# Patient Record
Sex: FEMALE | Race: WHITE | NOT HISPANIC OR LATINO | Employment: OTHER | URBAN - METROPOLITAN AREA
[De-identification: names, ages, dates, MRNs, and addresses within clinical notes are randomized per-mention and may not be internally consistent; named-entity substitution may affect disease eponyms.]

---

## 2017-01-13 ENCOUNTER — APPOINTMENT (OUTPATIENT)
Dept: MEDICAL GROUP | Facility: MEDICAL CENTER | Age: 82
End: 2017-01-13
Payer: MEDICARE

## 2017-01-30 ENCOUNTER — OFFICE VISIT (OUTPATIENT)
Dept: MEDICAL GROUP | Facility: MEDICAL CENTER | Age: 82
End: 2017-01-30
Payer: MEDICARE

## 2017-01-30 VITALS
SYSTOLIC BLOOD PRESSURE: 122 MMHG | HEIGHT: 64 IN | OXYGEN SATURATION: 97 % | HEART RATE: 67 BPM | BODY MASS INDEX: 27.66 KG/M2 | TEMPERATURE: 98.6 F | DIASTOLIC BLOOD PRESSURE: 64 MMHG | WEIGHT: 162 LBS

## 2017-01-30 DIAGNOSIS — Z96.659 HISTORY OF TOTAL KNEE ARTHROPLASTY, UNSPECIFIED LATERALITY: ICD-10-CM

## 2017-01-30 DIAGNOSIS — G47.00 INSOMNIA, UNSPECIFIED TYPE: Chronic | ICD-10-CM

## 2017-01-30 DIAGNOSIS — M81.0 POSTMENOPAUSAL BONE LOSS: ICD-10-CM

## 2017-01-30 DIAGNOSIS — M25.561 PAIN IN BOTH KNEES, UNSPECIFIED CHRONICITY: ICD-10-CM

## 2017-01-30 DIAGNOSIS — M25.562 PAIN IN BOTH KNEES, UNSPECIFIED CHRONICITY: ICD-10-CM

## 2017-01-30 PROCEDURE — 99214 OFFICE O/P EST MOD 30 MIN: CPT | Performed by: INTERNAL MEDICINE

## 2017-01-30 RX ORDER — CELECOXIB 100 MG/1
100 CAPSULE ORAL 2 TIMES DAILY
COMMUNITY
End: 2017-05-22

## 2017-01-30 RX ORDER — TRAMADOL HYDROCHLORIDE 50 MG/1
50 TABLET ORAL 2 TIMES DAILY PRN
Qty: 60 TAB | Refills: 5 | Status: SHIPPED
Start: 2017-01-30 | End: 2017-04-04

## 2017-01-30 RX ORDER — TRAZODONE HYDROCHLORIDE 50 MG/1
50 TABLET ORAL NIGHTLY PRN
Qty: 30 TAB | Refills: 3 | Status: SHIPPED
Start: 2017-01-30 | End: 2017-04-04

## 2017-01-30 NOTE — MR AVS SNAPSHOT
"        Yvonne Marie Wada   2017 1:40 PM   Office Visit   MRN: [id number]    Department:  [facility]   Dept Phone:  [phone]    Description:  Female : 1935   Provider:  Tee Tran M.D.           Allergies as of 2017     Allergen Noted Reactions    Codeine 2012   Nausea    Synthetic codones ok      You were diagnosed with     History of total knee arthroplasty, unspecified laterality   [680958]       Pain in both knees, unspecified chronicity   [9145331]       Insomnia, unspecified type   [7843397]       Postmenopausal bone loss   [127743]         Vital Signs     Blood Pressure Pulse Temperature Height Weight Body Mass Index    122/64 mmHg 67 37 °C (98.6 °F) 1.626 m (5' 4\") 73.483 kg (162 lb) 27.79 kg/m2    Oxygen Saturation Smoking Status                97% Never Smoker           Basic Information     Date Of Birth Sex Race Ethnicity Preferred Language    1935 Female White Non- English      Problem List              ICD-10-CM Priority Class Noted - Resolved    Status post lumbar surgery (Chronic) Z98.890   2010 - Present    Hypertension (Chronic) I10   2010 - Present    Dyslipidemia (Chronic) E78.5   2010 - Present    S/P TOMY (total abdominal hysterectomy) (Chronic) Z90.710   2010 - Present    Preventative health care (Chronic) Z00.00   2010 - Present    History of total knee arthroplasty Z96.659   2010 - Present    S/P appendectomy Z90.49   2010 - Present    Osteoporosis, idiopathic (Chronic) M81.8   2010 - Present    Hypothyroid (Chronic) E03.9   2011 - Present    History of shingles Z86.19   2011 - Present    GERD (gastroesophageal reflux disease) (Chronic) K21.9   2012 - Present    Cervical pain M54.2   2014 - Present    S/P thoracic aortic aneurysm repair (Chronic) Z98.890, Z86.79   3/29/2014 - Present    Decreased GFR R94.4   4/15/2016 - Present    Lower urinary tract infectious disease N39.0   " 7/10/2016 - Present    UTI (urinary tract infection) N39.0   11/12/2016 - Present    Insomnia (Chronic) G47.00   1/30/2017 - Present      Health Maintenance        Date Due Completion Dates    IMM DTaP/Tdap/Td Vaccine (1 - Tdap) 11/29/1955 ---    PAP SMEAR 11/29/1957 ---    MAMMOGRAM 5/22/2016 5/22/2015, 4/16/2014, 9/20/2012, 9/13/2011, 8/9/2010, 8/9/2010, 11/19/2008, 11/19/2008, 10/24/2007, 10/24/2007, 9/28/2006, 5/23/2005, 5/21/2004    IMM INFLUENZA (1) 9/1/2016 10/15/2015, 10/1/2014    COLONOSCOPY 6/26/2019 6/26/2009 (Prv Comp), 12/11/2003    Override on 6/26/2009: Previously completed ([facility])    BONE DENSITY 2/2/2020 2/2/2015, 9/20/2012, 8/9/2010            Current Immunizations     13-VALENT PCV PREVNAR 4/14/2015    Influenza Vaccine Adult HD 10/15/2015  9:34 AM, 10/1/2014    Pneumococcal polysaccharide vaccine (PPSV-23) 10/9/2004    SHINGLES VACCINE 9/9/2011      Below and/or attached are the medications your provider expects you to take. Review all of your home medications and newly ordered medications with your provider and/or pharmacist. Follow medication instructions as directed by your provider and/or pharmacist. Please keep your medication list with you and share with your provider. Update the information when medications are discontinued, doses are changed, or new medications (including over-the-counter products) are added; and carry medication information at all times in the event of emergency situations     Allergies:  CODEINE - Nausea               Medications  Valid as of: January 30, 2017 -  2:28 PM    Generic Name Brand Name Tablet Size Instructions for use    Aspirin (Tab)  MG Take 1 Tab by mouth 2 Times a Day.        Atorvastatin Calcium (Tab) LIPITOR 20 MG Take 20 mg by mouth every evening.        Benazepril HCl (Tab) LOTENSIN 40 MG Take 1 Tab by mouth every day.        Calcium Carbonate-Vitamin D   Take 1 Tab by mouth every day.        Celecoxib (Cap) CELEBREX 100 MG Take 100 mg by mouth  2 times a day.        Cholecalciferol (Tab) cholecalciferol 1000 UNIT Take 1,000 Units by mouth every day.        Lansoprazole (CAPSULE DELAYED RELEASE) PREVACID 15 MG Take 15 mg by mouth 1 time daily as needed.        Levothyroxine Sodium (Tab) SYNTHROID 75 MCG Take 1 Tab by mouth Every morning on an empty stomach.        Magnesium Gluconate (Tab) MAG-G 500 MG Take 500 mg by mouth every day.        Metoprolol Succinate (TABLET SR 24 HR) TOPROL XL 25 MG TAKE 1/2 TABLET DAILY        Multiple Vitamins-Minerals   Take 1 Tab by mouth every day.        TraMADol HCl (Tab) ULTRAM 50 MG Take 1 Tab by mouth 2 times a day as needed for Moderate Pain.        TraZODone HCl (Tab) DESYREL 50 MG Take 1 Tab by mouth at bedtime as needed for Sleep.        .                 Medicines prescribed today were sent to:     [id number] - [place] - [address]    [address] [place] [address]    Phone: [phone] Fax: [phone]    Open 24 Hours?: No    [facility] [place], [place] - [address] AT PORTAL TO REGISTERED [facility] SIT[facility]    [address] [facility] [address]    Phone: [phone] Fax: [phone]    Open 24 Hours?: No      Medication refill instructions:       If your prescription bottle indicates you have medication refills left, it is not necessary to call your provider’s office. Please contact your pharmacy and they will refill your medication.    If your prescription bottle indicates you do not have any refills left, you may request refills at any time through one of the following ways: The online [employer] system (except Urgent Care), by calling your provider’s office, or by asking your pharmacy to contact your provider’s office with a refill request. Medication refills are processed only during regular business hours and may not be available until the next business day. Your provider may request additional information or to have a follow-up visit with you prior to  refilling your medication.   *Please Note: Medication refills are assigned a new Rx number when refilled electronically. Your pharmacy may indicate that no refills were authorized even though a new prescription for the same medication is available at the pharmacy. Please request the medicine by name with the pharmacy before contacting your provider for a refill.        Your To Do List     Future Labs/Procedures Complete By Expires    DS-BONE DENSITY STUDY (DEXA)  As directed 8/2/2017      Other Notes About Your Plan     1/30/17 dexa ordered  1/30/17 trazodone for sleep, tramadol for pain continue Celebrex  4/17 Need all labs  Repeat pneumovax               MyChart Status: Patient Declined

## 2017-01-30 NOTE — PROGRESS NOTES
Subjective:      Yvonne Marie Wada is a 81 y.o. female who presents with insomnia        HPI     Has had recent knee surgery x 2, most recent in november, ambulating using cane and no falls, no swelling, does have more pain at night right knee since the surgery, taking celebrex daily once with no GI upset or irritation. No blood in stools.  Denies anxiety or depression.  Difficulty with sleep maintenance because of knee pain.  Does not take hydrocodone.  Takes occasional Aleve, no GI upset with Celebrex or Aleve.  No incontinence of bowel or bladder  No hip pain or back pain  Right knee pain can be sharp in nature, usually worse with activity or laying on the right side, no radiation down her legs, improved with rest  No swelling, no redness of the right knee, no open sores or lesions, no fevers or chills  Hypertension followed by cardiology has been stable on Lotensin and metoprolol  Dyslipidemia on Lipitor no muscle pain or muscle aches with statin therapy  No tobacco, no alcohol        Current Outpatient Prescriptions   Medication Sig Dispense Refill   • celecoxib (CELEBREX) 100 MG Cap Take 100 mg by mouth 2 times a day.     • atorvastatin (LIPITOR) 20 MG Tab Take 20 mg by mouth every evening.     • naproxen (ALEVE) 220 MG tablet Take 440 mg by mouth 2 times a day as needed.     • aspirin (ASA) 325 MG Tab Take 1 Tab by mouth 2 Times a Day. 60 Tab 3   • lansoprazole (PREVACID) 15 MG CAPSULE DELAYED RELEASE Take 15 mg by mouth 1 time daily as needed.     • levothyroxine (SYNTHROID) 75 MCG Tab Take 1 Tab by mouth Every morning on an empty stomach. 90 Tab 3   • benazepril (LOTENSIN) 40 MG tablet Take 1 Tab by mouth every day. 90 Tab 3   • metoprolol SR (TOPROL XL) 25 MG TABLET SR 24 HR TAKE 1/2 TABLET DAILY 45 Tab 3   • magnesium gluconate (MAG-G) 500 MG tablet Take 500 mg by mouth every day.     • Multiple Vitamins-Minerals (CENTRUM SILVER ULTRA WOMENS PO) Take 1 Tab by mouth every day.     • vitamin D  (CHOLECALCIFEROL) 1000 UNIT TABS Take 1,000 Units by mouth every day.     • Calcium Carbonate-Vitamin D (CALTRATE 600+D PO) Take 1 Tab by mouth every day.       No current facility-administered medications for this visit.     Patient Active Problem List    Diagnosis Date Noted   • UTI (urinary tract infection) 11/12/2016   • Lower urinary tract infectious disease 07/10/2016   • Decreased GFR 04/15/2016   • Arthritis of knee, right 03/02/2016   • S/P thoracic aortic aneurysm repair 03/29/2014   • Cervical pain 02/28/2014   • GERD (gastroesophageal reflux disease) 07/12/2012   • History of shingles 06/30/2011   • Hypothyroid 04/08/2011   • Osteoporosis, idiopathic 08/09/2010   • Status post lumbar surgery 07/26/2010   • Hypertension 07/26/2010   • Dyslipidemia 07/26/2010   • S/P TOMY (total abdominal hysterectomy) 07/26/2010   • Preventative health care 07/26/2010   • History of total knee arthroplasty 07/26/2010   • S/P appendectomy 07/26/2010         Arthritis  2/29/16  orthopedics x-ray right knee advanced DJD on the right knee corticosteroid injection performed, prescription voltaren gel  6/13/16  orthopedic note x-rays demonstrate right knee advanced DJD and resurfaced patella, offer right knee total replacement followed by patellar resurfacing after she recovers from her right knee    Cervical pain  2/10/14 [facility] note; x-ray cervical spine DJD, right shoulder steroid injection    Decreased GFR  5/31/09 bun 18,creat 1.2  1/14/10 bun 28,creat 1.3,GFR 40  9/20/12 bun 37,creat 1.1,GFR 48  9/25/13 bun 25,creat 1.2,GFR 44  9/26/14 bun 26,creat 1.1,GFR 45  2/23/15 bun 20,creat 1.1,GFR 48  4/15/16 bun 31,creat 1.1,GFR 45  7/6/16 bun 29,creat 1.1,GFR 44     Dyslipidemia  4/11 chol 159,trig 84,hdl 47,ldl 95,cpk 114  7/12 chol 163,trig 120,hdl 49,ldl 90, crp 1.1 on lipitor 20 mg  3/26/13 chol 159,trig 99,hdl 46,ldl 93 on lipitor 20 mg  9/25/13 chol 164,trig 100,hdl 47,ldl 97 on lipitor 20 mg  9/12/14 cardiology  note lower extremity weakness, hold lipitor x3 months, labs ordered  12/15/14 cardiology start low dose lipitor 10 mg  1/22/15 off lipitor will resume 10 mg and repeat labs 4 weeks  2/24/15 chol 179,trig 111,hdl 54,ldl 103 on lipitor 10 mg  2/1/16 cardiology note restart lipitor 20 mg   4/15/16 chol 163,trig 102,hdl 48,ldl 95 on lipitor 20 mg    Gastroesophageal reflux  6/07 EGD per [facility] hiatal hernia, start prevacid 30 mg  7/12 protonix 20 mg qday  11/16/12 [facility] note cont prilosec  1/5/16 change to protonix 40 mg from prilosec    History shingles    History total knee arthroplasty  4/10 MRI right knee complex tear medial meniscus, horizontal cleavage tear lateral meniscus, chondromalacia patella, moderate-sized baker's cyst  5/10 right meniscus knee repair   5/10 told by  had gout on surgery had pathology report, I await that report, question gout seen on pathology report done during repair of right meniscus knee surgery.  7/10 uric acid 6.3, we'll await starting allopurinol depending on the operative report  8/5/10 reviewed orthopedics notes , operative report indicates crystal deposition, could be gout or pseudogout, no biopsy results, suspect chondrocalcinosis  10/11 dr.parlasca najera note, MRI pending  8/7/10 reviewed pathology report right knee tissue, marked degenerative changes with focal calcification, no mention of gout. Based on this and a normal uric acid level, I do not believe she has gout, has no hyperuricemia medications would be indicated  10/11 dr.parlasca najera left knee meniscectomy and arthroscopy  1/12 dr.parlasca najera left knee arthroplasty  6/13/16  orthopedic note x-rays demonstrate right knee advanced DJD and resurfaced patella, offer right knee total replacement followed by patellar resurfacing after she recovers from her right knee  9/8/16  orthopedic's operative note right knee total arthroplasty  11/2/16  orthopedic note, follow-up  right knee, x-ray right knee shows subluxation patella, traumatic evulsion VMO needs reexploration and repair  11/10/16  orthopedic's operative note VMO quadriceps avulsion repair status post total knee replacement  11/23/16  orthopedic no follow-up quadriceps repair, continue crutches in full extension, follow-up one month and then start passive range of motion 0-40°    Hypertension  Sees cardiology  1/10/11 [facility] note cardiac catheterization normal systolic function  3/11 [facility] echo moderate aortic insufficiency, moderate mitral insufficiency, moderate tricuspid insufficiency, normal LV function  5/12 echo [facility] normal LV function EF 60%, moderate to severe left atrial enlargement, RVSP 32   9/26/13 CT thoracic aorta no evidence of aneurysm, small hiatal hernia  9/26/13 Persantine thallium uniform breast tissue attenuation, cannot rule out underlying ischemic, LVEF 67%  10/3/13 on toprol-XL 25 mg half a tablet daily   12/13/13 cardiology note cont same meds, repeat echo and follow up 6 months  1/2/14 echo mild LVH, grade 1 diastolic dysfunction, ascending aortic 4.0, no change compared with ZAK 2010  5/15/14 cardiology note; increase benazepril to 40 mg qday,continue toprol XL 25 mg daily  6/17/15 cardiology note continue meds, repeat echo 6 months  2/1/16 cardiology note moderate pulmonary hypertension and snoring, nocturnal pulse oximetry ordered  6/30/16 cardiology note patient declines overnight pulse oximetry    Hypothyroid  4/11 tsh 9 start synthroid 50  4/11 tsh 6,free t3 3.1,free t4 1.2,tpo negative  7/1/11 tsh 2.1 cont synthroid 50 mcg  7/12 tsh 3.4 cont synthroid 50 mcg  7/31/13 tsh 3.2 on synthroid 50 mcg  9/25/13 tsh 1.7 on synthroid 50 mcg  2/24/15 tsh 4.9 on synthroid 50 mcg; increase to synthroid 75 mcg, repeat tsh in 6 weeks  4/14/15 tsh 1.1 on synthroid 75 mcg  4/15/16 tsh 2.3 on synthroid 75 mcg    Osteoporosis  Had been on fosamax [id number]   8/10 dexa LS -2.0,hip -1.5 await old dexa  result, she will get copy for me  4/11 vit d 35  9/12 dexa LS -3.2,hip -1.4  2/13/13 reclast IV  7/31/13 vit d 33 cont 2000 units  2/18/14 reclast IV  2/2/15 dexa LS -3.1,hip -1.9; FRAX 35.5 % major,12.6% hip  2/18/15 reclast IV  2/24/15 vit d 33  4/15/16 vit d 36    Preventative health  6/27/09 colon per [facility] no records  10/19/04 pneumovax   9/9/11 zoster vaccine  2/2/15 dexa LS -3.1,hip -1.9; FRAX 35.5 % major,12.6% hip  4/14/15 prevnar  5/22/15 mammogram  4/15/16 vit d 36    Status post TOMY    Status post thoracic aneurysm repair  6/29/06 CT-CTA chest with and without postprocessing; stable ascending thoracic aortic aneurysm maximum diameter 4.8, just distal to the aortic valve maximum diameter 4.3  4/9/07 CT-CTA chest with and without processing; stable ascending aortic thoracic aneurysm 4.5 x 4.6 cm  6/8/09 CT chest with; stable 4.7 ascending aorta aneurysm  10/15/09 CT chest without; dilated ascending thoracic aorta 4.9 cm aneurysm increased from 4.7  1/11/10  cardiovascular surgery operative note ascending thoracic aorta aneurysm repair  9/26/13 CT thoracic aorta evaluation; status post repair ascending thoracic aortic aneurysm without evidence of dissection or leak  1/2/14 echo mild LVH, grade 1 diastolic dysfunction, ascending aortic 4.0, no change compared with ZAK 2010  5/15/14 cardiology note  6/17/15 cardiology note cont meds,repeat echo 6 months  2/1/16 cardiology note moderate pulmonary hypertension and snoring, nocturnal pulse oximetry ordered    Status post lumbar surgery  6/03 L4-5 epidural    7/06  spinal surgery operative note decompression, foraminotomy. L4-L5 posterior fusion, L4-L5 allograft   3/5/14 MRI lumbar spine grade 2 spondylolisthesis L4-L5 with previous laminectomy and posterior fusion, left sided pedicle screw fixation L4-L5, moderate central canal stenosis L5-S1 secondary to facet arthropathy, mild to moderate multilevel neural foraminal  "narrowing  12/8/14  pain [facility] note; right lower extremity radiculitis L4-L5 lesion, myofascial lumbosacral pain, trochanteric bursitis, followup as needed  4/2/15  pain procedure note right L4-L5 and right L5-S1 SSNRB under fluoroscopy  4/27/15  pain note; right trochanteric bursal injection, trigger point injections performed, also set up SI joint injections  8/3/15 [facility] neurosurgery note, lumbar x-ray flexion and extension, MRI lumbar spine without contrast, followup   8/9/15 MRI lumbar spine, previous L4-L5 left  fusion and laminectomy, L3-L4 moderate bilateral facet arthropathy, mild disc bulge, L4-L5 severe bilateral facet arthropathy, moderate to severe bilateral neural foraminal stenosis, L5-S1 moderate facet arthropathy  8/28/15  neurosurgery note previous posterior fusion L4-L5, does have L3-L4 disc bulge with central canal stenosis, recommend L3-L5 decompression  8/31/15  pain note; right lower extremity radiculitis, myofascial lumbosacral pain, start hydrocodone 5 mg, continued SI joint exercises, perform trochanteric bursal injection right hip, trigger points lumbar region  10/28/15  neurosurgery note, will schedule for posterior L3-L4 laminectomy and fusion with redo L4-5 laminectomy and exploration of fusion, surgical clearance per cardiology   12/9/15 [facility] neurosurgery note s/p L4-S1 fusion on 11/24/15 , follow up in 4 weeks  12/23/15  neurosurgery note, clear to restart physical therapy if she would like after one month, follow-up 3 months             ROS       Objective:     /64 mmHg  Pulse 67  Temp(Src) 37 °C (98.6 °F)  Ht 1.626 m (5' 4\")  Wt 73.483 kg (162 lb)  BMI 27.79 kg/m2  SpO2 97%     Physical Exam   Constitutional: She appears well-developed and well-nourished. No distress.   HENT:   Head: Normocephalic and atraumatic.   Eyes: Conjunctivae are normal. Right eye exhibits no discharge. Left eye " exhibits no discharge.   Neck: No thyromegaly present.   Cardiovascular: Normal rate and regular rhythm.    Murmur heard.  Pulmonary/Chest: Effort normal and breath sounds normal. No respiratory distress. She has no wheezes.   Abdominal: She exhibits no distension.   Musculoskeletal: She exhibits no edema.   Neurological: She is alert.   Skin: Skin is warm. She is not diaphoretic.   Psychiatric: She has a normal mood and affect. Her behavior is normal.   Nursing note and vitals reviewed.    Limited right knee range of motion flexion and extension, no edema, incision site clean, dry, intact  Mild tenderness over the patellar tendon          Assessment/Plan:     Assessment  #!  Status post right knee surgery ×2 with most recent in November by Dr. manzo orthopedics, not going to physical therapy, still with knee pain and limited range of motion using cane for ambulation  9/8/16  orthopedic's operative note right knee total arthroplasty  11/2/16  orthopedic note, follow-up right knee, x-ray right knee shows subluxation patella, traumatic evulsion VMO needs reexploration and repair  11/10/16  orthopedic's operative note VMO quadriceps avulsion repair status post total knee replacement    #2 insomnia related to knee pain, no evidence of depression    #3 hypertension stable on Lotensin and Toprol    #4 osteoporosis on reclast did not get injection last year    Plan  #1 trazodone 50 every evening as needed for insomnia medication may cause daytime drowsiness, fall precautions    #2 add tramadol 50 mg twice a day as needed, may cause lightheadedness, dizziness, headache, nausea, drowsiness    #3 continue Celebrex    #4 do not take Aleve or Advil with Celebrex     #5 repeat bone density    #6 declines mammogram    #7 influenza vaccine at pharmacy    #8 no alcohol with medication

## 2017-02-26 DIAGNOSIS — Z86.79 S/P THORACIC AORTIC ANEURYSM REPAIR: ICD-10-CM

## 2017-02-26 DIAGNOSIS — Z98.890 S/P THORACIC AORTIC ANEURYSM REPAIR: ICD-10-CM

## 2017-02-26 DIAGNOSIS — I35.1 AORTIC VALVE REGURGITATION, ACQUIRED: ICD-10-CM

## 2017-02-26 DIAGNOSIS — I10 ESSENTIAL HYPERTENSION: ICD-10-CM

## 2017-02-26 RX ORDER — BENAZEPRIL HYDROCHLORIDE 40 MG/1
40 TABLET ORAL DAILY
Qty: 90 TAB | Refills: 3 | Status: SHIPPED | OUTPATIENT
Start: 2017-02-26 | End: 2017-06-07 | Stop reason: SDUPTHER

## 2017-03-21 DIAGNOSIS — I10 ESSENTIAL HYPERTENSION: ICD-10-CM

## 2017-03-21 RX ORDER — METOPROLOL SUCCINATE 25 MG/1
TABLET, EXTENDED RELEASE ORAL
Qty: 45 TAB | Refills: 3 | Status: SHIPPED | OUTPATIENT
Start: 2017-03-21 | End: 2017-11-01

## 2017-04-04 ENCOUNTER — OFFICE VISIT (OUTPATIENT)
Dept: MEDICAL GROUP | Facility: MEDICAL CENTER | Age: 82
End: 2017-04-04
Payer: MEDICARE

## 2017-04-04 VITALS
HEART RATE: 66 BPM | OXYGEN SATURATION: 96 % | HEIGHT: 64 IN | DIASTOLIC BLOOD PRESSURE: 58 MMHG | WEIGHT: 156 LBS | BODY MASS INDEX: 26.63 KG/M2 | TEMPERATURE: 98.1 F | SYSTOLIC BLOOD PRESSURE: 122 MMHG

## 2017-04-04 DIAGNOSIS — G89.29 CHRONIC PAIN OF RIGHT KNEE: ICD-10-CM

## 2017-04-04 DIAGNOSIS — G47.00 INSOMNIA, UNSPECIFIED TYPE: Chronic | ICD-10-CM

## 2017-04-04 DIAGNOSIS — G89.29 CHRONIC RIGHT SHOULDER PAIN: ICD-10-CM

## 2017-04-04 DIAGNOSIS — M25.561 CHRONIC PAIN OF RIGHT KNEE: ICD-10-CM

## 2017-04-04 DIAGNOSIS — M25.511 CHRONIC RIGHT SHOULDER PAIN: ICD-10-CM

## 2017-04-04 PROCEDURE — 99213 OFFICE O/P EST LOW 20 MIN: CPT | Performed by: INTERNAL MEDICINE

## 2017-04-04 PROCEDURE — 1101F PT FALLS ASSESS-DOCD LE1/YR: CPT | Performed by: INTERNAL MEDICINE

## 2017-04-04 PROCEDURE — G8432 DEP SCR NOT DOC, RNG: HCPCS | Performed by: INTERNAL MEDICINE

## 2017-04-04 PROCEDURE — 1036F TOBACCO NON-USER: CPT | Performed by: INTERNAL MEDICINE

## 2017-04-04 PROCEDURE — G8419 CALC BMI OUT NRM PARAM NOF/U: HCPCS | Performed by: INTERNAL MEDICINE

## 2017-04-04 PROCEDURE — 4040F PNEUMOC VAC/ADMIN/RCVD: CPT | Performed by: INTERNAL MEDICINE

## 2017-04-04 RX ORDER — NAPROXEN 500 MG/1
500 TABLET ORAL 2 TIMES DAILY PRN
Qty: 60 TAB | Refills: 5 | Status: SHIPPED | OUTPATIENT
Start: 2017-04-04 | End: 2017-11-01

## 2017-04-04 RX ORDER — TIZANIDINE 4 MG/1
4 TABLET ORAL NIGHTLY PRN
Qty: 30 TAB | Refills: 3 | Status: SHIPPED
Start: 2017-04-04 | End: 2017-09-18

## 2017-04-04 NOTE — PROGRESS NOTES
Subjective:      Yvonne Marie Wada is a 81 y.o. female who presents with arm pain           HPI    Has had knee surgery right side most recent right total knee replacement in november, not going to physical therapy, due to the knee pain has been using her arms to go from sitting to standing and that has been bothering her shoulders. Right handed female.  She will have right upper arm pain especially with lying on her back at night.  No radiation below the elbow.  No trauma to the area.  No sensory changes hands.  No neck pain or cervical radiculopathy.  No incontinence of bowel or bladder.  Pain can be sharp at times, but more often like a cramp with activity and movement of her shoulder.  Has been trying Aleve which has been beneficial.  2 tablets Aleve per day without any GI upset.  Tramadol caused nausea.  Trazodone did cause daytime drowsiness for insomnia, she is no longer on those medication.  Chronic right knee pain status post right knee surgery ×2 last year initial surgery performed on 9/8/16  orthopedic's operative note right knee total arthroplasty, with second surgery performed on 11/10/16  orthopedic's operative note VMO quadriceps avulsion repair status post total knee replacement.  No leg swelling.  No hip pain or low back pain.  Pain is worse with ambulation, right knee pain can be sharp, no radiation.  No sensory changes.  Worse with prolonged walking or standing  Cane for ambulation no falls  No tobacco        Current Outpatient Prescriptions   Medication Sig Dispense Refill   • metoprolol SR (TOPROL XL) 25 MG TABLET SR 24 HR TAKE 1/2 TABLET DAILY 45 Tab 3   • benazepril (LOTENSIN) 40 MG tablet Take 1 Tab by mouth every day. 90 Tab 3   • trazodone (DESYREL) 50 MG Tab Take 1 Tab by mouth at bedtime as needed for Sleep. 30 Tab 3   • tramadol (ULTRAM) 50 MG Tab Take 1 Tab by mouth 2 times a day as needed for Moderate Pain. 60 Tab 5   • atorvastatin (LIPITOR) 20 MG Tab Take 20 mg by mouth  every evening.     • aspirin (ASA) 325 MG Tab Take 1 Tab by mouth 2 Times a Day. 60 Tab 3   • lansoprazole (PREVACID) 15 MG CAPSULE DELAYED RELEASE Take 15 mg by mouth 1 time daily as needed.     • levothyroxine (SYNTHROID) 75 MCG Tab Take 1 Tab by mouth Every morning on an empty stomach. 90 Tab 3   • magnesium gluconate (MAG-G) 500 MG tablet Take 500 mg by mouth every day.     • Multiple Vitamins-Minerals (CENTRUM SILVER ULTRA WOMENS PO) Take 1 Tab by mouth every day.     • vitamin D (CHOLECALCIFEROL) 1000 UNIT TABS Take 1,000 Units by mouth every day.     • Calcium Carbonate-Vitamin D (CALTRATE 600+D PO) Take 1 Tab by mouth every day.     • celecoxib (CELEBREX) 100 MG Cap Take 100 mg by mouth 2 times a day.       No current facility-administered medications for this visit.     Patient Active Problem List    Diagnosis Date Noted   • Insomnia 01/30/2017   • UTI (urinary tract infection) 11/12/2016   • Lower urinary tract infectious disease 07/10/2016   • Decreased GFR 04/15/2016   • S/P thoracic aortic aneurysm repair 03/29/2014   • Cervical pain 02/28/2014   • GERD (gastroesophageal reflux disease) 07/12/2012   • History of shingles 06/30/2011   • Hypothyroid 04/08/2011   • Osteoporosis, idiopathic 08/09/2010   • Status post lumbar surgery 07/26/2010   • Hypertension 07/26/2010   • Dyslipidemia 07/26/2010   • S/P TOMY (total abdominal hysterectomy) 07/26/2010   • Preventative health care 07/26/2010   • History of total knee arthroplasty 07/26/2010   • S/P appendectomy 07/26/2010               Decreased GFR  5/31/09 bun 18,creat 1.2  1/14/10 bun 28,creat 1.3,GFR 40  9/20/12 bun 37,creat 1.1,GFR 48  9/25/13 bun 25,creat 1.2,GFR 44  9/26/14 bun 26,creat 1.1,GFR 45  2/23/15 bun 20,creat 1.1,GFR 48  4/15/16 bun 31,creat 1.1,GFR 45  7/6/16 bun 29,creat 1.1,GFR 44     Dyslipidemia  4/11 chol 159,trig 84,hdl 47,ldl 95,cpk 114  7/12 chol 163,trig 120,hdl 49,ldl 90, crp 1.1 on lipitor 20 mg  3/26/13 chol 159,trig 99,hdl  46,ldl 93 on lipitor 20 mg  9/25/13 chol 164,trig 100,hdl 47,ldl 97 on lipitor 20 mg  9/12/14 cardiology note lower extremity weakness, hold lipitor x3 months, labs ordered  12/15/14 cardiology start low dose lipitor 10 mg  1/22/15 off lipitor will resume 10 mg and repeat labs 4 weeks  2/24/15 chol 179,trig 111,hdl 54,ldl 103 on lipitor 10 mg  2/1/16 cardiology note restart lipitor 20 mg   4/15/16 chol 163,trig 102,hdl 48,ldl 95 on lipitor 20 mg    Gastroesophageal reflux  6/27/07 EGD per [facility] hiatal hernia, start prevacid 30 mg  7/12 protonix 20 mg qday  11/16/12 [facility] note cont prilosec  1/5/16 change to protonix 40 mg from prilosec    History shingles    History of total knee arthroplasty  4/10 MRI right knee complex tear medial meniscus, horizontal cleavage tear lateral meniscus, chondromalacia patella, moderate-sized baker's cyst  5/10 right meniscus knee repair   5/10 told by  had gout on surgery had pathology report, I await that report, question gout seen on pathology report done during repair of right meniscus knee surgery.  7/10 uric acid 6.3, await starting allopurinol depending on the operative report  8/5/10 reviewed orthopedics notes , operative report indicates crystal deposition, could be gout or pseudogout, no biopsy results, suspect chondrocalcinosis  10/11 dr.parlasca najera note, MRI pending  8/7/10 reviewed pathology report right knee tissue, marked degenerative changes with focal calcification, no mention of gout. Based on this and a normal uric acid level, I do not believe she has gout, has no hyperuricemia medications would be indicated  10/11 dr.parlasca najera left knee meniscectomy and arthroscopy  1/12 dr.parlasca najera left knee arthroplasty  2/29/16  orthopedics x-ray right knee advanced DJD on the right knee corticosteroid injection performed, prescription voltaren gel  6/13/16  orthopedic note x-rays demonstrate right knee advanced DJD and  resurfaced patella, offer right knee total replacement followed by patellar resurfacing after she recovers from her right knee  9/8/16  orthopedic's operative note right knee total arthroplasty  11/2/16  orthopedic note, follow-up right knee, x-ray right knee shows subluxation patella, traumatic evulsion VMO needs reexploration and repair  11/10/16  orthopedic's operative note VMO quadriceps avulsion repair status post total knee replacement  11/23/16  orthopedic no follow-up quadriceps repair, continue crutches in full extension, follow-up one month and then start passive range of motion 0-40°  2/4/17  orthopedic note, continue physical therapy, follow-up this summer    Hypertension  Sees cardiology  1/10/11 [facility] note cardiac catheterization normal systolic function  3/11 [facility] echo moderate aortic insufficiency, moderate mitral insufficiency, moderate tricuspid insufficiency, normal LV function  5/12 echo [facility] normal LV function EF 60%, moderate to severe left atrial enlargement, RVSP 32   9/26/13 CT thoracic aorta no evidence of aneurysm, small hiatal hernia  9/26/13 Persantine thallium uniform breast tissue attenuation, cannot rule out underlying ischemic, LVEF 67%  10/3/13 on toprol-XL 25 mg half a tablet daily   12/13/13 cardiology note cont same meds, repeat echo and follow up 6 months  1/2/14 echo mild LVH, grade 1 diastolic dysfunction, ascending aortic 4.0, no change compared with ZAK 2010  5/15/14 cardiology note; increase benazepril to 40 mg qday,continue toprol XL 25 mg daily  6/17/15 cardiology note continue meds, repeat echo 6 months  2/1/16 cardiology note moderate pulmonary hypertension and snoring, nocturnal pulse oximetry ordered  6/30/16 cardiology note patient declines overnight pulse oximetry    Hypothyroid  4/11 tsh 9 start synthroid 50  4/11 tsh 6,free t3 3.1,free t4 1.2,tpo negative  7/1/11 tsh 2.1 cont synthroid 50 mcg  7/12 tsh 3.4 cont synthroid 50  mcg  7/31/13 tsh 3.2 on synthroid 50 mcg  9/25/13 tsh 1.7 on synthroid 50 mcg  2/24/15 tsh 4.9 on synthroid 50 mcg; increase to synthroid 75 mcg, repeat tsh in 6 weeks  4/14/15 tsh 1.1 on synthroid 75 mcg  4/15/16 tsh 2.3 on synthroid 75 mcg    Insomnia  1/30/17 trial of trazodone 50 mg    Osteoporosis  Had been on fosamax [id number]   8/10 dexa LS -2.0,hip -1.5 await old dexa result, she will get copy for me  4/11 vit d 35  9/12 dexa LS -3.2,hip -1.4  2/13/13 reclast IV  7/31/13 vit d 33 cont 2000 units  2/18/14 reclast IV  2/2/15 dexa LS -3.1,hip -1.9; FRAX 35.5 % major,12.6% hip  2/18/15 reclast IV  2/24/15 vit d 33  4/15/16 vit d 36    Status post TOMY    Status post thoracic aneurysm repair  6/29/06 CT-CTA chest with and without postprocessing; stable ascending thoracic aortic aneurysm maximum diameter 4.8, just distal to the aortic valve maximum diameter 4.3  4/9/07 CT-CTA chest with and without processing; stable ascending aortic thoracic aneurysm 4.5 x 4.6 cm  6/8/09 CT chest with; stable 4.7 ascending aorta aneurysm  10/15/09 CT chest without; dilated ascending thoracic aorta 4.9 cm aneurysm increased from 4.7  1/11/10  cardiovascular surgery operative note ascending thoracic aorta aneurysm repair  9/26/13 CT thoracic aorta evaluation; status post repair ascending thoracic aortic aneurysm without evidence of dissection or leak  1/2/14 echo mild LVH, grade 1 diastolic dysfunction, ascending aortic 4.0, no change compared with ZAK 2010  5/15/14 cardiology note  6/17/15 cardiology note cont meds,repeat echo 6 months  2/1/16 cardiology note moderate pulmonary hypertension and snoring, nocturnal pulse oximetry ordered    Status post lumbar surgery  6/03 L4-5 epidural    7/06  spinal surgery operative note decompression, foraminotomy. L4-L5 posterior fusion, L4-L5 allograft   3/5/14 MRI lumbar spine grade 2 spondylolisthesis L4-L5 with previous laminectomy and posterior fusion, left  "sided pedicle screw fixation L4-L5, moderate central canal stenosis L5-S1 secondary to facet arthropathy, mild to moderate multilevel neural foraminal narrowing  12/8/14  pain OMAR note; right lower extremity radiculitis L4-L5 lesion, myofascial lumbosacral pain, trochanteric bursitis, followup as needed  4/2/15  pain procedure note right L4-L5 and right L5-S1 SSNRB under fluoroscopy  4/27/15  pain note; right trochanteric bursal injection, trigger point injections performed, also set up SI joint injections  8/3/15 [facility] neurosurgery note, lumbar x-ray flexion and extension, MRI lumbar spine without contrast, followup   8/9/15 MRI lumbar spine, previous L4-L5 left  fusion and laminectomy, L3-L4 moderate bilateral facet arthropathy, mild disc bulge, L4-L5 severe bilateral facet arthropathy, moderate to severe bilateral neural foraminal stenosis, L5-S1 moderate facet arthropathy  8/28/15  neurosurgery note previous posterior fusion L4-L5, does have L3-L4 disc bulge with central canal stenosis, recommend L3-L5 decompression  8/31/15  pain note; right lower extremity radiculitis, myofascial lumbosacral pain, start hydrocodone 5 mg, continued SI joint exercises, perform trochanteric bursal injection right hip, trigger points lumbar region  10/28/15  neurosurgery note, will schedule for posterior L3-L4 laminectomy and fusion with redo L4-5 laminectomy and exploration of fusion, surgical clearance per cardiology   12/9/15 [facility] neurosurgery note s/p L4-S1 fusion on 11/24/15 , follow up in 4 weeks  12/23/15  neurosurgery note, clear to restart physical therapy if she would like after one month, follow-up 3 months        ROS       Objective:     /58 mmHg  Pulse 66  Temp(Src) 36.7 °C (98.1 °F)  Ht 1.626 m (5' 4.02\")  Wt 70.761 kg (156 lb)  BMI 26.76 kg/m2  SpO2 96%     Physical Exam   Constitutional: She appears well-developed and " well-nourished. No distress.   HENT:   Head: Normocephalic and atraumatic.   Eyes: Conjunctivae are normal. Right eye exhibits no discharge. Left eye exhibits no discharge.   Neck: No JVD present. No thyromegaly present.   Cardiovascular: Normal rate and regular rhythm.    No murmur heard.  Pulmonary/Chest: Effort normal. No respiratory distress. She has no wheezes.   Abdominal: She exhibits no distension.   Musculoskeletal: She exhibits tenderness. She exhibits no edema.   Skin: She is not diaphoretic. No erythema.   Psychiatric: She has a normal mood and affect. Her behavior is normal.   Nursing note and vitals reviewed.    Right shoulder limited range of motion hand behind back, negative Fitch, limited right shoulder abduction 120°, limited right shoulder extension 120°, positive Neer's, normal  strength    Right knee limited extension 150°, cane for ambulation, crepitus right knee             Assessment/Plan:     Assessment  #1 right shoulder pain question rotator cuff syndrome from standing repeatedly with knee pain    #2 chronic knee pain after surgery first in September, second in November as follows:  9/8/16  orthopedic's operative note right knee total arthroplasty  11/10/16  orthopedic's operative note VMO quadriceps avulsion repair status post total knee replacement  Still with right knee pain with ambulation, did not complete physical therapy    #3 insomnia cannot tolerate trazodone    Plan  #! Xray right shoulder     #2 custom physical therapy right shoulder and right knee evaluation and treatment    #3 Zanaflex one by mouth every afternoon, can cause drowsiness, sedation, no alcohol with medication, could not tolerate tramadol, Celebrex not effective    #4 sleep hygiene cannot tolerate trazodone    #5 follow-up in 3 months

## 2017-04-04 NOTE — MR AVS SNAPSHOT
"        Yvonne Marie Wada   2017 2:40 PM   Office Visit   MRN: [id number]    Department:  [facility]   Dept Phone:  [phone]    Description:  Female : 1935   Provider:  Tee Tran M.D.           Allergies as of 2017     Allergen Noted Reactions    Codeine 2012   Nausea    Synthetic codones ok    Trazodone 2017       groggy    Ultram [Tramadol] 2017       nausea      You were diagnosed with     Chronic pain of right knee   [4284282]       Chronic right shoulder pain   [[id number]]         Vital Signs     Blood Pressure Pulse Temperature Height Weight Body Mass Index    122/58 mmHg 66 36.7 °C (98.1 °F) 1.626 m (5' 4.02\") 70.761 kg (156 lb) 26.76 kg/m2    Oxygen Saturation Smoking Status                96% Never Smoker           Basic Information     Date Of Birth Sex Race Ethnicity Preferred Language    1935 Female White Non- English      Problem List              ICD-10-CM Priority Class Noted - Resolved    Status post lumbar surgery (Chronic) Z98.890   2010 - Present    Hypertension (Chronic) I10   2010 - Present    Dyslipidemia (Chronic) E78.5   2010 - Present    S/P TOMY (total abdominal hysterectomy) (Chronic) Z90.710   2010 - Present    Preventative health care (Chronic) Z00.00   2010 - Present    History of total knee arthroplasty Z96.659   2010 - Present    S/P appendectomy Z90.49   2010 - Present    Osteoporosis, idiopathic (Chronic) M81.8   2010 - Present    Hypothyroid (Chronic) E03.9   2011 - Present    History of shingles Z86.19   2011 - Present    GERD (gastroesophageal reflux disease) (Chronic) K21.9   2012 - Present    Cervical pain M54.2   2014 - Present    S/P thoracic aortic aneurysm repair (Chronic) Z98.890, Z86.79   3/29/2014 - Present    Decreased GFR R94.4   4/15/2016 - Present    Lower urinary tract infectious disease N39.0   7/10/2016 - Present    UTI (urinary tract " infection) N39.0   11/12/2016 - Present    Insomnia (Chronic) G47.00   1/30/2017 - Present      Health Maintenance        Date Due Completion Dates    IMM DTaP/Tdap/Td Vaccine (1 - Tdap) 11/29/1955 ---    PAP SMEAR 11/29/1957 ---    MAMMOGRAM 5/22/2016 5/22/2015, 4/16/2014, 9/20/2012, 9/13/2011, 8/9/2010, 8/9/2010, 11/19/2008, 11/19/2008, 10/24/2007, 10/24/2007, 9/28/2006, 5/23/2005, 5/21/2004    COLONOSCOPY 6/26/2019 6/26/2009 (Prv Comp), 12/11/2003    Override on 6/26/2009: Previously completed ([facility])    BONE DENSITY 2/2/2020 2/2/2015, 9/20/2012, 8/9/2010            Current Immunizations     13-VALENT PCV PREVNAR 4/14/2015    Influenza Vaccine Adult HD 10/15/2015  9:34 AM, 10/1/2014    Pneumococcal polysaccharide vaccine (PPSV-23) 10/9/2004    SHINGLES VACCINE 9/9/2011      Below and/or attached are the medications your provider expects you to take. Review all of your home medications and newly ordered medications with your provider and/or pharmacist. Follow medication instructions as directed by your provider and/or pharmacist. Please keep your medication list with you and share with your provider. Update the information when medications are discontinued, doses are changed, or new medications (including over-the-counter products) are added; and carry medication information at all times in the event of emergency situations     Allergies:  CODEINE - Nausea     TRAZODONE - (reactions not documented)     ULTRAM - (reactions not documented)               Medications  Valid as of: April 04, 2017 -  2:57 PM    Generic Name Brand Name Tablet Size Instructions for use    Aspirin (Tab)  MG Take 1 Tab by mouth 2 Times a Day.        Atorvastatin Calcium (Tab) LIPITOR 20 MG Take 20 mg by mouth every evening.        Benazepril HCl (Tab) LOTENSIN 40 MG Take 1 Tab by mouth every day.        Calcium Carbonate-Vitamin D   Take 1 Tab by mouth every day.        Celecoxib (Cap) CELEBREX 100 MG Take 100 mg by mouth 2 times a  day.        Cholecalciferol (Tab) cholecalciferol 1000 UNIT Take 1,000 Units by mouth every day.        Lansoprazole (CAPSULE DELAYED RELEASE) PREVACID 15 MG Take 15 mg by mouth 1 time daily as needed.        Levothyroxine Sodium (Tab) SYNTHROID 75 MCG Take 1 Tab by mouth Every morning on an empty stomach.        Magnesium Gluconate (Tab) MAG-G 500 MG Take 500 mg by mouth every day.        Metoprolol Succinate (TABLET SR 24 HR) TOPROL XL 25 MG TAKE 1/2 TABLET DAILY        Multiple Vitamins-Minerals   Take 1 Tab by mouth every day.        Naproxen (Tab) NAPROSYN 500 MG Take 1 Tab by mouth 2 times a day as needed (pain).        TiZANidine HCl (Tab) ZANAFLEX 4 MG Take 1 Tab by mouth at bedtime as needed.        .                 Medicines prescribed today were sent to:     [id number] - [place], [place] - [address]    [address] [place] [address]    Phone: [phone] Fax: [phone]    Open 24 Hours?: No    [facility] - [place], [place] - [address] AT [address] TO [address] [facility]    [address] [facility] [address]    Phone: [phone] Fax: [phone]    Open 24 Hours?: No      Medication refill instructions:       If your prescription bottle indicates you have medication refills left, it is not necessary to call your provider’s office. Please contact your pharmacy and they will refill your medication.    If your prescription bottle indicates you do not have any refills left, you may request refills at any time through one of the following ways: The online [employer] system (except Urgent Care), by calling your provider’s office, or by asking your pharmacy to contact your provider’s office with a refill request. Medication refills are processed only during regular business hours and may not be available until the next business day. Your provider may request additional information or to have a follow-up visit with you prior to refilling your medication.      *Please Note: Medication refills are assigned a new Rx number when refilled electronically. Your pharmacy may indicate that no refills were authorized even though a new prescription for the same medication is available at the pharmacy. Please request the medicine by name with the pharmacy before contacting your provider for a refill.        Your To Do List     Future Labs/Procedures Complete By Expires    DX-SHOULDER 2+ RIGHT  As directed 10/5/2017      Referral     A referral request has been sent to our patient care coordination department. Please allow 3-5 business days for us to process this request and contact you either by phone or mail. If you do not hear from us by the 5th business day, please call us at [phone].        Other Notes About Your Plan     1/30/17 dexa ordered  1/30/17 trazodone for sleep, tramadol for pain continue Celebrex  4/17 Need all labs  Repeat pneumovax                     MyChart Status: Patient Declined

## 2017-04-05 ENCOUNTER — HOSPITAL ENCOUNTER (OUTPATIENT)
Dept: RADIOLOGY | Facility: MEDICAL CENTER | Age: 82
End: 2017-04-05
Attending: INTERNAL MEDICINE
Payer: MEDICARE

## 2017-04-05 ENCOUNTER — TELEPHONE (OUTPATIENT)
Dept: MEDICAL GROUP | Facility: MEDICAL CENTER | Age: 82
End: 2017-04-05

## 2017-04-05 DIAGNOSIS — G89.29 CHRONIC RIGHT SHOULDER PAIN: ICD-10-CM

## 2017-04-05 DIAGNOSIS — M25.511 CHRONIC RIGHT SHOULDER PAIN: ICD-10-CM

## 2017-04-05 PROBLEM — M25.519 SHOULDER PAIN: Status: ACTIVE | Noted: 2017-04-05

## 2017-04-05 PROCEDURE — 73030 X-RAY EXAM OF SHOULDER: CPT | Mod: RT

## 2017-04-26 DIAGNOSIS — E03.9 HYPOTHYROIDISM, UNSPECIFIED TYPE: Chronic | ICD-10-CM

## 2017-04-26 DIAGNOSIS — E78.5 DYSLIPIDEMIA: Chronic | ICD-10-CM

## 2017-04-26 DIAGNOSIS — M81.8 OSTEOPOROSIS, IDIOPATHIC: Chronic | ICD-10-CM

## 2017-04-26 DIAGNOSIS — I10 ESSENTIAL HYPERTENSION: Chronic | ICD-10-CM

## 2017-04-26 RX ORDER — LEVOTHYROXINE SODIUM 0.07 MG/1
75 TABLET ORAL
Qty: 90 TAB | Refills: 0 | Status: SHIPPED | OUTPATIENT
Start: 2017-04-26 | End: 2017-08-20 | Stop reason: SDUPTHER

## 2017-04-26 NOTE — TELEPHONE ENCOUNTER
Please notify patient that she is due for fasting blood tests, the orders have been placed in the computer system

## 2017-05-11 ENCOUNTER — HOSPITAL ENCOUNTER (OUTPATIENT)
Dept: LAB | Facility: MEDICAL CENTER | Age: 82
End: 2017-05-11
Attending: INTERNAL MEDICINE
Payer: MEDICARE

## 2017-05-11 DIAGNOSIS — E78.5 DYSLIPIDEMIA: Chronic | ICD-10-CM

## 2017-05-11 DIAGNOSIS — E03.9 HYPOTHYROIDISM, UNSPECIFIED TYPE: Chronic | ICD-10-CM

## 2017-05-11 DIAGNOSIS — M81.8 OSTEOPOROSIS, IDIOPATHIC: Chronic | ICD-10-CM

## 2017-05-11 LAB
25(OH)D3 SERPL-MCNC: 36 NG/ML (ref 30–100)
ALBUMIN SERPL BCP-MCNC: 4 G/DL (ref 3.2–4.9)
ALBUMIN/GLOB SERPL: 1.3 G/DL
ALP SERPL-CCNC: 63 U/L (ref 30–99)
ALT SERPL-CCNC: 13 U/L (ref 2–50)
ANION GAP SERPL CALC-SCNC: 7 MMOL/L (ref 0–11.9)
AST SERPL-CCNC: 17 U/L (ref 12–45)
BASOPHILS # BLD AUTO: 1.5 % (ref 0–1.8)
BASOPHILS # BLD: 0.07 K/UL (ref 0–0.12)
BILIRUB SERPL-MCNC: 0.4 MG/DL (ref 0.1–1.5)
BUN SERPL-MCNC: 35 MG/DL (ref 8–22)
CALCIUM SERPL-MCNC: 10.1 MG/DL (ref 8.5–10.5)
CHLORIDE SERPL-SCNC: 108 MMOL/L (ref 96–112)
CHOLEST SERPL-MCNC: 186 MG/DL (ref 100–199)
CO2 SERPL-SCNC: 23 MMOL/L (ref 20–33)
CREAT SERPL-MCNC: 1.17 MG/DL (ref 0.5–1.4)
EOSINOPHIL # BLD AUTO: 0.31 K/UL (ref 0–0.51)
EOSINOPHIL NFR BLD: 6.6 % (ref 0–6.9)
ERYTHROCYTE [DISTWIDTH] IN BLOOD BY AUTOMATED COUNT: 45.7 FL (ref 35.9–50)
GFR SERPL CREATININE-BSD FRML MDRD: 44 ML/MIN/1.73 M 2
GLOBULIN SER CALC-MCNC: 3 G/DL (ref 1.9–3.5)
GLUCOSE SERPL-MCNC: 97 MG/DL (ref 65–99)
HCT VFR BLD AUTO: 39.2 % (ref 37–47)
HDLC SERPL-MCNC: 47 MG/DL
HGB BLD-MCNC: 12.4 G/DL (ref 12–16)
IMM GRANULOCYTES # BLD AUTO: 0.01 K/UL (ref 0–0.11)
IMM GRANULOCYTES NFR BLD AUTO: 0.2 % (ref 0–0.9)
LDLC SERPL CALC-MCNC: 119 MG/DL
LYMPHOCYTES # BLD AUTO: 1.68 K/UL (ref 1–4.8)
LYMPHOCYTES NFR BLD: 35.7 % (ref 22–41)
MCH RBC QN AUTO: 29.3 PG (ref 27–33)
MCHC RBC AUTO-ENTMCNC: 31.6 G/DL (ref 33.6–35)
MCV RBC AUTO: 92.7 FL (ref 81.4–97.8)
MONOCYTES # BLD AUTO: 0.44 K/UL (ref 0–0.85)
MONOCYTES NFR BLD AUTO: 9.3 % (ref 0–13.4)
NEUTROPHILS # BLD AUTO: 2.2 K/UL (ref 2–7.15)
NEUTROPHILS NFR BLD: 46.7 % (ref 44–72)
NRBC # BLD AUTO: 0 K/UL
NRBC BLD AUTO-RTO: 0 /100 WBC
PLATELET # BLD AUTO: 236 K/UL (ref 164–446)
PMV BLD AUTO: 10.1 FL (ref 9–12.9)
POTASSIUM SERPL-SCNC: 4.6 MMOL/L (ref 3.6–5.5)
PROT SERPL-MCNC: 7 G/DL (ref 6–8.2)
RBC # BLD AUTO: 4.23 M/UL (ref 4.2–5.4)
SODIUM SERPL-SCNC: 138 MMOL/L (ref 135–145)
TRIGL SERPL-MCNC: 101 MG/DL (ref 0–149)
TSH SERPL DL<=0.005 MIU/L-ACNC: 1.23 UIU/ML (ref 0.3–3.7)
WBC # BLD AUTO: 4.7 K/UL (ref 4.8–10.8)

## 2017-05-11 PROCEDURE — 80061 LIPID PANEL: CPT

## 2017-05-11 PROCEDURE — 80053 COMPREHEN METABOLIC PANEL: CPT

## 2017-05-11 PROCEDURE — 82306 VITAMIN D 25 HYDROXY: CPT

## 2017-05-11 PROCEDURE — 36415 COLL VENOUS BLD VENIPUNCTURE: CPT

## 2017-05-11 PROCEDURE — 84443 ASSAY THYROID STIM HORMONE: CPT

## 2017-05-11 PROCEDURE — 85025 COMPLETE CBC W/AUTO DIFF WBC: CPT

## 2017-05-12 ENCOUNTER — TELEPHONE (OUTPATIENT)
Dept: MEDICAL GROUP | Facility: MEDICAL CENTER | Age: 82
End: 2017-05-12

## 2017-05-12 DIAGNOSIS — M25.511 CHRONIC RIGHT SHOULDER PAIN: ICD-10-CM

## 2017-05-12 DIAGNOSIS — G89.29 CHRONIC RIGHT SHOULDER PAIN: ICD-10-CM

## 2017-05-13 NOTE — TELEPHONE ENCOUNTER
Notified with labs, taking Lipitor intermittently, recommend resume taking daily  Still with right shoulder discomfort despite physical therapy, will order MRI right shoulder, previous x-ray showed calcifications, has been trying physical therapy over one month with still persistent pain

## 2017-05-18 ENCOUNTER — TELEPHONE (OUTPATIENT)
Dept: MEDICAL GROUP | Facility: MEDICAL CENTER | Age: 82
End: 2017-05-18

## 2017-05-18 ENCOUNTER — APPOINTMENT (OUTPATIENT)
Dept: RADIOLOGY | Facility: MEDICAL CENTER | Age: 82
End: 2017-05-18
Attending: INTERNAL MEDICINE
Payer: MEDICARE

## 2017-05-18 DIAGNOSIS — M75.111 INCOMPLETE TEAR OF RIGHT ROTATOR CUFF: ICD-10-CM

## 2017-05-18 DIAGNOSIS — G89.29 CHRONIC RIGHT SHOULDER PAIN: ICD-10-CM

## 2017-05-18 DIAGNOSIS — M25.511 CHRONIC RIGHT SHOULDER PAIN: ICD-10-CM

## 2017-05-18 PROCEDURE — 73221 MRI JOINT UPR EXTREM W/O DYE: CPT | Mod: RT

## 2017-05-19 NOTE — TELEPHONE ENCOUNTER
Notified with MRI, will refer to orthopedics and she can follow-up with myself initially to try steroid injection

## 2017-05-19 NOTE — TELEPHONE ENCOUNTER
Please call patient and have her schedule an appointment with myself 20 minute visit for a shoulder injection, make sure we have Kenalog available

## 2017-05-22 ENCOUNTER — OFFICE VISIT (OUTPATIENT)
Dept: MEDICAL GROUP | Facility: MEDICAL CENTER | Age: 82
End: 2017-05-22
Payer: MEDICARE

## 2017-05-22 VITALS
HEART RATE: 66 BPM | WEIGHT: 161 LBS | BODY MASS INDEX: 27.49 KG/M2 | HEIGHT: 64 IN | OXYGEN SATURATION: 95 % | DIASTOLIC BLOOD PRESSURE: 62 MMHG | TEMPERATURE: 98.4 F | SYSTOLIC BLOOD PRESSURE: 118 MMHG

## 2017-05-22 DIAGNOSIS — M25.511 CHRONIC RIGHT SHOULDER PAIN: ICD-10-CM

## 2017-05-22 DIAGNOSIS — G89.29 CHRONIC RIGHT SHOULDER PAIN: ICD-10-CM

## 2017-05-22 PROCEDURE — 20610 DRAIN/INJ JOINT/BURSA W/O US: CPT | Performed by: INTERNAL MEDICINE

## 2017-05-22 NOTE — PROGRESS NOTES
Subjective:      Yvonne Marie Wada is a 81 y.o. female who presents with shoulder pain        HPI   Here right shoulder pain follow-up, has been going to physical therapy, 14 treatments, some improvement in range of motion pain, but still chronic right shoulder pain, right-handed female, try Naprosyn 500 L twice daily with no GI upset, seen in April with right shoulder pain, MRI ordered, done on May 18 showing full-thickness supraspinatus tear, is here for possible shoulder injection.  Referral made to orthopedics but no appointments until October.  Uses cane for an ablation, no falls.  Does not want surgery at this point for her shoulder if possible since she has to drive for her .  Routine day-to-day activities no limitations, occasional right shoulder pain with laying on that side, but with physical therapy there has been improvement.  No radiation down the arm.  No weakness right upper extremity.  No sensory changes right upper extremity, no neck pain and cervical radiculopathy  Pain can be sharp in nature.  Worse with lifting above her head or out in front.  Naprosyn does help.    No tobacco    Current Outpatient Prescriptions   Medication Sig Dispense Refill   • levothyroxine (SYNTHROID) 75 MCG Tab Take 1 Tab by mouth Every morning on an empty stomach. 90 Tab 0   • naproxen (NAPROSYN) 500 MG Tab Take 1 Tab by mouth 2 times a day as needed (pain). 60 Tab 5   • tizanidine (ZANAFLEX) 4 MG Tab Take 1 Tab by mouth at bedtime as needed. 30 Tab 3   • metoprolol SR (TOPROL XL) 25 MG TABLET SR 24 HR TAKE 1/2 TABLET DAILY 45 Tab 3   • benazepril (LOTENSIN) 40 MG tablet Take 1 Tab by mouth every day. 90 Tab 3   • celecoxib (CELEBREX) 100 MG Cap Take 100 mg by mouth 2 times a day.     • atorvastatin (LIPITOR) 20 MG Tab Take 20 mg by mouth every evening.     • aspirin (ASA) 325 MG Tab Take 1 Tab by mouth 2 Times a Day. 60 Tab 3   • lansoprazole (PREVACID) 15 MG CAPSULE DELAYED RELEASE Take 15 mg by mouth 1 time  daily as needed.     • magnesium gluconate (MAG-G) 500 MG tablet Take 500 mg by mouth every day.     • Multiple Vitamins-Minerals (CENTRUM SILVER ULTRA WOMENS PO) Take 1 Tab by mouth every day.     • vitamin D (CHOLECALCIFEROL) 1000 UNIT TABS Take 1,000 Units by mouth every day.     • Calcium Carbonate-Vitamin D (CALTRATE 600+D PO) Take 1 Tab by mouth every day.       No current facility-administered medications for this visit.     Patient Active Problem List    Diagnosis Date Noted   • Shoulder pain 04/05/2017   • Insomnia 01/30/2017   • UTI (urinary tract infection) 11/12/2016   • Decreased GFR 04/15/2016   • S/P thoracic aortic aneurysm repair 03/29/2014   • Cervical pain 02/28/2014   • GERD (gastroesophageal reflux disease) 07/12/2012   • History of shingles 06/30/2011   • Hypothyroid 04/08/2011   • Osteoporosis, idiopathic 08/09/2010   • Status post lumbar surgery 07/26/2010   • Hypertension 07/26/2010   • Dyslipidemia 07/26/2010   • S/P TOMY (total abdominal hysterectomy) 07/26/2010   • Preventative health care 07/26/2010   • History of total knee arthroplasty 07/26/2010   • S/P appendectomy 07/26/2010        Cervical pain  2/10/14 [facility] note; x-ray cervical spine DJD, right shoulder steroid injection    Decreased GFR  5/31/09 bun 18,creat 1.2  1/14/10 bun 28,creat 1.3,GFR 40  9/20/12 bun 37,creat 1.1,GFR 48  9/25/13 bun 25,creat 1.2,GFR 44  9/26/14 bun 26,creat 1.1,GFR 45  2/23/15 bun 20,creat 1.1,GFR 48  4/15/16 bun 31,creat 1.1,GFR 45  7/6/16 bun 29,creat 1.1,GFR 44   5/12/17 bun 35,creat 1.1,GFR 44    Dyslipidemia  4/11 chol 159,trig 84,hdl 47,ldl 95,cpk 114  7/12 chol 163,trig 120,hdl 49,ldl 90, crp 1.1 on lipitor 20 mg  3/26/13 chol 159,trig 99,hdl 46,ldl 93 on lipitor 20 mg  9/25/13 chol 164,trig 100,hdl 47,ldl 97 on lipitor 20 mg  9/12/14 cardiology note lower extremity weakness, hold lipitor x3 months, labs ordered  12/15/14 cardiology start low dose lipitor 10 mg  1/22/15 off lipitor will resume 10 mg  and repeat labs 4 weeks  2/24/15 chol 179,trig 111,hdl 54,ldl 103 on lipitor 10 mg  2/1/16 cardiology note restart lipitor 20 mg    4/15/16 chol 163,trig 102,hdl 48,ldl 95 on lipitor 20 mg  5/12/17 chol 186,trig 101,hdl 47,ldl 119 on lipitor 20 mg intermittently will start taking daily    Gastroesophageal reflux  6/27/07 EGD per [facility] hiatal hernia, start prevacid 30 mg  7/12 protonix 20 mg qday  11/16/12 [facility] note cont prilosec  1/5/16 change to protonix 40 mg from prilosec    History shingles    History of total knee arthroplasty  4/10 MRI right knee complex tear medial meniscus, horizontal cleavage tear lateral meniscus, chondromalacia patella, moderate-sized baker's cyst  5/10 right meniscus knee repair   5/10 told by  had gout on surgery had pathology report, I await that report, question gout seen on pathology report done during repair of right meniscus knee surgery.  7/10 uric acid 6.3, await starting allopurinol depending on the operative report  8/5/10 reviewed orthopedics notes , operative report indicates crystal deposition, could be gout or pseudogout, no biopsy results, suspect chondrocalcinosis  10/11  ortho note, MRI pending  8/7/10 reviewed pathology report right knee tissue, marked degenerative changes with focal calcification, no mention of gout. Based on this and a normal uric acid level, I do not believe she has gout, has no hyperuricemia medications would be indicated  10/11 dr.parlasca najera left knee meniscectomy and arthroscopy  1/12 dr.parlasca najera left knee arthroplasty  2/29/16  orthopedics x-ray right knee advanced DJD on the right knee corticosteroid injection performed, prescription voltaren gel  6/13/16  orthopedic note x-rays demonstrate right knee advanced DJD and resurfaced patella, offer right knee total replacement followed by patellar resurfacing after she recovers from her right knee  9/8/16  orthopedic's operative  note right knee total arthroplasty  11/2/16  orthopedic note, follow-up right knee, x-ray right knee shows subluxation patella, traumatic evulsion VMO needs reexploration and repair  11/10/16  orthopedic's operative note VMO quadriceps avulsion repair status post total knee replacement  11/23/16  orthopedic no follow-up quadriceps repair, continue crutches in full extension, follow-up one month and then start passive range of motion 0-40°  2/4/17  orthopedic note, continue physical therapy, follow-up this summer  4/17/17 custom PT note    Hypertension  Sees cardiology  1/10/11 [facility] note cardiac catheterization normal systolic function  3/11 [facility] echo moderate aortic insufficiency, moderate mitral insufficiency, moderate tricuspid insufficiency, normal LV function  5/12 echo [facility] normal LV function EF 60%, moderate to severe left atrial enlargement, RVSP 32    9/26/13 CT thoracic aorta no evidence of aneurysm, small hiatal hernia  9/26/13 Persantine thallium uniform breast tissue attenuation, cannot rule out underlying ischemic, LVEF 67%  10/3/13 on toprol-XL 25 mg half a tablet daily    12/13/13 cardiology note cont same meds, repeat echo and follow up 6 months  1/2/14 echo mild LVH, grade 1 diastolic dysfunction, ascending aortic 4.0, no change compared with ZAK 2010  5/15/14 cardiology note; increase benazepril to 40 mg qday,continue toprol XL 25 mg daily  6/17/15 cardiology note continue meds, repeat echo 6 months  2/1/16 cardiology note moderate pulmonary hypertension and snoring, nocturnal pulse oximetry ordered  6/30/16 cardiology note patient declines overnight pulse oximetry    Hypothyroid  4/11 tsh 9 start synthroid 50  4/11 tsh 6,free t3 3.1,free t4 1.2,tpo negative  7/1/11 tsh 2.1 cont synthroid 50 mcg  7/12 tsh 3.4 cont synthroid 50 mcg  7/31/13 tsh 3.2 on synthroid 50 mcg  9/25/13 tsh 1.7 on synthroid 50 mcg  2/24/15 tsh 4.9 on synthroid 50 mcg; increase to synthroid 75 mcg,  repeat tsh in 6 weeks  4/14/15 tsh 1.1 on synthroid 75 mcg  4/15/16 tsh 2.3 on synthroid 75 mcg  5/12/17 tsh 1.2 on synthroid 75 mcg    Insomnia  1/30/17 trial of trazodone 50 mg  4/4/17 side effects with trazodone drowsiness, discontinued    Osteoporosis  Had been on fosamax [id number]    8/10 dexa LS -2.0,hip -1.5 await old dexa result, she will get copy for me  4/11 vit d 35  9/12 dexa LS -3.2,hip -1.4  2/13/13 reclast IV  7/31/13 vit d 33 cont 2000 units  2/18/14 reclast IV  2/2/15 dexa LS -3.1,hip -1.9; FRAX 35.5 % major,12.6% hip  2/18/15 reclast IV  2/24/15 vit d 33  4/15/16 vit d 36  5/12/17 vit d 36    Preventative health  6/27/09 colon per [facility] no records  10/19/04 pneumovax   9/9/11 zoster vaccine  2/2/15 dexa LS -3.1,hip -1.9; FRAX 35.5 % major,12.6% hip  4/14/15 prevnar  5/22/15 mammogram  5/12/17 vit d 36    Shoulder pain  4/4/17 right shoulder pain right shoulder x-ray ordered, right knee pain, referral custom PT, tried celebrex no benefit, will try naproxen 500 mg twice daily and zanaflex at night  4/5/17 x-ray right shoulder calcifications consistent with calcific tendinitis or hydroxyapatite deposition    Status post TOMY    Status post thoracic aneurysm repair  6/29/06 CT-CTA chest with and without postprocessing; stable ascending thoracic aortic aneurysm maximum diameter 4.8, just distal to the aortic valve maximum diameter 4.3  4/9/07 CT-CTA chest with and without processing; stable ascending aortic thoracic aneurysm 4.5 x 4.6 cm  6/8/09 CT chest with; stable 4.7 ascending aorta aneurysm  10/15/09 CT chest without; dilated ascending thoracic aorta 4.9 cm aneurysm increased from 4.7  1/11/10  cardiovascular surgery operative note ascending thoracic aorta aneurysm repair  9/26/13 CT thoracic aorta evaluation; status post repair ascending thoracic aortic aneurysm without evidence of dissection or leak  1/2/14 echo mild LVH, grade 1 diastolic dysfunction, ascending aortic 4.0, no change  compared with ZAK 2010  5/15/14 cardiology note  6/17/15 cardiology note cont meds,repeat echo 6 months  2/1/16 cardiology note moderate pulmonary hypertension and snoring, nocturnal pulse oximetry ordered    Status post lumbar surgery  6/03 L4-5 epidural     7/06  spinal surgery operative note decompression, foraminotomy. L4-L5 posterior fusion, L4-L5 allograft    3/5/14 MRI lumbar spine grade 2 spondylolisthesis L4-L5 with previous laminectomy and posterior fusion, left sided pedicle screw fixation L4-L5, moderate central canal stenosis L5-S1 secondary to facet arthropathy, mild to moderate multilevel neural foraminal narrowing  12/8/14  pain [facility] note; right lower extremity radiculitis L4-L5 lesion, myofascial lumbosacral pain, trochanteric bursitis, followup as needed  4/2/15  pain procedure note right L4-L5 and right L5-S1 SSNRB under fluoroscopy  4/27/15  pain note; right trochanteric bursal injection, trigger point injections performed, also set up SI joint injections  8/3/15 [facility] neurosurgery note, lumbar x-ray flexion and extension, MRI lumbar spine without contrast, followup   8/9/15 MRI lumbar spine, previous L4-L5 left  fusion and laminectomy, L3-L4 moderate bilateral facet arthropathy, mild disc bulge, L4-L5 severe bilateral facet arthropathy, moderate to severe bilateral neural foraminal stenosis, L5-S1 moderate facet arthropathy  8/28/15  neurosurgery note previous posterior fusion L4-L5, does have L3-L4 disc bulge with central canal stenosis, recommend L3-L5 decompression  8/31/15  pain note; right lower extremity radiculitis, myofascial lumbosacral pain, start hydrocodone 5 mg, continued SI joint exercises, perform trochanteric bursal injection right hip, trigger points lumbar region  10/28/15  neurosurgery note, will schedule for posterior L3-L4 laminectomy and fusion with redo L4-5 laminectomy and exploration of  "fusion, surgical clearance per cardiology   12/9/15 [facility] neurosurgery note s/p L4-S1 fusion on 11/24/15 , follow up in 4 weeks  12/23/15  neurosurgery note, clear to restart physical therapy if she would like after one month, follow-up 3 months          ROS       Objective:     /62 mmHg  Pulse 66  Temp(Src) 36.9 °C (98.4 °F)  Ht 1.626 m (5' 4\")  Wt 73.029 kg (161 lb)  BMI 27.62 kg/m2  SpO2 95%     Physical Exam   Constitutional: She appears well-developed and well-nourished. No distress.   HENT:   Head: Normocephalic and atraumatic.   Eyes: Conjunctivae are normal. Right eye exhibits no discharge. Left eye exhibits no discharge.   Cardiovascular: Normal rate, regular rhythm and normal heart sounds.    No murmur heard.  Pulmonary/Chest: Effort normal and breath sounds normal. No respiratory distress.   Abdominal: She exhibits no distension.   Musculoskeletal: She exhibits tenderness. She exhibits no edema.   Neurological: She is alert.   Skin: Skin is warm. She is not diaphoretic.   Psychiatric: She has a normal mood and affect. Her behavior is normal.   Nursing note and vitals reviewed.    Right shoulder no clavicle tenderness, no glenohumeral tenderness, no biceps tenderness  Limited right shoulder abduction and adduction  Normal  strength, normal right elbow flexion and extension          Assessment/Plan:   Assessment  #1 right shoulder supraspinatus tear by MRI, 5/18/17 MRI right shoulder; full-thickness supraspinatus tendon tear, no labral damage, going to physical therapy 14 treatments with some improvement in pain and range of motion, try Naprosyn with some improvement        Plan  #1 informed consent obtained verbal and written right shoulder injection, risks and benefits, bleeding, infection, tendon rupture discussed, local pain, reaction to anesthetic and corticosteroid possibly could include lightheadedness, dizziness, nausea, chest pain, palpitations, patient understands " and agrees and provides written and verbal consent    #2 patient has had corticosteroid injections in the past without side effects    #3 area prepped and draped in sterile fashion, patient sitting, right elbow 90°, internally rotated    #4 using Kenalog 20 mg, lidocaine 1% without 1 cc, using 3 mL syringe, 27-gauge needle, area posterior acromion process had already been demarcated, one 1 cm inferior and medial to that marker, needle injected aiming for sternal notch, injection performed without complications    #5 area dressed without complications, no bleeding, no blood loss    #6 patient told to decrease inflammation may take 3 days, no heavy lifting, continue physical therapy, monitor for redness, fever, pain, drainage or discharge, should that occur return care or urgent care or emergency room    #7 call for any questions    #8 follow up if no improvement

## 2017-05-22 NOTE — MR AVS SNAPSHOT
"        Yvonne Marie Wada   2017 2:20 PM   Office Visit   MRN: [id number]    Department:  [facility]   Dept Phone:  [phone]    Description:  Female : 1935   Provider:  Tee Tran M.D.           Allergies as of 2017     Allergen Noted Reactions    Codeine 2012   Nausea    Synthetic codones ok    Trazodone 2017       groggy    Ultram [Tramadol] 2017       nausea      You were diagnosed with     Chronic right shoulder pain   [321006]         Vital Signs     Blood Pressure Pulse Temperature Height Weight Body Mass Index    118/62 mmHg 66 36.9 °C (98.4 °F) 1.626 m (5' 4\") 73.029 kg (161 lb) 27.62 kg/m2    Oxygen Saturation Smoking Status                95% Never Smoker           Basic Information     Date Of Birth Sex Race Ethnicity Preferred Language    1935 Female White Non- English      Your appointments     2017  8:30 AM   BONE DENSITY (DEXASCAN) with [facility] BD 1   [facility] ([address])    [address] [place] [address]  [facility] [address]   [phone]           No calcium supplements 24 hours prior to exam, including antacids or multivitamins w/calcium.  Pt may eat and drink normally.  No injection procedures prior to Dexa on the same day.  No barium contrast, no CTs with IV or oral contrast, no Pet/CTs and no Nuc Med injections for 7 days prior to a Dexa (including Barium Swallow, Upper GI, Small Bowel Series).  If scheduling a Dexa on the same day as a CT with IV or oral contrast, any test with barium, Pet/CT or a Nuc Med with injection, always schedule the Dexa before the other study.              Problem List              ICD-10-CM Priority Class Noted - Resolved    Status post lumbar surgery (Chronic) Z98.890   2010 - Present    Hypertension (Chronic) I10   2010 - Present    Dyslipidemia (Chronic) E78.5   2010 - Present    S/P TOMY (total abdominal hysterectomy) (Chronic) Z90.710   2010 - " Present    Preventative health care (Chronic) Z00.00   7/26/2010 - Present    History of total knee arthroplasty Z96.659   7/26/2010 - Present    S/P appendectomy Z90.49   7/26/2010 - Present    Osteoporosis, idiopathic (Chronic) M81.8   8/9/2010 - Present    Hypothyroid (Chronic) E03.9   4/8/2011 - Present    History of shingles Z86.19   6/30/2011 - Present    GERD (gastroesophageal reflux disease) (Chronic) K21.9   7/12/2012 - Present    Cervical pain M54.2   2/28/2014 - Present    S/P thoracic aortic aneurysm repair (Chronic) Z98.890, Z86.79   3/29/2014 - Present    Decreased GFR R94.4   4/15/2016 - Present    UTI (urinary tract infection) N39.0   11/12/2016 - Present    Insomnia (Chronic) G47.00   1/30/2017 - Present    Shoulder pain M25.519   4/5/2017 - Present      Health Maintenance        Date Due Completion Dates    IMM DTaP/Tdap/Td Vaccine (1 - Tdap) 11/29/1955 ---    PAP SMEAR 11/29/1957 ---    MAMMOGRAM 5/22/2016 5/22/2015, 4/16/2014, 9/20/2012, 9/13/2011, 8/9/2010, 8/9/2010, 11/19/2008, 11/19/2008, 10/24/2007, 10/24/2007, 9/28/2006, 5/23/2005, 5/21/2004    COLONOSCOPY 6/26/2019 6/26/2009 (Prv Comp), 12/11/2003    Override on 6/26/2009: Previously completed ([facility])    BONE DENSITY 2/2/2020 2/2/2015, 9/20/2012, 8/9/2010            Current Immunizations     13-VALENT PCV PREVNAR 4/14/2015    Influenza Vaccine Adult HD 10/15/2015  9:34 AM, 10/1/2014    Pneumococcal polysaccharide vaccine (PPSV-23) 10/9/2004    SHINGLES VACCINE 9/9/2011      Below and/or attached are the medications your provider expects you to take. Review all of your home medications and newly ordered medications with your provider and/or pharmacist. Follow medication instructions as directed by your provider and/or pharmacist. Please keep your medication list with you and share with your provider. Update the information when medications are discontinued, doses are changed, or new medications (including over-the-counter products) are added;  and carry medication information at all times in the event of emergency situations     Allergies:  CODEINE - Nausea     TRAZODONE - (reactions not documented)     ULTRAM - (reactions not documented)               Medications  Valid as of: May 22, 2017 -  4:26 PM    Generic Name Brand Name Tablet Size Instructions for use    Aspirin (Tab)  MG Take 1 Tab by mouth 2 Times a Day.        Atorvastatin Calcium (Tab) LIPITOR 20 MG Take 20 mg by mouth every evening.        Benazepril HCl (Tab) LOTENSIN 40 MG Take 1 Tab by mouth every day.        Calcium Carbonate-Vitamin D   Take 1 Tab by mouth every day.        Cholecalciferol (Tab) cholecalciferol 1000 UNIT Take 1,000 Units by mouth every day.        Lansoprazole (CAPSULE DELAYED RELEASE) PREVACID 15 MG Take 15 mg by mouth 1 time daily as needed.        Levothyroxine Sodium (Tab) SYNTHROID 75 MCG Take 1 Tab by mouth Every morning on an empty stomach.        Magnesium Gluconate (Tab) MAG-G 500 MG Take 500 mg by mouth every day.        Metoprolol Succinate (TABLET SR 24 HR) TOPROL XL 25 MG TAKE 1/2 TABLET DAILY        Multiple Vitamins-Minerals   Take 1 Tab by mouth every day.        Naproxen (Tab) NAPROSYN 500 MG Take 1 Tab by mouth 2 times a day as needed (pain).        TiZANidine HCl (Tab) ZANAFLEX 4 MG Take 1 Tab by mouth at bedtime as needed.        .                 Medicines prescribed today were sent to:     [facility][id number] - [place] - [address]    [address] [place] [address]    Phone: [phone] Fax: [phone]    Open 24 Hours?: No    [facility] - [place], [place] - [address] AT PORTAL TO REGISTERED [facility] S[facility]    [address] [facility] [address]    Phone: [phone] Fax: [phone]    Open 24 Hours?: No      Medication refill instructions:       If your prescription bottle indicates you have medication refills left, it is not necessary to call your provider’s office. Please contact your  pharmacy and they will refill your medication.    If your prescription bottle indicates you do not have any refills left, you may request refills at any time through one of the following ways: The online [employer] system (except Urgent Care), by calling your provider’s office, or by asking your pharmacy to contact your provider’s office with a refill request. Medication refills are processed only during regular business hours and may not be available until the next business day. Your provider may request additional information or to have a follow-up visit with you prior to refilling your medication.   *Please Note: Medication refills are assigned a new Rx number when refilled electronically. Your pharmacy may indicate that no refills were authorized even though a new prescription for the same medication is available at the pharmacy. Please request the medicine by name with the pharmacy before contacting your provider for a refill.        Other Notes About Your Plan     1/30/17 dexa ordered  5/12/17 MRI right shoulder ordered, persistent pain despite physical therapy  5/18/17 MRI right shoulder; full-thickness supraspinatus tendon tear  5/22/17 has been going to physical therapy 14 treatments some improvement, right shoulder injection provided, if no improvement in has appt  in october, if need sooner appt try   Repeat pneumovax                 [employer] Status: Patient Declined

## 2017-06-07 DIAGNOSIS — I10 ESSENTIAL HYPERTENSION: ICD-10-CM

## 2017-06-07 DIAGNOSIS — Z86.79 S/P THORACIC AORTIC ANEURYSM REPAIR: ICD-10-CM

## 2017-06-07 DIAGNOSIS — Z98.890 S/P THORACIC AORTIC ANEURYSM REPAIR: ICD-10-CM

## 2017-06-07 DIAGNOSIS — I35.1 AORTIC VALVE REGURGITATION, ACQUIRED: ICD-10-CM

## 2017-06-07 RX ORDER — BENAZEPRIL HYDROCHLORIDE 40 MG/1
40 TABLET ORAL DAILY
Qty: 90 TAB | Refills: 3 | Status: SHIPPED | OUTPATIENT
Start: 2017-06-07 | End: 2018-07-03

## 2017-09-18 ENCOUNTER — OFFICE VISIT (OUTPATIENT)
Dept: MEDICAL GROUP | Facility: MEDICAL CENTER | Age: 82
End: 2017-09-18
Payer: MEDICARE

## 2017-09-18 VITALS
HEART RATE: 59 BPM | WEIGHT: 166 LBS | TEMPERATURE: 97.4 F | HEIGHT: 64 IN | BODY MASS INDEX: 28.34 KG/M2 | DIASTOLIC BLOOD PRESSURE: 66 MMHG | SYSTOLIC BLOOD PRESSURE: 128 MMHG | OXYGEN SATURATION: 99 %

## 2017-09-18 DIAGNOSIS — Z98.890 S/P THORACIC AORTIC ANEURYSM REPAIR: Chronic | ICD-10-CM

## 2017-09-18 DIAGNOSIS — M81.0 POSTMENOPAUSAL BONE LOSS: ICD-10-CM

## 2017-09-18 DIAGNOSIS — M81.8 OSTEOPOROSIS, IDIOPATHIC: Chronic | ICD-10-CM

## 2017-09-18 DIAGNOSIS — E78.5 DYSLIPIDEMIA: ICD-10-CM

## 2017-09-18 DIAGNOSIS — Z12.39 ENCOUNTER FOR SCREENING BREAST EXAMINATION: ICD-10-CM

## 2017-09-18 DIAGNOSIS — Z86.79 S/P THORACIC AORTIC ANEURYSM REPAIR: Chronic | ICD-10-CM

## 2017-09-18 DIAGNOSIS — Z23 FLU VACCINE NEED: ICD-10-CM

## 2017-09-18 DIAGNOSIS — E03.9 HYPOTHYROIDISM, UNSPECIFIED TYPE: Chronic | ICD-10-CM

## 2017-09-18 DIAGNOSIS — I10 ESSENTIAL HYPERTENSION: Chronic | ICD-10-CM

## 2017-09-18 PROCEDURE — G0008 ADMIN INFLUENZA VIRUS VAC: HCPCS | Performed by: INTERNAL MEDICINE

## 2017-09-18 PROCEDURE — 99214 OFFICE O/P EST MOD 30 MIN: CPT | Mod: 25 | Performed by: INTERNAL MEDICINE

## 2017-09-18 PROCEDURE — 90662 IIV NO PRSV INCREASED AG IM: CPT | Performed by: INTERNAL MEDICINE

## 2017-09-18 RX ORDER — FLUTICASONE PROPIONATE 50 MCG
2 SPRAY, SUSPENSION (ML) NASAL DAILY
Qty: 16 G | Refills: 11 | Status: SHIPPED | OUTPATIENT
Start: 2017-09-18 | End: 2019-01-10

## 2017-09-18 RX ORDER — ATORVASTATIN CALCIUM 20 MG/1
20 TABLET, FILM COATED ORAL DAILY
Qty: 30 TAB | Refills: 6 | Status: ON HOLD
Start: 2017-09-18 | End: 2017-11-02

## 2017-09-18 RX ORDER — AZITHROMYCIN 250 MG/1
TABLET, FILM COATED ORAL
Qty: 6 TAB | Refills: 0 | Status: SHIPPED | OUTPATIENT
Start: 2017-09-18 | End: 2017-11-01

## 2017-09-18 ASSESSMENT — PATIENT HEALTH QUESTIONNAIRE - PHQ9: CLINICAL INTERPRETATION OF PHQ2 SCORE: 0

## 2017-09-18 NOTE — PROGRESS NOTES
Subjective:      Yvonne Marie Wada is a 81 y.o. female who presents with sinus issue          HPI   New complaint  Has had cough with sputum does not check color sputum, cough can occur at night and during the day, some sinus pressure and congestion, no headaches, no fever or chills, no mouth or tooth pain, some sinus post nasal drip, no sob, no chest pain, no sick exposures, no otc meds for cough or allergies, tries to use otc nasal lavage and flonase intermittently, Cough is not worse with activity or exercise, occasional allergic rhinitis symptoms during the summer  No nausea, vomiting, diarrhea, no melena or hematochezia  No history of recurrent lung infections, sinus infections  No sick exposure  No recent travel  On benazepril daily will run 130/70 with no lightheadedness, no dizziness, no chest pain, no palpitations, no shortness of breath  Off lipitor Not exercising because of chronic right hip pain followed by orthopedics, no falls, utilizing cane for ambulation, Naprosyn as needed no GI upset, has seen physical therapy  History thoracic aorta aneurysm repair Stable followed by cardiology          Current Outpatient Prescriptions   Medication Sig Dispense Refill   • levothyroxine (SYNTHROID) 75 MCG Tab Take 1 Tab by mouth Every morning on an empty stomach. 90 Tab 3   • benazepril (LOTENSIN) 40 MG tablet Take 1 Tab by mouth every day. 90 Tab 3   • naproxen (NAPROSYN) 500 MG Tab Take 1 Tab by mouth 2 times a day as needed (pain). 60 Tab 5   • tizanidine (ZANAFLEX) 4 MG Tab Take 1 Tab by mouth at bedtime as needed. 30 Tab 3   • metoprolol SR (TOPROL XL) 25 MG TABLET SR 24 HR TAKE 1/2 TABLET DAILY 45 Tab 3   • atorvastatin (LIPITOR) 20 MG Tab Take 20 mg by mouth every evening.     • aspirin (ASA) 325 MG Tab Take 1 Tab by mouth 2 Times a Day. 60 Tab 3   • lansoprazole (PREVACID) 15 MG CAPSULE DELAYED RELEASE Take 15 mg by mouth 1 time daily as needed.     • magnesium gluconate (MAG-G) 500 MG tablet Take 500 mg  by mouth every day.     • Multiple Vitamins-Minerals (CENTRUM SILVER ULTRA WOMENS PO) Take 1 Tab by mouth every day.     • vitamin D (CHOLECALCIFEROL) 1000 UNIT TABS Take 1,000 Units by mouth every day.     • Calcium Carbonate-Vitamin D (CALTRATE 600+D PO) Take 1 Tab by mouth every day.       No current facility-administered medications for this visit.      Patient Active Problem List    Diagnosis Date Noted   • Shoulder pain 04/05/2017   • Insomnia 01/30/2017   • UTI (urinary tract infection) 11/12/2016   • Decreased GFR 04/15/2016   • S/P thoracic aortic aneurysm repair 03/29/2014   • Cervical pain 02/28/2014   • GERD (gastroesophageal reflux disease) 07/12/2012   • History of shingles 06/30/2011   • Hypothyroid 04/08/2011   • Osteoporosis, idiopathic 08/09/2010   • Status post lumbar surgery 07/26/2010   • Hypertension 07/26/2010   • Dyslipidemia 07/26/2010   • S/P TOMY (total abdominal hysterectomy) 07/26/2010   • Preventative health care 07/26/2010   • History of total knee arthroplasty 07/26/2010   • S/P appendectomy 07/26/2010         Cervical pain  2/10/14 [facility] note; x-ray cervical spine DJD, right shoulder steroid injection     Decreased GFR  5/31/09 bun 18,creat 1.2  1/14/10 bun 28,creat 1.3,GFR 40  9/20/12 bun 37,creat 1.1,GFR 48  9/25/13 bun 25,creat 1.2,GFR 44  9/26/14 bun 26,creat 1.1,GFR 45  2/23/15 bun 20,creat 1.1,GFR 48  4/15/16 bun 31,creat 1.1,GFR 45  7/6/16 bun 29,creat 1.1,GFR 44   5/12/17 bun 35,creat 1.1,GFR 44     Dyslipidemia  4/11 chol 159,trig 84,hdl 47,ldl 95,cpk 114  7/12 chol 163,trig 120,hdl 49,ldl 90, crp 1.1 on lipitor 20 mg  3/26/13 chol 159,trig 99,hdl 46,ldl 93 on lipitor 20 mg  9/25/13 chol 164,trig 100,hdl 47,ldl 97 on lipitor 20 mg  9/12/14 cardiology note lower extremity weakness, hold lipitor x3 months, labs ordered  12/15/14 cardiology start low dose lipitor 10 mg  1/22/15 off lipitor will resume 10 mg and repeat labs 4 weeks  2/24/15 chol 179,trig 111,hdl 54,ldl 103 on  lipitor 10 mg  2/1/16 cardiology note restart lipitor 20 mg    4/15/16 chol 163,trig 102,hdl 48,ldl 95 on lipitor 20 mg  5/12/17 chol 186,trig 101,hdl 47,ldl 119 on lipitor 20 mg intermittently will start taking daily     Gastroesophageal reflux  6/27/07 EGD per [facility] hiatal hernia, start prevacid 30 mg  7/12 protonix 20 mg qday  11/16/12 [facility] note cont prilosec  1/5/16 change to protonix 40 mg from prilosec     History shingles     History of total knee arthroplasty  4/10 MRI right knee complex tear medial meniscus, horizontal cleavage tear lateral meniscus, chondromalacia patella, moderate-sized baker's cyst  5/10 right meniscus knee repair   5/10 told by  had gout on surgery had pathology report, I await that report, question gout seen on pathology report done during repair of right meniscus knee surgery.  7/10 uric acid 6.3, await starting allopurinol depending on the operative report  8/5/10 reviewed orthopedics notes , operative report indicates crystal deposition, could be gout or pseudogout, no biopsy results, suspect chondrocalcinosis  10/11  ortho note, MRI pending  8/7/10 reviewed pathology report right knee tissue, marked degenerative changes with focal calcification, no mention of gout. Based on this and a normal uric acid level, I do not believe she has gout, has no hyperuricemia medications would be indicated  10/11 dr.parlasca najera left knee meniscectomy and arthroscopy  1/12 dr.parlasca najera left knee arthroplasty  2/29/16  orthopedics x-ray right knee advanced DJD on the right knee corticosteroid injection performed, prescription voltaren gel  6/13/16  orthopedic note x-rays demonstrate right knee advanced DJD and resurfaced patella, offer right knee total replacement followed by patellar resurfacing after she recovers from her right knee  9/8/16  orthopedic's operative note right knee total arthroplasty  11/2/16  orthopedic  note, follow-up right knee, x-ray right knee shows subluxation patella, traumatic evulsion VMO needs reexploration and repair  11/10/16  orthopedic's operative note VMO quadriceps avulsion repair status post total knee replacement  11/23/16  orthopedic no follow-up quadriceps repair, continue crutches in full extension, follow-up one month and then start passive range of motion 0-40°  2/4/17  orthopedic note, continue physical therapy, follow-up this summer  4/17/17 custom PT note  6/9/17 custom PT note     Hypertension  Sees cardiology  1/10/11 [facility] note cardiac catheterization normal systolic function  3/11 [facility] echo moderate aortic insufficiency, moderate mitral insufficiency, moderate tricuspid insufficiency, normal LV function  5/12 echo [facility] normal LV function EF 60%, moderate to severe left atrial enlargement, RVSP 32    9/26/13 CT thoracic aorta no evidence of aneurysm, small hiatal hernia  9/26/13 Persantine thallium uniform breast tissue attenuation, cannot rule out underlying ischemic, LVEF 67%  10/3/13 on toprol-XL 25 mg half a tablet daily    12/13/13 cardiology note cont same meds, repeat echo and follow up 6 months  1/2/14 echo mild LVH, grade 1 diastolic dysfunction, ascending aortic 4.0, no change compared with ZAK 2010  5/15/14 cardiology note; increase benazepril to 40 mg qday,continue toprol XL 25 mg daily  6/17/15 cardiology note continue meds, repeat echo 6 months  2/1/16 cardiology note moderate pulmonary hypertension and snoring, nocturnal pulse oximetry ordered  6/30/16 cardiology note patient declines overnight pulse oximetry     Hypothyroid  4/11 tsh 9 start synthroid 50  4/11 tsh 6,free t3 3.1,free t4 1.2,tpo negative  7/1/11 tsh 2.1 cont synthroid 50 mcg  7/12 tsh 3.4 cont synthroid 50 mcg  7/31/13 tsh 3.2 on synthroid 50 mcg  9/25/13 tsh 1.7 on synthroid 50 mcg  2/24/15 tsh 4.9 on synthroid 50 mcg; increase to synthroid 75 mcg, repeat tsh in 6 weeks  4/14/15 tsh 1.1  on synthroid 75 mcg  4/15/16 tsh 2.3 on synthroid 75 mcg  5/12/17 tsh 1.2 on synthroid 75 mcg     Insomnia  1/30/17 trial of trazodone 50 mg  4/4/17 side effects with trazodone drowsiness, discontinued     Osteoporosis  Had been on fosamax [id number]    8/10 dexa LS -2.0,hip -1.5 await old dexa result, she will get copy for me  4/11 vit d 35  9/12 dexa LS -3.2,hip -1.4  2/13/13 reclast IV  7/31/13 vit d 33 cont 2000 units  2/18/14 reclast IV  2/2/15 dexa LS -3.1,hip -1.9; FRAX 35.5 % major,12.6% hip  2/18/15 reclast IV  2/24/15 vit d 33  4/15/16 vit d 36  5/12/17 vit d 36     Preventative health  6/27/09 colon per [facility] no records  10/19/04 pneumovax   9/9/11 zoster vaccine  2/2/15 dexa LS -3.1,hip -1.9; FRAX 35.5 % major,12.6% hip  4/14/15 prevnar  5/22/15 mammogram  5/12/17 vit d 36     Shoulder pain  4/4/17 right shoulder pain right shoulder x-ray ordered, right knee pain, referral custom PT, tried celebrex no benefit, will try naproxen 500 mg twice daily and zanaflex at night  4/5/17 x-ray right shoulder calcifications consistent with calcific tendinitis or hydroxyapatite deposition  6/9/17 custom PT note   7/10/17 custom PT note      Status post TOMY     Status post thoracic aneurysm repair  6/29/06 CT-CTA chest with and without postprocessing; stable ascending thoracic aortic aneurysm maximum diameter 4.8, just distal to the aortic valve maximum diameter 4.3  4/9/07 CT-CTA chest with and without processing; stable ascending aortic thoracic aneurysm 4.5 x 4.6 cm  6/8/09 CT chest with; stable 4.7 ascending aorta aneurysm  10/15/09 CT chest without; dilated ascending thoracic aorta 4.9 cm aneurysm increased from 4.7  1/11/10  cardiovascular surgery operative note ascending thoracic aorta aneurysm repair  9/26/13 CT thoracic aorta evaluation; status post repair ascending thoracic aortic aneurysm without evidence of dissection or leak  1/2/14 echo mild LVH, grade 1 diastolic dysfunction, ascending aortic  4.0, no change compared with ZAK 2010  5/15/14 cardiology note  6/17/15 cardiology note cont meds,repeat echo 6 months  2/1/16 cardiology note moderate pulmonary hypertension and snoring, nocturnal pulse oximetry ordered     Status post lumbar surgery  6/03 L4-5 epidural     7/06  spinal surgery operative note decompression, foraminotomy. L4-L5 posterior fusion, L4-L5 allograft    3/5/14 MRI lumbar spine grade 2 spondylolisthesis L4-L5 with previous laminectomy and posterior fusion, left sided pedicle screw fixation L4-L5, moderate central canal stenosis L5-S1 secondary to facet arthropathy, mild to moderate multilevel neural foraminal narrowing  12/8/14  pain [facility] note; right lower extremity radiculitis L4-L5 lesion, myofascial lumbosacral pain, trochanteric bursitis, followup as needed  4/2/15  pain procedure note right L4-L5 and right L5-S1 SSNRB under fluoroscopy  4/27/15  pain note; right trochanteric bursal injection, trigger point injections performed, also set up SI joint injections  8/3/15 [facility] neurosurgery note, lumbar x-ray flexion and extension, MRI lumbar spine without contrast, followup   8/9/15 MRI lumbar spine, previous L4-L5 left  fusion and laminectomy, L3-L4 moderate bilateral facet arthropathy, mild disc bulge, L4-L5 severe bilateral facet arthropathy, moderate to severe bilateral neural foraminal stenosis, L5-S1 moderate facet arthropathy  8/28/15  neurosurgery note previous posterior fusion L4-L5, does have L3-L4 disc bulge with central canal stenosis, recommend L3-L5 decompression  8/31/15  pain note; right lower extremity radiculitis, myofascial lumbosacral pain, start hydrocodone 5 mg, continued SI joint exercises, perform trochanteric bursal injection right hip, trigger points lumbar region  10/28/15  neurosurgery note, will schedule for posterior L3-L4 laminectomy and fusion with redo L4-5 laminectomy and  exploration of fusion, surgical clearance per cardiology   12/9/15 [facility] neurosurgery note s/p L4-S1 fusion on 11/24/15 , follow up in 4 weeks  12/23/15  neurosurgery note, clear to restart physical therapy if she would like after one month, follow-up 3 months        Depression Screening    Little interest or pleasure in doing things?  0 - not at all  Feeling down, depressed , or hopeless? 0 - not at all  Patient Health Questionnaire Score: 0           ROS       Objective:          Physical Exam   Constitutional: She appears well-developed and well-nourished. No distress.   HENT:   Head: Normocephalic and atraumatic.   Right Ear: External ear normal.   Left Ear: External ear normal.   Mouth/Throat: Oropharynx is clear and moist.   Eyes: Conjunctivae are normal. Right eye exhibits no discharge. Left eye exhibits no discharge.   Neck: No JVD present. No thyromegaly present.   Cardiovascular: Normal rate, regular rhythm and normal heart sounds.    Pulmonary/Chest: Effort normal and breath sounds normal. No respiratory distress. She has no wheezes.   Abdominal: She exhibits no distension.   Musculoskeletal: She exhibits no edema.   Neurological: She is alert.   Skin: Skin is warm. She is not diaphoretic.   Psychiatric: She has a normal mood and affect. Her behavior is normal.   Nursing note and vitals reviewed.    No sinus tenderness to palpation          Assessment/Plan:     Assessment  #! Sinusitis, symptoms ×6-8 weeks, possibly allergic rhinitis component, lungs are clear, no saturation, afebrile    #2 hypertension stable on Lotensin    #3 dyslipidemia not taking Lipitor    #4 right hip pain followed by orthopedics taking Naprosyn as needed with no GI irritation    #5 history thoracic aneurysm repair followed by cardiology         Plan  #1 flu vaccine, afebrile she can have this today    #2 Zithromax prescription for sinusitis has had before can cause nausea, loose stools, rash    #3 Flonase nasal  spray    #4 continue nasal lavage    #5 continue check blood pressure and record    #6 resume Lipitor    #7 echo    #8 follow-up cardiology    #9 notify us if symptoms do not improve    #10 follow-up orthopedic    #11 bone density and mammogram ordered

## 2017-09-21 ENCOUNTER — HOSPITAL ENCOUNTER (OUTPATIENT)
Dept: RADIOLOGY | Facility: MEDICAL CENTER | Age: 82
End: 2017-09-21
Attending: ORTHOPAEDIC SURGERY
Payer: MEDICARE

## 2017-09-21 DIAGNOSIS — M25.551 RIGHT HIP PAIN: ICD-10-CM

## 2017-09-21 PROCEDURE — 73721 MRI JNT OF LWR EXTRE W/O DYE: CPT | Mod: RT

## 2017-11-01 ENCOUNTER — RESOLUTE PROFESSIONAL BILLING HOSPITAL PROF FEE (OUTPATIENT)
Dept: HOSPITALIST | Facility: MEDICAL CENTER | Age: 82
End: 2017-11-01
Payer: MEDICARE

## 2017-11-01 ENCOUNTER — APPOINTMENT (OUTPATIENT)
Dept: RADIOLOGY | Facility: MEDICAL CENTER | Age: 82
End: 2017-11-01
Attending: EMERGENCY MEDICINE
Payer: MEDICARE

## 2017-11-01 ENCOUNTER — HOSPITAL ENCOUNTER (OUTPATIENT)
Facility: MEDICAL CENTER | Age: 82
End: 2017-11-02
Attending: EMERGENCY MEDICINE | Admitting: HOSPITALIST
Payer: MEDICARE

## 2017-11-01 ENCOUNTER — APPOINTMENT (OUTPATIENT)
Dept: RADIOLOGY | Facility: MEDICAL CENTER | Age: 82
End: 2017-11-01
Attending: HOSPITALIST
Payer: MEDICARE

## 2017-11-01 DIAGNOSIS — R51.9 ACUTE NONINTRACTABLE HEADACHE, UNSPECIFIED HEADACHE TYPE: ICD-10-CM

## 2017-11-01 DIAGNOSIS — I10 ESSENTIAL HYPERTENSION: ICD-10-CM

## 2017-11-01 PROBLEM — Z98.890 HISTORY OF AORTIC ANEURYSM REPAIR: Status: ACTIVE | Noted: 2017-11-01

## 2017-11-01 PROBLEM — E03.9 HYPOTHYROIDISM: Status: ACTIVE | Noted: 2017-11-01

## 2017-11-01 PROBLEM — Z86.79 HISTORY OF AORTIC ANEURYSM REPAIR: Status: ACTIVE | Noted: 2017-11-01

## 2017-11-01 PROBLEM — G45.9 TIA (TRANSIENT ISCHEMIC ATTACK): Status: ACTIVE | Noted: 2017-11-01

## 2017-11-01 LAB
ALBUMIN SERPL BCP-MCNC: 4.3 G/DL (ref 3.2–4.9)
ALBUMIN/GLOB SERPL: 1.5 G/DL
ALP SERPL-CCNC: 55 U/L (ref 30–99)
ALT SERPL-CCNC: 17 U/L (ref 2–50)
ANION GAP SERPL CALC-SCNC: 10 MMOL/L (ref 0–11.9)
APTT PPP: 26.8 SEC (ref 24.7–36)
AST SERPL-CCNC: 25 U/L (ref 12–45)
BASOPHILS # BLD AUTO: 1.2 % (ref 0–1.8)
BASOPHILS # BLD: 0.07 K/UL (ref 0–0.12)
BILIRUB SERPL-MCNC: 1 MG/DL (ref 0.1–1.5)
BUN SERPL-MCNC: 23 MG/DL (ref 8–22)
CALCIUM SERPL-MCNC: 9.6 MG/DL (ref 8.4–10.2)
CHLORIDE SERPL-SCNC: 103 MMOL/L (ref 96–112)
CO2 SERPL-SCNC: 24 MMOL/L (ref 20–33)
CREAT SERPL-MCNC: 1.22 MG/DL (ref 0.5–1.4)
EOSINOPHIL # BLD AUTO: 0.18 K/UL (ref 0–0.51)
EOSINOPHIL NFR BLD: 3 % (ref 0–6.9)
ERYTHROCYTE [DISTWIDTH] IN BLOOD BY AUTOMATED COUNT: 43.6 FL (ref 35.9–50)
GFR SERPL CREATININE-BSD FRML MDRD: 42 ML/MIN/1.73 M 2
GLOBULIN SER CALC-MCNC: 2.9 G/DL (ref 1.9–3.5)
GLUCOSE SERPL-MCNC: 103 MG/DL (ref 65–99)
HCT VFR BLD AUTO: 39.3 % (ref 37–47)
HGB BLD-MCNC: 13.1 G/DL (ref 12–16)
IMM GRANULOCYTES # BLD AUTO: 0.03 K/UL (ref 0–0.11)
IMM GRANULOCYTES NFR BLD AUTO: 0.5 % (ref 0–0.9)
INR PPP: 1.01 (ref 0.87–1.13)
LYMPHOCYTES # BLD AUTO: 1.59 K/UL (ref 1–4.8)
LYMPHOCYTES NFR BLD: 26.6 % (ref 22–41)
MAGNESIUM SERPL-MCNC: 1.8 MG/DL (ref 1.5–2.5)
MCH RBC QN AUTO: 30.7 PG (ref 27–33)
MCHC RBC AUTO-ENTMCNC: 33.3 G/DL (ref 33.6–35)
MCV RBC AUTO: 92 FL (ref 81.4–97.8)
MONOCYTES # BLD AUTO: 0.52 K/UL (ref 0–0.85)
MONOCYTES NFR BLD AUTO: 8.7 % (ref 0–13.4)
NEUTROPHILS # BLD AUTO: 3.58 K/UL (ref 2–7.15)
NEUTROPHILS NFR BLD: 60 % (ref 44–72)
NRBC # BLD AUTO: 0 K/UL
NRBC BLD AUTO-RTO: 0 /100 WBC
PLATELET # BLD AUTO: 226 K/UL (ref 164–446)
PMV BLD AUTO: 9.9 FL (ref 9–12.9)
POTASSIUM SERPL-SCNC: 3.8 MMOL/L (ref 3.6–5.5)
PROT SERPL-MCNC: 7.2 G/DL (ref 6–8.2)
PROTHROMBIN TIME: 13.1 SEC (ref 12–14.6)
RBC # BLD AUTO: 4.27 M/UL (ref 4.2–5.4)
SODIUM SERPL-SCNC: 137 MMOL/L (ref 135–145)
TROPONIN I SERPL-MCNC: <0.02 NG/ML (ref 0–0.04)
TSH SERPL DL<=0.005 MIU/L-ACNC: 2.16 UIU/ML (ref 0.35–5.5)
WBC # BLD AUTO: 6 K/UL (ref 4.8–10.8)

## 2017-11-01 PROCEDURE — 85610 PROTHROMBIN TIME: CPT

## 2017-11-01 PROCEDURE — 85025 COMPLETE CBC W/AUTO DIFF WBC: CPT

## 2017-11-01 PROCEDURE — 71010 DX-CHEST-PORTABLE (1 VIEW): CPT

## 2017-11-01 PROCEDURE — 93005 ELECTROCARDIOGRAM TRACING: CPT | Performed by: EMERGENCY MEDICINE

## 2017-11-01 PROCEDURE — 80053 COMPREHEN METABOLIC PANEL: CPT

## 2017-11-01 PROCEDURE — 85730 THROMBOPLASTIN TIME PARTIAL: CPT

## 2017-11-01 PROCEDURE — 94760 N-INVAS EAR/PLS OXIMETRY 1: CPT

## 2017-11-01 PROCEDURE — 84443 ASSAY THYROID STIM HORMONE: CPT

## 2017-11-01 PROCEDURE — A9270 NON-COVERED ITEM OR SERVICE: HCPCS | Performed by: HOSPITALIST

## 2017-11-01 PROCEDURE — 83735 ASSAY OF MAGNESIUM: CPT

## 2017-11-01 PROCEDURE — 83036 HEMOGLOBIN GLYCOSYLATED A1C: CPT

## 2017-11-01 PROCEDURE — 700102 HCHG RX REV CODE 250 W/ 637 OVERRIDE(OP): Performed by: HOSPITALIST

## 2017-11-01 PROCEDURE — G0378 HOSPITAL OBSERVATION PER HR: HCPCS

## 2017-11-01 PROCEDURE — 99285 EMERGENCY DEPT VISIT HI MDM: CPT

## 2017-11-01 PROCEDURE — 84484 ASSAY OF TROPONIN QUANT: CPT

## 2017-11-01 PROCEDURE — 70450 CT HEAD/BRAIN W/O DYE: CPT

## 2017-11-01 PROCEDURE — 70551 MRI BRAIN STEM W/O DYE: CPT

## 2017-11-01 PROCEDURE — 36415 COLL VENOUS BLD VENIPUNCTURE: CPT

## 2017-11-01 PROCEDURE — 70544 MR ANGIOGRAPHY HEAD W/O DYE: CPT

## 2017-11-01 PROCEDURE — 99220 PR INITIAL OBSERVATION CARE,LEVL III: CPT | Mod: AI | Performed by: HOSPITALIST

## 2017-11-01 PROCEDURE — 93306 TTE W/DOPPLER COMPLETE: CPT | Mod: 26 | Performed by: INTERNAL MEDICINE

## 2017-11-01 RX ORDER — MAGNESIUM GLUCONATE 27 MG(500)
500 TABLET ORAL DAILY
Status: DISCONTINUED | OUTPATIENT
Start: 2017-11-01 | End: 2017-11-01

## 2017-11-01 RX ORDER — BISACODYL 10 MG
10 SUPPOSITORY, RECTAL RECTAL
Status: DISCONTINUED | OUTPATIENT
Start: 2017-11-01 | End: 2017-11-02 | Stop reason: HOSPADM

## 2017-11-01 RX ORDER — FLUTICASONE PROPIONATE 50 MCG
2 SPRAY, SUSPENSION (ML) NASAL DAILY
Status: DISCONTINUED | OUTPATIENT
Start: 2017-11-01 | End: 2017-11-02 | Stop reason: HOSPADM

## 2017-11-01 RX ORDER — ATORVASTATIN CALCIUM 40 MG/1
80 TABLET, FILM COATED ORAL EVERY EVENING
Status: DISCONTINUED | OUTPATIENT
Start: 2017-11-01 | End: 2017-11-02 | Stop reason: HOSPADM

## 2017-11-01 RX ORDER — ASPIRIN 325 MG
325 TABLET ORAL DAILY
Status: DISCONTINUED | OUTPATIENT
Start: 2017-11-01 | End: 2017-11-02 | Stop reason: HOSPADM

## 2017-11-01 RX ORDER — AMOXICILLIN 250 MG
2 CAPSULE ORAL 2 TIMES DAILY
Status: DISCONTINUED | OUTPATIENT
Start: 2017-11-01 | End: 2017-11-02 | Stop reason: HOSPADM

## 2017-11-01 RX ORDER — ACETAMINOPHEN 325 MG/1
650 TABLET ORAL EVERY 6 HOURS PRN
Status: DISCONTINUED | OUTPATIENT
Start: 2017-11-01 | End: 2017-11-02 | Stop reason: HOSPADM

## 2017-11-01 RX ORDER — MECOBALAMIN 5000 MCG
15 TABLET,DISINTEGRATING ORAL
Status: DISCONTINUED | OUTPATIENT
Start: 2017-11-01 | End: 2017-11-01

## 2017-11-01 RX ORDER — ASPIRIN 600 MG/1
300 SUPPOSITORY RECTAL DAILY
Status: DISCONTINUED | OUTPATIENT
Start: 2017-11-01 | End: 2017-11-02 | Stop reason: HOSPADM

## 2017-11-01 RX ORDER — OMEPRAZOLE 20 MG/1
20 CAPSULE, DELAYED RELEASE ORAL
Status: DISCONTINUED | OUTPATIENT
Start: 2017-11-01 | End: 2017-11-02 | Stop reason: HOSPADM

## 2017-11-01 RX ORDER — METOPROLOL SUCCINATE 25 MG/1
12.5 TABLET, EXTENDED RELEASE ORAL DAILY
Status: DISCONTINUED | OUTPATIENT
Start: 2017-11-01 | End: 2017-11-02 | Stop reason: HOSPADM

## 2017-11-01 RX ORDER — ASPIRIN 81 MG/1
324 TABLET, CHEWABLE ORAL DAILY
Status: DISCONTINUED | OUTPATIENT
Start: 2017-11-01 | End: 2017-11-02 | Stop reason: HOSPADM

## 2017-11-01 RX ORDER — METOPROLOL SUCCINATE 25 MG/1
12.5 TABLET, EXTENDED RELEASE ORAL DAILY
COMMUNITY
End: 2018-07-03

## 2017-11-01 RX ORDER — NAPROXEN SODIUM 220 MG
440 TABLET ORAL 2 TIMES DAILY WITH MEALS
COMMUNITY
End: 2018-02-20

## 2017-11-01 RX ORDER — BENAZEPRIL HYDROCHLORIDE 10 MG/1
40 TABLET ORAL DAILY
Status: DISCONTINUED | OUTPATIENT
Start: 2017-11-01 | End: 2017-11-02 | Stop reason: HOSPADM

## 2017-11-01 RX ORDER — POLYETHYLENE GLYCOL 3350 17 G/17G
1 POWDER, FOR SOLUTION ORAL
Status: DISCONTINUED | OUTPATIENT
Start: 2017-11-01 | End: 2017-11-02 | Stop reason: HOSPADM

## 2017-11-01 RX ORDER — LEVOTHYROXINE SODIUM 0.05 MG/1
75 TABLET ORAL
Status: DISCONTINUED | OUTPATIENT
Start: 2017-11-01 | End: 2017-11-02 | Stop reason: HOSPADM

## 2017-11-01 RX ADMIN — Medication 400 MG: at 17:57

## 2017-11-01 RX ADMIN — ACETAMINOPHEN 650 MG: 325 TABLET, FILM COATED ORAL at 21:38

## 2017-11-01 RX ADMIN — ASPIRIN 325 MG ORAL TABLET 325 MG: 325 PILL ORAL at 17:55

## 2017-11-01 RX ADMIN — DOCUSATE SODIUM AND SENNOSIDES 2 TABLET: 8.6; 5 TABLET, FILM COATED ORAL at 20:36

## 2017-11-01 RX ADMIN — ATORVASTATIN CALCIUM 80 MG: 40 TABLET, FILM COATED ORAL at 20:35

## 2017-11-01 RX ADMIN — VITAMIN D, TAB 1000IU (100/BT) 1000 UNITS: 25 TAB at 17:55

## 2017-11-01 ASSESSMENT — ENCOUNTER SYMPTOMS
HEARTBURN: 0
CHILLS: 0
HEMOPTYSIS: 0
EYE PAIN: 0
DOUBLE VISION: 0
BLURRED VISION: 0
PND: 0
ORTHOPNEA: 0
CONSTIPATION: 0
COUGH: 0
SENSORY CHANGE: 0
PALPITATIONS: 0
SORE THROAT: 0
MYALGIAS: 0
WEAKNESS: 0
SPUTUM PRODUCTION: 0
PHOTOPHOBIA: 0
MEMORY LOSS: 0
BACK PAIN: 0
NERVOUS/ANXIOUS: 1
SHORTNESS OF BREATH: 0
NAUSEA: 0
DEPRESSION: 0
FEVER: 0
CLAUDICATION: 0
TREMORS: 0
STRIDOR: 0
NECK PAIN: 0
SPEECH CHANGE: 0
HEADACHES: 1
BLOOD IN STOOL: 0
DIZZINESS: 0
VOMITING: 0
TINGLING: 0

## 2017-11-01 ASSESSMENT — PATIENT HEALTH QUESTIONNAIRE - PHQ9
SUM OF ALL RESPONSES TO PHQ QUESTIONS 1-9: 0
2. FEELING DOWN, DEPRESSED, IRRITABLE, OR HOPELESS: NOT AT ALL
1. LITTLE INTEREST OR PLEASURE IN DOING THINGS: NOT AT ALL
SUM OF ALL RESPONSES TO PHQ9 QUESTIONS 1 AND 2: 0

## 2017-11-01 ASSESSMENT — LIFESTYLE VARIABLES
EVER_SMOKED: NEVER
ALCOHOL_USE: NO
EVER_SMOKED: NEVER

## 2017-11-01 ASSESSMENT — PAIN SCALES - GENERAL: PAINLEVEL_OUTOF10: 0

## 2017-11-01 NOTE — ED NOTES
ERP at bedside. Pt agrees with plan of care discussed by ERP. AIDET acknowledged with patient. Yifan in low position, side rail up for pt safety. Call light within reach. Will continue to monitor.

## 2017-11-01 NOTE — ED PROVIDER NOTES
"ED Provider Note    CHIEF COMPLAINT  Chief Complaint   Patient presents with   • Possible Stroke   • Headache   • Facial Pain   • Numbness       [facility]  Yvonne Marie Wada is a 81 y.o. female who presents For evaluation of possible stroke.  The patient presents with a several day history of frontal headache.  She states she is developed some numbness in her face in the periorbital area along with some numbness in both her right and left hands.  Patient is concerned because 1998 she had a hemorrhagic stroke.  The patient states she's had a cough for the last several weeks but this is been fairly nonproductive.  The patient denies: Fever, chills, nasal congestion, purulent rhinorrhea, chest pain, abdominal pain, gastrointestinal symptoms, unilateral motor weakness, ataxia, vertigo.  No other acute symptomatology or complaints.    REVIEW OF SYSTEMS  See HPI for further details. All other systems negative.    PAST MEDICAL HISTORY  Past Medical History:   Diagnosis Date   • AAA (abdominal aortic aneurysm) (CMS-[facility]) 7/26/2010    10/09 CT thorax dilated ascending thoracic aorta 4.9 cm 1/10 transesophageal echo dilated aortic root, and ascending aorta with mild aortic insufficiency 1/10  ascending aortic aneurysm repair 5/12 echo [facility] normal LV function EF 60%, moderate to severe left atrial enlargement, RVSP 32    • Aortic insufficiency    • Aortic valve disease    • Aortic valve regurgitation    • Arthritis     back and knee/ osteo   • Cataract    • Dental disorder     full top denture, partial bottom   • Diverticulosis    • Dyslipidemia 7/26/2010    Sees [facility] on lipitor 4/11 chol 159,trig 84,hdl 47,ldl 95,cpk 114 7/12 chol 163,trig 120,hdl 49,ldl 90, crp 1.1 on lipitor 20 mg    • GERD (gastroesophageal reflux disease) 7/12/2012 6/07 EGD per [facility] hiatal hernia, start prevacid 30 mg 7/12 protonix 20 mg qday    • Glaucoma    • Heart burn    • Heart murmur    • Heart valve disease     \"leaking\", mitral valve   • " Hiatus hernia syndrome    • High cholesterol    • History of shingles 6/30/2011    2010 Back and left thorax     • History of total knee arthroplasty 7/26/2010    4/10 MRI right knee complex tear medial meniscus, horizontal cleavage tear lateral meniscus, chondromalacia patella, moderate-sized Baker's cyst 5/10 right meniscus knee repair  5/10 told by  had gout on surgery had pathology report, I await that report Question gout seen on pathology report done during repair of right meniscus knee surgery. 7/10 uric acid 6.3, we'll await starting allopurinol depending on the operative report 8/5/10 reviewed ortho notes , op report indicates crystal deposition, could be gout or pseudogout. No bx result sent over. Will need to get. My suspicion is chondrocalcinosis. 10/11  ortho note, MRI pending 8/7/10 reviewed pathology report right knee tissue, marked degenerative changes with focal calcification, no mention of gout. Based on this and a normal uric acid level, I do not believe she has gout, has no hyperuricemia medications would be indicated 10/11  ortho left knee meniscectomy and arthroscopy 1/12     • HLD (hyperlipidemia)    • Hypertension    • Hypothyroid 4/8/2011 4/11 tsh 9 start synthroid 50 4/11 tsh 6,free t3 3.1,free t4 1.2,tpo negative 7/1/11 tsh 2.1 cont synthroid 50 mcg 7/12 tsh 3.4 cont synthroid 50 mcg    • Indigestion    • Mitral insufficiency    • OSTEOPOROSIS 8/9/2010    Had been on fosamax [id number]  8/10 dexa LS -2.0,hip -1.5 await old dexa result, she will get copy for me 4/11 vit d 35 9/12 dexa LS -3.2,hip -1.4 2/13/13 relast infusion    • Pain     back and right leg, can get as bad as 10/10   • Preventative health care 7/26/2010 2002 pneum 2005 colon [facility] no records 4/11 vit d 35 9/11 shingles vaccine 9/12 mammogram 9/12 dexa LS -3.2,hip -1.4    • Rheumatic fever    • S/P appendectomy 7/26/2010   • S/P TOMY (total abdominal  hysterectomy) 7/26/2010    Sees gyn on HRT estradiol for 20 yrs () 11/08 mammo     • Shingles 6/30/2011   • Snoring    • Status post lumbar surgery 7/26/2010 6/03 L4-5 epidural Dr. Jordan  7/06 overall for decompression, foraminotomy. L4-L5 posterior fusion, L4-L5 allograft      • Stroke (CMS-[facility]) 1996    no residual problems    • Thoracic aortic aneurysm without mention of rupture    • Tricuspid insufficiency    • Unspecified disorder of thyroid     hypo       FAMILY HISTORY  Family History   Problem Relation Age of Onset   • Stroke Mother    • Heart Disease Father    • Heart Disease Sister        SOCIAL HISTORY  Nonsmoker; no history of alcohol or drugs;    SURGICAL HISTORY  Past Surgical History:   Procedure Laterality Date   • TENDON REPAIR Right 11/10/2016    Procedure: TENDON REPAIR - QUADRACEPS ;  Surgeon: Maxim Herrera M.D.;  Location: [facility];  Service:    • KNEE ARTHROPLASTY TOTAL Right 9/8/2016    Procedure: KNEE ARTHROPLASTY TOTAL;  Surgeon: Maxim Herrera M.D.;  Location: [facility];  Service:    • LUMBAR FUSION POSTERIOR  11/24/2015    Procedure: LUMBAR FUSION POSTERIOR L3-4, EXPLORATION OF FUSION L4-5 WITH INSTRUMENTATION;  Surgeon: Hermann Reis M.D.;  Location: [facility];  Service:    • LUMBAR LAMINECTOMY DISKECTOMY  11/24/2015    Procedure: LUMBAR L3-4 LAMINECTOMY AND REDO L4-5;  Surgeon: Hermann Reis M.D.;  Location: [facility];  Service:    • KNEE ARTHROPLASTY TOTAL  1/3/2012    Performed by YOSEF MEJÍA at [facility]   • KNEE ARTHROSCOPY  10/18/2011    Performed by YOSEF MEJÍA at [facility]   • MEDIAL MENISCECTOMY  10/18/2011    Performed by YOSEF MEJÍA at [facility]   • AORTIC VALVE REPLACEMENT  1/12/2010    Performed by ISAÍAS NICOLE at [facility]   • APPENDECTOMY     • HYSTERECTOMY, TOTAL ABDOMINAL     • LUMBAR FUSION POSTERIOR          CURRENT MEDICATIONS  See nurses notes    ALLERGIES  Allergies   Allergen Reactions   • Codeine Nausea     Synthetic codones ok   • Trazodone      groggy   • Ultram [Tramadol]      nausea       PHYSICAL EXAM  VITAL SIGNS: Wt 74.6 kg (164 lb 7.4 oz)   BMI 28.23 kg/m²    Constitutional: A 81-year-old female, anxious, oriented ×3  HENT: ,Atraumatic, Bilateral external ears normal, tympanic membranes clear, Oropharynx moist, No oral exudates, Nose normal.   Eyes: PERRL, EOMI, Conjunctiva normal, No discharge.   Neck: Normal range of motion, No tenderness, Supple, No stridor.   Lymphatic: No lymphadenopathy noted.   Cardiovascular: Normal heart rate, Normal rhythm, No murmurs, No rubs, No gallops.   Thorax & Lungs: Normal Equal breath sounds, No respiratory distress, No wheezing, no stridor, no rales. No chest tenderness.   Abdomen: Soft, nontender, nondistended, no organomegaly, positive bowel sounds normal in quality. No guarding or rebound.  Skin: Mildly decreased skin turgor, pink, warm, dry. No rashes, petechiae, purpura. Normal capillary refill.   Back: No tenderness, No CVA tenderness.   Extremities: Intact distal pulses, No edema, No tenderness, No cyanosis, No clubbing. Vascular: Pulses are 2+, symmetric in the upper and lower extremities.  Musculoskeletal: Diffuse arthritic changes with pain in her right knee from previous right knee arthroplasty. No tenderness to palpation or major deformities noted.   Neurologic: Alert & oriented x 3, Normal motor function, Normal sensory function, No gross focal deficits noted.  Subjective paresthesias in the perioral area along with the right and left upper extremities; I do not appreciate any motor dysfunction/weakness; finger-nose is normal;  Psychiatric: Affect anxious, Judgment normal, Mood normal.     EKG  I have interpreted: Rate 80, rhythm sinus, axis normal, QRS Q-T intervals normal, nonspecific ST-T wave changes, 12-lead EKG, no acute change compared to  previous tracing;    RADIOLOGY/PROCEDURES  CT-HEAD W/O   Final Result      Stable head CT with no noncontrast CT evidence of acute intracranial hemorrhage.      Stable right midbrain likely chronic lacunar infarction, mostly periventricular white matter changes and cerebral volume loss.      DX-CHEST-PORTABLE (1 VIEW)   Final Result      Decreasing lung volumes with increasing linear opacity most likely to represent atelectasis            COURSE & MEDICAL DECISION MAKING  Pertinent Labs & Imaging studies reviewed. (See chart for details)  1.  IV normal saline    Laboratory studies: CBC and differential within normal; CMP shows a random glucose 103 BUN of 23 otherwise within normal; troponin less than 0.02; coags within normal;    Discussion/consultation: At this time, the patient presents for evaluation of paresthesias along with a headache.  The patient's initial workup has been negative.  Patient's remaining hypertensive.  At this time, I spoke with the hospitalist on call.  Patient will be admitted for further monitoring, treatment, and care.    FINAL IMPRESSION  1. Acute nonintractable headache, unspecified headache type    2. Essential hypertension        PLAN  1.  Patient will be admitted for further monitoring, treatment, and care.    Electronically signed by: Guy G Gansert, 11/1/2017 12:19 PM

## 2017-11-01 NOTE — H&P
Hospital Medicine History and Physical    Date of Service  11/1/2017    Chief Complaint  Chief Complaint   Patient presents with   • Possible Stroke   • Headache   • Facial Pain   • Numbness       History of Presenting Illness  81 y.o. female with a past medical history of Ascending aortic aneurysm status post repair about 8 years ago, hypertension, hyperlipidemia,  presented 11/1/2017 with symptoms of left lower facial pain, numbness and transient numbness of her both hands, not arms that last fairly quick several minutes, given her previous medical history patient came to the ER for an evaluation. Upon our initial evaluation patient was noted to have elevated blood pressure with systolic BP >190s. However, her initial CT Head was grossly normal for acute findings. At this time we will admit patient for close neurochecks as well as perform a TIA workup.      Primary Care Physician  Tee Tran M.D.    Consultants  None    Code Status  Code: Full code    Review of Systems  Review of Systems   Constitutional: Negative for chills, fever and malaise/fatigue.   HENT: Negative for congestion, hearing loss, sore throat and tinnitus.    Eyes: Negative for blurred vision, double vision, photophobia and pain.   Respiratory: Negative for cough, hemoptysis, sputum production, shortness of breath and stridor.    Cardiovascular: Negative for chest pain, palpitations, orthopnea, claudication and PND.   Gastrointestinal: Negative for blood in stool, constipation, heartburn, melena, nausea and vomiting.   Genitourinary: Negative for dysuria, frequency and urgency.   Musculoskeletal: Negative for back pain, myalgias and neck pain.   Neurological: Positive for headaches. Negative for dizziness, tingling, tremors, sensory change, speech change and weakness.   Psychiatric/Behavioral: Negative for depression, memory loss and suicidal ideas. The patient is nervous/anxious.           Past Medical History  Past Medical History:    Diagnosis Date   • GERD (gastroesophageal reflux disease) 7/12/2012 6/07 EGD per [facility] hiatal hernia, start prevacid 30 mg 7/12 protonix 20 mg qday    • Shingles 6/30/2011   • History of shingles 6/30/2011 2010 Back and left thorax     • Hypothyroid 4/8/2011 4/11 tsh 9 start synthroid 50 4/11 tsh 6,free t3 3.1,free t4 1.2,tpo negative 7/1/11 tsh 2.1 cont synthroid 50 mcg 7/12 tsh 3.4 cont synthroid 50 mcg    • OSTEOPOROSIS 8/9/2010    Had been on fosamax [id number]  8/10 dexa LS -2.0,hip -1.5 await old dexa result, she will get copy for me 4/11 vit d 35 9/12 dexa LS -3.2,hip -1.4 2/13/13 relast infusion    • AAA (abdominal aortic aneurysm) (CMS-[facility]) 7/26/2010    10/09 CT thorax dilated ascending thoracic aorta 4.9 cm 1/10 transesophageal echo dilated aortic root, and ascending aorta with mild aortic insufficiency 1/10  ascending aortic aneurysm repair 5/12 echo [facility] normal LV function EF 60%, moderate to severe left atrial enlargement, RVSP 32    • Status post lumbar surgery 7/26/2010 6/03 L4-5 epidural Dr. Jordan  7/06 overall for decompression, foraminotomy. L4-L5 posterior fusion, L4-L5 allograft      • Dyslipidemia 7/26/2010    Sees [facility] on lipitor 4/11 chol 159,trig 84,hdl 47,ldl 95,cpk 114 7/12 chol 163,trig 120,hdl 49,ldl 90, crp 1.1 on lipitor 20 mg    • S/P TOMY (total abdominal hysterectomy) 7/26/2010    Sees gyn on HRT estradiol for 20 yrs () 11/08 mammo     • Preventative health care 7/26/2010    2002 pneum 2005 colon [facility] no records 4/11 vit d 35 9/11 shingles vaccine 9/12 mammogram 9/12 dexa LS -3.2,hip -1.4    • History of total knee arthroplasty 7/26/2010    4/10 MRI right knee complex tear medial meniscus, horizontal cleavage tear lateral meniscus, chondromalacia patella, moderate-sized Baker's cyst 5/10 right meniscus knee repair  5/10 told by  had gout on surgery had pathology report, I await that report Question gout seen on pathology  "report done during repair of right meniscus knee surgery. 7/10 uric acid 6.3, we'll await starting allopurinol depending on the operative report 8/5/10 reviewed ortho notes , op report indicates crystal deposition, could be gout or pseudogout. No bx result sent over. Will need to get. My suspicion is chondrocalcinosis. 10/11  ortho note, MRI pending 8/7/10 reviewed pathology report right knee tissue, marked degenerative changes with focal calcification, no mention of gout. Based on this and a normal uric acid level, I do not believe she has gout, has no hyperuricemia medications would be indicated 10/11  ortho left knee meniscectomy and arthroscopy 1/12     • S/P appendectomy 7/26/2010   • Stroke (CMS-[facility]) 1996    no residual problems    • Aortic insufficiency    • Aortic valve disease    • Aortic valve regurgitation    • Arthritis     back and knee/ osteo   • Cataract    • Dental disorder     full top denture, partial bottom   • Diverticulosis    • Glaucoma    • Heart burn    • Heart murmur    • Heart valve disease     \"leaking\", mitral valve   • Hiatus hernia syndrome    • High cholesterol    • HLD (hyperlipidemia)    • Hypertension    • Indigestion    • Mitral insufficiency    • Pain     back and right leg, can get as bad as 10/10   • Rheumatic fever    • Snoring    • Thoracic aortic aneurysm without mention of rupture    • Tricuspid insufficiency    • Unspecified disorder of thyroid     hypo       Surgical History  Past Surgical History:   Procedure Laterality Date   • TENDON REPAIR Right 11/10/2016    Procedure: TENDON REPAIR - QUADRACEPS ;  Surgeon: Maxim Herrera M.D.;  Location: SURGERY [facility];  Service:    • KNEE ARTHROPLASTY TOTAL Right 9/8/2016    Procedure: KNEE ARTHROPLASTY TOTAL;  Surgeon: Maxim Herrera M.D.;  Location: SURGERY [facility];  Service:    • LUMBAR FUSION POSTERIOR  11/24/2015    Procedure: LUMBAR FUSION POSTERIOR L3-4, " EXPLORATION OF FUSION L4-5 WITH INSTRUMENTATION;  Surgeon: Hermann Reis M.D.;  Location: SURGERY [facility];  Service:    • LUMBAR LAMINECTOMY DISKECTOMY  11/24/2015    Procedure: LUMBAR L3-4 LAMINECTOMY AND REDO L4-5;  Surgeon: Hermann Reis M.D.;  Location: SURGERY [facility];  Service:    • KNEE ARTHROPLASTY TOTAL  1/3/2012    Performed by YOSEF MEJÍA at [facility]   • KNEE ARTHROSCOPY  10/18/2011    Performed by YOSEF MEJÍA at [facility]   • MEDIAL MENISCECTOMY  10/18/2011    Performed by YOSEF MEJÍA at [facility]   • AORTIC VALVE REPLACEMENT  1/12/2010    Performed by ISAÍAS NICOLE at [facility]   • APPENDECTOMY     • HYSTERECTOMY, TOTAL ABDOMINAL     • LUMBAR FUSION POSTERIOR         Medications  No current facility-administered medications on file prior to encounter.      Current Outpatient Prescriptions on File Prior to Encounter   Medication Sig Dispense Refill   • atorvastatin (LIPITOR) 20 MG Tab Take 1 Tab by mouth every day. 30 Tab 6   • fluticasone (FLONASE) 50 MCG/ACT nasal spray Spray 2 Sprays in nose every day. Each Nostril 16 g 11   • levothyroxine (SYNTHROID) 75 MCG Tab Take 1 Tab by mouth Every morning on an empty stomach. 90 Tab 3   • benazepril (LOTENSIN) 40 MG tablet Take 1 Tab by mouth every day. 90 Tab 3   • lansoprazole (PREVACID) 15 MG CAPSULE DELAYED RELEASE Take 15 mg by mouth 1 time daily as needed.     • magnesium gluconate (MAG-G) 500 MG tablet Take 500 mg by mouth every day.     • Multiple Vitamins-Minerals (CENTRUM SILVER ULTRA WOMENS PO) Take 1 Tab by mouth every day.     • vitamin D (CHOLECALCIFEROL) 1000 UNIT TABS Take 1,000 Units by mouth every day.         Family History  Family History   Problem Relation Age of Onset   • Stroke Mother    • Heart Disease Father    • Heart Disease Sister        Social History  Social History   Substance Use Topics   • Smoking status: Never Smoker   • Smokeless  tobacco: Never Used      Comment: none   • Alcohol use No       Allergies  Allergies   Allergen Reactions   • Codeine Nausea     Synthetic codones ok   • Trazodone Vomiting     groggy   • Ultram [Tramadol] Vomiting     nausea        Physical Exam  Laboratory   Hemodynamics  Temp (24hrs), Av.9 °C (98.5 °F), Min:36.9 °C (98.5 °F), Max:36.9 °C (98.5 °F)   Temperature: 36.9 °C (98.5 °F)  Pulse  Av.7  Min: 59  Max: 83 Heart Rate (Monitored): (!) 56  Blood Pressure : (!) 195/91 (Simultaneous filing. User may not have seen previous data.), NIBP: (!) 198/69      Respiratory      Respiration: 20, Pulse Oximetry: 97 %             Physical Exam   Constitutional: She is oriented to person, place, and time. She appears well-developed and well-nourished.   HENT:   Head: Normocephalic and atraumatic.   Mouth/Throat: No oropharyngeal exudate.   Eyes: Conjunctivae are normal. Pupils are equal, round, and reactive to light. Right eye exhibits no discharge. No scleral icterus.   Neck: Neck supple. No JVD present. No thyromegaly present.   Cardiovascular: Normal rate and intact distal pulses.    No murmur heard.  Pulmonary/Chest: Effort normal and breath sounds normal. No stridor. No respiratory distress. She has no wheezes. She has no rales.   Abdominal: Soft. Bowel sounds are normal. She exhibits no distension. There is no tenderness. There is no rebound.   Musculoskeletal: Normal range of motion. She exhibits no edema.   Neurological: She is alert and oriented to person, place, and time. She displays normal reflexes. No cranial nerve deficit. She exhibits normal muscle tone. Coordination normal.   Strength 4/5 throughout B/L UE/LE    Skin: Skin is warm. No erythema.   Psychiatric: She has a normal mood and affect. Her behavior is normal. Thought content normal.         Assessment/Plan  * TIA (transient ischemic attack)- (present on admission)   Assessment & Plan    Symptoms such as left facial pain and numbness can possibly  be due to a TIA, symptoms currently resolved, except for mild headache. 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   Initial CT Head: Stable head CT with no noncontrast CT evidence of acute intracranial hemorrhage.  We will order an MRI/MRA of her brain.  We have started patient on aspirin, high intensity statin for cardiac protective measures.  We will also obtain an echocardiogram as well.  Dysphagia screen before diet.   PT/OT when stable        History of aortic aneurysm repair- (present on admission)   Assessment & Plan    No acute issues, follows with cardiology.  We will perform an echocardiogram.        Hypothyroidism- (present on admission)   Assessment & Plan    Continue synthroid  Check TSH/Free T4        GERD (gastroesophageal reflux disease)- (present on admission)   Assessment & Plan    Continue lansoprazole        Hypertension- (present on admission)   Assessment & Plan    Will allow for permissive HTN, however sbp >190s on admission, if sustained we will continue metoprolol and her benazepril.            I anticipate this patient is appropriate for observation status at this time.    Prophylaxis: lovenox    Recent Labs      11/01/17   1244   WBC  6.0   RBC  4.27   HEMOGLOBIN  13.1   HEMATOCRIT  39.3   MCV  92.0   MCH  30.7   MCHC  33.3*   RDW  43.6   PLATELETCT  226   MPV  9.9     Recent Labs      11/01/17   1244   SODIUM  137   POTASSIUM  3.8   CHLORIDE  103   CO2  24   GLUCOSE  103*   BUN  23*   CREATININE  1.22   CALCIUM  9.6     Recent Labs      11/01/17   1244   ALTSGPT  17   ASTSGOT  25   ALKPHOSPHAT  55   TBILIRUBIN  1.0   GLUCOSE  103*     Recent Labs      11/01/17   1244   APTT  26.8   INR  1.01             Lab Results   Component Value Date    TROPONINI <0.02 11/01/2017       Imaging  CT-HEAD W/O   Final Result      Stable head CT with no noncontrast CT evidence of acute intracranial hemorrhage.      Stable right  midbrain likely chronic lacunar infarction, mostly periventricular white matter changes and cerebral volume loss.      DX-CHEST-PORTABLE (1 VIEW)   Final Result      Decreasing lung volumes with increasing linear opacity most likely to represent atelectasis      Echocardiogram Comp W/O cont    (Results Pending)   MR-BRAIN-W/O    (Results Pending)   MR-MRA HEAD-W/O    (Results Pending)

## 2017-11-01 NOTE — ED NOTES
"Patient tearful -states \"it feels like I'm having another stroke\".   Right face pain, weakness, intermittent numbness in bilateral hands, and headache x 1 week. EKG in triage.   "

## 2017-11-01 NOTE — ASSESSMENT & PLAN NOTE
Symptoms such as left facial pain and numbness can possibly be due to a TIA, symptoms currently resolved, except for mild headache. 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   Initial CT Head: Stable head CT with no noncontrast CT evidence of acute intracranial hemorrhage.  We will order an MRI/MRA of her brain.  We have started patient on aspirin, high intensity statin for cardiac protective measures.  We will also obtain an echocardiogram as well.  Dysphagia screen before diet.   PT/OT when stable

## 2017-11-01 NOTE — ASSESSMENT & PLAN NOTE
Will allow for permissive HTN, however sbp >190s on admission, if sustained we will continue metoprolol and her benazepril.

## 2017-11-02 VITALS
HEART RATE: 63 BPM | DIASTOLIC BLOOD PRESSURE: 78 MMHG | OXYGEN SATURATION: 95 % | TEMPERATURE: 98 F | SYSTOLIC BLOOD PRESSURE: 163 MMHG | RESPIRATION RATE: 16 BRPM | HEIGHT: 64 IN | BODY MASS INDEX: 28.08 KG/M2 | WEIGHT: 164.46 LBS

## 2017-11-02 PROBLEM — G45.9 TIA (TRANSIENT ISCHEMIC ATTACK): Status: RESOLVED | Noted: 2017-11-01 | Resolved: 2017-11-02

## 2017-11-02 LAB
ALBUMIN SERPL BCP-MCNC: 3.2 G/DL (ref 3.2–4.9)
ALBUMIN/GLOB SERPL: 1.4 G/DL
ALP SERPL-CCNC: 51 U/L (ref 30–99)
ALT SERPL-CCNC: 16 U/L (ref 2–50)
ANION GAP SERPL CALC-SCNC: 9 MMOL/L (ref 0–11.9)
AST SERPL-CCNC: 23 U/L (ref 12–45)
BASOPHILS # BLD AUTO: 1.4 % (ref 0–1.8)
BASOPHILS # BLD: 0.07 K/UL (ref 0–0.12)
BILIRUB SERPL-MCNC: 0.6 MG/DL (ref 0.1–1.5)
BUN SERPL-MCNC: 22 MG/DL (ref 8–22)
CALCIUM SERPL-MCNC: 9.3 MG/DL (ref 8.4–10.2)
CHLORIDE SERPL-SCNC: 106 MMOL/L (ref 96–112)
CHOLEST SERPL-MCNC: 146 MG/DL (ref 100–199)
CO2 SERPL-SCNC: 24 MMOL/L (ref 20–33)
CREAT SERPL-MCNC: 1.28 MG/DL (ref 0.5–1.4)
EOSINOPHIL # BLD AUTO: 0.29 K/UL (ref 0–0.51)
EOSINOPHIL NFR BLD: 5.8 % (ref 0–6.9)
ERYTHROCYTE [DISTWIDTH] IN BLOOD BY AUTOMATED COUNT: 42.5 FL (ref 35.9–50)
EST. AVERAGE GLUCOSE BLD GHB EST-MCNC: 108 MG/DL
GFR SERPL CREATININE-BSD FRML MDRD: 40 ML/MIN/1.73 M 2
GLOBULIN SER CALC-MCNC: 2.3 G/DL (ref 1.9–3.5)
GLUCOSE SERPL-MCNC: 93 MG/DL (ref 65–99)
HBA1C MFR BLD: 5.4 % (ref 0–5.6)
HCT VFR BLD AUTO: 35.7 % (ref 37–47)
HDLC SERPL-MCNC: 42 MG/DL
HGB BLD-MCNC: 11.8 G/DL (ref 12–16)
IMM GRANULOCYTES # BLD AUTO: 0.02 K/UL (ref 0–0.11)
IMM GRANULOCYTES NFR BLD AUTO: 0.4 % (ref 0–0.9)
LDLC SERPL CALC-MCNC: 89 MG/DL
LV EJECT FRACT  99904: 70
LYMPHOCYTES # BLD AUTO: 1.69 K/UL (ref 1–4.8)
LYMPHOCYTES NFR BLD: 33.6 % (ref 22–41)
MCH RBC QN AUTO: 30.1 PG (ref 27–33)
MCHC RBC AUTO-ENTMCNC: 33.1 G/DL (ref 33.6–35)
MCV RBC AUTO: 91.1 FL (ref 81.4–97.8)
MONOCYTES # BLD AUTO: 0.61 K/UL (ref 0–0.85)
MONOCYTES NFR BLD AUTO: 12.1 % (ref 0–13.4)
NEUTROPHILS # BLD AUTO: 2.35 K/UL (ref 2–7.15)
NEUTROPHILS NFR BLD: 46.7 % (ref 44–72)
NRBC # BLD AUTO: 0 K/UL
NRBC BLD AUTO-RTO: 0 /100 WBC
PLATELET # BLD AUTO: 201 K/UL (ref 164–446)
PMV BLD AUTO: 9.6 FL (ref 9–12.9)
POTASSIUM SERPL-SCNC: 4 MMOL/L (ref 3.6–5.5)
PROT SERPL-MCNC: 5.5 G/DL (ref 6–8.2)
RBC # BLD AUTO: 3.92 M/UL (ref 4.2–5.4)
SODIUM SERPL-SCNC: 139 MMOL/L (ref 135–145)
TRIGL SERPL-MCNC: 73 MG/DL (ref 0–149)
WBC # BLD AUTO: 5 K/UL (ref 4.8–10.8)

## 2017-11-02 PROCEDURE — G0378 HOSPITAL OBSERVATION PER HR: HCPCS

## 2017-11-02 PROCEDURE — 700102 HCHG RX REV CODE 250 W/ 637 OVERRIDE(OP): Performed by: HOSPITALIST

## 2017-11-02 PROCEDURE — 99285 EMERGENCY DEPT VISIT HI MDM: CPT

## 2017-11-02 PROCEDURE — 85025 COMPLETE CBC W/AUTO DIFF WBC: CPT

## 2017-11-02 PROCEDURE — G8978 MOBILITY CURRENT STATUS: HCPCS | Mod: CI

## 2017-11-02 PROCEDURE — 97535 SELF CARE MNGMENT TRAINING: CPT

## 2017-11-02 PROCEDURE — G8987 SELF CARE CURRENT STATUS: HCPCS | Mod: CI

## 2017-11-02 PROCEDURE — 99217 PR OBSERVATION CARE DISCHARGE: CPT | Performed by: HOSPITALIST

## 2017-11-02 PROCEDURE — A9270 NON-COVERED ITEM OR SERVICE: HCPCS | Performed by: HOSPITALIST

## 2017-11-02 PROCEDURE — G8989 SELF CARE D/C STATUS: HCPCS | Mod: CI

## 2017-11-02 PROCEDURE — G8988 SELF CARE GOAL STATUS: HCPCS | Mod: CI

## 2017-11-02 PROCEDURE — 36415 COLL VENOUS BLD VENIPUNCTURE: CPT

## 2017-11-02 PROCEDURE — G8980 MOBILITY D/C STATUS: HCPCS | Mod: CI

## 2017-11-02 PROCEDURE — G8979 MOBILITY GOAL STATUS: HCPCS | Mod: CI

## 2017-11-02 PROCEDURE — 97165 OT EVAL LOW COMPLEX 30 MIN: CPT

## 2017-11-02 PROCEDURE — 93306 TTE W/DOPPLER COMPLETE: CPT

## 2017-11-02 PROCEDURE — 80053 COMPREHEN METABOLIC PANEL: CPT

## 2017-11-02 PROCEDURE — 80061 LIPID PANEL: CPT

## 2017-11-02 PROCEDURE — 700111 HCHG RX REV CODE 636 W/ 250 OVERRIDE (IP): Performed by: HOSPITALIST

## 2017-11-02 PROCEDURE — 97161 PT EVAL LOW COMPLEX 20 MIN: CPT

## 2017-11-02 PROCEDURE — 94760 N-INVAS EAR/PLS OXIMETRY 1: CPT

## 2017-11-02 RX ORDER — ASPIRIN 81 MG/1
81 TABLET ORAL DAILY
Qty: 30 TAB | Refills: 0 | Status: SHIPPED | OUTPATIENT
Start: 2017-11-02 | End: 2017-11-28

## 2017-11-02 RX ORDER — ATORVASTATIN CALCIUM 80 MG/1
80 TABLET, FILM COATED ORAL EVERY EVENING
Qty: 30 TAB | Refills: 0 | Status: SHIPPED | OUTPATIENT
Start: 2017-11-02 | End: 2017-11-28 | Stop reason: SDUPTHER

## 2017-11-02 RX ADMIN — METOPROLOL SUCCINATE 25 MG: 25 TABLET, EXTENDED RELEASE ORAL at 08:22

## 2017-11-02 RX ADMIN — LEVOTHYROXINE SODIUM 75 MCG: 50 TABLET ORAL at 06:17

## 2017-11-02 RX ADMIN — VITAMIN D, TAB 1000IU (100/BT) 1000 UNITS: 25 TAB at 08:21

## 2017-11-02 RX ADMIN — ENOXAPARIN SODIUM 40 MG: 100 INJECTION SUBCUTANEOUS at 08:16

## 2017-11-02 RX ADMIN — ASPIRIN 325 MG ORAL TABLET 325 MG: 325 PILL ORAL at 08:22

## 2017-11-02 RX ADMIN — Medication 400 MG: at 08:22

## 2017-11-02 RX ADMIN — BENAZEPRIL HYDROCHLORIDE 40 MG: 10 TABLET, FILM COATED ORAL at 08:19

## 2017-11-02 ASSESSMENT — GAIT ASSESSMENTS
GAIT LEVEL OF ASSIST: SUPERVISED
DEVIATION: STEP TO
ASSISTIVE DEVICE: SINGLE POINT CANE
DISTANCE (FEET): 200

## 2017-11-02 ASSESSMENT — ACTIVITIES OF DAILY LIVING (ADL): TOILETING: INDEPENDENT

## 2017-11-02 ASSESSMENT — PAIN SCALES - GENERAL
PAINLEVEL_OUTOF10: 0
PAINLEVEL_OUTOF10: 0

## 2017-11-02 NOTE — THERAPY
"Occupational Therapy Evaluation completed.   Functional Status:  A&Ox3. Performs self-cares and functional mobility using SPC with Sup/SBA. Good safety awareness.    Plan of Care: Patient with no further skilled OT needs in the acute care setting at this time  Discharge Recommendations:  Equipment: No Equipment Needed. Post-acute therapy Currently anticipate no further skilled therapy needs once patient is discharged from the inpatient setting.  Lives with spouse. Pt states son can place a railing on steps to enter home for increased safety.    See \"Rehab Therapy-Acute\" Patient Summary Report for complete documentation.    "

## 2017-11-02 NOTE — DISCHARGE INSTRUCTIONS
Discharge Instructions    Discharged to home by car with relative. Discharged via wheelchair, hospital escort: Yes.  Special equipment needed: Cane    Be sure to schedule a follow-up appointment with your primary care doctor or any specialists as instructed.     Discharge Plan:   Diet Plan: Discussed  Activity Level: Discussed  Confirmed Follow up Appointment: Patient to Call and Schedule Appointment  Confirmed Symptoms Management: Discussed  Medication Reconciliation Updated: Yes  Influenza Vaccine Indication: Not indicated: Previously immunized this influenza season and > 8 years of age    I understand that a diet low in cholesterol, fat, and sodium is recommended for good health. Unless I have been given specific instructions below for another diet, I accept this instruction as my diet prescription.   Other diet: Regular    Special Instructions: None    · Is patient discharged on Warfarin / Coumadin?   No     · Is patient Post Blood Transfusion?  No    Depression / Suicide Risk    As you are discharged from this [facility] facility, it is important to learn how to keep safe from harming yourself.    Recognize the warning signs:  · Abrupt changes in personality, positive or negative- including increase in energy   · Giving away possessions  · Change in eating patterns- significant weight changes-  positive or negative  · Change in sleeping patterns- unable to sleep or sleeping all the time   · Unwillingness or inability to communicate  · Depression  · Unusual sadness, discouragement and loneliness  · Talk of wanting to die  · Neglect of personal appearance   · Rebelliousness- reckless behavior  · Withdrawal from people/activities they love  · Confusion- inability to concentrate     If you or a loved one observes any of these behaviors or has concerns about self-harm, here's what you can do:  · Talk about it- your feelings and reasons for harming yourself  · Remove any means that you might use to hurt yourself  "(examples: pills, rope, extension cords, firearm)  · Get professional help from the community (Mental Health, Substance Abuse, psychological counseling)  · Do not be alone:Call your Safe Contact- someone whom you trust who will be there for you.  · Call your local CRISIS HOTLINE 740-2647 or 526-608-0478  · Call your local Children's Mobile Crisis Response Team [place] (977) 449-1973 or www.[employer]  · Call the toll free National Suicide Prevention Hotlines   · National Suicide Prevention Lifeline 173-323-PZLY (8010)  · [employer] 800-SUICIDE (723-0815)      Transient Ischemic Attack    A transient ischemic attack (TIA) is a \"warning stroke\" that causes stroke-like symptoms. A TIA does not cause lasting damage to the brain. The symptoms of a TIA can happen fast and do not last long. It is important to know the symptoms of a TIA and what to do. This can help prevent stroke or death.   HOME CARE   · Take medicines only as told by your doctor. Make sure you understand all of the instructions.  · You may need to take aspirin or warfarin medicine. Warfarin needs to be taken exactly as told.  ¨ Taking too much or too little warfarin is dangerous. Blood tests must be done as often as told by your doctor. A PT blood test measures how long it takes for blood to clot. Your PT is used to calculate another value called an INR. Your PT and INR help your doctor adjust your warfarin dosage. He or she will make sure you are taking the right amount.  ¨ Food can cause problems with warfarin and affect the results of your blood tests. This is true for foods high in vitamin K. Eat the same amount of foods high in vitamin K each day. Foods high in vitamin K include spinach, kale, broccoli, cabbage, mayra and turnip greens, [place] sprouts, peas, cauliflower, seaweed, and parsley. Other foods high in vitamin K include beef and pork liver, green tea, and soybean oil. Eat the same amount of foods high in " vitamin K each day. Avoid big changes in your diet. Tell your doctor before changing your diet. Talk to a  (dietitian) if you have questions.  ¨ Many medicines can cause problems with warfarin and affect your PT and INR. Tell your doctor about all medicines you take. This includes vitamins and dietary pills (supplements). Do not take or stop taking any prescribed or over-the-counter medicines unless your doctor tells you to.  ¨ Warfarin can cause more bruising or bleeding. Hold pressure over any cuts for longer than normal. Talk to your doctor about other side effects of warfarin.  ¨ Avoid sports or activities that may cause injury or bleeding.  ¨ Be careful when you shave, floss, or use sharp objects.  ¨ Avoid or drink very little alcohol while taking warfarin. Tell your doctor if you change how much alcohol you drink.  ¨ Tell your dentist and other doctors that you take warfarin before any procedures.  · Follow your diet program as told, if you are given one.  · Keep a healthy weight.  · Stay active. Try to get at least 30 minutes of activity on all or most days.  · Do not use any tobacco products, including cigarettes, chewing tobacco, or electronic cigarettes. If you need help quitting, ask your doctor.  · Limit alcohol intake to no more than 1 drink per day for nonpregnant women and 2 drinks per day for men. One drink equals 12 ounces of beer, 5 ounces of wine, or 1½ ounces of hard liquor.  · Do not abuse drugs.  · Keep your home safe so you do not fall. You can do this by:  ¨ Putting grab bars in the bedroom and bathroom.  ¨ Raising toilet seats.  ¨ Putting a seat in the shower.  · Keep all follow-up visits as told by your doctor. This is important.  GET HELP IF:  · Your personality changes.  · You have trouble swallowing.  · You have double vision.  · You are dizzy.  · You have a fever.  GET HELP RIGHT AWAY IF:   These symptoms may be an emergency. Do not wait to see if the symptoms will go  away. Get medical help right away. Call your local emergency services (911 in the [place] Do not drive yourself to the hospital.  · You have sudden weakness or lose feeling (go numb), especially on one side of the body. This can affect your:  ¨ Face.  ¨ Arm.  ¨ Leg.  · You have sudden trouble walking.  · You have sudden trouble moving your arms or legs.  · You have sudden confusion.  · You have trouble talking.  · You have trouble understanding.  · You have sudden trouble seeing in one or both eyes.  · You lose your balance.  · Your movements are not smooth.  · You have a sudden, very bad headache with no known cause.  · You have new chest pain.  · Your heartbeat is unsteady.  · You are partly or totally unaware of what is going on around you.  MAKE SURE YOU:   · Understand these instructions.  · Will watch your condition.  · Will get help right away if you are not doing well or get worse.     This information is not intended to replace advice given to you by your health care provider. Make sure you discuss any questions you have with your health care provider.     Document Released: 09/26/2009 Document Revised: 01/08/2016 Document Reviewed: 03/25/2015  [employer] Patient Education ©2016 Elsevier Inc.    Aspirin capsules or tablets extended release  What is this medicine?  ASPIRIN (AS pir in) is a pain reliever. It is used to treat mild pain and fever. This medicine is also used as directed by a doctor to prevent and to treat heart attacks, to prevent strokes, and to treat arthritis or inflammation.  This medicine may be used for other purposes; ask your health care provider or pharmacist if you have questions.  COMMON BRAND NAME(S): Anacin Adult Low Strength, Octavia Aspirin Regimen, Octavia Low Dose Aspirin Regimen, Easprin , Ecotrin, Genacote, Halfprin, MiniPrin, Safety Coated Aspirin, Gulf Adult Low Strength, Zero Order Release Aspirin, ZORprin  What should I tell my health care provider before I take  this medicine?  They need to know if you have any of these conditions:  -anemia  -asthma  -bleeding problems  -child with chickenpox, the flu, or other viral infection  -diabetes  -gout  -if you frequently drink alcohol containing drinks  -kidney disease  -liver disease  -low level of vitamin K  -lupus  -smoke tobacco  -stomach ulcers or other problems  -an unusual or allergic reaction to aspirin, tartrazine dye, other medicines, dyes, or preservatives  -pregnant or trying to get pregnant  -breast-feeding  How should I use this medicine?  Take this medicine by mouth with a glass of water. Follow the directions on the package or prescription label. Do not chew, crush, or cut this medicine. You can take this medicine with or without food. If it upsets your stomach, take it with food. Do not take your medicine more often than directed.  Talk to your pediatrician regarding the use of this medicine in children. While this drug may be prescribed for children as young as 12 years of age for selected conditions, precautions do apply. Children and teenagers should not use this medicine to treat chicken pox or flu symptoms unless directed by a doctor.  Patients over 65 years old may have a stronger reaction and need a smaller dose.  Overdosage: If you think you have taken too much of this medicine contact a poison control center or emergency room at once.  NOTE: This medicine is only for you. Do not share this medicine with others.  What if I miss a dose?  If you are taking this medicine on a regular schedule and miss a dose, take it as soon as you can. If it is almost time for your next dose, take only that dose. Do not take double or extra doses.  What may interact with this medicine?  Do not take this medicine with any of the following medications:  -cidofovir  -ketorolac  -probenecid  This medicine may also interact with the following medications:  -alcohol  -alendronate  -bismuth subsalicylate  -flavocoxid  -herbal  supplements like feverfew, garlic, татьяна, ginkgo biloba, horse chestnut  -medicines for diabetes or glaucoma like acetazolamide, methazolamide  -medicines for gout  -medicines that treat or prevent blood clots like enoxaparin, heparin, ticlopidine, warfarin  -other aspirin and aspirin-like medicines  -NSAIDs, medicines for pain and inflammation, like ibuprofen or naproxen  -pemetrexed  -sulfinpyrazone  -varicella live vaccine  This list may not describe all possible interactions. Give your health care provider a list of all the medicines, herbs, non-prescription drugs, or dietary supplements you use. Also tell them if you smoke, drink alcohol, or use illegal drugs. Some items may interact with your medicine.  What should I watch for while using this medicine?  If you are treating yourself for pain, tell your doctor or health care professional if the pain lasts more than 10 days, if it gets worse, or if there is a new or different kind of pain. Tell your doctor if you see redness or swelling. Also, check with your doctor if you have a fever that lasts for more than 3 days. Only take this medicine to prevent heart attacks or blood clotting if prescribed by your doctor or health care professional.  Do not take aspirin or aspirin-like medicines with this medicine. Too much aspirin can be dangerous. Always read the labels carefully.  This medicine can irritate your stomach or cause bleeding problems. Do not smoke cigarettes or drink alcohol while taking this medicine. Do not lie down for 30 minutes after taking this medicine to prevent irritation to your throat.  If you are scheduled for any medical or dental procedure, tell your healthcare provider that you are taking this medicine. You may need to stop taking this medicine before the procedure.  What side effects may I notice from receiving this medicine?  Side effects that you should report to your doctor or health care professional as soon as possible:  -allergic  reactions like skin rash, itching or hives, swelling of the face, lips, or tongue  -black, tarry stools  -bloody, coffee ground-like vomit  -breathing problems  -changes in hearing, ringing in the ears  -confusion  -general ill feeling or flu-like symptoms  -pain on swallowing  -redness, blistering, peeling or loosening of the skin, including inside the mouth or nose  -trouble passing urine or change in the amount of urine  -unusual bleeding or bruising  -unusually weak or tired  -yellowing of the eyes or skin  Side effects that usually do not require medical attention (report to your doctor or health care professional if they continue or are bothersome):  -diarrhea or constipation  -nausea, vomiting  -stomach gas, heartburn  This list may not describe all possible side effects. Call your doctor for medical advice about side effects. You may report side effects to FDA at 3-427-FDA-2170.  Where should I keep my medicine?  Keep out of the reach of children.  Store at room temperature between 15 and 30 degrees C (59 and 86 degrees F). Protect from heat and moisture. Do not use this medicine if it has a strong vinegar smell. Throw away any unused medicine after the expiration date.  NOTE: This sheet is a summary. It may not cover all possible information. If you have questions about this medicine, talk to your doctor, pharmacist, or health care provider.  © 2014, Elsevier/Gold Standard. (3/17/2009 3:13:32 PM)    Atorvastatin tablets  What is this medicine?  ATORVASTATIN (a TORE va sta tin) is known as a HMG-CoA reductase inhibitor or 'statin'. It lowers the level of cholesterol and triglycerides in the blood. This drug may also reduce the risk of heart attack, stroke, or other health problems in patients with risk factors for heart disease. Diet and lifestyle changes are often used with this drug.  This medicine may be used for other purposes; ask your health care provider or pharmacist if you have questions.  COMMON  BRAND NAME(S): Lipitor  What should I tell my health care provider before I take this medicine?  They need to know if you have any of these conditions:  -frequently drink alcoholic beverages  -history of stroke, TIA  -kidney disease  -liver disease  -muscle aches or weakness  -other medical condition  -an unusual or allergic reaction to atorvastatin, other medicines, foods, dyes, or preservatives  -pregnant or trying to get pregnant  -breast-feeding  How should I use this medicine?  Take this medicine by mouth with a glass of water. Follow the directions on the prescription label. You can take this medicine with or without food. Take your doses at regular intervals. Do not take your medicine more often than directed.  Talk to your pediatrician regarding the use of this medicine in children. While this drug may be prescribed for children as young as 10 years old for selected conditions, precautions do apply.  Overdosage: If you think you have taken too much of this medicine contact a poison control center or emergency room at once.  NOTE: This medicine is only for you. Do not share this medicine with others.  What if I miss a dose?  If you miss a dose, take it as soon as you can. If it is almost time for your next dose, take only that dose. Do not take double or extra doses.  What may interact with this medicine?  Do not take this medicine with any of the following medications:  -red yeast rice  -telaprevir  -telithromycin  -voriconazole  This medicine may also interact with the following medications:  -alcohol  -antiviral medicines for HIV or AIDS  -boceprevir  -certain antibiotics like clarithromycin, erythromycin, troleandomycin  -certain medicines for cholesterol like fenofibrate or gemfibrozil  -cimetidine  -clarithromycin  -colchicine  -cyclosporine  -digoxin  -female hormones, like estrogens or progestins and birth control pills  -grapefruit juice  -medicines for fungal infections like fluconazole,  itraconazole, ketoconazole  -niacin  -rifampin  -spironolactone  This list may not describe all possible interactions. Give your health care provider a list of all the medicines, herbs, non-prescription drugs, or dietary supplements you use. Also tell them if you smoke, drink alcohol, or use illegal drugs. Some items may interact with your medicine.  What should I watch for while using this medicine?  Visit your doctor or health care professional for regular check-ups. You may need regular tests to make sure your liver is working properly.  Tell your doctor or health care professional right away if you get any unexplained muscle pain, tenderness, or weakness, especially if you also have a fever and tiredness. Your doctor or health care professional may tell you to stop taking this medicine if you develop muscle problems. If your muscle problems do not go away after stopping this medicine, contact your health care professional.  This drug is only part of a total heart-health program. Your doctor or a dietician can suggest a low-cholesterol and low-fat diet to help. Avoid alcohol and smoking, and keep a proper exercise schedule.  Do not use this drug if you are pregnant or breast-feeding. Serious side effects to an unborn child or to an infant are possible. Talk to your doctor or pharmacist for more information.  This medicine may affect blood sugar levels. If you have diabetes, check with your doctor or health care professional before you change your diet or the dose of your diabetic medicine.  If you are going to have surgery tell your health care professional that you are taking this drug.  What side effects may I notice from receiving this medicine?  Side effects that you should report to your doctor or health care professional as soon as possible:  -allergic reactions like skin rash, itching or hives, swelling of the face, lips, or tongue  -dark urine  -fever  -joint pain  -muscle cramps, pain  -redness,  blistering, peeling or loosening of the skin, including inside the mouth  -trouble passing urine or change in the amount of urine  -unusually weak or tired  -yellowing of eyes or skin  Side effects that usually do not require medical attention (report to your doctor or health care professional if they continue or are bothersome):  -constipation  -heartburn  -stomach gas, pain, upset  This list may not describe all possible side effects. Call your doctor for medical advice about side effects. You may report side effects to FDA at 5-949-FDA-2841.  Where should I keep my medicine?  Keep out of the reach of children.  Store at room temperature between 20 to 25 degrees C (68 to 77 degrees F). Throw away any unused medicine after the expiration date.  NOTE: This sheet is a summary. It may not cover all possible information. If you have questions about this medicine, talk to your doctor, pharmacist, or health care provider.  © 2014, Elsevier/Gold Standard. (11/6/2012 9:18:24 AM)

## 2017-11-02 NOTE — ED NOTES
Dysphasia screening done. Pt swallowed without difficulty. Dr Ríos notified. Cardiac diet ordered.

## 2017-11-02 NOTE — PROGRESS NOTES
Received report for this pt at 1900. Pt arrived back from MRI around 1915. Pt awake and alert. No complaints of pain or sob. Pt stated she not have any numbness or tingling. Told her to tell me if she started to have numbness/tingling. Had a headache earlier this evening - gave tylenol. Will monitor pt.

## 2017-11-02 NOTE — PROGRESS NOTES
Discharge instructions given to patient. Discharge instructions given to patient at bedside, verbalizes understanding and states plans for follow-up with doctors. New and home medication review, post-discharge activity level and worsening of symptoms needing follow-up care discussed. Telemetry monitor/IV cathlon removed, monitor room notified. All belongings accounted for, all questions answered at this time. Patient wheelchair escorted out by staff to husbands car.

## 2017-11-02 NOTE — DISCHARGE PLANNING
Care Transition Team Assessment    Information Source  Orientation : Oriented x 4  Information Given By: Patient  Informant's Name: yvonne Wada  Who is responsible for making decisions for patient? : Patient    Readmission Evaluation  Is this a readmission?: No    Elopement Risk  Legal Hold: No  Ambulatory or Self Mobile in Wheelchair: Yes  Disoriented: No  Psychiatric Symptoms: None  History of Wandering: No  Elopement this Admit: No  Vocalizing Wanting to Leave: No  Displays Behaviors, Body Language Wanting to Leave: No-Not at Risk for Elopement  Elopement Risk: Not at Risk for Elopement    Interdisciplinary Discharge Planning  Does Admitting Nurse Feel This Could be a Complex Discharge?: No  Primary Care Physician: Marc  Lives with - Patient's Self Care Capacity: Significant Other  Patient or legal guardian wants to designate a caregiver (see row info): No  Support Systems: Friends / Neighbors  Housing / Facility: Unable To Determine At This Time  Do You Take your Prescribed Medications Regularly: Yes  Mobility Issues: Yes  Prior Services: Home-Independent  Patient Expects to be Discharged to:: Home  Assistance Needed: Yes  Durable Medical Equipment: Other - Specify    Discharge Preparedness  What is your plan after discharge?: Home with help  What are your discharge supports?: Spouse    Finances  Financial Barriers to Discharge: No  Prescription Coverage: Yes    Vision / Hearing Impairment  Vision Impairment : Yes  Right Eye Vision: Wears Glasses  Left Eye Vision: Wears Glasses  Hearing Impairment : No    Values / Beliefs / Concerns  Values / Beliefs Concerns : No    Advance Directive  Advance Directive?: None    Domestic Abuse  Have you ever been the victim of abuse or violence?: No  Physical Abuse or Sexual Abuse: No  Verbal Abuse or Emotional Abuse: No  Possible Abuse Reported to:: Not Applicable    Psychological Assessment  History of Substance Abuse: None    Discharge Risks or Barriers  Discharge risks  or barriers?: No    Anticipated Discharge Information  Anticipated discharge disposition: Home

## 2017-11-02 NOTE — DISCHARGE SUMMARY
CHIEF COMPLAINT ON ADMISSION  Chief Complaint   Patient presents with   • Possible Stroke   • Headache   • Facial Pain   • Numbness       CODE STATUS  Full Code    HPI & HOSPITAL COURSE  This is a 81 y.o. female here with  Past medical history of ascending aortic aneurysm, status post repair about 8 years ago, hypertension and hyperlipidemia. He showed admitted 11/1/2017 with symptoms of lower facial pain numbness and transient numbness in both hands lasted fairly quickly several minutes. Her previous history she came to the ER for evaluation. Upon initial presentation she had elevated systolic blood pressures in the 190s. There is concern for TIA patient had CT head which was grossly normal. She also had MRI moderate chronic microvascular ischemic changes chronic microhemorrhages left frontal and right parietal lobes chronic lacunar infarcts and no acute findings. Patient also seen by PT OT cleared. Blood pressure remained stable on home regimen. Patient was resolved fairly quickly this morning had no symptoms no focal neurological deficits cranial nerves were all intact. Given chronic changes will start on high-dose statin and aspirin. Patient follow-up with primary care physician.    Therefore, she is discharged in good and stable condition with close outpatient follow-up.    SPECIFIC OUTPATIENT FOLLOW-UP  Follow up pcp x 1 week     DISCHARGE PROBLEM LIST  Principal Problem (Resolved):    TIA (transient ischemic attack) POA: Yes  Active Problems:    Hypertension (Chronic) POA: Yes      Overview: Sees cardiology      1/10/11 [facility] note cardiac catheterization normal systolic function      3/11 [facility] echo moderate aortic insufficiency, moderate mitral       insufficiency, moderate tricuspid insufficiency, normal LV function      5/12 echo [facility] normal LV function EF 60%, moderate to severe left atrial       enlargement, RVSP 32       9/26/13 CT thoracic aorta no evidence of aneurysm, small hiatal hernia      9/26/13  Persantine thallium uniform breast tissue attenuation, cannot rule       out underlying ischemic, LVEF 67%      10/3/13 on toprol-XL 25 mg half a tablet daily       12/13/13 cardiology note cont same meds, repeat echo and follow up 6       months      1/2/14 echo mild LVH, grade 1 diastolic dysfunction, ascending aortic 4.0,       no change compared with [facility] 2010      5/15/14 cardiology note; increase benazepril to 40 mg qday,continue toprol       XL 25 mg daily      6/17/15 cardiology note continue meds, repeat echo 6 months      2/1/16 cardiology note moderate pulmonary hypertension and snoring,       nocturnal pulse oximetry ordered      6/30/16 cardiology note patient declines overnight pulse oximetry       1/16/16 echo EF 65%,grade 2-3 diastolic dysfunction, mild MR, RVSP 40,       aorta root 3.5 cm                      GERD (gastroesophageal reflux disease) (Chronic) POA: Yes      Overview: 6/27/07 EGD per [facility] hiatal hernia, start prevacid 30 mg      7/12 protonix 20 mg qday      11/16/12 [facility] note cont prilosec      1/5/16 change to protonix 40 mg from prilosec    Hypothyroidism POA: Yes    History of aortic aneurysm repair POA: Yes      FOLLOW UP  Future Appointments  Date Time Provider Department Center   11/3/2017 1:15 PM Paco Ocampo M.D. [facility] None     No follow-up provider specified.    MEDICATIONS ON DISCHARGE   [facility]   [facility] Medication Instructions LETICIA:[id number]    Printed on:11/02/17 1791   Medication Information                      aspirin 81 MG EC tablet  Take 1 Tab by mouth every day.             atorvastatin (LIPITOR) 80 MG tablet  Take 1 Tab by mouth every evening.             benazepril (LOTENSIN) 40 MG tablet  Take 1 Tab by mouth every day.             fluticasone (FLONASE) 50 MCG/ACT nasal spray  Spray 2 Sprays in nose every day. Each Nostril             lansoprazole (PREVACID) 15 MG CAPSULE DELAYED RELEASE  Take 15 mg by mouth 1 time daily as needed.              levothyroxine (SYNTHROID) 75 MCG Tab  Take 1 Tab by mouth Every morning on an empty stomach.             magnesium gluconate (MAG-G) 500 MG tablet  Take 500 mg by mouth every day.             metoprolol SR (TOPROL XL) 25 MG TABLET SR 24 HR  Take 12.5 mg by mouth every day.             Multiple Vitamins-Minerals (CENTRUM SILVER ULTRA WOMENS PO)  Take 1 Tab by mouth every day.             naproxen (ALEVE) 220 MG tablet  Take 440 mg by mouth 2 times a day, with meals.             vitamin D (CHOLECALCIFEROL) 1000 UNIT TABS  Take 1,000 Units by mouth every day.                 DIET  Orders Placed This Encounter   Procedures   • DIET ORDER     Standing Status:   Standing     Number of Occurrences:   1     Order Specific Question:   Diet:     Answer:   Cardiac [6]       ACTIVITY  As tolerated.  Weight bearing as tolerated      CONSULTATIONS  None    PROCEDURES  None    LABORATORY  Lab Results   Component Value Date/Time    SODIUM 139 11/02/2017 04:55 AM    POTASSIUM 4.0 11/02/2017 04:55 AM    CHLORIDE 106 11/02/2017 04:55 AM    CO2 24 11/02/2017 04:55 AM    GLUCOSE 93 11/02/2017 04:55 AM    BUN 22 11/02/2017 04:55 AM    CREATININE 1.28 11/02/2017 04:55 AM    CREATININE 1.05 (H) 02/07/2013 11:31 AM        Lab Results   Component Value Date/Time    WBC 5.0 11/02/2017 04:55 AM    WBC 3.9 (L) 04/16/2011 12:00 AM    HEMOGLOBIN 11.8 (L) 11/02/2017 04:55 AM    HEMATOCRIT 35.7 (L) 11/02/2017 04:55 AM    PLATELETCT 201 11/02/2017 04:55 AM        Total time of the discharge process exceeds 38 minutes

## 2017-11-02 NOTE — CARE PLAN
Problem: Safety  Goal: Will remain free from injury  Outcome: PROGRESSING AS EXPECTED  Pt calls for assistance

## 2017-11-02 NOTE — PROGRESS NOTES
Bedside report completed. Assumed pt care. Pt A&O 4, resting in bed comfortably with no signs of labored breathing on RA. Pt tele monitor in place, cardiac rhythm being monitored. Pt call light within reach, bed in low position, upper bed rails up, non skid socks in place. Pt denies any pain or other distress at this time. Patient was updated on the plan of care for the day. All fall precautions in place. Will continue to monitor.

## 2017-11-02 NOTE — PROGRESS NOTES
Telemetry Note:  Sinus Rhythm with a first degree AVB.  No ectopy.  MARCIA 0.22, QRS 0.10, QTI 0.38

## 2017-11-02 NOTE — CARE PLAN
Problem: Safety  Goal: Will remain free from injury    Intervention: Provide assistance with mobility  Pt mobility assessed at beginning of shift, pt requires one assist and cane with ambulation.  Fall precautions in place, non-slip socks on, bed in lowest, locked position and call light is within reach.  Pt educated to call for assistance and verbalizes understanding.       Problem: Knowledge Deficit  Goal: Knowledge of disease process/condition, treatment plan, diagnostic tests, and medications will improve    Intervention: Assess knowledge level of disease process/condition, treatment plan, diagnostic tests, and medications  Educated pt on disease process, treatment plan and reason for medications she is on.  Pt also educated on how to deep breath and cough efficiently.

## 2017-11-03 ENCOUNTER — OFFICE VISIT (OUTPATIENT)
Dept: CARDIOLOGY | Facility: MEDICAL CENTER | Age: 82
End: 2017-11-03
Payer: MEDICARE

## 2017-11-03 VITALS
BODY MASS INDEX: 27.83 KG/M2 | HEART RATE: 62 BPM | WEIGHT: 163 LBS | OXYGEN SATURATION: 95 % | SYSTOLIC BLOOD PRESSURE: 152 MMHG | HEIGHT: 64 IN | DIASTOLIC BLOOD PRESSURE: 80 MMHG

## 2017-11-03 DIAGNOSIS — Z86.79 S/P THORACIC AORTIC ANEURYSM REPAIR: ICD-10-CM

## 2017-11-03 DIAGNOSIS — R51.9 NONINTRACTABLE HEADACHE, UNSPECIFIED CHRONICITY PATTERN, UNSPECIFIED HEADACHE TYPE: ICD-10-CM

## 2017-11-03 DIAGNOSIS — I35.1 AORTIC VALVE REGURGITATION, ACQUIRED: ICD-10-CM

## 2017-11-03 DIAGNOSIS — Z98.890 S/P THORACIC AORTIC ANEURYSM REPAIR: ICD-10-CM

## 2017-11-03 DIAGNOSIS — I10 ESSENTIAL HYPERTENSION: ICD-10-CM

## 2017-11-03 DIAGNOSIS — I27.21 PAH (PULMONARY ARTERY HYPERTENSION) (HCC): ICD-10-CM

## 2017-11-03 PROCEDURE — 99214 OFFICE O/P EST MOD 30 MIN: CPT | Performed by: INTERNAL MEDICINE

## 2017-11-03 ASSESSMENT — ENCOUNTER SYMPTOMS
NERVOUS/ANXIOUS: 0
BLURRED VISION: 0
EYE DISCHARGE: 0
DEPRESSION: 0
BACK PAIN: 1
NAUSEA: 0
BRUISES/BLEEDS EASILY: 0
SHORTNESS OF BREATH: 0
PALPITATIONS: 0
SENSORY CHANGE: 1
CHILLS: 0
DIZZINESS: 0
PND: 0
MYALGIAS: 0
FEVER: 0
HEADACHES: 1
HEARTBURN: 0
COUGH: 0

## 2017-11-03 NOTE — PROGRESS NOTES
"Subjective:   Yvonne Marie Wada is a 81 y.o. female who presents todayInitially here to get clearance for repeat orthopedic surgery.   I ever she 2 days ago developed variable numbness of her face and hands and was hospitalized overnight. Workup was unremarkable. Old lacunar infarct was documented possibly related to hemorrhagic stroke 20 years ago.  She was sent home on baby aspirin and statin  Antihypertensives were continued unchanged.  She was hypertensive in the emergency room and then blood pressure normalized with further hospitalization.  Today she has more of a head pain including in the bilateral submaxillary area in the frontal area.  Pain is not worse with breathing or head position or neck position. No fever or chills.  No nasal discharge.  Past Medical History:   Diagnosis Date   • AAA (abdominal aortic aneurysm) (CMS-[facility]) 7/26/2010    10/09 CT thorax dilated ascending thoracic aorta 4.9 cm 1/10 transesophageal echo dilated aortic root, and ascending aorta with mild aortic insufficiency 1/10  ascending aortic aneurysm repair 5/12 echo [facility] normal LV function EF 60%, moderate to severe left atrial enlargement, RVSP 32    • Aortic insufficiency    • Aortic valve disease    • Aortic valve regurgitation    • Arthritis     back and knee/ osteo   • Cataract    • Dental disorder     full top denture, partial bottom   • Diverticulosis    • Dyslipidemia 7/26/2010    Sees [facility] on lipitor 4/11 chol 159,trig 84,hdl 47,ldl 95,cpk 114 7/12 chol 163,trig 120,hdl 49,ldl 90, crp 1.1 on lipitor 20 mg    • GERD (gastroesophageal reflux disease) 7/12/2012 6/07 EGD per [facility] hiatal hernia, start prevacid 30 mg 7/12 protonix 20 mg qday    • Glaucoma    • Heart burn    • Heart murmur    • Heart valve disease     \"leaking\", mitral valve   • Hiatus hernia syndrome    • High cholesterol    • History of shingles 6/30/2011 2010 Back and left thorax     • History of total knee arthroplasty 7/26/2010    4/10 MRI " right knee complex tear medial meniscus, horizontal cleavage tear lateral meniscus, chondromalacia patella, moderate-sized Baker's cyst 5/10 right meniscus knee repair  5/10 told by  had gout on surgery had pathology report, I await that report Question gout seen on pathology report done during repair of right meniscus knee surgery. 7/10 uric acid 6.3, we'll await starting allopurinol depending on the operative report 8/5/10 reviewed ortho notes , op report indicates crystal deposition, could be gout or pseudogout. No bx result sent over. Will need to get. My suspicion is chondrocalcinosis. 10/11  ortho note, MRI pending 8/7/10 reviewed pathology report right knee tissue, marked degenerative changes with focal calcification, no mention of gout. Based on this and a normal uric acid level, I do not believe she has gout, has no hyperuricemia medications would be indicated 10/11  ortho left knee meniscectomy and arthroscopy 1/12     • HLD (hyperlipidemia)    • Hypertension    • Hypothyroid 4/8/2011 4/11 tsh 9 start synthroid 50 4/11 tsh 6,free t3 3.1,free t4 1.2,tpo negative 7/1/11 tsh 2.1 cont synthroid 50 mcg 7/12 tsh 3.4 cont synthroid 50 mcg    • Indigestion    • Mitral insufficiency    • OSTEOPOROSIS 8/9/2010    Had been on fosamax 5550-1119  8/10 dexa LS -2.0,hip -1.5 await old dexa result, she will get copy for me 4/11 vit d 35 9/12 dexa LS -3.2,hip -1.4 2/13/13 relast infusion    • Pain     back and right leg, can get as bad as 10/10   • Preventative health care 7/26/2010 2002 pneum 2005 colon [facility] no records 4/11 vit d 35 9/11 shingles vaccine 9/12 mammogram 9/12 dexa LS -3.2,hip -1.4    • Rheumatic fever    • S/P appendectomy 7/26/2010   • S/P TOMY (total abdominal hysterectomy) 7/26/2010    Sees gyn on HRT estradiol for 20 yrs () 11/08 mammo     • Shingles 6/30/2011   • Snoring    • Status post lumbar surgery 7/26/2010 6/03 L4-5  epidural Dr. Jordan  7/06 overall for decompression, foraminotomy. L4-L5 posterior fusion, L4-L5 allograft      • Stroke (CMS-HCC) 1996    no residual problems    • Thoracic aortic aneurysm without mention of rupture    • Tricuspid insufficiency    • Unspecified disorder of thyroid     hypo     Past Surgical History:   Procedure Laterality Date   • TENDON REPAIR Right 11/10/2016    Procedure: TENDON REPAIR - QUADRACEPS ;  Surgeon: Maxim Herrera M.D.;  Location: SURGERY [facility];  Service:    • KNEE ARTHROPLASTY TOTAL Right 9/8/2016    Procedure: KNEE ARTHROPLASTY TOTAL;  Surgeon: Maxim Herrera M.D.;  Location: SURGERY [facility];  Service:    • LUMBAR FUSION POSTERIOR  11/24/2015    Procedure: LUMBAR FUSION POSTERIOR L3-4, EXPLORATION OF FUSION L4-5 WITH INSTRUMENTATION;  Surgeon: Hermann Reis M.D.;  Location: SURGERY [facility];  Service:    • LUMBAR LAMINECTOMY DISKECTOMY  11/24/2015    Procedure: LUMBAR L3-4 LAMINECTOMY AND REDO L4-5;  Surgeon: Hermann Reis M.D.;  Location: SURGERY [facility];  Service:    • KNEE ARTHROPLASTY TOTAL  1/3/2012    Performed by YOSEF MEJÍA at [facility]   • KNEE ARTHROSCOPY  10/18/2011    Performed by YOSEF MEJÍA at [facility]   • MEDIAL MENISCECTOMY  10/18/2011    Performed by YOSEF MEJÍA at [facility]   • AORTIC VALVE REPLACEMENT  1/12/2010    Performed by ISAÍAS NICOLE at [facility]   • APPENDECTOMY     • HYSTERECTOMY, TOTAL ABDOMINAL     • LUMBAR FUSION POSTERIOR       Family History   Problem Relation Age of Onset   • Stroke Mother    • Heart Disease Father    • Heart Disease Sister      History   Smoking Status   • Never Smoker   Smokeless Tobacco   • Never Used     Comment: none     Allergies   Allergen Reactions   • Codeine Nausea     Synthetic codones ok   • Trazodone Vomiting     groggy   • Ultram [Tramadol] Vomiting     nausea     Outpatient Encounter  "Prescriptions as of 11/3/2017   Medication Sig Dispense Refill   • atorvastatin (LIPITOR) 80 MG tablet Take 1 Tab by mouth every evening. 30 Tab 0   • aspirin 81 MG EC tablet Take 1 Tab by mouth every day. 30 Tab 0   • naproxen (ALEVE) 220 MG tablet Take 440 mg by mouth 2 times a day, with meals.     • metoprolol SR (TOPROL XL) 25 MG TABLET SR 24 HR Take 12.5 mg by mouth every day.     • fluticasone (FLONASE) 50 MCG/ACT nasal spray Spray 2 Sprays in nose every day. Each Nostril 16 g 11   • levothyroxine (SYNTHROID) 75 MCG Tab Take 1 Tab by mouth Every morning on an empty stomach. 90 Tab 3   • benazepril (LOTENSIN) 40 MG tablet Take 1 Tab by mouth every day. 90 Tab 3   • lansoprazole (PREVACID) 15 MG CAPSULE DELAYED RELEASE Take 15 mg by mouth 1 time daily as needed.     • magnesium gluconate (MAG-G) 500 MG tablet Take 500 mg by mouth every day.     • Multiple Vitamins-Minerals (CENTRUM SILVER ULTRA WOMENS PO) Take 1 Tab by mouth every day.     • vitamin D (CHOLECALCIFEROL) 1000 UNIT TABS Take 1,000 Units by mouth every day.       No facility-administered encounter medications on file as of 11/3/2017.      Review of Systems   Constitutional: Negative for chills, fever and malaise/fatigue.   Eyes: Negative for blurred vision and discharge.   Respiratory: Negative for cough and shortness of breath.    Cardiovascular: Negative for chest pain, palpitations, leg swelling and PND.   Gastrointestinal: Negative for heartburn and nausea.   Genitourinary: Negative for dysuria and urgency.   Musculoskeletal: Positive for back pain and joint pain. Negative for myalgias.   Skin: Negative for itching and rash.   Neurological: Positive for sensory change and headaches. Negative for dizziness.   Endo/Heme/Allergies: Negative for environmental allergies. Does not bruise/bleed easily.   Psychiatric/Behavioral: Negative for depression. The patient is not nervous/anxious.         Objective:   /80   Pulse 62   Ht 1.626 m (5' 4\")  "  Wt 73.9 kg (163 lb)   SpO2 95%   BMI 27.98 kg/m²     Physical Exam   Constitutional: She is oriented to person, place, and time. She appears well-developed and well-nourished.   HENT:   Head: Normocephalic and atraumatic.   Eyes: Conjunctivae and EOM are normal. No scleral icterus.   Neck: Neck supple. No JVD present. No thyromegaly present.   Cardiovascular: Normal rate and regular rhythm.  Exam reveals no gallop and no friction rub.    Murmur heard.   Systolic murmur is present with a grade of 2/6    Diastolic murmur is present with a grade of 1/6   Pulmonary/Chest: Effort normal and breath sounds normal. No respiratory distress. She has no wheezes. She has no rales. She exhibits no tenderness.   Abdominal: Soft. Bowel sounds are normal. She exhibits no distension and no mass. There is no tenderness.   Neurological: She is alert and oriented to person, place, and time. Coordination normal.   Skin: Skin is warm and dry. No rash noted. No pallor.   Psychiatric: She has a normal mood and affect. Her behavior is normal. Judgment and thought content normal.       Assessment:     1. Essential hypertension     2. S/P thoracic aortic aneurysm repair     3. Aortic valve regurgitation, acquired     4. PAH (pulmonary artery hypertension)     5. Nonintractable headache, unspecified chronicity pattern, unspecified headache type  WESTERGREN SED RATE       Medical Decision Making:  Today's Assessment / Status / Plan:   She is not planning on elective orthopedic surgery anytime soon  Her current syndrome of variable numbness and head pain is of uncertain etiology.  She may need to see her PCP regarding this syndrome.  I have ordered an erythrocyte sedimentation rate.  Next appointment kept open

## 2017-11-03 NOTE — LETTER
[facility]   [address]   [address]  [place] [address]  Phone: [phone]  Fax: [phone]              Yvonne Marie Wada  1935    Encounter Date: 11/3/2017    Paco Ocampo M.D.          PROGRESS NOTE:  Subjective:   Yvonne Marie Wada is a 81 y.o. female who presents todayInitially here to get clearance for repeat orthopedic surgery.   I ever she 2 days ago developed variable numbness of her face and hands and was hospitalized overnight. Workup was unremarkable. Old lacunar infarct was documented possibly related to hemorrhagic stroke 20 years ago.  She was sent home on baby aspirin and statin  Antihypertensives were continued unchanged.  She was hypertensive in the emergency room and then blood pressure normalized with further hospitalization.  Today she has more of a head pain including in the bilateral submaxillary area in the frontal area.  Pain is not worse with breathing or head position or neck position. No fever or chills.  No nasal discharge.  Past Medical History:   Diagnosis Date   • AAA (abdominal aortic aneurysm) (CMS-[facility]) 7/26/2010    10/09 CT thorax dilated ascending thoracic aorta 4.9 cm 1/10 transesophageal echo dilated aortic root, and ascending aorta with mild aortic insufficiency 1/10  ascending aortic aneurysm repair 5/12 echo [facility] normal LV function EF 60%, moderate to severe left atrial enlargement, RVSP 32    • Aortic insufficiency    • Aortic valve disease    • Aortic valve regurgitation    • Arthritis     back and knee/ osteo   • Cataract    • Dental disorder     full top denture, partial bottom   • Diverticulosis    • Dyslipidemia 7/26/2010    Sees [facility] on lipitor 4/11 chol 159,trig 84,hdl 47,ldl 95,cpk 114 7/12 chol 163,trig 120,hdl 49,ldl 90, crp 1.1 on lipitor 20 mg    • GERD (gastroesophageal reflux disease) 7/12/2012 6/07 EGD per [facility] hiatal hernia, start prevacid 30 mg 7/12  "protonix 20 mg qday    • Glaucoma    • Heart burn    • Heart murmur    • Heart valve disease     \"leaking\", mitral valve   • Hiatus hernia syndrome    • High cholesterol    • History of shingles 6/30/2011    2010 Back and left thorax     • History of total knee arthroplasty 7/26/2010    4/10 MRI right knee complex tear medial meniscus, horizontal cleavage tear lateral meniscus, chondromalacia patella, moderate-sized Baker's cyst 5/10 right meniscus knee repair  5/10 told by  had gout on surgery had pathology report, I await that report Question gout seen on pathology report done during repair of right meniscus knee surgery. 7/10 uric acid 6.3, we'll await starting allopurinol depending on the operative report 8/5/10 reviewed ortho notes , op report indicates crystal deposition, could be gout or pseudogout. No bx result sent over. Will need to get. My suspicion is chondrocalcinosis. 10/11  ortho note, MRI pending 8/7/10 reviewed pathology report right knee tissue, marked degenerative changes with focal calcification, no mention of gout. Based on this and a normal uric acid level, I do not believe she has gout, has no hyperuricemia medications would be indicated 10/11  ortho left knee meniscectomy and arthroscopy 1/12     • HLD (hyperlipidemia)    • Hypertension    • Hypothyroid 4/8/2011 4/11 tsh 9 start synthroid 50 4/11 tsh 6,free t3 3.1,free t4 1.2,tpo negative 7/1/11 tsh 2.1 cont synthroid 50 mcg 7/12 tsh 3.4 cont synthroid 50 mcg    • Indigestion    • Mitral insufficiency    • OSTEOPOROSIS 8/9/2010    Had been on fosamax [id number]  8/10 dexa LS -2.0,hip -1.5 await old dexa result, she will get copy for me 4/11 vit d 35 9/12 dexa LS -3.2,hip -1.4 2/13/13 relast infusion    • Pain     back and right leg, can get as bad as 10/10   • Preventative health care 7/26/2010 2002 pneum 2005 colon [facility] no records 4/11 vit d 35 9/11 shingles vaccine 9/12 " mammogram 9/12 dexa LS -3.2,hip -1.4    • Rheumatic fever    • S/P appendectomy 7/26/2010   • S/P TOMY (total abdominal hysterectomy) 7/26/2010    Sees gyn on HRT estradiol for 20 yrs () 11/08 mammo     • Shingles 6/30/2011   • Snoring    • Status post lumbar surgery 7/26/2010 6/03 L4-5 epidural Dr. Jordan  7/06 overall for decompression, foraminotomy. L4-L5 posterior fusion, L4-L5 allograft      • Stroke (CMS-[facility]) 1996    no residual problems    • Thoracic aortic aneurysm without mention of rupture    • Tricuspid insufficiency    • Unspecified disorder of thyroid     hypo     Past Surgical History:   Procedure Laterality Date   • TENDON REPAIR Right 11/10/2016    Procedure: TENDON REPAIR - QUADRACEPS ;  Surgeon: Maxim Herrera M.D.;  Location: [facility];  Service:    • KNEE ARTHROPLASTY TOTAL Right 9/8/2016    Procedure: KNEE ARTHROPLASTY TOTAL;  Surgeon: Maxim Herrera M.D.;  Location: [facility];  Service:    • LUMBAR FUSION POSTERIOR  11/24/2015    Procedure: LUMBAR FUSION POSTERIOR L3-4, EXPLORATION OF FUSION L4-5 WITH INSTRUMENTATION;  Surgeon: Hermann Reis M.D.;  Location: [facility];  Service:    • LUMBAR LAMINECTOMY DISKECTOMY  11/24/2015    Procedure: LUMBAR L3-4 LAMINECTOMY AND REDO L4-5;  Surgeon: Hermann Reis M.D.;  Location: [facility];  Service:    • KNEE ARTHROPLASTY TOTAL  1/3/2012    Performed by YOSEF MEJÍA at [facility]   • KNEE ARTHROSCOPY  10/18/2011    Performed by YOSEF MEJÍA at [facility]   • MEDIAL MENISCECTOMY  10/18/2011    Performed by YOSEF MEJÍA at [facility]   • AORTIC VALVE REPLACEMENT  1/12/2010    Performed by ISAÍAS NICOLE at [facility]   • APPENDECTOMY     • HYSTERECTOMY, TOTAL ABDOMINAL     • LUMBAR FUSION POSTERIOR       Family History   Problem Relation Age of Onset   • Stroke Mother    • Heart Disease Father    • Heart  Disease Sister      History   Smoking Status   • Never Smoker   Smokeless Tobacco   • Never Used     Comment: none     Allergies   Allergen Reactions   • Codeine Nausea     Synthetic codones ok   • Trazodone Vomiting     groggy   • Ultram [Tramadol] Vomiting     nausea     Outpatient Encounter Prescriptions as of 11/3/2017   Medication Sig Dispense Refill   • atorvastatin (LIPITOR) 80 MG tablet Take 1 Tab by mouth every evening. 30 Tab 0   • aspirin 81 MG EC tablet Take 1 Tab by mouth every day. 30 Tab 0   • naproxen (ALEVE) 220 MG tablet Take 440 mg by mouth 2 times a day, with meals.     • metoprolol SR (TOPROL XL) 25 MG TABLET SR 24 HR Take 12.5 mg by mouth every day.     • fluticasone (FLONASE) 50 MCG/ACT nasal spray Spray 2 Sprays in nose every day. Each Nostril 16 g 11   • levothyroxine (SYNTHROID) 75 MCG Tab Take 1 Tab by mouth Every morning on an empty stomach. 90 Tab 3   • benazepril (LOTENSIN) 40 MG tablet Take 1 Tab by mouth every day. 90 Tab 3   • lansoprazole (PREVACID) 15 MG CAPSULE DELAYED RELEASE Take 15 mg by mouth 1 time daily as needed.     • magnesium gluconate (MAG-G) 500 MG tablet Take 500 mg by mouth every day.     • Multiple Vitamins-Minerals (CENTRUM SILVER ULTRA WOMENS PO) Take 1 Tab by mouth every day.     • vitamin D (CHOLECALCIFEROL) 1000 UNIT TABS Take 1,000 Units by mouth every day.       No facility-administered encounter medications on file as of 11/3/2017.      Review of Systems   Constitutional: Negative for chills, fever and malaise/fatigue.   Eyes: Negative for blurred vision and discharge.   Respiratory: Negative for cough and shortness of breath.    Cardiovascular: Negative for chest pain, palpitations, leg swelling and PND.   Gastrointestinal: Negative for heartburn and nausea.   Genitourinary: Negative for dysuria and urgency.   Musculoskeletal: Positive for back pain and joint pain. Negative for myalgias.   Skin: Negative for itching and rash.   Neurological: Positive for  "sensory change and headaches. Negative for dizziness.   Endo/Heme/Allergies: Negative for environmental allergies. Does not bruise/bleed easily.   Psychiatric/Behavioral: Negative for depression. The patient is not nervous/anxious.         Objective:   /80   Pulse 62   Ht 1.626 m (5' 4\")   Wt 73.9 kg (163 lb)   SpO2 95%   BMI 27.98 kg/m²      Physical Exam   Constitutional: She is oriented to person, place, and time. She appears well-developed and well-nourished.   HENT:   Head: Normocephalic and atraumatic.   Eyes: Conjunctivae and EOM are normal. No scleral icterus.   Neck: Neck supple. No JVD present. No thyromegaly present.   Cardiovascular: Normal rate and regular rhythm.  Exam reveals no gallop and no friction rub.    Murmur heard.   Systolic murmur is present with a grade of 2/6    Diastolic murmur is present with a grade of 1/6   Pulmonary/Chest: Effort normal and breath sounds normal. No respiratory distress. She has no wheezes. She has no rales. She exhibits no tenderness.   Abdominal: Soft. Bowel sounds are normal. She exhibits no distension and no mass. There is no tenderness.   Neurological: She is alert and oriented to person, place, and time. Coordination normal.   Skin: Skin is warm and dry. No rash noted. No pallor.   Psychiatric: She has a normal mood and affect. Her behavior is normal. Judgment and thought content normal.       Assessment:     1. Essential hypertension     2. S/P thoracic aortic aneurysm repair     3. Aortic valve regurgitation, acquired     4. PAH (pulmonary artery hypertension)     5. Nonintractable headache, unspecified chronicity pattern, unspecified headache type  WESTERGREN SED RATE       Medical Decision Making:  Today's Assessment / Status / Plan:   She is not planning on elective orthopedic surgery anytime soon  Her current syndrome of variable numbness and head pain is of uncertain etiology.  She may need to see her PCP regarding this syndrome.  I have ordered " an erythrocyte sedimentation rate.  Next appointment kept open        Tee BENNY Tran M.D.  [address]  [address]  [facility] [id number]  VIA In Basket

## 2017-11-10 LAB — EKG IMPRESSION: NORMAL

## 2017-11-24 PROBLEM — E03.9 HYPOTHYROIDISM: Status: RESOLVED | Noted: 2017-11-01 | Resolved: 2017-11-24

## 2017-11-24 PROBLEM — Z98.890 HISTORY OF AORTIC ANEURYSM REPAIR: Status: RESOLVED | Noted: 2017-11-01 | Resolved: 2017-11-24

## 2017-11-24 PROBLEM — Z86.79 HISTORY OF AORTIC ANEURYSM REPAIR: Status: RESOLVED | Noted: 2017-11-01 | Resolved: 2017-11-24

## 2017-11-28 ENCOUNTER — OFFICE VISIT (OUTPATIENT)
Dept: MEDICAL GROUP | Facility: MEDICAL CENTER | Age: 82
End: 2017-11-28
Payer: MEDICARE

## 2017-11-28 VITALS
SYSTOLIC BLOOD PRESSURE: 144 MMHG | DIASTOLIC BLOOD PRESSURE: 82 MMHG | HEIGHT: 64 IN | OXYGEN SATURATION: 98 % | WEIGHT: 171 LBS | BODY MASS INDEX: 29.19 KG/M2 | TEMPERATURE: 97 F | HEART RATE: 60 BPM

## 2017-11-28 DIAGNOSIS — G45.9 TRANSIENT CEREBRAL ISCHEMIA, UNSPECIFIED TYPE: ICD-10-CM

## 2017-11-28 DIAGNOSIS — M25.551 PAIN OF RIGHT HIP JOINT: ICD-10-CM

## 2017-11-28 DIAGNOSIS — E78.5 DYSLIPIDEMIA: Chronic | ICD-10-CM

## 2017-11-28 DIAGNOSIS — I10 ESSENTIAL HYPERTENSION: Chronic | ICD-10-CM

## 2017-11-28 PROCEDURE — 99214 OFFICE O/P EST MOD 30 MIN: CPT | Performed by: INTERNAL MEDICINE

## 2017-11-28 RX ORDER — OMEPRAZOLE 20 MG/1
20 TABLET, DELAYED RELEASE ORAL DAILY
Qty: 30 TAB | Refills: 5
Start: 2017-11-28 | End: 2018-02-20

## 2017-11-28 RX ORDER — AMLODIPINE BESYLATE 5 MG/1
5 TABLET ORAL DAILY
Qty: 30 TAB | Refills: 5 | Status: SHIPPED | OUTPATIENT
Start: 2017-11-28 | End: 2018-03-01 | Stop reason: SDUPTHER

## 2017-11-28 RX ORDER — CLOPIDOGREL BISULFATE 75 MG/1
75 TABLET ORAL DAILY
Qty: 30 TAB | Refills: 5 | Status: SHIPPED | OUTPATIENT
Start: 2017-11-28 | End: 2018-02-20

## 2017-11-28 RX ORDER — ATORVASTATIN CALCIUM 80 MG/1
80 TABLET, FILM COATED ORAL EVERY EVENING
Qty: 90 TAB | Refills: 2 | Status: SHIPPED | OUTPATIENT
Start: 2017-11-28 | End: 2018-10-29 | Stop reason: SDUPTHER

## 2017-11-28 NOTE — PROGRESS NOTES
Subjective:      Yvonne Marie Wada is a 81 y.o. female who presents with Other (REF needed  R Hip)            HPI       Needs clearance for hip surgery upcoming per  hip right side  Seen ER And subsequently admitted to the hospital November 1 for right hand numbness and numbness of her right face at the corner of her mouth but with no associated facial or right arm or hand weakness, no difficulty with speech or swallow or balance, no vision changes, no headache, no dizziness or difficulty with balance or falls, has been on asa prior to going to the hospital, and had been on lipitor 40 mg at the time of admission. Blood pressure had been running 140s prior to the hospital .  No previous history of TIA, stroke, arrhythmia.  Patient is followed by cardiology with a previous history of thoracic aorta aneurysm repair, hypertension, dyslipidemia  Taking three aleve bid with no GI upset   Seen by cardiology November 3 for clearance for hip surgery but per cardiology note patient was not contemplating surgery anytime soon however the patient is currently deciding on hip surgery and would like to proceed given chronic arthritic right hip and chronic pain making it difficult for her to ambulate.  Hospital records reviewed  11/1/17 hospital admission, 11/2/17 hospital discharge, facial numbness, transient numbness in both hands, resolved, CT, MRI head no acute infarct, blood pressure stable, discharged home  11/1/17 CT head stable right mid brain likely chronic lacunar infarction, periventricular white matter disease  11/1/17 MRI brain no acute abnormality, moderate chronic microvascular stomach disease, chronic microhemorrhages left frontal and right parietal lobes, chronic lacunar infarct left basal ganglia and blanco, or cerebral atrophy  11/1/17 MR-MRA head without; negative  11/2/17 echo normal LV size and function, EF 70%, RVSP 50, mild AR and TR  11/2/17 discharged on aspirin, lipitor 80 mg  Since discharge no  recurrent sensory symptoms, no headache, no vision changes, no numbness or tingling face, arms or legs, no weakness face, arms, legs, blood pressure has been stable since discharge typically running systolics 130s to 140s  Currently on metoprolol 25 mg and lotensin 40 mg with no lightheadedness or dizziness  On prilosec every other day  Aspirin daily plus Lipitor 80 mg with no muscle pain or muscle aches on higher statin therapy     Current Outpatient Prescriptions   Medication Sig Dispense Refill   • atorvastatin (LIPITOR) 80 MG tablet Take 1 Tab by mouth every evening. 30 Tab 0   • aspirin 81 MG EC tablet Take 1 Tab by mouth every day. 30 Tab 0   • naproxen (ALEVE) 220 MG tablet Take 440 mg by mouth 2 times a day, with meals.     • metoprolol SR (TOPROL XL) 25 MG TABLET SR 24 HR Take 12.5 mg by mouth every day.     • fluticasone (FLONASE) 50 MCG/ACT nasal spray Spray 2 Sprays in nose every day. Each Nostril 16 g 11   • levothyroxine (SYNTHROID) 75 MCG Tab Take 1 Tab by mouth Every morning on an empty stomach. 90 Tab 3   • benazepril (LOTENSIN) 40 MG tablet Take 1 Tab by mouth every day. 90 Tab 3   • lansoprazole (PREVACID) 15 MG CAPSULE DELAYED RELEASE Take 15 mg by mouth 1 time daily as needed.     • magnesium gluconate (MAG-G) 500 MG tablet Take 500 mg by mouth every day.     • Multiple Vitamins-Minerals (CENTRUM SILVER ULTRA WOMENS PO) Take 1 Tab by mouth every day.     • vitamin D (CHOLECALCIFEROL) 1000 UNIT TABS Take 1,000 Units by mouth every day.       No current facility-administered medications for this visit.      Patient Active Problem List   Diagnosis   • Status post lumbar surgery   • Hypertension   • Dyslipidemia   • S/P TOMY (total abdominal hysterectomy)   • Preventative health care   • History of total knee arthroplasty   • S/P appendectomy   • Osteoporosis, idiopathic   • Hypothyroid   • History of shingles   • GERD (gastroesophageal reflux disease)   • Cervical pain   • S/P thoracic aortic  aneurysm repair   • Decreased GFR   • UTI (urinary tract infection)   • Insomnia   • Shoulder pain          Cervical pain  2/10/14 [facility] note; x-ray cervical spine DJD, right shoulder steroid injection     Decreased GFR  5/31/09 bun 18,creat 1.2  1/14/10 bun 28,creat 1.3,GFR 40  9/20/12 bun 37,creat 1.1,GFR 48  9/25/13 bun 25,creat 1.2,GFR 44  9/26/14 bun 26,creat 1.1,GFR 45  2/23/15 bun 20,creat 1.1,GFR 48  4/15/16 bun 31,creat 1.1,GFR 45  7/6/16 bun 29,creat 1.1,GFR 44   5/12/17 bun 35,creat 1.1,GFR 44  11/2/17 bun 22,creat 1.28,GFR 40     Dyslipidemia  4/11 chol 159,trig 84,hdl 47,ldl 95,cpk 114  7/12 chol 163,trig 120,hdl 49,ldl 90, crp 1.1 on lipitor 20 mg  3/26/13 chol 159,trig 99,hdl 46,ldl 93 on lipitor 20 mg  9/25/13 chol 164,trig 100,hdl 47,ldl 97 on lipitor 20 mg  9/12/14 cardiology note lower extremity weakness, hold lipitor x3 months, labs ordered  12/15/14 cardiology start low dose lipitor 10 mg  1/22/15 off lipitor will resume 10 mg and repeat labs 4 weeks  2/24/15 chol 179,trig 111,hdl 54,ldl 103 on lipitor 10 mg  2/1/16 cardiology note restart lipitor 20 mg    4/15/16 chol 163,trig 102,hdl 48,ldl 95 on lipitor 20 mg  5/12/17 chol 186,trig 101,hdl 47,ldl 119 on lipitor 20 mg intermittently will start taking daily  11/1/17 chol 146,trig 73,hdl 42,ldl 89 on lipitor 20 mg     Gastroesophageal reflux  6/27/07 EGD per [facility] hiatal hernia, start prevacid 30 mg  7/12 protonix 20 mg qday  11/16/12 [facility] note cont prilosec  1/5/16 change to protonix 40 mg from prilosec     History shingles     History of total knee arthroplasty  4/10 MRI right knee complex tear medial meniscus, horizontal cleavage tear lateral meniscus, chondromalacia patella, moderate-sized baker's cyst  5/10 right meniscus knee repair   5/10 told by  had gout on surgery had pathology report, I await that report, question gout seen on pathology report done during repair of right meniscus knee surgery.  7/10 uric acid 6.3,  await starting allopurinol depending on the operative report  8/5/10 reviewed orthopedics notes , operative report indicates crystal deposition, could be gout or pseudogout, no biopsy results, suspect chondrocalcinosis  10/11 dr.parlasca najera note, MRI pending  8/7/10 reviewed pathology report right knee tissue, marked degenerative changes with focal calcification, no mention of gout. Based on this and a normal uric acid level, I do not believe she has gout, has no hyperuricemia medications would be indicated  10/11 dr.parlasca najera left knee meniscectomy and arthroscopy  1/12 dr.parlasca najera left knee arthroplasty  2/29/16  orthopedics x-ray right knee advanced DJD on the right knee corticosteroid injection performed, prescription voltaren gel  6/13/16  orthopedic note x-rays demonstrate right knee advanced DJD and resurfaced patella, offer right knee total replacement followed by patellar resurfacing after she recovers from her right knee  9/8/16  orthopedic's operative note right knee total arthroplasty  11/2/16  orthopedic note, follow-up right knee, x-ray right knee shows subluxation patella, traumatic evulsion VMO needs reexploration and repair  11/10/16  orthopedic's operative note VMO quadriceps avulsion repair status post total knee replacement  11/23/16  orthopedic no follow-up quadriceps repair, continue crutches in full extension, follow-up one month and then start passive range of motion 0-40°  2/4/17  orthopedic note, continue physical therapy, follow-up this summer  4/17/17 [facility] PT note  6/9/17 custom PT note     Hypertension  Sees cardiology  1/10/11 [facility] note cardiac catheterization normal systolic function  3/11 [facility] echo moderate aortic insufficiency, moderate mitral insufficiency, moderate tricuspid insufficiency, normal LV function  5/12 echo [facility] normal LV function EF 60%, moderate to severe left atrial enlargement, RVSP 32     9/26/13 CT thoracic aorta no evidence of aneurysm, small hiatal hernia  9/26/13 Persantine thallium uniform breast tissue attenuation, cannot rule out underlying ischemic, LVEF 67%  10/3/13 on toprol-XL 25 mg half a tablet daily    12/13/13 cardiology note cont same meds, repeat echo and follow up 6 months  1/2/14 echo mild LVH, grade 1 diastolic dysfunction, ascending aortic 4.0, no change compared with ZAK 2010  5/15/14 cardiology note; increase benazepril to 40 mg qday,continue toprol XL 25 mg daily  6/17/15 cardiology note continue meds, repeat echo 6 months  2/1/16 cardiology note moderate pulmonary hypertension and snoring, nocturnal pulse oximetry ordered  6/30/16 cardiology note patient declines overnight pulse oximetry  11/1/17 echo normal LV size and function, EF 70%, mild aortic insufficiency and mild tricuspid regurgitation, RVSP 50, aortic root 3.2 cm  11/3/17 cardiology note [id number] hospital admission,     Hypothyroid  4/11 tsh 9 start synthroid 50  4/11 tsh 6,free t3 3.1,free t4 1.2,tpo negative  7/1/11 tsh 2.1 cont synthroid 50 mcg  7/12 tsh 3.4 cont synthroid 50 mcg  7/31/13 tsh 3.2 on synthroid 50 mcg  9/25/13 tsh 1.7 on synthroid 50 mcg  2/24/15 tsh 4.9 on synthroid 50 mcg; increase to synthroid 75 mcg, repeat tsh in 6 weeks  4/14/15 tsh 1.1 on synthroid 75 mcg  4/15/16 tsh 2.3 on synthroid 75 mcg  5/12/17 tsh 1.2 on synthroid 75 mcg  11/1/17 tsh 2.1 on synthroid 75 mcg     Insomnia  1/30/17 trial of trazodone 50 mg  4/4/17 side effects with trazodone drowsiness, discontinued     Osteoporosis  Had been on fosamax [id number]    8/10 dexa LS -2.0,hip -1.5 await old dexa result, she will get copy for me  4/11 vit d 35  9/12 dexa LS -3.2,hip -1.4  2/13/13 reclast IV  7/31/13 vit d 33 cont 2000 units  2/18/14 reclast IV  2/2/15 dexa LS -3.1,hip -1.9; FRAX 35.5 % major,12.6% hip  2/18/15 reclast IV  2/24/15 vit d 33  4/15/16 vit d 36  5/12/17 vit d 36     Preventative health  6/27/09 colon per  [facility] no records  10/19/04 pneumovax   9/9/11 zoster vaccine  2/2/15 dexa LS -3.1,hip -1.9; FRAX 35.5 % major,12.6% hip  4/14/15 prevnar  5/22/15 mammogram  5/12/17 vit d 36     Shoulder pain  4/4/17 right shoulder pain right shoulder x-ray ordered, right knee pain, referral custom PT, tried celebrex no benefit, will try naproxen 500 mg twice daily and zanaflex at night  4/5/17 x-ray right shoulder calcifications consistent with calcific tendinitis or hydroxyapatite deposition  5/12/17 MRI right shoulder ordered, persistent pain despite physical therapy  5/18/17 MRI right shoulder; full-thickness supraspinatus tendon tear  5/22/17 right shoulder injection, has been going to physical therapy 14 treatments some improvement, right shoulder injection provided, if no improvement in has appt  in october, if need sooner appt try   6/9/17 custom PT note   7/10/17 custom PT note      Status post TOMY     Status post thoracic aneurysm repair  6/29/06 CT-CTA chest with and without postprocessing; stable ascending thoracic aortic aneurysm maximum diameter 4.8, just distal to the aortic valve maximum diameter 4.3  4/9/07 CT-CTA chest with and without processing; stable ascending aortic thoracic aneurysm 4.5 x 4.6 cm  6/8/09 CT chest with; stable 4.7 ascending aorta aneurysm  10/15/09 CT chest without; dilated ascending thoracic aorta 4.9 cm aneurysm increased from 4.7  1/11/10  cardiovascular surgery operative note ascending thoracic aorta aneurysm repair  9/26/13 CT thoracic aorta evaluation; status post repair ascending thoracic aortic aneurysm without evidence of dissection or leak  1/2/14 echo mild LVH, grade 1 diastolic dysfunction, ascending aortic 4.0, no change compared with ZAK 2010  5/15/14 cardiology note  6/17/15 cardiology note cont meds,repeat echo 6 months  2/1/16 cardiology note moderate pulmonary hypertension and snoring, nocturnal pulse oximetry ordered  11/1/17 echo normal LV size  and function, EF 70%, mild aortic insufficiency and mild tricuspid regurgitation, RVSP 50, aortic root 3.2 cm  11/3/17 cardiology note stable     Status post lumbar surgery  6/03 L4-5 epidural     7/06  spinal surgery operative note decompression, foraminotomy. L4-L5 posterior fusion, L4-L5 allograft    3/5/14 MRI lumbar spine grade 2 spondylolisthesis L4-L5 with previous laminectomy and posterior fusion, left sided pedicle screw fixation L4-L5, moderate central canal stenosis L5-S1 secondary to facet arthropathy, mild to moderate multilevel neural foraminal narrowing  12/8/14  pain [facility] note; right lower extremity radiculitis L4-L5 lesion, myofascial lumbosacral pain, trochanteric bursitis, followup as needed  4/2/15  pain procedure note right L4-L5 and right L5-S1 SSNRB under fluoroscopy  4/27/15  pain note; right trochanteric bursal injection, trigger point injections performed, also set up SI joint injections  8/3/15 [facility] note, lumbar x-ray flexion and extension, MRI lumbar spine without contrast, followup   8/9/15 MRI lumbar spine, previous L4-L5 left  fusion and laminectomy, L3-L4 moderate bilateral facet arthropathy, mild disc bulge, L4-L5 severe bilateral facet arthropathy, moderate to severe bilateral neural foraminal stenosis, L5-S1 moderate facet arthropathy  8/28/15  neurosurgery note previous posterior fusion L4-L5, does have L3-L4 disc bulge with central canal stenosis, recommend L3-L5 decompression  8/31/15  pain note; right lower extremity radiculitis, myofascial lumbosacral pain, start hydrocodone 5 mg, continued SI joint exercises, perform trochanteric bursal injection right hip, trigger points lumbar region  10/28/15  neurosurgery note, will schedule for posterior L3-L4 laminectomy and fusion with redo L4-5 laminectomy and exploration of fusion, surgical clearance per cardiology   12/9/15 [facility]  "neurosurgery note s/p L4-S1 fusion on 11/24/15 , follow up in 4 weeks  12/23/15  neurosurgery note, clear to restart physical therapy if she would like after one month, follow-up 3 months    UTI  7/6/16 UTI klebsiella pneumoniae sensitive to all antibiotics except ampicillin, start bactrim x 5 days                  Health Maintenance Summary                IMM DTaP/Tdap/Td Vaccine Overdue 11/29/1954     Annual Wellness Visit Overdue 1/23/2016      Done 1/22/2015 Visit Dx: Medicare annual wellness visit, initial    MAMMOGRAM Overdue 5/22/2016      Done 5/22/2015 [facility]-SCREENING MAMMOGRAM W/ CAD     Patient has more history with this topic...    COLONOSCOPY Next Due 6/26/2019      Previously completed 6/26/2009 [facility]     Patient has more history with this topic...    BONE DENSITY Next Due 2/2/2020      Done 2/2/2015 DS-BONE DENSITY STUDY (DEXA)     Patient has more history with this topic...          Patient Care Team:  Tee Tran M.D. as PCP - General  Paco Ocampo M.D. as Consulting Physician (Cardiology)      ROS       Objective:     /82   Pulse 60   Temp 36.1 °C (97 °F)   Ht 1.626 m (5' 4\")   Wt 77.6 kg (171 lb)   SpO2 98%   BMI 29.35 kg/m²      Physical Exam   Constitutional: She appears well-developed and well-nourished. No distress.   HENT:   Head: Normocephalic and atraumatic.   Mouth/Throat: Oropharynx is clear and moist.   Eyes: Conjunctivae and EOM are normal. Pupils are equal, round, and reactive to light. Right eye exhibits no discharge. Left eye exhibits no discharge. No scleral icterus.   Neck: Neck supple. No JVD present. No thyromegaly present.   Cardiovascular: Normal rate, regular rhythm and normal heart sounds.    No murmur heard.  Pulmonary/Chest: Effort normal and breath sounds normal. No respiratory distress. She has no wheezes.   Musculoskeletal: She exhibits no edema.   Neurological: She is alert. No cranial nerve deficit or sensory deficit. She exhibits normal " muscle tone. Coordination normal.   Skin: Skin is warm. She is not diaphoretic.   Psychiatric: She has a normal mood and affect. Her behavior is normal.   Nursing note and vitals reviewed.    Normal strength upper and lower extremities  Normal gait  Normal affect, insight, judgment          Assessment/Plan:     Assessment  #1 Recent hospital admission diagnosed with possible TIA with sensory changes right arm and right side of face, no strength deficits, no headache, no other neurologic changes, symptoms resolved upon admission.  Workup included CT head old lacunar infarct, MRI brain old lacunar infarct, MRI/MRA head without all negative, was on aspirin 81 mg, Lipitor 20 mg at time of admission, no current neurologic deficits, seen by cardiology earlier this month, clearance not provided    #2 chronic right hip pain followed by orthopedics, we have no records from  apparently patient is a candidate for right hip replacement given her chronic right hip pain, she is intolerant of tramadol, does not want to take narcotics, is taking Aleve 3 tablets twice daily with no GI upset, also on Prilosec every other day    #3 hypertension systolics running slightly high 140s on benazepril 40 mg, Toprol-XL 25 mg    #4 dyslipidemia on Lipitor 80 mg no muscle pain or muscle aches with statins therapy    #5 reflux on Prilosec no GI upset with Aleve 3 tablets twice daily    #6 decreased GFR, creatinine 1.28, she is taking anti-inflammatories, this needs to be monitored    Plan  #! Change asa to plavix    #2 continue lipitor 80 mg    #3 add norvasc 5 mg daily and check blood pressure daily can cause constipation, edema, lightheadedness, tachycardia, check blood pressure and record    #4 continue prilosec daily     #5 refer to  [facility] cardiology second opinion per patient request     #6 repeat labs    #7 there are not any well-designed randomized trial is looking at episode of TIA, and subsequent risk of recurrent  stroke, at best I could find population based studies indicating a potentially  increased risk of stroke within 3 months of TIA between 15 and 20% although because of the multiple designs, outcomes, it is difficult to obtain reliable data from population based incidence studies    #8 since the patient seems determined to have her hip replacement done sooner rather than later, I believe it reasonable to refer her to neurology to provide an opinion as to whether or not she had a TIA, if we should continue on Plavix, and the appropriate timeframe with regards to right hip arthroplasty    #9 old records from orthopedics    #10 letter to orthopedics

## 2017-11-29 ENCOUNTER — TELEPHONE (OUTPATIENT)
Dept: MEDICAL GROUP | Facility: MEDICAL CENTER | Age: 82
End: 2017-11-29

## 2017-11-29 PROBLEM — M25.559 HIP PAIN: Status: ACTIVE | Noted: 2017-11-29

## 2017-12-03 ENCOUNTER — TELEPHONE (OUTPATIENT)
Dept: MEDICAL GROUP | Facility: MEDICAL CENTER | Age: 82
End: 2017-12-03

## 2017-12-03 DIAGNOSIS — G45.9 TRANSIENT CEREBRAL ISCHEMIA, UNSPECIFIED TYPE: ICD-10-CM

## 2017-12-03 NOTE — TELEPHONE ENCOUNTER
Called the patient, letter will be sent to orthopedics indicating recent possible TIA, will place referral to neurology and cardiology evaluation pending, patient remains on Plavix, if she did have a TIA, question  as to how long she needs to wait before surgery, the only pertinent article that I could find evaluating early risk of stroke after TIA meta analysis archives internal medicine 2007;167 (22) :[id number] suggesting that risk of stroke within 3 months of TIA 15-20% however discrepancy in the medical literature with regards to heterogeneity of studies.  Because of this and question of TIA during hospitalization, we will refer to neurology, she is awaiting right hip replacement, and would like to expedite this but again given question more recent TIA, possible increased risk of stroke after TIA, referred to neurology and cardiology for input  She understands and agrees

## 2017-12-09 ENCOUNTER — TELEPHONE (OUTPATIENT)
Dept: MEDICAL GROUP | Facility: MEDICAL CENTER | Age: 82
End: 2017-12-09

## 2017-12-09 LAB
ALBUMIN SERPL-MCNC: 4.2 G/DL (ref 3.5–4.7)
ALBUMIN/GLOB SERPL: 1.7 {RATIO} (ref 1.2–2.2)
ALP SERPL-CCNC: 68 IU/L (ref 39–117)
ALT SERPL-CCNC: 19 IU/L (ref 0–32)
AST SERPL-CCNC: 21 IU/L (ref 0–40)
BILIRUB SERPL-MCNC: 0.5 MG/DL (ref 0–1.2)
BUN SERPL-MCNC: 29 MG/DL (ref 8–27)
BUN/CREAT SERPL: 25 (ref 12–28)
CALCIUM SERPL-MCNC: 9.7 MG/DL (ref 8.7–10.3)
CHLORIDE SERPL-SCNC: 101 MMOL/L (ref 96–106)
CHOLEST SERPL-MCNC: 133 MG/DL (ref 100–199)
CO2 SERPL-SCNC: 22 MMOL/L (ref 18–29)
COMMENT 011824: NORMAL
CREAT SERPL-MCNC: 1.18 MG/DL (ref 0.57–1)
GFR SERPLBLD CREATININE-BSD FMLA CKD-EPI: 43 ML/MIN/1.73
GFR SERPLBLD CREATININE-BSD FMLA CKD-EPI: 50 ML/MIN/1.73
GLOBULIN SER CALC-MCNC: 2.5 G/DL (ref 1.5–4.5)
GLUCOSE SERPL-MCNC: 92 MG/DL (ref 65–99)
HDLC SERPL-MCNC: 49 MG/DL
LDLC SERPL CALC-MCNC: 69 MG/DL (ref 0–99)
POTASSIUM SERPL-SCNC: 4.7 MMOL/L (ref 3.5–5.2)
PROT SERPL-MCNC: 6.7 G/DL (ref 6–8.5)
SODIUM SERPL-SCNC: 139 MMOL/L (ref 134–144)
TRIGL SERPL-MCNC: 74 MG/DL (ref 0–149)
VLDLC SERPL CALC-MCNC: 15 MG/DL (ref 5–40)

## 2017-12-12 ENCOUNTER — TELEPHONE (OUTPATIENT)
Dept: MEDICAL GROUP | Facility: MEDICAL CENTER | Age: 82
End: 2017-12-12

## 2017-12-12 ENCOUNTER — HOSPITAL ENCOUNTER (OUTPATIENT)
Dept: RADIOLOGY | Facility: MEDICAL CENTER | Age: 82
End: 2017-12-12
Attending: INTERNAL MEDICINE
Payer: MEDICARE

## 2017-12-12 DIAGNOSIS — G45.9 TRANSIENT CEREBRAL ISCHEMIA, UNSPECIFIED TYPE: ICD-10-CM

## 2017-12-12 PROCEDURE — 93880 EXTRACRANIAL BILAT STUDY: CPT

## 2018-01-23 ENCOUNTER — OFFICE VISIT (OUTPATIENT)
Dept: MEDICAL GROUP | Facility: MEDICAL CENTER | Age: 83
End: 2018-01-23
Payer: MEDICARE

## 2018-01-23 VITALS
HEART RATE: 65 BPM | WEIGHT: 171.6 LBS | HEIGHT: 64 IN | BODY MASS INDEX: 29.3 KG/M2 | RESPIRATION RATE: 14 BRPM | OXYGEN SATURATION: 96 % | SYSTOLIC BLOOD PRESSURE: 138 MMHG | DIASTOLIC BLOOD PRESSURE: 64 MMHG | TEMPERATURE: 97 F

## 2018-01-23 DIAGNOSIS — J01.00 ACUTE NON-RECURRENT MAXILLARY SINUSITIS: ICD-10-CM

## 2018-01-23 PROCEDURE — 99214 OFFICE O/P EST MOD 30 MIN: CPT | Performed by: NURSE PRACTITIONER

## 2018-01-23 RX ORDER — CODEINE PHOSPHATE AND GUAIFENESIN 10; 100 MG/5ML; MG/5ML
5 SOLUTION ORAL EVERY 6 HOURS PRN
Qty: 120 ML | Refills: 0 | Status: SHIPPED | OUTPATIENT
Start: 2018-01-23 | End: 2018-02-02

## 2018-01-23 RX ORDER — AMOXICILLIN AND CLAVULANATE POTASSIUM 875; 125 MG/1; MG/1
1 TABLET, FILM COATED ORAL 2 TIMES DAILY
Qty: 20 TAB | Refills: 0 | Status: SHIPPED | OUTPATIENT
Start: 2018-01-23 | End: 2018-02-20

## 2018-01-24 NOTE — PROGRESS NOTES
Subjective:     Chief Complaint   Patient presents with   • Nasal Congestion   • Cough     Malanne Marie Wada is a 82 y.o. female established patient of Dr. Tran here for evaluation of acute congestion and sinus pressure. She states that she's had persistent nasal congestion and postnasal drainage for the last 4 weeks. The drainage in her throat is triggering cough which is troublesome for her. She is feeling generally poorly, pressure in the face and behind the eyes. She is using Flonase without any benefit. She did try a prescription cough syrup at night to help with sleep, this seemed to work fairly well. She has a surgery coming up next month and is concerned that she will not be recovered.  She denies fever, nausea, vomiting, shortness of breath    Current medicines (including changes today)  Current Outpatient Prescriptions   Medication Sig Dispense Refill   • amoxicillin-clavulanate (AUGMENTIN) 875-125 MG Tab Take 1 Tab by mouth 2 times a day. 20 Tab 0   • guaifenesin-codeine (ROBITUSSIN AC) Solution oral solution Take 5 mL by mouth every 6 hours as needed for Cough for up to 10 days. 120 mL 0   • amlodipine (NORVASC) 5 MG Tab Take 1 Tab by mouth every day. 30 Tab 5   • omeprazole (PRILOSEC OTC) 20 MG tablet Take 1 Tab by mouth every day. 30 Tab 5   • atorvastatin (LIPITOR) 80 MG tablet Take 1 Tab by mouth every evening. 90 Tab 2   • naproxen (ALEVE) 220 MG tablet Take 440 mg by mouth 2 times a day, with meals.     • metoprolol SR (TOPROL XL) 25 MG TABLET SR 24 HR Take 12.5 mg by mouth every day.     • levothyroxine (SYNTHROID) 75 MCG Tab Take 1 Tab by mouth Every morning on an empty stomach. 90 Tab 3   • benazepril (LOTENSIN) 40 MG tablet Take 1 Tab by mouth every day. 90 Tab 3   • magnesium gluconate (MAG-G) 500 MG tablet Take 500 mg by mouth every day.     • Multiple Vitamins-Minerals (CENTRUM SILVER ULTRA WOMENS PO) Take 1 Tab by mouth every day.     • vitamin D (CHOLECALCIFEROL) 1000 UNIT TABS Take  "1,000 Units by mouth every day.     • clopidogrel (PLAVIX) 75 MG Tab Take 1 Tab by mouth every day. 30 Tab 5   • fluticasone (FLONASE) 50 MCG/ACT nasal spray Spray 2 Sprays in nose every day. Each Nostril 16 g 11     No current facility-administered medications for this visit.      She  has a past medical history of AAA (abdominal aortic aneurysm) (CMS-HCC) (7/26/2010); Aortic insufficiency; Aortic valve disease; Aortic valve regurgitation; Arthritis; Cataract; Dental disorder; Diverticulosis; Dyslipidemia (7/26/2010); GERD (gastroesophageal reflux disease) (7/12/2012); Glaucoma; Heart burn; Heart murmur; Heart valve disease; Hiatus hernia syndrome; High cholesterol; History of shingles (6/30/2011); History of total knee arthroplasty (7/26/2010); HLD (hyperlipidemia); Hypertension; Hypothyroid (4/8/2011); Indigestion; Mitral insufficiency; OSTEOPOROSIS (8/9/2010); Pain; Preventative health care (7/26/2010); Rheumatic fever; S/P appendectomy (7/26/2010); S/P TOMY (total abdominal hysterectomy) (7/26/2010); Shingles (6/30/2011); Snoring; Status post lumbar surgery (7/26/2010); Stroke (CMS-HCC) (1996); Thoracic aortic aneurysm without mention of rupture; Tricuspid insufficiency; and Unspecified disorder of thyroid. She also has no past medical history of CAD (coronary artery disease); COPD; or Liver disease.    ROS included above     Objective:     Blood pressure 138/64, pulse 65, temperature 36.1 °C (97 °F), resp. rate 14, height 1.626 m (5' 4\"), weight 77.8 kg (171 lb 9.6 oz), SpO2 96 %, not currently breastfeeding. Body mass index is 29.46 kg/m².     Physical Exam:  General: Alert, oriented in no acute distress.  Eye contact is good, speech is normal, affect calm  HEENT: Posterior oropharynx with mild erythema, no lesions or exudate. Nares with yellow mucus. TMs gray with good landmarks bilaterally. Mild maxillary sinus tenderness. No lymphadenopathy.  Lungs: clear to auscultation bilaterally, normal effort, no " wheeze/ rhonchi/ rales.  CV: regular rate and rhythm, S1, S2, murmur present  Abdomen: soft, nontender  Ext: no edema, color normal, vascularity normal, temperature normal    Assessment and Plan:   The following treatment plan was discussed   1. Acute non-recurrent maxillary sinusitis  4 weeks of acute congestion with sinus pressure. Will start Augmentin, advised take antibiotic with food, add probiotic to diet. Increase fluids, rest, humidity, OTC Coricidin if needed. She has been using a prescription cough syrup at night to help with her sleep and requests refill today, prescription provided. Advised not to drink alcohol or drive with medication, she verbalizes understanding  amoxicillin-clavulanate (AUGMENTIN) 875-125 MG Tab    guaifenesin-codeine (ROBITUSSIN AC) Solution oral solution       Followup: As needed         Please note that this dictation was created using voice recognition software. I have worked with consultants from the vendor as well as technical experts from [facility] to optimize the interface. I have made every reasonable attempt to correct obvious errors, but I expect that there are errors of grammar and possibly content that I did not discover before finalizing the note.

## 2018-02-19 PROBLEM — Z86.73 HISTORY OF TRANSIENT ISCHEMIC ATTACK (TIA): Status: ACTIVE | Noted: 2017-11-01

## 2018-02-20 ENCOUNTER — HOSPITAL ENCOUNTER (EMERGENCY)
Facility: MEDICAL CENTER | Age: 83
End: 2018-02-20
Attending: EMERGENCY MEDICINE
Payer: MEDICARE

## 2018-02-20 ENCOUNTER — APPOINTMENT (OUTPATIENT)
Dept: RADIOLOGY | Facility: MEDICAL CENTER | Age: 83
End: 2018-02-20
Attending: EMERGENCY MEDICINE
Payer: MEDICARE

## 2018-02-20 ENCOUNTER — TELEPHONE (OUTPATIENT)
Dept: MEDICAL GROUP | Facility: MEDICAL CENTER | Age: 83
End: 2018-02-20

## 2018-02-20 VITALS
BODY MASS INDEX: 27.31 KG/M2 | HEIGHT: 64 IN | TEMPERATURE: 97.7 F | DIASTOLIC BLOOD PRESSURE: 68 MMHG | RESPIRATION RATE: 18 BRPM | SYSTOLIC BLOOD PRESSURE: 135 MMHG | WEIGHT: 160 LBS | HEART RATE: 87 BPM | OXYGEN SATURATION: 99 %

## 2018-02-20 DIAGNOSIS — Z46.6 ENCOUNTER FOR REMOVAL OF URINARY CATHETER: ICD-10-CM

## 2018-02-20 DIAGNOSIS — I47.10 PSVT (PAROXYSMAL SUPRAVENTRICULAR TACHYCARDIA): ICD-10-CM

## 2018-02-20 PROBLEM — Z86.79 HISTORY OF SUPRAVENTRICULAR TACHYCARDIA: Status: ACTIVE | Noted: 2018-02-20

## 2018-02-20 LAB
ALBUMIN SERPL BCP-MCNC: 3.6 G/DL (ref 3.2–4.9)
ALBUMIN/GLOB SERPL: 1.1 G/DL
ALP SERPL-CCNC: 55 U/L (ref 30–99)
ALT SERPL-CCNC: 21 U/L (ref 2–50)
ANION GAP SERPL CALC-SCNC: 9 MMOL/L (ref 0–11.9)
APTT PPP: 27.4 SEC (ref 24.7–36)
AST SERPL-CCNC: 31 U/L (ref 12–45)
BASOPHILS # BLD AUTO: 0.7 % (ref 0–1.8)
BASOPHILS # BLD: 0.05 K/UL (ref 0–0.12)
BILIRUB SERPL-MCNC: 0.7 MG/DL (ref 0.1–1.5)
BNP SERPL-MCNC: 181 PG/ML (ref 0–100)
BUN SERPL-MCNC: 37 MG/DL (ref 8–22)
CALCIUM SERPL-MCNC: 9 MG/DL (ref 8.4–10.2)
CHLORIDE SERPL-SCNC: 103 MMOL/L (ref 96–112)
CO2 SERPL-SCNC: 21 MMOL/L (ref 20–33)
CREAT SERPL-MCNC: 1.22 MG/DL (ref 0.5–1.4)
EOSINOPHIL # BLD AUTO: 0.23 K/UL (ref 0–0.51)
EOSINOPHIL NFR BLD: 3.2 % (ref 0–6.9)
ERYTHROCYTE [DISTWIDTH] IN BLOOD BY AUTOMATED COUNT: 44.2 FL (ref 35.9–50)
GLOBULIN SER CALC-MCNC: 3.4 G/DL (ref 1.9–3.5)
GLUCOSE SERPL-MCNC: 131 MG/DL (ref 65–99)
HCT VFR BLD AUTO: 33.2 % (ref 37–47)
HGB BLD-MCNC: 11 G/DL (ref 12–16)
IMM GRANULOCYTES # BLD AUTO: 0.08 K/UL (ref 0–0.11)
IMM GRANULOCYTES NFR BLD AUTO: 1.1 % (ref 0–0.9)
INR PPP: 0.97 (ref 0.87–1.13)
LIPASE SERPL-CCNC: 20 U/L (ref 7–58)
LYMPHOCYTES # BLD AUTO: 1.66 K/UL (ref 1–4.8)
LYMPHOCYTES NFR BLD: 23.2 % (ref 22–41)
MCH RBC QN AUTO: 30.1 PG (ref 27–33)
MCHC RBC AUTO-ENTMCNC: 33.1 G/DL (ref 33.6–35)
MCV RBC AUTO: 91 FL (ref 81.4–97.8)
MONOCYTES # BLD AUTO: 0.86 K/UL (ref 0–0.85)
MONOCYTES NFR BLD AUTO: 12 % (ref 0–13.4)
NEUTROPHILS # BLD AUTO: 4.27 K/UL (ref 2–7.15)
NEUTROPHILS NFR BLD: 59.8 % (ref 44–72)
NRBC # BLD AUTO: 0 K/UL
NRBC BLD-RTO: 0 /100 WBC
PLATELET # BLD AUTO: 259 K/UL (ref 164–446)
PMV BLD AUTO: 9.1 FL (ref 9–12.9)
POTASSIUM SERPL-SCNC: 4 MMOL/L (ref 3.6–5.5)
PROT SERPL-MCNC: 7 G/DL (ref 6–8.2)
PROTHROMBIN TIME: 12.5 SEC (ref 12–14.6)
RBC # BLD AUTO: 3.65 M/UL (ref 4.2–5.4)
SODIUM SERPL-SCNC: 133 MMOL/L (ref 135–145)
TROPONIN I SERPL-MCNC: <0.02 NG/ML (ref 0–0.04)
WBC # BLD AUTO: 7.2 K/UL (ref 4.8–10.8)

## 2018-02-20 PROCEDURE — 85610 PROTHROMBIN TIME: CPT

## 2018-02-20 PROCEDURE — 93005 ELECTROCARDIOGRAM TRACING: CPT | Performed by: EMERGENCY MEDICINE

## 2018-02-20 PROCEDURE — 83690 ASSAY OF LIPASE: CPT

## 2018-02-20 PROCEDURE — 99284 EMERGENCY DEPT VISIT MOD MDM: CPT

## 2018-02-20 PROCEDURE — 85025 COMPLETE CBC W/AUTO DIFF WBC: CPT

## 2018-02-20 PROCEDURE — 84484 ASSAY OF TROPONIN QUANT: CPT

## 2018-02-20 PROCEDURE — 71045 X-RAY EXAM CHEST 1 VIEW: CPT

## 2018-02-20 PROCEDURE — 80053 COMPREHEN METABOLIC PANEL: CPT

## 2018-02-20 PROCEDURE — 36415 COLL VENOUS BLD VENIPUNCTURE: CPT

## 2018-02-20 PROCEDURE — 83880 ASSAY OF NATRIURETIC PEPTIDE: CPT

## 2018-02-20 PROCEDURE — 85730 THROMBOPLASTIN TIME PARTIAL: CPT

## 2018-02-20 RX ORDER — CELECOXIB 200 MG/1
200 CAPSULE ORAL DAILY
Status: SHIPPED | COMMUNITY
End: 2018-11-07

## 2018-02-20 RX ORDER — CEPHALEXIN 500 MG/1
500 CAPSULE ORAL 2 TIMES DAILY
Status: SHIPPED | COMMUNITY
Start: 2018-02-15 | End: 2018-03-01

## 2018-02-20 RX ORDER — OMEPRAZOLE 20 MG/1
20 CAPSULE, DELAYED RELEASE ORAL EVERY MORNING
Status: SHIPPED | COMMUNITY
End: 2023-02-07

## 2018-02-20 RX ORDER — AMOXICILLIN AND CLAVULANATE POTASSIUM 875; 125 MG/1; MG/1
1 TABLET, FILM COATED ORAL 2 TIMES DAILY
Status: SHIPPED | COMMUNITY
Start: 2018-01-23 | End: 2018-03-01

## 2018-02-20 ASSESSMENT — PAIN SCALES - GENERAL: PAINLEVEL_OUTOF10: 0

## 2018-02-20 NOTE — ED NOTES
Dc instructions reviewed with pt. To f/u with ortho and pcp, return for worsening s/s. D/c via wheelchair per pt request

## 2018-02-20 NOTE — DISCHARGE INSTRUCTIONS
Paroxysmal Supraventricular Tachycardia  Paroxysmal supraventricular tachycardia (PSVT) is a type of abnormal heart rhythm. It causes your heart to beat very quickly and then suddenly stop beating so quickly. A normal heart rate is  beats per minute. During an episode of PSVT, your heart rate may be 150-250 beats per minute. This can make you feel light-headed and short of breath. An episode of PSVT can be frightening. It is usually not dangerous.  The heart has four chambers. All chambers need to work together for the heart to beat effectively. A normal heartbeat usually starts in the right upper chamber of the heart (atrium) when an area (sinoatrial node) puts out an electrical signal that spreads to the other chambers. People with PSVT may have abnormal electrical pathways, or they may have other areas in the upper chambers that send out electrical signals. The result is a very rapid heartbeat.  When your heart beats very quickly, it does not have time to fill completely with blood. When PSVT happens often or it lasts for long periods, it can lead to heart weakness and failure. Most people with PSVT do not have any other heart disease.  CAUSES  Abnormal electrical activity in the heart causes PSVT. It is not known why some people get PSVT and others do not.  RISK FACTORS  You may be more likely to have PSVT if:  · You are 20-30 years old.  · You are a woman.  Other factors that may increase your chances of an attack include:  · Stress.  · Being tired.  · Smoking.  · Stimulant drugs.  · Alcoholic drinks.  · Caffeine.  · Pregnancy.  SIGNS AND SYMPTOMS  A mild episode of PSVT may cause no symptoms. If you do have signs and symptoms, they may include:  · A pounding heart.  · Feeling of skipped heartbeats (palpitations).  · Weakness.  · Shortness of breath.  · Tightness or pain in your chest.  · Light-headedness.  · Anxiety.  · Dizziness.  · Sweating.  · Nausea.  · A fainting spell.  DIAGNOSIS  Your health care  provider may suspect PSVT if you have symptoms that come and go. The health care provider will do a physical exam. If you are having an episode during the exam, the health care provider may be able to diagnose PSVT by listening to your heart and feeling your pulse. Tests may also be done, including:  · An electrical study of your heart (electrocardiogram, or ECG).  · A test in which you wear a portable ECG monitor all day (Holter monitor) or for several days (event monitor).  · A test that involves taking an image of your heart using sound waves (echocardiogram) to rule out other causes of a fast heart rate.  TREATMENT  You may not need treatment if episodes of PSVT do not happen often or if they do not cause symptoms. If PSVT episodes do cause symptoms, your health care provider may first suggest trying a self-treatment called vagus nerve stimulation. The vagus nerve extends down from the brain. It regulates certain body functions. Stimulating this nerve can slow down the heart. Your health care provider can teach you ways to do this. You may need to try a few ways to find what works best for you. Options include:  · Holding your breath and pushing, as though you are having a bowel movement.  · Massaging an area on one side of your neck below your jaw.  · Bending forward with your head between your legs.  · Bending forward with your head between your legs and coughing.  · Massaging your eyeballs with your eyes closed.  If vagus nerve stimulation does not work, other treatment options include:  · Medicines to prevent an attack.  · Being treated in the hospital with medicine or electric shock to stop an attack (cardioversion). This treatment can include:  ¨ Getting medicine through an IV line.  ¨ Having a small electric shock delivered to your heart. You will be given medicine to make you sleep through this procedure.  · If you have frequent episodes with symptoms, you may need a procedure to get rid of the faulty  areas of your heart (radiofrequency ablation) and end the episodes of PSVT. In this procedure:  ¨ A long, thin tube (catheter) is passed through one of your veins into your heart.  ¨ Energy directed through the catheter eliminates the areas of your heart that are causing abnormal electric stimulation.  HOME CARE INSTRUCTIONS  · Take medicines only as directed by your health care provider.  · Do not use caffeine in any form if caffeine triggers episodes of PSVT. Otherwise, consume caffeine in moderation. This means no more than a few cups of coffee or the equivalent each day.  · Do not drink alcohol if alcohol triggers episodes of PSVT. Otherwise, limit alcohol intake to no more than 1 drink per day for nonpregnant women and 2 drinks per day for men. One drink equals 12 ounces of beer, 5 ounces of wine, or 1½ ounces of hard liquor.  · Do not use any tobacco products, including cigarettes, chewing tobacco, or electronic cigarettes. If you need help quitting, ask your health care provider.  · Try to get at least 7 hours of sleep each night.  · Find healthy ways to manage stress.  · Perform vagus nerve stimulation as directed by your health care provider.  · Maintain a healthy weight.  · Get some exercise on most days. Ask your health care provider to suggest some good activities for you.  SEEK MEDICAL CARE IF:  · You are having episodes of PSVT more often, or they are lasting longer.  · Vagus nerve stimulation is no longer helping.  · You have new symptoms during an episode.  SEEK IMMEDIATE MEDICAL CARE IF:  · You have chest pain or trouble breathing.  · You have an episode of PSVT that has lasted longer than 20 minutes.  · You have passed out from an episode of PSVT.  These symptoms may represent a serious problem that is an emergency. Do not wait to see if the symptoms will go away. Get medical help right away. Call your local emergency services (911 in the [place] Do not drive yourself to the hospital.     This  information is not intended to replace advice given to you by your health care provider. Make sure you discuss any questions you have with your health care provider.     Document Released: 12/18/2006 Document Revised: 01/08/2016 Document Reviewed: 05/28/2015  [employer] Interactive Patient Education ©2016 [employer]

## 2018-02-20 NOTE — ED NOTES
"Chief Complaint   Patient presents with   • Chest Pain     SVT at 163 per minute   • Urinary Catheter Problem     \"I want this catheter out\". Hip surgery within the week     "

## 2018-02-20 NOTE — TELEPHONE ENCOUNTER
Patient called and left a message yesterday which was a holiday, the patient had Dale catheter placed after hip replacement surgery done at [facility] and is having problems with the catheter, please call the patient and have her go to the emergency room for a Dale catheter check if she is still having problems, we are not equipped to handle that type of problem here

## 2018-03-01 DIAGNOSIS — I10 ESSENTIAL HYPERTENSION: ICD-10-CM

## 2018-03-01 RX ORDER — AMLODIPINE BESYLATE 5 MG/1
5 TABLET ORAL DAILY
Qty: 90 TAB | Refills: 1 | Status: SHIPPED | OUTPATIENT
Start: 2018-03-01 | End: 2018-09-18 | Stop reason: SDUPTHER

## 2018-03-06 ENCOUNTER — APPOINTMENT (OUTPATIENT)
Dept: RADIOLOGY | Facility: MEDICAL CENTER | Age: 83
End: 2018-03-06
Attending: EMERGENCY MEDICINE
Payer: MEDICARE

## 2018-03-06 ENCOUNTER — HOSPITAL ENCOUNTER (EMERGENCY)
Facility: MEDICAL CENTER | Age: 83
End: 2018-03-06
Attending: EMERGENCY MEDICINE
Payer: MEDICARE

## 2018-03-06 VITALS
OXYGEN SATURATION: 96 % | WEIGHT: 165 LBS | HEART RATE: 82 BPM | BODY MASS INDEX: 28.17 KG/M2 | TEMPERATURE: 99.3 F | SYSTOLIC BLOOD PRESSURE: 138 MMHG | HEIGHT: 64 IN | RESPIRATION RATE: 18 BRPM | DIASTOLIC BLOOD PRESSURE: 85 MMHG

## 2018-03-06 DIAGNOSIS — Z96.641 STATUS POST HIP REPLACEMENT, RIGHT: ICD-10-CM

## 2018-03-06 DIAGNOSIS — M79.604 RIGHT LEG PAIN: ICD-10-CM

## 2018-03-06 LAB
ANION GAP SERPL CALC-SCNC: 7 MMOL/L (ref 0–11.9)
APTT PPP: 26.8 SEC (ref 24.7–36)
BASOPHILS # BLD AUTO: 0.8 % (ref 0–1.8)
BASOPHILS # BLD: 0.05 K/UL (ref 0–0.12)
BUN SERPL-MCNC: 41 MG/DL (ref 8–22)
CALCIUM SERPL-MCNC: 9.1 MG/DL (ref 8.4–10.2)
CHLORIDE SERPL-SCNC: 104 MMOL/L (ref 96–112)
CO2 SERPL-SCNC: 23 MMOL/L (ref 20–33)
CREAT SERPL-MCNC: 1.34 MG/DL (ref 0.5–1.4)
EOSINOPHIL # BLD AUTO: 0.34 K/UL (ref 0–0.51)
EOSINOPHIL NFR BLD: 5.8 % (ref 0–6.9)
ERYTHROCYTE [DISTWIDTH] IN BLOOD BY AUTOMATED COUNT: 47.5 FL (ref 35.9–50)
GLUCOSE SERPL-MCNC: 105 MG/DL (ref 65–99)
HCT VFR BLD AUTO: 30.7 % (ref 37–47)
HGB BLD-MCNC: 10.1 G/DL (ref 12–16)
IMM GRANULOCYTES # BLD AUTO: 0.03 K/UL (ref 0–0.11)
IMM GRANULOCYTES NFR BLD AUTO: 0.5 % (ref 0–0.9)
INR PPP: 0.98 (ref 0.87–1.13)
LYMPHOCYTES # BLD AUTO: 1.56 K/UL (ref 1–4.8)
LYMPHOCYTES NFR BLD: 26.5 % (ref 22–41)
MCH RBC QN AUTO: 30.9 PG (ref 27–33)
MCHC RBC AUTO-ENTMCNC: 32.9 G/DL (ref 33.6–35)
MCV RBC AUTO: 93.9 FL (ref 81.4–97.8)
MONOCYTES # BLD AUTO: 0.63 K/UL (ref 0–0.85)
MONOCYTES NFR BLD AUTO: 10.7 % (ref 0–13.4)
NEUTROPHILS # BLD AUTO: 3.28 K/UL (ref 2–7.15)
NEUTROPHILS NFR BLD: 55.7 % (ref 44–72)
NRBC # BLD AUTO: 0 K/UL
NRBC BLD-RTO: 0 /100 WBC
PLATELET # BLD AUTO: 276 K/UL (ref 164–446)
PMV BLD AUTO: 9.2 FL (ref 9–12.9)
POTASSIUM SERPL-SCNC: 4 MMOL/L (ref 3.6–5.5)
PROTHROMBIN TIME: 12.6 SEC (ref 12–14.6)
RBC # BLD AUTO: 3.27 M/UL (ref 4.2–5.4)
SODIUM SERPL-SCNC: 134 MMOL/L (ref 135–145)
WBC # BLD AUTO: 5.9 K/UL (ref 4.8–10.8)

## 2018-03-06 PROCEDURE — 93971 EXTREMITY STUDY: CPT | Mod: RT

## 2018-03-06 PROCEDURE — 85610 PROTHROMBIN TIME: CPT

## 2018-03-06 PROCEDURE — 80048 BASIC METABOLIC PNL TOTAL CA: CPT

## 2018-03-06 PROCEDURE — 99283 EMERGENCY DEPT VISIT LOW MDM: CPT

## 2018-03-06 PROCEDURE — 85730 THROMBOPLASTIN TIME PARTIAL: CPT

## 2018-03-06 PROCEDURE — 85025 COMPLETE CBC W/AUTO DIFF WBC: CPT

## 2018-03-06 NOTE — ED NOTES
Pt concerned about possible blood clot in right leg pt is s/p right hip sx approx 1 month ago, pt states difficulty bearing weight and continues to have pain, taking prescribed hydrocodone w/o relief.

## 2018-03-06 NOTE — ED PROVIDER NOTES
ED Provider Note    CHIEF COMPLAINT  Leg Pain    HPI  Yvonne Marie Wada is a 82 y.o. female who presents to the emergency department she complained of right lower leg pain. The patient states she had her hip replaced 2 weeks ago as an outpatient at the time she had a torn ligament in the hip and she has not been able to walk on it since. She states she just finished Lovenox 2 days ago but this evening had pain from her hip that shot only down to her foot and toes that made her worried she had a blood clot. She denies any chest pain or shortness of breath has been taking a baby aspirin denies any worsening or new numbness or tingling in the leg denies any trauma. She however has not been ambulatory since the surgery. She took a Norco prior to arrival and states that greatly improved her leg pain. Currently it is a 4 out of 10 and sharp in nature    REVIEW OF SYSTEMS  See HPI for further details. All other systems are negative.     PAST MEDICAL HISTORY   has a past medical history of AAA (abdominal aortic aneurysm) (CMS-HCC) (7/26/2010); Aortic insufficiency; Aortic valve disease; Aortic valve regurgitation; Arthritis; Cataract; Dental disorder; Diverticulosis; Dyslipidemia (7/26/2010); GERD (gastroesophageal reflux disease) (7/12/2012); Glaucoma; Heart burn; Heart murmur; Heart valve disease; Hiatus hernia syndrome; High cholesterol; History of shingles (6/30/2011); History of total knee arthroplasty (7/26/2010); HLD (hyperlipidemia); Hypertension; Hypothyroid (4/8/2011); Indigestion; Mitral insufficiency; OSTEOPOROSIS (8/9/2010); Pain; Preventative health care (7/26/2010); Rheumatic fever; S/P appendectomy (7/26/2010); S/P TOMY (total abdominal hysterectomy) (7/26/2010); Shingles (6/30/2011); Snoring; Status post lumbar surgery (7/26/2010); Stroke (CMS-HCC) (1996); Thoracic aortic aneurysm without mention of rupture; Tricuspid insufficiency; and Unspecified disorder of thyroid.    SOCIAL HISTORY  Social History  "    Social History Main Topics   • Smoking status: Never Smoker   • Smokeless tobacco: Never Used      Comment: none   • Alcohol use No   • Drug use: No   • Sexual activity: Not on file       SURGICAL HISTORY   has a past surgical history that includes lumbar fusion posterior; hysterectomy, total abdominal; appendectomy; aortic valve replacement (1/12/2010); knee arthroscopy (10/18/2011); medial meniscectomy (10/18/2011); knee arthroplasty total (1/3/2012); lumbar fusion posterior (11/24/2015); lumbar laminectomy diskectomy (11/24/2015); knee arthroplasty total (Right, 9/8/2016); and tendon repair (Right, 11/10/2016).    CURRENT MEDICATIONS  Home Medications     Reviewed by Rosemarie Dempsey R.N. (Registered Nurse) on 03/06/18 at 0134  Med List Status: Partial   Medication Last Dose Status   amLODIPine (NORVASC) 5 MG Tab  Active   atorvastatin (LIPITOR) 80 MG tablet 2/19/2018 Active   benazepril (LOTENSIN) 40 MG tablet 2/20/2018 Active   celecoxib (CELEBREX) 200 MG Cap 2/19/2018 Active   enoxaparin (LOVENOX) 40 MG/0.4ML Solution inj 2/19/2018 Active   fluticasone (FLONASE) 50 MCG/ACT nasal spray 2/19/2018 Active   levothyroxine (SYNTHROID) 75 MCG Tab 2/19/2018 Active   magnesium gluconate (MAG-G) 500 MG tablet > 2 weeks Active   metoprolol SR (TOPROL XL) 25 MG TABLET SR 24 HR 2/20/2018 Active   Multiple Vitamins-Minerals (CENTRUM SILVER ULTRA WOMENS PO) > 2 weeks Active   omeprazole (PRILOSEC) 20 MG delayed-release capsule 2/19/2018 Active   vitamin D (CHOLECALCIFEROL) 1000 UNIT TABS > 2 weeks Active                ALLERGIES  Allergies   Allergen Reactions   • Codeine Nausea     Synthetic codones ok   • Trazodone Vomiting     groggy   • Ultram [Tramadol] Vomiting     nausea       PHYSICAL EXAM  VITAL SIGNS: /61   Pulse 80   Temp 37.4 °C (99.3 °F)   Resp 18   Ht 1.626 m (5' 4\")   Wt 74.8 kg (165 lb)   SpO2 98%   BMI 28.32 kg/m²    Pulse ox interpretation: I interpret this pulse ox as " normal.  Constitutional: Alert in no apparent distress.  HENT: Normocephalic, Atraumatic, MMM  Eyes: PERound. Conjunctiva normal, non-icteric.   Heart: Regular rate and rythm, no murmurs.    Lungs: Clear to auscultation bilaterally. No resp distress, breath sounds equal  Abdomen: Non-tender, non-distended, normal bowel sounds  EXT: Mild right hip tenderness, bilateral large dermis with 3 swelling and no pitting edema, bilateral DP pulses 2+, negative Homans sign bilaterally negative calf tenderness bilaterally. No swelling or redness or erythema over the right hip  Skin: Warm, Dry, No erythema, No rash.   Neurologic: Alert and oriented, Grossly non-focal.       DIFFERENTIAL DIAGNOSIS AND WORK UP PLAN    This is a 82 y.o. female who presents with right leg pain it appears equal on my examination however she has not been moving that leg and she had increased pain this evening as well as anticoagulants until 2 days ago. Her differential includes muscle spasm nerve pain such as sciatica or discomfort secondary to her recent surgery versus DVT versus electrolyte abnormality leading to muscle spasm. We'll perform laboratory analysis and ultrasound lower extremity    Pertinent Lab Findings  CBC within normal limits and a mild anemia with a hemoglobin of 10, BMP within normal limits coags normal      Radiology  US-EXTREMITY VENOUS UNILATERAL-LOWER RIGHT   Final Result         1.  No evidence of right lower extremity deep venous thrombosis.        The radiologist's interpretation of all radiological studies have been reviewed by me.    COURSE & MEDICAL DECISION MAKING  Pertinent Labs & Imaging studies reviewed. (See chart for details)    3:15 AM  Reassess patient in the bedside she is doing well states her pain is in control after the pain meds she took at home. We discussed her ultrasound findings without evidence of a blood clot. She'll follow up with orthopedic surgeon or primary care provider. She will return to the  "emergency department if any new or worsening redness swelling or discharge or worsening numbness weakness tingling in the leg.    /85   Pulse 82   Temp 37.4 °C (99.3 °F)   Resp 18   Ht 1.626 m (5' 4\")   Wt 74.8 kg (165 lb)   SpO2 96%   BMI 28.32 kg/m²        The patient will return for new or worsening symptoms and is stable at the time of discharge.    The patient is referred to a primary physician for blood pressure management, diabetic screening, and for all other preventative health concerns.    DISPOSITION:  Patient will be discharged home in stable condition.    FOLLOW UP:  Tee Tran M.D.  [address]  [id number]  [place] [id number] [phone]    Schedule an appointment as soon as possible for a visit      [facility], Emergency Dept  [address]  [place] [id number] [phone]    If symptoms worsen - color change in your toes, or unbearable pain not controlled by hydrocodone      OUTPATIENT MEDICATIONS:  New Prescriptions    No medications on file           FINAL IMPRESSION  1. Right leg pain    2. Status post hip replacement, right           Electronically signed by: Ursula Aguilera, 3/6/2018 1:40 AM    This dictation has been created using voice recognition software and/or scribes. The accuracy of the dictation is limited by the abilities of the software and the expertise of the scribes. I expect there may be some errors of grammar and possibly content. I made every attempt to manually correct the errors within my dictation. However, errors related to voice recognition software and/or scribes may still exist and should be interpreted within the appropriate context.    "

## 2018-03-06 NOTE — DISCHARGE INSTRUCTIONS
Hip Rehabilitation After Surgery  Exercising your hip can greatly improve the results of your hip surgery. The exercises described here are designed to help you keep full movement of your hip joint.  HOW SHOULD I EXERCISE MY HIP?  The following exercises can be done on a training mat, on the floor, on a table, or on a bed. Use whatever works best and is most comfortable for you. Perform all exercises about fifteen times on each side, three times per day or as directed.  · Lying on your back, slowly slide your foot toward your buttocks, raising your knee up off the floor. Then slowly slide your foot back down until your leg is straight again.  · Lying on your back, spread your legs as far apart as you can without feeling discomfort.  · Lying on your side, raise your leg straight up from the floor as far as is comfortable. Slowly lower the leg.  · Lying on your back, tighten up the muscle in the front of your thigh (quadriceps). You can do this by keeping your leg straight and trying to raise your heel off the floor. This helps strengthen the largest muscle supporting your knee.  · Lying on your back, tighten up the muscles of your buttocks both with the legs straight and with the knee bent at a comfortable angle while keeping your heel on the floor.  · Lying on your stomach, lift your toes off the floor toward your buttocks. Bend your knee as far as is comfortable. Tighten the muscles in your buttocks while doing this.  Follow all safety measures that are given to protect your hip. If any of these exercises cause increased pain or swelling in your joint, decrease the exercises until you are comfortable again. Then slowly increase the exercises. Call your health care provider if you have problems or questions.      This information is not intended to replace advice given to you by your health care provider. Make sure you discuss any questions you have with your health care provider.     Document Released: 07/21/2005  Document Revised: 01/08/2016 Document Reviewed: 03/19/2015  [employer] Interactive Patient Education ©2016 [employer]

## 2018-03-06 NOTE — ED NOTES
Pt dc'd home with instructions to f/u with pcp, opportunity provided to ask questions, pt brought out to car via wheelchair with .

## 2018-03-11 LAB
EKG IMPRESSION: NORMAL
EKG IMPRESSION: NORMAL

## 2018-03-19 DIAGNOSIS — I10 ESSENTIAL HYPERTENSION: ICD-10-CM

## 2018-03-19 DIAGNOSIS — Z86.79 S/P THORACIC AORTIC ANEURYSM REPAIR: ICD-10-CM

## 2018-03-19 DIAGNOSIS — I35.1 AORTIC VALVE REGURGITATION, ACQUIRED: ICD-10-CM

## 2018-03-19 DIAGNOSIS — Z98.890 S/P THORACIC AORTIC ANEURYSM REPAIR: ICD-10-CM

## 2018-03-20 RX ORDER — BENAZEPRIL HYDROCHLORIDE 40 MG/1
TABLET ORAL
Qty: 90 TAB | Refills: 2 | Status: SHIPPED | OUTPATIENT
Start: 2018-03-20 | End: 2018-10-31 | Stop reason: SDUPTHER

## 2018-07-03 ENCOUNTER — TELEPHONE (OUTPATIENT)
Dept: MEDICAL GROUP | Facility: MEDICAL CENTER | Age: 83
End: 2018-07-03

## 2018-07-03 ENCOUNTER — OFFICE VISIT (OUTPATIENT)
Dept: MEDICAL GROUP | Facility: MEDICAL CENTER | Age: 83
End: 2018-07-03
Payer: MEDICARE

## 2018-07-03 VITALS
WEIGHT: 162 LBS | HEART RATE: 61 BPM | DIASTOLIC BLOOD PRESSURE: 62 MMHG | TEMPERATURE: 98 F | SYSTOLIC BLOOD PRESSURE: 128 MMHG | HEIGHT: 64 IN | OXYGEN SATURATION: 96 % | BODY MASS INDEX: 27.66 KG/M2

## 2018-07-03 DIAGNOSIS — E78.5 DYSLIPIDEMIA: Chronic | ICD-10-CM

## 2018-07-03 DIAGNOSIS — Z00.00 PREVENTATIVE HEALTH CARE: Chronic | ICD-10-CM

## 2018-07-03 DIAGNOSIS — M81.0 POSTMENOPAUSAL BONE LOSS: ICD-10-CM

## 2018-07-03 DIAGNOSIS — M79.604 PAIN OF RIGHT LOWER EXTREMITY: ICD-10-CM

## 2018-07-03 DIAGNOSIS — Z96.641 STATUS POST TOTAL REPLACEMENT OF RIGHT HIP: ICD-10-CM

## 2018-07-03 DIAGNOSIS — Z12.31 ENCOUNTER FOR SCREENING MAMMOGRAM FOR BREAST CANCER: ICD-10-CM

## 2018-07-03 DIAGNOSIS — Z23 NEED FOR ZOSTER VACCINE: ICD-10-CM

## 2018-07-03 DIAGNOSIS — I10 ESSENTIAL HYPERTENSION: Chronic | ICD-10-CM

## 2018-07-03 PROCEDURE — 99214 OFFICE O/P EST MOD 30 MIN: CPT | Performed by: INTERNAL MEDICINE

## 2018-07-03 NOTE — LETTER
[facility]  Tee Tran M.D.  [address]  [place] [address]  Fax: [phone]   Authorization for Release/Disclosure of   Protected Health Information   Name: YVONNE MARIE WADA : 1935 SSN: [id number]   Address: [address] [place], [place] [address] [place] [address] Phone:    [phone] (home)    I authorize the entity listed below to release/disclose the PHI below to:   [facility]/Tee Tran M.D. and Tee Tran M.D.   Provider or Entity Name:     Address   City, State, Zip   Phone:      Fax:     Reason for request: continuity of care   Information to be released:    [  ] LAST COLONOSCOPY,  including any PATH REPORT and follow-up  [  ] LAST FIT/COLOGUARD RESULT [  ] LAST DEXA  [  ] LAST MAMMOGRAM  [  ] LAST PAP  [  ] LAST LABS [  ] RETINA EXAM REPORT  [  ] IMMUNIZATION RECORDS  [ XXX ] Release all info      [  ] Check here and initial the line next to each item to release ALL health information INCLUDING  _____ Care and treatment for drug and / or alcohol abuse  _____ HIV testing, infection status, or AIDS  _____ Genetic Testing    DATES OF SERVICE OR TIME PERIOD TO BE DISCLOSED: _____________  I understand and acknowledge that:  * This Authorization may be revoked at any time by you in writing, except if your health information has already been used or disclosed.  * Your health information that will be used or disclosed as a result of you signing this authorization could be re-disclosed by the recipient. If this occurs, your re-disclosed health information may no longer be protected by State or Federal laws.  * You may refuse to sign this Authorization. Your refusal will not affect your ability to obtain treatment.  * This Authorization becomes effective upon signing and will  on (date) __________.      If no date is indicated, this Authorization will  one (1) year from the signature date.    Name: Yvonne Marie Wada    Signature:   Date:     7/3/2018       PLEASE FAX  REQUESTED RECORDS BACK TO: [phone]

## 2018-07-03 NOTE — PROGRESS NOTES
Subjective:      Yvonne Marie Wada is a 82 y.o. female who presents with Other (DMV Paperwork); Medication Management; and Referral Needed (cardiologist)            HPI     Here for dmv form completion has been to orthopedics  after surgery in feb at [facility], has been still having right IT band pain, completed PT at [facility] uses cane at all times.  We do not have any operative records from orthopedics or [facility].  Patient has difficulty ambulating long distances because of right ligament pain, has completed physical therapy.  Requesting DMV form because of limitations.  Only 4 months since surgery, has chronic right lateral thigh pain 5-6/10, improving from 10/10 pain after surgery.  No current pain medications.  No regular anti-inflammatories.  On metoprolol, Lotensin, Norvasc with blood pressure stable and controlled, no lightheadedness, dizziness, chest pain, palpitations.  Limited activity because of recent surgery, no regular exercise program, low sodium diet.   Dyslipidemia on Lipitor no muscle pain or muscle #10 acupuncture referral for pain aches related to statin therapy   Hypothyroid on replacement, no fatigue, no weight loss, no mood changes  Decreased hearing left ear, has had earwax previously, no pain, no drainage, no discharge, tinnitus, no vertigo      Current Outpatient Prescriptions   Medication Sig Dispense Refill   • metoprolol SR (TOPROL XL) 25 MG TABLET SR 24 HR TAKE 1/2 TABLET DAILY 45 Tab 3   • benazepril (LOTENSIN) 40 MG tablet TAKE 1 TABLET DAILY 90 Tab 2   • amLODIPine (NORVASC) 5 MG Tab Take 1 Tab by mouth every day. 90 Tab 1   • enoxaparin (LOVENOX) 40 MG/0.4ML Solution inj Inject 1 Syringe as instructed every day. Pt started on 2/14/2018 for 14 day course.     • celecoxib (CELEBREX) 200 MG Cap Take 200 mg by mouth every day.     • omeprazole (PRILOSEC) 20 MG delayed-release capsule Take 20 mg by mouth as needed (For heartburn).     • atorvastatin (LIPITOR) 80  MG tablet Take 1 Tab by mouth every evening. 90 Tab 2   • metoprolol SR (TOPROL XL) 25 MG TABLET SR 24 HR Take 12.5 mg by mouth every day.     • fluticasone (FLONASE) 50 MCG/ACT nasal spray Spray 2 Sprays in nose every day. Each Nostril 16 g 11   • levothyroxine (SYNTHROID) 75 MCG Tab Take 1 Tab by mouth Every morning on an empty stomach. 90 Tab 3   • benazepril (LOTENSIN) 40 MG tablet Take 1 Tab by mouth every day. 90 Tab 3   • magnesium gluconate (MAG-G) 500 MG tablet Take 500 mg by mouth every day.     • Multiple Vitamins-Minerals (CENTRUM SILVER ULTRA WOMENS PO) Take 1 Tab by mouth every day.     • vitamin D (CHOLECALCIFEROL) 1000 UNIT TABS Take 1,000 Units by mouth every day.       No current facility-administered medications for this visit.      Patient Active Problem List   Diagnosis   • Status post lumbar surgery   • Hypertension   • Dyslipidemia   • S/P TOMY (total abdominal hysterectomy)   • Preventative health care   • History of total knee arthroplasty   • S/P appendectomy   • Osteoporosis, idiopathic   • Hypothyroid   • History of shingles   • GERD (gastroesophageal reflux disease)   • Cervical pain   • S/P thoracic aortic aneurysm repair   • Decreased GFR   • UTI (urinary tract infection)   • Insomnia   • Shoulder pain   • History of transient ischemic attack (TIA)   • Hip pain   • History of supraventricular tachycardia        Cervical pain  2/10/14 [facility] note; x-ray cervical spine DJD, right shoulder steroid injection     Decreased GFR  5/31/09 bun 18,creat 1.2  1/14/10 bun 28,creat 1.3,GFR 40  9/20/12 bun 37,creat 1.1,GFR 48  9/25/13 bun 25,creat 1.2,GFR 44  9/26/14 bun 26,creat 1.1,GFR 45  2/23/15 bun 20,creat 1.1,GFR 48  4/15/16 bun 31,creat 1.1,GFR 45  7/6/16 bun 29,creat 1.1,GFR 44   5/12/17 bun 35,creat 1.1,GFR 44  11/2/17 bun 22,creat 1.28,GFR 40  12/9/17 bun 29,creat 1.1,GFR 43     Dyslipidemia  4/11 chol 159,trig 84,hdl 47,ldl 95,cpk 114  7/12 chol 163,trig 120,hdl 49,ldl 90, crp 1.1 on lipitor  20 mg  3/26/13 chol 159,trig 99,hdl 46,ldl 93 on lipitor 20 mg  9/25/13 chol 164,trig 100,hdl 47,ldl 97 on lipitor 20 mg  9/12/14 cardiology note lower extremity weakness, hold lipitor x3 months, labs ordered  12/15/14 cardiology start low dose lipitor 10 mg  1/22/15 off lipitor will resume 10 mg and repeat labs 4 weeks  2/24/15 chol 179,trig 111,hdl 54,ldl 103 on lipitor 10 mg  2/1/16 cardiology note restart lipitor 20 mg    4/15/16 chol 163,trig 102,hdl 48,ldl 95 on lipitor 20 mg  5/12/17 chol 186,trig 101,hdl 47,ldl 119 on lipitor 20 mg intermittently will start taking daily  11/1/17 chol 146,trig 73,hdl 42,ldl 89 on lipitor 20 mg  12/9/17 chol 133,trig 74,hdl 49,ldl 69 on lipitor 80 mg higher dose due to recent transient ischemic attack     Gastroesophageal reflux  6/27/07 EGD per [facility] hiatal hernia, start prevacid 30 mg  7/12 protonix 20 mg qday  11/16/12 [facility] note cont prilosec  1/5/16 change to protonix 40 mg from prilosec    Hip pain  9/21/17 MRI right hip mild trochanteric bursitis, mild femoral acetabular joint arthritis  10/12/17  orthopedic no proceed with right total hip arthroplasty, x-ray AP pelvis and right hip shows early arthrosis with calcifications and DJD  2/14/18  orthopedics operative note at [facility] right hip total arthroplasty, gluteus medius and minimus tendon repair     History shingles     History of total knee arthroplasty  4/10 MRI right knee complex tear medial meniscus, horizontal cleavage tear lateral meniscus, chondromalacia patella, moderate-sized baker's cyst  5/10 right meniscus knee repair   5/10 told by  had gout on surgery had pathology report, I await that report, question gout seen on pathology report done during repair of right meniscus knee surgery.  7/10 uric acid 6.3, await starting allopurinol depending on the operative report  8/5/10 reviewed orthopedics notes , operative report indicates crystal deposition, could be  gout or pseudogout, no biopsy results, suspect chondrocalcinosis  10/11 dr.parlasca najera note, MRI pending  8/7/10 reviewed pathology report right knee tissue, marked degenerative changes with focal calcification, no mention of gout. Based on this and a normal uric acid level, I do not believe she has gout, has no hyperuricemia medications would be indicated  10/11 dr.parlasca najera left knee meniscectomy and arthroscopy  1/12 dr.parlasca najera left knee arthroplasty  2/29/16  orthopedics x-ray right knee advanced DJD on the right knee corticosteroid injection performed, prescription voltaren gel  6/13/16  orthopedic note x-rays demonstrate right knee advanced DJD and resurfaced patella, offer right knee total replacement followed by patellar resurfacing after she recovers from her right knee  9/8/16  orthopedic's operative note right knee total arthroplasty  11/2/16  orthopedic note, follow-up right knee, x-ray right knee shows subluxation patella, traumatic evulsion VMO needs reexploration and repair  11/10/16  orthopedic's operative note VMO quadriceps avulsion repair status post total knee replacement  11/23/16  orthopedic no follow-up quadriceps repair, continue crutches in full extension, follow-up one month and then start passive range of motion 0-40°  2/4/17  orthopedic note, continue physical therapy, follow-up this summer  4/17/17 [facility] note  6/9/17 [facility] note    History TIA  11/1/17 hospital admission, 11/2/17 hospital discharge, facial numbness, transient numbness in both hands, resolved, CT, MRI head no acute infarct, blood pressure stable, discharged home, patient states she was on aspirin at the time of admission  11/1/17 CT head stable right mid brain likely chronic lacunar infarction, periventricular white matter disease  11/1/17 MRI brain no acute abnormality, moderate chronic microvascular stomach disease, chronic microhemorrhages left  frontal and right parietal lobes, chronic lacunar infarct left basal ganglia and blanco, or cerebral atrophy  11/1/17 MR-MRA head without; negative  11/2/17 echo normal LV size and function, EF 70%, RVSP 50, mild AR and TR  11/28/17 awaiting possible right hip replacement per orthopedics , given recent possible TIA, cannot provide clearance, she will like second opinion from cardiology referral made to dr.bryan de leon, changed asa to plavix  12/12/17 ultrasound carotid less than 50% internal carotid stenosis bilateral  12/20/17 dr.bryan de leon cardiology note most recent echocardiogram looks fine, will repeat CT scan follow aortic repair  2/20/18 episode of supraventricular tachycardia in the emergency room, davenport catheter removed, symptoms resolved with repeat EKG sinus rhythm     Hypertension  Sees cardiology  1/10/11 [facility] note cardiac catheterization normal systolic function  3/11 [facility] echo moderate aortic insufficiency, moderate mitral insufficiency, moderate tricuspid insufficiency, normal LV function  5/12 echo [facility] normal LV function EF 60%, moderate to severe left atrial enlargement, RVSP 32    9/26/13 CT thoracic aorta no evidence of aneurysm, small hiatal hernia  9/26/13 Persantine thallium uniform breast tissue attenuation, cannot rule out underlying ischemic, LVEF 67%  10/3/13 on toprol-XL 25 mg half a tablet daily    12/13/13 cardiology note cont same meds, repeat echo and follow up 6 months  1/2/14 echo mild LVH, grade 1 diastolic dysfunction, ascending aortic 4.0, no change compared with ZAK 2010  5/15/14 cardiology note; increase benazepril to 40 mg qday,continue toprol XL 25 mg daily  6/17/15 cardiology note continue meds, repeat echo 6 months  2/1/16 cardiology note moderate pulmonary hypertension and snoring, nocturnal pulse oximetry ordered  6/30/16 cardiology note patient declines overnight pulse oximetry  11/1/17 echo normal LV size and function, EF 70%, mild aortic insufficiency  and mild tricuspid regurgitation, RVSP 50, aortic root 3.2 cm  11/3/17 cardiology note hypertension stable, status post thoracic aortic aneurysm repair, headache unspecified, ESR ordered  11/28/17 on benazepril 40 mg,toprol 25mg, add norvasc 5 mg  12/12/17 ultrasound carotid less than 50% internal carotid stenosis bilateral  12/20/17  [facility] cardiology note most recent echocardiogram looks fine, will repeat CT scan follow aortic repair  2/20/18 episode of supraventricular tachycardia in the emergency room, davenport catheter removed, symptoms resolved with repeat EKG sinus rhythm     Hypothyroid  4/11 tsh 9 start synthroid 50  4/11 tsh 6,free t3 3.1,free t4 1.2,tpo negative  7/1/11 tsh 2.1 cont synthroid 50 mcg  7/12 tsh 3.4 cont synthroid 50 mcg  7/31/13 tsh 3.2 on synthroid 50 mcg  9/25/13 tsh 1.7 on synthroid 50 mcg  2/24/15 tsh 4.9 on synthroid 50 mcg; increase to synthroid 75 mcg, repeat tsh in 6 weeks  4/14/15 tsh 1.1 on synthroid 75 mcg  4/15/16 tsh 2.3 on synthroid 75 mcg  5/12/17 tsh 1.2 on synthroid 75 mcg  11/1/17 tsh 2.1 on synthroid 75 mcg     Insomnia  1/30/17 trial of trazodone 50 mg  4/4/17 side effects with trazodone drowsiness, discontinued     Osteoporosis  Had been on fosamax [id number]    8/10 dexa LS -2.0,hip -1.5 await old dexa result, she will get copy for me  4/11 vit d 35  9/12 dexa LS -3.2,hip -1.4  2/13/13 reclast IV  7/31/13 vit d 33 cont 2000 units  2/18/14 reclast IV  2/2/15 dexa LS -3.1,hip -1.9; FRAX 35.5 % major,12.6% hip  2/18/15 reclast IV  2/24/15 vit d 33  4/15/16 vit d 36  5/12/17 vit d 36     Preventative health  6/27/09 colon per [facility] no records  10/19/04 pneumovax   9/9/11 zoster vaccine  2/2/15 dexa LS -3.1,hip -1.9; FRAX 35.5 % major,12.6% hip  4/14/15 prevnar  5/22/15 mammogram  5/12/17 vit d 36     Shoulder pain  4/4/17 right shoulder pain right shoulder x-ray ordered, right knee pain, referral [facility], tried celebrex no benefit, will try naproxen 500 mg twice  daily and zanaflex at night  4/5/17 x-ray right shoulder calcifications consistent with calcific tendinitis or hydroxyapatite deposition  5/12/17 MRI right shoulder ordered, persistent pain despite physical therapy  5/18/17 MRI right shoulder; full-thickness supraspinatus tendon tear  5/22/17 right shoulder injection, has been going to physical therapy 14 treatments some improvement, right shoulder injection provided, if no improvement in has appt  in october, if need sooner appt try   6/9/17 [facility] note   7/10/17 [facility] note      Status post TOMY     Status post thoracic aneurysm repair  6/29/06 CT-CTA chest with and without postprocessing; stable ascending thoracic aortic aneurysm maximum diameter 4.8, just distal to the aortic valve maximum diameter 4.3  4/9/07 CT-CTA chest with and without processing; stable ascending aortic thoracic aneurysm 4.5 x 4.6 cm  6/8/09 CT chest with; stable 4.7 ascending aorta aneurysm  10/15/09 CT chest without; dilated ascending thoracic aorta 4.9 cm aneurysm increased from 4.7  1/11/10  cardiovascular surgery operative note ascending thoracic aorta aneurysm repair  9/26/13 CT thoracic aorta evaluation; status post repair ascending thoracic aortic aneurysm without evidence of dissection or leak  1/2/14 echo mild LVH, grade 1 diastolic dysfunction, ascending aortic 4.0, no change compared with ZAK 2010  5/15/14 cardiology note  6/17/15 cardiology note cont meds,repeat echo 6 months  2/1/16 cardiology note moderate pulmonary hypertension and snoring, nocturnal pulse oximetry ordered  11/1/17 echo normal LV size and function, EF 70%, mild aortic insufficiency and mild tricuspid regurgitation, RVSP 50, aortic root 3.2 cm  11/3/17 cardiology note stable  12/20/17  [facility] cardiology note most recent echocardiogram looks fine, will repeat CT scan follow aortic repair     Status post lumbar surgery  6/03 L4-5 epidural     7/06   spinal surgery operative note decompression, foraminotomy. L4-L5 posterior fusion, L4-L5 allograft    3/5/14 MRI lumbar spine grade 2 spondylolisthesis L4-L5 with previous laminectomy and posterior fusion, left sided pedicle screw fixation L4-L5, moderate central canal stenosis L5-S1 secondary to facet arthropathy, mild to moderate multilevel neural foraminal narrowing  12/8/14  pain [facility] note; right lower extremity radiculitis L4-L5 lesion, myofascial lumbosacral pain, trochanteric bursitis, followup as needed  4/2/15  pain procedure note right L4-L5 and right L5-S1 SSNRB under fluoroscopy  4/27/15  pain note; right trochanteric bursal injection, trigger point injections performed, also set up SI joint injections  8/3/15 [facility] neurosurgery note, lumbar x-ray flexion and extension, MRI lumbar spine without contrast, followup   8/9/15 MRI lumbar spine, previous L4-L5 left  fusion and laminectomy, L3-L4 moderate bilateral facet arthropathy, mild disc bulge, L4-L5 severe bilateral facet arthropathy, moderate to severe bilateral neural foraminal stenosis, L5-S1 moderate facet arthropathy  8/28/15  neurosurgery note previous posterior fusion L4-L5, does have L3-L4 disc bulge with central canal stenosis, recommend L3-L5 decompression  8/31/15  pain note; right lower extremity radiculitis, myofascial lumbosacral pain, start hydrocodone 5 mg, continued SI joint exercises, perform trochanteric bursal injection right hip, trigger points lumbar region  10/28/15  neurosurgery note, will schedule for posterior L3-L4 laminectomy and fusion with redo L4-5 laminectomy and exploration of fusion, surgical clearance per cardiology   12/9/15 [facility] neurosurgery note s/p L4-S1 fusion on 11/24/15 , follow up in 4 weeks  12/23/15  neurosurgery note, clear to restart physical therapy if she would like after one month, follow-up 3 months     UTI  7/6/16 UTI  "klebsiella pneumoniae sensitive to all antibiotics except ampicillin, start bactrim x 5 days                          Health Maintenance Summary                IMM DTaP/Tdap/Td Vaccine Overdue 11/29/1954     Annual Wellness Visit Overdue 1/23/2016      Done 1/22/2015 Visit Dx: Medicare annual wellness visit, initial    MAMMOGRAM Overdue 5/22/2016      Done 5/22/2015 MA-SCREENING MAMMOGRAM W/ CAD     Patient has more history with this topic...    IMM INFLUENZA Next Due 9/1/2018      Done 9/18/2017 Imm Admin: Influenza Vaccine Adult HD     Patient has more history with this topic...    COLONOSCOPY Next Due 6/26/2019      Previously completed 6/26/2009 [facility]     Patient has more history with this topic...    BONE DENSITY Next Due 2/2/2020      Done 2/2/2015 DS-BONE DENSITY STUDY (DEXA)     Patient has more history with this topic...          Patient Care Team:  Tee Tran M.D. as PCP - General  Paco Ocampo M.D. as Consulting Physician (Cardiology)    ROS       Objective:     /62   Pulse 61   Temp 36.7 °C (98 °F)   Ht 1.626 m (5' 4\")   Wt 73.5 kg (162 lb)   SpO2 96%   BMI 27.81 kg/m²      Physical Exam   Constitutional: She appears well-developed and well-nourished. No distress.   HENT:   Head: Normocephalic and atraumatic.   Eyes: Conjunctivae are normal. Right eye exhibits no discharge. Left eye exhibits no discharge.   Neck: Neck supple. No JVD present.   Cardiovascular: Normal rate, regular rhythm and normal heart sounds.    Pulmonary/Chest: Effort normal and breath sounds normal.   Abdominal: She exhibits no distension.   Musculoskeletal: She exhibits no edema.   Skin: Skin is warm. She is not diaphoretic.   Psychiatric: She has a normal mood and affect. Her behavior is normal.   Nursing note and vitals reviewed.      No spinal tenderness  Mild right IT band tenderness       Left ear cerumen  Assessment/Plan:       Assessment  #1 status post right hip arthroplasty in February by orthopedics " , completed physical therapy, still limited with regards to ability to walk for prolonged periods of time because of right leg discomfort but improving, no falls, cane for ambulation    #2 gait imbalance uses cane for ambulation completed physical therapy no falls    #3 hypertension stable on metoprolol, Lotensin, Norvasc    #4 dyslipidemia stable on Lipitor no muscle pain related to statin therapy    #5 hypothyroid stable on Synthroid    #6 left  ear cerumen      Plan  #1 old records from orthopedics    #2 left ear lavage by medical assistant, left ear examined after lavage, clear no evidence of otitis    #3 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

## 2018-08-07 ENCOUNTER — HOSPITAL ENCOUNTER (OUTPATIENT)
Dept: RADIOLOGY | Facility: MEDICAL CENTER | Age: 83
End: 2018-08-07
Attending: INTERNAL MEDICINE
Payer: MEDICARE

## 2018-08-07 ENCOUNTER — TELEPHONE (OUTPATIENT)
Dept: MEDICAL GROUP | Facility: MEDICAL CENTER | Age: 83
End: 2018-08-07

## 2018-08-07 DIAGNOSIS — M81.0 POSTMENOPAUSAL BONE LOSS: ICD-10-CM

## 2018-08-07 DIAGNOSIS — Z12.31 ENCOUNTER FOR SCREENING MAMMOGRAM FOR BREAST CANCER: ICD-10-CM

## 2018-08-07 DIAGNOSIS — M81.8 OSTEOPOROSIS, IDIOPATHIC: Chronic | ICD-10-CM

## 2018-08-07 DIAGNOSIS — M81.0 POSTMENOPAUSAL OSTEOPOROSIS: ICD-10-CM

## 2018-08-07 PROCEDURE — 77067 SCR MAMMO BI INCL CAD: CPT

## 2018-08-07 PROCEDURE — 77080 DXA BONE DENSITY AXIAL: CPT

## 2018-08-07 RX ORDER — ZOLEDRONIC ACID 5 MG/100ML
5 INJECTION, SOLUTION INTRAVENOUS ONCE
Qty: 100 ML | Refills: 0 | Status: SHIPPED
Start: 2018-08-07 | End: 2018-08-07

## 2018-08-07 NOTE — TELEPHONE ENCOUNTER
Notified with bone density, will resume reclast, then repeat bone density 2 years, forms completed to send to infusion center  Mammogram negative repeat 1 year

## 2018-08-31 ENCOUNTER — OUTPATIENT INFUSION SERVICES (OUTPATIENT)
Dept: ONCOLOGY | Facility: MEDICAL CENTER | Age: 83
End: 2018-08-31
Attending: INTERNAL MEDICINE
Payer: MEDICARE

## 2018-08-31 VITALS
HEIGHT: 63 IN | DIASTOLIC BLOOD PRESSURE: 65 MMHG | HEART RATE: 57 BPM | SYSTOLIC BLOOD PRESSURE: 139 MMHG | RESPIRATION RATE: 18 BRPM | OXYGEN SATURATION: 97 % | WEIGHT: 162.48 LBS | BODY MASS INDEX: 28.79 KG/M2 | TEMPERATURE: 98.3 F

## 2018-08-31 LAB
CA-I BLD ISE-SCNC: 1.11 MMOL/L (ref 1.1–1.3)
CREAT BLD-MCNC: 1.4 MG/DL (ref 0.5–1.4)

## 2018-08-31 PROCEDURE — 700111 HCHG RX REV CODE 636 W/ 250 OVERRIDE (IP): Performed by: INTERNAL MEDICINE

## 2018-08-31 PROCEDURE — 36415 COLL VENOUS BLD VENIPUNCTURE: CPT

## 2018-08-31 PROCEDURE — 82330 ASSAY OF CALCIUM: CPT

## 2018-08-31 PROCEDURE — 96365 THER/PROPH/DIAG IV INF INIT: CPT

## 2018-08-31 PROCEDURE — 82565 ASSAY OF CREATININE: CPT

## 2018-08-31 RX ORDER — ZOLEDRONIC ACID 5 MG/100ML
5 INJECTION, SOLUTION INTRAVENOUS ONCE
Status: COMPLETED | OUTPATIENT
Start: 2018-08-31 | End: 2018-08-31

## 2018-08-31 RX ADMIN — ZOLEDRONIC ACID 5 MG: 0.05 INJECTION, SOLUTION INTRAVENOUS at 12:14

## 2018-08-31 ASSESSMENT — PAIN SCALES - GENERAL: PAINLEVEL_OUTOF10: 0

## 2018-08-31 NOTE — PROGRESS NOTES
Late entry for 1300:  Reclast infusion completed without incident.  Line flushed clear with normal saline.  No adverse effects observed or expressed.  PIV d/c'd; catheter tip intact.  Pressure dressing applied.  Pt released to self care in no apparent distress after completion of treatment, ambulatory.  Pt to f/u with MD.

## 2018-08-31 NOTE — PROGRESS NOTES
Pt to infusion services ambulatory per self.  Pt here for scheduled Reclast infusion.  Plan of care reviewed.  Pt verbalizes understanding.  Pt tells me she has received Reclast in the past and tolerated.  Pt denies any recent or upcoming invasive dental work.  Pt tells me she is taking calcium and vitamin d daily.  Pt denies any s/sx of hypocalcemia.  PIV established to LAC, #24G.  IV flushes easily; +blood return verified.  Labs drawn for iSTAT calcium and creatinine.  Ionized calcium = 1.11 today and creatinine = 1.4.  Chart to pharmacy; okay for Reclast today.  Reclast administered per MD orders.  Reclast infusing at this time.  Pt resting in chair.  Call light at hand.

## 2018-08-31 NOTE — LETTER
Infusion Services   [address] [place] [address]  [place] [address]  Phone: [phone]  Fax: [phone]              Dear Marc,    Your patient, Yvonne Marie Wada (: 1935), was scheduled at [facility].  Mala's encounter diagnosis is:  No diagnosis found.  She arrived for her appointment, and  the scheduled treatment was   given. These medications were administered to the patient: We administered zoledronic Acid..  Mala tolerated treatment.. In addition, the following labs were drawn    Recent Results (from the past 24 hour(s))   ISTAT CREATININE    Collection Time: 18 11:57 AM   Result Value Ref Range    Istat Creatinine 1.4 0.5 - 1.4 mg/dL   ISTAT IONIZED CA    Collection Time: 18 11:57 AM   Result Value Ref Range    Istat Ionized Calcium 1.11 1.10 - 1.30 mmol/L            Her next appointment is did not reschedule; because she must see her provider first.    For more information, you may review the nurse's progress notes in chart review under the notes section.       Sincerely,  Infusion Services

## 2018-08-31 NOTE — PROGRESS NOTES
Pharmacy Reclast Note  Labs 8/31/18  Ionized calcium = 1.11  Cr = 1.4 est CrCl~37 ml/min  Last Reclast at [facility] on 2/18/15  Eileen HeathD

## 2018-09-05 ENCOUNTER — OFFICE VISIT (OUTPATIENT)
Dept: MEDICAL GROUP | Facility: MEDICAL CENTER | Age: 83
End: 2018-09-05
Payer: MEDICARE

## 2018-09-05 VITALS
HEART RATE: 75 BPM | HEIGHT: 63 IN | RESPIRATION RATE: 16 BRPM | OXYGEN SATURATION: 95 % | SYSTOLIC BLOOD PRESSURE: 140 MMHG | DIASTOLIC BLOOD PRESSURE: 60 MMHG | WEIGHT: 173 LBS | BODY MASS INDEX: 30.65 KG/M2 | TEMPERATURE: 97.6 F

## 2018-09-05 DIAGNOSIS — J01.10 ACUTE NON-RECURRENT FRONTAL SINUSITIS: ICD-10-CM

## 2018-09-05 PROCEDURE — 99214 OFFICE O/P EST MOD 30 MIN: CPT | Performed by: NURSE PRACTITIONER

## 2018-09-05 RX ORDER — AMOXICILLIN AND CLAVULANATE POTASSIUM 875; 125 MG/1; MG/1
1 TABLET, FILM COATED ORAL 2 TIMES DAILY
Qty: 20 TAB | Refills: 0 | Status: SHIPPED | OUTPATIENT
Start: 2018-09-05 | End: 2018-10-05

## 2018-09-06 NOTE — PROGRESS NOTES
"Subjective:     Chief Complaint   Patient presents with   • Sinus Problem     Yvonne Marie Wada is a 82 y.o. female here today to follow up on:    Acute non-recurrent frontal sinusitis  Constant congestion, rhinitis, postnasal drainage for over 2 months.  Patient states \"I have tried everything\" and it has not been improving.  In the last 2 weeks she has developed frontal sinus pain, headaches, fatigue.  She has been using Flonase on a regular basis, has also use Walter med saline rinse without improvement.  She has been hesitant to try any oral allergy medications due to her history of high blood pressure.  Denies fever, nausea, cough, shortness of breath, otalgia       Current medicines (including changes today)  Current Outpatient Prescriptions   Medication Sig Dispense Refill   • amoxicillin-clavulanate (AUGMENTIN) 875-125 MG Tab Take 1 Tab by mouth 2 times a day. 20 Tab 0   • CALCIUM PO Take  by mouth.     • Bioflavonoid Products (BIOFLEX PO) Take  by mouth.     • metoprolol SR (TOPROL XL) 25 MG TABLET SR 24 HR TAKE 1/2 TABLET DAILY 45 Tab 3   • benazepril (LOTENSIN) 40 MG tablet TAKE 1 TABLET DAILY 90 Tab 2   • celecoxib (CELEBREX) 200 MG Cap Take 200 mg by mouth every day.     • omeprazole (PRILOSEC) 20 MG delayed-release capsule Take 20 mg by mouth as needed (For heartburn).     • atorvastatin (LIPITOR) 80 MG tablet Take 1 Tab by mouth every evening. 90 Tab 2   • fluticasone (FLONASE) 50 MCG/ACT nasal spray Spray 2 Sprays in nose every day. Each Nostril 16 g 11   • levothyroxine (SYNTHROID) 75 MCG Tab Take 1 Tab by mouth Every morning on an empty stomach. 90 Tab 3   • magnesium gluconate (MAG-G) 500 MG tablet Take 500 mg by mouth every day.     • Multiple Vitamins-Minerals (CENTRUM SILVER ULTRA WOMENS PO) Take 1 Tab by mouth every day.     • vitamin D (CHOLECALCIFEROL) 1000 UNIT TABS Take 1,000 Units by mouth every day.     • amLODIPine (NORVASC) 5 MG Tab Take 1 Tab by mouth every day. 90 Tab 1     No current " "facility-administered medications for this visit.      She  has a past medical history of AAA (abdominal aortic aneurysm) ([facility]) (7/26/2010); Aortic insufficiency; Aortic valve disease; Aortic valve regurgitation; Arthritis; Cataract; Dental disorder; Diverticulosis; Dyslipidemia (7/26/2010); GERD (gastroesophageal reflux disease) (7/12/2012); Glaucoma; Heart burn; Heart murmur; Heart valve disease; Hiatus hernia syndrome; High cholesterol; History of shingles (6/30/2011); History of total knee arthroplasty (7/26/2010); HLD (hyperlipidemia); Hypertension; Hypothyroid (4/8/2011); Indigestion; Mitral insufficiency; OSTEOPOROSIS (8/9/2010); Pain; Preventative health care (7/26/2010); Rheumatic fever; S/P appendectomy (7/26/2010); S/P TOMY (total abdominal hysterectomy) (7/26/2010); Shingles (6/30/2011); Snoring; Status post lumbar surgery (7/26/2010); Stroke ([facility]) (1996); Thoracic aortic aneurysm without mention of rupture; Tricuspid insufficiency; and Unspecified disorder of thyroid. She also has no past medical history of CAD (coronary artery disease); COPD; or Liver disease.    ROS included above     Objective:     Blood pressure 140/60, pulse 75, temperature 36.4 °C (97.6 °F), resp. rate 16, height 1.59 m (5' 2.6\"), weight 78.5 kg (173 lb), SpO2 95 %. Body mass index is 31.04 kg/m².     Physical Exam:  General: Alert, oriented in no acute distress.  Eye contact is good, speech is normal, affect calm  HEENT: Oral mucosa pink moist, no lesions.  Nares with thick yellow mucus.  Pain over left frontal sinus.  TMs gray with good landmarks bilaterally. No cervical or supraclavicular lymphadenopathy.  Lungs: clear to auscultation bilaterally, normal effort, no wheeze/ rhonchi/ rales.  CV: regular rate and rhythm, S1, S2, murmur present  Ext: no edema, color normal, vascularity normal, temperature normal    Assessment and Plan:   The following treatment plan was discussed   1. Acute non-recurrent frontal sinusitis   " persistent congestion now with acute frontal sinusitis.  Start Augmentin.  I suspect she has uncontrolled allergies as a contributing factor.  Advised to start Zyrtec daily, avoid decongestants.  Continue Flonase.  Follow-up if not gradually resolving  amoxicillin-clavulanate (AUGMENTIN) 875-125 MG Tab       Followup: As needed         Please note that this dictation was created using voice recognition software. I have worked with consultants from the vendor as well as technical experts from [facility] [employer] to optimize the interface. I have made every reasonable attempt to correct obvious errors, but I expect that there are errors of grammar and possibly content that I did not discover before finalizing the note.

## 2018-09-06 NOTE — ASSESSMENT & PLAN NOTE
"Constant congestion, rhinitis, postnasal drainage for over 2 months.  Patient states \"I have tried everything\" and it has not been improving.  In the last 2 weeks she has developed frontal sinus pain, headaches, fatigue.  She has been using Flonase on a regular basis, has also use Walter med saline rinse without improvement.  She has been hesitant to try any oral allergy medications due to her history of high blood pressure.  Denies fever, nausea, cough, shortness of breath, otalgia  "

## 2018-10-05 RX ORDER — AZITHROMYCIN 250 MG/1
TABLET, FILM COATED ORAL
Qty: 6 TAB | Refills: 0 | Status: SHIPPED | OUTPATIENT
Start: 2018-10-05 | End: 2018-11-07

## 2018-10-31 DIAGNOSIS — Z86.79 S/P THORACIC AORTIC ANEURYSM REPAIR: ICD-10-CM

## 2018-10-31 DIAGNOSIS — Z98.890 S/P THORACIC AORTIC ANEURYSM REPAIR: ICD-10-CM

## 2018-10-31 DIAGNOSIS — I35.1 AORTIC VALVE REGURGITATION, ACQUIRED: ICD-10-CM

## 2018-10-31 DIAGNOSIS — I10 ESSENTIAL HYPERTENSION: ICD-10-CM

## 2018-10-31 RX ORDER — BENAZEPRIL HYDROCHLORIDE 40 MG/1
40 TABLET ORAL DAILY
Qty: 90 TAB | Refills: 0 | Status: SHIPPED | OUTPATIENT
Start: 2018-10-31 | End: 2018-11-07 | Stop reason: SDUPTHER

## 2018-11-07 ENCOUNTER — OFFICE VISIT (OUTPATIENT)
Dept: CARDIOLOGY | Facility: MEDICAL CENTER | Age: 83
End: 2018-11-07
Payer: MEDICARE

## 2018-11-07 VITALS
OXYGEN SATURATION: 98 % | SYSTOLIC BLOOD PRESSURE: 124 MMHG | WEIGHT: 161 LBS | HEIGHT: 63 IN | HEART RATE: 58 BPM | BODY MASS INDEX: 28.53 KG/M2 | DIASTOLIC BLOOD PRESSURE: 70 MMHG

## 2018-11-07 DIAGNOSIS — Z98.890 S/P THORACIC AORTIC ANEURYSM REPAIR: Chronic | ICD-10-CM

## 2018-11-07 DIAGNOSIS — I35.1 AORTIC VALVE REGURGITATION, ACQUIRED: ICD-10-CM

## 2018-11-07 DIAGNOSIS — Z86.79 S/P THORACIC AORTIC ANEURYSM REPAIR: Chronic | ICD-10-CM

## 2018-11-07 DIAGNOSIS — E78.5 DYSLIPIDEMIA: Chronic | ICD-10-CM

## 2018-11-07 DIAGNOSIS — I10 ESSENTIAL HYPERTENSION: Chronic | ICD-10-CM

## 2018-11-07 DIAGNOSIS — I35.1 NONRHEUMATIC AORTIC VALVE INSUFFICIENCY: ICD-10-CM

## 2018-11-07 PROCEDURE — 99214 OFFICE O/P EST MOD 30 MIN: CPT | Performed by: INTERNAL MEDICINE

## 2018-11-07 RX ORDER — BENAZEPRIL HYDROCHLORIDE 40 MG/1
40 TABLET ORAL DAILY
Qty: 90 TAB | Refills: 3 | Status: SHIPPED | OUTPATIENT
Start: 2018-11-07 | End: 2019-02-19 | Stop reason: SDUPTHER

## 2018-11-07 NOTE — PROGRESS NOTES
"  Subjective:   Yvonne Marie Wada is a 82 y.o. female who presents today for follow-up of repaired thoracic ascending aorta aneurysm in 2010, aortic insufficiency, hypertension and hyperlipidemia.  She is a former patient of Dr. Bertrand and this is our first visit.  Blood pressure is well controlled heart rate is acceptable.  Medical therapy is appropriate.  She has no cardiac symptoms.  She request medication refills.  She has mild aortic insufficiency as of last check with preserved LVEF.  She is mildly active relating to multiple multi multiple musculoskeletal issues including hip surgery and tendon injury as well as knee surgeries.  At her activity level she has no exertional symptoms.    Denies any other cardiovascular symptoms including chest pain, shortness of breath, dyspnea on exertion, lightheadedness, syncope or presyncope, lower extremity edema, PND, orthopnea or palpitations.      Past Medical History:   Diagnosis Date   • AAA (abdominal aortic aneurysm) ([facility]) 7/26/2010    10/09 CT thorax dilated ascending thoracic aorta 4.9 cm 1/10 transesophageal echo dilated aortic root, and ascending aorta with mild aortic insufficiency 1/10  ascending aortic aneurysm repair 5/12 echo [facility] normal LV function EF 60%, moderate to severe left atrial enlargement, RVSP 32    • Aortic insufficiency    • Aortic valve disease    • Aortic valve regurgitation    • Arthritis     back and knee/ osteo   • Cataract    • Dental disorder     full top denture, partial bottom   • Diverticulosis    • Dyslipidemia 7/26/2010    Sees [facility] on lipitor 4/11 chol 159,trig 84,hdl 47,ldl 95,cpk 114 7/12 chol 163,trig 120,hdl 49,ldl 90, crp 1.1 on lipitor 20 mg    • GERD (gastroesophageal reflux disease) 7/12/2012 6/07 EGD per [facility] hiatal hernia, start prevacid 30 mg 7/12 protonix 20 mg qday    • Glaucoma    • Heart burn    • Heart murmur    • Heart valve disease     \"leaking\", mitral valve   • Hiatus hernia syndrome    • High " cholesterol    • History of shingles 6/30/2011    2010 Back and left thorax     • History of total knee arthroplasty 7/26/2010    4/10 MRI right knee complex tear medial meniscus, horizontal cleavage tear lateral meniscus, chondromalacia patella, moderate-sized Baker's cyst 5/10 right meniscus knee repair  5/10 told by  had gout on surgery had pathology report, I await that report Question gout seen on pathology report done during repair of right meniscus knee surgery. 7/10 uric acid 6.3, we'll await starting allopurinol depending on the operative report 8/5/10 reviewed ortho notes , op report indicates crystal deposition, could be gout or pseudogout. No bx result sent over. Will need to get. My suspicion is chondrocalcinosis. 10/11  ortho note, MRI pending 8/7/10 reviewed pathology report right knee tissue, marked degenerative changes with focal calcification, no mention of gout. Based on this and a normal uric acid level, I do not believe she has gout, has no hyperuricemia medications would be indicated 10/11  ortho left knee meniscectomy and arthroscopy 1/12     • HLD (hyperlipidemia)    • Hypertension    • Hypothyroid 4/8/2011 4/11 tsh 9 start synthroid 50 4/11 tsh 6,free t3 3.1,free t4 1.2,tpo negative 7/1/11 tsh 2.1 cont synthroid 50 mcg 7/12 tsh 3.4 cont synthroid 50 mcg    • Indigestion    • Mitral insufficiency    • OSTEOPOROSIS 8/9/2010    Had been on fosamax [id number]  8/10 dexa LS -2.0,hip -1.5 await old dexa result, she will get copy for me 4/11 vit d 35 9/12 dexa LS -3.2,hip -1.4 2/13/13 relast infusion    • Pain     back and right leg, can get as bad as 10/10   • Preventative health care 7/26/2010 2002 pneum 2005 colon [facility] no records 4/11 vit d 35 9/11 shingles vaccine 9/12 mammogram 9/12 dexa LS -3.2,hip -1.4    • Rheumatic fever    • S/P appendectomy 7/26/2010   • S/P TOMY (total abdominal hysterectomy) 7/26/2010    Sees gyn on  HRT estradiol for 20 yrs () 11/08 mammo     • Shingles 6/30/2011   • Snoring    • Status post lumbar surgery 7/26/2010 6/03 L4-5 epidural Dr. Jordan  7/06 overall for decompression, foraminotomy. L4-L5 posterior fusion, L4-L5 allograft      • Stroke ([facility]) 1996    no residual problems    • Thoracic aortic aneurysm without mention of rupture    • Tricuspid insufficiency    • Unspecified disorder of thyroid     hypo     Past Surgical History:   Procedure Laterality Date   • TENDON REPAIR Right 11/10/2016    Procedure: TENDON REPAIR - QUADRACEPS ;  Surgeon: Maxim Herrera M.D.;  Location: [facility];  Service:    • KNEE ARTHROPLASTY TOTAL Right 9/8/2016    Procedure: KNEE ARTHROPLASTY TOTAL;  Surgeon: Maxim Herrera M.D.;  Location: [facility];  Service:    • LUMBAR FUSION POSTERIOR  11/24/2015    Procedure: LUMBAR FUSION POSTERIOR L3-4, EXPLORATION OF FUSION L4-5 WITH INSTRUMENTATION;  Surgeon: Hermann Reis M.D.;  Location: [facility];  Service:    • LUMBAR LAMINECTOMY DISKECTOMY  11/24/2015    Procedure: LUMBAR L3-4 LAMINECTOMY AND REDO L4-5;  Surgeon: Hermann Reis M.D.;  Location: [facility];  Service:    • KNEE ARTHROPLASTY TOTAL  1/3/2012    Performed by YOSEF MEJÍA at [facility]   • KNEE ARTHROSCOPY  10/18/2011    Performed by YOSEF MEJÍA at [facility]   • MEDIAL MENISCECTOMY  10/18/2011    Performed by YOSEF MEJÍA at [facility]   • AORTIC VALVE REPLACEMENT  1/12/2010    Performed by ISAÍAS NICOLE at [facility]   • APPENDECTOMY     • HYSTERECTOMY, TOTAL ABDOMINAL     • LUMBAR FUSION POSTERIOR       Family History   Problem Relation Age of Onset   • Stroke Mother    • Heart Disease Father    • Heart Disease Sister      Social History     Social History   • Marital status:      Spouse name: N/A   • Number of children: N/A   • Years of education: N/A      Occupational History   • Not on file.     Social History Main Topics   • Smoking status: Never Smoker   • Smokeless tobacco: Never Used      Comment: none   • Alcohol use No   • Drug use: No   • Sexual activity: Not on file     Other Topics Concern   • Not on file     Social History Narrative   • No narrative on file     Allergies   Allergen Reactions   • Codeine Nausea     Synthetic codones ok   • Trazodone Vomiting     groggy   • Ultram [Tramadol] Vomiting     nausea     Outpatient Encounter Prescriptions as of 11/7/2018   Medication Sig Dispense Refill   • benazepril (LOTENSIN) 40 MG tablet Take 1 Tab by mouth every day. 90 Tab 3   • atorvastatin (LIPITOR) 80 MG tablet Take 1 Tab by mouth every evening. 90 Tab 3   • levothyroxine (SYNTHROID) 75 MCG Tab Take 1 Tab by mouth Every morning on an empty stomach. 90 Tab 0   • CALCIUM PO Take  by mouth.     • Bioflavonoid Products (BIOFLEX PO) Take  by mouth.     • metoprolol SR (TOPROL XL) 25 MG TABLET SR 24 HR TAKE 1/2 TABLET DAILY 45 Tab 3   • omeprazole (PRILOSEC) 20 MG delayed-release capsule Take 20 mg by mouth as needed (For heartburn).     • fluticasone (FLONASE) 50 MCG/ACT nasal spray Spray 2 Sprays in nose every day. Each Nostril 16 g 11   • magnesium gluconate (MAG-G) 500 MG tablet Take 500 mg by mouth every day.     • Multiple Vitamins-Minerals (CENTRUM SILVER ULTRA WOMENS PO) Take 1 Tab by mouth every day.     • vitamin D (CHOLECALCIFEROL) 1000 UNIT TABS Take 1,000 Units by mouth every day.     • [DISCONTINUED] benazepril (LOTENSIN) 40 MG tablet Take 1 Tab by mouth every day. 90 Tab 0   • [DISCONTINUED] azithromycin (ZITHROMAX Z-TOM) 250 MG Tab 2 tabs by mouth day 1, 1 tab by mouth days 2-5 6 Tab 0   • [DISCONTINUED] amLODIPine (NORVASC) 5 MG Tab Take 1 Tab by mouth every day. 90 Tab 2   • [DISCONTINUED] celecoxib (CELEBREX) 200 MG Cap Take 200 mg by mouth every day.       No facility-administered encounter medications on file as of 11/7/2018.   "    Review of Systems   All other systems reviewed and are negative.       Objective:   /70 (BP Location: Left arm, Patient Position: Sitting, BP Cuff Size: Adult)   Pulse (!) 58   Ht 1.59 m (5' 2.6\")   Wt 73 kg (161 lb)   SpO2 98%   BMI 28.89 kg/m²     Physical Exam   Constitutional: She is oriented to person, place, and time. She appears well-developed and well-nourished. No distress.   HENT:   Head: Normocephalic and atraumatic.   Right Ear: External ear normal.   Left Ear: External ear normal.   Mouth/Throat: No oropharyngeal exudate.   Eyes: Pupils are equal, round, and reactive to light. Conjunctivae and EOM are normal. Right eye exhibits no discharge. Left eye exhibits no discharge. No scleral icterus.   Neck: Normal range of motion. Neck supple. No JVD present. No tracheal deviation present. No thyromegaly present.   Cardiovascular: Normal rate, regular rhythm, S1 normal, S2 normal and intact distal pulses.  PMI is not displaced.  Exam reveals no gallop, no S3, no S4 and no friction rub.    Murmur heard.   Crescendo decrescendo systolic murmur is present with a grade of 2/6    Decrescendo diastolic murmur is present with a grade of 2/6   Pulses:       Carotid pulses are 2+ on the right side, and 2+ on the left side.       Radial pulses are 2+ on the right side, and 2+ on the left side.        Popliteal pulses are 2+ on the right side, and 2+ on the left side.        Dorsalis pedis pulses are 2+ on the right side, and 2+ on the left side.        Posterior tibial pulses are 2+ on the right side, and 2+ on the left side.   Pulmonary/Chest: Effort normal and breath sounds normal. No respiratory distress. She has no wheezes. She has no rales. She exhibits no tenderness.   Abdominal: Soft. Bowel sounds are normal. She exhibits no distension. There is no tenderness.   Musculoskeletal: Normal range of motion. She exhibits no edema or tenderness.   Neurological: She is alert and oriented to person, place, " and time. No cranial nerve deficit (Cranial nerves II through XII grossly intact).   Skin: Skin is warm and dry. No rash noted. She is not diaphoretic. No erythema.   Psychiatric: She has a normal mood and affect. Her behavior is normal. Judgment and thought content normal.   Vitals reviewed.    LABS:  Lab Results   Component Value Date/Time    CHOLSTRLTOT 133 12/08/2017 08:57 AM    CHOLSTRLTOT 146 11/02/2017 04:55 AM    LDL 69 12/08/2017 08:57 AM    LDL 89 11/02/2017 04:55 AM    HDL 49 12/08/2017 08:57 AM    HDL 42 11/02/2017 04:55 AM    TRIGLYCERIDE 74 12/08/2017 08:57 AM    TRIGLYCERIDE 73 11/02/2017 04:55 AM       Lab Results   Component Value Date/Time    WBC 5.9 03/06/2018 01:48 AM    WBC 3.9 (L) 04/16/2011 12:00 AM    RBC 3.27 (L) 03/06/2018 01:48 AM    RBC 4.63 04/16/2011 12:00 AM    HEMOGLOBIN 10.1 (L) 03/06/2018 01:48 AM    HEMATOCRIT 30.7 (L) 03/06/2018 01:48 AM    MCV 93.9 03/06/2018 01:48 AM    MCV 92 04/16/2011 12:00 AM    NEUTSPOLYS 55.70 03/06/2018 01:48 AM    LYMPHOCYTES 26.50 03/06/2018 01:48 AM    MONOCYTES 10.70 03/06/2018 01:48 AM    EOSINOPHILS 5.80 03/06/2018 01:48 AM    BASOPHILS 0.80 03/06/2018 01:48 AM     Lab Results   Component Value Date/Time    SODIUM 134 (L) 03/06/2018 01:48 AM    POTASSIUM 4.0 03/06/2018 01:48 AM    CHLORIDE 104 03/06/2018 01:48 AM    CO2 23 03/06/2018 01:48 AM    GLUCOSE 105 (H) 03/06/2018 01:48 AM    BUN 41 (H) 03/06/2018 01:48 AM    CREATININE 1.34 03/06/2018 01:48 AM    CREATININE 1.05 (H) 02/07/2013 11:31 AM    BUNCREATRAT 25 12/08/2017 08:57 AM    BUNCREATRAT 30 (H) 02/07/2013 11:31 AM     Lab Results   Component Value Date    HBA1C 5.4 11/01/2017      Lab Results   Component Value Date/Time    ALKPHOSPHAT 55 02/20/2018 10:05 AM    ASTSGOT 31 02/20/2018 10:05 AM    ALTSGPT 21 02/20/2018 10:05 AM    TBILIRUBIN 0.7 02/20/2018 10:05 AM      Lab Results   Component Value Date/Time    BNPBTYPENAT 181 (H) 02/20/2018 10:05 AM      Lab Results   Component Value  Date/Time    TSH 2.320 04/14/2016 08:27 AM     Lab Results   Component Value Date/Time    PROTHROMBTM 12.6 03/06/2018 01:48 AM    INR 0.98 03/06/2018 01:48 AM        ECHO CONCLUSIONS (11/2/2017):  Normal left ventricular chamber size.  Mild concentric left ventricular hypertrophy.  Left ventricular ejection fraction is visually estimated to be 70%.  Mild aortic insufficiency.  Mild tricuspid regurgitation.  Estimated right ventricular systolic pressure  is 50 mmHg.    EKG (2/20/2018):  I have personally reviewed the EKG this visit and discussed with the patient.  SINUS RHYTHM       Assessment:     1. S/P thoracic aortic aneurysm repair  benazepril (LOTENSIN) 40 MG tablet    EC-ECHOCARDIOGRAM COMPLETE W/O CONT   2. Essential hypertension  benazepril (LOTENSIN) 40 MG tablet   3. Dyslipidemia     4. Nonrheumatic aortic valve insufficiency     5. Aortic valve regurgitation, acquired  benazepril (LOTENSIN) 40 MG tablet    EC-ECHOCARDIOGRAM COMPLETE W/O CONT       Medical Decision Making:  Today's Assessment / Status / Plan:     She is doing well.  She has no appreciable cardiac symptoms although she is limited by musculoskeletal issues.  No underlying CAD.  Previously preserved LVEF.  Mild aortic insufficiency.  Mild atherosclerotic disease and is undergoing aggressive cholesterol management and takes an aspirin.  Most recent lipid profile is acceptable.  We discussed her cholesterol medication regimen as well as her antihypertensive therapy.  These are all appropriate.  Recommend continuing monitoring for goal LDL less than 70, and an echocardiogram to reassess her aortic valve insufficiency as well as attempt to visualize repaired ascending aorta.    She is to follow-up yearly if this testing is unremarkable.

## 2018-11-20 ENCOUNTER — HOSPITAL ENCOUNTER (OUTPATIENT)
Dept: CARDIOLOGY | Facility: MEDICAL CENTER | Age: 83
End: 2018-11-20
Attending: INTERNAL MEDICINE
Payer: MEDICARE

## 2018-11-20 DIAGNOSIS — Z98.890 S/P THORACIC AORTIC ANEURYSM REPAIR: Chronic | ICD-10-CM

## 2018-11-20 DIAGNOSIS — I35.1 AORTIC VALVE REGURGITATION, ACQUIRED: ICD-10-CM

## 2018-11-20 DIAGNOSIS — Z86.79 S/P THORACIC AORTIC ANEURYSM REPAIR: Chronic | ICD-10-CM

## 2018-11-20 PROCEDURE — 93306 TTE W/DOPPLER COMPLETE: CPT

## 2018-11-20 PROCEDURE — 93306 TTE W/DOPPLER COMPLETE: CPT | Mod: 26 | Performed by: INTERNAL MEDICINE

## 2018-11-21 LAB
LV EJECT FRACT  99904: 60
LV EJECT FRACT MOD 2C 99903: 61.69
LV EJECT FRACT MOD 4C 99902: 60.46
LV EJECT FRACT MOD BP 99901: 60.58

## 2019-01-05 PROBLEM — I27.20 PULMONARY HYPERTENSION (HCC): Status: ACTIVE | Noted: 2019-01-05

## 2019-01-05 PROBLEM — Z96.649 S/P TOTAL HIP ARTHROPLASTY: Status: ACTIVE | Noted: 2017-11-29

## 2019-01-05 PROBLEM — J01.10 ACUTE NON-RECURRENT FRONTAL SINUSITIS: Status: RESOLVED | Noted: 2018-09-05 | Resolved: 2019-01-05

## 2019-01-09 ENCOUNTER — TELEPHONE (OUTPATIENT)
Dept: MEDICAL GROUP | Facility: MEDICAL CENTER | Age: 84
End: 2019-01-09

## 2019-01-09 RX ORDER — DIAZEPAM 10 MG/1
TABLET ORAL
COMMUNITY
End: 2019-01-10

## 2019-01-09 RX ORDER — BENAZEPRIL HYDROCHLORIDE 40 MG/1
TABLET ORAL
COMMUNITY
End: 2019-01-10

## 2019-01-09 RX ORDER — METOPROLOL SUCCINATE 25 MG/1
TABLET, EXTENDED RELEASE ORAL
COMMUNITY
End: 2019-04-15 | Stop reason: SDUPTHER

## 2019-01-09 RX ORDER — FLUTICASONE PROPIONATE 50 MCG
SPRAY, SUSPENSION (ML) NASAL
COMMUNITY
End: 2020-09-28

## 2019-01-09 RX ORDER — SULFAMETHOXAZOLE AND TRIMETHOPRIM 800; 160 MG/1; MG/1
TABLET ORAL
COMMUNITY
End: 2019-01-10

## 2019-01-09 RX ORDER — CELECOXIB 200 MG/1
CAPSULE ORAL
COMMUNITY
End: 2019-01-10

## 2019-01-09 NOTE — TELEPHONE ENCOUNTER
Future Appointments       Provider Department Center    1/10/2019 9:20 AM Tee Tran M.D.; [facility]  [facility]    11/8/2019 11:30 AM Kobi Wheeler M.D. [facility]           ANNUAL WELLNESS VISIT PRE-VISIT PLANNING WITHOUT OUTREACH    1.  Reviewed note from last office visit with PCP: YES    2.  If any orders were placed at last visit, do we have Results/Consult Notes?        •  Labs - Labs were not ordered at last office visit.  Note: If patient appointment is for lab review and patient did not complete labs, check with provider if OK to reschedule patient until labs completed.       •  Imaging - Imaging ordered, completed and results are in chart.       •  Referrals - Referral ordered, patient has NOT been seen.    3.  Immunizations were updated in [facility] using WebIZ?: Yes       •  WebIZ Recommendations: FLU, TDAP, SHINGRIX (Shingles) and MMR        •  Is patient due for Tdap? YES. Patient was notified of copay/out of pocket cost.       •  Is patient due for Shingrix? YES. Patient was notified of copay/out of pocket cost.     4.  Patient is due for the following Health Maintenance Topics:   Health Maintenance Due   Topic Date Due   • IMM DTaP/Tdap/Td Vaccine (1 - Tdap) 11/29/1954   • IMM ZOSTER VACCINES (2 of 3) 11/04/2011   • Annual Wellness Visit  01/23/2016   • IMM INFLUENZA (1) 09/01/2018       - Patient has completed Immunizations: FLU and HMR Letter(s) faxed to: [facility]  - Patient already has appointment scheduled for Annual Wellness Visit (AWV).  - Patient declines Immunizations: TDAP.    5.  Reviewed/Updated the following with patient:       •   Preferred Pharmacy? YES       •   Preferred Lab? YES       •   Preferred Communication? YES       •   Allergies? YES       •   Medications? YES. Was Abstract Encounter opened and chart updated? YES       •   Social History? YES. Was Abstract Encounter opened  and chart updated? YES       •   Family History (document living status of immediate family members and if + hx of  cancer, diabetes, hypertension, hyperlipidemia, heart attack, stroke) YES. Was Abstract Encounter opened and chart updated? YES    6.  Care Team Updated:       •   DME Company (gait device, O2, CPAP, etc.): YES       •   Other Specialists (eye doctor, derm, GYN, cardiology, endo, etc): YES    7. Orders for overdue Health Maintenance topics pended in Pre-Charting? NO    8.  Patient has the following Care Path diagnoses on Problem List:  NONE    9.  Patient was advised: “This is a free wellness visit. The provider will screen for medical conditions to help you stay healthy. If you have other concerns to address you may be asked to discuss these at a separate visit or there may be an additional fee.”     10.  Patient was informed to arrive 15 min prior to their scheduled appointment and bring in their medication bottles.

## 2019-01-10 ENCOUNTER — OFFICE VISIT (OUTPATIENT)
Dept: MEDICAL GROUP | Facility: MEDICAL CENTER | Age: 84
End: 2019-01-10
Payer: MEDICARE

## 2019-01-10 VITALS
SYSTOLIC BLOOD PRESSURE: 106 MMHG | DIASTOLIC BLOOD PRESSURE: 58 MMHG | HEART RATE: 58 BPM | HEIGHT: 63 IN | BODY MASS INDEX: 28 KG/M2 | OXYGEN SATURATION: 98 % | TEMPERATURE: 97.8 F | WEIGHT: 158 LBS

## 2019-01-10 DIAGNOSIS — E78.5 DYSLIPIDEMIA: Chronic | ICD-10-CM

## 2019-01-10 DIAGNOSIS — I10 ESSENTIAL HYPERTENSION: Chronic | ICD-10-CM

## 2019-01-10 DIAGNOSIS — E03.9 HYPOTHYROIDISM, UNSPECIFIED TYPE: Chronic | ICD-10-CM

## 2019-01-10 DIAGNOSIS — M81.8 OSTEOPOROSIS, IDIOPATHIC: Chronic | ICD-10-CM

## 2019-01-10 DIAGNOSIS — Z00.00 MEDICARE ANNUAL WELLNESS VISIT, SUBSEQUENT: Primary | ICD-10-CM

## 2019-01-10 DIAGNOSIS — R94.4 DECREASED GFR: ICD-10-CM

## 2019-01-10 PROCEDURE — G0439 PPPS, SUBSEQ VISIT: HCPCS | Performed by: INTERNAL MEDICINE

## 2019-01-10 ASSESSMENT — ENCOUNTER SYMPTOMS: GENERAL WELL-BEING: GOOD

## 2019-01-10 ASSESSMENT — ACTIVITIES OF DAILY LIVING (ADL): BATHING_REQUIRES_ASSISTANCE: 0

## 2019-01-10 ASSESSMENT — PATIENT HEALTH QUESTIONNAIRE - PHQ9: CLINICAL INTERPRETATION OF PHQ2 SCORE: 0

## 2019-01-10 NOTE — PROGRESS NOTES
Chief Complaint   Patient presents with   • Annual Wellness Visit         HPI:  Mala is a 83 y.o. here for Medicare Annual Wellness Visit  Medication, allergies, medical history, surgical history, social history, family history reviewed and updated  Sees cardiology  SH , no tobacco, no etoh    Patient Active Problem List    Diagnosis Date Noted   • Pulmonary hypertension (HCC) 01/05/2019   • History of supraventricular tachycardia 02/20/2018   • S/P total hip arthroplasty 11/29/2017   • History of transient ischemic attack (TIA) 11/01/2017   • Shoulder pain 04/05/2017   • Insomnia 01/30/2017   • UTI (urinary tract infection) 11/12/2016   • Decreased GFR 04/15/2016   • S/P thoracic aortic aneurysm repair 03/29/2014   • Cervical pain 02/28/2014   • Tricuspid regurgitation    • GERD (gastroesophageal reflux disease) 07/12/2012   • History of shingles 06/30/2011   • Hypothyroid 04/08/2011   • Osteoporosis, idiopathic 08/09/2010   • Status post lumbar surgery 07/26/2010   • Hypertension 07/26/2010   • Dyslipidemia 07/26/2010   • S/P TOMY (total abdominal hysterectomy) 07/26/2010   • Preventative health care 07/26/2010   • S/P total knee arthroplasty 07/26/2010   • S/P appendectomy 07/26/2010          Cervical pain  2/10/14 [facility] note; x-ray cervical spine DJD, right shoulder steroid injection     Decreased GFR  5/31/09 bun 18,creat 1.2  1/14/10 bun 28,creat 1.3,GFR 40  9/20/12 bun 37,creat 1.1,GFR 48  9/25/13 bun 25,creat 1.2,GFR 44  9/26/14 bun 26,creat 1.1,GFR 45  2/23/15 bun 20,creat 1.1,GFR 48  4/15/16 bun 31,creat 1.1,GFR 45  7/6/16 bun 29,creat 1.1,GFR 44   5/12/17 bun 35,creat 1.1,GFR 44  11/2/17 bun 22,creat 1.28,GFR 40  12/9/17 bun 29,creat 1.1,GFR 43  10/4/18 bun 31,creat 1.5 at [facility]     Dyslipidemia  4/11 chol 159,trig 84,hdl 47,ldl 95,cpk 114  7/12 chol 163,trig 120,hdl 49,ldl 90, crp 1.1 on lipitor 20 mg  3/26/13 chol 159,trig 99,hdl 46,ldl 93 on lipitor 20 mg  9/25/13 chol 164,trig 100,hdl  47,ldl 97 on lipitor 20 mg  9/12/14 cardiology note lower extremity weakness, hold lipitor x3 months, labs ordered  12/15/14 cardiology start low dose lipitor 10 mg  1/22/15 off lipitor will resume 10 mg and repeat labs 4 weeks  2/24/15 chol 179,trig 111,hdl 54,ldl 103 on lipitor 10 mg  2/1/16 cardiology note restart lipitor 20 mg    4/15/16 chol 163,trig 102,hdl 48,ldl 95 on lipitor 20 mg  5/12/17 chol 186,trig 101,hdl 47,ldl 119 on lipitor 20 mg intermittently will start taking daily  11/1/17 chol 146,trig 73,hdl 42,ldl 89 on lipitor 20 mg  12/9/17 chol 133,trig 74,hdl 49,ldl 69 on lipitor 80 mg higher dose due to recent transient ischemic attack  10/4/18 chol 126,trig 98,hdl 44,ldl 62 on lipitor 80 mg at [facility]     Gastroesophageal reflux  6/27/07 EGD per [facility] hiatal hernia, start prevacid 30 mg  7/12 protonix 20 mg qday  11/16/12 [facility] note cont prilosec  1/5/16 change to protonix 40 mg from prilosec      History shingles    History of supraventricular tachycardia  11/17/18 cardiology note asymptomatic, continue cholesterol medication, continue to monitor echo aortic valve, repaired ascending aorta, follow-up yearly  11/21/18 echo normal LV function, EF 60%, mild LVH, grade 2 diastolic dysfunction, mild aortic insufficiency, moderate tricuspid regurgitation, RVSP 50      History TIA  11/1/17 hospital admission, 11/2/17 hospital discharge, facial numbness, transient numbness in both hands, resolved, CT, MRI head no acute infarct, blood pressure stable, discharged home, patient states she was on aspirin at the time of admission  11/1/17 CT head stable right mid brain likely chronic lacunar infarction, periventricular white matter disease  11/1/17 MRI brain no acute abnormality, moderate chronic microvascular stomach disease, chronic microhemorrhages left frontal and right parietal lobes, chronic lacunar infarct left basal ganglia and blanco, or cerebral atrophy  11/1/17 MR-MRA head without; negative  11/2/17  echo normal LV size and function, EF 70%, RVSP 50, mild AR and TR  11/28/17 awaiting possible right hip replacement per orthopedics , given recent possible TIA, cannot provide clearance, she will like second opinion from cardiology referral made to dr.bryan de leon, changed asa to plavix  12/12/17 ultrasound carotid less than 50% internal carotid stenosis bilateral  12/20/17 dr.bryan de leon cardiology note most recent echocardiogram looks fine, will repeat CT scan follow aortic repair  2/20/18 episode of supraventricular tachycardia in the emergency room, davenport catheter removed, symptoms resolved with repeat EKG sinus rhythm  11/17/18 cardiology note asymptomatic, continue cholesterol medication, continue to monitor echo aortic valve, repaired ascending aorta, follow-up yearly  11/21/18 echo normal LV function, EF 60%, mild LVH, grade 2 diastolic dysfunction, mild aortic insufficiency, moderate tricuspid regurgitation, RVSP 50     Hypertension  Sees cardiology  1/10/11 [facility] note cardiac catheterization normal systolic function  3/11 [facility] echo moderate aortic insufficiency, moderate mitral insufficiency, moderate tricuspid insufficiency, normal LV function  5/12 echo [facility] normal LV function EF 60%, moderate to severe left atrial enlargement, RVSP 32    9/26/13 CT thoracic aorta no evidence of aneurysm, small hiatal hernia  9/26/13 Persantine thallium uniform breast tissue attenuation, cannot rule out underlying ischemic, LVEF 67%  10/3/13 on toprol-XL 25 mg half a tablet daily    12/13/13 cardiology note cont same meds, repeat echo and follow up 6 months  1/2/14 echo mild LVH, grade 1 diastolic dysfunction, ascending aortic 4.0, no change compared with ZAK 2010  5/15/14 cardiology note; increase benazepril to 40 mg qday,continue toprol XL 25 mg daily  6/17/15 cardiology note continue meds, repeat echo 6 months  2/1/16 cardiology note moderate pulmonary hypertension and snoring, nocturnal pulse oximetry  ordered  6/30/16 cardiology note patient declines overnight pulse oximetry  11/1/17 echo normal LV size and function, EF 70%, mild aortic insufficiency and mild tricuspid regurgitation, RVSP 50, aortic root 3.2 cm  11/3/17 cardiology note hypertension stable, status post thoracic aortic aneurysm repair, headache unspecified, ESR ordered  11/28/17 on benazepril 40 mg,toprol 25mg, add norvasc 5 mg  12/12/17 ultrasound carotid less than 50% internal carotid stenosis bilateral  12/20/17  [facility] cardiology note most recent echocardiogram looks fine, will repeat CT scan follow aortic repair  2/20/18 episode of supraventricular tachycardia in the emergency room, davenport catheter removed, symptoms resolved with repeat EKG sinus rhythm  11/17/18 cardiology note asymptomatic, continue cholesterol medication, continue to monitor echo aortic valve, repaired ascending aorta, follow-up yearly  11/21/18 echo normal LV function, EF 60%, mild LVH, grade 2 diastolic dysfunction, mild aortic insufficiency, moderate tricuspid regurgitation, RVSP 50     Hypothyroid  4/11 tsh 9 start synthroid 50  4/11 tsh 6,free t3 3.1,free t4 1.2,tpo negative  7/1/11 tsh 2.1 cont synthroid 50 mcg  7/12 tsh 3.4 cont synthroid 50 mcg  7/31/13 tsh 3.2 on synthroid 50 mcg  9/25/13 tsh 1.7 on synthroid 50 mcg  2/24/15 tsh 4.9 on synthroid 50 mcg; increase to synthroid 75 mcg, repeat tsh in 6 weeks  4/14/15 tsh 1.1 on synthroid 75 mcg  4/15/16 tsh 2.3 on synthroid 75 mcg  5/12/17 tsh 1.2 on synthroid 75 mcg  11/1/17 tsh 2.1 on synthroid 75 mcg     Insomnia  1/30/17 trial of trazodone 50 mg  4/4/17 side effects with trazodone drowsiness, discontinued     Osteoporosis  Had been on fosamax [id number]    8/10 dexa LS -2.0,hip -1.5 await old dexa result, she will get copy for me  4/11 vit d 35  9/12 dexa LS -3.2,hip -1.4  2/13/13 reclast IV  7/31/13 vit d 33 cont 2000 units  2/18/14 reclast IV  2/2/15 dexa LS -3.1,hip -1.9; FRAX 35.5 % major,12.6%  hip  2/18/15 reclast IV  2/24/15 vit d 33  4/15/16 vit d 36  5/12/17 vit d 36  8/7/18 dexa left forearm -4.2,hip -2.5 resume reclast   8/31/18 reclast IV     Preventative health  6/27/09 colon per [facility] no records  10/19/04 pneumovax   9/9/11 zoster vaccine  4/14/15 prevnar  5/12/17 vit d 36  7/3/18 shingrix prescription provided to get at pharmacy  8/7/18 dexa left forearm -4.2,hip -2.5  8/7/18 mammogram    Pulmonary hypertension  11/17/18 cardiology note asymptomatic, continue cholesterol medication, continue to monitor echo aortic valve, repaired ascending aorta, follow-up yearly  11/21/18 echo normal LV function, EF 60%, mild LVH, grade 2 diastolic dysfunction, mild aortic insufficiency, moderate tricuspid regurgitation, RVSP 50     Shoulder pain  4/4/17 right shoulder pain right shoulder x-ray ordered, right knee pain, referral custom PT, tried celebrex no benefit, will try naproxen 500 mg twice daily and zanaflex at night  4/5/17 x-ray right shoulder calcifications consistent with calcific tendinitis or hydroxyapatite deposition  5/12/17 MRI right shoulder ordered, persistent pain despite physical therapy  5/18/17 MRI right shoulder; full-thickness supraspinatus tendon tear  5/22/17 right shoulder injection, has been going to physical therapy 14 treatments some improvement, right shoulder injection provided, if no improvement in has appt  in october, if need sooner appt try   6/9/17 custom PT note   7/10/17 custom PT note      s/p hip arthroplasty  9/21/17 MRI right hip mild trochanteric bursitis, mild femoral acetabular joint arthritis  10/12/17  orthopedic no proceed with right total hip arthroplasty, x-ray AP pelvis and right hip shows early arthrosis with calcifications and DJD  2/14/18  orthopedics operative note at [facility] right hip total arthroplasty, gluteus medius and minimus tendon repair  3/27/18 [place] [facility] note   5/8/18 [place] [facility] note xray right  hip stable total hip arthroplasty, continue physical therapy     s/p knee arthroplasty  4/10 MRI right knee complex tear medial meniscus, horizontal cleavage tear lateral meniscus, chondromalacia patella, moderate-sized baker's cyst  5/10 right meniscus knee repair   5/10 told by  had gout on surgery had pathology report, I await that report, question gout seen on pathology report done during repair of right meniscus knee surgery.  7/10 uric acid 6.3, await starting allopurinol depending on the operative report  8/5/10 reviewed orthopedics notes , operative report indicates crystal deposition, could be gout or pseudogout, no biopsy results, suspect chondrocalcinosis  10/11  ortho note, MRI pending  8/7/10 reviewed pathology report right knee tissue, marked degenerative changes with focal calcification, no mention of gout. Based on this and a normal uric acid level, I do not believe she has gout, has no hyperuricemia medications would be indicated  10/11 dr.parlasca najera left knee meniscectomy and arthroscopy  1/12 dr.parlasca najera left knee arthroplasty  2/29/16  orthopedics x-ray right knee advanced DJD on the right knee corticosteroid injection performed, prescription voltaren gel  6/13/16  orthopedic note x-rays demonstrate right knee advanced DJD and resurfaced patella, offer right knee total replacement followed by patellar resurfacing after she recovers from her right knee  9/8/16  orthopedic's operative note right knee total arthroplasty  11/2/16  orthopedic note, follow-up right knee, x-ray right knee shows subluxation patella, traumatic evulsion VMO needs reexploration and repair  11/10/16  orthopedic's operative note VMO quadriceps avulsion repair status post total knee replacement  11/23/16  orthopedic no follow-up quadriceps repair, continue crutches in full extension, follow-up one month and then start passive range  of motion 0-40°  2/4/17  orthopedic note, continue physical therapy, follow-up this summer  4/17/17 [facility] PT note  6/9/17 custom PT note     s/p lumbar surgery  6/03 L4-5 epidural     7/06  spinal surgery operative note decompression, foraminotomy. L4-L5 posterior fusion, L4-L5 allograft    3/5/14 MRI lumbar spine grade 2 spondylolisthesis L4-L5 with previous laminectomy and posterior fusion, left sided pedicle screw fixation L4-L5, moderate central canal stenosis L5-S1 secondary to facet arthropathy, mild to moderate multilevel neural foraminal narrowing  12/8/14  pain [facility] note; right lower extremity radiculitis L4-L5 lesion, myofascial lumbosacral pain, trochanteric bursitis, followup as needed  4/2/15  pain procedure note right L4-L5 and right L5-S1 SSNRB under fluoroscopy  4/27/15  pain note; right trochanteric bursal injection, trigger point injections performed, also set up SI joint injections  8/3/15 [facility] neurosurgery note, lumbar x-ray flexion and extension, MRI lumbar spine without contrast, followup   8/9/15 MRI lumbar spine, previous L4-L5 left  fusion and laminectomy, L3-L4 moderate bilateral facet arthropathy, mild disc bulge, L4-L5 severe bilateral facet arthropathy, moderate to severe bilateral neural foraminal stenosis, L5-S1 moderate facet arthropathy  8/28/15  neurosurgery note previous posterior fusion L4-L5, does have L3-L4 disc bulge with central canal stenosis, recommend L3-L5 decompression  8/31/15  pain note; right lower extremity radiculitis, myofascial lumbosacral pain, start hydrocodone 5 mg, continued SI joint exercises, perform trochanteric bursal injection right hip, trigger points lumbar region  10/28/15  neurosurgery note, will schedule for posterior L3-L4 laminectomy and fusion with redo L4-5 laminectomy and exploration of fusion, surgical clearance per cardiology   12/9/15 [place]  neurosurgery note s/p L4-S1 fusion on 11/24/15 , follow up in 4 weeks  12/23/15  neurosurgery note, clear to restart physical therapy if she would like after one month, follow-up 3 months    s/p TOMY  Sees gyn on HRT estradiol for 20 yrs ()     s/p thoracic aneurysm repair  6/29/06 CT-CTA chest with and without postprocessing; stable ascending thoracic aortic aneurysm maximum diameter 4.8, just distal to the aortic valve maximum diameter 4.3  4/9/07 CT-CTA chest with and without processing; stable ascending aortic thoracic aneurysm 4.5 x 4.6 cm  6/8/09 CT chest with; stable 4.7 ascending aorta aneurysm  10/15/09 CT chest without; dilated ascending thoracic aorta 4.9 cm aneurysm increased from 4.7  1/11/10  cardiovascular surgery operative note ascending thoracic aorta aneurysm repair  9/26/13 CT thoracic aorta evaluation; status post repair ascending thoracic aortic aneurysm without evidence of dissection or leak  1/2/14 echo mild LVH, grade 1 diastolic dysfunction, ascending aortic 4.0, no change compared with ZAK 2010  5/15/14 cardiology note  6/17/15 cardiology note cont meds,repeat echo 6 months  2/1/16 cardiology note moderate pulmonary hypertension and snoring, nocturnal pulse oximetry ordered  11/1/17 echo normal LV size and function, EF 70%, mild aortic insufficiency and mild tricuspid regurgitation, RVSP 50, aortic root 3.2 cm  11/3/17 cardiology note stable  [facility] most recent echocardiogram looks fine, will repeat CT scan follow aortic repair  11/17/18 cardiology note asymptomatic, continue cholesterol medication, continue to monitor echo aortic valve, repaired ascending aorta, follow-up yearly  11/21/18 echo normal LV function, EF 60%, mild LVH, grade 2 diastolic dysfunction, mild aortic insufficiency, moderate tricuspid regurgitation, RVSP 50    Tricuspid regurgitation  11/17/18 cardiology note asymptomatic, continue cholesterol medication,  continue to monitor echo aortic valve, repaired ascending aorta, follow-up yearly  11/21/18 echo normal LV function, EF 60%, mild LVH, grade 2 diastolic dysfunction, mild aortic insufficiency, moderate tricuspid regurgitation, RVSP 50     UTI  7/6/16 UTI klebsiella pneumoniae sensitive to all antibiotics except ampicillin, start bactrim x 5 days                 Current Outpatient Prescriptions   Medication Sig Dispense Refill   • celecoxib (CELEBREX) 200 MG Cap celecoxib 200 mg capsule     • diazepam (VALIUM) 10 MG tablet diazepam 10 mg tablet   TAKE 1 TABLET BY MOUTH EVERY 12 HOURS AS NEEDED FOR ANXIETY OR SLEEP     • sulfamethoxazole-trimethoprim (BACTRIM DS) 800-160 MG tablet sulfamethoxazole 800 mg-trimethoprim 160 mg tablet     • fluticasone (FLONASE) 50 MCG/ACT nasal spray fluticasone 50 mcg/actuation nasal spray,suspension     • metoprolol SR (TOPROL XL) 25 MG TABLET SR 24 HR metoprolol succinate ER 25 mg tablet,extended release 24 hr     • levothyroxine (SYNTHROID) 75 MCG Tab Take 1 Tab by mouth Every morning on an empty stomach. 90 Tab 0   • benazepril (LOTENSIN) 40 MG tablet Take 1 Tab by mouth every day. 90 Tab 3   • atorvastatin (LIPITOR) 80 MG tablet Take 1 Tab by mouth every evening. 90 Tab 3   • CALCIUM PO Take  by mouth.     • Bioflavonoid Products (BIOFLEX PO) Take  by mouth.     • metoprolol SR (TOPROL XL) 25 MG TABLET SR 24 HR TAKE 1/2 TABLET DAILY 45 Tab 3   • omeprazole (PRILOSEC) 20 MG delayed-release capsule Take 20 mg by mouth as needed (For heartburn).     • fluticasone (FLONASE) 50 MCG/ACT nasal spray Spray 2 Sprays in nose every day. Each Nostril 16 g 11   • magnesium gluconate (MAG-G) 500 MG tablet Take 500 mg by mouth every day.     • Multiple Vitamins-Minerals (CENTRUM SILVER ULTRA WOMENS PO) Take 1 Tab by mouth every day.     • vitamin D (CHOLECALCIFEROL) 1000 UNIT TABS Take 1,000 Units by mouth every day.     • benazepril (LOTENSIN) 40 MG tablet benazepril 40 mg tablet       No  current facility-administered medications for this visit.         Patient is taking medications as noted in medication list.  Current supplements as per medication list.     Allergies: Codeine; Trazodone; and Ultram [tramadol]    Current social contact/activities: Pt reports going out to eat with her  and breakfasts with her lady friends      Is patient current with immunizations? No, due for TDAP and SHINGRIX (Shingles). Patient is interested in receiving NONE today.    She  reports that she has never smoked. She has never used smokeless tobacco. She reports that she does not drink alcohol or use drugs.  Counseling given: Not Answered        DPA/Advanced directive: Patient does not have an Advanced Directive.  A packet and workshop information was given on Advanced Directives.    ROS:    Gait: Uses a cane    Ostomy: No    Other tubes: No    Amputations: No   Chronic oxygen use No   Last eye exam June 2018    Wears hearing aids: No    : Denies any urinary leakage during the last 6 months       Depression Screening    Little interest or pleasure in doing things?  0 - not at all  Feeling down, depressed, or hopeless? 0 - not at all  Patient Health Questionnaire Score: 0    If depressive symptoms identified deferred to follow up visit unless specifically addressed in assessment and plan.    Interpretation of PHQ-9 Total Score   Score Severity   1-4 No Depression   5-9 Mild Depression   10-14 Moderate Depression   15-19 Moderately Severe Depression   20-27 Severe Depression    Screening for Cognitive Impairment    Three Minute Recall (leader, season, table)  2/3    [place] clock face with all 12 numbers and set the hands to show 10 past 11.  Yes 5/5  If cognitive concerns identified, deferred for follow up unless specifically addressed in assessment and plan.    Fall Risk Assessment    Has the patient had two or more falls in the last year or any fall with injury in the last year?  No  If fall risk identified,  deferred for follow up unless specifically addressed in assessment and plan.    Safety Assessment    Throw rugs on floor.  No  Handrails on all stairs.  No  Good lighting in all hallways.  Yes  Difficulty hearing.  Yes  Patient counseled about all safety risks that were identified.    Functional Assessment ADLs    Are there any barriers preventing you from cooking for yourself or meeting nutritional needs?  No.    Are there any barriers preventing you from driving safely or obtaining transportation?  No.    Are there any barriers preventing you from using a telephone or calling for help?  No.    Are there any barriers preventing you from shopping?  No.    Are there any barriers preventing you from taking care of your own finances?  No.    Are there any barriers preventing you from managing your medications?  No.    Are there any barriers preventing you from showering, bathing or dressing yourself?  No.    Are you currently engaging in any exercise or physical activity?  No.     What is your perception of your health?  Good.    Health Maintenance Summary                IMM DTaP/Tdap/Td Vaccine Overdue 11/29/1954     IMM ZOSTER VACCINES Overdue 11/4/2011      Done 9/9/2011 Imm Admin: Zoster Vaccine Live (ZVL) (Zostavax)    Annual Wellness Visit Overdue 1/23/2016      Done 1/22/2015 Visit Dx: Medicare annual wellness visit, initial    IMM INFLUENZA Overdue 9/1/2018      Done 9/18/2017 Imm Admin: Influenza Vaccine Adult HD     Patient has more history with this topic...    COLONOSCOPY Next Due 6/26/2019      Previously completed 6/26/2009 [facility]     Patient has more history with this topic...    MAMMOGRAM Next Due 8/7/2019      Done 8/7/2018 MA-MAMMO SCREENING BILAT W/TOMOSYNTHESIS W/CAD     Patient has more history with this topic...    BONE DENSITY Next Due 8/7/2023      Done 8/7/2018 DS-BONE DENSITY STUDY (DEXA)     Patient has more history with this topic...          Patient Care Team:  Tee Tran M.D. as PCP -  General  Kobi Wheeler M.D. as Consulting Physician (Interventional Cardiology)  Brett Santos II, O.D. as Consulting Physician (Optometry)  Angelic Adams as Consulting Physician (Dentistry)    Social History   Substance Use Topics   • Smoking status: Never Smoker   • Smokeless tobacco: Never Used      Comment: none   • Alcohol use No     Family History   Problem Relation Age of Onset   • Stroke Mother    • Heart Disease Father    • Heart Disease Sister      She  has a past medical history of AAA (abdominal aortic aneurysm) ([facility]) (7/26/2010); Aortic insufficiency; Aortic valve disease; Aortic valve regurgitation; Arthritis; Cataract; Dental disorder; Diverticulosis; Dyslipidemia (7/26/2010); GERD (gastroesophageal reflux disease) (7/12/2012); Glaucoma; Heart burn; Heart murmur; Heart valve disease; Hiatus hernia syndrome; High cholesterol; History of shingles (6/30/2011); History of total knee arthroplasty (7/26/2010); HLD (hyperlipidemia); Hypertension; Hypothyroid (4/8/2011); Indigestion; Mitral insufficiency; OSTEOPOROSIS (8/9/2010); Pain; Preventative health care (7/26/2010); Rheumatic fever; S/P appendectomy (7/26/2010); S/P TOMY (total abdominal hysterectomy) (7/26/2010); Shingles (6/30/2011); Snoring; Status post lumbar surgery (7/26/2010); Stroke ([facility]) (1996); Thoracic aortic aneurysm without mention of rupture; Tricuspid insufficiency; and Unspecified disorder of thyroid. She also has no past medical history of CAD (coronary artery disease); COPD; or Liver disease.   Past Surgical History:   Procedure Laterality Date   • TENDON REPAIR Right 11/10/2016    Procedure: TENDON REPAIR - QUADRACEPS ;  Surgeon: Maxim Herrera M.D.;  Location: SURGERY [facility];  Service:    • KNEE ARTHROPLASTY TOTAL Right 9/8/2016    Procedure: KNEE ARTHROPLASTY TOTAL;  Surgeon: Maxim Herrera M.D.;  Location: [facility];  Service:    • LUMBAR FUSION POSTERIOR  11/24/2015    Procedure: LUMBAR FUSION  "POSTERIOR L3-4, EXPLORATION OF FUSION L4-5 WITH INSTRUMENTATION;  Surgeon: Hermann Reis M.D.;  Location: SURGERY [facility];  Service:    • LUMBAR LAMINECTOMY DISKECTOMY  11/24/2015    Procedure: LUMBAR L3-4 LAMINECTOMY AND REDO L4-5;  Surgeon: Hermann Reis M.D.;  Location: SURGERY [facility];  Service:    • KNEE ARTHROPLASTY TOTAL  1/3/2012    Performed by YOSEF MEJÍA at [facility]   • KNEE ARTHROSCOPY  10/18/2011    Performed by YOSEF MEJÍA at [facility]   • MEDIAL MENISCECTOMY  10/18/2011    Performed by YOSEF MEJÍA at [facility]   • AORTIC VALVE REPLACEMENT  1/12/2010    Performed by ISAÍAS NICOLE at [facility]   • APPENDECTOMY     • HYSTERECTOMY, TOTAL ABDOMINAL     • LUMBAR FUSION POSTERIOR             Exam:     Height 1.592 m (5' 2.68\"), weight 71.7 kg (158 lb), not currently breastfeeding. Body mass index is 28.28 kg/m².    Hearing and dentition fair  Alert, oriented in no acute distress.  Eye contact is good, speech goal directed, affect calm  Lungs clear  Cv s1s2 2/6 candi   Extremities no edema       Assessment and Plan. The following treatment and monitoring plan is recommended:    Assessment  #! Wellness assessment     #2 History of supraventricular tachycardia 11/17/18 cardiology note asymptomatic, continue cholesterol medication, continue to monitor echo aortic valve, repaired ascending aorta, follow-up yearly 11/21/18 echo normal LV function, EF 60%, mild LVH, grade 2 diastolic dysfunction, mild aortic insufficiency, moderate tricuspid regurgitation, RVSP 50      #3 History TIA  No recurrence    #4 Hypertension stable on meds lotensin and toprol 11/17/18 cardiology note asymptomatic, continue cholesterol medication, continue to monitor echo aortic valve, repaired ascending aorta, follow-up yearly 11/21/18 echo normal LV function, EF 60%, mild LVH, grade 2 diastolic dysfunction, mild aortic insufficiency, moderate " tricuspid regurgitation, RVSP 50     #5 Hypothyroid 11/1/17 tsh 2.1 on synthroid 75 mcg has been stable     #6 Decreased GFR 10/4/18 bun 31,creat 1.5 at [facility] [place] [facility]     #7 Dyslipidemia 10/4/18 chol 126,trig 98,hdl 44,ldl 62 on lipitor 80 mg at [facility] [place] [facility]     #8 Gastroesophageal reflux on prilosec as needed 1/5/16 change to protonix 40 mg from prilosec     #9 Osteoporosis 8/7/18 dexa left forearm -4.2,hip -2.5 resume reclast   8/31/18 reclast IV     #10 Pulmonary hypertension stable no sob or chest pain11/21/18 echo normal LV function, EF 60%, mild LVH, grade 2 diastolic dysfunction, mild aortic insufficiency, moderate tricuspid regurgitation, RVSP 50      #11 s/p thoracic aneurysm repair stable 11/21/18 echo normal LV function, EF 60%, mild LVH, grade 2 diastolic dysfunction, mild aortic insufficiency, moderate tricuspid regurgitation, RVSP 50    #12 Preventative health  6/27/09 colon per [facility] no records  10/19/04 pneumovax   9/9/11 zoster vaccine  4/14/15 prevnar  5/12/17 vit d 36  7/3/18 shingrix prescription provided to get at pharmacy  8/7/18 dexa left forearm -4.2,hip -2.5  8/7/18 mammogram      #13 low vit d           Services suggested:    Health Care Screening recommendations as per orders if indicated.  Referrals offered: PT/OT/Nutrition counseling/Behavioral Health/Smoking cessation as per orders if indicated.    Discussion today about general wellness and lifestyle habits:    · Prevent falls and reduce trip hazards; Cautioned about securing or removing rugs.  · Have a working fire alarm and carbon monoxide detector;   · Engage in regular physical activity and social activities       Follow-up:   Plan  #! Health maintenance reviewed and updated    #2 mammogram due 8/19    #3 dexa due 2020    #4 has had flu vaccine, pneumococcal 13, pneumococcal 23    #5 declines tdap and on shingrix wait list     #6 labs     #7  Avoid NSAIDs    #8 declines hearing test, physical therapy    #9 nutrition, diet,  exercise discussed    #10 check blood pressure periodically and record    #11 falling precautions    #12 follow-up cardiology    #13 follow-up 6 months

## 2019-01-13 ENCOUNTER — OFFICE VISIT (OUTPATIENT)
Dept: URGENT CARE | Facility: PHYSICIAN GROUP | Age: 84
End: 2019-01-13
Payer: MEDICARE

## 2019-01-13 VITALS
BODY MASS INDEX: 28.35 KG/M2 | WEIGHT: 160 LBS | TEMPERATURE: 97.8 F | OXYGEN SATURATION: 98 % | SYSTOLIC BLOOD PRESSURE: 126 MMHG | HEIGHT: 63 IN | HEART RATE: 63 BPM | RESPIRATION RATE: 18 BRPM | DIASTOLIC BLOOD PRESSURE: 60 MMHG

## 2019-01-13 DIAGNOSIS — G44.209 TENSION HEADACHE: ICD-10-CM

## 2019-01-13 DIAGNOSIS — H61.21 IMPACTED CERUMEN OF RIGHT EAR: ICD-10-CM

## 2019-01-13 DIAGNOSIS — S05.01XA ABRASION OF RIGHT CORNEA, INITIAL ENCOUNTER: ICD-10-CM

## 2019-01-13 PROCEDURE — 99214 OFFICE O/P EST MOD 30 MIN: CPT | Mod: 25 | Performed by: FAMILY MEDICINE

## 2019-01-13 PROCEDURE — 69210 REMOVE IMPACTED EAR WAX UNI: CPT | Performed by: FAMILY MEDICINE

## 2019-01-13 RX ORDER — POLYMYXIN B SULFATE AND TRIMETHOPRIM 1; 10000 MG/ML; [USP'U]/ML
1 SOLUTION OPHTHALMIC 4 TIMES DAILY
Qty: 1 BOTTLE | Refills: 0 | Status: SHIPPED | OUTPATIENT
Start: 2019-01-13 | End: 2019-01-18

## 2019-01-14 ENCOUNTER — HOSPITAL ENCOUNTER (OUTPATIENT)
Dept: RADIOLOGY | Facility: MEDICAL CENTER | Age: 84
End: 2019-01-14
Attending: FAMILY MEDICINE
Payer: MEDICARE

## 2019-01-14 DIAGNOSIS — G44.319 ACUTE POST-TRAUMATIC HEADACHE, NOT INTRACTABLE: ICD-10-CM

## 2019-01-14 PROCEDURE — 70450 CT HEAD/BRAIN W/O DYE: CPT

## 2019-01-14 NOTE — PROGRESS NOTES
Chief Complaint   Patient presents with   • Eye Pain     R sided head [id number]         Migraine   This is a new problem. The current episode started in the past 7 days. The problem occurs intermittently. The problem has been unchanged. The pain is located in the right unilateral region. The pain does not radiate. The pain quality is similar to prior headaches. The quality of the pain is described as sharp. Associated symptoms include dizziness, nausea, phonophobia and photophobia. Pertinent negatives include no abdominal pain or fever. The symptoms are aggravated by activity and bright light. Patient has tried motrin for the symptoms. The treatment provided no relief.  past medical history is significant for migraine headaches.      #2.   She feels that she has some foreign object in rt eye - c/o pain and irritation x 3 d.       #3.  C/o decreased hearing on the rt x 1 wk       Social History   Substance Use Topics   • Smoking status: Never Smoker   • Smokeless tobacco: Never Used      Comment: none   • Alcohol use No           Past Medical History:   Diagnosis Date   • AAA (abdominal aortic aneurysm) ([facility]) 7/26/2010    10/09 CT thorax dilated ascending thoracic aorta 4.9 cm 1/10 transesophageal echo dilated aortic root, and ascending aorta with mild aortic insufficiency 1/10  ascending aortic aneurysm repair 5/12 echo [facility] normal LV function EF 60%, moderate to severe left atrial enlargement, RVSP 32    • Aortic insufficiency    • Aortic valve disease    • Aortic valve regurgitation    • Arthritis     back and knee/ osteo   • Cataract    • Dental disorder     full top denture, partial bottom   • Diverticulosis    • Dyslipidemia 7/26/2010    Sees [facility] on lipitor 4/11 chol 159,trig 84,hdl 47,ldl 95,cpk 114 7/12 chol 163,trig 120,hdl 49,ldl 90, crp 1.1 on lipitor 20 mg    • GERD (gastroesophageal reflux disease) 7/12/2012    6/07 EGD per [facility] hiatal hernia, start prevacid 30 mg 7/12 protonix 20 mg qday   "  • Glaucoma    • Heart burn    • Heart murmur    • Heart valve disease     \"leaking\", mitral valve   • Hiatus hernia syndrome    • High cholesterol    • History of shingles 6/30/2011    2010 Back and left thorax     • History of total knee arthroplasty 7/26/2010    4/10 MRI right knee complex tear medial meniscus, horizontal cleavage tear lateral meniscus, chondromalacia patella, moderate-sized Baker's cyst 5/10 right meniscus knee repair  5/10 told by  had gout on surgery had pathology report, I await that report Question gout seen on pathology report done during repair of right meniscus knee surgery. 7/10 uric acid 6.3, we'll await starting allopurinol depending on the operative report 8/5/10 reviewed ortho notes , op report indicates crystal deposition, could be gout or pseudogout. No bx result sent over. Will need to get. My suspicion is chondrocalcinosis. 10/11  ortho note, MRI pending 8/7/10 reviewed pathology report right knee tissue, marked degenerative changes with focal calcification, no mention of gout. Based on this and a normal uric acid level, I do not believe she has gout, has no hyperuricemia medications would be indicated 10/11  ortho left knee meniscectomy and arthroscopy 1/12     • HLD (hyperlipidemia)    • Hypertension    • Hypothyroid 4/8/2011 4/11 tsh 9 start synthroid 50 4/11 tsh 6,free t3 3.1,free t4 1.2,tpo negative 7/1/11 tsh 2.1 cont synthroid 50 mcg 7/12 tsh 3.4 cont synthroid 50 mcg    • Indigestion    • Mitral insufficiency    • OSTEOPOROSIS 8/9/2010    Had been on fosamax [id number]  8/10 dexa LS -2.0,hip -1.5 await old dexa result, she will get copy for me 4/11 vit d 35 9/12 dexa LS -3.2,hip -1.4 2/13/13 relast infusion    • Pain     back and right leg, can get as bad as 10/10   • Preventative health care 7/26/2010 2002 pneum 2005 colon [facility] no records 4/11 vit d 35 9/11 shingles vaccine 9/12 mammogram 9/12 dexa LS " "-3.2,hip -1.4    • Rheumatic fever    • S/P appendectomy 7/26/2010   • S/P TOMY (total abdominal hysterectomy) 7/26/2010    Sees gyn on HRT estradiol for 20 yrs () 11/08 mammo     • Shingles 6/30/2011   • Snoring    • Status post lumbar surgery 7/26/2010 6/03 L4-5 epidural Dr. Jordan  7/06 overall for decompression, foraminotomy. L4-L5 posterior fusion, L4-L5 allograft      • Stroke (HCC) 1996    no residual problems    • Thoracic aortic aneurysm without mention of rupture    • Tricuspid insufficiency    • Unspecified disorder of thyroid     hypo           Review of Systems   Constitutional: Negative for fever, chills and malaise/fatigue.   Eyes: Negative for eye redness, d/c.    HENT: Negative for sore throat, nasal congestion.    Respiratory: Negative for cough and sputum production.    Cardiovascular: Negative for chest pain and palpitations.   Gastrointestinal: Negative for nausea, vomiting, abdominal pain, diarrhea and constipation.   Genitourinary: Negative for dysuria, urgency and frequency.   Ext - no leg pain or edema.   Skin: Negative for rash or  itching.   Neurological: Negative for dizziness and tingling.   Psychiatric/Behavioral: Negative for depression or anxiety   Hematologic/lymphatic - denies bruising or excessive bleeding  All other systems reviewed and are negative.         Objective:     Blood pressure 126/60, pulse 63, temperature 36.6 °C (97.8 °F), temperature source Temporal, resp. rate 18, height 1.592 m (5' 2.68\"), weight 72.6 kg (160 lb), SpO2 98 %, not currently breastfeeding.    Physical Exam   Constitutional: pt is oriented to person, place, and time.  Pt appears well-developed and well-nourished. No distress.   HENT:   Rt ear - cerumen impaction  Head: Normocephalic and atraumatic.   Mouth/Throat: Oropharynx is clear and moist. No oropharyngeal exudate.   Eyes: EOMI , PERRLA.  Rt eye - No foreign bodies noted.   flourecin exam shows small corneal abrasion at lower " outer quadrant.      No scleral icterus.   Neck: Neck supple.   Cardiovascular: Normal rate and regular rhythm.    Pulmonary/Chest: Effort normal.   Lymphadenopathy:     Pt has no cervical adenopathy.   Neurologic: Alert and oriented. Cranial nerves II-XII intact, EOMs intact, no tongue deviation, PERRL, no facial asymmetry to motor or sensation, symmetric palate, normal finger-to-nose test, no pronator drift. No focal motor deficits. Symmetric reflexes. Normal station and gait, normal tandem walk. Coordination normal.   Skin: Skin is warm. Pt is not diaphoretic. No erythema. No pallor.   Psychiatric:  behavior is normal.   Nursing note and vitals reviewed.           1/14/2019 10:56 AM    HISTORY/REASON FOR EXAM: Right-sided headache    TECHNIQUE/EXAM DESCRIPTION AND NUMBER OF VIEWS:  CT of the head without contrast.    The study was performed on a helical multidetector CT scanner. Contiguous 5 mm axial sections were obtained from the skull base through the vertex.    Up to date radiation dose reduction adjustments have been utilized to meet ALARA standards for radiation dose reduction.    COMPARISON:  MRI brain and CT head 11/1/2017    FINDINGS:  The calvaria are normal in appearance.    There are white matter changes that could be from previous small vessel ischemia, demyelination, or gliosis.    There is cerebral volume loss.    The ventricular system is normal in size and position.    There is no hemorrhage or evidence for acute infarct.    There are no extra-axial fluid collection.    The visualized paranasal sinuses are clear.    The mastoid air cells and middle ear are clear.    There are ocular changes that are consistent with previous cataract surgery.   Impression       1.  No acute intracranial abnormality    2.  Underlying cerebral volume loss and white matter change   Reading Provider Reading Date   Cristiano Castillo M.D. Jan 14, 2019   Signing Provider Signing Date Signing Time   Cristiano Castillo M.D. Jan  14, 2019 11:08 AM          Assessment/Plan:         1. Impacted cerumen of right ear  ear canal was impacted with cerumen. Ear lavage was performed with normal saline, afterward impacted cerumen was removed with speculum. Subsequent to this, tympanic membrane was visualized.       2. Abrasion of right cornea, initial encounter     - polymixin-trimethoprim (POLYTRIM) 23893-2.1 UNIT/ML-% Solution; Place 1 Drop in right eye 4 times a day.  Dispense: 1 Bottle; Refill: 0    3.  Headaches  CT was unremarkable.   otc gary, prn

## 2019-01-16 ENCOUNTER — HOSPITAL ENCOUNTER (OUTPATIENT)
Dept: LAB | Facility: MEDICAL CENTER | Age: 84
End: 2019-01-16
Attending: INTERNAL MEDICINE
Payer: MEDICARE

## 2019-01-16 DIAGNOSIS — I10 ESSENTIAL HYPERTENSION: Chronic | ICD-10-CM

## 2019-01-16 DIAGNOSIS — E78.5 DYSLIPIDEMIA: Chronic | ICD-10-CM

## 2019-01-16 DIAGNOSIS — R94.4 DECREASED GFR: ICD-10-CM

## 2019-01-16 DIAGNOSIS — M81.8 OSTEOPOROSIS, IDIOPATHIC: Chronic | ICD-10-CM

## 2019-01-16 LAB
25(OH)D3 SERPL-MCNC: 27 NG/ML (ref 30–100)
ALBUMIN SERPL BCP-MCNC: 4.2 G/DL (ref 3.2–4.9)
ALBUMIN/GLOB SERPL: 1.3 G/DL
ALP SERPL-CCNC: 55 U/L (ref 30–99)
ALT SERPL-CCNC: 17 U/L (ref 2–50)
ANION GAP SERPL CALC-SCNC: 9 MMOL/L (ref 0–11.9)
APPEARANCE UR: ABNORMAL
AST SERPL-CCNC: 22 U/L (ref 12–45)
BACTERIA #/AREA URNS HPF: ABNORMAL /HPF
BASOPHILS # BLD AUTO: 1 % (ref 0–1.8)
BASOPHILS # BLD: 0.04 K/UL (ref 0–0.12)
BILIRUB SERPL-MCNC: 0.6 MG/DL (ref 0.1–1.5)
BILIRUB UR QL STRIP.AUTO: NEGATIVE
BUN SERPL-MCNC: 33 MG/DL (ref 8–22)
CALCIUM SERPL-MCNC: 9.7 MG/DL (ref 8.5–10.5)
CHLORIDE SERPL-SCNC: 107 MMOL/L (ref 96–112)
CHOLEST SERPL-MCNC: 161 MG/DL (ref 100–199)
CO2 SERPL-SCNC: 23 MMOL/L (ref 20–33)
COLOR UR: YELLOW
CREAT SERPL-MCNC: 1.34 MG/DL (ref 0.5–1.4)
EOSINOPHIL # BLD AUTO: 0.12 K/UL (ref 0–0.51)
EOSINOPHIL NFR BLD: 3 % (ref 0–6.9)
EPI CELLS #/AREA URNS HPF: NEGATIVE /HPF
ERYTHROCYTE [DISTWIDTH] IN BLOOD BY AUTOMATED COUNT: 45.1 FL (ref 35.9–50)
FASTING STATUS PATIENT QL REPORTED: NORMAL
GLOBULIN SER CALC-MCNC: 3.2 G/DL (ref 1.9–3.5)
GLUCOSE SERPL-MCNC: 96 MG/DL (ref 65–99)
GLUCOSE UR STRIP.AUTO-MCNC: NEGATIVE MG/DL
HCT VFR BLD AUTO: 38.5 % (ref 37–47)
HDLC SERPL-MCNC: 45 MG/DL
HGB BLD-MCNC: 12.8 G/DL (ref 12–16)
HYALINE CASTS #/AREA URNS LPF: ABNORMAL /LPF
IMM GRANULOCYTES # BLD AUTO: 0.01 K/UL (ref 0–0.11)
IMM GRANULOCYTES NFR BLD AUTO: 0.3 % (ref 0–0.9)
KETONES UR STRIP.AUTO-MCNC: NEGATIVE MG/DL
LDLC SERPL CALC-MCNC: 95 MG/DL
LEUKOCYTE ESTERASE UR QL STRIP.AUTO: ABNORMAL
LYMPHOCYTES # BLD AUTO: 1.6 K/UL (ref 1–4.8)
LYMPHOCYTES NFR BLD: 40.4 % (ref 22–41)
MCH RBC QN AUTO: 29.8 PG (ref 27–33)
MCHC RBC AUTO-ENTMCNC: 33.2 G/DL (ref 33.6–35)
MCV RBC AUTO: 89.7 FL (ref 81.4–97.8)
MICRO URNS: ABNORMAL
MONOCYTES # BLD AUTO: 0.45 K/UL (ref 0–0.85)
MONOCYTES NFR BLD AUTO: 11.4 % (ref 0–13.4)
NEUTROPHILS # BLD AUTO: 1.74 K/UL (ref 2–7.15)
NEUTROPHILS NFR BLD: 43.9 % (ref 44–72)
NITRITE UR QL STRIP.AUTO: NEGATIVE
NRBC # BLD AUTO: 0 K/UL
NRBC BLD-RTO: 0 /100 WBC
PH UR STRIP.AUTO: 5.5 [PH]
PHOSPHATE SERPL-MCNC: 3.6 MG/DL (ref 2.5–4.5)
PLATELET # BLD AUTO: 192 K/UL (ref 164–446)
PMV BLD AUTO: 9.5 FL (ref 9–12.9)
POTASSIUM SERPL-SCNC: 4.3 MMOL/L (ref 3.6–5.5)
PROT SERPL-MCNC: 7.4 G/DL (ref 6–8.2)
PROT UR QL STRIP: NEGATIVE MG/DL
PTH-INTACT SERPL-MCNC: 53.3 PG/ML (ref 14–72)
RBC # BLD AUTO: 4.29 M/UL (ref 4.2–5.4)
RBC # URNS HPF: ABNORMAL /HPF
RBC UR QL AUTO: NEGATIVE
SODIUM SERPL-SCNC: 139 MMOL/L (ref 135–145)
SP GR UR STRIP.AUTO: 1.01
TRIGL SERPL-MCNC: 103 MG/DL (ref 0–149)
TSH SERPL DL<=0.005 MIU/L-ACNC: 2.28 UIU/ML (ref 0.38–5.33)
UROBILINOGEN UR STRIP.AUTO-MCNC: 0.2 MG/DL
WBC # BLD AUTO: 4 K/UL (ref 4.8–10.8)
WBC #/AREA URNS HPF: ABNORMAL /HPF

## 2019-01-16 PROCEDURE — 85025 COMPLETE CBC W/AUTO DIFF WBC: CPT

## 2019-01-16 PROCEDURE — 81001 URINALYSIS AUTO W/SCOPE: CPT

## 2019-01-16 PROCEDURE — 87077 CULTURE AEROBIC IDENTIFY: CPT

## 2019-01-16 PROCEDURE — 83970 ASSAY OF PARATHORMONE: CPT

## 2019-01-16 PROCEDURE — 87086 URINE CULTURE/COLONY COUNT: CPT

## 2019-01-16 PROCEDURE — 82570 ASSAY OF URINE CREATININE: CPT

## 2019-01-16 PROCEDURE — 87186 SC STD MICRODIL/AGAR DIL: CPT

## 2019-01-16 PROCEDURE — 82306 VITAMIN D 25 HYDROXY: CPT

## 2019-01-16 PROCEDURE — 84100 ASSAY OF PHOSPHORUS: CPT

## 2019-01-16 PROCEDURE — 84160 ASSAY OF PROTEIN ANY SOURCE: CPT

## 2019-01-16 PROCEDURE — 84165 PROTEIN E-PHORESIS SERUM: CPT

## 2019-01-16 PROCEDURE — 80053 COMPREHEN METABOLIC PANEL: CPT

## 2019-01-16 PROCEDURE — 82043 UR ALBUMIN QUANTITATIVE: CPT

## 2019-01-16 PROCEDURE — 36415 COLL VENOUS BLD VENIPUNCTURE: CPT

## 2019-01-16 PROCEDURE — 84443 ASSAY THYROID STIM HORMONE: CPT

## 2019-01-16 PROCEDURE — 80061 LIPID PANEL: CPT

## 2019-01-17 LAB
CREAT UR-MCNC: 102.8 MG/DL
MICROALBUMIN UR-MCNC: 4.6 MG/DL
MICROALBUMIN/CREAT UR: 45 MG/G (ref 0–30)

## 2019-01-18 ENCOUNTER — TELEPHONE (OUTPATIENT)
Dept: MEDICAL GROUP | Facility: MEDICAL CENTER | Age: 84
End: 2019-01-18

## 2019-01-18 DIAGNOSIS — N30.00 ACUTE CYSTITIS WITHOUT HEMATURIA: ICD-10-CM

## 2019-01-18 LAB
BACTERIA UR CULT: ABNORMAL
BACTERIA UR CULT: ABNORMAL
SIGNIFICANT IND 70042: ABNORMAL
SITE SITE: ABNORMAL
SOURCE SOURCE: ABNORMAL

## 2019-01-18 RX ORDER — CEFUROXIME AXETIL 250 MG/1
250 TABLET ORAL 2 TIMES DAILY
Qty: 14 TAB | Refills: 0 | Status: SHIPPED | OUTPATIENT
Start: 2019-01-18 | End: 2019-02-21

## 2019-01-18 NOTE — TELEPHONE ENCOUNTER
Notified with labs, UTI sensitive to cefuroxime, no penicillin allergies, start 250 twice daily times 7 days, has had penicillin previously no reaction, medication can cause rash, nausea, patient currently has no burning with urination, no abdominal pain, nausea, vomiting, flank pain, fevers, chills, blood in her urine, occasional urinary frequency.  Any worsening symptoms to call us or side effects otherwise complete course of antibiotics.  CKD stable, no NSAIDs, continue medication thyroid, statin, increase vitamin D to 4000 units daily  Avoid NSAIDs

## 2019-01-19 LAB
ALBUMIN SERPL-MCNC: 4.41 G/DL (ref 3.75–5.01)
ALPHA1 GLOB SERPL ELPH-MCNC: 0.33 G/DL (ref 0.19–0.46)
ALPHA2 GLOB SERPL ELPH-MCNC: 0.84 G/DL (ref 0.48–1.05)
B-GLOBULIN SERPL ELPH-MCNC: 0.79 G/DL (ref 0.48–1.1)
EER PROT ELECT SER Q1092: NORMAL
GAMMA GLOB SERPL ELPH-MCNC: 1.03 G/DL (ref 0.62–1.51)
INTERPRETATION SERPL IFE-IMP: NORMAL
PROT SERPL-MCNC: 7.4 G/DL (ref 6–8.3)

## 2019-02-18 ENCOUNTER — PATIENT MESSAGE (OUTPATIENT)
Dept: MEDICAL GROUP | Facility: MEDICAL CENTER | Age: 84
End: 2019-02-18

## 2019-02-18 DIAGNOSIS — Z86.79 S/P THORACIC AORTIC ANEURYSM REPAIR: Chronic | ICD-10-CM

## 2019-02-18 DIAGNOSIS — Z98.890 S/P THORACIC AORTIC ANEURYSM REPAIR: Chronic | ICD-10-CM

## 2019-02-18 DIAGNOSIS — I35.1 AORTIC VALVE REGURGITATION, ACQUIRED: ICD-10-CM

## 2019-02-18 DIAGNOSIS — I10 ESSENTIAL HYPERTENSION: Chronic | ICD-10-CM

## 2019-02-19 RX ORDER — BENAZEPRIL HYDROCHLORIDE 40 MG/1
40 TABLET ORAL DAILY
Qty: 90 TAB | Refills: 3 | Status: SHIPPED | OUTPATIENT
Start: 2019-02-19 | End: 2020-01-31

## 2019-02-19 NOTE — PATIENT COMMUNICATION
Change to mail order pharmacy   Was the patient seen in the last year in this department? Yes    Does patient have an active prescription for medications requested? No     Received Request Via: Patient

## 2019-02-21 ENCOUNTER — OFFICE VISIT (OUTPATIENT)
Dept: MEDICAL GROUP | Facility: MEDICAL CENTER | Age: 84
End: 2019-02-21
Payer: MEDICARE

## 2019-02-21 VITALS
BODY MASS INDEX: 29.44 KG/M2 | OXYGEN SATURATION: 97 % | HEIGHT: 62 IN | TEMPERATURE: 98.5 F | HEART RATE: 70 BPM | WEIGHT: 160 LBS | SYSTOLIC BLOOD PRESSURE: 126 MMHG | DIASTOLIC BLOOD PRESSURE: 72 MMHG

## 2019-02-21 DIAGNOSIS — I10 ESSENTIAL HYPERTENSION: Chronic | ICD-10-CM

## 2019-02-21 DIAGNOSIS — J40 BRONCHITIS: ICD-10-CM

## 2019-02-21 PROCEDURE — 99213 OFFICE O/P EST LOW 20 MIN: CPT | Performed by: INTERNAL MEDICINE

## 2019-02-21 RX ORDER — DOXYCYCLINE HYCLATE 100 MG
100 TABLET ORAL 2 TIMES DAILY
Qty: 20 TAB | Refills: 0 | Status: SHIPPED | OUTPATIENT
Start: 2019-02-21 | End: 2019-03-22

## 2019-02-21 NOTE — PROGRESS NOTES
Subjective:      Yvonne Marie Wada is a 83 y.o. female who presents with Sinus Problem (x2days )            HPI   Patient complains of 3-4 days of cough and sinus congestion, postnasal drip left ear pressure no dizziness, no hearing loss, no tinnitus.  No shortness of breath, no chest pain, no hemoptysis, no fevers or chills.  Some fatigue.  Cough is worse with activity.  No tobacco, asthma, COPD  Last month completed ceftin for head cold, but symptoms did not fully resolve.  Blood pressure at home 125-130 remains on Toprol, Lotensin.  No lightheadedness or dizziness.  No nausea, vomiting, diarrhea      Current Outpatient Prescriptions   Medication Sig Dispense Refill   • benazepril (LOTENSIN) 40 MG tablet Take 1 Tab by mouth every day. 90 Tab 3   • cefUROXime (CEFTIN) 250 MG Tab Take 1 Tab by mouth 2 times a day. 14 Tab 0   • fluticasone (FLONASE) 50 MCG/ACT nasal spray fluticasone 50 mcg/actuation nasal spray,suspension     • metoprolol SR (TOPROL XL) 25 MG TABLET SR 24 HR metoprolol succinate ER 25 mg tablet,extended release 24 hr     • levothyroxine (SYNTHROID) 75 MCG Tab Take 1 Tab by mouth Every morning on an empty stomach. 90 Tab 0   • atorvastatin (LIPITOR) 80 MG tablet Take 1 Tab by mouth every evening. 90 Tab 3   • CALCIUM PO Take  by mouth.     • Bioflavonoid Products (BIOFLEX PO) Take  by mouth.     • omeprazole (PRILOSEC) 20 MG delayed-release capsule Take 20 mg by mouth as needed (For heartburn).     • magnesium gluconate (MAG-G) 500 MG tablet Take 500 mg by mouth every day.     • Multiple Vitamins-Minerals (CENTRUM SILVER ULTRA WOMENS PO) Take 1 Tab by mouth every day.     • vitamin D (CHOLECALCIFEROL) 1000 UNIT TABS Take 1,000 Units by mouth every day.       No current facility-administered medications for this visit.      Patient Active Problem List   Diagnosis   • Status post lumbar surgery   • Hypertension   • Dyslipidemia   • S/P TOMY (total abdominal hysterectomy)   • Preventative health care   •  "S/P total knee arthroplasty   • S/P appendectomy   • Osteoporosis, idiopathic   • Hypothyroid   • History of shingles   • GERD (gastroesophageal reflux disease)   • Tricuspid regurgitation   • Cervical pain   • S/P thoracic aortic aneurysm repair   • Decreased GFR   • UTI (urinary tract infection)   • Insomnia   • Shoulder pain   • History of transient ischemic attack (TIA)   • S/P total hip arthroplasty   • History of supraventricular tachycardia   • Pulmonary hypertension (HCC)        Health Maintenance Summary                IMM DTaP/Tdap/Td Vaccine Overdue 11/29/1954     IMM ZOSTER VACCINES Overdue 11/4/2011      Done 9/9/2011 Imm Admin: Zoster Vaccine Live (ZVL) (Zostavax)    IMM INFLUENZA Overdue 9/1/2018      Done 9/18/2017 Imm Admin: Influenza Vaccine Adult HD     Patient has more history with this topic...    COLONOSCOPY Next Due 6/26/2019      Previously completed 6/26/2009 [facility]     Patient has more history with this topic...    MAMMOGRAM Next Due 8/7/2019      Done 8/7/2018 MA-MAMMO SCREENING BILAT W/TOMOSYNTHESIS W/CAD     Patient has more history with this topic...    Annual Wellness Visit Next Due 1/11/2020      Done 1/10/2019 Visit Dx: Medicare annual wellness visit, subsequent     Patient has more history with this topic...    BONE DENSITY Next Due 8/7/2023      Done 8/7/2018 DS-BONE DENSITY STUDY (DEXA)     Patient has more history with this topic...          Patient Care Team:  Tee Tran M.D. as PCP - General Kobi Wheeler M.D. as Consulting Physician (Interventional Cardiology)  Brett Santos II, O.D. as Consulting Physician (Optometry)  Angelic Adams as Consulting Physician (Dentistry)      ROS       Objective:     /72 (BP Location: Right arm, Patient Position: Sitting, BP Cuff Size: Adult)   Pulse 70   Temp 36.9 °C (98.5 °F) (Temporal)   Ht 1.575 m (5' 2\")   Wt 72.6 kg (160 lb)   SpO2 97%   BMI 29.26 kg/m²      Physical Exam   Constitutional: She appears well-developed " and well-nourished. No distress.   HENT:   Head: Normocephalic and atraumatic.   Right Ear: External ear normal.   Left Ear: External ear normal.   Mouth/Throat: Oropharynx is clear and moist.   Eyes: Conjunctivae are normal. Right eye exhibits no discharge. Left eye exhibits no discharge.   Neck: Neck supple. No JVD present. No thyromegaly present.   Cardiovascular: Normal rate, regular rhythm and normal heart sounds.    Pulmonary/Chest: Effort normal and breath sounds normal. No respiratory distress. She has no wheezes.   Abdominal: She exhibits no distension.   Neurological: She is alert.   Skin: Skin is warm. She is not diaphoretic.   Psychiatric: She has a normal mood and affect. Her behavior is normal.   Nursing note and vitals reviewed.              Assessment/Plan:        Assessment  #1 bronchitis, recently treated last month with Ceftin, but not fully resolved, possible chronic bronchitis or sinusitis, lungs are clear, afebrile, normal saturation on room air, no tobacco, asthma, COPD    #2 hypertension stable    #3 history of SVT no recurrence      Plan  #1 since she recently had Ceftin, will change to doxycycline 100 mg twice daily times 10 days can cause nausea, vomiting, diarrhea, rash    #2 no need for chest x-ray at this time    #3 continue Flonase over-the-counter, trial of Allegra as Zyrtec makes her drowsy    #4 Ensure adequate hydration, check blood pressure regularly and record    #5 doxycycline can cause side effects, notify us if that occurs, or symptoms persist or worsen, ER precautions

## 2019-03-12 ENCOUNTER — OFFICE VISIT (OUTPATIENT)
Dept: MEDICAL GROUP | Facility: MEDICAL CENTER | Age: 84
End: 2019-03-12
Payer: MEDICARE

## 2019-03-12 VITALS
WEIGHT: 158 LBS | HEIGHT: 62 IN | BODY MASS INDEX: 29.08 KG/M2 | RESPIRATION RATE: 16 BRPM | TEMPERATURE: 96.8 F | DIASTOLIC BLOOD PRESSURE: 70 MMHG | OXYGEN SATURATION: 97 % | SYSTOLIC BLOOD PRESSURE: 130 MMHG | HEART RATE: 59 BPM

## 2019-03-12 DIAGNOSIS — E03.9 HYPOTHYROIDISM, UNSPECIFIED TYPE: ICD-10-CM

## 2019-03-12 DIAGNOSIS — R07.89 RIGHT-SIDED CHEST WALL PAIN: ICD-10-CM

## 2019-03-12 PROCEDURE — 99213 OFFICE O/P EST LOW 20 MIN: CPT | Performed by: NURSE PRACTITIONER

## 2019-03-12 RX ORDER — LEVOTHYROXINE SODIUM 0.07 MG/1
75 TABLET ORAL
Qty: 90 TAB | Refills: 3 | Status: SHIPPED | OUTPATIENT
Start: 2019-03-12 | End: 2020-01-31

## 2019-03-12 NOTE — PROGRESS NOTES
"Subjective:     Chief Complaint   Patient presents with   • Rib Pain     RIGHT RIB PAIN, GETTING BETTER, X 1 WEEK      Yvonne Marie Wada is a 83 y.o. female here today to follow up on:    Hypothyroid  Chronic issue managed with Synthroid 75 mcg daily, needing refill of medication today.  Labs from January reviewed  Energy and weight are stable.  Denies constipation, diarrhea, heat or cold intolerance    Right-sided chest wall pain  She reports a few episodes of right-sided chest wall pain over the last week, tends to come on suddenly and described as sharp\" with radiation around the right rib cage.  Exacerbated by coughing, deep breathing, certain movements.  Overall she feels that this is starting to improve, she is having less frequent episodes.  She states that she considered canceling her appointment today.  She does report that her allergies have been causing her to cough quite a bit, she started Allegra which does seem to be helping.  No shortness of breath, sputum production, point tenderness over the chest wall, abdominal pain, nausea, heartburn, injury       Current medicines (including changes today)  Current Outpatient Prescriptions   Medication Sig Dispense Refill   • levothyroxine (SYNTHROID) 75 MCG Tab Take 1 Tab by mouth Every morning on an empty stomach. 90 Tab 3   • doxycycline (VIBRAMYCIN) 100 MG Tab Take 1 Tab by mouth 2 times a day. 20 Tab 0   • benazepril (LOTENSIN) 40 MG tablet Take 1 Tab by mouth every day. 90 Tab 3   • fluticasone (FLONASE) 50 MCG/ACT nasal spray fluticasone 50 mcg/actuation nasal spray,suspension     • metoprolol SR (TOPROL XL) 25 MG TABLET SR 24 HR metoprolol succinate ER 25 mg tablet,extended release 24 hr     • atorvastatin (LIPITOR) 80 MG tablet Take 1 Tab by mouth every evening. 90 Tab 3   • CALCIUM PO Take  by mouth.     • Bioflavonoid Products (BIOFLEX PO) Take  by mouth.     • omeprazole (PRILOSEC) 20 MG delayed-release capsule Take 20 mg by mouth as needed (For " "heartburn).     • magnesium gluconate (MAG-G) 500 MG tablet Take 500 mg by mouth every day.     • Multiple Vitamins-Minerals (CENTRUM SILVER ULTRA WOMENS PO) Take 1 Tab by mouth every day.     • vitamin D (CHOLECALCIFEROL) 1000 UNIT TABS Take 1,000 Units by mouth every day.       No current facility-administered medications for this visit.      She  has a past medical history of AAA (abdominal aortic aneurysm) ([facility]) (7/26/2010); Aortic insufficiency; Aortic valve disease; Aortic valve regurgitation; Arthritis; Cataract; Dental disorder; Diverticulosis; Dyslipidemia (7/26/2010); GERD (gastroesophageal reflux disease) (7/12/2012); Glaucoma; Heart burn; Heart murmur; Heart valve disease; Hiatus hernia syndrome; High cholesterol; History of shingles (6/30/2011); History of total knee arthroplasty (7/26/2010); HLD (hyperlipidemia); Hypertension; Hypothyroid (4/8/2011); Indigestion; Mitral insufficiency; OSTEOPOROSIS (8/9/2010); Pain; Preventative health care (7/26/2010); Rheumatic fever; S/P appendectomy (7/26/2010); S/P TOMY (total abdominal hysterectomy) (7/26/2010); Shingles (6/30/2011); Snoring; Status post lumbar surgery (7/26/2010); Stroke ([facility]) (1996); Thoracic aortic aneurysm without mention of rupture; Tricuspid insufficiency; and Unspecified disorder of thyroid. She also has no past medical history of CAD (coronary artery disease); COPD; or Liver disease.    ROS included above     Objective:     Blood pressure 130/70, pulse (!) 59, temperature 36 °C (96.8 °F), temperature source Temporal, resp. rate 16, height 1.575 m (5' 2\"), weight 71.7 kg (158 lb), SpO2 97 %, not currently breastfeeding. Body mass index is 28.9 kg/m².     Physical Exam:  General: Alert, oriented in no acute distress.  Eye contact is good, speech is normal, affect calm  Lungs: clear to auscultation bilaterally, good aeration, normal effort, no wheeze/ rhonchi/ rales.  CV: regular rate and rhythm, S1, S2, murmur present   abdomen: soft, " nontender, nondistended  MS: No point tenderness over the anterior posterior chest wall, no deformity  Ext: no edema, color normal, vascularity normal, temperature normal    Assessment and Plan:   The following treatment plan was discussed   1. Hypothyroidism, unspecified type   stable, continue current medication  levothyroxine (SYNTHROID) 75 MCG Tab   2. Right-sided chest wall pain   intermittent sharp right-sided pain, improving at this point.  No point tenderness to suggest intercostal chondritis.  Likely muscular.  She will follow-up if this does not continue to resolve       Followup: As needed         Please note that this dictation was created using voice recognition software. I have worked with consultants from the vendor as well as technical experts from [facility] [employer] to optimize the interface. I have made every reasonable attempt to correct obvious errors, but I expect that there are errors of grammar and possibly content that I did not discover before finalizing the note.

## 2019-03-12 NOTE — ASSESSMENT & PLAN NOTE
"She reports a few episodes of right-sided chest wall pain over the last week, tends to come on suddenly and described as sharp\" with radiation around the right rib cage.  Exacerbated by coughing, deep breathing, certain movements.  Overall she feels that this is starting to improve, she is having less frequent episodes.  She states that she considered canceling her appointment today.  She does report that her allergies have been causing her to cough quite a bit, she started Allegra which does seem to be helping.  No shortness of breath, sputum production, point tenderness over the chest wall, abdominal pain, nausea, heartburn, injury  "

## 2019-03-12 NOTE — ASSESSMENT & PLAN NOTE
Chronic issue managed with Synthroid 75 mcg daily, needing refill of medication today.  Labs from January reviewed  Energy and weight are stable.  Denies constipation, diarrhea, heat or cold intolerance

## 2019-03-14 ENCOUNTER — TELEPHONE (OUTPATIENT)
Dept: SCHEDULING | Facility: IMAGING CENTER | Age: 84
End: 2019-03-14

## 2019-03-22 ENCOUNTER — OFFICE VISIT (OUTPATIENT)
Dept: URGENT CARE | Facility: PHYSICIAN GROUP | Age: 84
End: 2019-03-22
Payer: MEDICARE

## 2019-03-22 VITALS
OXYGEN SATURATION: 97 % | WEIGHT: 158 LBS | BODY MASS INDEX: 29.08 KG/M2 | TEMPERATURE: 98.7 F | HEIGHT: 62 IN | SYSTOLIC BLOOD PRESSURE: 138 MMHG | DIASTOLIC BLOOD PRESSURE: 78 MMHG | HEART RATE: 63 BPM

## 2019-03-22 DIAGNOSIS — J01.01 ACUTE RECURRENT MAXILLARY SINUSITIS: ICD-10-CM

## 2019-03-22 PROCEDURE — 99214 OFFICE O/P EST MOD 30 MIN: CPT | Performed by: FAMILY MEDICINE

## 2019-03-22 RX ORDER — CETIRIZINE HYDROCHLORIDE 10 MG/1
10 TABLET ORAL DAILY
COMMUNITY
End: 2020-07-16

## 2019-03-22 RX ORDER — CEFIXIME 400 MG/1
1 CAPSULE ORAL DAILY
Qty: 10 CAP | Refills: 0 | Status: SHIPPED | OUTPATIENT
Start: 2019-03-22 | End: 2019-05-25

## 2019-03-22 RX ORDER — CEFPODOXIME PROXETIL 200 MG/1
200 TABLET, FILM COATED ORAL 2 TIMES DAILY
Qty: 20 TAB | Refills: 0 | Status: SHIPPED | OUTPATIENT
Start: 2019-03-22 | End: 2019-03-22

## 2019-03-22 RX ORDER — CLINDAMYCIN HYDROCHLORIDE 150 MG/1
150 CAPSULE ORAL 3 TIMES DAILY
Qty: 30 CAP | Refills: 0 | Status: SHIPPED | OUTPATIENT
Start: 2019-03-22 | End: 2019-04-01

## 2019-03-22 ASSESSMENT — ENCOUNTER SYMPTOMS: SINUS PRESSURE: 1

## 2019-03-22 NOTE — PATIENT INSTRUCTIONS

## 2019-03-22 NOTE — PROGRESS NOTES
"Subjective:   Yvonne Marie Wadais a 83 y.o. female who presents for Sinus Problem (x 3 months)    Sinus Problem   This is a new problem. The current episode started 1 to 4 weeks ago. The problem has been rapidly worsening since onset. There has been no fever. The pain is severe. Associated symptoms include congestion and sinus pressure.     Review of Systems   HENT: Positive for congestion and sinus pressure.      Allergies   Allergen Reactions   • Amoxicillin    • Codeine Nausea     Synthetic codones ok   • Doxycycline    • Trazodone Vomiting     groggy   • Ultram [Tramadol] Vomiting     nausea      Objective:   /78   Pulse 63   Temp 37.1 °C (98.7 °F)   Ht 1.575 m (5' 2\")   Wt 71.7 kg (158 lb)   SpO2 97%   BMI 28.90 kg/m²   Physical Exam   Constitutional: She is oriented to person, place, and time. She appears well-developed and well-nourished. No distress.   HENT:   Head: Normocephalic and atraumatic.   Nose: Mucosal edema and rhinorrhea present. Right sinus exhibits maxillary sinus tenderness. Left sinus exhibits maxillary sinus tenderness.   Eyes: Pupils are equal, round, and reactive to light. Conjunctivae and EOM are normal.   Cardiovascular: Normal rate and regular rhythm.    No murmur heard.  Pulmonary/Chest: Effort normal and breath sounds normal. No respiratory distress.   Abdominal: Soft. She exhibits no distension. There is no tenderness.   Neurological: She is alert and oriented to person, place, and time. She has normal reflexes. No sensory deficit.   Skin: Skin is warm and dry.   Psychiatric: She has a normal mood and affect.     Assessment/Plan:   Assessment    1. Acute recurrent maxillary sinusitis  - cefpodoxime (VANTIN) 200 MG Tab; Take 1 Tab by mouth 2 times a day.  Dispense: 20 Tab; Refill: 0  - clindamycin (CLEOCIN) 150 MG Cap; Take 1 Cap by mouth 3 times a day for 10 days.  Dispense: 30 Cap; Refill: 0    Other orders  - fexofenadine (ALLEGRA) 60 MG Tab; Take 60 mg by mouth every " day.  - cetirizine (ZYRTEC) 10 MG Tab; Take 10 mg by mouth every day.      Differential diagnosis, natural history, supportive care, and indications for immediate follow-up discussed.  Return if symptoms worsen or fail to improve.

## 2019-03-26 ENCOUNTER — APPOINTMENT (OUTPATIENT)
Dept: RADIOLOGY | Facility: MEDICAL CENTER | Age: 84
End: 2019-03-26
Attending: EMERGENCY MEDICINE
Payer: MEDICARE

## 2019-03-26 ENCOUNTER — HOSPITAL ENCOUNTER (EMERGENCY)
Facility: MEDICAL CENTER | Age: 84
End: 2019-03-26
Attending: EMERGENCY MEDICINE
Payer: MEDICARE

## 2019-03-26 VITALS
SYSTOLIC BLOOD PRESSURE: 138 MMHG | BODY MASS INDEX: 28.63 KG/M2 | OXYGEN SATURATION: 96 % | RESPIRATION RATE: 16 BRPM | DIASTOLIC BLOOD PRESSURE: 70 MMHG | TEMPERATURE: 97.3 F | HEART RATE: 60 BPM | WEIGHT: 156.53 LBS

## 2019-03-26 DIAGNOSIS — R05.9 COUGH: ICD-10-CM

## 2019-03-26 DIAGNOSIS — R07.89 CHEST WALL PAIN: ICD-10-CM

## 2019-03-26 LAB — EKG IMPRESSION: NORMAL

## 2019-03-26 PROCEDURE — 71046 X-RAY EXAM CHEST 2 VIEWS: CPT

## 2019-03-26 PROCEDURE — 99284 EMERGENCY DEPT VISIT MOD MDM: CPT

## 2019-03-26 PROCEDURE — 93005 ELECTROCARDIOGRAM TRACING: CPT | Performed by: EMERGENCY MEDICINE

## 2019-03-26 PROCEDURE — 700102 HCHG RX REV CODE 250 W/ 637 OVERRIDE(OP): Performed by: EMERGENCY MEDICINE

## 2019-03-26 PROCEDURE — A9270 NON-COVERED ITEM OR SERVICE: HCPCS | Performed by: EMERGENCY MEDICINE

## 2019-03-26 RX ORDER — IBUPROFEN 200 MG
400 TABLET ORAL ONCE
Status: COMPLETED | OUTPATIENT
Start: 2019-03-26 | End: 2019-03-26

## 2019-03-26 RX ORDER — ACETAMINOPHEN 325 MG/1
650 TABLET ORAL ONCE
Status: COMPLETED | OUTPATIENT
Start: 2019-03-26 | End: 2019-03-26

## 2019-03-26 RX ADMIN — ACETAMINOPHEN 650 MG: 325 TABLET, FILM COATED ORAL at 09:18

## 2019-03-26 RX ADMIN — IBUPROFEN 400 MG: 200 TABLET, FILM COATED ORAL at 09:18

## 2019-03-26 NOTE — ED NOTES
Reviewed discharge instructions w/ pt, verbalized understanding to information provided including follow up care and return precautions.  Pt stated has a scheduled follow up appointment w/ PCP.  Pt ambulated from ED w/ family member.

## 2019-03-26 NOTE — ED PROVIDER NOTES
"ED Provider Note    CHIEF COMPLAINT  Chief Complaint   Patient presents with   • RUQ Pain     started ten days ago, states became worse yesterday, described as \"sharp when I bend over or cough\"       HPI  Yvonne Marie Wada is a 83 y.o. female who presents to the emergency department with right-sided chest wall pain.  The patient says that she believes it is from coughing.  She is had a cough for the last couple weeks.  She developed pain underneath her right breast approximately 7-10 days ago.  She went to her primary care provider for this pain and requested a chest x-ray.  However the primary care provider did not feel that this is indicated at this time.  Because of her ongoing symptoms she comes the emergency department for evaluation.  The patient localized the pain to the right side of the chest.  She points to a location underneath the right breast as her location of pain.  She says that it feels fine when she is sitting there but it hurts worse with coughing and movement.  No shortness of breath.  No nausea or vomiting.  No abdominal pain.  No diarrhea.  No fevers.    REVIEW OF SYSTEMS  See HPI for further details. All other systems are negative.     PAST MEDICAL HISTORY  Past Medical History:   Diagnosis Date   • AAA (abdominal aortic aneurysm) ([facility]) 7/26/2010    10/09 CT thorax dilated ascending thoracic aorta 4.9 cm 1/10 transesophageal echo dilated aortic root, and ascending aorta with mild aortic insufficiency 1/10  ascending aortic aneurysm repair 5/12 echo [facility] normal LV function EF 60%, moderate to severe left atrial enlargement, RVSP 32    • Aortic insufficiency    • Aortic valve disease    • Aortic valve regurgitation    • Arthritis     back and knee/ osteo   • Cataract    • Dental disorder     full top denture, partial bottom   • Diverticulosis    • Dyslipidemia 7/26/2010    Sees [facility] on lipitor 4/11 chol 159,trig 84,hdl 47,ldl 95,cpk 114 7/12 chol 163,trig 120,hdl 49,ldl 90, crp 1.1 on " "lipitor 20 mg    • GERD (gastroesophageal reflux disease) 7/12/2012    6/07 EGD per [facility] hiatal hernia, start prevacid 30 mg 7/12 protonix 20 mg qday    • Glaucoma    • Heart burn    • Heart murmur    • Heart valve disease     \"leaking\", mitral valve   • Hiatus hernia syndrome    • High cholesterol    • History of shingles 6/30/2011    2010 Back and left thorax     • History of total knee arthroplasty 7/26/2010    4/10 MRI right knee complex tear medial meniscus, horizontal cleavage tear lateral meniscus, chondromalacia patella, moderate-sized Baker's cyst 5/10 right meniscus knee repair  5/10 told by  had gout on surgery had pathology report, I await that report Question gout seen on pathology report done during repair of right meniscus knee surgery. 7/10 uric acid 6.3, we'll await starting allopurinol depending on the operative report 8/5/10 reviewed ortho notes , op report indicates crystal deposition, could be gout or pseudogout. No bx result sent over. Will need to get. My suspicion is chondrocalcinosis. 10/11  ortho note, MRI pending 8/7/10 reviewed pathology report right knee tissue, marked degenerative changes with focal calcification, no mention of gout. Based on this and a normal uric acid level, I do not believe she has gout, has no hyperuricemia medications would be indicated 10/11  ortho left knee meniscectomy and arthroscopy 1/12     • HLD (hyperlipidemia)    • Hypertension    • Hypothyroid 4/8/2011 4/11 tsh 9 start synthroid 50 4/11 tsh 6,free t3 3.1,free t4 1.2,tpo negative 7/1/11 tsh 2.1 cont synthroid 50 mcg 7/12 tsh 3.4 cont synthroid 50 mcg    • Indigestion    • Mitral insufficiency    • OSTEOPOROSIS 8/9/2010    Had been on fosamax [id number]  8/10 dexa LS -2.0,hip -1.5 await old dexa result, she will get copy for me 4/11 vit d 35 9/12 dexa LS -3.2,hip -1.4 2/13/13 relast infusion    • Pain     back and right leg, can get as bad " as 10/10   • Preventative health care 7/26/2010    2002 pneum 2005 colon [facility] no records 4/11 vit d 35 9/11 shingles vaccine 9/12 mammogram 9/12 dexa LS -3.2,hip -1.4    • Rheumatic fever    • S/P appendectomy 7/26/2010   • S/P TOMY (total abdominal hysterectomy) 7/26/2010    Sees gyn on HRT estradiol for 20 yrs () 11/08 mammo     • Shingles 6/30/2011   • Snoring    • Status post lumbar surgery 7/26/2010 6/03 L4-5 epidural Dr. Jordan  7/06 overall for decompression, foraminotomy. L4-L5 posterior fusion, L4-L5 allograft      • Stroke (HCC) 1996    no residual problems    • Thoracic aortic aneurysm without mention of rupture    • Tricuspid insufficiency    • Unspecified disorder of thyroid     hypo       FAMILY HISTORY  Family History   Problem Relation Age of Onset   • Stroke Mother    • Heart Disease Father    • Heart Disease Sister        SOCIAL HISTORY  Social History     Social History   • Marital status:      Spouse name: N/A   • Number of children: N/A   • Years of education: N/A     Social History Main Topics   • Smoking status: Never Smoker   • Smokeless tobacco: Never Used      Comment: none   • Alcohol use No   • Drug use: Yes      Comment: CBD Oil for Arthritis   • Sexual activity: Not Currently     Partners: Male     Other Topics Concern   • Not on file     Social History Narrative   • No narrative on file       SURGICAL HISTORY  Past Surgical History:   Procedure Laterality Date   • TENDON REPAIR Right 11/10/2016    Procedure: TENDON REPAIR - QUADRACEPS ;  Surgeon: Maxim Herrera M.D.;  Location: [facility];  Service:    • KNEE ARTHROPLASTY TOTAL Right 9/8/2016    Procedure: KNEE ARTHROPLASTY TOTAL;  Surgeon: Maxim Herrera M.D.;  Location: [facility];  Service:    • LUMBAR FUSION POSTERIOR  11/24/2015    Procedure: LUMBAR FUSION POSTERIOR L3-4, EXPLORATION OF FUSION L4-5 WITH INSTRUMENTATION;  Surgeon: Hermann Reis M.D.;  Location: [facility]  [facility];  Service:    • LUMBAR LAMINECTOMY DISKECTOMY  11/24/2015    Procedure: LUMBAR L3-4 LAMINECTOMY AND REDO L4-5;  Surgeon: Hermann Reis M.D.;  Location: SURGERY [facility];  Service:    • KNEE ARTHROPLASTY TOTAL  1/3/2012    Performed by YOSEF MEJÍA at [facility]   • KNEE ARTHROSCOPY  10/18/2011    Performed by YOSEF MEJÍA at [facility]   • MEDIAL MENISCECTOMY  10/18/2011    Performed by YOSEF MEJÍA at [facility]   • AORTIC VALVE REPLACEMENT  1/12/2010    Performed by ISAÍAS NICOLE at [facility]   • APPENDECTOMY     • HYSTERECTOMY, TOTAL ABDOMINAL     • LUMBAR FUSION POSTERIOR         CURRENT MEDICATIONS  Home Medications    **Home medications have not yet been reviewed for this encounter**         ALLERGIES  Allergies   Allergen Reactions   • Amoxicillin    • Codeine Nausea     Synthetic codones ok   • Doxycycline    • Trazodone Vomiting     groggy   • Ultram [Tramadol] Vomiting     nausea       PHYSICAL EXAM  VITAL SIGNS: /77   Pulse 67   Temp 36.2 °C (97.1 °F) (Temporal)   Resp 16   Wt 71 kg (156 lb 8.4 oz)   SpO2 95%   BMI 28.63 kg/m²   Constitutional: Well developed, Well nourished, No acute distress, Non-toxic appearance.   HENT: Normocephalic, Atraumatic, Bilateral external ears normal, Oropharynx moist, No oral exudates, Nose normal.   Eyes: PERRLA, EOMI, Conjunctiva normal, No discharge.   Neck: Normal range of motion, No tenderness, Supple, No stridor.   Cardiovascular: Regular rate and rhythm, no audible murmur  Thorax & Lungs: Clear to auscultation bilaterally.  No rales, rhonchi, or wheezing.  The patient does have fairly exquisite tenderness of the right lateral chest wall at the level of T5 and extending along the right sternal costal margin.  No palpable crepitus.  Breath sounds are symmetric.  Abdomen: Bowel sounds normal, Soft, No tenderness, No masses, No pulsatile masses.   Skin: Warm, Dry, No  erythema, No rash.   Back: No tenderness, No CVA tenderness.   Extremities: Intact distal pulses, No tenderness, No cyanosis, No clubbing.  No edema.  Neurologic: Alert & oriented x 3, Normal motor function, Normal sensory function, No focal deficits noted.     EKG  Results for orders placed or performed during the hospital encounter of 19   EKG   Result Value Ref Range    Report       [facility] Emergency Dept.    Test Date:  2019  Pt Name:    YVONNE WADA                  Department: [facility]  MRN:        [id number]                      [facility]  Gender:     Female                       Technician: [id number]  :        [id number]                   Requested By:COLE CAMPOS  Order #:    [id number]                    Reading MD: COLE CAMPOS MD    Measurements  Intervals                                Axis  Rate:       68                           P:          24  VA:         228                          QRS:        56  QRSD:       94                           T:          40  QT:         367  QTc:        391    Interpretive Statements  Sinus rhythm  Atrial premature complex  Prolonged VA interval  Compared to ECG 2018 10:28:43  Atrial premature complex(es) now present  First degree AV block now present    Electronically Signed On 3- 9:30:07 PDT by COLE CAMPOS MD           RADIOLOGY/PROCEDURES  DX-CHEST-2 VIEWS   Final Result      No evidence of acute cardiopulmonary process.            COURSE & MEDICAL DECISION MAKING  Pertinent Labs & Imaging studies reviewed. (See chart for details)    Patient presents today with mechanical right-sided chest wall pain.  She has fairly exquisite point tenderness on the right chest.  She has been coughing recently.  X-ray is obtained.  This does not demonstrate any evidence of focal infiltrate or displaced rib fracture.  I feel the patient has mechanical chest wall pain likely related to coughing.  She is treated with  Tylenol and ibuprofen in the emergency department.  I would recommend the same for home.    I do not feel the patient would benefit from antibiotics at this time.  Likely has a viral upper respiratory infection with chest wall pain from coughing.  Primary care follow-up.    The patient will return for new or worsening symptoms and is stable at the time of discharge.    The patient is referred to a primary physician for blood pressure management, diabetic screening, and for all other preventative health concerns.    DISPOSITION:  Patient will be discharged home in stable condition.    FOLLOW UP:  Tee Tran M.D.  [address] [facility] [id number]  [id number]  [facility] [address]  [phone]    Schedule an appointment as soon as possible for a visit       [facility], Emergency Dept  [address] [facility] [id number] [phone]    If symptoms worsen      OUTPATIENT MEDICATIONS:  New Prescriptions    No medications on file       FINAL IMPRESSION  1. Cough    2. Chest wall pain              Electronically signed by: [place], 3/26/2019 9:28 AM

## 2019-03-26 NOTE — ED TRIAGE NOTES
"Chief Complaint   Patient presents with   • RUQ Pain     started ten days ago, states became worse yesterday, described as \"sharp when I bend over or cough\"     /77   Pulse 67   Temp 36.2 °C (97.1 °F) (Temporal)   Resp 16   Wt 71 kg (156 lb 8.4 oz)   SpO2 95%   BMI 28.63 kg/m²     Pt states only feels pain when bending over, lying on Right side, coughing or reaching arms above head, denies c/o N/V, abd pain or CP.    "

## 2019-04-03 PROBLEM — R07.89 RIGHT-SIDED CHEST WALL PAIN: Status: RESOLVED | Noted: 2019-03-12 | Resolved: 2019-04-03

## 2019-04-15 ENCOUNTER — OFFICE VISIT (OUTPATIENT)
Dept: MEDICAL GROUP | Facility: MEDICAL CENTER | Age: 84
End: 2019-04-15
Payer: MEDICARE

## 2019-04-15 VITALS
WEIGHT: 160 LBS | TEMPERATURE: 98.8 F | HEART RATE: 75 BPM | DIASTOLIC BLOOD PRESSURE: 64 MMHG | HEIGHT: 62 IN | OXYGEN SATURATION: 95 % | SYSTOLIC BLOOD PRESSURE: 112 MMHG | RESPIRATION RATE: 16 BRPM | BODY MASS INDEX: 29.44 KG/M2

## 2019-04-15 DIAGNOSIS — J01.00 ACUTE NON-RECURRENT MAXILLARY SINUSITIS: ICD-10-CM

## 2019-04-15 PROCEDURE — 99214 OFFICE O/P EST MOD 30 MIN: CPT | Performed by: FAMILY MEDICINE

## 2019-04-15 RX ORDER — AZITHROMYCIN 250 MG/1
TABLET, FILM COATED ORAL
Qty: 6 TAB | Refills: 0 | Status: SHIPPED | OUTPATIENT
Start: 2019-04-15 | End: 2019-04-20

## 2019-04-15 RX ORDER — METOPROLOL SUCCINATE 25 MG/1
25 TABLET, EXTENDED RELEASE ORAL DAILY
Qty: 90 TAB | Refills: 3 | Status: SHIPPED | OUTPATIENT
Start: 2019-04-15 | End: 2020-07-15

## 2019-04-15 NOTE — PROGRESS NOTES
Subjective:   Yvonne Marie Wada is a 83 y.o. female here today for sinusitis    Acute non-recurrent maxillary sinusitis  Has been having sinusitis for 3 months with post nasal drip, right maxillary sinus pressure and right front sinus pressure. She was prescribed doxycyline but that caused nausea upset. Has been having cough and there is associated right side chest wall pain. Uses cough drop, which helps temporarily.  Chest x-ray was normal.  Robitussin, Neilmed, Flonase, which helps temporarily.  Has same symptoms every year, at the same time of year, but this year is worse.         Current medicines (including changes today)  Current Outpatient Prescriptions   Medication Sig Dispense Refill   • azithromycin (ZITHROMAX) 250 MG Tab 2 tabs by mouth day 1, 1 tab by mouth days 2-5 6 Tab 0   • metoprolol SR (TOPROL XL) 25 MG TABLET SR 24 HR Take 1 Tab by mouth every day. 90 Tab 3   • fexofenadine (ALLEGRA) 60 MG Tab Take 60 mg by mouth every day.     • cetirizine (ZYRTEC) 10 MG Tab Take 10 mg by mouth every day.     • Cefixime 400 MG Cap Take 1 Tab by mouth every day. 10 Cap 0   • levothyroxine (SYNTHROID) 75 MCG Tab Take 1 Tab by mouth Every morning on an empty stomach. 90 Tab 3   • benazepril (LOTENSIN) 40 MG tablet Take 1 Tab by mouth every day. 90 Tab 3   • fluticasone (FLONASE) 50 MCG/ACT nasal spray fluticasone 50 mcg/actuation nasal spray,suspension     • atorvastatin (LIPITOR) 80 MG tablet Take 1 Tab by mouth every evening. 90 Tab 3   • CALCIUM PO Take  by mouth.     • Bioflavonoid Products (BIOFLEX PO) Take  by mouth.     • omeprazole (PRILOSEC) 20 MG delayed-release capsule Take 20 mg by mouth as needed (For heartburn).     • magnesium gluconate (MAG-G) 500 MG tablet Take 500 mg by mouth every day.     • Multiple Vitamins-Minerals (CENTRUM SILVER ULTRA WOMENS PO) Take 1 Tab by mouth every day.     • vitamin D (CHOLECALCIFEROL) 1000 UNIT TABS Take 1,000 Units by mouth every day.       No current  "facility-administered medications for this visit.      She  has a past medical history of AAA (abdominal aortic aneurysm) ([facility]) (7/26/2010); Aortic insufficiency; Aortic valve disease; Aortic valve regurgitation; Arthritis; Cataract; Dental disorder; Diverticulosis; Dyslipidemia (7/26/2010); GERD (gastroesophageal reflux disease) (7/12/2012); Glaucoma; Heart burn; Heart murmur; Heart valve disease; Hiatus hernia syndrome; High cholesterol; History of shingles (6/30/2011); History of total knee arthroplasty (7/26/2010); HLD (hyperlipidemia); Hypertension; Hypothyroid (4/8/2011); Indigestion; Mitral insufficiency; OSTEOPOROSIS (8/9/2010); Pain; Preventative health care (7/26/2010); Rheumatic fever; S/P appendectomy (7/26/2010); S/P TOMY (total abdominal hysterectomy) (7/26/2010); Shingles (6/30/2011); Snoring; Status post lumbar surgery (7/26/2010); Stroke ([facility]) (1996); Thoracic aortic aneurysm without mention of rupture; Tricuspid insufficiency; and Unspecified disorder of thyroid. She also has no past medical history of CAD (coronary artery disease); COPD; or Liver disease.    ROS   No chest pain, no shortness of breath       Objective:     /64 (BP Location: Right arm, Patient Position: Sitting, BP Cuff Size: Adult)   Pulse 75   Temp 37.1 °C (98.8 °F) (Temporal)   Resp 16   Ht 1.575 m (5' 2.01\")   Wt 72.6 kg (160 lb)   SpO2 95%  Body mass index is 29.26 kg/m².   Physical Exam:  Constitutional: Alert, no distress.  Skin: Warm, dry, good turgor, no rashes in visible areas.  Eye: Equal, round and reactive, conjunctiva clear, lids normal.  ENMT: Lips without lesions, good dentition, oropharynx clear. Right maxillary tenderness on palpation.  Clear bilateral nasal drainage.  Neck: Trachea midline, no masses, no thyromegaly. No cervical or supraclavicular lymphadenopathy  Respiratory: Unlabored respiratory effort, lungs clear to auscultation, no wheezes, no ronchi.  Cardiovascular: Normal S1, S2, + " murmur  Psych: Alert and oriented x3, normal affect and mood.        Assessment and Plan:   The following treatment plan was discussed    1. Acute non-recurrent maxillary sinusitis  New problem.  Possible sinusitis versus allergies.  We will do a trial of Z-Enmanuel, which she has tolerated in the past.  Advised her to use Z-Enmanuel and Flonase together.  If she does not respond to Z-Enmanuel advised patient this may be more environmental and that she should continue Flonase.  Continue supportive treatment with cough drops as well.  - azithromycin (ZITHROMAX) 250 MG Tab; 2 tabs by mouth day 1, 1 tab by mouth days 2-5  Dispense: 6 Tab; Refill: 0      Followup: Return if symptoms worsen or fail to improve.

## 2019-04-15 NOTE — ASSESSMENT & PLAN NOTE
Has been having sinusitis for 3 months with post nasal drip, right maxillary sinus pressure and right front sinus pressure. She was prescribed doxycyline but that caused nausea upset. Has been having cough and there is associated right side chest wall pain. Uses cough drop, which helps temporarily.  Chest x-ray was normal.  Robitussin, Neilmed, Flonase, which helps temporarily.  Has same symptoms every year, at the same time of year, but this year is worse.

## 2019-05-25 ENCOUNTER — HOSPITAL ENCOUNTER (OUTPATIENT)
Facility: MEDICAL CENTER | Age: 84
End: 2019-05-25
Attending: FAMILY MEDICINE
Payer: MEDICARE

## 2019-05-25 ENCOUNTER — OFFICE VISIT (OUTPATIENT)
Dept: URGENT CARE | Facility: PHYSICIAN GROUP | Age: 84
End: 2019-05-25
Payer: MEDICARE

## 2019-05-25 VITALS
HEART RATE: 60 BPM | RESPIRATION RATE: 14 BRPM | BODY MASS INDEX: 29.44 KG/M2 | DIASTOLIC BLOOD PRESSURE: 62 MMHG | TEMPERATURE: 97.3 F | HEIGHT: 62 IN | WEIGHT: 160 LBS | OXYGEN SATURATION: 97 % | SYSTOLIC BLOOD PRESSURE: 128 MMHG

## 2019-05-25 DIAGNOSIS — R39.9 UTI SYMPTOMS: ICD-10-CM

## 2019-05-25 DIAGNOSIS — N30.00 ACUTE CYSTITIS WITHOUT HEMATURIA: ICD-10-CM

## 2019-05-25 PROCEDURE — 99214 OFFICE O/P EST MOD 30 MIN: CPT | Performed by: FAMILY MEDICINE

## 2019-05-25 PROCEDURE — 87086 URINE CULTURE/COLONY COUNT: CPT

## 2019-05-25 PROCEDURE — 87077 CULTURE AEROBIC IDENTIFY: CPT

## 2019-05-25 PROCEDURE — 87186 SC STD MICRODIL/AGAR DIL: CPT

## 2019-05-25 RX ORDER — CEFDINIR 300 MG/1
300 CAPSULE ORAL EVERY 12 HOURS
Qty: 10 CAP | Refills: 0 | Status: SHIPPED | OUTPATIENT
Start: 2019-05-25 | End: 2019-05-30

## 2019-05-25 NOTE — PROGRESS NOTES
"Subjective:      Yvonne Marie Wada is a 83 y.o. female who presents with Dysuria (x 4 days, painful and frequent urinating)            This is a new problem.  83-year-old presenting with urinary frequency and burning sensation started last night.  She had similar symptoms 4 days ago and took some Azo and a lot of fluid which went away.  She denies any back pain, flank pain, abdominal pain, nausea or vomiting or fever.  Had a UTI in January 2019.  She has been taking Azo at home.  She denies any blood in the urine.        Review of Systems   All other systems reviewed and are negative.         Objective:     /62   Pulse 60   Temp 36.3 °C (97.3 °F) (Temporal)   Resp 14   Ht 1.575 m (5' 2.01\")   Wt 72.6 kg (160 lb)   SpO2 97%   BMI 29.25 kg/m²      Physical Exam   Constitutional: She is oriented to person, place, and time. She appears well-developed and well-nourished.  Non-toxic appearance.   HENT:   Head: Normocephalic and atraumatic.   Right Ear: External ear normal.   Left Ear: External ear normal.   Nose: Nose normal.   Eyes: Conjunctivae are normal.   Cardiovascular: Normal rate and regular rhythm.  Exam reveals no gallop and no friction rub.    No murmur heard.  Pulmonary/Chest: Effort normal. No stridor. No respiratory distress. She has no wheezes. She has no rales.   Abdominal: Soft. Bowel sounds are normal. She exhibits no distension and no mass. There is no tenderness. There is no rigidity, no rebound, no guarding and no CVA tenderness.   Neurological: She is alert and oriented to person, place, and time.   Skin: Skin is warm. She is not diaphoretic. No pallor.   Psychiatric: She has a normal mood and affect.               Assessment/Plan:   ASSESSMENT:PLAN:  1. Acute cystitis without hematuria  - Urine Culture; Future  - cefdinir (OMNICEF) 300 MG Cap; Take 1 Cap by mouth every 12 hours for 5 days.  Dispense: 10 Cap; Refill: 0    2. UTI symptoms      She has been taking Azo at home.  She has " tried to drink some fluids here but not able to give us a urine sample now  Advised to collect a sample at home and drop it off before 5 PM today  Based on history she has uncomplicated urinary tract infection  Based on the recent urine culture from January, Ceftin it should do fine.  Plan per orders and instructions  Warning signs reviewed

## 2019-05-25 NOTE — PATIENT INSTRUCTIONS
Drop off your urine as soon as possible before 5 PM in our urgent care so we can send it for culture    Start Ceftin ear twice daily for 5 days  You may continue Azo  If you are not significantly better in the next 2 to 3 days we should see you back, if any worsening sooner

## 2019-06-01 PROBLEM — J01.00 ACUTE NON-RECURRENT MAXILLARY SINUSITIS: Status: RESOLVED | Noted: 2019-04-15 | Resolved: 2019-06-01

## 2019-06-04 ENCOUNTER — OFFICE VISIT (OUTPATIENT)
Dept: MEDICAL GROUP | Facility: MEDICAL CENTER | Age: 84
End: 2019-06-04
Payer: MEDICARE

## 2019-06-04 VITALS
SYSTOLIC BLOOD PRESSURE: 142 MMHG | OXYGEN SATURATION: 98 % | DIASTOLIC BLOOD PRESSURE: 70 MMHG | HEIGHT: 62 IN | WEIGHT: 158 LBS | BODY MASS INDEX: 29.08 KG/M2 | TEMPERATURE: 98.7 F | HEART RATE: 60 BPM

## 2019-06-04 DIAGNOSIS — M19.90 OSTEOARTHRITIS, UNSPECIFIED OSTEOARTHRITIS TYPE, UNSPECIFIED SITE: ICD-10-CM

## 2019-06-04 DIAGNOSIS — E03.9 HYPOTHYROIDISM, UNSPECIFIED TYPE: ICD-10-CM

## 2019-06-04 DIAGNOSIS — M19.049 HAND ARTHRITIS: ICD-10-CM

## 2019-06-04 DIAGNOSIS — E78.5 DYSLIPIDEMIA: ICD-10-CM

## 2019-06-04 DIAGNOSIS — N39.0 URINARY TRACT INFECTION WITHOUT HEMATURIA, SITE UNSPECIFIED: ICD-10-CM

## 2019-06-04 DIAGNOSIS — M13.0 POLYARTHRITIS: ICD-10-CM

## 2019-06-04 PROCEDURE — 99213 OFFICE O/P EST LOW 20 MIN: CPT | Performed by: INTERNAL MEDICINE

## 2019-06-04 NOTE — PROGRESS NOTES
Subjective:      Yvonne Marie Wada is a 83 y.o. female who presents with follow-up arthritis        HPI   Patient requesting rheumatology referral to  in [place]  Has chronic arthritis pain and low back surgery, has seen neurosurgery and physical therapy, has had prior knee surgeries, and chronic knee pain, mostly has knee pain right leg right hip to right knee. Takes advil two in am and biofreeze or cbd lotion. No more than two aleve in am, does have ckd with GFR 38.  Has nocturia with getting up every few hours at night, no daytime symptoms, no incontinence. No dysuria or hematuria  Past surgical history previous lumbar fusion L4-L5, followed by neurosurgery, pain management, last MRI showed previous laminectomy, moderate to severe bilateral foraminal stenosis lumbar spine, previous left knee arthroplasty, right knee arthroplasty, revision right knee replacement quadriceps evulsion, status post right hip total arthroplasty a year ago  Chronic pain right hip and knee, especially lateral thigh, has been to physical therapy, chronic low back pain, chronic arthritic pains hands, no personal history of rheumatoid arthritis, daughter has ankylosing spondylitis  CKD BUN 33, creatinine 1.34, GFR 38 in January other than Advil 2/day, no other regular NSAIDs  5/26/19 UTI klebsiella given omnicef, organism resistant ampicillin, ciprofloxacin, levofloxacin, bactrim, completed course of antibiotics, currently asymptomatic no dysuria hematuria  Dyslipidemia on Lipitor      Current Outpatient Prescriptions   Medication Sig Dispense Refill   • metoprolol SR (TOPROL XL) 25 MG TABLET SR 24 HR Take 1 Tab by mouth every day. 90 Tab 3   • fexofenadine (ALLEGRA) 60 MG Tab Take 60 mg by mouth every day.     • cetirizine (ZYRTEC) 10 MG Tab Take 10 mg by mouth every day.     • levothyroxine (SYNTHROID) 75 MCG Tab Take 1 Tab by mouth Every morning on an empty stomach. 90 Tab 3   • benazepril (LOTENSIN) 40 MG tablet Take 1 Tab  by mouth every day. 90 Tab 3   • fluticasone (FLONASE) 50 MCG/ACT nasal spray fluticasone 50 mcg/actuation nasal spray,suspension     • atorvastatin (LIPITOR) 80 MG tablet Take 1 Tab by mouth every evening. 90 Tab 3   • CALCIUM PO Take  by mouth.     • Bioflavonoid Products (BIOFLEX PO) Take  by mouth.     • omeprazole (PRILOSEC) 20 MG delayed-release capsule Take 20 mg by mouth as needed (For heartburn).     • magnesium gluconate (MAG-G) 500 MG tablet Take 500 mg by mouth every day.     • Multiple Vitamins-Minerals (CENTRUM SILVER ULTRA WOMENS PO) Take 1 Tab by mouth every day.     • vitamin D (CHOLECALCIFEROL) 1000 UNIT TABS Take 1,000 Units by mouth every day.       No current facility-administered medications for this visit.      Cervical pain  2/10/14 [facility] note; x-ray cervical spine DJD, right shoulder steroid injection     Decreased GFR  5/31/09 bun 18,creat 1.2  1/14/10 bun 28,creat 1.3,GFR 40  9/20/12 bun 37,creat 1.1,GFR 48  9/25/13 bun 25,creat 1.2,GFR 44  9/26/14 bun 26,creat 1.1,GFR 45  2/23/15 bun 20,creat 1.1,GFR 48  4/15/16 bun 31,creat 1.1,GFR 45  7/6/16 bun 29,creat 1.1,GFR 44   5/12/17 bun 35,creat 1.1,GFR 44  11/2/17 bun 22,creat 1.28,GFR 40  12/9/17 bun 29,creat 1.1,GFR 43  10/4/18 bun 31,creat 1.5 at [facility]  1/18/19 bun 33,creat 1.34,GFR 38,PTH 53,calcium 9.7,phos 3.6,spep negative     Dyslipidemia  4/11 chol 159,trig 84,hdl 47,ldl 95,cpk 114  7/12 chol 163,trig 120,hdl 49,ldl 90, crp 1.1 on lipitor 20 mg  3/26/13 chol 159,trig 99,hdl 46,ldl 93 on lipitor 20 mg  9/25/13 chol 164,trig 100,hdl 47,ldl 97 on lipitor 20 mg  9/12/14 cardiology note lower extremity weakness, hold lipitor x3 months, labs ordered  12/15/14 cardiology start low dose lipitor 10 mg  1/22/15 off lipitor will resume 10 mg and repeat labs 4 weeks  2/24/15 chol 179,trig 111,hdl 54,ldl 103 on lipitor 10 mg  2/1/16 cardiology note restart lipitor 20 mg    4/15/16 chol 163,trig 102,hdl 48,ldl 95 on lipitor 20 mg  5/12/17  chol 186,trig 101,hdl 47,ldl 119 on lipitor 20 mg intermittently will start taking daily  11/1/17 chol 146,trig 73,hdl 42,ldl 89 on lipitor 20 mg  12/9/17 chol 133,trig 74,hdl 49,ldl 69 on lipitor 80 mg higher dose due to recent transient ischemic attack  10/4/18 chol 126,trig 98,hdl 44,ldl 62 [facility]  1/18/19 chol 161,trig 103,hdl 45,ldl 95 on lipitor 80 mg     Gastroesophageal reflux  6/27/07 EGD per [facility] hiatal hernia, start prevacid 30 mg  7/12 protonix 20 mg qday  11/16/12 [facility] note cont prilosec  1/5/16 change to protonix 40 mg from prilosec     History shingles     History of supraventricular tachycardia  12/20/17  [facility] cardiology note most recent echocardiogram looks fine, will repeat CT scan follow aortic repair  2/20/17 episode of supraventricular tachycardia in the emergency room, daven[place] catheter removed, symptoms resolved with repeat EKG sinus rhythm  11/17/18 cardiology note asymptomatic, continue cholesterol medication, continue to monitor echo aortic valve, repaired ascending aorta, follow-up yearly  11/21/18 echo normal LV function, EF 60%, mild LVH, grade 2 diastolic dysfunction, mild aortic insufficiency, moderate tricuspid regurgitation, RVSP 50      History TIA  11/1/17 hospital admission, 11/2/17 hospital discharge, facial numbness, transient numbness in both hands, resolved, CT, MRI head no acute infarct, blood pressure stable, discharged home, patient states she was on aspirin at the time of admission  11/1/17 CT head stable right mid brain likely chronic lacunar infarction, periventricular white matter disease  11/1/17 MRI brain no acute abnormality, moderate chronic microvascular stomach disease, chronic microhemorrhages left frontal and right parietal lobes, chronic lacunar infarct left basal ganglia and blanco, or cerebral atrophy  11/1/17 MR-MRA head without; negative  11/2/17 echo normal LV size and function, EF 70%, RVSP 50, mild AR and TR  11/28/17  awaiting possible right hip replacement per orthopedics , given recent possible TIA, cannot provide clearance, she will like second opinion from cardiology referral made to dr.bryan de leon, changed asa to plavix  12/12/17 ultrasound carotid less than 50% internal carotid stenosis bilateral  12/20/17 dr.bryan de leon cardiology note most recent echocardiogram looks fine, will repeat CT scan follow aortic repair  2/20/18 episode of supraventricular tachycardia in the emergency room, daven[place] catheter removed, symptoms resolved with repeat EKG sinus rhythm  11/17/18 cardiology note asymptomatic, continue cholesterol medication, continue to monitor echo aortic valve, repaired ascending aorta, follow-up yearly  11/21/18 echo normal LV function, EF 60%, mild LVH, grade 2 diastolic dysfunction, mild aortic insufficiency, moderate tricuspid regurgitation, RVSP 50     Hypertension  Sees cardiology  1/10/11 [facility] note cardiac catheterization normal systolic function  3/11 [facility] echo moderate aortic insufficiency, moderate mitral insufficiency, moderate tricuspid insufficiency, normal LV function  5/12 echo [facility] normal LV function EF 60%, moderate to severe left atrial enlargement, RVSP 32    9/26/13 CT thoracic aorta no evidence of aneurysm, small hiatal hernia  9/26/13 Persantine thallium uniform breast tissue attenuation, cannot rule out underlying ischemic, LVEF 67%  10/3/13 on toprol-XL 25 mg half a tablet daily    12/13/13 cardiology note cont same meds, repeat echo and follow up 6 months  1/2/14 echo mild LVH, grade 1 diastolic dysfunction, ascending aortic 4.0, no change compared with ZAK 2010  5/15/14 cardiology note; increase benazepril to 40 mg qday,continue toprol XL 25 mg daily  6/17/15 cardiology note continue meds, repeat echo 6 months  2/1/16 cardiology note moderate pulmonary hypertension and snoring, nocturnal pulse oximetry ordered  6/30/16 cardiology note patient declines overnight pulse  oximetry  11/1/17 echo normal LV size and function, EF 70%, mild aortic insufficiency and mild tricuspid regurgitation, RVSP 50, aortic root 3.2 cm  11/3/17 cardiology note hypertension stable, status post thoracic aortic aneurysm repair, headache unspecified, ESR ordered  11/28/17 on benazepril 40 mg,toprol 25mg, add norvasc 5 mg  12/12/17 ultrasound carotid less than 50% internal carotid stenosis bilateral  12/20/17  [facility] cardiology note most recent echocardiogram looks fine, will repeat CT scan follow aortic repair  2/20/18 episode of supraventricular tachycardia in the emergency room, daven[place] catheter removed, symptoms resolved with repeat EKG sinus rhythm  11/17/18 cardiology note asymptomatic, continue cholesterol medication, continue to monitor echo aortic valve, repaired ascending aorta, follow-up yearly  11/21/18 echo normal LV function, EF 60%, mild LVH, grade 2 diastolic dysfunction, mild aortic insufficiency, moderate tricuspid regurgitation, RVSP 50  1/18/19 urine mac 45 on benazepril 40 mg, toprol 25 mg, norvasc 5 mg     Hypothyroid  4/11 tsh 9 start synthroid 50  4/11 tsh 6,free t3 3.1,free t4 1.2,tpo negative  7/1/11 tsh 2.1 cont synthroid 50 mcg  7/12 tsh 3.4 cont synthroid 50 mcg  7/31/13 tsh 3.2 on synthroid 50 mcg  9/25/13 tsh 1.7 on synthroid 50 mcg  2/24/15 tsh 4.9 on synthroid 50 mcg; increase to synthroid 75 mcg, repeat tsh in 6 weeks  4/14/15 tsh 1.1 on synthroid 75 mcg  4/15/16 tsh 2.3 on synthroid 75 mcg  5/12/17 tsh 1.2 on synthroid 75 mcg  11/1/17 tsh 2.1 on synthroid 75 mcg  1/18/19 tsh 2.2 on synthroid 75 mcg     Insomnia  1/30/17 trial of trazodone 50 mg  4/4/17 side effects with trazodone drowsiness, discontinued     Osteoporosis  Had been on fosamax [id number]    8/10 dexa LS -2.0,hip -1.5 await old dexa result, she will get copy for me  4/11 vit d 35  9/12 dexa LS -3.2,hip -1.4  2/13/13 reclast IV  7/31/13 vit d 33 cont 2000 units  2/18/14 reclast IV  2/2/15 dexa LS  -3.1,hip -1.9; FRAX 35.5 % major,12.6% hip  2/18/15 reclast IV  2/24/15 vit d 33  4/15/16 vit d 36  5/12/17 vit d 36  8/7/18 dexa left forearm -4.2,hip -2.5 resume reclast   8/31/18 reclast IV  1/18/19 vit d 27 increase from 2000 to 4000 units     Preventative health  6/27/09 colon per [facility] no records  10/19/04 pneumovax   9/9/11 zoster vaccine  4/14/15 prevnar  7/3/18 shingrix prescription provided to get at pharmacy  8/7/18 dexa left forearm -4.2,hip -2.5  8/7/18 mammogram  1/18/19 vit d 27 increase from 2000 to 4000 units     Pulmonary hypertension  11/17/18 cardiology note asymptomatic, continue cholesterol medication, continue to monitor echo aortic valve, repaired ascending aorta, follow-up yearly  11/21/18 echo normal LV function, EF 60%, mild LVH, grade 2 diastolic dysfunction, mild aortic insufficiency, moderate tricuspid regurgitation, RVSP 50     Shoulder pain  4/4/17 right shoulder pain right shoulder x-ray ordered, right knee pain, referral custom PT, tried celebrex no benefit, will try naproxen 500 mg twice daily and zanaflex at night  4/5/17 x-ray right shoulder calcifications consistent with calcific tendinitis or hydroxyapatite deposition  5/12/17 MRI right shoulder ordered, persistent pain despite physical therapy  5/18/17 MRI right shoulder; full-thickness supraspinatus tendon tear  5/22/17 right shoulder injection, has been going to physical therapy 14 treatments some improvement, right shoulder injection provided, if no improvement in has appt  in october, if need sooner appt try   6/9/17 custom PT note   7/10/17 custom PT note      s/p knee arthroplasty  4/10 MRI right knee complex tear medial meniscus, horizontal cleavage tear lateral meniscus, chondromalacia patella, moderate-sized baker's cyst  5/10 right meniscus knee repair   5/10 told by  had gout on surgery had pathology report, I await that report, question gout seen on pathology report done  during repair of right meniscus knee surgery.  7/10 uric acid 6.3, await starting allopurinol depending on the operative report  8/5/10 reviewed orthopedics notes , operative report indicates crystal deposition, could be gout or pseudogout, no biopsy results, suspect chondrocalcinosis  10/11 dr.parlasca najera note, MRI pending  8/7/10 reviewed pathology report right knee tissue, marked degenerative changes with focal calcification, no mention of gout. Based on this and a normal uric acid level, I do not believe she has gout, has no hyperuricemia medications would be indicated  10/11 dr.parlasca najera left knee meniscectomy and arthroscopy  1/12 dr.parlasca najera left knee arthroplasty  2/29/16  orthopedics x-ray right knee advanced DJD on the right knee corticosteroid injection performed, prescription voltaren gel  6/13/16  orthopedic note x-rays demonstrate right knee advanced DJD and resurfaced patella, offer right knee total replacement followed by patellar resurfacing after she recovers from her right knee  9/8/16  orthopedic's operative note right knee total arthroplasty  11/2/16  orthopedic note, follow-up right knee, x-ray right knee shows subluxation patella, traumatic evulsion VMO needs reexploration and repair  11/10/16  orthopedic's operative note VMO quadriceps avulsion repair status post total knee replacement  11/23/16  orthopedic no follow-up quadriceps repair, continue crutches in full extension, follow-up one month and then start passive range of motion 0-40°  2/4/17  orthopedic note, continue physical therapy, follow-up this summer  4/17/17 [facility] PT note  6/9/17 custom PT note    s/p total hip arthroplasty  9/21/17 MRI right hip mild trochanteric bursitis, mild femoral acetabular joint arthritis  10/12/17  orthopedic no proceed with right total hip arthroplasty, x-ray AP pelvis and right hip shows early arthrosis with  calcifications and DJD  2/14/18  orthopedics operative note at [facility] right hip total arthroplasty, gluteus medius and minimus tendon repair  3/27/18 [place] [facility] note   5/8/18 [facility] note xray right hip stable total hip arthroplasty, continue physical therapy     s/p lumbar surgery  6/03 L4-5 epidural     7/06  spinal surgery operative note decompression, foraminotomy. L4-L5 posterior fusion, L4-L5 allograft    3/5/14 MRI lumbar spine grade 2 spondylolisthesis L4-L5 with previous laminectomy and posterior fusion, left sided pedicle screw fixation L4-L5, moderate central canal stenosis L5-S1 secondary to facet arthropathy, mild to moderate multilevel neural foraminal narrowing  12/8/14  pain [facility] note; right lower extremity radiculitis L4-L5 lesion, myofascial lumbosacral pain, trochanteric bursitis, followup as needed  4/2/15  pain procedure note right L4-L5 and right L5-S1 SSNRB under fluoroscopy  4/27/15  pain note; right trochanteric bursal injection, trigger point injections performed, also set up SI joint injections  8/3/15 [facility] neurosurgery note, lumbar x-ray flexion and extension, MRI lumbar spine without contrast, followup   8/9/15 MRI lumbar spine, previous L4-L5 left  fusion and laminectomy, L3-L4 moderate bilateral facet arthropathy, mild disc bulge, L4-L5 severe bilateral facet arthropathy, moderate to severe bilateral neural foraminal stenosis, L5-S1 moderate facet arthropathy  8/28/15  neurosurgery note previous posterior fusion L4-L5, does have L3-L4 disc bulge with central canal stenosis, recommend L3-L5 decompression  8/31/15  pain note; right lower extremity radiculitis, myofascial lumbosacral pain, start hydrocodone 5 mg, continued SI joint exercises, perform trochanteric bursal injection right hip, trigger points lumbar region  10/28/15  neurosurgery note, will schedule for posterior L3-L4  laminectomy and fusion with redo L4-5 laminectomy and exploration of fusion, surgical clearance per cardiology   12/9/15 [facility] neurosurgery note s/p L4-S1 fusion on 11/24/15 , follow up in 4 weeks  12/23/15  neurosurgery note, clear to restart physical therapy if she would like after one month, follow-up 3 months     s/p TOMY  Sees gyn on HRT estradiol for 20 yrs ()     s/p thoracic aneurysm repair  6/29/06 CT-CTA chest with and without postprocessing; stable ascending thoracic aortic aneurysm maximum diameter 4.8, just distal to the aortic valve maximum diameter 4.3  4/9/07 CT-CTA chest with and without processing; stable ascending aortic thoracic aneurysm 4.5 x 4.6 cm  6/8/09 CT chest with; stable 4.7 ascending aorta aneurysm  10/15/09 CT chest without; dilated ascending thoracic aorta 4.9 cm aneurysm increased from 4.7  1/11/10  cardiovascular surgery operative note ascending thoracic aorta aneurysm repair  9/26/13 CT thoracic aorta evaluation; status post repair ascending thoracic aortic aneurysm without evidence of dissection or leak  1/2/14 echo mild LVH, grade 1 diastolic dysfunction, ascending aortic 4.0, no change compared with ZAK 2010  5/15/14 cardiology note  6/17/15 cardiology note cont meds,repeat echo 6 months  2/1/16 cardiology note moderate pulmonary hypertension and snoring, nocturnal pulse oximetry ordered  11/1/17 echo normal LV size and function, EF 70%, mild aortic insufficiency and mild tricuspid regurgitation, RVSP 50, aortic root 3.2 cm  11/3/17 cardiology note stable  12[facility]20[facility]17  [facility] most recent echocardiogram looks fine, will repeat CT scan follow aortic repair  11/17/18 cardiology note asymptomatic, continue cholesterol medication, continue to monitor echo aortic valve, repaired ascending aorta, follow-up yearly  11/21/18 echo normal LV function, EF 60%, mild LVH, grade 2 diastolic dysfunction, mild aortic insufficiency,  moderate tricuspid regurgitation, RVSP 50     Tricuspid regurgitation  11/17/18 cardiology note asymptomatic, continue cholesterol medication, continue to monitor echo aortic valve, repaired ascending aorta, follow-up yearly  11/21/18 echo normal LV function, EF 60%, mild LVH, grade 2 diastolic dysfunction, mild aortic insufficiency, moderate tricuspid regurgitation, RVSP 50     UTI  7/6/16 UTI klebsiella pneumoniae sensitive to all antibiotics except ampicillin, start bactrim x 5 days  1/18/19 UTI enterobacter cloacae resistant to ampicillin, cephalothin, penicillin, bactrim, sensitive to cefuroxime, ciprofloxacin and levaquin, nitrofurantoin                  Patient Active Problem List   Diagnosis   • Status post lumbar surgery   • Hypertension   • Dyslipidemia   • S/P TOMY (total abdominal hysterectomy)   • Preventative health care   • S/P total knee arthroplasty   • S/P appendectomy   • Osteoporosis, idiopathic   • Hypothyroid   • History of shingles   • GERD (gastroesophageal reflux disease)   • Tricuspid regurgitation   • Cervical pain   • S/P thoracic aortic aneurysm repair   • Decreased GFR   • UTI (urinary tract infection)   • Insomnia   • Shoulder pain   • History of transient ischemic attack (TIA)   • S/P total hip arthroplasty   • History of supraventricular tachycardia   • Pulmonary hypertension (HCC)          Health Maintenance Summary                IMM DTaP/Tdap/Td Vaccine Overdue 11/29/1954     IMM ZOSTER VACCINES Overdue 11/4/2011      Done 9/9/2011 Imm Admin: Zoster Vaccine Live (ZVL) (Zostavax)    MAMMOGRAM Next Due 8/7/2019      Done 8/7/2018 MA-MAMMO SCREENING BILAT W/TOMOSYNTHESIS W/CAD     Patient has more history with this topic...    IMM INFLUENZA Next Due 9/1/2019      Done 9/18/2017 Imm Admin: Influenza Vaccine Adult HD     Patient has more history with this topic...    Annual Wellness Visit Next Due 1/11/2020      Done 1/10/2019 Visit Dx: Medicare annual wellness visit, subsequent      Patient has more history with this topic...    BONE DENSITY Next Due 8/7/2023      Done 8/7/2018 DS-BONE DENSITY STUDY (DEXA)     Patient has more history with this topic...          Patient Care Team:  Tee Tran M.D. as PCP - General  Kobi Wheeler M.D. as Consulting Physician (Interventional Cardiology)  Brett Santos II, O.D. as Consulting Physician (Optometry)  Angelic Adams as Consulting Physician (Dentistry)      ROS       Objective:          Physical Exam   Constitutional: She appears well-developed and well-nourished. No distress.   HENT:   Head: Normocephalic and atraumatic.   Eyes: Conjunctivae are normal. Right eye exhibits no discharge. Left eye exhibits no discharge.   Neck: Neck supple. No JVD present. No thyromegaly present.   Cardiovascular: Normal rate and regular rhythm.    Pulmonary/Chest: Effort normal and breath sounds normal.   Abdominal: She exhibits no distension.   Neurological: She is alert.   Skin: Skin is warm. She is not diaphoretic.   Psychiatric: She has a normal mood and affect. Her behavior is normal.   Nursing note and vitals reviewed.    2/6 candi     Hands no swan-neck deformities, no ulnar deviation  Lower extremities no edema     Assessment/Plan:     Assessment  #!  Polyarthritis lumbar spine, right hip, right knee, hands likely related to multiple orthopedic procedures, spinal stenosis lumbar spine, right knee surgery and revision quadriceps avulsion, right hip arthroplasty    #2 hypothyroid on replacement    #3 CKD, using over-the-counter Advil 2/day    #4 history UTI in May completed Omnicef no current symptoms    #5 dyslipidemia on Lipitor      Plan  #! Referral rheumatology for polyarthritis I told the patient that she has osteoarthritis of multiple joints, I suspect underlying systemic collagen vascular disease, offered to get hand x-rays bilateral she declines    #2 labs    #3 recommend limit NSAID use because it can worsen CKD, we can consider topical  Voltaren    #4 follow-up 6 months

## 2019-06-08 ENCOUNTER — HOSPITAL ENCOUNTER (OUTPATIENT)
Dept: LAB | Facility: MEDICAL CENTER | Age: 84
End: 2019-06-08
Attending: INTERNAL MEDICINE
Payer: MEDICARE

## 2019-06-08 DIAGNOSIS — E78.5 DYSLIPIDEMIA: ICD-10-CM

## 2019-06-08 DIAGNOSIS — M13.0 POLYARTHRITIS: ICD-10-CM

## 2019-06-08 DIAGNOSIS — E03.9 HYPOTHYROIDISM, UNSPECIFIED TYPE: ICD-10-CM

## 2019-06-08 LAB
ALBUMIN SERPL BCP-MCNC: 4.1 G/DL (ref 3.2–4.9)
ALBUMIN/GLOB SERPL: 1.5 G/DL
ALP SERPL-CCNC: 57 U/L (ref 30–99)
ALT SERPL-CCNC: 19 U/L (ref 2–50)
ANION GAP SERPL CALC-SCNC: 8 MMOL/L (ref 0–11.9)
AST SERPL-CCNC: 24 U/L (ref 12–45)
BASOPHILS # BLD AUTO: 1.4 % (ref 0–1.8)
BASOPHILS # BLD: 0.06 K/UL (ref 0–0.12)
BILIRUB SERPL-MCNC: 0.6 MG/DL (ref 0.1–1.5)
BUN SERPL-MCNC: 33 MG/DL (ref 8–22)
CALCIUM SERPL-MCNC: 9.4 MG/DL (ref 8.5–10.5)
CHLORIDE SERPL-SCNC: 106 MMOL/L (ref 96–112)
CO2 SERPL-SCNC: 24 MMOL/L (ref 20–33)
CREAT SERPL-MCNC: 1.26 MG/DL (ref 0.5–1.4)
EOSINOPHIL # BLD AUTO: 0.13 K/UL (ref 0–0.51)
EOSINOPHIL NFR BLD: 3 % (ref 0–6.9)
ERYTHROCYTE [DISTWIDTH] IN BLOOD BY AUTOMATED COUNT: 48.9 FL (ref 35.9–50)
ERYTHROCYTE [SEDIMENTATION RATE] IN BLOOD BY WESTERGREN METHOD: 6 MM/HOUR (ref 0–30)
FASTING STATUS PATIENT QL REPORTED: NORMAL
GLOBULIN SER CALC-MCNC: 2.7 G/DL (ref 1.9–3.5)
GLUCOSE SERPL-MCNC: 93 MG/DL (ref 65–99)
HCT VFR BLD AUTO: 38.3 % (ref 37–47)
HGB BLD-MCNC: 12.3 G/DL (ref 12–16)
IMM GRANULOCYTES # BLD AUTO: 0.01 K/UL (ref 0–0.11)
IMM GRANULOCYTES NFR BLD AUTO: 0.2 % (ref 0–0.9)
LYMPHOCYTES # BLD AUTO: 1.7 K/UL (ref 1–4.8)
LYMPHOCYTES NFR BLD: 39.7 % (ref 22–41)
MCH RBC QN AUTO: 30 PG (ref 27–33)
MCHC RBC AUTO-ENTMCNC: 32.1 G/DL (ref 33.6–35)
MCV RBC AUTO: 93.4 FL (ref 81.4–97.8)
MONOCYTES # BLD AUTO: 0.47 K/UL (ref 0–0.85)
MONOCYTES NFR BLD AUTO: 11 % (ref 0–13.4)
NEUTROPHILS # BLD AUTO: 1.91 K/UL (ref 2–7.15)
NEUTROPHILS NFR BLD: 44.7 % (ref 44–72)
NRBC # BLD AUTO: 0 K/UL
NRBC BLD-RTO: 0 /100 WBC
PLATELET # BLD AUTO: 208 K/UL (ref 164–446)
PMV BLD AUTO: 9.5 FL (ref 9–12.9)
POTASSIUM SERPL-SCNC: 4.5 MMOL/L (ref 3.6–5.5)
PROT SERPL-MCNC: 6.8 G/DL (ref 6–8.2)
RBC # BLD AUTO: 4.1 M/UL (ref 4.2–5.4)
SODIUM SERPL-SCNC: 138 MMOL/L (ref 135–145)
TSH SERPL DL<=0.005 MIU/L-ACNC: 1.8 UIU/ML (ref 0.38–5.33)
WBC # BLD AUTO: 4.3 K/UL (ref 4.8–10.8)

## 2019-06-08 PROCEDURE — 86038 ANTINUCLEAR ANTIBODIES: CPT

## 2019-06-08 PROCEDURE — 36415 COLL VENOUS BLD VENIPUNCTURE: CPT

## 2019-06-08 PROCEDURE — 85652 RBC SED RATE AUTOMATED: CPT

## 2019-06-08 PROCEDURE — 84443 ASSAY THYROID STIM HORMONE: CPT

## 2019-06-08 PROCEDURE — 85025 COMPLETE CBC W/AUTO DIFF WBC: CPT

## 2019-06-08 PROCEDURE — 86200 CCP ANTIBODY: CPT

## 2019-06-08 PROCEDURE — 80053 COMPREHEN METABOLIC PANEL: CPT

## 2019-06-09 ENCOUNTER — TELEPHONE (OUTPATIENT)
Dept: MEDICAL GROUP | Facility: MEDICAL CENTER | Age: 84
End: 2019-06-09

## 2019-06-09 PROBLEM — D70.9 NEUTROPENIA (HCC): Status: ACTIVE | Noted: 2019-06-09

## 2019-06-10 ENCOUNTER — TELEPHONE (OUTPATIENT)
Dept: MEDICAL GROUP | Facility: MEDICAL CENTER | Age: 84
End: 2019-06-10

## 2019-06-10 LAB — NUCLEAR IGG SER QL IA: NORMAL

## 2019-06-10 NOTE — TELEPHONE ENCOUNTER
Called patient and left message.  Please notify her that the test results shows:  (1) her thyroid function is normal continue on the same thyroid dose  (2) her inflammatory markers are negative, we are still awaiting the test results looking for rheumatic disease regarding her joints those will take a few days  (3) her blood counts are stable  (4) her kidney function is stable

## 2019-06-10 NOTE — TELEPHONE ENCOUNTER
----- Message from Tee Tran M.D. sent at 6/9/2019 11:08 PM PDT -----  Called patient and left message.  Please notify her that the test results shows:  (1) her thyroid function is normal continue on the same thyroid dose  (2) her inflammatory markers are negative, we are still awaiting the test results looking for rheumatic disease regarding her joints those will take a few days  (3) her blood counts are stable  (4) her kidney function is stable

## 2019-06-11 ENCOUNTER — TELEPHONE (OUTPATIENT)
Dept: MEDICAL GROUP | Facility: MEDICAL CENTER | Age: 84
End: 2019-06-11

## 2019-06-11 LAB — CCP IGG SERPL-ACNC: 4 UNITS (ref 0–19)

## 2019-06-11 NOTE — TELEPHONE ENCOUNTER
Called pt and gave her your message, she wants to know why her blood counts are low and I am telling her they are stable.

## 2019-06-11 NOTE — TELEPHONE ENCOUNTER
Please notify patient that her white blood count has been as low as 3.9  Going back [id number] years ago, it is actually higher than that now.  It has been stable no further work-up for that is necessary.

## 2019-06-11 NOTE — TELEPHONE ENCOUNTER
Please notify patient that the blood tests for rheumatoid arthritis are negative, she can follow-up with the rheumatologist.  That referral has been placed.

## 2019-11-07 ENCOUNTER — HOSPITAL ENCOUNTER (OUTPATIENT)
Dept: RADIOLOGY | Facility: MEDICAL CENTER | Age: 84
End: 2019-11-07
Attending: INTERNAL MEDICINE
Payer: MEDICARE

## 2019-11-07 DIAGNOSIS — Z12.31 VISIT FOR SCREENING MAMMOGRAM: ICD-10-CM

## 2019-11-07 PROCEDURE — 77063 BREAST TOMOSYNTHESIS BI: CPT

## 2019-11-12 ENCOUNTER — OFFICE VISIT (OUTPATIENT)
Dept: MEDICAL GROUP | Facility: MEDICAL CENTER | Age: 84
End: 2019-11-12
Payer: MEDICARE

## 2019-11-12 VITALS
OXYGEN SATURATION: 95 % | HEART RATE: 62 BPM | DIASTOLIC BLOOD PRESSURE: 70 MMHG | WEIGHT: 165 LBS | TEMPERATURE: 98.2 F | HEIGHT: 64 IN | RESPIRATION RATE: 16 BRPM | SYSTOLIC BLOOD PRESSURE: 130 MMHG | BODY MASS INDEX: 28.17 KG/M2

## 2019-11-12 DIAGNOSIS — Z23 NEED FOR SHINGLES VACCINE: ICD-10-CM

## 2019-11-12 DIAGNOSIS — Z86.79 S/P THORACIC AORTIC ANEURYSM REPAIR: Chronic | ICD-10-CM

## 2019-11-12 DIAGNOSIS — R07.9 CHEST PAIN, UNSPECIFIED TYPE: ICD-10-CM

## 2019-11-12 DIAGNOSIS — I07.1 TRICUSPID VALVE INSUFFICIENCY, UNSPECIFIED ETIOLOGY: Chronic | ICD-10-CM

## 2019-11-12 DIAGNOSIS — D70.9 NEUTROPENIA, UNSPECIFIED TYPE (HCC): ICD-10-CM

## 2019-11-12 DIAGNOSIS — Z98.890 S/P THORACIC AORTIC ANEURYSM REPAIR: Chronic | ICD-10-CM

## 2019-11-12 DIAGNOSIS — Z00.00 PREVENTATIVE HEALTH CARE: Chronic | ICD-10-CM

## 2019-11-12 PROCEDURE — 90750 HZV VACC RECOMBINANT IM: CPT | Performed by: INTERNAL MEDICINE

## 2019-11-12 PROCEDURE — 90471 IMMUNIZATION ADMIN: CPT | Performed by: INTERNAL MEDICINE

## 2019-11-12 PROCEDURE — 99214 OFFICE O/P EST MOD 30 MIN: CPT | Mod: 25 | Performed by: INTERNAL MEDICINE

## 2019-11-12 RX ORDER — AZELASTINE 1 MG/ML
2 SPRAY, METERED NASAL 2 TIMES DAILY
Qty: 30 ML | Refills: 5 | Status: SHIPPED | OUTPATIENT
Start: 2019-11-12 | End: 2020-05-22 | Stop reason: SDUPTHER

## 2019-11-12 NOTE — PROGRESS NOTES
Subjective:      Yvonne Marie Wada is a 83 y.o. female who presents with Follow-Up            HPI       Has had sinus congestion improved with allegra, sinus drainage and post nasal drip, has used flonase daily, symptoms more noticeable last few weeks, no fever or chills, some nasal discharge, cough is dry, no sob, no hemoptysis. Takes only 1/2 allegra which does not make her drowsy, 1 full tablet of Allegra it does make her drowsy, symptoms are improved with half a tablet of Allegra, Flonase monotherapy not as effective without the additional Allegra.  No history of recurrent sinus infections.  Does not smoke no history of asthma, no history of COPD.  Hypertension followed by cardiology, blood pressure stable on benazepril, Toprol, amlodipine, no lightness or distance, history of SVT no recurrent symptoms of palpitations.  Has had occasional right-sided chest discomfort, does not happen daily not associate with activity, can be related to coughing related to postnasal drip, was seen in the ER a few months ago diagnosed with musculoskeletal strain from coughing.  Chest x-ray in March was negative.  No trauma to the area.  No history of shingles over that area.  Does have reflux, has been stable and controlled on omeprazole, when she does not take omeprazole expressed breakthrough reflux but she states she does take this regularly and the occasional chest discomfort with coughing is not related to or similar to reflux symptoms.  No hemoptysis.  Chest discomfort not positional, only seems to bother her when she calls related to the postnasal drip.  No history of pulmonary embolism.  No radiation.  No associated nausea, vomiting, does have history of hiatal hernia but again reflux controlled when she takes Prilosec as directed.  Dysphagia, odynophagia.  History neutropenia, no recurrent infections  Hypothyroid on Synthroid 75 mcg daily        Current Outpatient Medications   Medication Sig Dispense Refill   •  metoprolol SR (TOPROL XL) 25 MG TABLET SR 24 HR Take 1 Tab by mouth every day. 90 Tab 3   • fexofenadine (ALLEGRA) 60 MG Tab Take 60 mg by mouth every day.     • cetirizine (ZYRTEC) 10 MG Tab Take 10 mg by mouth every day.     • levothyroxine (SYNTHROID) 75 MCG Tab Take 1 Tab by mouth Every morning on an empty stomach. 90 Tab 3   • benazepril (LOTENSIN) 40 MG tablet Take 1 Tab by mouth every day. 90 Tab 3   • fluticasone (FLONASE) 50 MCG/ACT nasal spray fluticasone 50 mcg/actuation nasal spray,suspension     • atorvastatin (LIPITOR) 80 MG tablet Take 1 Tab by mouth every evening. 90 Tab 3   • CALCIUM PO Take  by mouth.     • Bioflavonoid Products (BIOFLEX PO) Take  by mouth.     • omeprazole (PRILOSEC) 20 MG delayed-release capsule Take 20 mg by mouth as needed (For heartburn).     • magnesium gluconate (MAG-G) 500 MG tablet Take 500 mg by mouth every day.     • Multiple Vitamins-Minerals (CENTRUM SILVER ULTRA WOMENS PO) Take 1 Tab by mouth every day.     • vitamin D (CHOLECALCIFEROL) 1000 UNIT TABS Take 1,000 Units by mouth every day.       No current facility-administered medications for this visit.      Patient Active Problem List   Diagnosis   • Status post lumbar surgery   • Hypertension   • Dyslipidemia   • S/P TOMY (total abdominal hysterectomy)   • Preventative health care   • S/P total knee arthroplasty   • S/P appendectomy   • Osteoporosis, idiopathic   • Hypothyroid   • History of shingles   • GERD (gastroesophageal reflux disease)   • Tricuspid regurgitation   • Cervical pain   • S/P thoracic aortic aneurysm repair   • CKD (chronic kidney disease) stage 3, GFR 30-59 ml/min ([facility])   • UTI (urinary tract infection)   • Insomnia   • Shoulder pain   • History of transient ischemic attack (TIA)   • S/P total hip arthroplasty   • History of supraventricular tachycardia   • Pulmonary hypertension ([facility])   • Neutropenia ([facility])               Health Maintenance Summary                IMM DTaP/Tdap/Td Vaccine Overdue  11/29/1946     IMM ZOSTER VACCINES Overdue 11/4/2011      Done 9/9/2011 Imm Admin: Zoster Vaccine Live (ZVL) (Zostavax)    Annual Wellness Visit Next Due 1/11/2020      Done 1/10/2019 Visit Dx: Medicare annual wellness visit, subsequent     Patient has more history with this topic...    MAMMOGRAM Next Due 11/7/2020      Done 11/7/2019 MA-SCREENING MAMMO BILAT W/TOMOSYNTHESIS W/CAD     Patient has more history with this topic...    BONE DENSITY Next Due 8/7/2023      Done 8/7/2018 DS-BONE DENSITY STUDY (DEXA)     Patient has more history with this topic...          Patient Care Team:  Tee Tran M.D. as PCP - General  Kobi Wheeler M.D. as Consulting Physician (Interventional Cardiology)  Brett Santos II, O.D. as Consulting Physician (Optometry)  Angelic Adams as Consulting Physician (Dentistry)    ROS       Objective:          Physical Exam  Vitals signs and nursing note reviewed.   Constitutional:       Appearance: Normal appearance.   HENT:      Head: Normocephalic and atraumatic.      Right Ear: Tympanic membrane, ear canal and external ear normal.      Left Ear: Tympanic membrane, ear canal and external ear normal.      Nose: Congestion present.      Mouth/Throat:      Pharynx: No posterior oropharyngeal erythema.   Eyes:      General: No scleral icterus.        Right eye: No discharge.         Left eye: No discharge.   Cardiovascular:      Rate and Rhythm: Normal rate and regular rhythm.      Heart sounds: Normal heart sounds. No murmur.   Pulmonary:      Effort: No respiratory distress.      Breath sounds: Normal breath sounds. No wheezing.   Abdominal:      Palpations: Abdomen is soft.   Skin:     General: Skin is warm.   Neurological:      Mental Status: She is alert.   Psychiatric:         Mood and Affect: Mood normal.         Behavior: Behavior normal.         Thought Content: Thought content normal.       No sinus tenderness to palpation  Neck no adenopathy or thyromegaly           Assessment/Plan:     Assessment  #!  Postnasal drip, contributing to dry cough, lungs are clear, normal saturation, afebrile, no evidence of sinusitis, bronchitis, pneumonia.  Symptoms are improved with Allegra half a tablet, also continues on Flonase.    #2 hypertension currently stable on benazepril, Toprol, Norvasc    #3 moderate tricuspid regurgitation with pulmonary hypertension RSVP 55 echo 1 year ago, grade 2 diastolic dysfunction    #3 history of SVT no recurrence remains on Toprol    #4  Atypical chest pain related to coughing, question related to costochondritis, symptoms appear improved if she takes Allegra which helps her cough, had an x-ray for similar symptoms earlier this year in March that was negative in the emergency room    #5  Chronic neutropenia no history of recurrent infections most recent lab 6/8/19 wbc 4.3 (44%N, 39%L)         Plan  #! Echo follow tricuspid regurgitation and diastolic dysfunction    #2 myocardial perfusion study chemical test for atypical chest pain with cardiac history, symptoms could be related to reflux, postnasal drip contributing to cough, also could be secondary to breakthrough reflux    #3 check blood pressure daily record    #4 follow-up cardiology as scheduled    #5 add Astelin nasal spray to Flonase and have a tablet of Allegra daily, Astelin may contribute to epistaxis with Flonase    #6 Shingrix vaccine first in series today, she will need to get her second vaccine within 2 to 6 months, if we do not have that in stock at that time, she will need to check with the pharmacy    #6 has had influenza vaccine    #7 already has had chest x-ray for the cough, I do not think any additional studies are warranted at this time, if the postnasal drip and resultant cough subsides with the use of daily Astelin, Flonase, Allegra no further studies are necessary.  If the Persantine thallium is negative and she still has a cough despite 4 therapy for postnasal drip, consider  increasing PPI, consider CT high-resolution thorax    #8 follow-up 3 to 4 months

## 2019-12-06 ENCOUNTER — HOSPITAL ENCOUNTER (OUTPATIENT)
Dept: CARDIOLOGY | Facility: MEDICAL CENTER | Age: 84
End: 2019-12-06
Attending: INTERNAL MEDICINE
Payer: MEDICARE

## 2019-12-06 ENCOUNTER — TELEPHONE (OUTPATIENT)
Dept: MEDICAL GROUP | Facility: MEDICAL CENTER | Age: 84
End: 2019-12-06

## 2019-12-06 DIAGNOSIS — I07.1 TRICUSPID VALVE INSUFFICIENCY, UNSPECIFIED ETIOLOGY: Chronic | ICD-10-CM

## 2019-12-06 LAB
LV EJECT FRACT  99904: 65
LV EJECT FRACT MOD 2C 99903: 66.81
LV EJECT FRACT MOD 4C 99902: 64.59
LV EJECT FRACT MOD BP 99901: 66.53

## 2019-12-06 PROCEDURE — 93306 TTE W/DOPPLER COMPLETE: CPT | Mod: 26 | Performed by: INTERNAL MEDICINE

## 2019-12-06 PROCEDURE — 93306 TTE W/DOPPLER COMPLETE: CPT

## 2020-01-02 ENCOUNTER — APPOINTMENT (OUTPATIENT)
Dept: CARDIOLOGY | Facility: MEDICAL CENTER | Age: 85
End: 2020-01-02
Payer: MEDICARE

## 2020-01-31 ENCOUNTER — TELEPHONE (OUTPATIENT)
Dept: MEDICAL GROUP | Facility: MEDICAL CENTER | Age: 85
End: 2020-01-31

## 2020-01-31 DIAGNOSIS — N18.30 CKD (CHRONIC KIDNEY DISEASE) STAGE 3, GFR 30-59 ML/MIN: ICD-10-CM

## 2020-01-31 DIAGNOSIS — M81.8 OSTEOPOROSIS, IDIOPATHIC: Chronic | ICD-10-CM

## 2020-01-31 DIAGNOSIS — E55.9 VITAMIN D DEFICIENCY: ICD-10-CM

## 2020-01-31 DIAGNOSIS — I35.1 AORTIC VALVE REGURGITATION, ACQUIRED: ICD-10-CM

## 2020-01-31 DIAGNOSIS — I10 ESSENTIAL HYPERTENSION: Chronic | ICD-10-CM

## 2020-01-31 DIAGNOSIS — Z98.890 S/P THORACIC AORTIC ANEURYSM REPAIR: Chronic | ICD-10-CM

## 2020-01-31 DIAGNOSIS — D70.9 NEUTROPENIA, UNSPECIFIED TYPE (HCC): ICD-10-CM

## 2020-01-31 DIAGNOSIS — E03.9 HYPOTHYROIDISM, UNSPECIFIED TYPE: Chronic | ICD-10-CM

## 2020-01-31 DIAGNOSIS — Z86.79 S/P THORACIC AORTIC ANEURYSM REPAIR: Chronic | ICD-10-CM

## 2020-01-31 DIAGNOSIS — E78.5 DYSLIPIDEMIA: Chronic | ICD-10-CM

## 2020-01-31 DIAGNOSIS — E03.9 HYPOTHYROIDISM, UNSPECIFIED TYPE: ICD-10-CM

## 2020-01-31 RX ORDER — ATORVASTATIN CALCIUM 80 MG/1
80 TABLET, FILM COATED ORAL DAILY
Qty: 90 TAB | Refills: 3 | Status: SHIPPED | OUTPATIENT
Start: 2020-01-31 | End: 2020-09-28

## 2020-01-31 RX ORDER — BENAZEPRIL HYDROCHLORIDE 40 MG/1
40 TABLET ORAL DAILY
Qty: 90 TAB | Refills: 3 | Status: SHIPPED | OUTPATIENT
Start: 2020-01-31 | End: 2020-02-07 | Stop reason: SDUPTHER

## 2020-01-31 RX ORDER — LEVOTHYROXINE SODIUM 0.07 MG/1
75 TABLET ORAL
Qty: 90 TAB | Refills: 1 | Status: SHIPPED | OUTPATIENT
Start: 2020-01-31 | End: 2020-07-30

## 2020-01-31 NOTE — TELEPHONE ENCOUNTER
Please notify the patient that she is due for her repeat fasting blood test, the orders are in the computer system.

## 2020-02-03 ENCOUNTER — HOSPITAL ENCOUNTER (OUTPATIENT)
Dept: LAB | Facility: MEDICAL CENTER | Age: 85
End: 2020-02-03
Attending: INTERNAL MEDICINE
Payer: MEDICARE

## 2020-02-03 DIAGNOSIS — E78.5 DYSLIPIDEMIA: Chronic | ICD-10-CM

## 2020-02-03 DIAGNOSIS — N18.30 CKD (CHRONIC KIDNEY DISEASE) STAGE 3, GFR 30-59 ML/MIN: ICD-10-CM

## 2020-02-03 DIAGNOSIS — E55.9 VITAMIN D DEFICIENCY: ICD-10-CM

## 2020-02-03 DIAGNOSIS — M81.8 OSTEOPOROSIS, IDIOPATHIC: Chronic | ICD-10-CM

## 2020-02-03 DIAGNOSIS — E03.9 HYPOTHYROIDISM, UNSPECIFIED TYPE: Chronic | ICD-10-CM

## 2020-02-03 DIAGNOSIS — D70.9 NEUTROPENIA, UNSPECIFIED TYPE (HCC): ICD-10-CM

## 2020-02-03 LAB
ALBUMIN SERPL BCP-MCNC: 4.1 G/DL (ref 3.2–4.9)
ALBUMIN/GLOB SERPL: 1.5 G/DL
ALP SERPL-CCNC: 61 U/L (ref 30–99)
ALT SERPL-CCNC: 17 U/L (ref 2–50)
ANION GAP SERPL CALC-SCNC: 9 MMOL/L (ref 0–11.9)
AST SERPL-CCNC: 22 U/L (ref 12–45)
BASOPHILS # BLD AUTO: 1.2 % (ref 0–1.8)
BASOPHILS # BLD: 0.05 K/UL (ref 0–0.12)
BILIRUB SERPL-MCNC: 0.6 MG/DL (ref 0.1–1.5)
BUN SERPL-MCNC: 30 MG/DL (ref 8–22)
CALCIUM SERPL-MCNC: 9.7 MG/DL (ref 8.5–10.5)
CHLORIDE SERPL-SCNC: 102 MMOL/L (ref 96–112)
CHOLEST SERPL-MCNC: 121 MG/DL (ref 100–199)
CO2 SERPL-SCNC: 26 MMOL/L (ref 20–33)
CREAT SERPL-MCNC: 1.24 MG/DL (ref 0.5–1.4)
EOSINOPHIL # BLD AUTO: 0.15 K/UL (ref 0–0.51)
EOSINOPHIL NFR BLD: 3.7 % (ref 0–6.9)
ERYTHROCYTE [DISTWIDTH] IN BLOOD BY AUTOMATED COUNT: 44.2 FL (ref 35.9–50)
FASTING STATUS PATIENT QL REPORTED: NORMAL
GLOBULIN SER CALC-MCNC: 2.8 G/DL (ref 1.9–3.5)
GLUCOSE SERPL-MCNC: 94 MG/DL (ref 65–99)
HCT VFR BLD AUTO: 39.4 % (ref 37–47)
HDLC SERPL-MCNC: 45 MG/DL
HGB BLD-MCNC: 12.5 G/DL (ref 12–16)
IMM GRANULOCYTES # BLD AUTO: 0.01 K/UL (ref 0–0.11)
IMM GRANULOCYTES NFR BLD AUTO: 0.2 % (ref 0–0.9)
LDLC SERPL CALC-MCNC: 62 MG/DL
LYMPHOCYTES # BLD AUTO: 1.66 K/UL (ref 1–4.8)
LYMPHOCYTES NFR BLD: 41 % (ref 22–41)
MCH RBC QN AUTO: 30 PG (ref 27–33)
MCHC RBC AUTO-ENTMCNC: 31.7 G/DL (ref 33.6–35)
MCV RBC AUTO: 94.5 FL (ref 81.4–97.8)
MONOCYTES # BLD AUTO: 0.48 K/UL (ref 0–0.85)
MONOCYTES NFR BLD AUTO: 11.9 % (ref 0–13.4)
NEUTROPHILS # BLD AUTO: 1.7 K/UL (ref 2–7.15)
NEUTROPHILS NFR BLD: 42 % (ref 44–72)
NRBC # BLD AUTO: 0 K/UL
NRBC BLD-RTO: 0 /100 WBC
PHOSPHATE SERPL-MCNC: 3.9 MG/DL (ref 2.5–4.5)
PLATELET # BLD AUTO: 202 K/UL (ref 164–446)
PMV BLD AUTO: 10 FL (ref 9–12.9)
POTASSIUM SERPL-SCNC: 4.2 MMOL/L (ref 3.6–5.5)
PROT SERPL-MCNC: 6.9 G/DL (ref 6–8.2)
RBC # BLD AUTO: 4.17 M/UL (ref 4.2–5.4)
SODIUM SERPL-SCNC: 137 MMOL/L (ref 135–145)
TRIGL SERPL-MCNC: 71 MG/DL (ref 0–149)
WBC # BLD AUTO: 4.1 K/UL (ref 4.8–10.8)

## 2020-02-03 PROCEDURE — 85025 COMPLETE CBC W/AUTO DIFF WBC: CPT

## 2020-02-03 PROCEDURE — 83970 ASSAY OF PARATHORMONE: CPT

## 2020-02-03 PROCEDURE — 36415 COLL VENOUS BLD VENIPUNCTURE: CPT

## 2020-02-03 PROCEDURE — 84443 ASSAY THYROID STIM HORMONE: CPT

## 2020-02-03 PROCEDURE — 80061 LIPID PANEL: CPT

## 2020-02-03 PROCEDURE — 80053 COMPREHEN METABOLIC PANEL: CPT

## 2020-02-03 PROCEDURE — 84100 ASSAY OF PHOSPHORUS: CPT

## 2020-02-03 PROCEDURE — 82306 VITAMIN D 25 HYDROXY: CPT

## 2020-02-04 ENCOUNTER — TELEPHONE (OUTPATIENT)
Dept: MEDICAL GROUP | Facility: MEDICAL CENTER | Age: 85
End: 2020-02-04

## 2020-02-04 LAB
25(OH)D3 SERPL-MCNC: 67 NG/ML (ref 30–100)
PTH-INTACT SERPL-MCNC: 57.9 PG/ML (ref 14–72)
TSH SERPL DL<=0.005 MIU/L-ACNC: 3.13 UIU/ML (ref 0.38–5.33)

## 2020-02-05 NOTE — TELEPHONE ENCOUNTER
Notified with labs, stable CKD, neutropenia stable, no changes continue current medication regimen.

## 2020-04-08 RX ORDER — SULFAMETHOXAZOLE AND TRIMETHOPRIM 800; 160 MG/1; MG/1
1 TABLET ORAL 2 TIMES DAILY
Qty: 6 QUANTITY SUFFICIENT | Refills: 0 | Status: SHIPPED | OUTPATIENT
Start: 2020-04-08 | End: 2020-07-16

## 2020-04-19 ENCOUNTER — HOSPITAL ENCOUNTER (EMERGENCY)
Facility: MEDICAL CENTER | Age: 85
End: 2020-04-19
Attending: EMERGENCY MEDICINE
Payer: MEDICARE

## 2020-04-19 VITALS
DIASTOLIC BLOOD PRESSURE: 75 MMHG | HEART RATE: 75 BPM | RESPIRATION RATE: 18 BRPM | BODY MASS INDEX: 27.83 KG/M2 | WEIGHT: 163 LBS | OXYGEN SATURATION: 96 % | TEMPERATURE: 97.4 F | HEIGHT: 64 IN | SYSTOLIC BLOOD PRESSURE: 155 MMHG

## 2020-04-19 DIAGNOSIS — J01.01 ACUTE RECURRENT MAXILLARY SINUSITIS: ICD-10-CM

## 2020-04-19 LAB — COVID ORDER STATUS COVID19: NORMAL

## 2020-04-19 PROCEDURE — G2023 SPECIMEN COLLECT COVID-19: HCPCS | Performed by: EMERGENCY MEDICINE

## 2020-04-19 PROCEDURE — 99284 EMERGENCY DEPT VISIT MOD MDM: CPT

## 2020-04-19 RX ORDER — AZITHROMYCIN 250 MG/1
TABLET, FILM COATED ORAL
Qty: 6 TAB | Refills: 0 | Status: SHIPPED | OUTPATIENT
Start: 2020-04-19 | End: 2020-05-22

## 2020-04-19 ASSESSMENT — FIBROSIS 4 INDEX: FIB4 SCORE: 2.19

## 2020-04-19 NOTE — ED NOTES
Discharge instructions given and discussed. RX for zithromax given and pt educated. Pt educated to come back to ER for new or worsening symptoms and follow up with PCP as instructed. Pt verbalized understanding. VSS. Pt  Discharged in stable condition. Pt educated on self isolation and verbalized understanding, mask remained in place. .

## 2020-04-19 NOTE — ED NOTES
Pt complains of right eye sinus pressure and headache. Pt states she was up all night with a cough as well . Denies CP or SOB.

## 2020-04-19 NOTE — DISCHARGE INSTRUCTIONS
Medications as prescribed. Return to the emergency department for worsening pain call if you have fever, shortness of breath or other concerns.  We will call you with the results of your COVID-19 test.

## 2020-04-19 NOTE — ED PROVIDER NOTES
ED Provider Note    CHIEF COMPLAINT  Chief Complaint   Patient presents with   • Sore Throat     sore throat with post nasal drainage x 10 days  R orbit pain    • Eye Pain   • Cough     coughed all night       HPI  Yvonne Marie Wada is a 84 y.o. female who presents to the emergency department complaining of sore throat, runny nose, postnasal drip and sinus discomfort on the right side.  The patient has a history of chronic and recurrent sinus infections with similar pain.  She states she has had this multiple times in the last 50 years and this is really no different.  Started with some sinus congestion which is been trying her over-the-counter and prescription remedies at home including Flonase and Allegra but really this is not helping.  She has significant postnasal drip when she lays down but this causes a cough.  The cough is entirely associated with a tickle in her throat from postnasal drip.  She has no shortness of breath or chest discomfort.  Cough is nonproductive.  She has some pain in the maxillary sinus and the right above her eye on the right.  She has no lateralizing headache or temporal pain.  She denies any fevers or chills or nausea vomiting.  She states she has had this multiple times in the past and still gets antibiotics.  She denies any other acute concerns or complaints.  No COVID-19 exposure.    REVIEW OF SYSTEMS  See HPI for further details. All other systems are negative.    PAST MEDICAL HISTORY  Past Medical History:   Diagnosis Date   • AAA (abdominal aortic aneurysm) ([facility]) 7/26/2010    10/09 CT thorax dilated ascending thoracic aorta 4.9 cm 1/10 transesophageal echo dilated aortic root, and ascending aorta with mild aortic insufficiency 1/10  ascending aortic aneurysm repair 5/12 echo [facility] normal LV function EF 60%, moderate to severe left atrial enlargement, RVSP 32    • Aortic insufficiency    • Aortic valve disease    • Aortic valve regurgitation    • Arthritis     back and  "knee/ osteo   • Cataract    • Dental disorder     full top denture, partial bottom   • Diverticulosis    • Dyslipidemia 7/26/2010    Sees [facility] on lipitor 4/11 chol 159,trig 84,hdl 47,ldl 95,cpk 114 7/12 chol 163,trig 120,hdl 49,ldl 90, crp 1.1 on lipitor 20 mg    • GERD (gastroesophageal reflux disease) 7/12/2012    6/07 EGD per [facility] hiatal hernia, start prevacid 30 mg 7/12 protonix 20 mg qday    • Glaucoma    • Heart burn    • Heart murmur    • Heart valve disease     \"leaking\", mitral valve   • Hiatus hernia syndrome    • High cholesterol    • History of shingles 6/30/2011 2010 Back and left thorax     • History of total knee arthroplasty 7/26/2010    4/10 MRI right knee complex tear medial meniscus, horizontal cleavage tear lateral meniscus, chondromalacia patella, moderate-sized Baker's cyst 5/10 right meniscus knee repair  5/10 told by  had gout on surgery had pathology report, I await that report Question gout seen on pathology report done during repair of right meniscus knee surgery. 7/10 uric acid 6.3, we'll await starting allopurinol depending on the operative report 8/5/10 reviewed ortho notes , op report indicates crystal deposition, could be gout or pseudogout. No bx result sent over. Will need to get. My suspicion is chondrocalcinosis. 10/11  ortho note, MRI pending 8/7/10 reviewed pathology report right knee tissue, marked degenerative changes with focal calcification, no mention of gout. Based on this and a normal uric acid level, I do not believe she has gout, has no hyperuricemia medications would be indicated 10/11  ortho left knee meniscectomy and arthroscopy 1/12     • HLD (hyperlipidemia)    • Hypertension    • Hypothyroid 4/8/2011 4/11 tsh 9 start synthroid 50 4/11 tsh 6,free t3 3.1,free t4 1.2,tpo negative 7/1/11 tsh 2.1 cont synthroid 50 mcg 7/12 tsh 3.4 cont synthroid 50 mcg    • Indigestion    • Mitral insufficiency    • " OSTEOPOROSIS 8/9/2010    Had been on fosamax [id number]  8/10 dexa LS -2.0,hip -1.5 await old dexa result, she will get copy for me 4/11 vit d 35 9/12 dexa LS -3.2,hip -1.4 2/13/13 relast infusion    • Pain     back and right leg, can get as bad as 10/10   • Preventative health care 7/26/2010    2002 pneum 2005 colon [facility] no records 4/11 vit d 35 9/11 shingles vaccine 9/12 mammogram 9/12 dexa LS -3.2,hip -1.4    • Rheumatic fever    • S/P appendectomy 7/26/2010   • S/P TOMY (total abdominal hysterectomy) 7/26/2010    Sees gyn on HRT estradiol for 20 yrs () 11/08 mammo     • Shingles 6/30/2011   • Snoring    • Status post lumbar surgery 7/26/2010 6/03 L4-5 epidural Dr. Jordan  7/06 overall for decompression, foraminotomy. L4-L5 posterior fusion, L4-L5 allograft      • Stroke (HCC) 1996    no residual problems    • Thoracic aortic aneurysm without mention of rupture    • Tricuspid insufficiency    • Unspecified disorder of thyroid     hypo       FAMILY HISTORY  Family History   Problem Relation Age of Onset   • Stroke Mother    • Heart Disease Father    • Heart Disease Sister        SOCIAL HISTORY  Social History     Socioeconomic History   • Marital status:      Spouse name: Not on file   • Number of children: Not on file   • Years of education: Not on file   • Highest education level: Not on file   Occupational History   • Not on file   Social Needs   • Financial resource strain: Not on file   • Food insecurity     Worry: Not on file     Inability: Not on file   • Transportation needs     Medical: Not on file     Non-medical: Not on file   Tobacco Use   • Smoking status: Never Smoker   • Smokeless tobacco: Never Used   • Tobacco comment: none   Substance and Sexual Activity   • Alcohol use: No     Alcohol/week: 0.0 oz   • Drug use: Yes     Comment: CBD Oil for Arthritis   • Sexual activity: Not Currently     Partners: Male   Lifestyle   • Physical activity     Days per week: Not on file      Minutes per session: Not on file   • Stress: Not on file   Relationships   • Social connections     Talks on phone: Not on file     Gets together: Not on file     Attends Judaism service: Not on file     Active member of club or organization: Not on file     Attends meetings of clubs or organizations: Not on file     Relationship status: Not on file   • Intimate partner violence     Fear of current or ex partner: Not on file     Emotionally abused: Not on file     Physically abused: Not on file     Forced sexual activity: Not on file   Other Topics Concern   • Not on file   Social History Narrative   • Not on file       SURGICAL HISTORY  Past Surgical History:   Procedure Laterality Date   • TENDON REPAIR Right 11/10/2016    Procedure: TENDON REPAIR - QUADRACEPS ;  Surgeon: Maxim Herrera M.D.;  Location: [facility];  Service:    • KNEE ARTHROPLASTY TOTAL Right 9/8/2016    Procedure: KNEE ARTHROPLASTY TOTAL;  Surgeon: Maxim Herrera M.D.;  Location: [facility];  Service:    • LUMBAR FUSION POSTERIOR  11/24/2015    Procedure: LUMBAR FUSION POSTERIOR L3-4, EXPLORATION OF FUSION L4-5 WITH INSTRUMENTATION;  Surgeon: Hermann Reis M.D.;  Location: [facility];  Service:    • LUMBAR LAMINECTOMY DISKECTOMY  11/24/2015    Procedure: LUMBAR L3-4 LAMINECTOMY AND REDO L4-5;  Surgeon: Hermann Reis M.D.;  Location: [facility];  Service:    • KNEE ARTHROPLASTY TOTAL  1/3/2012    Performed by YOSEF MEJÍA at [facility]   • KNEE ARTHROSCOPY  10/18/2011    Performed by YOSEF MEJÍA at [facility]   • MEDIAL MENISCECTOMY  10/18/2011    Performed by YOSEF MEJÍA at [facility]   • AORTIC VALVE REPLACEMENT  1/12/2010    Performed by ISAÍAS NICOLE at [facility]   • APPENDECTOMY     • HYSTERECTOMY, TOTAL ABDOMINAL     • LUMBAR FUSION POSTERIOR         CURRENT MEDICATIONS  Home Medications    **Home medications  "have not yet been reviewed for this encounter**         ALLERGIES  Allergies   Allergen Reactions   • Amoxicillin    • Bactrim [Sulfamethoxazole W-Trimethoprim]    • Codeine Nausea     Synthetic codones ok   • Doxycycline    • Trazodone Vomiting     groggy   • Ultram [Tramadol] Vomiting     nausea       PHYSICAL EXAM  VITAL SIGNS: /76   Pulse 71   Temp 36.7 °C (98.1 °F) (Temporal)   Resp 18   Ht 1.626 m (5' 4\")   Wt 73.9 kg (163 lb)   SpO2 96%   BMI 27.98 kg/m²      Constitutional: Well developed, Well nourished, No acute distress, Non-toxic appearance.   HENT: Normocephalic, Atraumatic, Bilateral external ears normal, Oropharynx moist, No oral exudates, Nose normal.  No temporal or mastoid tenderness.  Slight swelling to the nasal mucosa but no purulent discharge.  Tenderness of the right maxillary sinus.  No tenderness around the eyes.  Eyes: PERRL, EOMI, Conjunctiva normal, No discharge.   Neck: Normal range of motion, No tenderness, Supple, No stridor.   Cardiovascular: Normal heart rate, Normal rhythm, No murmurs, No rubs, No gallops.   Thorax & Lungs: Normal breath sounds, No respiratory distress, No wheezing,  Abdomen: Bowel sounds normal, Soft, No tenderness,.   Musculoskeletal: Good range of motion in all major joints.   Neurologic: Alert, , No focal deficits noted.   Psychiatric: Affect normal        RADIOLOGY/PROCEDURES      COURSE & MEDICAL DECISION MAKING  Pertinent Labs & Imaging studies reviewed. (See chart for details)      Patient presents with sinus pressure nasal discharge pain around the right eye and a bit of a cough.  I do believe the cough to be secondary to the postnasal drip.  Her lungs are clear she has a cough when she sits up with a normal saturation.  I do not think she requires an x-ray I do not think she is likely to have pneumonia.    Because of her age and comorbidities we will do a COVID-19 test.  This will be performed with a 2-day turnaround time and she will be " called with the results.    I reviewed the patient's chart.  She does not have imaging to support the diagnosis of sinusitis in the past.  She has a relatively recent normal ESR which I think makes temporal arteritis unlikely.  Her presentation is already unlikely but a recent lab is reassuring.    I reviewed the patient's primary care physician's notes that are on the computer and she has had multiple similar presentations occasionally treated with antibiotics and occasionally not.    At this point I think it is prudent to treat her with antibiotics.  She does have multiple drug allergies to this limits our choices.      The patient was noted to have elevated blood pressure while in the ER and was counseled to see their doctor within one wee to have this rechecked.    Tee Tran M.D.  [address] [facility] [address]  [id number]  [facility] [address]  [phone]    Schedule an appointment as soon as possible for a visit in 2 days          FINAL IMPRESSION  1. Acute recurrent maxillary sinusitis         2.   3.         Electronically signed by: Alfie Amador M.D., 4/19/2020 11:37 AM

## 2020-04-21 LAB
SARS-COV-2 RNA RESP QL NAA+PROBE: NOT DETECTED
SPECIMEN SOURCE: NORMAL

## 2020-04-21 NOTE — ED NOTES
COVID-19 Test Follow Up  04/21/20      Patient tested negative for COVID-19. I have informed the patient of the result by My Chart Message. Encouraged to stay at home until no fever for 72 hours without the use of fever reducing medications and symptoms improving. Informed there is no need for further self-isolate for 14 days for COVID-19 unless otherwise directed by the Health Dept.      4/19/2020 11:34   COVID Order Status Received   2019-nCoV RNA Interp Not detected   2019-nCoV Source NP Swab     She is advised to return to the ER for worsening symptoms including difficulty breathing, ongoing fever, weakness or chest pain.    Cristina Garcia, PharmD

## 2020-05-20 ENCOUNTER — TELEPHONE (OUTPATIENT)
Dept: HEALTH INFORMATION MANAGEMENT | Facility: OTHER | Age: 85
End: 2020-05-20

## 2020-05-20 NOTE — TELEPHONE ENCOUNTER
1. Caller Name: Yvonne Marie Wada                         Call Back Number: [phone]    [facility] PCP or Specialty Provider: Yes           2.  Does patient have any active symptoms of respiratory illness? Yes, the patient reports the following respiratory symptoms: cough and ha, eye pain. Pt states is a sinus infection, has hx of, pt has had neg covid test    3.  Does patient have any comoribidities? None     4.  Has the patient traveled in the last 14 days OR had any known contact with someone who is suspected or confirmed to have COVID-19?  No.    5. Disposition: Cleared by RN Triage as potential is low for COVID-19; OK to keep/schedule appointment    Note routed to [facility] Provider: [facility] only.

## 2020-05-22 ENCOUNTER — OFFICE VISIT (OUTPATIENT)
Dept: MEDICAL GROUP | Facility: MEDICAL CENTER | Age: 85
End: 2020-05-22
Payer: MEDICARE

## 2020-05-22 VITALS
HEART RATE: 68 BPM | HEIGHT: 64 IN | DIASTOLIC BLOOD PRESSURE: 62 MMHG | BODY MASS INDEX: 27.31 KG/M2 | TEMPERATURE: 97.9 F | OXYGEN SATURATION: 95 % | WEIGHT: 160 LBS | SYSTOLIC BLOOD PRESSURE: 138 MMHG

## 2020-05-22 DIAGNOSIS — J01.00 ACUTE NON-RECURRENT MAXILLARY SINUSITIS: ICD-10-CM

## 2020-05-22 PROCEDURE — 99214 OFFICE O/P EST MOD 30 MIN: CPT | Performed by: FAMILY MEDICINE

## 2020-05-22 RX ORDER — AZITHROMYCIN 250 MG/1
TABLET, FILM COATED ORAL
Qty: 6 TAB | Refills: 0 | Status: SHIPPED | OUTPATIENT
Start: 2020-05-22 | End: 2020-05-27

## 2020-05-22 RX ORDER — AZELASTINE 1 MG/ML
1 SPRAY, METERED NASAL 2 TIMES DAILY
Qty: 30 ML | Refills: 5 | Status: SHIPPED | OUTPATIENT
Start: 2020-05-22 | End: 2020-07-16

## 2020-05-22 ASSESSMENT — FIBROSIS 4 INDEX: FIB4 SCORE: 2.19

## 2020-05-22 ASSESSMENT — PATIENT HEALTH QUESTIONNAIRE - PHQ9: CLINICAL INTERPRETATION OF PHQ2 SCORE: 0

## 2020-05-22 NOTE — PROGRESS NOTES
Subjective:   Yvonne Marie Wada is a 84 y.o. female here today for sinusitis     Has been having persistent with nasal congestion and rhinorrhea for the last 1 month or more. She having right sided maxillary sinus pressure that radiates to under her right eye and also having frontal headache. Using Flonase daily without improvement in symptoms. Having post nasal drainage. Symptoms are worse at night.  Was seen at the ER 1 month ago and given Z-martin, which helped slightly but then came right back.    Current medicines (including changes today)  Current Outpatient Medications   Medication Sig Dispense Refill   • azithromycin (ZITHROMAX) 250 MG Tab 2 tabs by mouth day 1, 1 tab by mouth days 2-5 6 Tab 0   • azelastine (ASTELIN) 137 MCG/SPRAY nasal spray [place] 1 Spray in nose 2 times a day. 30 mL 5   • sulfamethoxazole-trimethoprim (BACTRIM DS) 800-160 MG tablet Take 1 Tab by mouth 2 times a day. 6 Quantity Sufficient 0   • benazepril (LOTENSIN) 40 MG tablet Take 1 Tab by mouth every day. 90 Tab 3   • levothyroxine (SYNTHROID) 75 MCG Tab Take 1 Tab by mouth Every morning on an empty stomach. 90 Tab 1   • atorvastatin (LIPITOR) 80 MG tablet Take 1 Tab by mouth every day. 90 Tab 3   • metoprolol SR (TOPROL XL) 25 MG TABLET SR 24 HR Take 1 Tab by mouth every day. 90 Tab 3   • fexofenadine (ALLEGRA) 60 MG Tab Take 60 mg by mouth every day.     • cetirizine (ZYRTEC) 10 MG Tab Take 10 mg by mouth every day.     • fluticasone (FLONASE) 50 MCG/ACT nasal spray fluticasone 50 mcg/actuation nasal spray,suspension     • CALCIUM PO Take  by mouth.     • Bioflavonoid Products (BIOFLEX PO) Take  by mouth.     • omeprazole (PRILOSEC) 20 MG delayed-release capsule Take 20 mg by mouth as needed (For heartburn).     • magnesium gluconate (MAG-G) 500 MG tablet Take 500 mg by mouth every day.     • Multiple Vitamins-Minerals (CENTRUM SILVER ULTRA WOMENS PO) Take 1 Tab by mouth every day.     • vitamin D (CHOLECALCIFEROL) 1000 UNIT TABS Take  "1,000 Units by mouth every day.       No current facility-administered medications for this visit.      She  has a past medical history of AAA (abdominal aortic aneurysm) ([facility]) (7/26/2010), Aortic insufficiency, Aortic valve disease, Aortic valve regurgitation, Arthritis, Cataract, Dental disorder, Diverticulosis, Dyslipidemia (7/26/2010), GERD (gastroesophageal reflux disease) (7/12/2012), Glaucoma, Heart burn, Heart murmur, Heart valve disease, Hiatus hernia syndrome, High cholesterol, History of shingles (6/30/2011), History of total knee arthroplasty (7/26/2010), HLD (hyperlipidemia), Hypertension, Hypothyroid (4/8/2011), Indigestion, Mitral insufficiency, OSTEOPOROSIS (8/9/2010), Pain, Preventative health care (7/26/2010), Rheumatic fever, S/P appendectomy (7/26/2010), S/P TOMY (total abdominal hysterectomy) (7/26/2010), Shingles (6/30/2011), Snoring, Status post lumbar surgery (7/26/2010), Stroke ([facility]) (1996), Thoracic aortic aneurysm without mention of rupture, Tricuspid insufficiency, and Unspecified disorder of thyroid. She also has no past medical history of CAD (coronary artery disease), COPD, or Liver disease.    ROS   No fever, + cough       Objective:     /62 (BP Location: Right arm, Patient Position: Sitting)   Pulse 68   Temp 36.6 °C (97.9 °F)   Ht 1.626 m (5' 4\")   Wt 72.6 kg (160 lb)   SpO2 95%  Body mass index is 27.46 kg/m².   Physical Exam:  Constitutional: Alert, no distress.  Skin: Warm, dry, good turgor, no rashes in visible areas.  Eye: Equal, round and reactive, conjunctiva clear, lids normal.  ENMT: Lips without lesions, good dentition, oropharynx clear. + right maxillary sinus tenderness on palpation.  Neck: Trachea midline, no masses, no thyromegaly. No cervical or supraclavicular lymphadenopathy  Respiratory: Unlabored respiratory effort, lungs clear to auscultation, no wheezes, no ronchi.  Psych: Alert and oriented x3, normal affect and mood.        Assessment and Plan:   The " following treatment plan was discussed    1. Acute non-recurrent maxillary sinusitis  New problem. [place] with Z-martin and will add Astelin 1 spray bilaterally to Flonase 1 spray bilaterally.      Followup: Return if symptoms worsen or fail to improve.

## 2020-07-13 ENCOUNTER — NURSE TRIAGE (OUTPATIENT)
Dept: HEALTH INFORMATION MANAGEMENT | Facility: OTHER | Age: 85
End: 2020-07-13

## 2020-07-13 NOTE — TELEPHONE ENCOUNTER
1. Caller Name: Mala                Call Back Number: [phone]  [facility] PCP or Specialty Provider: Yes         2.  In the last two weeks, has the patient had any new or worsening symptoms (not explained by alternative diagnosis)? No. sinus congestion. Allergies. Taking allegra and nasal rinse. Now with dizziness and HA around R eye.     3.  Does patient have any comoribidities? None     4.  Has the patient traveled in the last 14 days OR had any known contact with someone who is suspected or confirmed to have COVID-19?  No.    5. Disposition: Cleared by RN Triage as potential is low for COVID-19; OK to keep/schedule appointment    Note routed to [facility] Provider: [facility] only.

## 2020-07-13 NOTE — TELEPHONE ENCOUNTER
Regarding: Patient seeking clearance for office visit  ----- Message from Tio Márquez sent at 7/13/2020  9:45 AM PDT -----  Patient is having some sinus concerns and believes it is causing dizziness and would like to schedule an in office visit with provider

## 2020-07-16 ENCOUNTER — TELEPHONE (OUTPATIENT)
Dept: MEDICAL GROUP | Facility: MEDICAL CENTER | Age: 85
End: 2020-07-16

## 2020-07-16 ENCOUNTER — OFFICE VISIT (OUTPATIENT)
Dept: MEDICAL GROUP | Facility: MEDICAL CENTER | Age: 85
End: 2020-07-16
Payer: MEDICARE

## 2020-07-16 VITALS
DIASTOLIC BLOOD PRESSURE: 68 MMHG | SYSTOLIC BLOOD PRESSURE: 136 MMHG | TEMPERATURE: 97.8 F | WEIGHT: 163 LBS | HEART RATE: 74 BPM | BODY MASS INDEX: 27.83 KG/M2 | HEIGHT: 64 IN | OXYGEN SATURATION: 94 %

## 2020-07-16 DIAGNOSIS — Z00.00 PREVENTATIVE HEALTH CARE: Chronic | ICD-10-CM

## 2020-07-16 DIAGNOSIS — I27.20 PULMONARY HYPERTENSION, UNSPECIFIED (HCC): ICD-10-CM

## 2020-07-16 DIAGNOSIS — M81.8 IDIOPATHIC OSTEOPOROSIS: Chronic | ICD-10-CM

## 2020-07-16 DIAGNOSIS — J30.1 ALLERGIC RHINITIS DUE TO POLLEN, UNSPECIFIED SEASONALITY: ICD-10-CM

## 2020-07-16 PROBLEM — J30.9 ALLERGIC RHINITIS: Status: ACTIVE | Noted: 2020-07-16

## 2020-07-16 PROCEDURE — 99213 OFFICE O/P EST LOW 20 MIN: CPT | Performed by: INTERNAL MEDICINE

## 2020-07-16 RX ORDER — CEFIXIME 400 MG/1
400 CAPSULE ORAL DAILY
Qty: 10 CAP | Refills: 0 | Status: SHIPPED | OUTPATIENT
Start: 2020-07-16 | End: 2020-09-28

## 2020-07-16 RX ORDER — DOXYCYCLINE HYCLATE 100 MG
100 TABLET ORAL 2 TIMES DAILY
Qty: 20 TAB | Refills: 0 | Status: SHIPPED | OUTPATIENT
Start: 2020-07-16 | End: 2020-07-16

## 2020-07-16 RX ORDER — IPRATROPIUM BROMIDE 21 UG/1
2 SPRAY, METERED NASAL 3 TIMES DAILY
Qty: 6 ML | Refills: 6 | Status: SHIPPED | OUTPATIENT
Start: 2020-07-16 | End: 2020-08-15

## 2020-07-16 ASSESSMENT — FIBROSIS 4 INDEX: FIB4 SCORE: 2.19

## 2020-07-16 NOTE — PROGRESS NOTES
Subjective:      Yvonne Marie Wada is a 84 y.o. female who presents sinus issue          HPI     Has been having persistent sinus congestion now using allegra, tried astelin but caused nasal irritation so she is no longer using that, currently using Flonase, also uses nasal lavage in the am,  the allegra has helped, no cough, still with sinus pressure, no hearing changes, no tinnitus, no fever, no chills, no cough, no shortness of breath, no chest pain, no hemoptysis  Completed zpack in May with no benefit, intolerant of amoxicillin previously, doxycycline listed as intolerant but she does not recall any side effect of the medication  Medication, allergies, medical history, surgical history, social history reviewed and updated  No tobacco  Hypertension blood pressure stable running 130s over 70s to 80s  CKD no regular NSAIDs  No covid exposures  Aortic regurgitation  12/6/19 echo normal LV function EF 65%, RVSP 43, moderate tricuspid regurgitation, mild to moderate aortic insufficiency    Cervical pain  2/10/14 [facility] note; x-ray cervical spine DJD, right shoulder steroid injection     Decreased GFR  5/31/09 bun 18,creat 1.2  1/14/10 bun 28,creat 1.3,GFR 40  9/20/12 bun 37,creat 1.1,GFR 48  9/25/13 bun 25,creat 1.2,GFR 44  9/26/14 bun 26,creat 1.1,GFR 45  2/23/15 bun 20,creat 1.1,GFR 48  4/15/16 bun 31,creat 1.1,GFR 45  7/6/16 bun 29,creat 1.1,GFR 44   5/12/17 bun 35,creat 1.1,GFR 44  11/2/17 bun 22,creat 1.28,GFR 40  12/9/17 bun 29,creat 1.1,GFR 43  10/4/18 bun 31,creat 1.5 at [facility]  1/18/19 bun 33,creat 1.34,GFR 38,PTH 53,calcium 9.7,phos 3.6,spep negative  2/4/20 bun 30,creat 1.24, GFR 41, PTH 58, calcium 9.7, phos 3.9     Dyslipidemia  4/11 chol 159,trig 84,hdl 47,ldl 95,cpk 114  7/12 chol 163,trig 120,hdl 49,ldl 90, crp 1.1 on lipitor 20 mg  3/26/13 chol 159,trig 99,hdl 46,ldl 93 on lipitor 20 mg  9/25/13 chol 164,trig 100,hdl 47,ldl 97 on lipitor 20 mg  9/12/14 cardiology note lower extremity weakness,  hold lipitor x3 months, labs ordered  12/15/14 cardiology start low dose lipitor 10 mg  1/22/15 off lipitor will resume 10 mg and repeat labs 4 weeks  2/24/15 chol 179,trig 111,hdl 54,ldl 103 on lipitor 10 mg  2/1/16 cardiology note restart lipitor 20 mg    4/15/16 chol 163,trig 102,hdl 48,ldl 95 on lipitor 20 mg  5/12/17 chol 186,trig 101,hdl 47,ldl 119 on lipitor 20 mg intermittently will start taking daily  11/1/17 chol 146,trig 73,hdl 42,ldl 89 on lipitor 20 mg  12/9/17 chol 133,trig 74,hdl 49,ldl 69 on lipitor 80 mg higher dose due to recent transient ischemic attack  10/4/18 chol 126,trig 98,hdl 44,ldl 62 on lipitor 80 mg at [facility]  1/18/19 chol 161,trig 103,hdl 45,ldl 95 on lipitor 80 mg  2/4/20 chol 121,trig 71,hdl 45,ldl 62 on lipitor 80 mg       Gastroesophageal reflux  6/27/07 EGD per [facility] hiatal hernia, start prevacid 30 mg  7/12 protonix 20 mg qday  11/16/12 [facility] note cont prilosec  1/5/16 change to protonix 40 mg from prilosec     History shingles     History of supraventricular tachycardia  12/20/17  [facility] cardiology note most recent echocardiogram looks fine, will repeat CT scan follow aortic repair  2/20/17 episode of supraventricular tachycardia in the emergency room, davenport catheter removed, symptoms resolved with repeat EKG sinus rhythm  11/17/18 cardiology note asymptomatic, continue cholesterol medication, continue to monitor echo aortic valve, repaired ascending aorta, follow-up yearly  11/21/18 echo normal LV function, EF 60%, mild LVH, grade 2 diastolic dysfunction, mild aortic insufficiency, moderate tricuspid regurgitation, RVSP 50      History TIA  11/1/17 hospital admission, 11/2/17 hospital discharge, facial numbness, transient numbness in both hands, resolved, CT, MRI head no acute infarct, blood pressure stable, discharged home, patient states she was on aspirin at the time of admission  11/1/17 CT head stable right mid brain likely chronic lacunar infarction,  periventricular white matter disease  11/1/17 MRI brain no acute abnormality, moderate chronic microvascular stomach disease, chronic microhemorrhages left frontal and right parietal lobes, chronic lacunar infarct left basal ganglia and blanco, or cerebral atrophy  11/1/17 MR-MRA head without; negative  11/2/17 echo normal LV size and function, EF 70%, RVSP 50, mild AR and TR  11/28/17 awaiting possible right hip replacement per orthopedics , given recent possible TIA, cannot provide clearance, she will like second opinion from cardiology referral made to dr.bryan de leon, changed asa to plavix  12/12/17 ultrasound carotid less than 50% internal carotid stenosis bilateral  12/20/17 dr.bryan de leon cardiology note most recent echocardiogram looks fine, will repeat CT scan follow aortic repair  2/20/18 episode of supraventricular tachycardia in the emergency room, daven[place] catheter removed, symptoms resolved with repeat EKG sinus rhythm  11/17/18 cardiology note asymptomatic, continue cholesterol medication, continue to monitor echo aortic valve, repaired ascending aorta, follow-up yearly  11/21/18 echo normal LV function, EF 60%, mild LVH, grade 2 diastolic dysfunction, mild aortic insufficiency, moderate tricuspid regurgitation, RVSP 50  12/6/19 echo normal LV function EF 65%, RVSP 43, moderate tricuspid regurgitation, mild to moderate aortic insufficiency     Hypertension  Sees cardiology  1/10/11 [facility] note cardiac catheterization normal systolic function  3/11 [facility] echo moderate aortic insufficiency, moderate mitral insufficiency, moderate tricuspid insufficiency, normal LV function  5/12 echo [facility] normal LV function EF 60%, moderate to severe left atrial enlargement, RVSP 32    9/26/13 CT thoracic aorta no evidence of aneurysm, small hiatal hernia  9/26/13 Persantine thallium uniform breast tissue attenuation, cannot rule out underlying ischemic, LVEF 67%  10/3/13 on toprol-XL 25 mg half a tablet daily     12/13/13 cardiology note cont same meds, repeat echo and follow up 6 months  1/2/14 echo mild LVH, grade 1 diastolic dysfunction, ascending aortic 4.0, no change compared with ZAK 2010  5/15/14 cardiology note; increase benazepril to 40 mg qday,continue toprol XL 25 mg daily  6/17/15 cardiology note continue meds, repeat echo 6 months  2/1/16 cardiology note moderate pulmonary hypertension and snoring, nocturnal pulse oximetry ordered  6/30/16 cardiology note patient declines overnight pulse oximetry  11/1/17 echo normal LV size and function, EF 70%, mild aortic insufficiency and mild tricuspid regurgitation, RVSP 50, aortic root 3.2 cm  11/3/17 cardiology note hypertension stable, status post thoracic aortic aneurysm repair, headache unspecified, ESR ordered  11/28/17 on benazepril 40 mg,toprol 25mg, add norvasc 5 mg  12/12/17 ultrasound carotid less than 50% internal carotid stenosis bilateral  12/20/17  [facility] cardiology note most recent echocardiogram looks fine, will repeat CT scan follow aortic repair  2/20/18 episode of supraventricular tachycardia in the emergency room, davenport catheter removed, symptoms resolved with repeat EKG sinus rhythm  11/17/18 cardiology note asymptomatic, continue cholesterol medication, continue to monitor echo aortic valve, repaired ascending aorta, follow-up yearly  11/21/18 echo normal LV function, EF 60%, mild LVH, grade 2 diastolic dysfunction, mild aortic insufficiency, moderate tricuspid regurgitation, RVSP 50  1/18/19 urine mac 45 on benazepril 40 mg, toprol 25 mg, norvasc 5 mg  12/6/19 echo normal LV function EF 65%, RVSP 43, moderate tricuspid regurgitation, mild to moderate aortic insufficiency     Hypothyroid  4/11 tsh 9 start synthroid 50  4/11 tsh 6,free t3 3.1,free t4 1.2,tpo negative  7/1/11 tsh 2.1 cont synthroid 50 mcg  7/12 tsh 3.4 cont synthroid 50 mcg  7/31/13 tsh 3.2 on synthroid 50 mcg  9/25/13 tsh 1.7 on synthroid 50 mcg  2/24/15 tsh 4.9 on  synthroid 50 mcg; increase to synthroid 75 mcg, repeat tsh in 6 weeks  4/14/15 tsh 1.1 on synthroid 75 mcg  4/15/16 tsh 2.3 on synthroid 75 mcg  5/12/17 tsh 1.2 on synthroid 75 mcg  11/1/17 tsh 2.1 on synthroid 75 mcg  1/18/19 tsh 2.2 on synthroid 75 mcg  2/4/20 tsh 3.1 on synthroid 75 mcg     Insomnia  1/30/17 trial of trazodone 50 mg  4/4/17 side effects with trazodone drowsiness, discontinued    Neutropenia  4/16/11 wbc 3.9  7/30/13 wbc 5.6  11/7/16 wbc 5.7  5/11/17 wbc 4.7  2/20/18 wbc 7.2  3/6/18 wbc 5.9  1/16/19 wbc 4.0  6/8/19 wbc 4.3 (44%N, 39%L)  2/4/20 wbc 4.1 (42%N,41%L)     Osteoporosis  Had been on fosamax [id number]    8/10 dexa LS -2.0,hip -1.5 await old dexa result, she will get copy for me  4/11 vit d 35  9/12 dexa LS -3.2,hip -1.4  2/13/13 reclast IV  7/31/13 vit d 33 cont 2000 units  2/18/14 reclast IV  2/2/15 dexa LS -3.1,hip -1.9; FRAX 35.5 % major,12.6% hip  2/18/15 reclast IV  2/24/15 vit d 33  4/15/16 vit d 36  5/12/17 vit d 36  8/7/18 dexa left forearm -4.2,hip -2.5 resume reclast   8/31/18 reclast IV  1/18/19 vit d 27 increase from 2000 to 4000 units  2/4/20 vit d 67, PTH 58     Preventative health  6/27/09 colon per [facility] no records  10/19/04 pneumovax   9/9/11 zoster vaccine  4/14/15 prevnar  8/7/18 dexa left forearm -4.2,hip -2.5  9/28/19 pneumovax  11/7/19 mammogram  11/12/19 shingrix second  2/4/20 vit d 67     Pulmonary hypertension  11/17/18 cardiology note asymptomatic, continue cholesterol medication, continue to monitor echo aortic valve, repaired ascending aorta, follow-up yearly  11/21/18 echo normal LV function, EF 60%, mild LVH, grade 2 diastolic dysfunction, mild aortic insufficiency, moderate tricuspid regurgitation, RVSP 50  12/6/19 echo normal LV function EF 65%, RVSP 43, moderate tricuspid regurgitation, mild to moderate aortic insufficiency     Shoulder pain  4/4/17 right shoulder pain right shoulder x-ray ordered, right knee pain, referral custom PT, tried celebrex no  benefit, will try naproxen 500 mg twice daily and zanaflex at night  4/5/17 x-ray right shoulder calcifications consistent with calcific tendinitis or hydroxyapatite deposition  5/12/17 MRI right shoulder ordered, persistent pain despite physical therapy  5/18/17 MRI right shoulder; full-thickness supraspinatus tendon tear  5/22/17 right shoulder injection, has been going to physical therapy 14 treatments some improvement, right shoulder injection provided, if no improvement in has appt  in october, if need sooner appt try   6/9/17 [facility] note   7/10/17 [facility] PT note      s/p knee arthroplasty  4/10 MRI right knee complex tear medial meniscus, horizontal cleavage tear lateral meniscus, chondromalacia patella, moderate-sized baker's cyst  5/10 right meniscus knee repair   5/10 told by  had gout on surgery had pathology report, I await that report, question gout seen on pathology report done during repair of right meniscus knee surgery.  7/10 uric acid 6.3, await starting allopurinol depending on the operative report  8/5/10 reviewed orthopedics notes , operative report indicates crystal deposition, could be gout or pseudogout, no biopsy results, suspect chondrocalcinosis  10/11  ortho note, MRI pending  8/7/10 reviewed pathology report right knee tissue, marked degenerative changes with focal calcification, no mention of gout. Based on this and a normal uric acid level, I do not believe she has gout, has no hyperuricemia medications would be indicated  10/11 dr.parlasca najera left knee meniscectomy and arthroscopy  1/12 dr.parlasca najera left knee arthroplasty  2/29/16  orthopedics x-ray right knee advanced DJD on the right knee corticosteroid injection performed, prescription voltaren gel  6/13/16  orthopedic note x-rays demonstrate right knee advanced DJD and resurfaced patella, offer right knee total replacement followed by patellar  resurfacing after she recovers from her right knee  9/8/16  orthopedic's operative note right knee total arthroplasty  11/2/16  orthopedic note, follow-up right knee, x-ray right knee shows subluxation patella, traumatic evulsion VMO needs reexploration and repair  11/10/16  orthopedic's operative note VMO quadriceps avulsion repair status post total knee replacement  11/23/16  orthopedic no follow-up quadriceps repair, continue crutches in full extension, follow-up one month and then start passive range of motion 0-40°  2/4/17  orthopedic note, continue physical therapy, follow-up this summer  4/17/17 [facility] note  6/9/17 [facility] PT note     s/p total hip arthroplasty  9/21/17 MRI right hip mild trochanteric bursitis, mild femoral acetabular joint arthritis  10/12/17  orthopedic no proceed with right total hip arthroplasty, x-ray AP pelvis and right hip shows early arthrosis with calcifications and DJD  2/14/18  orthopedics operative note at [facility] right hip total arthroplasty, gluteus medius and minimus tendon repair  3/27/18 [place] [facility] note   5/8/18 [facility] [facility] note xray right hip stable total hip arthroplasty, continue physical therapy  4/3/19 custom PT discharge     s/p lumbar surgery  6/03 L4-5 epidural     7/06  spinal surgery operative note decompression, foraminotomy. L4-L5 posterior fusion, L4-L5 allograft    3/5/14 MRI lumbar spine grade 2 spondylolisthesis L4-L5 with previous laminectomy and posterior fusion, left sided pedicle screw fixation L4-L5, moderate central canal stenosis L5-S1 secondary to facet arthropathy, mild to moderate multilevel neural foraminal narrowing  12/8/14  pain [facility] note; right lower extremity radiculitis L4-L5 lesion, myofascial lumbosacral pain, trochanteric bursitis, followup as needed  4/2/15  pain procedure note right L4-L5 and right L5-S1 SSNRB under  fluoroscopy  4/27/15  pain note; right trochanteric bursal injection, trigger point injections performed, also set up SI joint injections  8/3/15 [facility] neurosurgery note, lumbar x-ray flexion and extension, MRI lumbar spine without contrast, followup   8/9/15 MRI lumbar spine, previous L4-L5 left  fusion and laminectomy, L3-L4 moderate bilateral facet arthropathy, mild disc bulge, L4-L5 severe bilateral facet arthropathy, moderate to severe bilateral neural foraminal stenosis, L5-S1 moderate facet arthropathy  8/28/15  neurosurgery note previous posterior fusion L4-L5, does have L3-L4 disc bulge with central canal stenosis, recommend L3-L5 decompression  8/31/15  pain note; right lower extremity radiculitis, myofascial lumbosacral pain, start hydrocodone 5 mg, continued SI joint exercises, perform trochanteric bursal injection right hip, trigger points lumbar region  10/28/15  neurosurgery note, will schedule for posterior L3-L4 laminectomy and fusion with redo L4-5 laminectomy and exploration of fusion, surgical clearance per cardiology   12/9/15 [facility] neurosurgery note s/p L4-S1 fusion on 11/24/15 , follow up in 4 weeks  12/23/15  neurosurgery note, clear to restart physical therapy if she would like after one month, follow-up 3 months     s/p TOMY  Sees gyn on HRT estradiol for 20 yrs ()     s/p thoracic aneurysm repair  6/29/06 CT-CTA chest with and without postprocessing; stable ascending thoracic aortic aneurysm maximum diameter 4.8, just distal to the aortic valve maximum diameter 4.3  4/9/07 CT-CTA chest with and without processing; stable ascending aortic thoracic aneurysm 4.5 x 4.6 cm  6/8/09 CT chest with; stable 4.7 ascending aorta aneurysm  10/15/09 CT chest without; dilated ascending thoracic aorta 4.9 cm aneurysm increased from 4.7  1/11/10  cardiovascular surgery operative note ascending thoracic aorta aneurysm  repair  9/26/13 CT thoracic aorta evaluation; status post repair ascending thoracic aortic aneurysm without evidence of dissection or leak  1/2/14 echo mild LVH, grade 1 diastolic dysfunction, ascending aortic 4.0, no change compared with ZAK 2010  5/15/14 cardiology note  6/17/15 cardiology note cont meds,repeat echo 6 months  2/1/16 cardiology note moderate pulmonary hypertension and snoring, nocturnal pulse oximetry ordered  11/1/17 echo normal LV size and function, EF 70%, mild aortic insufficiency and mild tricuspid regurgitation, RVSP 50, aortic root 3.2 cm  11/3/17 cardiology note stable  12/20/17  [facility] cardiology note most recent echocardiogram looks fine, will repeat CT scan follow aortic repair  11/17/18 cardiology note asymptomatic, continue cholesterol medication, continue to monitor echo aortic valve, repaired ascending aorta, follow-up yearly  11/21/18 echo normal LV function, EF 60%, mild LVH, grade 2 diastolic dysfunction, mild aortic insufficiency, moderate tricuspid regurgitation, RVSP 50  12/6/19 echo normal LV function EF 65%, RVSP 43, moderate tricuspid regurgitation, mild to moderate aortic insufficiency     Tricuspid regurgitation  11/17/18 cardiology note asymptomatic, continue cholesterol medication, continue to monitor echo aortic valve, repaired ascending aorta, follow-up yearly  11/21/18 echo normal LV function, EF 60%, mild LVH, grade 2 diastolic dysfunction, mild aortic insufficiency, moderate tricuspid regurgitation, RVSP 50  12/6/19 echo normal LV function EF 65%, RVSP 43, moderate tricuspid regurgitation, mild to moderate aortic insufficiency     UTI  7/6/16 UTI klebsiella pneumoniae sensitive to all antibiotics except ampicillin, start bactrim x 5 days  1/18/19 UTI enterobacter cloacae resistant to ampicillin, cephalothin, penicillin, bactrim, sensitive to cefuroxime, ciprofloxacin and levaquin, nitrofurantoin  5/26/19 UTI klebsiella given omnicef, organism resistant  ampicillin, ciprofloxacin, levofloxacin, bactrim          Current Outpatient Medications   Medication Sig Dispense Refill   • metoprolol SR (TOPROL XL) 25 MG TABLET SR 24 HR TAKE 1 TABLET DAILY 90 Tab 0   • azelastine (ASTELIN) 137 MCG/SPRAY nasal spray [place] 1 Spray in nose 2 times a day. 30 mL 5   • sulfamethoxazole-trimethoprim (BACTRIM DS) 800-160 MG tablet Take 1 Tab by mouth 2 times a day. 6 Quantity Sufficient 0   • benazepril (LOTENSIN) 40 MG tablet Take 1 Tab by mouth every day. 90 Tab 3   • levothyroxine (SYNTHROID) 75 MCG Tab Take 1 Tab by mouth Every morning on an empty stomach. 90 Tab 1   • atorvastatin (LIPITOR) 80 MG tablet Take 1 Tab by mouth every day. 90 Tab 3   • fexofenadine (ALLEGRA) 60 MG Tab Take 60 mg by mouth every day.     • cetirizine (ZYRTEC) 10 MG Tab Take 10 mg by mouth every day.     • fluticasone (FLONASE) 50 MCG/ACT nasal spray fluticasone 50 mcg/actuation nasal spray,suspension     • CALCIUM PO Take  by mouth.     • Bioflavonoid Products (BIOFLEX PO) Take  by mouth.     • omeprazole (PRILOSEC) 20 MG delayed-release capsule Take 20 mg by mouth as needed (For heartburn).     • magnesium gluconate (MAG-G) 500 MG tablet Take 500 mg by mouth every day.     • Multiple Vitamins-Minerals (CENTRUM SILVER ULTRA WOMENS PO) Take 1 Tab by mouth every day.     • vitamin D (CHOLECALCIFEROL) 1000 UNIT TABS Take 1,000 Units by mouth every day.       No current facility-administered medications for this visit.      Patient Active Problem List   Diagnosis   • Status post lumbar surgery   • Hypertension   • Dyslipidemia   • S/P TOMY (total abdominal hysterectomy)   • Preventative health care   • S/P total knee arthroplasty   • S/P appendectomy   • Osteoporosis, idiopathic   • Hypothyroid   • History of shingles   • GERD (gastroesophageal reflux disease)   • Tricuspid regurgitation   • Aortic regurgitation   • Cervical pain   • S/P thoracic aortic aneurysm repair   • CKD (chronic kidney disease) stage 3,  GFR 30-59 ml/min (HCC)   • UTI (urinary tract infection)   • Insomnia   • History shoulder pain   • History of transient ischemic attack (TIA)   • S/P total hip arthroplasty   • History of supraventricular tachycardia   • Pulmonary hypertension (HCC)   • Neutropenia (HCC)            Health Maintenance Summary                IMM DTaP/Tdap/Td Vaccine Overdue 11/29/1954     Annual Wellness Visit Overdue 1/11/2020      Done 1/10/2019 Visit Dx: Medicare annual wellness visit, subsequent     Patient has more history with this topic...    IMM INFLUENZA Next Due 9/1/2020      Done 9/28/2019 Imm Admin: Influenza Vaccine Adult HD     Patient has more history with this topic...    MAMMOGRAM Next Due 11/7/2020      Done 11/7/2019 MA-SCREENING MAMMO BILAT W/TOMOSYNTHESIS W/CAD     Patient has more history with this topic...    BONE DENSITY Next Due 8/7/2023      Done 8/7/2018 DS-BONE DENSITY STUDY (DEXA)     Patient has more history with this topic...          Patient Care Team:  Tee Tran M.D. as PCP - General  Kobi Wheeler M.D. as Consulting Physician (Interventional Cardiology)  Brett Santos II, O.D. as Consulting Physician (Optometry)  Angelic Adams as Consulting Physician (Dentistry)      ROS       Objective:        Physical Exam  Vitals signs and nursing note reviewed.   Constitutional:       General: She is not in acute distress.     Appearance: Normal appearance. She is not ill-appearing.   HENT:      Head: Normocephalic and atraumatic.      Nose: No congestion.      Mouth/Throat:      Pharynx: No oropharyngeal exudate.   Eyes:      Conjunctiva/sclera: Conjunctivae normal.   Neck:      Musculoskeletal: Neck supple.   Cardiovascular:      Rate and Rhythm: Regular rhythm.      Heart sounds: Murmur present.   Pulmonary:      Effort: Pulmonary effort is normal. No respiratory distress.      Breath sounds: Normal breath sounds.   Abdominal:      General: There is no distension.   Skin:     General: Skin is  warm.   Neurological:      General: No focal deficit present.      Mental Status: She is alert.   Psychiatric:         Mood and Affect: Mood normal.         Behavior: Behavior normal.     Nasal passageways mild rhinorrhea, no periorbital edema  No sinus tenderness to palpation, no mastoid tenderness          Assessment/Plan:       Assessment  #1  Allergic rhinitis question component of mild sinusitis, tried Zithromax no benefit a few months ago, remains on Flonase, Allegra, cannot tolerate Astelin, no evidence of bronchitis, recently tried Zithromax no benefit    #2 hypertension stable on metoprolol, Lotensin    #3 CKD stage III no regular NSAIDs    #4 hypothyroid on Synthroid    #5 dyslipidemia on Lipitor    #6 moderate tricuspid regurgitation, mild to moderate aortic insufficiency by echo in December no shortness of breath, chest pain, palpitations    Plan  #1 continue Flonase and Allegra    #2 add Atrovent nasal    #3 continue nasal lavage    #4 trial of doxycycline, it is listed as an allergy but as an intolerance, she does not recall any side effects with that medication, can cause rash, nausea, emesis, will try this for x10 days, notify me if side effects    #5 continue check blood pressure and record    #6 follow-up for wellness assessment    #7 notify me if symptoms do not improve    #8 could consider Singulair if symptoms do not improve or CT sinus

## 2020-07-16 NOTE — TELEPHONE ENCOUNTER
Yvonne Marie Wada  [phone]    Pt called to say that she turned in her script but this is a medication that she cannt take. States that it says in the labeling that it should not be taken if you have a hiatal hernia.

## 2020-07-16 NOTE — TELEPHONE ENCOUNTER
She can take the medication even with her hiatal hernia, the medication can sometimes cause more heartburn in patients with a hiatal hernia but it is not a contraindication to taking the medication.

## 2020-07-16 NOTE — TELEPHONE ENCOUNTER
Thank you for the update, she did not mention the nausea with the medication at the appointment.    We will change to a different medication called cefixime.  The medication is different from penicillin and she should not have a reaction, it can cause a rash, upset stomach, diarrhea.  I will send this to her pharmacy.

## 2020-07-22 ENCOUNTER — PATIENT MESSAGE (OUTPATIENT)
Dept: MEDICAL GROUP | Facility: MEDICAL CENTER | Age: 85
End: 2020-07-22

## 2020-07-22 DIAGNOSIS — M25.50 POLYARTHRALGIA: ICD-10-CM

## 2020-07-30 DIAGNOSIS — E03.9 HYPOTHYROIDISM, UNSPECIFIED TYPE: ICD-10-CM

## 2020-07-30 RX ORDER — LEVOTHYROXINE SODIUM 0.07 MG/1
75 TABLET ORAL
Qty: 90 TAB | Refills: 2 | Status: SHIPPED | OUTPATIENT
Start: 2020-07-30 | End: 2021-02-23

## 2020-07-31 ENCOUNTER — OFFICE VISIT (OUTPATIENT)
Dept: URGENT CARE | Facility: PHYSICIAN GROUP | Age: 85
End: 2020-07-31
Payer: MEDICARE

## 2020-07-31 ENCOUNTER — NURSE TRIAGE (OUTPATIENT)
Dept: HEALTH INFORMATION MANAGEMENT | Facility: OTHER | Age: 85
End: 2020-07-31

## 2020-07-31 VITALS
TEMPERATURE: 97.7 F | DIASTOLIC BLOOD PRESSURE: 62 MMHG | RESPIRATION RATE: 16 BRPM | HEIGHT: 64 IN | BODY MASS INDEX: 26.46 KG/M2 | WEIGHT: 155 LBS | SYSTOLIC BLOOD PRESSURE: 130 MMHG | HEART RATE: 71 BPM | OXYGEN SATURATION: 95 %

## 2020-07-31 DIAGNOSIS — M79.641 BILATERAL HAND PAIN: ICD-10-CM

## 2020-07-31 DIAGNOSIS — M15.8 OTHER OSTEOARTHRITIS INVOLVING MULTIPLE JOINTS: ICD-10-CM

## 2020-07-31 DIAGNOSIS — M79.642 BILATERAL HAND PAIN: ICD-10-CM

## 2020-07-31 PROCEDURE — 99214 OFFICE O/P EST MOD 30 MIN: CPT | Performed by: PHYSICIAN ASSISTANT

## 2020-07-31 RX ORDER — PREDNISONE 20 MG/1
20 TABLET ORAL DAILY
Qty: 10 TAB | Refills: 0 | Status: SHIPPED | OUTPATIENT
Start: 2020-07-31 | End: 2020-08-10

## 2020-07-31 ASSESSMENT — FIBROSIS 4 INDEX: FIB4 SCORE: 2.19

## 2020-07-31 NOTE — TELEPHONE ENCOUNTER
Regarding: Cough and arm pain  ----- Message from Gloria Paul sent at 7/31/2020  9:37 AM PDT -----  Patient calling regarding sinus issue and cough, as well as arm pain on both arms.

## 2020-07-31 NOTE — TELEPHONE ENCOUNTER
Pt reports she is having bilateral arm pain. Reports pain has been present for weeks. States not sure what is causing pain. Denies any chest pain. Pt states pain is in her right hand too. Pt states pain has been keeping her up at night. Pt reports chronic sinus problems. States she has a cough. States this is not a new symptom. Pt was transferred to scheduling to make appt. Per scheduling no same day appt at PCP office. Recommended patient go to urgent care for evaluation.    Reason for Disposition  • Arm pains with exertion (e.g., occurs with walking; goes away on resting)  • SEVERE pain (e.g., excruciating, unable to do any normal activities)    Additional Information  • Negative: Shock suspected (e.g., cold/pale/clammy skin, too weak to stand, low BP, rapid pulse)  • Negative: Similar pain previously and it was from 'heart attack'  • Negative: Similar pain previously from 'angina' and not relieved by nitroglycerin  • Negative: Sounds like a life-threatening emergency to the triager  • Negative: Chest pain  • Negative: Followed an injury to arm  • Negative: Wound looks infected  • Negative: Elbow pain is the main symptom  • Negative: Difficulty breathing or unusual sweating (e.g., sweating without exertion)  • Negative: Chest pain lasting longer than 5 minutes  • Negative: Age > 40 and no obvious cause for pain, pain still present even when not moving the arm  • Negative: Fever and red area (or area very tender to touch)  • Negative: Fever and swollen joint  • Negative: Entire arm is swollen  • Negative: Patient sounds very sick or weak to the triager  • Negative: Painful rash with multiple small blisters grouped together (i.e., dermatomal distribution or 'band' or 'stripe')  • Negative: Looks like a boil, infected sore, deep ulcer, or other infected rash (spreading redness, pus)  • Negative: Localized rash is very painful (no fever)  • Negative: Weakness (i.e., loss of strength) in hand or fingers  • Negative:  "Cast on wrist or arm and now increasing pain  • Negative: Red area or streak and large (> 2 in. or 5 cm)    Answer Assessment - Initial Assessment Questions  1. ONSET: \"When did the pain start?\"f      Started a couple weeks ago, went away and then came back.  2. LOCATION: \"Where is the pain located?\"      Bilateral arms, reports from wrist and radiates up both arms.  3. PAIN: \"How bad is the pain?\" (Scale 1-10; or mild, moderate, severe)    - MILD (1-3): doesn't interfere with normal activities    - MODERATE (4-7): interferes with normal activities (e.g., work or school) or awakens from sleep    - SEVERE (8-10): excruciating pain, unable to do any normal activities, unable to hold a cup of water      Moderate  4. WORK OR EXERCISE: \"Has there been any recent work or exercise that involved this part of the body?\"      denies  5. CAUSE: \"What do you think is causing the arm pain?\"      none  6. OTHER SYMPTOMS: \"Do you have any other symptoms?\" (e.g., neck pain, swelling, rash, fever, numbness, weakness)      Reports this morning right hand was stiff  7. PREGNANCY: \"Is there any chance you are pregnant?\" \"When was your last menstrual period?\"      N/A    Protocols used: ARM PAIN-A-[place]    "

## 2020-08-01 ASSESSMENT — ENCOUNTER SYMPTOMS
TINGLING: 0
FEVER: 0
MUSCLE WEAKNESS: 0
NUMBNESS: 0
CHILLS: 0
SENSORY CHANGE: 0
FOCAL WEAKNESS: 0

## 2020-08-20 ENCOUNTER — OFFICE VISIT (OUTPATIENT)
Dept: MEDICAL GROUP | Facility: MEDICAL CENTER | Age: 85
End: 2020-08-20
Payer: MEDICARE

## 2020-08-20 ENCOUNTER — HOSPITAL ENCOUNTER (OUTPATIENT)
Dept: LAB | Facility: MEDICAL CENTER | Age: 85
End: 2020-08-20
Attending: INTERNAL MEDICINE
Payer: MEDICARE

## 2020-08-20 VITALS
SYSTOLIC BLOOD PRESSURE: 144 MMHG | OXYGEN SATURATION: 97 % | HEIGHT: 64 IN | HEART RATE: 64 BPM | WEIGHT: 162 LBS | TEMPERATURE: 97.4 F | BODY MASS INDEX: 27.66 KG/M2 | DIASTOLIC BLOOD PRESSURE: 68 MMHG

## 2020-08-20 DIAGNOSIS — E78.5 DYSLIPIDEMIA: Chronic | ICD-10-CM

## 2020-08-20 DIAGNOSIS — M79.10 MUSCLE PAIN: ICD-10-CM

## 2020-08-20 LAB
ALBUMIN SERPL BCP-MCNC: 4.3 G/DL (ref 3.2–4.9)
ALBUMIN/GLOB SERPL: 2 G/DL
ALP SERPL-CCNC: 69 U/L (ref 30–99)
ALT SERPL-CCNC: 20 U/L (ref 2–50)
ANION GAP SERPL CALC-SCNC: 12 MMOL/L (ref 7–16)
AST SERPL-CCNC: 25 U/L (ref 12–45)
BASOPHILS # BLD AUTO: 0.9 % (ref 0–1.8)
BASOPHILS # BLD: 0.04 K/UL (ref 0–0.12)
BILIRUB SERPL-MCNC: 0.4 MG/DL (ref 0.1–1.5)
BUN SERPL-MCNC: 35 MG/DL (ref 8–22)
CALCIUM SERPL-MCNC: 9.5 MG/DL (ref 8.5–10.5)
CHLORIDE SERPL-SCNC: 101 MMOL/L (ref 96–112)
CK SERPL-CCNC: 75 U/L (ref 0–154)
CO2 SERPL-SCNC: 23 MMOL/L (ref 20–33)
CREAT SERPL-MCNC: 1.13 MG/DL (ref 0.5–1.4)
CRP SERPL HS-MCNC: 0.08 MG/DL (ref 0–0.75)
EOSINOPHIL # BLD AUTO: 0.16 K/UL (ref 0–0.51)
EOSINOPHIL NFR BLD: 3.5 % (ref 0–6.9)
ERYTHROCYTE [DISTWIDTH] IN BLOOD BY AUTOMATED COUNT: 45.3 FL (ref 35.9–50)
ERYTHROCYTE [SEDIMENTATION RATE] IN BLOOD BY WESTERGREN METHOD: 2 MM/HOUR (ref 0–30)
GLOBULIN SER CALC-MCNC: 2.2 G/DL (ref 1.9–3.5)
GLUCOSE SERPL-MCNC: 97 MG/DL (ref 65–99)
HCT VFR BLD AUTO: 38.3 % (ref 37–47)
HGB BLD-MCNC: 12.5 G/DL (ref 12–16)
IMM GRANULOCYTES # BLD AUTO: 0.01 K/UL (ref 0–0.11)
IMM GRANULOCYTES NFR BLD AUTO: 0.2 % (ref 0–0.9)
LYMPHOCYTES # BLD AUTO: 1.66 K/UL (ref 1–4.8)
LYMPHOCYTES NFR BLD: 36.3 % (ref 22–41)
MCH RBC QN AUTO: 30.6 PG (ref 27–33)
MCHC RBC AUTO-ENTMCNC: 32.6 G/DL (ref 33.6–35)
MCV RBC AUTO: 93.6 FL (ref 81.4–97.8)
MONOCYTES # BLD AUTO: 0.49 K/UL (ref 0–0.85)
MONOCYTES NFR BLD AUTO: 10.7 % (ref 0–13.4)
NEUTROPHILS # BLD AUTO: 2.21 K/UL (ref 2–7.15)
NEUTROPHILS NFR BLD: 48.4 % (ref 44–72)
NRBC # BLD AUTO: 0 K/UL
NRBC BLD-RTO: 0 /100 WBC
PLATELET # BLD AUTO: 209 K/UL (ref 164–446)
PMV BLD AUTO: 9.9 FL (ref 9–12.9)
POTASSIUM SERPL-SCNC: 4.5 MMOL/L (ref 3.6–5.5)
PROT SERPL-MCNC: 6.5 G/DL (ref 6–8.2)
RBC # BLD AUTO: 4.09 M/UL (ref 4.2–5.4)
SODIUM SERPL-SCNC: 136 MMOL/L (ref 135–145)
TSH SERPL DL<=0.005 MIU/L-ACNC: 1.62 UIU/ML (ref 0.38–5.33)
WBC # BLD AUTO: 4.6 K/UL (ref 4.8–10.8)

## 2020-08-20 PROCEDURE — 99213 OFFICE O/P EST LOW 20 MIN: CPT | Performed by: INTERNAL MEDICINE

## 2020-08-20 PROCEDURE — 84443 ASSAY THYROID STIM HORMONE: CPT

## 2020-08-20 PROCEDURE — 80053 COMPREHEN METABOLIC PANEL: CPT

## 2020-08-20 PROCEDURE — 82550 ASSAY OF CK (CPK): CPT

## 2020-08-20 PROCEDURE — 85652 RBC SED RATE AUTOMATED: CPT

## 2020-08-20 PROCEDURE — 36415 COLL VENOUS BLD VENIPUNCTURE: CPT

## 2020-08-20 PROCEDURE — 85025 COMPLETE CBC W/AUTO DIFF WBC: CPT

## 2020-08-20 PROCEDURE — 86140 C-REACTIVE PROTEIN: CPT

## 2020-08-20 ASSESSMENT — FIBROSIS 4 INDEX: FIB4 SCORE: 2.19

## 2020-08-20 NOTE — PROGRESS NOTES
Subjective:      Yvonne Marie Wada is a 84 y.o. female who presents with Follow-Up (arm pain b/l)            HPI       Last 5 weeks has had arm pain both sides at night, forearms and upper arms, not in the elbow or shoulder joint, but occurs only at night in bed, no weakness, no associated neck pain, no numbness or sensory changes hands or arms, takes advil two at night, pain like an ache rated 10 at night, wakes her up at night and tries cbd lotion and goes away. Does not occur with activity, no neck pain, no chest pain or sob, no recent cold or uri, remains on statin. Given prednisone at urgent care on 7/31/20 for five days and it did help somewhat.  No neck stiffness, no back stiffness, no pelvic stiffness, no headache, no vision changes, no history of polymyalgia rheumatica.  No history of cervical radiculopathy, no head trauma, no falls.  No weakness of  strength, no sensation changes fingers or hands.  No color changes fingers.  No swelling hands.  Symptoms only occur at night, no change in mattress, pillows, or sleeping arrangement  Hypothyroid on replacement   Dyslipidemia on lipitor with no previous statin intolerance  Blood pressure runs 130/60s at home more stress at home due to dog being very ill      Current Outpatient Medications   Medication Sig Dispense Refill   • levothyroxine (SYNTHROID) 75 MCG Tab TAKE 1 TAB BY MOUTH EVERY MORNING ON AN EMPTY STOMACH. 90 Tab 2   • Cefixime 400 MG Cap Take 400 mg by mouth every day. 10 Cap 0   • metoprolol SR (TOPROL XL) 25 MG TABLET SR 24 HR TAKE 1 TABLET DAILY 90 Tab 0   • benazepril (LOTENSIN) 40 MG tablet Take 1 Tab by mouth every day. 90 Tab 3   • atorvastatin (LIPITOR) 80 MG tablet Take 1 Tab by mouth every day. 90 Tab 3   • fexofenadine (ALLEGRA) 60 MG Tab Take 60 mg by mouth every day.     • fluticasone (FLONASE) 50 MCG/ACT nasal spray fluticasone 50 mcg/actuation nasal spray,suspension     • CALCIUM PO Take  by mouth.     • Bioflavonoid Products  (BIOFLEX PO) Take  by mouth.     • omeprazole (PRILOSEC) 20 MG delayed-release capsule Take 20 mg by mouth as needed (For heartburn).     • magnesium gluconate (MAG-G) 500 MG tablet Take 500 mg by mouth every day.     • Multiple Vitamins-Minerals (CENTRUM SILVER ULTRA WOMENS PO) Take 1 Tab by mouth every day.     • vitamin D (CHOLECALCIFEROL) 1000 UNIT TABS Take 1,000 Units by mouth every day.       No current facility-administered medications for this visit.      Aortic regurgitation  12/6/19 echo normal LV function EF 65%, RVSP 43, moderate tricuspid regurgitation, mild to moderate aortic insufficiency     Cervical pain  2/10/14 [facility] note; x-ray cervical spine DJD, right shoulder steroid injection     Decreased GFR  5/31/09 bun 18,creat 1.2  1/14/10 bun 28,creat 1.3,GFR 40  9/20/12 bun 37,creat 1.1,GFR 48  9/25/13 bun 25,creat 1.2,GFR 44  9/26/14 bun 26,creat 1.1,GFR 45  2/23/15 bun 20,creat 1.1,GFR 48  4/15/16 bun 31,creat 1.1,GFR 45  7/6/16 bun 29,creat 1.1,GFR 44   5/12/17 bun 35,creat 1.1,GFR 44  11/2/17 bun 22,creat 1.28,GFR 40  12/9/17 bun 29,creat 1.1,GFR 43  10/4/18 bun 31,creat 1.5 at [facility]  1/18/19 bun 33,creat 1.34,GFR 38,PTH 53,calcium 9.7,phos 3.6,spep negative  2/4/20 bun 30,creat 1.24, GFR 41, PTH 58, calcium 9.7, phos 3.9     Dyslipidemia  4/11 chol 159,trig 84,hdl 47,ldl 95,cpk 114  7/12 chol 163,trig 120,hdl 49,ldl 90, crp 1.1 on lipitor 20 mg  3/26/13 chol 159,trig 99,hdl 46,ldl 93 on lipitor 20 mg  9/25/13 chol 164,trig 100,hdl 47,ldl 97 on lipitor 20 mg  9/12/14 cardiology note lower extremity weakness, hold lipitor x3 months, labs ordered  12/15/14 cardiology start low dose lipitor 10 mg  1/22/15 off lipitor will resume 10 mg and repeat labs 4 weeks  2/24/15 chol 179,trig 111,hdl 54,ldl 103 on lipitor 10 mg  2/1/16 cardiology note restart lipitor 20 mg    4/15/16 chol 163,trig 102,hdl 48,ldl 95 on lipitor 20 mg  5/12/17 chol 186,trig 101,hdl 47,ldl 119 on lipitor 20 mg  intermittently will start taking daily  11/1/17 chol 146,trig 73,hdl 42,ldl 89 on lipitor 20 mg  12/9/17 chol 133,trig 74,hdl 49,ldl 69 on lipitor 80 mg higher dose due to recent transient ischemic attack  10/4/18 chol 126,trig 98,hdl 44,ldl 62 on lipitor 80 mg at [facility]  1/18/19 chol 161,trig 103,hdl 45,ldl 95 on lipitor 80 mg  2/4/20 chol 121,trig 71,hdl 45,ldl 62 on lipitor 80 mg     Gastroesophageal reflux  6/27/07 EGD per [facility] hiatal hernia, start prevacid 30 mg  7/12 protonix 20 mg qday  11/16/12 [facility] note cont prilosec  1/5/16 change to protonix 40 mg from prilosec     History shingles     History of supraventricular tachycardia  12/20/17  [facility] cardiology note most recent echocardiogram looks fine, will repeat CT scan follow aortic repair  2/20/17 episode of supraventricular tachycardia in the emergency room, daven[place] catheter removed, symptoms resolved with repeat EKG sinus rhythm  11/17/18 cardiology note asymptomatic, continue cholesterol medication, continue to monitor echo aortic valve, repaired ascending aorta, follow-up yearly  11/21/18 echo normal LV function, EF 60%, mild LVH, grade 2 diastolic dysfunction, mild aortic insufficiency, moderate tricuspid regurgitation, RVSP 50      History TIA  11/1/17 hospital admission, 11/2/17 hospital discharge, facial numbness, transient numbness in both hands, resolved, CT, MRI head no acute infarct, blood pressure stable, discharged home, patient states she was on aspirin at the time of admission  11/1/17 CT head stable right mid brain likely chronic lacunar infarction, periventricular white matter disease  11/1/17 MRI brain no acute abnormality, moderate chronic microvascular stomach disease, chronic microhemorrhages left frontal and right parietal lobes, chronic lacunar infarct left basal ganglia and blanco, or cerebral atrophy  11/1/17 MR-MRA head without; negative  11/2/17 echo normal LV size and function, EF 70%, RVSP 50, mild AR and  TR  11/28/17 awaiting possible right hip replacement per orthopedics , given recent possible TIA, cannot provide clearance, she will like second opinion from cardiology referral made to dr.bryan de leon, changed asa to plavix  12/12/17 ultrasound carotid less than 50% internal carotid stenosis bilateral  12/20/17 dr.bryan de leon cardiology note most recent echocardiogram looks fine, will repeat CT scan follow aortic repair  2/20/18 episode of supraventricular tachycardia in the emergency room, daven[place] catheter removed, symptoms resolved with repeat EKG sinus rhythm  11/17/18 cardiology note asymptomatic, continue cholesterol medication, continue to monitor echo aortic valve, repaired ascending aorta, follow-up yearly  11/21/18 echo normal LV function, EF 60%, mild LVH, grade 2 diastolic dysfunction, mild aortic insufficiency, moderate tricuspid regurgitation, RVSP 50  12/6/19 echo normal LV function EF 65%, RVSP 43, moderate tricuspid regurgitation, mild to moderate aortic insufficiency     Hypertension  Sees cardiology  1/10/11 [facility] note cardiac catheterization normal systolic function  3/11 [facility] echo moderate aortic insufficiency, moderate mitral insufficiency, moderate tricuspid insufficiency, normal LV function  5/12 echo [facility] normal LV function EF 60%, moderate to severe left atrial enlargement, RVSP 32    9/26/13 CT thoracic aorta no evidence of aneurysm, small hiatal hernia  9/26/13 Persantine thallium uniform breast tissue attenuation, cannot rule out underlying ischemic, LVEF 67%  10/3/13 on toprol-XL 25 mg half a tablet daily    12/13/13 cardiology note cont same meds, repeat echo and follow up 6 months  1/2/14 echo mild LVH, grade 1 diastolic dysfunction, ascending aortic 4.0, no change compared with ZAK 2010  5/15/14 cardiology note; increase benazepril to 40 mg qday,continue toprol XL 25 mg daily  6/17/15 cardiology note continue meds, repeat echo 6 months  2/1/16 cardiology note moderate  pulmonary hypertension and snoring, nocturnal pulse oximetry ordered  6/30/16 cardiology note patient declines overnight pulse oximetry  11/1/17 echo normal LV size and function, EF 70%, mild aortic insufficiency and mild tricuspid regurgitation, RVSP 50, aortic root 3.2 cm  11/3/17 cardiology note hypertension stable, status post thoracic aortic aneurysm repair, headache unspecified, ESR ordered  11/28/17 on benazepril 40 mg,toprol 25mg, add norvasc 5 mg  12/12/17 ultrasound carotid less than 50% internal carotid stenosis bilateral  12/20/17  [facility] cardiology note most recent echocardiogram looks fine, will repeat CT scan follow aortic repair  2/20/18 episode of supraventricular tachycardia in the emergency room, davenport catheter removed, symptoms resolved with repeat EKG sinus rhythm  11/17/18 cardiology note asymptomatic, continue cholesterol medication, continue to monitor echo aortic valve, repaired ascending aorta, follow-up yearly  11/21/18 echo normal LV function, EF 60%, mild LVH, grade 2 diastolic dysfunction, mild aortic insufficiency, moderate tricuspid regurgitation, RVSP 50  1/18/19 urine mac 45 on benazepril 40 mg, toprol 25 mg, norvasc 5 mg  12/6/19 echo normal LV function EF 65%, RVSP 43, moderate tricuspid regurgitation, mild to moderate aortic insufficiency     Hypothyroid  4/11 tsh 9 start synthroid 50  4/11 tsh 6,free t3 3.1,free t4 1.2,tpo negative  7/1/11 tsh 2.1 cont synthroid 50 mcg  7/12 tsh 3.4 cont synthroid 50 mcg  7/31/13 tsh 3.2 on synthroid 50 mcg  9/25/13 tsh 1.7 on synthroid 50 mcg  2/24/15 tsh 4.9 on synthroid 50 mcg; increase to synthroid 75 mcg, repeat tsh in 6 weeks  4/14/15 tsh 1.1 on synthroid 75 mcg  4/15/16 tsh 2.3 on synthroid 75 mcg  5/12/17 tsh 1.2 on synthroid 75 mcg  11/1/17 tsh 2.1 on synthroid 75 mcg  1/18/19 tsh 2.2 on synthroid 75 mcg  2/4/20 tsh 3.1 on synthroid 75 mcg     Insomnia  1/30/17 trial of trazodone 50 mg  4/4/17 side effects with trazodone  drowsiness, discontinued     Neutropenia  4/16/11 wbc 3.9  7/30/13 wbc 5.6  11/7/16 wbc 5.7  5/11/17 wbc 4.7  2/20/18 wbc 7.2  3/6/18 wbc 5.9  1/16/19 wbc 4.0  6/8/19 wbc 4.3 (44%N, 39%L)  2/4/20 wbc 4.1 (42%N,41%L)     Osteoporosis  Had been on fosamax [id number]    8/10 dexa LS -2.0,hip -1.5 await old dexa result, she will get copy for me  4/11 vit d 35  9/12 dexa LS -3.2,hip -1.4  2/13/13 reclast IV  7/31/13 vit d 33 cont 2000 units  2/18/14 reclast IV  2/2/15 dexa LS -3.1,hip -1.9; FRAX 35.5 % major,12.6% hip  2/18/15 reclast IV  2/24/15 vit d 33  4/15/16 vit d 36  5/12/17 vit d 36  8/7/18 dexa left forearm -4.2,hip -2.5 resume reclast   8/31/18 reclast IV  1/18/19 vit d 27 increase from 2000 to 4000 units  2/4/20 vit d 67, PTH 58     Preventative health  6/27/09 colon per [facility] no records  10/19/04 pneumovax   9/9/11 zoster vaccine  4/14/15 prevnar  8/7/18 dexa left forearm -4.2,hip -2.5  9/28/19 pneumovax  11/7/19 mammogram  11/12/19 shingrix second  2/4/20 vit d 67     Pulmonary hypertension  11/17/18 cardiology note asymptomatic, continue cholesterol medication, continue to monitor echo aortic valve, repaired ascending aorta, follow-up yearly  11/21/18 echo normal LV function, EF 60%, mild LVH, grade 2 diastolic dysfunction, mild aortic insufficiency, moderate tricuspid regurgitation, RVSP 50  12/6/19 echo normal LV function EF 65%, RVSP 43, moderate tricuspid regurgitation, mild to moderate aortic insufficiency     Shoulder pain  4/4/17 right shoulder pain right shoulder x-ray ordered, right knee pain, referral custom PT, tried celebrex no benefit, will try naproxen 500 mg twice daily and zanaflex at night  4/5/17 x-ray right shoulder calcifications consistent with calcific tendinitis or hydroxyapatite deposition  5/12/17 MRI right shoulder ordered, persistent pain despite physical therapy  5/18/17 MRI right shoulder; full-thickness supraspinatus tendon tear  5/22/17 right shoulder injection, has been going to  physical therapy 14 treatments some improvement, right shoulder injection provided, if no improvement in has appt  in october, if need sooner appt try   6/9/17 [facility] PT note   7/10/17 custom PT note      s/p knee arthroplasty  4/10 MRI right knee complex tear medial meniscus, horizontal cleavage tear lateral meniscus, chondromalacia patella, moderate-sized baker's cyst  5/10 right meniscus knee repair   5/10 told by  had gout on surgery had pathology report, I await that report, question gout seen on pathology report done during repair of right meniscus knee surgery.  7/10 uric acid 6.3, await starting allopurinol depending on the operative report  8/5/10 reviewed orthopedics notes , operative report indicates crystal deposition, could be gout or pseudogout, no biopsy results, suspect chondrocalcinosis  10/11  ortho note, MRI pending  8/7/10 reviewed pathology report right knee tissue, marked degenerative changes with focal calcification, no mention of gout. Based on this and a normal uric acid level, I do not believe she has gout, has no hyperuricemia medications would be indicated  10/11 dr.parlasca najera left knee meniscectomy and arthroscopy  1/12 dr.parlasca najera left knee arthroplasty  2/29/16  orthopedics x-ray right knee advanced DJD on the right knee corticosteroid injection performed, prescription voltaren gel  6/13/16  orthopedic note x-rays demonstrate right knee advanced DJD and resurfaced patella, offer right knee total replacement followed by patellar resurfacing after she recovers from her right knee  9/8/16  orthopedic's operative note right knee total arthroplasty  11/2/16  orthopedic note, follow-up right knee, x-ray right knee shows subluxation patella, traumatic evulsion VMO needs reexploration and repair  11/10/16  orthopedic's operative note VMO quadriceps avulsion repair status post total knee  replacement  11/23/16  orthopedic no follow-up quadriceps repair, continue crutches in full extension, follow-up one month and then start passive range of motion 0-40°  2/4/17  orthopedic note, continue physical therapy, follow-up this summer  4/17/17 [facility] PT note  6/9/17 custom PT note     s/p total hip arthroplasty  9/21/17 MRI right hip mild trochanteric bursitis, mild femoral acetabular joint arthritis  10/12/17  orthopedic no proceed with right total hip arthroplasty, x-ray AP pelvis and right hip shows early arthrosis with calcifications and DJD  2/14/18  orthopedics operative note at [facility] right hip total arthroplasty, gluteus medius and minimus tendon repair  3/27/18 [facility] [facility] note   5/8/18 [facility] note xray right hip stable total hip arthroplasty, continue physical therapy  4/3/19 custom PT discharge     s/p lumbar surgery  6/03 L4-5 epidural     7/06  spinal surgery operative note decompression, foraminotomy. L4-L5 posterior fusion, L4-L5 allograft    3/5/14 MRI lumbar spine grade 2 spondylolisthesis L4-L5 with previous laminectomy and posterior fusion, left sided pedicle screw fixation L4-L5, moderate central canal stenosis L5-S1 secondary to facet arthropathy, mild to moderate multilevel neural foraminal narrowing  12/8/14  pain [facility] note; right lower extremity radiculitis L4-L5 lesion, myofascial lumbosacral pain, trochanteric bursitis, followup as needed  4/2/15  pain procedure note right L4-L5 and right L5-S1 SSNRB under fluoroscopy  4/27/15  pain note; right trochanteric bursal injection, trigger point injections performed, also set up SI joint injections  8/3/15 [facility] neurosurgery note, lumbar x-ray flexion and extension, MRI lumbar spine without contrast, followup   8/9/15 MRI lumbar spine, previous L4-L5 left  fusion and laminectomy, L3-L4 moderate bilateral facet arthropathy, mild disc  bulge, L4-L5 severe bilateral facet arthropathy, moderate to severe bilateral neural foraminal stenosis, L5-S1 moderate facet arthropathy  8/28/15  neurosurgery note previous posterior fusion L4-L5, does have L3-L4 disc bulge with central canal stenosis, recommend L3-L5 decompression  8/31/15  pain note; right lower extremity radiculitis, myofascial lumbosacral pain, start hydrocodone 5 mg, continued SI joint exercises, perform trochanteric bursal injection right hip, trigger points lumbar region  10/28/15  neurosurgery note, will schedule for posterior L3-L4 laminectomy and fusion with redo L4-5 laminectomy and exploration of fusion, surgical clearance per cardiology   12/9/15 [facility] neurosurgery note s/p L4-S1 fusion on 11/24/15 , follow up in 4 weeks  12/23/15  neurosurgery note, clear to restart physical therapy if she would like after one month, follow-up 3 months     s/p TOMY  Sees gyn on HRT estradiol for 20 yrs ()     s/p thoracic aneurysm repair  6/29/06 CT-CTA chest with and without postprocessing; stable ascending thoracic aortic aneurysm maximum diameter 4.8, just distal to the aortic valve maximum diameter 4.3  4/9/07 CT-CTA chest with and without processing; stable ascending aortic thoracic aneurysm 4.5 x 4.6 cm  6/8/09 CT chest with; stable 4.7 ascending aorta aneurysm  10/15/09 CT chest without; dilated ascending thoracic aorta 4.9 cm aneurysm increased from 4.7  1/11/10  cardiovascular surgery operative note ascending thoracic aorta aneurysm repair  9/26/13 CT thoracic aorta evaluation; status post repair ascending thoracic aortic aneurysm without evidence of dissection or leak  1/2/14 echo mild LVH, grade 1 diastolic dysfunction, ascending aortic 4.0, no change compared with ZAK 2010  5/15/14 cardiology note  6/17/15 cardiology note cont meds,repeat echo 6 months  2/1/16 cardiology note moderate pulmonary hypertension and snoring,  nocturnal pulse oximetry ordered  11/1/17 echo normal LV size and function, EF 70%, mild aortic insufficiency and mild tricuspid regurgitation, RVSP 50, aortic root 3.2 cm  11/3/17 cardiology note stable  12/20/17  [facility] cardiology note most recent echocardiogram looks fine, will repeat CT scan follow aortic repair  11/17/18 cardiology note asymptomatic, continue cholesterol medication, continue to monitor echo aortic valve, repaired ascending aorta, follow-up yearly  11/21/18 echo normal LV function, EF 60%, mild LVH, grade 2 diastolic dysfunction, mild aortic insufficiency, moderate tricuspid regurgitation, RVSP 50  12/6/19 echo normal LV function EF 65%, RVSP 43, moderate tricuspid regurgitation, mild to moderate aortic insufficiency     Tricuspid regurgitation  11/17/18 cardiology note asymptomatic, continue cholesterol medication, continue to monitor echo aortic valve, repaired ascending aorta, follow-up yearly  11/21/18 echo normal LV function, EF 60%, mild LVH, grade 2 diastolic dysfunction, mild aortic insufficiency, moderate tricuspid regurgitation, RVSP 50  12/6/19 echo normal LV function EF 65%, RVSP 43, moderate tricuspid regurgitation, mild to moderate aortic insufficiency     UTI  7/6/16 UTI klebsiella pneumoniae sensitive to all antibiotics except ampicillin, start bactrim x 5 days  1/18/19 UTI enterobacter cloacae resistant to ampicillin, cephalothin, penicillin, bactrim, sensitive to cefuroxime, ciprofloxacin and levaquin, nitrofurantoin  5/26/19 UTI klebsiella given omnicef, organism resistant ampicillin, ciprofloxacin, levofloxacin, bactrim                Patient Active Problem List   Diagnosis   • Status post lumbar surgery   • Hypertension   • Dyslipidemia   • S/P TOMY (total abdominal hysterectomy)   • Preventative health care   • S/P total knee arthroplasty   • S/P appendectomy   • Osteoporosis, idiopathic   • Hypothyroid   • History of shingles   • GERD (gastroesophageal reflux  "disease)   • Tricuspid regurgitation   • Aortic regurgitation   • Cervical pain   • S/P thoracic aortic aneurysm repair   • CKD (chronic kidney disease) stage 3, GFR 30-59 ml/min (HCC)   • UTI (urinary tract infection)   • Insomnia   • History shoulder pain   • History of transient ischemic attack (TIA)   • S/P total hip arthroplasty   • History of supraventricular tachycardia   • Pulmonary hypertension (HCC)   • Neutropenia (HCC)   • Allergic rhinitis            Health Maintenance Summary                IMM DTaP/Tdap/Td Vaccine Overdue 11/29/1954     Annual Wellness Visit Overdue 1/11/2020      Done 1/10/2019 Visit Dx: Medicare annual wellness visit, subsequent     Patient has more history with this topic...    IMM INFLUENZA Next Due 9/1/2020      Done 9/28/2019 Imm Admin: Influenza Vaccine Adult HD     Patient has more history with this topic...    MAMMOGRAM Next Due 11/7/2020      Done 11/7/2019 MA-SCREENING MAMMO BILAT W/TOMOSYNTHESIS W/CAD     Patient has more history with this topic...    BONE DENSITY Next Due 8/7/2023      Done 8/7/2018 DS-BONE DENSITY STUDY (DEXA)     Patient has more history with this topic...          Patient Care Team:  Tee Tran M.D. as PCP - General  Kobi Wheeler M.D. as Consulting Physician (Interventional Cardiology)  Brett Santos II, O.D. as Consulting Physician (Optometry)  Angelic Adams as Consulting Physician (Dentistry)    ROS       Objective:     /68 (BP Location: Right arm, Patient Position: Sitting, BP Cuff Size: Adult)   Pulse 64   Temp 36.3 °C (97.4 °F)   Ht 1.626 m (5' 4\")   Wt 73.5 kg (162 lb)   SpO2 97%   BMI 27.81 kg/m²      Physical Exam  Vitals signs and nursing note reviewed.   Constitutional:       Appearance: Normal appearance.   HENT:      Head: Normocephalic and atraumatic.      Right Ear: External ear normal.      Left Ear: External ear normal.      Nose: Nose normal.   Eyes:      Conjunctiva/sclera: Conjunctivae normal.   Neck:      " Musculoskeletal: Neck supple.   Cardiovascular:      Rate and Rhythm: Normal rate and regular rhythm.   Pulmonary:      Effort: No respiratory distress.      Breath sounds: Normal breath sounds.   Abdominal:      General: There is no distension.   Skin:     General: Skin is warm.   Neurological:      General: No focal deficit present.      Mental Status: She is alert.   Psychiatric:         Mood and Affect: Mood normal.         Behavior: Behavior normal.     Normal  strength bilateral, normal range of motion elbows and shoulders, no tenderness to palpation forearms, biceps, triceps, deltoid muscles, no rashes, no edema right or left upper extremity, normal sensation bilateral upper extremities, neck normal range of motion no tenderness to palpation trapezius, neck no adenopathy or thyromegaly            Assessment/Plan:      Assessment  #1 pain bilateral upper arms, sounds more like muscle pain does not sound like radiculopathy, no sensory changes, no weakness, no evidence of shingles, do not suspect cardiac equivalent as symptoms only occur at night when lying down, possible statin side effect, less likely polymyalgia    #2 dyslipidemia on Lipitor    #3 hypertension typically controlled but slightly higher today given stress over her dog who is quite ill    #4 hypothyroid on replacement    #5 CKD stage III taking 2 Advil at night      Plan  #1 hold Lipitor x1 week    #2 labs    #3 continue check blood pressure and record    #4 avoid NSAIDs    #5 flu vaccine when available    #6 continue social distancing and mask wearing    #7 follow-up 1 month call if worsening symptoms in the interim

## 2020-08-21 ENCOUNTER — TELEPHONE (OUTPATIENT)
Dept: MEDICAL GROUP | Facility: MEDICAL CENTER | Age: 85
End: 2020-08-21

## 2020-08-21 NOTE — TELEPHONE ENCOUNTER
Notified with results, no evidence of polymyalgia on lab work, no evidence of muscle enzyme elevation related to statin.  Discussed with patient that statin side effects can still occur with normal CPK levels, advised her to hold Lipitor 1 week as we discussed, call me next week for update on symptoms.

## 2020-09-17 ENCOUNTER — OFFICE VISIT (OUTPATIENT)
Dept: MEDICAL GROUP | Facility: MEDICAL CENTER | Age: 85
End: 2020-09-17
Payer: MEDICARE

## 2020-09-17 ENCOUNTER — TELEPHONE (OUTPATIENT)
Dept: MEDICAL GROUP | Facility: MEDICAL CENTER | Age: 85
End: 2020-09-17

## 2020-09-17 VITALS
HEIGHT: 64 IN | TEMPERATURE: 97.2 F | HEART RATE: 74 BPM | DIASTOLIC BLOOD PRESSURE: 70 MMHG | WEIGHT: 162 LBS | SYSTOLIC BLOOD PRESSURE: 128 MMHG | BODY MASS INDEX: 27.66 KG/M2 | OXYGEN SATURATION: 97 %

## 2020-09-17 DIAGNOSIS — Z23 NEED FOR DIPHTHERIA-TETANUS-PERTUSSIS (TDAP) VACCINE: ICD-10-CM

## 2020-09-17 DIAGNOSIS — Z00.00 PREVENTATIVE HEALTH CARE: Chronic | ICD-10-CM

## 2020-09-17 DIAGNOSIS — E78.5 DYSLIPIDEMIA: Chronic | ICD-10-CM

## 2020-09-17 DIAGNOSIS — M81.0 OSTEOPOROSIS, UNSPECIFIED OSTEOPOROSIS TYPE, UNSPECIFIED PATHOLOGICAL FRACTURE PRESENCE: ICD-10-CM

## 2020-09-17 DIAGNOSIS — R26.9 GAIT DISORDER: ICD-10-CM

## 2020-09-17 DIAGNOSIS — M54.9 BACK PAIN, UNSPECIFIED BACK LOCATION, UNSPECIFIED BACK PAIN LATERALITY, UNSPECIFIED CHRONICITY: ICD-10-CM

## 2020-09-17 PROCEDURE — 90471 IMMUNIZATION ADMIN: CPT | Performed by: INTERNAL MEDICINE

## 2020-09-17 PROCEDURE — 99213 OFFICE O/P EST LOW 20 MIN: CPT | Mod: 25 | Performed by: INTERNAL MEDICINE

## 2020-09-17 PROCEDURE — 90715 TDAP VACCINE 7 YRS/> IM: CPT | Performed by: INTERNAL MEDICINE

## 2020-09-17 ASSESSMENT — FIBROSIS 4 INDEX: FIB4 SCORE: 2.22

## 2020-09-17 NOTE — LETTER
[facility]  Tee Tran M.D.  [address]  [place] [address]  Fax: [phone]   Authorization for Release/Disclosure of   Protected Health Information   Name: YVONNE MARIE WADA : 1935 SSN: [id number]   Address: [address] [place] [address] [place] [address] Phone:    [phone] (home)    I authorize the entity listed below to release/disclose the PHI below to:   [facility]/Tee Tran M.D. and Tee Tran M.D.   Provider or Entity Name:                                                            [facility] (Physiatry)   Address   City, State, [facility]   Phone:      Fax:                 [phone]   Reason for request: continuity of care   Information to be released:  Old records   [  ] LAST COLONOSCOPY,  including any PATH REPORT and follow-up  [  ] LAST FIT/COLOGUARD RESULT [  ] LAST DEXA  [  ] LAST MAMMOGRAM  [  ] LAST PAP  [  ] LAST LABS [  ] RETINA EXAM REPORT  [  ] IMMUNIZATION RECORDS  [ xx ] Release all info      [  ] Check here and initial the line next to each item to release ALL health information INCLUDING  _____ Care and treatment for drug and / or alcohol abuse  _____ HIV testing, infection status, or AIDS  _____ Genetic Testing    DATES OF SERVICE OR TIME PERIOD TO BE DISCLOSED: _____________  I understand and acknowledge that:  * This Authorization may be revoked at any time by you in writing, except if your health information has already been used or disclosed.  * Your health information that will be used or disclosed as a result of you signing this authorization could be re-disclosed by the recipient. If this occurs, your re-disclosed health information may no longer be protected by State or Federal laws.  * You may refuse to sign this Authorization. Your refusal will not affect your ability to obtain treatment.  * This Authorization becomes effective upon signing and will  on (date) __________.      If no date is indicated, this Authorization will  one (1)  year from the signature date.    Name: Yvonne Marie Wada    Signature:   Date:     9/17/2020       PLEASE FAX REQUESTED RECORDS BACK TO: [phone]

## 2020-09-17 NOTE — PROGRESS NOTES
Subjective:      Yvonne Marie Wada is a 84 y.o. female who presents with lipid Follow-Up            HPI   Here follow up muscle pains off statin lipitor since last month, also chronic back pain has seen pain management  [facility] pain management for injections as well as physical therapy, we do not have old records.  Pains are better in her legs and back off the statin.  Did have some arm muscle pains with Lipitor improved off that medication.  She still has occasional low back pain, no radiation down her leg just to the right thigh, uses cane for ambulation, no falls.  Hypothyroid on replacement taking daily.  Hypertension has been stable on metoprolol and Lotensin.  Practicing social distancing, avoiding smoking fires in the area.  No chest pain, shortness of breath.  No tobacco.      Current Outpatient Medications   Medication Sig Dispense Refill   • levothyroxine (SYNTHROID) 75 MCG Tab TAKE 1 TAB BY MOUTH EVERY MORNING ON AN EMPTY STOMACH. 90 Tab 2   • metoprolol SR (TOPROL XL) 25 MG TABLET SR 24 HR TAKE 1 TABLET DAILY 90 Tab 0   • benazepril (LOTENSIN) 40 MG tablet Take 1 Tab by mouth every day. 90 Tab 3   • fexofenadine (ALLEGRA) 60 MG Tab Take 60 mg by mouth every day.     • fluticasone (FLONASE) 50 MCG/ACT nasal spray fluticasone 50 mcg/actuation nasal spray,suspension     • CALCIUM PO Take  by mouth.     • omeprazole (PRILOSEC) 20 MG delayed-release capsule Take 20 mg by mouth as needed (For heartburn).     • magnesium gluconate (MAG-G) 500 MG tablet Take 500 mg by mouth every day.     • Multiple Vitamins-Minerals (CENTRUM SILVER ULTRA WOMENS PO) Take 1 Tab by mouth every day.     • vitamin D (CHOLECALCIFEROL) 1000 UNIT TABS Take 1,000 Units by mouth every day.     • Cefixime 400 MG Cap Take 400 mg by mouth every day. 10 Cap 0   • atorvastatin (LIPITOR) 80 MG tablet Take 1 Tab by mouth every day. (Patient not taking: Reported on 9/17/2020) 90 Tab 3   • Bioflavonoid Products (BIOFLEX PO) Take  by mouth.        No current facility-administered medications for this visit.      Aortic regurgitation  12/6/19 echo normal LV function EF 65%, RVSP 43, moderate tricuspid regurgitation, mild to moderate aortic insufficiency     Cervical pain  2/10/14 [facility] note; x-ray cervical spine DJD, right shoulder steroid injection     Decreased GFR  5/31/09 bun 18,creat 1.2  1/14/10 bun 28,creat 1.3,GFR 40  9/20/12 bun 37,creat 1.1,GFR 48  9/25/13 bun 25,creat 1.2,GFR 44  9/26/14 bun 26,creat 1.1,GFR 45  2/23/15 bun 20,creat 1.1,GFR 48  4/15/16 bun 31,creat 1.1,GFR 45  7/6/16 bun 29,creat 1.1,GFR 44   5/12/17 bun 35,creat 1.1,GFR 44  11/2/17 bun 22,creat 1.28,GFR 40  12/9/17 bun 29,creat 1.1,GFR 43  10/4/18 bun 31,creat 1.5 at [facility]  1/18/19 bun 33,creat 1.34,GFR 38,PTH 53,calcium 9.7,phos 3.6,spep negative  2/4/20 bun 30,creat 1.24, GFR 41, PTH 58, calcium 9.7, phos 3.9  8/20/20 bun 35,creat 1.12,GFR 46     Dyslipidemia  4/11 chol 159,trig 84,hdl 47,ldl 95,cpk 114  7/12 chol 163,trig 120,hdl 49,ldl 90, crp 1.1 on lipitor 20 mg  3/26/13 chol 159,trig 99,hdl 46,ldl 93 on lipitor 20 mg  9/25/13 chol 164,trig 100,hdl 47,ldl 97 on lipitor 20 mg  9/12/14 cardiology note lower extremity weakness, hold lipitor x3 months, labs ordered  12/15/14 cardiology start low dose lipitor 10 mg  1/22/15 off lipitor will resume 10 mg and repeat labs 4 weeks  2/24/15 chol 179,trig 111,hdl 54,ldl 103 on lipitor 10 mg  2/1/16 cardiology note restart lipitor 20 mg    4/15/16 chol 163,trig 102,hdl 48,ldl 95 on lipitor 20 mg  5/12/17 chol 186,trig 101,hdl 47,ldl 119 on lipitor 20 mg intermittently will start taking daily  11/1/17 chol 146,trig 73,hdl 42,ldl 89 on lipitor 20 mg  12/9/17 chol 133,trig 74,hdl 49,ldl 69 on lipitor 80 mg higher dose due to recent transient ischemic attack  10/4/18 chol 126,trig 98,hdl 44,ldl 62 on lipitor 80 mg at [facility]  1/18/19 chol 161,trig 103,hdl 45,ldl 95 on lipitor 80 mg  2/4/20 chol 121,trig 71,hdl 45,ldl 62  on lipitor 80 mg     Gastroesophageal reflux  6/27/07 EGD per [facility] hiatal hernia, start prevacid 30 mg  7/12 protonix 20 mg qday  11/16/12 [facility] note cont prilosec  1/5/16 change to protonix 40 mg from prilosec  2/4/20 vit d 67     History shingles     History of supraventricular tachycardia  12/20/17 dr.bryan de leon cardiology note most recent echocardiogram looks fine, will repeat CT scan follow aortic repair  2/20/17 episode of supraventricular tachycardia in the emergency room, daven[place] catheter removed, symptoms resolved with repeat EKG sinus rhythm  11/17/18 cardiology note asymptomatic, continue cholesterol medication, continue to monitor echo aortic valve, repaired ascending aorta, follow-up yearly  11/21/18 echo normal LV function, EF 60%, mild LVH, grade 2 diastolic dysfunction, mild aortic insufficiency, moderate tricuspid regurgitation, RVSP 50      History TIA  11/1/17 hospital admission, 11/2/17 hospital discharge, facial numbness, transient numbness in both hands, resolved, CT, MRI head no acute infarct, blood pressure stable, discharged home, patient states she was on aspirin at the time of admission  11/1/17 CT head stable right mid brain likely chronic lacunar infarction, periventricular white matter disease  11/1/17 MRI brain no acute abnormality, moderate chronic microvascular stomach disease, chronic microhemorrhages left frontal and right parietal lobes, chronic lacunar infarct left basal ganglia and blanco, or cerebral atrophy  11/1/17 MR-MRA head without; negative  11/2/17 echo normal LV size and function, EF 70%, RVSP 50, mild AR and TR  11/28/17 awaiting possible right hip replacement per orthopedics , given recent possible TIA, cannot provide clearance, she will like second opinion from cardiology referral made to dr.bryan de leon, changed asa to plavix  12/12/17 ultrasound carotid less than 50% internal carotid stenosis bilateral  12/20/17 dr.bryan de leon cardiology note most  recent echocardiogram looks fine, will repeat CT scan follow aortic repair  2/20/18 episode of supraventricular tachycardia in the emergency room, daven[place] catheter removed, symptoms resolved with repeat EKG sinus rhythm  11/17/18 cardiology note asymptomatic, continue cholesterol medication, continue to monitor echo aortic valve, repaired ascending aorta, follow-up yearly  11/21/18 echo normal LV function, EF 60%, mild LVH, grade 2 diastolic dysfunction, mild aortic insufficiency, moderate tricuspid regurgitation, RVSP 50  12/6/19 echo normal LV function EF 65%, RVSP 43, moderate tricuspid regurgitation, mild to moderate aortic insufficiency     Hypertension  Sees cardiology  1/10/11 [facility] note cardiac catheterization normal systolic function  3/11 [facility] echo moderate aortic insufficiency, moderate mitral insufficiency, moderate tricuspid insufficiency, normal LV function  5/12 echo [facility] normal LV function EF 60%, moderate to severe left atrial enlargement, RVSP 32    9/26/13 CT thoracic aorta no evidence of aneurysm, small hiatal hernia  9/26/13 Persantine thallium uniform breast tissue attenuation, cannot rule out underlying ischemic, LVEF 67%  10/3/13 on toprol-XL 25 mg half a tablet daily    12/13/13 cardiology note cont same meds, repeat echo and follow up 6 months  1/2/14 echo mild LVH, grade 1 diastolic dysfunction, ascending aortic 4.0, no change compared with ZAK 2010  5/15/14 cardiology note; increase benazepril to 40 mg qday,continue toprol XL 25 mg daily  6/17/15 cardiology note continue meds, repeat echo 6 months  2/1/16 cardiology note moderate pulmonary hypertension and snoring, nocturnal pulse oximetry ordered  6/30/16 cardiology note patient declines overnight pulse oximetry  11/1/17 echo normal LV size and function, EF 70%, mild aortic insufficiency and mild tricuspid regurgitation, RVSP 50, aortic root 3.2 cm  11/3/17 cardiology note hypertension stable, status post thoracic aortic aneurysm repair,  headache unspecified, ESR ordered  11/28/17 on benazepril 40 mg,toprol 25mg, add norvasc 5 mg  12/12/17 ultrasound carotid less than 50% internal carotid stenosis bilateral  12/20/17  [facility] cardiology note most recent echocardiogram looks fine, will repeat CT scan follow aortic repair  2/20/18 episode of supraventricular tachycardia in the emergency room, daven[place] catheter removed, symptoms resolved with repeat EKG sinus rhythm  11/17/18 cardiology note asymptomatic, continue cholesterol medication, continue to monitor echo aortic valve, repaired ascending aorta, follow-up yearly  11/21/18 echo normal LV function, EF 60%, mild LVH, grade 2 diastolic dysfunction, mild aortic insufficiency, moderate tricuspid regurgitation, RVSP 50  1/18/19 urine mac 45 on benazepril 40 mg, toprol 25 mg, norvasc 5 mg  12/6/19 echo normal LV function EF 65%, RVSP 43, moderate tricuspid regurgitation, mild to moderate aortic insufficiency     Hypothyroid  4/11 tsh 9 start synthroid 50  4/11 tsh 6,free t3 3.1,free t4 1.2,tpo negative  7/1/11 tsh 2.1 cont synthroid 50 mcg  7/12 tsh 3.4 cont synthroid 50 mcg  7/31/13 tsh 3.2 on synthroid 50 mcg  9/25/13 tsh 1.7 on synthroid 50 mcg  2/24/15 tsh 4.9 on synthroid 50 mcg; increase to synthroid 75 mcg, repeat tsh in 6 weeks  4/14/15 tsh 1.1 on synthroid 75 mcg  4/15/16 tsh 2.3 on synthroid 75 mcg  5/12/17 tsh 1.2 on synthroid 75 mcg  11/1/17 tsh 2.1 on synthroid 75 mcg  1/18/19 tsh 2.2 on synthroid 75 mcg  2/4/20 tsh 3.1 on synthroid 75 mcg  8/20/20 tsh 1.6 on synthroid 75 mcg     Insomnia  1/30/17 trial of trazodone 50 mg  4/4/17 side effects with trazodone drowsiness, discontinued     Neutropenia  4/16/11 wbc 3.9  7/30/13 wbc 5.6  11/7/16 wbc 5.7  5/11/17 wbc 4.7  2/20/18 wbc 7.2  3/6/18 wbc 5.9  1/16/19 wbc 4.0  6/8/19 wbc 4.3 (44%N, 39%L)  2/4/20 wbc 4.1 (42%N,41%L)  8/20/20 wbc 4.6     Osteoporosis  Had been on fosamax [id number]    8/10 dexa LS -2.0,hip -1.5 await old dexa result,  she will get copy for me  4/11 vit d 35  9/12 dexa LS -3.2,hip -1.4  2/13/13 reclast IV  7/31/13 vit d 33 cont 2000 units  2/18/14 reclast IV  2/2/15 dexa LS -3.1,hip -1.9; FRAX 35.5 % major,12.6% hip  2/18/15 reclast IV  2/24/15 vit d 33  4/15/16 vit d 36  5/12/17 vit d 36  8/7/18 dexa left forearm -4.2,hip -2.5 resume reclast   8/31/18 reclast IV  1/18/19 vit d 27 increase from 2000 to 4000 units  2/4/20 vit d 67, PTH 58     Preventative health  6/27/09 colon per [facility] no records  10/19/04 pneumovax   9/9/11 zoster vaccine  4/14/15 prevnar  8/7/18 dexa left forearm -4.2,hip -2.5  9/28/19 pneumovax  11/7/19 mammogram  11/12/19 shingrix second  2/4/20 vit d 67     Pulmonary hypertension  11/17/18 cardiology note asymptomatic, continue cholesterol medication, continue to monitor echo aortic valve, repaired ascending aorta, follow-up yearly  11/21/18 echo normal LV function, EF 60%, mild LVH, grade 2 diastolic dysfunction, mild aortic insufficiency, moderate tricuspid regurgitation, RVSP 50  12/6/19 echo normal LV function EF 65%, RVSP 43, moderate tricuspid regurgitation, mild to moderate aortic insufficiency     Shoulder pain  4/4/17 right shoulder pain right shoulder x-ray ordered, right knee pain, referral [facility] PT, tried celebrex no benefit, will try naproxen 500 mg twice daily and zanaflex at night  4/5/17 x-ray right shoulder calcifications consistent with calcific tendinitis or hydroxyapatite deposition  5/12/17 MRI right shoulder ordered, persistent pain despite physical therapy  5/18/17 MRI right shoulder; full-thickness supraspinatus tendon tear  5/22/17 right shoulder injection, has been going to physical therapy 14 treatments some improvement, right shoulder injection provided, if no improvement in has appt  in october, if need sooner appt try   6/9/17 [facility] PT note   7/10/17 custom PT note      s/p knee arthroplasty  4/10 MRI right knee complex tear medial meniscus, horizontal cleavage  tear lateral meniscus, chondromalacia patella, moderate-sized baker's cyst  5/10 right meniscus knee repair   5/10 told by  had gout on surgery had pathology report, I await that report, question gout seen on pathology report done during repair of right meniscus knee surgery.  7/10 uric acid 6.3, await starting allopurinol depending on the operative report  8/5/10 reviewed orthopedics notes , operative report indicates crystal deposition, could be gout or pseudogout, no biopsy results, suspect chondrocalcinosis  10/11 dr.parlasca najera note, MRI pending  8/7/10 reviewed pathology report right knee tissue, marked degenerative changes with focal calcification, no mention of gout. Based on this and a normal uric acid level, I do not believe she has gout, has no hyperuricemia medications would be indicated  10/11 dr.parlasca najera left knee meniscectomy and arthroscopy  1/12 dr.parlasca najera left knee arthroplasty  2/29/16  orthopedics x-ray right knee advanced DJD on the right knee corticosteroid injection performed, prescription voltaren gel  6/13/16  orthopedic note x-rays demonstrate right knee advanced DJD and resurfaced patella, offer right knee total replacement followed by patellar resurfacing after she recovers from her right knee  9/8/16  orthopedic's operative note right knee total arthroplasty  11/2/16  orthopedic note, follow-up right knee, x-ray right knee shows subluxation patella, traumatic evulsion VMO needs reexploration and repair  11/10/16  orthopedic's operative note VMO quadriceps avulsion repair status post total knee replacement  11/23/16  orthopedic no follow-up quadriceps repair, continue crutches in full extension, follow-up one month and then start passive range of motion 0-40°  2/4/17  orthopedic note, continue physical therapy, follow-up this summer  4/17/17 [facility] note  6/9/17 [facility] PT note     s/p total  hip arthroplasty  9/21/17 MRI right hip mild trochanteric bursitis, mild femoral acetabular joint arthritis  10/12/17  orthopedic no proceed with right total hip arthroplasty, x-ray AP pelvis and right hip shows early arthrosis with calcifications and DJD  2/14/18  orthopedics operative note at [facility] right hip total arthroplasty, gluteus medius and minimus tendon repair  3/27/18 [place] [facility] note   5/8/18 [place] [facility] note xray right hip stable total hip arthroplasty, continue physical therapy  4/3/19 [facility] PT discharge     s/p lumbar surgery  6/03 L4-5 epidural     7/06  spinal surgery operative note decompression, foraminotomy. L4-L5 posterior fusion, L4-L5 allograft    3/5/14 MRI lumbar spine grade 2 spondylolisthesis L4-L5 with previous laminectomy and posterior fusion, left sided pedicle screw fixation L4-L5, moderate central canal stenosis L5-S1 secondary to facet arthropathy, mild to moderate multilevel neural foraminal narrowing  12/8/14  pain [facility] note; right lower extremity radiculitis L4-L5 lesion, myofascial lumbosacral pain, trochanteric bursitis, followup as needed  4/2/15 dr.arraiz lowe procedure note right L4-L5 and right L5-S1 SSNRB under fluoroscopy  4/27/15  pain note; right trochanteric bursal injection, trigger point injections performed, also set up SI joint injections  8/3/15 [facility] neurosurgery note, lumbar x-ray flexion and extension, MRI lumbar spine without contrast, followup   8/9/15 MRI lumbar spine, previous L4-L5 left  fusion and laminectomy, L3-L4 moderate bilateral facet arthropathy, mild disc bulge, L4-L5 severe bilateral facet arthropathy, moderate to severe bilateral neural foraminal stenosis, L5-S1 moderate facet arthropathy  8/28/15  neurosurgery note previous posterior fusion L4-L5, does have L3-L4 disc bulge with central canal stenosis, recommend L3-L5 decompression  8/31/15 dr.arraiz lowe  note; right lower extremity radiculitis, myofascial lumbosacral pain, start hydrocodone 5 mg, continued SI joint exercises, perform trochanteric bursal injection right hip, trigger points lumbar region  10/28/15  neurosurgery note, will schedule for posterior L3-L4 laminectomy and fusion with redo L4-5 laminectomy and exploration of fusion, surgical clearance per cardiology   12/9/15 [facility] neurosurgery note s/p L4-S1 fusion on 11/24/15 , follow up in 4 weeks  12/23/15  neurosurgery note, clear to restart physical therapy if she would like after one month, follow-up 3 months     s/p TOMY  Sees gyn on HRT estradiol for 20 yrs ()     s/p thoracic aneurysm repair  6/29/06 CT-CTA chest with and without postprocessing; stable ascending thoracic aortic aneurysm maximum diameter 4.8, just distal to the aortic valve maximum diameter 4.3  4/9/07 CT-CTA chest with and without processing; stable ascending aortic thoracic aneurysm 4.5 x 4.6 cm  6/8/09 CT chest with; stable 4.7 ascending aorta aneurysm  10/15/09 CT chest without; dilated ascending thoracic aorta 4.9 cm aneurysm increased from 4.7  1/11/10  cardiovascular surgery operative note ascending thoracic aorta aneurysm repair  9/26/13 CT thoracic aorta evaluation; status post repair ascending thoracic aortic aneurysm without evidence of dissection or leak  1/2/14 echo mild LVH, grade 1 diastolic dysfunction, ascending aortic 4.0, no change compared with ZAK 2010  5/15/14 cardiology note  6/17/15 cardiology note cont meds,repeat echo 6 months  2/1/16 cardiology note moderate pulmonary hypertension and snoring, nocturnal pulse oximetry ordered  11/1/17 echo normal LV size and function, EF 70%, mild aortic insufficiency and mild tricuspid regurgitation, RVSP 50, aortic root 3.2 cm  11/3/17 cardiology note stable  [facility]ogram looks fine, will repeat CT scan follow aortic  repair  11/17/18 cardiology note asymptomatic, continue cholesterol medication, continue to monitor echo aortic valve, repaired ascending aorta, follow-up yearly  11/21/18 echo normal LV function, EF 60%, mild LVH, grade 2 diastolic dysfunction, mild aortic insufficiency, moderate tricuspid regurgitation, RVSP 50  12/6/19 echo normal LV function EF 65%, RVSP 43, moderate tricuspid regurgitation, mild to moderate aortic insufficiency     Tricuspid regurgitation  11/17/18 cardiology note asymptomatic, continue cholesterol medication, continue to monitor echo aortic valve, repaired ascending aorta, follow-up yearly  11/21/18 echo normal LV function, EF 60%, mild LVH, grade 2 diastolic dysfunction, mild aortic insufficiency, moderate tricuspid regurgitation, RVSP 50  12/6/19 echo normal LV function EF 65%, RVSP 43, moderate tricuspid regurgitation, mild to moderate aortic insufficiency     UTI  7/6/16 UTI klebsiella pneumoniae sensitive to all antibiotics except ampicillin, start bactrim x 5 days  1/18/19 UTI enterobacter cloacae resistant to ampicillin, cephalothin, penicillin, bactrim, sensitive to cefuroxime, ciprofloxacin and levaquin, nitrofurantoin  5/26/19 UTI klebsiella given omnicef, organism resistant ampicillin, ciprofloxacin, levofloxacin, bactrim                     Patient Active Problem List   Diagnosis   • Status post lumbar surgery   • Hypertension   • Dyslipidemia   • S/P TOMY (total abdominal hysterectomy)   • Preventative health care   • S/P total knee arthroplasty   • S/P appendectomy   • Osteoporosis, idiopathic   • Hypothyroid   • History of shingles   • GERD (gastroesophageal reflux disease)   • Tricuspid regurgitation   • Aortic regurgitation   • Cervical pain   • S/P thoracic aortic aneurysm repair   • CKD (chronic kidney disease) stage 3, GFR 30-59 ml/min ([facility])   • UTI (urinary tract infection)   • Insomnia   • History shoulder pain   • History of transient ischemic attack (TIA)   • S/P total  "hip arthroplasty   • History of supraventricular tachycardia   • Pulmonary hypertension (HCC)   • Neutropenia (HCC)   • Allergic rhinitis         Health Maintenance Summary                IMM DTaP/Tdap/Td Vaccine Overdue 11/29/1954     Annual Wellness Visit Overdue 1/11/2020      Done 1/10/2019 Visit Dx: Medicare annual wellness visit, subsequent     Patient has more history with this topic...    IMM INFLUENZA Overdue 9/1/2020      Done 9/28/2019 Imm Admin: Influenza Vaccine Adult HD     Patient has more history with this topic...    MAMMOGRAM Next Due 11/7/2020      Done 11/7/2019 MA-SCREENING MAMMO BILAT W/TOMOSYNTHESIS W/CAD     Patient has more history with this topic...    BONE DENSITY Next Due 8/7/2023      Done 8/7/2018 DS-BONE DENSITY STUDY (DEXA)     Patient has more history with this topic...          Patient Care Team:  Tee Tran M.D. as PCP - General  Kobi Wheeler M.D. as Consulting Physician (Interventional Cardiology)  Brett Santos II, O.D. as Consulting Physician (Optometry)  Angelic Adams as Consulting Physician (Dentistry)      ROS       Objective:     /70 (BP Location: Right arm, Patient Position: Sitting, BP Cuff Size: Adult)   Pulse 74   Temp 36.2 °C (97.2 °F)   Ht 1.626 m (5' 4\")   Wt 73.5 kg (162 lb)   SpO2 97%   BMI 27.81 kg/m²      Physical Exam  Vitals signs and nursing note reviewed.   Constitutional:       Appearance: Normal appearance.   HENT:      Head: Normocephalic.   Eyes:      Conjunctiva/sclera: Conjunctivae normal.   Cardiovascular:      Rate and Rhythm: Normal rate and regular rhythm.      Heart sounds: Normal heart sounds.   Pulmonary:      Effort: No respiratory distress.      Breath sounds: Normal breath sounds.   Abdominal:      General: There is no distension.   Skin:     General: Skin is warm.   Neurological:      General: No focal deficit present.      Mental Status: She is alert.   Psychiatric:         Mood and Affect: Mood normal.         " Behavior: Behavior normal.                 Assessment/Plan:        Assessment  #!  Dyslipidemia intolerant of Lipitor, apparently made her back and leg symptoms worse, improved off the medication, also had arm pain improved off Lipitor    #2 chronic low back pain followed by pain management uses cane for ambulation has seen physical therapy previously, has had injections, no chronic pain medications    #3 osteoporosis off reclast for a year     #4 hypertension stable on Lotensin and metoprolol        Plan  #! Old records  pain management [facility], follow-up pain management, continue cane for ambulation, falling precautions    #2 PT referral at [facility]    #3 has had flu shot at [facility]    #4 tdap     #5 labs    #6 consider crestor depending on labs    #7 check blood pressure record continue Lotensin and metoprolol    #8 follow-up 3 months    #9 continue social distancing measures and mask wearing during pandemic

## 2020-09-28 ENCOUNTER — HOSPITAL ENCOUNTER (EMERGENCY)
Facility: MEDICAL CENTER | Age: 85
End: 2020-09-28
Attending: EMERGENCY MEDICINE
Payer: MEDICARE

## 2020-09-28 ENCOUNTER — APPOINTMENT (OUTPATIENT)
Dept: RADIOLOGY | Facility: MEDICAL CENTER | Age: 85
End: 2020-09-28
Attending: EMERGENCY MEDICINE
Payer: MEDICARE

## 2020-09-28 VITALS
SYSTOLIC BLOOD PRESSURE: 144 MMHG | OXYGEN SATURATION: 97 % | RESPIRATION RATE: 17 BRPM | HEIGHT: 64 IN | BODY MASS INDEX: 27.96 KG/M2 | HEART RATE: 62 BPM | TEMPERATURE: 97 F | WEIGHT: 163.8 LBS | DIASTOLIC BLOOD PRESSURE: 73 MMHG

## 2020-09-28 DIAGNOSIS — I48.91 ATRIAL FIBRILLATION, UNSPECIFIED TYPE (HCC): ICD-10-CM

## 2020-09-28 LAB
ALBUMIN SERPL BCP-MCNC: 4.1 G/DL (ref 3.2–4.9)
ALBUMIN/GLOB SERPL: 1.5 G/DL
ALP SERPL-CCNC: 64 U/L (ref 30–99)
ALT SERPL-CCNC: 18 U/L (ref 2–50)
ANION GAP SERPL CALC-SCNC: 10 MMOL/L (ref 7–16)
AST SERPL-CCNC: 24 U/L (ref 12–45)
BASOPHILS # BLD AUTO: 1.1 % (ref 0–1.8)
BASOPHILS # BLD: 0.05 K/UL (ref 0–0.12)
BILIRUB SERPL-MCNC: 0.4 MG/DL (ref 0.1–1.5)
BUN SERPL-MCNC: 31 MG/DL (ref 8–22)
CALCIUM SERPL-MCNC: 9.5 MG/DL (ref 8.4–10.2)
CHLORIDE SERPL-SCNC: 105 MMOL/L (ref 96–112)
CO2 SERPL-SCNC: 24 MMOL/L (ref 20–33)
CREAT SERPL-MCNC: 1.1 MG/DL (ref 0.5–1.4)
EKG IMPRESSION: NORMAL
EKG IMPRESSION: NORMAL
EOSINOPHIL # BLD AUTO: 0.09 K/UL (ref 0–0.51)
EOSINOPHIL NFR BLD: 2 % (ref 0–6.9)
ERYTHROCYTE [DISTWIDTH] IN BLOOD BY AUTOMATED COUNT: 44.9 FL (ref 35.9–50)
GLOBULIN SER CALC-MCNC: 2.8 G/DL (ref 1.9–3.5)
GLUCOSE SERPL-MCNC: 105 MG/DL (ref 65–99)
HCT VFR BLD AUTO: 38.1 % (ref 37–47)
HGB BLD-MCNC: 12.6 G/DL (ref 12–16)
IMM GRANULOCYTES # BLD AUTO: 0.02 K/UL (ref 0–0.11)
IMM GRANULOCYTES NFR BLD AUTO: 0.4 % (ref 0–0.9)
LYMPHOCYTES # BLD AUTO: 1.67 K/UL (ref 1–4.8)
LYMPHOCYTES NFR BLD: 36.9 % (ref 22–41)
MCH RBC QN AUTO: 30.3 PG (ref 27–33)
MCHC RBC AUTO-ENTMCNC: 33.1 G/DL (ref 33.6–35)
MCV RBC AUTO: 91.6 FL (ref 81.4–97.8)
MONOCYTES # BLD AUTO: 0.53 K/UL (ref 0–0.85)
MONOCYTES NFR BLD AUTO: 11.7 % (ref 0–13.4)
NEUTROPHILS # BLD AUTO: 2.17 K/UL (ref 2–7.15)
NEUTROPHILS NFR BLD: 47.9 % (ref 44–72)
NRBC # BLD AUTO: 0 K/UL
NRBC BLD-RTO: 0 /100 WBC
NT-PROBNP SERPL IA-MCNC: 1078 PG/ML (ref 0–125)
PLATELET # BLD AUTO: 223 K/UL (ref 164–446)
PMV BLD AUTO: 8.7 FL (ref 9–12.9)
POTASSIUM SERPL-SCNC: 4.3 MMOL/L (ref 3.6–5.5)
PROT SERPL-MCNC: 6.9 G/DL (ref 6–8.2)
RBC # BLD AUTO: 4.16 M/UL (ref 4.2–5.4)
SODIUM SERPL-SCNC: 139 MMOL/L (ref 135–145)
TROPONIN T SERPL-MCNC: 32 NG/L (ref 6–19)
TSH SERPL DL<=0.005 MIU/L-ACNC: 2.68 UIU/ML (ref 0.38–5.33)
WBC # BLD AUTO: 4.5 K/UL (ref 4.8–10.8)

## 2020-09-28 PROCEDURE — 99285 EMERGENCY DEPT VISIT HI MDM: CPT

## 2020-09-28 PROCEDURE — 93005 ELECTROCARDIOGRAM TRACING: CPT

## 2020-09-28 PROCEDURE — 83880 ASSAY OF NATRIURETIC PEPTIDE: CPT

## 2020-09-28 PROCEDURE — 71045 X-RAY EXAM CHEST 1 VIEW: CPT

## 2020-09-28 PROCEDURE — 85025 COMPLETE CBC W/AUTO DIFF WBC: CPT

## 2020-09-28 PROCEDURE — 93005 ELECTROCARDIOGRAM TRACING: CPT | Performed by: EMERGENCY MEDICINE

## 2020-09-28 PROCEDURE — 84443 ASSAY THYROID STIM HORMONE: CPT

## 2020-09-28 PROCEDURE — 80053 COMPREHEN METABOLIC PANEL: CPT

## 2020-09-28 PROCEDURE — 84484 ASSAY OF TROPONIN QUANT: CPT

## 2020-09-28 PROCEDURE — 36415 COLL VENOUS BLD VENIPUNCTURE: CPT

## 2020-09-28 RX ORDER — METOPROLOL SUCCINATE 25 MG/1
12.5 TABLET, EXTENDED RELEASE ORAL EVERY MORNING
Status: SHIPPED | COMMUNITY
End: 2020-09-30 | Stop reason: SDUPTHER

## 2020-09-28 RX ORDER — AZELASTINE 1 MG/ML
2 SPRAY, METERED NASAL DAILY
Status: SHIPPED | COMMUNITY
Start: 2020-08-17 | End: 2021-06-22

## 2020-09-28 ASSESSMENT — LIFESTYLE VARIABLES
HAVE YOU EVER FELT YOU SHOULD CUT DOWN ON YOUR DRINKING: NO
CONSUMPTION TOTAL: NEGATIVE
TOTAL SCORE: 0
EVER HAD A DRINK FIRST THING IN THE MORNING TO STEADY YOUR NERVES TO GET RID OF A HANGOVER: NO
TOTAL SCORE: 0
HOW MANY TIMES IN THE PAST YEAR HAVE YOU HAD 5 OR MORE DRINKS IN A DAY: 0
ON A TYPICAL DAY WHEN YOU DRINK ALCOHOL HOW MANY DRINKS DO YOU HAVE: 0
AVERAGE NUMBER OF DAYS PER WEEK YOU HAVE A DRINK CONTAINING ALCOHOL: 0
TOTAL SCORE: 0
DO YOU DRINK ALCOHOL: NO
EVER FELT BAD OR GUILTY ABOUT YOUR DRINKING: NO
HAVE PEOPLE ANNOYED YOU BY CRITICIZING YOUR DRINKING: NO

## 2020-09-28 ASSESSMENT — FIBROSIS 4 INDEX: FIB4 SCORE: 2.22

## 2020-09-28 NOTE — ED NOTES
"Presents complaining of acute onset of palpitations recurring since approximately 0400 this AM.  She denies any chest pain or associated dyspnea, and recalls a similar occurrence 9 months ago.   Chief Complaint   Patient presents with   • Palpitations   /82   Pulse (!) 139   Temp 36.6 °C (97.8 °F) (Temporal)   Resp 17   Ht 1.626 m (5' 4\")   Wt 74.3 kg (163 lb 12.8 oz)   SpO2 99%   BMI 28.12 kg/m²          "

## 2020-09-28 NOTE — ED NOTES
Med Rec complete per phone interview with pt  Allergies Reviewed and updated  No ABX in the last 14 days    Pt took ONCE dose of ASPRIN this morning (9/28)

## 2020-09-28 NOTE — ED PROVIDER NOTES
"ED Provider Note    CHIEF COMPLAINT  Chief Complaint   Patient presents with   • Palpitations       [facility]  Yvonne Marie Wada is a 84 y.o. female who presents with heart fluttering this morning at 4 AM.  When she went to bed last night it was fine.  She has no pain no shortness of breath no nausea vomiting diaphoresis no arm pain neck pain or back pain.  She had a similar episode 9 months ago but did not get it checked out at that time.  She comes in for evaluation.  No leg pain or swelling.      REVIEW OF SYSTEMS  See HPI for further details    PAST MEDICAL HISTORY  Past Medical History:   Diagnosis Date   • AAA (abdominal aortic aneurysm) (Formerly [facility]) 7/26/2010    10/09 CT thorax dilated ascending thoracic aorta 4.9 cm 1/10 transesophageal echo dilated aortic root, and ascending aorta with mild aortic insufficiency 1/10  ascending aortic aneurysm repair 5/12 echo [facility] normal LV function EF 60%, moderate to severe left atrial enlargement, RVSP 32    • Aortic insufficiency    • Aortic valve disease    • Aortic valve regurgitation    • Arthritis     back and knee/ osteo   • Cataract    • Dental disorder     full top denture, partial bottom   • Diverticulosis    • Dyslipidemia 7/26/2010    Sees [facility] on lipitor 4/11 chol 159,trig 84,hdl 47,ldl 95,cpk 114 7/12 chol 163,trig 120,hdl 49,ldl 90, crp 1.1 on lipitor 20 mg    • GERD (gastroesophageal reflux disease) 7/12/2012 6/07 EGD per [facility] hiatal hernia, start prevacid 30 mg 7/12 protonix 20 mg qday    • Glaucoma    • Heart burn    • Heart murmur    • Heart valve disease     \"leaking\", mitral valve   • Hiatus hernia syndrome    • High cholesterol    • History of shingles 6/30/2011    2010 Back and left thorax     • History of total knee arthroplasty 7/26/2010    4/10 MRI right knee complex tear medial meniscus, horizontal cleavage tear lateral meniscus, chondromalacia patella, moderate-sized Baker's cyst 5/10 right meniscus knee repair  5/10 told by "  had gout on surgery had pathology report, I await that report Question gout seen on pathology report done during repair of right meniscus knee surgery. 7/10 uric acid 6.3, we'll await starting allopurinol depending on the operative report 8/5/10 reviewed ortho notes , op report indicates crystal deposition, could be gout or pseudogout. No bx result sent over. Will need to get. My suspicion is chondrocalcinosis. 10/11  ortho note, MRI pending 8/7/10 reviewed pathology report right knee tissue, marked degenerative changes with focal calcification, no mention of gout. Based on this and a normal uric acid level, I do not believe she has gout, has no hyperuricemia medications would be indicated 10/11  ortho left knee meniscectomy and arthroscopy 1/12     • HLD (hyperlipidemia)    • Hypertension    • Hypothyroid 4/8/2011 4/11 tsh 9 start synthroid 50 4/11 tsh 6,free t3 3.1,free t4 1.2,tpo negative 7/1/11 tsh 2.1 cont synthroid 50 mcg 7/12 tsh 3.4 cont synthroid 50 mcg    • Indigestion    • Mitral insufficiency    • OSTEOPOROSIS 8/9/2010    Had been on fosamax [id number]  8/10 dexa LS -2.0,hip -1.5 await old dexa result, she will get copy for me 4/11 vit d 35 9/12 dexa LS -3.2,hip -1.4 2/13/13 relast infusion    • Pain     back and right leg, can get as bad as 10/10   • Preventative health care 7/26/2010 2002 pneum 2005 colon [facility] no records 4/11 vit d 35 9/11 shingles vaccine 9/12 mammogram 9/12 dexa LS -3.2,hip -1.4    • Rheumatic fever    • S/P appendectomy 7/26/2010   • S/P TOMY (total abdominal hysterectomy) 7/26/2010    Sees gyn on HRT estradiol for 20 yrs () 11/08 mammo     • Shingles 6/30/2011   • Snoring    • Status post lumbar surgery 7/26/2010 6/03 L4-5 epidural Dr. Jordan  7/06 overall for decompression, foraminotomy. L4-L5 posterior fusion, L4-L5 allograft      • Stroke (HCC) 1996    no residual problems    • Thoracic aortic  aneurysm without mention of rupture    • Tricuspid insufficiency    • Unspecified disorder of thyroid     hypo       FAMILY HISTORY  Family History   Problem Relation Age of Onset   • Stroke Mother    • Heart Disease Father    • Heart Disease Sister        SOCIAL HISTORY  Social History     Socioeconomic History   • Marital status:      Spouse name: Not on file   • Number of children: Not on file   • Years of education: Not on file   • Highest education level: Not on file   Occupational History   • Not on file   Social Needs   • Financial resource strain: Not on file   • Food insecurity     Worry: Not on file     Inability: Not on file   • Transportation needs     Medical: Not on file     Non-medical: Not on file   Tobacco Use   • Smoking status: Never Smoker   • Smokeless tobacco: Never Used   • Tobacco comment: none   Substance and Sexual Activity   • Alcohol use: No     Alcohol/week: 0.0 oz   • Drug use: Yes     Comment: CBD Oil for Arthritis   • Sexual activity: Not Currently     Partners: Male   Lifestyle   • Physical activity     Days per week: Not on file     Minutes per session: Not on file   • Stress: Not on file   Relationships   • Social connections     Talks on phone: Not on file     Gets together: Not on file     Attends Zoroastrianism service: Not on file     Active member of club or organization: Not on file     Attends meetings of clubs or organizations: Not on file     Relationship status: Not on file   • Intimate partner violence     Fear of current or ex partner: Not on file     Emotionally abused: Not on file     Physically abused: Not on file     Forced sexual activity: Not on file   Other Topics Concern   • Not on file   Social History Narrative   • Not on file       SURGICAL HISTORY  Past Surgical History:   Procedure Laterality Date   • TENDON REPAIR Right 11/10/2016    Procedure: TENDON REPAIR - QUADRACEPS ;  Surgeon: Maxim Herrera M.D.;  Location: SURGERY [facility];  Service:    •  KNEE ARTHROPLASTY TOTAL Right 9/8/2016    Procedure: KNEE ARTHROPLASTY TOTAL;  Surgeon: Maxim Herrera M.D.;  Location: SURGERY [facility];  Service:    • LUMBAR FUSION POSTERIOR  11/24/2015    Procedure: LUMBAR FUSION POSTERIOR L3-4, EXPLORATION OF FUSION L4-5 WITH INSTRUMENTATION;  Surgeon: Hermann Reis M.D.;  Location: SURGERY [facility];  Service:    • LUMBAR LAMINECTOMY DISKECTOMY  11/24/2015    Procedure: LUMBAR L3-4 LAMINECTOMY AND REDO L4-5;  Surgeon: Hermann Reis M.D.;  Location: SURGERY [facility];  Service:    • KNEE ARTHROPLASTY TOTAL  1/3/2012    Performed by YOSEF MEJÍA at [facility]   • KNEE ARTHROSCOPY  10/18/2011    Performed by YOSEF MEJÍA at [facility]   • MEDIAL MENISCECTOMY  10/18/2011    Performed by YOSEF MEJÍA at [facility]   • AORTIC VALVE REPLACEMENT  1/12/2010    Performed by ISAÍAS NICOLE at [facility]   • APPENDECTOMY     • HYSTERECTOMY, TOTAL ABDOMINAL     • LUMBAR FUSION POSTERIOR         CURRENT MEDICATIONS  Home Medications     Reviewed by Mike Guzman (Pharmacy Tech) on 09/28/20 at 0900  Med List Status: Complete   Medication Last Dose Status   aspirin EC (ECOTRIN) 81 MG Tablet Delayed Response 9/28/2020 Active   azelastine (ASTELIN) 137 MCG/SPRAY nasal spray 9/27/2020 Active   benazepril (LOTENSIN) 40 MG tablet 9/28/2020 Active   fexofenadine (ALLEGRA) 60 MG Tab > 1 WEEK Active   levothyroxine (SYNTHROID) 75 MCG Tab 9/28/2020 Active   metoprolol SR (TOPROL XL) 25 MG TABLET SR 24 HR 9/28/2020 Active   Multiple Vitamins-Minerals (CENTRUM SILVER ULTRA WOMENS PO) 9/28/2020 Active   omeprazole (PRILOSEC) 20 MG delayed-release capsule 9/27/2020 Active                ALLERGIES  Allergies   Allergen Reactions   • Amoxicillin Vomiting     Upset stomach, ok if needed for life saving treatment (per pt)   • Bactrim [Sulfamethoxazole W-Trimethoprim] Vomiting     Nausea/vomiting Ok in a life  "saving situation (per pt)   • Codeine Nausea     Synthetic codones ok   • Doxycycline      Nausea, Ok in life saving situation (per pt)   • Lipitor [Atorvastatin Calcium]    • Trazodone Vomiting     groggy   • Ultram [Tramadol] Vomiting     nausea       PHYSICAL EXAM  VITAL SIGNS: /82   Pulse (!) 139   Temp 36.6 °C (97.8 °F) (Temporal)   Resp 17   Ht 1.626 m (5' 4\")   Wt 74.3 kg (163 lb 12.8 oz)   SpO2 99%   BMI 28.12 kg/m²     Constitutional: Patient is alert and oriented x3 in no distress   HENT: Moist mucous membranes  Eyes:   No conjunctivitis  Neck: Trach is midline no Pap thyroid  Lymphatic: Negative anterior cervical of adenopathy  Cardiovascular: Irregularly irregular  Thorax & Lungs: Clear to auscultation  Back: No CVA tenderness  Abdomen: Nontender  Neurologic: Normal motor sensation  Extremities: No edema  Psychiatric: Affect normal, Judgment normal, Mood normal.     EKG: Atrial flutter 2-1 block rate 132 normal corrected QT interval QRS and axis nonspecific ST changes no old EKG for comparison    EKG #2: Atrial fibrillation rate is 98 with a normal corrected QT interval QRS and axis.  Results for orders placed or performed during the hospital encounter of 09/28/20   CBC WITH DIFFERENTIAL   Result Value Ref Range    WBC 4.5 (L) 4.8 - 10.8 K/uL    RBC 4.16 (L) 4.20 - 5.40 M/uL    Hemoglobin 12.6 12.0 - 16.0 g/dL    Hematocrit 38.1 37.0 - 47.0 %    MCV 91.6 81.4 - 97.8 fL    MCH 30.3 27.0 - 33.0 pg    MCHC 33.1 (L) 33.6 - 35.0 g/dL    RDW 44.9 35.9 - 50.0 fL    Platelet Count 223 164 - 446 K/uL    MPV 8.7 (L) 9.0 - 12.9 fL    Neutrophils-Polys 47.90 44.00 - 72.00 %    Lymphocytes 36.90 22.00 - 41.00 %    Monocytes 11.70 0.00 - 13.40 %    Eosinophils 2.00 0.00 - 6.90 %    Basophils 1.10 0.00 - 1.80 %    Immature Granulocytes 0.40 0.00 - 0.90 %    Nucleated RBC 0.00 /100 WBC    Neutrophils (Absolute) 2.17 2.00 - 7.15 K/uL    Lymphs (Absolute) 1.67 1.00 - 4.80 K/uL    Monos (Absolute) 0.53 0.00 - " 0.85 K/uL    Eos (Absolute) 0.09 0.00 - 0.51 K/uL    Baso (Absolute) 0.05 0.00 - 0.12 K/uL    Immature Granulocytes (abs) 0.02 0.00 - 0.11 K/uL    NRBC (Absolute) 0.00 K/uL   COMP METABOLIC PANEL   Result Value Ref Range    Sodium 139 135 - 145 mmol/L    Potassium 4.3 3.6 - 5.5 mmol/L    Chloride 105 96 - 112 mmol/L    Co2 24 20 - 33 mmol/L    Anion Gap 10.0 7.0 - 16.0    Glucose 105 (H) 65 - 99 mg/dL    Bun 31 (H) 8 - 22 mg/dL    Creatinine 1.10 0.50 - 1.40 mg/dL    Calcium 9.5 8.4 - 10.2 mg/dL    AST(SGOT) 24 12 - 45 U/L    ALT(SGPT) 18 2 - 50 U/L    Alkaline Phosphatase 64 30 - 99 U/L    Total Bilirubin 0.4 0.1 - 1.5 mg/dL    Albumin 4.1 3.2 - 4.9 g/dL    Total Protein 6.9 6.0 - 8.2 g/dL    Globulin 2.8 1.9 - 3.5 g/dL    A-G Ratio 1.5 g/dL   TROPONIN   Result Value Ref Range    Troponin T 32 (H) 6 - 19 ng/L   proBrain Natriuretic Peptide, NT   Result Value Ref Range    NT-proBNP 1078 (H) 0 - 125 pg/mL   ESTIMATED GFR   Result Value Ref Range    GFR If  57 (A) >60 mL/min/1.73 m 2    GFR If Non  47 (A) >60 mL/min/1.73 m 2   EKG   Result Value Ref Range    Report       [facility] Emergency Dept.    Test Date:  2020  Pt Name:    YVONNE WADA                  Department: [facility]  MRN:        [id number]                      Room:       -ROOM 1  Gender:     Female                       Technician: [id number]  :        1935                   Requested By:LEENA SHELDON  Order #:    [id number]                    Reading MD: LEENA SHELDON MD    Measurements  Intervals                                Axis  Rate:       98                           P:  DC:                                      QRS:        36  QRSD:       104                          T:          10  QT:         348  QTc:        445    Interpretive Statements  ATRIAL FIBRILLATION  Compared to ECG 2020 08:10:19  Atrial flutter no longer present  2:1 AV block no longer present  Early  repolarization no longer present  Electronically Signed On 2020 9:11:00 PDT by LENEA SHELDON MD     EKG   Result Value Ref Range    Report       [facility] Emergency Dept[facility]    Test Date:  2020  Pt Name:    YVONNE WADA                  Department: [facility]  MRN:        [id number]                      [facility]  Gender:     Female                       Technician: [id number]  :        1935                   Requested By:ER TRIAGE PROTOCOL  Order #:    [id number]                    Reading MD: LEENA SHELDON MD    Measurements  Intervals                                Axis  Rate:       132                          P:  WV:                                      QRS:        20  QRSD:       84                           T:          92  QT:         296  QTc:        439    Interpretive Statements  ATRIAL FLUTTER WITH 2:1 AV BLOCK  REPOLARIZATION ABNORMALITY, PROB RATE RELATED  BASELINE WANDER IN LEAD(S) V1  Compared to ECG 2019 08:34:56  2:1 AV block now present  Early repolarization now present  Sinus rhythm no longer present  Atrial premature complex(es) no longer present  First degree AV b lock no longer present  Electronically Signed On 2020 9:10:56 PDT by LEENA SHELDON MD            COURSE & MEDICAL DECISION MAKING  Pertinent Labs & Imaging studies reviewed. (See chart for details)  Patient's initial EKG was somewhat suspicious for atrial flutter however when I went in the rhythm strip did look like A. fib the EKG was repeated and it does appear to be atrial fibrillation new onset.  She has had a before but is never had it worked up.  She does go back to normal but does periodically get tachycardic in the ER.  Labs do show some congestive heart failure component.  I do feel she needs admission and contacting the hospitalist.  Due to new onset of A. fib I am also contacting cardiology she sees them as well.    FINAL IMPRESSION  1.  Atrial fibrillation  2.    3.         Electronically signed by: Addi Morataya M.D., 9/28/2020 8:20 AM

## 2020-09-30 ENCOUNTER — OFFICE VISIT (OUTPATIENT)
Dept: CARDIOLOGY | Facility: MEDICAL CENTER | Age: 85
End: 2020-09-30
Payer: MEDICARE

## 2020-09-30 VITALS
OXYGEN SATURATION: 97 % | HEART RATE: 64 BPM | BODY MASS INDEX: 27.42 KG/M2 | HEIGHT: 64 IN | SYSTOLIC BLOOD PRESSURE: 134 MMHG | WEIGHT: 160.6 LBS | DIASTOLIC BLOOD PRESSURE: 72 MMHG

## 2020-09-30 DIAGNOSIS — I10 ESSENTIAL HYPERTENSION: ICD-10-CM

## 2020-09-30 DIAGNOSIS — I48.0 PAF (PAROXYSMAL ATRIAL FIBRILLATION) (HCC): ICD-10-CM

## 2020-09-30 DIAGNOSIS — E78.5 DYSLIPIDEMIA: ICD-10-CM

## 2020-09-30 DIAGNOSIS — N18.30 CKD (CHRONIC KIDNEY DISEASE) STAGE 3, GFR 30-59 ML/MIN: ICD-10-CM

## 2020-09-30 PROCEDURE — 99215 OFFICE O/P EST HI 40 MIN: CPT | Performed by: INTERNAL MEDICINE

## 2020-09-30 RX ORDER — METOPROLOL SUCCINATE 25 MG/1
25 TABLET, EXTENDED RELEASE ORAL EVERY MORNING
Qty: 90 TAB | Refills: 3 | Status: SHIPPED | OUTPATIENT
Start: 2020-09-30 | End: 2021-11-15

## 2020-09-30 ASSESSMENT — FIBROSIS 4 INDEX: FIB4 SCORE: 2.11

## 2020-09-30 NOTE — PROGRESS NOTES
"  Subjective:   Yvonne Marie Wada is a 82 y.o. female who presents today for follow-up of repaired thoracic ascending aorta aneurysm in 2010, aortic insufficiency, hypertension and hyperlipidemia.      In the interim she presented the emergency department with recurrent episodes of palpitations and was noted to be in atrial fibrillation.  She spontaneously converted sinus rhythm and was advised to stay in the hospital but ended up leaving after she converted to sinus rhythm.  She now feels well.  She was started on low-dose apixaban which is not the appropriate dosing for her age and renal function, and recommended to quadruple her beta-blocker.  Thankfully she only doubled it.    Past Medical History:   Diagnosis Date   • AAA (abdominal aortic aneurysm) (Formerly [facility]) 7/26/2010    10/09 CT thorax dilated ascending thoracic aorta 4.9 cm 1/10 transesophageal echo dilated aortic root, and ascending aorta with mild aortic insufficiency 1/10  ascending aortic aneurysm repair 5/12 echo [facility] normal LV function EF 60%, moderate to severe left atrial enlargement, RVSP 32    • Aortic insufficiency    • Aortic valve disease    • Aortic valve regurgitation    • Arthritis     back and knee/ osteo   • Cataract    • Dental disorder     full top denture, partial bottom   • Diverticulosis    • Dyslipidemia 7/26/2010    Sees [facility] on lipitor 4/11 chol 159,trig 84,hdl 47,ldl 95,cpk 114 7/12 chol 163,trig 120,hdl 49,ldl 90, crp 1.1 on lipitor 20 mg    • GERD (gastroesophageal reflux disease) 7/12/2012    6/07 EGD per [facility] hiatal hernia, start prevacid 30 mg 7/12 protonix 20 mg qday    • Glaucoma    • Heart burn    • Heart murmur    • Heart valve disease     \"leaking\", mitral valve   • Hiatus hernia syndrome    • High cholesterol    • History of shingles 6/30/2011    2010 Back and left thorax     • History of total knee arthroplasty 7/26/2010    4/10 MRI right knee complex tear medial meniscus, horizontal cleavage tear lateral " meniscus, chondromalacia patella, moderate-sized Baker's cyst 5/10 right meniscus knee repair  5/10 told by  had gout on surgery had pathology report, I await that report Question gout seen on pathology report done during repair of right meniscus knee surgery. 7/10 uric acid 6.3, we'll await starting allopurinol depending on the operative report 8/5/10 reviewed ortho notes , op report indicates crystal deposition, could be gout or pseudogout. No bx result sent over. Will need to get. My suspicion is chondrocalcinosis. 10/11  ortho note, MRI pending 8/7/10 reviewed pathology report right knee tissue, marked degenerative changes with focal calcification, no mention of gout. Based on this and a normal uric acid level, I do not believe she has gout, has no hyperuricemia medications would be indicated 10/11  ortho left knee meniscectomy and arthroscopy 1/12     • HLD (hyperlipidemia)    • Hypertension    • Hypothyroid 4/8/2011 4/11 tsh 9 start synthroid 50 4/11 tsh 6,free t3 3.1,free t4 1.2,tpo negative 7/1/11 tsh 2.1 cont synthroid 50 mcg 7/12 tsh 3.4 cont synthroid 50 mcg    • Indigestion    • Mitral insufficiency    • OSTEOPOROSIS 8/9/2010    Had been on fosamax [id number]  8/10 dexa LS -2.0,hip -1.5 await old dexa result, she will get copy for me 4/11 vit d 35 9/12 dexa LS -3.2,hip -1.4 2/13/13 relast infusion    • Pain     back and right leg, can get as bad as 10/10   • Preventative health care 7/26/2010 2002 pneum 2005 colon [facility] no records 4/11 vit d 35 9/11 shingles vaccine 9/12 mammogram 9/12 dexa LS -3.2,hip -1.4    • Rheumatic fever    • S/P appendectomy 7/26/2010   • S/P TOMY (total abdominal hysterectomy) 7/26/2010    Sees gyn on HRT estradiol for 20 yrs () 11/08 mammo     • Shingles 6/30/2011   • Snoring    • Status post lumbar surgery 7/26/2010 6/03 L4-5 epidural Dr. Jordan  7/06 overall for decompression, foraminotomy. L4-L5  posterior fusion, L4-L5 allograft      • Stroke (Formerly [facility]) 1996    no residual problems    • Thoracic aortic aneurysm without mention of rupture    • Tricuspid insufficiency    • Unspecified disorder of thyroid     hypo     Past Surgical History:   Procedure Laterality Date   • TENDON REPAIR Right 11/10/2016    Procedure: TENDON REPAIR - QUADRACEPS ;  Surgeon: Maxim Herrera M.D.;  Location: [facility];  Service:    • KNEE ARTHROPLASTY TOTAL Right 9/8/2016    Procedure: KNEE ARTHROPLASTY TOTAL;  Surgeon: Maxim Herrera M.D.;  Location: SURGERY [facility];  Service:    • LUMBAR FUSION POSTERIOR  11/24/2015    Procedure: LUMBAR FUSION POSTERIOR L3-4, EXPLORATION OF FUSION L4-5 WITH INSTRUMENTATION;  Surgeon: Hermann Reis M.D.;  Location: SURGERY [facility];  Service:    • LUMBAR LAMINECTOMY DISKECTOMY  11/24/2015    Procedure: LUMBAR L3-4 LAMINECTOMY AND REDO L4-5;  Surgeon: Hermann Reis M.D.;  Location: [facility];  Service:    • KNEE ARTHROPLASTY TOTAL  1/3/2012    Performed by YOSEF MEJÍA at [facility]   • KNEE ARTHROSCOPY  10/18/2011    Performed by YOSEF MEJÍA at [facility]   • MEDIAL MENISCECTOMY  10/18/2011    Performed by YOSEF MEJÍA at [facility]   • AORTIC VALVE REPLACEMENT  1/12/2010    Performed by ISAÍAS NICOLE at [facility]   • APPENDECTOMY     • HYSTERECTOMY, TOTAL ABDOMINAL     • LUMBAR FUSION POSTERIOR       Family History   Problem Relation Age of Onset   • Stroke Mother    • Heart Disease Father    • Heart Disease Sister      Social History     Socioeconomic History   • Marital status:      Spouse name: Not on file   • Number of children: Not on file   • Years of education: Not on file   • Highest education level: Not on file   Occupational History   • Not on file   Social Needs   • Financial resource strain: Not on file   • Food insecurity     Worry: Not on file      Inability: Not on file   • Transportation needs     Medical: Not on file     Non-medical: Not on file   Tobacco Use   • Smoking status: Never Smoker   • Smokeless tobacco: Never Used   • Tobacco comment: none   Substance and Sexual Activity   • Alcohol use: No     Alcohol/week: 0.0 oz   • Drug use: Yes     Comment: CBD Oil for Arthritis   • Sexual activity: Not Currently     Partners: Male   Lifestyle   • Physical activity     Days per week: Not on file     Minutes per session: Not on file   • Stress: Not on file   Relationships   • Social connections     Talks on phone: Not on file     Gets together: Not on file     Attends Congregational service: Not on file     Active member of club or organization: Not on file     Attends meetings of clubs or organizations: Not on file     Relationship status: Not on file   • Intimate partner violence     Fear of current or ex partner: Not on file     Emotionally abused: Not on file     Physically abused: Not on file     Forced sexual activity: Not on file   Other Topics Concern   • Not on file   Social History Narrative   • Not on file     Allergies   Allergen Reactions   • Amoxicillin Vomiting     Upset stomach, ok if needed for life saving treatment (per pt)   • Bactrim [Sulfamethoxazole W-Trimethoprim] Vomiting     Nausea/vomiting Ok in a life saving situation (per pt)   • Codeine Nausea     Synthetic codones ok   • Doxycycline      Nausea, Ok in life saving situation (per pt)   • Trazodone Vomiting     groggy   • Ultram [Tramadol] Vomiting     nausea     Outpatient Encounter Medications as of 9/30/2020   Medication Sig Dispense Refill   • Magnesium 300 MG Cap Take 300 mg by mouth every day.     • apixaban (ELIQUIS) 5mg Tab Take 1 Tab by mouth 2 Times a Day. 180 Tab 3   • metoprolol SR (TOPROL XL) 25 MG TABLET SR 24 HR Take 1 Tab by mouth every morning. 90 Tab 3   • azelastine (ASTELIN) 137 MCG/SPRAY nasal spray Spray 1-2 Sprays in nose 2 times a day as needed (coughing).     •  "levothyroxine (SYNTHROID) 75 MCG Tab TAKE 1 TAB BY MOUTH EVERY MORNING ON AN EMPTY STOMACH. 90 Tab 2   • benazepril (LOTENSIN) 40 MG tablet Take 1 Tab by mouth every day. 90 Tab 3   • fexofenadine (ALLEGRA) 60 MG Tab Take 60 mg by mouth 1 time daily as needed. Indications: Hayfever     • omeprazole (PRILOSEC) 20 MG delayed-release capsule Take 20 mg by mouth 1 time daily as needed (For heartburn).     • Multiple Vitamins-Minerals (CENTRUM SILVER ULTRA WOMENS PO) Take 1 Tab by mouth every morning.     • [DISCONTINUED] metoprolol SR (TOPROL XL) 25 MG TABLET SR 24 HR Take 12.5 mg by mouth every morning. 0.5 tablet = 12.5 mg     • [DISCONTINUED] aspirin EC (ECOTRIN) 81 MG Tablet Delayed Response Take 81 mg by mouth Once.     • [DISCONTINUED] apixaban (ELIQUIS) 2.5mg Tab Take 1 Tab by mouth 2 Times a Day. 60 Tab 0     No facility-administered encounter medications on file as of 9/30/2020.      Review of Systems   All other systems reviewed and are negative.       Objective:   /72 (BP Location: Left arm, Patient Position: Sitting, BP Cuff Size: Adult)   Pulse 64   Ht 1.626 m (5' 4\")   Wt 72.8 kg (160 lb 9.6 oz)   SpO2 97%   BMI 27.57 kg/m²     Physical Exam   Constitutional: She is oriented to person, place, and time. She appears well-developed and well-nourished. No distress.   HENT:   Head: Normocephalic and atraumatic.   Right Ear: External ear normal.   Left Ear: External ear normal.   Mouth/Throat: No oropharyngeal exudate.   Eyes: Pupils are equal, round, and reactive to light. Conjunctivae and EOM are normal. Right eye exhibits no discharge. Left eye exhibits no discharge. No scleral icterus.   Neck: Normal range of motion. Neck supple. No JVD present. No tracheal deviation present. No thyromegaly present.   Cardiovascular: Normal rate, regular rhythm, S1 normal, S2 normal and intact distal pulses. PMI is not displaced. Exam reveals no gallop, no S3, no S4 and no friction rub.   Murmur heard.   Crescendo " decrescendo systolic murmur is present with a grade of 2/6.   Decrescendo diastolic murmur is present with a grade of 2/6.  Pulses:       Carotid pulses are 2+ on the right side and 2+ on the left side.       Radial pulses are 2+ on the right side and 2+ on the left side.        Popliteal pulses are 2+ on the right side and 2+ on the left side.        Dorsalis pedis pulses are 2+ on the right side and 2+ on the left side.        Posterior tibial pulses are 2+ on the right side and 2+ on the left side.   Pulmonary/Chest: Effort normal and breath sounds normal. No respiratory distress. She has no wheezes. She has no rales. She exhibits no tenderness.   Abdominal: Soft. Bowel sounds are normal. She exhibits no distension. There is no abdominal tenderness.   Musculoskeletal: Normal range of motion.         General: No tenderness or edema.   Neurological: She is alert and oriented to person, place, and time. No cranial nerve deficit (Cranial nerves II through XII grossly intact).   Skin: Skin is warm and dry. No rash noted. She is not diaphoretic. No erythema.   Psychiatric: She has a normal mood and affect. Her behavior is normal. Judgment and thought content normal.   Vitals reviewed.  No significant change in physical exam since prior 11/7/2018 .    LABS:  Lab Results   Component Value Date/Time    CHOLSTRLTOT 121 02/03/2020 07:03 AM    LDL 62 02/03/2020 07:03 AM    HDL 45 02/03/2020 07:03 AM    TRIGLYCERIDE 71 02/03/2020 07:03 AM       Lab Results   Component Value Date/Time    WBC 4.5 (L) 09/28/2020 08:20 AM    WBC 3.9 (L) 04/16/2011 12:00 AM    RBC 4.16 (L) 09/28/2020 08:20 AM    RBC 4.63 04/16/2011 12:00 AM    HEMOGLOBIN 12.6 09/28/2020 08:20 AM    HEMATOCRIT 38.1 09/28/2020 08:20 AM    MCV 91.6 09/28/2020 08:20 AM    MCV 92 04/16/2011 12:00 AM    NEUTSPOLYS 47.90 09/28/2020 08:20 AM    LYMPHOCYTES 36.90 09/28/2020 08:20 AM    MONOCYTES 11.70 09/28/2020 08:20 AM    EOSINOPHILS 2.00 09/28/2020 08:20 AM    BASOPHILS  1.10 09/28/2020 08:20 AM     Lab Results   Component Value Date/Time    SODIUM 139 09/28/2020 08:20 AM    POTASSIUM 4.3 09/28/2020 08:20 AM    CHLORIDE 105 09/28/2020 08:20 AM    CO2 24 09/28/2020 08:20 AM    GLUCOSE 105 (H) 09/28/2020 08:20 AM    BUN 31 (H) 09/28/2020 08:20 AM    CREATININE 1.10 09/28/2020 08:20 AM    CREATININE 1.05 (H) 02/07/2013 11:31 AM    BUNCREATRAT 25 12/08/2017 08:57 AM    BUNCREATRAT 30 (H) 02/07/2013 11:31 AM         Lab Results   Component Value Date/Time    ALKPHOSPHAT 64 09/28/2020 08:20 AM    ASTSGOT 24 09/28/2020 08:20 AM    ALTSGPT 18 09/28/2020 08:20 AM    TBILIRUBIN 0.4 09/28/2020 08:20 AM      Lab Results   Component Value Date/Time    BNPBTYPENAT 181 (H) 02/20/2018 10:05 AM      Lab Results   Component Value Date/Time    TSH 2.320 04/14/2016 08:27 AM     Lab Results   Component Value Date/Time    PROTHROMBTM 12.6 03/06/2018 01:48 AM    INR 0.98 03/06/2018 01:48 AM        ECHO CONCLUSIONS (11/2/2017):  Normal left ventricular chamber size.  Mild concentric left ventricular hypertrophy.  Left ventricular ejection fraction is visually estimated to be 70%.  Mild aortic insufficiency.  Mild tricuspid regurgitation.  Estimated right ventricular systolic pressure  is 50 mmHg.    EKG (2/20/2018):  I have personally reviewed the EKG this visit and discussed with the patient.  SINUS RHYTHM       Assessment:     1. PAF (paroxysmal atrial fibrillation) (Formerly [facility])  apixaban (ELIQUIS) 5mg Tab    metoprolol SR (TOPROL XL) 25 MG TABLET SR 24 HR    EC-ECHOCARDIOGRAM COMPLETE W/O CONT   2. Essential hypertension  apixaban (ELIQUIS) 5mg Tab    metoprolol SR (TOPROL XL) 25 MG TABLET SR 24 HR   3. Dyslipidemia     4. CKD (chronic kidney disease) stage 3, GFR 30-59 ml/min (Formerly [facility])         Medical Decision Making:  Today's Assessment / Status / Plan:     Overall doing well currently in sinus rhythm.  Based on her age weight and renal function she is most appropriate for 5 mg twice daily full dose apixaban as  she has no bleeding risks.  I advised her against routine use of nonsteroidal anti-inflammatories, and agree with a 25 mg up from 12.5 mg dose of Toprol-XL.  Should she have recurrent episodes consideration for antiarrhythmic therapy likely in the form of amiodarone would be recommended.  She is not planning to take baby aspirin concomitantly and I agree.    Follow-up in 3 months for tolerability and 6 months with renal function evaluation for dose titration of her apixaban.  Also needs an echocardiogram.

## 2020-10-03 ENCOUNTER — HOSPITAL ENCOUNTER (OUTPATIENT)
Dept: LAB | Facility: MEDICAL CENTER | Age: 85
End: 2020-10-03
Attending: INTERNAL MEDICINE
Payer: MEDICARE

## 2020-10-03 DIAGNOSIS — E78.5 DYSLIPIDEMIA: Chronic | ICD-10-CM

## 2020-10-03 LAB
CHOLEST SERPL-MCNC: 240 MG/DL (ref 100–199)
FASTING STATUS PATIENT QL REPORTED: NORMAL
HDLC SERPL-MCNC: 60 MG/DL
LDLC SERPL CALC-MCNC: 158 MG/DL
TRIGL SERPL-MCNC: 111 MG/DL (ref 0–149)

## 2020-10-03 PROCEDURE — 80061 LIPID PANEL: CPT

## 2020-10-03 PROCEDURE — 36415 COLL VENOUS BLD VENIPUNCTURE: CPT

## 2020-10-04 ENCOUNTER — TELEPHONE (OUTPATIENT)
Dept: MEDICAL GROUP | Facility: MEDICAL CENTER | Age: 85
End: 2020-10-04

## 2020-10-04 DIAGNOSIS — I48.0 PAROXYSMAL ATRIAL FIBRILLATION (HCC): ICD-10-CM

## 2020-10-04 DIAGNOSIS — E78.5 DYSLIPIDEMIA: Chronic | ICD-10-CM

## 2020-10-04 DIAGNOSIS — Z86.79 HISTORY OF SUPRAVENTRICULAR TACHYCARDIA: ICD-10-CM

## 2020-10-04 RX ORDER — ROSUVASTATIN CALCIUM 10 MG/1
10 TABLET, COATED ORAL EVERY EVENING
Qty: 30 TAB | Refills: 3 | Status: SHIPPED | OUTPATIENT
Start: 2020-10-04 | End: 2021-01-23

## 2020-10-04 NOTE — TELEPHONE ENCOUNTER
Notified with results of statin, cannot tolerate Lipitor, start Crestor 10 mg, can cause muscle pain, muscle aches, liver enzyme elevation, prescription to pharmacy she agrees.  She will get labs done in 6 weeks fasting.  Notify us if side effects in the interim.

## 2020-10-08 NOTE — DOCUMENTATION QUERY
[facility]                                                                       Query Response Note      PATIENT:               WADA, YVONNE  ACCT #:                  [id number]  MRN:                     [id number]  :                      1935  ADMIT DATE:       2020 8:07 AM  DISCH DATE:        2020 11:46 AM  RESPONDING  PROVIDER #:        [id number]           QUERY TEXT:    Your assistance is needed in determining the reason  for BNP lab test that was ordered.  This service is non-covered by the diagnoses documented in the medical record.      Can you please provide an additional diagnosis that describes the sign or symptom that  (prompted/initiated) the name of lab-BNP lab test  NOTE:  If an appropriate answer is not listed below, please respond with a new note.        The patient's Clinical Indicators include:  BNP lab test  Options provided:   -- Other related diagnosis, Please specify diagnosis demonstrating medical necessity for lab/imaging/other test.)   -- Unable to determine      Query created by: Carolyn Valenzuela on 10/5/2020 5:39 AM    RESPONSE TEXT:    Other related diagnosis- chf          Electronically signed by:  LEENA SHELDON MD 10/8/2020 7:43 AM

## 2020-10-22 ENCOUNTER — HOSPITAL ENCOUNTER (OUTPATIENT)
Dept: CARDIOLOGY | Facility: MEDICAL CENTER | Age: 85
End: 2020-10-22
Attending: INTERNAL MEDICINE
Payer: MEDICARE

## 2020-10-22 DIAGNOSIS — I48.0 PAF (PAROXYSMAL ATRIAL FIBRILLATION) (HCC): ICD-10-CM

## 2020-10-22 PROCEDURE — 93306 TTE W/DOPPLER COMPLETE: CPT

## 2020-10-23 LAB
LV EJECT FRACT  99904: 70
LV EJECT FRACT MOD 2C 99903: 79
LV EJECT FRACT MOD 4C 99902: 69.23
LV EJECT FRACT MOD BP 99901: 75.45

## 2020-10-23 PROCEDURE — 93306 TTE W/DOPPLER COMPLETE: CPT | Mod: 26 | Performed by: INTERNAL MEDICINE

## 2020-10-26 ENCOUNTER — HOSPITAL ENCOUNTER (OUTPATIENT)
Dept: LAB | Facility: MEDICAL CENTER | Age: 85
End: 2020-10-26
Attending: INTERNAL MEDICINE
Payer: MEDICARE

## 2020-10-26 ENCOUNTER — TELEPHONE (OUTPATIENT)
Dept: MEDICAL GROUP | Facility: MEDICAL CENTER | Age: 85
End: 2020-10-26

## 2020-10-26 DIAGNOSIS — E78.5 DYSLIPIDEMIA: Chronic | ICD-10-CM

## 2020-10-26 LAB
ALBUMIN SERPL BCP-MCNC: 4.4 G/DL (ref 3.2–4.9)
ALP SERPL-CCNC: 58 U/L (ref 30–99)
ALT SERPL-CCNC: 24 U/L (ref 2–50)
AST SERPL-CCNC: 21 U/L (ref 12–45)
BILIRUB CONJ SERPL-MCNC: <0.2 MG/DL (ref 0.1–0.5)
BILIRUB INDIRECT SERPL-MCNC: NORMAL MG/DL (ref 0–1)
BILIRUB SERPL-MCNC: 0.6 MG/DL (ref 0.1–1.5)
CHOLEST SERPL-MCNC: 158 MG/DL (ref 100–199)
FASTING STATUS PATIENT QL REPORTED: NORMAL
HDLC SERPL-MCNC: 62 MG/DL
LDLC SERPL CALC-MCNC: 75 MG/DL
PROT SERPL-MCNC: 7.1 G/DL (ref 6–8.2)
TRIGL SERPL-MCNC: 106 MG/DL (ref 0–149)

## 2020-10-26 PROCEDURE — 36415 COLL VENOUS BLD VENIPUNCTURE: CPT

## 2020-10-26 PROCEDURE — 80061 LIPID PANEL: CPT

## 2020-10-26 PROCEDURE — 80076 HEPATIC FUNCTION PANEL: CPT

## 2020-10-27 ENCOUNTER — TELEPHONE (OUTPATIENT)
Dept: MEDICAL GROUP | Facility: MEDICAL CENTER | Age: 85
End: 2020-10-27

## 2020-10-27 NOTE — TELEPHONE ENCOUNTER
Please notify the patient that her cholesterol test shows:  (1) total cholesterol is 158, decreased from 240  (2) good cholesterol is 62 (goal is above 40)  (3) bad cholesterol is 75 which is decreased from 158 (goal is less than 100)  (4) her cholesterol looks excellent, have her continue on the same cholesterol medication.

## 2020-10-27 NOTE — TELEPHONE ENCOUNTER
----- Message from Tee Tran M.D. sent at 10/26/2020  7:33 PM PDT -----  Please notify the patient that her cholesterol test shows:  (1) total cholesterol is 158, decreased from 240  (2) good cholesterol is 62 (goal is above 40)  (3) bad cholesterol is 75 which is decreased from 158 (goal is less than 100)  (4) her cholesterol looks excellent, have her continue on the same cholesterol medication.

## 2020-12-14 ENCOUNTER — OFFICE VISIT (OUTPATIENT)
Dept: CARDIOLOGY | Facility: MEDICAL CENTER | Age: 85
End: 2020-12-14
Payer: MEDICARE

## 2020-12-14 VITALS
WEIGHT: 160.8 LBS | OXYGEN SATURATION: 98 % | DIASTOLIC BLOOD PRESSURE: 70 MMHG | BODY MASS INDEX: 27.45 KG/M2 | HEIGHT: 64 IN | RESPIRATION RATE: 14 BRPM | SYSTOLIC BLOOD PRESSURE: 128 MMHG | HEART RATE: 67 BPM

## 2020-12-14 DIAGNOSIS — Z86.79 S/P THORACIC AORTIC ANEURYSM REPAIR: Chronic | ICD-10-CM

## 2020-12-14 DIAGNOSIS — Z98.890 S/P THORACIC AORTIC ANEURYSM REPAIR: Chronic | ICD-10-CM

## 2020-12-14 DIAGNOSIS — I10 ESSENTIAL HYPERTENSION: Chronic | ICD-10-CM

## 2020-12-14 DIAGNOSIS — Z79.01 ON CONTINUOUS ORAL ANTICOAGULATION: ICD-10-CM

## 2020-12-14 DIAGNOSIS — I35.1 NONRHEUMATIC AORTIC VALVE INSUFFICIENCY: Chronic | ICD-10-CM

## 2020-12-14 DIAGNOSIS — I48.0 PAF (PAROXYSMAL ATRIAL FIBRILLATION) (HCC): ICD-10-CM

## 2020-12-14 PROCEDURE — 99215 OFFICE O/P EST HI 40 MIN: CPT | Performed by: INTERNAL MEDICINE

## 2020-12-14 RX ORDER — AMIODARONE HYDROCHLORIDE 200 MG/1
200 TABLET ORAL DAILY
Qty: 90 TAB | Refills: 11 | Status: SHIPPED | OUTPATIENT
Start: 2020-12-14 | End: 2021-06-11

## 2020-12-14 ASSESSMENT — FIBROSIS 4 INDEX: FIB4 SCORE: 1.61

## 2020-12-14 NOTE — PROGRESS NOTES
"  Subjective:   Yvonne Marie Wada is a 85 y.o. female who presents today for follow-up of repaired thoracic ascending aorta aneurysm in 2010, aortic insufficiency, hypertension and hyperlipidemia.  She had a recent diagnosis of new onset atrial fibrillation, paroxysmal.    In the interim her dosing has been adjusted to the appropriate anticoagulation dosing and she is doing well without any side effects, she does note intermittent 3-4 times per week episodes of atrial fibrillation that resolves after 20 to 40 minutes.  Is quite distressing to her.      Past Medical History:   Diagnosis Date   • AAA (abdominal aortic aneurysm) ([facility]) 7/26/2010    10/09 CT thorax dilated ascending thoracic aorta 4.9 cm 1/10 transesophageal echo dilated aortic root, and ascending aorta with mild aortic insufficiency 1/10  ascending aortic aneurysm repair 5/12 echo [facility] normal LV function EF 60%, moderate to severe left atrial enlargement, RVSP 32    • Aortic insufficiency    • Aortic valve disease    • Aortic valve regurgitation    • Arthritis     back and knee/ osteo   • Cataract    • Dental disorder     full top denture, partial bottom   • Diverticulosis    • Dyslipidemia 7/26/2010    Sees [facility] on lipitor 4/11 chol 159,trig 84,hdl 47,ldl 95,cpk 114 7/12 chol 163,trig 120,hdl 49,ldl 90, crp 1.1 on lipitor 20 mg    • GERD (gastroesophageal reflux disease) 7/12/2012 6/07 EGD per [facility] hiatal hernia, start prevacid 30 mg 7/12 protonix 20 mg qday    • Glaucoma    • Heart burn    • Heart murmur    • Heart valve disease     \"leaking\", mitral valve   • Hiatus hernia syndrome    • High cholesterol    • History of shingles 6/30/2011    2010 Back and left thorax     • History of total knee arthroplasty 7/26/2010    4/10 MRI right knee complex tear medial meniscus, horizontal cleavage tear lateral meniscus, chondromalacia patella, moderate-sized Baker's cyst 5/10 right meniscus knee repair  5/10 told by  had gout " on surgery had pathology report, I await that report Question gout seen on pathology report done during repair of right meniscus knee surgery. 7/10 uric acid 6.3, we'll await starting allopurinol depending on the operative report 8/5/10 reviewed ortho notes , op report indicates crystal deposition, could be gout or pseudogout. No bx result sent over. Will need to get. My suspicion is chondrocalcinosis. 10/11  ortho note, MRI pending 8/7/10 reviewed pathology report right knee tissue, marked degenerative changes with focal calcification, no mention of gout. Based on this and a normal uric acid level, I do not believe she has gout, has no hyperuricemia medications would be indicated 10/11  ortho left knee meniscectomy and arthroscopy 1/12     • HLD (hyperlipidemia)    • Hypertension    • Hypothyroid 4/8/2011 4/11 tsh 9 start synthroid 50 4/11 tsh 6,free t3 3.1,free t4 1.2,tpo negative 7/1/11 tsh 2.1 cont synthroid 50 mcg 7/12 tsh 3.4 cont synthroid 50 mcg    • Indigestion    • Mitral insufficiency    • OSTEOPOROSIS 8/9/2010    Had been on fosamax [id number]  8/10 dexa LS -2.0,hip -1.5 await old dexa result, she will get copy for me 4/11 vit d 35 9/12 dexa LS -3.2,hip -1.4 2/13/13 relast infusion    • Pain     back and right leg, can get as bad as 10/10   • Preventative health care 7/26/2010    2002 pneum 2005 colon [facility] no records 4/11 vit d 35 9/11 shingles vaccine 9/12 mammogram 9/12 dexa LS -3.2,hip -1.4    • Rheumatic fever    • S/P appendectomy 7/26/2010   • S/P TOMY (total abdominal hysterectomy) 7/26/2010    Sees gyn on HRT estradiol for 20 yrs () 11/08 mammo     • Shingles 6/30/2011   • Snoring    • Status post lumbar surgery 7/26/2010 6/03 L4-5 epidural Dr. Jordan  7/06 overall for decompression, foraminotomy. L4-L5 posterior fusion, L4-L5 allograft      • Stroke (HCC) 1996    no residual problems    • Thoracic aortic aneurysm without mention  of rupture    • Tricuspid insufficiency    • Unspecified disorder of thyroid     hypo     Past Surgical History:   Procedure Laterality Date   • TENDON REPAIR Right 11/10/2016    Procedure: TENDON REPAIR - QUADRACEPS ;  Surgeon: Maxim Herrera M.D.;  Location: SURGERY [facility];  Service:    • KNEE ARTHROPLASTY TOTAL Right 9/8/2016    Procedure: KNEE ARTHROPLASTY TOTAL;  Surgeon: Maxim Herrera M.D.;  Location: SURGERY [facility];  Service:    • LUMBAR FUSION POSTERIOR  11/24/2015    Procedure: LUMBAR FUSION POSTERIOR L3-4, EXPLORATION OF FUSION L4-5 WITH INSTRUMENTATION;  Surgeon: Hermann Reis M.D.;  Location: SURGERY [facility];  Service:    • LUMBAR LAMINECTOMY DISKECTOMY  11/24/2015    Procedure: LUMBAR L3-4 LAMINECTOMY AND REDO L4-5;  Surgeon: Hermann Reis M.D.;  Location: SURGERY [facility];  Service:    • KNEE ARTHROPLASTY TOTAL  1/3/2012    Performed by YOSEF MEJÍA at [facility]   • KNEE ARTHROSCOPY  10/18/2011    Performed by YOSEF MEJÍA at [facility]   • MEDIAL MENISCECTOMY  10/18/2011    Performed by YOSEF MEJÍA at [facility]   • AORTIC VALVE REPLACEMENT  1/12/2010    Performed by ISAÍAS NICOLE at [facility]   • APPENDECTOMY     • HYSTERECTOMY, TOTAL ABDOMINAL     • LUMBAR FUSION POSTERIOR       Family History   Problem Relation Age of Onset   • Stroke Mother    • Heart Disease Father    • Heart Disease Sister      Social History     Socioeconomic History   • Marital status:      Spouse name: Not on file   • Number of children: Not on file   • Years of education: Not on file   • Highest education level: Not on file   Occupational History   • Not on file   Social Needs   • Financial resource strain: Not on file   • Food insecurity     Worry: Not on file     Inability: Not on file   • Transportation needs     Medical: Not on file     Non-medical: Not on file   Tobacco Use   • Smoking status: Never  Smoker   • Smokeless tobacco: Never Used   • Tobacco comment: none   Substance and Sexual Activity   • Alcohol use: No     Alcohol/week: 0.0 oz   • Drug use: Yes     Comment: CBD Oil for Arthritis   • Sexual activity: Not Currently     Partners: Male   Lifestyle   • Physical activity     Days per week: Not on file     Minutes per session: Not on file   • Stress: Not on file   Relationships   • Social connections     Talks on phone: Not on file     Gets together: Not on file     Attends L[employer] service: Not on file     Active member of club or organization: Not on file     Attends meetings of clubs or organizations: Not on file     Relationship status: Not on file   • Intimate partner violence     Fear of current or ex partner: Not on file     Emotionally abused: Not on file     Physically abused: Not on file     Forced sexual activity: Not on file   Other Topics Concern   • Not on file   Social History Narrative   • Not on file     Allergies   Allergen Reactions   • Amoxicillin Vomiting     Upset stomach, ok if needed for life saving treatment (per pt)   • Bactrim [Sulfamethoxazole W-Trimethoprim] Vomiting     Nausea/vomiting Ok in a life saving situation (per pt)   • Codeine Nausea     Synthetic codones ok   • Doxycycline      Nausea, Ok in life saving situation (per pt)   • Trazodone Vomiting     groggy   • Ultram [Tramadol] Vomiting     nausea     Outpatient Encounter Medications as of 12/14/2020   Medication Sig Dispense Refill   • amiodarone (CORDARONE) 200 MG Tab Take 1 Tab by mouth every day. 90 Tab 11   • rosuvastatin (CRESTOR) 10 MG Tab Take 1 Tab by mouth every evening. 30 Tab 3   • Magnesium 300 MG Cap Take 300 mg by mouth every day.     • apixaban (ELIQUIS) 5mg Tab Take 1 Tab by mouth 2 Times a Day. 180 Tab 3   • metoprolol SR (TOPROL XL) 25 MG TABLET SR 24 HR Take 1 Tab by mouth every morning. 90 Tab 3   • azelastine (ASTELIN) 137 MCG/SPRAY nasal spray Spray 1-2 Sprays in nose 2 times a day as needed  "(coughing).     • levothyroxine (SYNTHROID) 75 MCG Tab TAKE 1 TAB BY MOUTH EVERY MORNING ON AN EMPTY STOMACH. 90 Tab 2   • benazepril (LOTENSIN) 40 MG tablet Take 1 Tab by mouth every day. 90 Tab 3   • fexofenadine (ALLEGRA) 60 MG Tab Take 60 mg by mouth 1 time daily as needed. Indications: Hayfever     • omeprazole (PRILOSEC) 20 MG delayed-release capsule Take 20 mg by mouth 1 time daily as needed (For heartburn).     • Multiple Vitamins-Minerals (CENTRUM SILVER ULTRA WOMENS PO) Take 1 Tab by mouth every morning.       No facility-administered encounter medications on file as of 12/14/2020.      Review of Systems   All other systems reviewed and are negative.       Objective:   /70 (BP Location: Left arm, Patient Position: Sitting, BP Cuff Size: Adult)   Pulse 67   Resp 14   Ht 1.626 m (5' 4\")   Wt 72.9 kg (160 lb 12.8 oz)   SpO2 98%   BMI 27.60 kg/m²     Physical Exam   Constitutional: She is oriented to person, place, and time. She appears well-developed and well-nourished. No distress.   HENT:   Head: Normocephalic and atraumatic.   Right Ear: External ear normal.   Left Ear: External ear normal.   Mouth/Throat: No oropharyngeal exudate.   Eyes: Pupils are equal, round, and reactive to light. Conjunctivae and EOM are normal. Right eye exhibits no discharge. Left eye exhibits no discharge. No scleral icterus.   Neck: Normal range of motion. Neck supple. No JVD present. No tracheal deviation present. No thyromegaly present.   Cardiovascular: Normal rate, regular rhythm, S1 normal, S2 normal and intact distal pulses. PMI is not displaced. Exam reveals no gallop, no S3, no S4 and no friction rub.   Murmur heard.   Crescendo decrescendo systolic murmur is present with a grade of 2/6.   Decrescendo diastolic murmur is present with a grade of 2/6.  Pulses:       Carotid pulses are 2+ on the right side and 2+ on the left side.       Radial pulses are 2+ on the right side and 2+ on the left side.        " Popliteal pulses are 2+ on the right side and 2+ on the left side.        Dorsalis pedis pulses are 2+ on the right side and 2+ on the left side.        Posterior tibial pulses are 2+ on the right side and 2+ on the left side.   Pulmonary/Chest: Effort normal and breath sounds normal. No respiratory distress. She has no wheezes. She has no rales. She exhibits no tenderness.   Abdominal: Soft. Bowel sounds are normal. She exhibits no distension. There is no abdominal tenderness.   Musculoskeletal: Normal range of motion.         General: No tenderness or edema.   Neurological: She is alert and oriented to person, place, and time. No cranial nerve deficit (Cranial nerves II through XII grossly intact).   Skin: Skin is warm and dry. No rash noted. She is not diaphoretic. No erythema.   Psychiatric: She has a normal mood and affect. Her behavior is normal. Judgment and thought content normal.   Vitals reviewed.  No significant change in physical exam since prior 9/30/2020    LABS:  Lab Results   Component Value Date/Time    CHOLSTRLTOT 158 10/26/2020 09:06 AM    LDL 75 10/26/2020 09:06 AM    HDL 62 10/26/2020 09:06 AM    TRIGLYCERIDE 106 10/26/2020 09:06 AM       Lab Results   Component Value Date/Time    WBC 4.5 (L) 09/28/2020 08:20 AM    WBC 3.9 (L) 04/16/2011 12:00 AM    RBC 4.16 (L) 09/28/2020 08:20 AM    RBC 4.63 04/16/2011 12:00 AM    HEMOGLOBIN 12.6 09/28/2020 08:20 AM    HEMATOCRIT 38.1 09/28/2020 08:20 AM    MCV 91.6 09/28/2020 08:20 AM    MCV 92 04/16/2011 12:00 AM    NEUTSPOLYS 47.90 09/28/2020 08:20 AM    LYMPHOCYTES 36.90 09/28/2020 08:20 AM    MONOCYTES 11.70 09/28/2020 08:20 AM    EOSINOPHILS 2.00 09/28/2020 08:20 AM    BASOPHILS 1.10 09/28/2020 08:20 AM     Lab Results   Component Value Date/Time    SODIUM 139 09/28/2020 08:20 AM    POTASSIUM 4.3 09/28/2020 08:20 AM    CHLORIDE 105 09/28/2020 08:20 AM    CO2 24 09/28/2020 08:20 AM    GLUCOSE 105 (H) 09/28/2020 08:20 AM    BUN 31 (H) 09/28/2020 08:20 AM     CREATININE 1.10 09/28/2020 08:20 AM    CREATININE 1.05 (H) 02/07/2013 11:31 AM    BUNCREATRAT 25 12/08/2017 08:57 AM    BUNCREATRAT 30 (H) 02/07/2013 11:31 AM         Lab Results   Component Value Date/Time    ALKPHOSPHAT 58 10/26/2020 09:06 AM    ASTSGOT 21 10/26/2020 09:06 AM    ALTSGPT 24 10/26/2020 09:06 AM    TBILIRUBIN 0.6 10/26/2020 09:06 AM      Lab Results   Component Value Date/Time    BNPBTYPENAT 181 (H) 02/20/2018 10:05 AM      Lab Results   Component Value Date/Time    TSH 2.320 04/14/2016 08:27 AM     Lab Results   Component Value Date/Time    PROTHROMBTM 12.6 03/06/2018 01:48 AM    INR 0.98 03/06/2018 01:48 AM        ECHO CONCLUSIONS (11/2/2017):  Normal left ventricular chamber size.  Mild concentric left ventricular hypertrophy.  Left ventricular ejection fraction is visually estimated to be 70%.  Mild aortic insufficiency.  Mild tricuspid regurgitation.  Estimated right ventricular systolic pressure  is 50 mmHg.    EKG (2/20/2018):  I have personally reviewed the EKG this visit and discussed with the patient.  SINUS RHYTHM       Assessment:     1. PAF (paroxysmal atrial fibrillation) (HCC)  amiodarone (CORDARONE) 200 MG Tab   2. S/P thoracic aortic aneurysm repair     3. Nonrheumatic aortic valve insufficiency     4. Essential hypertension     5. On continuous oral anticoagulation         Medical Decision Making:  Today's Assessment / Status / Plan:     Doing well.  Currently in sinus rhythm.  Tolerating her appropriate anticoagulation dosing well.  Should she ever meet criteria for a lower anticoagulant dosing then should be adjusted down.  Given her frequent symptomatic episodes of PAF and her advanced age recommend initiation of amiodarone 200 mg daily.  This should help decrease the frequency and duration of her atrial fibrillation.  Continue other medical therapy including her anticoagulation.    Follow-up in 6 months

## 2020-12-21 ENCOUNTER — OFFICE VISIT (OUTPATIENT)
Dept: MEDICAL GROUP | Facility: MEDICAL CENTER | Age: 85
End: 2020-12-21
Payer: MEDICARE

## 2020-12-21 VITALS
HEART RATE: 62 BPM | BODY MASS INDEX: 27.66 KG/M2 | SYSTOLIC BLOOD PRESSURE: 114 MMHG | TEMPERATURE: 97.8 F | WEIGHT: 162 LBS | OXYGEN SATURATION: 97 % | DIASTOLIC BLOOD PRESSURE: 62 MMHG | HEIGHT: 64 IN

## 2020-12-21 DIAGNOSIS — I48.0 PAROXYSMAL ATRIAL FIBRILLATION (HCC): ICD-10-CM

## 2020-12-21 DIAGNOSIS — I27.20 PULMONARY HYPERTENSION (HCC): ICD-10-CM

## 2020-12-21 DIAGNOSIS — Z86.79 S/P THORACIC AORTIC ANEURYSM REPAIR: Chronic | ICD-10-CM

## 2020-12-21 DIAGNOSIS — Z98.890 S/P THORACIC AORTIC ANEURYSM REPAIR: Chronic | ICD-10-CM

## 2020-12-21 DIAGNOSIS — I10 ESSENTIAL HYPERTENSION: Chronic | ICD-10-CM

## 2020-12-21 DIAGNOSIS — Z86.73 HISTORY OF TRANSIENT ISCHEMIC ATTACK (TIA): ICD-10-CM

## 2020-12-21 DIAGNOSIS — Z86.79 HISTORY OF SUPRAVENTRICULAR TACHYCARDIA: ICD-10-CM

## 2020-12-21 PROCEDURE — 99213 OFFICE O/P EST LOW 20 MIN: CPT | Performed by: INTERNAL MEDICINE

## 2020-12-21 ASSESSMENT — FIBROSIS 4 INDEX: FIB4 SCORE: 1.61

## 2020-12-21 NOTE — PROGRESS NOTES
Subjective:      Yvonne Marie Wada is a 85 y.o. female who presents with andrew.fib Follow-Up            HPI     Patient newly diagnosed with a.fib has seen cardiology and is now on amiodarone 200 mg daily, metoprolol 25 mg one pill a day, eliquis and does not take asa and nsaids, no side effects with medications.  Since starting the medications no chest pain, palpitations, lightheadedness.  She is also limiting caffeine, drinking decaffeinated coffee now.  Although she does not drive her troponins.  Low-sodium diet.  No leg swelling.  No cough or shortness of breath.  Practicing social distancing with COVID-19 pandemic.  Dyslipidemia on Crestor.  No falls, balance is stable using cane for ambulation   CKD most recent labs 9/28/20 bun 31,creat 1.1,GFR 47, no leg swelling    Current Outpatient Medications   Medication Sig Dispense Refill   • amiodarone (CORDARONE) 200 MG Tab Take 1 Tab by mouth every day. 90 Tab 11   • rosuvastatin (CRESTOR) 10 MG Tab Take 1 Tab by mouth every evening. 30 Tab 3   • Magnesium 300 MG Cap Take 300 mg by mouth every day.     • apixaban (ELIQUIS) 5mg Tab Take 1 Tab by mouth 2 Times a Day. 180 Tab 3   • metoprolol SR (TOPROL XL) 25 MG TABLET SR 24 HR Take 1 Tab by mouth every morning. 90 Tab 3   • azelastine (ASTELIN) 137 MCG/SPRAY nasal spray Spray 1-2 Sprays in nose 2 times a day as needed (coughing).     • levothyroxine (SYNTHROID) 75 MCG Tab TAKE 1 TAB BY MOUTH EVERY MORNING ON AN EMPTY STOMACH. 90 Tab 2   • benazepril (LOTENSIN) 40 MG tablet Take 1 Tab by mouth every day. 90 Tab 3   • fexofenadine (ALLEGRA) 60 MG Tab Take 60 mg by mouth 1 time daily as needed. Indications: Hayfever     • omeprazole (PRILOSEC) 20 MG delayed-release capsule Take 20 mg by mouth 1 time daily as needed (For heartburn).     • Multiple Vitamins-Minerals (CENTRUM SILVER ULTRA WOMENS PO) Take 1 Tab by mouth every morning.       No current facility-administered medications for this visit.                         Aortic regurgitation  12/6/19 echo normal LV function EF 65%, RVSP 43, moderate tricuspid regurgitation, mild to moderate aortic insufficiency     Cervical pain  2/10/14 [facility] note; x-ray cervical spine DJD, right shoulder steroid injection     Decreased GFR  5/31/09 bun 18,creat 1.2  1/14/10 bun 28,creat 1.3,GFR 40  9/20/12 bun 37,creat 1.1,GFR 48  9/25/13 bun 25,creat 1.2,GFR 44  9/26/14 bun 26,creat 1.1,GFR 45  2/23/15 bun 20,creat 1.1,GFR 48  4/15/16 bun 31,creat 1.1,GFR 45  7/6/16 bun 29,creat 1.1,GFR 44   5/12/17 bun 35,creat 1.1,GFR 44  11/2/17 bun 22,creat 1.28,GFR 40  12/9/17 bun 29,creat 1.1,GFR 43  10/4/18 bun 31,creat 1.5 at [facility]  1/18/19 bun 33,creat 1.34,GFR 38,PTH 53,calcium 9.7,phos 3.6,spep negative  2/4/20 bun 30,creat 1.24, GFR 41, PTH 58, calcium 9.7, phos 3.9  8/20/20 bun 35,creat 1.12,GFR 46     Dyslipidemia  4/11 chol 159,trig 84,hdl 47,ldl 95,cpk 114  7/12 chol 163,trig 120,hdl 49,ldl 90, crp 1.1 on lipitor 20 mg  3/26/13 chol 159,trig 99,hdl 46,ldl 93 on lipitor 20 mg  9/25/13 chol 164,trig 100,hdl 47,ldl 97 on lipitor 20 mg  9/12/14 cardiology note lower extremity weakness, hold lipitor x3 months, labs ordered  12/15/14 cardiology start low dose lipitor 10 mg  1/22/15 off lipitor will resume 10 mg and repeat labs 4 weeks  2/24/15 chol 179,trig 111,hdl 54,ldl 103 on lipitor 10 mg  2/1/16 cardiology note restart lipitor 20 mg    4/15/16 chol 163,trig 102,hdl 48,ldl 95 on lipitor 20 mg  5/12/17 chol 186,trig 101,hdl 47,ldl 119 on lipitor 20 mg intermittently will start taking daily  11/1/17 chol 146,trig 73,hdl 42,ldl 89 on lipitor 20 mg  12/9/17 chol 133,trig 74,hdl 49,ldl 69 on lipitor 80 mg higher dose due to recent transient ischemic attack  10/4/18 chol 126,trig 98,hdl 44,ldl 62 on lipitor 80 mg at [facility]  1/18/19 chol 161,trig 103,hdl 45,ldl 95 on lipitor 80 mg  2/4/20 chol 121,trig 71,hdl 45,ldl 62 on lipitor 80 mg  8/17/20 stop lipitor due to muscle pain   9/17/20  improved off lipitor repeat lipid pending  10/3/20 chol 240,trig 111,hdl 60,ldl 158, recommend start crestor 10 mg and repeat labs 6 weeks  10/26/20 chol 158,trig 106,hdl 62,ldl 75 on crestor 20 mg  Lipitor muscle pain      Gastroesophageal reflux  6/27/07 EGD per [facility] hiatal hernia, start prevacid 30 mg  7/12 protonix 20 mg qday  11/16/12 [facility] note cont prilosec  1/5/16 change to protonix 40 mg from prilosec  2/4/20 vit d 67     History shingles    History shoulder pain  4/4/17 right shoulder pain right shoulder x-ray ordered, right knee pain, referral custom PT, tried celebrex no benefit, will try naproxen 500 mg twice daily and zanaflex at night  4/5/17 x-ray right shoulder calcifications consistent with calcific tendinitis or hydroxyapatite deposition  5/12/17 MRI right shoulder ordered, persistent pain despite physical therapy  5/18/17 MRI right shoulder; full-thickness supraspinatus tendon tear  5/22/17 right shoulder injection, has been going to physical therapy 14 treatments some improvement, right shoulder injection provided, if no improvement in has appt  in october, if need sooner appt try   6/9/17 [facility] PT note   7/10/17 custom PT note      History of supraventricular tachycardia  12/20/17  [facility] cardiology note most recent echocardiogram looks fine, will repeat CT scan follow aortic repair  2/20/17 episode of supraventricular tachycardia in the emergency room, davenport catheter removed, symptoms resolved with repeat EKG sinus rhythm  11/17/18 cardiology note asymptomatic, continue cholesterol medication, continue to monitor echo aortic valve, repaired ascending aorta, follow-up yearly  11/21/18 echo normal LV function, EF 60%, mild LVH, grade 2 diastolic dysfunction, mild aortic insufficiency, moderate tricuspid regurgitation, RVSP 50  12/14/20 cardiology note maintaining sinus rhythm, given frequent symptomatic episodes of paroxysmal atrial fibrillation and age, recommend  amiodarone 200 mg, follow-up 6 months      History TIA  11/1/17 hospital admission, 11/2/17 hospital discharge, facial numbness, transient numbness in both hands, resolved, CT, MRI head no acute infarct, blood pressure stable, discharged home, patient states she was on aspirin at the time of admission  11/1/17 CT head stable right mid brain likely chronic lacunar infarction, periventricular white matter disease  11/1/17 MRI brain no acute abnormality, moderate chronic microvascular stomach disease, chronic microhemorrhages left frontal and right parietal lobes, chronic lacunar infarct left basal ganglia and blanco, or cerebral atrophy  11/1/17 MR-MRA head without; negative  11/2/17 echo normal LV size and function, EF 70%, RVSP 50, mild AR and TR  11/28/17 awaiting possible right hip replacement per orthopedics , given recent possible TIA, cannot provide clearance, she will like second opinion from cardiology referral made to dr.bryan de leon, changed asa to plavix  12/12/17 ultrasound carotid less than 50% internal carotid stenosis bilateral  12/20/17 dr.bryan de leon cardiology note most recent echocardiogram looks fine, will repeat CT scan follow aortic repair  2/20/18 episode of supraventricular tachycardia in the emergency room, davenport catheter removed, symptoms resolved with repeat EKG sinus rhythm  11/17/18 cardiology note asymptomatic, continue cholesterol medication, continue to monitor echo aortic valve, repaired ascending aorta, follow-up yearly  11/21/18 echo normal LV function, EF 60%, mild LVH, grade 2 diastolic dysfunction, mild aortic insufficiency, moderate tricuspid regurgitation, RVSP 50  12/6/19 echo normal LV function EF 65%, RVSP 43, moderate tricuspid regurgitation, mild to moderate aortic insufficiency     Hypertension  Sees cardiology  1/10/11 [facility] note cardiac catheterization normal systolic function  3/11 [facility] echo moderate aortic insufficiency, moderate mitral insufficiency,  moderate tricuspid insufficiency, normal LV function  5/12 echo [facility] normal LV function EF 60%, moderate to severe left atrial enlargement, RVSP 32    9/26/13 CT thoracic aorta no evidence of aneurysm, small hiatal hernia  9/26/13 Persantine thallium uniform breast tissue attenuation, cannot rule out underlying ischemic, LVEF 67%  10/3/13 on toprol-XL 25 mg half a tablet daily    12/13/13 cardiology note cont same meds, repeat echo and follow up 6 months  1/2/14 echo mild LVH, grade 1 diastolic dysfunction, ascending aortic 4.0, no change compared with ZAK 2010  5/15/14 cardiology note; increase benazepril to 40 mg qday,continue toprol XL 25 mg daily  6/17/15 cardiology note continue meds, repeat echo 6 months  2/1/16 cardiology note moderate pulmonary hypertension and snoring, nocturnal pulse oximetry ordered  6/30/16 cardiology note patient declines overnight pulse oximetry  11/1/17 echo normal LV size and function, EF 70%, mild aortic insufficiency and mild tricuspid regurgitation, RVSP 50, aortic root 3.2 cm  11/3/17 cardiology note hypertension stable, status post thoracic aortic aneurysm repair, headache unspecified, ESR ordered  11/28/17 on benazepril 40 mg,toprol 25mg, add norvasc 5 mg  12/12/17 ultrasound carotid less than 50% internal carotid stenosis bilateral  12/20/17  [facility] cardiology note most recent echocardiogram looks fine, will repeat CT scan follow aortic repair  2/20/18 episode of supraventricular tachycardia in the emergency room, davenport catheter removed, symptoms resolved with repeat EKG sinus rhythm  11/17/18 cardiology note asymptomatic, continue cholesterol medication, continue to monitor echo aortic valve, repaired ascending aorta, follow-up yearly  11/21/18 echo normal LV function, EF 60%, mild LVH, grade 2 diastolic dysfunction, mild aortic insufficiency, moderate tricuspid regurgitation, RVSP 50  1/18/19 urine mac 45 on benazepril 40 mg, toprol 25 mg, norvasc 5 mg  12/6/19  echo normal LV function EF 65%, RVSP 43, moderate tricuspid regurgitation, mild to moderate aortic insufficiency  12/14/20 cardiology note maintaining sinus rhythm, given frequent symptomatic episodes of paroxysmal atrial fibrillation and age, recommend amiodarone 200 mg, follow-up 6 months    Hypothyroid  4/11 tsh 9 start synthroid 50  4/11 tsh 6,free t3 3.1,free t4 1.2,tpo negative  7/1/11 tsh 2.1 cont synthroid 50 mcg  7/12 tsh 3.4 cont synthroid 50 mcg  7/31/13 tsh 3.2 on synthroid 50 mcg  9/25/13 tsh 1.7 on synthroid 50 mcg  2/24/15 tsh 4.9 on synthroid 50 mcg; increase to synthroid 75 mcg, repeat tsh in 6 weeks  4/14/15 tsh 1.1 on synthroid 75 mcg  4/15/16 tsh 2.3 on synthroid 75 mcg  5/12/17 tsh 1.2 on synthroid 75 mcg  11/1/17 tsh 2.1 on synthroid 75 mcg  1/18/19 tsh 2.2 on synthroid 75 mcg  2/4/20 tsh 3.1 on synthroid 75 mcg  8/20/20 tsh 1.6 on synthroid 75 mcg     Insomnia  1/30/17 trial of trazodone 50 mg  4/4/17 side effects with trazodone drowsiness, discontinued     Neutropenia  4/16/11 wbc 3.9  7/30/13 wbc 5.6  11/7/16 wbc 5.7  5/11/17 wbc 4.7  2/20/18 wbc 7.2  3/6/18 wbc 5.9  1/16/19 wbc 4.0  6/8/19 wbc 4.3 (44%N, 39%L)  2/4/20 wbc 4.1 (42%N,41%L)  8/20/20 wbc 4.6     Osteoporosis  Had been on fosamax [id number]    8/10 dexa LS -2.0,hip -1.5 await old dexa result, she will get copy for me  4/11 vit d 35  9/12 dexa LS -3.2,hip -1.4  2/13/13 reclast IV  7/31/13 vit d 33 cont 2000 units  2/18/14 reclast IV  2/2/15 dexa LS -3.1,hip -1.9; FRAX 35.5 % major,12.6% hip  2/18/15 reclast IV  2/24/15 vit d 33  4/15/16 vit d 36  5/12/17 vit d 36  8/7/18 dexa left forearm -4.2,hip -2.5 resume reclast   8/31/18 reclast IV  1/18/19 vit d 27 increase from 2000 to 4000 units  2/4/20 vit d 67, PTH 58    Paroxysmal atrial fibrillation  11/1/17 hospital admission, 11/2/17 hospital discharge, facial numbness, transient numbness in both hands, resolved, CT, MRI head no acute infarct, blood pressure stable, discharged home,  patient states she was on aspirin at the time of admission  11/1/17 CT head stable right mid brain likely chronic lacunar infarction, periventricular white matter disease  11/1/17 MRI brain no acute abnormality, moderate chronic microvascular stomach disease, chronic microhemorrhages left frontal and right parietal lobes, chronic lacunar infarct left basal ganglia and blanco, or cerebral atrophy  11/1/17 MR-MRA head without; negative  2/20/17 episode of supraventricular tachycardia in the emergency room, davenprot catheter removed, symptoms resolved with repeat EKG sinus rhythm  12/20/17  [facility] cardiology note most recent echocardiogram looks fine, will repeat CT scan follow aortic repair  11/17/18 cardiology note asymptomatic, continue cholesterol medication, continue to monitor echo aortic valve, repaired ascending aorta, follow-up yearly  11/21/18 echo normal LV function, EF 60%, mild LVH, grade 2 diastolic dysfunction, mild aortic insufficiency, moderate tricuspid regurgitation, RVSP 50  12/6/19 echo normal LV function EF 65%, RVSP 43, moderate tricuspid regurgitation, mild to moderate aortic insufficiency  9/28/20 emergency room note EKG atrial fibrillation new onset  9/30/20 cardiology note sinus rhythm, start eliquis 5 mg bid and increase toprol to 25 mg daily, echo ordered, follow up 3 months   12/14/20 cardiology note maintaining sinus rhythm, given frequent symptomatic episodes of paroxysmal atrial fibrillation and age, recommend amiodarone 200 mg, follow-up 6 months     Preventative health  6/27/09 colon per [facility] no records  10/19/04 pneumovax   9/9/11 zoster vaccine  4/14/15 prevnar  8/7/18 dexa left forearm -4.2,hip -2.5  9/28/19 pneumovax  11/7/19 mammogram  11/12/19 shingrix second  2/4/20 vit d 67  9/17/20 tdap      Pulmonary hypertension  11/17/18 cardiology note asymptomatic, continue cholesterol medication, continue to monitor echo aortic valve, repaired ascending aorta, follow-up  yearly  11/21/18 echo normal LV function, EF 60%, mild LVH, grade 2 diastolic dysfunction, mild aortic insufficiency, moderate tricuspid regurgitation, RVSP 50  12/6/19 echo normal LV function EF 65%, RVSP 43, moderate tricuspid regurgitation, mild to moderate aortic insufficiency     s/p knee arthroplasty  4/10 MRI right knee complex tear medial meniscus, horizontal cleavage tear lateral meniscus, chondromalacia patella, moderate-sized baker's cyst  5/10 right meniscus knee repair   5/10 told by  had gout on surgery had pathology report, I await that report, question gout seen on pathology report done during repair of right meniscus knee surgery.  7/10 uric acid 6.3, await starting allopurinol depending on the operative report  8/5/10 reviewed orthopedics notes , operative report indicates crystal deposition, could be gout or pseudogout, no biopsy results, suspect chondrocalcinosis  10/11  ortho note, MRI pending  8/7/10 reviewed pathology report right knee tissue, marked degenerative changes with focal calcification, no mention of gout. Based on this and a normal uric acid level, I do not believe she has gout, has no hyperuricemia medications would be indicated  10/11 dr.parlasca najera left knee meniscectomy and arthroscopy  1/12 dr.parlasca najera left knee arthroplasty  2/29/16  orthopedics x-ray right knee advanced DJD on the right knee corticosteroid injection performed, prescription voltaren gel  6/13/16  orthopedic note x-rays demonstrate right knee advanced DJD and resurfaced patella, offer right knee total replacement followed by patellar resurfacing after she recovers from her right knee  9/8/16  orthopedic's operative note right knee total arthroplasty  11/2/16  orthopedic note, follow-up right knee, x-ray right knee shows subluxation patella, traumatic evulsion VMO needs reexploration and repair  11/10/16  orthopedic's  operative note VMO quadriceps avulsion repair status post total knee replacement  11/23/16  orthopedic no follow-up quadriceps repair, continue crutches in full extension, follow-up one month and then start passive range of motion 0-40°  2/4/17  orthopedic note, continue physical therapy, follow-up this summer  4/17/17 [facility] note  6/9/17 [facility] PT note     s/p total hip arthroplasty  9/21/17 MRI right hip mild trochanteric bursitis, mild femoral acetabular joint arthritis  10/12/17  orthopedic no proceed with right total hip arthroplasty, x-ray AP pelvis and right hip shows early arthrosis with calcifications and DJD  2/14/18  orthopedics operative note at [facility] right hip total arthroplasty, gluteus medius and minimus tendon repair  3/27/18 [facility] note   5/8/18 [facility] note xray right hip stable total hip arthroplasty, continue physical therapy  4/3/19 custom PT discharge     s/p lumbar surgery  6/03 L4-5 epidural     7/06  spinal surgery operative note decompression, foraminotomy. L4-L5 posterior fusion, L4-L5 allograft    3/5/14 MRI lumbar spine grade 2 spondylolisthesis L4-L5 with previous laminectomy and posterior fusion, left sided pedicle screw fixation L4-L5, moderate central canal stenosis L5-S1 secondary to facet arthropathy, mild to moderate multilevel neural foraminal narrowing  12/8/14  pain [facility] note; right lower extremity radiculitis L4-L5 lesion, myofascial lumbosacral pain, trochanteric bursitis, followup as needed  4/2/15  pain procedure note right L4-L5 and right L5-S1 SSNRB under fluoroscopy  4/27/15  pain note; right trochanteric bursal injection, trigger point injections performed, also set up SI joint injections  8/3/15 [facility] neurosurgery note, lumbar x-ray flexion and extension, MRI lumbar spine without contrast, followup   8/9/15 MRI lumbar spine, previous L4-L5 left  fusion and  laminectomy, L3-L4 moderate bilateral facet arthropathy, mild disc bulge, L4-L5 severe bilateral facet arthropathy, moderate to severe bilateral neural foraminal stenosis, L5-S1 moderate facet arthropathy  8/28/15  neurosurgery note previous posterior fusion L4-L5, does have L3-L4 disc bulge with central canal stenosis, recommend L3-L5 decompression  8/31/15  pain note; right lower extremity radiculitis, myofascial lumbosacral pain, start hydrocodone 5 mg, continued SI joint exercises, perform trochanteric bursal injection right hip, trigger points lumbar region  10/28/15  neurosurgery note, will schedule for posterior L3-L4 laminectomy and fusion with redo L4-5 laminectomy and exploration of fusion, surgical clearance per cardiology   12/9/15 [facility] neurosurgery note s/p L4-S1 fusion on 11/24/15 , follow up in 4 weeks  12/23/15  neurosurgery note, clear to restart physical therapy if she would like after one month, follow-up 3 months     s/p TOMY  Sees gyn on HRT estradiol for 20 yrs ()     s/p thoracic aneurysm repair  6/29/06 CT-CTA chest with and without postprocessing; stable ascending thoracic aortic aneurysm maximum diameter 4.8, just distal to the aortic valve maximum diameter 4.3  4/9/07 CT-CTA chest with and without processing; stable ascending aortic thoracic aneurysm 4.5 x 4.6 cm  6/8/09 CT chest with; stable 4.7 ascending aorta aneurysm  10/15/09 CT chest without; dilated ascending thoracic aorta 4.9 cm aneurysm increased from 4.7  1/11/10  cardiovascular surgery operative note ascending thoracic aorta aneurysm repair  9/26/13 CT thoracic aorta evaluation; status post repair ascending thoracic aortic aneurysm without evidence of dissection or leak  1/2/14 echo mild LVH, grade 1 diastolic dysfunction, ascending aortic 4.0, no change compared with ZKA 2010  5/15/14 cardiology note  6/17/15 cardiology note cont meds,repeat echo 6 months  2/1/16  cardiology note moderate pulmonary hypertension and snoring, nocturnal pulse oximetry ordered  11/1/17 echo normal LV size and function, EF 70%, mild aortic insufficiency and mild tricuspid regurgitation, RVSP 50, aortic root 3.2 cm  11/3/17 cardiology note stable  [facility] most recent echocardiogram looks fine, will repeat CT scan follow aortic repair  11/17/18 cardiology note asymptomatic, continue cholesterol medication, continue to monitor echo aortic valve, repaired ascending aorta, follow-up yearly  11/21/18 echo normal LV function, EF 60%, mild LVH, grade 2 diastolic dysfunction, mild aortic insufficiency, moderate tricuspid regurgitation, RVSP 50  12/6/19 echo normal LV function EF 65%, RVSP 43, moderate tricuspid regurgitation, mild to moderate aortic insufficiency     Tricuspid regurgitation  11/17/18 cardiology note asymptomatic, continue cholesterol medication, continue to monitor echo aortic valve, repaired ascending aorta, follow-up yearly  11/21/18 echo normal LV function, EF 60%, mild LVH, grade 2 diastolic dysfunction, mild aortic insufficiency, moderate tricuspid regurgitation, RVSP 50  12/6/19 echo normal LV function EF 65%, RVSP 43, moderate tricuspid regurgitation, mild to moderate aortic insufficiency     UTI  7/6/16 UTI klebsiella pneumoniae sensitive to all antibiotics except ampicillin, start bactrim x 5 days  1/18/19 UTI enterobacter cloacae resistant to ampicillin, cephalothin, penicillin, bactrim, sensitive to cefuroxime, ciprofloxacin and levaquin, nitrofurantoin  5/26/19 UTI klebsiella given omnicef, organism resistant ampicillin, ciprofloxacin, levofloxacin, bactrim                   Patient Active Problem List   Diagnosis   • Status post lumbar surgery   • Hypertension   • Dyslipidemia   • S/P TOMY (total abdominal hysterectomy)   • Preventative health care   • S/P total knee arthroplasty   • S/P appendectomy   • Osteoporosis, idiopathic   •  "Hypothyroid   • History of shingles   • History of total knee arthroplasty   • GERD (gastroesophageal reflux disease)   • Tricuspid regurgitation   • Aortic regurgitation   • Cervical pain   • S/P thoracic aortic aneurysm repair   • CKD (chronic kidney disease) stage 3, GFR 30-59 ml/min   • UTI (urinary tract infection)   • Insomnia   • History of shoulder pain   • History of transient ischemic attack (TIA)   • S/P total hip arthroplasty   • History of supraventricular tachycardia   • Pulmonary hypertension (HCC)   • Neutropenia (HCC)   • Allergic rhinitis   • Paroxysmal atrial fibrillation (HCC)   • On continuous oral anticoagulation          Health Maintenance Summary                Annual Wellness Visit Overdue 1/11/2020      Done 1/10/2019 Visit Dx: Medicare annual wellness visit, subsequent     Patient has more history with this topic...    MAMMOGRAM Overdue 11/7/2020      Done 11/7/2019 MA-SCREENING MAMMO BILAT W/TOMOSYNTHESIS W/CAD     Patient has more history with this topic...    BONE DENSITY Next Due 8/7/2023      Done 8/7/2018 DS-BONE DENSITY STUDY (DEXA)     Patient has more history with this topic...    IMM DTaP/Tdap/Td Vaccine Next Due 9/17/2030      Done 9/17/2020 Imm Admin: Tdap Vaccine          Patient Care Team:  Tee Tran M.D. as PCP - General  Kobi Wheeler M.D. as Consulting Physician (Interventional Cardiology)  Brett Santos II, O.D. as Consulting Physician (Optometry)  Angelic Adams as Consulting Physician (Dentistry)    ROS       Objective:     /62 (BP Location: Right arm, Patient Position: Sitting, BP Cuff Size: Adult)   Pulse 62   Temp 36.6 °C (97.8 °F)   Ht 1.626 m (5' 4\")   Wt 73.5 kg (162 lb)   SpO2 97%   BMI 27.81 kg/m²      Physical Exam  Vitals signs and nursing note reviewed.   Constitutional:       Appearance: Normal appearance.   HENT:      Head: Normocephalic and atraumatic.      Right Ear: External ear normal.      Left Ear: External ear normal.   Eyes: "      Conjunctiva/sclera: Conjunctivae normal.   Neck:      Musculoskeletal: Neck supple.   Cardiovascular:      Rate and Rhythm: Normal rate and regular rhythm.      Heart sounds: Normal heart sounds.   Pulmonary:      Effort: No respiratory distress.      Breath sounds: Normal breath sounds.   Abdominal:      General: There is no distension.   Skin:     General: Skin is warm.   Neurological:      General: No focal deficit present.      Mental Status: She is alert.   Psychiatric:         Mood and Affect: Mood normal.         Behavior: Behavior normal.       Legs no edema          Assessment/Plan:          Assessment  #! Atrial fibrillation has seen cardiology sinus on exam today, remains on metoprolol, Eliquis, amiodarone    #2  Hypertension stable most recent echo 10/22/20 echo EF 70%, grade 1 diastolic dysfunction, mild AR, RVSP 30     #3 CKD stage III 9/28/20 bun 31,creat 1.1,GFR 47 no regular NSAIDs    #4 pulmonary hypertension related to atrial fibrillation, RVSP 30 stable no leg edema, no shortness of breath    Plan  #! Hold off and on mammogram and dexa due to covid    #2  Continue medications, anticoagulation education and precautions, continue cane for ambulation, understands risk of Eliquis and bleeding should she fall    #3 continue avoid aspirin and NSAIDs    #4 patient has had influenza vaccine    #5 continue check blood pressure    #6 continue social distancing and mask work during COVID-19 pandemic    #7 follow-up 6 months

## 2021-01-11 DIAGNOSIS — Z23 NEED FOR VACCINATION: ICD-10-CM

## 2021-01-18 ENCOUNTER — TELEPHONE (OUTPATIENT)
Dept: CARDIOLOGY | Facility: MEDICAL CENTER | Age: 86
End: 2021-01-18

## 2021-01-18 NOTE — TELEPHONE ENCOUNTER
Called pt and informed that it is generally not recommended to take NSAIDs such as Aleve regularly along with blood thinners due to the increased risk of bleeding. Encouraged to try Tylenol instead, and if inadequate pain relief to discuss with PCP for further strategies.

## 2021-01-18 NOTE — TELEPHONE ENCOUNTER
TO: 130p/Mon/Ofc  NM: Yvonne Wada   PH: [phone]   PT NM: Wada, Yvonne   : 1935   REG DR: Dr Wheeler   RE: Is afib patient, wants to know if  she can take Aleve for her back?   DISP HIST: 2021 01:13P RK, pt  dsp

## 2021-02-06 ENCOUNTER — IMMUNIZATION (OUTPATIENT)
Dept: FAMILY PLANNING/WOMEN'S HEALTH CLINIC | Facility: IMMUNIZATION CENTER | Age: 86
End: 2021-02-06
Payer: MEDICARE

## 2021-02-06 DIAGNOSIS — Z23 ENCOUNTER FOR VACCINATION: Primary | ICD-10-CM

## 2021-02-06 PROCEDURE — 91300 PFIZER SARS-COV-2 VACCINE: CPT

## 2021-02-06 PROCEDURE — 0001A PFIZER SARS-COV-2 VACCINE: CPT

## 2021-02-25 ENCOUNTER — TELEPHONE (OUTPATIENT)
Dept: CARDIOLOGY | Facility: MEDICAL CENTER | Age: 86
End: 2021-02-25

## 2021-02-25 NOTE — TELEPHONE ENCOUNTER
TW    Pt called and is asking if she can take Melatonin and Robitussin with heart condition. Please call pt back to advice at [phone] can LVM    Thank you

## 2021-02-27 ENCOUNTER — IMMUNIZATION (OUTPATIENT)
Dept: FAMILY PLANNING/WOMEN'S HEALTH CLINIC | Facility: IMMUNIZATION CENTER | Age: 86
End: 2021-02-27
Payer: MEDICARE

## 2021-02-27 DIAGNOSIS — Z23 ENCOUNTER FOR VACCINATION: Primary | ICD-10-CM

## 2021-02-27 PROCEDURE — 0002A PFIZER SARS-COV-2 VACCINE: CPT | Performed by: NURSE PRACTITIONER

## 2021-02-27 PROCEDURE — 91300 PFIZER SARS-COV-2 VACCINE: CPT | Performed by: NURSE PRACTITIONER

## 2021-04-06 ENCOUNTER — TELEPHONE (OUTPATIENT)
Dept: MEDICAL GROUP | Facility: MEDICAL CENTER | Age: 86
End: 2021-04-06

## 2021-04-06 ENCOUNTER — OFFICE VISIT (OUTPATIENT)
Dept: MEDICAL GROUP | Facility: MEDICAL CENTER | Age: 86
End: 2021-04-06
Payer: MEDICARE

## 2021-04-06 VITALS
WEIGHT: 162 LBS | OXYGEN SATURATION: 96 % | HEIGHT: 64 IN | TEMPERATURE: 98 F | HEART RATE: 76 BPM | BODY MASS INDEX: 27.66 KG/M2 | SYSTOLIC BLOOD PRESSURE: 130 MMHG | DIASTOLIC BLOOD PRESSURE: 64 MMHG

## 2021-04-06 DIAGNOSIS — I10 ESSENTIAL HYPERTENSION: Chronic | ICD-10-CM

## 2021-04-06 DIAGNOSIS — Z98.890 STATUS POST LUMBAR SURGERY: Chronic | ICD-10-CM

## 2021-04-06 DIAGNOSIS — M54.9 BACK PAIN, UNSPECIFIED BACK LOCATION, UNSPECIFIED BACK PAIN LATERALITY, UNSPECIFIED CHRONICITY: ICD-10-CM

## 2021-04-06 DIAGNOSIS — M25.559 HIP PAIN: ICD-10-CM

## 2021-04-06 DIAGNOSIS — M81.0 POSTMENOPAUSAL BONE LOSS: ICD-10-CM

## 2021-04-06 DIAGNOSIS — R26.89 BALANCE DISORDER: ICD-10-CM

## 2021-04-06 DIAGNOSIS — Z12.31 ENCOUNTER FOR SCREENING MAMMOGRAM FOR BREAST CANCER: ICD-10-CM

## 2021-04-06 PROCEDURE — 99213 OFFICE O/P EST LOW 20 MIN: CPT | Performed by: INTERNAL MEDICINE

## 2021-04-06 RX ORDER — CEFIXIME 400 MG/1
400 CAPSULE ORAL DAILY
Qty: 10 CAPSULE | Refills: 0 | Status: SHIPPED | OUTPATIENT
Start: 2021-04-06 | End: 2021-06-11

## 2021-04-06 ASSESSMENT — PATIENT HEALTH QUESTIONNAIRE - PHQ9: CLINICAL INTERPRETATION OF PHQ2 SCORE: 0

## 2021-04-06 ASSESSMENT — FIBROSIS 4 INDEX: FIB4 SCORE: 1.61

## 2021-04-06 NOTE — PROGRESS NOTES
Subjective:      Yvonne Marie Wada is a 85 y.o. female who presents with Cough            HPI     Patient has had more sinus congestion for the past month along with post nasal drip more noticeable at night when lays down, also will have dry cough at night, symptoms not as noticeable during the day, using astelin twice per day, allegra at night half tab at night as 1 full tablet can make her drowsy, no fever or chills, no sob, no chest pain, no palpitations. has atrial fibrillation sees cardiology on metoprolol, eliquis, amiodarone.  Has not had issues with palpitations since being started on amiodarone, home blood pressures running 120s/50s with heart rate running 50-60s on Lotensin 40 mg, metoprolol 25 mg.   Has back pain and hip pain and had injection dr.arraiz NELSON in her right hip yesterday, she has had chronic issues with balance, no falls using a cane for ambulation.  Previous history of lumbar surgery, as well as hip arthroplasty, knee arthroplasty, she was going to be set up for physical therapy but Covid occurred and she has not been to physical therapy during this interim timeframe.  Patient states she does have difficulty with gait, she does have chronic back and hip pain, sometimes she just feels off balance, no syncope, no dizziness, no history of orthostatic hypotension and falls related to that  No tobacco       Current Outpatient Medications   Medication Sig Dispense Refill   • benazepril (LOTENSIN) 40 MG tablet TAKE 1 TABLET BY MOUTH EVERY DAY 90 tablet 2   • levothyroxine (SYNTHROID) 75 MCG Tab TAKE 1 TAB BY MOUTH EVERY MORNING ON AN EMPTY STOMACH. 90 tablet 1   • Magnesium 300 MG Cap Take 300 mg by mouth every day.     • apixaban (ELIQUIS) 5mg Tab Take 1 Tab by mouth 2 Times a Day. 180 Tab 3   • metoprolol SR (TOPROL XL) 25 MG TABLET SR 24 HR Take 1 Tab by mouth every morning. 90 Tab 3   • azelastine (ASTELIN) 137 MCG/SPRAY nasal spray Spray 1-2 Sprays in nose 2 times a day as needed (coughing).      • fexofenadine (ALLEGRA) 60 MG Tab Take 60 mg by mouth 1 time daily as needed. Indications: Hayfever     • omeprazole (PRILOSEC) 20 MG delayed-release capsule Take 20 mg by mouth 1 time daily as needed (For heartburn).     • Multiple Vitamins-Minerals (CENTRUM SILVER ULTRA WOMENS PO) Take 1 Tab by mouth every morning.     • rosuvastatin (CRESTOR) 10 MG Tab TAKE 1 TABLET BY MOUTH EVERY DAY IN THE EVENING (Patient not taking: Reported on 4/6/2021) 90 Tab 2   • amiodarone (CORDARONE) 200 MG Tab Take 1 Tab by mouth every day. (Patient not taking: Reported on 4/6/2021) 90 Tab 11     No current facility-administered medications for this visit.     Aortic regurgitation  12/6/19 echo normal LV function EF 65%, RVSP 43, moderate tricuspid regurgitation, mild to moderate aortic insufficiency  10/22/20 echo EF 70%, grade 1 diastolic dysfunction, mild AR, RVSP 30      Cervical pain  2/10/14 [facility] note; x-ray cervical spine DJD, right shoulder steroid injection     Decreased GFR  5/31/09 bun 18,creat 1.2  1/14/10 bun 28,creat 1.3,GFR 40  9/20/12 bun 37,creat 1.1,GFR 48  9/25/13 bun 25,creat 1.2,GFR 44  9/26/14 bun 26,creat 1.1,GFR 45  2/23/15 bun 20,creat 1.1,GFR 48  4/15/16 bun 31,creat 1.1,GFR 45  7/6/16 bun 29,creat 1.1,GFR 44   5/12/17 bun 35,creat 1.1,GFR 44  11/2/17 bun 22,creat 1.28,GFR 40  12/9/17 bun 29,creat 1.1,GFR 43  10/4/18 bun 31,creat 1.5 at [facility]  1/18/19 bun 33,creat 1.34,GFR 38,PTH 53,calcium 9.7,phos 3.6,spep negative  2/4/20 bun 30,creat 1.24, GFR 41, PTH 58, calcium 9.7, phos 3.9  8/20/20 bun 35,creat 1.12,GFR 46  9/28/20 bun 31,creat 1.1,GFR 47     Dyslipidemia  4/11 chol 159,trig 84,hdl 47,ldl 95,cpk 114  7/12 chol 163,trig 120,hdl 49,ldl 90, crp 1.1 on lipitor 20 mg  3/26/13 chol 159,trig 99,hdl 46,ldl 93 on lipitor 20 mg  9/25/13 chol 164,trig 100,hdl 47,ldl 97 on lipitor 20 mg  9/12/14 cardiology note lower extremity weakness, hold lipitor x3 months, labs ordered  12/15/14 cardiology start  low dose lipitor 10 mg  1/22/15 off lipitor will resume 10 mg and repeat labs 4 weeks  2/24/15 chol 179,trig 111,hdl 54,ldl 103 on lipitor 10 mg  2/1/16 cardiology note restart lipitor 20 mg    4/15/16 chol 163,trig 102,hdl 48,ldl 95 on lipitor 20 mg  5/12/17 chol 186,trig 101,hdl 47,ldl 119 on lipitor 20 mg intermittently will start taking daily  11/1/17 chol 146,trig 73,hdl 42,ldl 89 on lipitor 20 mg  12/9/17 chol 133,trig 74,hdl 49,ldl 69 on lipitor 80 mg higher dose due to recent transient ischemic attack  10/4/18 chol 126,trig 98,hdl 44,ldl 62 on lipitor 80 mg at Counts include [facility] beds at the [facility]  1/18/19 chol 161,trig 103,hdl 45,ldl 95 on lipitor 80 mg  2/4/20 chol 121,trig 71,hdl 45,ldl 62 on lipitor 80 mg  8/17/20 stop lipitor due to muscle pain   9/17/20 improved off lipitor repeat lipid pending  10/3/20 chol 240,trig 111,hdl 60,ldl 158, recommend start crestor 10 mg and repeat labs 6 weeks  10/26/20 chol 158,trig 106,hdl 62,ldl 75 on crestor 20 mg  Lipitor muscle pain      Gastroesophageal reflux  6/27/07 EGD per [facility] hiatal hernia, start prevacid 30 mg  7/12 protonix 20 mg qday  11/16/12 [facility] note cont prilosec  1/5/16 change to protonix 40 mg from prilosec  2/4/20 vit d 67    history of pulmonary hypertension  11/17/18 cardiology note asymptomatic, continue cholesterol medication, continue to monitor echo aortic valve, repaired ascending aorta, follow-up yearly  11/21/18 echo normal LV function, EF 60%, mild LVH, grade 2 diastolic dysfunction, mild aortic insufficiency, moderate tricuspid regurgitation, RVSP 50  12/6/19 echo normal LV function EF 65%, RVSP 43, moderate tricuspid regurgitation, mild to moderate aortic insufficiency  10/22/20 echo EF 70%, grade 1 diastolic dysfunction, mild AR, RVSP 30     History shingles     History shoulder pain  4/4/17 right shoulder pain right shoulder x-ray ordered, right knee pain, referral custom PT, tried celebrex no benefit, will try naproxen 500 mg twice daily and zanaflex at  night  4/5/17 x-ray right shoulder calcifications consistent with calcific tendinitis or hydroxyapatite deposition  5/12/17 MRI right shoulder ordered, persistent pain despite physical therapy  5/18/17 MRI right shoulder; full-thickness supraspinatus tendon tear  5/22/17 right shoulder injection, has been going to physical therapy 14 treatments some improvement, right shoulder injection provided, if no improvement in has appt  in october, if need sooner appt try   6/9/17 custom PT note   7/10/17 custom PT note      History of supraventricular tachycardia  12/20/17  [facility] cardiology note most recent echocardiogram looks fine, will repeat CT scan follow aortic repair  2/20/17 episode of supraventricular tachycardia in the emergency room, davenport catheter removed, symptoms resolved with repeat EKG sinus rhythm  11/17/18 cardiology note asymptomatic, continue cholesterol medication, continue to monitor echo aortic valve, repaired ascending aorta, follow-up yearly  11/21/18 echo normal LV function, EF 60%, mild LVH, grade 2 diastolic dysfunction, mild aortic insufficiency, moderate tricuspid regurgitation, RVSP 50  10/22/20 echo EF 70%, grade 1 diastolic dysfunction, mild AR, RVSP 30   12/14/20 cardiology note maintaining sinus rhythm, given frequent symptomatic episodes of paroxysmal atrial fibrillation and age, recommend amiodarone 200 mg, follow-up 6 month      History TIA  11/1/17 hospital admission, 11/2/17 hospital discharge, facial numbness, transient numbness in both hands, resolved, CT, MRI head no acute infarct, blood pressure stable, discharged home, patient states she was on aspirin at the time of admission  11/1/17 CT head stable right mid brain likely chronic lacunar infarction, periventricular white matter disease  11/1/17 MRI brain no acute abnormality, moderate chronic microvascular stomach disease, chronic microhemorrhages left frontal and right parietal lobes, chronic  lacunar infarct left basal ganglia and blanco, or cerebral atrophy  11/1/17 MR-MRA head without; negative  11/2/17 echo normal LV size and function, EF 70%, RVSP 50, mild AR and TR  11/28/17 awaiting possible right hip replacement per orthopedics , given recent possible TIA, cannot provide clearance, she will like second opinion from cardiology referral made to dr.bryan de leon, changed asa to plavix  12/12/17 ultrasound carotid less than 50% internal carotid stenosis bilateral  12/20/17 dr.bryan de leon cardiology note most recent echocardiogram looks fine, will repeat CT scan follow aortic repair  2/20/18 episode of supraventricular tachycardia in the emergency room, davenport catheter removed, symptoms resolved with repeat EKG sinus rhythm  11/17/18 cardiology note asymptomatic, continue cholesterol medication, continue to monitor echo aortic valve, repaired ascending aorta, follow-up yearly  11/21/18 echo normal LV function, EF 60%, mild LVH, grade 2 diastolic dysfunction, mild aortic insufficiency, moderate tricuspid regurgitation, RVSP 50  12/6/19 echo normal LV function EF 65%, RVSP 43, moderate tricuspid regurgitation, mild to moderate aortic insufficiency   10/22/20 echo EF 70%, grade 1 diastolic dysfunction, mild AR, RVSP 30   12/14/20 cardiology note maintaining sinus rhythm, given frequent symptomatic episodes of paroxysmal atrial fibrillation and age, recommend amiodarone 200 mg, follow-up 6 month    Hypertension  1/10/11 [facility] note cardiac catheterization normal systolic function  3/11 [facility] echo moderate aortic insufficiency, moderate mitral insufficiency, moderate tricuspid insufficiency, normal LV function  5/12 echo [facility] normal LV function EF 60%, moderate to severe left atrial enlargement, RVSP 32    9/26/13 CT thoracic aorta no evidence of aneurysm, small hiatal hernia  9/26/13 Persantine thallium uniform breast tissue attenuation, cannot rule out underlying ischemic, LVEF 67%  10/3/13 on  toprol-XL 25 mg half a tablet daily    12/13/13 cardiology note cont same meds, repeat echo and follow up 6 months  1/2/14 echo mild LVH, grade 1 diastolic dysfunction, ascending aortic 4.0, no change compared with ZAK 2010  5/15/14 cardiology note; increase benazepril to 40 mg qday,continue toprol XL 25 mg daily  6/17/15 cardiology note continue meds, repeat echo 6 months  2/1/16 cardiology note moderate pulmonary hypertension and snoring, nocturnal pulse oximetry ordered  6/30/16 cardiology note patient declines overnight pulse oximetry  11/1/17 echo normal LV size and function, EF 70%, mild aortic insufficiency and mild tricuspid regurgitation, RVSP 50, aortic root 3.2 cm  11/3/17 cardiology note hypertension stable, status post thoracic aortic aneurysm repair, headache unspecified, ESR ordered  11/28/17 on benazepril 40 mg,toprol 25mg, add norvasc 5 mg  12/12/17 ultrasound carotid less than 50% internal carotid stenosis bilateral  12/20/17  [facility] cardiology note most recent echocardiogram looks fine, will repeat CT scan follow aortic repair  2/20/18 episode of supraventricular tachycardia in the emergency room, davenport catheter removed, symptoms resolved with repeat EKG sinus rhythm  11/17/18 cardiology note asymptomatic, continue cholesterol medication, continue to monitor echo aortic valve, repaired ascending aorta, follow-up yearly  11/21/18 echo normal LV function, EF 60%, mild LVH, grade 2 diastolic dysfunction, mild aortic insufficiency, moderate tricuspid regurgitation, RVSP 50  1/18/19 urine mac 45 on benazepril 40 mg, toprol 25 mg, norvasc 5 mg  12/6/19 echo normal LV function EF 65%, RVSP 43, moderate tricuspid regurgitation, mild to moderate aortic insufficiency  10/22/20 echo EF 70%, grade 1 diastolic dysfunction, mild AR, RVSP 30   12/14/20 cardiology note maintaining sinus rhythm, given frequent symptomatic episodes of paroxysmal atrial fibrillation and age, recommend amiodarone 200 mg,  follow-up 6 month     Hypothyroid  4/11 tsh 9 start synthroid 50  4/11 tsh 6,free t3 3.1,free t4 1.2,tpo negative  7/1/11 tsh 2.1 cont synthroid 50 mcg  7/12 tsh 3.4 cont synthroid 50 mcg  7/31/13 tsh 3.2 on synthroid 50 mcg  9/25/13 tsh 1.7 on synthroid 50 mcg  2/24/15 tsh 4.9 on synthroid 50 mcg; increase to synthroid 75 mcg, repeat tsh in 6 weeks  4/14/15 tsh 1.1 on synthroid 75 mcg  4/15/16 tsh 2.3 on synthroid 75 mcg  5/12/17 tsh 1.2 on synthroid 75 mcg  11/1/17 tsh 2.1 on synthroid 75 mcg  1/18/19 tsh 2.2 on synthroid 75 mcg  2/4/20 tsh 3.1 on synthroid 75 mcg  8/20/20 tsh 1.6 on synthroid 75 mcg     Insomnia  1/30/17 trial of trazodone 50 mg  4/4/17 side effects with trazodone drowsiness, discontinued     Neutropenia  4/16/11 wbc 3.9  7/30/13 wbc 5.6  11/7/16 wbc 5.7  5/11/17 wbc 4.7  2/20/18 wbc 7.2  3/6/18 wbc 5.9  1/16/19 wbc 4.0  6/8/19 wbc 4.3 (44%N, 39%L)  2/4/20 wbc 4.1 (42%N,41%L)  8/20/20 wbc 4.6     Osteoporosis  Had been on fosamax [id number]    8/10 dexa LS -2.0,hip -1.5 await old dexa result, she will get copy for me  4/11 vit d 35  9/12 dexa LS -3.2,hip -1.4  2/13/13 reclast IV  7/31/13 vit d 33 cont 2000 units  2/18/14 reclast IV  2/2/15 dexa LS -3.1,hip -1.9; FRAX 35.5 % major,12.6% hip  2/18/15 reclast IV  2/24/15 vit d 33  4/15/16 vit d 36  5/12/17 vit d 36  8/7/18 dexa left forearm -4.2,hip -2.5 resume reclast   8/31/18 reclast IV  1/18/19 vit d 27 increase from 2000 to 4000 units  2/4/20 vit d 67, PTH 58     Paroxysmal atrial fibrillation  11/1/17 hospital admission, 11/2/17 hospital discharge, facial numbness, transient numbness in both hands, resolved, CT, MRI head no acute infarct, blood pressure stable, discharged home, patient states she was on aspirin at the time of admission  11/1/17 CT head stable right mid brain likely chronic lacunar infarction, periventricular white matter disease  11/1/17 MRI brain no acute abnormality, moderate chronic microvascular stomach disease, chronic  microhemorrhages left frontal and right parietal lobes, chronic lacunar infarct left basal ganglia and blanco, or cerebral atrophy  11/1/17 MR-MRA head without; negative  2/20/17 episode of supraventricular tachycardia in the emergency room, davenport catheter removed, symptoms resolved with repeat EKG sinus rhythm  12/20/17  [facility] cardiology note most recent echocardiogram looks fine, will repeat CT scan follow aortic repair  11/17/18 cardiology note asymptomatic, continue cholesterol medication, continue to monitor echo aortic valve, repaired ascending aorta, follow-up yearly  11/21/18 echo normal LV function, EF 60%, mild LVH, grade 2 diastolic dysfunction, mild aortic insufficiency, moderate tricuspid regurgitation, RVSP 50  12/6/19 echo normal LV function EF 65%, RVSP 43, moderate tricuspid regurgitation, mild to moderate aortic insufficiency  9/28/20 emergency room note EKG atrial fibrillation new onset  9/30/20 cardiology note sinus rhythm, start eliquis 5 mg bid and increase toprol to 25 mg daily, echo ordered, follow up 3 months   10/22/20 echo EF 70%, grade 1 diastolic dysfunction, mild AR, RVSP 30   12/14/20 cardiology note maintaining sinus rhythm, given frequent symptomatic episodes of paroxysmal atrial fibrillation and age, recommend amiodarone 200 mg, follow-up 6 month     Preventative health  6/27/09 colon per [facility] no records  10/19/04 pneumovax   9/9/11 zoster vaccine  4/14/15 prevnar  8/7/18 dexa left forearm -4.2,hip -2.5  9/28/19 pneumovax  11/7/19 mammogram  11/12/19 shingrix second  2/4/20 vit d 67  9/17/20 tdap   2/27/21 covid second pfizer     s/p knee arthroplasty  4/10 MRI right knee complex tear medial meniscus, horizontal cleavage tear lateral meniscus, chondromalacia patella, moderate-sized baker's cyst  5/10 right meniscus knee repair   5/10 told by  had gout on surgery had pathology report, I await that report, question gout seen on pathology report done  during repair of right meniscus knee surgery.  7/10 uric acid 6.3, await starting allopurinol depending on the operative report  8/5/10 reviewed orthopedics notes , operative report indicates crystal deposition, could be gout or pseudogout, no biopsy results, suspect chondrocalcinosis  10/11 dr.parlasca najera note, MRI pending  8/7/10 reviewed pathology report right knee tissue, marked degenerative changes with focal calcification, no mention of gout. Based on this and a normal uric acid level, I do not believe she has gout, has no hyperuricemia medications would be indicated  10/11 dr.parlasca najera left knee meniscectomy and arthroscopy  1/12 dr.parlasca najera left knee arthroplasty  2/29/16  orthopedics x-ray right knee advanced DJD on the right knee corticosteroid injection performed, prescription voltaren gel  6/13/16  orthopedic note x-rays demonstrate right knee advanced DJD and resurfaced patella, offer right knee total replacement followed by patellar resurfacing after she recovers from her right knee  9/8/16  orthopedic's operative note right knee total arthroplasty  11/2/16  orthopedic note, follow-up right knee, x-ray right knee shows subluxation patella, traumatic evulsion VMO needs reexploration and repair  11/10/16  orthopedic's operative note VMO quadriceps avulsion repair status post total knee replacement  11/23/16  orthopedic no follow-up quadriceps repair, continue crutches in full extension, follow-up one month and then start passive range of motion 0-40°  2/4/17  orthopedic note, continue physical therapy, follow-up this summer  4/17/17 [facility] note  6/9/17 [facility] PT note     s/p total hip arthroplasty  9/21/17 MRI right hip mild trochanteric bursitis, mild femoral acetabular joint arthritis  10/12/17  orthopedic no proceed with right total hip arthroplasty, x-ray AP pelvis and right hip shows early arthrosis with  calcifications and DJD  2/14/18  orthopedics operative note at [facility] right hip total arthroplasty, gluteus medius and minimus tendon repair  3/27/18 [place] [facility] note   5/8/18 [facility] [facility] note xray right hip stable total hip arthroplasty, continue physical therapy  4/3/19 custom PT discharge     s/p lumbar surgery  6/03 L4-5 epidural     7/06  spinal surgery operative note decompression, foraminotomy. L4-L5 posterior fusion, L4-L5 allograft    3/5/14 MRI lumbar spine grade 2 spondylolisthesis L4-L5 with previous laminectomy and posterior fusion, left sided pedicle screw fixation L4-L5, moderate central canal stenosis L5-S1 secondary to facet arthropathy, mild to moderate multilevel neural foraminal narrowing  12/8/14  pain [facility] note; right lower extremity radiculitis L4-L5 lesion, myofascial lumbosacral pain, trochanteric bursitis, followup as needed  4/2/15  pain procedure note right L4-L5 and right L5-S1 SSNRB under fluoroscopy  4/27/15  pain note; right trochanteric bursal injection, trigger point injections performed, also set up SI joint injections  8/3/15 [facility] neurosurgery note, lumbar x-ray flexion and extension, MRI lumbar spine without contrast, followup   8/9/15 MRI lumbar spine, previous L4-L5 left  fusion and laminectomy, L3-L4 moderate bilateral facet arthropathy, mild disc bulge, L4-L5 severe bilateral facet arthropathy, moderate to severe bilateral neural foraminal stenosis, L5-S1 moderate facet arthropathy  8/28/15  neurosurgery note previous posterior fusion L4-L5, does have L3-L4 disc bulge with central canal stenosis, recommend L3-L5 decompression  8/31/15  pain note; right lower extremity radiculitis, myofascial lumbosacral pain, start hydrocodone 5 mg, continued SI joint exercises, perform trochanteric bursal injection right hip, trigger points lumbar region  10/28/15  neurosurgery note, will  schedule for posterior L3-L4 laminectomy and fusion with redo L4-5 laminectomy and exploration of fusion, surgical clearance per cardiology   11/24/15  neurosurgery operative note expiration removal L4-L5 hardware on the left, bilateral facetectomies L4, L3-L4 transforaminal lumbar interbody fusion  12/9/15 [facility] [place] neurosurgery note s/p L4-S1 fusion on 11/24/15 , follow up in 4 weeks  12/23/15  neurosurgery note, clear to restart physical therapy if she would like after one month, follow-up 3 months     s/p TOMY  Sees gyn on HRT estradiol for 20 yrs ()     s/p thoracic aneurysm repair  6/29/06 CT-CTA chest with and without postprocessing; stable ascending thoracic aortic aneurysm maximum diameter 4.8, just distal to the aortic valve maximum diameter 4.3  4/9/07 CT-CTA chest with and without processing; stable ascending aortic thoracic aneurysm 4.5 x 4.6 cm  6/8/09 CT chest with; stable 4.7 ascending aorta aneurysm  10/15/09 CT chest without; dilated ascending thoracic aorta 4.9 cm aneurysm increased from 4.7  1/11/10  cardiovascular surgery operative note ascending thoracic aorta aneurysm repair  9/26/13 CT thoracic aorta evaluation; status post repair ascending thoracic aortic aneurysm without evidence of dissection or leak  1/2/14 echo mild LVH, grade 1 diastolic dysfunction, ascending aortic 4.0, no change compared with ZAK 2010  5/15/14 cardiology note  6/17/15 cardiology note cont meds,repeat echo 6 months  2/1/16 cardiology note moderate pulmonary hypertension and snoring, nocturnal pulse oximetry ordered  11/1/17 echo normal LV size and function, EF 70%, mild aortic insufficiency and mild tricuspid regurgitation, RVSP 50, aortic root 3.2 cm  11/3/17 cardiology note stable  12/20/17  [facility] cardiology note most recent echocardiogram looks fine, will repeat CT scan follow aortic repair  11/17/18 cardiology note asymptomatic, continue cholesterol medication,  continue to monitor echo aortic valve, repaired ascending aorta, follow-up yearly  11/21/18 echo normal LV function, EF 60%, mild LVH, grade 2 diastolic dysfunction, mild aortic insufficiency, moderate tricuspid regurgitation, RVSP 50  12/6/19 echo normal LV function EF 65%, RVSP 43, moderate tricuspid regurgitation, mild to moderate aortic insufficiency  10/22/20 echo EF 70%, grade 1 diastolic dysfunction, mild AR, RVSP 30      Tricuspid regurgitation  11/17/18 cardiology note asymptomatic, continue cholesterol medication, continue to monitor echo aortic valve, repaired ascending aorta, follow-up yearly  11/21/18 echo normal LV function, EF 60%, mild LVH, grade 2 diastolic dysfunction, mild aortic insufficiency, moderate tricuspid regurgitation, RVSP 50  12/6/19 echo normal LV function EF 65%, RVSP 43, moderate tricuspid regurgitation, mild to moderate aortic insufficiency  10/22/20 echo EF 70%, grade 1 diastolic dysfunction, mild AR, RVSP 30      UTI  7/6/16 UTI klebsiella pneumoniae sensitive to all antibiotics except ampicillin, start bactrim x 5 days  1/18/19 UTI enterobacter cloacae resistant to ampicillin, cephalothin, penicillin, bactrim, sensitive to cefuroxime, ciprofloxacin and levaquin, nitrofurantoin  5/26/19 UTI klebsiella given omnicef, organism resistant ampicillin, ciprofloxacin, levofloxacin, bactrim           Patient Active Problem List   Diagnosis   • S/p lumbar surgery   • Hypertension   • Dyslipidemia   • S/P TOMY (total abdominal hysterectomy)   • Preventative health care   • S/P total knee arthroplasty   • S/P appendectomy   • Osteoporosis, idiopathic   • Hypothyroid   • History of shingles   • History of total knee arthroplasty   • GERD (gastroesophageal reflux disease)   • Tricuspid regurgitation   • Aortic regurgitation   • Cervical pain   • S/P thoracic aortic aneurysm repair   • CKD (chronic kidney disease) stage 3, GFR 30-59 ml/min   • UTI (urinary tract infection)   • Insomnia   •  "History of shoulder pain   • History of transient ischemic attack (TIA)   • S/P total hip arthroplasty   • History of supraventricular tachycardia   • History of pulmonary hypertension (HCC)   • Neutropenia (HCC)   • Allergic rhinitis   • Paroxysmal atrial fibrillation (HCC)   • On continuous oral anticoagulation         ROS       Objective:     /64 (BP Location: Right arm, Patient Position: Sitting, BP Cuff Size: Adult)   Pulse 76   Temp 36.7 °C (98 °F)   Ht 1.626 m (5' 4\")   Wt 73.5 kg (162 lb)   SpO2 96%   BMI 27.81 kg/m²      Physical Exam  Vitals and nursing note reviewed.   Constitutional:       Appearance: Normal appearance.   HENT:      Head: Normocephalic and atraumatic.   Eyes:      Conjunctiva/sclera: Conjunctivae normal.   Cardiovascular:      Rate and Rhythm: Normal rate and regular rhythm.   Pulmonary:      Effort: Pulmonary effort is normal. No respiratory distress.      Breath sounds: Normal breath sounds. No stridor. No wheezing.   Abdominal:      General: There is no distension.   Musculoskeletal:      Cervical back: Neck supple.   Skin:     General: Skin is warm.   Neurological:      General: No focal deficit present.      Mental Status: She is alert.   Psychiatric:         Mood and Affect: Mood normal.     Sinus pressure maxillary bilateral             Assessment/Plan:        Assessment  #!  Sinusitis, lungs are clear, no evidence of bronchitis or pneumonia    #2 hypertension stable blood pressure on Lotensin metoprolol    #3 paroxysmal atrial fibrillation, sinus on exam, on amiodarone per cardiology, remains on Eliquis no bleeding issues    #4 chronic back and hip pain status post right hip epidural injection yesterday by pain management [facility], we have no records yet    #5 gait imbalance, using cane for ambulation, possibly component of back pain hip pain contribute to imbalance, and her antihypertensive regimen although she describes no orthostatic symptoms    Plan  #1 old records "  pain management    #2 physical therapy referral at [facility]    #3 continue astelin     #4 trial of flonase    #5 Continue nasal lavage    #6  Start cefixime 400 mg daily, this would not interact with amiodarone or Eliquis, she has had amoxicillin before with some stomach upset related to that, the cephalosporin should be fine, advised patient that medication can cause rash, fever, nausea, diarrhea    #7 mammogram and bone density    #8 follow-up cardiology    #9 continue other medications, no NSAIDs on Eliquis, falling precautions, orthostatic precautions

## 2021-04-20 ENCOUNTER — OFFICE VISIT (OUTPATIENT)
Dept: CARDIOLOGY | Facility: MEDICAL CENTER | Age: 86
End: 2021-04-20
Payer: MEDICARE

## 2021-04-20 VITALS
HEART RATE: 56 BPM | RESPIRATION RATE: 12 BRPM | BODY MASS INDEX: 26.98 KG/M2 | SYSTOLIC BLOOD PRESSURE: 140 MMHG | OXYGEN SATURATION: 97 % | WEIGHT: 158 LBS | HEIGHT: 64 IN | DIASTOLIC BLOOD PRESSURE: 62 MMHG

## 2021-04-20 DIAGNOSIS — Z79.01 ON CONTINUOUS ORAL ANTICOAGULATION: ICD-10-CM

## 2021-04-20 DIAGNOSIS — E78.5 DYSLIPIDEMIA: ICD-10-CM

## 2021-04-20 DIAGNOSIS — N18.30 STAGE 3 CHRONIC KIDNEY DISEASE, UNSPECIFIED WHETHER STAGE 3A OR 3B CKD: ICD-10-CM

## 2021-04-20 DIAGNOSIS — I10 ESSENTIAL HYPERTENSION: ICD-10-CM

## 2021-04-20 DIAGNOSIS — I48.0 PAF (PAROXYSMAL ATRIAL FIBRILLATION) (HCC): ICD-10-CM

## 2021-04-20 PROCEDURE — 99214 OFFICE O/P EST MOD 30 MIN: CPT | Performed by: INTERNAL MEDICINE

## 2021-04-20 RX ORDER — EZETIMIBE 10 MG/1
10 TABLET ORAL DAILY
Qty: 90 TABLET | Refills: 3 | Status: ON HOLD | OUTPATIENT
Start: 2021-04-20 | End: 2021-06-23

## 2021-04-20 ASSESSMENT — FIBROSIS 4 INDEX: FIB4 SCORE: 1.61

## 2021-04-20 NOTE — PROGRESS NOTES
"  Subjective:   Yvonne Marie Wada is a 85 y.o. female who presents today for follow-up of repaired thoracic ascending aorta aneurysm in 2010, aortic insufficiency, hypertension and hyperlipidemia.  She had a recent diagnosis of new onset atrial fibrillation, paroxysmal.    Since last visit she continues to tolerate her anticoagulation well.  She feels fatigued on amiodarone.  It did improve her symptomatic A. fib which she was having 40-minute episodes 3-4 times per week.  Now she has had 0.  She feels that she is tired from the amiodarone however and wishes to discontinue it.  We discussed alternatives.      Past Medical History:   Diagnosis Date   • AAA (abdominal aortic aneurysm) ([facility]) 7/26/2010    10/09 CT thorax dilated ascending thoracic aorta 4.9 cm 1/10 transesophageal echo dilated aortic root, and ascending aorta with mild aortic insufficiency 1/10  ascending aortic aneurysm repair 5/12 echo [facility] normal LV function EF 60%, moderate to severe left atrial enlargement, RVSP 32    • Aortic insufficiency    • Aortic valve disease    • Aortic valve regurgitation    • Arthritis     back and knee/ osteo   • Cataract    • Dental disorder     full top denture, partial bottom   • Diverticulosis    • Dyslipidemia 7/26/2010    Sees [facility] on lipitor 4/11 chol 159,trig 84,hdl 47,ldl 95,cpk 114 7/12 chol 163,trig 120,hdl 49,ldl 90, crp 1.1 on lipitor 20 mg    • GERD (gastroesophageal reflux disease) 7/12/2012    6/07 EGD per [facility] hiatal hernia, start prevacid 30 mg 7/12 protonix 20 mg qday    • Glaucoma    • Heart burn    • Heart murmur    • Heart valve disease     \"leaking\", mitral valve   • Hiatus hernia syndrome    • High cholesterol    • History of shingles 6/30/2011    2010 Back and left thorax     • History of total knee arthroplasty 7/26/2010    4/10 MRI right knee complex tear medial meniscus, horizontal cleavage tear lateral meniscus, chondromalacia patella, moderate-sized Baker's cyst 5/10 right meniscus " knee repair  5/10 told by  had gout on surgery had pathology report, I await that report Question gout seen on pathology report done during repair of right meniscus knee surgery. 7/10 uric acid 6.3, we'll await starting allopurinol depending on the operative report 8/5/10 reviewed ortho notes , op report indicates crystal deposition, could be gout or pseudogout. No bx result sent over. Will need to get. My suspicion is chondrocalcinosis. 10/11  ortho note, MRI pending 8/7/10 reviewed pathology report right knee tissue, marked degenerative changes with focal calcification, no mention of gout. Based on this and a normal uric acid level, I do not believe she has gout, has no hyperuricemia medications would be indicated 10/11  ortho left knee meniscectomy and arthroscopy 1/12     • HLD (hyperlipidemia)    • Hypertension    • Hypothyroid 4/8/2011 4/11 tsh 9 start synthroid 50 4/11 tsh 6,free t3 3.1,free t4 1.2,tpo negative 7/1/11 tsh 2.1 cont synthroid 50 mcg 7/12 tsh 3.4 cont synthroid 50 mcg    • Indigestion    • Mitral insufficiency    • OSTEOPOROSIS 8/9/2010    Had been on fosamax [id number]  8/10 dexa LS -2.0,hip -1.5 await old dexa result, she will get copy for me 4/11 vit d 35 9/12 dexa LS -3.2,hip -1.4 2/13/13 relast infusion    • Pain     back and right leg, can get as bad as 10/10   • Preventative health care 7/26/2010 2002 pneum 2005 colon [facility] no records 4/11 vit d 35 9/11 shingles vaccine 9/12 mammogram 9/12 dexa LS -3.2,hip -1.4    • Rheumatic fever    • S/P appendectomy 7/26/2010   • S/P TOMY (total abdominal hysterectomy) 7/26/2010    Sees gyn on HRT estradiol for 20 yrs () 11/08 mammo     • Shingles 6/30/2011   • Snoring    • Status post lumbar surgery 7/26/2010 6/03 L4-5 epidural Dr. Jordan  7/06 overall for decompression, foraminotomy. L4-L5 posterior fusion, L4-L5 allograft      • Stroke (HCC) 1996    no  residual problems    • Thoracic aortic aneurysm without mention of rupture    • Tricuspid insufficiency    • Unspecified disorder of thyroid     hypo     Past Surgical History:   Procedure Laterality Date   • TENDON REPAIR Right 11/10/2016    Procedure: TENDON REPAIR - QUADRACEPS ;  Surgeon: Maxim Herrera M.D.;  Location: [facility];  Service:    • KNEE ARTHROPLASTY TOTAL Right 9/8/2016    Procedure: KNEE ARTHROPLASTY TOTAL;  Surgeon: Maxim Herrera M.D.;  Location: SURGERY [facility];  Service:    • LUMBAR FUSION POSTERIOR  11/24/2015    Procedure: LUMBAR FUSION POSTERIOR L3-4, EXPLORATION OF FUSION L4-5 WITH INSTRUMENTATION;  Surgeon: Hermann Reis M.D.;  Location: SURGERY [facility];  Service:    • LUMBAR LAMINECTOMY DISKECTOMY  11/24/2015    Procedure: LUMBAR L3-4 LAMINECTOMY AND REDO L4-5;  Surgeon: Hermann Reis M.D.;  Location: SURGERY [facility];  Service:    • KNEE ARTHROPLASTY TOTAL  1/3/2012    Performed by YOSEF MEJÍA at [facility]   • KNEE ARTHROSCOPY  10/18/2011    Performed by YOSEF MEJÍA at [facility]   • MEDIAL MENISCECTOMY  10/18/2011    Performed by YOSEF MEJÍA at [facility]   • AORTIC VALVE REPLACEMENT  1/12/2010    Performed by ISAÍAS NICOLE at [facility]   • APPENDECTOMY     • HYSTERECTOMY, TOTAL ABDOMINAL     • LUMBAR FUSION POSTERIOR       Family History   Problem Relation Age of Onset   • Stroke Mother    • Heart Disease Father    • Heart Disease Sister      Social History     Socioeconomic History   • Marital status:      Spouse name: Not on file   • Number of children: Not on file   • Years of education: Not on file   • Highest education level: Not on file   Occupational History   • Not on file   Tobacco Use   • Smoking status: Never Smoker   • Smokeless tobacco: Never Used   • Tobacco comment: none   Substance and Sexual Activity   • Alcohol use: No     Alcohol/week: 0.0 oz   •  Drug use: Yes     Comment: CBD Oil for Arthritis   • Sexual activity: Not Currently     Partners: Male   Other Topics Concern   • Not on file   Social History Narrative   • Not on file     Social Determinants of Health     Financial Resource Strain:    • Difficulty of Paying Living Expenses:    Food Insecurity:    • Worried About Running Out of Food in the Last Year:    • Ran Out of Food in the Last Year:    Transportation Needs:    • Lack of Transportation (Medical):    • Lack of Transportation (Non-Medical):    Physical Activity:    • Days of Exercise per Week:    • Minutes of Exercise per Session:    Stress:    • Feeling of Stress :    Social Connections:    • Frequency of Communication with Friends and Family:    • Frequency of Social Gatherings with Friends and Family:    • Attends Jew Services:    • Active Member of Clubs or Organizations:    • Attends Club or Organization Meetings:    • Marital Status:    Intimate Partner Violence:    • Fear of Current or Ex-Partner:    • Emotionally Abused:    • Physically Abused:    • Sexually Abused:      Allergies   Allergen Reactions   • Amoxicillin Vomiting     Upset stomach, ok if needed for life saving treatment (per pt)   • Bactrim [Sulfamethoxazole W-Trimethoprim] Vomiting     Nausea/vomiting Ok in a life saving situation (per pt)   • Codeine Nausea     Synthetic codones ok   • Doxycycline      Nausea, Ok in life saving situation (per pt)   • Trazodone Vomiting     groggy   • Ultram [Tramadol] Vomiting     nausea     Outpatient Encounter Medications as of 4/20/2021   Medication Sig Dispense Refill   • ezetimibe (ZETIA) 10 MG Tab Take 1 tablet by mouth every day. 90 tablet 3   • benazepril (LOTENSIN) 40 MG tablet TAKE 1 TABLET BY MOUTH EVERY DAY 90 tablet 2   • levothyroxine (SYNTHROID) 75 MCG Tab TAKE 1 TAB BY MOUTH EVERY MORNING ON AN EMPTY STOMACH. 90 tablet 1   • Magnesium 300 MG Cap Take 300 mg by mouth every day.     • apixaban (ELIQUIS) 5mg Tab Take 1  "Tab by mouth 2 Times a Day. 180 Tab 3   • metoprolol SR (TOPROL XL) 25 MG TABLET SR 24 HR Take 1 Tab by mouth every morning. 90 Tab 3   • azelastine (ASTELIN) 137 MCG/SPRAY nasal spray Spray 1-2 Sprays in nose 2 times a day as needed (coughing).     • fexofenadine (ALLEGRA) 60 MG Tab Take 60 mg by mouth 1 time daily as needed. Indications: Hayfever     • omeprazole (PRILOSEC) 20 MG delayed-release capsule Take 20 mg by mouth 1 time daily as needed (For heartburn).     • Multiple Vitamins-Minerals (CENTRUM SILVER ULTRA WOMENS PO) Take 1 Tab by mouth every morning.     • Cefixime 400 MG Cap Take 400 mg by mouth every day. Indications: sinus infection 10 capsule 0   • rosuvastatin (CRESTOR) 10 MG Tab TAKE 1 TABLET BY MOUTH EVERY DAY IN THE EVENING (Patient not taking: Reported on 4/6/2021) 90 Tab 2   • amiodarone (CORDARONE) 200 MG Tab Take 1 Tab by mouth every day. (Patient not taking: Reported on 4/6/2021) 90 Tab 11     No facility-administered encounter medications on file as of 4/20/2021.     Review of Systems   All other systems reviewed and are negative.       Objective:   /62 (BP Location: Left arm, Patient Position: Sitting, BP Cuff Size: Adult)   Pulse (!) 56   Resp 12   Ht 1.626 m (5' 4\")   Wt 71.7 kg (158 lb)   SpO2 97%   BMI 27.12 kg/m²     Physical Exam   Constitutional: She is oriented to person, place, and time. She appears well-developed and well-nourished. No distress.   HENT:   Head: Normocephalic and atraumatic.   Right Ear: External ear normal.   Left Ear: External ear normal.   Mouth/Throat: No oropharyngeal exudate.   Eyes: Pupils are equal, round, and reactive to light. Conjunctivae and EOM are normal. Right eye exhibits no discharge. Left eye exhibits no discharge. No scleral icterus.   Neck: No JVD present. No tracheal deviation present. No thyromegaly present.   Cardiovascular: Normal rate, regular rhythm, S1 normal, S2 normal and intact distal pulses. PMI is not displaced. Exam " reveals no gallop, no S3, no S4 and no friction rub.   Murmur heard.   Crescendo decrescendo systolic murmur is present with a grade of 2/6.   Decrescendo diastolic murmur is present with a grade of 2/6.  Pulses:       Carotid pulses are 2+ on the right side and 2+ on the left side.       Radial pulses are 2+ on the right side and 2+ on the left side.        Popliteal pulses are 2+ on the right side and 2+ on the left side.        Dorsalis pedis pulses are 2+ on the right side and 2+ on the left side.        Posterior tibial pulses are 2+ on the right side and 2+ on the left side.   Pulmonary/Chest: Effort normal and breath sounds normal. No respiratory distress. She has no wheezes. She has no rales. She exhibits no tenderness.   Abdominal: Soft. Bowel sounds are normal. She exhibits no distension. There is no abdominal tenderness.   Musculoskeletal:         General: No tenderness or edema. Normal range of motion.      Cervical back: Normal range of motion and neck supple.   Neurological: She is alert and oriented to person, place, and time. No cranial nerve deficit (Cranial nerves II through XII grossly intact).   Skin: Skin is warm and dry. No rash noted. She is not diaphoretic. No erythema.   Psychiatric: She has a normal mood and affect. Her behavior is normal. Judgment and thought content normal.   Vitals reviewed.  No change in physical exam since prior 12/14/2020    LABS:  Lab Results   Component Value Date/Time    CHOLSTRLTOT 158 10/26/2020 09:06 AM    LDL 75 10/26/2020 09:06 AM    HDL 62 10/26/2020 09:06 AM    TRIGLYCERIDE 106 10/26/2020 09:06 AM       Lab Results   Component Value Date/Time    WBC 4.5 (L) 09/28/2020 08:20 AM    WBC 3.9 (L) 04/16/2011 12:00 AM    RBC 4.16 (L) 09/28/2020 08:20 AM    RBC 4.63 04/16/2011 12:00 AM    HEMOGLOBIN 12.6 09/28/2020 08:20 AM    HEMATOCRIT 38.1 09/28/2020 08:20 AM    MCV 91.6 09/28/2020 08:20 AM    MCV 92 04/16/2011 12:00 AM    NEUTSPOLYS 47.90 09/28/2020 08:20 AM     LYMPHOCYTES 36.90 09/28/2020 08:20 AM    MONOCYTES 11.70 09/28/2020 08:20 AM    EOSINOPHILS 2.00 09/28/2020 08:20 AM    BASOPHILS 1.10 09/28/2020 08:20 AM     Lab Results   Component Value Date/Time    SODIUM 139 09/28/2020 08:20 AM    POTASSIUM 4.3 09/28/2020 08:20 AM    CHLORIDE 105 09/28/2020 08:20 AM    CO2 24 09/28/2020 08:20 AM    GLUCOSE 105 (H) 09/28/2020 08:20 AM    BUN 31 (H) 09/28/2020 08:20 AM    CREATININE 1.10 09/28/2020 08:20 AM    CREATININE 1.05 (H) 02/07/2013 11:31 AM    BUNCREATRAT 25 12/08/2017 08:57 AM    BUNCREATRAT 30 (H) 02/07/2013 11:31 AM         Lab Results   Component Value Date/Time    ALKPHOSPHAT 58 10/26/2020 09:06 AM    ASTSGOT 21 10/26/2020 09:06 AM    ALTSGPT 24 10/26/2020 09:06 AM    TBILIRUBIN 0.6 10/26/2020 09:06 AM      Lab Results   Component Value Date/Time    BNPBTYPENAT 181 (H) 02/20/2018 10:05 AM      Lab Results   Component Value Date/Time    TSH 2.320 04/14/2016 08:27 AM     Lab Results   Component Value Date/Time    PROTHROMBTM 12.6 03/06/2018 01:48 AM    INR 0.98 03/06/2018 01:48 AM        ECHO CONCLUSIONS (11/2/2017):  Normal left ventricular chamber size.  Mild concentric left ventricular hypertrophy.  Left ventricular ejection fraction is visually estimated to be 70%.  Mild aortic insufficiency.  Mild tricuspid regurgitation.  Estimated right ventricular systolic pressure  is 50 mmHg.    EKG (2/20/2018):  I have personally reviewed the EKG this visit and discussed with the patient.  SINUS RHYTHM       Assessment:     1. PAF (paroxysmal atrial fibrillation) (HCC)  Basic Metabolic Panel   2. Essential hypertension     3. On continuous oral anticoagulation  Basic Metabolic Panel   4. Dyslipidemia  ezetimibe (ZETIA) 10 MG Tab   5. Stage 3 chronic kidney disease, unspecified whether stage 3a or 3b CKD  Basic Metabolic Panel       Medical Decision Making:  Today's Assessment / Status / Plan:     Doing well currently in sinus rhythm.  Requires a metabolic panel to assess  dosing appropriateness for her DOAC.  We discussed that she will likely return to intermittent atrial fibrillation should she discontinue amiodarone.  She would like to discontinue it.  We discussed dronedarone, sotalol and Tikosyn as her viable alternatives.  She would not like to do any of these at this time.  Ablation is not likely appropriate given her advanced age.  She is also not interested in the procedure.  We discussed very sparing use of Aleve as long as she uses a PPI which she is on.  We discussed her suboptimal lipid profile and she is currently off her statin and has tried 2 different statins with side effects.  She will try Zetia.

## 2021-04-23 PROBLEM — R26.9 GAIT DISORDER: Status: ACTIVE | Noted: 2021-04-23

## 2021-04-26 NOTE — ED NOTES
"Med rec updated and complete  Allergies reviewed  Pt states \"I'm not sure the strength of my LOVENOX\".   Called [facility] @ [phone] to verify antibiotics and strength of LOVENOX.  Asked pt last time she took her medications.  Pt states \"I have been off my PLAVIX 75MG for over a month\".     " 5

## 2021-04-30 ENCOUNTER — HOSPITAL ENCOUNTER (OUTPATIENT)
Dept: LAB | Facility: MEDICAL CENTER | Age: 86
End: 2021-04-30
Attending: INTERNAL MEDICINE
Payer: MEDICARE

## 2021-04-30 DIAGNOSIS — Z79.01 ON CONTINUOUS ORAL ANTICOAGULATION: ICD-10-CM

## 2021-04-30 DIAGNOSIS — I48.0 PAF (PAROXYSMAL ATRIAL FIBRILLATION) (HCC): ICD-10-CM

## 2021-04-30 DIAGNOSIS — N18.30 STAGE 3 CHRONIC KIDNEY DISEASE, UNSPECIFIED WHETHER STAGE 3A OR 3B CKD: ICD-10-CM

## 2021-04-30 LAB
ANION GAP SERPL CALC-SCNC: 6 MMOL/L (ref 7–16)
BUN SERPL-MCNC: 38 MG/DL (ref 8–22)
CALCIUM SERPL-MCNC: 9.2 MG/DL (ref 8.5–10.5)
CHLORIDE SERPL-SCNC: 94 MMOL/L (ref 96–112)
CO2 SERPL-SCNC: 25 MMOL/L (ref 20–33)
CREAT SERPL-MCNC: 1.53 MG/DL (ref 0.5–1.4)
GLUCOSE SERPL-MCNC: 95 MG/DL (ref 65–99)
POTASSIUM SERPL-SCNC: 4.8 MMOL/L (ref 3.6–5.5)
SODIUM SERPL-SCNC: 125 MMOL/L (ref 135–145)

## 2021-04-30 PROCEDURE — 36415 COLL VENOUS BLD VENIPUNCTURE: CPT

## 2021-04-30 PROCEDURE — 80048 BASIC METABOLIC PNL TOTAL CA: CPT

## 2021-05-13 ENCOUNTER — TELEPHONE (OUTPATIENT)
Dept: CARDIOLOGY | Facility: MEDICAL CENTER | Age: 86
End: 2021-05-13

## 2021-05-13 DIAGNOSIS — R79.89 ELEVATED SERUM CREATININE: ICD-10-CM

## 2021-05-13 NOTE — TELEPHONE ENCOUNTER
----- Message from Kobi Wheeler M.D. sent at 5/12/2021  8:53 AM PDT -----  Her kidney function is mildly abnormal as you note, please let her know and have her follow-up with a repeat metabolic panel that is nonfasting.  ----- Message -----  From: Socorro Kerns L.P.N.  Sent: 5/3/2021   8:15 AM PDT  To: Kobi Wheeler M.D.    FV 10-20-21 with TW. Pt. Takes no diuretics.

## 2021-05-20 ENCOUNTER — OFFICE VISIT (OUTPATIENT)
Dept: URGENT CARE | Facility: PHYSICIAN GROUP | Age: 86
End: 2021-05-20
Payer: MEDICARE

## 2021-05-20 VITALS
DIASTOLIC BLOOD PRESSURE: 60 MMHG | HEART RATE: 60 BPM | OXYGEN SATURATION: 96 % | TEMPERATURE: 96.8 F | WEIGHT: 155 LBS | RESPIRATION RATE: 18 BRPM | HEIGHT: 64 IN | BODY MASS INDEX: 26.46 KG/M2 | SYSTOLIC BLOOD PRESSURE: 122 MMHG

## 2021-05-20 DIAGNOSIS — J01.10 ACUTE NON-RECURRENT FRONTAL SINUSITIS: ICD-10-CM

## 2021-05-20 PROCEDURE — 99213 OFFICE O/P EST LOW 20 MIN: CPT | Performed by: NURSE PRACTITIONER

## 2021-05-20 RX ORDER — AZITHROMYCIN 250 MG/1
250 TABLET, FILM COATED ORAL DAILY
Qty: 6 TABLET | Refills: 0 | Status: SHIPPED | OUTPATIENT
Start: 2021-05-20 | End: 2021-05-25

## 2021-05-20 RX ORDER — EPINASTINE HCL 0.05 %
DROPS OPHTHALMIC (EYE)
COMMUNITY
Start: 2021-04-28 | End: 2021-06-22

## 2021-05-20 ASSESSMENT — ENCOUNTER SYMPTOMS
COUGH: 1
SHORTNESS OF BREATH: 0
SINUS PAIN: 1
SINUS PRESSURE: 1
FEVER: 0
WHEEZING: 0
SORE THROAT: 1

## 2021-05-20 ASSESSMENT — FIBROSIS 4 INDEX: FIB4 SCORE: 1.61

## 2021-05-20 NOTE — PROGRESS NOTES
"  Subjective:     Yvonne Marie Wada is a 85 y.o. female who presents for Sinus Problem (pain, x6 weeks. )      Sinuses drain to throat causing a cough. Right sinus pressure. Allegra and Flonase. Sinus rinse. States this occurs annually for her. Z-pack clears it up. Does not tolerate other recommended antibiotics.     Sinus Problem  This is a new problem. The current episode started more than 1 month ago. There has been no fever. Her pain is at a severity of 2/10. The pain is mild. Associated symptoms include congestion, coughing, sinus pressure and a sore throat. Pertinent negatives include no ear pain or shortness of breath. Past treatments include saline sprays. The treatment provided mild relief.       Past Medical History:   Diagnosis Date   • AAA (abdominal aortic aneurysm) ([facility]) 7/26/2010    10/09 CT thorax dilated ascending thoracic aorta 4.9 cm 1/10 transesophageal echo dilated aortic root, and ascending aorta with mild aortic insufficiency 1/10  ascending aortic aneurysm repair 5/12 echo [facility] normal LV function EF 60%, moderate to severe left atrial enlargement, RVSP 32    • Aortic insufficiency    • Aortic valve disease    • Aortic valve regurgitation    • Arthritis     back and knee/ osteo   • Cataract    • Dental disorder     full top denture, partial bottom   • Diverticulosis    • Dyslipidemia 7/26/2010    Sees [facility] on lipitor 4/11 chol 159,trig 84,hdl 47,ldl 95,cpk 114 7/12 chol 163,trig 120,hdl 49,ldl 90, crp 1.1 on lipitor 20 mg    • GERD (gastroesophageal reflux disease) 7/12/2012 6/07 EGD per [facility] hiatal hernia, start prevacid 30 mg 7/12 protonix 20 mg qday    • Glaucoma    • Heart burn    • Heart murmur    • Heart valve disease     \"leaking\", mitral valve   • Hiatus hernia syndrome    • High cholesterol    • History of shingles 6/30/2011    2010 Back and left thorax     • History of total knee arthroplasty 7/26/2010    4/10 MRI right knee complex tear medial meniscus, horizontal " cleavage tear lateral meniscus, chondromalacia patella, moderate-sized Baker's cyst 5/10 right meniscus knee repair  5/10 told by  had gout on surgery had pathology report, I await that report Question gout seen on pathology report done during repair of right meniscus knee surgery. 7/10 uric acid 6.3, we'll await starting allopurinol depending on the operative report 8/5/10 reviewed ortho notes , op report indicates crystal deposition, could be gout or pseudogout. No bx result sent over. Will need to get. My suspicion is chondrocalcinosis. 10/11  ortho note, MRI pending 8/7/10 reviewed pathology report right knee tissue, marked degenerative changes with focal calcification, no mention of gout. Based on this and a normal uric acid level, I do not believe she has gout, has no hyperuricemia medications would be indicated 10/11  ortho left knee meniscectomy and arthroscopy 1/12     • HLD (hyperlipidemia)    • Hypertension    • Hypothyroid 4/8/2011 4/11 tsh 9 start synthroid 50 4/11 tsh 6,free t3 3.1,free t4 1.2,tpo negative 7/1/11 tsh 2.1 cont synthroid 50 mcg 7/12 tsh 3.4 cont synthroid 50 mcg    • Indigestion    • Mitral insufficiency    • OSTEOPOROSIS 8/9/2010    Had been on fosamax [id number]  8/10 dexa LS -2.0,hip -1.5 await old dexa result, she will get copy for me 4/11 vit d 35 9/12 dexa LS -3.2,hip -1.4 2/13/13 relast infusion    • Pain     back and right leg, can get as bad as 10/10   • Preventative health care 7/26/2010 2002 pneum 2005 colon [facility] no records 4/11 vit d 35 9/11 shingles vaccine 9/12 mammogram 9/12 dexa LS -3.2,hip -1.4    • Rheumatic fever    • S/P appendectomy 7/26/2010   • S/P TOMY (total abdominal hysterectomy) 7/26/2010    Sees gyn on HRT estradiol for 20 yrs () 11/08 mammo     • Shingles 6/30/2011   • Snoring    • Status post lumbar surgery 7/26/2010 6/03 L4-5 epidural Dr. Jordan  7/06 overall for  decompression, foraminotomy. L4-L5 posterior fusion, L4-L5 allograft      • Stroke (HCC) 1996    no residual problems    • Thoracic aortic aneurysm without mention of rupture    • Tricuspid insufficiency    • Unspecified disorder of thyroid     hypo       Past Surgical History:   Procedure Laterality Date   • TENDON REPAIR Right 11/10/2016    Procedure: TENDON REPAIR - QUADRACEPS ;  Surgeon: Maxim Herrera M.D.;  Location: SURGERY [facility];  Service:    • KNEE ARTHROPLASTY TOTAL Right 9/8/2016    Procedure: KNEE ARTHROPLASTY TOTAL;  Surgeon: Maxim Herrera M.D.;  Location: SURGERY [facility];  Service:    • LUMBAR FUSION POSTERIOR  11/24/2015    Procedure: LUMBAR FUSION POSTERIOR L3-4, EXPLORATION OF FUSION L4-5 WITH INSTRUMENTATION;  Surgeon: Hermann Reis M.D.;  Location: SURGERY [facility];  Service:    • LUMBAR LAMINECTOMY DISKECTOMY  11/24/2015    Procedure: LUMBAR L3-4 LAMINECTOMY AND REDO L4-5;  Surgeon: Hermann Reis M.D.;  Location: SURGERY [facility];  Service:    • KNEE ARTHROPLASTY TOTAL  1/3/2012    Performed by YOSEF MEJÍA at [facility]   • KNEE ARTHROSCOPY  10/18/2011    Performed by YOSEF MEJÍA at [facility]   • MEDIAL MENISCECTOMY  10/18/2011    Performed by YOSEF MEJÍA at [facility]   • AORTIC VALVE REPLACEMENT  1/12/2010    Performed by ISAÍAS NICOLE at [facility]   • APPENDECTOMY     • HYSTERECTOMY, TOTAL ABDOMINAL     • LUMBAR FUSION POSTERIOR         Social History     Socioeconomic History   • Marital status:      Spouse name: Not on file   • Number of children: Not on file   • Years of education: Not on file   • Highest education level: Not on file   Occupational History   • Not on file   Tobacco Use   • Smoking status: Never Smoker   • Smokeless tobacco: Never Used   • Tobacco comment: none   Vaping Use   • Vaping Use: Never used   Substance and Sexual Activity   • Alcohol use:  No     Alcohol/week: 0.0 oz   • Drug use: Yes     Comment: CBD Oil for Arthritis   • Sexual activity: Not Currently     Partners: Male   Other Topics Concern   • Not on file   Social History Narrative   • Not on file     Social Determinants of Health     Financial Resource Strain:    • Difficulty of Paying Living Expenses:    Food Insecurity:    • Worried About Running Out of Food in the Last Year:    • Ran Out of Food in the Last Year:    Transportation Needs:    • Lack of Transportation (Medical):    • Lack of Transportation (Non-Medical):    Physical Activity:    • Days of Exercise per Week:    • Minutes of Exercise per Session:    Stress:    • Feeling of Stress :    Social Connections:    • Frequency of Communication with Friends and Family:    • Frequency of Social Gatherings with Friends and Family:    • Attends Christian Services:    • Active Member of Clubs or Organizations:    • Attends Club or Organization Meetings:    • Marital Status:    Intimate Partner Violence:    • Fear of Current or Ex-Partner:    • Emotionally Abused:    • Physically Abused:    • Sexually Abused:         Family History   Problem Relation Age of Onset   • Stroke Mother    • Heart Disease Father    • Heart Disease Sister         Allergies   Allergen Reactions   • Amoxicillin Vomiting     Upset stomach, ok if needed for life saving treatment (per pt)   • Bactrim [Sulfamethoxazole W-Trimethoprim] Vomiting     Nausea/vomiting Ok in a life saving situation (per pt)   • Codeine Nausea     Synthetic codones ok   • Doxycycline      Nausea, Ok in life saving situation (per pt)   • Trazodone Vomiting     groggy   • Ultram [Tramadol] Vomiting     nausea       Review of Systems   Constitutional: Negative for fever.   HENT: Positive for congestion, sinus pressure, sinus pain and sore throat. Negative for ear pain.    Respiratory: Positive for cough. Negative for shortness of breath and wheezing.    Skin: Negative for rash.   Endo/Heme/Allergies:  "Positive for environmental allergies.   All other systems reviewed and are negative.       Objective:   /60 (BP Location: Right arm, Patient Position: Sitting, BP Cuff Size: Adult)   Pulse 60   Temp 36 °C (96.8 °F) (Temporal)   Resp 18   Ht 1.626 m (5' 4\")   Wt 70.3 kg (155 lb)   SpO2 96%   BMI 26.61 kg/m²     Physical Exam  Vitals reviewed.   Constitutional:       General: She is not in acute distress.     Appearance: She is well-developed.   HENT:      Head: Normocephalic and atraumatic.      Right Ear: External ear normal. No drainage, swelling or tenderness.      Left Ear: External ear normal. No drainage, swelling or tenderness.      Ears:      Comments: Partial cerumen occlusion bilaterally.      Nose: Congestion present.      Right Sinus: Frontal sinus tenderness present. No maxillary sinus tenderness.      Left Sinus: No maxillary sinus tenderness.      Mouth/Throat:      Lips: Pink.      Mouth: Mucous membranes are moist.      Pharynx: No pharyngeal swelling or posterior oropharyngeal erythema.      Comments: Clear post nasal drip.   Eyes:      Conjunctiva/sclera: Conjunctivae normal.   Cardiovascular:      Rate and Rhythm: Normal rate.   Pulmonary:      Effort: Pulmonary effort is normal.      Breath sounds: Normal breath sounds.   Musculoskeletal:         General: Normal range of motion.      Cervical back: Normal range of motion.   Skin:     General: Skin is warm and dry.      Findings: No rash.   Neurological:      General: No focal deficit present.      Mental Status: She is alert and oriented to person, place, and time.      GCS: GCS eye subscore is 4. GCS verbal subscore is 5. GCS motor subscore is 6.   Psychiatric:         Mood and Affect: Mood normal.         Speech: Speech normal.         Behavior: Behavior normal.         Thought Content: Thought content normal.         Judgment: Judgment normal.         Assessment/Plan:   1. Acute non-recurrent frontal sinusitis  - azithromycin " (ZITHROMAX) 250 MG Tab; Take 1 tablet by mouth every day for 5 days. Take 500 mg day one, 250 mg days 2-5.  Dispense: 6 tablet; Refill: 0  -Nasal spray and allergy medications as directed.  -You may try saline irrigation or neti pot.   -Drink plenty of fluids.  -Tylenol as directed for pain.   -Warm compress to sinuses.      Follow up with primary care provider. Urgently for worsening symptoms, persistent fevers, facial swelling, visual changes, weakness, elevated heart rate, stiff neck, symptoms last longer than 10 days, or any other concerns.    Differential diagnosis, natural history, supportive care, and indications for immediate follow-up discussed.

## 2021-06-02 ENCOUNTER — APPOINTMENT (OUTPATIENT)
Dept: RADIOLOGY | Facility: MEDICAL CENTER | Age: 86
End: 2021-06-02
Attending: INTERNAL MEDICINE
Payer: MEDICARE

## 2021-06-03 ENCOUNTER — HOSPITAL ENCOUNTER (OUTPATIENT)
Dept: LAB | Facility: MEDICAL CENTER | Age: 86
End: 2021-06-03
Attending: INTERNAL MEDICINE
Payer: MEDICARE

## 2021-06-03 DIAGNOSIS — R79.89 ELEVATED SERUM CREATININE: ICD-10-CM

## 2021-06-03 LAB
ANION GAP SERPL CALC-SCNC: 10 MMOL/L (ref 7–16)
BUN SERPL-MCNC: 38 MG/DL (ref 8–22)
CALCIUM SERPL-MCNC: 9.8 MG/DL (ref 8.5–10.5)
CHLORIDE SERPL-SCNC: 99 MMOL/L (ref 96–112)
CO2 SERPL-SCNC: 22 MMOL/L (ref 20–33)
CREAT SERPL-MCNC: 1.46 MG/DL (ref 0.5–1.4)
GLUCOSE SERPL-MCNC: 94 MG/DL (ref 65–99)
POTASSIUM SERPL-SCNC: 4.6 MMOL/L (ref 3.6–5.5)
SODIUM SERPL-SCNC: 131 MMOL/L (ref 135–145)

## 2021-06-03 PROCEDURE — 80048 BASIC METABOLIC PNL TOTAL CA: CPT

## 2021-06-03 PROCEDURE — 36415 COLL VENOUS BLD VENIPUNCTURE: CPT

## 2021-06-11 DIAGNOSIS — Z96.641 STATUS POST TOTAL REPLACEMENT OF RIGHT HIP: ICD-10-CM

## 2021-06-11 DIAGNOSIS — E78.5 DYSLIPIDEMIA: Chronic | ICD-10-CM

## 2021-06-11 DIAGNOSIS — Z86.73 HISTORY OF TRANSIENT ISCHEMIC ATTACK (TIA): ICD-10-CM

## 2021-06-11 DIAGNOSIS — I10 ESSENTIAL HYPERTENSION: Chronic | ICD-10-CM

## 2021-06-11 DIAGNOSIS — I48.0 PAROXYSMAL ATRIAL FIBRILLATION (HCC): ICD-10-CM

## 2021-06-11 DIAGNOSIS — Z98.890 S/P THORACIC AORTIC ANEURYSM REPAIR: Chronic | ICD-10-CM

## 2021-06-11 DIAGNOSIS — Z86.79 S/P THORACIC AORTIC ANEURYSM REPAIR: Chronic | ICD-10-CM

## 2021-06-11 DIAGNOSIS — I35.1 NONRHEUMATIC AORTIC VALVE INSUFFICIENCY: Chronic | ICD-10-CM

## 2021-06-17 ENCOUNTER — OFFICE VISIT (OUTPATIENT)
Dept: MEDICAL GROUP | Facility: MEDICAL CENTER | Age: 86
End: 2021-06-17
Payer: MEDICARE

## 2021-06-17 VITALS
TEMPERATURE: 98.2 F | HEART RATE: 80 BPM | WEIGHT: 156 LBS | OXYGEN SATURATION: 96 % | DIASTOLIC BLOOD PRESSURE: 62 MMHG | BODY MASS INDEX: 26.63 KG/M2 | SYSTOLIC BLOOD PRESSURE: 128 MMHG | HEIGHT: 64 IN

## 2021-06-17 DIAGNOSIS — I10 ESSENTIAL HYPERTENSION: ICD-10-CM

## 2021-06-17 DIAGNOSIS — G44.83 PRIMARY COUGH HEADACHE: ICD-10-CM

## 2021-06-17 DIAGNOSIS — R05.9 COUGH: ICD-10-CM

## 2021-06-17 DIAGNOSIS — E78.5 DYSLIPIDEMIA: ICD-10-CM

## 2021-06-17 DIAGNOSIS — N18.31 STAGE 3A CHRONIC KIDNEY DISEASE: ICD-10-CM

## 2021-06-17 DIAGNOSIS — E03.9 HYPOTHYROIDISM, UNSPECIFIED TYPE: ICD-10-CM

## 2021-06-17 DIAGNOSIS — I48.0 PAROXYSMAL ATRIAL FIBRILLATION (HCC): ICD-10-CM

## 2021-06-17 DIAGNOSIS — E53.8 B12 DEFICIENCY: ICD-10-CM

## 2021-06-17 PROBLEM — I27.20 PULMONARY HYPERTENSION (HCC): Status: RESOLVED | Noted: 2019-01-05 | Resolved: 2021-06-17

## 2021-06-17 PROBLEM — D70.9 NEUTROPENIA (HCC): Status: RESOLVED | Noted: 2019-06-09 | Resolved: 2021-06-17

## 2021-06-17 PROCEDURE — 99213 OFFICE O/P EST LOW 20 MIN: CPT | Performed by: INTERNAL MEDICINE

## 2021-06-17 RX ORDER — DOXYCYCLINE HYCLATE 100 MG
100 TABLET ORAL 2 TIMES DAILY
Qty: 20 TABLET | Refills: 0 | Status: SHIPPED | OUTPATIENT
Start: 2021-06-17 | End: 2021-08-31

## 2021-06-17 RX ORDER — IPRATROPIUM BROMIDE 21 UG/1
2 SPRAY, METERED NASAL 2 TIMES DAILY
Qty: 30 ML | Refills: 6 | Status: ON HOLD | OUTPATIENT
Start: 2021-06-17 | End: 2021-11-04

## 2021-06-17 ASSESSMENT — FIBROSIS 4 INDEX: FIB4 SCORE: 1.61

## 2021-06-17 NOTE — PROGRESS NOTES
Subjective:      Yvonne Marie Wada is a 85 y.o. female who presents with follow-up labs        HPI     Post nasal drip worse with laying down and will notice the cough is worse with laying down, cough can be productive. Seen urgent care 5/20/21 and diagnosed with sinus infection, given zpack and still with some runny nose. Prior to zpack had cefixime in april, sinus pressure right side frontal still persisting after the antibiotics.  No fever.  Some postnasal drip and sinus drainage.  Cough which can be productive but no hemoptysis.  No history of recurrent sinus infections, no tobacco.  No asthma or COPD.  Paroxysmal atrial fibrillation followed by cardiology on Eliquis, Toprol.  Off amiodarone.  Patient denies chest pain, palpitations, lightheadedness.  Dyslipidemia statin intolerant, cardiology gave her a prescription for Zetia, she does not know if she is taking that medication.  Recent labs from cardiology 6/3/21 BUN 30, creatinine 1.4, no regular NSAIDs, on Eliquis and no nsaids      Current Outpatient Medications   Medication Sig Dispense Refill   • Epinastine HCl 0.05 % Solution      • ezetimibe (ZETIA) 10 MG Tab Take 1 tablet by mouth every day. 90 tablet 3   • benazepril (LOTENSIN) 40 MG tablet TAKE 1 TABLET BY MOUTH EVERY DAY 90 tablet 2   • levothyroxine (SYNTHROID) 75 MCG Tab TAKE 1 TAB BY MOUTH EVERY MORNING ON AN EMPTY STOMACH. 90 tablet 1   • Magnesium 300 MG Cap Take 300 mg by mouth every day.     • apixaban (ELIQUIS) 5mg Tab Take 1 Tab by mouth 2 Times a Day. 180 Tab 3   • metoprolol SR (TOPROL XL) 25 MG TABLET SR 24 HR Take 1 Tab by mouth every morning. 90 Tab 3   • azelastine (ASTELIN) 137 MCG/SPRAY nasal spray Spray 1-2 Sprays in nose 2 times a day as needed (coughing).     • fexofenadine (ALLEGRA) 60 MG Tab Take 60 mg by mouth 1 time daily as needed. Indications: Hayfever     • omeprazole (PRILOSEC) 20 MG delayed-release capsule Take 20 mg by mouth 1 time daily as needed (For heartburn).     •  Multiple Vitamins-Minerals (CENTRUM SILVER ULTRA WOMENS PO) Take 1 Tab by mouth every morning.       No current facility-administered medications for this visit.       Aortic regurgitation  12/6/19 echo normal LV function EF 65%, RVSP 43, moderate tricuspid regurgitation, mild to moderate aortic insufficiency  10/22/20 echo EF 70%, grade 1 diastolic dysfunction, mild AR, RVSP 30   4/20/21  sinus on exam, patient would like to discontinue amiodarone understanding she likely will return to intermittent atrial fibrillation, discussed sotalol, dronedarone, tikosyn as alternatives, patient declines any of those alternatives      Cervical pain  2/10/14 [facility] note; x-ray cervical spine DJD, right shoulder steroid injection     Decreased GFR  5/31/09 bun 18,creat 1.2  1/14/10 bun 28,creat 1.3,GFR 40  9/20/12 bun 37,creat 1.1,GFR 48  9/25/13 bun 25,creat 1.2,GFR 44  9/26/14 bun 26,creat 1.1,GFR 45  2/23/15 bun 20,creat 1.1,GFR 48  4/15/16 bun 31,creat 1.1,GFR 45  7/6/16 bun 29,creat 1.1,GFR 44   5/12/17 bun 35,creat 1.1,GFR 44  11/2/17 bun 22,creat 1.28,GFR 40  12/9/17 bun 29,creat 1.1,GFR 43  10/4/18 bun 31,creat 1.5 at [facility]  1/18/19 bun 33,creat 1.34,GFR 38,PTH 53,calcium 9.7,phos 3.6,spep negative  2/4/20 bun 30,creat 1.24, GFR 41, PTH 58, calcium 9.7, phos 3.9  8/20/20 bun 35,creat 1.12,GFR 46  9/28/20 bun 31,creat 1.1,GFR 47  6/8/21 bun 38,creat 1.46,GFR 34     Dyslipidemia  4/11 chol 159,trig 84,hdl 47,ldl 95,cpk 114  7/12 chol 163,trig 120,hdl 49,ldl 90, crp 1.1 on lipitor 20 mg  3/26/13 chol 159,trig 99,hdl 46,ldl 93 on lipitor 20 mg  9/25/13 chol 164,trig 100,hdl 47,ldl 97 on lipitor 20 mg  9/12/14 cardiology note lower extremity weakness, hold lipitor x3 months, labs ordered  12/15/14 cardiology start low dose lipitor 10 mg  1/22/15 off lipitor will resume 10 mg and repeat labs 4 weeks  2/24/15 chol 179,trig 111,hdl 54,ldl 103 on lipitor 10 mg  2/1/16 cardiology note restart lipitor 20 mg     4/15/16 chol 163,trig 102,hdl 48,ldl 95 on lipitor 20 mg  5/12/17 chol 186,trig 101,hdl 47,ldl 119 on lipitor 20 mg intermittently will start taking daily  11/1/17 chol 146,trig 73,hdl 42,ldl 89 on lipitor 20 mg  12/9/17 chol 133,trig 74,hdl 49,ldl 69 on lipitor 80 mg higher dose due to recent transient ischemic attack  10/4/18 chol 126,trig 98,hdl 44,ldl 62 on lipitor 80 mg at [facility]  1/18/19 chol 161,trig 103,hdl 45,ldl 95 on lipitor 80 mg  2/4/20 chol 121,trig 71,hdl 45,ldl 62 on lipitor 80 mg  8/17/20 stop lipitor due to muscle pain   9/17/20 improved off lipitor repeat lipid pending  10/3/20 chol 240,trig 111,hdl 60,ldl 158, recommend start crestor 10 mg and repeat labs 6 weeks  10/26/20 chol 158,trig 106,hdl 62,ldl 75 on crestor 20 mg  4/20/21  off statin, she agrees to try zetia  Lipitor,crestor muscle pain     gait disorder  4/22/21 custom PT evaluation      Gastroesophageal reflux  6/27/07 EGD per [facility] hiatal hernia, start prevacid 30 mg  7/12 protonix 20 mg qday  11/16/12 [facility] note cont prilosec  1/5/16 change to protonix 40 mg from prilosec  2/4/20 vit d 67     history of pulmonary hypertension  11/17/18 cardiology note asymptomatic, continue cholesterol medication, continue to monitor echo aortic valve, repaired ascending aorta, follow-up yearly  11/21/18 echo normal LV function, EF 60%, mild LVH, grade 2 diastolic dysfunction, mild aortic insufficiency, moderate tricuspid regurgitation, RVSP 50  12/6/19 echo normal LV function EF 65%, RVSP 43, moderate tricuspid regurgitation, mild to moderate aortic insufficiency  10/22/20 echo EF 70%, grade 1 diastolic dysfunction, mild AR, RVSP 30     History shingles     History shoulder pain  4/4/17 right shoulder pain right shoulder x-ray ordered, right knee pain, referral custom PT, tried celebrex no benefit, will try naproxen 500 mg twice daily and zanaflex at night  4/5/17 x-ray right shoulder calcifications consistent with calcific  tendinitis or hydroxyapatite deposition  5/12/17 MRI right shoulder ordered, persistent pain despite physical therapy  5/18/17 MRI right shoulder; full-thickness supraspinatus tendon tear  5/22/17 right shoulder injection, has been going to physical therapy 14 treatments some improvement, right shoulder injection provided, if no improvement in has appt  in october, if need sooner appt try   6/9/17 custom PT note   7/10/17 custom PT note       History TIA  11/1/17 hospital admission, 11/2/17 hospital discharge, facial numbness, transient numbness in both hands, resolved, CT, MRI head no acute infarct, blood pressure stable, discharged home, patient states she was on aspirin at the time of admission  11/1/17 CT head stable right mid brain likely chronic lacunar infarction, periventricular white matter disease  11/1/17 MRI brain no acute abnormality, moderate chronic microvascular stomach disease, chronic microhemorrhages left frontal and right parietal lobes, chronic lacunar infarct left basal ganglia and blanco, or cerebral atrophy  11/1/17 MR-MRA head without; negative  11/2/17 echo normal LV size and function, EF 70%, RVSP 50, mild AR and TR  11/28/17 awaiting possible right hip replacement per orthopedics , given recent possible TIA, cannot provide clearance, she will like second opinion from cardiology referral made to dr.bryan de leon, changed asa to plavix  12/12/17 ultrasound carotid less than 50% internal carotid stenosis bilateral  12/20/17 dr.bryan de leon cardiology note most recent echocardiogram looks fine, will repeat CT scan follow aortic repair  2/20/18 episode of supraventricular tachycardia in the emergency room, davenport catheter removed, symptoms resolved with repeat EKG sinus rhythm  11/17/18 cardiology note asymptomatic, continue cholesterol medication, continue to monitor echo aortic valve, repaired ascending aorta, follow-up yearly  11/21/18 echo normal LV function,  EF 60%, mild LVH, grade 2 diastolic dysfunction, mild aortic insufficiency, moderate tricuspid regurgitation, RVSP 50  12/6/19 echo normal LV function EF 65%, RVSP 43, moderate tricuspid regurgitation, mild to moderate aortic insufficiency   10/22/20 echo EF 70%, grade 1 diastolic dysfunction, mild AR, RVSP 30   12/14/20 cardiology note maintaining sinus rhythm, given frequent symptomatic episodes of paroxysmal atrial fibrillation and age, recommend amiodarone 200 mg, follow-up 6 month  4/20/21  sinus on exam, patient would like to discontinue amiodarone understanding she likely will return to intermittent atrial fibrillation, discussed sotalol, dronedarone, tikosyn as alternatives, patient declines any of those alternatives, ablation is not appropriate given her age      Hypertension  1/10/11 [facility] note cardiac catheterization normal systolic function  3/11 [facility] echo moderate aortic insufficiency, moderate mitral insufficiency, moderate tricuspid insufficiency, normal LV function  5/12 echo [facility] normal LV function EF 60%, moderate to severe left atrial enlargement, RVSP 32    9/26/13 CT thoracic aorta no evidence of aneurysm, small hiatal hernia  9/26/13 Persantine thallium uniform breast tissue attenuation, cannot rule out underlying ischemic, LVEF 67%  10/3/13 on toprol-XL 25 mg half a tablet daily    12/13/13 cardiology note cont same meds, repeat echo and follow up 6 months  1/2/14 echo mild LVH, grade 1 diastolic dysfunction, ascending aortic 4.0, no change compared with ZAK 2010  5/15/14 cardiology note; increase benazepril to 40 mg qday,continue toprol XL 25 mg daily  6/17/15 cardiology note continue meds, repeat echo 6 months  2/1/16 cardiology note moderate pulmonary hypertension and snoring, nocturnal pulse oximetry ordered  6/30/16 cardiology note patient declines overnight pulse oximetry  11/1/17 echo normal LV size and function, EF 70%, mild aortic insufficiency and mild tricuspid  regurgitation, RVSP 50, aortic root 3.2 cm  11/3/17 cardiology note hypertension stable, status post thoracic aortic aneurysm repair, headache unspecified, ESR ordered  11/28/17 on benazepril 40 mg,toprol 25mg, add norvasc 5 mg  12/12/17 ultrasound carotid less than 50% internal carotid stenosis bilateral  12/20/17  [facility] cardiology note most recent echocardiogram looks fine, will repeat CT scan follow aortic repair  2/20/18 episode of supraventricular tachycardia in the emergency room, davenport catheter removed, symptoms resolved with repeat EKG sinus rhythm  11/17/18 cardiology note asymptomatic, continue cholesterol medication, continue to monitor echo aortic valve, repaired ascending aorta, follow-up yearly  11/21/18 echo normal LV function, EF 60%, mild LVH, grade 2 diastolic dysfunction, mild aortic insufficiency, moderate tricuspid regurgitation, RVSP 50  1/18/19 urine mac 45 on benazepril 40 mg, toprol 25 mg, norvasc 5 mg  12/6/19 echo normal LV function EF 65%, RVSP 43, moderate tricuspid regurgitation, mild to moderate aortic insufficiency  10/22/20 echo EF 70%, grade 1 diastolic dysfunction, mild AR, RVSP 30   12/14/20 cardiology note maintaining sinus rhythm, given frequent symptomatic episodes of paroxysmal atrial fibrillation and age, recommend amiodarone 200 mg, follow-up 6 month  4/6/21 on lotensin 40 mg and metoprolol 25 mg  4/20/21  sinus on exam, patient would like to discontinue amiodarone understanding she likely will return to intermittent atrial fibrillation, discussed sotalol, dronedarone, tikosyn as alternatives, patient declines any of those alternatives, ablation is not appropriate given her age      Hypothyroid  4/11 tsh 9 start synthroid 50  4/11 tsh 6,free t3 3.1,free t4 1.2,tpo negative  7/1/11 tsh 2.1 cont synthroid 50 mcg  7/12 tsh 3.4 cont synthroid 50 mcg  7/31/13 tsh 3.2 on synthroid 50 mcg  9/25/13 tsh 1.7 on synthroid 50 mcg  2/24/15 tsh 4.9 on synthroid 50  mcg; increase to synthroid 75 mcg, repeat tsh in 6 weeks  4/14/15 tsh 1.1 on synthroid 75 mcg  4/15/16 tsh 2.3 on synthroid 75 mcg  5/12/17 tsh 1.2 on synthroid 75 mcg  11/1/17 tsh 2.1 on synthroid 75 mcg  1/18/19 tsh 2.2 on synthroid 75 mcg  2/4/20 tsh 3.1 on synthroid 75 mcg  8/20/20 tsh 1.6 on synthroid 75 mcg     Insomnia  1/30/17 trial of trazodone 50 mg  4/4/17 side effects with trazodone drowsiness, discontinued     Neutropenia  4/16/11 wbc 3.9  7/30/13 wbc 5.6  11/7/16 wbc 5.7  5/11/17 wbc 4.7  2/20/18 wbc 7.2  3/6/18 wbc 5.9  1/16/19 wbc 4.0  6/8/19 wbc 4.3 (44%N, 39%L)  2/4/20 wbc 4.1 (42%N,41%L)  8/20/20 wbc 4.6     Osteoporosis  Had been on fosamax [id number]    8/10 dexa LS -2.0,hip -1.5 await old dexa result, she will get copy for me  4/11 vit d 35  9/12 dexa LS -3.2,hip -1.4  2/13/13 reclast IV  7/31/13 vit d 33 cont 2000 units  2/18/14 reclast IV  2/2/15 dexa LS -3.1,hip -1.9; FRAX 35.5 % major,12.6% hip  2/18/15 reclast IV  2/24/15 vit d 33  4/15/16 vit d 36  5/12/17 vit d 36  8/7/18 dexa left forearm -4.2,hip -2.5 resume reclast   8/31/18 reclast IV  1/18/19 vit d 27 increase from 2000 to 4000 units  2/4/20 vit d 67, PTH 58     Paroxysmal atrial fibrillation  11/1/17 hospital admission, 11/2/17 hospital discharge, facial numbness, transient numbness in both hands, resolved, CT, MRI head no acute infarct, blood pressure stable, discharged home, patient states she was on aspirin at the time of admission  11/1/17 CT head stable right mid brain likely chronic lacunar infarction, periventricular white matter disease  11/1/17 MRI brain no acute abnormality, moderate chronic microvascular stomach disease, chronic microhemorrhages left frontal and right parietal lobes, chronic lacunar infarct left basal ganglia and blanco, or cerebral atrophy  11/1/17 MR-MRA head without; negative  2/20/17 episode of supraventricular tachycardia in the emergency room, davenport catheter removed, symptoms resolved with repeat EKG  sinus rhythm  12/20/17  [facility] cardiology note most recent echocardiogram looks fine, will repeat CT scan follow aortic repair  11/17/18 cardiology note asymptomatic, continue cholesterol medication, continue to monitor echo aortic valve, repaired ascending aorta, follow-up yearly  11/21/18 echo normal LV function, EF 60%, mild LVH, grade 2 diastolic dysfunction, mild aortic insufficiency, moderate tricuspid regurgitation, RVSP 50  12/6/19 echo normal LV function EF 65%, RVSP 43, moderate tricuspid regurgitation, mild to moderate aortic insufficiency  9/28/20 emergency room note EKG atrial fibrillation new onset  9/30/20 cardiology note sinus rhythm, start eliquis 5 mg bid and increase toprol to 25 mg daily, echo ordered, follow up 3 months   10/22/20 echo EF 70%, grade 1 diastolic dysfunction, mild AR, RVSP 30   12/14/20 cardiology note maintaining sinus rhythm, given frequent symptomatic episodes of paroxysmal atrial fibrillation and age, recommend amiodarone 200 mg, follow-up 6 month  4/20/21  cardiology note sinus on exam, patient would like to discontinue amiodarone understanding she likely will return to intermittent atrial fibrillation, discussed sotalol, dronedarone, tikosyn as alternatives, patient declines any of those alternatives, ablation is not appropriate given her age      Preventative health  6/27/09 colon per [facility] no records  10/19/04 pneumovax   9/9/11 zoster vaccine  4/14/15 prevnar  8/7/18 dexa left forearm -4.2,hip -2.5  9/28/19 pneumovax  11/7/19 mammogram  11/12/19 shingrix second  2/4/20 vit d 67  9/17/20 tdap   2/27/21 covid second pfizer     s/p knee arthroplasty  4/10 MRI right knee complex tear medial meniscus, horizontal cleavage tear lateral meniscus, chondromalacia patella, moderate-sized baker's cyst  5/10 right meniscus knee repair   5/10 told by  had gout on surgery had pathology report, I await that report, question gout seen on  pathology report done during repair of right meniscus knee surgery.  7/10 uric acid 6.3, await starting allopurinol depending on the operative report  8/5/10 reviewed orthopedics notes , operative report indicates crystal deposition, could be gout or pseudogout, no biopsy results, suspect chondrocalcinosis  10/11 dr.parlasca najera note, MRI pending  8/7/10 reviewed pathology report right knee tissue, marked degenerative changes with focal calcification, no mention of gout. Based on this and a normal uric acid level, I do not believe she has gout, has no hyperuricemia medications would be indicated  10/11 dr.parlasca najera left knee meniscectomy and arthroscopy  1/12 dr.parlasca najera left knee arthroplasty  2/29/16  orthopedics x-ray right knee advanced DJD on the right knee corticosteroid injection performed, prescription voltaren gel  6/13/16  orthopedic note x-rays demonstrate right knee advanced DJD and resurfaced patella, offer right knee total replacement followed by patellar resurfacing after she recovers from her right knee  9/8/16  orthopedic's operative note right knee total arthroplasty  11/2/16  orthopedic note, follow-up right knee, x-ray right knee shows subluxation patella, traumatic evulsion VMO needs reexploration and repair  11/10/16  orthopedic's operative note VMO quadriceps avulsion repair status post total knee replacement  11/23/16  orthopedic no follow-up quadriceps repair, continue crutches in full extension, follow-up one month and then start passive range of motion 0-40°  2/4/17  orthopedic note, continue physical therapy, follow-up this summer  4/17/17 [facility] note  6/9/17 custom PT note     s/p total hip arthroplasty  9/21/17 MRI right hip mild trochanteric bursitis, mild femoral acetabular joint arthritis  10/12/17  orthopedic no proceed with right total hip arthroplasty, x-ray AP pelvis and right hip shows early  arthrosis with calcifications and DJD  2/14/18  orthopedics operative note at [facility] right hip total arthroplasty, gluteus medius and minimus tendon repair  3/27/18 [place] [facility] note   5/8/18 [place] [facility] note xray right hip stable total hip arthroplasty, continue physical therapy  4/3/19 custom PT discharge  4/5/21 dr.arraiz NELSON pain note right trochanteric bursitis steroid injection, trigger point injection low back  4/22/21 custom PT evaluation      s/p lumbar surgery  6/03 L4-5 epidural     7/06  spinal surgery operative note decompression, foraminotomy. L4-L5 posterior fusion, L4-L5 allograft    3/5/14 MRI lumbar spine grade 2 spondylolisthesis L4-L5 with previous laminectomy and posterior fusion, left sided pedicle screw fixation L4-L5, moderate central canal stenosis L5-S1 secondary to facet arthropathy, mild to moderate multilevel neural foraminal narrowing  12/8/14 dr.arraiz lowe [facility] note; right lower extremity radiculitis L4-L5 lesion, myofascial lumbosacral pain, trochanteric bursitis, followup as needed  4/2/15 dr.arraiz lowe procedure note right L4-L5 and right L5-S1 SSNRB under fluoroscopy  4/27/15 dr.arraiz lowe note; right trochanteric bursal injection, trigger point injections performed, also set up SI joint injections  8/3/15 [facility] neurosurgery note, lumbar x-ray flexion and extension, MRI lumbar spine without contrast, followup   8/9/15 MRI lumbar spine, previous L4-L5 left  fusion and laminectomy, L3-L4 moderate bilateral facet arthropathy, mild disc bulge, L4-L5 severe bilateral facet arthropathy, moderate to severe bilateral neural foraminal stenosis, L5-S1 moderate facet arthropathy  8/28/15  neurosurgery note previous posterior fusion L4-L5, does have L3-L4 disc bulge with central canal stenosis, recommend L3-L5 decompression  8/31/15 dr.arraiz lowe note; right lower extremity radiculitis, myofascial lumbosacral pain, start hydrocodone  5 mg, continued SI joint exercises, perform trochanteric bursal injection right hip, trigger points lumbar region  10/28/15  neurosurgery note, will schedule for posterior L3-L4 laminectomy and fusion with redo L4-5 laminectomy and exploration of fusion, surgical clearance per cardiology   11/24/15  neurosurgery operative note expiration removal L4-L5 hardware on the left, bilateral facetectomies L4, L3-L4 transforaminal lumbar interbody fusion  12/9/15 [facility] neurosurgery note s/p L4-S1 fusion on 11/24/15 , follow up in 4 weeks  12/23/15  neurosurgery note, clear to restart physical therapy if she would like after one month, follow-up 3 months  6/9/20 dr.arraiz NELSON pain note trigger point injections trochanteric bursa right side  8/25/20 dr.arraiz NELSON pain procedure note epidural right L4 nerve root   4/5/21 dr.arraiz NELSON pain note right trochanteric bursitis steroid injection, trigger point injection low back  4/22/21 custom PT evaluation      s/p TOMY  Sees gyn on HRT estradiol for 20 yrs ()     s/p thoracic aneurysm repair  6/29/06 CT-CTA chest with and without postprocessing; stable ascending thoracic aortic aneurysm maximum diameter 4.8, just distal to the aortic valve maximum diameter 4.3  4/9/07 CT-CTA chest with and without processing; stable ascending aortic thoracic aneurysm 4.5 x 4.6 cm  6/8/09 CT chest with; stable 4.7 ascending aorta aneurysm  10/15/09 CT chest without; dilated ascending thoracic aorta 4.9 cm aneurysm increased from 4.7  1/11/10  cardiovascular surgery operative note ascending thoracic aorta aneurysm repair  9/26/13 CT thoracic aorta evaluation; status post repair ascending thoracic aortic aneurysm without evidence of dissection or leak  1/2/14 echo mild LVH, grade 1 diastolic dysfunction, ascending aortic 4.0, no change compared with ZAK 2010  5/15/14 cardiology note  6/17/15 cardiology note cont meds,repeat echo 6 months  2/1/16  cardiology note moderate pulmonary hypertension and snoring, nocturnal pulse oximetry ordered  11/1/17 echo normal LV size and function, EF 70%, mild aortic insufficiency and mild tricuspid regurgitation, RVSP 50, aortic root 3.2 cm  11/3/17 cardiology note stable  12/20/17  [facility] cardiology note most recent echocardiogram looks fine, will repeat CT scan follow aortic repair  11/17/18 cardiology note asymptomatic, continue cholesterol medication, continue to monitor echo aortic valve, repaired ascending aorta, follow-up yearly  11/21/18 echo normal LV function, EF 60%, mild LVH, grade 2 diastolic dysfunction, mild aortic insufficiency, moderate tricuspid regurgitation, RVSP 50  12/6/19 echo normal LV function EF 65%, RVSP 43, moderate tricuspid regurgitation, mild to moderate aortic insufficiency  10/22/20 echo EF 70%, grade 1 diastolic dysfunction, mild AR, RVSP 30      Tricuspid regurgitation  11/17/18 cardiology note asymptomatic, continue cholesterol medication, continue to monitor echo aortic valve, repaired ascending aorta, follow-up yearly  11/21/18 echo normal LV function, EF 60%, mild LVH, grade 2 diastolic dysfunction, mild aortic insufficiency, moderate tricuspid regurgitation, RVSP 50  12/6/19 echo normal LV function EF 65%, RVSP 43, moderate tricuspid regurgitation, mild to moderate aortic insufficiency  10/22/20 echo EF 70%, grade 1 diastolic dysfunction, mild AR, RVSP 30   4/20/21  sinus on exam, patient would like to discontinue amiodarone understanding she likely will return to intermittent atrial fibrillation, discussed sotalol, dronedarone, tikosyn as alternatives, patient declines any of those alternatives, ablation is not appropriate given her age      UTI  7/6/16 UTI klebsiella pneumoniae sensitive to all antibiotics except ampicillin, start bactrim x 5 days  1/18/19 UTI enterobacter cloacae resistant to ampicillin, cephalothin, penicillin, bactrim, sensitive to  cefuroxime, ciprofloxacin and levaquin, nitrofurantoin  5/26/19 UTI klebsiella given omnicef, organism resistant ampicillin, ciprofloxacin, levofloxacin, bactrim                 Patient Active Problem List   Diagnosis   • S/p lumbar surgery   • Hypertension   • Dyslipidemia   • S/P TOMY (total abdominal hysterectomy)   • Preventative health care   • S/P total knee arthroplasty   • S/P appendectomy   • Osteoporosis, idiopathic   • Hypothyroid   • History of shingles   • GERD (gastroesophageal reflux disease)   • Tricuspid regurgitation   • Aortic regurgitation   • Cervical pain   • S/P thoracic aortic aneurysm repair   • CKD (chronic kidney disease) stage 3, GFR 30-59 ml/min   • UTI (urinary tract infection)   • Insomnia   • History of shoulder pain   • History of transient ischemic attack (TIA)   • S/P total hip arthroplasty   • History of pulmonary hypertension (HCC)   • Neutropenia (HCC)   • Allergic rhinitis   • Paroxysmal atrial fibrillation (HCC)   • On continuous oral anticoagulation   • Gait disorder         ROS       Objective:          Physical Exam  Vitals and nursing note reviewed.   Constitutional:       Appearance: Normal appearance.   HENT:      Head: Normocephalic and atraumatic.      Right Ear: External ear normal.      Left Ear: External ear normal.   Eyes:      Conjunctiva/sclera: Conjunctivae normal.   Cardiovascular:      Rate and Rhythm: Normal rate and regular rhythm.      Heart sounds: Normal heart sounds.   Pulmonary:      Effort: Pulmonary effort is normal.      Breath sounds: Normal breath sounds.   Abdominal:      General: There is no distension.   Musculoskeletal:         General: No swelling.      Cervical back: Neck supple.   Skin:     General: Skin is warm.      Coloration: Skin is not jaundiced.      Findings: No bruising.   Neurological:      General: No focal deficit present.      Mental Status: She is alert.   Psychiatric:         Mood and Affect: Mood normal.         Behavior:  Behavior normal.       Right-sided sinus frontal tenderness to palpation, extraocular movements intact, no periorbital edema, no TMJ tenderness, no tender palpable temporal artery                 Assessment/Plan:        Assessment  #1  Paroxysmal atrial fibrillation followed by cardiology off amiodarone, patient declined sotalol, dronedarone, Tikosyn as alternatives, remains on metoprolol for rate control, Eliquis for paroxysmal atrial fibrillation, sinus on exam today rate controlled, no complications with Eliquis, regular NSAIDs    #2 hypertension on Lotensin and metoprolol    #3 ckd stage IIIb 6/8/21 bun 38,creat 1.46,GFR 34, no fluid overload on exam, no regular NSAIDs    #4 dyslipidemia 4/20/21  off statin, she agrees to try zetia    #5 hypothyroid 8/20/20 tsh 1.6 on synthroid 75 mcg    #6 allergic rhinitis has tried Flonase, on Astelin    #7 question sinusitis, some right-sided sinus pressure    #8 B12 deficiency    #9 vitamin D deficiency    Plan  #1 trial of doxycycline 1 p.o. twice daily x10 days has had before can cause nausea, diarrhea, loose stools    #2 if no improvement in sinus symptoms consider CT sinuses    #3 chest x-ray for cough    #4 add Atrovent nasal to Astelin, as already tried Flonase    #5 anticoagulation precautions with Eliquis, continue no NSAIDs    #6 no NSAIDs for CKD    #7 reviewed recent labs    #8 follow-up cardiology    #9 labs ordered one month

## 2021-06-22 ENCOUNTER — APPOINTMENT (OUTPATIENT)
Dept: RADIOLOGY | Facility: MEDICAL CENTER | Age: 86
DRG: 536 | End: 2021-06-22
Attending: ORTHOPAEDIC SURGERY
Payer: MEDICARE

## 2021-06-22 ENCOUNTER — APPOINTMENT (OUTPATIENT)
Dept: RADIOLOGY | Facility: MEDICAL CENTER | Age: 86
DRG: 536 | End: 2021-06-22
Attending: EMERGENCY MEDICINE
Payer: MEDICARE

## 2021-06-22 ENCOUNTER — TELEPHONE (OUTPATIENT)
Dept: MEDICAL GROUP | Facility: MEDICAL CENTER | Age: 86
End: 2021-06-22

## 2021-06-22 ENCOUNTER — HOSPITAL ENCOUNTER (OUTPATIENT)
Dept: RADIOLOGY | Facility: MEDICAL CENTER | Age: 86
End: 2021-06-22
Attending: INTERNAL MEDICINE
Payer: COMMERCIAL

## 2021-06-22 ENCOUNTER — HOSPITAL ENCOUNTER (INPATIENT)
Facility: MEDICAL CENTER | Age: 86
LOS: 1 days | DRG: 536 | End: 2021-06-23
Attending: EMERGENCY MEDICINE | Admitting: HOSPITALIST
Payer: MEDICARE

## 2021-06-22 DIAGNOSIS — I10 ESSENTIAL HYPERTENSION: ICD-10-CM

## 2021-06-22 DIAGNOSIS — R05.9 COUGH: ICD-10-CM

## 2021-06-22 DIAGNOSIS — S32.810A MULTIPLE CLOSED FRACTURES OF PELVIS WITH STABLE DISRUPTION OF PELVIC RING, INITIAL ENCOUNTER (HCC): ICD-10-CM

## 2021-06-22 DIAGNOSIS — Z86.73 HISTORY OF TRANSIENT ISCHEMIC ATTACK (TIA): ICD-10-CM

## 2021-06-22 DIAGNOSIS — Z96.659 STATUS POST TOTAL KNEE REPLACEMENT, UNSPECIFIED LATERALITY: ICD-10-CM

## 2021-06-22 DIAGNOSIS — Z98.890 STATUS POST LUMBAR SURGERY: ICD-10-CM

## 2021-06-22 DIAGNOSIS — S32.401A CLOSED DISPLACED FRACTURE OF RIGHT ACETABULUM, UNSPECIFIED PORTION OF ACETABULUM, INITIAL ENCOUNTER (HCC): ICD-10-CM

## 2021-06-22 DIAGNOSIS — Z96.641 STATUS POST TOTAL REPLACEMENT OF RIGHT HIP: ICD-10-CM

## 2021-06-22 PROBLEM — J32.9 SINUSITIS: Status: ACTIVE | Noted: 2021-06-22

## 2021-06-22 PROBLEM — S32.9XXA PELVIC FRACTURE (HCC): Status: ACTIVE | Noted: 2021-06-22

## 2021-06-22 LAB
ALBUMIN SERPL BCP-MCNC: 4.2 G/DL (ref 3.2–4.9)
ALBUMIN/GLOB SERPL: 1.8 G/DL
ALP SERPL-CCNC: 69 U/L (ref 30–99)
ALT SERPL-CCNC: 18 U/L (ref 2–50)
ANION GAP SERPL CALC-SCNC: 11 MMOL/L (ref 7–16)
APTT PPP: 30.8 SEC (ref 24.7–36)
AST SERPL-CCNC: 21 U/L (ref 12–45)
BASOPHILS # BLD AUTO: 0.7 % (ref 0–1.8)
BASOPHILS # BLD: 0.04 K/UL (ref 0–0.12)
BILIRUB SERPL-MCNC: 0.3 MG/DL (ref 0.1–1.5)
BUN SERPL-MCNC: 48 MG/DL (ref 8–22)
CALCIUM SERPL-MCNC: 9.5 MG/DL (ref 8.4–10.2)
CHLORIDE SERPL-SCNC: 99 MMOL/L (ref 96–112)
CO2 SERPL-SCNC: 21 MMOL/L (ref 20–33)
CREAT SERPL-MCNC: 1.44 MG/DL (ref 0.5–1.4)
EOSINOPHIL # BLD AUTO: 0.04 K/UL (ref 0–0.51)
EOSINOPHIL NFR BLD: 0.7 % (ref 0–6.9)
ERYTHROCYTE [DISTWIDTH] IN BLOOD BY AUTOMATED COUNT: 48.6 FL (ref 35.9–50)
GLOBULIN SER CALC-MCNC: 2.4 G/DL (ref 1.9–3.5)
GLUCOSE SERPL-MCNC: 105 MG/DL (ref 65–99)
HCT VFR BLD AUTO: 29.6 % (ref 37–47)
HGB BLD-MCNC: 9.2 G/DL (ref 12–16)
IMM GRANULOCYTES # BLD AUTO: 0.06 K/UL (ref 0–0.11)
IMM GRANULOCYTES NFR BLD AUTO: 1 % (ref 0–0.9)
INR PPP: 1.26 (ref 0.87–1.13)
LYMPHOCYTES # BLD AUTO: 1.32 K/UL (ref 1–4.8)
LYMPHOCYTES NFR BLD: 22.2 % (ref 22–41)
MCH RBC QN AUTO: 26.7 PG (ref 27–33)
MCHC RBC AUTO-ENTMCNC: 31.1 G/DL (ref 33.6–35)
MCV RBC AUTO: 85.8 FL (ref 81.4–97.8)
MONOCYTES # BLD AUTO: 0.64 K/UL (ref 0–0.85)
MONOCYTES NFR BLD AUTO: 10.8 % (ref 0–13.4)
NEUTROPHILS # BLD AUTO: 3.85 K/UL (ref 2–7.15)
NEUTROPHILS NFR BLD: 64.6 % (ref 44–72)
NRBC # BLD AUTO: 0 K/UL
NRBC BLD-RTO: 0 /100 WBC
PLATELET # BLD AUTO: 228 K/UL (ref 164–446)
PMV BLD AUTO: 8.6 FL (ref 9–12.9)
POTASSIUM SERPL-SCNC: 4.7 MMOL/L (ref 3.6–5.5)
PROT SERPL-MCNC: 6.6 G/DL (ref 6–8.2)
PROTHROMBIN TIME: 15.5 SEC (ref 12–14.6)
RBC # BLD AUTO: 3.45 M/UL (ref 4.2–5.4)
SARS-COV+SARS-COV-2 AG RESP QL IA.RAPID: NOTDETECTED
SODIUM SERPL-SCNC: 131 MMOL/L (ref 135–145)
SPECIMEN SOURCE: NORMAL
WBC # BLD AUTO: 6 K/UL (ref 4.8–10.8)

## 2021-06-22 PROCEDURE — 72192 CT PELVIS W/O DYE: CPT | Mod: MG

## 2021-06-22 PROCEDURE — C9803 HOPD COVID-19 SPEC COLLECT: HCPCS | Performed by: EMERGENCY MEDICINE

## 2021-06-22 PROCEDURE — 71046 X-RAY EXAM CHEST 2 VIEWS: CPT

## 2021-06-22 PROCEDURE — 99222 1ST HOSP IP/OBS MODERATE 55: CPT | Performed by: HOSPITALIST

## 2021-06-22 PROCEDURE — U0005 INFEC AGEN DETEC AMPLI PROBE: HCPCS

## 2021-06-22 PROCEDURE — A9270 NON-COVERED ITEM OR SERVICE: HCPCS | Performed by: HOSPITALIST

## 2021-06-22 PROCEDURE — U0003 INFECTIOUS AGENT DETECTION BY NUCLEIC ACID (DNA OR RNA); SEVERE ACUTE RESPIRATORY SYNDROME CORONAVIRUS 2 (SARS-COV-2) (CORONAVIRUS DISEASE [COVID-19]), AMPLIFIED PROBE TECHNIQUE, MAKING USE OF HIGH THROUGHPUT TECHNOLOGIES AS DESCRIBED BY CMS-2020-01-R: HCPCS

## 2021-06-22 PROCEDURE — 96374 THER/PROPH/DIAG INJ IV PUSH: CPT

## 2021-06-22 PROCEDURE — 700111 HCHG RX REV CODE 636 W/ 250 OVERRIDE (IP): Performed by: EMERGENCY MEDICINE

## 2021-06-22 PROCEDURE — 85025 COMPLETE CBC W/AUTO DIFF WBC: CPT

## 2021-06-22 PROCEDURE — 96375 TX/PRO/DX INJ NEW DRUG ADDON: CPT

## 2021-06-22 PROCEDURE — 99285 EMERGENCY DEPT VISIT HI MDM: CPT

## 2021-06-22 PROCEDURE — 87426 SARSCOV CORONAVIRUS AG IA: CPT

## 2021-06-22 PROCEDURE — 85610 PROTHROMBIN TIME: CPT

## 2021-06-22 PROCEDURE — 36415 COLL VENOUS BLD VENIPUNCTURE: CPT

## 2021-06-22 PROCEDURE — 700102 HCHG RX REV CODE 250 W/ 637 OVERRIDE(OP): Performed by: HOSPITALIST

## 2021-06-22 PROCEDURE — 96376 TX/PRO/DX INJ SAME DRUG ADON: CPT

## 2021-06-22 PROCEDURE — 770006 HCHG ROOM/CARE - MED/SURG/GYN SEMI*

## 2021-06-22 PROCEDURE — 72190 X-RAY EXAM OF PELVIS: CPT

## 2021-06-22 PROCEDURE — 80053 COMPREHEN METABOLIC PANEL: CPT

## 2021-06-22 PROCEDURE — 85730 THROMBOPLASTIN TIME PARTIAL: CPT

## 2021-06-22 RX ORDER — ACETAMINOPHEN 325 MG/1
650 TABLET ORAL EVERY 6 HOURS PRN
Status: DISCONTINUED | OUTPATIENT
Start: 2021-06-22 | End: 2021-06-23 | Stop reason: HOSPADM

## 2021-06-22 RX ORDER — ONDANSETRON 4 MG/1
4 TABLET, ORALLY DISINTEGRATING ORAL EVERY 4 HOURS PRN
Status: DISCONTINUED | OUTPATIENT
Start: 2021-06-22 | End: 2021-06-23 | Stop reason: HOSPADM

## 2021-06-22 RX ORDER — BENAZEPRIL HYDROCHLORIDE 10 MG/1
40 TABLET ORAL
Status: DISCONTINUED | OUTPATIENT
Start: 2021-06-23 | End: 2021-06-23 | Stop reason: HOSPADM

## 2021-06-22 RX ORDER — DOXYCYCLINE 100 MG/1
100 TABLET ORAL EVERY 12 HOURS
Status: DISCONTINUED | OUTPATIENT
Start: 2021-06-22 | End: 2021-06-23 | Stop reason: HOSPADM

## 2021-06-22 RX ORDER — METOPROLOL SUCCINATE 25 MG/1
25 TABLET, EXTENDED RELEASE ORAL EVERY MORNING
Status: DISCONTINUED | OUTPATIENT
Start: 2021-06-23 | End: 2021-06-23 | Stop reason: HOSPADM

## 2021-06-22 RX ORDER — OMEPRAZOLE 20 MG/1
20 CAPSULE, DELAYED RELEASE ORAL EVERY EVENING
Status: DISCONTINUED | OUTPATIENT
Start: 2021-06-22 | End: 2021-06-23 | Stop reason: HOSPADM

## 2021-06-22 RX ORDER — HYDROMORPHONE HYDROCHLORIDE 1 MG/ML
0.25 INJECTION, SOLUTION INTRAMUSCULAR; INTRAVENOUS; SUBCUTANEOUS
Status: DISCONTINUED | OUTPATIENT
Start: 2021-06-22 | End: 2021-06-23 | Stop reason: HOSPADM

## 2021-06-22 RX ORDER — BISACODYL 10 MG
10 SUPPOSITORY, RECTAL RECTAL
Status: DISCONTINUED | OUTPATIENT
Start: 2021-06-22 | End: 2021-06-23 | Stop reason: HOSPADM

## 2021-06-22 RX ORDER — AMOXICILLIN 250 MG
2 CAPSULE ORAL 2 TIMES DAILY
Status: DISCONTINUED | OUTPATIENT
Start: 2021-06-23 | End: 2021-06-23 | Stop reason: HOSPADM

## 2021-06-22 RX ORDER — LEVOTHYROXINE SODIUM 0.07 MG/1
75 TABLET ORAL
Status: DISCONTINUED | OUTPATIENT
Start: 2021-06-23 | End: 2021-06-23 | Stop reason: HOSPADM

## 2021-06-22 RX ORDER — ONDANSETRON 2 MG/ML
4 INJECTION INTRAMUSCULAR; INTRAVENOUS EVERY 4 HOURS PRN
Status: DISCONTINUED | OUTPATIENT
Start: 2021-06-22 | End: 2021-06-23 | Stop reason: HOSPADM

## 2021-06-22 RX ORDER — IPRATROPIUM BROMIDE 21 UG/1
2 SPRAY, METERED NASAL DAILY
Status: DISCONTINUED | OUTPATIENT
Start: 2021-06-22 | End: 2021-06-23 | Stop reason: HOSPADM

## 2021-06-22 RX ORDER — OXYCODONE HYDROCHLORIDE 5 MG/1
2.5 TABLET ORAL
Status: DISCONTINUED | OUTPATIENT
Start: 2021-06-22 | End: 2021-06-23 | Stop reason: HOSPADM

## 2021-06-22 RX ORDER — POLYETHYLENE GLYCOL 3350 17 G/17G
1 POWDER, FOR SOLUTION ORAL
Status: DISCONTINUED | OUTPATIENT
Start: 2021-06-22 | End: 2021-06-23 | Stop reason: HOSPADM

## 2021-06-22 RX ORDER — ONDANSETRON 2 MG/ML
4 INJECTION INTRAMUSCULAR; INTRAVENOUS ONCE
Status: COMPLETED | OUTPATIENT
Start: 2021-06-22 | End: 2021-06-22

## 2021-06-22 RX ORDER — ACETAMINOPHEN 500 MG
1000 TABLET ORAL EVERY 6 HOURS PRN
Status: ON HOLD | COMMUNITY
End: 2021-06-28

## 2021-06-22 RX ORDER — OXYCODONE HYDROCHLORIDE 5 MG/1
5 TABLET ORAL
Status: DISCONTINUED | OUTPATIENT
Start: 2021-06-22 | End: 2021-06-23 | Stop reason: HOSPADM

## 2021-06-22 RX ADMIN — OXYCODONE HYDROCHLORIDE 2.5 MG: 5 TABLET ORAL at 18:39

## 2021-06-22 RX ADMIN — FENTANYL CITRATE 50 MCG: 50 INJECTION, SOLUTION INTRAMUSCULAR; INTRAVENOUS at 14:55

## 2021-06-22 RX ADMIN — FENTANYL CITRATE 50 MCG: 50 INJECTION, SOLUTION INTRAMUSCULAR; INTRAVENOUS at 16:26

## 2021-06-22 RX ADMIN — ACETAMINOPHEN 650 MG: 325 TABLET, FILM COATED ORAL at 21:45

## 2021-06-22 RX ADMIN — OMEPRAZOLE 20 MG: 20 CAPSULE, DELAYED RELEASE ORAL at 18:09

## 2021-06-22 RX ADMIN — OXYCODONE HYDROCHLORIDE 5 MG: 5 TABLET ORAL at 21:45

## 2021-06-22 RX ADMIN — DOXYCYCLINE 100 MG: 100 TABLET, FILM COATED ORAL at 18:09

## 2021-06-22 RX ADMIN — ONDANSETRON 4 MG: 2 INJECTION INTRAMUSCULAR; INTRAVENOUS at 14:56

## 2021-06-22 ASSESSMENT — PAIN DESCRIPTION - PAIN TYPE
TYPE: ACUTE PAIN
TYPE: ACUTE PAIN

## 2021-06-22 ASSESSMENT — ENCOUNTER SYMPTOMS
EYE DISCHARGE: 0
STRIDOR: 0
FOCAL WEAKNESS: 0
EYE REDNESS: 0
MYALGIAS: 0
FEVER: 0
VOMITING: 0
COUGH: 0
FLANK PAIN: 0
CHILLS: 0
SHORTNESS OF BREATH: 0
FALLS: 1
ABDOMINAL PAIN: 0
NERVOUS/ANXIOUS: 0
BRUISES/BLEEDS EASILY: 0

## 2021-06-22 ASSESSMENT — LIFESTYLE VARIABLES
EVER HAD A DRINK FIRST THING IN THE MORNING TO STEADY YOUR NERVES TO GET RID OF A HANGOVER: NO
AVERAGE NUMBER OF DAYS PER WEEK YOU HAVE A DRINK CONTAINING ALCOHOL: 0
CONSUMPTION TOTAL: NEGATIVE
ALCOHOL_USE: NO
EVER FELT BAD OR GUILTY ABOUT YOUR DRINKING: NO
HAVE PEOPLE ANNOYED YOU BY CRITICIZING YOUR DRINKING: NO
TOTAL SCORE: 0
TOTAL SCORE: 0
HOW MANY TIMES IN THE PAST YEAR HAVE YOU HAD 5 OR MORE DRINKS IN A DAY: 0
ON A TYPICAL DAY WHEN YOU DRINK ALCOHOL HOW MANY DRINKS DO YOU HAVE: 0
TOTAL SCORE: 0
HAVE YOU EVER FELT YOU SHOULD CUT DOWN ON YOUR DRINKING: NO

## 2021-06-22 ASSESSMENT — COGNITIVE AND FUNCTIONAL STATUS - GENERAL
DAILY ACTIVITIY SCORE: 16
MOVING TO AND FROM BED TO CHAIR: A LOT
WALKING IN HOSPITAL ROOM: A LOT
SUGGESTED CMS G CODE MODIFIER DAILY ACTIVITY: CK
MOVING FROM LYING ON BACK TO SITTING ON SIDE OF FLAT BED: A LOT
HELP NEEDED FOR BATHING: A LOT
DRESSING REGULAR UPPER BODY CLOTHING: A LOT
MOBILITY SCORE: 13
TURNING FROM BACK TO SIDE WHILE IN FLAT BAD: A LITTLE
DRESSING REGULAR LOWER BODY CLOTHING: A LOT
SUGGESTED CMS G CODE MODIFIER MOBILITY: CL
STANDING UP FROM CHAIR USING ARMS: A LOT
CLIMB 3 TO 5 STEPS WITH RAILING: A LOT
TOILETING: A LOT

## 2021-06-22 ASSESSMENT — CHA2DS2 SCORE
CHF OR LEFT VENTRICULAR DYSFUNCTION: NO
VASCULAR DISEASE: YES
CHA2DS2 VASC SCORE: 7
HYPERTENSION: YES
AGE 65 TO 74: NO
SEX: FEMALE
PRIOR STROKE OR TIA OR THROMBOEMBOLISM: YES
AGE 75 OR GREATER: YES
DIABETES: NO

## 2021-06-22 ASSESSMENT — PATIENT HEALTH QUESTIONNAIRE - PHQ9
1. LITTLE INTEREST OR PLEASURE IN DOING THINGS: NOT AT ALL
SUM OF ALL RESPONSES TO PHQ9 QUESTIONS 1 AND 2: 0
2. FEELING DOWN, DEPRESSED, IRRITABLE, OR HOPELESS: NOT AT ALL

## 2021-06-22 ASSESSMENT — FIBROSIS 4 INDEX: FIB4 SCORE: 1.82

## 2021-06-22 NOTE — TELEPHONE ENCOUNTER
Called patient left message please notify her that her chest x-ray is negative for infection or inflammation.

## 2021-06-22 NOTE — ED NOTES
Med rec updated and complete  Allergies reviewed  Interviewed pt with  at bedside with permission from pt.      No current facility-administered medications on file prior to encounter.     Current Outpatient Medications on File Prior to Encounter   Medication Sig Dispense Refill   • acetaminophen (TYLENOL) 500 MG Tab Take 1,000 mg by mouth every 6 hours as needed for Moderate Pain.     • ipratropium (ATROVENT) 0.03 % Solution Administer 2 Sprays into affected nostril(S) 2 times a day. (Patient taking differently: Administer 2 Sprays into affected nostril(S) every day.) 30 mL 6   • doxycycline (VIBRAMYCIN) 100 MG Tab Take 1 tablet by mouth 2 times a day. (Patient taking differently: Take 100 mg by mouth 2 times a day. Pt started on 6/17/2021 for 10 day course) 20 tablet 0   • benazepril (LOTENSIN) 40 MG tablet TAKE 1 TABLET BY MOUTH EVERY DAY (Patient taking differently: Take 40 mg by mouth every day.) 90 tablet 2   • levothyroxine (SYNTHROID) 75 MCG Tab TAKE 1 TAB BY MOUTH EVERY MORNING ON AN EMPTY STOMACH. 90 tablet 1   • MAGNESIUM PO Take 1 capsule by mouth every evening.     • apixaban (ELIQUIS) 5mg Tab Take 1 Tab by mouth 2 Times a Day. 180 Tab 3   • metoprolol SR (TOPROL XL) 25 MG TABLET SR 24 HR Take 1 Tab by mouth every morning. 90 Tab 3   • FEXOFENADINE HCL PO Take 1 tablet by mouth every day. Indications: Hayfever     • omeprazole (PRILOSEC) 20 MG delayed-release capsule Take 20 mg by mouth every evening.     • Multiple Vitamins-Minerals (CENTRUM SILVER ULTRA WOMENS PO) Take 1 tablet by mouth every evening.

## 2021-06-22 NOTE — ED TRIAGE NOTES
Bib ems for above complaints.    Chief Complaint   Patient presents with   • GLF     walking out of MD office and ran into door. fell backward landing on buttocks, pt on eliquis, denies hitting head or LOC.    • Leg Pain     pt complains of right upper thigh/groin pain. pt able to move leg on her own at this time.      /64   Pulse 79   Temp 36.6 °C (97.8 °F) (Temporal)   Resp 16   SpO2 96%   Breastfeeding No

## 2021-06-22 NOTE — H&P
Hospital Medicine History & Physical Note    Date of Service  6/22/2021    Primary Care Physician  Tee Tran M.D.    Consultants  Julio Burgess     Code Status  Full Code    Chief Complaint  Chief Complaint   Patient presents with   • GLF     walking out of MD office and ran into door. fell backward landing on buttocks, pt on eliquis, denies hitting head or LOC.    • Leg Pain     pt complains of right upper thigh/groin pain. pt able to move leg on her own at this time.      History of Presenting Illness  85 y.o. female with a past medical history of hypothyroidism, hypertension, chronic kidney disease stage III, paroxysmal atrial fibrillation on anticoagulation with apixaban who presented 6/22/2021 with right hip pain after a fall.  Patient fell on the ground after she walked into a door.  She hit her gluteal region and developed severe pain rated as 10 out of 10, worse with attempting to stand or move her right lower extremity.  No radiation to other places.  She denies loss of consciousness.  She denies hitting her head.     Review of Systems  Review of Systems   Constitutional: Negative for chills and fever.   Eyes: Negative for discharge and redness.   Respiratory: Negative for cough, shortness of breath and stridor.    Cardiovascular: Negative for chest pain and leg swelling.   Gastrointestinal: Negative for abdominal pain and vomiting.   Genitourinary: Negative for flank pain.   Musculoskeletal: Positive for falls and joint pain. Negative for myalgias.   Skin: Negative.    Neurological: Negative for focal weakness.   Endo/Heme/Allergies: Does not bruise/bleed easily.   Psychiatric/Behavioral: The patient is not nervous/anxious.      Past Medical History  Abdominal aortic aneurysm  Hypertension  Hypothyroid   Thoracic aortic aneurysm without mention of rupture  Tricuspid insufficiency  Unspecified disorder of thyroid.    Surgical History   has a past surgical history that includes lumbar fusion posterior;  hysterectomy, total abdominal; appendectomy; aortic valve replacement (1/12/2010); knee arthroscopy (10/18/2011); medial meniscectomy (10/18/2011); knee arthroplasty total (1/3/2012); lumbar fusion posterior (11/24/2015); lumbar laminectomy diskectomy (11/24/2015); knee arthroplasty total (Right, 9/8/2016); and tendon repair (Right, 11/10/2016).     Family History  family history includes Heart Disease in her father and sister; Stroke in her mother.     Social History   reports that she has never smoked. She has never used smokeless tobacco. She reports current drug use. She reports that she does not drink alcohol.    Allergies  Allergies   Allergen Reactions   • Amoxicillin Vomiting     Upset stomach, ok if needed for life saving treatment (per pt)   • Bactrim [Sulfamethoxazole W-Trimethoprim] Vomiting     Nausea/vomiting Ok in a life saving situation (per pt)   • Codeine Nausea     Synthetic codones ok   • Doxycycline      Nausea, Ok in life saving situation (per pt)   • Trazodone Vomiting     groggy   • Ultram [Tramadol] Vomiting     nausea     Medications  Prior to Admission Medications   Prescriptions Last Dose Informant Patient Reported? Taking?   FEXOFENADINE HCL PO 6/22/2021 at 0630 Patient Yes No   Sig: Take 1 tablet by mouth every day. Indications: Hayfever   MAGNESIUM PO 6/21/2021 at 1800 Patient Yes No   Sig: Take 1 capsule by mouth every evening.   Multiple Vitamins-Minerals (CENTRUM SILVER ULTRA WOMENS PO) 6/21/2021 at 1800 Patient Yes No   Sig: Take 1 tablet by mouth every evening.   acetaminophen (TYLENOL) 500 MG Tab 6/22/2021 at 0900 Patient Yes Yes   Sig: Take 1,000 mg by mouth every 6 hours as needed for Moderate Pain.   apixaban (ELIQUIS) 5mg Tab 6/22/2021 at 0630 Patient No No   Sig: Take 1 Tab by mouth 2 Times a Day.   benazepril (LOTENSIN) 40 MG tablet 6/22/2021 at 0630 Patient No No   Sig: TAKE 1 TABLET BY MOUTH EVERY DAY   Patient taking differently: Take 40 mg by mouth every day.    doxycycline (VIBRAMYCIN) 100 MG Tab 6/22/2021 at 0630 Patient No No   Sig: Take 1 tablet by mouth 2 times a day.   Patient taking differently: Take 100 mg by mouth 2 times a day. Pt started on 6/17/2021 for 10 day course   ezetimibe (ZETIA) 10 MG Tab Not Taking at Unknown time Patient No No   Sig: Take 1 tablet by mouth every day.   Patient not taking: Reported on 6/22/2021   ipratropium (ATROVENT) 0.03 % Solution 6/22/2021 at 0730 Patient No No   Sig: Administer 2 Sprays into affected nostril(S) 2 times a day.   Patient taking differently: Administer 2 Sprays into affected nostril(S) every day.   levothyroxine (SYNTHROID) 75 MCG Tab 6/22/2021 at 0400 Patient No No   Sig: TAKE 1 TAB BY MOUTH EVERY MORNING ON AN EMPTY STOMACH.   metoprolol SR (TOPROL XL) 25 MG TABLET SR 24 HR 6/22/2021 at 0630 Patient No No   Sig: Take 1 Tab by mouth every morning.   omeprazole (PRILOSEC) 20 MG delayed-release capsule 6/21/2021 at 1800 Patient Yes No   Sig: Take 20 mg by mouth every evening.      Facility-Administered Medications: None     Physical Exam  Temp:  [36.6 °C (97.8 °F)] 36.6 °C (97.8 °F)  Pulse:  [79] 79  Resp:  [16] 16  BP: (142)/(64) 142/64  SpO2:  [96 %] 96 %    Physical Exam  Constitutional:       General: She is not in acute distress.  HENT:      Head: Normocephalic and atraumatic.      Right Ear: External ear normal.      Left Ear: External ear normal.      Nose: No congestion or rhinorrhea.      Mouth/Throat:      Mouth: Mucous membranes are moist.      Pharynx: No oropharyngeal exudate or posterior oropharyngeal erythema.   Eyes:      General: No scleral icterus.        Right eye: No discharge.         Left eye: No discharge.      Conjunctiva/sclera: Conjunctivae normal.      Pupils: Pupils are equal, round, and reactive to light.   Cardiovascular:      Heart sounds: No friction rub. No gallop.    Pulmonary:      Effort: Pulmonary effort is normal.   Abdominal:      General: Abdomen is flat. There is no  distension.      Tenderness: There is no guarding.   Musculoskeletal:         General: No swelling.      Cervical back: Neck supple. No rigidity. No muscular tenderness.      Right lower leg: No edema.      Left lower leg: No edema.   Skin:     Capillary Refill: Capillary refill takes 2 to 3 seconds.      Coloration: Skin is not jaundiced or pale.      Findings: No bruising or erythema.   Neurological:      Mental Status: She is alert and oriented to person, place, and time.   Psychiatric:         Mood and Affect: Mood normal.         Judgment: Judgment normal.       Laboratory:  Recent Labs     06/22/21  1447   WBC 6.0   RBC 3.45*   HEMOGLOBIN 9.2*   HEMATOCRIT 29.6*   MCV 85.8   MCH 26.7*   MCHC 31.1*   RDW 48.6   PLATELETCT 228   MPV 8.6*     Recent Labs     06/22/21  1447   SODIUM 131*   POTASSIUM 4.7   CHLORIDE 99   CO2 21   GLUCOSE 105*   BUN 48*   CREATININE 1.44*   CALCIUM 9.5     Recent Labs     06/22/21  1447   ALTSGPT 18   ASTSGOT 21   ALKPHOSPHAT 69   TBILIRUBIN 0.3   GLUCOSE 105*     Recent Labs     06/22/21  1500   APTT 30.8   INR 1.26*     No results for input(s): NTPROBNP in the last 72 hours.      No results for input(s): TROPONINT in the last 72 hours.    Imaging:  DX-PELVIS-TRAUMA SERIES  3-   Final Result      1.  Fractures of the right superior and inferior pubic rami. Sacral fracture is not well visualized.      2.  Osteopenia.      3.  Mild left hip osteoarthritis.      4.  Status post right hip arthroplasty      CT-PELVIS W/O PLUS RECONS   Final Result      1.  Comminuted and mildly displaced fractures of the RIGHT inferior and superior pubic rami.   2.  RIGHT sacral alar fracture.   3.  RIGHT hip prosthesis is normally located.   4.  Moderate degenerative change of LEFT hip.        Assessment/Plan:  I anticipate this patient will require at least two midnights for appropriate medical management, necessitating inpatient admission.    * Pelvic fracture (HCC)- (present on admission)  Assessment  "& Plan  Imaging shows evidence for Comminuted mildly displaced fractures RIGHT inferior and superior pubic rami    Orthopedics \"[place]\" consulted by the emergency department provider   We will hold home apixaban until the patient is evaluated by orthopedics to decide about the need for surgery  Multimodal pain control  Consider physical and Occupational Therapy, pharmacologic prophylaxis when okay with orthopedics     Paroxysmal atrial fibrillation (HCC)- (present on admission)  Assessment & Plan  Resume home metoprolol with hold parameters.  We will hold home apixaban until the patient is evaluated by orthopedics to decide about the need for surgery    CKD (chronic kidney disease) stage 3, GFR 30-59 ml/min- (present on admission)  Assessment & Plan  Avoid nephrotoxins as much as possible, renally dose medications, monitor inputs and outputs     GERD (gastroesophageal reflux disease)- (present on admission)  Assessment & Plan  Resume home omeprazole     Hypothyroid- (present on admission)  Assessment & Plan  Resume home levothyroxine    Hypertension- (present on admission)  Assessment & Plan  Resume home Benzapril and metoprolol with hold parameters    "

## 2021-06-22 NOTE — ED PROVIDER NOTES
ED Provider Note    CHIEF COMPLAINT  Chief Complaint   Patient presents with   • GLF     walking out of MD office and ran into door. fell backward landing on buttocks, pt on eliquis, denies hitting head or LOC.    • Leg Pain     pt complains of right upper thigh/groin pain. pt able to move leg on her own at this time.        hospitals  Yvonne Marie Wada is a 85 y.o. female who presents to the ED after a fall.  The patient states that she walked into a door, fell backwards onto her gluteal area.  When they tried to get the patient up she could not put any weight on her right leg.  She does have a previous hip replacement on the right leg.  Patient did not hit her head when she fell, she has a slight abrasion to her nose where she hit the door.  Patient has sharp severe pain in the groin of the right leg.  No numbness, tingling, weakness.  No chest pain, shortness of breath.  No abdominal pains, nausea vomiting.  The patient is on Eliquis, she took her last dose this morning.    REVIEW OF SYSTEMS  See HPI for further details. All other systems are negative.      PAST MEDICAL HISTORY  Past Medical History:   Diagnosis Date   • AAA (abdominal aortic aneurysm) ([facility]) 7/26/2010    10/09 CT thorax dilated ascending thoracic aorta 4.9 cm 1/10 transesophageal echo dilated aortic root, and ascending aorta with mild aortic insufficiency 1/10  ascending aortic aneurysm repair 5/12 echo [facility] normal LV function EF 60%, moderate to severe left atrial enlargement, RVSP 32    • Aortic insufficiency    • Aortic valve disease    • Aortic valve regurgitation    • Arthritis     back and knee/ osteo   • Cataract    • Dental disorder     full top denture, partial bottom   • Diverticulosis    • Dyslipidemia 7/26/2010    Sees [facility] on lipitor 4/11 chol 159,trig 84,hdl 47,ldl 95,cpk 114 7/12 chol 163,trig 120,hdl 49,ldl 90, crp 1.1 on lipitor 20 mg    • GERD (gastroesophageal reflux disease) 7/12/2012 6/07 EGD per [facility] hiatal hernia,  "start prevacid 30 mg 7/12 protonix 20 mg qday    • Glaucoma    • Heart burn    • Heart murmur    • Heart valve disease     \"leaking\", mitral valve   • Hiatus hernia syndrome    • High cholesterol    • History of shingles 6/30/2011    2010 Back and left thorax     • History of total knee arthroplasty 7/26/2010    4/10 MRI right knee complex tear medial meniscus, horizontal cleavage tear lateral meniscus, chondromalacia patella, moderate-sized Baker's cyst 5/10 right meniscus knee repair  5/10 told by  had gout on surgery had pathology report, I await that report Question gout seen on pathology report done during repair of right meniscus knee surgery. 7/10 uric acid 6.3, we'll await starting allopurinol depending on the operative report 8/5/10 reviewed ortho notes , op report indicates crystal deposition, could be gout or pseudogout. No bx result sent over. Will need to get. My suspicion is chondrocalcinosis. 10/11  ortho note, MRI pending 8/7/10 reviewed pathology report right knee tissue, marked degenerative changes with focal calcification, no mention of gout. Based on this and a normal uric acid level, I do not believe she has gout, has no hyperuricemia medications would be indicated 10/11  ortho left knee meniscectomy and arthroscopy 1/12     • HLD (hyperlipidemia)    • Hypertension    • Hypothyroid 4/8/2011 4/11 tsh 9 start synthroid 50 4/11 tsh 6,free t3 3.1,free t4 1.2,tpo negative 7/1/11 tsh 2.1 cont synthroid 50 mcg 7/12 tsh 3.4 cont synthroid 50 mcg    • Indigestion    • Mitral insufficiency    • OSTEOPOROSIS 8/9/2010    Had been on fosamax [id number]  8/10 dexa LS -2.0,hip -1.5 await old dexa result, she will get copy for me 4/11 vit d 35 9/12 dexa LS -3.2,hip -1.4 2/13/13 relast infusion    • Pain     back and right leg, can get as bad as 10/10   • Preventative health care 7/26/2010 2002 pneum 2005 colon [facility] no records 4/11 vit d 35 " 9/11 shingles vaccine 9/12 mammogram 9/12 dexa LS -3.2,hip -1.4    • Rheumatic fever    • S/P appendectomy 7/26/2010   • S/P TOMY (total abdominal hysterectomy) 7/26/2010    Sees gyn on HRT estradiol for 20 yrs () 11/08 mammo     • Shingles 6/30/2011   • Snoring    • Status post lumbar surgery 7/26/2010 6/03 L4-5 epidural Dr. Jordan  7/06 overall for decompression, foraminotomy. L4-L5 posterior fusion, L4-L5 allograft      • Stroke (HCC) 1996    no residual problems    • Thoracic aortic aneurysm without mention of rupture    • Tricuspid insufficiency    • Unspecified disorder of thyroid     hypo       FAMILY HISTORY  Family History   Problem Relation Age of Onset   • Stroke Mother    • Heart Disease Father    • Heart Disease Sister        SOCIAL HISTORY  Social History     Socioeconomic History   • Marital status:      Spouse name: Not on file   • Number of children: Not on file   • Years of education: Not on file   • Highest education level: Not on file   Occupational History   • Not on file   Tobacco Use   • Smoking status: Never Smoker   • Smokeless tobacco: Never Used   • Tobacco comment: none   Vaping Use   • Vaping Use: Never used   Substance and Sexual Activity   • Alcohol use: No     Alcohol/week: 0.0 oz   • Drug use: Yes     Comment: CBD Oil for Arthritis   • Sexual activity: Not Currently     Partners: Male   Other Topics Concern   • Not on file   Social History Narrative   • Not on file     Social Determinants of Health     Financial Resource Strain:    • Difficulty of Paying Living Expenses:    Food Insecurity:    • Worried About Running Out of Food in the Last Year:    • Ran Out of Food in the Last Year:    Transportation Needs:    • Lack of Transportation (Medical):    • Lack of Transportation (Non-Medical):    Physical Activity:    • Days of Exercise per Week:    • Minutes of Exercise per Session:    Stress:    • Feeling of Stress :    Social Connections:    • Frequency of  Communication with Friends and Family:    • Frequency of Social Gatherings with Friends and Family:    • Attends Synagogue Services:    • Active Member of Clubs or Organizations:    • Attends Club or Organization Meetings:    • Marital Status:    Intimate Partner Violence:    • Fear of Current or Ex-Partner:    • Emotionally Abused:    • Physically Abused:    • Sexually Abused:        SURGICAL HISTORY  Past Surgical History:   Procedure Laterality Date   • TENDON REPAIR Right 11/10/2016    Procedure: TENDON REPAIR - QUADRACEPS ;  Surgeon: Maxim Herrera M.D.;  Location: SURGERY [facility];  Service:    • KNEE ARTHROPLASTY TOTAL Right 9/8/2016    Procedure: KNEE ARTHROPLASTY TOTAL;  Surgeon: Maxim Herrera M.D.;  Location: SURGERY [facility];  Service:    • LUMBAR FUSION POSTERIOR  11/24/2015    Procedure: LUMBAR FUSION POSTERIOR L3-4, EXPLORATION OF FUSION L4-5 WITH INSTRUMENTATION;  Surgeon: Hermann Reis M.D.;  Location: SURGERY [facility];  Service:    • LUMBAR LAMINECTOMY DISKECTOMY  11/24/2015    Procedure: LUMBAR L3-4 LAMINECTOMY AND REDO L4-5;  Surgeon: Hermann Reis M.D.;  Location: SURGERY [facility];  Service:    • KNEE ARTHROPLASTY TOTAL  1/3/2012    Performed by YOSEF MEJÍA at [facility]   • KNEE ARTHROSCOPY  10/18/2011    Performed by YOSEF MEJÍA at [facility]   • MEDIAL MENISCECTOMY  10/18/2011    Performed by YOSEF MEJÍA at [facility]   • AORTIC VALVE REPLACEMENT  1/12/2010    Performed by ISAÍAS NICOLE at [facility]   • APPENDECTOMY     • HYSTERECTOMY, TOTAL ABDOMINAL     • LUMBAR FUSION POSTERIOR         CURRENT MEDICATIONS  Home Medications     Reviewed by Mike Recio (Pharmacy Tech) on 06/22/21 at 1547  Med List Status: Complete   Medication Last Dose Status   acetaminophen (TYLENOL) 500 MG Tab 6/22/2021 Active   apixaban (ELIQUIS) 5mg Tab 6/22/2021 Active   benazepril (LOTENSIN) 40 MG  tablet 6/22/2021 Active   doxycycline (VIBRAMYCIN) 100 MG Tab 6/22/2021 Active   ezetimibe (ZETIA) 10 MG Tab Not Taking Active   FEXOFENADINE HCL PO 6/22/2021 Active   ipratropium (ATROVENT) 0.03 % Solution 6/22/2021 Active   levothyroxine (SYNTHROID) 75 MCG Tab 6/22/2021 Active   MAGNESIUM PO 6/21/2021 Active   metoprolol SR (TOPROL XL) 25 MG TABLET SR 24 HR 6/22/2021 Active   Multiple Vitamins-Minerals (CENTRUM SILVER ULTRA WOMENS PO) 6/21/2021 Active   omeprazole (PRILOSEC) 20 MG delayed-release capsule 6/21/2021 Active                ALLERGIES  Allergies   Allergen Reactions   • Amoxicillin Vomiting     Upset stomach, ok if needed for life saving treatment (per pt)   • Bactrim [Sulfamethoxazole W-Trimethoprim] Vomiting     Nausea/vomiting Ok in a life saving situation (per pt)   • Codeine Nausea     Synthetic codones ok   • Doxycycline      Nausea, Ok in life saving situation (per pt)   • Trazodone Vomiting     groggy   • Ultram [Tramadol] Vomiting     nausea       PHYSICAL EXAM  VITAL SIGNS: /47   Pulse 72   Temp 36.7 °C (98 °F) (Oral)   Resp 16   Wt 70 kg (154 lb 5.2 oz)   SpO2 100%   Breastfeeding No   BMI 26.49 kg/m²   Constitutional: Well-developed well-nourished 85-year-old white female who appears in mild distress  HENT: Slight abrasion to the nose, no soft tissue swelling, otherwise atraumatic, normocephalic  Eyes: PERRLA, EOMI, Conjunctiva normal, No discharge.   Neck: No midline C-spine tenderness palpation  Cardiovascular: Normal heart rate, Normal rhythm   Thorax & Lungs: Normal breath sounds, No respiratory distress, No wheezing, No chest tenderness.   Abdomen: Bowel sounds normal, Soft, No tenderness, No masses, No pulsatile masses.   Skin: Warm, Dry, No erythema, No rash.   Back: No T or L-spine tenderness palpation  Extremities: Intact distal pulses, No edema, No tenderness.   Musculoskeletal: She has full range of motion of her right hip but slight pain with palpation of the right  bony pelvis, 2+ pulses and normal sensation distally.  Neurologic: Alert & oriented x 3, Normal motor function, Normal sensory function, No focal deficits noted.     RADIOLOGY/PROCEDURES  DX-PELVIS-TRAUMA SERIES  3-   Final Result      1.  Fractures of the right superior and inferior pubic rami. Sacral fracture is not well visualized.      2.  Osteopenia.      3.  Mild left hip osteoarthritis.      4.  Status post right hip arthroplasty      CT-PELVIS W/O PLUS RECONS   Final Result      1.  Comminuted and mildly displaced fractures of the RIGHT inferior and superior pubic rami.   2.  RIGHT sacral alar fracture.   3.  RIGHT hip prosthesis is normally located.   4.  Moderate degenerative change of LEFT hip.        Results for orders placed or performed during the hospital encounter of 06/22/21   CBC WITH DIFFERENTIAL   Result Value Ref Range    WBC 6.0 4.8 - 10.8 K/uL    RBC 3.45 (L) 4.20 - 5.40 M/uL    Hemoglobin 9.2 (L) 12.0 - 16.0 g/dL    Hematocrit 29.6 (L) 37.0 - 47.0 %    MCV 85.8 81.4 - 97.8 fL    MCH 26.7 (L) 27.0 - 33.0 pg    MCHC 31.1 (L) 33.6 - 35.0 g/dL    RDW 48.6 35.9 - 50.0 fL    Platelet Count 228 164 - 446 K/uL    MPV 8.6 (L) 9.0 - 12.9 fL    Neutrophils-Polys 64.60 44.00 - 72.00 %    Lymphocytes 22.20 22.00 - 41.00 %    Monocytes 10.80 0.00 - 13.40 %    Eosinophils 0.70 0.00 - 6.90 %    Basophils 0.70 0.00 - 1.80 %    Immature Granulocytes 1.00 (H) 0.00 - 0.90 %    Nucleated RBC 0.00 /100 WBC    Neutrophils (Absolute) 3.85 2.00 - 7.15 K/uL    Lymphs (Absolute) 1.32 1.00 - 4.80 K/uL    Monos (Absolute) 0.64 0.00 - 0.85 K/uL    Eos (Absolute) 0.04 0.00 - 0.51 K/uL    Baso (Absolute) 0.04 0.00 - 0.12 K/uL    Immature Granulocytes (abs) 0.06 0.00 - 0.11 K/uL    NRBC (Absolute) 0.00 K/uL   PROTHROMBIN TIME   Result Value Ref Range    PT 15.5 (H) 12.0 - 14.6 sec    INR 1.26 (H) 0.87 - 1.13   APTT   Result Value Ref Range    APTT 30.8 24.7 - 36.0 sec   COMP METABOLIC PANEL   Result Value Ref Range    Sodium  131 (L) 135 - 145 mmol/L    Potassium 4.7 3.6 - 5.5 mmol/L    Chloride 99 96 - 112 mmol/L    Co2 21 20 - 33 mmol/L    Anion Gap 11.0 7.0 - 16.0    Glucose 105 (H) 65 - 99 mg/dL    Bun 48 (H) 8 - 22 mg/dL    Creatinine 1.44 (H) 0.50 - 1.40 mg/dL    Calcium 9.5 8.4 - 10.2 mg/dL    AST(SGOT) 21 12 - 45 U/L    ALT(SGPT) 18 2 - 50 U/L    Alkaline Phosphatase 69 30 - 99 U/L    Total Bilirubin 0.3 0.1 - 1.5 mg/dL    Albumin 4.2 3.2 - 4.9 g/dL    Total Protein 6.6 6.0 - 8.2 g/dL    Globulin 2.4 1.9 - 3.5 g/dL    A-G Ratio 1.8 g/dL   SARS-COV Antigen MARY LOU: Collect dry nasal swab AND NP swab in VTM   Result Value Ref Range    SARS-CoV-2 Source NP Swab     SARS-COV ANTIGEN MARY LOU NotDetected Not-Detected   SARS-CoV-2 PCR (24 hour In-House): Collect NP swab in VTM    Specimen: Respirate   Result Value Ref Range    SARS-CoV-2 Source NP Swab    ESTIMATED GFR   Result Value Ref Range    GFR If  42 (A) >60 mL/min/1.73 m 2    GFR If Non African American 35 (A) >60 mL/min/1.73 m 2        COURSE & MEDICAL DECISION MAKING  Pertinent Labs & Imaging studies reviewed. (See chart for details)  Status post fall, likely pelvic fracture, hip seems appropriate.  Due to the patient's age and osteoporosis I will get a CT scan instead of a plain x-ray for more detailed imaging.    CT scan shows nondisplaced inferior and superior pubic rami and sacral Fletcher, discussed case with Dr. Kline who will consult on the patient, discussed the case with the hospitalist for hospitalization.        FINAL IMPRESSION  1. Multiple closed fractures of pelvis with stable disruption of pelvic ring, initial encounter ([facility])        Patient referred to primary care provider for blood pressure management     This dictation was created using voice recognition software. The accuracy of the dictation is limited to the abilities of the software. I expect there may be some errors of grammar and possibly content. The nursing notes were reviewed and certain aspects  of this information were incorporated into this note.    Electronically signed by: Heladio Dobbins M.D., 6/22/2021

## 2021-06-23 ENCOUNTER — TELEPHONE (OUTPATIENT)
Dept: MEDICAL GROUP | Facility: MEDICAL CENTER | Age: 86
End: 2021-06-23

## 2021-06-23 VITALS
WEIGHT: 154.32 LBS | HEART RATE: 83 BPM | RESPIRATION RATE: 17 BRPM | TEMPERATURE: 99.3 F | SYSTOLIC BLOOD PRESSURE: 105 MMHG | DIASTOLIC BLOOD PRESSURE: 52 MMHG | BODY MASS INDEX: 26.49 KG/M2 | OXYGEN SATURATION: 92 %

## 2021-06-23 LAB
ALBUMIN SERPL BCP-MCNC: 3.7 G/DL (ref 3.2–4.9)
ALBUMIN/GLOB SERPL: 1.8 G/DL
ALP SERPL-CCNC: 64 U/L (ref 30–99)
ALT SERPL-CCNC: 15 U/L (ref 2–50)
ANION GAP SERPL CALC-SCNC: 10 MMOL/L (ref 7–16)
AST SERPL-CCNC: 20 U/L (ref 12–45)
BASOPHILS # BLD AUTO: 0.8 % (ref 0–1.8)
BASOPHILS # BLD: 0.04 K/UL (ref 0–0.12)
BILIRUB SERPL-MCNC: 0.5 MG/DL (ref 0.1–1.5)
BUN SERPL-MCNC: 39 MG/DL (ref 8–22)
CALCIUM SERPL-MCNC: 9.2 MG/DL (ref 8.4–10.2)
CHLORIDE SERPL-SCNC: 99 MMOL/L (ref 96–112)
CO2 SERPL-SCNC: 21 MMOL/L (ref 20–33)
CREAT SERPL-MCNC: 1.36 MG/DL (ref 0.5–1.4)
EOSINOPHIL # BLD AUTO: 0.12 K/UL (ref 0–0.51)
EOSINOPHIL NFR BLD: 2.3 % (ref 0–6.9)
ERYTHROCYTE [DISTWIDTH] IN BLOOD BY AUTOMATED COUNT: 48.3 FL (ref 35.9–50)
GLOBULIN SER CALC-MCNC: 2.1 G/DL (ref 1.9–3.5)
GLUCOSE SERPL-MCNC: 89 MG/DL (ref 65–99)
HCT VFR BLD AUTO: 27 % (ref 37–47)
HGB BLD-MCNC: 8.3 G/DL (ref 12–16)
IMM GRANULOCYTES # BLD AUTO: 0.01 K/UL (ref 0–0.11)
IMM GRANULOCYTES NFR BLD AUTO: 0.2 % (ref 0–0.9)
LYMPHOCYTES # BLD AUTO: 1.59 K/UL (ref 1–4.8)
LYMPHOCYTES NFR BLD: 30.2 % (ref 22–41)
MCH RBC QN AUTO: 26.4 PG (ref 27–33)
MCHC RBC AUTO-ENTMCNC: 30.7 G/DL (ref 33.6–35)
MCV RBC AUTO: 86 FL (ref 81.4–97.8)
MONOCYTES # BLD AUTO: 0.73 K/UL (ref 0–0.85)
MONOCYTES NFR BLD AUTO: 13.9 % (ref 0–13.4)
NEUTROPHILS # BLD AUTO: 2.77 K/UL (ref 2–7.15)
NEUTROPHILS NFR BLD: 52.6 % (ref 44–72)
NRBC # BLD AUTO: 0 K/UL
NRBC BLD-RTO: 0 /100 WBC
PLATELET # BLD AUTO: 212 K/UL (ref 164–446)
PMV BLD AUTO: 9.4 FL (ref 9–12.9)
POTASSIUM SERPL-SCNC: 4.5 MMOL/L (ref 3.6–5.5)
PROT SERPL-MCNC: 5.8 G/DL (ref 6–8.2)
RBC # BLD AUTO: 3.14 M/UL (ref 4.2–5.4)
SARS-COV-2 RNA RESP QL NAA+PROBE: NOTDETECTED
SODIUM SERPL-SCNC: 130 MMOL/L (ref 135–145)
SPECIMEN SOURCE: NORMAL
WBC # BLD AUTO: 5.3 K/UL (ref 4.8–10.8)

## 2021-06-23 PROCEDURE — 36415 COLL VENOUS BLD VENIPUNCTURE: CPT

## 2021-06-23 PROCEDURE — 99239 HOSP IP/OBS DSCHRG MGMT >30: CPT | Performed by: INTERNAL MEDICINE

## 2021-06-23 PROCEDURE — 85025 COMPLETE CBC W/AUTO DIFF WBC: CPT

## 2021-06-23 PROCEDURE — 97165 OT EVAL LOW COMPLEX 30 MIN: CPT

## 2021-06-23 PROCEDURE — 700102 HCHG RX REV CODE 250 W/ 637 OVERRIDE(OP): Performed by: INTERNAL MEDICINE

## 2021-06-23 PROCEDURE — A9270 NON-COVERED ITEM OR SERVICE: HCPCS | Performed by: HOSPITALIST

## 2021-06-23 PROCEDURE — A9270 NON-COVERED ITEM OR SERVICE: HCPCS | Performed by: INTERNAL MEDICINE

## 2021-06-23 PROCEDURE — 80053 COMPREHEN METABOLIC PANEL: CPT

## 2021-06-23 PROCEDURE — 97162 PT EVAL MOD COMPLEX 30 MIN: CPT

## 2021-06-23 PROCEDURE — 700111 HCHG RX REV CODE 636 W/ 250 OVERRIDE (IP): Performed by: HOSPITALIST

## 2021-06-23 PROCEDURE — 700102 HCHG RX REV CODE 250 W/ 637 OVERRIDE(OP): Performed by: HOSPITALIST

## 2021-06-23 RX ORDER — CALCIUM CARBONATE 500 MG/1
500 TABLET, CHEWABLE ORAL DAILY
Status: DISCONTINUED | OUTPATIENT
Start: 2021-06-23 | End: 2021-06-23 | Stop reason: HOSPADM

## 2021-06-23 RX ORDER — OXYCODONE HYDROCHLORIDE 5 MG/1
5 TABLET ORAL EVERY 6 HOURS PRN
Qty: 15 TABLET | Refills: 0 | Status: ON HOLD | OUTPATIENT
Start: 2021-06-23 | End: 2021-06-28

## 2021-06-23 RX ADMIN — OXYCODONE HYDROCHLORIDE 5 MG: 5 TABLET ORAL at 01:19

## 2021-06-23 RX ADMIN — OXYCODONE HYDROCHLORIDE 5 MG: 5 TABLET ORAL at 13:31

## 2021-06-23 RX ADMIN — HYDROMORPHONE HYDROCHLORIDE 0.25 MG: 1 INJECTION, SOLUTION INTRAMUSCULAR; INTRAVENOUS; SUBCUTANEOUS at 03:40

## 2021-06-23 RX ADMIN — DOCUSATE SODIUM 50 MG AND SENNOSIDES 8.6 MG 2 TABLET: 8.6; 5 TABLET, FILM COATED ORAL at 05:10

## 2021-06-23 RX ADMIN — LEVOTHYROXINE SODIUM 75 MCG: 0.07 TABLET ORAL at 05:11

## 2021-06-23 RX ADMIN — DOXYCYCLINE 100 MG: 100 TABLET, FILM COATED ORAL at 05:11

## 2021-06-23 RX ADMIN — DOXYCYCLINE 100 MG: 100 TABLET, FILM COATED ORAL at 17:28

## 2021-06-23 RX ADMIN — DOCUSATE SODIUM 50 MG AND SENNOSIDES 8.6 MG 2 TABLET: 8.6; 5 TABLET, FILM COATED ORAL at 17:28

## 2021-06-23 RX ADMIN — OXYCODONE HYDROCHLORIDE 5 MG: 5 TABLET ORAL at 07:34

## 2021-06-23 RX ADMIN — ONDANSETRON 4 MG: 2 INJECTION INTRAMUSCULAR; INTRAVENOUS at 03:40

## 2021-06-23 RX ADMIN — ONDANSETRON 4 MG: 2 INJECTION INTRAMUSCULAR; INTRAVENOUS at 15:50

## 2021-06-23 RX ADMIN — CALCIUM CARBONATE (ANTACID) CHEW TAB 500 MG 500 MG: 500 CHEW TAB at 09:40

## 2021-06-23 RX ADMIN — OMEPRAZOLE 20 MG: 20 CAPSULE, DELAYED RELEASE ORAL at 17:28

## 2021-06-23 ASSESSMENT — COGNITIVE AND FUNCTIONAL STATUS - GENERAL
SUGGESTED CMS G CODE MODIFIER MOBILITY: CK
MOVING FROM LYING ON BACK TO SITTING ON SIDE OF FLAT BED: A LITTLE
TURNING FROM BACK TO SIDE WHILE IN FLAT BAD: A LITTLE
SUGGESTED CMS G CODE MODIFIER DAILY ACTIVITY: CK
DAILY ACTIVITIY SCORE: 17
PERSONAL GROOMING: A LITTLE
DRESSING REGULAR UPPER BODY CLOTHING: A LITTLE
TOILETING: A LITTLE
STANDING UP FROM CHAIR USING ARMS: A LITTLE
DRESSING REGULAR LOWER BODY CLOTHING: A LOT
MOVING TO AND FROM BED TO CHAIR: A LITTLE
WALKING IN HOSPITAL ROOM: A LITTLE
MOBILITY SCORE: 17
HELP NEEDED FOR BATHING: A LOT
CLIMB 3 TO 5 STEPS WITH RAILING: A LOT

## 2021-06-23 ASSESSMENT — PAIN DESCRIPTION - PAIN TYPE
TYPE: ACUTE PAIN
TYPE: ACUTE PAIN

## 2021-06-23 ASSESSMENT — GAIT ASSESSMENTS
GAIT LEVEL OF ASSIST: MINIMAL ASSIST
ASSISTIVE DEVICE: FRONT WHEEL WALKER
DEVIATION: ANTALGIC;STEP TO
DISTANCE (FEET): 15
DISTANCE (FEET): 15

## 2021-06-23 ASSESSMENT — ACTIVITIES OF DAILY LIVING (ADL): TOILETING: INDEPENDENT

## 2021-06-23 NOTE — TELEPHONE ENCOUNTER
----- Message from Tee Tran M.D. sent at 6/22/2021  4:33 PM PDT -----  Called patient left message please notify her that her chest x-ray is negative for infection or inflammation.

## 2021-06-23 NOTE — DISCHARGE PLANNING
Anticipated Discharge Disposition: D/C to Home with HH    Action: LSW met with pt at bedside to obtain HH choice. Pt made choice for [facility]. Pt may be ready for discharge later this afternoon.    Barriers to Discharge: HH acceptance.    Plan: LSW to update MD, RN, and pt once a decision has been made regarding referral.

## 2021-06-23 NOTE — DISCHARGE PLANNING
Anticipated Discharge Disposition: D/C to Home with HH    Action: During IDT rounds, pt was anticipated to potentially need SNF prior to d/c home. LSW met with pt and pt's family at bedside to discuss. Pt indicated she would prefer to d/c home with HH. Pt lives with her spouse who can provide 24/7 support and assistance. Family is also available to assist pt. Pt owns a walker and cane.    LSW agreed to discuss with MD and will return with HH choice if appropriate.    Barriers to Discharge: HH acceptance, HH choice.    Plan: LSW to obtain HH choice if MD feels HH is appropriate for pt at time of d/c.

## 2021-06-23 NOTE — CARE PLAN
The patient is Stable - Low risk of patient condition declining or worsening         Progress made toward(s) clinical / shift goals:        Problem: Knowledge Deficit - Standard  Goal: Patient and family/care givers will demonstrate understanding of plan of care, disease process/condition, diagnostic tests and medications  Outcome: Progressing     Problem: Pain - Standard  Goal: Alleviation of pain or a reduction in pain to the patient’s comfort goal  Outcome: Progressing     Problem: Fall Risk  Goal: Patient will remain free from falls  Outcome: Progressing       Patient is not progressing towards the following goals:

## 2021-06-23 NOTE — DISCHARGE PLANNING
ER CM met with pt at bedside on the floor. Very pleasant. She lives in a 1 story home with a sunken living room. She lives with her spouse.  She has a walker and cane No O2 or other DME. She was at imaging [place] [facility] [place] walked into glass door and fell. RX filled at [facility] on [facility] [facility]  Care Transition Team Assessment    Information Source  Orientation Level: Oriented X4  Information Given By: Patient  Informant's Name: Mala  Who is responsible for making decisions for patient? : Patient         Elopement Risk  Legal Hold: No  Ambulatory or Self Mobile in Wheelchair: No-Not an Elopement Risk  Elopement Risk: Not at Risk for Elopement    Interdisciplinary Discharge Planning  Primary Care Physician: Dr Tran  Lives with - Patient's Self Care Capacity: Spouse  Patient or legal guardian wants to designate a caregiver: No  Support Systems: Spouse / Significant Other  Housing / Facility: 1 Story House  Do You Take your Prescribed Medications Regularly: Yes  Able to Return to Previous ADL's: Future Time w/Therapy  Mobility Issues: Yes  Prior Services: None  Assistance Needed: Yes  Durable Medical Equipment: Walker, Other - Specify    Discharge Preparedness  What is your plan after discharge?: Uncertain - pending medical team collaboration  What are your discharge supports?: Spouse         Finances  Prescription Coverage: Yes    Vision / Hearing Impairment  Right Eye Vision: Impaired, Wears Glasses  Left Eye Vision: Impaired, Wears Glasses              Domestic Abuse  Have you ever been the victim of abuse or violence?: No  Physical Abuse or Sexual Abuse: No  Verbal Abuse or Emotional Abuse: No  Possible Abuse/Neglect Reported to:: Not Applicable

## 2021-06-23 NOTE — CARE PLAN
Problem: Pain - Standard  Goal: Alleviation of pain or a reduction in pain to the patient’s comfort goal  Outcome: Progressing     Problem: Fall Risk  Goal: Patient will remain free from falls  Outcome: Progressing   The patient is Stable - Low risk of patient condition declining or worsening    Shift Goals  Clinical Goals: Pain control    Progress made toward(s) clinical / shift goals:  Pain assessed, medicated per MAR. Fall precautions in place. Pt calls appropriately.    Patient is not progressing towards the following goals: N/A

## 2021-06-23 NOTE — DISCHARGE PLANNING
Anticipated Discharge Disposition: D/C to Home with HH    Action: LSW contacted [facility] and spoke with intake to check on status of pt's referral. Pt has been accepted. Pt anticipated to discharge today.     Barriers to Discharge: None.    Plan: LSW to update RN, MD, and pt regarding acceptance. No further d/c planning needs anticipated.

## 2021-06-23 NOTE — FACE TO FACE
Face to Face Supporting Documentation - Home Health    The encounter with this patient was in whole or in part the primary reason for home health admission.    Date of encounter:   Patient:                    MRN:                       YOB: 2020  Yvonne Marie Wada  [id number]  1935     Home health to see patient for:  Skilled Nursing care for assessment, interventions & education, Physical Therapy evaluation and treatment and Occupational therapy evaluation and treatment    Skilled need for:  Exacerbation of Chronic Disease State Fall    Skilled nursing interventions to include:  Comment: PT/OT    Homebound status evidenced by:  Need the aid of supportive devices such as crutches, canes, wheelchairs or walkers or Needs the assistance of another person in order to leave the home. Leaving home requires a considerable and taxing effort. There is a normal inability to leave the home.    Community Physician to provide follow up care: Tee Tran M.D.     Optional Interventions? No      I certify the face to face encounter for this home health care referral meets the CMS requirements and the encounter/clinical assessment with the patient was, in whole, or in part, for the medical condition(s) listed above, which is the primary reason for home health care. Based on my clinical findings: the service(s) are medically necessary, support the need for home health care, and the homebound criteria are met.  I certify that this patient has had a face to face encounter by myself.  Charlie Karimi M.D. - NPI: [id number]

## 2021-06-23 NOTE — ASSESSMENT & PLAN NOTE
"Imaging shows evidence for Comminuted mildly displaced fractures RIGHT inferior and superior pubic rami    Orthopedics \"[place]\" consulted by the emergency department provider   We will hold home apixaban until the patient is evaluated by orthopedics to decide about the need for surgery  Multimodal pain control  Consider physical and Occupational Therapy, pharmacologic prophylaxis when okay with orthopedics   "

## 2021-06-23 NOTE — PROGRESS NOTES
4 Eyes Skin Assessment Completed by Byron, RN and Cooper, RN.    Head WDL  Ears WDL  Nose WDL  Mouth WDL  Neck WDL  Breast/Chest WDL  Shoulder Blades WDL  Spine WDL  (R) Arm/Elbow/Hand Redness, Bruising and Abrasion  (L) Arm/Elbow/Hand Redness, Blanching, Bruising, Abrasion and Scab  Abdomen WDL  Groin WDL  Scrotum/Coccyx/Buttocks WDL  (R) Leg WDL  (L) Leg WDL  (R) Heel/Foot/Toe WDL  (L) Heel/Foot/Toe WDL          Devices In Places Pulse Ox      Interventions In Place Gray Ear Foams    Possible Skin Injury No    Pictures Uploaded Into Epic N/A  Wound Consult Placed N/A  RN Wound Prevention Protocol Ordered No

## 2021-06-23 NOTE — PROGRESS NOTES
Pt arrived to floor. A&O x4, reports 5/10 pain in R hip. Denies nausea. No signs of distress. Discussed POC, no other needs at this time.

## 2021-06-23 NOTE — DISCHARGE PLANNING
Received Choice form [id number]   Agency/Facility Name: [facility]  Referral sent per Choice form @ [phone]

## 2021-06-23 NOTE — TELEPHONE ENCOUNTER
Notified patient's , Shalom with results. Patient was admitted yesterday into hospital. She had a fall which caused a broken pelvis.  will be visiting her later today and will notify her of x-ray results.

## 2021-06-23 NOTE — ASSESSMENT & PLAN NOTE
Resume home metoprolol with hold parameters.  We will hold home apixaban until the patient is evaluated by orthopedics to decide about the need for surgery

## 2021-06-23 NOTE — THERAPY
"Occupational Therapy   Initial Evaluation     Patient Name: Yvonne Marie Wada  Age:  85 y.o., Sex:  female  Medical Record #: [id number]  Today's Date: 6/23/2021     Precautions  Precautions: Fall Risk    Assessment    Patient is 85 y.o. female with a diagnosis of R pelvic ring injury, s/p GLF on 6/22. Additional factors influencing patient status / progress: HTN, HTD, CKD 3, PAF, AVR, posterior lumbar fusion, B TKAs. She lives with her  and was I with all ADLs/IADLs, ambulates without AD, and drives at PLO[place]. Pt presents today with post-traumatic hip pain, but was able to tolerate ADLs and functional txfs using FWW at min/CGA level, did require mod A with LB dressing. She will benefit from acute OT for 1-2 more days while in house, though I anticipate she will be safe to dc home with  services once medically cleared.       Plan    Recommend Occupational Therapy 4 times per week until therapy goals are met for the following treatments:  Adaptive Equipment.    DC Equipment Recommendations: None  Discharge Recommendations: Recommend home health for continued occupational therapy services     Subjective    \"I'm doing more than I thought I can.\"     Objective       06/23/21 1019   Prior Living Situation   Prior Services None   Housing / Facility 1 Story House   Steps Into Home 1   Steps In Home 1   Bathroom Set up Walk In Shower   Equipment Owned Front-Wheel Walker;Single Point Cane   Lives with - Patient's Self Care Capacity Spouse   Comments pt lives with , able to assist upon pt's dc home   Prior Level of ADL Function   Self Feeding Independent   Grooming / Hygiene Independent   Bathing Independent   Dressing Independent   Toileting Independent   Prior Level of IADL Function   Medication Management Independent   Laundry Independent   Kitchen Mobility Independent   Finances Independent   Home Management Independent   Shopping Independent   Prior Level Of Mobility Independent Without Device in " Home;Independent Without Device in Community   Driving / Transportation Driving Independent   Occupation (Pre-Hospital Vocational) Retired Due To Age   History of Falls   History of Falls Yes   Date of Last Fall 06/22/21  (reason for admit)   Precautions   Precautions Fall Risk   Pain   Pain Scales 0 to 10 Scale    Intervention Medication (see MAR)   Pain 0 - 10 Group   Location Hip;Leg   Location Orientation Right   Pain Rating Scale (NPRS) 4   Description Aching   Comfort Goal Comfort with Movement   Therapist Pain Assessment Post Activity Pain Same as Prior to Activity   Cognition    Cognition / Consciousness WDL   Comments A+Ox4, some delayed responses, but pleasant and cooperative   Passive ROM Upper Body   Passive ROM Upper Body WDL   Active ROM Upper Body   Active ROM Upper Body  WDL   Strength Upper Body   Upper Body Strength  WDL   Balance Assessment   Sitting Balance (Static) Fair   Sitting Balance (Dynamic) Fair   Standing Balance (Static) Fair -   Standing Balance (Dynamic) Poor +   Weight Shift Sitting Fair   Weight Shift Standing Fair   Comments with FWW   Bed Mobility    Supine to Sit Supervised   Sit to Supine   (up in recliner post session)   Scooting Supervised   Comments HOB elevated, rails not utilized   ADL Assessment   Grooming Seated;Supervision   Upper Body Dressing Minimal Assist   Lower Body Dressing Moderate Assist   Toileting Minimal Assist   How much help from another person does the patient currently need...   Putting on and taking off regular lower body clothing? 2   Bathing (including washing, rinsing, and drying)? 2   Toileting, which includes using a toilet, bedpan, or urinal? 3   Putting on and taking off regular upper body clothing? 3   Taking care of personal grooming such as brushing teeth? 3   Eating meals? 4   6 Clicks Daily Activity Score 17   Functional Mobility   Sit to Stand Minimal Assist  (CGA)   Bed, Chair, Wheelchair Transfer Minimal Assist   Toilet Transfers Minimal  Assist   Transfer Method Stand Step   Mobility in room with FWW   Distance (Feet) 15   # of Times Distance was Traveled 2   Activity Tolerance   Sitting in Chair 5 mins   Sitting Edge of Bed 10 mins   Standing 5 mins total   Comments limited by pain   Short Term Goals   Short Term Goal # 1 pt will complete FB dressing with spv   Short Term Goal # 2 pt will complete ADL txfs using LRAD at spv level   Short Term Goal # 3 pt will tolerate G/H standing sinkside x 10mins at spv level   Education Group   Education Provided Role of Occupational Therapist;Home Safety;Transfers;Activities of Daily Living   Role of Occupational Therapist Patient Response Patient;Acceptance;Explanation   Home Safety Patient Response Patient;Acceptance;Explanation;Verbal Demonstration   Transfers Patient Response Patient;Acceptance;Explanation;Verbal Demonstration;Reinforcement Needed   ADL Patient Response Patient;Acceptance;Explanation;Verbal Demonstration;Reinforcement Needed   Problem List   Problem List Decreased Active Daily Living Skills;Decreased Homemaking Skills;Decreased Functional Mobility;Decreased Activity Tolerance;Impaired Postural Control / Balance   Anticipated Discharge Equipment and Recommendations   DC Equipment Recommendations None   Discharge Recommendations Recommend home health for continued occupational therapy services   Interdisciplinary Plan of Care Collaboration   IDT Collaboration with  Nursing;Physical Therapist   Patient Position at End of Therapy Seated;Call Light within Reach;Tray Table within Reach;Phone within Reach   Collaboration Comments RN aware of OT session and dc recs   Session Information   Date / Session Number  6/23 #1 (1/4, 6/29) LF   Priority 3

## 2021-06-23 NOTE — THERAPY
Physical Therapy   Initial Evaluation     Patient Name: Yvonne Marie Wada  Age:  85 y.o., Sex:  female  Medical Record #: [id number]  Today's Date: 6/23/2021     Precautions: Weight Bearing As Tolerated Right Lower Extremity, Fall Risk    Assessment  Patient is 85 y.o. female with a diagnosis of R superior/inferior pubic rami fractures s/p fall. Pt is experiencing pain with mobility but was able to ambulate short distances in her room this am. Expect 1-2 more days of PT to progress mobility so can return home with assist of spouse.        Plan    Recommend Physical Therapy daily until therapy goals are met for the following treatments:  Bed Mobility, Gait Training, Neuro Re-Education / Balance, Stair Training, Therapeutic Activities and Therapeutic Exercises    DC Equipment Recommendations: Unable to determine at this time  Discharge Recommendations: Recommend home health for continued physical therapy services        06/23/21 1037   Prior Living Situation   Housing / Facility 1 Story House   Steps Into Home 1   Steps In Home 1   Bathroom Set up Walk In Shower   Equipment Owned Front-Wheel Walker;Single Point Cane   Lives with - Patient's Self Care Capacity Spouse   Comments supportive spouse and family at home   Prior Level of Functional Mobility   Bed Mobility Independent   Transfer Status Independent   Ambulation Independent   Assistive Devices Used   (uses walking pole outside)   History of Falls   Date of Last Fall 06/22/21   Cognition    Comments Pt is oriented x4, slow to respond to questions   Passive ROM Lower Body   Passive ROM Lower Body Not Tested   Active ROM Lower Body    Active ROM Lower Body  WDL   Strength Lower Body   Lower Body Strength  X   Comments R LE 4/5   Sensation Lower Body   Lower Extremity Sensation   WDL   Balance Assessment   Sitting Balance (Static) Fair   Sitting Balance (Dynamic) Fair   Standing Balance (Static) Fair -   Standing Balance (Dynamic) Fair -   Weight Shift Sitting Fair    Weight Shift Standing Fair   Comments stdg with FWW   Gait Analysis   Gait Level Of Assist Minimal Assist   Assistive Device Front Wheel Walker   Distance (Feet) 15   # of Times Distance was Traveled 2   Deviation Antalgic;Step To   Bed Mobility    Supine to Sit Supervised   Functional Mobility   Sit to Stand Minimal Assist  (CGA)   Bed, Chair, Wheelchair Transfer Minimal Assist   Toilet Transfers Minimal Assist   Transfer Method Stand Step   Activity Tolerance   Sitting in Chair 1 hr +   Standing 5 min   Short Term Goals    Short Term Goal # 1 Pt will be able to perform sit < >stand and transfer Ilda in 6 visits so can DC home safely.   Short Term Goal # 2 Pt will be able to ambulate 100 ft with FWW Ilda in 6 visits so can DC home safely.   Short Term Goal # 3 pt will be able to go up/down 2 steps Breanne in 6 visits so can enter home safely.

## 2021-06-23 NOTE — PROGRESS NOTES
Assumed care of pt. A&O x4, pt reports 8/10 pain in R hip/leg. Medicated per MAR. Pt denies nausea, numbness, tingling. Pedal pulses present. Pt able to plantar/dorsiflex. No signs of distress. Discussed POC. No other needs at this time.

## 2021-06-23 NOTE — PROGRESS NOTES
Imaging reviewed  Pelvic ring injury, stable  Plan nonop treatment  WBAT with walker  PT/OT  Recheck CBC in AM

## 2021-06-24 NOTE — DISCHARGE SUMMARY
"Discharge Summary    CHIEF COMPLAINT ON ADMISSION  Chief Complaint   Patient presents with   • GLF     walking out of MD office and ran into door. fell backward landing on buttocks, pt on eliquis, denies hitting head or LOC.    • Leg Pain     pt complains of right upper thigh/groin pain. pt able to move leg on her own at this time.        Reason for Admission  EMS     Admission Date  6/22/2021    CODE STATUS  Full Code    HPI & HOSPITAL COURSE  Per notes, \"85 y.o. female with a past medical history of hypothyroidism, hypertension, chronic kidney disease stage III, paroxysmal atrial fibrillation on anticoagulation with apixaban who presented 6/22/2021 with right hip pain after a fall.  Patient fell on the ground after she walked into a door.  She hit her gluteal region and developed severe pain rated as 10 out of 10, worse with attempting to stand or move her right lower extremity.  No radiation to other places.  She denies loss of consciousness.  She denies hitting her head. \"    Patient was admitted and evaluated overnight for right hip pain.  She was evaluated by orthopedic surgery.  Per orthopedic surgery recommendations pelvic ring injury is stable and does not require operative treatment.  They recommended weightbearing as tolerated with walker.  Physical therapy evaluated patient and recommended home with home health and physical therapy.  Patient does have very good support network at home with children and .  I discuss all treatment plans, diagnosis with both patient,  and children.  Pain was well controlled.  Patient did have a little fatigue earlier in the morning, the plan was to reevaluate patient in the afternoon and discharge if feeling well enough and pain was controlled.  Home health was set up.      Therefore, she is discharged in good and stable condition to home with close outpatient follow-up.    The patient met 2-midnight criteria for an inpatient stay at the time of " discharge.    Discharge Date  6/23/2021    FOLLOW UP ITEMS POST DISCHARGE  Follow-up with PCP    DISCHARGE DIAGNOSES  Principal Problem:    Pelvic fracture (HCC) POA: Yes  Active Problems:    Hypertension (Chronic) POA: Yes      Overview: 1/10/11 [facility] note cardiac catheterization normal systolic function      3/11 [facility] echo moderate aortic insufficiency, moderate mitral       insufficiency, moderate tricuspid insufficiency, normal LV function      5/12 echo [facility] normal LV function EF 60%, moderate to severe left atrial       enlargement, RVSP 32       9/26/13 CT thoracic aorta no evidence of aneurysm, small hiatal hernia      9/26/13 persantine thallium uniform breast tissue attenuation, cannot rule       out underlying ischemic, LVEF 67%      10/3/13 on toprol-XL 25 mg half a tablet daily       12/13/13 cardiology note cont same meds, repeat echo and follow up 6       months      1/2/14 echo mild LVH, grade 1 diastolic dysfunction, ascending aortic 4.0,       no change compared with [facility] 2010      5/15/14 cardiology note; increase benazepril to 40 mg qday,continue toprol       XL 25 mg daily      6/17/15 cardiology note continue meds, repeat echo 6 months      2/1/16 cardiology note moderate pulmonary hypertension and snoring,       nocturnal pulse oximetry ordered      6/30/16 cardiology note patient declines overnight pulse oximetry      1/16/16 echo EF 65%,grade 2-3 diastolic dysfunction, mild MR, RVSP 40,       aorta root 3.5 cm      11/1/17 echo normal LV size and function, EF 70%, mild aortic       insufficiency and mild tricuspid regurgitation, RVSP 50, aortic root 3.2       cm      11/3/17 cardiology note hypertension stable, status post thoracic aortic       aneurysm repair, headache unspecified, ESR ordered      11/28/17 on benazepril 40 mg,toprol 25 mg, add norvasc 5 mg      12/12/17 ultrasound carotid less than 50% internal carotid stenosis       bilateral      [facility]  most [facility]       looks fine, will repeat CT scan follow aortic repair      2/20/18 episode of supraventricular tachycardia in the emergency room,       davenport catheter removed, symptoms resolved with repeat EKG sinus rhythm      11/17/18 cardiology note asymptomatic, continue cholesterol medication,       continue to monitor echo aortic valve, repaired ascending aorta, follow-up       yearly      11/21/18 echo normal LV function, EF 60%, mild LVH, grade 2 diastolic       dysfunction, mild aortic insufficiency, moderate tricuspid regurgitation,       RVSP 50      1/18/19 urine mac 45 on benazepril 40 mg, toprol 25 mg, norvasc 5 mg      12/6/19 echo normal LV function EF 65%, RVSP 43, moderate tricuspid       regurgitation, mild to moderate aortic insufficiency      10/22/20 echo EF 70%, grade 1 diastolic dysfunction, mild AR, RVSP 30       12/14/20 cardiology note maintaining sinus rhythm, given frequent       symptomatic episodes of paroxysmal atrial fibrillation and age, recommend       amiodarone 200 mg, follow-up 6 months      4/6/21 on lotensin 40 mg and metoprolol 25 mg      4/20/21  sinus on exam, patient would like to discontinue       amiodarone understanding she likely will return to intermittent atrial       fibrillation, discussed sotalol, dronedarone, tikosyn as alternatives,       patient declines any of those alternatives, ablation is not appropriate       given her age                             Hypothyroid (Chronic) POA: Yes      Overview: 4/11 tsh 9 start synthroid 50      4/11 tsh 6,free t3 3.1,free t4 1.2,tpo negative      7/1/11 tsh 2.1 cont synthroid 50 mcg      7/12 tsh 3.4 cont synthroid 50 mcg      7/31/13 tsh 3.2 on synthroid 50 mcg      9/25/13 tsh 1.7 on synthroid 50 mcg      2/24/15 tsh 4.9 on synthroid 50 mcg; increase to synthroid 75 mcg, repeat       tsh in 6 weeks      4/14/15 tsh 1.1 on synthroid 75 mcg      4/15/16 tsh 2.3 on synthroid 75 mcg       5/12/17 tsh 1.2 on synthroid 75 mcg      11/1/17 tsh 2.1 on synthroid 75 mcg      1/18/19 tsh 2.2 on synthroid 75 mcg      6/8/19 tsh 1.8 on synthroid 75 mcg       2/4/20 tsh 3.1 on synthroid 75 mcg      8/20/20 tsh 1.6 on synthroid 75 mcg                      GERD (gastroesophageal reflux disease) (Chronic) POA: Yes      Overview: 6/27/07 EGD per [facility] hiatal hernia, start prevacid 30 mg      7/12 protonix 20 mg qday      11/16/12 [facility] note cont prilosec      1/5/16 change to protonix 40 mg from prilosec      2/4/20 vit d 67    CKD (chronic kidney disease) stage 3, GFR 30-59 ml/min POA: Yes      Overview: 5/31/09 bun 18,creat 1.2      1/14/10 bun 28,creat 1.3,GFR 40      9/20/12 bun 37,creat 1.1,GFR 48      9/25/13 bun 25,creat 1.2,GFR 44      9/26/14 bun 26,creat 1.1,GFR 45      2/23/15 bun 20,creat 1.1,GFR 48      4/15/16 bun 31,creat 1.1,GFR 45      7/6/16 bun 29,creat 1.1,GFR 44       5/12/17 bun 35,creat 1.1,GFR 44      11/2/17 bun 22,creat 1.28,GFR 40      12/9/17 bun 29,creat 1.1,GFR 43      10/4/18 bun 31,creat 1.5 at [facility]      1/18/19 bun 33,creat 1.34,GFR 38,PTH 53,calcium 9.7,phos 3.6,SPEP negative      6/8/19 bun 33,creat 1.2,GFR 48      2/4/20 bun 30,creat 1.24, GFR 41, PTH 58, calcium 9.7, phos 3.9      8/20/20 bun 35,creat 1.12,GFR 46      9/28/20 bun 31,creat 1.1,GFR 47      6/8/21 bun 38,creat 1.46,GFR 34                      Paroxysmal atrial fibrillation (HCC) POA: Yes      Overview: 11/1/17 hospital admission, 11/2/17 hospital discharge, facial numbness,       transient numbness in both hands, resolved, CT, MRI head no acute infarct,       blood pressure stable, discharged home, patient states she was on aspirin       at the time of admission      11/1/17 CT head stable right mid brain likely chronic lacunar infarction,       periventricular white matter disease      11/1/17 MRI brain no acute abnormality, moderate chronic microvascular       stomach disease, chronic microhemorrhages  left frontal and right parietal       lobes, chronic lacunar infarct left basal ganglia and blanco, or cerebral       atrophy      11/1/17 MR-MRA head without; negative      2/20/17 episode of supraventricular tachycardia in the emergency room,       davenport catheter removed, symptoms resolved with repeat EKG sinus rhythm      [facility] most recent echocardiogram       looks fine, will repeat CT scan follow aortic repair      11/17/18 cardiology note asymptomatic, continue cholesterol medication,       continue to monitor echo aortic valve, repaired ascending aorta, follow-up       yearly      11/21/18 echo normal LV function, EF 60%, mild LVH, grade 2 diastolic       dysfunction, mild aortic insufficiency, moderate tricuspid regurgitation,       RVSP 50      12/6/19 echo normal LV function EF 65%, RVSP 43, moderate tricuspid       regurgitation, mild to moderate aortic insufficiency      9/28/20 emergency room note EKG atrial fibrillation new onset      9/30/20 cardiology note sinus rhythm, start eliquis 5 mg bid and increase       toprol to 25 mg daily, echo ordered, follow up 3 months       10/22/20 echo EF 70%, grade 1 diastolic dysfunction, mild AR, RVSP 30       12/14/20 cardiology note maintaining sinus rhythm, given frequent       symptomatic episodes of paroxysmal atrial fibrillation and age, recommend       amiodarone 200 mg, follow-up 6 months      4/20/21  cardiology note sinus on exam, patient would like to       discontinue amiodarone understanding she likely will return to       intermittent atrial fibrillation, discussed sotalol, dronedarone, tikosyn       as alternatives, patient declines any of those alternatives, ablation is       not appropriate given her age           Sinusitis POA: Yes  Resolved Problems:    * No resolved hospital problems. *      FOLLOW UP  Future Appointments   Date Time Provider Department Center   10/20/2021 11:30 AM Kobi ANDREW  MICHELLE Wheeler. [facility] None   12/20/2021 11:00 AM Teepatrizia Tran M.D. [facility] SKya Coulter     Teepatrizia Tran M.D.  [id number]  [place] [address]  [phone]            MEDICATIONS ON DISCHARGE     Medication List      START taking these medications      Instructions   oxyCODONE immediate-release 5 MG Tabs  Commonly known as: ROXICODONE   Take 1 tablet by mouth every 6 hours as needed for Severe Pain for up to 4 days.  Dose: 5 mg        CHANGE how you take these medications      Instructions   benazepril 40 MG tablet  What changed: when to take this  Commonly known as: LOTENSIN   TAKE 1 TABLET BY MOUTH EVERY DAY     doxycycline 100 MG Tabs  What changed: additional instructions  Commonly known as: VIBRAMYCIN   Take 1 tablet by mouth 2 times a day.  Dose: 100 mg     ipratropium 0.03 % Soln  What changed: when to take this  Commonly known as: ATROVENT   Administer 2 Sprays into affected nostril(S) 2 times a day.  Dose: 2 Spray        CONTINUE taking these medications      Instructions   acetaminophen 500 MG Tabs  Commonly known as: TYLENOL   Take 1,000 mg by mouth every 6 hours as needed for Moderate Pain.  Dose: 1,000 mg     apixaban 5mg Tabs  Commonly known as: Eliquis   Take 1 Tab by mouth 2 Times a Day.  Dose: 5 mg     CENTRUM SILVER ULTRA WOMENS PO   Take 1 tablet by mouth every evening.  Dose: 1 tablet     FEXOFENADINE HCL PO   Take 1 tablet by mouth every day. Indications: Hayfever  Dose: 1 tablet     levothyroxine 75 MCG Tabs  Commonly known as: SYNTHROID   TAKE 1 TAB BY MOUTH EVERY MORNING ON AN EMPTY STOMACH.  Dose: 75 mcg     MAGNESIUM PO   Take 1 capsule by mouth every evening.  Dose: 1 capsule     metoprolol SR 25 MG Tb24  Commonly known as: TOPROL XL   Take 1 Tab by mouth every morning.  Dose: 25 mg     omeprazole 20 MG delayed-release capsule  Commonly known as: PRILOSEC   Take 20 mg by mouth every evening.  Dose: 20 mg        STOP taking these medications    ezetimibe 10 MG  Tabs  Commonly known as: ZETIA            Allergies  Allergies   Allergen Reactions   • Amoxicillin Vomiting     Upset stomach, ok if needed for life saving treatment (per pt)   • Bactrim [Sulfamethoxazole W-Trimethoprim] Vomiting     Nausea/vomiting Ok in a life saving situation (per pt)   • Codeine Nausea     Synthetic codones ok   • Doxycycline      Nausea, Ok in life saving situation (per pt)   • Trazodone Vomiting     groggy   • Ultram [Tramadol] Vomiting     nausea       DIET  Orders Placed This Encounter   Procedures   • Diet Order Diet: Cardiac     Standing Status:   Standing     Number of Occurrences:   1     Order Specific Question:   Diet:     Answer:   Cardiac [6]       ACTIVITY  As tolerated.  Weight bearing as tolerated    CONSULTATIONS  Orthopedic surgery    PROCEDURES  None    LABORATORY  Lab Results   Component Value Date    SODIUM 130 (L) 06/23/2021    POTASSIUM 4.5 06/23/2021    CHLORIDE 99 06/23/2021    CO2 21 06/23/2021    GLUCOSE 89 06/23/2021    BUN 39 (H) 06/23/2021    CREATININE 1.36 06/23/2021    CREATININE 1.05 (H) 02/07/2013        Lab Results   Component Value Date    WBC 5.3 06/23/2021    WBC 3.9 (L) 04/16/2011    HEMOGLOBIN 8.3 (L) 06/23/2021    HEMATOCRIT 27.0 (L) 06/23/2021    PLATELETCT 212 06/23/2021        Total time of the discharge process exceeds 35 minutes.

## 2021-06-24 NOTE — PROGRESS NOTES
Pt was discharged. Pt was educated on all discharge instructions, new medications, and follow-up appointments. Pt verbalizes understanding of all discharge education. Pt escorted out with wheelchair.

## 2021-06-24 NOTE — DISCHARGE INSTRUCTIONS
Discharge Instructions    Discharged to home by car with relative. Discharged via wheelchair, hospital escort: Refused.  Special equipment needed: Not Applicable    Be sure to schedule a follow-up appointment with your primary care doctor or any specialists as instructed.     Discharge Plan:   Diet Plan: Discussed  Activity Level: Discussed  Confirmed Follow up Appointment: Appointment Scheduled  Confirmed Symptoms Management: Discussed  Medication Reconciliation Updated: No (Comments)    I understand that a diet low in cholesterol, fat, and sodium is recommended for good health. Unless I have been given specific instructions below for another diet, I accept this instruction as my diet prescription.   Other diet: Regular    Special Instructions: None    · Is patient discharged on Warfarin / Coumadin?   No     Depression / Suicide Risk    As you are discharged from this [facility] facility, it is important to learn how to keep safe from harming yourself.    Recognize the warning signs:  · Abrupt changes in personality, positive or negative- including increase in energy   · Giving away possessions  · Change in eating patterns- significant weight changes-  positive or negative  · Change in sleeping patterns- unable to sleep or sleeping all the time   · Unwillingness or inability to communicate  · Depression  · Unusual sadness, discouragement and loneliness  · Talk of wanting to die  · Neglect of personal appearance   · Rebelliousness- reckless behavior  · Withdrawal from people/activities they love  · Confusion- inability to concentrate     If you or a loved one observes any of these behaviors or has concerns about self-harm, here's what you can do:  · Talk about it- your feelings and reasons for harming yourself  · Remove any means that you might use to hurt yourself (examples: pills, rope, extension cords, firearm)  · Get professional help from the community (Mental Health, Substance Abuse, psychological  counseling)  · Do not be alone:Call your Safe Contact- someone whom you trust who will be there for you.  · Call your local CRISIS HOTLINE 258-9677 or 732-543-0115  · Call your local Children's Mobile Crisis Response Team [place] (369) 945-3600 or www.[employer]  · Call the toll free National Suicide Prevention Hotlines   · National Suicide Prevention Lifeline 088-422-XGZU (9268)  · National Hope Line Network 800-SUICIDE (592-1010)

## 2021-06-25 ENCOUNTER — HOSPITAL ENCOUNTER (INPATIENT)
Facility: MEDICAL CENTER | Age: 86
LOS: 3 days | DRG: 536 | End: 2021-06-28
Attending: EMERGENCY MEDICINE | Admitting: INTERNAL MEDICINE
Payer: MEDICARE

## 2021-06-25 ENCOUNTER — APPOINTMENT (OUTPATIENT)
Dept: RADIOLOGY | Facility: MEDICAL CENTER | Age: 86
DRG: 536 | End: 2021-06-25
Attending: EMERGENCY MEDICINE
Payer: MEDICARE

## 2021-06-25 DIAGNOSIS — S32.401G: ICD-10-CM

## 2021-06-25 DIAGNOSIS — I15.0 RENOVASCULAR HYPERTENSION: ICD-10-CM

## 2021-06-25 DIAGNOSIS — R10.2 PELVIC PAIN: ICD-10-CM

## 2021-06-25 DIAGNOSIS — R33.9 URINARY RETENTION: ICD-10-CM

## 2021-06-25 DIAGNOSIS — E87.1 HYPONATREMIA: ICD-10-CM

## 2021-06-25 DIAGNOSIS — S32.2XXA CLOSED FRACTURE OF SACRUM AND COCCYX, INITIAL ENCOUNTER (HCC): ICD-10-CM

## 2021-06-25 DIAGNOSIS — N18.30 ACUTE RENAL FAILURE SUPERIMPOSED ON STAGE 3 CHRONIC KIDNEY DISEASE, UNSPECIFIED ACUTE RENAL FAILURE TYPE, UNSPECIFIED WHETHER STAGE 3A OR 3B CKD (HCC): ICD-10-CM

## 2021-06-25 DIAGNOSIS — N17.9 AKI (ACUTE KIDNEY INJURY) (HCC): ICD-10-CM

## 2021-06-25 DIAGNOSIS — K59.03 DRUG-INDUCED CONSTIPATION: ICD-10-CM

## 2021-06-25 DIAGNOSIS — S32.811A MULTIPLE CLOSED FRACTURES OF PELVIS WITH UNSTABLE DISRUPTION OF PELVIC RING, INITIAL ENCOUNTER (HCC): ICD-10-CM

## 2021-06-25 DIAGNOSIS — I48.0 PAROXYSMAL ATRIAL FIBRILLATION (HCC): ICD-10-CM

## 2021-06-25 DIAGNOSIS — R26.9 GAIT DISORDER: ICD-10-CM

## 2021-06-25 DIAGNOSIS — D64.9 CHRONIC ANEMIA: ICD-10-CM

## 2021-06-25 DIAGNOSIS — S32.82XA MULTIPLE CLOSED FRACTURES OF PELVIS WITHOUT DISRUPTION OF PELVIC RING, INITIAL ENCOUNTER (HCC): ICD-10-CM

## 2021-06-25 DIAGNOSIS — S32.10XA CLOSED FRACTURE OF SACRUM AND COCCYX, INITIAL ENCOUNTER (HCC): ICD-10-CM

## 2021-06-25 DIAGNOSIS — Z79.01 ON CONTINUOUS ORAL ANTICOAGULATION: ICD-10-CM

## 2021-06-25 DIAGNOSIS — N17.9 ACUTE RENAL FAILURE SUPERIMPOSED ON STAGE 3 CHRONIC KIDNEY DISEASE, UNSPECIFIED ACUTE RENAL FAILURE TYPE, UNSPECIFIED WHETHER STAGE 3A OR 3B CKD (HCC): ICD-10-CM

## 2021-06-25 DIAGNOSIS — E86.0 DEHYDRATION: ICD-10-CM

## 2021-06-25 DIAGNOSIS — Z98.890 STATUS POST LUMBAR SURGERY: ICD-10-CM

## 2021-06-25 LAB
25(OH)D3 SERPL-MCNC: 75 NG/ML (ref 30–100)
ANION GAP SERPL CALC-SCNC: 12 MMOL/L (ref 7–16)
BASOPHILS # BLD AUTO: 0.2 % (ref 0–1.8)
BASOPHILS # BLD: 0.03 K/UL (ref 0–0.12)
BUN SERPL-MCNC: 76 MG/DL (ref 8–22)
CALCIUM SERPL-MCNC: 9.1 MG/DL (ref 8.4–10.2)
CHLORIDE SERPL-SCNC: 93 MMOL/L (ref 96–112)
CO2 SERPL-SCNC: 19 MMOL/L (ref 20–33)
CREAT SERPL-MCNC: 2.44 MG/DL (ref 0.5–1.4)
EOSINOPHIL # BLD AUTO: 0.02 K/UL (ref 0–0.51)
EOSINOPHIL NFR BLD: 0.2 % (ref 0–6.9)
ERYTHROCYTE [DISTWIDTH] IN BLOOD BY AUTOMATED COUNT: 48.6 FL (ref 35.9–50)
GLUCOSE SERPL-MCNC: 124 MG/DL (ref 65–99)
HCT VFR BLD AUTO: 28.5 % (ref 37–47)
HGB BLD-MCNC: 8.9 G/DL (ref 12–16)
IMM GRANULOCYTES # BLD AUTO: 0.15 K/UL (ref 0–0.11)
IMM GRANULOCYTES NFR BLD AUTO: 1.2 % (ref 0–0.9)
LYMPHOCYTES # BLD AUTO: 0.76 K/UL (ref 1–4.8)
LYMPHOCYTES NFR BLD: 6.1 % (ref 22–41)
MCH RBC QN AUTO: 26.6 PG (ref 27–33)
MCHC RBC AUTO-ENTMCNC: 31.2 G/DL (ref 33.6–35)
MCV RBC AUTO: 85.1 FL (ref 81.4–97.8)
MONOCYTES # BLD AUTO: 1.13 K/UL (ref 0–0.85)
MONOCYTES NFR BLD AUTO: 9 % (ref 0–13.4)
NEUTROPHILS # BLD AUTO: 10.43 K/UL (ref 2–7.15)
NEUTROPHILS NFR BLD: 83.3 % (ref 44–72)
NRBC # BLD AUTO: 0 K/UL
NRBC BLD-RTO: 0 /100 WBC
PLATELET # BLD AUTO: 176 K/UL (ref 164–446)
PMV BLD AUTO: 8.9 FL (ref 9–12.9)
POTASSIUM SERPL-SCNC: 4.9 MMOL/L (ref 3.6–5.5)
RBC # BLD AUTO: 3.35 M/UL (ref 4.2–5.4)
SODIUM SERPL-SCNC: 124 MMOL/L (ref 135–145)
WBC # BLD AUTO: 12.5 K/UL (ref 4.8–10.8)

## 2021-06-25 PROCEDURE — A9270 NON-COVERED ITEM OR SERVICE: HCPCS | Performed by: INTERNAL MEDICINE

## 2021-06-25 PROCEDURE — 80048 BASIC METABOLIC PNL TOTAL CA: CPT

## 2021-06-25 PROCEDURE — 700102 HCHG RX REV CODE 250 W/ 637 OVERRIDE(OP): Performed by: INTERNAL MEDICINE

## 2021-06-25 PROCEDURE — 82306 VITAMIN D 25 HYDROXY: CPT

## 2021-06-25 PROCEDURE — 700105 HCHG RX REV CODE 258: Performed by: INTERNAL MEDICINE

## 2021-06-25 PROCEDURE — 99223 1ST HOSP IP/OBS HIGH 75: CPT | Mod: AI | Performed by: INTERNAL MEDICINE

## 2021-06-25 PROCEDURE — 700105 HCHG RX REV CODE 258: Performed by: EMERGENCY MEDICINE

## 2021-06-25 PROCEDURE — 72192 CT PELVIS W/O DYE: CPT | Mod: MG

## 2021-06-25 PROCEDURE — 770006 HCHG ROOM/CARE - MED/SURG/GYN SEMI*

## 2021-06-25 PROCEDURE — 36415 COLL VENOUS BLD VENIPUNCTURE: CPT

## 2021-06-25 PROCEDURE — 99285 EMERGENCY DEPT VISIT HI MDM: CPT

## 2021-06-25 PROCEDURE — 85025 COMPLETE CBC W/AUTO DIFF WBC: CPT

## 2021-06-25 RX ORDER — SODIUM CHLORIDE 9 MG/ML
INJECTION, SOLUTION INTRAVENOUS CONTINUOUS
Status: ACTIVE | OUTPATIENT
Start: 2021-06-25 | End: 2021-06-26

## 2021-06-25 RX ORDER — OXYCODONE HYDROCHLORIDE 5 MG/1
2.5 TABLET ORAL
Status: DISCONTINUED | OUTPATIENT
Start: 2021-06-25 | End: 2021-06-26

## 2021-06-25 RX ORDER — POLYETHYLENE GLYCOL 3350 17 G/17G
1 POWDER, FOR SOLUTION ORAL
Status: DISCONTINUED | OUTPATIENT
Start: 2021-06-25 | End: 2021-06-28 | Stop reason: HOSPADM

## 2021-06-25 RX ORDER — IPRATROPIUM BROMIDE 21 UG/1
2 SPRAY, METERED NASAL 2 TIMES DAILY
Status: DISCONTINUED | OUTPATIENT
Start: 2021-06-25 | End: 2021-06-28 | Stop reason: HOSPADM

## 2021-06-25 RX ORDER — ACETAMINOPHEN 325 MG/1
650 TABLET ORAL EVERY 6 HOURS PRN
Status: DISCONTINUED | OUTPATIENT
Start: 2021-06-30 | End: 2021-06-28 | Stop reason: HOSPADM

## 2021-06-25 RX ORDER — SODIUM CHLORIDE 9 MG/ML
1000 INJECTION, SOLUTION INTRAVENOUS ONCE
Status: COMPLETED | OUTPATIENT
Start: 2021-06-25 | End: 2021-06-25

## 2021-06-25 RX ORDER — OMEPRAZOLE 20 MG/1
20 CAPSULE, DELAYED RELEASE ORAL EVERY MORNING
Status: DISCONTINUED | OUTPATIENT
Start: 2021-06-26 | End: 2021-06-28 | Stop reason: HOSPADM

## 2021-06-25 RX ORDER — BISACODYL 10 MG
10 SUPPOSITORY, RECTAL RECTAL
Status: DISCONTINUED | OUTPATIENT
Start: 2021-06-25 | End: 2021-06-28 | Stop reason: HOSPADM

## 2021-06-25 RX ORDER — OXYCODONE HYDROCHLORIDE 5 MG/1
5 TABLET ORAL
Status: DISCONTINUED | OUTPATIENT
Start: 2021-06-25 | End: 2021-06-26

## 2021-06-25 RX ORDER — HEPARIN SODIUM 5000 [USP'U]/ML
5000 INJECTION, SOLUTION INTRAVENOUS; SUBCUTANEOUS EVERY 8 HOURS
Status: DISCONTINUED | OUTPATIENT
Start: 2021-06-26 | End: 2021-06-26

## 2021-06-25 RX ORDER — BENAZEPRIL HYDROCHLORIDE 10 MG/1
40 TABLET ORAL EVERY MORNING
Status: DISCONTINUED | OUTPATIENT
Start: 2021-06-25 | End: 2021-06-25

## 2021-06-25 RX ORDER — DOXYCYCLINE 100 MG/1
100 TABLET ORAL EVERY 12 HOURS
Status: DISCONTINUED | OUTPATIENT
Start: 2021-06-25 | End: 2021-06-28 | Stop reason: HOSPADM

## 2021-06-25 RX ORDER — AMOXICILLIN 250 MG
2 CAPSULE ORAL 2 TIMES DAILY
Status: DISCONTINUED | OUTPATIENT
Start: 2021-06-26 | End: 2021-06-28 | Stop reason: HOSPADM

## 2021-06-25 RX ORDER — METOPROLOL SUCCINATE 25 MG/1
25 TABLET, EXTENDED RELEASE ORAL EVERY MORNING
Status: DISCONTINUED | OUTPATIENT
Start: 2021-06-26 | End: 2021-06-28 | Stop reason: HOSPADM

## 2021-06-25 RX ORDER — BENAZEPRIL HYDROCHLORIDE 40 MG/1
40 TABLET ORAL EVERY MORNING
COMMUNITY
End: 2021-11-15 | Stop reason: SDUPTHER

## 2021-06-25 RX ORDER — ACETAMINOPHEN 325 MG/1
650 TABLET ORAL EVERY 6 HOURS
Status: DISCONTINUED | OUTPATIENT
Start: 2021-06-25 | End: 2021-06-28 | Stop reason: HOSPADM

## 2021-06-25 RX ORDER — LEVOTHYROXINE SODIUM 0.07 MG/1
75 TABLET ORAL
Status: DISCONTINUED | OUTPATIENT
Start: 2021-06-26 | End: 2021-06-28 | Stop reason: HOSPADM

## 2021-06-25 RX ORDER — HYDROMORPHONE HYDROCHLORIDE 1 MG/ML
0.25 INJECTION, SOLUTION INTRAMUSCULAR; INTRAVENOUS; SUBCUTANEOUS
Status: DISCONTINUED | OUTPATIENT
Start: 2021-06-25 | End: 2021-06-26

## 2021-06-25 RX ADMIN — ACETAMINOPHEN 650 MG: 325 TABLET, FILM COATED ORAL at 23:52

## 2021-06-25 RX ADMIN — DOXYCYCLINE 100 MG: 100 TABLET ORAL at 18:49

## 2021-06-25 RX ADMIN — SODIUM CHLORIDE: 9 INJECTION, SOLUTION INTRAVENOUS at 18:57

## 2021-06-25 RX ADMIN — ACETAMINOPHEN 650 MG: 325 TABLET, FILM COATED ORAL at 18:49

## 2021-06-25 RX ADMIN — OXYCODONE HYDROCHLORIDE 5 MG: 5 TABLET ORAL at 18:49

## 2021-06-25 RX ADMIN — SODIUM CHLORIDE 1000 ML: 9 INJECTION, SOLUTION INTRAVENOUS at 14:45

## 2021-06-25 ASSESSMENT — ENCOUNTER SYMPTOMS
FALLS: 1
DIARRHEA: 0
HEADACHES: 0
WEAKNESS: 1
DIZZINESS: 0
CONSTIPATION: 0
VOMITING: 0
COUGH: 0
CHILLS: 0
NAUSEA: 0
PALPITATIONS: 0
BACK PAIN: 1
FEVER: 0
ABDOMINAL PAIN: 0
SHORTNESS OF BREATH: 0

## 2021-06-25 ASSESSMENT — PATIENT HEALTH QUESTIONNAIRE - PHQ9
SUM OF ALL RESPONSES TO PHQ9 QUESTIONS 1 AND 2: 0
2. FEELING DOWN, DEPRESSED, IRRITABLE, OR HOPELESS: NOT AT ALL
1. LITTLE INTEREST OR PLEASURE IN DOING THINGS: NOT AT ALL
SUM OF ALL RESPONSES TO PHQ9 QUESTIONS 1 AND 2: 0
2. FEELING DOWN, DEPRESSED, IRRITABLE, OR HOPELESS: NOT AT ALL
1. LITTLE INTEREST OR PLEASURE IN DOING THINGS: NOT AT ALL

## 2021-06-25 ASSESSMENT — LIFESTYLE VARIABLES
ALCOHOL_USE: NO
TOTAL SCORE: 0
CONSUMPTION TOTAL: NEGATIVE
ON A TYPICAL DAY WHEN YOU DRINK ALCOHOL HOW MANY DRINKS DO YOU HAVE: 0
AVERAGE NUMBER OF DAYS PER WEEK YOU HAVE A DRINK CONTAINING ALCOHOL: 0
EVER FELT BAD OR GUILTY ABOUT YOUR DRINKING: NO
EVER HAD A DRINK FIRST THING IN THE MORNING TO STEADY YOUR NERVES TO GET RID OF A HANGOVER: NO
HAVE YOU EVER FELT YOU SHOULD CUT DOWN ON YOUR DRINKING: NO
HAVE PEOPLE ANNOYED YOU BY CRITICIZING YOUR DRINKING: NO
TOTAL SCORE: 0
HOW MANY TIMES IN THE PAST YEAR HAVE YOU HAD 5 OR MORE DRINKS IN A DAY: 0
TOTAL SCORE: 0

## 2021-06-25 ASSESSMENT — COGNITIVE AND FUNCTIONAL STATUS - GENERAL
DRESSING REGULAR LOWER BODY CLOTHING: A LOT
MOBILITY SCORE: 6
TOILETING: TOTAL
DAILY ACTIVITIY SCORE: 13
SUGGESTED CMS G CODE MODIFIER DAILY ACTIVITY: CL
STANDING UP FROM CHAIR USING ARMS: TOTAL
MOVING TO AND FROM BED TO CHAIR: UNABLE
MOVING FROM LYING ON BACK TO SITTING ON SIDE OF FLAT BED: UNABLE
CLIMB 3 TO 5 STEPS WITH RAILING: TOTAL
MOBILITY SCORE: 24
SUGGESTED CMS G CODE MODIFIER MOBILITY: CH
TURNING FROM BACK TO SIDE WHILE IN FLAT BAD: UNABLE
SUGGESTED CMS G CODE MODIFIER MOBILITY: CN
DRESSING REGULAR UPPER BODY CLOTHING: TOTAL
HELP NEEDED FOR BATHING: TOTAL
WALKING IN HOSPITAL ROOM: TOTAL

## 2021-06-25 ASSESSMENT — FIBROSIS 4 INDEX: FIB4 SCORE: 2.05

## 2021-06-25 ASSESSMENT — PAIN DESCRIPTION - PAIN TYPE: TYPE: ACUTE PAIN

## 2021-06-25 NOTE — ED TRIAGE NOTES
Chief Complaint   Patient presents with   • Pelvic Pain   • Failure to Thrive     BIB [facility] from home, recent d/c for pelvic pain on 6/22/21, Home health nurse called EMS as patient is unable to care for self at home and had low o2 sats 88RA.

## 2021-06-25 NOTE — H&P
Hospital Medicine History & Physical Note    Date of Service  6/25/2021    Primary Care Physician  Tee Tran M.D.    Consultants  Orthopedic surgery Dr. Pereyra    Code Status  Full Code    Chief Complaint  Chief Complaint   Patient presents with   • Pelvic Pain   • Failure to Thrive       History of Presenting Illness  85 y.o. female PMH hypothyroidism, HTN, CKD 3, pA-fib chronic eliquis, who presented 6/25/2021 with pelvic pain and failure to thrive.  Admitted 6/25/21 for failure to thrive at home.  Home health nurse called EMS as patient was unable to stand, unstable at home, hypoxic 88% on room air.  Patient had GLF , pelvic and sacral fx known after fall. Still complains of pain in region, non-radiating, 10/10 pain, worsens with movement, no alleviating factors.  Patient was seen in hospital and discharged home 6/23/21.    In the ED, imaging was taken, reviewed: Stable appearance of the minimally displaced fractures of the right sacral ala, right pubic ramus posteriorly and right ischial ramus.    Labs reviewed: WILY, volume depletion, hyponatremia 124.    Review of Systems  Review of Systems   Constitutional: Negative for chills, fever and malaise/fatigue.   HENT:        Dry mouth   Respiratory: Negative for cough and shortness of breath.    Cardiovascular: Negative for chest pain and palpitations.   Gastrointestinal: Negative for abdominal pain, constipation, diarrhea, nausea and vomiting.   Musculoskeletal: Positive for back pain (Lower back), falls and joint pain (Hip).   Neurological: Positive for weakness. Negative for dizziness and headaches.   All other systems reviewed and are negative.      Past Medical History   has a past medical history of AAA (abdominal aortic aneurysm) (HCC) (7/26/2010), Aortic insufficiency, Aortic valve disease, Aortic valve regurgitation, Arthritis, Cataract, Dental disorder, Diverticulosis, Dyslipidemia (7/26/2010), GERD (gastroesophageal reflux disease) (7/12/2012),  Glaucoma, Heart burn, Heart murmur, Heart valve disease, Hiatus hernia syndrome, High cholesterol, History of shingles (6/30/2011), History of total knee arthroplasty (7/26/2010), HLD (hyperlipidemia), Hypertension, Hypothyroid (4/8/2011), Indigestion, Mitral insufficiency, OSTEOPOROSIS (8/9/2010), Pain, Preventative health care (7/26/2010), Rheumatic fever, S/P appendectomy (7/26/2010), S/P TOMY (total abdominal hysterectomy) (7/26/2010), Shingles (6/30/2011), Snoring, Status post lumbar surgery (7/26/2010), Stroke (HCC) (1996), Thoracic aortic aneurysm without mention of rupture, Tricuspid insufficiency, and Unspecified disorder of thyroid.    Surgical History   has a past surgical history that includes lumbar fusion posterior; hysterectomy, total abdominal; appendectomy; aortic valve replacement (1/12/2010); knee arthroscopy (10/18/2011); medial meniscectomy (10/18/2011); knee arthroplasty total (1/3/2012); lumbar fusion posterior (11/24/2015); lumbar laminectomy diskectomy (11/24/2015); knee arthroplasty total (Right, 9/8/2016); and tendon repair (Right, 11/10/2016).     Family History  family history includes Heart Disease in her father and sister; Stroke in her mother.     Social History   reports that she has never smoked. She has never used smokeless tobacco. She reports current drug use. She reports that she does not drink alcohol.    Allergies  Allergies   Allergen Reactions   • Amoxicillin Vomiting     Upset stomach, ok if needed for life saving treatment (per pt)   • Codeine Nausea     Synthetic codones ok   • Doxycycline Nausea     Nausea, Ok in life saving situation (per pt)   • Sulfamethoxazole-Trimethoprim Vomiting and Nausea     Ok in a life saving situation (per pt)   • Tramadol Vomiting and Nausea     nausea   • Trazodone Vomiting     groggy       Medications  Prior to Admission Medications   Prescriptions Last Dose Informant Patient Reported? Taking?   FEXOFENADINE HCL PO 6/25/2021 at 1030 Patient  Yes No   Sig: Take 1 tablet by mouth 2 times a day. Indications: Hayfever   MAGNESIUM PO 6/24/2021 at pm Patient Yes No   Sig: Take 1 capsule by mouth every evening.   Multiple Vitamins-Minerals (CENTRUM SILVER ULTRA WOMENS PO) 6/24/2021 at pm Patient Yes No   Sig: Take 1 tablet by mouth every evening.   acetaminophen (TYLENOL) 500 MG Tab 6/25/2021 at 1030 Patient Yes No   Sig: Take 1,000 mg by mouth every 6 hours as needed for Moderate Pain.   apixaban (ELIQUIS) 5mg Tab 6/25/2021 at 1030 Patient No No   Sig: Take 1 Tab by mouth 2 Times a Day.   benazepril (LOTENSIN) 40 MG tablet 6/25/2021 at 1030 Patient Yes Yes   Sig: Take 40 mg by mouth every morning.   doxycycline (VIBRAMYCIN) 100 MG Tab 6/25/2021 at 1030 Patient No No   Sig: Take 1 tablet by mouth 2 times a day.   ipratropium (ATROVENT) 0.03 % Solution 6/25/2021 at 1030 Patient No No   Sig: Administer 2 Sprays into affected nostril(S) 2 times a day.   levothyroxine (SYNTHROID) 75 MCG Tab 6/25/2021 at 1030 Patient No No   Sig: TAKE 1 TAB BY MOUTH EVERY MORNING ON AN EMPTY STOMACH.   metoprolol SR (TOPROL XL) 25 MG TABLET SR 24 HR 6/25/2021 at 1030 Patient No No   Sig: Take 1 Tab by mouth every morning.   omeprazole (PRILOSEC) 20 MG delayed-release capsule 6/25/2021 at 1030 Patient Yes No   Sig: Take 20 mg by mouth every morning.   oxyCODONE immediate-release (ROXICODONE) 5 MG Tab 6/25/2021 at 1300 Patient No No   Sig: Take 1 tablet by mouth every 6 hours as needed for Severe Pain for up to 4 days.      Facility-Administered Medications: None       Physical Exam  Temp:  [36.9 °C (98.4 °F)] 36.9 °C (98.4 °F)  Pulse:  [60-65] 65  Resp:  [16] 16  BP: ()/(48-56) 91/56  SpO2:  [94 %-99 %] 96 %    Physical Exam  Vitals and nursing note reviewed.   Constitutional:       General: She is not in acute distress.     Appearance: Normal appearance. She is not ill-appearing.   HENT:      Head: Normocephalic and atraumatic.   Cardiovascular:      Rate and Rhythm: Normal  rate.      Pulses: Normal pulses.      Heart sounds: Normal heart sounds. No murmur heard.     Pulmonary:      Effort: Pulmonary effort is normal. No respiratory distress.      Breath sounds: Normal breath sounds. No wheezing.   Chest:      Comments: Old surgical scar present well-healed.  Abdominal:      Palpations: Abdomen is soft.      Tenderness: There is no guarding.      Comments: Hypoactive bowel sounds  Dull to percussion in lower abdomen.  Resonant over the upper abdomen.   Musculoskeletal:         General: Tenderness (Hip) present.      Right lower leg: No edema.      Left lower leg: No edema.      Comments: Patient is able to move all extremities.  Bilateral lower extremities more difficult to move due to pain from her fractures.   Skin:     General: Skin is warm.      Capillary Refill: Capillary refill takes less than 2 seconds.      Coloration: Skin is not jaundiced.      Findings: No erythema.   Neurological:      General: No focal deficit present.      Mental Status: She is alert and oriented to person, place, and time. Mental status is at baseline.      Motor: Weakness present.   Psychiatric:         Mood and Affect: Mood normal.         Behavior: Behavior normal.         Thought Content: Thought content normal.         Judgment: Judgment normal.         Laboratory:  Recent Labs     06/23/21  0255 06/25/21  1350   WBC 5.3 12.5*   RBC 3.14* 3.35*   HEMOGLOBIN 8.3* 8.9*   HEMATOCRIT 27.0* 28.5*   MCV 86.0 85.1   MCH 26.4* 26.6*   MCHC 30.7* 31.2*   RDW 48.3 48.6   PLATELETCT 212 176   MPV 9.4 8.9*     Recent Labs     06/23/21  0255 06/25/21  1350   SODIUM 130* 124*   POTASSIUM 4.5 4.9   CHLORIDE 99 93*   CO2 21 19*   GLUCOSE 89 124*   BUN 39* 76*   CREATININE 1.36 2.44*   CALCIUM 9.2 9.1     Recent Labs     06/23/21  0255 06/25/21  1350   ALTSGPT 15  --    ASTSGOT 20  --    ALKPHOSPHAT 64  --    TBILIRUBIN 0.5  --    GLUCOSE 89 124*         No results for input(s): NTPROBNP in the last 72 hours.       No results for input(s): TROPONINT in the last 72 hours.    Imaging:  CT-PELVIS W/O   Final Result      1.  Stable appearance of the minimally displaced fractures of the right sacral ala, right pubic ramus posteriorly and right ischial ramus.      2.  No acute pelvic fracture.      3.  No hip dislocation.      4.  Right total hip arthroplasty in place.            Assessment/Plan:  I anticipate this patient will require at least two midnights for appropriate medical management, necessitating inpatient admission.    * Pelvic fracture (HCC)- (present on admission)  Assessment & Plan  Patient has repeats complained of right hip fracture.  Patient had fallen when she was going to a [employer] and hit the glass and fell backwards onto her gluteal region causing a fracture.  Patient was evaluated in the ED, patient had decided to go home rather than stay inpatient.  When she got home patient had gotten worse and her pain.  Home health nurse had come to evaluate the patient today and determined patient was unable to stand up, decompensating and was hypoxic to 88% on room air.  Home health nurse called EMS for evaluation in the ED  -Orthopedic surgery was consulted by EDP, pending evaluation and recommendation  -Pain control regimen ordered by myself  -We will keep patient n.p.o. for potential surgery, if no surgery recommended patient can be on a cardiac diet  -PT/OT to evaluate    Hyponatremia- (present on admission)  Assessment & Plan  Patient arrived with sodium level of 124.  Patient likely has hypovolemic hyponatremia as patient has not been able to sustain her oral fluid intake at home.  She is also has had a poor appetite.  -Patient will be on IV fluids, recheck sodium levels.  Patient was just discharged, acute process, sodium 130 on 6/23.    Acute renal failure superimposed on stage 3 chronic kidney disease (HCC)- (present on admission)  Assessment & Plan  Patient has not been able to eat or drink well at home.   "Creatinine 2.44 on admission.  Baseline appears to be 1.5 or less.  Baseline CKD stage III.  -Avoid nephrotoxic agents  -We will hold patient's ACE inhibitor  -Repeat renal function in the morning    On continuous oral anticoagulation- (present on admission)  Assessment & Plan  Patient is on Eliquis at home, has atrial fibrillation  Currently patient has acute renal failure on top of CKD and patient is pending possible surgery  We will continue patient on heparin at this time, restart Eliquis once renal failure improves and no surgery is needed    Paroxysmal atrial fibrillation (HCC)- (present on admission)  Assessment & Plan  \"4/20/21  cardiology note sinus on exam, patient would like to discontinue amiodarone understanding she likely will return to intermittent atrial fibrillation, discussed sotalol, dronedarone, tikosyn as alternatives, patient declines any of those alternatives, ablation is not appropriate given her age\" - obtained from Overview of problem from PCP, newest recommendation from PCP.  -We will continue patient on metoprolol at this time with parameters  -Holding Eliquis at this time due to WILY and potential surgery, restart if no surgery and WILY is improving    GERD (gastroesophageal reflux disease)- (present on admission)  Assessment & Plan  Patient has chronic GERD, stable, continue omeprazole    Hypothyroid- (present on admission)  Assessment & Plan  Patient is on chronic Synthroid 75 mcg, stable, continue home medications    Osteoporosis, idiopathic- (present on admission)  Assessment & Plan  Patient has osteoporosis  Given her acute fractures we will check vitamin D 25 levels  Patient currently not on replacements at home    Hypertension- (present on admission)  Assessment & Plan  Patient currently hypotensive.  Patient has not been able to sustain fluids at home, appears volume depleted on admission.  -Hold ACE inhibitor.  Continue metoprolol with parameters.    DVT Prophylaxis: " SCDs, heparin.  hold Eliquis for possible surgery

## 2021-06-25 NOTE — ASSESSMENT & PLAN NOTE
Patient is on Eliquis at home, has atrial fibrillation  Currently patient has acute renal failure on top of CKD and patient is pending possible surgery  We will continue patient on heparin at this time, restart Eliquis once renal failure improves and no surgery is needed

## 2021-06-25 NOTE — ED NOTES
Pharmacy Medication Reconciliation    Med rec updated and complete per pt & pt spouse at bedside  Allergies have been verified and updated  Pt home pharmacy:[facility] [address]      ~Patient reports that she is on a 10 day course of Doxycycline that was started on 06/23/2021

## 2021-06-25 NOTE — ASSESSMENT & PLAN NOTE
Patient has repeats complained of right hip fracture.  Patient had fallen when she was going to a [employer] lab and hit the glass and fell backwards onto her gluteal region causing a fracture.  Patient was evaluated in the ED, patient had decided to go home rather than stay inpatient.  When she got home patient had gotten worse and her pain.  Home health nurse had come to evaluate the patient today and determined patient was unable to stand up, decompensating and was hypoxic to 88% on room air.  Home health nurse called EMS for evaluation in the ED  -Orthopedic surgery was consulted by EDP, the previous evaluated patient and actually stable and does not require surgical management.  I did discuss again with orthopedic surgery this morning, no need for surgical management at this time  -Pain management  -PT OT recommending skilled nursing placement, pending OT evaluation.  Discussed with case management and referral sent to skilled.

## 2021-06-25 NOTE — ED PROVIDER NOTES
"ED Provider Note    CHIEF COMPLAINT  Chief Complaint   Patient presents with   • Pelvic Pain   • Failure to Thrive       [facility]  Yvonne Marie Wada is a 85 y.o. female who presents to the emergency department for pelvic pain and inability to walk.  Patient was recently admitted to the hospital for pelvic fracture.  She was found to have superior and inferior pubic ramus fracture as well as a sacral fracture.  She was discharged home yesterday.  She says that she is having too much pain to perform her activities of daily living.  She is been unable to eat or drink or take care of her self.  She complains of severe pelvic pain.  No injuries.    REVIEW OF SYSTEMS  See HPI for further details. All other systems are negative.     PAST MEDICAL HISTORY  Past Medical History:   Diagnosis Date   • AAA (abdominal aortic aneurysm) ([facility]) 7/26/2010    10/09 CT thorax dilated ascending thoracic aorta 4.9 cm 1/10 transesophageal echo dilated aortic root, and ascending aorta with mild aortic insufficiency 1/10  ascending aortic aneurysm repair 5/12 echo [facility] normal LV function EF 60%, moderate to severe left atrial enlargement, RVSP 32    • Aortic insufficiency    • Aortic valve disease    • Aortic valve regurgitation    • Arthritis     back and knee/ osteo   • Cataract    • Dental disorder     full top denture, partial bottom   • Diverticulosis    • Dyslipidemia 7/26/2010    Sees [facility] on lipitor 4/11 chol 159,trig 84,hdl 47,ldl 95,cpk 114 7/12 chol 163,trig 120,hdl 49,ldl 90, crp 1.1 on lipitor 20 mg    • GERD (gastroesophageal reflux disease) 7/12/2012    6/07 EGD per [facility] hiatal hernia, start prevacid 30 mg 7/12 protonix 20 mg qday    • Glaucoma    • Heart burn    • Heart murmur    • Heart valve disease     \"leaking\", mitral valve   • Hiatus hernia syndrome    • High cholesterol    • History of shingles 6/30/2011    2010 Back and left thorax     • History of total knee arthroplasty 7/26/2010    4/10 MRI right knee complex " tear medial meniscus, horizontal cleavage tear lateral meniscus, chondromalacia patella, moderate-sized Baker's cyst 5/10 right meniscus knee repair  5/10 told by  had gout on surgery had pathology report, I await that report Question gout seen on pathology report done during repair of right meniscus knee surgery. 7/10 uric acid 6.3, we'll await starting allopurinol depending on the operative report 8/5/10 reviewed ortho notes , op report indicates crystal deposition, could be gout or pseudogout. No bx result sent over. Will need to get. My suspicion is chondrocalcinosis. 10/11  ortho note, MRI pending 8/7/10 reviewed pathology report right knee tissue, marked degenerative changes with focal calcification, no mention of gout. Based on this and a normal uric acid level, I do not believe she has gout, has no hyperuricemia medications would be indicated 10/11  ortho left knee meniscectomy and arthroscopy 1/12     • HLD (hyperlipidemia)    • Hypertension    • Hypothyroid 4/8/2011 4/11 tsh 9 start synthroid 50 4/11 tsh 6,free t3 3.1,free t4 1.2,tpo negative 7/1/11 tsh 2.1 cont synthroid 50 mcg 7/12 tsh 3.4 cont synthroid 50 mcg    • Indigestion    • Mitral insufficiency    • OSTEOPOROSIS 8/9/2010    Had been on fosamax [id number]  8/10 dexa LS -2.0,hip -1.5 await old dexa result, she will get copy for me 4/11 vit d 35 9/12 dexa LS -3.2,hip -1.4 2/13/13 relast infusion    • Pain     back and right leg, can get as bad as 10/10   • Preventative health care 7/26/2010 2002 pneum 2005 colon [facility] no records 4/11 vit d 35 9/11 shingles vaccine 9/12 mammogram 9/12 dexa LS -3.2,hip -1.4    • Rheumatic fever    • S/P appendectomy 7/26/2010   • S/P TOMY (total abdominal hysterectomy) 7/26/2010    Sees gyn on HRT estradiol for 20 yrs () 11/08 mammo     • Shingles 6/30/2011   • Snoring    • Status post lumbar surgery 7/26/2010 6/03 L4-5 epidural   Nikki  7/06 overall for decompression, foraminotomy. L4-L5 posterior fusion, L4-L5 allograft      • Stroke (HCC) 1996    no residual problems    • Thoracic aortic aneurysm without mention of rupture    • Tricuspid insufficiency    • Unspecified disorder of thyroid     hypo       FAMILY HISTORY  Family History   Problem Relation Age of Onset   • Stroke Mother    • Heart Disease Father    • Heart Disease Sister        SOCIAL HISTORY  Social History     Socioeconomic History   • Marital status:      Spouse name: Not on file   • Number of children: Not on file   • Years of education: Not on file   • Highest education level: Not on file   Occupational History   • Not on file   Tobacco Use   • Smoking status: Never Smoker   • Smokeless tobacco: Never Used   • Tobacco comment: none   Vaping Use   • Vaping Use: Never used   Substance and Sexual Activity   • Alcohol use: No     Alcohol/week: 0.0 oz   • Drug use: Yes     Comment: CBD Oil for Arthritis   • Sexual activity: Not Currently     Partners: Male   Other Topics Concern   • Not on file   Social History Narrative   • Not on file     Social Determinants of Health     Financial Resource Strain:    • Difficulty of Paying Living Expenses:    Food Insecurity:    • Worried About Running Out of Food in the Last Year:    • Ran Out of Food in the Last Year:    Transportation Needs:    • Lack of Transportation (Medical):    • Lack of Transportation (Non-Medical):    Physical Activity:    • Days of Exercise per Week:    • Minutes of Exercise per Session:    Stress:    • Feeling of Stress :    Social Connections:    • Frequency of Communication with Friends and Family:    • Frequency of Social Gatherings with Friends and Family:    • Attends [employer] Services:    • Active Member of Clubs or Organizations:    • Attends Club or Organization Meetings:    • Marital Status:    Intimate Partner Violence:    • Fear of Current or Ex-Partner:    • Emotionally Abused:    •  Physically Abused:    • Sexually Abused:        SURGICAL HISTORY  Past Surgical History:   Procedure Laterality Date   • TENDON REPAIR Right 11/10/2016    Procedure: TENDON REPAIR - QUADRACEPS ;  Surgeon: Maxim Herrera M.D.;  Location: SURGERY [facility];  Service:    • KNEE ARTHROPLASTY TOTAL Right 9/8/2016    Procedure: KNEE ARTHROPLASTY TOTAL;  Surgeon: Maxim Herrera M.D.;  Location: SURGERY [facility];  Service:    • LUMBAR FUSION POSTERIOR  11/24/2015    Procedure: LUMBAR FUSION POSTERIOR L3-4, EXPLORATION OF FUSION L4-5 WITH INSTRUMENTATION;  Surgeon: Hermann Reis M.D.;  Location: SURGERY [facility];  Service:    • LUMBAR LAMINECTOMY DISKECTOMY  11/24/2015    Procedure: LUMBAR L3-4 LAMINECTOMY AND REDO L4-5;  Surgeon: Hermann Reis M.D.;  Location: SURGERY [facility];  Service:    • KNEE ARTHROPLASTY TOTAL  1/3/2012    Performed by YOSEF MEJÍA at [facility]   • KNEE ARTHROSCOPY  10/18/2011    Performed by YOSEF MEJÍA at [facility]   • MEDIAL MENISCECTOMY  10/18/2011    Performed by YOSEF MEJÍA at [facility]   • AORTIC VALVE REPLACEMENT  1/12/2010    Performed by ISAÍAS NICOLE at [facility]   • APPENDECTOMY     • HYSTERECTOMY, TOTAL ABDOMINAL     • LUMBAR FUSION POSTERIOR         CURRENT MEDICATIONS  Home Medications     Reviewed by Mike Fernández (Pharmacy Tech) on 06/25/21 at 1519  Med List Status: Complete   Medication Last Dose Status   acetaminophen (TYLENOL) 500 MG Tab 6/25/2021 Active   apixaban (ELIQUIS) 5mg Tab 6/25/2021 Active   benazepril (LOTENSIN) 40 MG tablet 6/25/2021 Active   doxycycline (VIBRAMYCIN) 100 MG Tab 6/25/2021 Active   FEXOFENADINE HCL PO 6/25/2021 Active   ipratropium (ATROVENT) 0.03 % Solution 6/25/2021 Active   levothyroxine (SYNTHROID) 75 MCG Tab 6/25/2021 Active   MAGNESIUM PO 6/24/2021 Active   metoprolol SR (TOPROL XL) 25 MG TABLET SR 24 HR 6/25/2021 Active   Multiple  "Vitamins-Minerals (CENTRUM SILVER ULTRA WOMENS PO) 6/24/2021 Active   omeprazole (PRILOSEC) 20 MG delayed-release capsule 6/25/2021 Active   oxyCODONE immediate-release (ROXICODONE) 5 MG Tab 6/25/2021 Active                ALLERGIES  Allergies   Allergen Reactions   • Amoxicillin Vomiting     Upset stomach, ok if needed for life saving treatment (per pt)   • Codeine Nausea     Synthetic codones ok   • Doxycycline Nausea     Nausea, Ok in life saving situation (per pt)   • Sulfamethoxazole-Trimethoprim Vomiting and Nausea     Ok in a life saving situation (per pt)   • Tramadol Vomiting and Nausea     nausea   • Trazodone Vomiting     groggy       PHYSICAL EXAM  VITAL SIGNS: BP (!) 91/56   Pulse 65   Temp 36.9 °C (98.4 °F) (Temporal)   Resp 16   Ht 1.626 m (5' 4\")   Wt 69.9 kg (154 lb)   SpO2 96%   BMI 26.43 kg/m²   Constitutional: Well developed, Well nourished, no distress pain well controlled after being treated prior to arrival, Non-toxic appearance.   HENT: Normocephalic, Atraumatic, Bilateral external ears normal, Oropharynx moist, No oral exudates, Nose normal.   Eyes: PERRL, EOMI, Conjunctiva normal, No discharge.   Neck: Normal range of motion, No tenderness, Supple, No stridor.   Lymphatic: No lymphadenopathy noted.   Cardiovascular: Normal heart rate, Normal rhythm, No murmurs, No rubs, No gallops.   Thorax & Lungs: Normal breath sounds, No respiratory distress, No wheezing, No chest tenderness.   Abdomen: Soft, nontender, nondistended, no organomegaly.  No palpable masses.  Skin: Warm, Dry, No erythema, No rash.   Back: No tenderness, No CVA tenderness.   Extremities: Intact distal pulses, No edema, No tenderness, No cyanosis, No clubbing.  Right-sided pelvic tenderness to palpation.  Pelvis is stable.  Neurologic: Alert & oriented x 3, Normal motor function, Normal sensory function, No focal deficits noted.       RADIOLOGY/PROCEDURES  CT-PELVIS W/O   Final Result      1.  Stable appearance of the " minimally displaced fractures of the right sacral ala, right pubic ramus posteriorly and right ischial ramus.      2.  No acute pelvic fracture.      3.  No hip dislocation.      4.  Right total hip arthroplasty in place.        Results for orders placed or performed during the hospital encounter of 06/25/21   CBC WITH DIFFERENTIAL   Result Value Ref Range    WBC 12.5 (H) 4.8 - 10.8 K/uL    RBC 3.35 (L) 4.20 - 5.40 M/uL    Hemoglobin 8.9 (L) 12.0 - 16.0 g/dL    Hematocrit 28.5 (L) 37.0 - 47.0 %    MCV 85.1 81.4 - 97.8 fL    MCH 26.6 (L) 27.0 - 33.0 pg    MCHC 31.2 (L) 33.6 - 35.0 g/dL    RDW 48.6 35.9 - 50.0 fL    Platelet Count 176 164 - 446 K/uL    MPV 8.9 (L) 9.0 - 12.9 fL    Neutrophils-Polys 83.30 (H) 44.00 - 72.00 %    Lymphocytes 6.10 (L) 22.00 - 41.00 %    Monocytes 9.00 0.00 - 13.40 %    Eosinophils 0.20 0.00 - 6.90 %    Basophils 0.20 0.00 - 1.80 %    Immature Granulocytes 1.20 (H) 0.00 - 0.90 %    Nucleated RBC 0.00 /100 WBC    Neutrophils (Absolute) 10.43 (H) 2.00 - 7.15 K/uL    Lymphs (Absolute) 0.76 (L) 1.00 - 4.80 K/uL    Monos (Absolute) 1.13 (H) 0.00 - 0.85 K/uL    Eos (Absolute) 0.02 0.00 - 0.51 K/uL    Baso (Absolute) 0.03 0.00 - 0.12 K/uL    Immature Granulocytes (abs) 0.15 (H) 0.00 - 0.11 K/uL    NRBC (Absolute) 0.00 K/uL   BASIC METABOLIC PANEL   Result Value Ref Range    Sodium 124 (L) 135 - 145 mmol/L    Potassium 4.9 3.6 - 5.5 mmol/L    Chloride 93 (L) 96 - 112 mmol/L    Co2 19 (L) 20 - 33 mmol/L    Glucose 124 (H) 65 - 99 mg/dL    Bun 76 (HH) 8 - 22 mg/dL    Creatinine 2.44 (H) 0.50 - 1.40 mg/dL    Calcium 9.1 8.4 - 10.2 mg/dL    Anion Gap 12.0 7.0 - 16.0   ESTIMATED GFR   Result Value Ref Range    GFR If  23 (A) >60 mL/min/1.73 m 2    GFR If Non  19 (A) >60 mL/min/1.73 m 2           COURSE & MEDICAL DECISION MAKING  Pertinent Labs & Imaging studies reviewed. (See chart for details)    Patient presents today with intractable pain with known pelvic fractures.   Previously the patient had a plain film but no CT scan.  Therefore CT scan is obtained today in order to further evaluate the nature of the patient's injury.    CT scan findings as above.  Laboratory studies remarkable for acute kidney injury with creatinine of 2.4, increased BUN.  Williams consistent with dehydration and inability to access fluids.    Anemia is chronic.    Patient will be admitted to the hospital for further evaluation and treatment.  I spoke with the on-call hospitalist for admission.  In addition I spoke with Dr. Pereyra and from orthopedics who will consult.      FINAL IMPRESSION  1. WILY (acute kidney injury) (HCC)    2. Multiple closed fractures of pelvis with unstable disruption of pelvic ring, initial encounter (HCC)    3. Closed fracture of sacrum and coccyx, initial encounter (HCC)    4. Pelvic pain    5. Dehydration    6. Hyponatremia    7. Chronic anemia            Electronically signed by: Boaz Grove M.D., 6/25/2021 5:08 PM

## 2021-06-25 NOTE — ASSESSMENT & PLAN NOTE
Patient has osteoporosis  Given her acute fractures we will check vitamin D 25 levels  Patient currently not on replacements at home

## 2021-06-25 NOTE — ASSESSMENT & PLAN NOTE
"\"4/20/21  cardiology note sinus on exam, patient would like to discontinue amiodarone understanding she likely will return to intermittent atrial fibrillation, discussed sotalol, dronedarone, tikosyn as alternatives, patient declines any of those alternatives, ablation is not appropriate given her age\" - obtained from Overview of problem from PCP, newest recommendation from PCP.  -We will continue patient on metoprolol at this time with parameters  -Holding Eliquis at this time due to WILY and potential surgery, restart if no surgery and WILY is improving  "

## 2021-06-25 NOTE — ASSESSMENT & PLAN NOTE
Patient arrived with sodium level of 124.  Patient likely has hypovolemic hyponatremia as patient has not been able to sustain her oral fluid intake at home.  She is also has had a poor appetite.  -Patient will be on IV fluids, recheck sodium levels.  Patient was just discharged, acute process, sodium 130 on 6/23.  -Improving sodium at 127 today, will keep IV fluids as she is still having a very poor appetite  Dietary consultation and supplements with meals.

## 2021-06-25 NOTE — ASSESSMENT & PLAN NOTE
Patient currently hypotensive.  Patient has not been able to sustain fluids at home, appears volume depleted on admission.  -Hold ACE inhibitor.  Continue metoprolol with parameters.

## 2021-06-26 PROBLEM — K59.00 CONSTIPATION: Status: ACTIVE | Noted: 2021-06-26

## 2021-06-26 LAB
ALBUMIN SERPL BCP-MCNC: 3.3 G/DL (ref 3.2–4.9)
ANION GAP SERPL CALC-SCNC: 10 MMOL/L (ref 7–16)
BUN SERPL-MCNC: 79 MG/DL (ref 8–22)
BUN SERPL-MCNC: 81 MG/DL (ref 8–22)
CALCIUM SERPL-MCNC: 8.1 MG/DL (ref 8.4–10.2)
CALCIUM SERPL-MCNC: 8.3 MG/DL (ref 8.4–10.2)
CHLORIDE SERPL-SCNC: 91 MMOL/L (ref 96–112)
CHLORIDE SERPL-SCNC: 98 MMOL/L (ref 96–112)
CK SERPL-CCNC: 75 U/L (ref 0–154)
CO2 SERPL-SCNC: 15 MMOL/L (ref 20–33)
CO2 SERPL-SCNC: 19 MMOL/L (ref 20–33)
CREAT SERPL-MCNC: 2.12 MG/DL (ref 0.5–1.4)
CREAT SERPL-MCNC: 2.79 MG/DL (ref 0.5–1.4)
ERYTHROCYTE [DISTWIDTH] IN BLOOD BY AUTOMATED COUNT: 49.7 FL (ref 35.9–50)
GLUCOSE SERPL-MCNC: 104 MG/DL (ref 65–99)
GLUCOSE SERPL-MCNC: 108 MG/DL (ref 65–99)
HCT VFR BLD AUTO: 27.1 % (ref 37–47)
HGB BLD-MCNC: 8.4 G/DL (ref 12–16)
MAGNESIUM SERPL-MCNC: 2 MG/DL (ref 1.5–2.5)
MCH RBC QN AUTO: 26.4 PG (ref 27–33)
MCHC RBC AUTO-ENTMCNC: 31 G/DL (ref 33.6–35)
MCV RBC AUTO: 85.2 FL (ref 81.4–97.8)
PHOSPHATE SERPL-MCNC: 5.6 MG/DL (ref 2.5–4.5)
PLATELET # BLD AUTO: 174 K/UL (ref 164–446)
PMV BLD AUTO: 9.6 FL (ref 9–12.9)
POTASSIUM SERPL-SCNC: 4.5 MMOL/L (ref 3.6–5.5)
POTASSIUM SERPL-SCNC: 4.8 MMOL/L (ref 3.6–5.5)
RBC # BLD AUTO: 3.18 M/UL (ref 4.2–5.4)
SODIUM SERPL-SCNC: 122 MMOL/L (ref 135–145)
SODIUM SERPL-SCNC: 123 MMOL/L (ref 135–145)
WBC # BLD AUTO: 9.4 K/UL (ref 4.8–10.8)

## 2021-06-26 PROCEDURE — 99233 SBSQ HOSP IP/OBS HIGH 50: CPT | Performed by: INTERNAL MEDICINE

## 2021-06-26 PROCEDURE — 700105 HCHG RX REV CODE 258: Performed by: INTERNAL MEDICINE

## 2021-06-26 PROCEDURE — A9270 NON-COVERED ITEM OR SERVICE: HCPCS | Performed by: INTERNAL MEDICINE

## 2021-06-26 PROCEDURE — 83735 ASSAY OF MAGNESIUM: CPT

## 2021-06-26 PROCEDURE — 770006 HCHG ROOM/CARE - MED/SURG/GYN SEMI*

## 2021-06-26 PROCEDURE — 700102 HCHG RX REV CODE 250 W/ 637 OVERRIDE(OP): Performed by: INTERNAL MEDICINE

## 2021-06-26 PROCEDURE — 99221 1ST HOSP IP/OBS SF/LOW 40: CPT | Performed by: PHYSICIAN ASSISTANT

## 2021-06-26 PROCEDURE — 82550 ASSAY OF CK (CPK): CPT

## 2021-06-26 PROCEDURE — 85027 COMPLETE CBC AUTOMATED: CPT

## 2021-06-26 PROCEDURE — 700101 HCHG RX REV CODE 250: Performed by: INTERNAL MEDICINE

## 2021-06-26 PROCEDURE — 36415 COLL VENOUS BLD VENIPUNCTURE: CPT

## 2021-06-26 PROCEDURE — 80048 BASIC METABOLIC PNL TOTAL CA: CPT

## 2021-06-26 PROCEDURE — 80069 RENAL FUNCTION PANEL: CPT

## 2021-06-26 RX ORDER — HYDROMORPHONE HYDROCHLORIDE 1 MG/ML
0.25 INJECTION, SOLUTION INTRAMUSCULAR; INTRAVENOUS; SUBCUTANEOUS
Status: DISCONTINUED | OUTPATIENT
Start: 2021-06-26 | End: 2021-06-28 | Stop reason: HOSPADM

## 2021-06-26 RX ORDER — OXYCODONE HYDROCHLORIDE 10 MG/1
10 TABLET ORAL
Status: DISCONTINUED | OUTPATIENT
Start: 2021-06-26 | End: 2021-06-28 | Stop reason: HOSPADM

## 2021-06-26 RX ORDER — OXYCODONE HYDROCHLORIDE 5 MG/1
2.5 TABLET ORAL
Status: DISCONTINUED | OUTPATIENT
Start: 2021-06-26 | End: 2021-06-28 | Stop reason: HOSPADM

## 2021-06-26 RX ADMIN — OXYCODONE HYDROCHLORIDE 10 MG: 10 TABLET ORAL at 21:09

## 2021-06-26 RX ADMIN — IPRATROPIUM BROMIDE 2 SPRAY: 21 SPRAY NASAL at 08:53

## 2021-06-26 RX ADMIN — APIXABAN 2.5 MG: 2.5 TABLET, FILM COATED ORAL at 19:26

## 2021-06-26 RX ADMIN — ACETAMINOPHEN 650 MG: 325 TABLET, FILM COATED ORAL at 19:26

## 2021-06-26 RX ADMIN — LEVOTHYROXINE SODIUM 75 MCG: 0.07 TABLET ORAL at 05:53

## 2021-06-26 RX ADMIN — METOPROLOL SUCCINATE 25 MG: 25 TABLET, EXTENDED RELEASE ORAL at 05:53

## 2021-06-26 RX ADMIN — OXYCODONE HYDROCHLORIDE 5 MG: 5 TABLET ORAL at 09:06

## 2021-06-26 RX ADMIN — DOCUSATE SODIUM 50 MG AND SENNOSIDES 8.6 MG 2 TABLET: 8.6; 5 TABLET, FILM COATED ORAL at 19:25

## 2021-06-26 RX ADMIN — APIXABAN 2.5 MG: 2.5 TABLET, FILM COATED ORAL at 12:07

## 2021-06-26 RX ADMIN — DOXYCYCLINE 100 MG: 100 TABLET ORAL at 19:26

## 2021-06-26 RX ADMIN — OXYCODONE HYDROCHLORIDE 10 MG: 10 TABLET ORAL at 12:32

## 2021-06-26 RX ADMIN — ACETAMINOPHEN 650 MG: 325 TABLET, FILM COATED ORAL at 12:07

## 2021-06-26 RX ADMIN — SODIUM CHLORIDE: 9 INJECTION, SOLUTION INTRAVENOUS at 07:22

## 2021-06-26 RX ADMIN — POLYETHYLENE GLYCOL 3350 1 PACKET: 17 POWDER, FOR SOLUTION ORAL at 15:20

## 2021-06-26 RX ADMIN — DOXYCYCLINE 100 MG: 100 TABLET ORAL at 05:53

## 2021-06-26 RX ADMIN — ACETAMINOPHEN 650 MG: 325 TABLET, FILM COATED ORAL at 05:53

## 2021-06-26 RX ADMIN — OMEPRAZOLE 20 MG: 20 CAPSULE, DELAYED RELEASE ORAL at 05:53

## 2021-06-26 RX ADMIN — IPRATROPIUM BROMIDE 2 SPRAY: 21 SPRAY NASAL at 19:28

## 2021-06-26 RX ADMIN — DOCUSATE SODIUM 50 MG AND SENNOSIDES 8.6 MG 2 TABLET: 8.6; 5 TABLET, FILM COATED ORAL at 06:01

## 2021-06-26 ASSESSMENT — PAIN DESCRIPTION - PAIN TYPE
TYPE: ACUTE PAIN
TYPE: ACUTE PAIN

## 2021-06-26 ASSESSMENT — CHA2DS2 SCORE
SEX: FEMALE
HYPERTENSION: YES
AGE 65 TO 74: NO
PRIOR STROKE OR TIA OR THROMBOEMBOLISM: YES
AGE 75 OR GREATER: YES
VASCULAR DISEASE: NO
CHA2DS2 VASC SCORE: 6
DIABETES: NO
CHF OR LEFT VENTRICULAR DYSFUNCTION: NO

## 2021-06-26 ASSESSMENT — ENCOUNTER SYMPTOMS
FALLS: 1
CONSTIPATION: 1

## 2021-06-26 NOTE — ASSESSMENT & PLAN NOTE
Patient states no BM in several days since taking opiates, likely contributing to poor oral intake and abdominal pain.  Bowel regimen

## 2021-06-26 NOTE — HOSPITAL COURSE
"Per notes, \"85 y.o. female PMH hypothyroidism, HTN, CKD 3, pA-fib chronic eliquis, who presented 6/25/2021 with pelvic pain and failure to thrive.  Admitted 6/25/21 for failure to thrive at home.  Home health nurse called EMS as patient was unable to stand, unstable at home, hypoxic 88% on room air.  Patient had GLF , pelvic and sacral fx known after fall. Still complains of pain in region, non-radiating, 10/10 pain, worsens with movement, no alleviating factors.  Patient was seen in hospital and discharged home 6/23/21.     In the ED, imaging was taken, reviewed: Stable appearance of the minimally displaced fractures of the right sacral ala, right pubic ramus posteriorly and right ischial ramus.    Labs reviewed: WILY, volume depletion, hyponatremia 124.\"    "

## 2021-06-26 NOTE — PROGRESS NOTES
"Hospital Medicine Daily Progress Note    Date of Service  6/26/2021    Chief Complaint  85 y.o. female admitted 6/25/2021 with pelvic pain    Hospital Course  Per notes, \"85 y.o. female PMH hypothyroidism, HTN, CKD 3, pA-fib chronic eliquis, who presented 6/25/2021 with pelvic pain and failure to thrive.  Admitted 6/25/21 for failure to thrive at home.  Home health nurse called EMS as patient was unable to stand, unstable at home, hypoxic 88% on room air.  Patient had GLF , pelvic and sacral fx known after fall. Still complains of pain in region, non-radiating, 10/10 pain, worsens with movement, no alleviating factors.  Patient was seen in hospital and discharged home 6/23/21.     In the ED, imaging was taken, reviewed: Stable appearance of the minimally displaced fractures of the right sacral ala, right pubic ramus posteriorly and right ischial ramus.    Labs reviewed: WILY, volume depletion, hyponatremia 124.\"        Interval Problem Update  Seen and examined by me this morning at bedside, still having significant pain.    Discussed with family, patient sent home with oxycodone as needed and scheduled Tylenol.  Is unclear if she received scheduled Tylenol and only received a few doses of oxycodone.  Previously she has been evaluated by physical therapy and Occupational Therapy.  For home with home health, negative now.  She will need either inpatient rehabilitation for SNF.    Pending PT/OT evaluations    Discussed with orthopedic surgery no need for surgery at this time.    Consultants/Specialty  Orthopedic surgery    Code Status  Full Code    Disposition  Anticipated discharge to SNF versus rehab    Review of Systems  Review of Systems   Gastrointestinal: Positive for constipation.   Musculoskeletal: Positive for falls and joint pain.   All other systems reviewed and are negative.       Physical Exam  Temp:  [36.5 °C (97.7 °F)-36.9 °C (98.5 °F)] 36.5 °C (97.7 °F)  Pulse:  [58-81] 81  Resp:  [16-18] 18  BP: " ()/(42-72) 105/51  SpO2:  [93 %-100 %] 97 %    Physical Exam  Vitals and nursing note reviewed.   Constitutional:       Appearance: Normal appearance.   Cardiovascular:      Rate and Rhythm: Normal rate and regular rhythm.      Pulses: Normal pulses.      Heart sounds: Normal heart sounds.   Pulmonary:      Effort: Pulmonary effort is normal.      Breath sounds: Normal breath sounds.   Abdominal:      General: Abdomen is flat. There is distension.      Palpations: Abdomen is soft.   Musculoskeletal:      Right lower leg: No edema.      Left lower leg: No edema.   Neurological:      General: No focal deficit present.      Mental Status: She is alert and oriented to person, place, and time. Mental status is at baseline.         Fluids    Intake/Output Summary (Last 24 hours) at 6/26/2021 1325  Last data filed at 6/26/2021 1000  Gross per 24 hour   Intake 2173.82 ml   Output 3 ml   Net 2170.82 ml       Laboratory  Recent Labs     06/25/21  1350 06/26/21  0419   WBC 12.5* 9.4   RBC 3.35* 3.18*   HEMOGLOBIN 8.9* 8.4*   HEMATOCRIT 28.5* 27.1*   MCV 85.1 85.2   MCH 26.6* 26.4*   MCHC 31.2* 31.0*   RDW 48.6 49.7   PLATELETCT 176 174   MPV 8.9* 9.6     Recent Labs     06/25/21  1350 06/26/21  0419   SODIUM 124* 122*   POTASSIUM 4.9 4.8   CHLORIDE 93* 91*   CO2 19* 19*   GLUCOSE 124* 104*   BUN 76* 81*   CREATININE 2.44* 2.79*   CALCIUM 9.1 8.3*                   Imaging  CT-PELVIS W/O   Final Result      1.  Stable appearance of the minimally displaced fractures of the right sacral ala, right pubic ramus posteriorly and right ischial ramus.      2.  No acute pelvic fracture.      3.  No hip dislocation.      4.  Right total hip arthroplasty in place.           Assessment/Plan  * Pelvic fracture (HCC)- (present on admission)  Assessment & Plan  Patient has repeats complained of right hip fracture.  Patient had fallen when she was going to a [employer] lab and hit the glass and fell backwards onto her gluteal region causing  "a fracture.  Patient was evaluated in the ED, patient had decided to go home rather than stay inpatient.  When she got home patient had gotten worse and her pain.  Home health nurse had come to evaluate the patient today and determined patient was unable to stand up, decompensating and was hypoxic to 88% on room air.  Home health nurse called EMS for evaluation in the ED  -Orthopedic surgery was consulted by EDP, the previous evaluated patient and actually stable and does not require surgical management.  I did discuss again with orthopedic surgery this morning, no need for surgical management at this time  -Pain management  -Likely need placement as she has failed outpatient with home health.  -PT/OT evaluations and treatment    Constipation- (present on admission)  Assessment & Plan  Patient states no BM in several days since taking opiates, likely contributing to poor oral intake and abdominal pain.  Bowel regimen    Acute renal failure superimposed on stage 3 chronic kidney disease (HCC)- (present on admission)  Assessment & Plan  Patient has not been able to eat or drink well at home.  Creatinine 2.44 on admission.  Baseline appears to be 1.5 or less.  Baseline CKD stage III.  -Avoid nephrotoxic agents  -We will hold patient's ACE inhibitor  -Continue to monitor    On continuous oral anticoagulation- (present on admission)  Assessment & Plan  Patient is on Eliquis at home, has atrial fibrillation  Currently patient has acute renal failure on top of CKD and patient is pending possible surgery  We will continue patient on heparin at this time, restart Eliquis once renal failure improves and no surgery is needed    Paroxysmal atrial fibrillation (HCC)- (present on admission)  Assessment & Plan  \"4/20/21  cardiology note sinus on exam, patient would like to discontinue amiodarone understanding she likely will return to intermittent atrial fibrillation, discussed sotalol, dronedarone, tikosyn as " "alternatives, patient declines any of those alternatives, ablation is not appropriate given her age\" - obtained from Overview of problem from PCP, newest recommendation from PCP.  -We will continue patient on metoprolol at this time with parameters  -Holding Eliquis at this time due to WILY and potential surgery, restart if no surgery and WILY is improving    Hyponatremia- (present on admission)  Assessment & Plan  Patient arrived with sodium level of 124.  Patient likely has hypovolemic hyponatremia as patient has not been able to sustain her oral fluid intake at home.  She is also has had a poor appetite.  -Patient will be on IV fluids, recheck sodium levels.  Patient was just discharged, acute process, sodium 130 on 6/23.    GERD (gastroesophageal reflux disease)- (present on admission)  Assessment & Plan  Patient has chronic GERD, stable, continue omeprazole    Hypothyroid- (present on admission)  Assessment & Plan  Patient is on chronic Synthroid 75 mcg, stable, continue home medications    Osteoporosis, idiopathic- (present on admission)  Assessment & Plan  Patient has osteoporosis  Given her acute fractures we will check vitamin D 25 levels  Patient currently not on replacements at home    Hypertension- (present on admission)  Assessment & Plan  Patient currently hypotensive.  Patient has not been able to sustain fluids at home, appears volume depleted on admission.  -Hold ACE inhibitor.  Continue metoprolol with parameters.       VTE prophylaxis: Apixaban      "

## 2021-06-26 NOTE — PROGRESS NOTES
Assumed care of patient at approx 0700hrs; bedside report from NOC RN. Updated on POC. Patient currently A & O x 4; on2L O2 via NC; pt reports pain 10/10 with activity; medicated per MAR. Assessment completed. Call light within reach. Whiteboard updated. Fall precautions in place. Bed locked and in lowest position. All questions answered. No other needs indicated at this time.

## 2021-06-26 NOTE — CARE PLAN
The patient is Stable - Low risk of patient condition declining or worsening    Shift Goals  Clinical Goals: Patient will use call light for needs.  Patient Goals: Patient will rest comfortably.    Progress made toward(s) clinical / shift goals:  Patient uses the call light for needs.    Patient is not progressing towards the following goals:      Problem: Knowledge Deficit - Standard  Goal: Patient and family/care givers will demonstrate understanding of plan of care, disease process/condition, diagnostic tests and medications  Outcome: Progressing     Problem: Pain - Standard  Goal: Alleviation of pain or a reduction in pain to the patient’s comfort goal  Outcome: Progressing     Problem: Skin Integrity  Goal: Skin integrity is maintained or improved  Outcome: Progressing

## 2021-06-26 NOTE — CARE PLAN
The patient is Stable - Low risk of patient condition declining or worsening    Shift Goals  Clinical Goals: pain control, increased mobility  Patient Goals: pain control    Progress made toward(s) clinical / shift goals:  pt pain is well controlled with pain regimen  Problem: Knowledge Deficit - Standard  Goal: Patient and family/care givers will demonstrate understanding of plan of care, disease process/condition, diagnostic tests and medications  Outcome: Progressing     Problem: Pain - Standard  Goal: Alleviation of pain or a reduction in pain to the patient’s comfort goal  Outcome: Progressing       Patient is not progressing towards the following goals:

## 2021-06-26 NOTE — PROGRESS NOTES
"6/26/2021    Time Called:   Time Arrived:     The patient was seen at the request of     HPI: Yvonne Marie Wada is a 85 y.o. female who presents with complaints of pain to right pelvis.  This started immediately after a fall several days ago.  The pain is 7/10 and is described as sharp.  The pain is made worse by palpation of the area and made better by rest and immobilization.    Past Medical History:   Diagnosis Date   • AAA (abdominal aortic aneurysm) ([facility]) 7/26/2010    10/09 CT thorax dilated ascending thoracic aorta 4.9 cm 1/10 transesophageal echo dilated aortic root, and ascending aorta with mild aortic insufficiency 1/10  ascending aortic aneurysm repair 5/12 echo [facility] normal LV function EF 60%, moderate to severe left atrial enlargement, RVSP 32    • Aortic insufficiency    • Aortic valve disease    • Aortic valve regurgitation    • Arthritis     back and knee/ osteo   • Cataract    • Dental disorder     full top denture, partial bottom   • Diverticulosis    • Dyslipidemia 7/26/2010    Sees [facility] on lipitor 4/11 chol 159,trig 84,hdl 47,ldl 95,cpk 114 7/12 chol 163,trig 120,hdl 49,ldl 90, crp 1.1 on lipitor 20 mg    • GERD (gastroesophageal reflux disease) 7/12/2012 6/07 EGD per [facility] hiatal hernia, start prevacid 30 mg 7/12 protonix 20 mg qday    • Glaucoma    • Heart burn    • Heart murmur    • Heart valve disease     \"leaking\", mitral valve   • Hiatus hernia syndrome    • High cholesterol    • History of shingles 6/30/2011 2010 Back and left thorax     • History of total knee arthroplasty 7/26/2010    4/10 MRI right knee complex tear medial meniscus, horizontal cleavage tear lateral meniscus, chondromalacia patella, moderate-sized Baker's cyst 5/10 right meniscus knee repair  5/10 told by  had gout on surgery had pathology report, I await that report Question gout seen on pathology report done during repair of right meniscus knee surgery. 7/10 uric acid 6.3, we'll await " starting allopurinol depending on the operative report 8/5/10 reviewed ortho notes , op report indicates crystal deposition, could be gout or pseudogout. No bx result sent over. Will need to get. My suspicion is chondrocalcinosis. 10/11  ortho note, MRI pending 8/7/10 reviewed pathology report right knee tissue, marked degenerative changes with focal calcification, no mention of gout. Based on this and a normal uric acid level, I do not believe she has gout, has no hyperuricemia medications would be indicated 10/11  ortho left knee meniscectomy and arthroscopy 1/12     • HLD (hyperlipidemia)    • Hypertension    • Hypothyroid 4/8/2011 4/11 tsh 9 start synthroid 50 4/11 tsh 6,free t3 3.1,free t4 1.2,tpo negative 7/1/11 tsh 2.1 cont synthroid 50 mcg 7/12 tsh 3.4 cont synthroid 50 mcg    • Indigestion    • Mitral insufficiency    • OSTEOPOROSIS 8/9/2010    Had been on fosamax [id number]  8/10 dexa LS -2.0,hip -1.5 await old dexa result, she will get copy for me 4/11 vit d 35 9/12 dexa LS -3.2,hip -1.4 2/13/13 relast infusion    • Pain     back and right leg, can get as bad as 10/10   • Preventative health care 7/26/2010 2002 pneum 2005 colon [facility] no records 4/11 vit d 35 9/11 shingles vaccine 9/12 mammogram 9/12 dexa LS -3.2,hip -1.4    • Rheumatic fever    • S/P appendectomy 7/26/2010   • S/P TOMY (total abdominal hysterectomy) 7/26/2010    Sees gyn on HRT estradiol for 20 yrs () 11/08 mammo     • Shingles 6/30/2011   • Snoring    • Status post lumbar surgery 7/26/2010 6/03 L4-5 epidural Dr. Jordan  7/06 overall for decompression, foraminotomy. L4-L5 posterior fusion, L4-L5 allograft      • Stroke (HCC) 1996    no residual problems    • Thoracic aortic aneurysm without mention of rupture    • Tricuspid insufficiency    • Unspecified disorder of thyroid     hypo       Past Surgical History:   Procedure Laterality Date   • TENDON REPAIR Right  11/10/2016    Procedure: TENDON REPAIR - QUADRACEPS ;  Surgeon: Maxim Herrera M.D.;  Location: SURGERY [facility];  Service:    • KNEE ARTHROPLASTY TOTAL Right 9/8/2016    Procedure: KNEE ARTHROPLASTY TOTAL;  Surgeon: Maxim Herrera M.D.;  Location: SURGERY [facility];  Service:    • LUMBAR FUSION POSTERIOR  11/24/2015    Procedure: LUMBAR FUSION POSTERIOR L3-4, EXPLORATION OF FUSION L4-5 WITH INSTRUMENTATION;  Surgeon: Hermann Reis M.D.;  Location: SURGERY [facility];  Service:    • LUMBAR LAMINECTOMY DISKECTOMY  11/24/2015    Procedure: LUMBAR L3-4 LAMINECTOMY AND REDO L4-5;  Surgeon: Hermann Reis M.D.;  Location: SURGERY [facility];  Service:    • KNEE ARTHROPLASTY TOTAL  1/3/2012    Performed by YOSEF MEJÍA at [facility]   • KNEE ARTHROSCOPY  10/18/2011    Performed by YOSEF MEJÍA at [facility]   • MEDIAL MENISCECTOMY  10/18/2011    Performed by YOSEF MEJÍA at [facility]   • AORTIC VALVE REPLACEMENT  1/12/2010    Performed by ISAÍAS NICOLE at [facility]   • APPENDECTOMY     • HYSTERECTOMY, TOTAL ABDOMINAL     • LUMBAR FUSION POSTERIOR         Medications  No current facility-administered medications on file prior to encounter.     Current Outpatient Medications on File Prior to Encounter   Medication Sig Dispense Refill   • benazepril (LOTENSIN) 40 MG tablet Take 40 mg by mouth every morning.     • oxyCODONE immediate-release (ROXICODONE) 5 MG Tab Take 1 tablet by mouth every 6 hours as needed for Severe Pain for up to 4 days. 15 tablet 0   • acetaminophen (TYLENOL) 500 MG Tab Take 1,000 mg by mouth every 6 hours as needed for Moderate Pain.     • ipratropium (ATROVENT) 0.03 % Solution Administer 2 Sprays into affected nostril(S) 2 times a day. 30 mL 6   • doxycycline (VIBRAMYCIN) 100 MG Tab Take 1 tablet by mouth 2 times a day. 20 tablet 0   • levothyroxine (SYNTHROID) 75 MCG Tab TAKE 1 TAB BY MOUTH EVERY MORNING  ON AN EMPTY STOMACH. 90 tablet 1   • MAGNESIUM PO Take 1 capsule by mouth every evening.     • apixaban (ELIQUIS) 5mg Tab Take 1 Tab by mouth 2 Times a Day. 180 Tab 3   • metoprolol SR (TOPROL XL) 25 MG TABLET SR 24 HR Take 1 Tab by mouth every morning. 90 Tab 3   • FEXOFENADINE HCL PO Take 1 tablet by mouth 2 times a day. Indications: Hayfever     • omeprazole (PRILOSEC) 20 MG delayed-release capsule Take 20 mg by mouth every morning.     • Multiple Vitamins-Minerals (CENTRUM SILVER ULTRA WOMENS PO) Take 1 tablet by mouth every evening.         Allergies  Amoxicillin, Codeine, Doxycycline, Sulfamethoxazole-trimethoprim, Tramadol, and Trazodone    ROS  No new injury focal weakness or paresthesia. All other systems were reviewed and found to be negative    Family History   Problem Relation Age of Onset   • Stroke Mother    • Heart Disease Father    • Heart Disease Sister        Social History     Socioeconomic History   • Marital status:      Spouse name: Not on file   • Number of children: Not on file   • Years of education: Not on file   • Highest education level: Not on file   Occupational History   • Not on file   Tobacco Use   • Smoking status: Never Smoker   • Smokeless tobacco: Never Used   • Tobacco comment: none   Vaping Use   • Vaping Use: Never used   Substance and Sexual Activity   • Alcohol use: No     Alcohol/week: 0.0 oz   • Drug use: Yes     Comment: CBD Oil for Arthritis   • Sexual activity: Not Currently     Partners: Male   Other Topics Concern   • Not on file   Social History Narrative   • Not on file     Social Determinants of Health     Financial Resource Strain:    • Difficulty of Paying Living Expenses:    Food Insecurity:    • Worried About Running Out of Food in the Last Year:    • Ran Out of Food in the Last Year:    Transportation Needs:    • Lack of Transportation (Medical):    • Lack of Transportation (Non-Medical):    Physical Activity:    • Days of Exercise per Week:    •  "Minutes of Exercise per Session:    Stress:    • Feeling of Stress :    Social Connections:    • Frequency of Communication with Friends and Family:    • Frequency of Social Gatherings with Friends and Family:    • Attends [employer] Services:    • Active Member of Clubs or Organizations:    • Attends Club or Organization Meetings:    • Marital Status:    Intimate Partner Violence:    • Fear of Current or Ex-Partner:    • Emotionally Abused:    • Physically Abused:    • Sexually Abused:        Physical Exam  Vitals  /51   Pulse 81   Temp 36.5 °C (97.7 °F) (Oral)   Resp 18   Ht 1.626 m (5' 4\")   Wt 69.9 kg (154 lb)   SpO2 97%   General: Well Developed, Well Nourished, Age appropriate appearance  HEENT: Normocephalic, atraumatic  Psych: Normal mood and affect  Neck: Supple, nontender, no masses  Lungs: Breathing unlabored, No audible wheezing  Heart: Regular heart rate and rhythm  Abdomen: Soft, NT, ND  Neuro: Sensation grossly intact to BUE and BLE, moving all four extremities  Skin: Intact, no open wounds  Vascular: Extremities well perfused, Capillary refill <2 seconds  MSK: Indicates pain right pelvis made worse with motion.  Currently resting comfortably.      Radiographs:  CT-PELVIS W/O   Final Result      1.  Stable appearance of the minimally displaced fractures of the right sacral ala, right pubic ramus posteriorly and right ischial ramus.      2.  No acute pelvic fracture.      3.  No hip dislocation.      4.  Right total hip arthroplasty in place.          Laboratory Values  Recent Labs     06/25/21  1350 06/26/21  0419   WBC 12.5* 9.4   RBC 3.35* 3.18*   HEMOGLOBIN 8.9* 8.4*   HEMATOCRIT 28.5* 27.1*   MCV 85.1 85.2   MCH 26.6* 26.4*   MCHC 31.2* 31.0*   RDW 48.6 49.7   PLATELETCT 176 174   MPV 8.9* 9.6     Recent Labs     06/25/21  1350 06/26/21  0419   SODIUM 124* 122*   POTASSIUM 4.9 4.8   CHLORIDE 93* 91*   CO2 19* 19*   GLUCOSE 124* 104*   BUN 76* 81*   CPKTOTAL  --  75       "       Impression: Right pelvic ring and sacral ritu fractures, nondisplaced and nonoperative    Plan:We discussed the diagnosis and findings with the patient at length.  We reviewed possible non operative and operative interventions and the risks and benefits of each of these.  she had a chance to ask questions and all of these were answered to her satisfaction. The patient chose to proceed with nonoperative intervention. Risks and benefits of surgery were discussed which include but are not limited to bleeding, infection, neurovascular damage, malunion, nonunion, instability, limb length discrepancy, DVT, PE, MI, Stroke and death. They understand these risks and wish to proceed.    Patient is weightbearing as tolerated.  Currently admitted for pain control.  She can continue with PT as tolerated.  We will continue to follow peripherally while in-house.  I reviewed patient with Dr. SEGURA and he agrees.

## 2021-06-26 NOTE — PROGRESS NOTES
Pt arrived via gurney, admitted to room 201-1 from ED. Pt is A&Ox4, c/o pain reported at 8/10 on a scale of 0-10. Oriented to room call light. Reviewed plan of care with the patient and the family. Fall precaution in place. Bed locked. Bed at lowest position. Call light within reach. Encouraged pt the importance to call for assistance.

## 2021-06-27 LAB
ANION GAP SERPL CALC-SCNC: 11 MMOL/L (ref 7–16)
BUN SERPL-MCNC: 75 MG/DL (ref 8–22)
CALCIUM SERPL-MCNC: 7.9 MG/DL (ref 8.4–10.2)
CHLORIDE SERPL-SCNC: 99 MMOL/L (ref 96–112)
CO2 SERPL-SCNC: 17 MMOL/L (ref 20–33)
CREAT SERPL-MCNC: 1.84 MG/DL (ref 0.5–1.4)
ERYTHROCYTE [DISTWIDTH] IN BLOOD BY AUTOMATED COUNT: 50.4 FL (ref 35.9–50)
GLUCOSE SERPL-MCNC: 117 MG/DL (ref 65–99)
HCT VFR BLD AUTO: 24.7 % (ref 37–47)
HGB BLD-MCNC: 7.7 G/DL (ref 12–16)
MAGNESIUM SERPL-MCNC: 2.1 MG/DL (ref 1.5–2.5)
MCH RBC QN AUTO: 26.6 PG (ref 27–33)
MCHC RBC AUTO-ENTMCNC: 31.2 G/DL (ref 33.6–35)
MCV RBC AUTO: 85.2 FL (ref 81.4–97.8)
PLATELET # BLD AUTO: 178 K/UL (ref 164–446)
PMV BLD AUTO: 9.8 FL (ref 9–12.9)
POTASSIUM SERPL-SCNC: 4.7 MMOL/L (ref 3.6–5.5)
RBC # BLD AUTO: 2.9 M/UL (ref 4.2–5.4)
SODIUM SERPL-SCNC: 127 MMOL/L (ref 135–145)
WBC # BLD AUTO: 8 K/UL (ref 4.8–10.8)

## 2021-06-27 PROCEDURE — 700102 HCHG RX REV CODE 250 W/ 637 OVERRIDE(OP): Performed by: INTERNAL MEDICINE

## 2021-06-27 PROCEDURE — 99233 SBSQ HOSP IP/OBS HIGH 50: CPT | Performed by: INTERNAL MEDICINE

## 2021-06-27 PROCEDURE — 80048 BASIC METABOLIC PNL TOTAL CA: CPT

## 2021-06-27 PROCEDURE — 770006 HCHG ROOM/CARE - MED/SURG/GYN SEMI*

## 2021-06-27 PROCEDURE — 83735 ASSAY OF MAGNESIUM: CPT

## 2021-06-27 PROCEDURE — A9270 NON-COVERED ITEM OR SERVICE: HCPCS | Performed by: INTERNAL MEDICINE

## 2021-06-27 PROCEDURE — 36415 COLL VENOUS BLD VENIPUNCTURE: CPT

## 2021-06-27 PROCEDURE — 85027 COMPLETE CBC AUTOMATED: CPT

## 2021-06-27 PROCEDURE — 97162 PT EVAL MOD COMPLEX 30 MIN: CPT

## 2021-06-27 RX ADMIN — DOXYCYCLINE 100 MG: 100 TABLET ORAL at 05:04

## 2021-06-27 RX ADMIN — OXYCODONE HYDROCHLORIDE 10 MG: 10 TABLET ORAL at 09:55

## 2021-06-27 RX ADMIN — OXYCODONE HYDROCHLORIDE 10 MG: 10 TABLET ORAL at 20:32

## 2021-06-27 RX ADMIN — BISACODYL 10 MG: 10 SUPPOSITORY RECTAL at 20:57

## 2021-06-27 RX ADMIN — DOXYCYCLINE 100 MG: 100 TABLET ORAL at 17:39

## 2021-06-27 RX ADMIN — IPRATROPIUM BROMIDE 2 SPRAY: 21 SPRAY NASAL at 17:40

## 2021-06-27 RX ADMIN — DOCUSATE SODIUM 50 MG AND SENNOSIDES 8.6 MG 2 TABLET: 8.6; 5 TABLET, FILM COATED ORAL at 17:39

## 2021-06-27 RX ADMIN — ACETAMINOPHEN 650 MG: 325 TABLET, FILM COATED ORAL at 17:39

## 2021-06-27 RX ADMIN — OMEPRAZOLE 20 MG: 20 CAPSULE, DELAYED RELEASE ORAL at 05:04

## 2021-06-27 RX ADMIN — ACETAMINOPHEN 650 MG: 325 TABLET, FILM COATED ORAL at 05:04

## 2021-06-27 RX ADMIN — DOCUSATE SODIUM 50 MG AND SENNOSIDES 8.6 MG 2 TABLET: 8.6; 5 TABLET, FILM COATED ORAL at 05:04

## 2021-06-27 RX ADMIN — APIXABAN 2.5 MG: 2.5 TABLET, FILM COATED ORAL at 05:04

## 2021-06-27 RX ADMIN — IPRATROPIUM BROMIDE 2 SPRAY: 21 SPRAY NASAL at 05:09

## 2021-06-27 RX ADMIN — APIXABAN 2.5 MG: 2.5 TABLET, FILM COATED ORAL at 17:39

## 2021-06-27 RX ADMIN — METOPROLOL SUCCINATE 25 MG: 25 TABLET, EXTENDED RELEASE ORAL at 05:04

## 2021-06-27 RX ADMIN — LEVOTHYROXINE SODIUM 75 MCG: 0.07 TABLET ORAL at 05:04

## 2021-06-27 RX ADMIN — MAGNESIUM HYDROXIDE 30 ML: 400 SUSPENSION ORAL at 05:03

## 2021-06-27 RX ADMIN — ACETAMINOPHEN 650 MG: 325 TABLET, FILM COATED ORAL at 11:50

## 2021-06-27 RX ADMIN — ACETAMINOPHEN 650 MG: 325 TABLET, FILM COATED ORAL at 00:09

## 2021-06-27 RX ADMIN — ACETAMINOPHEN 650 MG: 325 TABLET, FILM COATED ORAL at 23:57

## 2021-06-27 ASSESSMENT — PAIN DESCRIPTION - PAIN TYPE
TYPE: ACUTE PAIN

## 2021-06-27 ASSESSMENT — ENCOUNTER SYMPTOMS
CONSTIPATION: 1
FALLS: 1

## 2021-06-27 ASSESSMENT — COGNITIVE AND FUNCTIONAL STATUS - GENERAL
STANDING UP FROM CHAIR USING ARMS: TOTAL
MOVING FROM LYING ON BACK TO SITTING ON SIDE OF FLAT BED: UNABLE
MOBILITY SCORE: 6
MOVING TO AND FROM BED TO CHAIR: UNABLE
WALKING IN HOSPITAL ROOM: TOTAL
CLIMB 3 TO 5 STEPS WITH RAILING: TOTAL
TURNING FROM BACK TO SIDE WHILE IN FLAT BAD: UNABLE
SUGGESTED CMS G CODE MODIFIER MOBILITY: CN

## 2021-06-27 ASSESSMENT — GAIT ASSESSMENTS: GAIT LEVEL OF ASSIST: UNABLE TO PARTICIPATE

## 2021-06-27 NOTE — DISCHARGE PLANNING
Received Choice form at 3991  Agency/Facility Name: (1) [facility] (2) [facility] (3) [place] [facility]  Referral sent per Choice form @ [id number]

## 2021-06-27 NOTE — DISCHARGE PLANNING
Anticipated Discharge Disposition:   SNF     Action:   Chart review complete.     Per MD and PT, PAP is recommended. SNF referral order in place. RN REGINE to collect choice.     1230: RN REGINE met with patient at bedside to collect SNF choice. Patient is agreeable and gave choice for SNF's in [facility], 1- [facility], 2- [place], 3- [place].     1300: Choice form faxed to [facility]     PASRR: [id number]     Barriers to Discharge:   SNF acceptance   OT eval  Medical Clearance    Plan:   Hospital care management will continue to assist with discharge planning needs.     Care Transition Team Assessment    Information Source  Orientation Level: Oriented X4  Information Given By: Patient  Informant's Name: Yvonne Wada  Who is responsible for making decisions for patient? : Patient    Readmission Evaluation  Is this a readmission?: Yes - unplanned readmission    Elopement Risk  Legal Hold: No  Ambulatory or Self Mobile in Wheelchair: No-Not an Elopement Risk  Elopement Risk: Not at Risk for Elopement    Interdisciplinary Discharge Planning  Does Admitting Nurse Feel This Could be a Complex Discharge?: No  Primary Care Physician: CHRISTINE RAYO M.D  Lives with - Patient's Self Care Capacity: Spouse  Support Systems: Spouse / Significant Other  Housing / Facility: [address] [place]  Do You Take your Prescribed Medications Regularly: Yes  Able to Return to Previous ADL's: Future Time w/Therapy  Mobility Issues: Yes  Assistance Needed: Yes    Discharge Preparedness  What is your plan after discharge?: Skilled nursing facility  What are your discharge supports?: Spouse    Finances  Financial Barriers to Discharge: No  Prescription Coverage: Yes    Vision / Hearing Impairment  Right Eye Vision: Impaired, Wears Glasses  Left Eye Vision: Impaired, Wears Glasses    Advance Directive  Advance Directive?: None    Domestic Abuse  Have you ever been the victim of abuse or violence?: No  Physical Abuse or Sexual Abuse: No  Verbal Abuse or Emotional  Abuse: No  Possible Abuse/Neglect Reported to:: Not Applicable    Discharge Risks or Barriers  Discharge risks or barriers?: Complex medical needs  Patient risk factors: Vulnerable adult, Complex medical needs    Anticipated Discharge Information  Discharge Disposition: Still a Patient (30)

## 2021-06-27 NOTE — PROGRESS NOTES
"Hospital Medicine Daily Progress Note    Date of Service  6/27/2021    Chief Complaint  85 y.o. female admitted 6/25/2021 with pelvic pain    Hospital Course  Per notes, \"85 y.o. female PMH hypothyroidism, HTN, CKD 3, pA-fib chronic eliquis, who presented 6/25/2021 with pelvic pain and failure to thrive.  Admitted 6/25/21 for failure to thrive at home.  Home health nurse called EMS as patient was unable to stand, unstable at home, hypoxic 88% on room air.  Patient had GLF , pelvic and sacral fx known after fall. Still complains of pain in region, non-radiating, 10/10 pain, worsens with movement, no alleviating factors.  Patient was seen in hospital and discharged home 6/23/21.     In the ED, imaging was taken, reviewed: Stable appearance of the minimally displaced fractures of the right sacral ala, right pubic ramus posteriorly and right ischial ramus.    Labs reviewed: WILY, volume depletion, hyponatremia 124.\"        Interval Problem Update  Seen and examined this morning at bedside, no complaints at time of examination.  Will encourage working with PT/OT.    Discussed with family, patient sent home with oxycodone as needed and scheduled Tylenol.  Is unclear if she received scheduled Tylenol and only received a few doses of oxycodone.  Previously she has been evaluated by physical therapy and Occupational Therapy.  For home with home health, negative now.  She will need either inpatient rehabilitation for SNF.    PT recommending skilled nursing placement prior to discharge home.  Discussed with case management.    Discussed with orthopedic surgery no need for surgery at this time.    Consultants/Specialty  Orthopedic surgery    Code Status  Full Code    Disposition  Anticipated discharge to SNF versus rehab    Review of Systems  Review of Systems   Gastrointestinal: Positive for constipation.   Musculoskeletal: Positive for falls and joint pain.   All other systems reviewed and are negative.       Physical " Exam  Temp:  [36.3 °C (97.3 °F)-36.7 °C (98 °F)] 36.7 °C (98 °F)  Pulse:  [68-77] 77  Resp:  [12-18] 18  BP: (107-139)/(47-60) 139/60  SpO2:  [93 %-100 %] 93 %    Physical Exam  Vitals and nursing note reviewed.   Constitutional:       Appearance: Normal appearance.   Cardiovascular:      Rate and Rhythm: Normal rate and regular rhythm.      Pulses: Normal pulses.      Heart sounds: Normal heart sounds.   Pulmonary:      Effort: Pulmonary effort is normal.      Breath sounds: Normal breath sounds.   Abdominal:      General: Abdomen is flat. There is distension.      Palpations: Abdomen is soft.   Musculoskeletal:      Right lower leg: No edema.      Left lower leg: No edema.   Neurological:      General: No focal deficit present.      Mental Status: She is alert and oriented to person, place, and time. Mental status is at baseline.         Fluids    Intake/Output Summary (Last 24 hours) at 6/27/2021 1137  Last data filed at 6/27/2021 0840  Gross per 24 hour   Intake 220 ml   Output 1500 ml   Net -1280 ml       Laboratory  Recent Labs     06/25/21  1350 06/26/21  0419 06/27/21  0228   WBC 12.5* 9.4 8.0   RBC 3.35* 3.18* 2.90*   HEMOGLOBIN 8.9* 8.4* 7.7*   HEMATOCRIT 28.5* 27.1* 24.7*   MCV 85.1 85.2 85.2   MCH 26.6* 26.4* 26.6*   MCHC 31.2* 31.0* 31.2*   RDW 48.6 49.7 50.4*   PLATELETCT 176 174 178   MPV 8.9* 9.6 9.8     Recent Labs     06/26/21  0419 06/26/21  1415 06/27/21  0228   SODIUM 122* 123* 127*   POTASSIUM 4.8 4.5 4.7   CHLORIDE 91* 98 99   CO2 19* 15* 17*   GLUCOSE 104* 108* 117*   BUN 81* 79* 75*   CREATININE 2.79* 2.12* 1.84*   CALCIUM 8.3* 8.1* 7.9*                   Imaging  CT-PELVIS W/O   Final Result      1.  Stable appearance of the minimally displaced fractures of the right sacral ala, right pubic ramus posteriorly and right ischial ramus.      2.  No acute pelvic fracture.      3.  No hip dislocation.      4.  Right total hip arthroplasty in place.           Assessment/Plan  * Pelvic fracture  (HCC)- (present on admission)  Assessment & Plan  Patient has repeats complained of right hip fracture.  Patient had fallen when she was going to a [employer] lab and hit the glass and fell backwards onto her gluteal region causing a fracture.  Patient was evaluated in the ED, patient had decided to go home rather than stay inpatient.  When she got home patient had gotten worse and her pain.  Home health nurse had come to evaluate the patient today and determined patient was unable to stand up, decompensating and was hypoxic to 88% on room air.  Home health nurse called EMS for evaluation in the ED  -Orthopedic surgery was consulted by EDP, the previous evaluated patient and actually stable and does not require surgical management.  I did discuss again with orthopedic surgery this morning, no need for surgical management at this time  -Pain management  -PT OT recommending skilled nursing placement, pending OT evaluation.  Discussed with case management and referral sent to skilled.    Constipation- (present on admission)  Assessment & Plan  Patient states no BM in several days since taking opiates, likely contributing to poor oral intake and abdominal pain.  Bowel regimen    Acute renal failure superimposed on stage 3 chronic kidney disease (HCC)- (present on admission)  Assessment & Plan  Patient has not been able to eat or drink well at home.  Creatinine 2.44 on admission.  Baseline appears to be 1.5 or less.  Baseline CKD stage III.  -Avoid nephrotoxic agents  -We will hold patient's ACE inhibitor  -Continue to monitor    On continuous oral anticoagulation- (present on admission)  Assessment & Plan  Patient is on Eliquis at home, has atrial fibrillation  Currently patient has acute renal failure on top of CKD and patient is pending possible surgery  We will continue patient on heparin at this time, restart Eliquis once renal failure improves and no surgery is needed    Paroxysmal atrial fibrillation (HCC)- (present  "on admission)  Assessment & Plan  \"4/20/21  cardiology note sinus on exam, patient would like to discontinue amiodarone understanding she likely will return to intermittent atrial fibrillation, discussed sotalol, dronedarone, tikosyn as alternatives, patient declines any of those alternatives, ablation is not appropriate given her age\" - obtained from Overview of problem from PCP, newest recommendation from PCP.  -We will continue patient on metoprolol at this time with parameters  -Holding Eliquis at this time due to WILY and potential surgery, restart if no surgery and WILY is improving    Hyponatremia- (present on admission)  Assessment & Plan  Patient arrived with sodium level of 124.  Patient likely has hypovolemic hyponatremia as patient has not been able to sustain her oral fluid intake at home.  She is also has had a poor appetite.  -Patient will be on IV fluids, recheck sodium levels.  Patient was just discharged, acute process, sodium 130 on 6/23.  -Improving sodium at 127 today, will keep IV fluids as she is still having a very poor appetite  Dietary consultation and supplements with meals.    GERD (gastroesophageal reflux disease)- (present on admission)  Assessment & Plan  Patient has chronic GERD, stable, continue omeprazole    Hypothyroid- (present on admission)  Assessment & Plan  Patient is on chronic Synthroid 75 mcg, stable, continue home medications    Osteoporosis, idiopathic- (present on admission)  Assessment & Plan  Patient has osteoporosis  Given her acute fractures we will check vitamin D 25 levels  Patient currently not on replacements at home    Hypertension- (present on admission)  Assessment & Plan  Patient currently hypotensive.  Patient has not been able to sustain fluids at home, appears volume depleted on admission.  -Hold ACE inhibitor.  Continue metoprolol with parameters.       VTE prophylaxis: Apixaban      "

## 2021-06-27 NOTE — CARE PLAN
The patient is Stable - Low risk of patient condition declining or worsening    Shift Goals  Clinical Goals: Patient will get up to chair.  Patient Goals: Patient tolerate moving out of bed.      Progress made toward(s) clinical / shift goals:  Patient tolerated getting out of bed to chair.  Sat in chair for about 10 minutes, then back to bed.  Patient did well.      Patient is not progressing towards the following goals: n/a      Problem: Pain - Standard  Goal: Alleviation of pain or a reduction in pain to the patient’s comfort goal  Outcome: Progressing     Problem: Skin Integrity  Goal: Skin integrity is maintained or improved  Outcome: Progressing     Problem: Fall Risk  Goal: Patient will remain free from falls  Outcome: Progressing

## 2021-06-27 NOTE — PROGRESS NOTES
Assumed care at 1900.  Patient is in bed, in no acute distress.  Will continue to monitor.   at bedside.

## 2021-06-27 NOTE — THERAPY
Physical Therapy   Initial Evaluation     Patient Name: Yvonne Marie Wada  Age:  85 y.o., Sex:  female  Medical Record #: [id number]  Today's Date: 6/27/2021     Precautions: Weight Bearing As Tolerated Right Lower Extremity    Assessment  Patient is 85 y.o. female with a diagnosis of R sup/inf pubic ramus fracture sustained 6/22/21, was DC home on 6/23 and has since returned due to increased R pelvic pain and inability to mobilize.  Pt is presenting with weakness B LE, decreased ROM and WB tolerance R LE, was unable to safely transfer to chair or ambulate today. Recommend further therapy at SNF prior to DC home.      Plan    Recommend Physical Therapy 5 times per week until therapy goals are met for the following treatments:  Bed Mobility, Gait Training, Neuro Re-Education / Balance, Stair Training, Therapeutic Activities and Therapeutic Exercises    DC Equipment Recommendations: Unable to determine at this time  Discharge Recommendations: Recommend post-acute placement for additional physical therapy services prior to discharge home        06/27/21 1059   Prior Living Situation   Housing / Facility 1 Story House   Steps Into Home 1   Steps In Home 1   Equipment Owned Front-Wheel Walker;Single Point Cane   Lives with - Patient's Self Care Capacity Spouse   Comments supportive family   Prior Level of Functional Mobility   Bed Mobility Independent   Transfer Status Independent   Ambulation Independent   Assistive Devices Used   (uses walking pole outside)   History of Falls   History of Falls Yes   Date of Last Fall 06/22/21  (sustaining R pelvic fracture)   Cognition    Comments Pt is agreeable for PT, flat affect, oriented x4 but decreaesd memory of recent events/ timeline. Pt reports has not been getting up much at home and was unable to ambulate since DC home last week.   Passive ROM Lower Body   Passive ROM Lower Body X   Comments R hip ROM limited by pain   Active ROM Lower Body    Active ROM Lower Body  X    Comments limited by pain R hip   Strength Lower Body   Lower Body Strength  X   Comments R LE grossly 2/5   Balance Assessment   Sitting Balance (Static) Fair -   Sitting Balance (Dynamic) Poor +   Standing Balance (Static) Poor   Standing Balance (Dynamic) Trace   Weight Shift Sitting Poor   Weight Shift Standing Poor   Comments stdg with FWW   Gait Analysis   Gait Level Of Assist Unable to Participate   Comments Pt able to take a few side steps up side of bed with FWW modA x2, B LE shaky   Bed Mobility    Supine to Sit Maximal Assist  (x2)   Sit to Supine Maximal Assist  (x2)   Functional Mobility   Sit to Stand Moderate Assist   Bed, Chair, Wheelchair Transfer Unable to Participate   Activity Tolerance   Sitting Edge of Bed 4 min   Standing 30 sec with FWW   Short Term Goals    Short Term Goal # 1 Pt will be able to perform sit < >stand and transfer Ilda in 6 visits so can DC home safely.   Short Term Goal # 2 Pt will be able to ambulate 50 ft with FWW Ilda in 6 visits so can DC home safely.   Short Term Goal # 3 pt will be able to go up/down 2 steps Breanne in 6 visits so can enter home safely.   Short Term Goal # 4 Pt will be able to perform bed mobility and sup <> sit Ilda in 6 visits.

## 2021-06-28 ENCOUNTER — PATIENT OUTREACH (OUTPATIENT)
Dept: HEALTH INFORMATION MANAGEMENT | Facility: OTHER | Age: 86
End: 2021-06-28

## 2021-06-28 VITALS
HEART RATE: 65 BPM | TEMPERATURE: 97.8 F | WEIGHT: 154 LBS | HEIGHT: 64 IN | DIASTOLIC BLOOD PRESSURE: 50 MMHG | BODY MASS INDEX: 26.29 KG/M2 | RESPIRATION RATE: 16 BRPM | OXYGEN SATURATION: 94 % | SYSTOLIC BLOOD PRESSURE: 125 MMHG

## 2021-06-28 PROBLEM — K59.00 CONSTIPATION: Status: RESOLVED | Noted: 2021-06-26 | Resolved: 2021-06-28

## 2021-06-28 LAB
ANION GAP SERPL CALC-SCNC: 11 MMOL/L (ref 7–16)
BUN SERPL-MCNC: 64 MG/DL (ref 8–22)
CALCIUM SERPL-MCNC: 8.2 MG/DL (ref 8.4–10.2)
CHLORIDE SERPL-SCNC: 97 MMOL/L (ref 96–112)
CO2 SERPL-SCNC: 17 MMOL/L (ref 20–33)
CREAT SERPL-MCNC: 1.52 MG/DL (ref 0.5–1.4)
EKG IMPRESSION: NORMAL
GLUCOSE SERPL-MCNC: 98 MG/DL (ref 65–99)
POTASSIUM SERPL-SCNC: 4.7 MMOL/L (ref 3.6–5.5)
SODIUM SERPL-SCNC: 125 MMOL/L (ref 135–145)

## 2021-06-28 PROCEDURE — 93005 ELECTROCARDIOGRAM TRACING: CPT | Performed by: INTERNAL MEDICINE

## 2021-06-28 PROCEDURE — 700102 HCHG RX REV CODE 250 W/ 637 OVERRIDE(OP): Performed by: INTERNAL MEDICINE

## 2021-06-28 PROCEDURE — A9270 NON-COVERED ITEM OR SERVICE: HCPCS | Performed by: INTERNAL MEDICINE

## 2021-06-28 PROCEDURE — 80048 BASIC METABOLIC PNL TOTAL CA: CPT

## 2021-06-28 PROCEDURE — 36415 COLL VENOUS BLD VENIPUNCTURE: CPT

## 2021-06-28 PROCEDURE — 99239 HOSP IP/OBS DSCHRG MGMT >30: CPT | Performed by: INTERNAL MEDICINE

## 2021-06-28 PROCEDURE — 97165 OT EVAL LOW COMPLEX 30 MIN: CPT

## 2021-06-28 PROCEDURE — 93010 ELECTROCARDIOGRAM REPORT: CPT | Performed by: INTERNAL MEDICINE

## 2021-06-28 RX ORDER — OXYCODONE HYDROCHLORIDE 5 MG/1
5 TABLET ORAL EVERY 8 HOURS PRN
Qty: 9 TABLET | Refills: 0 | Status: SHIPPED | OUTPATIENT
Start: 2021-06-28 | End: 2021-07-01

## 2021-06-28 RX ORDER — PROMETHAZINE HYDROCHLORIDE 25 MG/1
25 TABLET ORAL EVERY 6 HOURS PRN
Qty: 30 TABLET | Refills: 0 | Status: SHIPPED
Start: 2021-06-28 | End: 2021-08-31

## 2021-06-28 RX ORDER — AMOXICILLIN 250 MG
2 CAPSULE ORAL 2 TIMES DAILY
Qty: 30 TABLET | Refills: 0 | Status: ON HOLD
Start: 2021-06-28 | End: 2021-11-04

## 2021-06-28 RX ORDER — PROMETHAZINE HYDROCHLORIDE 25 MG/1
25 TABLET ORAL EVERY 6 HOURS PRN
Status: DISCONTINUED | OUTPATIENT
Start: 2021-06-28 | End: 2021-06-28 | Stop reason: HOSPADM

## 2021-06-28 RX ORDER — BISACODYL 10 MG
10 SUPPOSITORY, RECTAL RECTAL PRN
Status: ON HOLD
Start: 2021-06-28 | End: 2021-11-04

## 2021-06-28 RX ORDER — ACETAMINOPHEN 325 MG/1
650 TABLET ORAL EVERY 6 HOURS
Qty: 30 TABLET | Refills: 0 | Status: ON HOLD | COMMUNITY
Start: 2021-06-28 | End: 2021-11-04

## 2021-06-28 RX ADMIN — OXYCODONE HYDROCHLORIDE 10 MG: 10 TABLET ORAL at 09:24

## 2021-06-28 RX ADMIN — OMEPRAZOLE 20 MG: 20 CAPSULE, DELAYED RELEASE ORAL at 05:10

## 2021-06-28 RX ADMIN — OXYCODONE HYDROCHLORIDE 10 MG: 10 TABLET ORAL at 16:17

## 2021-06-28 RX ADMIN — APIXABAN 2.5 MG: 2.5 TABLET, FILM COATED ORAL at 05:10

## 2021-06-28 RX ADMIN — ACETAMINOPHEN 650 MG: 325 TABLET, FILM COATED ORAL at 05:09

## 2021-06-28 RX ADMIN — DOCUSATE SODIUM 50 MG AND SENNOSIDES 8.6 MG 2 TABLET: 8.6; 5 TABLET, FILM COATED ORAL at 05:10

## 2021-06-28 RX ADMIN — METOPROLOL SUCCINATE 25 MG: 25 TABLET, EXTENDED RELEASE ORAL at 05:10

## 2021-06-28 RX ADMIN — ACETAMINOPHEN 650 MG: 325 TABLET, FILM COATED ORAL at 11:46

## 2021-06-28 RX ADMIN — MAGNESIUM HYDROXIDE 30 ML: 400 SUSPENSION ORAL at 11:46

## 2021-06-28 RX ADMIN — LEVOTHYROXINE SODIUM 75 MCG: 0.07 TABLET ORAL at 05:10

## 2021-06-28 RX ADMIN — IPRATROPIUM BROMIDE 2 SPRAY: 21 SPRAY NASAL at 05:14

## 2021-06-28 RX ADMIN — PHENYLEPHRINE HYDROCHLORIDE 1 SPRAY: 1 SPRAY NASAL at 02:53

## 2021-06-28 RX ADMIN — DOXYCYCLINE 100 MG: 100 TABLET ORAL at 05:10

## 2021-06-28 ASSESSMENT — GAIT ASSESSMENTS: DISTANCE (FEET): 5

## 2021-06-28 ASSESSMENT — COGNITIVE AND FUNCTIONAL STATUS - GENERAL
HELP NEEDED FOR BATHING: TOTAL
DRESSING REGULAR UPPER BODY CLOTHING: A LITTLE
SUGGESTED CMS G CODE MODIFIER DAILY ACTIVITY: CK
PERSONAL GROOMING: A LITTLE
DRESSING REGULAR LOWER BODY CLOTHING: A LOT
TOILETING: TOTAL
DAILY ACTIVITIY SCORE: 14

## 2021-06-28 ASSESSMENT — PAIN DESCRIPTION - PAIN TYPE
TYPE: ACUTE PAIN

## 2021-06-28 ASSESSMENT — ACTIVITIES OF DAILY LIVING (ADL): TOILETING: INDEPENDENT

## 2021-06-28 NOTE — DISCHARGE PLANNING
Anticipated Discharge Disposition:   SNF, [facility]    Action:   Chart review complete.     Discussed patient's plan of care and plans for discharge during rounds.   Per MD, once patient has a BM, this patient will be medically cleared to discharge to SNF.     1132: RN CM informed by MD that patient is medically cleared to due having BM. RN CM asked DPA to follow up with [facility] for bed availability.     1143: [facility] has a bed today and is able to take the patient today at 1600. RN CM to set up [facility] due to patient requiring gurney.     1311: Transport forms faxed to [facility]. Transfer packet started.     1342: Transport confirmation received: patient will be going to [facility] at 1600 today via [facility].     1345: RN CM made aware that patient's daughter is at bedside and would not like her mother to go to [facility]. RN CM spoke with daughter and bedside and gave her information on [facility]. Daughter updated on time of transport, MD to speak with family further regarding POC and [facility].     1500: After Daughters visited [facility], they are agreeable to patient going to [facility]. Patient's daughters and spouse at bedside. Packet signed. IMM given and signed. Packet placed with patient's chart. Bedside RN notified.    Barriers to Discharge:   None     Plan:   Wait for transport.   Hospital care management will continue to assist with discharge planning needs.

## 2021-06-28 NOTE — PROGRESS NOTES
Reviewed discharge instructions with patient and family. Patient show understanding of discharge instructions, all questions answered. IV  Dc'd. Patient meet criteria  for discharge. Discharge paper signed. Patient was escorted outside hospital on a gurney with [facility] and family.

## 2021-06-28 NOTE — CARE PLAN
The patient is Stable - Low risk of patient condition declining or worsening    Shift Goals  Clinical Goals: Patient will be free of falls.  Patient Goals: Will get patient out of bed, up to chair.    Progress made toward(s) clinical / shift goals:  Patient sat at edge of bed for 10 minutes.  She uses the call lights for needs.      Patient is not progressing towards the following goals:      Problem: Knowledge Deficit - Standard  Goal: Patient and family/care givers will demonstrate understanding of plan of care, disease process/condition, diagnostic tests and medications  Outcome: Progressing     Problem: Pain - Standard  Goal: Alleviation of pain or a reduction in pain to the patient’s comfort goal  Outcome: Progressing     Problem: Skin Integrity  Goal: Skin integrity is maintained or improved  Outcome: Progressing     Problem: Fall Risk  Goal: Patient will remain free from falls  Outcome: Progressing

## 2021-06-28 NOTE — DISCHARGE SUMMARY
"Discharge Summary    CHIEF COMPLAINT ON ADMISSION  Chief Complaint   Patient presents with   • Pelvic Pain   • Failure to Thrive       Reason for Admission  pelvic pain     Admission Date  6/25/2021    CODE STATUS  Full Code    HPI & HOSPITAL COURSE  Per notes, \"85 y.o. female PMH hypothyroidism, HTN, CKD 3, pA-fib chronic eliquis, who presented 6/25/2021 with pelvic pain and failure to thrive.  Admitted 6/25/21 for failure to thrive at home.  Home health nurse called EMS as patient was unable to stand, unstable at home, hypoxic 88% on room air.  Patient had GLF , pelvic and sacral fx known after fall. Still complains of pain in region, non-radiating, 10/10 pain, worsens with movement, no alleviating factors.  Patient was seen in hospital and discharged home 6/23/21.     In the ED, imaging was taken, reviewed: Stable appearance of the minimally displaced fractures of the right sacral ala, right pubic ramus posteriorly and right ischial ramus.    Labs reviewed: WILY, volume depletion, hyponatremia 124.\"    Patient was admitted for intractable pain secondary to pelvic fracture.  She was evaluated again by orthopedic surgery, no intervention needed as she has a stable pelvic fracture.  She will likely have pain for some time and require physical therapy.  She was evaluated by PT/OT who now recommending skilled nursing placement.  Initially they had recommended home with home health, patient failed this plan.  She is accepted to [facility] care skilled nursing and will be discharged for further therapy.  She was noted to have hyponatremia on admission, likely due to poor oral intake, this is resolved and is now stable.  Additionally while she was in the hospital she did have episodes of constipation.  She was able to have a bowel movement on 6/28, will need to continue bowel regimen while taking opioids and monitor closely for constipation.  I did recommend she follow-up with PCP in 1 to 2 weeks for evaluation and hospital " follow-up.  She will also need to follow-up with urology in outpatient setting is having urinary retention on the hospital, a Dale was placed out of necessity.    Therefore, she is discharged in good and stable condition to skilled nursing facility.    The patient met 2-midnight criteria for an inpatient stay at the time of discharge.    Discharge Date  6/28/2021    FOLLOW UP ITEMS POST DISCHARGE  Follow-up with PCP  Follow-up with urology for urinary retention.    DISCHARGE DIAGNOSES  Principal Problem:    Pelvic fracture (HCC) POA: Yes  Active Problems:    Hypertension (Chronic) POA: Yes      Overview: 1/10/11 [facility] note cardiac catheterization normal systolic function      3/11 [facility] echo moderate aortic insufficiency, moderate mitral       insufficiency, moderate tricuspid insufficiency, normal LV function      5/12 echo [facility] normal LV function EF 60%, moderate to severe left atrial       enlargement, RVSP 32       9/26/13 CT thoracic aorta no evidence of aneurysm, small hiatal hernia      9/26/13 persantine thallium uniform breast tissue attenuation, cannot rule       out underlying ischemic, LVEF 67%      10/3/13 on toprol-XL 25 mg half a tablet daily       12/13/13 cardiology note cont same meds, repeat echo and follow up 6       months      1/2/14 echo mild LVH, grade 1 diastolic dysfunction, ascending aortic 4.0,       no change compared with ZAK 2010      5/15/14 cardiology note; increase benazepril to 40 mg qday,continue toprol       XL 25 mg daily      6/17/15 cardiology note continue meds, repeat echo 6 months      2/1/16 cardiology note moderate pulmonary hypertension and snoring,       nocturnal pulse oximetry ordered      6/30/16 cardiology note patient declines overnight pulse oximetry      1/16/16 echo EF 65%,grade 2-3 diastolic dysfunction, mild MR, RVSP 40,       aorta root 3.5 cm      11/1/17 echo normal LV size and function, EF 70%, mild aortic       insufficiency and mild tricuspid  regurgitation, RVSP 50, aortic root 3.2       cm      11/3/17 cardiology note hypertension stable, status post thoracic aortic       aneurysm repair, headache unspecified, ESR ordered      11/28/17 on benazepril 40 mg,toprol 25 mg, add norvasc 5 mg      12/12/17 ultrasound carotid less than 50% internal carotid stenosis       bilateral      12/20/17  [facility] cardiology note most recent echocardiogram       looks fine, will repeat CT scan follow aortic repair      2/20/18 episode of supraventricular tachycardia in the emergency room,       davenport catheter removed, symptoms resolved with repeat EKG sinus rhythm      11/17/18 cardiology note asymptomatic, continue cholesterol medication,       continue to monitor echo aortic valve, repaired ascending aorta, follow-up       yearly      11/21/18 echo normal LV function, EF 60%, mild LVH, grade 2 diastolic       dysfunction, mild aortic insufficiency, moderate tricuspid regurgitation,       RVSP 50      1/18/19 urine mac 45 on benazepril 40 mg, toprol 25 mg, norvasc 5 mg      12/6/19 echo normal LV function EF 65%, RVSP 43, moderate tricuspid       regurgitation, mild to moderate aortic insufficiency      10/22/20 echo EF 70%, grade 1 diastolic dysfunction, mild AR, RVSP 30       12/14/20 cardiology note maintaining sinus rhythm, given frequent       symptomatic episodes of paroxysmal atrial fibrillation and age, recommend       amiodarone 200 mg, follow-up 6 months      4/6/21 on lotensin 40 mg and metoprolol 25 mg      4/20/21  sinus on exam, patient would like to discontinue       amiodarone understanding she likely will return to intermittent atrial       fibrillation, discussed sotalol, dronedarone, tikosyn as alternatives,       patient declines any of those alternatives, ablation is not appropriate       given her age                             Osteoporosis, idiopathic (Chronic) POA: Yes      Overview: Had been on fosamax [id number]       8/10  dexa LS -2.0,hip -1.5 await old dexa result, she will get copy for me      4/11 vit d 35      9/12 dexa LS -3.2,hip -1.4      2/13/13 reclast IV      7/31/13 vit d 33 cont 2000 units      2/18/14 reclast IV      2/2/15 dexa LS -3.1,hip -1.9; FRAX 35.5 % major,12.6% hip      2/18/15 reclast IV      2/24/15 vit d 33      4/15/16 vit d 36      5/12/17 vit d 36      8/7/18 dexa left forearm -4.2,hip -2.5 resume reclast       8/31/18 reclast IV      1/18/19 vit d 27 increase from 2000 to 4000 units      2/4/20 vit d 67, PTH 58                 Hypothyroid (Chronic) POA: Yes      Overview: 4/11 tsh 9 start synthroid 50      4/11 tsh 6,free t3 3.1,free t4 1.2,tpo negative      7/1/11 tsh 2.1 cont synthroid 50 mcg      7/12 tsh 3.4 cont synthroid 50 mcg      7/31/13 tsh 3.2 on synthroid 50 mcg      9/25/13 tsh 1.7 on synthroid 50 mcg      2/24/15 tsh 4.9 on synthroid 50 mcg; increase to synthroid 75 mcg, repeat       tsh in 6 weeks      4/14/15 tsh 1.1 on synthroid 75 mcg      4/15/16 tsh 2.3 on synthroid 75 mcg      5/12/17 tsh 1.2 on synthroid 75 mcg      11/1/17 tsh 2.1 on synthroid 75 mcg      1/18/19 tsh 2.2 on synthroid 75 mcg      6/8/19 tsh 1.8 on synthroid 75 mcg       2/4/20 tsh 3.1 on synthroid 75 mcg      8/20/20 tsh 1.6 on synthroid 75 mcg                      GERD (gastroesophageal reflux disease) (Chronic) POA: Yes      Overview: 6/27/07 EGD per [facility] hiatal hernia, start prevacid 30 mg      7/12 protonix 20 mg qday      11/16/12 [facility] note cont prilosec      1/5/16 change to protonix 40 mg from prilosec      2/4/20 vit d 67    Hyponatremia POA: Yes    Paroxysmal atrial fibrillation (HCC) POA: Yes      Overview: 11/1/17 hospital admission, 11/2/17 hospital discharge, facial numbness,       transient numbness in both hands, resolved, CT, MRI head no acute infarct,       blood pressure stable, discharged home, patient states she was on aspirin       at the time of admission      11/1/17 CT head stable right mid  brain likely chronic lacunar infarction,       periventricular white matter disease      11/1/17 MRI brain no acute abnormality, moderate chronic microvascular       stomach disease, chronic microhemorrhages left frontal and right parietal       lobes, chronic lacunar infarct left basal ganglia and blanco, or cerebral       atrophy      11/1/17 MR-MRA head without; negative      2/20/17 episode of supraventricular tachycardia in the emergency room,       davenport catheter removed, symptoms resolved with repeat EKG sinus rhythm      12/20/17  [facility] cardiology note most recent echocardiogram       looks fine, will repeat CT scan follow aortic repair      11/17/18 cardiology note asymptomatic, continue cholesterol medication,       continue to monitor echo aortic valve, repaired ascending aorta, follow-up       yearly      11/21/18 echo normal LV function, EF 60%, mild LVH, grade 2 diastolic       dysfunction, mild aortic insufficiency, moderate tricuspid regurgitation,       RVSP 50      12/6/19 echo normal LV function EF 65%, RVSP 43, moderate tricuspid       regurgitation, mild to moderate aortic insufficiency      9/28/20 emergency room note EKG atrial fibrillation new onset      9/30/20 cardiology note sinus rhythm, start eliquis 5 mg bid and increase       toprol to 25 mg daily, echo ordered, follow up 3 months       10/22/20 echo EF 70%, grade 1 diastolic dysfunction, mild AR, RVSP 30       12/14/20 cardiology note maintaining sinus rhythm, given frequent       symptomatic episodes of paroxysmal atrial fibrillation and age, recommend       amiodarone 200 mg, follow-up 6 months      4/20/21  cardiology note sinus on exam, patient would like to       discontinue amiodarone understanding she likely will return to       intermittent atrial fibrillation, discussed sotalol, dronedarone, tikosyn       as alternatives, patient declines any of those alternatives, ablation is       not appropriate given  her age           On continuous oral anticoagulation POA: Yes    Acute renal failure superimposed on stage 3 chronic kidney disease (HCC) POA: Yes    Constipation POA: Yes  Resolved Problems:    * No resolved hospital problems. *      FOLLOW UP  Future Appointments   Date Time Provider Department Center   10/20/2021 11:30 AM Kobi Wheeler M.D. [facility] None   12/20/2021 11:00 AM Tee Tran M.D. [address]  [place] [id number]  [phone]          MEDICATIONS ON DISCHARGE     Medication List      ASK your doctor about these medications      Instructions   acetaminophen 500 MG Tabs  Commonly known as: TYLENOL   Take 1,000 mg by mouth every 6 hours as needed for Moderate Pain.  Dose: 1,000 mg     apixaban 5mg Tabs  Commonly known as: Eliquis   Take 1 Tab by mouth 2 Times a Day.  Dose: 5 mg     benazepril 40 MG tablet  Commonly known as: LOTENSIN  Ask about: Which instructions should I use?   Take 40 mg by mouth every morning.  Dose: 40 mg     CENTRUM SILVER ULTRA WOMENS PO   Take 1 tablet by mouth every evening.  Dose: 1 tablet     doxycycline 100 MG Tabs  Commonly known as: VIBRAMYCIN   Take 1 tablet by mouth 2 times a day.  Dose: 100 mg     FEXOFENADINE HCL PO   Take 1 tablet by mouth 2 times a day. Indications: Hayfever  Dose: 1 tablet     ipratropium 0.03 % Soln  Commonly known as: ATROVENT   Administer 2 Sprays into affected nostril(S) 2 times a day.  Dose: 2 Spray     levothyroxine 75 MCG Tabs  Commonly known as: SYNTHROID   TAKE 1 TAB BY MOUTH EVERY MORNING ON AN EMPTY STOMACH.  Dose: 75 mcg     MAGNESIUM PO   Take 1 capsule by mouth every evening.  Dose: 1 capsule     metoprolol SR 25 MG Tb24  Commonly known as: TOPROL XL   Take 1 Tab by mouth every morning.  Dose: 25 mg     omeprazole 20 MG delayed-release capsule  Commonly known as: PRILOSEC   Take 20 mg by mouth every morning.  Dose: 20 mg     oxyCODONE immediate-release 5 MG Tabs  Commonly  known as: ROXICODONE  Ask about: Should I take this medication?   Take 1 tablet by mouth every 6 hours as needed for Severe Pain for up to 4 days.  Dose: 5 mg            Allergies  Allergies   Allergen Reactions   • Amoxicillin Vomiting     Upset stomach, ok if needed for life saving treatment (per pt)   • Codeine Nausea     Synthetic codones ok   • Doxycycline Nausea     Nausea, Ok in life saving situation (per pt)   • Sulfamethoxazole-Trimethoprim Vomiting and Nausea     Ok in a life saving situation (per pt)   • Tramadol Vomiting and Nausea     nausea   • Trazodone Vomiting     groggy       DIET  Orders Placed This Encounter   Procedures   • Diet Order Diet: Clear Liquid     Standing Status:   Standing     Number of Occurrences:   1     Order Specific Question:   Diet:     Answer:   Clear Liquid [10]       ACTIVITY  As tolerated.  Weight bearing as tolerated    CONSULTATIONS  Orthopedic surgery    PROCEDURES  None    LABORATORY  Lab Results   Component Value Date    SODIUM 125 (L) 06/28/2021    POTASSIUM 4.7 06/28/2021    CHLORIDE 97 06/28/2021    CO2 17 (L) 06/28/2021    GLUCOSE 98 06/28/2021    BUN 64 (H) 06/28/2021    CREATININE 1.52 (H) 06/28/2021    CREATININE 1.05 (H) 02/07/2013        Lab Results   Component Value Date    WBC 8.0 06/27/2021    WBC 3.9 (L) 04/16/2011    HEMOGLOBIN 7.7 (L) 06/27/2021    HEMATOCRIT 24.7 (L) 06/27/2021    PLATELETCT 178 06/27/2021        Total time of the discharge process exceeds 45 minutes.

## 2021-06-28 NOTE — DISCHARGE PLANNING
Agency/Facility Name: [facility]  Spoke To: Amairani  Outcome: No answer. Left message inquiring on bed 6/28    RN CM informed

## 2021-06-28 NOTE — DISCHARGE PLANNING
Received Transport Form @ 131  Spoke to Desean TROTTER    Transport is scheduled for 6/28 @1600 going to [facility].    BS RN and RN CM notified of scheduled transport via Voalte @[phone].

## 2021-06-28 NOTE — CARE PLAN
The patient is Stable - Low risk of patient condition declining or worsening    Shift Goals  Clinical Goals: pain managemnet and get pt up to chair   Patient Goals: Pain management     Problem: Knowledge Deficit - Standard  Goal: Patient and family/care givers will demonstrate understanding of plan of care, disease process/condition, diagnostic tests and medications  Outcome: Progressing     Problem: Pain - Standard  Goal: Alleviation of pain or a reduction in pain to the patient’s comfort goal  Outcome: Progressing     Problem: Skin Integrity  Goal: Skin integrity is maintained or improved  Outcome: Progressing     Problem: Fall Risk  Goal: Patient will remain free from falls  Outcome: Progressing     Progress made toward(s) clinical / shift goals:  All goals    Patient is not progressing towards the following goals:

## 2021-06-28 NOTE — THERAPY
Occupational Therapy   Initial Evaluation     Patient Name: Yvonne Marie Wada  Age:  85 y.o., Sex:  female  Medical Record #: [id number]  Today's Date: 6/28/2021     Precautions  Precautions: Weight Bearing As Tolerated Right Lower Extremity    Assessment  Patient is 85 y.o. female with a diagnosis of GLF on 6/22 with R sup/inf pubic ramus fx.  She was seen in hospital, discharged the next day with Home Health.  Pt was failing at home, unable to ambulate and slightly hypoxic when evaluated by  nurse.  RN called EMS. Prior to fall, pt was indep with ADL's, IADL's and mobility.  Currently she is limited by pain and nausea, tolerating only a few steps and minimal self care tasks.  Pt NOT safe to go home, would benefit from further inpt post acute therapy prior to home.  OT will follow while in house.      Plan    Recommend Occupational Therapy 3 times per week until therapy goals are met for the following treatments:  Adaptive Equipment, Neuro Re-Education / Balance, Self Care/Activities of Daily Living, Therapeutic Activities and Therapeutic Exercises.    DC Equipment Recommendations: Unable to determine at this time  Discharge Recommendations: Recommend post-acute placement for additional occupational therapy services prior to discharge home       Prior Living Situation   Prior Services None   Housing / Facility 1 Story House   Steps Into Home 1   Steps In Home 1   Bathroom Set up Walk In Shower;Shower Chair;Grab Bars   Equipment Owned Front-Wheel Walker;Single Point Cane;Tub / Shower Seat;Grab Bar(s) In Tub / Shower   Lives with - Patient's Self Care Capacity Spouse   Comments Spouse home and able to assist, previous notes say supportive family   Prior Level of ADL Function   Self Feeding Independent   Grooming / Hygiene Independent   Bathing Independent   Dressing Independent   Toileting Independent   Prior Level of IADL Function   Medication Management Independent   Laundry Independent   Kitchen Mobility  Independent   Finances Independent   Home Management Independent   Shopping Independent   Prior Level Of Mobility Independent Without Device in Home;Independent Without Device in Community   Driving / Transportation Driving Independent   Occupation (Pre-Hospital Vocational) Retired Due To Age   Leisure Interests Unable To Determine At This Time   Comments Pt was inde p with all ADL's and IADL's prior to fall last week, went home shotly after and failed at home- unable to walk or participate in ADL's   History of Falls   History of Falls Yes   Date of Last Fall 06/22/21  (sustained R pelvic fx)   Precautions   Precautions Weight Bearing As Tolerated Right Lower Extremity   Vitals   Pulse Oximetry 94 %   O2 Delivery Device Room air w/o2 available   Pain 0 - 10 Group   Location Pelvis   Location Orientation Right   Description Aching;Sore   Therapist Pain Assessment 5;Prior to Activity;Nurse Notified  (pain meds given at time of therapy)   Cognition    Cognition / Consciousness WDL   Comments Pt agreeable to OT eval, limited conversation, flat affect.  Reports she wasnt ablet o walk much at home since pelvic fx   Active ROM Upper Body   Active ROM Upper Body  WDL   Strength Upper Body   Upper Body Strength  WDL   Upper Body Muscle Tone   Upper Body Muscle Tone  WDL   Balance Assessment   Sitting Balance (Static) Fair   Sitting Balance (Dynamic) Fair -   Standing Balance (Static) Poor   Standing Balance (Dynamic) Trace +   Weight Shift Sitting Fair   Weight Shift Standing Poor   Comments with FWW   Bed Mobility    Supine to Sit   ([facility] prior to therapy)   Scooting Supervised   ADL Assessment   Eating Supervision   Grooming Supervision;Seated   Upper Body Dressing Minimal Assist   Lower Body Dressing Maximal Assist   Toileting Maximal Assist  (davenport, wearing a brief)   Functional Mobility   Sit to Stand Moderate Assist   Bed, Chair, Wheelchair Transfer Moderate Assist   Transfer Method Stand Step   Mobility able to take  4-5 steps forward from chair, needed chair norma up behind her to sit.  Marched in place 2-3 times prior to walking a short bit   Distance (Feet) 5   # of Times Distance was Traveled 1   Activity Tolerance   Sitting in Chair > 1 hour   Standing 20 sec, 1 min   Comments limited by pain and nausea   Patient / Family Goals   Patient / Family Goal #1 rehab/SNF   Short Term Goals   Short Term Goal # 1 pt will complete FB dressing with spv in 5 visits   Short Term Goal # 2 pt will complete ADL txfs using LRAD at spv level in 6 visit   Short Term Goal # 3 pt will tolerate G/H standing sinkside x 10mins at spv level in 6 visits   Education Group   Education Provided Role of Occupational Therapist;Activities of Daily Living   Role of Occupational Therapist Patient Response Patient;Family;Acceptance;Explanation;Verbal Demonstration   ADL Patient Response Patient;Family;Acceptance;Explanation;Verbal Demonstration   Additional Comments Spouse present for therapy

## 2021-06-28 NOTE — DISCHARGE INSTRUCTIONS
Discharge Instructions    Discharged to [facility] home by medical transportation with escort. Discharged via ambulance, hospital escort: Yes.  Special equipment needed: Not Applicable    Be sure to schedule a follow-up appointment with your primary care doctor or any specialists as instructed.     Discharge Plan:        I understand that a diet low in cholesterol, fat, and sodium is recommended for good health. Unless I have been given specific instructions below for another diet, I accept this instruction as my diet prescription.   Other diet: Regular    Special Instructions: None    · Is patient discharged on Warfarin / Coumadin?   No     Hyponatremia  Hyponatremia is when the amount of salt (sodium) in your blood is too low. When salt levels are low, your body may take in extra water. This can cause swelling throughout the body. The swelling often affects the brain.  What are the causes?  This condition may be caused by:  · Certain medical problems or conditions.  · Vomiting a lot.  · Having watery poop (diarrhea) often.  · Certain medicines or illegal drugs.  · Not having enough water in the body (dehydration).  · Drinking too much water.  · Eating a diet that is low in salt.  · Large burns on your body.  · Too much sweating.  What increases the risk?  You are more likely to get this condition if you:  · Have long-term (chronic) kidney disease.  · Have heart failure.  · Have a medical condition that causes you to have watery poop often.  · Do very hard exercises.  · Take medicines that affect the amount of salt is in your blood.  What are the signs or symptoms?  Symptoms of this condition include:  · Headache.  · Feeling like you may vomit (nausea).  · Vomiting.  · Being very tired (lethargic).  · Muscle weakness and cramps.  · Not wanting to eat as much as normal (loss of appetite).  · Feeling weak or light-headed.  Severe symptoms of this condition include:  · Confusion.  · Feeling restless (agitation).  · Having  a fast heart rate.  · Passing out (fainting).  · Seizures.  · Coma.  How is this treated?  Treatment for this condition depends on the cause. Treatment may include:  · Getting fluids through an IV tube that is put into one of your veins.  · Taking medicines to fix the salt levels in your blood. If medicines are causing the problem, your medicines will need to be changed.  · Limiting how much water or fluid you take in.  · Monitoring in the hospital to watch your symptoms.  Follow these instructions at home:    · Take over-the-counter and prescription medicines only as told by your doctor. Many medicines can make this condition worse. Talk with your doctor about any medicines that you are taking.  · Eat and drink exactly as you are told by your doctor.  ? Eat only the foods you are told to eat.  ? Limit how much fluid you take.  · Do not drink alcohol.  · Keep all follow-up visits as told by your doctor. This is important.  Contact a doctor if:  · You feel more like you may vomit.  · You feel more tired.  · Your headache gets worse.  · You feel more confused.  · You feel weaker.  · Your symptoms go away and then they come back.  · You have trouble following the diet instructions.  Get help right away if:  · You have a seizure.  · You pass out.  · You keep having watery poop.  · You keep vomiting.  Summary  · Hyponatremia is when the amount of salt in your blood is too low.  · When salt levels are low, you can have swelling throughout the body. The swelling mostly affects the brain.  · Treatment depends on the cause. Treatment may include getting IV fluids, medicines, or not drinking as much fluid.  This information is not intended to replace advice given to you by your health care provider. Make sure you discuss any questions you have with your health care provider.  Document Released: 08/29/2012 Document Revised: 03/05/2020 Document Reviewed: 11/21/2019  Elsevier Patient Education © 2020 Elsevier Inc.      Acute  Kidney Injury, Adult    Acute kidney injury is a sudden worsening of kidney function. The kidneys are organs that have several jobs. They filter the blood to remove waste products and extra fluid. They also maintain a healthy balance of minerals and hormones in the body, which helps control blood pressure and keep bones strong. With this condition, your kidneys do not do their jobs as well as they should.  This condition ranges from mild to severe. Over time it may develop into long-lasting (chronic) kidney disease. Early detection and treatment may prevent acute kidney injury from developing into a chronic condition.  What are the causes?  Common causes of this condition include:  · A problem with blood flow to the kidneys. This may be caused by:  ? Low blood pressure (hypotension) or shock.  ? Blood loss.  ? Heart and blood vessel (cardiovascular) disease.  ? Severe burns.  ? Liver disease.  · Direct damage to the kidneys. This may be caused by:  ? Certain medicines.  ? A kidney infection.  ? Poisoning.  ? Being around or in contact with toxic substances.  ? A surgical wound.  ? A hard, direct hit to the kidney area.  · A sudden blockage of urine flow. This may be caused by:  ? Cancer.  ? Kidney stones.  ? An enlarged prostate in males.  What are the signs or symptoms?  Symptoms of this condition may not be obvious until the condition becomes severe. Symptoms of this condition can include:  · Tiredness (lethargy), or difficulty staying awake.  · Nausea or vomiting.  · Swelling (edema) of the face, legs, ankles, or feet.  · Problems with urination, such as:  ? Abdominal pain, or pain along the side of your stomach (flank).  ? Decreased urine production.  ? Decrease in the force of urine flow.  · Muscle twitches and cramps, especially in the legs.  · Confusion or trouble concentrating.  · Loss of appetite.  · Fever.  How is this diagnosed?  This condition may be diagnosed with tests, including:  · Blood  tests.  · Urine tests.  · Imaging tests.  · A test in which a sample of tissue is removed from the kidneys to be examined under a microscope (kidney biopsy).  How is this treated?  Treatment for this condition depends on the cause and how severe the condition is. In mild cases, treatment may not be needed. The kidneys may heal on their own. In more severe cases, treatment will involve:  · Treating the cause of the kidney injury. This may involve changing any medicines you are taking or adjusting your dosage.  · Fluids. You may need specialized IV fluids to balance your body's needs.  · Having a catheter placed to drain urine and prevent blockages.  · Preventing problems from occurring. This may mean avoiding certain medicines or procedures that can cause further injury to the kidneys.  In some cases treatment may also require:  · A procedure to remove toxic wastes from the body (dialysis or continuous renal replacement therapy - CRRT).  · Surgery. This may be done to repair a torn kidney, or to remove the blockage from the urinary system.  Follow these instructions at home:  Medicines  · Take over-the-counter and prescription medicines only as told by your health care provider.  · Do not take any new medicines without your health care provider's approval. Many medicines can worsen your kidney damage.  · Do not take any vitamin and mineral supplements without your health care provider's approval. Many nutritional supplements can worsen your kidney damage.  Lifestyle  · If your health care provider prescribed changes to your diet, follow them. You may need to decrease the amount of protein you eat.  · Achieve and maintain a healthy weight. If you need help with this, ask your health care provider.  · Start or continue an exercise plan. Try to exercise at least 30 minutes a day, 5 days a week.  · Do not use any tobacco products, such as cigarettes, chewing tobacco, and e-cigarettes. If you need help quitting, ask your  health care provider.  General instructions  · Keep track of your blood pressure. Report changes in your blood pressure as told by your health care provider.  · Stay up to date with immunizations. Ask your health care provider which immunizations you need.  · Keep all follow-up visits as told by your health care provider. This is important.  Where to find more information  · American Association of Kidney Patients: www.aakp.org  · National Kidney Foundation: www.kidney.org  · American Kidney Fund: www.akfinc.org  · Life Options [facility]:  ? www.lifeoptions.org  ? www.kidneyschool.org  Contact a health care provider if:  · Your symptoms get worse.  · You develop new symptoms.  Get help right away if:  · You develop symptoms of worsening kidney disease, which include:  ? Headaches.  ? Abnormally dark or light skin.  ? Easy bruising.  ? Frequent hiccups.  ? Chest pain.  ? Shortness of breath.  ? End of menstruation in women.  ? Seizures.  ? Confusion or altered mental status.  ? Abdominal or back pain.  ? Itchiness.  · You have a fever.  · Your body is producing less urine.  · You have pain or bleeding when you urinate.  Summary  · Acute kidney injury is a sudden worsening of kidney function.  · Acute kidney injury can be caused by problems with blood flow to the kidneys, direct damage to the kidneys, and sudden blockage of urine flow.  · Symptoms of this condition may not be obvious until it becomes severe. Symptoms may include edema, lethargy, confusion, nausea or vomiting, and problems passing urine.  · This condition can usually be diagnosed with blood tests, urine tests, and imaging tests. Sometimes a kidney biopsy is done to diagnose this condition.  · Treatment for this condition often involves treating the underlying cause. It is treated with fluids, medicines, dialysis, diet changes, or surgery.  This information is not intended to replace advice given to you by your health care provider. Make  sure you discuss any questions you have with your health care provider.  Document Released: 07/02/2012 Document Revised: 11/30/2018 Document Reviewed: 12/08/2017  Elsevier Patient Education © 2020 [employer]      Depression / Suicide Risk    As you are discharged from this [facility] facility, it is important to learn how to keep safe from harming yourself.    Recognize the warning signs:  · Abrupt changes in personality, positive or negative- including increase in energy   · Giving away possessions  · Change in eating patterns- significant weight changes-  positive or negative  · Change in sleeping patterns- unable to sleep or sleeping all the time   · Unwillingness or inability to communicate  · Depression  · Unusual sadness, discouragement and loneliness  · Talk of wanting to die  · Neglect of personal appearance   · Rebelliousness- reckless behavior  · Withdrawal from people/activities they love  · Confusion- inability to concentrate     If you or a loved one observes any of these behaviors or has concerns about self-harm, here's what you can do:  · Talk about it- your feelings and reasons for harming yourself  · Remove any means that you might use to hurt yourself (examples: pills, rope, extension cords, firearm)  · Get professional help from the community (Mental Health, Substance Abuse, psychological counseling)  · Do not be alone:Call your Safe Contact- someone whom you trust who will be there for you.  · Call your local CRISIS HOTLINE 285-4437 or 316-177-2006  · Call your local Children's Mobile Crisis Response Team [place] (980) 696-5466 or www.Home Chef  · Call the toll free National Suicide Prevention Hotlines   · National Suicide Prevention Lifeline 039-936-GSHM (6278)  · National Hope Line Network 800-SUICIDE (122-0414)        Controlled Substance Use Informed Consent    Treatment with controlled substance medications including opiates (narcotics) is dangerous and can lead to  physical dependence and addiction. Taking these may lead to serious health problems, accidental overdose, and even death.     Risks & Benefits  · Accidental overdose can and frequently occurs. Death is possible from respiratory depression (slow breathing).  · Even taking these medications for a short duration have been demonstrated to cause physical dependence.  · There may be forms of medications available that deter certain types of abuse.  Ask your doctor for more information.  · Women who are pregnant, plan to become pregnant, or could become pregnant, have the added risk of fetal and  baby drug addiction, and  abstinence syndrome, a dangerous type of drug withdrawal.    Proper use  · Take your medications as prescribed. Do not take more pills or capsules than are prescribed, or more often than prescribed.  · Notify your provider of all other medications, supplements, and over-the-counter medications you are taking.  · Do not start new medications while on these medications without discussing with your healthcare provider.    Storage & Disposal  · These medications are for your use only.   · Do not share your medications with others and do not sell your medication.   · Ensure safe storage of your medications in their original containers and out of the reach of others.  · Take unused medications to a Drug Enforcement Administration public disposal location (https://apps.deadiversion.iMotions - Eye TrackingoAdpeps.gov/pubdispsearch/)  · If disposal services are unavailable, mix medication with used coffee grounds or riki litter, seal in a bag and place in the trash.    Refills  · New medication refills will only be authorized after being seen by your healthcare provider.   · No telephone or internet refills will be given.    Minors  · Those under 18 have special risks including interfering with brain development.   · Warning signs include mood or personality changes, behavior changes including doing poorly in school,  social isolation, or a sudden change of friends. Please see https://drugfree.org/article/spotting-drug-use/ for more info.    Opioids and Narcotics  · Constipation is highly likely with opiate treatment, and can cause nausea, vomiting, and can lead bowel blockage and death.  · Opiate medication overdose can be reversed with a medication called naloxone (Narcan). Some pharmacies in [place] allow you to purchase naloxone without a prescription, and the law in [place] allows for healthcare providers to prescribe naloxone to a patient’s family or other acquaintances.  · Opiate pain medications do not treat the underlying cause of your pain. They only mask your pain.  · Your pain level may be reduced while taking these medications, and the intent is to restore normal function and enable you to perform daily tasks. You may never reach a “zero” pain level, even with proper usage.    Alternatives  · Non-narcotic medications may be effective in treating your pain. These include NSAIDs (e.g. ibuprofen, Motrin) and acetaminophen (e.g. Tylenol, Excedrin). Please discuss with your healthcare provider to see if you may be a candidate for this type of pain control.  · Some people treat pain without medications. Heating pads and ice may be just as effective for pain.  · Other treatments could include physical therapy, massage, cognitive behavioral therapy, or acupuncture.  · Risks of the alternative were discussed with the patient.       This is not an exhaustive list of risks, benefits, alternatives, or instructions.  If you have any questions regarding your medications please talk with No name on file. or another member of your care team.    By signing below, I acknowledge my healthcare provider has discussed the risks and benefits of controlled substances, my specific treatment plan, and I agree to treatment with these medications. I understand I can withdraw my consent to treatment with these medications at any  time.    _________________________________ _______________________   Patient / Authorized Legal Representative Date/Time      _________________________________     Relationship to patient (if not patient)

## 2021-06-28 NOTE — DIETARY
"Nutrition services: Day 3 of admit.  Yvonne Marie Wada is a 85 y.o. female with admitting DX of hyponatremia.   Consult received for supplements. Note malnutrition screening tool score 0 (no wt loss, poor PO pta).     RD met with pt at bedside. When RD inquired re: Boost supplement ordered by MD pt stated \"I don't want them\" and that she is discharging today. Pt mentioned drinking Premier protein shakes at home. Pt stated she is in a lot of pain. RD then concluded visit.      Assessment:  Height: 162.6 cm (5' 4\")  Weight: 69.9 kg (154 lb) - stated wt  Body mass index is 26.43 kg/m²., BMI classification: normal for age.   Diet/Intake: Currently with order for clear liquids. Previously on Regular diet w/ PO intake <25-75% of meals. Avg = 46% of meals.     Evaluation:   1. Per H&P pt presented with failure to thrive at home.   2. Pt with episode of constipation. Per RN believes diet order changed to clear liquids this morning by MD d/t constipation. Notes pt has since had a BM today.   3. RD visualized pt for muscle/fat loss briefly during short visit. No obvious signs of wasting noted.   4. Note pt with discharge order/summary. Plan to leave @1600 via [facility] today.     Malnutrition Risk: at risk with PO avg <50% of meals since admit.     Recommendations/Plan (if pt does not discharge as planned):    1. Advance diet >clear liquids.   2. Will continue supplements as ordered.   3. Encourage intake of meals, supplements.   4. Document intake of all meals, supplements as % taken in ADL's to provide interdisciplinary communication across all shifts.   5. Monitor weight.  6. Nutrition rep will continue to see patient for ongoing meal and snack preferences.     RD will f/u in 2-3 days for PO intake, should pt not discharge as planned this afternoon.     "

## 2021-06-28 NOTE — CARE PLAN
The patient is Stable - Low risk of patient condition declining or worsening    Shift Goals  Clinical Goals: Patient will be able to transfer from bed to the recliner.    Progress made toward(s) clinical / shift goals:  Patient transfer from bed to the decliner today.      Problem: Pain - Standard  Goal: Alleviation of pain or a reduction in pain to the patient’s comfort goal  Outcome: Progressing

## 2021-06-28 NOTE — DISCHARGE PLANNING
Agency/Facility Name: [facility]  Spoke To: Amairani  Outcome: Bed and transport available 6/28 @ 1630.      [facility] request for 1600 per RN CM    RN CM informed

## 2021-08-09 ENCOUNTER — TELEPHONE (OUTPATIENT)
Dept: MEDICAL GROUP | Facility: MEDICAL CENTER | Age: 86
End: 2021-08-09

## 2021-08-09 NOTE — TELEPHONE ENCOUNTER
VOICEMAIL  1. Caller Name: Cheri Fraga                      Call Back Number: [phone]    2. Message: need verbal orders for starting home health skilled nursing 2x a week for 2 weeks, then 1x week 6 weeks, has davenport needs order to maintain and changing davenport every 30 days. Has appointment with urology on 18th. Going to get PT and OT eval.     3. Patient approves office to leave a detailed voicemail/MyChart message: N\A

## 2021-08-10 NOTE — TELEPHONE ENCOUNTER
Reviewed request from home health for verbal orders, okay to start skilled nursing twice a week for 2 weeks, then 1 time a week for 6 weeks, Dale catheter order to maintain and change Dale every 30 days, evaluation physical therapy and Occupational Therapy

## 2021-08-13 ENCOUNTER — TELEPHONE (OUTPATIENT)
Dept: MEDICAL GROUP | Facility: MEDICAL CENTER | Age: 86
End: 2021-08-13

## 2021-08-13 NOTE — TELEPHONE ENCOUNTER
VOICEMAIL  1. Caller Name: Cristina                      Call Back Number: [phone]    2. Message: Need verbal orders for initial start of care and POC.    3. Patient approves office to leave a detailed voicemail/MyChart message: N\A

## 2021-08-17 ENCOUNTER — TELEPHONE (OUTPATIENT)
Dept: MEDICAL GROUP | Facility: MEDICAL CENTER | Age: 86
End: 2021-08-17

## 2021-08-17 NOTE — TELEPHONE ENCOUNTER
I did not order the oxycodone for her.  I assume this was provided to her by the hospital or the skilled nursing facility?    She needs to be seen and evaluated for pain I cannot adjust or increase her oxycodone to every 4 hours without actually seeing her since she was not on any narcotics when I last saw her.    Could you please see if someone has any availabilities tomorrow?

## 2021-08-17 NOTE — TELEPHONE ENCOUNTER
Cheri Fraga called to report that Mala is at 7 to 10 on the pain scale all the time. Wants to know if you will increase he Oxycodone to Q4. Pt not getting any relief as she is medicated now.

## 2021-08-18 NOTE — TELEPHONE ENCOUNTER
Left a message for patient to call back at [phone].   Left a message for Philly to call back at [phone].

## 2021-08-23 ENCOUNTER — TELEPHONE (OUTPATIENT)
Dept: MEDICAL GROUP | Facility: MEDICAL CENTER | Age: 86
End: 2021-08-23

## 2021-08-23 NOTE — TELEPHONE ENCOUNTER
Rosaline Fraga called to advise that she cancelled her PT session with Mala due to Bad Air Quality.

## 2021-08-30 PROBLEM — Z87.81 HISTORY OF PELVIC FRACTURE: Status: ACTIVE | Noted: 2021-08-30

## 2021-08-30 PROBLEM — J32.9 SINUSITIS: Status: RESOLVED | Noted: 2021-06-22 | Resolved: 2021-08-30

## 2021-08-30 PROBLEM — N17.9 ACUTE RENAL FAILURE SUPERIMPOSED ON STAGE 3 CHRONIC KIDNEY DISEASE (HCC): Status: RESOLVED | Noted: 2021-06-25 | Resolved: 2021-08-30

## 2021-08-30 PROBLEM — N18.30 ACUTE RENAL FAILURE SUPERIMPOSED ON STAGE 3 CHRONIC KIDNEY DISEASE (HCC): Status: RESOLVED | Noted: 2021-06-25 | Resolved: 2021-08-30

## 2021-08-31 ENCOUNTER — TELEPHONE (OUTPATIENT)
Dept: MEDICAL GROUP | Facility: MEDICAL CENTER | Age: 86
End: 2021-08-31

## 2021-08-31 ENCOUNTER — OFFICE VISIT (OUTPATIENT)
Dept: MEDICAL GROUP | Facility: MEDICAL CENTER | Age: 86
End: 2021-08-31
Payer: MEDICARE

## 2021-08-31 VITALS
HEART RATE: 132 BPM | BODY MASS INDEX: 26.43 KG/M2 | OXYGEN SATURATION: 99 % | TEMPERATURE: 97.7 F | SYSTOLIC BLOOD PRESSURE: 144 MMHG | HEIGHT: 64 IN | DIASTOLIC BLOOD PRESSURE: 70 MMHG

## 2021-08-31 DIAGNOSIS — E03.9 HYPOTHYROIDISM, UNSPECIFIED TYPE: ICD-10-CM

## 2021-08-31 DIAGNOSIS — D64.9 ANEMIA, UNSPECIFIED TYPE: ICD-10-CM

## 2021-08-31 DIAGNOSIS — N18.32 STAGE 3B CHRONIC KIDNEY DISEASE: ICD-10-CM

## 2021-08-31 DIAGNOSIS — I48.0 PAROXYSMAL ATRIAL FIBRILLATION (HCC): ICD-10-CM

## 2021-08-31 DIAGNOSIS — M54.50 LOW BACK PAIN, UNSPECIFIED BACK PAIN LATERALITY, UNSPECIFIED CHRONICITY, UNSPECIFIED WHETHER SCIATICA PRESENT: ICD-10-CM

## 2021-08-31 DIAGNOSIS — E55.9 VITAMIN D DEFICIENCY: ICD-10-CM

## 2021-08-31 DIAGNOSIS — Z98.890 STATUS POST LUMBAR SURGERY: Chronic | ICD-10-CM

## 2021-08-31 DIAGNOSIS — S32.9XXS CLOSED NONDISPLACED FRACTURE OF PELVIS, UNSPECIFIED PART OF PELVIS, SEQUELA: ICD-10-CM

## 2021-08-31 PROCEDURE — 99214 OFFICE O/P EST MOD 30 MIN: CPT | Performed by: INTERNAL MEDICINE

## 2021-08-31 RX ORDER — EPINASTINE HCL 0.05 %
DROPS OPHTHALMIC (EYE)
Status: ON HOLD | COMMUNITY
End: 2021-11-04

## 2021-08-31 RX ORDER — EZETIMIBE 10 MG/1
TABLET ORAL
Status: ON HOLD | COMMUNITY
End: 2021-11-04

## 2021-08-31 RX ORDER — GABAPENTIN 100 MG/1
CAPSULE ORAL
Qty: 90 CAPSULE | Refills: 1 | Status: SHIPPED | OUTPATIENT
Start: 2021-08-31 | End: 2021-10-07 | Stop reason: SDUPTHER

## 2021-08-31 RX ORDER — TRAMADOL HYDROCHLORIDE 50 MG/1
50 TABLET ORAL EVERY 8 HOURS PRN
Qty: 90 TABLET | Refills: 0 | Status: SHIPPED | OUTPATIENT
Start: 2021-08-31 | End: 2021-09-30

## 2021-08-31 NOTE — PROGRESS NOTES
Subjective     Yvonne Marie Wada is a 85 y.o. female who presents with follow up fracture Follow-Up            HPI    Patient is here with  and daughter.  Follow-up from the hospital.  Reviewed medication list from [facility].  We have no records from [facility] when she was there for over a month after hospital discharge, after she was admitted to the hospital at [facility] falling onto her buttocks on June 22.   Medications, allergies, medical history, surgical history, social history, family history  reviewed and updated  Patient was at [facility] for at least a month and was on oxycodone every six hours, has been off the oxycodone for a week, had been taking colace.  She had been having issues with constipation on the oxycodone.  Patient went off the oxycodone last week she had been on 5 mg 4 times daily, since then her pain has been worse.  She states she does not really have back pain but more sciatica pain as she describes it.  She states she will have shooting pain on the back of her leg similar to sciatica pain that she has had previously, she has seen [facility] in the past for epidurals, last epidural about 1 year ago, she is status post lumbar fusion 2006 L4-L5, subsequent L4-L5 hardware removal, bilateral vasectomy L3-4 and interbody fusion 2015, she also has a history of a right hip arthroplasty 2018, as well as left and right total arthroplasty.  Most of her discomfort is on the left side she has difficulty sitting for even short periods of time.  She does have a four-wheel walker with hand brakes and a seat.  Does require assistance at home, has home health as well as home health nursing, physical therapy.  Since admission to the hospital she has had difficulty urinating and does have a Dale catheter in place followed by urology, we do not have any records from  urology.  She has been using Advil as needed for pain 40 mg at a time, no GI upset although she is on Eliquis for paroxysmal atrial  fibrillation.  Has not noticed any blood in her stools although after the constipation she started taking Colace and suppositories and has had more loose stools the past week but that is easing has she is off the oxycodone.  She has physical therapy twice a week.  No further falls.  Davenport catheter in place, appetite decreased, she has lost weight during hospitalization, no leg swelling.  Medications and allergies reviewed and updated, blood pressures have been stable still remains on the benazepril 40 mg, Toprol 25 mg, Synthroid 75 mcg, Eliquis 5 mg, Prilosec 20 mg  For pain previously she has been on tramadol without side effects and she believes it was effective for back pain a while ago  Hospital records reviewed  We have no skilled nursing facility records  6/22/21 CT pelvis without post reconstruction, comminuted mildly displaced fracture of right inferior and superior pubic rami, right sacral ritu fracture, right hip prosthesis normally located, moderate degenerative change left hip  6/25/21 hospital admission, 6/28/21 hospital discharge, admitted with pelvic fracture, seen by orthopedics no surgical intervention necessary, seen by physical therapy and occupational therapy recommended skilled nursing placement, urinary retention discharged with davenport catheter  6/25/21 CT pelvis without, stable appearance of minimally displaced fractures right sacral ritu, right pubic ramus posteriorly, and right ischial ramus, no acute pelvic fracture, no hip dislocation, right hip total arthroplasty in place  6/27/21 physical therapy hospital note recommend postacute placement for additional physical therapy services  6/27/21 Na 125,bun 64,creat 1.52  6/27/21 hgb 7.7,hct 24.7        Current Outpatient Medications   Medication Sig Dispense Refill   • Epinastine HCl 0.05 % Solution epinastine 0.05 % eye drops   INSTILL 1 DROP INTO BOTH EYES TWICE A DAY     • ezetimibe (ZETIA) 10 MG Tab ezetimibe 10 mg tablet   TAKE 1 TABLET BY  MOUTH EVERY DAY     • acetaminophen (TYLENOL) 325 MG Tab Take 2 Tablets by mouth every 6 hours. 30 tablet 0   • promethazine (PHENERGAN) 25 MG Tab Take 1 tablet by mouth every 6 hours as needed for Nausea/Vomiting. 30 tablet 0   • senna-docusate (PERICOLACE OR SENOKOT S) 8.6-50 MG Tab Take 2 Tablets by mouth 2 times a day. 30 tablet 0   • bisacodyl (DULCOLAX) 10 MG Suppos Insert 1 Suppository into the rectum as needed (if magnesium hydroxide ineffective after 24 hours).     • benazepril (LOTENSIN) 40 MG tablet Take 40 mg by mouth every morning.     • ipratropium (ATROVENT) 0.03 % Solution Administer 2 Sprays into affected nostril(S) 2 times a day. 30 mL 6   • doxycycline (VIBRAMYCIN) 100 MG Tab Take 1 tablet by mouth 2 times a day. 20 tablet 0   • levothyroxine (SYNTHROID) 75 MCG Tab TAKE 1 TAB BY MOUTH EVERY MORNING ON AN EMPTY STOMACH. 90 tablet 1   • MAGNESIUM PO Take 1 capsule by mouth every evening.     • apixaban (ELIQUIS) 5mg Tab Take 1 Tab by mouth 2 Times a Day. 180 Tab 3   • metoprolol SR (TOPROL XL) 25 MG TABLET SR 24 HR Take 1 Tab by mouth every morning. 90 Tab 3   • FEXOFENADINE HCL PO Take 1 tablet by mouth 2 times a day. Indications: Hayfever     • omeprazole (PRILOSEC) 20 MG delayed-release capsule Take 20 mg by mouth every morning.     • Multiple Vitamins-Minerals (CENTRUM SILVER ULTRA WOMENS PO) Take 1 tablet by mouth every evening.       No current facility-administered medications for this visit.     Aortic regurgitation  12/6/19 echo normal LV function EF 65%, RVSP 43, moderate tricuspid regurgitation, mild to moderate aortic insufficiency  10/22/20 echo EF 70%, grade 1 diastolic dysfunction, mild AR, RVSP 30   4/20/21  sinus on exam, patient would like to discontinue amiodarone understanding she likely will return to intermittent atrial fibrillation, discussed sotalol, dronedarone, tikosyn as alternatives, patient declines any of those alternatives      Cervical pain  2/10/14 [facility]  note; x-ray cervical spine DJD, right shoulder steroid injection     Decreased GFR  5/31/09 bun 18,creat 1.2  1/14/10 bun 28,creat 1.3,GFR 40  9/20/12 bun 37,creat 1.1,GFR 48  9/25/13 bun 25,creat 1.2,GFR 44  9/26/14 bun 26,creat 1.1,GFR 45  2/23/15 bun 20,creat 1.1,GFR 48  4/15/16 bun 31,creat 1.1,GFR 45  7/6/16 bun 29,creat 1.1,GFR 44   5/12/17 bun 35,creat 1.1,GFR 44  11/2/17 bun 22,creat 1.28,GFR 40  12/9/17 bun 29,creat 1.1,GFR 43  10/4/18 bun 31,creat 1.5 at [facility]  1/18/19 bun 33,creat 1.34,GFR 38,PTH 53,calcium 9.7,phos 3.6,spep negative  2/4/20 bun 30,creat 1.24, GFR 41, PTH 58, calcium 9.7, phos 3.9  8/20/20 bun 35,creat 1.12,GFR 46  9/28/20 bun 31,creat 1.1,GFR 47  6/8/21 bun 38,creat 1.46,GFR 34  6/27/21 Na 125,bun 64,creat 1.52 (hospital admission acute sacral fracture)     Dyslipidemia  4/11 chol 159,trig 84,hdl 47,ldl 95,cpk 114  7/12 chol 163,trig 120,hdl 49,ldl 90, crp 1.1 on lipitor 20 mg  3/26/13 chol 159,trig 99,hdl 46,ldl 93 on lipitor 20 mg  9/25/13 chol 164,trig 100,hdl 47,ldl 97 on lipitor 20 mg  9/12/14 cardiology note lower extremity weakness, hold lipitor x3 months, labs ordered  12/15/14 cardiology start low dose lipitor 10 mg  1/22/15 off lipitor will resume 10 mg and repeat labs 4 weeks  2/24/15 chol 179,trig 111,hdl 54,ldl 103 on lipitor 10 mg  2/1/16 cardiology note restart lipitor 20 mg    4/15/16 chol 163,trig 102,hdl 48,ldl 95 on lipitor 20 mg  5/12/17 chol 186,trig 101,hdl 47,ldl 119 on lipitor 20 mg intermittently will start taking daily  11/1/17 chol 146,trig 73,hdl 42,ldl 89 on lipitor 20 mg  12/9/17 chol 133,trig 74,hdl 49,ldl 69 on lipitor 80 mg higher dose due to recent transient ischemic attack  10/4/18 chol 126,trig 98,hdl 44,ldl 62 on lipitor 80 mg at [facility]  1/18/19 chol 161,trig 103,hdl 45,ldl 95 on lipitor 80 mg  2/4/20 chol 121,trig 71,hdl 45,ldl 62 on lipitor 80 mg  8/17/20 stop lipitor due to muscle pain   9/17/20 improved off lipitor repeat  lipid pending  10/3/20 chol 240,trig 111,hdl 60,ldl 158, recommend start crestor 10 mg and repeat labs 6 weeks  10/26/20 chol 158,trig 106,hdl 62,ldl 75 on crestor 20 mg  4/20/21  off statin, she agrees to try zetia  Lipitor,crestor muscle pain      gait disorder  4/22/21 custom PT evaluation      Gastroesophageal reflux  6/27/07 EGD per [facility] hiatal hernia, start prevacid 30 mg  7/12 protonix 20 mg qday  11/16/12 [facility] note cont prilosec  1/5/16 change to protonix 40 mg from prilosec  2/4/20 vit d 67    History pelvic fracture  6/22/21 CT pelvis without post reconstruction, comminuted mildly displaced fracture of right inferior and superior pubic rami, right sacral ritu fracture, right hip prosthesis normally located, moderate degenerative change left hip  6/25/21 hospital admission, 6/28/21 hospital discharge, admitted with pelvic fracture, seen by orthopedics no surgical intervention necessary, seen by physical therapy and occupational therapy recommended skilled nursing placement, urinary retention discharged with davenport catheter  6/25/21 CT pelvis without, stable appearance of minimally displaced fractures right sacral ritu, right pubic ramus posteriorly, and right ischial ramus, no acute pelvic fracture, no hip dislocation, right hip total arthroplasty in place  6/27/21 physical therapy hospital note recommend postacute placement for additional physical therapy services  6/27/21 Na 125,bun 64,creat 1.52  6/27/21 hgb 7.7,hct 24.7     history of pulmonary hypertension  11/17/18 cardiology note asymptomatic, continue cholesterol medication, continue to monitor echo aortic valve, repaired ascending aorta, follow-up yearly  11/21/18 echo normal LV function, EF 60%, mild LVH, grade 2 diastolic dysfunction, mild aortic insufficiency, moderate tricuspid regurgitation, RVSP 50  12/6/19 echo normal LV function EF 65%, RVSP 43, moderate tricuspid regurgitation, mild to moderate aortic insufficiency  10/22/20 echo  EF 70%, grade 1 diastolic dysfunction, mild AR, RVSP 30     History shingles     History shoulder pain  4/4/17 right shoulder pain right shoulder x-ray ordered, right knee pain, referral custom PT, tried celebrex no benefit, will try naproxen 500 mg twice daily and zanaflex at night  4/5/17 x-ray right shoulder calcifications consistent with calcific tendinitis or hydroxyapatite deposition  5/12/17 MRI right shoulder ordered, persistent pain despite physical therapy  5/18/17 MRI right shoulder; full-thickness supraspinatus tendon tear  5/22/17 right shoulder injection, has been going to physical therapy 14 treatments some improvement, right shoulder injection provided, if no improvement in has appt  in october, if need sooner appt try   6/9/17 custom PT note   7/10/17 custom PT note       History TIA  11/1/17 hospital admission, 11/2/17 hospital discharge, facial numbness, transient numbness in both hands, resolved, CT, MRI head no acute infarct, blood pressure stable, discharged home, patient states she was on aspirin at the time of admission  11/1/17 CT head stable right mid brain likely chronic lacunar infarction, periventricular white matter disease  11/1/17 MRI brain no acute abnormality, moderate chronic microvascular stomach disease, chronic microhemorrhages left frontal and right parietal lobes, chronic lacunar infarct left basal ganglia and blanco, or cerebral atrophy  11/1/17 MR-MRA head without; negative  11/2/17 echo normal LV size and function, EF 70%, RVSP 50, mild AR and TR  11/28/17 awaiting possible right hip replacement per orthopedics , given recent possible TIA, cannot provide clearance, she will like second opinion from cardiology referral made to dr.bryan de leon, changed asa to plavix  12/12/17 ultrasound carotid less than 50% internal carotid stenosis bilateral  12/20/17 dr.bryan de leon cardiology note most recent echocardiogram looks fine, will repeat CT scan  follow aortic repair  2/20/18 episode of supraventricular tachycardia in the emergency room, davenport catheter removed, symptoms resolved with repeat EKG sinus rhythm  11/17/18 cardiology note asymptomatic, continue cholesterol medication, continue to monitor echo aortic valve, repaired ascending aorta, follow-up yearly  11/21/18 echo normal LV function, EF 60%, mild LVH, grade 2 diastolic dysfunction, mild aortic insufficiency, moderate tricuspid regurgitation, RVSP 50  12/6/19 echo normal LV function EF 65%, RVSP 43, moderate tricuspid regurgitation, mild to moderate aortic insufficiency   10/22/20 echo EF 70%, grade 1 diastolic dysfunction, mild AR, RVSP 30   12/14/20 cardiology note maintaining sinus rhythm, given frequent symptomatic episodes of paroxysmal atrial fibrillation and age, recommend amiodarone 200 mg, follow-up 6 month  4/20/21  sinus on exam, patient would like to discontinue amiodarone understanding she likely will return to intermittent atrial fibrillation, discussed sotalol, dronedarone, tikosyn as alternatives, patient declines any of those alternatives, ablation is not appropriate given her age      Hypertension  1/10/11 [facility] note cardiac catheterization normal systolic function  3/11 [facility] echo moderate aortic insufficiency, moderate mitral insufficiency, moderate tricuspid insufficiency, normal LV function  5/12 echo [facility] normal LV function EF 60%, moderate to severe left atrial enlargement, RVSP 32    9/26/13 CT thoracic aorta no evidence of aneurysm, small hiatal hernia  9/26/13 Persantine thallium uniform breast tissue attenuation, cannot rule out underlying ischemic, LVEF 67%  10/3/13 on toprol-XL 25 mg half a tablet daily    12/13/13 cardiology note cont same meds, repeat echo and follow up 6 months  1/2/14 echo mild LVH, grade 1 diastolic dysfunction, ascending aortic 4.0, no change compared with ZAK 2010  5/15/14 cardiology note; increase benazepril to 40 mg qday,continue  toprol XL 25 mg daily  6/17/15 cardiology note continue meds, repeat echo 6 months  2/1/16 cardiology note moderate pulmonary hypertension and snoring, nocturnal pulse oximetry ordered  6/30/16 cardiology note patient declines overnight pulse oximetry  11/1/17 echo normal LV size and function, EF 70%, mild aortic insufficiency and mild tricuspid regurgitation, RVSP 50, aortic root 3.2 cm  11/3/17 cardiology note hypertension stable, status post thoracic aortic aneurysm repair, headache unspecified, ESR ordered  11/28/17 on benazepril 40 mg,toprol 25mg, add norvasc 5 mg  12/12/17 ultrasound carotid less than 50% internal carotid stenosis bilateral  12/20/17  [facility] cardiology note most recent echocardiogram looks fine, will repeat CT scan follow aortic repair  2/20/18 episode of supraventricular tachycardia in the emergency room, davenport catheter removed, symptoms resolved with repeat EKG sinus rhythm  11/17/18 cardiology note asymptomatic, continue cholesterol medication, continue to monitor echo aortic valve, repaired ascending aorta, follow-up yearly  11/21/18 echo normal LV function, EF 60%, mild LVH, grade 2 diastolic dysfunction, mild aortic insufficiency, moderate tricuspid regurgitation, RVSP 50  1/18/19 urine mac 45 on benazepril 40 mg, toprol 25 mg, norvasc 5 mg  12/6/19 echo normal LV function EF 65%, RVSP 43, moderate tricuspid regurgitation, mild to moderate aortic insufficiency  10/22/20 echo EF 70%, grade 1 diastolic dysfunction, mild AR, RVSP 30   12/14/20 cardiology note maintaining sinus rhythm, given frequent symptomatic episodes of paroxysmal atrial fibrillation and age, recommend amiodarone 200 mg, follow-up 6 month  4/6/21 on lotensin 40 mg and metoprolol 25 mg  4/20/21  sinus on exam, patient would like to discontinue amiodarone understanding she likely will return to intermittent atrial fibrillation, discussed sotalol, dronedarone, tikosyn as alternatives, patient declines  any of those alternatives, ablation is not appropriate given her age      Hypothyroid  4/11 tsh 9 start synthroid 50  4/11 tsh 6,free t3 3.1,free t4 1.2,tpo negative  7/1/11 tsh 2.1 cont synthroid 50 mcg  7/12 tsh 3.4 cont synthroid 50 mcg  7/31/13 tsh 3.2 on synthroid 50 mcg  9/25/13 tsh 1.7 on synthroid 50 mcg  2/24/15 tsh 4.9 on synthroid 50 mcg; increase to synthroid 75 mcg, repeat tsh in 6 weeks  4/14/15 tsh 1.1 on synthroid 75 mcg  4/15/16 tsh 2.3 on synthroid 75 mcg  5/12/17 tsh 1.2 on synthroid 75 mcg  11/1/17 tsh 2.1 on synthroid 75 mcg  1/18/19 tsh 2.2 on synthroid 75 mcg  2/4/20 tsh 3.1 on synthroid 75 mcg  8/20/20 tsh 1.6 on synthroid 75 mcg     Insomnia  1/30/17 trial of trazodone 50 mg  4/4/17 side effects with trazodone drowsiness, discontinued     Neutropenia  4/16/11 wbc 3.9  7/30/13 wbc 5.6  11/7/16 wbc 5.7  5/11/17 wbc 4.7  2/20/18 wbc 7.2  3/6/18 wbc 5.9  1/16/19 wbc 4.0  6/8/19 wbc 4.3 (44%N, 39%L)  2/4/20 wbc 4.1 (42%N,41%L)  8/20/20 wbc 4.6     Osteoporosis  Had been on fosamax [id number]    8/10 dexa LS -2.0,hip -1.5 await old dexa result, she will get copy for me  4/11 vit d 35  9/12 dexa LS -3.2,hip -1.4  2/13/13 reclast IV  7/31/13 vit d 33 cont 2000 units  2/18/14 reclast IV  2/2/15 dexa LS -3.1,hip -1.9; FRAX 35.5 % major,12.6% hip  2/18/15 reclast IV  2/24/15 vit d 33  4/15/16 vit d 36  5/12/17 vit d 36  8/7/18 dexa left forearm -4.2,hip -2.5 resume reclast   8/31/18 reclast IV  1/18/19 vit d 27 increase from 2000 to 4000 units  2/4/20 vit d 67, PTH 58     Paroxysmal atrial fibrillation  11/1/17 hospital admission, 11/2/17 hospital discharge, facial numbness, transient numbness in both hands, resolved, CT, MRI head no acute infarct, blood pressure stable, discharged home, patient states she was on aspirin at the time of admission  11/1/17 CT head stable right mid brain likely chronic lacunar infarction, periventricular white matter disease  11/1/17 MRI brain no acute abnormality,  moderate chronic microvascular stomach disease, chronic microhemorrhages left frontal and right parietal lobes, chronic lacunar infarct left basal ganglia and blanco, or cerebral atrophy  11/1/17 MR-MRA head without; negative  2/20/17 episode of supraventricular tachycardia in the emergency room, davenport catheter removed, symptoms resolved with repeat EKG sinus rhythm  12/20/17  [facility] cardiology note most recent echocardiogram looks fine, will repeat CT scan follow aortic repair  11/17/18 cardiology note asymptomatic, continue cholesterol medication, continue to monitor echo aortic valve, repaired ascending aorta, follow-up yearly  11/21/18 echo normal LV function, EF 60%, mild LVH, grade 2 diastolic dysfunction, mild aortic insufficiency, moderate tricuspid regurgitation, RVSP 50  12/6/19 echo normal LV function EF 65%, RVSP 43, moderate tricuspid regurgitation, mild to moderate aortic insufficiency  9/28/20 emergency room note EKG atrial fibrillation new onset  9/30/20 cardiology note sinus rhythm, start eliquis 5 mg bid and increase toprol to 25 mg daily, echo ordered, follow up 3 months   10/22/20 echo EF 70%, grade 1 diastolic dysfunction, mild AR, RVSP 30   12/14/20 cardiology note maintaining sinus rhythm, given frequent symptomatic episodes of paroxysmal atrial fibrillation and age, recommend amiodarone 200 mg, follow-up 6 month  4/20/21  cardiology note sinus on exam, patient would like to discontinue amiodarone understanding she likely will return to intermittent atrial fibrillation, discussed sotalol, dronedarone, tikosyn as alternatives, patient declines any of those alternatives, ablation is not appropriate given her age      Preventative health  6/27/09 colon per [facility] no records  10/19/04 pneumovax   9/9/11 zoster vaccine  4/14/15 prevnar  8/7/18 dexa left forearm -4.2,hip -2.5  9/28/19 pneumovax  11/7/19 mammogram  11/12/19 shingrix second  2/4/20 vit d 67  9/17/20 tdap   2/27/21  covid second pfizer     s/p knee arthroplasty  4/10 MRI right knee complex tear medial meniscus, horizontal cleavage tear lateral meniscus, chondromalacia patella, moderate-sized baker's cyst  5/10 right meniscus knee repair   5/10 told by  had gout on surgery had pathology report, I await that report, question gout seen on pathology report done during repair of right meniscus knee surgery.  7/10 uric acid 6.3, await starting allopurinol depending on the operative report  8/5/10 reviewed orthopedics notes , operative report indicates crystal deposition, could be gout or pseudogout, no biopsy results, suspect chondrocalcinosis  10/11 dr.parlasca najera note, MRI pending  8/7/10 reviewed pathology report right knee tissue, marked degenerative changes with focal calcification, no mention of gout. Based on this and a normal uric acid level, I do not believe she has gout, has no hyperuricemia medications would be indicated  10/11 dr.parlasca najera left knee meniscectomy and arthroscopy  1/12 dr.parlasca najera left knee arthroplasty  2/29/16  orthopedics x-ray right knee advanced DJD on the right knee corticosteroid injection performed, prescription voltaren gel  6/13/16  orthopedic note x-rays demonstrate right knee advanced DJD and resurfaced patella, offer right knee total replacement followed by patellar resurfacing after she recovers from her right knee  9/8/16  orthopedic's operative note right knee total arthroplasty  11/2/16  orthopedic note, follow-up right knee, x-ray right knee shows subluxation patella, traumatic evulsion VMO needs reexploration and repair  11/10/16  orthopedic's operative note VMO quadriceps avulsion repair status post total knee replacement  11/23/16  orthopedic no follow-up quadriceps repair, continue crutches in full extension, follow-up one month and then start passive range of motion 0-40°  2/4/17   orthopedic note, continue physical therapy, follow-up this summer  4/17/17 [facility] note  6/9/17 custom PT note     s/p total hip arthroplasty  9/21/17 MRI right hip mild trochanteric bursitis, mild femoral acetabular joint arthritis  10/12/17  orthopedic no proceed with right total hip arthroplasty, x-ray AP pelvis and right hip shows early arthrosis with calcifications and DJD  2/14/18  orthopedics operative note at [facility] right hip total arthroplasty, gluteus medius and minimus tendon repair  3/27/18 [facility] note   5/8/18 [facility] note xray right hip stable total hip arthroplasty, continue physical therapy  4/3/19 custom PT discharge  4/5/21 dr.arraiz NELSON pain note right trochanteric bursitis steroid injection, trigger point injection low back  4/22/21 custom PT evaluation   6/21/21 custom PT discharge note   6/25/21 CT pelvis without, stable appearance of minimally displaced fractures right sacral ritu, right pubic ramus posteriorly, and right ischial ramus, no acute pelvic fracture, no hip dislocation, right hip total arthroplasty in place     s/p lumbar surgery  6/03 L4-5 epidural     7/06  spinal surgery operative note decompression, foraminotomy. L4-L5 posterior fusion, L4-L5 allograft    3/5/14 MRI lumbar spine grade 2 spondylolisthesis L4-L5 with previous laminectomy and posterior fusion, left sided pedicle screw fixation L4-L5, moderate central canal stenosis L5-S1 secondary to facet arthropathy, mild to moderate multilevel neural foraminal narrowing  12/8/14  pain [facility] note; right lower extremity radiculitis L4-L5 lesion, myofascial lumbosacral pain, trochanteric bursitis, followup as needed  4/2/15  pain procedure note right L4-L5 and right L5-S1 SSNRB under fluoroscopy  4/27/15  pain note; right trochanteric bursal injection, trigger point injections performed, also set up SI joint injections  8/3/15 [facility]  neurosurgery note, lumbar x-ray flexion and extension, MRI lumbar spine without contrast, followup   8/9/15 MRI lumbar spine, previous L4-L5 left  fusion and laminectomy, L3-L4 moderate bilateral facet arthropathy, mild disc bulge, L4-L5 severe bilateral facet arthropathy, moderate to severe bilateral neural foraminal stenosis, L5-S1 moderate facet arthropathy  8/28/15  neurosurgery note previous posterior fusion L4-L5, does have L3-L4 disc bulge with central canal stenosis, recommend L3-L5 decompression  8/31/15  pain note; right lower extremity radiculitis, myofascial lumbosacral pain, start hydrocodone 5 mg, continued SI joint exercises, perform trochanteric bursal injection right hip, trigger points lumbar region  10/28/15  neurosurgery note, will schedule for posterior L3-L4 laminectomy and fusion with redo L4-5 laminectomy and exploration of fusion, surgical clearance per cardiology   11/24/15  neurosurgery operative note expiration removal L4-L5 hardware on the left, bilateral facetectomies L4, L3-L4 transforaminal lumbar interbody fusion  12/9/15 [facility] neurosurgery note s/p L4-S1 fusion on 11/24/15 , follow up in 4 weeks  12/23/15  neurosurgery note, clear to restart physical therapy if she would like after one month, follow-up 3 months  6/9/20  [facility] pain note trigger point injections trochanteric bursa right side  8/25/20 dr.arraiz NELSON pain procedure note epidural right L4 nerve root   4/5/21  [facility] pain note right trochanteric bursitis steroid injection, trigger point injection low back  4/22/21 custom PT evaluation   6/21/21 custom PT discharge note      s/p TOMY  Sees gyn on HRT estradiol for 20 yrs ()     s/p thoracic aneurysm repair  6/29/06 CT-CTA chest with and without postprocessing; stable ascending thoracic aortic aneurysm maximum diameter 4.8, just distal to the aortic valve maximum diameter 4.3  4/9/07 CT-CTA  chest with and without processing; stable ascending aortic thoracic aneurysm 4.5 x 4.6 cm  6/8/09 CT chest with; stable 4.7 ascending aorta aneurysm  10/15/09 CT chest without; dilated ascending thoracic aorta 4.9 cm aneurysm increased from 4.7  1/11/10  cardiovascular surgery operative note ascending thoracic aorta aneurysm repair  9/26/13 CT thoracic aorta evaluation; status post repair ascending thoracic aortic aneurysm without evidence of dissection or leak  1/2/14 echo mild LVH, grade 1 diastolic dysfunction, ascending aortic 4.0, no change compared with [facility] 2010  5/15/14 cardiology note  6/17/15 cardiology note cont meds,repeat echo 6 months  2/1/16 cardiology note moderate pulmonary hypertension and snoring, nocturnal pulse oximetry ordered  11/1/17 echo normal LV size and function, EF 70%, mild aortic insufficiency and mild tricuspid regurgitation, RVSP 50, aortic root 3.2 cm  11/3/17 cardiology note stable  [facility] most recent echocardiogram looks fine, will repeat CT scan follow aortic repair  11/17/18 cardiology note asymptomatic, continue cholesterol medication, continue to monitor echo aortic valve, repaired ascending aorta, follow-up yearly  11/21/18 echo normal LV function, EF 60%, mild LVH, grade 2 diastolic dysfunction, mild aortic insufficiency, moderate tricuspid regurgitation, RVSP 50  12/6/19 echo normal LV function EF 65%, RVSP 43, moderate tricuspid regurgitation, mild to moderate aortic insufficiency  10/22/20 echo EF 70%, grade 1 diastolic dysfunction, mild AR, RVSP 30      Tricuspid regurgitation  11/17/18 cardiology note asymptomatic, continue cholesterol medication, continue to monitor echo aortic valve, repaired ascending aorta, follow-up yearly  11/21/18 echo normal LV function, EF 60%, mild LVH, grade 2 diastolic dysfunction, mild aortic insufficiency, moderate tricuspid regurgitation, RVSP 50  12/6/19 echo normal LV function EF 65%, RVSP  43, moderate tricuspid regurgitation, mild to moderate aortic insufficiency  10/22/20 echo EF 70%, grade 1 diastolic dysfunction, mild AR, RVSP 30   4/20/21  sinus on exam, patient would like to discontinue amiodarone understanding she likely will return to intermittent atrial fibrillation, discussed sotalol, dronedarone, tikosyn as alternatives, patient declines any of those alternatives, ablation is not appropriate given her age      UTI  7/6/16 UTI klebsiella pneumoniae sensitive to all antibiotics except ampicillin, start bactrim x 5 days  1/18/19 UTI enterobacter cloacae resistant to ampicillin, cephalothin, penicillin, bactrim, sensitive to cefuroxime, ciprofloxacin and levaquin, nitrofurantoin  5/26/19 UTI klebsiella given omnicef, organism resistant ampicillin, ciprofloxacin, levofloxacin, bactrim        Patient Active Problem List   Diagnosis   • S/p lumbar surgery   • Hypertension   • Dyslipidemia   • S/P TOMY (total abdominal hysterectomy)   • Preventative health care   • S/P total knee arthroplasty   • S/P appendectomy   • Osteoporosis, idiopathic   • Hypothyroid   • History of shingles   • GERD (gastroesophageal reflux disease)   • Tricuspid regurgitation   • Aortic regurgitation   • Cervical pain   • S/P thoracic aortic aneurysm repair   • CKD (chronic kidney disease) stage 3, GFR 30-59 ml/min   • UTI (urinary tract infection)   • Insomnia   • History of shoulder pain   • History of transient ischemic attack (TIA)   • S/P total hip arthroplasty   • Allergic rhinitis   • Paroxysmal atrial fibrillation (HCC)   • On continuous oral anticoagulation   • Gait disorder   • History of pelvic fracture          Health Maintenance Summary                Annual Wellness Visit Overdue 1/11/2020      Done 1/10/2019 Visit Dx: Medicare annual wellness visit, subsequent     Patient has more history with this topic...    MAMMOGRAM Overdue 11/7/2020      Done 11/7/2019 MA-SCREENING MAMMO BILAT W/TOMOSYNTHESIS  "W/CAD     Patient has more history with this topic...    IMM INFLUENZA Next Due 9/1/2021      Done 8/27/2020 Imm Admin: Influenza Vaccine Adult HD     Patient has more history with this topic...    BONE DENSITY Next Due 8/7/2023      Done 8/7/2018 DS-BONE DENSITY STUDY (DEXA)     Patient has more history with this topic...    IMM DTaP/Tdap/Td Vaccine Next Due 9/17/2030      Done 9/17/2020 Imm Admin: Tdap Vaccine          Patient Care Team:  Tee Tran M.D. as PCP - General Kobi Wheeler M.D. as Consulting Physician (Interventional Cardiology)  Brett Santos II, O.D. as Consulting Physician (Optometry)  Angelic Adams as Consulting Physician (Dentistry)       Health Maintenance Summary                Annual Wellness Visit Overdue 1/11/2020      Done 1/10/2019 Visit Dx: Medicare annual wellness visit, subsequent     Patient has more history with this topic...    MAMMOGRAM Overdue 11/7/2020      Done 11/7/2019 MA-SCREENING MAMMO BILAT W/TOMOSYNTHESIS W/CAD     Patient has more history with this topic...    IMM INFLUENZA Next Due 9/1/2021      Done 8/27/2020 Imm Admin: Influenza Vaccine Adult HD     Patient has more history with this topic...    BONE DENSITY Next Due 8/7/2023      Done 8/7/2018 DS-BONE DENSITY STUDY (DEXA)     Patient has more history with this topic...    IMM DTaP/Tdap/Td Vaccine Next Due 9/17/2030      Done 9/17/2020 Imm Admin: Tdap Vaccine          Patient Care Team:  Tee Tran M.D. as PCP - General Kobi Wheeler M.D. as Consulting Physician (Interventional Cardiology)  Brett Santos II, O.D. as Consulting Physician (Optometry)  Angelic Adams as Consulting Physician (Dentistry)    ROS           Objective     /70 (BP Location: Left arm, Patient Position: Sitting, BP Cuff Size: Adult)   Pulse (!) 132   Temp 36.5 °C (97.7 °F)   Ht 1.626 m (5' 4\")   SpO2 99%   BMI 26.43 kg/m²      Physical Exam  Vitals and nursing note reviewed.   Constitutional:       Appearance: " Normal appearance.   HENT:      Head: Normocephalic and atraumatic.      Right Ear: External ear normal.      Left Ear: External ear normal.   Eyes:      Conjunctiva/sclera: Conjunctivae normal.   Cardiovascular:      Rate and Rhythm: Normal rate and regular rhythm.      Heart sounds: Normal heart sounds.   Pulmonary:      Effort: No respiratory distress.   Abdominal:      General: There is no distension.   Musculoskeletal:         General: No swelling.      Cervical back: Neck supple.   Skin:     General: Skin is warm.   Neurological:      General: No focal deficit present.      Mental Status: She is alert.   Psychiatric:         Thought Content: Thought content normal.       Dale catheter in place, clear yellow urine draining    Patient in obvious discomfort when sitting for prolonged periods of time needing to shift to her right buttock when sitting, will very likely need to stand because of severe pelvic pain    No spinal tenderness  Some tenderness to palpation left gluteal, left IT band region    Normal sensation lower extremities  Normal dorsiflexion plantarflexion, limited left hip flexion extension     Normal affect, insight, judgment                Assessment & Plan        Assessment  #1 pubic fracture pelvis by CT June 22 after falling accidentally, mildly displaced right inferior superior pubic rami, right sacral ritu fractures, moderate left hip arthritis, right hip prosthesis in place, patient seen by orthopedics, conservative therapy recommended, patient was discharged to skilled nursing for over a month we do not have any records, currently at home with her , daughter provides assistance as well, she had been on oxycodone 5 mg 4 times a day through the nursing facility admission discharge, she stopped the oxycodone a week ago with worsening pain, the medication was also contributed to significant constipation, she does have physical therapy is trying to participate with the pelvic fractures  are quite uncomfortable, she does have a four-wheel walker with hand brakes and seat she does have some sciatica type symptoms down the left leg I do believe most of her pain is related to these pelvic fractures however although she may have a component of lumbar radiculopathy as she has required previous epidural injections by pain management, but also might be a nerve and muscle spasm component to her pelvic pain affecting gluteal muscles, as well as left leg muscles.  The biggest issue right now is pain control of the oxycodone, she did not have significant side effects of drowsiness, memory loss, confusion on the oxycodone but did have significant constipation clinically her pain is not controlled off the oxycodone currently and is adversely impacting her quality of life    #2 constipation resolving slowly related to opiate use, she has been off opiates for over a week, and have been tried suppositories and Colace to improve her bowel movements, off the opiates, she has actually had more looser stools that is subsiding    #3 decreased appetite related to chronic pain, likely a component of mood changes with chronic pain as well as opiate, she has good social support with her  and daughter however    #4 paroxysmal atrial fibrillation followed by cardiology on Eliquis, metoprolol, she has been taking occasional Advil as well on the Eliquis with no GI upset    #5 hypertension blood pressure controlled, reasonable despite the chronic pain, she does have occasional elevations to the 140s according to patient which is understandable given her chronic pain    #6 hypothyroid on Synthroid    #7 history of osteoporosis had been on Reclast previously, has not gotten a follow-up bone density yet which was ordered previously, we are awaiting the bone density result to determine whether or not she needed request again    #8 CKD stage IIIb, her creatinine prior to the recent hospitalization has run between 1.1 and 1.3  going back to 2009, her hospital discharge creatinine was 1.5, this was not followed up upon, perhaps the acute injury and dehydration contributed to some prerenal elevation, she also has been taking NSAIDs intermittently which may contribute, 6/27/21 Na 125,bun 64,creat 1.52    #9 anemia on discharge 6/27/21 hgb 7.7,hct 24.7 on hospital discharge, no clear melena or hematochezia at home    Plan  #!  Reviewed hospital records    #2 continue home health, nursing, physical therapy, participate as much as possible, falling precautions, continue walker for ambulation      #3 no NSAIDs, stop Advil, continue Eliquis, bleeding precautions on chronic oral anticoagulation especially with previous history of anemia, also no NSAIDs since CKD    #4 continue metoprolol and Lotensin, check blood pressure regularly and record     #5 recheck labs CKD, anemia, and thyroid to be drawn by home health, orders faxed to home health    #6 we will need to repeat bone density at some point when she is able    #7 she is up-to-date on Covid vaccination as is her  and daughter    #8 discussed treatment options for pain NSAIDs and Celebrex are not recommended    #9 she can take Tylenol 1000 mg 3 times a day    #10 discussed tramadol versus hydrocodone, she also tramadol since she has had that before and I agree although the medication still can cause drowsiness, sedation, memory loss, confusion, and she needs to be careful with falling again, I agree with tramadol starting 50 mg 3 times daily as needed for the pain to call me of side effects with loss titrate the dose upwards    #11 condition gabapentin 100 mg once daily evening for 2 nights then increase to twice a day for 2 days then titrating up to 3 times daily if tolerated can cause lightheadedness, dizziness, take with tramadol    #12 send me an update on her condition in a few days via [facility]    #13 we could also consider a muscle relaxant but will hold off on that for now    #14  follow-up with pain management [facility] referral placed    #15 follow-up with myself 4 weeks telemedicine appointment    #16 follow-up with cardiology when possible    #17     #18 opiate risk score 0

## 2021-08-31 NOTE — LETTER
[facility]  Tee Tran M.D.  [address]  [place] [address]  Fax: [phone]   Authorization for Release/Disclosure of   Protected Health Information   Name: YVONNE MARIE WADA : 1935 SSN: [id number]   Address: [address] [place] [address] [place] [address] Phone:    [phone] (home)    I authorize the entity listed below to release/disclose the PHI below to:   [facility]/Tee Tran M.D. and Tee Tran M.D.   Provider or Entity Name:  [facility]   Address   City, [facility], Zip  [address] [place] [address] Phone: [phone]     Fax: [phone]   Reason for request: continuity of care   Information to be released:    [  ] LAST COLONOSCOPY,  including any PATH REPORT and follow-up  [  ] LAST FIT/COLOGUARD RESULT [  ] LAST DEXA  [  ] LAST MAMMOGRAM  [  ] LAST PAP  [  ] LAST LABS [  ] RETINA EXAM REPORT  [  ] IMMUNIZATION RECORDS  [ XXX ] Release all info      [  ] Check here and initial the line next to each item to release ALL health information INCLUDING  _____ Care and treatment for drug and / or alcohol abuse  _____ HIV testing, infection status, or AIDS  _____ Genetic Testing    DATES OF SERVICE OR TIME PERIOD TO BE DISCLOSED: _____________  I understand and acknowledge that:  * This Authorization may be revoked at any time by you in writing, except if your health information has already been used or disclosed.  * Your health information that will be used or disclosed as a result of you signing this authorization could be re-disclosed by the recipient. If this occurs, your re-disclosed health information may no longer be protected by State or Federal laws.  * You may refuse to sign this Authorization. Your refusal will not affect your ability to obtain treatment.  * This Authorization becomes effective upon signing and will  on (date) __________.      If no date is indicated, this Authorization will  one (1) year from the signature date.    Name:  Yvonne Marie Wada    Signature: continuity of care   Date:     9/1/2021       PLEASE FAX REQUESTED RECORDS BACK TO: [phone]

## 2021-08-31 NOTE — TELEPHONE ENCOUNTER
Please call the patient and schedule a follow-up telemedicine appointment for 5 weeks, she can set that up on her daughter's phone if necessary or 's computer

## 2021-09-07 DIAGNOSIS — I48.0 PAF (PAROXYSMAL ATRIAL FIBRILLATION) (HCC): ICD-10-CM

## 2021-09-07 DIAGNOSIS — I10 ESSENTIAL HYPERTENSION: ICD-10-CM

## 2021-09-09 RX ORDER — APIXABAN 5 MG/1
TABLET, FILM COATED ORAL
Qty: 180 TABLET | Refills: 2 | Status: SHIPPED | OUTPATIENT
Start: 2021-09-09 | End: 2022-06-24

## 2021-09-14 PROBLEM — R33.9 URINARY RETENTION: Status: ACTIVE | Noted: 2021-09-14

## 2021-09-15 ENCOUNTER — APPOINTMENT (OUTPATIENT)
Dept: RADIOLOGY | Facility: MEDICAL CENTER | Age: 86
End: 2021-09-15
Attending: EMERGENCY MEDICINE
Payer: MEDICARE

## 2021-09-15 ENCOUNTER — HOSPITAL ENCOUNTER (EMERGENCY)
Facility: MEDICAL CENTER | Age: 86
End: 2021-09-15
Attending: EMERGENCY MEDICINE
Payer: MEDICARE

## 2021-09-15 VITALS
RESPIRATION RATE: 14 BRPM | WEIGHT: 150 LBS | TEMPERATURE: 97 F | HEIGHT: 64 IN | DIASTOLIC BLOOD PRESSURE: 60 MMHG | BODY MASS INDEX: 25.61 KG/M2 | SYSTOLIC BLOOD PRESSURE: 122 MMHG | OXYGEN SATURATION: 94 % | HEART RATE: 65 BPM

## 2021-09-15 DIAGNOSIS — K59.03 DRUG-INDUCED CONSTIPATION: ICD-10-CM

## 2021-09-15 DIAGNOSIS — D50.0 IRON DEFICIENCY ANEMIA DUE TO CHRONIC BLOOD LOSS: ICD-10-CM

## 2021-09-15 DIAGNOSIS — Z79.01 ANTICOAGULATED: ICD-10-CM

## 2021-09-15 DIAGNOSIS — R10.30 LOWER ABDOMINAL PAIN: ICD-10-CM

## 2021-09-15 LAB
ALBUMIN SERPL BCP-MCNC: 3.6 G/DL (ref 3.2–4.9)
ALBUMIN/GLOB SERPL: 1.5 G/DL
ALP SERPL-CCNC: 83 U/L (ref 30–99)
ALT SERPL-CCNC: 17 U/L (ref 2–50)
ANION GAP SERPL CALC-SCNC: 13 MMOL/L (ref 7–16)
AST SERPL-CCNC: 22 U/L (ref 12–45)
BASOPHILS # BLD AUTO: 0.6 % (ref 0–1.8)
BASOPHILS # BLD: 0.02 K/UL (ref 0–0.12)
BILIRUB SERPL-MCNC: 0.4 MG/DL (ref 0.1–1.5)
BUN SERPL-MCNC: 25 MG/DL (ref 8–22)
CALCIUM SERPL-MCNC: 9.1 MG/DL (ref 8.5–10.5)
CHLORIDE SERPL-SCNC: 97 MMOL/L (ref 96–112)
CO2 SERPL-SCNC: 19 MMOL/L (ref 20–33)
CREAT SERPL-MCNC: 0.84 MG/DL (ref 0.5–1.4)
EOSINOPHIL # BLD AUTO: 0.02 K/UL (ref 0–0.51)
EOSINOPHIL NFR BLD: 0.6 % (ref 0–6.9)
ERYTHROCYTE [DISTWIDTH] IN BLOOD BY AUTOMATED COUNT: 45.7 FL (ref 35.9–50)
GLOBULIN SER CALC-MCNC: 2.4 G/DL (ref 1.9–3.5)
GLUCOSE SERPL-MCNC: 95 MG/DL (ref 65–99)
HCT VFR BLD AUTO: 26.1 % (ref 37–47)
HEMOCCULT STL QL: NEGATIVE
HGB BLD-MCNC: 8 G/DL (ref 12–16)
IMM GRANULOCYTES # BLD AUTO: 0.02 K/UL (ref 0–0.11)
IMM GRANULOCYTES NFR BLD AUTO: 0.6 % (ref 0–0.9)
LYMPHOCYTES # BLD AUTO: 1 K/UL (ref 1–4.8)
LYMPHOCYTES NFR BLD: 27.9 % (ref 22–41)
MCH RBC QN AUTO: 23.5 PG (ref 27–33)
MCHC RBC AUTO-ENTMCNC: 30.7 G/DL (ref 33.6–35)
MCV RBC AUTO: 76.5 FL (ref 81.4–97.8)
MONOCYTES # BLD AUTO: 0.45 K/UL (ref 0–0.85)
MONOCYTES NFR BLD AUTO: 12.5 % (ref 0–13.4)
NEUTROPHILS # BLD AUTO: 2.08 K/UL (ref 2–7.15)
NEUTROPHILS NFR BLD: 57.8 % (ref 44–72)
NRBC # BLD AUTO: 0 K/UL
NRBC BLD-RTO: 0 /100 WBC
PLATELET # BLD AUTO: 281 K/UL (ref 164–446)
PMV BLD AUTO: 8.9 FL (ref 9–12.9)
POTASSIUM SERPL-SCNC: 4.5 MMOL/L (ref 3.6–5.5)
PROT SERPL-MCNC: 6 G/DL (ref 6–8.2)
RBC # BLD AUTO: 3.41 M/UL (ref 4.2–5.4)
SODIUM SERPL-SCNC: 129 MMOL/L (ref 135–145)
WBC # BLD AUTO: 3.6 K/UL (ref 4.8–10.8)

## 2021-09-15 PROCEDURE — 99284 EMERGENCY DEPT VISIT MOD MDM: CPT

## 2021-09-15 PROCEDURE — 700101 HCHG RX REV CODE 250: Performed by: EMERGENCY MEDICINE

## 2021-09-15 PROCEDURE — 36415 COLL VENOUS BLD VENIPUNCTURE: CPT

## 2021-09-15 PROCEDURE — 80053 COMPREHEN METABOLIC PANEL: CPT

## 2021-09-15 PROCEDURE — A9270 NON-COVERED ITEM OR SERVICE: HCPCS | Performed by: EMERGENCY MEDICINE

## 2021-09-15 PROCEDURE — 700105 HCHG RX REV CODE 258: Performed by: EMERGENCY MEDICINE

## 2021-09-15 PROCEDURE — 74022 RADEX COMPL AQT ABD SERIES: CPT

## 2021-09-15 PROCEDURE — 700102 HCHG RX REV CODE 250 W/ 637 OVERRIDE(OP): Performed by: EMERGENCY MEDICINE

## 2021-09-15 PROCEDURE — 82272 OCCULT BLD FECES 1-3 TESTS: CPT

## 2021-09-15 PROCEDURE — 85025 COMPLETE CBC W/AUTO DIFF WBC: CPT

## 2021-09-15 RX ORDER — ENEMA 19; 7 G/133ML; G/133ML
1 ENEMA RECTAL ONCE
Status: COMPLETED | OUTPATIENT
Start: 2021-09-15 | End: 2021-09-15

## 2021-09-15 RX ORDER — SODIUM CHLORIDE 9 MG/ML
1000 INJECTION, SOLUTION INTRAVENOUS ONCE
Status: COMPLETED | OUTPATIENT
Start: 2021-09-15 | End: 2021-09-15

## 2021-09-15 RX ORDER — ACETAMINOPHEN 325 MG/1
650 TABLET ORAL ONCE
Status: COMPLETED | OUTPATIENT
Start: 2021-09-15 | End: 2021-09-15

## 2021-09-15 RX ORDER — LANOLIN ALCOHOL/MO/W.PET/CERES
325 CREAM (GRAM) TOPICAL 2 TIMES DAILY
Qty: 60 TABLET | Refills: 0 | Status: SHIPPED | OUTPATIENT
Start: 2021-09-15 | End: 2021-12-20

## 2021-09-15 RX ADMIN — SODIUM CHLORIDE 1000 ML: 9 INJECTION, SOLUTION INTRAVENOUS at 12:15

## 2021-09-15 RX ADMIN — SODIUM PHOSPHATE 133 ML: 7; 19 ENEMA RECTAL at 13:17

## 2021-09-15 RX ADMIN — ACETAMINOPHEN 650 MG: 325 TABLET, FILM COATED ORAL at 13:17

## 2021-09-15 ASSESSMENT — FIBROSIS 4 INDEX: FIB4 SCORE: 2.44

## 2021-09-15 NOTE — DISCHARGE INSTRUCTIONS
Try to avoid tramadol and other opiates.  Take MiraLAX daily.  If no bowel movement in 2 days add a stimulant such as senna or 1 tablespoon of mineral oil mixed in yogurt twice daily.  When constipation resolved start iron.  Follow-up with GI for your anemia.

## 2021-09-15 NOTE — ED NOTES
Patient had large BM post enema, patient reports she no longer has abdominal pain. Patient medically cleared to discharge per Dr. Hillman.

## 2021-09-15 NOTE — ED PROVIDER NOTES
ED Provider Note    Scribed for Shawn Hillman M.D. by Aashish Landaverde. 9/15/2021  11:35 AM    Primary care provider: Tee Tran M.D.  Means of arrival: Ambulance  History obtained from: Patient  History limited by: None    CHIEF COMPLAINT  Chief Complaint   Patient presents with   • Constipation   • Abdominal Pain     HPI  Yvonne Marie Wada is a 85 y.o. female who presents to the Emergency Department by ambulance for evaluation of constipation onset 5 days ago. She denies a bowel movement for 5 days, but had normal bowel movements prior to this. She has medicated with laxatives and suppositories with no relief.  and daughter have manually disimpacted stool from her rectum;  says stool was astrid like today. She has medicated with tramadol daily (BID to TID) since 8/28/21 for pain related to a pelvic fracture in June, 2021; she was on oxycodone prior to this. She admits to associated symptoms of intermittent severe lower abdominal pain (5 days, every 5 minutes in frequency), rectal pain, vomiting (one episode last night), weight loss (since pelvic fracture), loss of appetite, or decreased PO fluid intake, but denies fever, chills, fatigue, headache, or cough. She denies history of abdominal surgery. She says last colonoscopy by Dr. Peguero (GI) a few years ago was reassuring. She has a chronic davenport in place since her pelvic fracture, last changed 8/18/21. She has follow up scheduled with Dr. Roberson (Urology) on 9/28/21. She has not medicated with any antihistamines recently. She denies history of diverticulitis or bowel obstruction. She is on Eliquis for history of atrial fibrillation.     REVIEW OF SYSTEMS  Pertinent positives include: constipation, lower abdominal pain (5 days, every 5 minutes in frequency), rectal pain, vomiting (one episode last night), weight loss (since pelvic fracture), loss of appetite, or decreased PO fluid intake.  Pertinent negatives include: fever, chills, fatigue,  "headache, or cough.  10+ systems reviewed and negative.      PAST MEDICAL HISTORY  Past Medical History:   Diagnosis Date   • AAA (abdominal aortic aneurysm) (HCC) 7/26/2010    10/09 CT thorax dilated ascending thoracic aorta 4.9 cm 1/10 transesophageal echo dilated aortic root, and ascending aorta with mild aortic insufficiency 1/10  ascending aortic aneurysm repair 5/12 echo [facility] normal LV function EF 60%, moderate to severe left atrial enlargement, RVSP 32    • Aortic insufficiency    • Aortic valve disease    • Aortic valve regurgitation    • Arthritis     back and knee/ osteo   • Cataract    • Dental disorder     full top denture, partial bottom   • Diverticulosis    • Dyslipidemia 7/26/2010    Sees [facility] on lipitor 4/11 chol 159,trig 84,hdl 47,ldl 95,cpk 114 7/12 chol 163,trig 120,hdl 49,ldl 90, crp 1.1 on lipitor 20 mg    • GERD (gastroesophageal reflux disease) 7/12/2012 6/07 EGD per [facility] hiatal hernia, start prevacid 30 mg 7/12 protonix 20 mg qday    • Glaucoma    • Heart burn    • Heart murmur    • Heart valve disease     \"leaking\", mitral valve   • Hiatus hernia syndrome    • High cholesterol    • History of shingles 6/30/2011    2010 Back and left thorax     • History of total knee arthroplasty 7/26/2010    4/10 MRI right knee complex tear medial meniscus, horizontal cleavage tear lateral meniscus, chondromalacia patella, moderate-sized Baker's cyst 5/10 right meniscus knee repair  5/10 told by  had gout on surgery had pathology report, I await that report Question gout seen on pathology report done during repair of right meniscus knee surgery. 7/10 uric acid 6.3, we'll await starting allopurinol depending on the operative report 8/5/10 reviewed ortho notes , op report indicates crystal deposition, could be gout or pseudogout. No bx result sent over. Will need to get. My suspicion is chondrocalcinosis. 10/11  ortho note, MRI pending 8/7/10 reviewed " pathology report right knee tissue, marked degenerative changes with focal calcification, no mention of gout. Based on this and a normal uric acid level, I do not believe she has gout, has no hyperuricemia medications would be indicated 10/11  ortho left knee meniscectomy and arthroscopy 1/12     • HLD (hyperlipidemia)    • Hypertension    • Hypothyroid 4/8/2011 4/11 tsh 9 start synthroid 50 4/11 tsh 6,free t3 3.1,free t4 1.2,tpo negative 7/1/11 tsh 2.1 cont synthroid 50 mcg 7/12 tsh 3.4 cont synthroid 50 mcg    • Indigestion    • Mitral insufficiency    • OSTEOPOROSIS 8/9/2010    Had been on fosamax [id number]  8/10 dexa LS -2.0,hip -1.5 await old dexa result, she will get copy for me 4/11 vit d 35 9/12 dexa LS -3.2,hip -1.4 2/13/13 relast infusion    • Pain     back and right leg, can get as bad as 10/10   • Preventative health care 7/26/2010 2002 pneum 2005 colon [facility] no records 4/11 vit d 35 9/11 shingles vaccine 9/12 mammogram 9/12 dexa LS -3.2,hip -1.4    • Rheumatic fever    • S/P appendectomy 7/26/2010   • S/P TOMY (total abdominal hysterectomy) 7/26/2010    Sees gyn on HRT estradiol for 20 yrs () 11/08 mammo     • Shingles 6/30/2011   • Snoring    • Status post lumbar surgery 7/26/2010 6/03 L4-5 epidural Dr. Jordan  7/06 overall for decompression, foraminotomy. L4-L5 posterior fusion, L4-L5 allograft      • Stroke (HCC) 1996    no residual problems    • Thoracic aortic aneurysm without mention of rupture    • Tricuspid insufficiency    • Unspecified disorder of thyroid     hypo     FAMILY HISTORY  Family History   Problem Relation Age of Onset   • Stroke Mother    • Heart Disease Father    • Heart Disease Sister        SOCIAL HISTORY  Social History     Tobacco Use   • Smoking status: Never Smoker   • Smokeless tobacco: Never Used   • Tobacco comment: none   Vaping Use   • Vaping Use: Never used   Substance Use Topics   • Alcohol use: No     Alcohol/week: 0.0  oz   • Drug use: Yes     Comment: CBD Oil for Arthritis     Social History     Substance and Sexual Activity   Drug Use Yes    Comment: CBD Oil for Arthritis       SURGICAL HISTORY  Past Surgical History:   Procedure Laterality Date   • TENDON REPAIR Right 11/10/2016    Procedure: TENDON REPAIR - QUADRACEPS ;  Surgeon: Maxim Herrera M.D.;  Location: SURGERY [facility];  Service:    • KNEE ARTHROPLASTY TOTAL Right 9/8/2016    Procedure: KNEE ARTHROPLASTY TOTAL;  Surgeon: Maxim Herrera M.D.;  Location: SURGERY [facility];  Service:    • LUMBAR FUSION POSTERIOR  11/24/2015    Procedure: LUMBAR FUSION POSTERIOR L3-4, EXPLORATION OF FUSION L4-5 WITH INSTRUMENTATION;  Surgeon: Hermann Reis M.D.;  Location: SURGERY [facility];  Service:    • LUMBAR LAMINECTOMY DISKECTOMY  11/24/2015    Procedure: LUMBAR L3-4 LAMINECTOMY AND REDO L4-5;  Surgeon: Hermann Reis M.D.;  Location: SURGERY [facility];  Service:    • KNEE ARTHROPLASTY TOTAL  1/3/2012    Performed by YOSEF MEJÍA at [facility]   • KNEE ARTHROSCOPY  10/18/2011    Performed by YOSEF MEJÍA at [facility]   • MEDIAL MENISCECTOMY  10/18/2011    Performed by YOSEF MEJÍA at [facility]   • AORTIC VALVE REPLACEMENT  1/12/2010    Performed by ISAÍAS NICOLE at [facility]   • APPENDECTOMY     • HYSTERECTOMY, TOTAL ABDOMINAL     • LUMBAR FUSION POSTERIOR         CURRENT MEDICATIONS  Home Medications     Reviewed by Ileana Garcia R.N. (Registered Nurse) on 09/15/21 at 1129  Med List Status: Partial   Medication Last Dose Status   acetaminophen (TYLENOL) 325 MG Tab  Active   benazepril (LOTENSIN) 40 MG tablet  Active   bisacodyl (DULCOLAX) 10 MG Suppos  Active   ELIQUIS 5 MG Tab  Active   Epinastine HCl 0.05 % Solution  Active   ezetimibe (ZETIA) 10 MG Tab  Active   FEXOFENADINE HCL PO  Active   gabapentin (NEURONTIN) 100 MG Cap  Active   ipratropium (ATROVENT) 0.03 % Solution   "Active   levothyroxine (SYNTHROID) 75 MCG Tab  Active   MAGNESIUM PO  Active   metoprolol SR (TOPROL XL) 25 MG TABLET SR 24 HR  Active   Multiple Vitamins-Minerals (CENTRUM SILVER ULTRA WOMENS PO)  Active   omeprazole (PRILOSEC) 20 MG delayed-release capsule  Active   senna-docusate (PERICOLACE OR SENOKOT S) 8.6-50 MG Tab  Active   traMADol (ULTRAM) 50 MG Tab  Active                ALLERGIES  Allergies   Allergen Reactions   • Amoxicillin Vomiting     Upset stomach, ok if needed for life saving treatment (per pt)   • Codeine Nausea     Synthetic codones ok   • Doxycycline Nausea     Nausea, Ok in life saving situation (per pt)   • Sulfamethoxazole-Trimethoprim Vomiting and Nausea     Ok in a life saving situation (per pt)   • Tramadol Vomiting and Nausea     nausea   • Trazodone Vomiting     groggy       PHYSICAL EXAM  VITAL SIGNS: /66   Pulse 71   Temp 36.5 °C (97.7 °F) (Temporal)   Resp 16   Ht 1.626 m (5' 4\")   Wt 68 kg (150 lb)   SpO2 99%   BMI 25.75 kg/m²   Reviewed and afebrile  Constitutional: Well developed, Well nourished, appearing.  HENT: Normocephalic, atraumatic, bilateral external ears normal, wearing a mask.   Eyes: PERRLA, conjunctiva pink, no scleral icterus.   Cardiovascular: Regular rate and rhythm. No murmurs, rubs or gallops.  No dependent edema or calf tenderness  Respiratory: Tachypneic. Lungs clear to auscultation bilaterally. No wheezes, rales, or rhonchi.  Abdominal:  Abdomen soft, non-tender despite episodes of pain every 5 minutes, non distended, not-tympanic. Bowel sounds present. No rebound, or guarding.    Skin: No erythema, no rash.   Genitourinary: No costovertebral angle tenderness.   Musculoskeletal: no deformities.   Rectal; Performed by medical student Ulices under my supervision with female nurse chaperone. Hard large ball of astrid-like stool in the vault, that could not be digitally removed.  Stool Hemoccult positive  Neurologic: Alert & oriented x 3, cranial " nerves 2-12 intact by passive exam.  No focal deficit noted.  Psychiatric: Affect normal, Judgment normal, Mood normal.     DIFFERENTIAL DIAGNOSIS:  Constipation, Bowel Obstruction, Diverticulitis, Dehydration, Adverse Effect of Opiate.     RADIOLOGY/PROCEDURES  DX-ABDOMEN COMPLETE WITH AP OR PA CXR   Final Result      1.  Concerning the patient's history of constipation, somewhat prominent fecal material in the rectum. No evidence of bowel obstruction.   2.  Postoperative median sternotomy. No acute cardiopulmonary disease.        Radiologist interpretation have been reviewed by me.     LABORATORY:  Results for orders placed or performed during the hospital encounter of 09/15/21   CBC WITH DIFFERENTIAL   Result Value Ref Range    WBC 3.6 (L) 4.8 - 10.8 K/uL    RBC 3.41 (L) 4.20 - 5.40 M/uL    Hemoglobin 8.0 (L) 12.0 - 16.0 g/dL    Hematocrit 26.1 (L) 37.0 - 47.0 %    MCV 76.5 (L) 81.4 - 97.8 fL    MCH 23.5 (L) 27.0 - 33.0 pg    MCHC 30.7 (L) 33.6 - 35.0 g/dL    RDW 45.7 35.9 - 50.0 fL    Platelet Count 281 164 - 446 K/uL    MPV 8.9 (L) 9.0 - 12.9 fL    Neutrophils-Polys 57.80 44.00 - 72.00 %    Lymphocytes 27.90 22.00 - 41.00 %    Monocytes 12.50 0.00 - 13.40 %    Eosinophils 0.60 0.00 - 6.90 %    Basophils 0.60 0.00 - 1.80 %    Immature Granulocytes 0.60 0.00 - 0.90 %    Nucleated RBC 0.00 /100 WBC    Neutrophils (Absolute) 2.08 2.00 - 7.15 K/uL    Lymphs (Absolute) 1.00 1.00 - 4.80 K/uL    Monos (Absolute) 0.45 0.00 - 0.85 K/uL    Eos (Absolute) 0.02 0.00 - 0.51 K/uL    Baso (Absolute) 0.02 0.00 - 0.12 K/uL    Immature Granulocytes (abs) 0.02 0.00 - 0.11 K/uL    NRBC (Absolute) 0.00 K/uL   Comp Metabolic Panel   Result Value Ref Range    Sodium 129 (L) 135 - 145 mmol/L    Potassium 4.5 3.6 - 5.5 mmol/L    Chloride 97 96 - 112 mmol/L    Co2 19 (L) 20 - 33 mmol/L    Anion Gap 13.0 7.0 - 16.0    Glucose 95 65 - 99 mg/dL    Bun 25 (H) 8 - 22 mg/dL    Creatinine 0.84 0.50 - 1.40 mg/dL    Calcium 9.1 8.5 - 10.5 mg/dL     AST(SGOT) 22 12 - 45 U/L    ALT(SGPT) 17 2 - 50 U/L    Alkaline Phosphatase 83 30 - 99 U/L    Total Bilirubin 0.4 0.1 - 1.5 mg/dL    Albumin 3.6 3.2 - 4.9 g/dL    Total Protein 6.0 6.0 - 8.2 g/dL    Globulin 2.4 1.9 - 3.5 g/dL    A-G Ratio 1.5 g/dL   ESTIMATED GFR   Result Value Ref Range    GFR If African American >60 >60 mL/min/1.73 m 2    GFR If Non African American >60 >60 mL/min/1.73 m 2      Lab results reviewed by me.     INTERVENTIONS:  Medications   fleet enema 133 mL (133 mL Rectal Given 9/15/21 1317)   NS infusion 1,000 mL (0 mL Intravenous Stopped 9/15/21 1512)   acetaminophen (Tylenol) tablet 650 mg (650 mg Oral Given 9/15/21 1317)   Soapsuds enema  Response: Large bowel movement with relief of symptoms.    ED COURSE:  Nursing notes, VS, PMSFHx reviewed in chart.     11:35 AM - Patient seen and examined at bedside. I informed the patient of my plan to run diagnostic studies to evaluate their symptoms including imaging and labs. Patient verbalizes understanding and support with my plan of care. Patient will be treated with fleet enema 133 mL  and 1 L NS IV for her symptoms. Ordered DX-Abdomen Complete, CBC with diff, CMP to evaluate.     12:16 PM - Nursing staff informed me the patient is complaining of a headache. She will be medicated with Tylenol 650 mg tab.     1:08 PM - The patient is anemic. I spoke with family, who are unsure of the cause of her anemia. Ordered Occult Blood Stool.     1:23 PM Paged GI.    1:40 PM I discussed the patient's case and the above findings with Dr. Alvarado (GI) who will follow up with the patient as an outpatient.      2:09 PM - The patient had a small bowel movement. Stool was hemoccult positive. She will be treated with soap suds enema.       MEDICAL DECISION MAKING:  This patient presents with constipation likely due to tramadol opioid use for sacrum and pubic ramus fractures.  No evidence of bowel obstruction or diverticulitis.  Patient has anemia that was  first identified in June and worsened during her hospitalization after her fall and fractures likely secondary to routine blood draws.  Her ashu was 7.7 and she is remained stable at that level now at 8.0 since June.  She is Hemoccult positive and anticoagulated.  No one had addressed this issue on her 2 prior admissions.  I discussed the case with TURNER Shah, who will ensure that the patient is seen promptly in clinic for work-up.  Since she has been stable for 3 months I will not discontinue her anticoagulation but she verbalized understanding to return for melanotic stool or frankly bloody stool.    PLAN:  New Prescriptions    FERROUS SULFATE 325 (65 FE) MG EC TABLET    Take 1 Tablet by mouth 2 times a day.     Miralax and stimulants  Constipation handout given  Return for increased bleeding, abdominal pain, bloating, vomiting, ill appearance    Parish Alvarado M.D.  [address] [place] [address] [address] [place] [address]  [phone]    Schedule an appointment as soon as possible for a visit         CONDITION: Stable.     FINAL IMPRESSION  1. Lower abdominal pain    2. Drug-induced constipation    3. Iron deficiency anemia due to chronic blood loss    4. Anticoagulated        Aashish REAGAN (Emmett), am scribing for, and in the presence of, Shawn Hillman M.D..    Electronically signed by: Aashish Cota), 9/15/2021    Shawn REAGAN M.D. personally performed the services described in this documentation, as scribed by Aashish Landaverde in my presence, and it is both accurate and complete.    The note accurately reflects work and decisions made by me.  hSawn Hillman M.D.  9/16/2021  12:59 PM

## 2021-09-15 NOTE — ED NOTES
Discharge teaching and paperwork provided. All questions/concerns answered. VSS, assessment stable and PIV removed. Given information regarding prescription. Patient discharged to the care of spouse and assisted out of the ED via wheelchair.

## 2021-09-15 NOTE — ED NOTES
IV access obtained.  Blood drawn and sent to lab.    IVF started per MAR.   Pt c/o HA, ERP informed of pt complaint.

## 2021-09-20 ENCOUNTER — HOSPITAL ENCOUNTER (OUTPATIENT)
Facility: MEDICAL CENTER | Age: 86
End: 2021-09-20
Attending: PHYSICIAN ASSISTANT
Payer: MEDICARE

## 2021-09-20 PROCEDURE — 87086 URINE CULTURE/COLONY COUNT: CPT

## 2021-09-22 ENCOUNTER — PATIENT OUTREACH (OUTPATIENT)
Dept: MEDICAL GROUP | Facility: MEDICAL CENTER | Age: 86
End: 2021-09-22

## 2021-09-23 LAB
BACTERIA UR CULT: NORMAL
SIGNIFICANT IND 70042: NORMAL
SITE SITE: NORMAL
SOURCE SOURCE: NORMAL

## 2021-09-27 ENCOUNTER — TELEPHONE (OUTPATIENT)
Dept: MEDICAL GROUP | Facility: MEDICAL CENTER | Age: 86
End: 2021-09-27

## 2021-09-27 NOTE — TELEPHONE ENCOUNTER
Cynthia with [facility] called to advise that the patient has requested that home care stop and they not come to the house anymore. Cynthia just wanted you to know that there will be no more visits.

## 2021-10-07 ENCOUNTER — OFFICE VISIT (OUTPATIENT)
Dept: MEDICAL GROUP | Facility: MEDICAL CENTER | Age: 86
End: 2021-10-07
Payer: MEDICARE

## 2021-10-07 VITALS
OXYGEN SATURATION: 98 % | SYSTOLIC BLOOD PRESSURE: 112 MMHG | TEMPERATURE: 97.2 F | HEIGHT: 64 IN | BODY MASS INDEX: 22.02 KG/M2 | HEART RATE: 74 BPM | WEIGHT: 129 LBS | DIASTOLIC BLOOD PRESSURE: 58 MMHG

## 2021-10-07 DIAGNOSIS — Z87.81 HISTORY OF PELVIC FRACTURE: ICD-10-CM

## 2021-10-07 DIAGNOSIS — R94.4 DECREASED GFR: ICD-10-CM

## 2021-10-07 DIAGNOSIS — M81.0 SENILE OSTEOPOROSIS: ICD-10-CM

## 2021-10-07 DIAGNOSIS — I15.0 RENOVASCULAR HYPERTENSION: ICD-10-CM

## 2021-10-07 DIAGNOSIS — I48.0 PAROXYSMAL ATRIAL FIBRILLATION (HCC): ICD-10-CM

## 2021-10-07 DIAGNOSIS — Z23 NEEDS FLU SHOT: ICD-10-CM

## 2021-10-07 PROBLEM — D64.9 ANEMIA: Status: ACTIVE | Noted: 2021-10-07

## 2021-10-07 PROCEDURE — G0008 ADMIN INFLUENZA VIRUS VAC: HCPCS | Performed by: INTERNAL MEDICINE

## 2021-10-07 PROCEDURE — 90662 IIV NO PRSV INCREASED AG IM: CPT | Performed by: INTERNAL MEDICINE

## 2021-10-07 PROCEDURE — 99214 OFFICE O/P EST MOD 30 MIN: CPT | Mod: 25 | Performed by: INTERNAL MEDICINE

## 2021-10-07 RX ORDER — GABAPENTIN 100 MG/1
200 CAPSULE ORAL 3 TIMES DAILY PRN
Qty: 180 CAPSULE | Refills: 3 | Status: SHIPPED | OUTPATIENT
Start: 2021-10-07 | End: 2022-02-14

## 2021-10-07 RX ORDER — CEPHALEXIN 500 MG/1
CAPSULE ORAL
Status: ON HOLD | COMMUNITY
End: 2021-11-04

## 2021-10-07 ASSESSMENT — FIBROSIS 4 INDEX: FIB4 SCORE: 1.6

## 2021-10-07 NOTE — PROGRESS NOTES
Subjective     Yvonne Marie Wada is a 85 y.o. female who presents with pain  Follow-Up            HPI   Patient is here with her , history of pelvic fracture in June, no surgery, and outpatient physical therapy and home health, we did order labs for follow-up however those tests were not drawn by home health.  Anemia since admission to the hospital, most recent labs a month ago in the emergency room were done showing in the ER 9/15/21 hgb 8.0,hct 26.1,mcv 76, FIT negative at that time but has been on iron supplementation twice a day since which has caused constipation, taking stool softer as well as MiraLAX daily.  Medications, allergies, medical history, surgical history, social history, family history  reviewed and updated  Still with pain low back and left side with radiation down to the left leg, on neurontin 100 mg tid with no drowsiness, no sedation, no memory loss, no depression, pain is worse in the am.  On average pain level 5/10.  Using walker for ambulation, able to toilet and shower with stand by assist, has shower bars and chair, dresses self. Still feels tired and decreased energy. Still with davenport catheter in place and sees urology.  Remains on Keflex.  Also followed by cardiology with history of paroxysmal atrial fibrillation, patient had declined amiodarone, sotalol, maintains on metoprolol and Lotensin.  No regular NSAIDs.  Most recent labs  9/28/20 hgb 12.6,hct 38  6/22/21 hgb 9,hct 29,mcv 86  9/15/21 hgb 8.0,hct 26,mcv 76  9/15/21 FIT negative  9/15/21 bun 25,creat 0.84,GFR>60      Current Outpatient Medications   Medication Sig Dispense Refill   • levothyroxine (SYNTHROID) 75 MCG Tab TAKE 1 TAB BY MOUTH EVERY MORNING ON AN EMPTY STOMACH. 90 Tablet 0   • ferrous sulfate 325 (65 Fe) MG EC tablet Take 1 Tablet by mouth 2 times a day. 60 Tablet 0   • ELIQUIS 5 MG Tab TAKE 1 TABLET BY MOUTH TWICE A  Tablet 2   • Epinastine HCl 0.05 % Solution epinastine 0.05 % eye drops   INSTILL 1 DROP  INTO BOTH EYES TWICE A DAY     • ezetimibe (ZETIA) 10 MG Tab ezetimibe 10 mg tablet   TAKE 1 TABLET BY MOUTH EVERY DAY     • gabapentin (NEURONTIN) 100 MG Cap Take one po qpm x two days, then increase to one bid x two days, then increase to one po tid  Indications: back and nerve pain 90 Capsule 1   • acetaminophen (TYLENOL) 325 MG Tab Take 2 Tablets by mouth every 6 hours. 30 tablet 0   • senna-docusate (PERICOLACE OR SENOKOT S) 8.6-50 MG Tab Take 2 Tablets by mouth 2 times a day. 30 tablet 0   • bisacodyl (DULCOLAX) 10 MG Suppos Insert 1 Suppository into the rectum as needed (if magnesium hydroxide ineffective after 24 hours).     • benazepril (LOTENSIN) 40 MG tablet Take 40 mg by mouth every morning.     • ipratropium (ATROVENT) 0.03 % Solution Administer 2 Sprays into affected nostril(S) 2 times a day. 30 mL 6   • MAGNESIUM PO Take 1 capsule by mouth every evening.     • metoprolol SR (TOPROL XL) 25 MG TABLET SR 24 HR Take 1 Tab by mouth every morning. 90 Tab 3   • FEXOFENADINE HCL PO Take 1 tablet by mouth 2 times a day. Indications: Hayfever     • omeprazole (PRILOSEC) 20 MG delayed-release capsule Take 20 mg by mouth every morning.     • Multiple Vitamins-Minerals (CENTRUM SILVER ULTRA WOMENS PO) Take 1 tablet by mouth every evening.       No current facility-administered medications for this visit.           Aortic regurgitation  12/6/19 echo normal LV function EF 65%, RVSP 43, moderate tricuspid regurgitation, mild to moderate aortic insufficiency  10/22/20 echo EF 70%, grade 1 diastolic dysfunction, mild AR, RVSP 30   4/20/21  sinus on exam, patient would like to discontinue amiodarone understanding she likely will return to intermittent atrial fibrillation, discussed sotalol, dronedarone, tikosyn as alternatives, patient declines any of those alternatives      Cervical pain  2/10/14 [facility] note; x-ray cervical spine DJD, right shoulder steroid injection     Decreased GFR  5/31/09 bun 18,creat  1.2  1/14/10 bun 28,creat 1.3,GFR 40  9/20/12 bun 37,creat 1.1,GFR 48  9/25/13 bun 25,creat 1.2,GFR 44  9/26/14 bun 26,creat 1.1,GFR 45  2/23/15 bun 20,creat 1.1,GFR 48  4/15/16 bun 31,creat 1.1,GFR 45  7/6/16 bun 29,creat 1.1,GFR 44   5/12/17 bun 35,creat 1.1,GFR 44  11/2/17 bun 22,creat 1.28,GFR 40  12/9/17 bun 29,creat 1.1,GFR 43  10/4/18 bun 31,creat 1.5 at [facility]  1/18/19 bun 33,creat 1.34,GFR 38,PTH 53,calcium 9.7,phos 3.6,spep negative  2/4/20 bun 30,creat 1.24, GFR 41, PTH 58, calcium 9.7, phos 3.9  8/20/20 bun 35,creat 1.12,GFR 46  9/28/20 bun 31,creat 1.1,GFR 47  6/8/21 bun 38,creat 1.46,GFR 34  6/27/21 Na 125,bun 64,creat 1.52 (hospital admission acute sacral fracture)     Dyslipidemia  4/11 chol 159,trig 84,hdl 47,ldl 95,cpk 114  7/12 chol 163,trig 120,hdl 49,ldl 90, crp 1.1 on lipitor 20 mg  3/26/13 chol 159,trig 99,hdl 46,ldl 93 on lipitor 20 mg  9/25/13 chol 164,trig 100,hdl 47,ldl 97 on lipitor 20 mg  9/12/14 cardiology note lower extremity weakness, hold lipitor x3 months, labs ordered  12/15/14 cardiology start low dose lipitor 10 mg  1/22/15 off lipitor will resume 10 mg and repeat labs 4 weeks  2/24/15 chol 179,trig 111,hdl 54,ldl 103 on lipitor 10 mg  2/1/16 cardiology note restart lipitor 20 mg    4/15/16 chol 163,trig 102,hdl 48,ldl 95 on lipitor 20 mg  5/12/17 chol 186,trig 101,hdl 47,ldl 119 on lipitor 20 mg intermittently will start taking daily  11/1/17 chol 146,trig 73,hdl 42,ldl 89 on lipitor 20 mg  12/9/17 chol 133,trig 74,hdl 49,ldl 69 on lipitor 80 mg higher dose due to recent transient ischemic attack  10/4/18 chol 126,trig 98,hdl 44,ldl 62 on lipitor 80 mg at [facility]  1/18/19 chol 161,trig 103,hdl 45,ldl 95 on lipitor 80 mg  2/4/20 chol 121,trig 71,hdl 45,ldl 62 on lipitor 80 mg  8/17/20 stop lipitor due to muscle pain   9/17/20 improved off lipitor repeat lipid pending  10/3/20 chol 240,trig 111,hdl 60,ldl 158, recommend start crestor 10 mg and repeat labs 6  weeks  10/26/20 chol 158,trig 106,hdl 62,ldl 75 on crestor 20 mg  4/20/21  off statin, she agrees to try zetia  Lipitor,crestor muscle pain      gait disorder  4/22/21 [facility] PT evaluation      Gastroesophageal reflux  6/27/07 EGD per [facility] hiatal hernia, start prevacid 30 mg  7/12 protonix 20 mg qday  11/16/12 [facility] note cont prilosec  1/5/16 change to protonix 40 mg from prilosec  2/4/20 vit d 67     History pelvic fracture  6/22/21 CT pelvis without post reconstruction, comminuted mildly displaced fracture of right inferior and superior pubic rami, right sacral ritu fracture, right hip prosthesis normally located, moderate degenerative change left hip  6/25/21 hospital admission, 6/28/21 hospital discharge, admitted with pelvic fracture, seen by orthopedics no surgical intervention necessary, seen by physical therapy and occupational therapy recommended skilled nursing placement, urinary retention discharged with davenport catheter  6/25/21 CT pelvis without, stable appearance of minimally displaced fractures right sacral ritu, right pubic ramus posteriorly, and right ischial ramus, no acute pelvic fracture, no hip dislocation, right hip total arthroplasty in place  6/27/21 physical therapy hospital note recommend postacute placement for additional physical therapy services  6/27/21 Na 125,bun 64,creat 1.52  6/27/21 hgb 7.7,hct 24.7     history of pulmonary hypertension  11/17/18 cardiology note asymptomatic, continue cholesterol medication, continue to monitor echo aortic valve, repaired ascending aorta, follow-up yearly  11/21/18 echo normal LV function, EF 60%, mild LVH, grade 2 diastolic dysfunction, mild aortic insufficiency, moderate tricuspid regurgitation, RVSP 50  12/6/19 echo normal LV function EF 65%, RVSP 43, moderate tricuspid regurgitation, mild to moderate aortic insufficiency  10/22/20 echo EF 70%, grade 1 diastolic dysfunction, mild AR, RVSP 30     History shingles     History shoulder  pain  4/4/17 right shoulder pain right shoulder x-ray ordered, right knee pain, referral [facility] PT, tried celebrex no benefit, will try naproxen 500 mg twice daily and zanaflex at night  4/5/17 x-ray right shoulder calcifications consistent with calcific tendinitis or hydroxyapatite deposition  5/12/17 MRI right shoulder ordered, persistent pain despite physical therapy  5/18/17 MRI right shoulder; full-thickness supraspinatus tendon tear  5/22/17 right shoulder injection, has been going to physical therapy 14 treatments some improvement, right shoulder injection provided, if no improvement in has appt  in october, if need sooner appt try   6/9/17 custom PT note   7/10/17 custom PT note       History TIA  11/1/17 hospital admission, 11/2/17 hospital discharge, facial numbness, transient numbness in both hands, resolved, CT, MRI head no acute infarct, blood pressure stable, discharged home, patient states she was on aspirin at the time of admission  11/1/17 CT head stable right mid brain likely chronic lacunar infarction, periventricular white matter disease  11/1/17 MRI brain no acute abnormality, moderate chronic microvascular stomach disease, chronic microhemorrhages left frontal and right parietal lobes, chronic lacunar infarct left basal ganglia and blanco, or cerebral atrophy  11/1/17 MR-MRA head without; negative  11/2/17 echo normal LV size and function, EF 70%, RVSP 50, mild AR and TR  11/28/17 awaiting possible right hip replacement per orthopedics , given recent possible TIA, cannot provide clearance, she will like second opinion from cardiology referral made to dr.bryan de leon, changed asa to plavix  12/12/17 ultrasound carotid less than 50% internal carotid stenosis bilateral  12/20/17 dr.bryan de leon cardiology note most recent echocardiogram looks fine, will repeat CT scan follow aortic repair  2/20/18 episode of supraventricular tachycardia in the emergency room, ethel  catheter removed, symptoms resolved with repeat EKG sinus rhythm  11/17/18 cardiology note asymptomatic, continue cholesterol medication, continue to monitor echo aortic valve, repaired ascending aorta, follow-up yearly  11/21/18 echo normal LV function, EF 60%, mild LVH, grade 2 diastolic dysfunction, mild aortic insufficiency, moderate tricuspid regurgitation, RVSP 50  12/6/19 echo normal LV function EF 65%, RVSP 43, moderate tricuspid regurgitation, mild to moderate aortic insufficiency  10/22/20 echo EF 70%, grade 1 diastolic dysfunction, mild AR, RVSP 30   12/14/20 cardiology note maintaining sinus rhythm, given frequent symptomatic episodes of paroxysmal atrial fibrillation and age, recommend amiodarone 200 mg, follow-up 6 month  4/20/21  sinus on exam, patient would like to discontinue amiodarone understanding she likely will return to intermittent atrial fibrillation, discussed sotalol, dronedarone, tikosyn as alternatives, patient declines any of those alternatives, ablation is not appropriate given her age      Hypertension  1/10/11 [facility] note cardiac catheterization normal systolic function  3/11 [facility] echo moderate aortic insufficiency, moderate mitral insufficiency, moderate tricuspid insufficiency, normal LV function  5/12 echo [facility] normal LV function EF 60%, moderate to severe left atrial enlargement, RVSP 32    9/26/13 CT thoracic aorta no evidence of aneurysm, small hiatal hernia  9/26/13 Persantine thallium uniform breast tissue attenuation, cannot rule out underlying ischemic, LVEF 67%  10/3/13 on toprol-XL 25 mg half a tablet daily    12/13/13 cardiology note cont same meds, repeat echo and follow up 6 months  1/2/14 echo mild LVH, grade 1 diastolic dysfunction, ascending aortic 4.0, no change compared with ZAK 2010  5/15/14 cardiology note; increase benazepril to 40 mg qday,continue toprol XL 25 mg daily  6/17/15 cardiology note continue meds, repeat echo 6 months  2/1/16 cardiology  note moderate pulmonary hypertension and snoring, nocturnal pulse oximetry ordered  6/30/16 cardiology note patient declines overnight pulse oximetry  11/1/17 echo normal LV size and function, EF 70%, mild aortic insufficiency and mild tricuspid regurgitation, RVSP 50, aortic root 3.2 cm  11/3/17 cardiology note hypertension stable, status post thoracic aortic aneurysm repair, headache unspecified, ESR ordered  11/28/17 on benazepril 40 mg,toprol 25mg, add norvasc 5 mg  12/12/17 ultrasound carotid less than 50% internal carotid stenosis bilateral  12/20/17  [facility] cardiology note most recent echocardiogram looks fine, will repeat CT scan follow aortic repair  2/20/18 episode of supraventricular tachycardia in the emergency room, davenport catheter removed, symptoms resolved with repeat EKG sinus rhythm  11/17/18 cardiology note asymptomatic, continue cholesterol medication, continue to monitor echo aortic valve, repaired ascending aorta, follow-up yearly  11/21/18 echo normal LV function, EF 60%, mild LVH, grade 2 diastolic dysfunction, mild aortic insufficiency, moderate tricuspid regurgitation, RVSP 50  1/18/19 urine mac 45 on benazepril 40 mg, toprol 25 mg, norvasc 5 mg  12/6/19 echo normal LV function EF 65%, RVSP 43, moderate tricuspid regurgitation, mild to moderate aortic insufficiency  10/22/20 echo EF 70%, grade 1 diastolic dysfunction, mild AR, RVSP 30   12/14/20 cardiology note maintaining sinus rhythm, given frequent symptomatic episodes of paroxysmal atrial fibrillation and age, recommend amiodarone 200 mg, follow-up 6 month  4/6/21 on lotensin 40 mg and metoprolol 25 mg  4/20/21  sinus on exam, patient would like to discontinue amiodarone understanding she likely will return to intermittent atrial fibrillation, discussed sotalol, dronedarone, tikosyn as alternatives, patient declines any of those alternatives, ablation is not appropriate given her age      Hypothyroid  4/11 tsh 9  start synthroid 50  4/11 tsh 6,free t3 3.1,free t4 1.2,tpo negative  7/1/11 tsh 2.1 cont synthroid 50 mcg  7/12 tsh 3.4 cont synthroid 50 mcg  7/31/13 tsh 3.2 on synthroid 50 mcg  9/25/13 tsh 1.7 on synthroid 50 mcg  2/24/15 tsh 4.9 on synthroid 50 mcg; increase to synthroid 75 mcg, repeat tsh in 6 weeks  4/14/15 tsh 1.1 on synthroid 75 mcg  4/15/16 tsh 2.3 on synthroid 75 mcg  5/12/17 tsh 1.2 on synthroid 75 mcg  11/1/17 tsh 2.1 on synthroid 75 mcg  1/18/19 tsh 2.2 on synthroid 75 mcg  2/4/20 tsh 3.1 on synthroid 75 mcg  8/20/20 tsh 1.6 on synthroid 75 mcg     Insomnia  1/30/17 trial of trazodone 50 mg  4/4/17 side effects with trazodone drowsiness, discontinued     Neutropenia  4/16/11 wbc 3.9  7/30/13 wbc 5.6  11/7/16 wbc 5.7  5/11/17 wbc 4.7  2/20/18 wbc 7.2  3/6/18 wbc 5.9  1/16/19 wbc 4.0  6/8/19 wbc 4.3 (44%N, 39%L)  2/4/20 wbc 4.1 (42%N,41%L)  8/20/20 wbc 4.6     Osteoporosis  Had been on fosamax [id number]    8/10 dexa LS -2.0,hip -1.5 await old dexa result, she will get copy for me  4/11 vit d 35  9/12 dexa LS -3.2,hip -1.4  2/13/13 reclast IV  7/31/13 vit d 33 cont 2000 units  2/18/14 reclast IV  2/2/15 dexa LS -3.1,hip -1.9; FRAX 35.5 % major,12.6% hip  2/18/15 reclast IV  2/24/15 vit d 33  4/15/16 vit d 36  5/12/17 vit d 36  8/7/18 dexa left forearm -4.2,hip -2.5 resume reclast   8/31/18 reclast IV  1/18/19 vit d 27 increase from 2000 to 4000 units  2/4/20 vit d 67, PTH 58     Paroxysmal atrial fibrillation  11/1/17 hospital admission, 11/2/17 hospital discharge, facial numbness, transient numbness in both hands, resolved, CT, MRI head no acute infarct, blood pressure stable, discharged home, patient states she was on aspirin at the time of admission  11/1/17 CT head stable right mid brain likely chronic lacunar infarction, periventricular white matter disease  11/1/17 MRI brain no acute abnormality, moderate chronic microvascular stomach disease, chronic microhemorrhages left frontal and right parietal  lobes, chronic lacunar infarct left basal ganglia and blanco, or cerebral atrophy  11/1/17 MR-MRA head without; negative  2/20/17 episode of supraventricular tachycardia in the emergency room, davenport catheter removed, symptoms resolved with repeat EKG sinus rhythm  12/20/17  [facility] cardiology note most recent echocardiogram looks fine, will repeat CT scan follow aortic repair  11/17/18 cardiology note asymptomatic, continue cholesterol medication, continue to monitor echo aortic valve, repaired ascending aorta, follow-up yearly  11/21/18 echo normal LV function, EF 60%, mild LVH, grade 2 diastolic dysfunction, mild aortic insufficiency, moderate tricuspid regurgitation, RVSP 50  12/6/19 echo normal LV function EF 65%, RVSP 43, moderate tricuspid regurgitation, mild to moderate aortic insufficiency  9/28/20 emergency room note EKG atrial fibrillation new onset  9/30/20 cardiology note sinus rhythm, start eliquis 5 mg bid and increase toprol to 25 mg daily, echo ordered, follow up 3 months   10/22/20 echo EF 70%, grade 1 diastolic dysfunction, mild AR, RVSP 30   12/14/20 cardiology note maintaining sinus rhythm, given frequent symptomatic episodes of paroxysmal atrial fibrillation and age, recommend amiodarone 200 mg, follow-up 6 month  4/20/21  cardiology note sinus on exam, patient would like to discontinue amiodarone understanding she likely will return to intermittent atrial fibrillation, discussed sotalol, dronedarone, tikosyn as alternatives, patient declines any of those alternatives, ablation is not appropriate given her age      Preventative health  6/27/09 colon per [facility] no records  10/19/04 pneumovax   9/9/11 zoster vaccine  4/14/15 prevnar  8/7/18 dexa left forearm -4.2,hip -2.5  9/28/19 pneumovax  11/7/19 mammogram  11/12/19 shingrix second  2/4/20 vit d 67  9/17/20 tdap   2/27/21 covid second pfizer     s/p knee arthroplasty  4/10 MRI right knee complex tear medial meniscus, horizontal  cleavage tear lateral meniscus, chondromalacia patella, moderate-sized baker's cyst  5/10 right meniscus knee repair   5/10 told by  had gout on surgery had pathology report, I await that report, question gout seen on pathology report done during repair of right meniscus knee surgery.  7/10 uric acid 6.3, await starting allopurinol depending on the operative report  8/5/10 reviewed orthopedics notes , operative report indicates crystal deposition, could be gout or pseudogout, no biopsy results, suspect chondrocalcinosis  10/11 dr.parlasca najera note, MRI pending  8/7/10 reviewed pathology report right knee tissue, marked degenerative changes with focal calcification, no mention of gout. Based on this and a normal uric acid level, I do not believe she has gout, has no hyperuricemia medications would be indicated  10/11 dr.parlasca najera left knee meniscectomy and arthroscopy  1/12 dr.parlasca najera left knee arthroplasty  2/29/16  orthopedics x-ray right knee advanced DJD on the right knee corticosteroid injection performed, prescription voltaren gel  6/13/16  orthopedic note x-rays demonstrate right knee advanced DJD and resurfaced patella, offer right knee total replacement followed by patellar resurfacing after she recovers from her right knee  9/8/16  orthopedic's operative note right knee total arthroplasty  11/2/16  orthopedic note, follow-up right knee, x-ray right knee shows subluxation patella, traumatic evulsion VMO needs reexploration and repair  11/10/16  orthopedic's operative note VMO quadriceps avulsion repair status post total knee replacement  11/23/16  orthopedic no follow-up quadriceps repair, continue crutches in full extension, follow-up one month and then start passive range of motion 0-40°  2/4/17  orthopedic note, continue physical therapy, follow-up this summer  4/17/17 [facility] note  6/9/17 custom PT note      s/p total hip arthroplasty  9/21/17 MRI right hip mild trochanteric bursitis, mild femoral acetabular joint arthritis  10/12/17  orthopedic no proceed with right total hip arthroplasty, x-ray AP pelvis and right hip shows early arthrosis with calcifications and DJD  2/14/18  orthopedics operative note at [facility] right hip total arthroplasty, gluteus medius and minimus tendon repair  3/27/18 [facility] [facility] note   5/8/18 [facility] note xray right hip stable total hip arthroplasty, continue physical therapy  4/3/19 custom PT discharge  4/5/21  [facility] pain note right trochanteric bursitis steroid injection, trigger point injection low back  4/22/21 custom PT evaluation   6/21/21 custom PT discharge note   6/25/21 CT pelvis without, stable appearance of minimally displaced fractures right sacral ritu, right pubic ramus posteriorly, and right ischial ramus, no acute pelvic fracture, no hip dislocation, right hip total arthroplasty in place     s/p lumbar surgery  6/03 L4-5 epidural     7/06  spinal surgery operative note decompression, foraminotomy. L4-L5 posterior fusion, L4-L5 allograft    3/5/14 MRI lumbar spine grade 2 spondylolisthesis L4-L5 with previous laminectomy and posterior fusion, left sided pedicle screw fixation L4-L5, moderate central canal stenosis L5-S1 secondary to facet arthropathy, mild to moderate multilevel neural foraminal narrowing  12/8/14 dr.arraiz lowe [facility] note; right lower extremity radiculitis L4-L5 lesion, myofascial lumbosacral pain, trochanteric bursitis, followup as needed  4/2/15  pain procedure note right L4-L5 and right L5-S1 SSNRB under fluoroscopy  4/27/15  pain note; right trochanteric bursal injection, trigger point injections performed, also set up SI joint injections  8/3/15 [facility] neurosurgery note, lumbar x-ray flexion and extension, MRI lumbar spine without contrast, followup   8/9/15 MRI  lumbar spine, previous L4-L5 left  fusion and laminectomy, L3-L4 moderate bilateral facet arthropathy, mild disc bulge, L4-L5 severe bilateral facet arthropathy, moderate to severe bilateral neural foraminal stenosis, L5-S1 moderate facet arthropathy  8/28/15  neurosurgery note previous posterior fusion L4-L5, does have L3-L4 disc bulge with central canal stenosis, recommend L3-L5 decompression  8/31/15  pain note; right lower extremity radiculitis, myofascial lumbosacral pain, start hydrocodone 5 mg, continued SI joint exercises, perform trochanteric bursal injection right hip, trigger points lumbar region  10/28/15  neurosurgery note, will schedule for posterior L3-L4 laminectomy and fusion with redo L4-5 laminectomy and exploration of fusion, surgical clearance per cardiology   11/24/15  neurosurgery operative note expiration removal L4-L5 hardware on the left, bilateral facetectomies L4, L3-L4 transforaminal lumbar interbody fusion  12/9/15 [facility] neurosurgery note s/p L4-S1 fusion on 11/24/15 , follow up in 4 weeks  12/23/15  neurosurgery note, clear to restart physical therapy if she would like after one month, follow-up 3 months  6/9/20 dr.arraiz NELSON pain note trigger point injections trochanteric bursa right side  8/25/20 dr.arraiz NELSON pain procedure note epidural right L4 nerve root   4/5/21 dr.arraiz NELSON pain note right trochanteric bursitis steroid injection, trigger point injection low back  4/22/21 custom PT evaluation   6/21/21 custom PT discharge note      s/p TOMY  Sees gyn on HRT estradiol for 20 yrs ()     s/p thoracic aneurysm repair  6/29/06 CT-CTA chest with and without postprocessing; stable ascending thoracic aortic aneurysm maximum diameter 4.8, just distal to the aortic valve maximum diameter 4.3  4/9/07 CT-CTA chest with and without processing; stable ascending aortic thoracic aneurysm 4.5 x 4.6 cm  6/8/09 CT chest with; stable  4.7 ascending aorta aneurysm  10/15/09 CT chest without; dilated ascending thoracic aorta 4.9 cm aneurysm increased from 4.7  1/11/10  cardiovascular surgery operative note ascending thoracic aorta aneurysm repair  9/26/13 CT thoracic aorta evaluation; status post repair ascending thoracic aortic aneurysm without evidence of dissection or leak  1/2/14 echo mild LVH, grade 1 diastolic dysfunction, ascending aortic 4.0, no change compared with ZAK 2010  5/15/14 cardiology note  6/17/15 cardiology note cont meds,repeat echo 6 months  2/1/16 cardiology note moderate pulmonary hypertension and snoring, nocturnal pulse oximetry ordered  11/1/17 echo normal LV size and function, EF 70%, mild aortic insufficiency and mild tricuspid regurgitation, RVSP 50, aortic root 3.2 cm  11/3/17 cardiology note stable  12/20/17  [facility] cardi[facility] most recent echocardiogram looks fine, will repeat CT scan follow aortic repair  11/17/18 cardiology note asymptomatic, continue cholesterol medication, continue to monitor echo aortic valve, repaired ascending aorta, follow-up yearly  11/21/18 echo normal LV function, EF 60%, mild LVH, grade 2 diastolic dysfunction, mild aortic insufficiency, moderate tricuspid regurgitation, RVSP 50  12/6/19 echo normal LV function EF 65%, RVSP 43, moderate tricuspid regurgitation, mild to moderate aortic insufficiency  10/22/20 echo EF 70%, grade 1 diastolic dysfunction, mild AR, RVSP 30   4/20/21  sinus on exam, patient would like to discontinue amiodarone understanding she likely will return to intermittent atrial fibrillation, discussed sotalol, dronedarone, tikosyn as alternatives, patient declines any of those alternatives, ablation is not appropriate given her age      Tricuspid regurgitation  11/17/18 cardiology note asymptomatic, continue cholesterol medication, continue to monitor echo aortic valve, repaired ascending aorta, follow-up yearly  11/21/18 echo  normal LV function, EF 60%, mild LVH, grade 2 diastolic dysfunction, mild aortic insufficiency, moderate tricuspid regurgitation, RVSP 50  12/6/19 echo normal LV function EF 65%, RVSP 43, moderate tricuspid regurgitation, mild to moderate aortic insufficiency  10/22/20 echo EF 70%, grade 1 diastolic dysfunction, mild AR, RVSP 30   4/20/21  sinus on exam, patient would like to discontinue amiodarone understanding she likely will return to intermittent atrial fibrillation, discussed sotalol, dronedarone, tikosyn as alternatives, patient declines any of those alternatives, ablation is not appropriate given her age     Urinary retention  8/18/21  urology note urinary retention, urethral davenport catheter in place, catheter exchange performed, failed voiding trial today, follow-up 1 month     UTI  7/6/16 UTI klebsiella pneumoniae sensitive to all antibiotics except ampicillin, start bactrim x 5 days  1/18/19 UTI enterobacter cloacae resistant to ampicillin, cephalothin, penicillin, bactrim, sensitive to cefuroxime, ciprofloxacin and levaquin, nitrofurantoin  5/26/19 UTI klebsiella given omnicef, organism resistant ampicillin, ciprofloxacin, levofloxacin, bactrim  8/18/21  urology note urinary retention, urethral davenport catheter in place, catheter exchange performed, failed voiding trial today, follow-up 1 month        Patient Active Problem List   Diagnosis   • S/p lumbar surgery   • Hypertension   • Dyslipidemia   • S/P TOMY (total abdominal hysterectomy)   • Preventative health care   • S/P knee bilateral arthroplasty   • S/P appendectomy   • Osteoporosis, idiopathic   • Hypothyroid   • History of shingles   • GERD (gastroesophageal reflux disease)   • Tricuspid regurgitation   • Aortic regurgitation   • Cervical pain   • S/P thoracic aortic aneurysm repair   • CKD (chronic kidney disease) stage 3, GFR 30-59 ml/min   • UTI (urinary tract infection)   • Insomnia   • History of shoulder pain   •  "History of transient ischemic attack (TIA)   • S/P total hip arthroplasty   • Allergic rhinitis   • Paroxysmal atrial fibrillation (HCC)   • On continuous oral anticoagulation   • Gait disorder   • History of pelvic fracture   • Urinary retention         ROS           Objective     /58 (BP Location: Left arm, Patient Position: Sitting, BP Cuff Size: Adult)   Pulse 74   Temp 36.2 °C (97.2 °F)   Ht 1.626 m (5' 4\")   Wt 58.5 kg (129 lb)   SpO2 98%   BMI 22.14 kg/m²      Physical Exam  Vitals and nursing note reviewed.   Constitutional:       Appearance: Normal appearance.   HENT:      Head: Normocephalic and atraumatic.      Right Ear: External ear normal.      Left Ear: External ear normal.   Eyes:      Conjunctiva/sclera: Conjunctivae normal.   Cardiovascular:      Rate and Rhythm: Normal rate and regular rhythm.      Heart sounds: Normal heart sounds.   Pulmonary:      Effort: Pulmonary effort is normal.      Breath sounds: Normal breath sounds.   Abdominal:      General: There is no distension.   Musculoskeletal:         General: No swelling.      Cervical back: Neck supple.   Skin:     General: Skin is warm.   Neurological:      General: No focal deficit present.      Mental Status: She is alert.   Psychiatric:         Mood and Affect: Mood normal.         Behavior: Behavior normal.                             Assessment & Plan        Assessment  #!  Status post recent pelvic fracture, admitted in June, has been seen by home health and cleared, not currently undergoing physical therapy symptoms improving, pain is improved, just on gabapentin no narcotics, 100 mg 3 times daily of the gabapentin without side effects, pain level 5/10 some radiation down the left leg but gabapentin does control the pain although she will have more pain during the day when she moves about    #2  Urinary retention followed by urology, Dale catheter in place, has been set up for suprapubic catheter     #3 anemia most recent " labs hemoglobin 8.0, hematocrit 26, FIT in the emergency room negative start on iron supplementation twice a day with some constipation follow-up labs were ordered and fax to home health she did not get those done    #4 paroxysmal atrial fibrillation, on metoprolol, has declined amiodarone and sotalol, has been seen by cardiology, sinus on exam today    #5 weakness and debilitation improving, using walker, good social support with  at home, strength is gradually improving    #6 constipation with iron supplementation      Plan  #1 flu vaccine high-dose today, she is scheduled for Covid booster this weekend    #2 labs ordered previously, those are in the system, patient will have that done follow-up on anemia as well as her renal function which appears to have improved on her most recent labs    #3 increase neurontin to 200 mg tid monitor for drowsiness, sedation, memory loss    #4 decrease iron to qod with vitamin c daily    #5 increase miralax to bid    #6 dexa, may need to resume either Reclast or trial for Prolia based upon results    #7  Labs previously ordered she will have those done    #8 declines physical therapy    #9 call imaging to schedule the suprapubic catheter placement    #10 follow-up with urology    #11 continue with daily exercise and activity, falling precautions    #12 follow-up 3 months    #13 check blood pressure periodically and record

## 2021-10-14 ENCOUNTER — HOSPITAL ENCOUNTER (OUTPATIENT)
Dept: LAB | Facility: MEDICAL CENTER | Age: 86
End: 2021-10-14
Attending: INTERNAL MEDICINE
Payer: MEDICARE

## 2021-10-14 DIAGNOSIS — N18.32 STAGE 3B CHRONIC KIDNEY DISEASE: ICD-10-CM

## 2021-10-14 DIAGNOSIS — I48.0 PAROXYSMAL ATRIAL FIBRILLATION (HCC): ICD-10-CM

## 2021-10-14 DIAGNOSIS — G44.83 PRIMARY COUGH HEADACHE: ICD-10-CM

## 2021-10-14 DIAGNOSIS — E53.8 B12 DEFICIENCY: ICD-10-CM

## 2021-10-14 DIAGNOSIS — D64.9 ANEMIA, UNSPECIFIED TYPE: ICD-10-CM

## 2021-10-14 DIAGNOSIS — N18.31 STAGE 3A CHRONIC KIDNEY DISEASE: ICD-10-CM

## 2021-10-14 DIAGNOSIS — E78.5 DYSLIPIDEMIA: ICD-10-CM

## 2021-10-14 LAB
ALBUMIN SERPL BCP-MCNC: 3.8 G/DL (ref 3.2–4.9)
ALBUMIN/GLOB SERPL: 1.7 G/DL
ALP SERPL-CCNC: 77 U/L (ref 30–99)
ALT SERPL-CCNC: 11 U/L (ref 2–50)
ANION GAP SERPL CALC-SCNC: 13 MMOL/L (ref 7–16)
ANISOCYTOSIS BLD QL SMEAR: ABNORMAL
AST SERPL-CCNC: 17 U/L (ref 12–45)
BASOPHILS # BLD AUTO: 1.1 % (ref 0–1.8)
BASOPHILS # BLD: 0.04 K/UL (ref 0–0.12)
BILIRUB SERPL-MCNC: 0.3 MG/DL (ref 0.1–1.5)
BUN SERPL-MCNC: 15 MG/DL (ref 8–22)
CALCIUM SERPL-MCNC: 9.4 MG/DL (ref 8.4–10.2)
CHLORIDE SERPL-SCNC: 99 MMOL/L (ref 96–112)
CO2 SERPL-SCNC: 21 MMOL/L (ref 20–33)
COMMENT 1642: NORMAL
CREAT SERPL-MCNC: 0.75 MG/DL (ref 0.5–1.4)
EOSINOPHIL # BLD AUTO: 0.12 K/UL (ref 0–0.51)
EOSINOPHIL NFR BLD: 3.4 % (ref 0–6.9)
ERYTHROCYTE [DISTWIDTH] IN BLOOD BY AUTOMATED COUNT: 68.4 FL (ref 35.9–50)
ERYTHROCYTE [SEDIMENTATION RATE] IN BLOOD BY WESTERGREN METHOD: 14 MM/HOUR (ref 0–25)
GLOBULIN SER CALC-MCNC: 2.3 G/DL (ref 1.9–3.5)
GLUCOSE SERPL-MCNC: 96 MG/DL (ref 65–99)
HCT VFR BLD AUTO: 33.1 % (ref 37–47)
HGB BLD-MCNC: 9.8 G/DL (ref 12–16)
HGB RETIC QN AUTO: 33.2 PG/CELL (ref 29–35)
IMM GRANULOCYTES # BLD AUTO: 0.02 K/UL (ref 0–0.11)
IMM GRANULOCYTES NFR BLD AUTO: 0.6 % (ref 0–0.9)
IMM RETICS NFR: 14.3 % (ref 9.3–17.4)
LG PLATELETS BLD QL SMEAR: NORMAL
LYMPHOCYTES # BLD AUTO: 1.29 K/UL (ref 1–4.8)
LYMPHOCYTES NFR BLD: 36.9 % (ref 22–41)
MCH RBC QN AUTO: 25.3 PG (ref 27–33)
MCHC RBC AUTO-ENTMCNC: 29.6 G/DL (ref 33.6–35)
MCV RBC AUTO: 85.5 FL (ref 81.4–97.8)
MICROCYTES BLD QL SMEAR: ABNORMAL
MONOCYTES # BLD AUTO: 0.54 K/UL (ref 0–0.85)
MONOCYTES NFR BLD AUTO: 15.4 % (ref 0–13.4)
NEUTROPHILS # BLD AUTO: 1.49 K/UL (ref 2–7.15)
NEUTROPHILS NFR BLD: 42.6 % (ref 44–72)
NRBC # BLD AUTO: 0 K/UL
NRBC BLD-RTO: 0 /100 WBC
PHOSPHATE SERPL-MCNC: 3.3 MG/DL (ref 2.5–4.5)
PLATELET # BLD AUTO: 203 K/UL (ref 164–446)
PLATELET BLD QL SMEAR: NORMAL
PMV BLD AUTO: 8.1 FL (ref 9–12.9)
POLYCHROMASIA BLD QL SMEAR: NORMAL
POTASSIUM SERPL-SCNC: 4.5 MMOL/L (ref 3.6–5.5)
PROT SERPL-MCNC: 6.1 G/DL (ref 6–8.2)
RBC # BLD AUTO: 3.87 M/UL (ref 4.2–5.4)
RBC BLD AUTO: PRESENT
RETICS # AUTO: 0.1 M/UL (ref 0.04–0.06)
RETICS/RBC NFR: 2.6 % (ref 0.8–2.1)
SODIUM SERPL-SCNC: 133 MMOL/L (ref 135–145)
TSH SERPL DL<=0.005 MIU/L-ACNC: 1.53 UIU/ML (ref 0.38–5.33)
WBC # BLD AUTO: 3.5 K/UL (ref 4.8–10.8)

## 2021-10-14 PROCEDURE — 83540 ASSAY OF IRON: CPT

## 2021-10-14 PROCEDURE — 80053 COMPREHEN METABOLIC PANEL: CPT

## 2021-10-14 PROCEDURE — 85025 COMPLETE CBC W/AUTO DIFF WBC: CPT

## 2021-10-14 PROCEDURE — 85046 RETICYTE/HGB CONCENTRATE: CPT

## 2021-10-14 PROCEDURE — 84155 ASSAY OF PROTEIN SERUM: CPT

## 2021-10-14 PROCEDURE — 36415 COLL VENOUS BLD VENIPUNCTURE: CPT

## 2021-10-14 PROCEDURE — 83550 IRON BINDING TEST: CPT

## 2021-10-14 PROCEDURE — 83970 ASSAY OF PARATHORMONE: CPT

## 2021-10-14 PROCEDURE — 84165 PROTEIN E-PHORESIS SERUM: CPT

## 2021-10-14 PROCEDURE — 84100 ASSAY OF PHOSPHORUS: CPT

## 2021-10-14 PROCEDURE — 82306 VITAMIN D 25 HYDROXY: CPT

## 2021-10-14 PROCEDURE — 82728 ASSAY OF FERRITIN: CPT

## 2021-10-14 PROCEDURE — 85652 RBC SED RATE AUTOMATED: CPT

## 2021-10-14 PROCEDURE — 82607 VITAMIN B-12: CPT

## 2021-10-14 PROCEDURE — 84443 ASSAY THYROID STIM HORMONE: CPT

## 2021-10-15 ENCOUNTER — TELEPHONE (OUTPATIENT)
Dept: MEDICAL GROUP | Facility: MEDICAL CENTER | Age: 86
End: 2021-10-15

## 2021-10-15 PROBLEM — J30.9 ALLERGIC RHINITIS: Status: RESOLVED | Noted: 2020-07-16 | Resolved: 2021-10-15

## 2021-10-15 LAB
25(OH)D3 SERPL-MCNC: 78 NG/ML (ref 30–100)
FERRITIN SERPL-MCNC: 70.4 NG/ML (ref 10–291)
IRON SATN MFR SERPL: 6 % (ref 15–55)
IRON SERPL-MCNC: 23 UG/DL (ref 40–170)
PTH-INTACT SERPL-MCNC: 30.8 PG/ML (ref 14–72)
TIBC SERPL-MCNC: 354 UG/DL (ref 250–450)
UIBC SERPL-MCNC: 331 UG/DL (ref 110–370)
VIT B12 SERPL-MCNC: 716 PG/ML (ref 211–911)

## 2021-10-15 NOTE — TELEPHONE ENCOUNTER
Notified with labs, anemia improving, still low iron but ferritin normal, she is on iron every other day, take vitamin C every day, continue that supplement regimen, no change thyroid dose.  She has appointment in December we will repeat her blood test at that time.

## 2021-10-18 LAB
ALBUMIN SERPL ELPH-MCNC: 3.76 G/DL (ref 3.75–5.01)
ALPHA1 GLOB SERPL ELPH-MCNC: 0.31 G/DL (ref 0.19–0.46)
ALPHA2 GLOB SERPL ELPH-MCNC: 0.72 G/DL (ref 0.48–1.05)
B-GLOBULIN SERPL ELPH-MCNC: 0.61 G/DL (ref 0.48–1.1)
GAMMA GLOB SERPL ELPH-MCNC: 0.81 G/DL (ref 0.62–1.51)
INTERPRETATION SERPL IFE-IMP: ABNORMAL
MONOCLON BAND OBS SERPL: ABNORMAL
PATHOLOGY STUDY: ABNORMAL
PROT SERPL-MCNC: 6.2 G/DL (ref 6.3–8.2)

## 2021-10-21 ENCOUNTER — APPOINTMENT (OUTPATIENT)
Dept: ADMISSIONS | Facility: MEDICAL CENTER | Age: 86
End: 2021-10-21
Attending: UROLOGY
Payer: MEDICARE

## 2021-11-04 ENCOUNTER — APPOINTMENT (OUTPATIENT)
Dept: RADIOLOGY | Facility: MEDICAL CENTER | Age: 86
End: 2021-11-04
Attending: UROLOGY
Payer: MEDICARE

## 2021-11-04 ENCOUNTER — HOSPITAL ENCOUNTER (OUTPATIENT)
Facility: MEDICAL CENTER | Age: 86
End: 2021-11-04
Attending: UROLOGY | Admitting: RADIOLOGY
Payer: MEDICARE

## 2021-11-04 VITALS
BODY MASS INDEX: 23.67 KG/M2 | OXYGEN SATURATION: 95 % | WEIGHT: 138.67 LBS | DIASTOLIC BLOOD PRESSURE: 71 MMHG | RESPIRATION RATE: 16 BRPM | HEIGHT: 64 IN | HEART RATE: 108 BPM | TEMPERATURE: 96.9 F | SYSTOLIC BLOOD PRESSURE: 130 MMHG

## 2021-11-04 DIAGNOSIS — R33.9 RETENTION OF URINE: ICD-10-CM

## 2021-11-04 LAB
ERYTHROCYTE [DISTWIDTH] IN BLOOD BY AUTOMATED COUNT: 61.6 FL (ref 35.9–50)
EXTERNAL QUALITY CONTROL: NORMAL
HCT VFR BLD AUTO: 34.8 % (ref 37–47)
HGB BLD-MCNC: 11.1 G/DL (ref 12–16)
INR PPP: 1.02 (ref 0.87–1.13)
MCH RBC QN AUTO: 26.9 PG (ref 27–33)
MCHC RBC AUTO-ENTMCNC: 31.9 G/DL (ref 33.6–35)
MCV RBC AUTO: 84.3 FL (ref 81.4–97.8)
PLATELET # BLD AUTO: 234 K/UL (ref 164–446)
PMV BLD AUTO: 8.9 FL (ref 9–12.9)
PROTHROMBIN TIME: 13.1 SEC (ref 12–14.6)
RBC # BLD AUTO: 4.13 M/UL (ref 4.2–5.4)
SARS-COV+SARS-COV-2 AG RESP QL IA.RAPID: NEGATIVE
WBC # BLD AUTO: 4 K/UL (ref 4.8–10.8)

## 2021-11-04 PROCEDURE — A9270 NON-COVERED ITEM OR SERVICE: HCPCS | Performed by: RADIOLOGY

## 2021-11-04 PROCEDURE — 87426 SARSCOV CORONAVIRUS AG IA: CPT | Performed by: RADIOLOGY

## 2021-11-04 PROCEDURE — 700105 HCHG RX REV CODE 258: Performed by: RADIOLOGY

## 2021-11-04 PROCEDURE — 85610 PROTHROMBIN TIME: CPT

## 2021-11-04 PROCEDURE — 160002 HCHG RECOVERY MINUTES (STAT)

## 2021-11-04 PROCEDURE — 99153 MOD SED SAME PHYS/QHP EA: CPT

## 2021-11-04 PROCEDURE — 700111 HCHG RX REV CODE 636 W/ 250 OVERRIDE (IP): Performed by: RADIOLOGY

## 2021-11-04 PROCEDURE — 700117 HCHG RX CONTRAST REV CODE 255: Performed by: RADIOLOGY

## 2021-11-04 PROCEDURE — 700102 HCHG RX REV CODE 250 W/ 637 OVERRIDE(OP): Performed by: RADIOLOGY

## 2021-11-04 PROCEDURE — 700111 HCHG RX REV CODE 636 W/ 250 OVERRIDE (IP)

## 2021-11-04 PROCEDURE — 85027 COMPLETE CBC AUTOMATED: CPT

## 2021-11-04 RX ORDER — ONDANSETRON 2 MG/ML
4 INJECTION INTRAMUSCULAR; INTRAVENOUS PRN
Status: ACTIVE | OUTPATIENT
Start: 2021-11-04 | End: 2021-11-04

## 2021-11-04 RX ORDER — SODIUM CHLORIDE 9 MG/ML
INJECTION, SOLUTION INTRAVENOUS CONTINUOUS
Status: DISCONTINUED | OUTPATIENT
Start: 2021-11-04 | End: 2021-11-04 | Stop reason: HOSPADM

## 2021-11-04 RX ORDER — OXYCODONE HYDROCHLORIDE 5 MG/1
5 TABLET ORAL
Status: DISCONTINUED | OUTPATIENT
Start: 2021-11-04 | End: 2021-11-04 | Stop reason: HOSPADM

## 2021-11-04 RX ORDER — MIDAZOLAM HYDROCHLORIDE 1 MG/ML
.5-2 INJECTION INTRAMUSCULAR; INTRAVENOUS PRN
Status: ACTIVE | OUTPATIENT
Start: 2021-11-04 | End: 2021-11-04

## 2021-11-04 RX ORDER — MORPHINE SULFATE 4 MG/ML
4 INJECTION, SOLUTION INTRAMUSCULAR; INTRAVENOUS
Status: DISCONTINUED | OUTPATIENT
Start: 2021-11-04 | End: 2021-11-04 | Stop reason: HOSPADM

## 2021-11-04 RX ORDER — MIDAZOLAM HYDROCHLORIDE 1 MG/ML
INJECTION INTRAMUSCULAR; INTRAVENOUS
Status: COMPLETED
Start: 2021-11-04 | End: 2021-11-04

## 2021-11-04 RX ORDER — OXYCODONE HYDROCHLORIDE 5 MG/1
10 TABLET ORAL
Status: DISCONTINUED | OUTPATIENT
Start: 2021-11-04 | End: 2021-11-04 | Stop reason: HOSPADM

## 2021-11-04 RX ORDER — SODIUM CHLORIDE 9 MG/ML
500 INJECTION, SOLUTION INTRAVENOUS
Status: ACTIVE | OUTPATIENT
Start: 2021-11-04 | End: 2021-11-04

## 2021-11-04 RX ORDER — ONDANSETRON 2 MG/ML
4 INJECTION INTRAMUSCULAR; INTRAVENOUS EVERY 8 HOURS PRN
Status: DISCONTINUED | OUTPATIENT
Start: 2021-11-04 | End: 2021-11-04 | Stop reason: HOSPADM

## 2021-11-04 RX ADMIN — MIDAZOLAM HYDROCHLORIDE 1 MG: 1 INJECTION, SOLUTION INTRAMUSCULAR; INTRAVENOUS at 13:37

## 2021-11-04 RX ADMIN — OXYCODONE HYDROCHLORIDE 5 MG: 5 TABLET ORAL at 15:39

## 2021-11-04 RX ADMIN — FENTANYL CITRATE 50 MCG: 50 INJECTION, SOLUTION INTRAMUSCULAR; INTRAVENOUS at 13:38

## 2021-11-04 RX ADMIN — MIDAZOLAM HYDROCHLORIDE 1 MG: 1 INJECTION, SOLUTION INTRAMUSCULAR; INTRAVENOUS at 13:32

## 2021-11-04 RX ADMIN — MIDAZOLAM HYDROCHLORIDE 1 MG: 1 INJECTION, SOLUTION INTRAMUSCULAR; INTRAVENOUS at 13:54

## 2021-11-04 RX ADMIN — FENTANYL CITRATE 50 MCG: 50 INJECTION, SOLUTION INTRAMUSCULAR; INTRAVENOUS at 13:57

## 2021-11-04 RX ADMIN — SODIUM CHLORIDE: 9 INJECTION, SOLUTION INTRAVENOUS at 13:04

## 2021-11-04 RX ADMIN — FENTANYL CITRATE 50 MCG: 50 INJECTION, SOLUTION INTRAMUSCULAR; INTRAVENOUS at 13:32

## 2021-11-04 RX ADMIN — MIDAZOLAM HYDROCHLORIDE 1 MG: 1 INJECTION, SOLUTION INTRAMUSCULAR; INTRAVENOUS at 13:57

## 2021-11-04 RX ADMIN — IOHEXOL 20 ML: 300 INJECTION, SOLUTION INTRAVENOUS at 14:00

## 2021-11-04 RX ADMIN — FENTANYL CITRATE 50 MCG: 50 INJECTION, SOLUTION INTRAMUSCULAR; INTRAVENOUS at 13:55

## 2021-11-04 ASSESSMENT — FIBROSIS 4 INDEX: FIB4 SCORE: 2.12

## 2021-11-04 ASSESSMENT — PAIN DESCRIPTION - PAIN TYPE
TYPE: SURGICAL PAIN
TYPE: ACUTE PAIN
TYPE: SURGICAL PAIN

## 2021-11-04 NOTE — PROGRESS NOTES
IR NOTE:  16FR BARD REF 6840E34 LOT [id number] SUPRA PUBIC CATHETER PLACED BY DR. AUGUST, PT TOLERATED WELL AND VSS ON RA, DINERO CATHETER TO STAY IN PLACE AT THIS TIME PER DR. AUGUST, NEW BAG APPLIED TO CURRENT DINERO CATHETER

## 2021-11-04 NOTE — PROGRESS NOTES
"History and Physical    Date: 11/4/2021    PCP: Tee Tran M.D.      CC: Urinary retention.    HPI: This is a 85 y.o. female who is presenting for suprapubic catheter placement.     Past Medical History:   Diagnosis Date   • AAA (abdominal aortic aneurysm) ([facility]) 7/26/2010    10/09 CT thorax dilated ascending thoracic aorta 4.9 cm 1/10 transesophageal echo dilated aortic root, and ascending aorta with mild aortic insufficiency 1/10  ascending aortic aneurysm repair 5/12 echo [facility] normal LV function EF 60%, moderate to severe left atrial enlargement, RVSP 32    • Aortic insufficiency    • Aortic valve disease    • Aortic valve regurgitation    • Arthritis     back and knee/ osteo   • Cataract    • Dental disorder     full top denture, partial bottom   • Diverticulosis    • Dyslipidemia 7/26/2010    Sees [facility] on lipitor 4/11 chol 159,trig 84,hdl 47,ldl 95,cpk 114 7/12 chol 163,trig 120,hdl 49,ldl 90, crp 1.1 on lipitor 20 mg    • GERD (gastroesophageal reflux disease) 7/12/2012 6/07 EGD per [facility] hiatal hernia, start prevacid 30 mg 7/12 protonix 20 mg qday    • Glaucoma    • Heart burn    • Heart murmur    • Heart valve disease     \"leaking\", mitral valve   • Hiatus hernia syndrome    • High cholesterol    • History of shingles 6/30/2011    2010 Back and left thorax     • History of total knee arthroplasty 7/26/2010    4/10 MRI right knee complex tear medial meniscus, horizontal cleavage tear lateral meniscus, chondromalacia patella, moderate-sized Baker's cyst 5/10 right meniscus knee repair  5/10 told by  had gout on surgery had pathology report, I await that report Question gout seen on pathology report done during repair of right meniscus knee surgery. 7/10 uric acid 6.3, we'll await starting allopurinol depending on the operative report 8/5/10 reviewed ortho notes , op report indicates crystal deposition, could be gout or pseudogout. No bx result sent over. Will " need to get. My suspicion is chondrocalcinosis. 10/11 dr.parlasca najera note, MRI pending 8/7/10 reviewed pathology report right knee tissue, marked degenerative changes with focal calcification, no mention of gout. Based on this and a normal uric acid level, I do not believe she has gout, has no hyperuricemia medications would be indicated 10/11 dr.parlasca najera left knee meniscectomy and arthroscopy 1/12     • HLD (hyperlipidemia)    • Hypertension    • Hypothyroid 4/8/2011 4/11 tsh 9 start synthroid 50 4/11 tsh 6,free t3 3.1,free t4 1.2,tpo negative 7/1/11 tsh 2.1 cont synthroid 50 mcg 7/12 tsh 3.4 cont synthroid 50 mcg    • Indigestion    • Mitral insufficiency    • OSTEOPOROSIS 8/9/2010    Had been on fosamax [id number]  8/10 dexa LS -2.0,hip -1.5 await old dexa result, she will get copy for me 4/11 vit d 35 9/12 dexa LS -3.2,hip -1.4 2/13/13 relast infusion    • Pain     back and right leg, can get as bad as 10/10   • Preventative health care 7/26/2010    2002 pneum 2005 colon [facility] no records 4/11 vit d 35 9/11 shingles vaccine 9/12 mammogram 9/12 dexa LS -3.2,hip -1.4    • Rheumatic fever    • S/P appendectomy 7/26/2010   • S/P TOMY (total abdominal hysterectomy) 7/26/2010    Sees gyn on HRT estradiol for 20 yrs () 11/08 mammo     • Shingles 6/30/2011   • Snoring    • Status post lumbar surgery 7/26/2010 6/03 L4-5 epidural Dr. Jordan  7/06 overall for decompression, foraminotomy. L4-L5 posterior fusion, L4-L5 allograft      • Stroke (HCC) 1996    no residual problems    • Thoracic aortic aneurysm without mention of rupture    • Tricuspid insufficiency    • Unspecified disorder of thyroid     hypo   • Urinary bladder disorder     suprapubic catheter insertion 11/4       Past Surgical History:   Procedure Laterality Date   • TENDON REPAIR Right 11/10/2016    Procedure: TENDON REPAIR - QUADRACEPS ;  Surgeon: Maxim Herrera M.D.;  Location: SURGERY [facility];  Service:     • KNEE ARTHROPLASTY TOTAL Right 9/8/2016    Procedure: KNEE ARTHROPLASTY TOTAL;  Surgeon: Maxim Herrera M.D.;  Location: SURGERY [facility];  Service:    • LUMBAR FUSION POSTERIOR  11/24/2015    Procedure: LUMBAR FUSION POSTERIOR L3-4, EXPLORATION OF FUSION L4-5 WITH INSTRUMENTATION;  Surgeon: Hermann Reis M.D.;  Location: SURGERY [facility];  Service:    • LUMBAR LAMINECTOMY DISKECTOMY  11/24/2015    Procedure: LUMBAR L3-4 LAMINECTOMY AND REDO L4-5;  Surgeon: Hermann Reis M.D.;  Location: SURGERY [facility];  Service:    • KNEE ARTHROPLASTY TOTAL  1/3/2012    Performed by YOSEF MEJÍA at [facility]   • KNEE ARTHROSCOPY  10/18/2011    Performed by YOSEF MEJÍA at [facility]   • MEDIAL MENISCECTOMY  10/18/2011    Performed by YOSEF MEJÍA at [facility]   • AORTIC VALVE REPLACEMENT  1/12/2010    Performed by ISAÍAS NICOLE at [facility]   • APPENDECTOMY     • HYSTERECTOMY, TOTAL ABDOMINAL     • LUMBAR FUSION POSTERIOR         Current Facility-Administered Medications   Medication Dose Route Frequency Provider Last Rate Last Admin   • NS infusion   Intravenous Continuous Aashish Lorenzo M.D.            Social History     Socioeconomic History   • Marital status:      Spouse name: Not on file   • Number of children: Not on file   • Years of education: Not on file   • Highest education level: Not on file   Occupational History   • Not on file   Tobacco Use   • Smoking status: Never Smoker   • Smokeless tobacco: Never Used   • Tobacco comment: none   Vaping Use   • Vaping Use: Never used   Substance and Sexual Activity   • Alcohol use: No     Alcohol/week: 0.0 oz   • Drug use: Yes     Comment: CBD Oil for Arthritis   • Sexual activity: Not Currently     Partners: Male   Other Topics Concern   • Not on file   Social History Narrative   • Not on file     Social Determinants of Health     Financial Resource Strain:    •  Difficulty of Paying Living Expenses:    Food Insecurity:    • Worried About Running Out of Food in the Last Year:    • Ran Out of Food in the Last Year:    Transportation Needs:    • Lack of Transportation (Medical):    • Lack of Transportation (Non-Medical):    Physical Activity:    • Days of Exercise per Week:    • Minutes of Exercise per Session:    Stress:    • Feeling of Stress :    Social Connections:    • Frequency of Communication with Friends and Family:    • Frequency of Social Gatherings with Friends and Family:    • Attends [employer] Services:    • Active Member of Clubs or Organizations:    • Attends Club or Organization Meetings:    • Marital Status:    Intimate Partner Violence:    • Fear of Current or Ex-Partner:    • Emotionally Abused:    • Physically Abused:    • Sexually Abused:        Family History   Problem Relation Age of Onset   • Stroke Mother    • Heart Disease Father    • Heart Disease Sister        Allergies:  Amoxicillin, Codeine, Doxycycline, Sulfamethoxazole-trimethoprim, Tramadol, and Trazodone    Review of Systems:  Negative    Physical Exam    Vital Signs  Blood Pressure : 151/72   Temperature: 36.7 °C (98 °F)   Pulse: 64   Respiration: 20   Pulse Oximetry: 98 %        Labs:                    Radiology:  IR-DRAIN-BLADDER SUPRAPUB W/CATH    (Results Pending)             Assessment and Plan: This is a 85 y.o. female who is presenting for suprapubic catheter placement.

## 2021-11-04 NOTE — OR NURSING
Page out to Dr. Roberson as pt reports being under the impression urethral catheter POA would be removed.   
Page out to on-call urologist. Left call back phone number [phone] and to ask for RAMÍREZ Roberts.  
Preop assessment complete. VSS. Dale catheter in place prior to arrival to preop. Dale catheter clamped upon arrival to preop per order.  
Spoke with GREGORY Graff, with 's office. Explained to Prerna pt has davenport catheter she was under the impression would be removed while in IR for suprapubic cath placement. Prerna states there is nothing in pt's chart about davenport being removed. Prerna states she will leave a message for  regarding pt wanting davenport catheter removed.   Updated RAMÍREZ Roberts, in Phase II.   
 used

## 2021-11-04 NOTE — DISCHARGE INSTRUCTIONS
ACTIVITY: Rest and take it easy for the first 24 hours.  A responsible adult is recommended to remain with you during that time.  It is normal to feel sleepy.  We encourage you to not do anything that requires balance, judgment or coordination.    MILD FLU-LIKE SYMPTOMS ARE NORMAL. YOU MAY EXPERIENCE GENERALIZED MUSCLE ACHES, THROAT IRRITATION, HEADACHE AND/OR SOME NAUSEA.    FOR 24 HOURS DO NOT:  Drive, operate machinery or run household appliances.  Drink beer or alcoholic beverages.   Make important decisions or sign legal documents.    SPECIAL INSTRUCTIONS: Follow instructions given to you by your MD.      Suprapubic Catheter Home Guide  A suprapubic catheter is a flexible tube that is used to drain urine from the bladder into a collection bag outside the body. The catheter is inserted into the bladder through a small opening in the lower abdomen, above the pubic bone (suprapubic area) and a few inches below your belly button (navel). A tiny balloon filled with germ-free (sterile) water helps to keep the catheter in place.  The collection bag must be emptied at least once a day and cleaned at least every other day. The collection bag can be put beside your bed at night and attached to your leg during the day. You may have a large collection bag to use at night and a smaller one to use during the day.  Your suprapubic catheter may need to be changed every 4-6 weeks, or as often as recommended by your health care provider. Healing of the tract where the catheter is placed can take 6 weeks to 6 months. During that time, your health care provider may change your catheter. Once the tract is well healed, you or a caregiver will change your suprapubic catheter at home.  What are the risks?  This catheter is safe to use. However, problems can occur, including:  · Blocked urine flow. This can occur if the catheter stops working, or if you have a blood clot in your bladder or in the catheter.  · Irritation of the skin  around the catheter.  · Infection. This can happen if bacteria gets into your bladder.  Supplies needed:  · Two pairs of sterile gloves.  · Paper towels.  · Catheter.  · Two syringes.  · Sterile water.  · Sterile cleaning solution.  · Lubricant.  · Collection bags.  How to change the catheter  1. Drink plenty of fluids during the hours before you change the catheter.  2. Wash your hands with soap and water. If soap and water are not available, use hand .  3. Draw up sterile water into a syringe to have ready to fill the new catheter balloon. The amount will depend on the size of the balloon.  4. Have all of your supplies ready and close to you on a paper towel.  5. Lie on your back, sitting slightly upright so that you can see the catheter and opening.  6. Put on sterile gloves.  7. Clean the skin around the catheter opening using the sterile cleaning solution.  8. Remove the water from the balloon in the catheter using a syringe.  9. Slowly remove the catheter. If the catheter seems stuck, or if you have difficulty removing it:  ? Do not pull on it.  ? Call your health care provider right away.  10. Place the old catheter on a paper towel to discard later.  11. Take off the used gloves, and put on a new pair.  12. Put lubricant on the end of the new catheter that will go into your bladder.  13. Clean the skin around the catheter opening using the sterile cleaning solution.  14. Gently slide the catheter through the opening in your abdomen and into the tract that leads to your bladder.  15. Wait for some urine to start flowing through the catheter.  16. When urine starts to flow through the catheter, attach the collection bag to the end of the catheter. Make sure the connection is tight.  17. Use a syringe to fill the catheter balloon with sterile water. Fill to the amount directed by your health care provider.  18. Remove the gloves and wash your hands with soap and water.  How to care for the skin around  the catheter  Follow your health care provider's instructions on caring for your skin.  · Use a clean washcloth and soapy water to clean the skin around your catheter every day. Pat the area dry with a clean paper towel.  · Do not pull on the catheter.  · Do not use ointment or lotion on this area, unless told by your health care provider.  · Check the skin around the catheter every day for signs of infection. Check for:  ? Redness, swelling, or pain.  ? Fluid or blood.  ? Warmth.  ? Pus or a bad smell.  How to empty and clean the collection bag  Empty the large collection bag every 8 hours. Empty the small collection bag when it is about ? full. Clean the collection bag every 2-3 days, or as often as told by your health care provider. To do this:  1. Wash your hands with soap and water. If soap and water are not available, use hand .  2. Disconnect the bag from the catheter and immediately attach a new bag to the catheter.  3. Hold the used bag over the toilet or another container.  4. Turn the valve (spigot) at the bottom of the bag to empty the urine. Empty the used bag completely.  ? Do not touch the opening of the spigot.  ? Do not let the opening touch the toilet or container.  5. Close the spigot tightly when the bag is empty.  6. Clean the used bag in one of the following methods:  ? According to the 's instructions.  ? As told by your health care provider.  7. Let the bag dry completely. Put it in a clean plastic bag before storing it.  General tips  · Always wash your hands before and after caring for your catheter and collection bag. Use a mild, fragrance-free soap. If soap and water are not available, use hand .  · Clean the outside of the catheter with soap and water as often as told by your health care provider.  · Always make sure there are no twists or kinks in the catheter tube.  · Always make sure there are no leaks in the catheter or collection bag.  · Always wear the  leg bag below your knee.  · Make sure the overnight drainage bag is always lower than the level of your bladder, but do not let it touch the floor. Before you go to sleep, hang the bag inside a wastebasket that is covered by a clean plastic bag.  · Drink enough fluid to keep your urine pale yellow.  · Do not take baths, swim, or use a hot tub until your health care provider approves. Ask your health care provider if you may take showers.  Contact a ricardo care provider if:  · You leak urine.  · You have redness, swelling, or pain around your catheter.  · You have fluid or blood coming from your catheter opening.  · Your catheter opening feels warm to the touch.  · You have pus or a bad smell coming from your catheter opening.  · You have a fever or chills.  · Your urine flow slows down.  · Your urine becomes cloudy or smelly.  Get help right away if:  · Your catheter comes out.  · You have:  ? Nausea.  ? Back pain.  ? Difficulty changing your catheter.  ? Blood in your urine.  ? No urine flow for 1 hour.  Summary  · A suprapubic catheter is a flexible tube that is used to drain urine from the bladder into a collection bag outside the body.  · Your suprapubic catheter may need to be changed every 4-6 weeks, or as recommended by your health care provider.  · Follow instructions on how to change the catheter and how to empty and clean the collection bag.  · Always wash your hands before and after caring for your catheter and collection bag. Drink enough fluid to keep your urine pale yellow.  · Get help right away if you have difficulty changing your catheter or if there is blood in your urine.  This information is not intended to replace advice given to you by your health care provider. Make sure you discuss any questions you have with your health care provider.  Document Released: 09/04/2012 Document Revised: 04/09/2020 Document Reviewed: 01/22/2020  Elsevier Patient Education © 2020 [employer]      DIET: To avoid  nausea, slowly advance diet as tolerated, avoiding spicy or greasy foods for the first day.  Add more substantial food to your diet according to your physician's instructions. INCREASE FLUIDS AND FIBER TO AVOID CONSTIPATION.    SURGICAL DRESSING/BATHING: Keep site clean dry and intact.    FOLLOW-UP APPOINTMENT:  A follow-up appointment should be arranged with your doctor in 1-2 weeks; call to schedule.    You should CALL YOUR PHYSICIAN if you develop:  Fever greater than 101 degrees F.  Pain not relieved by medication, or persistent nausea or vomiting.  Excessive bleeding (blood soaking through dressing) or unexpected drainage from the wound.  Extreme redness or swelling around the incision site, drainage of pus or foul smelling drainage.  Inability to urinate or empty your bladder within 8 hours.  Problems with breathing or chest pain.    You should call 911 if you develop problems with breathing or chest pain.  If you are unable to contact your doctor or surgical center, you should go to the nearest emergency room or urgent care center.  Physician's telephone #: Dr. Roberson: [phone]    If any questions arise, call your doctor.  If your doctor is not available, please feel free to call the Surgical Center at [phone]. The Contact Center is open Monday through Friday 7AM to 5PM and may speak to a nurse at [phone], or toll free at [phone].     A registered nurse may call you a few days after your surgery to see how you are doing after your procedure.    MEDICATIONS: Resume taking daily medication.  Take prescribed pain medication with food.  If no medication is prescribed, you may take non-aspirin pain medication if needed.  PAIN MEDICATION CAN BE VERY CONSTIPATING.  Take a stool softener or laxative such as senokot, pericolace, or milk of magnesia if needed.    Prescription given for NA.  Last pain medication given at 3:39pm ( 5mg oxycodone).    If your physician has prescribed pain medication  that includes Acetaminophen (Tylenol), do not take additional Acetaminophen (Tylenol) while taking the prescribed medication.    Depression / Suicide Risk    As you are discharged from this [facility] facility, it is important to learn how to keep safe from harming yourself.    Recognize the warning signs:  · Abrupt changes in personality, positive or negative- including increase in energy   · Giving away possessions  · Change in eating patterns- significant weight changes-  positive or negative  · Change in sleeping patterns- unable to sleep or sleeping all the time   · Unwillingness or inability to communicate  · Depression  · Unusual sadness, discouragement and loneliness  · Talk of wanting to die  · Neglect of personal appearance   · Rebelliousness- reckless behavior  · Withdrawal from people/activities they love  · Confusion- inability to concentrate     If you or a loved one observes any of these behaviors or has concerns about self-harm, here's what you can do:  · Talk about it- your feelings and reasons for harming yourself  · Remove any means that you might use to hurt yourself (examples: pills, rope, extension cords, firearm)  · Get professional help from the community (Mental Health, Substance Abuse, psychological counseling)  · Do not be alone:Call your Safe Contact- someone whom you trust who will be there for you.  · Call your local CRISIS HOTLINE 340-7127 or 414-492-3031  · Call your local Children's Mobile Crisis Response Team [place] (708) 258-1544 or www.VoAPPs  · Call the toll free National Suicide Prevention Hotlines   · National Suicide Prevention Lifeline 190-006-LQGR (1657)  · National Hope Line Network 800-SUICIDE (470-5759)

## 2021-11-04 NOTE — OR SURGEON
Immediate Post- Operative Note        Findings: Urinary retention..       Procedure(s):  Suprapubic catheter placement      Estimated Blood Loss: Less than 5 ml        Complications: None            11/4/2021     1415 PM     Shawn Peralta M.D.

## 2021-11-04 NOTE — PROGRESS NOTES
Assumed care from Alejandra ELMORE, per report, order to remove urethral davenport catheter and completed. Pt with suprapubic cath site, scant ss drng at insertion site. Pt c/o pain to suprapubic site, medicated with oxycodone x1, tolerated well, pain controlled.     Pt vss, denies nausea, insertion site wnl, cath with yellow urine output present. Pt and family given discharge instructions. Pt discharged home in stable condition with all belongings.

## 2021-11-15 RX ORDER — BENAZEPRIL HYDROCHLORIDE 40 MG/1
40 TABLET ORAL EVERY MORNING
Qty: 90 TABLET | Refills: 3 | Status: SHIPPED | OUTPATIENT
Start: 2021-11-15 | End: 2022-11-06

## 2021-11-16 NOTE — TELEPHONE ENCOUNTER
Benazepril  Bottle says she has 2 RF's left but pharmacy will not RF      Received request via: Patient    Was the patient seen in the last year in this department? Yes    Does the patient have an active prescription (recently filled or refills available) for medication(s) requested? No

## 2021-12-17 PROBLEM — Z86.2 HISTORY OF NEUTROPENIA: Status: RESOLVED | Noted: 2019-06-09 | Resolved: 2021-06-17

## 2021-12-20 ENCOUNTER — TELEPHONE (OUTPATIENT)
Dept: MEDICAL GROUP | Facility: MEDICAL CENTER | Age: 86
End: 2021-12-20

## 2021-12-20 ENCOUNTER — OFFICE VISIT (OUTPATIENT)
Dept: MEDICAL GROUP | Facility: MEDICAL CENTER | Age: 86
End: 2021-12-20
Payer: MEDICARE

## 2021-12-20 VITALS
HEIGHT: 64 IN | HEART RATE: 69 BPM | SYSTOLIC BLOOD PRESSURE: 104 MMHG | TEMPERATURE: 97.3 F | OXYGEN SATURATION: 96 % | BODY MASS INDEX: 24.75 KG/M2 | WEIGHT: 145 LBS | DIASTOLIC BLOOD PRESSURE: 62 MMHG

## 2021-12-20 DIAGNOSIS — E78.5 DYSLIPIDEMIA: ICD-10-CM

## 2021-12-20 DIAGNOSIS — E55.9 VITAMIN D DEFICIENCY: ICD-10-CM

## 2021-12-20 DIAGNOSIS — D70.9 NEUTROPENIA, UNSPECIFIED TYPE (HCC): ICD-10-CM

## 2021-12-20 DIAGNOSIS — E53.8 B12 DEFICIENCY: ICD-10-CM

## 2021-12-20 DIAGNOSIS — I35.1 AORTIC VALVE INSUFFICIENCY, ETIOLOGY OF CARDIAC VALVE DISEASE UNSPECIFIED: ICD-10-CM

## 2021-12-20 DIAGNOSIS — E61.1 IRON DEFICIENCY: ICD-10-CM

## 2021-12-20 DIAGNOSIS — I48.0 PAROXYSMAL ATRIAL FIBRILLATION (HCC): ICD-10-CM

## 2021-12-20 PROCEDURE — 99214 OFFICE O/P EST MOD 30 MIN: CPT | Performed by: INTERNAL MEDICINE

## 2021-12-20 ASSESSMENT — FIBROSIS 4 INDEX: FIB4 SCORE: 1.86

## 2021-12-20 NOTE — PROGRESS NOTES
Subjective     Yvonne Marie Wada is a 86 y.o. female who presents with follow-up atrial fibrillation        HPI     Patient is here with her , paroxysmal atrial fibrillation followed by cardiology, off amiodarone since last year as the patient complained of fatigue with that medication, remains on metoprolol, Eliquis, no regular NSAIDs, no bruising or bleeding issues with the Eliquis.  Medications, allergies, medical history, surgical history, social history, family history  reviewed and updated   Patient reports she has been having more episodes of atrial fibrillation at night around 3 am, she has had three episodes this month possibly 1/week, where she will wake up in the middle of the night with heart racing episodes, they may last a few minutes up to perhaps 15 to 20 minutes, she will sit down and relax and symptoms will resolve, there is no associated chest pain, palpitations, lightheadedness, syncope. She does not check her blood pressure heart rate when these episodes happen. She remains on the metoprolol 25 mg daily. She has not complained of any atrial fibrillation type symptoms during the day. No extra caffeine intake, no alcohol.  Patient also had a suprapubic catheter placed in November by urology for urinary retention, no issues with the catheter, sees urology once a month. We do not have the most recent records from urology.  Hypothyroid on replacement Synthroid 75 mcg last TSH 1.5 on October 14  Patient and her  are up-to-date on the COVID vaccine series and booster, has had the influenza vaccine as well  Patient has had chronic anemia, currently off iron supplementation, most recent hemoglobin 11, hematocrit 35 prior to suprapubic catheter placement. No blood in the catheter bag. Continues to use a cane for ambulation, no falls. Chronic back pain status post lumbar surgery, improved with gabapentin taking 3 times a day without side effects of drowsiness.           Current Outpatient  Medications   Medication Sig Dispense Refill   • metoprolol SR (TOPROL XL) 25 MG TABLET SR 24 HR TAKE 1 TABLET BY MOUTH EVERY DAY IN THE MORNING 90 Tablet 1   • benazepril (LOTENSIN) 40 MG tablet Take 1 Tablet by mouth every morning. 90 Tablet 3   • gabapentin (NEURONTIN) 100 MG Cap Take 2 Capsules by mouth 3 times a day as needed (pain). Indications: back and nerve pain 180 Capsule 3   • levothyroxine (SYNTHROID) 75 MCG Tab TAKE 1 TAB BY MOUTH EVERY MORNING ON AN EMPTY STOMACH. 90 Tablet 0   • ferrous sulfate 325 (65 Fe) MG EC tablet Take 1 Tablet by mouth 2 times a day. (Patient taking differently: Take 325 mg by mouth every 48 hours.) 60 Tablet 0   • ELIQUIS 5 MG Tab TAKE 1 TABLET BY MOUTH TWICE A  Tablet 2   • MAGNESIUM PO Take 1 capsule by mouth every evening.     • omeprazole (PRILOSEC) 20 MG delayed-release capsule Take 20 mg by mouth every morning.     • Multiple Vitamins-Minerals (CENTRUM SILVER ULTRA WOMENS PO) Take 1 tablet by mouth every evening.       No current facility-administered medications for this visit.              anemia  9/28/20 hgb 12.6,hct 38  6/22/21 hgb 9,hct 29,mcv 86  9/15/21 hgb 8.0,hct 26,mcv 76  9/15/21 FIT negative  10/14/21 hgb 9.8,hct 33,mcv 85,iron 23,%sat 6,ferritin 70,b12 716,retic 2.6%,SPEP negative  11/4/21 hgb 11.1,hct 35,mcv 84      Aortic regurgitation  12/6/19 echo normal LV function EF 65%, RVSP 43, moderate tricuspid regurgitation, mild to moderate aortic insufficiency  10/22/20 echo EF 70%, grade 1 diastolic dysfunction, mild AR, RVSP 30   4/20/21  sinus on exam, patient would like to discontinue amiodarone understanding she likely will return to intermittent atrial fibrillation, discussed sotalol, dronedarone, tikosyn as alternatives, patient declines any of those alternatives      Cervical pain  2/10/14 [facility] note; x-ray cervical spine DJD, right shoulder steroid injection     Decreased GFR  5/31/09 bun 18,creat 1.2  1/14/10 bun 28,creat 1.3,GFR  40  9/20/12 bun 37,creat 1.1,GFR 48  9/25/13 bun 25,creat 1.2,GFR 44  9/26/14 bun 26,creat 1.1,GFR 45  2/23/15 bun 20,creat 1.1,GFR 48  4/15/16 bun 31,creat 1.1,GFR 45  7/6/16 bun 29,creat 1.1,GFR 44   5/12/17 bun 35,creat 1.1,GFR 44  11/2/17 bun 22,creat 1.28,GFR 40  12/9/17 bun 29,creat 1.1,GFR 43  10/4/18 bun 31,creat 1.5 at [facility]  1/18/19 bun 33,creat 1.34,GFR 38,PTH 53,calcium 9.7,phos 3.6,spep negative  2/4/20 bun 30,creat 1.24, GFR 41, PTH 58, calcium 9.7, phos 3.9  8/20/20 bun 35,creat 1.12,GFR 46  9/28/20 bun 31,creat 1.1,GFR 47  6/8/21 bun 38,creat 1.46,GFR 34  6/27/21 Na 125,bun 64,creat 1.52 (hospital admission acute sacral fracture)  9/15/21 bun 25,creat 0.84,GFR>60  10/14/21 bun 15,creat 0.75,GFR>60,PTH 31calcium 9.4,phos 3.3,SPEP negative     Dyslipidemia  4/11 chol 159,trig 84,hdl 47,ldl 95,cpk 114  7/12 chol 163,trig 120,hdl 49,ldl 90, crp 1.1 on lipitor 20 mg  3/26/13 chol 159,trig 99,hdl 46,ldl 93 on lipitor 20 mg  9/25/13 chol 164,trig 100,hdl 47,ldl 97 on lipitor 20 mg  9/12/14 cardiology note lower extremity weakness, hold lipitor x3 months, labs ordered  12/15/14 cardiology start low dose lipitor 10 mg  1/22/15 off lipitor will resume 10 mg and repeat labs 4 weeks  2/24/15 chol 179,trig 111,hdl 54,ldl 103 on lipitor 10 mg  2/1/16 cardiology note restart lipitor 20 mg    4/15/16 chol 163,trig 102,hdl 48,ldl 95 on lipitor 20 mg  5/12/17 chol 186,trig 101,hdl 47,ldl 119 on lipitor 20 mg intermittently will start taking daily  11/1/17 chol 146,trig 73,hdl 42,ldl 89 on lipitor 20 mg  12/9/17 chol 133,trig 74,hdl 49,ldl 69 on lipitor 80 mg higher dose due to recent transient ischemic attack  10/4/18 chol 126,trig 98,hdl 44,ldl 62 on lipitor 80 mg at [facility]  1/18/19 chol 161,trig 103,hdl 45,ldl 95 on lipitor 80 mg  2/4/20 chol 121,trig 71,hdl 45,ldl 62 on lipitor 80 mg  8/17/20 stop lipitor due to muscle pain   9/17/20 improved off lipitor repeat lipid pending  10/3/20 chol  240,trig 111,hdl 60,ldl 158, recommend start crestor 10 mg and repeat labs 6 weeks  10/26/20 chol 158,trig 106,hdl 62,ldl 75 on crestor 20 mg  4/20/21  off statin, she agrees to try zetia  Lipitor,crestor muscle pain      gait disorder  4/22/21 [facility] PT evaluation      Gastroesophageal reflux  6/27/07 EGD per [facility] hiatal hernia, start prevacid 30 mg  7/12 protonix 20 mg qday  11/16/12 [facility] note cont prilosec  1/5/16 change to protonix 40 mg from prilosec  2/4/20 vit d 67  10/14/21 vit d 78,b12 716 on prilosec     History pelvic fracture  6/22/21 CT pelvis without post reconstruction, comminuted mildly displaced fracture of right inferior and superior pubic rami, right sacral ritu fracture, right hip prosthesis normally located, moderate degenerative change left hip  6/25/21 hospital admission, 6/28/21 hospital discharge, admitted with pelvic fracture, seen by orthopedics no surgical intervention necessary, seen by physical therapy and occupational therapy recommended skilled nursing placement, urinary retention discharged with davenport catheter  6/25/21 CT pelvis without, stable appearance of minimally displaced fractures right sacral ritu, right pubic ramus posteriorly, and right ischial ramus, no acute pelvic fracture, no hip dislocation, right hip total arthroplasty in place  6/27/21 physical therapy hospital note recommend postacute placement for additional physical therapy services  10/7/21 on neurontin 100 mg tid, increase to 200 mg tid     history of pulmonary hypertension  11/17/18 cardiology note asymptomatic, continue cholesterol medication, continue to monitor echo aortic valve, repaired ascending aorta, follow-up yearly  11/21/18 echo normal LV function, EF 60%, mild LVH, grade 2 diastolic dysfunction, mild aortic insufficiency, moderate tricuspid regurgitation, RVSP 50  12/6/19 echo normal LV function EF 65%, RVSP 43, moderate tricuspid regurgitation, mild to moderate aortic  insufficiency  10/22/20 echo EF 70%, grade 1 diastolic dysfunction, mild AR, RVSP 30     History shingles     History shoulder pain  4/4/17 right shoulder pain right shoulder x-ray ordered, right knee pain, referral custom PT, tried celebrex no benefit, will try naproxen 500 mg twice daily and zanaflex at night  4/5/17 x-ray right shoulder calcifications consistent with calcific tendinitis or hydroxyapatite deposition  5/12/17 MRI right shoulder ordered, persistent pain despite physical therapy  5/18/17 MRI right shoulder; full-thickness supraspinatus tendon tear  5/22/17 right shoulder injection, has been going to physical therapy 14 treatments some improvement, right shoulder injection provided, if no improvement in has appt  in october, if need sooner appt try   6/9/17 custom PT note   7/10/17 custom PT note       History TIA  11/1/17 hospital admission, 11/2/17 hospital discharge, facial numbness, transient numbness in both hands, resolved, CT, MRI head no acute infarct, blood pressure stable, discharged home, patient states she was on aspirin at the time of admission  11/1/17 CT head stable right mid brain likely chronic lacunar infarction, periventricular white matter disease  11/1/17 MRI brain no acute abnormality, moderate chronic microvascular stomach disease, chronic microhemorrhages left frontal and right parietal lobes, chronic lacunar infarct left basal ganglia and blanco, or cerebral atrophy  11/1/17 MR-MRA head without; negative  11/2/17 echo normal LV size and function, EF 70%, RVSP 50, mild AR and TR  11/28/17 awaiting possible right hip replacement per orthopedics , given recent possible TIA, cannot provide clearance, she will like second opinion from cardiology referral made to dr.bryan de leon, changed asa to plavix  12/12/17 ultrasound carotid less than 50% internal carotid stenosis bilateral  12/20/17 dr.bryan de leon cardiology note most recent echocardiogram  looks fine, will repeat CT scan follow aortic repair  2/20/18 episode of supraventricular tachycardia in the emergency room, davenport catheter removed, symptoms resolved with repeat EKG sinus rhythm  11/17/18 cardiology note asymptomatic, continue cholesterol medication, continue to monitor echo aortic valve, repaired ascending aorta, follow-up yearly  11/21/18 echo normal LV function, EF 60%, mild LVH, grade 2 diastolic dysfunction, mild aortic insufficiency, moderate tricuspid regurgitation, RVSP 50  12/6/19 echo normal LV function EF 65%, RVSP 43, moderate tricuspid regurgitation, mild to moderate aortic insufficiency  10/22/20 echo EF 70%, grade 1 diastolic dysfunction, mild AR, RVSP 30   12/14/20 cardiology note maintaining sinus rhythm, given frequent symptomatic episodes of paroxysmal atrial fibrillation and age, recommend amiodarone 200 mg, follow-up 6 month  4/20/21  sinus on exam, patient would like to discontinue amiodarone understanding she likely will return to intermittent atrial fibrillation, discussed sotalol, dronedarone, tikosyn as alternatives, patient declines any of those alternatives, ablation is not appropriate given her age      Hypertension  1/10/11 [facility] note cardiac catheterization normal systolic function  3/11 [facility] echo moderate aortic insufficiency, moderate mitral insufficiency, moderate tricuspid insufficiency, normal LV function  5/12 echo [facility] normal LV function EF 60%, moderate to severe left atrial enlargement, RVSP 32    9/26/13 CT thoracic aorta no evidence of aneurysm, small hiatal hernia  9/26/13 Persantine thallium uniform breast tissue attenuation, cannot rule out underlying ischemic, LVEF 67%  10/3/13 on toprol-XL 25 mg half a tablet daily    12/13/13 cardiology note cont same meds, repeat echo and follow up 6 months  1/2/14 echo mild LVH, grade 1 diastolic dysfunction, ascending aortic 4.0, no change compared with ZAK 2010  5/15/14 cardiology note; increase  benazepril to 40 mg qday,continue toprol XL 25 mg daily  6/17/15 cardiology note continue meds, repeat echo 6 months  2/1/16 cardiology note moderate pulmonary hypertension and snoring, nocturnal pulse oximetry ordered  6/30/16 cardiology note patient declines overnight pulse oximetry  11/1/17 echo normal LV size and function, EF 70%, mild aortic insufficiency and mild tricuspid regurgitation, RVSP 50, aortic root 3.2 cm  11/3/17 cardiology note hypertension stable, status post thoracic aortic aneurysm repair, headache unspecified, ESR ordered  11/28/17 on benazepril 40 mg,toprol 25mg, add norvasc 5 mg  12/12/17 ultrasound carotid less than 50% internal carotid stenosis bilateral  12/20/17  [facility] cardiology note most recent echocardiogram looks fine, will repeat CT scan follow aortic repair  2/20/18 episode of supraventricular tachycardia in the emergency room, davenport catheter removed, symptoms resolved with repeat EKG sinus rhythm  11/17/18 cardiology note asymptomatic, continue cholesterol medication, continue to monitor echo aortic valve, repaired ascending aorta, follow-up yearly  11/21/18 echo normal LV function, EF 60%, mild LVH, grade 2 diastolic dysfunction, mild aortic insufficiency, moderate tricuspid regurgitation, RVSP 50  1/18/19 urine mac 45 on benazepril 40 mg, toprol 25 mg, norvasc 5 mg  12/6/19 echo normal LV function EF 65%, RVSP 43, moderate tricuspid regurgitation, mild to moderate aortic insufficiency  10/22/20 echo EF 70%, grade 1 diastolic dysfunction, mild AR, RVSP 30   12/14/20 cardiology note maintaining sinus rhythm, given frequent symptomatic episodes of paroxysmal atrial fibrillation and age, recommend amiodarone 200 mg, follow-up 6 month  4/6/21 on lotensin 40 mg and metoprolol 25 mg  4/20/21  sinus on exam, patient would like to discontinue amiodarone understanding she likely will return to intermittent atrial fibrillation, discussed sotalol, dronedarone, tikosyn  as alternatives, patient declines any of those alternatives, ablation is not appropriate given her age      Hypothyroid  4/11 tsh 9 start synthroid 50  4/11 tsh 6,free t3 3.1,free t4 1.2,tpo negative  7/1/11 tsh 2.1 cont synthroid 50 mcg  7/12 tsh 3.4 cont synthroid 50 mcg  7/31/13 tsh 3.2 on synthroid 50 mcg  9/25/13 tsh 1.7 on synthroid 50 mcg  2/24/15 tsh 4.9 on synthroid 50 mcg; increase to synthroid 75 mcg, repeat tsh in 6 weeks  4/14/15 tsh 1.1 on synthroid 75 mcg  4/15/16 tsh 2.3 on synthroid 75 mcg  5/12/17 tsh 1.2 on synthroid 75 mcg  11/1/17 tsh 2.1 on synthroid 75 mcg  1/18/19 tsh 2.2 on synthroid 75 mcg  2/4/20 tsh 3.1 on synthroid 75 mcg  8/20/20 tsh 1.6 on synthroid 75 mcg  10/14/21 tsh 1.5 on synthroid 75 mcg     Insomnia  1/30/17 trial of trazodone 50 mg  4/4/17 side effects with trazodone drowsiness, discontinued     neutropenia  4/16/11 wbc 3.9  7/30/13 wbc 5.6  11/7/16 wbc 5.7  5/11/17 wbc 4.7  2/20/18 wbc 7.2  3/6/18 wbc 5.9  1/16/19 wbc 4.0  6/8/19 wbc 4.3 (44%N, 39%L)  2/4/20 wbc 4.1 (42%N,41%L)  8/20/20 wbc 4.6  11/4/21 wbc 4.0     Osteoporosis  Had been on fosamax [id number]    8/10 dexa LS -2.0,hip -1.5 await old dexa result, she will get copy for me  4/11 vit d 35  9/12 dexa LS -3.2,hip -1.4  2/13/13 reclast IV  7/31/13 vit d 33 cont 2000 units  2/18/14 reclast IV  2/2/15 dexa LS -3.1,hip -1.9; FRAX 35.5 % major,12.6% hip  2/18/15 reclast IV  2/24/15 vit d 33  4/15/16 vit d 36  5/12/17 vit d 36  8/7/18 dexa left forearm -4.2,hip -2.5 resume reclast   8/31/18 reclast IV  1/18/19 vit d 27 increase from 2000 to 4000 units  2/4/20 vit d 67, PTH 58  6/25/21 vit d 75     Paroxysmal atrial fibrillation  11/1/17 hospital admission, 11/2/17 hospital discharge, facial numbness, transient numbness in both hands, resolved, CT, MRI head no acute infarct, blood pressure stable, discharged home, patient states she was on aspirin at the time of admission  11/1/17 CT head stable right mid brain likely  chronic lacunar infarction, periventricular white matter disease  11/1/17 MRI brain no acute abnormality, moderate chronic microvascular stomach disease, chronic microhemorrhages left frontal and right parietal lobes, chronic lacunar infarct left basal ganglia and blanco, or cerebral atrophy  11/1/17 MR-MRA head without; negative  2/20/17 episode of supraventricular tachycardia in the emergency room, davenport catheter removed, symptoms resolved with repeat EKG sinus rhythm  12/20/17  [facility] cardiology note most recent echocardiogram looks fine, will repeat CT scan follow aortic repair  11/17/18 cardiology note asymptomatic, continue cholesterol medication, continue to monitor echo aortic valve, repaired ascending aorta, follow-up yearly  11/21/18 echo normal LV function, EF 60%, mild LVH, grade 2 diastolic dysfunction, mild aortic insufficiency, moderate tricuspid regurgitation, RVSP 50  12/6/19 echo normal LV function EF 65%, RVSP 43, moderate tricuspid regurgitation, mild to moderate aortic insufficiency  9/28/20 emergency room note EKG atrial fibrillation new onset  9/30/20 cardiology note sinus rhythm, start eliquis 5 mg bid and increase toprol to 25 mg daily, echo ordered, follow up 3 months   10/22/20 echo EF 70%, grade 1 diastolic dysfunction, mild AR, RVSP 30   12/14/20 cardiology note maintaining sinus rhythm, given frequent symptomatic episodes of paroxysmal atrial fibrillation and age, recommend amiodarone 200 mg, follow-up 6 month  4/20/21  cardiology note sinus on exam, patient would like to discontinue amiodarone understanding she likely will return to intermittent atrial fibrillation, discussed sotalol, dronedarone, tikosyn as alternatives, patient declines any of those alternatives, ablation is not appropriate given her age      Preventative health  6/27/09 colon per [facility] no records  10/19/04 pneumovax   9/9/11 zoster vaccine  4/14/15 prevnar  8/7/18 dexa left forearm -4.2,hip  -2.5  9/28/19 pneumovax  11/7/19 mammogram  11/12/19 shingrix second  9/17/20 tdap   2/27/21 covid second pfizer  10/11/21 covid pfizer booster  10/14/21 vit d 78     s/p knee arthroplasty  4/10 MRI right knee complex tear medial meniscus, horizontal cleavage tear lateral meniscus, chondromalacia patella, moderate-sized baker's cyst  5/10 right meniscus knee repair   5/10 told by  had gout on surgery had pathology report, I await that report, question gout seen on pathology report done during repair of right meniscus knee surgery.  7/10 uric acid 6.3, await starting allopurinol depending on the operative report  8/5/10 reviewed orthopedics notes , operative report indicates crystal deposition, could be gout or pseudogout, no biopsy results, suspect chondrocalcinosis  10/11  ortho note, MRI pending  8/7/10 reviewed pathology report right knee tissue, marked degenerative changes with focal calcification, no mention of gout. Based on this and a normal uric acid level, I do not believe she has gout, has no hyperuricemia medications would be indicated  10/11 dr.parlasca najera left knee meniscectomy and arthroscopy  1/12 dr.parlasca najera left knee arthroplasty  2/29/16  orthopedics x-ray right knee advanced DJD on the right knee corticosteroid injection performed, prescription voltaren gel  6/13/16  orthopedic note x-rays demonstrate right knee advanced DJD and resurfaced patella, offer right knee total replacement followed by patellar resurfacing after she recovers from her right knee  9/8/16  orthopedic's operative note right knee total arthroplasty  11/2/16  orthopedic note, follow-up right knee, x-ray right knee shows subluxation patella, traumatic evulsion VMO needs reexploration and repair  11/10/16  orthopedic's operative note VMO quadriceps avulsion repair status post total knee replacement  11/23/16  orthopedic no follow-up  quadriceps repair, continue crutches in full extension, follow-up one month and then start passive range of motion 0-40°  2/4/17  orthopedic note, continue physical therapy, follow-up this summer  4/17/17 [facility] note  6/9/17 [facility] PT note     s/p total hip arthroplasty  9/21/17 MRI right hip mild trochanteric bursitis, mild femoral acetabular joint arthritis  10/12/17  orthopedic no proceed with right total hip arthroplasty, x-ray AP pelvis and right hip shows early arthrosis with calcifications and DJD  2/14/18  orthopedics operative note at [facility] right hip total arthroplasty, gluteus medius and minimus tendon repair  3/27/18 [facility] note   5/8/18 [facility] note xray right hip stable total hip arthroplasty, continue physical therapy  4/3/19 custom PT discharge  4/5/21 dr.arraiz NELSON pain note right trochanteric bursitis steroid injection, trigger point injection low back  4/22/21 custom PT evaluation   6/21/21 custom PT discharge note   6/25/21 CT pelvis without, stable appearance of minimally displaced fractures right sacral ritu, right pubic ramus posteriorly, and right ischial ramus, no acute pelvic fracture, no hip dislocation, right hip total arthroplasty in place     s/p lumbar surgery  6/03 L4-5 epidural     7/06  spinal surgery operative note decompression, foraminotomy. L4-L5 posterior fusion, L4-L5 allograft    3/5/14 MRI lumbar spine grade 2 spondylolisthesis L4-L5 with previous laminectomy and posterior fusion, left sided pedicle screw fixation L4-L5, moderate central canal stenosis L5-S1 secondary to facet arthropathy, mild to moderate multilevel neural foraminal narrowing  12/8/14 dr.arraiz lowe [facility] note; right lower extremity radiculitis L4-L5 lesion, myofascial lumbosacral pain, trochanteric bursitis, followup as needed  4/2/15 dr.arraiz lowe procedure note right L4-L5 and right L5-S1 SSNRB under fluoroscopy  4/27/15 dr.arraiz lowe  note; right trochanteric bursal injection, trigger point injections performed, also set up SI joint injections  8/3/15 [facility] neurosurgery note, lumbar x-ray flexion and extension, MRI lumbar spine without contrast, followup   8/9/15 MRI lumbar spine, previous L4-L5 left  fusion and laminectomy, L3-L4 moderate bilateral facet arthropathy, mild disc bulge, L4-L5 severe bilateral facet arthropathy, moderate to severe bilateral neural foraminal stenosis, L5-S1 moderate facet arthropathy  8/28/15  neurosurgery note previous posterior fusion L4-L5, does have L3-L4 disc bulge with central canal stenosis, recommend L3-L5 decompression  8/31/15  pain note; right lower extremity radiculitis, myofascial lumbosacral pain, start hydrocodone 5 mg, continued SI joint exercises, perform trochanteric bursal injection right hip, trigger points lumbar region  10/28/15  neurosurgery note, will schedule for posterior L3-L4 laminectomy and fusion with redo L4-5 laminectomy and exploration of fusion, surgical clearance per cardiology   11/24/15  neurosurgery operative note expiration removal L4-L5 hardware on the left, bilateral facetectomies L4, L3-L4 transforaminal lumbar interbody fusion  12/9/15 [facility] neurosurgery note s/p L4-S1 fusion on 11/24/15 , follow up in 4 weeks  12/23/15  neurosurgery note, clear to restart physical therapy if she would like after one month, follow-up 3 months  6/9/20 dr.arraiz NELSON pain note trigger point injections trochanteric bursa right side  8/25/20 dr.arraiz NELSON pain procedure note epidural right L4 nerve root   4/5/21 dr.arraiz NELSON pain note right trochanteric bursitis steroid injection, trigger point injection low back  4/22/21 custom PT evaluation   6/21/21 custom PT discharge note      s/p TOMY  Sees gyn on HRT estradiol for 20 yrs ()     s/p thoracic aneurysm repair  6/29/06 CT-CTA chest with and without postprocessing; stable  ascending thoracic aortic aneurysm maximum diameter 4.8, just distal to the aortic valve maximum diameter 4.3  4/9/07 CT-CTA chest with and without processing; stable ascending aortic thoracic aneurysm 4.5 x 4.6 cm  6/8/09 CT chest with; stable 4.7 ascending aorta aneurysm  10/15/09 CT chest without; dilated ascending thoracic aorta 4.9 cm aneurysm increased from 4.7  1/11/10  cardiovascular surgery operative note ascending thoracic aorta aneurysm repair  9/26/13 CT thoracic aorta evaluation; status post repair ascending thoracic aortic aneurysm without evidence of dissection or leak  1/2/14 echo mild LVH, grade 1 diastolic dysfunction, ascending aortic 4.0, no change compared with ZAK 2010  5/15/14 cardiology note  6/17/15 cardiology note cont meds,repeat echo 6 months  2/1/16 cardiology note moderate pulmonary hypertension and snoring, nocturnal pulse oximetry ordered  11/1/17 echo normal LV size and function, EF 70%, mild aortic insufficiency and mild tricuspid regurgitation, RVSP 50, aortic root 3.2 cm  11/3/17 cardiology note stable  12/20/17  [facility] note most recent echocardiogram looks fine, will repeat CT scan follow aortic repair  11/17/18 cardiology note asymptomatic, continue cholesterol medication, continue to monitor echo aortic valve, repaired ascending aorta, follow-up yearly  11/21/18 echo normal LV function, EF 60%, mild LVH, grade 2 diastolic dysfunction, mild aortic insufficiency, moderate tricuspid regurgitation, RVSP 50  12/6/19 echo normal LV function EF 65%, RVSP 43, moderate tricuspid regurgitation, mild to moderate aortic insufficiency  10/22/20 echo EF 70%, grade 1 diastolic dysfunction, mild AR, RVSP 30   4/20/21  sinus on exam, patient would like to discontinue amiodarone understanding she likely will return to intermittent atrial fibrillation, discussed sotalol, dronedarone, tikosyn as alternatives, patient declines any of those alternatives,  ablation is not appropriate given her age      Tricuspid regurgitation  11/17/18 cardiology note asymptomatic, continue cholesterol medication, continue to monitor echo aortic valve, repaired ascending aorta, follow-up yearly  11/21/18 echo normal LV function, EF 60%, mild LVH, grade 2 diastolic dysfunction, mild aortic insufficiency, moderate tricuspid regurgitation, RVSP 50  12/6/19 echo normal LV function EF 65%, RVSP 43, moderate tricuspid regurgitation, mild to moderate aortic insufficiency  10/22/20 echo EF 70%, grade 1 diastolic dysfunction, mild AR, RVSP 30   4/20/21  sinus on exam, patient would like to discontinue amiodarone understanding she likely will return to intermittent atrial fibrillation, discussed sotalol, dronedarone, tikosyn as alternatives, patient declines any of those alternatives, ablation is not appropriate given her age      Urinary retention  8/18/21  urology note urinary retention, urethral davenport catheter in place, catheter exchange performed, failed voiding trial today, follow-up 1 month  11/4/21 suprapubic catherer placement in hospital for urinary retention     UTI  7/6/16 UTI klebsiella pneumoniae sensitive to all antibiotics except ampicillin, start bactrim x 5 days  1/18/19 UTI enterobacter cloacae resistant to ampicillin, cephalothin, penicillin, bactrim, sensitive to cefuroxime, ciprofloxacin and levaquin, nitrofurantoin  5/26/19 UTI klebsiella given omnicef, organism resistant ampicillin, ciprofloxacin, levofloxacin, bactrim  8/18/21  urology note urinary retention, urethral davenport catheter in place, catheter exchange performed, failed voiding trial today, follow-up 1 month  11/4/21 suprapubic catherer placement in hospital for urinary retention                   Patient Active Problem List   Diagnosis   • S/p lumbar surgery   • Hypertension   • Dyslipidemia   • S/P TOMY (total abdominal hysterectomy)   • Preventative health care   • S/P knee  bilateral arthroplasty   • S/P appendectomy   • Osteoporosis, idiopathic   • Hypothyroid   • History of shingles   • GERD (gastroesophageal reflux disease)   • Tricuspid regurgitation   • Aortic regurgitation   • Cervical pain   • S/P thoracic aortic aneurysm repair   • Decreased GFR   • UTI (urinary tract infection)   • Insomnia   • History of shoulder pain   • History of transient ischemic attack (TIA)   • S/P total hip arthroplasty   • Paroxysmal atrial fibrillation (HCC)   • On continuous oral anticoagulation   • Gait disorder   • History of pelvic fracture   • Urinary retention   • Anemia          Patient Care Team:  Tee Tran M.D. as PCP - General  Kobi Wheeler M.D. as Consulting Physician (Interventional Cardiology)  Brett Santos II, O.D. as Consulting Physician (Optometry)  Angelic Adams as Consulting Physician (Dentistry)      ROS           Objective          Physical Exam  Vitals reviewed.   Constitutional:       Appearance: Normal appearance.   HENT:      Head: Normocephalic and atraumatic.      Right Ear: External ear normal.      Left Ear: External ear normal.   Eyes:      Conjunctiva/sclera: Conjunctivae normal.   Cardiovascular:      Rate and Rhythm: Normal rate and regular rhythm.   Pulmonary:      Effort: No respiratory distress.      Breath sounds: Normal breath sounds.   Abdominal:      General: There is no distension.   Musculoskeletal:         General: No swelling.   Skin:     General: Skin is warm.      Findings: No bruising.   Neurological:      Mental Status: She is alert.   Psychiatric:         Mood and Affect: Mood normal.         Behavior: Behavior normal.                             Assessment & Plan        Assessment  #1 paroxysmal atrial fibrillation, sinus on exam on metoprolol and Eliquis per cardiology, had been on amiodarone in the past but patient opted to discontinue that medication in April due to fatigue, she has had a few more episodes of nocturnal symptoms of  atrial fibrillation where she will feel her heart race or skip, does not check her blood pressure heart rate at that time, these are short lasting, and she has had 3 occurrences in the last 3 weeks, but 1/week usually at night without any associated chest pain, shortness of breath, lightheadedness    #2 hypothyroid on replacement most recent TSH therapeutic 1.5, October 14, on Synthroid 75 mcg    #3 neutropenia chronic and stable most recent lab 11/4/21 wbc 4.0    #4 urinary retention suprapubic catheter placed in November no complications followed by urology monthly    #5 chronic anticoagulation with Eliquis, no falls, no regular NSAIDs, no bleeding complications    #6 anemia improved on most recent lab, 11/4/21 hgb 11.1,hct 35,mcv 84, did have iron deficiency on prior labs, had been on iron supplementation but discontinued that on her own last month    #7 aortic regurgitation by previous echo October 2020    #8 chronic low back pain status post lumbar surgery, stable on gabapentin    #9 anemia most recent labs 11/4/21 hgb 11,hct 35,mcv 84, she is off iron supplementation    #10 vitamin D deficiency      Plan  #! Echo    #2 check blood pressure and heart rate and record those readings daily, continue metoprolol    #3 offered event monitor to document atrial fibrillation burden she declines, she has a follow-up with cardiology next month    #4 old records from urology    #5 continue Synthroid same dose recheck TSH    #6 when she does have episodes of palpitations at night, advised her to check her blood pressure heart rate and record    #7 start regular exercise using recumbent bike daily    #8 falling precautions, anticoagulation precautions with Eliquis    #9 up-to-date on Covid vaccine and booster    #10 labs including iron, B12, vitamin D     #11 follow-up 4 months

## 2021-12-20 NOTE — LETTER
[facility]  Tee Tran M.D.  [address]  [place] [address]  Fax: [phone]   Authorization for Release/Disclosure of   Protected Health Information   Name: YVONNE MARIE WADA : 1935 SSN: [id number]   Address: [address] [place] [address] [place] [address] Phone:    [phone] (home)    I authorize the entity listed below to release/disclose the PHI below to:   [facility]/Tee Tran M.D. and Tee Tran M.D.   Provider or Entity Name:  [facility] [place]    Address   City, State, [facility]   Phone:      Fax:  [phone]   Reason for request: continuity of care   Information to be released:    [  ] LAST COLONOSCOPY,  including any PATH REPORT and follow-up  [  ] LAST FIT/COLOGUARD RESULT [  ] LAST DEXA  [  ] LAST MAMMOGRAM  [  ] LAST PAP  [  ] LAST LABS [  ] RETINA EXAM REPORT  [  ] IMMUNIZATION RECORDS  [ xxx ] Release all info from past 3 months      [  ] Check here and initial the line next to each item to release ALL health information INCLUDING  _____ Care and treatment for drug and / or alcohol abuse  _____ HIV testing, infection status, or AIDS  _____ Genetic Testing    DATES OF SERVICE OR TIME PERIOD TO BE DISCLOSED: _____________  I understand and acknowledge that:  * This Authorization may be revoked at any time by you in writing, except if your health information has already been used or disclosed.  * Your health information that will be used or disclosed as a result of you signing this authorization could be re-disclosed by the recipient. If this occurs, your re-disclosed health information may no longer be protected by State or Federal laws.  * You may refuse to sign this Authorization. Your refusal will not affect your ability to obtain treatment.  * This Authorization becomes effective upon signing and will  on (date) __________.      If no date is indicated, this Authorization will  one (1) year from the signature date.    Name: Mala Samson  Wada    Signature:continuity of care   Date:     12/20/2021       PLEASE FAX REQUESTED RECORDS BACK TO: [phone]

## 2022-01-10 ENCOUNTER — OFFICE VISIT (OUTPATIENT)
Dept: CARDIOLOGY | Facility: MEDICAL CENTER | Age: 87
End: 2022-01-10
Payer: MEDICARE

## 2022-01-10 VITALS
BODY MASS INDEX: 24.92 KG/M2 | WEIGHT: 146 LBS | HEIGHT: 64 IN | SYSTOLIC BLOOD PRESSURE: 150 MMHG | OXYGEN SATURATION: 99 % | RESPIRATION RATE: 14 BRPM | DIASTOLIC BLOOD PRESSURE: 64 MMHG | HEART RATE: 64 BPM

## 2022-01-10 DIAGNOSIS — I35.1 NONRHEUMATIC AORTIC VALVE INSUFFICIENCY: ICD-10-CM

## 2022-01-10 DIAGNOSIS — I48.0 PAF (PAROXYSMAL ATRIAL FIBRILLATION) (HCC): ICD-10-CM

## 2022-01-10 DIAGNOSIS — Z79.01 ON CONTINUOUS ORAL ANTICOAGULATION: ICD-10-CM

## 2022-01-10 DIAGNOSIS — I10 ESSENTIAL HYPERTENSION: ICD-10-CM

## 2022-01-10 PROBLEM — N31.9 NEUROGENIC BLADDER: Status: ACTIVE | Noted: 2022-01-05

## 2022-01-10 PROCEDURE — 99214 OFFICE O/P EST MOD 30 MIN: CPT | Performed by: INTERNAL MEDICINE

## 2022-01-10 ASSESSMENT — FIBROSIS 4 INDEX: FIB4 SCORE: 1.86

## 2022-01-10 NOTE — PROGRESS NOTES
"  Subjective:   Yvonne Marie Wada is a 86 y.o. female who presents today for follow-up of repaired thoracic ascending aorta aneurysm in 2010, aortic insufficiency, hypertension and hyperlipidemia.  She had a recent diagnosis of new onset atrial fibrillation, paroxysmal.    Last visit she complained of fatigue with amiodarone and discontinued it electing not to be considered for procedures or other antiarrhythmic therapy.  Subsequently she has had a recurrence of atrial fibrillation as we discussed but it is intermittent and not particularly symptomatic lasting for up to an hour occasionally.  Continues to tolerate her anticoagulation well.  Unfortunately sustained a mechanical trip and fall fall downstairs without any loss of consciousness and broke her hip and pelvis.  She is convalescing from this.    Past Medical History:   Diagnosis Date   • AAA (abdominal aortic aneurysm) ([facility]) 7/26/2010    10/09 CT thorax dilated ascending thoracic aorta 4.9 cm 1/10 transesophageal echo dilated aortic root, and ascending aorta with mild aortic insufficiency 1/10  ascending aortic aneurysm repair 5/12 echo [facility] normal LV function EF 60%, moderate to severe left atrial enlargement, RVSP 32    • Aortic insufficiency    • Aortic valve disease    • Aortic valve regurgitation    • Arthritis     back and knee/ osteo   • Cataract    • Dental disorder     full top denture, partial bottom   • Diverticulosis    • Dyslipidemia 7/26/2010    Sees [facility] on lipitor 4/11 chol 159,trig 84,hdl 47,ldl 95,cpk 114 7/12 chol 163,trig 120,hdl 49,ldl 90, crp 1.1 on lipitor 20 mg    • GERD (gastroesophageal reflux disease) 7/12/2012 6/07 EGD per [facility] hiatal hernia, start prevacid 30 mg 7/12 protonix 20 mg qday    • Glaucoma    • Heart burn    • Heart murmur    • Heart valve disease     \"leaking\", mitral valve   • Hiatus hernia syndrome    • High cholesterol    • History of shingles 6/30/2011 2010 Back and left thorax     • History of " total knee arthroplasty 7/26/2010    4/10 MRI right knee complex tear medial meniscus, horizontal cleavage tear lateral meniscus, chondromalacia patella, moderate-sized Baker's cyst 5/10 right meniscus knee repair  5/10 told by  had gout on surgery had pathology report, I await that report Question gout seen on pathology report done during repair of right meniscus knee surgery. 7/10 uric acid 6.3, we'll await starting allopurinol depending on the operative report 8/5/10 reviewed ortho notes , op report indicates crystal deposition, could be gout or pseudogout. No bx result sent over. Will need to get. My suspicion is chondrocalcinosis. 10/11  ortho note, MRI pending 8/7/10 reviewed pathology report right knee tissue, marked degenerative changes with focal calcification, no mention of gout. Based on this and a normal uric acid level, I do not believe she has gout, has no hyperuricemia medications would be indicated 10/11  ortho left knee meniscectomy and arthroscopy 1/12     • HLD (hyperlipidemia)    • Hypertension    • Hypothyroid 4/8/2011 4/11 tsh 9 start synthroid 50 4/11 tsh 6,free t3 3.1,free t4 1.2,tpo negative 7/1/11 tsh 2.1 cont synthroid 50 mcg 7/12 tsh 3.4 cont synthroid 50 mcg    • Indigestion    • Mitral insufficiency    • OSTEOPOROSIS 8/9/2010    Had been on fosamax [id number]  8/10 dexa LS -2.0,hip -1.5 await old dexa result, she will get copy for me 4/11 vit d 35 9/12 dexa LS -3.2,hip -1.4 2/13/13 relast infusion    • Pain     back and right leg, can get as bad as 10/10   • Preventative health care 7/26/2010 2002 pneum 2005 colon [facility] no records 4/11 vit d 35 9/11 shingles vaccine 9/12 mammogram 9/12 dexa LS -3.2,hip -1.4    • Rheumatic fever    • S/P appendectomy 7/26/2010   • S/P TOMY (total abdominal hysterectomy) 7/26/2010    Sees gyn on HRT estradiol for 20 yrs () 11/08 mammo     • Shingles 6/30/2011   • Snoring    •  Status post lumbar surgery 7/26/2010 6/03 L4-5 epidural Dr. Jordan  7/06 overall for decompression, foraminotomy. L4-L5 posterior fusion, L4-L5 allograft      • Stroke (HCC) 1996    no residual problems    • Thoracic aortic aneurysm without mention of rupture    • Tricuspid insufficiency    • Unspecified disorder of thyroid     hypo   • Urinary bladder disorder     suprapubic catheter insertion 11/4     Past Surgical History:   Procedure Laterality Date   • TENDON REPAIR Right 11/10/2016    Procedure: TENDON REPAIR - QUADRACEPS ;  Surgeon: Maxim Herrera M.D.;  Location: [facility];  Service:    • KNEE ARTHROPLASTY TOTAL Right 9/8/2016    Procedure: KNEE ARTHROPLASTY TOTAL;  Surgeon: Maxim Herrera M.D.;  Location: [facility];  Service:    • FUSION, SPINE, LUMBAR, PLIF  11/24/2015    Procedure: LUMBAR FUSION POSTERIOR L3-4, EXPLORATION OF FUSION L4-5 WITH INSTRUMENTATION;  Surgeon: Hermann Reis M.D.;  Location: [facility];  Service:    • LUMBAR LAMINECTOMY DISKECTOMY  11/24/2015    Procedure: LUMBAR L3-4 LAMINECTOMY AND REDO L4-5;  Surgeon: Hermann Reis M.D.;  Location: [facility];  Service:    • KNEE ARTHROPLASTY TOTAL  1/3/2012    Performed by YOSEF MEJÍA at [facility]   • KNEE ARTHROSCOPY  10/18/2011    Performed by YOSEF MEJÍA at [facility]   • MENISCECTOMY, KNEE, MEDIAL  10/18/2011    Performed by YOSEF MEJÍA at [facility]   • AORTIC VALVE REPLACEMENT  1/12/2010    Performed by ISAÍAS NICOLE at [facility]   • APPENDECTOMY     • FUSION, SPINE, LUMBAR, PLIF     • HYSTERECTOMY, TOTAL ABDOMINAL       Family History   Problem Relation Age of Onset   • Stroke Mother    • Heart Disease Father    • Heart Disease Sister      Social History     Socioeconomic History   • Marital status:      Spouse name: Not on file   • Number of children: Not on file   • Years of  education: Not on file   • Highest education level: Not on file   Occupational History   • Not on file   Tobacco Use   • Smoking status: Never Smoker   • Smokeless tobacco: Never Used   • Tobacco comment: none   Vaping Use   • Vaping Use: Never used   Substance and Sexual Activity   • Alcohol use: No     Alcohol/week: 0.0 oz   • Drug use: Yes     Comment: CBD Oil for Arthritis   • Sexual activity: Not Currently     Partners: Male   Other Topics Concern   • Not on file   Social History Narrative   • Not on file     Social Determinants of Health     Financial Resource Strain:    • Difficulty of Paying Living Expenses: Not on file   Food Insecurity:    • Worried About Running Out of Food in the Last Year: Not on file   • Ran Out of Food in the Last Year: Not on file   Transportation Needs:    • Lack of Transportation (Medical): Not on file   • Lack of Transportation (Non-Medical): Not on file   Physical Activity:    • Days of Exercise per Week: Not on file   • Minutes of Exercise per Session: Not on file   Stress:    • Feeling of Stress : Not on file   Social Connections:    • Frequency of Communication with Friends and Family: Not on file   • Frequency of Social Gatherings with Friends and Family: Not on file   • Attends Islam Services: Not on file   • Active Member of Clubs or Organizations: Not on file   • Attends Club or Organization Meetings: Not on file   • Marital Status: Not on file   Intimate Partner Violence:    • Fear of Current or Ex-Partner: Not on file   • Emotionally Abused: Not on file   • Physically Abused: Not on file   • Sexually Abused: Not on file   Housing Stability:    • Unable to Pay for Housing in the Last Year: Not on file   • Number of Places Lived in the Last Year: Not on file   • Unstable Housing in the Last Year: Not on file     Allergies   Allergen Reactions   • Amoxicillin Vomiting     Upset stomach, ok if needed for life saving treatment (per pt)   • Codeine Nausea     Synthetic  "codones ok   • Doxycycline Nausea     Nausea, Ok in life saving situation (per pt)   • Sulfamethoxazole-Trimethoprim Vomiting and Nausea     Ok in a life saving situation (per pt)   • Tramadol Vomiting and Nausea     nausea   • Trazodone Vomiting     groggy     Outpatient Encounter Medications as of 1/10/2022   Medication Sig Dispense Refill   • metoprolol SR (TOPROL XL) 25 MG TABLET SR 24 HR TAKE 1 TABLET BY MOUTH EVERY DAY IN THE MORNING 90 Tablet 1   • benazepril (LOTENSIN) 40 MG tablet Take 1 Tablet by mouth every morning. 90 Tablet 3   • gabapentin (NEURONTIN) 100 MG Cap Take 2 Capsules by mouth 3 times a day as needed (pain). Indications: back and nerve pain 180 Capsule 3   • levothyroxine (SYNTHROID) 75 MCG Tab TAKE 1 TAB BY MOUTH EVERY MORNING ON AN EMPTY STOMACH. 90 Tablet 0   • ELIQUIS 5 MG Tab TAKE 1 TABLET BY MOUTH TWICE A  Tablet 2   • MAGNESIUM PO Take 1 capsule by mouth every evening.     • omeprazole (PRILOSEC) 20 MG delayed-release capsule Take 20 mg by mouth every morning.     • Multiple Vitamins-Minerals (CENTRUM SILVER ULTRA WOMENS PO) Take 1 tablet by mouth every evening.       No facility-administered encounter medications on file as of 1/10/2022.     Review of Systems   All other systems reviewed and are negative.       Objective:   /64 (BP Location: Left arm, Patient Position: Sitting)   Pulse 64   Resp 14   Ht 1.626 m (5' 4\")   Wt 66.2 kg (146 lb)   SpO2 99%   BMI 25.06 kg/m²     Physical Exam  Vitals reviewed.   Constitutional:       General: She is not in acute distress.     Appearance: She is well-developed. She is not diaphoretic.   HENT:      Head: Normocephalic and atraumatic.      Right Ear: External ear normal.      Left Ear: External ear normal.      Mouth/Throat:      Pharynx: No oropharyngeal exudate.   Eyes:      General: No scleral icterus.        Right eye: No discharge.         Left eye: No discharge.      Conjunctiva/sclera: Conjunctivae normal.      " Pupils: Pupils are equal, round, and reactive to light.   Neck:      Thyroid: No thyromegaly.      Vascular: No JVD.      Trachea: No tracheal deviation.   Cardiovascular:      Rate and Rhythm: Normal rate and regular rhythm.      Chest Wall: PMI is not displaced.      Pulses:           Carotid pulses are 2+ on the right side and 2+ on the left side.       Radial pulses are 2+ on the right side and 2+ on the left side.        Popliteal pulses are 2+ on the right side and 2+ on the left side.        Dorsalis pedis pulses are 2+ on the right side and 2+ on the left side.        Posterior tibial pulses are 2+ on the right side and 2+ on the left side.      Heart sounds: S1 normal and S2 normal. Murmur heard.    Crescendo decrescendo systolic murmur is present with a grade of 2/6.   Decrescendo diastolic murmur is present with a grade of 2/4.  No friction rub. No gallop. No S3 or S4 sounds.    Pulmonary:      Effort: Pulmonary effort is normal. No respiratory distress.      Breath sounds: Normal breath sounds. No wheezing or rales.   Chest:      Chest wall: No tenderness.   Abdominal:      General: Bowel sounds are normal. There is no distension.      Palpations: Abdomen is soft.      Tenderness: There is no abdominal tenderness.   Musculoskeletal:         General: No tenderness. Normal range of motion.      Cervical back: Normal range of motion and neck supple.   Skin:     General: Skin is warm and dry.      Findings: No erythema or rash.   Neurological:      Mental Status: She is alert and oriented to person, place, and time.      Cranial Nerves: No cranial nerve deficit (Cranial nerves II through XII grossly intact).   Psychiatric:         Behavior: Behavior normal.         Thought Content: Thought content normal.         Judgment: Judgment normal.     No change in physical exam since prior 12/14/2020    LABS:  Lab Results   Component Value Date/Time    CHOLSTRLTOT 158 10/26/2020 09:06 AM    LDL 75 10/26/2020 09:06  AM    HDL 62 10/26/2020 09:06 AM    TRIGLYCERIDE 106 10/26/2020 09:06 AM       Lab Results   Component Value Date/Time    WBC 4.0 (L) 11/04/2021 12:40 PM    WBC 3.9 (L) 04/16/2011 12:00 AM    RBC 4.13 (L) 11/04/2021 12:40 PM    RBC 4.63 04/16/2011 12:00 AM    HEMOGLOBIN 11.1 (L) 11/04/2021 12:40 PM    HEMATOCRIT 34.8 (L) 11/04/2021 12:40 PM    MCV 84.3 11/04/2021 12:40 PM    MCV 92 04/16/2011 12:00 AM    NEUTSPOLYS 42.60 (L) 10/14/2021 03:31 PM    LYMPHOCYTES 36.90 10/14/2021 03:31 PM    MONOCYTES 15.40 (H) 10/14/2021 03:31 PM    EOSINOPHILS 3.40 10/14/2021 03:31 PM    BASOPHILS 1.10 10/14/2021 03:31 PM    ANISOCYTOSIS 1+ 10/14/2021 03:31 PM     Lab Results   Component Value Date/Time    SODIUM 133 (L) 10/14/2021 03:31 PM    POTASSIUM 4.5 10/14/2021 03:31 PM    CHLORIDE 99 10/14/2021 03:31 PM    CO2 21 10/14/2021 03:31 PM    GLUCOSE 96 10/14/2021 03:31 PM    BUN 15 10/14/2021 03:31 PM    CREATININE 0.75 10/14/2021 03:31 PM    CREATININE 1.05 (H) 02/07/2013 11:31 AM    BUNCREATRAT 25 12/08/2017 08:57 AM    BUNCREATRAT 30 (H) 02/07/2013 11:31 AM         Lab Results   Component Value Date/Time    ALKPHOSPHAT 77 10/14/2021 03:31 PM    ASTSGOT 17 10/14/2021 03:31 PM    ALTSGPT 11 10/14/2021 03:31 PM    TBILIRUBIN 0.3 10/14/2021 03:31 PM      Lab Results   Component Value Date/Time    BNPBTYPENAT 181 (H) 02/20/2018 10:05 AM      Lab Results   Component Value Date/Time    TSH 2.320 04/14/2016 08:27 AM     Lab Results   Component Value Date/Time    PROTHROMBTM 13.1 11/04/2021 12:40 PM    INR 1.02 11/04/2021 12:40 PM        ECHO CONCLUSIONS (11/2/2017):  Normal left ventricular chamber size.  Mild concentric left ventricular hypertrophy.  Left ventricular ejection fraction is visually estimated to be 70%.  Mild aortic insufficiency.  Mild tricuspid regurgitation.  Estimated right ventricular systolic pressure  is 50 mmHg.    EKG (2/20/2018):  I have personally reviewed the EKG this visit and discussed with the  patient.  SINUS RHYTHM       Assessment:     1. PAF (paroxysmal atrial fibrillation) ([facility])  EC-ECHOCARDIOGRAM COMPLETE W/O CONT   2. Essential hypertension     3. On continuous oral anticoagulation     4. Nonrheumatic aortic valve insufficiency  EC-ECHOCARDIOGRAM COMPLETE W/O CONT       Medical Decision Making:  Today's Assessment / Status / Plan:     In sinus rhythm today, PAF since discontinuation of amiodarone without much change in her other symptomology.  Her A. fib episodes are not too symptomatic.  I recommended that she check her heart rate while in A. fib to see if there is a room for medical therapy changes.  She notes her blood pressure is better at home.  I have asked her to check it routinely and write it down and bring to her primary physician or to me to discuss further.  She has a history of heart valve insufficiency and I recommend follow-up echocardiogram.  Goal blood pressure is 120/80 or less less than 140/90.  Continue anticoagulation routine renal function evaluations for dosing and follow-up in 6 months.

## 2022-01-15 ENCOUNTER — HOSPITAL ENCOUNTER (OUTPATIENT)
Dept: LAB | Facility: MEDICAL CENTER | Age: 87
End: 2022-01-15
Attending: INTERNAL MEDICINE
Payer: MEDICARE

## 2022-01-15 DIAGNOSIS — E61.1 IRON DEFICIENCY: ICD-10-CM

## 2022-01-15 DIAGNOSIS — E53.8 B12 DEFICIENCY: ICD-10-CM

## 2022-01-15 DIAGNOSIS — E55.9 VITAMIN D DEFICIENCY: ICD-10-CM

## 2022-01-15 DIAGNOSIS — E78.5 DYSLIPIDEMIA: ICD-10-CM

## 2022-01-15 LAB
25(OH)D3 SERPL-MCNC: 81 NG/ML (ref 30–100)
BASOPHILS # BLD AUTO: 1.6 % (ref 0–1.8)
BASOPHILS # BLD: 0.06 K/UL (ref 0–0.12)
CHOLEST SERPL-MCNC: 205 MG/DL (ref 100–199)
EOSINOPHIL # BLD AUTO: 0.1 K/UL (ref 0–0.51)
EOSINOPHIL NFR BLD: 2.6 % (ref 0–6.9)
ERYTHROCYTE [DISTWIDTH] IN BLOOD BY AUTOMATED COUNT: 48.9 FL (ref 35.9–50)
FERRITIN SERPL-MCNC: 32 NG/ML (ref 10–291)
HCT VFR BLD AUTO: 34.7 % (ref 37–47)
HDLC SERPL-MCNC: 56 MG/DL
HGB BLD-MCNC: 11 G/DL (ref 12–16)
IMM GRANULOCYTES # BLD AUTO: 0.02 K/UL (ref 0–0.11)
IMM GRANULOCYTES NFR BLD AUTO: 0.5 % (ref 0–0.9)
IRON SATN MFR SERPL: 12 % (ref 15–55)
IRON SERPL-MCNC: 45 UG/DL (ref 40–170)
LDLC SERPL CALC-MCNC: 133 MG/DL
LYMPHOCYTES # BLD AUTO: 1.41 K/UL (ref 1–4.8)
LYMPHOCYTES NFR BLD: 36.4 % (ref 22–41)
MCH RBC QN AUTO: 27.4 PG (ref 27–33)
MCHC RBC AUTO-ENTMCNC: 31.7 G/DL (ref 33.6–35)
MCV RBC AUTO: 86.5 FL (ref 81.4–97.8)
MONOCYTES # BLD AUTO: 0.62 K/UL (ref 0–0.85)
MONOCYTES NFR BLD AUTO: 16 % (ref 0–13.4)
NEUTROPHILS # BLD AUTO: 1.66 K/UL (ref 2–7.15)
NEUTROPHILS NFR BLD: 42.9 % (ref 44–72)
NRBC # BLD AUTO: 0 K/UL
NRBC BLD-RTO: 0 /100 WBC
PLATELET # BLD AUTO: 220 K/UL (ref 164–446)
PMV BLD AUTO: 10.1 FL (ref 9–12.9)
RBC # BLD AUTO: 4.01 M/UL (ref 4.2–5.4)
TIBC SERPL-MCNC: 367 UG/DL (ref 250–450)
TRIGL SERPL-MCNC: 80 MG/DL (ref 0–149)
TSH SERPL DL<=0.005 MIU/L-ACNC: 3.49 UIU/ML (ref 0.38–5.33)
UIBC SERPL-MCNC: 322 UG/DL (ref 110–370)
VIT B12 SERPL-MCNC: 839 PG/ML (ref 211–911)
WBC # BLD AUTO: 3.9 K/UL (ref 4.8–10.8)

## 2022-01-15 PROCEDURE — 82306 VITAMIN D 25 HYDROXY: CPT

## 2022-01-15 PROCEDURE — 36415 COLL VENOUS BLD VENIPUNCTURE: CPT

## 2022-01-15 PROCEDURE — 80061 LIPID PANEL: CPT

## 2022-01-15 PROCEDURE — 84443 ASSAY THYROID STIM HORMONE: CPT

## 2022-01-15 PROCEDURE — 82607 VITAMIN B-12: CPT

## 2022-01-15 PROCEDURE — 83540 ASSAY OF IRON: CPT

## 2022-01-15 PROCEDURE — 83550 IRON BINDING TEST: CPT

## 2022-01-15 PROCEDURE — 82728 ASSAY OF FERRITIN: CPT

## 2022-01-15 PROCEDURE — 85025 COMPLETE CBC W/AUTO DIFF WBC: CPT

## 2022-01-16 ENCOUNTER — TELEPHONE (OUTPATIENT)
Dept: MEDICAL GROUP | Facility: MEDICAL CENTER | Age: 87
End: 2022-01-16

## 2022-01-16 DIAGNOSIS — E78.5 DYSLIPIDEMIA: ICD-10-CM

## 2022-01-16 DIAGNOSIS — E03.9 HYPOTHYROIDISM, UNSPECIFIED TYPE: ICD-10-CM

## 2022-01-16 RX ORDER — EZETIMIBE 10 MG/1
10 TABLET ORAL DAILY
Qty: 30 TABLET | Refills: 3 | Status: SHIPPED | OUTPATIENT
Start: 2022-01-16 | End: 2022-02-14

## 2022-01-16 RX ORDER — LEVOTHYROXINE SODIUM 0.07 MG/1
75 TABLET ORAL
Qty: 90 TABLET | Refills: 3 | Status: SHIPPED | OUTPATIENT
Start: 2022-01-16 | End: 2023-01-18

## 2022-01-16 NOTE — TELEPHONE ENCOUNTER
Notified with results, chronic neutropenia, anemia stable, off iron saturation has increased, continue off iron supplement, chronic anemia stable at this point work-up thus far negative, no further evaluation needed as long as she can maintain her hemoglobin and hematocrit. TSH within range, continue Synthroid. Statin intolerant, LDL elevated, cardiology did mention previously trying ezetimibe, patient states that she never tried that medication since she is statin intolerant to Lipitor and Crestor, will send prescription for ezetimibe to her pharmacy, repeat cholesterol panel in 3 months. Follow-up with cardiology.

## 2022-02-17 ENCOUNTER — TELEPHONE (OUTPATIENT)
Dept: MEDICAL GROUP | Facility: MEDICAL CENTER | Age: 87
End: 2022-02-17
Payer: MEDICARE

## 2022-02-17 DIAGNOSIS — R26.9 GAIT DISORDER: ICD-10-CM

## 2022-02-17 DIAGNOSIS — M54.2 NECK PAIN: ICD-10-CM

## 2022-02-17 DIAGNOSIS — M54.50 LOW BACK PAIN, UNSPECIFIED BACK PAIN LATERALITY, UNSPECIFIED CHRONICITY, UNSPECIFIED WHETHER SCIATICA PRESENT: ICD-10-CM

## 2022-02-17 NOTE — TELEPHONE ENCOUNTER
Yvonne Marie Wada  [phone] ([place])     Pt called and would like you to send a REF to [facility].

## 2022-03-01 PROBLEM — N31.9 NEUROGENIC BLADDER: Status: RESOLVED | Noted: 2022-01-05 | Resolved: 2022-03-01

## 2022-03-22 ENCOUNTER — HOSPITAL ENCOUNTER (OUTPATIENT)
Dept: CARDIOLOGY | Facility: MEDICAL CENTER | Age: 87
End: 2022-03-22
Attending: INTERNAL MEDICINE
Payer: MEDICARE

## 2022-03-22 DIAGNOSIS — I48.0 PAF (PAROXYSMAL ATRIAL FIBRILLATION) (HCC): ICD-10-CM

## 2022-03-22 DIAGNOSIS — I35.1 NONRHEUMATIC AORTIC VALVE INSUFFICIENCY: ICD-10-CM

## 2022-03-22 LAB
LV EJECT FRACT  99904: 55
LV EJECT FRACT MOD 2C 99903: 57.9
LV EJECT FRACT MOD 4C 99902: 60.54
LV EJECT FRACT MOD BP 99901: 56.67

## 2022-03-22 PROCEDURE — 93306 TTE W/DOPPLER COMPLETE: CPT | Mod: 26 | Performed by: INTERNAL MEDICINE

## 2022-03-22 PROCEDURE — 93306 TTE W/DOPPLER COMPLETE: CPT

## 2022-03-29 ENCOUNTER — TELEPHONE (OUTPATIENT)
Dept: CARDIOLOGY | Facility: MEDICAL CENTER | Age: 87
End: 2022-03-29
Payer: MEDICARE

## 2022-03-29 NOTE — TELEPHONE ENCOUNTER
TW    Patient would like a call to go over her results of her echo.  PH: [phone]    Thank you    Alivia MCCORD

## 2022-03-30 NOTE — TELEPHONE ENCOUNTER
"Returned pt call and reviewed Echo results.  She states, \"Sometimes I wake up int he morning and it's bouncing around and I havent even had a cup of coffee.\"  She states it is bothersome.  Advised pt it may be from her PAF.  Reassurance given that findings will be relayed to MD for advise.  Pt verbalizes understanding and states no other concerns or questions at this time.  Mala is appreciative of information given.    To TW, MD please advise as pt states she feels her heart is bouncing around when she wakes up.  "

## 2022-04-01 ENCOUNTER — TELEPHONE (OUTPATIENT)
Dept: CARDIOLOGY | Facility: MEDICAL CENTER | Age: 87
End: 2022-04-01
Payer: MEDICARE

## 2022-04-01 NOTE — LETTER
April 1, 2022        Yvonne Marie Wada  [address]  [place] [address]          Dear Mala,    We have received the results of your recent:    Imaging of Echocardiogram     Your test came back within normal limits. ECHO results are normal. Please follow up as previously discussed with your physician.      Feel free to call us with any questions.        Sincerely,          [facility] Medical Assistant to Dr. Wheeler   Electronically Signed

## 2022-04-01 NOTE — TELEPHONE ENCOUNTER
----- Message from Eloisa Young R.N. sent at 3/23/2022  4:06 PM PDT -----  Ruel Johnson, please reassure pt her ECHO results are normal. Thank you!

## 2022-04-04 NOTE — TELEPHONE ENCOUNTER
Other     Tamika Kim R.N. routed conversation to You 3 days ago      Kobi Wheeler M.D.  Tamika Kim R.N. 3 days ago     Echo looks excellent for age.  No changes.      Called and LM for pt. Apruvet message sent.

## 2022-04-15 PROBLEM — Z87.898 HISTORY OF INSOMNIA: Status: ACTIVE | Noted: 2017-01-30

## 2022-04-18 ENCOUNTER — OFFICE VISIT (OUTPATIENT)
Dept: MEDICAL GROUP | Facility: MEDICAL CENTER | Age: 87
End: 2022-04-18
Payer: MEDICARE

## 2022-04-18 VITALS
HEART RATE: 75 BPM | HEIGHT: 63 IN | SYSTOLIC BLOOD PRESSURE: 124 MMHG | WEIGHT: 153 LBS | TEMPERATURE: 97.7 F | DIASTOLIC BLOOD PRESSURE: 68 MMHG | BODY MASS INDEX: 27.11 KG/M2 | OXYGEN SATURATION: 97 %

## 2022-04-18 DIAGNOSIS — M81.0 POSTMENOPAUSAL BONE LOSS: ICD-10-CM

## 2022-04-18 DIAGNOSIS — Z98.890 S/P THORACIC AORTIC ANEURYSM REPAIR: Chronic | ICD-10-CM

## 2022-04-18 DIAGNOSIS — D70.9 NEUTROPENIA, UNSPECIFIED TYPE (HCC): ICD-10-CM

## 2022-04-18 DIAGNOSIS — I48.0 PAROXYSMAL ATRIAL FIBRILLATION (HCC): ICD-10-CM

## 2022-04-18 DIAGNOSIS — D64.9 ANEMIA, UNSPECIFIED TYPE: ICD-10-CM

## 2022-04-18 DIAGNOSIS — E55.9 VITAMIN D DEFICIENCY: ICD-10-CM

## 2022-04-18 DIAGNOSIS — R79.0 LOW MAGNESIUM LEVEL: ICD-10-CM

## 2022-04-18 DIAGNOSIS — Z86.73 HISTORY OF TRANSIENT ISCHEMIC ATTACK (TIA): ICD-10-CM

## 2022-04-18 DIAGNOSIS — Z12.31 ENCOUNTER FOR SCREENING MAMMOGRAM FOR BREAST CANCER: ICD-10-CM

## 2022-04-18 DIAGNOSIS — K21.9 GASTROESOPHAGEAL REFLUX DISEASE, UNSPECIFIED WHETHER ESOPHAGITIS PRESENT: ICD-10-CM

## 2022-04-18 DIAGNOSIS — I15.0 RENOVASCULAR HYPERTENSION: Chronic | ICD-10-CM

## 2022-04-18 DIAGNOSIS — Z86.79 S/P THORACIC AORTIC ANEURYSM REPAIR: Chronic | ICD-10-CM

## 2022-04-18 PROCEDURE — 99214 OFFICE O/P EST MOD 30 MIN: CPT | Performed by: INTERNAL MEDICINE

## 2022-04-18 ASSESSMENT — FIBROSIS 4 INDEX: FIB4 SCORE: 1.98

## 2022-04-18 NOTE — PROGRESS NOTES
Subjective     Yvonne Marie Wada is a 86 y.o. female who presents follow up cholesterol        HPI     Here for follow-up dyslipidemia, started on Zetia in January, statin intolerant to Lipitor and Crestor, has been eating more sweets such as cream and cookies and has gained some weight over the winter. Has noticed more gerd takes prilosec in the morning or evening, when she skips the medication. Some occasional food sticking sensation, occasional nausea with meals, no regurgitation of food. No nsaids.  Followed by cardiology, hypertension on metoprolol 25 mg, Lotensin 40 mg, will have episodes of heart skipping sensation periodically and intermittently.  Takes metoprolol daily, checks her blood pressures regularly and they have been stable typically running 120s over 70s to 80s.  When she does feel her heart skipping she does not check her blood pressure or heart rate at those times.  Minimal caffeine intake although she does eat chocolate, avoids coffee, soda, diet soda.  Trying to keep active, exercising regularly, completed physical therapy. Walking daily with cane and no falls, completed PT last month.   Hypothyroid on replacement on Synthroid 75 mcg most recent TSH 3.4 in January.  Chronic anemia last hemoglobin 11, hematocrit 35, iron levels normal range off iron therapy.  3/22/22 echo EF 55%, diastolic dysfunction grade II, mild MR and TR, RVSP 28      Current Outpatient Medications   Medication Sig Dispense Refill   • metoprolol SR (TOPROL XL) 25 MG TABLET SR 24 HR TAKE 1 TABLET BY MOUTH EVERY DAY IN THE MORNING 90 Tablet 3   • ezetimibe (ZETIA) 10 MG Tab TAKE 1 TABLET BY MOUTH EVERY DAY 90 Tablet 3   • gabapentin (NEURONTIN) 100 MG Cap TAKE 2 CAPSULES BY MOUTH 3 TIMES A DAY AS NEEDED (PAIN). INDICATIONS: BACK AND NERVE PAIN 180 Capsule 3   • levothyroxine (SYNTHROID) 75 MCG Tab Take 1 Tablet by mouth every morning on an empty stomach. 90 Tablet 3   • benazepril (LOTENSIN) 40 MG tablet Take 1 Tablet by  mouth every morning. 90 Tablet 3   • ELIQUIS 5 MG Tab TAKE 1 TABLET BY MOUTH TWICE A  Tablet 2   • MAGNESIUM PO Take 1 capsule by mouth every evening.     • omeprazole (PRILOSEC) 20 MG delayed-release capsule Take 20 mg by mouth every morning.     • Multiple Vitamins-Minerals (CENTRUM SILVER ULTRA WOMENS PO) Take 1 tablet by mouth every evening.       No current facility-administered medications for this visit.                anemia  9/28/20 hgb 12.6,hct 38  6/22/21 hgb 9,hct 29,mcv 86  9/15/21 hgb 8.0,hct 26,mcv 76  9/15/21 FIT negative  10/14/21 hgb 9.8,hct 33,mcv 85,iron 23,%sat 6,ferritin 70,b12 716,retic 2.6%,SPEP negative  11/4/21 hgb 11.1,hct 35,mcv 84  12/20/21 off iron  1/15/22 hgb 11,hct 35,mcv 86,iron 45,%sat 12,ferritin 32,b12 839 off iron (%sat improved)      Aortic regurgitation  12/6/19 echo normal LV function EF 65%, RVSP 43, moderate tricuspid regurgitation, mild to moderate aortic insufficiency  10/22/20 echo EF 70%, grade 1 diastolic dysfunction, mild AR, RVSP 30   4/20/21  sinus on exam, patient would like to discontinue amiodarone understanding she likely will return to intermittent atrial fibrillation, discussed sotalol, dronedarone, tikosyn as alternatives, patient declines any of those alternatives   1/10/22 cardiology note sinus rhythm, atrial fibrillation episodes are not significantly symptomatic, recommend she check her heart rate when atrial fibrillation episodes occurs, repeat follow-up echo ordered, follow-up 6 months     Decreased GFR  5/31/09 bun 18,creat 1.2  1/14/10 bun 28,creat 1.3,GFR 40  9/20/12 bun 37,creat 1.1,GFR 48  9/25/13 bun 25,creat 1.2,GFR 44  9/26/14 bun 26,creat 1.1,GFR 45  2/23/15 bun 20,creat 1.1,GFR 48  4/15/16 bun 31,creat 1.1,GFR 45  7/6/16 bun 29,creat 1.1,GFR 44   5/12/17 bun 35,creat 1.1,GFR 44  11/2/17 bun 22,creat 1.28,GFR 40  12/9/17 bun 29,creat 1.1,GFR 43  10/4/18 bun 31,creat 1.5 at [facility]  1/18/19 bun 33,creat 1.34,GFR  38,PTH 53,calcium 9.7,phos 3.6,spep negative  2/4/20 bun 30,creat 1.24, GFR 41, PTH 58, calcium 9.7, phos 3.9  8/20/20 bun 35,creat 1.12,GFR 46  9/28/20 bun 31,creat 1.1,GFR 47  6/8/21 bun 38,creat 1.46,GFR 34  6/27/21 Na 125,bun 64,creat 1.52 (hospital admission acute sacral fracture)  9/15/21 bun 25,creat 0.84,GFR>60  10/14/21 bun 15,creat 0.75,GFR>60,PTH 31calcium 9.4,phos 3.3,SPEP negative     Dyslipidemia  4/11 chol 159,trig 84,hdl 47,ldl 95,cpk 114  7/12 chol 163,trig 120,hdl 49,ldl 90, crp 1.1 on lipitor 20 mg  3/26/13 chol 159,trig 99,hdl 46,ldl 93 on lipitor 20 mg  9/25/13 chol 164,trig 100,hdl 47,ldl 97 on lipitor 20 mg  9/12/14 cardiology note lower extremity weakness, hold lipitor x3 months, labs ordered  12/15/14 cardiology start low dose lipitor 10 mg  1/22/15 off lipitor will resume 10 mg and repeat labs 4 weeks  2/24/15 chol 179,trig 111,hdl 54,ldl 103 on lipitor 10 mg  2/1/16 cardiology note restart lipitor 20 mg    4/15/16 chol 163,trig 102,hdl 48,ldl 95 on lipitor 20 mg  5/12/17 chol 186,trig 101,hdl 47,ldl 119 on lipitor 20 mg intermittently will start taking daily  11/1/17 chol 146,trig 73,hdl 42,ldl 89 on lipitor 20 mg  12/9/17 chol 133,trig 74,hdl 49,ldl 69 on lipitor 80 mg higher dose due to recent transient ischemic attack  10/4/18 chol 126,trig 98,hdl 44,ldl 62 on lipitor 80 mg at [facility]  1/18/19 chol 161,trig 103,hdl 45,ldl 95 on lipitor 80 mg  2/4/20 chol 121,trig 71,hdl 45,ldl 62 on lipitor 80 mg  8/17/20 stop lipitor due to muscle pain   9/17/20 improved off lipitor repeat lipid pending  10/3/20 chol 240,trig 111,hdl 60,ldl 158, recommend start crestor 10 mg and repeat labs 6 weeks  10/26/20 chol 158,trig 106,hdl 62,ldl 75 on crestor 20 mg  4/20/21  off statin, she agrees to try zetia  1/15/22 chol 205,trig 80,hdl 56,ldl 133 did not start zetia, will start zetia 10 mg  Lipitor,crestor muscle pain      gait disorder  4/22/21 custom PT evaluation   3/1/22 custom PT  evaluation      Gastroesophageal reflux  6/27/07 EGD per [facility] hiatal hernia, start prevacid 30 mg  7/12 protonix 20 mg qday  11/16/12 [facility] note cont prilosec  1/5/16 change to protonix 40 mg from prilosec  2/4/20 vit d 67  10/14/21 vit d 78,b12 716 on prilosec  1/15/22 vit d 81,b12 839 on prilosec    history insomnia   1/30/17 trial of trazodone 50 mg  4/4/17 side effects with trazodone drowsiness, discontinued     History pelvic fracture  6/22/21 CT pelvis without post reconstruction, comminuted mildly displaced fracture of right inferior and superior pubic rami, right sacral ritu fracture, right hip prosthesis normally located, moderate degenerative change left hip  6/25/21 hospital admission, 6/28/21 hospital discharge, admitted with pelvic fracture, seen by orthopedics no surgical intervention necessary, seen by physical therapy and occupational therapy recommended skilled nursing placement, urinary retention discharged with davenport catheter  6/25/21 CT pelvis without, stable appearance of minimally displaced fractures right sacral ritu, right pubic ramus posteriorly, and right ischial ramus, no acute pelvic fracture, no hip dislocation, right hip total arthroplasty in place  6/27/21 physical therapy hospital note recommend postacute placement for additional physical therapy services  10/7/21 on neurontin 100 mg tid, increase to 200 mg tid     history of pulmonary hypertension  11/17/18 cardiology note asymptomatic, continue cholesterol medication, continue to monitor echo aortic valve, repaired ascending aorta, follow-up yearly  11/21/18 echo normal LV function, EF 60%, mild LVH, grade 2 diastolic dysfunction, mild aortic insufficiency, moderate tricuspid regurgitation, RVSP 50  12/6/19 echo normal LV function EF 65%, RVSP 43, moderate tricuspid regurgitation, mild to moderate aortic insufficiency  10/22/20 echo EF 70%, grade 1 diastolic dysfunction, mild AR, RVSP 30     History shingles     History shoulder  pain  4/4/17 right shoulder pain right shoulder x-ray ordered, right knee pain, referral custom PT, tried celebrex no benefit, will try naproxen 500 mg twice daily and zanaflex at night  4/5/17 x-ray right shoulder calcifications consistent with calcific tendinitis or hydroxyapatite deposition  5/12/17 MRI right shoulder ordered, persistent pain despite physical therapy  5/18/17 MRI right shoulder; full-thickness supraspinatus tendon tear  5/22/17 right shoulder injection, has been going to physical therapy 14 treatments some improvement, right shoulder injection provided, if no improvement in has appt  in october, if need sooner appt try   6/9/17 custom PT note   7/10/17 custom PT note       History TIA  11/1/17 hospital admission, 11/2/17 hospital discharge, facial numbness, transient numbness in both hands, resolved, CT, MRI head no acute infarct, blood pressure stable, discharged home, patient states she was on aspirin at the time of admission  11/1/17 CT head stable right mid brain likely chronic lacunar infarction, periventricular white matter disease  11/1/17 MRI brain no acute abnormality, moderate chronic microvascular stomach disease, chronic microhemorrhages left frontal and right parietal lobes, chronic lacunar infarct left basal ganglia and blanco, or cerebral atrophy  11/1/17 MR-MRA head without; negative  11/2/17 echo normal LV size and function, EF 70%, RVSP 50, mild AR and TR  11/28/17 awaiting possible right hip replacement per orthopedics , given recent possible TIA, cannot provide clearance, she will like second opinion from cardiology referral made to dr.bryan de leon, changed asa to plavix  12/12/17 ultrasound carotid less than 50% internal carotid stenosis bilateral  12/20/17 dr.bryan de leon cardiology note most recent echocardiogram looks fine, will repeat CT scan follow aortic repair  2/20/18 episode of supraventricular tachycardia in the emergency room, ethel  catheter removed, symptoms resolved with repeat EKG sinus rhythm  11/17/18 cardiology note asymptomatic, continue cholesterol medication, continue to monitor echo aortic valve, repaired ascending aorta, follow-up yearly  11/21/18 echo normal LV function, EF 60%, mild LVH, grade 2 diastolic dysfunction, mild aortic insufficiency, moderate tricuspid regurgitation, RVSP 50  12/6/19 echo normal LV function EF 65%, RVSP 43, moderate tricuspid regurgitation, mild to moderate aortic insufficiency  10/22/20 echo EF 70%, grade 1 diastolic dysfunction, mild AR, RVSP 30   12/14/20 cardiology note maintaining sinus rhythm, given frequent symptomatic episodes of paroxysmal atrial fibrillation and age, recommend amiodarone 200 mg, follow-up 6 month  4/20/21  sinus on exam, patient would like to discontinue amiodarone understanding she likely will return to intermittent atrial fibrillation, discussed sotalol, dronedarone, tikosyn as alternatives, patient declines any of those alternatives, ablation is not appropriate given her age      Hypertension  1/10/11 [facility] note cardiac catheterization normal systolic function  3/11 [facility] echo moderate aortic insufficiency, moderate mitral insufficiency, moderate tricuspid insufficiency, normal LV function  5/12 echo [facility] normal LV function EF 60%, moderate to severe left atrial enlargement, RVSP 32    9/26/13 CT thoracic aorta no evidence of aneurysm, small hiatal hernia  9/26/13 Persantine thallium uniform breast tissue attenuation, cannot rule out underlying ischemic, LVEF 67%  10/3/13 on toprol-XL 25 mg half a tablet daily    12/13/13 cardiology note cont same meds, repeat echo and follow up 6 months  1/2/14 echo mild LVH, grade 1 diastolic dysfunction, ascending aortic 4.0, no change compared with [facility] 2010  5/15/14 cardiology note; increase benazepril to 40 mg qday,continue toprol XL 25 mg daily  6/17/15 cardiology note continue meds, repeat echo 6 months  2/1/16 cardiology  note moderate pulmonary hypertension and snoring, nocturnal pulse oximetry ordered  6/30/16 cardiology note patient declines overnight pulse oximetry  11/1/17 echo normal LV size and function, EF 70%, mild aortic insufficiency and mild tricuspid regurgitation, RVSP 50, aortic root 3.2 cm  11/3/17 cardiology note hypertension stable, status post thoracic aortic aneurysm repair, headache unspecified, ESR ordered  11/28/17 on benazepril 40 mg,toprol 25mg, add norvasc 5 mg  12/12/17 ultrasound carotid less than 50% internal carotid stenosis bilateral  12/20/17  [facility] cardiology note most recent echocardiogram looks fine, will repeat CT scan follow aortic repair  2/20/18 episode of supraventricular tachycardia in the emergency room, davenport catheter removed, symptoms resolved with repeat EKG sinus rhythm  11/17/18 cardiology note asymptomatic, continue cholesterol medication, continue to monitor echo aortic valve, repaired ascending aorta, follow-up yearly  11/21/18 echo normal LV function, EF 60%, mild LVH, grade 2 diastolic dysfunction, mild aortic insufficiency, moderate tricuspid regurgitation, RVSP 50  1/18/19 urine mac 45 on benazepril 40 mg, toprol 25 mg, norvasc 5 mg  12/6/19 echo normal LV function EF 65%, RVSP 43, moderate tricuspid regurgitation, mild to moderate aortic insufficiency  10/22/20 echo EF 70%, grade 1 diastolic dysfunction, mild AR, RVSP 30   12/14/20 cardiology note maintaining sinus rhythm, given frequent symptomatic episodes of paroxysmal atrial fibrillation and age, recommend amiodarone 200 mg, follow-up 6 month  4/6/21 on lotensin 40 mg and metoprolol 25 mg  4/20/21  sinus on exam, patient would like to discontinue amiodarone understanding she likely will return to intermittent atrial fibrillation, discussed sotalol, dronedarone, tikosyn as alternatives, patient declines any of those alternatives, ablation is not appropriate given her age   1/10/22 cardiology note sinus  rhythm, atrial fibrillation episodes are not significantly symptomatic, recommend she check her heart rate when atrial fibrillation episodes occurs, repeat follow-up echo ordered, follow-up 6 months  4/18/22 on lotensin 40 mg and metoprolol 25 mg     Hypothyroid  4/11 tsh 9 start synthroid 50  4/11 tsh 6,free t3 3.1,free t4 1.2,tpo negative  7/1/11 tsh 2.1 cont synthroid 50 mcg  7/12 tsh 3.4 cont synthroid 50 mcg  7/31/13 tsh 3.2 on synthroid 50 mcg  9/25/13 tsh 1.7 on synthroid 50 mcg  2/24/15 tsh 4.9 on synthroid 50 mcg; increase to synthroid 75 mcg, repeat tsh in 6 weeks  4/14/15 tsh 1.1 on synthroid 75 mcg  4/15/16 tsh 2.3 on synthroid 75 mcg  5/12/17 tsh 1.2 on synthroid 75 mcg  11/1/17 tsh 2.1 on synthroid 75 mcg  1/18/19 tsh 2.2 on synthroid 75 mcg  2/4/20 tsh 3.1 on synthroid 75 mcg  8/20/20 tsh 1.6 on synthroid 75 mcg  10/14/21 tsh 1.5 on synthroid 75 mcg  1/15/22 tsh 3.4 on synthroid 75 mcg    neck arthritis  2/10/14 [facility] note; x-ray cervical spine DJD, right shoulder steroid injection     neutropenia  4/16/11 wbc 3.9  7/30/13 wbc 5.6  11/7/16 wbc 5.7  5/11/17 wbc 4.7  2/20/18 wbc 7.2  3/6/18 wbc 5.9  1/16/19 wbc 4.0  6/8/19 wbc 4.3 (44%N, 39%L)  2/4/20 wbc 4.1 (42%N,41%L)  8/20/20 wbc 4.6  11/4/21 wbc 4.0  1/15/22 wbc 3.9 (43%N,36%L),     Osteoporosis  Had been on fosamax [id number]    8/10 dexa LS -2.0,hip -1.5 await old dexa result, she will get copy for me  4/11 vit d 35  9/12 dexa LS -3.2,hip -1.4  2/13/13 reclast IV  7/31/13 vit d 33 cont 2000 units  2/18/14 reclast IV  2/2/15 dexa LS -3.1,hip -1.9; FRAX 35.5 % major,12.6% hip  2/18/15 reclast IV  2/24/15 vit d 33  4/15/16 vit d 36  5/12/17 vit d 36  8/7/18 dexa left forearm -4.2,hip -2.5 resume reclast   8/31/18 reclast IV  1/18/19 vit d 27 increase from 2000 to 4000 units  2/4/20 vit d 67, PTH 58  6/25/21 vit d 75  10/14/21 vit d 78  1/15/22 vit d 81     Paroxysmal atrial fibrillation  11/1/17 hospital admission, 11/2/17 hospital discharge, facial  numbness, transient numbness in both hands, resolved, CT, MRI head no acute infarct, blood pressure stable, discharged home, patient states she was on aspirin at the time of admission  11/1/17 CT head stable right mid brain likely chronic lacunar infarction, periventricular white matter disease  11/1/17 MRI brain no acute abnormality, moderate chronic microvascular stomach disease, chronic microhemorrhages left frontal and right parietal lobes, chronic lacunar infarct left basal ganglia and blanco, or cerebral atrophy  11/1/17 MR-MRA head without; negative  2/20/17 episode of supraventricular tachycardia in the emergency room, davenport catheter removed, symptoms resolved with repeat EKG sinus rhythm  12/20/17  [facility] cardiology note most recent echocardiogram looks fine, will repeat CT scan follow aortic repair  11/17/18 cardiology note asymptomatic, continue cholesterol medication, continue to monitor echo aortic valve, repaired ascending aorta, follow-up yearly  11/21/18 echo normal LV function, EF 60%, mild LVH, grade 2 diastolic dysfunction, mild aortic insufficiency, moderate tricuspid regurgitation, RVSP 50  12/6/19 echo normal LV function EF 65%, RVSP 43, moderate tricuspid regurgitation, mild to moderate aortic insufficiency  9/28/20 emergency room note EKG atrial fibrillation new onset  9/30/20 cardiology note sinus rhythm, start eliquis 5 mg bid and increase toprol to 25 mg daily, echo ordered, follow up 3 months   10/22/20 echo EF 70%, grade 1 diastolic dysfunction, mild AR, RVSP 30   12/14/20 cardiology note maintaining sinus rhythm, given frequent symptomatic episodes of paroxysmal atrial fibrillation and age, recommend amiodarone 200 mg, follow-up 6 month  4/20/21  cardiology note sinus on exam, patient would like to discontinue amiodarone understanding she likely will return to intermittent atrial fibrillation, discussed sotalol, dronedarone, tikosyn as alternatives, patient declines  any of those alternatives, ablation is not appropriate given her age  12/20/21 on metoprolol and eliquis  1/10/22 cardiology note sinus rhythm, atrial fibrillation episodes are not significantly symptomatic, recommend she check her heart rate when atrial fibrillation episodes occurs, repeat follow-up echo ordered, follow-up 6 months      Preventative health  6/27/09 colon per [facility] no records  10/19/04 pneumovax   9/9/11 zoster vaccine  4/14/15 prevnar  8/7/18 dexa left forearm -4.2,hip -2.5  9/28/19 pneumovax  11/7/19 mammogram  11/12/19 shingrix second  9/17/20 tdap   2/27/21 covid second pfizer  10/11/21 covid pfizer booster  1/15/22 vit d 81     s/p knee arthroplasty  4/10 MRI right knee complex tear medial meniscus, horizontal cleavage tear lateral meniscus, chondromalacia patella, moderate-sized baker's cyst  5/10 right meniscus knee repair   5/10 told by  had gout on surgery had pathology report, I await that report, question gout seen on pathology report done during repair of right meniscus knee surgery.  7/10 uric acid 6.3, await starting allopurinol depending on the operative report  8/5/10 reviewed orthopedics notes , operative report indicates crystal deposition, could be gout or pseudogout, no biopsy results, suspect chondrocalcinosis  10/11 dr.parlasca najera note, MRI pending  8/7/10 reviewed pathology report right knee tissue, marked degenerative changes with focal calcification, no mention of gout. Based on this and a normal uric acid level, I do not believe she has gout, has no hyperuricemia medications would be indicated  10/11 dr.parlasca najera left knee meniscectomy and arthroscopy  1/12 dr.parlasca najera left knee arthroplasty  2/29/16  orthopedics x-ray right knee advanced DJD on the right knee corticosteroid injection performed, prescription voltaren gel  6/13/16  orthopedic note x-rays demonstrate right knee advanced DJD and resurfaced patella,  offer right knee total replacement followed by patellar resurfacing after she recovers from her right knee  9/8/16  orthopedic's operative note right knee total arthroplasty  11/2/16  orthopedic note, follow-up right knee, x-ray right knee shows subluxation patella, traumatic evulsion VMO needs reexploration and repair  11/10/16  orthopedic's operative note VMO quadriceps avulsion repair status post total knee replacement  11/23/16  orthopedic no follow-up quadriceps repair, continue crutches in full extension, follow-up one month and then start passive range of motion 0-40°  2/4/17  orthopedic note, continue physical therapy, follow-up this summer  4/17/17 [facility] PT note  6/9/17 custom PT note     s/p total hip arthroplasty  9/21/17 MRI right hip mild trochanteric bursitis, mild femoral acetabular joint arthritis  10/12/17  orthopedic no proceed with right total hip arthroplasty, x-ray AP pelvis and right hip shows early arthrosis with calcifications and DJD  2/14/18  orthopedics operative note at [facility] right hip total arthroplasty, gluteus medius and minimus tendon repair  3/27/18 [facility] note   5/8/18 [facility] note xray right hip stable total hip arthroplasty, continue physical therapy  4/3/19 custom PT discharge  4/5/21  [facility] pain note right trochanteric bursitis steroid injection, trigger point injection low back  4/22/21 custom PT evaluation   6/21/21 custom PT discharge note   6/25/21 CT pelvis without, stable appearance of minimally displaced fractures right sacral ritu, right pubic ramus posteriorly, and right ischial ramus, no acute pelvic fracture, no hip dislocation, right hip total arthroplasty in place     s/p lumbar surgery  6/03 L4-5 epidural     7/06  spinal surgery operative note decompression, foraminotomy. L4-L5 posterior fusion, L4-L5 allograft    3/5/14 MRI lumbar spine grade 2  spondylolisthesis L4-L5 with previous laminectomy and posterior fusion, left sided pedicle screw fixation L4-L5, moderate central canal stenosis L5-S1 secondary to facet arthropathy, mild to moderate multilevel neural foraminal narrowing  12/8/14  pain [facility] note; right lower extremity radiculitis L4-L5 lesion, myofascial lumbosacral pain, trochanteric bursitis, followup as needed  4/2/15  pain procedure note right L4-L5 and right L5-S1 SSNRB under fluoroscopy  4/27/15  pain note; right trochanteric bursal injection, trigger point injections performed, also set up SI joint injections  8/3/15 [facility] neurosurgery note, lumbar x-ray flexion and extension, MRI lumbar spine without contrast, followup   8/9/15 MRI lumbar spine, previous L4-L5 left  fusion and laminectomy, L3-L4 moderate bilateral facet arthropathy, mild disc bulge, L4-L5 severe bilateral facet arthropathy, moderate to severe bilateral neural foraminal stenosis, L5-S1 moderate facet arthropathy  8/28/15  neurosurgery note previous posterior fusion L4-L5, does have L3-L4 disc bulge with central canal stenosis, recommend L3-L5 decompression  8/31/15  pain note; right lower extremity radiculitis, myofascial lumbosacral pain, start hydrocodone 5 mg, continued SI joint exercises, perform trochanteric bursal injection right hip, trigger points lumbar region  10/28/15  neurosurgery note, will schedule for posterior L3-L4 laminectomy and fusion with redo L4-5 laminectomy and exploration of fusion, surgical clearance per cardiology   11/24/15  neurosurgery operative note expiration removal L4-L5 hardware on the left, bilateral facetectomies L4, L3-L4 transforaminal lumbar interbody fusion  12/9/15 [facility] neurosurgery note s/p L4-S1 fusion on 11/24/15 , follow up in 4 weeks  12/23/15  neurosurgery note, clear to restart physical therapy if she would like after one month, follow-up  3 months  6/9/20 dr.arraiz NELSON pain note trigger point injections trochanteric bursa right side  8/25/20 dr.arraiz NELSON pain procedure note epidural right L4 nerve root   4/5/21 dr.arraiz NELSON pain note right trochanteric bursitis steroid injection, trigger point injection low back  4/22/21 custom PT evaluation   6/21/21 custom PT discharge note      s/p TOMY  Sees gyn on HRT estradiol for 20 yrs ()     s/p thoracic aneurysm repair  6/29/06 CT-CTA chest with and without postprocessing; stable ascending thoracic aortic aneurysm maximum diameter 4.8, just distal to the aortic valve maximum diameter 4.3  4/9/07 CT-CTA chest with and without processing; stable ascending aortic thoracic aneurysm 4.5 x 4.6 cm  6/8/09 CT chest with; stable 4.7 ascending aorta aneurysm  10/15/09 CT chest without; dilated ascending thoracic aorta 4.9 cm aneurysm increased from 4.7  1/11/10  cardiovascular surgery operative note ascending thoracic aorta aneurysm repair  9/26/13 CT thoracic aorta evaluation; status post repair ascending thoracic aortic aneurysm without evidence of dissection or leak  1/2/14 echo mild LVH, grade 1 diastolic dysfunction, ascending aortic 4.0, no change compared with ZAK 2010  5/15/14 cardiology note  6/17/15 cardiology note cont meds,repeat echo 6 months  2/1/16 cardiology note moderate pulmonary hypertension and snoring, nocturnal pulse oximetry ordered  11/1/17 echo normal LV size and function, EF 70%, mild aortic insufficiency and mild tricuspid regurgitation, RVSP 50, aortic root 3.2 cm  11/3/17 cardiology note stable  12/20/17  [facility] cardiology note most recent echocardiogram looks fine, will repeat CT scan follow aortic repair  11/17/18 cardiology note asymptomatic, continue cholesterol medication, continue to monitor echo aortic valve, repaired ascending aorta, follow-up yearly  11/21/18 echo normal LV function, EF 60%, mild LVH, grade 2 diastolic dysfunction, mild aortic  insufficiency, moderate tricuspid regurgitation, RVSP 50  12/6/19 echo normal LV function EF 65%, RVSP 43, moderate tricuspid regurgitation, mild to moderate aortic insufficiency  10/22/20 echo EF 70%, grade 1 diastolic dysfunction, mild AR, RVSP 30   4/20/21 dr.wiedenbeck bravo on exam, patient would like to discontinue amiodarone understanding she likely will return to intermittent atrial fibrillation, discussed sotalol, dronedarone, tikosyn as alternatives, patient declines any of those alternatives, ablation is not appropriate given her age      Tricuspid regurgitation  11/17/18 cardiology note asymptomatic, continue cholesterol medication, continue to monitor echo aortic valve, repaired ascending aorta, follow-up yearly  11/21/18 echo normal LV function, EF 60%, mild LVH, grade 2 diastolic dysfunction, mild aortic insufficiency, moderate tricuspid regurgitation, RVSP 50  12/6/19 echo normal LV function EF 65%, RVSP 43, moderate tricuspid regurgitation, mild to moderate aortic insufficiency  10/22/20 echo EF 70%, grade 1 diastolic dysfunction, mild AR, RVSP 30   4/20/21 dr.wiedenbeck bravo on exam, patient would like to discontinue amiodarone understanding she likely will return to intermittent atrial fibrillation, discussed sotalol, dronedarone, tikosyn as alternatives, patient declines any of those alternatives, ablation is not appropriate given her age      Urinary retention  8/18/21  urology note urinary retention, urethral davenport catheter in place, catheter exchange performed, failed voiding trial today, follow-up 1 month  9/28/21 urology note, urodynamic study cystometrogram showing bladder compliance is reduced, detrusor pressure during filling is high, leaking observed during the filling phase, electromyography shows no change in activity with increased abdominal pressure, EMG activity no change during voiding, complex CMG uroflow patient leak small amount prior to catheter falling out but when  attempted to void was not able to, impression is poor detrusor compliance, complete retention, no uninhibited contractions, normal capacity, normal bladder sensation, impaired pelvic floor response to voiding; suprapubic tube discussed with patient as best option to manage bladder while she is healing from a pelvic break  9/28/21 urology note davenport catheter insertion post void residual 481 mL, will schedule suprapubic catheter  11/4/21 suprapubic catherer placement in hospital for urinary retention  12/6/21 urology note catheter exchange     UTI  7/6/16 UTI klebsiella pneumoniae sensitive to all antibiotics except ampicillin, start bactrim x 5 days  1/18/19 UTI enterobacter cloacae resistant to ampicillin, cephalothin, penicillin, bactrim, sensitive to cefuroxime, ciprofloxacin and levaquin, nitrofurantoin  5/26/19 UTI klebsiella given omnicef, organism resistant ampicillin, ciprofloxacin, levofloxacin, bactrim  8/18/21  urology note urinary retention, urethral davenport catheter in place, catheter exchange performed, failed voiding trial today, follow-up 1 month  11/4/21 suprapubic catherer placement in hospital for urinary retention               Patient Active Problem List   Diagnosis   • S/p lumbar surgery   • Hypertension   • Dyslipidemia   • S/P TOMY (total abdominal hysterectomy)   • Preventative health care   • S/P knee bilateral arthroplasty   • S/P appendectomy   • Osteoporosis, idiopathic   • Hypothyroid   • History of shingles   • GERD (gastroesophageal reflux disease)   • Tricuspid regurgitation   • Aortic regurgitation   • Neck arthritis   • S/P thoracic aortic aneurysm repair   • Decreased GFR   • UTI (urinary tract infection)   • History of insomnia   • History of shoulder pain   • History of transient ischemic attack (TIA)   • S/P total hip arthroplasty   • Paroxysmal atrial fibrillation (HCC)   • On continuous oral anticoagulation   • Gait disorder   • History of pelvic fracture   • Urinary  retention   • Anemia   • Neutropenia (HCC)         ROS           Objective        Physical Exam  Vitals and nursing note reviewed.   Constitutional:       Appearance: Normal appearance.   HENT:      Head: Normocephalic and atraumatic.      Right Ear: External ear normal.      Left Ear: External ear normal.   Eyes:      Conjunctiva/sclera: Conjunctivae normal.   Cardiovascular:      Rate and Rhythm: Normal rate and regular rhythm.      Heart sounds: Murmur heard.   Pulmonary:      Effort: Pulmonary effort is normal.      Breath sounds: Normal breath sounds.   Abdominal:      General: There is no distension.   Musculoskeletal:         General: No swelling.      Cervical back: Neck supple.   Skin:     General: Skin is warm.      Coloration: Skin is not jaundiced.      Findings: No bruising.   Neurological:      General: No focal deficit present.      Mental Status: She is alert.   Psychiatric:         Mood and Affect: Mood normal.                     Assessment & Plan        Assessment  #1 dyslipidemia 1/15/22 chol 205,trig 80,hdl 56,ldl 133 not taking Zetia 10 mg daily, intolerant of Lipitor and Crestor, has been eating more sweets recently gained some weight, but she has also started exercising regularly by walking    #2 paroxysmal atrial fibrillation followed by cardiology, sinus on exam, on Eliquis, metoprolol, will occasionally feel her heart skipping for a few seconds with no associated lightheadedness, chest pain, shortness of breath    #3 hypertension stable on Lotensin and metoprolol    #4 reflux, taking omeprazole intermittently sometimes during the day, sometimes at night, no NSAIDs, breakthrough symptoms when she is off the medication, when she takes the medication she states her symptoms are controlled, off the medication she will have some nausea and difficulty with regurgitation    #5 neutropenia 1/15/22 wbc 3.9 (43%N,36%L),    #6 anemia 1/15/22 hgb 11,hct 35,mcv 86,iron 45,%sat 12,ferritin 32,b12 839  off iron (%sat improved), no NSAIDs, remains on Eliquis     #7 hypothyroid on replacement, 1/15/22 tsh 3.4 on synthroid 75 mcg    #8 osteoporosis due for bone density        Plan  #!  Continue Zetia, recheck lipid panel, labs ordered    #2 continue check blood pressure regular and record    #3 continue no NSAIDs    #4 cut back on sweets, continue exercising, cane for ambulation, falling precautions, has completed physical therapy    #5 follow-up with cardiology as scheduled next month, she has had intermittent skipping sensations, advised her to check her blood pressure, heart rate, and record when she has those episodes, offered to check a zio monitor 2-week event monitor she declined she would like to hold off for now    #6 minimize caffeine intake    #7 take Prilosec daily 30 minutes before meals, medications and other supplements, she will take this first with water only in the morning, wait 30 minutes then take her thyroid medication, minimize chocolate intake    #8 referral back to digestive health for reflux, continue to avoid NSAIDs    #9 mammogram and bone density    #10 follow-up 6 months    #11 advised her to get the COVID-vaccine 14 series

## 2022-05-10 ENCOUNTER — HOSPITAL ENCOUNTER (OUTPATIENT)
Dept: LAB | Facility: MEDICAL CENTER | Age: 87
End: 2022-05-10
Attending: INTERNAL MEDICINE
Payer: MEDICARE

## 2022-05-10 DIAGNOSIS — E55.9 VITAMIN D DEFICIENCY: ICD-10-CM

## 2022-05-10 DIAGNOSIS — R79.0 LOW MAGNESIUM LEVEL: ICD-10-CM

## 2022-05-10 DIAGNOSIS — D64.9 ANEMIA, UNSPECIFIED TYPE: ICD-10-CM

## 2022-05-10 LAB
ALBUMIN SERPL BCP-MCNC: 4.1 G/DL (ref 3.2–4.9)
ALBUMIN/GLOB SERPL: 1.3 G/DL
ALP SERPL-CCNC: 65 U/L (ref 30–99)
ALT SERPL-CCNC: 14 U/L (ref 2–50)
ANION GAP SERPL CALC-SCNC: 11 MMOL/L (ref 7–16)
AST SERPL-CCNC: 24 U/L (ref 12–45)
BASOPHILS # BLD AUTO: 1 % (ref 0–1.8)
BASOPHILS # BLD: 0.04 K/UL (ref 0–0.12)
BILIRUB SERPL-MCNC: 0.4 MG/DL (ref 0.1–1.5)
BUN SERPL-MCNC: 23 MG/DL (ref 8–22)
CALCIUM SERPL-MCNC: 9.8 MG/DL (ref 8.5–10.5)
CHLORIDE SERPL-SCNC: 105 MMOL/L (ref 96–112)
CO2 SERPL-SCNC: 24 MMOL/L (ref 20–33)
CREAT SERPL-MCNC: 1.18 MG/DL (ref 0.5–1.4)
EOSINOPHIL # BLD AUTO: 0.11 K/UL (ref 0–0.51)
EOSINOPHIL NFR BLD: 2.8 % (ref 0–6.9)
ERYTHROCYTE [DISTWIDTH] IN BLOOD BY AUTOMATED COUNT: 48.3 FL (ref 35.9–50)
GFR SERPLBLD CREATININE-BSD FMLA CKD-EPI: 45 ML/MIN/1.73 M 2
GLOBULIN SER CALC-MCNC: 3.1 G/DL (ref 1.9–3.5)
GLUCOSE SERPL-MCNC: 91 MG/DL (ref 65–99)
HCT VFR BLD AUTO: 33.5 % (ref 37–47)
HGB BLD-MCNC: 10.4 G/DL (ref 12–16)
IMM GRANULOCYTES # BLD AUTO: 0.01 K/UL (ref 0–0.11)
IMM GRANULOCYTES NFR BLD AUTO: 0.3 % (ref 0–0.9)
LYMPHOCYTES # BLD AUTO: 1.39 K/UL (ref 1–4.8)
LYMPHOCYTES NFR BLD: 34.8 % (ref 22–41)
MAGNESIUM SERPL-MCNC: 2 MG/DL (ref 1.5–2.5)
MCH RBC QN AUTO: 27.1 PG (ref 27–33)
MCHC RBC AUTO-ENTMCNC: 31 G/DL (ref 33.6–35)
MCV RBC AUTO: 87.2 FL (ref 81.4–97.8)
MONOCYTES # BLD AUTO: 0.49 K/UL (ref 0–0.85)
MONOCYTES NFR BLD AUTO: 12.3 % (ref 0–13.4)
NEUTROPHILS # BLD AUTO: 1.95 K/UL (ref 2–7.15)
NEUTROPHILS NFR BLD: 48.8 % (ref 44–72)
NRBC # BLD AUTO: 0 K/UL
NRBC BLD-RTO: 0 /100 WBC
PLATELET # BLD AUTO: 218 K/UL (ref 164–446)
PMV BLD AUTO: 9.5 FL (ref 9–12.9)
POTASSIUM SERPL-SCNC: 4.8 MMOL/L (ref 3.6–5.5)
PROT SERPL-MCNC: 7.2 G/DL (ref 6–8.2)
RBC # BLD AUTO: 3.84 M/UL (ref 4.2–5.4)
SODIUM SERPL-SCNC: 140 MMOL/L (ref 135–145)
WBC # BLD AUTO: 4 K/UL (ref 4.8–10.8)

## 2022-05-10 PROCEDURE — 83735 ASSAY OF MAGNESIUM: CPT

## 2022-05-10 PROCEDURE — 82306 VITAMIN D 25 HYDROXY: CPT

## 2022-05-10 PROCEDURE — 36415 COLL VENOUS BLD VENIPUNCTURE: CPT

## 2022-05-10 PROCEDURE — 85025 COMPLETE CBC W/AUTO DIFF WBC: CPT

## 2022-05-10 PROCEDURE — 80053 COMPREHEN METABOLIC PANEL: CPT

## 2022-05-11 LAB — 25(OH)D3 SERPL-MCNC: 72 NG/ML (ref 30–100)

## 2022-05-14 ENCOUNTER — TELEPHONE (OUTPATIENT)
Dept: MEDICAL GROUP | Facility: MEDICAL CENTER | Age: 87
End: 2022-05-14
Payer: MEDICARE

## 2022-05-14 DIAGNOSIS — E60 ZINC DEFICIENCY: ICD-10-CM

## 2022-05-14 DIAGNOSIS — D64.9 ANEMIA, UNSPECIFIED TYPE: ICD-10-CM

## 2022-05-14 DIAGNOSIS — E61.0 COPPER DEFICIENCY: ICD-10-CM

## 2022-05-14 NOTE — TELEPHONE ENCOUNTER
Notified with results, stable renal function, chronic anemia stable repeat labs 3 months for anemia follow-up, lab slip printed and mailed to her.  She understands and agrees.

## 2022-05-24 ENCOUNTER — OFFICE VISIT (OUTPATIENT)
Dept: CARDIOLOGY | Facility: MEDICAL CENTER | Age: 87
End: 2022-05-24
Payer: MEDICARE

## 2022-05-24 VITALS
HEIGHT: 64 IN | SYSTOLIC BLOOD PRESSURE: 152 MMHG | RESPIRATION RATE: 14 BRPM | HEART RATE: 67 BPM | OXYGEN SATURATION: 98 % | BODY MASS INDEX: 26.15 KG/M2 | WEIGHT: 153.2 LBS | DIASTOLIC BLOOD PRESSURE: 70 MMHG

## 2022-05-24 DIAGNOSIS — I48.0 PAF (PAROXYSMAL ATRIAL FIBRILLATION) (HCC): ICD-10-CM

## 2022-05-24 DIAGNOSIS — I10 ESSENTIAL HYPERTENSION: ICD-10-CM

## 2022-05-24 DIAGNOSIS — I35.1 NONRHEUMATIC AORTIC VALVE INSUFFICIENCY: ICD-10-CM

## 2022-05-24 DIAGNOSIS — Z86.79 S/P THORACIC AORTIC ANEURYSM REPAIR: ICD-10-CM

## 2022-05-24 DIAGNOSIS — Z98.890 S/P THORACIC AORTIC ANEURYSM REPAIR: ICD-10-CM

## 2022-05-24 DIAGNOSIS — I15.0 RENOVASCULAR HYPERTENSION: Chronic | ICD-10-CM

## 2022-05-24 DIAGNOSIS — Z79.01 ON CONTINUOUS ORAL ANTICOAGULATION: ICD-10-CM

## 2022-05-24 PROCEDURE — 99214 OFFICE O/P EST MOD 30 MIN: CPT | Performed by: INTERNAL MEDICINE

## 2022-05-24 RX ORDER — METOPROLOL SUCCINATE 50 MG/1
50 TABLET, EXTENDED RELEASE ORAL EVERY MORNING
Qty: 90 TABLET | Refills: 3 | Status: ON HOLD | OUTPATIENT
Start: 2022-05-24 | End: 2022-10-28 | Stop reason: SDUPTHER

## 2022-05-24 ASSESSMENT — FIBROSIS 4 INDEX: FIB4 SCORE: 2.5

## 2022-05-24 NOTE — PROGRESS NOTES
"  Subjective:   Yvonne Marie Wada is a 86 y.o. female who presents today for follow-up of repaired thoracic ascending aorta aneurysm in 2010, aortic insufficiency, hypertension hyperlipidemia and PAF on OAC.    Doing well.  Did not bring in blood pressure logs.  No significant trouble with her atrial fibrillation.    Past Medical History:   Diagnosis Date   • AAA (abdominal aortic aneurysm) ([facility]) 7/26/2010    10/09 CT thorax dilated ascending thoracic aorta 4.9 cm 1/10 transesophageal echo dilated aortic root, and ascending aorta with mild aortic insufficiency 1/10  ascending aortic aneurysm repair 5/12 echo [facility] normal LV function EF 60%, moderate to severe left atrial enlargement, RVSP 32    • Aortic insufficiency    • Aortic valve disease    • Aortic valve regurgitation    • Arthritis     back and knee/ osteo   • Cataract    • Dental disorder     full top denture, partial bottom   • Diverticulosis    • Dyslipidemia 7/26/2010    Sees [facility] on lipitor 4/11 chol 159,trig 84,hdl 47,ldl 95,cpk 114 7/12 chol 163,trig 120,hdl 49,ldl 90, crp 1.1 on lipitor 20 mg    • GERD (gastroesophageal reflux disease) 7/12/2012 6/07 EGD per [facility] hiatal hernia, start prevacid 30 mg 7/12 protonix 20 mg qday    • Glaucoma    • Heart burn    • Heart murmur    • Heart valve disease     \"leaking\", mitral valve   • Hiatus hernia syndrome    • High cholesterol    • History of shingles 6/30/2011    2010 Back and left thorax     • History of total knee arthroplasty 7/26/2010    4/10 MRI right knee complex tear medial meniscus, horizontal cleavage tear lateral meniscus, chondromalacia patella, moderate-sized Baker's cyst 5/10 right meniscus knee repair  5/10 told by  had gout on surgery had pathology report, I await that report Question gout seen on pathology report done during repair of right meniscus knee surgery. 7/10 uric acid 6.3, we'll await starting allopurinol depending on the operative report 8/5/10 " reviewed ortho notes , op report indicates crystal deposition, could be gout or pseudogout. No bx result sent over. Will need to get. My suspicion is chondrocalcinosis. 10/11  ortho note, MRI pending 8/7/10 reviewed pathology report right knee tissue, marked degenerative changes with focal calcification, no mention of gout. Based on this and a normal uric acid level, I do not believe she has gout, has no hyperuricemia medications would be indicated 10/11  ortho left knee meniscectomy and arthroscopy 1/12     • HLD (hyperlipidemia)    • Hypertension    • Hypothyroid 4/8/2011 4/11 tsh 9 start synthroid 50 4/11 tsh 6,free t3 3.1,free t4 1.2,tpo negative 7/1/11 tsh 2.1 cont synthroid 50 mcg 7/12 tsh 3.4 cont synthroid 50 mcg    • Indigestion    • Mitral insufficiency    • OSTEOPOROSIS 8/9/2010    Had been on fosamax [id number]  8/10 dexa LS -2.0,hip -1.5 await old dexa result, she will get copy for me 4/11 vit d 35 9/12 dexa LS -3.2,hip -1.4 2/13/13 relast infusion    • Pain     back and right leg, can get as bad as 10/10   • Preventative health care 7/26/2010 2002 pneum 2005 colon [facility] no records 4/11 vit d 35 9/11 shingles vaccine 9/12 mammogram 9/12 dexa LS -3.2,hip -1.4    • Rheumatic fever    • S/P appendectomy 7/26/2010   • S/P TOMY (total abdominal hysterectomy) 7/26/2010    Sees gyn on HRT estradiol for 20 yrs () 11/08 mammo     • Shingles 6/30/2011   • Snoring    • Status post lumbar surgery 7/26/2010 6/03 L4-5 epidural Dr. Jordan  7/06 overall for decompression, foraminotomy. L4-L5 posterior fusion, L4-L5 allograft      • Stroke (HCC) 1996    no residual problems    • Thoracic aortic aneurysm without mention of rupture    • Tricuspid insufficiency    • Unspecified disorder of thyroid     hypo   • Urinary bladder disorder     suprapubic catheter insertion 11/4     Past Surgical History:   Procedure Laterality Date   • TENDON REPAIR Right  11/10/2016    Procedure: TENDON REPAIR - QUADRACEPS ;  Surgeon: Maxim Herrera M.D.;  Location: [facility] [place] [facility];  Service:    • KNEE ARTHROPLASTY TOTAL Right 9/8/2016    Procedure: KNEE ARTHROPLASTY TOTAL;  Surgeon: Maxim Herrera M.D.;  Location: SUR[facility] [place] [facility];  Service:    • FUSION, SPINE, LUMBAR, PLIF  11/24/2015    Procedure: LUMBAR FUSION POSTERIOR L3-4, EXPLORATION OF FUSION L4-5 WITH INSTRUMENTATION;  Surgeon: Hermann Reis M.D.;  Location: [facility];  Service:    • LUMBAR LAMINECTOMY DISKECTOMY  11/24/2015    Procedure: LUMBAR L3-4 LAMINECTOMY AND REDO L4-5;  Surgeon: Hermann Reis M.D.;  Location: [facility];  Service:    • KNEE ARTHROPLASTY TOTAL  1/3/2012    Performed by YOSEF MEJÍA at [facility]   • KNEE ARTHROSCOPY  10/18/2011    Performed by YOSEF MEJÍA at [facility]   • MENISCECTOMY, KNEE, MEDIAL  10/18/2011    Performed by YOSEF MEJÍA at [facility]   • AORTIC VALVE REPLACEMENT  1/12/2010    Performed by ISAÍAS NICOLE at [facility]   • APPENDECTOMY     • FUSION, SPINE, LUMBAR, PLIF     • HYSTERECTOMY, TOTAL ABDOMINAL       Family History   Problem Relation Age of Onset   • Stroke Mother    • Heart Disease Father    • Heart Disease Sister      Social History     Socioeconomic History   • Marital status:      Spouse name: Not on file   • Number of children: Not on file   • Years of education: Not on file   • Highest education level: Not on file   Occupational History   • Not on file   Tobacco Use   • Smoking status: Never Smoker   • Smokeless tobacco: Never Used   • Tobacco comment: none   Vaping Use   • Vaping Use: Never used   Substance and Sexual Activity   • Alcohol use: No     Alcohol/week: 0.0 oz   • Drug use: Yes     Comment: CBD Oil for Arthritis   • Sexual activity: Not Currently     Partners: Male   Other Topics Concern   • Not on file   Social History Narrative   •  Not on file     Social Determinants of Health     Financial Resource Strain: Not on file   Food Insecurity: Not on file   Transportation Needs: Not on file   Physical Activity: Not on file   Stress: Not on file   Social Connections: Not on file   Intimate Partner Violence: Not on file   Housing Stability: Not on file     Allergies   Allergen Reactions   • Amoxicillin Vomiting     Upset stomach, ok if needed for life saving treatment (per pt)   • Codeine Nausea     Synthetic codones ok   • Doxycycline Nausea     Nausea, Ok in life saving situation (per pt)   • Sulfamethoxazole-Trimethoprim Vomiting and Nausea     Ok in a life saving situation (per pt)   • Tramadol Vomiting and Nausea     nausea   • Trazodone Vomiting     groggy     Outpatient Encounter Medications as of 5/24/2022   Medication Sig Dispense Refill   • metoprolol SR (TOPROL XL) 50 MG TABLET SR 24 HR Take 1 Tablet by mouth every morning. 90 Tablet 3   • ezetimibe (ZETIA) 10 MG Tab TAKE 1 TABLET BY MOUTH EVERY DAY 90 Tablet 3   • gabapentin (NEURONTIN) 100 MG Cap TAKE 2 CAPSULES BY MOUTH 3 TIMES A DAY AS NEEDED (PAIN). INDICATIONS: BACK AND NERVE PAIN 180 Capsule 3   • levothyroxine (SYNTHROID) 75 MCG Tab Take 1 Tablet by mouth every morning on an empty stomach. 90 Tablet 3   • benazepril (LOTENSIN) 40 MG tablet Take 1 Tablet by mouth every morning. 90 Tablet 3   • ELIQUIS 5 MG Tab TAKE 1 TABLET BY MOUTH TWICE A DAY (Patient taking differently: Take 5 mg by mouth 2 times a day.) 180 Tablet 2   • MAGNESIUM PO Take 1 capsule by mouth every evening.     • omeprazole (PRILOSEC) 20 MG delayed-release capsule Take 20 mg by mouth every morning.     • Multiple Vitamins-Minerals (CENTRUM SILVER ULTRA WOMENS PO) Take 1 tablet by mouth every evening.     • [DISCONTINUED] metoprolol SR (TOPROL XL) 25 MG TABLET SR 24 HR TAKE 1 TABLET BY MOUTH EVERY DAY IN THE MORNING (Patient taking differently: Take 25 mg by mouth every day.) 90 Tablet 3     No facility-administered  "encounter medications on file as of 5/24/2022.     Review of Systems   All other systems reviewed and are negative.       Objective:   BP (!) 152/70 (BP Location: Left arm, Patient Position: Sitting, BP Cuff Size: Adult)   Pulse 67   Resp 14   Ht 1.626 m (5' 4\")   Wt 69.5 kg (153 lb 3.2 oz)   SpO2 98%   BMI 26.30 kg/m²     Physical Exam  Vitals reviewed.   Constitutional:       General: She is not in acute distress.     Appearance: She is well-developed. She is not diaphoretic.   HENT:      Head: Normocephalic and atraumatic.      Right Ear: External ear normal.      Left Ear: External ear normal.      Mouth/Throat:      Pharynx: No oropharyngeal exudate.   Eyes:      General: No scleral icterus.        Right eye: No discharge.         Left eye: No discharge.      Conjunctiva/sclera: Conjunctivae normal.      Pupils: Pupils are equal, round, and reactive to light.   Neck:      Thyroid: No thyromegaly.      Vascular: No JVD.      Trachea: No tracheal deviation.   Cardiovascular:      Rate and Rhythm: Normal rate and regular rhythm.      Chest Wall: PMI is not displaced.      Pulses:           Carotid pulses are 2+ on the right side and 2+ on the left side.       Radial pulses are 2+ on the right side and 2+ on the left side.        Popliteal pulses are 2+ on the right side and 2+ on the left side.        Dorsalis pedis pulses are 2+ on the right side and 2+ on the left side.        Posterior tibial pulses are 2+ on the right side and 2+ on the left side.      Heart sounds: S1 normal and S2 normal. Murmur heard.    Crescendo decrescendo systolic murmur is present with a grade of 2/6.   Decrescendo diastolic murmur is present with a grade of 2/4.    No friction rub. No gallop. No S3 or S4 sounds.   Pulmonary:      Effort: Pulmonary effort is normal. No respiratory distress.      Breath sounds: Normal breath sounds. No wheezing or rales.   Chest:      Chest wall: No tenderness.   Abdominal:      General: Bowel " sounds are normal. There is no distension.      Palpations: Abdomen is soft.      Tenderness: There is no abdominal tenderness.   Musculoskeletal:         General: No tenderness. Normal range of motion.      Cervical back: Normal range of motion and neck supple.   Skin:     General: Skin is warm and dry.      Findings: No erythema or rash.   Neurological:      Mental Status: She is alert and oriented to person, place, and time.      Cranial Nerves: No cranial nerve deficit (Cranial nerves II through XII grossly intact).   Psychiatric:         Behavior: Behavior normal.         Thought Content: Thought content normal.         Judgment: Judgment normal.     No change in physical exam since prior 12/14/2020    LABS:  Lab Results   Component Value Date/Time    CHOLSTRLTOT 205 (H) 01/15/2022 09:45 AM     (H) 01/15/2022 09:45 AM    HDL 56 01/15/2022 09:45 AM    TRIGLYCERIDE 80 01/15/2022 09:45 AM       Lab Results   Component Value Date/Time    WBC 4.0 (L) 05/10/2022 09:21 AM    WBC 3.9 (L) 04/16/2011 12:00 AM    RBC 3.84 (L) 05/10/2022 09:21 AM    RBC 4.63 04/16/2011 12:00 AM    HEMOGLOBIN 10.4 (L) 05/10/2022 09:21 AM    HEMATOCRIT 33.5 (L) 05/10/2022 09:21 AM    MCV 87.2 05/10/2022 09:21 AM    MCV 92 04/16/2011 12:00 AM    NEUTSPOLYS 48.80 05/10/2022 09:21 AM    LYMPHOCYTES 34.80 05/10/2022 09:21 AM    MONOCYTES 12.30 05/10/2022 09:21 AM    EOSINOPHILS 2.80 05/10/2022 09:21 AM    BASOPHILS 1.00 05/10/2022 09:21 AM    ANISOCYTOSIS 1+ 10/14/2021 03:31 PM     Lab Results   Component Value Date/Time    SODIUM 140 05/10/2022 09:21 AM    POTASSIUM 4.8 05/10/2022 09:21 AM    CHLORIDE 105 05/10/2022 09:21 AM    CO2 24 05/10/2022 09:21 AM    GLUCOSE 91 05/10/2022 09:21 AM    BUN 23 (H) 05/10/2022 09:21 AM    CREATININE 1.18 05/10/2022 09:21 AM    CREATININE 1.05 (H) 02/07/2013 11:31 AM    BUNCREATRAT 25 12/08/2017 08:57 AM    BUNCREATRAT 30 (H) 02/07/2013 11:31 AM         Lab Results   Component Value Date/Time     ALKPHOSPHAT 65 05/10/2022 09:21 AM    ASTSGOT 24 05/10/2022 09:21 AM    ALTSGPT 14 05/10/2022 09:21 AM    TBILIRUBIN 0.4 05/10/2022 09:21 AM      Lab Results   Component Value Date/Time    BNPBTYPENAT 181 (H) 02/20/2018 10:05 AM      Lab Results   Component Value Date/Time    TSH 2.320 04/14/2016 08:27 AM     Lab Results   Component Value Date/Time    PROTHROMBTM 13.1 11/04/2021 12:40 PM    INR 1.02 11/04/2021 12:40 PM        ECHO CONCLUSIONS (11/2/2017):  Normal left ventricular chamber size.  Mild concentric left ventricular hypertrophy.  Left ventricular ejection fraction is visually estimated to be 70%.  Mild aortic insufficiency.  Mild tricuspid regurgitation.  Estimated right ventricular systolic pressure  is 50 mmHg.    EKG (2/20/2018):  I have personally reviewed the EKG this visit and discussed with the patient.  SINUS RHYTHM       Assessment:     1. Essential hypertension  metoprolol SR (TOPROL XL) 50 MG TABLET SR 24 HR   2. PAF (paroxysmal atrial fibrillation) (HCC)  metoprolol SR (TOPROL XL) 50 MG TABLET SR 24 HR   3. Nonrheumatic aortic valve insufficiency     4. On continuous oral anticoagulation     5. S/P thoracic aortic aneurysm repair  CT-CTA COMPLETE THORACOABDOMINAL AORTA   6. Renovascular hypertension         Medical Decision Making:  Today's Assessment / Status / Plan:     In sinus rhythm today, PAF since discontinuation of amiodarone without much change in her other symptomology.  Blood pressures remain uncontrolled.  She indicates her home blood pressures are better but cannot produce any documentation of such even after requesting it several times.  I would therefore increase her Toprol-XL from 25 mg to 50 mg daily, suggest CTA of the aorta and once the contrast shortage has improved, can be within the next 6 to 12 months.  Continue other medical therapy.  Follow-up with cardiology in 6 months.

## 2022-06-23 DIAGNOSIS — I10 ESSENTIAL HYPERTENSION: ICD-10-CM

## 2022-06-23 DIAGNOSIS — I48.0 PAF (PAROXYSMAL ATRIAL FIBRILLATION) (HCC): ICD-10-CM

## 2022-06-27 ENCOUNTER — TELEPHONE (OUTPATIENT)
Dept: MEDICAL GROUP | Facility: MEDICAL CENTER | Age: 87
End: 2022-06-27
Payer: MEDICARE

## 2022-06-27 DIAGNOSIS — Z98.890 STATUS POST LUMBAR SURGERY: Chronic | ICD-10-CM

## 2022-06-27 RX ORDER — APIXABAN 5 MG/1
TABLET, FILM COATED ORAL
Qty: 180 TABLET | Refills: 3 | Status: SHIPPED | OUTPATIENT
Start: 2022-06-27 | End: 2023-04-21 | Stop reason: SDUPTHER

## 2022-06-27 NOTE — TELEPHONE ENCOUNTER
Malasasha Samson Wada  [phone] (home)        Pt called and LM, states that it has been a year since she fell and no longer has any pain. Wants to d/c Gabapentin. Please advise.

## 2022-06-28 NOTE — TELEPHONE ENCOUNTER
Please ask if she is still taking the gabapentin 2 tablets 3 times a day.  If so we need to gradually decrease the dose.    She can go to 1 tablet 3 times a day for 1 week and if her pain is not worse, she can at that point stop the medication.

## 2022-08-02 ENCOUNTER — HOSPITAL ENCOUNTER (OUTPATIENT)
Dept: LAB | Facility: MEDICAL CENTER | Age: 87
End: 2022-08-02
Attending: INTERNAL MEDICINE
Payer: MEDICARE

## 2022-08-02 DIAGNOSIS — E60 ZINC DEFICIENCY: ICD-10-CM

## 2022-08-02 DIAGNOSIS — D64.9 ANEMIA, UNSPECIFIED TYPE: ICD-10-CM

## 2022-08-02 DIAGNOSIS — E61.0 COPPER DEFICIENCY: ICD-10-CM

## 2022-08-02 LAB
BASOPHILS # BLD AUTO: 1.4 % (ref 0–1.8)
BASOPHILS # BLD: 0.06 K/UL (ref 0–0.12)
EOSINOPHIL # BLD AUTO: 0.1 K/UL (ref 0–0.51)
EOSINOPHIL NFR BLD: 2.3 % (ref 0–6.9)
ERYTHROCYTE [DISTWIDTH] IN BLOOD BY AUTOMATED COUNT: 50 FL (ref 35.9–50)
FERRITIN SERPL-MCNC: 28.8 NG/ML (ref 10–291)
FOLATE SERPL-MCNC: 39.2 NG/ML
HCT VFR BLD AUTO: 36.4 % (ref 37–47)
HGB BLD-MCNC: 11.4 G/DL (ref 12–16)
HGB RETIC QN AUTO: 29.8 PG/CELL (ref 29–35)
IMM GRANULOCYTES # BLD AUTO: 0.02 K/UL (ref 0–0.11)
IMM GRANULOCYTES NFR BLD AUTO: 0.5 % (ref 0–0.9)
IMM RETICS NFR: 17 % (ref 9.3–17.4)
IRON SATN MFR SERPL: 19 % (ref 15–55)
IRON SERPL-MCNC: 73 UG/DL (ref 40–170)
LYMPHOCYTES # BLD AUTO: 1.27 K/UL (ref 1–4.8)
LYMPHOCYTES NFR BLD: 29.4 % (ref 22–41)
MCH RBC QN AUTO: 27.7 PG (ref 27–33)
MCHC RBC AUTO-ENTMCNC: 31.3 G/DL (ref 33.6–35)
MCV RBC AUTO: 88.3 FL (ref 81.4–97.8)
MONOCYTES # BLD AUTO: 0.39 K/UL (ref 0–0.85)
MONOCYTES NFR BLD AUTO: 9 % (ref 0–13.4)
NEUTROPHILS # BLD AUTO: 2.48 K/UL (ref 2–7.15)
NEUTROPHILS NFR BLD: 57.4 % (ref 44–72)
NRBC # BLD AUTO: 0 K/UL
NRBC BLD-RTO: 0 /100 WBC
PLATELET # BLD AUTO: 210 K/UL (ref 164–446)
PMV BLD AUTO: 9.4 FL (ref 9–12.9)
RBC # BLD AUTO: 4.12 M/UL (ref 4.2–5.4)
RETICS # AUTO: 0.07 M/UL (ref 0.04–0.06)
RETICS/RBC NFR: 1.8 % (ref 0.8–2.1)
TIBC SERPL-MCNC: 377 UG/DL (ref 250–450)
UIBC SERPL-MCNC: 304 UG/DL (ref 110–370)
VIT B12 SERPL-MCNC: 777 PG/ML (ref 211–911)
WBC # BLD AUTO: 4.3 K/UL (ref 4.8–10.8)

## 2022-08-02 PROCEDURE — 83540 ASSAY OF IRON: CPT

## 2022-08-02 PROCEDURE — 84165 PROTEIN E-PHORESIS SERUM: CPT

## 2022-08-02 PROCEDURE — 83550 IRON BINDING TEST: CPT

## 2022-08-02 PROCEDURE — 85025 COMPLETE CBC W/AUTO DIFF WBC: CPT

## 2022-08-02 PROCEDURE — 85046 RETICYTE/HGB CONCENTRATE: CPT

## 2022-08-02 PROCEDURE — 84630 ASSAY OF ZINC: CPT

## 2022-08-02 PROCEDURE — 84155 ASSAY OF PROTEIN SERUM: CPT

## 2022-08-02 PROCEDURE — 82607 VITAMIN B-12: CPT

## 2022-08-02 PROCEDURE — 82525 ASSAY OF COPPER: CPT

## 2022-08-02 PROCEDURE — 82746 ASSAY OF FOLIC ACID SERUM: CPT

## 2022-08-02 PROCEDURE — 88184 FLOWCYTOMETRY/ TC 1 MARKER: CPT

## 2022-08-02 PROCEDURE — 82728 ASSAY OF FERRITIN: CPT

## 2022-08-02 PROCEDURE — 88185 FLOWCYTOMETRY/TC ADD-ON: CPT | Mod: 91

## 2022-08-02 PROCEDURE — 36415 COLL VENOUS BLD VENIPUNCTURE: CPT

## 2022-08-05 LAB
ACUTE LEUKEMIA MARKERS SPEC-IMP: NORMAL
ALBUMIN SERPL ELPH-MCNC: 3.93 G/DL (ref 3.75–5.01)
ALPHA1 GLOB SERPL ELPH-MCNC: 0.35 G/DL (ref 0.19–0.46)
ALPHA2 GLOB SERPL ELPH-MCNC: 0.81 G/DL (ref 0.48–1.05)
B-GLOBULIN SERPL ELPH-MCNC: 0.77 G/DL (ref 0.48–1.1)
COPPER SERPL-MCNC: 99.1 UG/DL (ref 80–155)
EVENTS COUNTED SPEC: 26 MARKERS
GAMMA GLOB SERPL ELPH-MCNC: 1.04 G/DL (ref 0.62–1.51)
INTERPRETATION SERPL IFE-IMP: NORMAL
MONOCLON BAND OBS SERPL: NORMAL
PATHOLOGY STUDY: NORMAL
PROT SERPL-MCNC: 6.9 G/DL (ref 6.3–8.2)
SOURCE 9121: NORMAL
ZINC SERPL-MCNC: 108.6 UG/DL (ref 60–120)

## 2022-08-07 ENCOUNTER — TELEPHONE (OUTPATIENT)
Dept: MEDICAL GROUP | Facility: MEDICAL CENTER | Age: 87
End: 2022-08-07
Payer: MEDICARE

## 2022-08-08 NOTE — TELEPHONE ENCOUNTER
I called the patient and left a message, please notify her that her counts have improved, she is still slightly anemic but it is better, she has normal iron levels, normal vitamin B12 levels and no evidence of blood cancer.

## 2022-08-09 ENCOUNTER — TELEPHONE (OUTPATIENT)
Dept: MEDICAL GROUP | Facility: MEDICAL CENTER | Age: 87
End: 2022-08-09
Payer: MEDICARE

## 2022-08-09 NOTE — TELEPHONE ENCOUNTER
----- Message from Tee Tran M.D. sent at 8/7/2022  9:42 PM PDT -----  I called the patient and left a message, please notify her that her counts have improved, she is still slightly anemic but it is better, she has normal iron levels, normal vitamin B12 levels and no evidence of blood cancer.

## 2022-08-14 ENCOUNTER — HOSPITAL ENCOUNTER (EMERGENCY)
Facility: MEDICAL CENTER | Age: 87
End: 2022-08-14
Attending: EMERGENCY MEDICINE
Payer: MEDICARE

## 2022-08-14 ENCOUNTER — APPOINTMENT (OUTPATIENT)
Dept: RADIOLOGY | Facility: MEDICAL CENTER | Age: 87
End: 2022-08-14
Attending: EMERGENCY MEDICINE
Payer: MEDICARE

## 2022-08-14 VITALS
HEIGHT: 64 IN | WEIGHT: 150 LBS | SYSTOLIC BLOOD PRESSURE: 196 MMHG | TEMPERATURE: 98.1 F | BODY MASS INDEX: 25.61 KG/M2 | HEART RATE: 71 BPM | RESPIRATION RATE: 17 BRPM | DIASTOLIC BLOOD PRESSURE: 81 MMHG | OXYGEN SATURATION: 91 %

## 2022-08-14 DIAGNOSIS — M25.561 CHRONIC PAIN OF RIGHT KNEE: ICD-10-CM

## 2022-08-14 DIAGNOSIS — N30.90 CYSTITIS: ICD-10-CM

## 2022-08-14 DIAGNOSIS — S20.229A CONTUSION OF THORACIC SPINE: ICD-10-CM
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
## 2022-08-14 DIAGNOSIS — Z96.0 INDWELLING URINARY CATHETER PRESENT: ICD-10-CM

## 2022-08-14 DIAGNOSIS — G89.29 CHRONIC PAIN OF RIGHT KNEE: ICD-10-CM

## 2022-08-14 LAB
ALBUMIN SERPL BCP-MCNC: 4.1 G/DL (ref 3.2–4.9)
ALBUMIN/GLOB SERPL: 1.5 G/DL
ALP SERPL-CCNC: 58 U/L (ref 30–99)
ALT SERPL-CCNC: 15 U/L (ref 2–50)
ANION GAP SERPL CALC-SCNC: 13 MMOL/L (ref 7–16)
APPEARANCE UR: CLEAR
AST SERPL-CCNC: 21 U/L (ref 12–45)
BACTERIA #/AREA URNS HPF: ABNORMAL /HPF
BASOPHILS # BLD AUTO: 1 % (ref 0–1.8)
BASOPHILS # BLD: 0.05 K/UL (ref 0–0.12)
BILIRUB SERPL-MCNC: 0.4 MG/DL (ref 0.1–1.5)
BILIRUB UR QL STRIP.AUTO: NEGATIVE
BUN SERPL-MCNC: 27 MG/DL (ref 8–22)
CALCIUM SERPL-MCNC: 9.7 MG/DL (ref 8.5–10.5)
CHLORIDE SERPL-SCNC: 102 MMOL/L (ref 96–112)
CO2 SERPL-SCNC: 21 MMOL/L (ref 20–33)
COLOR UR: YELLOW
CREAT SERPL-MCNC: 1.06 MG/DL (ref 0.5–1.4)
EKG IMPRESSION: NORMAL
EOSINOPHIL # BLD AUTO: 0.15 K/UL (ref 0–0.51)
EOSINOPHIL NFR BLD: 3.1 % (ref 0–6.9)
EPI CELLS #/AREA URNS HPF: NEGATIVE /HPF
ERYTHROCYTE [DISTWIDTH] IN BLOOD BY AUTOMATED COUNT: 48.8 FL (ref 35.9–50)
GFR SERPLBLD CREATININE-BSD FMLA CKD-EPI: 51 ML/MIN/1.73 M 2
GLOBULIN SER CALC-MCNC: 2.8 G/DL (ref 1.9–3.5)
GLUCOSE SERPL-MCNC: 106 MG/DL (ref 65–99)
GLUCOSE UR STRIP.AUTO-MCNC: NEGATIVE MG/DL
HCT VFR BLD AUTO: 35.2 % (ref 37–47)
HGB BLD-MCNC: 11.3 G/DL (ref 12–16)
HYALINE CASTS #/AREA URNS LPF: ABNORMAL /LPF
IMM GRANULOCYTES # BLD AUTO: 0.06 K/UL (ref 0–0.11)
IMM GRANULOCYTES NFR BLD AUTO: 1.2 % (ref 0–0.9)
KETONES UR STRIP.AUTO-MCNC: NEGATIVE MG/DL
LEUKOCYTE ESTERASE UR QL STRIP.AUTO: ABNORMAL
LYMPHOCYTES # BLD AUTO: 1.39 K/UL (ref 1–4.8)
LYMPHOCYTES NFR BLD: 28.7 % (ref 22–41)
MCH RBC QN AUTO: 27.8 PG (ref 27–33)
MCHC RBC AUTO-ENTMCNC: 32.1 G/DL (ref 33.6–35)
MCV RBC AUTO: 86.7 FL (ref 81.4–97.8)
MICRO URNS: ABNORMAL
MONOCYTES # BLD AUTO: 0.55 K/UL (ref 0–0.85)
MONOCYTES NFR BLD AUTO: 11.3 % (ref 0–13.4)
NEUTROPHILS # BLD AUTO: 2.65 K/UL (ref 2–7.15)
NEUTROPHILS NFR BLD: 54.7 % (ref 44–72)
NITRITE UR QL STRIP.AUTO: POSITIVE
NRBC # BLD AUTO: 0 K/UL
NRBC BLD-RTO: 0 /100 WBC
PH UR STRIP.AUTO: 6.5 [PH] (ref 5–8)
PLATELET # BLD AUTO: 213 K/UL (ref 164–446)
PMV BLD AUTO: 8.7 FL (ref 9–12.9)
POTASSIUM SERPL-SCNC: 4.1 MMOL/L (ref 3.6–5.5)
PROT SERPL-MCNC: 6.9 G/DL (ref 6–8.2)
PROT UR QL STRIP: 30 MG/DL
RBC # BLD AUTO: 4.06 M/UL (ref 4.2–5.4)
RBC # URNS HPF: ABNORMAL /HPF
RBC UR QL AUTO: ABNORMAL
SODIUM SERPL-SCNC: 136 MMOL/L (ref 135–145)
SP GR UR STRIP.AUTO: 1.01
TROPONIN T SERPL-MCNC: 26 NG/L (ref 6–19)
UROBILINOGEN UR STRIP.AUTO-MCNC: 0.2 MG/DL
WBC # BLD AUTO: 4.9 K/UL (ref 4.8–10.8)
WBC #/AREA URNS HPF: ABNORMAL /HPF

## 2022-08-14 PROCEDURE — A9270 NON-COVERED ITEM OR SERVICE: HCPCS | Performed by: EMERGENCY MEDICINE

## 2022-08-14 PROCEDURE — 72125 CT NECK SPINE W/O DYE: CPT | Mod: MF

## 2022-08-14 PROCEDURE — 85025 COMPLETE CBC W/AUTO DIFF WBC: CPT

## 2022-08-14 PROCEDURE — 72128 CT CHEST SPINE W/O DYE: CPT | Mod: MG

## 2022-08-14 PROCEDURE — 700102 HCHG RX REV CODE 250 W/ 637 OVERRIDE(OP): Performed by: EMERGENCY MEDICINE

## 2022-08-14 PROCEDURE — 700111 HCHG RX REV CODE 636 W/ 250 OVERRIDE (IP): Performed by: EMERGENCY MEDICINE

## 2022-08-14 PROCEDURE — 99285 EMERGENCY DEPT VISIT HI MDM: CPT

## 2022-08-14 PROCEDURE — 84484 ASSAY OF TROPONIN QUANT: CPT

## 2022-08-14 PROCEDURE — 93005 ELECTROCARDIOGRAM TRACING: CPT | Performed by: EMERGENCY MEDICINE

## 2022-08-14 PROCEDURE — 700105 HCHG RX REV CODE 258: Performed by: EMERGENCY MEDICINE

## 2022-08-14 PROCEDURE — 80053 COMPREHEN METABOLIC PANEL: CPT

## 2022-08-14 PROCEDURE — 96374 THER/PROPH/DIAG INJ IV PUSH: CPT

## 2022-08-14 PROCEDURE — 70450 CT HEAD/BRAIN W/O DYE: CPT | Mod: MG

## 2022-08-14 PROCEDURE — 36415 COLL VENOUS BLD VENIPUNCTURE: CPT

## 2022-08-14 PROCEDURE — 81001 URINALYSIS AUTO W/SCOPE: CPT

## 2022-08-14 RX ORDER — CEFDINIR 300 MG/1
300 CAPSULE ORAL 2 TIMES DAILY
Qty: 20 CAPSULE | Refills: 0 | Status: SHIPPED | OUTPATIENT
Start: 2022-08-14 | End: 2022-08-22

## 2022-08-14 RX ORDER — ASPIRIN 81 MG/1
324 TABLET, CHEWABLE ORAL ONCE
Status: DISCONTINUED | OUTPATIENT
Start: 2022-08-14 | End: 2022-08-14

## 2022-08-14 RX ORDER — CEFTRIAXONE 1 G/1
1 INJECTION, POWDER, FOR SOLUTION INTRAMUSCULAR; INTRAVENOUS ONCE
Status: COMPLETED | OUTPATIENT
Start: 2022-08-14 | End: 2022-08-14

## 2022-08-14 RX ORDER — IBUPROFEN 200 MG
400 TABLET ORAL ONCE
Status: COMPLETED | OUTPATIENT
Start: 2022-08-14 | End: 2022-08-14

## 2022-08-14 RX ORDER — SODIUM CHLORIDE 9 MG/ML
1000 INJECTION, SOLUTION INTRAVENOUS ONCE
Status: COMPLETED | OUTPATIENT
Start: 2022-08-14 | End: 2022-08-14

## 2022-08-14 RX ORDER — ACETAMINOPHEN 325 MG/1
650 TABLET ORAL ONCE
Status: COMPLETED | OUTPATIENT
Start: 2022-08-14 | End: 2022-08-14

## 2022-08-14 RX ORDER — LIDOCAINE HYDROCHLORIDE AND EPINEPHRINE 10; 10 MG/ML; UG/ML
10 INJECTION, SOLUTION INFILTRATION; PERINEURAL ONCE
Status: DISCONTINUED | OUTPATIENT
Start: 2022-08-14 | End: 2022-08-14

## 2022-08-14 RX ADMIN — IBUPROFEN 400 MG: 200 TABLET, FILM COATED ORAL at 07:38

## 2022-08-14 RX ADMIN — CEFTRIAXONE SODIUM 1 G: 1 INJECTION, POWDER, FOR SOLUTION INTRAMUSCULAR; INTRAVENOUS at 08:07

## 2022-08-14 RX ADMIN — SODIUM CHLORIDE 1000 ML: 9 INJECTION, SOLUTION INTRAVENOUS at 07:02

## 2022-08-14 RX ADMIN — ACETAMINOPHEN 650 MG: 325 TABLET, FILM COATED ORAL at 07:38

## 2022-08-14 ASSESSMENT — PAIN DESCRIPTION - PAIN TYPE: TYPE: ACUTE PAIN

## 2022-08-14 ASSESSMENT — FIBROSIS 4 INDEX: FIB4 SCORE: 2.6

## 2022-08-14 NOTE — DISCHARGE INSTRUCTIONS
Please complete all days of Omnicef therapy for your urinary tract infection which I believe is contributing to your dizziness.    Drink plenty of fluids and use a walker when ambulatory.  Ace wrap to the right knee for additional support and comfort    Follow-up with Dr. Tran in a couple of days for recheck and return if significant change in symptoms or functional decline

## 2022-08-14 NOTE — ED PROVIDER NOTES
ED Provider Note    CHIEF COMPLAINT  Chief Complaint   Patient presents with    T-5000 FALL     MGLF +Head Strike -LOC +Eliquis  Approx. 3 cm Lac Posterior Scalp       HPI  Yvonne Marie Wada is a 86 y.o. female who presents as a TBI she fell while trying to empty her Dale catheter bag.  This fall occurred at home in the bathroom and she struck the back of her head as well as the back of her thoracic mid spine where she has the most pain.  She is on Eliquis for aortic valve disease.  She says that she has been dizzy for a few days and had a fall also this past Tuesday as well as today.  She did not lose consciousness and says that her pain in the mid back is moderate to severe when she moves.  She says that she is status post total knee arthroplasty on both knees but the right knee has been giving her some issues recently and feeling less stable and she is followed by [facility]    REVIEW OF SYSTEMS  See HPI for further details. All other systems are negative.     PAST MEDICAL HISTORY  Past Medical History:   Diagnosis Date    AAA (abdominal aortic aneurysm) ([facility]) 7/26/2010    10/09 CT thorax dilated ascending thoracic aorta 4.9 cm 1/10 transesophageal echo dilated aortic root, and ascending aorta with mild aortic insufficiency 1/10  ascending aortic aneurysm repair 5/12 echo [facility] normal LV function EF 60%, moderate to severe left atrial enlargement, RVSP 32     Aortic insufficiency     Aortic valve disease     Aortic valve regurgitation     Arthritis     back and knee/ osteo    Cataract     Dental disorder     full top denture, partial bottom    Diverticulosis     Dyslipidemia 7/26/2010    Sees [facility] on lipitor 4/11 chol 159,trig 84,hdl 47,ldl 95,cpk 114 7/12 chol 163,trig 120,hdl 49,ldl 90, crp 1.1 on lipitor 20 mg     GERD (gastroesophageal reflux disease) 7/12/2012    6/07 EGD per [facility] hiatal hernia, start prevacid 30 mg 7/12 protonix 20 mg qday     Glaucoma     Heart burn     Heart murmur      "Heart valve disease     \"leaking\", mitral valve    Hiatus hernia syndrome     High cholesterol     History of shingles 6/30/2011 2010 Back and left thorax      History of total knee arthroplasty 7/26/2010    4/10 MRI right knee complex tear medial meniscus, horizontal cleavage tear lateral meniscus, chondromalacia patella, moderate-sized Baker's cyst 5/10 right meniscus knee repair  5/10 told by  had gout on surgery had pathology report, I await that report Question gout seen on pathology report done during repair of right meniscus knee surgery. 7/10 uric acid 6.3, we'll await starting allopurinol depending on the operative report 8/5/10 reviewed ortho notes , op report indicates crystal deposition, could be gout or pseudogout. No bx result sent over. Will need to get. My suspicion is chondrocalcinosis. 10/11  ortho note, MRI pending 8/7/10 reviewed pathology report right knee tissue, marked degenerative changes with focal calcification, no mention of gout. Based on this and a normal uric acid level, I do not believe she has gout, has no hyperuricemia medications would be indicated 10/11  ortho left knee meniscectomy and arthroscopy 1/12      HLD (hyperlipidemia)     Hypertension     Hypothyroid 4/8/2011 4/11 tsh 9 start synthroid 50 4/11 tsh 6,free t3 3.1,free t4 1.2,tpo negative 7/1/11 tsh 2.1 cont synthroid 50 mcg 7/12 tsh 3.4 cont synthroid 50 mcg     Indigestion     Mitral insufficiency     OSTEOPOROSIS 8/9/2010    Had been on fosamax [id number]  8/10 dexa LS -2.0,hip -1.5 await old dexa result, she will get copy for me 4/11 vit d 35 9/12 dexa LS -3.2,hip -1.4 2/13/13 relast infusion     Pain     back and right leg, can get as bad as 10/10    Preventative health care 7/26/2010 2002 pneum 2005 colon [facility] no records 4/11 vit d 35 9/11 shingles vaccine 9/12 mammogram 9/12 dexa LS -3.2,hip -1.4     Rheumatic fever     S/P appendectomy " 7/26/2010    S/P TOMY (total abdominal hysterectomy) 7/26/2010    Sees gyn on HRT estradiol for 20 yrs () 11/08 mammo      Shingles 6/30/2011    Snoring     Status post lumbar surgery 7/26/2010 6/03 L4-5 epidural Dr. Jordan  7/06 overall for decompression, foraminotomy. L4-L5 posterior fusion, L4-L5 allograft       Stroke ([facility]) 1996    no residual problems     Thoracic aortic aneurysm without mention of rupture     Tricuspid insufficiency     Unspecified disorder of thyroid     hypo    Urinary bladder disorder     suprapubic catheter insertion 11/4       FAMILY HISTORY  Family History   Problem Relation Age of Onset    Stroke Mother     Heart Disease Father     Heart Disease Sister        SOCIAL HISTORY   reports that she has never smoked. She has never used smokeless tobacco. She reports current drug use. She reports that she does not drink alcohol.    SURGICAL HISTORY  Past Surgical History:   Procedure Laterality Date    TENDON REPAIR Right 11/10/2016    Procedure: TENDON REPAIR - QUADRACEPS ;  Surgeon: Maxim Herrera M.D.;  Location: [facility];  Service:     KNEE ARTHROPLASTY TOTAL Right 9/8/2016    Procedure: KNEE ARTHROPLASTY TOTAL;  Surgeon: Maxim Herrera M.D.;  Location: [facility];  Service:     FUSION, SPINE, LUMBAR, PLIF  11/24/2015    Procedure: LUMBAR FUSION POSTERIOR L3-4, EXPLORATION OF FUSION L4-5 WITH INSTRUMENTATION;  Surgeon: Hermann Reis M.D.;  Location: [facility];  Service:     LUMBAR LAMINECTOMY DISKECTOMY  11/24/2015    Procedure: LUMBAR L3-4 LAMINECTOMY AND REDO L4-5;  Surgeon: Hermann Reis M.D.;  Location: [facility];  Service:     KNEE ARTHROPLASTY TOTAL  1/3/2012    Performed by YOSEF MEJÍA at [facility]    KNEE ARTHROSCOPY  10/18/2011    Performed by YOSEF MEJÍA at [facility]    MENISCECTOMY, KNEE, MEDIAL  10/18/2011    Performed by YOSEF MEJ[facility]A at [facility]  "[facility]    AORTIC VALVE REPLACEMENT  1/12/2010    Performed by ISAÍAS NICOLE at [facility]    APPENDECTOMY      FUSION, SPINE, LUMBAR, PLIF      HYSTERECTOMY, TOTAL ABDOMINAL         CURRENT MEDICATIONS  Home Medications       Reviewed by Jose Kidd R.N. (Registered Nurse) on 08/14/22 at 0543  Med List Status: <None>     Medication Last Dose Status   benazepril (LOTENSIN) 40 MG tablet  Active   ELIQUIS 5 MG Tab  Active   ezetimibe (ZETIA) 10 MG Tab  Active   gabapentin (NEURONTIN) 100 MG Cap  Active   levothyroxine (SYNTHROID) 75 MCG Tab  Active   MAGNESIUM PO  Active   metoprolol SR (TOPROL XL) 50 MG TABLET SR 24 HR  Active   Multiple Vitamins-Minerals (CENTRUM SILVER ULTRA WOMENS PO)  Active   omeprazole (PRILOSEC) 20 MG delayed-release capsule  Active                    ALLERGIES  Allergies   Allergen Reactions    Amoxicillin Vomiting     Upset stomach, ok if needed for life saving treatment (per pt)    Codeine Nausea     Synthetic codones ok    Doxycycline Nausea     Nausea, Ok in life saving situation (per pt)    Sulfamethoxazole-Trimethoprim Vomiting and Nausea     Ok in a life saving situation (per pt)    Tramadol Vomiting and Nausea     nausea    Trazodone Vomiting     groggy       PHYSICAL EXAM  VITAL SIGNS: Ht 1.626 m (5' 4\")   Wt 68 kg (150 lb)   BMI 25.75 kg/m²    Constitutional: Elderly female, No acute distress, Non-toxic appearance.   HENT: Normocephalic, small abrasion at the posterior occiput without laceration that is hemostatic, Bilateral external ears normal, Oropharynx is clear mucous membranes are moist. No oral exudates or nasal discharge.   Eyes: Pupils are equal round and reactive, EOMI, Conjunctiva normal, No discharge.   Neck: Normal range of motion, No midline cervical spine tenderness, Supple, No stridor. No meningismus.  Lymphatic: No lymphadenopathy noted.   Cardiovascular: Regular rate and rhythm without murmur rub or gallop.  Thorax & Lungs: Clear " breath sounds bilaterally without wheezes, rhonchi or rales. There is no chest wall tenderness.   Abdomen: Soft non-tender non-distended. There is no rebound or guarding. No organomegaly is appreciated. Bowel sounds are normal.  Indwelling Dale catheter present with yellow clear urine in the bag  Skin: Normal without rash.   Back: No CVA or spinal tenderness.   Extremities: Intact distal pulses, No edema, No tenderness, No cyanosis, No clubbing. Capillary refill is less than 2 seconds.  Musculoskeletal: Good range of motion in all major joints. No tenderness to palpation or major deformities noted.   Neurologic: Alert & oriented x 3, Normal motor function, Normal sensory function, No focal deficits noted. Reflexes are normal.  Psychiatric: Affect normal, Judgment normal, Mood normal. There is no suicidal ideation or patient reported hallucinations.     EKG  Results for orders placed or performed during the hospital encounter of 22   EKG (NOW)   Result Value Ref Range    Report       [facility] Emergency Dept.    Test Date:  2022  Pt Name:    YVONNE WADA                  Department: ER  MRN:        [id number]                      Room:       [facility]  Gender:     Female                       Technician: [id number]  :        [id number]                   Requested By:HODAN RECINOS  Order #:    [id number]                    Reading MD: HODAN RECINOS MD    Measurements  Intervals                                Axis  Rate:       68                           P:          -36  OR:         288                          QRS:        33  QRSD:       95                           T:          52  QT:         392  QTc:        417    Interpretive Statements  Sinus rhythm  Prolonged OR interval  Baseline wander in lead(s) V1  Compared to ECG 2021 10:15:33  No significant changes  Electronically Signed On 2022 8:13:27 PDT by HODAN RECINOS MD           RADIOLOGY/PROCEDURES  CT-HEAD W/O   Final Result       1.  Diffuse atrophy and white matter changes.   2.  No acute intracranial hemorrhage or territorial infarct.            CT-CSPINE WITHOUT PLUS RECONS   Final Result      1.  Severe multilevel degenerative changes cervical spine.   2.  Grade 1 anterolisthesis at C3-4, likely degenerative.   3.  No acute cervical spine fracture.      CT-TSPINE W/O PLUS RECONS   Final Result      1.  Moderate multilevel degenerative change of thoracic spine with accentuated kyphosis.   2.  Multilevel mild/moderate compression deformities as described above, likely chronic although acute fracture is difficult to entirely exclude.   3.  No segmental malalignment.            COURSE & MEDICAL DECISION MAKING  Pertinent Labs & Imaging studies reviewed. (See chart for details)  The patient arrived as a TBI and after initial evaluation we sent her for CAT scan of the head, C-spine and thoracic spine and no evidence of skull fracture, intracranial bleed or spine fracture was identified.    Examination after cleansing of the scalp wound shows no evidence of laceration in need of repair    Laboratory evaluation reveals mild anemia that is baseline, no leukocytosis or shift, no evidence of electrolyte derangements.  Urinalysis shows 5-10 white cells per high-power field, many bacteria with positive nitrites and therefore we will treat as cystitis which may be contributing to her balance issues    After Rocephin therapy and Ace wrap to the right knee the patient felt okay ambulating with a walker under her own power.  Both patient and  feel good about taking her home and continuing antibiotic therapy with Omnicef for 10 days with follow-up to her primary doctor in 2 days and return here if she is in functional decline    FINAL IMPRESSION  1. Fall, initial encounter    2. Contusion of thoracic spine    3. Cystitis    4. Indwelling urinary catheter present    5. Chronic pain of right knee             Electronically signed by: Alban Hall,  M.D., 8/14/2022 5:45 AM

## 2022-08-14 NOTE — ED NOTES
Labs collected and sent, EKG done and read by ERP.  at bedside. Updated on POC - pending labs and imaging results, pt and  verbalized understanding.

## 2022-08-14 NOTE — ED NOTES
Reviewed discharge instructions, pt verbalized understanding of instructions and medication. States she will schedule follow-up appointment. Denies further questions at this time. Pt wheeled out of ED to be driven home by family.

## 2022-08-14 NOTE — ED TRIAGE NOTES
"Chief Complaint   Patient presents with    T-5000 FALL     MGLF +Head Strike -LOC +Eliquis  Approx. 3 cm Lac Posterior Scalp     Patient BIB [facility] from home after a MGLF. Patient was in the bathroom emptying out her cather when her legs gave out and she fell, striking her head. Patient takes Eliquis for chronic AFIB, denies LOC but has a small approx. 3 cm laceration to her posterior scalp.     Patient is AAO4, GCS 15 on arrival and in NAD.    Patient complaining of midline back pain as well as head pain.    EMS administered 50 mcg IV Fentanyl PTA.    Ht 1.626 m (5' 4\")   Wt 68 kg (150 lb)     "

## 2022-08-18 ENCOUNTER — TELEPHONE (OUTPATIENT)
Dept: MEDICAL GROUP | Facility: MEDICAL CENTER | Age: 87
End: 2022-08-18
Payer: MEDICARE

## 2022-08-19 NOTE — TELEPHONE ENCOUNTER
Yvonne Marie Wada's dtr [facility]  [phone]    Pt fell and had to go to ER. Dtr - [facility] wants her seen. You have no immediate availability.

## 2022-08-22 ENCOUNTER — OFFICE VISIT (OUTPATIENT)
Dept: MEDICAL GROUP | Facility: MEDICAL CENTER | Age: 87
End: 2022-08-22
Payer: MEDICARE

## 2022-08-22 ENCOUNTER — TELEPHONE (OUTPATIENT)
Dept: MEDICAL GROUP | Facility: MEDICAL CENTER | Age: 87
End: 2022-08-22

## 2022-08-22 VITALS
SYSTOLIC BLOOD PRESSURE: 110 MMHG | TEMPERATURE: 99 F | OXYGEN SATURATION: 95 % | HEIGHT: 64 IN | WEIGHT: 150 LBS | HEART RATE: 55 BPM | BODY MASS INDEX: 25.61 KG/M2 | DIASTOLIC BLOOD PRESSURE: 58 MMHG

## 2022-08-22 DIAGNOSIS — M25.561 CHRONIC PAIN OF RIGHT KNEE: ICD-10-CM

## 2022-08-22 DIAGNOSIS — N18.31 STAGE 3A CHRONIC KIDNEY DISEASE: ICD-10-CM

## 2022-08-22 DIAGNOSIS — G89.29 CHRONIC PAIN OF RIGHT KNEE: ICD-10-CM

## 2022-08-22 DIAGNOSIS — R94.4 DECREASED GFR: ICD-10-CM

## 2022-08-22 DIAGNOSIS — M81.0 SENILE OSTEOPOROSIS: ICD-10-CM

## 2022-08-22 DIAGNOSIS — I48.0 PAROXYSMAL ATRIAL FIBRILLATION (HCC): ICD-10-CM

## 2022-08-22 DIAGNOSIS — M81.0 OSTEOPOROSIS, UNSPECIFIED OSTEOPOROSIS TYPE, UNSPECIFIED PATHOLOGICAL FRACTURE PRESENCE: ICD-10-CM

## 2022-08-22 PROCEDURE — 99214 OFFICE O/P EST MOD 30 MIN: CPT | Performed by: INTERNAL MEDICINE

## 2022-08-22 RX ORDER — TRAMADOL HYDROCHLORIDE 50 MG/1
TABLET ORAL
COMMUNITY
End: 2022-08-22

## 2022-08-22 RX ORDER — DIPHENHYDRAMINE HYDROCHLORIDE 50 MG/ML
50 INJECTION INTRAMUSCULAR; INTRAVENOUS PRN
Status: CANCELLED | OUTPATIENT
Start: 2022-09-05

## 2022-08-22 RX ORDER — EPINEPHRINE 1 MG/ML(1)
0.5 AMPUL (ML) INJECTION PRN
Status: CANCELLED | OUTPATIENT
Start: 2022-09-05

## 2022-08-22 RX ORDER — METHYLPREDNISOLONE SODIUM SUCCINATE 125 MG/2ML
125 INJECTION, POWDER, LYOPHILIZED, FOR SOLUTION INTRAMUSCULAR; INTRAVENOUS PRN
Status: CANCELLED | OUTPATIENT
Start: 2022-09-05

## 2022-08-22 ASSESSMENT — FIBROSIS 4 INDEX: FIB4 SCORE: 2.16

## 2022-08-22 NOTE — PROGRESS NOTES
Subjective     Yvonne Marie Wada is a 86 y.o. female who presents with ER follow up          HPI  Patient is here with her daughter, new complaint follow-up ER, ER notes reviewed.  8/14/22 ER note patient fell while trying to empty davenport bag, patient indicates that she felt her right knee buckle.  She has been having problems with her right knee ever since her initial right knee total arthroplasty in 2016 and revision in November 2016 quadriceps avulsion repair, patient will have right knee discomfort with activity or just standing, has fallen previously when her right knee will buckle.  In the ER she underwent scans including a CT head negative, CT cervical spine several multilevel degenerative changes, no acute fracture, CT thoracic spine moderate multilevel degenerative changes, accentuated kyphosis, mild/moderate compression deformities mild height loss T3 chronic, moderate loss T7 and T8 chronic, mild height loss T11 chronic.  Laboratory tests in the ER showing 8/14/22 BUN 27, creatinine 1.06, GFR 51, WBC 4.9, hemoglobin 11, hematocrit 35, UA 5-10 RBC  She is followed by urology with a suprapubic catheter and plan is to remove that in the next few weeks.  She had a positive UA in the emergency room and was given Omnicef which she is completing.  She maintains on gabapentin for chronic back pain.  Paroxysmal atrial fibrillation followed by cardiology, remains on amiodarone, Eliquis, Toprol.  Has not noticed any palpitations, chest pain.  For the chronic pain she does take Tylenol and occasional Advil with no GI upset or bleeding.  Medications, allergies, medical history, surgical history, social history, family history  reviewed and updated    Current Outpatient Medications   Medication Sig Dispense Refill    ELIQUIS 5 MG Tab TAKE 1 TABLET BY MOUTH TWICE A  Tablet 3    traMADol (ULTRAM) 50 MG Tab tramadol 50 mg tablet   TAKE 1 TAB ORALLY EVERY 8 HOURS AS NEEDED FOR MODERATE PAIN (PELVIC FRACTURE & BACK)  30 DAY SUPPLY      cefdinir (OMNICEF) 300 MG Cap Take 1 Capsule by mouth 2 times a day. 20 Capsule 0    gabapentin (NEURONTIN) 100 MG Cap TAKE 2 CAPSULES BY MOUTH 3 TIMES A DAY AS NEEDED (PAIN). INDICATIONS: BACK AND NERVE PAIN 180 Capsule 3    metoprolol SR (TOPROL XL) 50 MG TABLET SR 24 HR Take 1 Tablet by mouth every morning. 90 Tablet 3    ezetimibe (ZETIA) 10 MG Tab TAKE 1 TABLET BY MOUTH EVERY DAY 90 Tablet 3    levothyroxine (SYNTHROID) 75 MCG Tab Take 1 Tablet by mouth every morning on an empty stomach. 90 Tablet 3    benazepril (LOTENSIN) 40 MG tablet Take 1 Tablet by mouth every morning. 90 Tablet 3    MAGNESIUM PO Take 1 capsule by mouth every evening.      omeprazole (PRILOSEC) 20 MG delayed-release capsule Take 20 mg by mouth every morning.      Multiple Vitamins-Minerals (CENTRUM SILVER ULTRA WOMENS PO) Take 1 tablet by mouth every evening.       No current facility-administered medications for this visit.       anemia  9/28/20 hgb 12.6,hct 38  6/22/21 hgb 9,hct 29,mcv 86  9/15/21 hgb 8.0,hct 26,mcv 76  9/15/21 FIT negative  10/14/21 hgb 9.8,hct 33,mcv 85,iron 23,%sat 6,ferritin 70,b12 716,retic 2.6%,SPEP negative  11/4/21 hgb 11.1,hct 35,mcv 84  12/20/21 off iron  1/15/22 hgb 11,hct 35,mcv 86,iron 45,%sat 12,ferritin 32,b12 839 off iron (%sat improved)  5/10/22 hgb 10.4,hct 33.5,mcv 87  8/2/22 hgb 11.4,hct 36.4,mcv 88,iron 73,%sat 19,ferritin 29,b12 777,folate 39,flow cytometry negative,retic 1.8%, zinc 108,copper 99,SPEP negative  8/14/22 ghb 11,hct 35     Decreased GFR  5/31/09 bun 18,creat 1.2  1/14/10 bun 28,creat 1.3,GFR 40  9/20/12 bun 37,creat 1.1,GFR 48  9/25/13 bun 25,creat 1.2,GFR 44  9/26/14 bun 26,creat 1.1,GFR 45  2/23/15 bun 20,creat 1.1,GFR 48  4/15/16 bun 31,creat 1.1,GFR 45  7/6/16 bun 29,creat 1.1,GFR 44   5/12/17 bun 35,creat 1.1,GFR 44  11/2/17 bun 22,creat 1.28,GFR 40  12/9/17 bun 29,creat 1.1,GFR 43  10/4/18 bun 31,creat 1.5 at [facility]  1/18/19 bun 33,creat 1.34,GFR  38,PTH 53,calcium 9.7,phos 3.6,spep negative  2/4/20 bun 30,creat 1.24, GFR 41, PTH 58, calcium 9.7, phos 3.9  8/20/20 bun 35,creat 1.12,GFR 46  9/28/20 bun 31,creat 1.1,GFR 47  6/8/21 bun 38,creat 1.46,GFR 34  6/27/21 Na 125,bun 64,creat 1.52 (hospital admission acute sacral fracture)  9/15/21 bun 25,creat 0.84,GFR>60  10/14/21 bun 15,creat 0.75,GFR>60,PTH 31calcium 9.4,phos 3.3,SPEP negative  5/10/22 bun 23,creat 1.18,GFR 45  8/14/22 bun 27,creat 1.06,GFR 51     Dyslipidemia  4/11 chol 159,trig 84,hdl 47,ldl 95,cpk 114  7/12 chol 163,trig 120,hdl 49,ldl 90, crp 1.1 on lipitor 20 mg  3/26/13 chol 159,trig 99,hdl 46,ldl 93 on lipitor 20 mg  9/25/13 chol 164,trig 100,hdl 47,ldl 97 on lipitor 20 mg  9/12/14 cardiology note lower extremity weakness, hold lipitor x3 months, labs ordered  12/15/14 cardiology start low dose lipitor 10 mg  1/22/15 off lipitor will resume 10 mg and repeat labs 4 weeks  2/24/15 chol 179,trig 111,hdl 54,ldl 103 on lipitor 10 mg  2/1/16 cardiology note restart lipitor 20 mg    4/15/16 chol 163,trig 102,hdl 48,ldl 95 on lipitor 20 mg  5/12/17 chol 186,trig 101,hdl 47,ldl 119 on lipitor 20 mg intermittently will start taking daily  11/1/17 chol 146,trig 73,hdl 42,ldl 89 on lipitor 20 mg  12/9/17 chol 133,trig 74,hdl 49,ldl 69 on lipitor 80 mg higher dose due to recent transient ischemic attack  10/4/18 chol 126,trig 98,hdl 44,ldl 62 on lipitor 80 mg at [facility]  1/18/19 chol 161,trig 103,hdl 45,ldl 95 on lipitor 80 mg  2/4/20 chol 121,trig 71,hdl 45,ldl 62 on lipitor 80 mg  8/17/20 stop lipitor due to muscle pain   9/17/20 improved off lipitor repeat lipid pending  10/3/20 chol 240,trig 111,hdl 60,ldl 158, recommend start crestor 10 mg and repeat labs 6 weeks  10/26/20 chol 158,trig 106,hdl 62,ldl 75 on crestor 20 mg  4/20/21  off statin, she agrees to try zetia  1/15/22 chol 205,trig 80,hdl 56,ldl 133 did not start zetia, will start zetia 10 mg  Lipitor,crestor muscle pain       gait disorder  4/22/21 custom PT evaluation   3/1/22 custom PT evaluation      Gastroesophageal reflux  6/27/07 EGD per [facility] hiatal hernia, start prevacid 30 mg  7/12 protonix 20 mg qday  11/16/12 [facility] note cont prilosec  1/5/16 change to protonix 40 mg from prilosec  2/4/20 vit d 67  10/14/21 vit d 78,b12 716 on prilosec  1/15/22 vit d 81,b12 839 on prilosec  5/10/22 magnesium 2.0, vit d 72     history insomnia   1/30/17 trial of trazodone 50 mg  4/4/17 side effects with trazodone drowsiness, discontinued     History pelvic fracture  6/22/21 CT pelvis without post reconstruction, comminuted mildly displaced fracture of right inferior and superior pubic rami, right sacral ritu fracture, right hip prosthesis normally located, moderate degenerative change left hip  6/25/21 hospital admission, 6/28/21 hospital discharge, admitted with pelvic fracture, seen by orthopedics no surgical intervention necessary, seen by physical therapy and occupational therapy recommended skilled nursing placement, urinary retention discharged with davenport catheter  6/25/21 CT pelvis without, stable appearance of minimally displaced fractures right sacral ritu, right pubic ramus posteriorly, and right ischial ramus, no acute pelvic fracture, no hip dislocation, right hip total arthroplasty in place  6/27/21 physical therapy hospital note recommend postacute placement for additional physical therapy services  10/7/21 on neurontin 100 mg tid, increase to 200 mg tid     history of pulmonary hypertension  11/17/18 cardiology note asymptomatic, continue cholesterol medication, continue to monitor echo aortic valve, repaired ascending aorta, follow-up yearly  11/21/18 echo normal LV function, EF 60%, mild LVH, grade 2 diastolic dysfunction, mild aortic insufficiency, moderate tricuspid regurgitation, RVSP 50  12/6/19 echo normal LV function EF 65%, RVSP 43, moderate tricuspid regurgitation, mild to moderate aortic insufficiency  10/22/20 echo EF  70%, grade 1 diastolic dysfunction, mild AR, RVSP 30     History shingles     History shoulder pain  4/4/17 right shoulder pain right shoulder x-ray ordered, right knee pain, referral custom PT, tried celebrex no benefit, will try naproxen 500 mg twice daily and zanaflex at night  4/5/17 x-ray right shoulder calcifications consistent with calcific tendinitis or hydroxyapatite deposition  5/12/17 MRI right shoulder ordered, persistent pain despite physical therapy  5/18/17 MRI right shoulder; full-thickness supraspinatus tendon tear  5/22/17 right shoulder injection, has been going to physical therapy 14 treatments some improvement, right shoulder injection provided, if no improvement in has appt  in october, if need sooner appt try   6/9/17 custom PT note   7/10/17 custom PT note       History TIA  11/1/17 hospital admission, 11/2/17 hospital discharge, facial numbness, transient numbness in both hands, resolved, CT, MRI head no acute infarct, blood pressure stable, discharged home, patient states she was on aspirin at the time of admission  11/1/17 CT head stable right mid brain likely chronic lacunar infarction, periventricular white matter disease  11/1/17 MRI brain no acute abnormality, moderate chronic microvascular stomach disease, chronic microhemorrhages left frontal and right parietal lobes, chronic lacunar infarct left basal ganglia and blanco, or cerebral atrophy  11/1/17 MR-MRA head without; negative  11/2/17 echo normal LV size and function, EF 70%, RVSP 50, mild AR and TR  11/28/17 awaiting possible right hip replacement per orthopedics , given recent possible TIA, cannot provide clearance, she will like second opinion from cardiology referral made to dr.bryan de leon, changed asa to plavix  12/12/17 ultrasound carotid less than 50% internal carotid stenosis bilateral  12/20/17 dr.bryan de leon cardiology note most recent echocardiogram looks fine, will repeat CT scan  follow aortic repair  2/20/18 episode of supraventricular tachycardia in the emergency room, daven[place] catheter removed, symptoms resolved with repeat EKG sinus rhythm  11/17/18 cardiology note asymptomatic, continue cholesterol medication, continue to monitor echo aortic valve, repaired ascending aorta, follow-up yearly  11/21/18 echo normal LV function, EF 60%, mild LVH, grade 2 diastolic dysfunction, mild aortic insufficiency, moderate tricuspid regurgitation, RVSP 50  12/6/19 echo normal LV function EF 65%, RVSP 43, moderate tricuspid regurgitation, mild to moderate aortic insufficiency  10/22/20 echo EF 70%, grade 1 diastolic dysfunction, mild AR, RVSP 30   12/14/20 cardiology note maintaining sinus rhythm, given frequent symptomatic episodes of paroxysmal atrial fibrillation and age, recommend amiodarone 200 mg, follow-up 6 month  4/20/21  sinus on exam, patient would like to discontinue amiodarone understanding she likely will return to intermittent atrial fibrillation, discussed sotalol, dronedarone, tikosyn as alternatives, patient declines any of those alternatives, ablation is not appropriate given her age   1/10/22 cardiology note sinus rhythm, atrial fibrillation episodes are not significantly symptomatic, recommend she check her heart rate when atrial fibrillation episodes occurs, repeat follow-up echo ordered, follow-up 6 months  3/22/22 echo EF 55%, diastolic dysfunction grade II, mild MR and TR, RVSP 28     Hypertension  1/10/11 [facility] note cardiac catheterization normal systolic function  3/11 [facility] echo moderate aortic insufficiency, moderate mitral insufficiency, moderate tricuspid insufficiency, normal LV function  5/12 echo [facility] normal LV function EF 60%, moderate to severe left atrial enlargement, RVSP 32    9/26/13 CT thoracic aorta no evidence of aneurysm, small hiatal hernia  9/26/13 Persantine thallium uniform breast tissue attenuation, cannot rule out underlying ischemic, LVEF  67%  10/3/13 on toprol-XL 25 mg half a tablet daily    12/13/13 cardiology note cont same meds, repeat echo and follow up 6 months  1/2/14 echo mild LVH, grade 1 diastolic dysfunction, ascending aortic 4.0, no change compared with ZAK 2010  5/15/14 cardiology note; increase benazepril to 40 mg qday,continue toprol XL 25 mg daily  6/17/15 cardiology note continue meds, repeat echo 6 months  2/1/16 cardiology note moderate pulmonary hypertension and snoring, nocturnal pulse oximetry ordered  6/30/16 cardiology note patient declines overnight pulse oximetry  11/1/17 echo normal LV size and function, EF 70%, mild aortic insufficiency and mild tricuspid regurgitation, RVSP 50, aortic root 3.2 cm  11/3/17 cardiology note hypertension stable, status post thoracic aortic aneurysm repair, headache unspecified, ESR ordered  11/28/17 on benazepril 40 mg,toprol 25mg, add norvasc 5 mg  12/12/17 ultrasound carotid less than 50% internal carotid stenosis bilateral  12/20/17  [facility] cardiology note most recent echocardiogram looks fine, will repeat CT scan follow aortic repair  2/20/18 episode of supraventricular tachycardia in the emergency room, daven[place] catheter removed, symptoms resolved with repeat EKG sinus rhythm  11/17/18 cardiology note asymptomatic, continue cholesterol medication, continue to monitor echo aortic valve, repaired ascending aorta, follow-up yearly  11/21/18 echo normal LV function, EF 60%, mild LVH, grade 2 diastolic dysfunction, mild aortic insufficiency, moderate tricuspid regurgitation, RVSP 50  1/18/19 urine mac 45 on benazepril 40 mg, toprol 25 mg, norvasc 5 mg  12/6/19 echo normal LV function EF 65%, RVSP 43, moderate tricuspid regurgitation, mild to moderate aortic insufficiency  10/22/20 echo EF 70%, grade 1 diastolic dysfunction, mild AR, RVSP 30   12/14/20 cardiology note maintaining sinus rhythm, given frequent symptomatic episodes of paroxysmal atrial fibrillation and age, recommend  amiodarone 200 mg, follow-up 6 month  4/6/21 on lotensin 40 mg and metoprolol 25 mg  4/20/21  sinus on exam, patient would like to discontinue amiodarone understanding she likely will return to intermittent atrial fibrillation, discussed sotalol, dronedarone, tikosyn as alternatives, patient declines any of those alternatives, ablation is not appropriate given her age   1/10/22 cardiology note sinus rhythm, atrial fibrillation episodes are not significantly symptomatic, recommend she check her heart rate when atrial fibrillation episodes occurs, repeat follow-up echo ordered, follow-up 6 months  3/22/22 echo EF 55%, diastolic dysfunction grade II, mild MR and TR, RVSP 28  4/18/22 on lotensin 40 mg and metoprolol 25 mg  5/24/22  cardiology note paroxysmal atrial fibrillation, sinus today, on amiodarone, blood pressures not controlled, increase toprol from 25 to 50 mg, suggest CTA aorta, follow-up 6 months      Hypothyroid  4/11 tsh 9 start synthroid 50  4/11 tsh 6,free t3 3.1,free t4 1.2,tpo negative  7/1/11 tsh 2.1 cont synthroid 50 mcg  7/12 tsh 3.4 cont synthroid 50 mcg  7/31/13 tsh 3.2 on synthroid 50 mcg  9/25/13 tsh 1.7 on synthroid 50 mcg  2/24/15 tsh 4.9 on synthroid 50 mcg; increase to synthroid 75 mcg, repeat tsh in 6 weeks  4/14/15 tsh 1.1 on synthroid 75 mcg  4/15/16 tsh 2.3 on synthroid 75 mcg  5/12/17 tsh 1.2 on synthroid 75 mcg  11/1/17 tsh 2.1 on synthroid 75 mcg  1/18/19 tsh 2.2 on synthroid 75 mcg  2/4/20 tsh 3.1 on synthroid 75 mcg  8/20/20 tsh 1.6 on synthroid 75 mcg  10/14/21 tsh 1.5 on synthroid 75 mcg  1/15/22 tsh 3.4 on synthroid 75 mcg     neck arthritis  2/10/14 [facility] note; x-ray cervical spine DJD, right shoulder steroid injection     neutropenia  4/16/11 wbc 3.9  7/30/13 wbc 5.6  11/7/16 wbc 5.7  5/11/17 wbc 4.7  2/20/18 wbc 7.2  3/6/18 wbc 5.9  1/16/19 wbc 4.0  6/8/19 wbc 4.3 (44%N, 39%L)  2/4/20 wbc 4.1 (42%N,41%L)  8/20/20 wbc 4.6  11/4/21 wbc 4.0  1/15/22 wbc  3.9 (43%N,36%L)  5/10/22 wbc 4.0  8/2/22 wbc 4.3,b12 777,flow cytometry negative,SPEP negative  8/14/22 wbc 4.9     Osteoporosis  Had been on fosamax [id number]    8/10 dexa LS -2.0,hip -1.5 await old dexa result, she will get copy for me  4/11 vit d 35  9/12 dexa LS -3.2,hip -1.4  2/13/13 reclast IV  7/31/13 vit d 33 cont 2000 units  2/18/14 reclast IV  2/2/15 dexa LS -3.1,hip -1.9; FRAX 35.5 % major,12.6% hip  2/18/15 reclast IV  2/24/15 vit d 33  4/15/16 vit d 36  5/12/17 vit d 36  8/7/18 dexa left forearm -4.2,hip -2.5 resume reclast   8/31/18 reclast IV  1/18/19 vit d 27 increase from 2000 to 4000 units  2/4/20 vit d 67, PTH 58  6/25/21 vit d 75  10/14/21 vit d 78  1/15/22 vit d 81  5/11/22 vit d 72     Paroxysmal atrial fibrillation  11/1/17 hospital admission, 11/2/17 hospital discharge, facial numbness, transient numbness in both hands, resolved, CT, MRI head no acute infarct, blood pressure stable, discharged home, patient states she was on aspirin at the time of admission  11/1/17 CT head stable right mid brain likely chronic lacunar infarction, periventricular white matter disease  11/1/17 MRI brain no acute abnormality, moderate chronic microvascular stomach disease, chronic microhemorrhages left frontal and right parietal lobes, chronic lacunar infarct left basal ganglia and blanco, or cerebral atrophy  11/1/17 MR-MRA head without; negative  2/20/17 episode of supraventricular tachycardia in the emergency room, [place] catheter removed, symptoms resolved with repeat EKG sinus rhythm  [facility] most recent echocardiogram looks fine, will repeat CT scan follow aortic repair  11/17/18 cardiology note asymptomatic, continue cholesterol medication, continue to monitor echo aortic valve, repaired ascending aorta, follow-up yearly  11/21/18 echo normal LV function, EF 60%, mild LVH, grade 2 diastolic dysfunction, mild aortic insufficiency, moderate tricuspid regurgitation, RVSP  50  12/6/19 echo normal LV function EF 65%, RVSP 43, moderate tricuspid regurgitation, mild to moderate aortic insufficiency  9/28/20 emergency room note EKG atrial fibrillation new onset  9/30/20 cardiology note sinus rhythm, start eliquis 5 mg bid and increase toprol to 25 mg daily, echo ordered, follow up 3 months   10/22/20 echo EF 70%, grade 1 diastolic dysfunction, mild AR, RVSP 30   12/14/20 cardiology note maintaining sinus rhythm, given frequent symptomatic episodes of paroxysmal atrial fibrillation and age, recommend amiodarone 200 mg, follow-up 6 month  4/20/21  cardiology note sinus on exam, patient would like to discontinue amiodarone understanding she likely will return to intermittent atrial fibrillation, discussed sotalol, dronedarone, tikosyn as alternatives, patient declines any of those alternatives, ablation is not appropriate given her age  12/20/21 on metoprolol and eliquis  1/10/22 cardiology note sinus rhythm, atrial fibrillation episodes are not significantly symptomatic, recommend she check her heart rate when atrial fibrillation episodes occurs, repeat follow-up echo ordered, follow-up 6 months   3/22/22 echo EF 55%, diastolic dysfunction grade II, mild MR and TR, RVSP 28  5/24/22  cardiology note paroxysmal atrial fibrillation, sinus today, on amiodarone, blood pressures not controlled, increase toprol from 25 to 50 mg, suggest CTA aorta, follow-up 6 months    Preventative health  6/27/09 colon per [facility] no records  10/19/04 pneumovax   9/9/11 zoster vaccine  4/14/15 prevnar  8/7/18 dexa left forearm -4.2,hip -2.5  9/28/19 pneumovax  11/7/19 mammogram  11/12/19 shingrix second  9/17/20 tdap   4/19/22 covid pfizer 4th  5/11/22 vit d 72    s/p hip arthroplasty  9/21/17 MRI right hip mild trochanteric bursitis, mild femoral acetabular joint arthritis  10/12/17  orthopedic no proceed with right total hip arthroplasty, x-ray AP pelvis and right hip shows  early arthrosis with calcifications and DJD  2/14/18  orthopedics operative note at [facility] right hip total arthroplasty, gluteus medius and minimus tendon repair  3/27/18 [place] [facility] note   5/8/18 [facility] [facility] note xray right hip stable total hip arthroplasty, continue physical therapy  4/3/19 [facility] PT discharge  4/5/21  [facility] pain note right trochanteric bursitis steroid injection, trigger point injection low back  4/22/21 custom PT evaluation   6/21/21 custom PT discharge note   6/25/21 CT pelvis without, stable appearance of minimally displaced fractures right sacral ritu, right pubic ramus posteriorly, and right ischial ramus, no acute pelvic fracture, no hip dislocation, right hip total arthroplasty in place     s/p knee arthroplasty  4/10 MRI right knee complex tear medial meniscus, horizontal cleavage tear lateral meniscus, chondromalacia patella, moderate-sized baker's cyst  5/10 right meniscus knee repair   5/10 told by  had gout on surgery had pathology report, I await that report, question gout seen on pathology report done during repair of right meniscus knee surgery.  7/10 uric acid 6.3, await starting allopurinol depending on the operative report  8/5/10 reviewed orthopedics notes , operative report indicates crystal deposition, could be gout or pseudogout, no biopsy results, suspect chondrocalcinosis  10/11 dr.parlasca najera note, MRI pending  8/7/10 reviewed pathology report right knee tissue, marked degenerative changes with focal calcification, no mention of gout. Based on this and a normal uric acid level, I do not believe she has gout, has no hyperuricemia medications would be indicated  10/11 dr.parlasca najera left knee meniscectomy and arthroscopy  1/12 dr.parlasca najera left knee arthroplasty  2/29/16  orthopedics x-ray right knee advanced DJD on the right knee corticosteroid injection performed, prescription voltaren  gel  6/13/16  orthopedic note x-rays demonstrate right knee advanced DJD and resurfaced patella, offer right knee total replacement followed by patellar resurfacing after she recovers from her right knee  9/8/16  orthopedic's operative note right knee total arthroplasty  11/2/16  orthopedic note, follow-up right knee, x-ray right knee shows subluxation patella, traumatic evulsion VMO needs reexploration and repair  11/10/16  orthopedic's operative note VMO quadriceps avulsion repair status post total knee replacement  11/23/16  orthopedic no follow-up quadriceps repair, continue crutches in full extension, follow-up one month and then start passive range of motion 0-40°  2/4/17  orthopedic note, continue physical therapy, follow-up this summer  4/17/17 [facility] PT note  6/9/17 [facility] PT note     s/p lumbar surgery  6/03 L4-5 epidural     7/06  spinal surgery operative note decompression, foraminotomy. L4-L5 posterior fusion, L4-L5 allograft    3/5/14 MRI lumbar spine grade 2 spondylolisthesis L4-L5 with previous laminectomy and posterior fusion, left sided pedicle screw fixation L4-L5, moderate central canal stenosis L5-S1 secondary to facet arthropathy, mild to moderate multilevel neural foraminal narrowing  12/8/14  pain [facility] note; right lower extremity radiculitis L4-L5 lesion, myofascial lumbosacral pain, trochanteric bursitis, followup as needed  4/2/15  pain procedure note right L4-L5 and right L5-S1 SSNRB under fluoroscopy  4/27/15  pain note; right trochanteric bursal injection, trigger point injections performed, also set up SI joint injections  8/3/15 [facility] neurosurgery note, lumbar x-ray flexion and extension, MRI lumbar spine without contrast, followup   8/9/15 MRI lumbar spine, previous L4-L5 left  fusion and laminectomy, L3-L4 moderate bilateral facet arthropathy, mild disc bulge, L4-L5 severe bilateral facet  arthropathy, moderate to severe bilateral neural foraminal stenosis, L5-S1 moderate facet arthropathy  8/28/15  neurosurgery note previous posterior fusion L4-L5, does have L3-L4 disc bulge with central canal stenosis, recommend L3-L5 decompression  8/31/15  pain note; right lower extremity radiculitis, myofascial lumbosacral pain, start hydrocodone 5 mg, continued SI joint exercises, perform trochanteric bursal injection right hip, trigger points lumbar region  10/28/15  neurosurgery note, will schedule for posterior L3-L4 laminectomy and fusion with redo L4-5 laminectomy and exploration of fusion, surgical clearance per cardiology   11/24/15  neurosurgery operative note expiration removal L4-L5 hardware on the left, bilateral facetectomies L4, L3-L4 transforaminal lumbar interbody fusion  12/9/15 [facility] neurosurgery note s/p L4-S1 fusion on 11/24/15 , follow up in 4 weeks  12/23/15  neurosurgery note, clear to restart physical therapy if she would like after one month, follow-up 3 months  6/9/20  [facility] pain note trigger point injections trochanteric bursa right side  8/25/20  [facility] pain procedure note epidural right L4 nerve root   4/5/21  [facility] pain note right trochanteric bursitis steroid injection, trigger point injection low back  4/22/21 custom PT evaluation   6/21/21 custom PT discharge note   6/27/22 on neurontin 300 mg two tid will taper off     s/p TOMY  Sees gyn on HRT estradiol for 20 yrs ()     s/p thoracic aneurysm repair  6/29/06 CT-CTA chest with and without postprocessing; stable ascending thoracic aortic aneurysm maximum diameter 4.8, just distal to the aortic valve maximum diameter 4.3  4/9/07 CT-CTA chest with and without processing; stable ascending aortic thoracic aneurysm 4.5 x 4.6 cm  6/8/09 CT chest with; stable 4.7 ascending aorta aneurysm  10/15/09 CT chest without; dilated ascending thoracic aorta 4.9 cm  aneurysm increased from 4.7  1/11/10  cardiovascular surgery operative note ascending thoracic aorta aneurysm repair  9/26/13 CT thoracic aorta evaluation; status post repair ascending thoracic aortic aneurysm without evidence of dissection or leak  1/2/14 echo mild LVH, grade 1 diastolic dysfunction, ascending aortic 4.0, no change compared with ZAK 2010  5/15/14 cardiology note  6/17/15 cardiology note cont meds,repeat echo 6 months  2/1/16 cardiology note moderate pulmonary hypertension and snoring, nocturnal pulse oximetry ordered  11/1/17 echo normal LV size and function, EF 70%, mild aortic insufficiency and mild tricuspid regurgitation, RVSP 50, aortic root 3.2 cm  11/3/17 cardiology note stable  12/20/17  [facility] cardiology note most recent echocardiogram looks fine, will repeat CT scan follow aortic repair  11/17/18 cardiology note asymptomatic, continue cholesterol medication, continue to monitor echo aortic valve, repaired ascending aorta, follow-up yearly  11/21/18 echo normal LV function, EF 60%, mild LVH, grade 2 diastolic dysfunction, mild aortic insufficiency, moderate tricuspid regurgitation, RVSP 50  12/6/19 echo normal LV function EF 65%, RVSP 43, moderate tricuspid regurgitation, mild to moderate aortic insufficiency  10/22/20 echo EF 70%, grade 1 diastolic dysfunction, mild AR, RVSP 30   4/20/21  sinus on exam, patient would like to discontinue amiodarone understanding she likely will return to intermittent atrial fibrillation, discussed sotalol, dronedarone, tikosyn as alternatives, patient declines any of those alternatives, ablation is not appropriate given her age   1/10/22 cardiology note sinus rhythm, atrial fibrillation episodes are not significantly symptomatic, recommend she check her heart rate when atrial fibrillation episodes occurs, repeat follow-up echo ordered, follow-up 6 months  3/22/22 echo EF 55%, diastolic dysfunction grade II, mild MR and TR,  RVSP 28  5/24/22  cardiology note paroxysmal atrial fibrillation, sinus today, on amiodarone, blood pressures not controlled, increase toprol from 25 to 50 mg, suggest CTA aorta, follow-up 6 months     Tricuspid regurgitation  11/17/18 cardiology note asymptomatic, continue cholesterol medication, continue to monitor echo aortic valve, repaired ascending aorta, follow-up yearly  11/21/18 echo normal LV function, EF 60%, mild LVH, grade 2 diastolic dysfunction, mild aortic insufficiency, moderate tricuspid regurgitation, RVSP 50  12/6/19 echo normal LV function EF 65%, RVSP 43, moderate tricuspid regurgitation, mild to moderate aortic insufficiency  10/22/20 echo EF 70%, grade 1 diastolic dysfunction, mild AR, RVSP 30   4/20/21  sinus on exam, patient would like to discontinue amiodarone understanding she likely will return to intermittent atrial fibrillation, discussed sotalol, dronedarone, tikosyn as alternatives, patient declines any of those alternatives, ablation is not appropriate given her age   3/22/22 echo EF 55%, diastolic dysfunction grade II, mild MR and TR, RVSP 28  5/24/22  cardiology note paroxysmal atrial fibrillation, sinus today, on amiodarone, blood pressures not controlled, increase toprol from 25 to 50 mg, suggest CTA aorta, follow-up 6 months     Urinary retention  8/18/21  urology note urinary retention, urethral davenport catheter in place, catheter exchange performed, failed voiding trial today, follow-up 1 month  9/28/21 urology note, urodynamic study cystometrogram showing bladder compliance is reduced, detrusor pressure during filling is high, leaking observed during the filling phase, electromyography shows no change in activity with increased abdominal pressure, EMG activity no change during voiding, complex CMG uroflow patient leak small amount prior to catheter falling out but when attempted to void was not able to, impression is poor detrusor  compliance, complete retention, no uninhibited contractions, normal capacity, normal bladder sensation, impaired pelvic floor response to voiding; suprapubic tube discussed with patient as best option to manage bladder while she is healing from a pelvic break  9/28/21 urology note davenport catheter insertion post void residual 481 mL, will schedule suprapubic catheter  11/4/21 suprapubic catherer placement in hospital for urinary retention  12/6/21 urology note catheter exchange     UTI  7/6/16 UTI klebsiella pneumoniae sensitive to all antibiotics except ampicillin, start bactrim x 5 days  1/18/19 UTI enterobacter cloacae resistant to ampicillin, cephalothin, penicillin, bactrim, sensitive to cefuroxime, ciprofloxacin and levaquin, nitrofurantoin  5/26/19 UTI klebsiella given omnicef, organism resistant ampicillin, ciprofloxacin, levofloxacin, bactrim  8/18/21  urology note urinary retention, urethral davenport catheter in place, catheter exchange performed, failed voiding trial today, follow-up 1 month  11/4/21 suprapubic catherer placement in hospital for urinary retention                                Patient Active Problem List   Diagnosis    S/p lumbar surgery    Hypertension    Dyslipidemia    S/P TOMY (total abdominal hysterectomy)    Preventative health care    S/P knee bilateral arthroplasty    S/P appendectomy    Osteoporosis, idiopathic    Hypothyroid    History of shingles    GERD (gastroesophageal reflux disease)    Tricuspid regurgitation    Neck arthritis    S/P thoracic aortic aneurysm repair    Decreased GFR    UTI (urinary tract infection)    History of insomnia    History of shoulder pain    History of transient ischemic attack (TIA)    S/p hip right arthroplasty    Paroxysmal atrial fibrillation (HCC)    On continuous oral anticoagulation    Gait disorder    History of pelvic fracture    Urinary retention    Anemia    Neutropenia (HCC)                Patient Care Team:  Tee Tran M.D. as  PCP - General  Kobi Wheeler M.D. as Consulting Physician (Interventional Cardiology)  Brett Santos II, O.D. as Consulting Physician (Optometry)  Angelic Adams as Consulting Physician (Dentistry)      ROS           Objective        Physical Exam  Vitals and nursing note reviewed.   Constitutional:       General: She is not in acute distress.  HENT:      Head: Normocephalic and atraumatic.   Eyes:      Conjunctiva/sclera: Conjunctivae normal.   Cardiovascular:      Rate and Rhythm: Regular rhythm.   Pulmonary:      Effort: No respiratory distress.      Breath sounds: Normal breath sounds.   Abdominal:      General: There is no distension.   Skin:     Findings: No bruising.   Neurological:      General: No focal deficit present.   Psychiatric:         Mood and Affect: Mood normal.     Right knee sleeve lowered no crepitus, tenderness to palpation medial aspect, no popliteal tenderness                      Assessment & Plan        Assessment  #1 fall and subsequent ER visit, records reviewed from August 14, no fracture, no bleeding complications based on CT head, fall related to right knee buckling and she has had instability issues ever since her right knee arthroplasty and subsequent avulsion repair.    #2 right knee pain and instability status post right knee total arthroplasty 2016 and subsequent quadriceps avulsion repair 2 months later    #2 Paroxysmal atrial fibrillation followed by cardiology on amiodarone, Toprol, Eliquis, sinus on my exam today    #3 urinary retention followed by urology suprapubic catheter in place    #4 anemia most recent lab 8/14/22 hgb 11,hct 35, previous lab work-up 8/2/22 hgb 11.4,hct 36.4,mcv 88,iron 73,%sat 19,ferritin 29,b12 777,folate 39,flow cytometry negative,retic 1.8%, zinc 108,copper 99,SPEP negative    #5 CKD 3a 8/14/22 bun 27,creat 1.06,GFR 51    #6 osteoporosis senile last bone density 2018 left forearm -4.2, hip -2.5, had been on Reclast intermittently since 2013,  last Reclast infusion 2018     #7 History neutropenia most recent WBC normal 4.9      Plan  #!  Referral [place] [facility] second opinion regarding knee pain    #2 old records [facility] [place], follow-up urology    #3 falling precautions    #4 anticoagulation education on Eliquis, recommend no NSAIDs, Tylenol    #5 follow-up cardiology, continue to check blood pressure regularly, orthostatic precautions    #6 bone density follow-up osteoporosis    #7 change to Prolia given worsening bone density on bisphosphonate therapy, order to infusion center placed in [facility]    #8 follow-up with myself 3 months

## 2022-08-22 NOTE — LETTER
[facility]  Tee Tran M.D.  [address]  [place] [address]  Fax: [phone]   Authorization for Release/Disclosure of   Protected Health Information   Name: YVONNE MARIE WADA : 1935 SSN: [id number]   Address: [address] [place] [address] [place] [address] Phone:    [phone] (home)    I authorize the entity listed below to release/disclose the PHI below to:   [facility]/Tee Tran M.D. and Tee Tran M.D.   Provider or Entity Name:  [facility] [place]    Address   City, State, [facility]   Phone:      Fax:  [phone]   Reason for request: continuity of care   Information to be released:    [  ] LAST COLONOSCOPY,  including any PATH REPORT and follow-up  [  ] LAST FIT/COLOGUARD RESULT [  ] LAST DEXA  [  ] LAST MAMMOGRAM  [  ] LAST PAP  [  ] LAST LABS [  ] RETINA EXAM REPORT  [  ] IMMUNIZATION RECORDS  [ xxx ] Release all info from past year      [  ] Check here and initial the line next to each item to release ALL health information INCLUDING  _____ Care and treatment for drug and / or alcohol abuse  _____ HIV testing, infection status, or AIDS  _____ Genetic Testing    DATES OF SERVICE OR TIME PERIOD TO BE DISCLOSED: _____________  I understand and acknowledge that:  * This Authorization may be revoked at any time by you in writing, except if your health information has already been used or disclosed.  * Your health information that will be used or disclosed as a result of you signing this authorization could be re-disclosed by the recipient. If this occurs, your re-disclosed health information may no longer be protected by State or Federal laws.  * You may refuse to sign this Authorization. Your refusal will not affect your ability to obtain treatment.  * This Authorization becomes effective upon signing and will  on (date) __________.      If no date is indicated, this Authorization will  one (1) year from the signature date.    Name: Mala Samson  [place]    Signature: continuity of care   Date:     8/24/2022       PLEASE FAX REQUESTED RECORDS BACK TO: [phone]

## 2022-08-24 NOTE — PROGRESS NOTES
Annual Health Assessment Questions:    1.  Are you currently engaging in any exercise or physical activity? No    2.  How would you describe your mood or emotional well-being today? good    3.  Have you had any falls in the last year? No    4.  Have you noticed any problems with your balance or had difficulty walking? Yes    5.  In the last six months have you experienced any leakage of urine? No    6. DPA/Advanced Directive: Patient does not have an Advanced Directive.  A packet and workshop information was given on Advanced Directives.     Dasha

## 2022-08-26 ENCOUNTER — TELEPHONE (OUTPATIENT)
Dept: ONCOLOGY | Facility: MEDICAL CENTER | Age: 87
End: 2022-08-26
Payer: MEDICARE

## 2022-08-26 NOTE — TELEPHONE ENCOUNTER
Spoke to Mala, offered to schedule an appointment for her but she was unable to schedule, she said she would have to find someone who can drive her, provided her with our call back number [phone].  She stated she would call back to schedule.

## 2022-10-12 ENCOUNTER — APPOINTMENT (OUTPATIENT)
Dept: RADIOLOGY | Facility: MEDICAL CENTER | Age: 87
End: 2022-10-12
Attending: EMERGENCY MEDICINE
Payer: MEDICARE

## 2022-10-12 ENCOUNTER — APPOINTMENT (OUTPATIENT)
Dept: RADIOLOGY | Facility: MEDICAL CENTER | Age: 87
End: 2022-10-12
Attending: ORTHOPAEDIC SURGERY
Payer: MEDICARE

## 2022-10-12 ENCOUNTER — HOSPITAL ENCOUNTER (OUTPATIENT)
Facility: MEDICAL CENTER | Age: 87
End: 2022-10-17
Attending: EMERGENCY MEDICINE | Admitting: INTERNAL MEDICINE
Payer: MEDICARE

## 2022-10-12 DIAGNOSIS — S82.891A CLOSED FRACTURE OF RIGHT ANKLE, INITIAL ENCOUNTER: ICD-10-CM

## 2022-10-12 DIAGNOSIS — S82.201A TIBIA/FIBULA FRACTURE, RIGHT, CLOSED, INITIAL ENCOUNTER: ICD-10-CM

## 2022-10-12 DIAGNOSIS — Z98.1 S/P ANKLE ARTHRODESIS: ICD-10-CM

## 2022-10-12 DIAGNOSIS — M81.0 SENILE OSTEOPOROSIS: ICD-10-CM

## 2022-10-12 DIAGNOSIS — K59.03 DRUG-INDUCED CONSTIPATION: ICD-10-CM

## 2022-10-12 DIAGNOSIS — S82.401A TIBIA/FIBULA FRACTURE, RIGHT, CLOSED, INITIAL ENCOUNTER: ICD-10-CM

## 2022-10-12 LAB
ALBUMIN SERPL BCP-MCNC: 4.1 G/DL (ref 3.2–4.9)
ALBUMIN/GLOB SERPL: 1.6 G/DL
ALP SERPL-CCNC: 71 U/L (ref 30–99)
ALT SERPL-CCNC: 13 U/L (ref 2–50)
ANION GAP SERPL CALC-SCNC: 11 MMOL/L (ref 7–16)
AST SERPL-CCNC: 19 U/L (ref 12–45)
BASOPHILS # BLD AUTO: 0.6 % (ref 0–1.8)
BASOPHILS # BLD: 0.05 K/UL (ref 0–0.12)
BILIRUB SERPL-MCNC: 0.5 MG/DL (ref 0.1–1.5)
BUN SERPL-MCNC: 26 MG/DL (ref 8–22)
CALCIUM SERPL-MCNC: 9.4 MG/DL (ref 8.4–10.2)
CHLORIDE SERPL-SCNC: 104 MMOL/L (ref 96–112)
CO2 SERPL-SCNC: 22 MMOL/L (ref 20–33)
CREAT SERPL-MCNC: 1.19 MG/DL (ref 0.5–1.4)
EOSINOPHIL # BLD AUTO: 0.02 K/UL (ref 0–0.51)
EOSINOPHIL NFR BLD: 0.2 % (ref 0–6.9)
ERYTHROCYTE [DISTWIDTH] IN BLOOD BY AUTOMATED COUNT: 48.7 FL (ref 35.9–50)
GFR SERPLBLD CREATININE-BSD FMLA CKD-EPI: 45 ML/MIN/1.73 M 2
GLOBULIN SER CALC-MCNC: 2.6 G/DL (ref 1.9–3.5)
GLUCOSE SERPL-MCNC: 101 MG/DL (ref 65–99)
HCT VFR BLD AUTO: 34.6 % (ref 37–47)
HGB BLD-MCNC: 11.1 G/DL (ref 12–16)
IMM GRANULOCYTES # BLD AUTO: 0.04 K/UL (ref 0–0.11)
IMM GRANULOCYTES NFR BLD AUTO: 0.5 % (ref 0–0.9)
LYMPHOCYTES # BLD AUTO: 1.48 K/UL (ref 1–4.8)
LYMPHOCYTES NFR BLD: 17.6 % (ref 22–41)
MCH RBC QN AUTO: 28.8 PG (ref 27–33)
MCHC RBC AUTO-ENTMCNC: 32.1 G/DL (ref 33.6–35)
MCV RBC AUTO: 89.6 FL (ref 81.4–97.8)
MONOCYTES # BLD AUTO: 0.76 K/UL (ref 0–0.85)
MONOCYTES NFR BLD AUTO: 9.1 % (ref 0–13.4)
NEUTROPHILS # BLD AUTO: 6.04 K/UL (ref 2–7.15)
NEUTROPHILS NFR BLD: 72 % (ref 44–72)
NRBC # BLD AUTO: 0 K/UL
NRBC BLD-RTO: 0 /100 WBC
PLATELET # BLD AUTO: 225 K/UL (ref 164–446)
PMV BLD AUTO: 8.8 FL (ref 9–12.9)
POTASSIUM SERPL-SCNC: 4.2 MMOL/L (ref 3.6–5.5)
PROT SERPL-MCNC: 6.7 G/DL (ref 6–8.2)
RBC # BLD AUTO: 3.86 M/UL (ref 4.2–5.4)
SODIUM SERPL-SCNC: 137 MMOL/L (ref 135–145)
WBC # BLD AUTO: 8.4 K/UL (ref 4.8–10.8)

## 2022-10-12 PROCEDURE — 36415 COLL VENOUS BLD VENIPUNCTURE: CPT

## 2022-10-12 PROCEDURE — 85025 COMPLETE CBC W/AUTO DIFF WBC: CPT

## 2022-10-12 PROCEDURE — 700102 HCHG RX REV CODE 250 W/ 637 OVERRIDE(OP): Performed by: INTERNAL MEDICINE

## 2022-10-12 PROCEDURE — 99220 PR INITIAL OBSERVATION CARE,LEVL III: CPT | Performed by: INTERNAL MEDICINE

## 2022-10-12 PROCEDURE — 73610 X-RAY EXAM OF ANKLE: CPT | Mod: RT

## 2022-10-12 PROCEDURE — 700111 HCHG RX REV CODE 636 W/ 250 OVERRIDE (IP): Performed by: EMERGENCY MEDICINE

## 2022-10-12 PROCEDURE — G0378 HOSPITAL OBSERVATION PER HR: HCPCS

## 2022-10-12 PROCEDURE — 73600 X-RAY EXAM OF ANKLE: CPT | Mod: RT

## 2022-10-12 PROCEDURE — A9270 NON-COVERED ITEM OR SERVICE: HCPCS | Performed by: EMERGENCY MEDICINE

## 2022-10-12 PROCEDURE — 700102 HCHG RX REV CODE 250 W/ 637 OVERRIDE(OP): Performed by: EMERGENCY MEDICINE

## 2022-10-12 PROCEDURE — 29515 APPLICATION SHORT LEG SPLINT: CPT

## 2022-10-12 PROCEDURE — 302875 HCHG BANDAGE ACE 4 OR 6""

## 2022-10-12 PROCEDURE — 80053 COMPREHEN METABOLIC PANEL: CPT

## 2022-10-12 PROCEDURE — A9270 NON-COVERED ITEM OR SERVICE: HCPCS | Performed by: INTERNAL MEDICINE

## 2022-10-12 PROCEDURE — 99285 EMERGENCY DEPT VISIT HI MDM: CPT

## 2022-10-12 PROCEDURE — 73590 X-RAY EXAM OF LOWER LEG: CPT | Mod: RT

## 2022-10-12 PROCEDURE — 73700 CT LOWER EXTREMITY W/O DYE: CPT | Mod: RT

## 2022-10-12 PROCEDURE — 94760 N-INVAS EAR/PLS OXIMETRY 1: CPT

## 2022-10-12 RX ORDER — ONDANSETRON 4 MG/1
4 TABLET, ORALLY DISINTEGRATING ORAL ONCE
Status: COMPLETED | OUTPATIENT
Start: 2022-10-12 | End: 2022-10-12

## 2022-10-12 RX ORDER — ONDANSETRON 2 MG/ML
4 INJECTION INTRAMUSCULAR; INTRAVENOUS EVERY 4 HOURS PRN
Status: DISCONTINUED | OUTPATIENT
Start: 2022-10-12 | End: 2022-10-17 | Stop reason: HOSPADM

## 2022-10-12 RX ORDER — HYDROMORPHONE HYDROCHLORIDE 1 MG/ML
0.25 INJECTION, SOLUTION INTRAMUSCULAR; INTRAVENOUS; SUBCUTANEOUS
Status: DISCONTINUED | OUTPATIENT
Start: 2022-10-12 | End: 2022-10-17 | Stop reason: HOSPADM

## 2022-10-12 RX ORDER — GABAPENTIN 100 MG/1
200 CAPSULE ORAL 3 TIMES DAILY
Status: DISCONTINUED | OUTPATIENT
Start: 2022-10-12 | End: 2022-10-17 | Stop reason: HOSPADM

## 2022-10-12 RX ORDER — OXYCODONE HYDROCHLORIDE 5 MG/1
2.5 TABLET ORAL
Status: DISCONTINUED | OUTPATIENT
Start: 2022-10-12 | End: 2022-10-17 | Stop reason: HOSPADM

## 2022-10-12 RX ORDER — HYDROCODONE BITARTRATE AND ACETAMINOPHEN 5; 325 MG/1; MG/1
1 TABLET ORAL ONCE
Status: COMPLETED | OUTPATIENT
Start: 2022-10-12 | End: 2022-10-12

## 2022-10-12 RX ORDER — BENAZEPRIL HYDROCHLORIDE 10 MG/1
40 TABLET ORAL EVERY MORNING
Status: DISCONTINUED | OUTPATIENT
Start: 2022-10-12 | End: 2022-10-17 | Stop reason: HOSPADM

## 2022-10-12 RX ORDER — EZETIMIBE 10 MG/1
10 TABLET ORAL DAILY
Status: DISCONTINUED | OUTPATIENT
Start: 2022-10-12 | End: 2022-10-17 | Stop reason: HOSPADM

## 2022-10-12 RX ORDER — OMEPRAZOLE 20 MG/1
20 CAPSULE, DELAYED RELEASE ORAL EVERY MORNING
Status: DISCONTINUED | OUTPATIENT
Start: 2022-10-12 | End: 2022-10-17 | Stop reason: HOSPADM

## 2022-10-12 RX ORDER — ACETAMINOPHEN 325 MG/1
650 TABLET ORAL EVERY 6 HOURS PRN
Status: DISCONTINUED | OUTPATIENT
Start: 2022-10-12 | End: 2022-10-17 | Stop reason: HOSPADM

## 2022-10-12 RX ORDER — OXYCODONE HYDROCHLORIDE 5 MG/1
5 TABLET ORAL
Status: DISCONTINUED | OUTPATIENT
Start: 2022-10-12 | End: 2022-10-17 | Stop reason: HOSPADM

## 2022-10-12 RX ORDER — GABAPENTIN 100 MG/1
100 CAPSULE ORAL 3 TIMES DAILY
COMMUNITY
End: 2023-02-07

## 2022-10-12 RX ORDER — ONDANSETRON 4 MG/1
4 TABLET, ORALLY DISINTEGRATING ORAL EVERY 4 HOURS PRN
Status: DISCONTINUED | OUTPATIENT
Start: 2022-10-12 | End: 2022-10-17 | Stop reason: HOSPADM

## 2022-10-12 RX ORDER — LEVOTHYROXINE SODIUM 0.07 MG/1
75 TABLET ORAL
Status: DISCONTINUED | OUTPATIENT
Start: 2022-10-12 | End: 2022-10-17 | Stop reason: HOSPADM

## 2022-10-12 RX ORDER — POLYETHYLENE GLYCOL 3350 17 G/17G
1 POWDER, FOR SOLUTION ORAL
Status: DISCONTINUED | OUTPATIENT
Start: 2022-10-12 | End: 2022-10-15

## 2022-10-12 RX ORDER — LABETALOL HYDROCHLORIDE 5 MG/ML
10 INJECTION, SOLUTION INTRAVENOUS EVERY 4 HOURS PRN
Status: DISCONTINUED | OUTPATIENT
Start: 2022-10-12 | End: 2022-10-17 | Stop reason: HOSPADM

## 2022-10-12 RX ORDER — BISACODYL 10 MG
10 SUPPOSITORY, RECTAL RECTAL
Status: DISCONTINUED | OUTPATIENT
Start: 2022-10-12 | End: 2022-10-15

## 2022-10-12 RX ORDER — AMOXICILLIN 250 MG
2 CAPSULE ORAL 2 TIMES DAILY
Status: DISCONTINUED | OUTPATIENT
Start: 2022-10-12 | End: 2022-10-12

## 2022-10-12 RX ORDER — METOPROLOL SUCCINATE 25 MG/1
50 TABLET, EXTENDED RELEASE ORAL EVERY MORNING
Status: DISCONTINUED | OUTPATIENT
Start: 2022-10-12 | End: 2022-10-17 | Stop reason: HOSPADM

## 2022-10-12 RX ADMIN — BENAZEPRIL HYDROCHLORIDE 40 MG: 10 TABLET ORAL at 15:13

## 2022-10-12 RX ADMIN — EZETIMIBE 10 MG: 10 TABLET ORAL at 15:13

## 2022-10-12 RX ADMIN — OXYCODONE 5 MG: 5 TABLET ORAL at 18:32

## 2022-10-12 RX ADMIN — HYDROCODONE BITARTRATE AND ACETAMINOPHEN 1 TABLET: 5; 325 TABLET ORAL at 09:46

## 2022-10-12 RX ADMIN — LEVOTHYROXINE SODIUM 75 MCG: 0.07 TABLET ORAL at 15:13

## 2022-10-12 RX ADMIN — OMEPRAZOLE 20 MG: 20 CAPSULE, DELAYED RELEASE ORAL at 15:12

## 2022-10-12 RX ADMIN — ONDANSETRON 4 MG: 4 TABLET, ORALLY DISINTEGRATING ORAL at 09:46

## 2022-10-12 RX ADMIN — METOPROLOL SUCCINATE 50 MG: 25 TABLET, EXTENDED RELEASE ORAL at 15:12

## 2022-10-12 RX ADMIN — GABAPENTIN 200 MG: 100 CAPSULE ORAL at 15:13

## 2022-10-12 RX ADMIN — OXYCODONE 5 MG: 5 TABLET ORAL at 21:39

## 2022-10-12 RX ADMIN — GABAPENTIN 200 MG: 100 CAPSULE ORAL at 21:39

## 2022-10-12 ASSESSMENT — PAIN DESCRIPTION - PAIN TYPE
TYPE: ACUTE PAIN

## 2022-10-12 ASSESSMENT — COGNITIVE AND FUNCTIONAL STATUS - GENERAL
DAILY ACTIVITIY SCORE: 18
HELP NEEDED FOR BATHING: A LITTLE
MOVING FROM LYING ON BACK TO SITTING ON SIDE OF FLAT BED: A LOT
STANDING UP FROM CHAIR USING ARMS: A LOT
DRESSING REGULAR UPPER BODY CLOTHING: A LITTLE
SUGGESTED CMS G CODE MODIFIER DAILY ACTIVITY: CK
SUGGESTED CMS G CODE MODIFIER MOBILITY: CL
DRESSING REGULAR LOWER BODY CLOTHING: A LOT
MOVING TO AND FROM BED TO CHAIR: A LOT
CLIMB 3 TO 5 STEPS WITH RAILING: A LOT
TOILETING: A LOT
TURNING FROM BACK TO SIDE WHILE IN FLAT BAD: A LOT
WALKING IN HOSPITAL ROOM: A LOT
MOBILITY SCORE: 12

## 2022-10-12 ASSESSMENT — ENCOUNTER SYMPTOMS
HEADACHES: 0
NAUSEA: 0
PALPITATIONS: 0
ABDOMINAL PAIN: 0
BLURRED VISION: 0
CHILLS: 0
WEAKNESS: 1
DIZZINESS: 0
FEVER: 0
COUGH: 0
SHORTNESS OF BREATH: 0
MYALGIAS: 0
DEPRESSION: 0
VOMITING: 0

## 2022-10-12 ASSESSMENT — FIBROSIS 4 INDEX: FIB4 SCORE: 2.16

## 2022-10-12 ASSESSMENT — PATIENT HEALTH QUESTIONNAIRE - PHQ9
1. LITTLE INTEREST OR PLEASURE IN DOING THINGS: NOT AT ALL
2. FEELING DOWN, DEPRESSED, IRRITABLE, OR HOPELESS: NOT AT ALL
SUM OF ALL RESPONSES TO PHQ9 QUESTIONS 1 AND 2: 0

## 2022-10-12 ASSESSMENT — LIFESTYLE VARIABLES
TOTAL SCORE: 0
AVERAGE NUMBER OF DAYS PER WEEK YOU HAVE A DRINK CONTAINING ALCOHOL: 0
EVER HAD A DRINK FIRST THING IN THE MORNING TO STEADY YOUR NERVES TO GET RID OF A HANGOVER: NO
ON A TYPICAL DAY WHEN YOU DRINK ALCOHOL HOW MANY DRINKS DO YOU HAVE: 0
HAVE PEOPLE ANNOYED YOU BY CRITICIZING YOUR DRINKING: NO
TOTAL SCORE: 0
CONSUMPTION TOTAL: NEGATIVE
HOW MANY TIMES IN THE PAST YEAR HAVE YOU HAD 5 OR MORE DRINKS IN A DAY: 0
EVER FELT BAD OR GUILTY ABOUT YOUR DRINKING: NO
HAVE YOU EVER FELT YOU SHOULD CUT DOWN ON YOUR DRINKING: NO
ALCOHOL_USE: NO
TOTAL SCORE: 0

## 2022-10-12 NOTE — CARE PLAN
"  Problem: Knowledge Deficit - Standard  Goal: Patient and family/care givers will demonstrate understanding of plan of care, disease process/condition, diagnostic tests and medications  Outcome: Progressing     Problem: Fall Risk  Goal: Patient will remain free from falls  Outcome: Progressing     Problem: Hemodynamics  Goal: Patient's hemodynamics, fluid balance and neurologic status will be stable or improve  Outcome: Progressing     The patient is Stable - Low risk of patient condition declining or worsening    Shift Goals  Clinical Goals: Keep NPO pending surgery consult  Patient Goals: Rest comfortably, minimize pain  Family Goals: n/a    Progress made toward(s) clinical / shift goals:  Pt denies intolerable pain at this time, states that pain is a 10 when attempting to move R ankle and foot, but \"tolerable\" at rest. Pt denies nausea, has been NPO since this AM, pending surgical consult.    Patient is not progressing towards the following goals: n/a      "

## 2022-10-12 NOTE — ED NOTES
Report to Dalia who will assume care. Advised the need for an IV and blood as well as a lower leg splint.

## 2022-10-12 NOTE — ED TRIAGE NOTES
Chief Complaint   Patient presents with    Ankle Injury     Fell in the night sustained a right ankle foot injury, very swollen and bruised. Unable to bear weight.

## 2022-10-12 NOTE — PROGRESS NOTES
Pt arrived to floor from ER. Assumed care of patient. Discussed daily plan of care, oriented patient to floor. VSS. Pt resting comfortably in bed with no intolerable pain noted. Splint in place to RLE. Pt denies numbness or tingling at this time. Hourly rounding in place.

## 2022-10-12 NOTE — ED PROVIDER NOTES
"CHIEF COMPLAINT  Chief Complaint   Patient presents with    Ankle Injury     Fell in the night sustained a right ankle foot injury, very swollen and bruised. Unable to bear weight.       HPI  Yvonne Marie Wada is a 86 y.o. female with a history of aortic valve replacement, on Eliquis.  The patient presents after she got out of bed and then twisted her ankle.  She notes swelling and pain to her right ankle.  No other injuries.  She denies any head neck chest back or abdominal injury.  No other extremity complaints.  No paresthesias or weakness in the foot other than weakness limited by pain.    REVIEW OF SYSTEMS  No paresthesias, no weakness, no other injury    PAST MEDICAL HISTORY  Past Medical History:   Diagnosis Date    AAA (abdominal aortic aneurysm) ([facility]) 7/26/2010    10/09 CT thorax dilated ascending thoracic aorta 4.9 cm 1/10 transesophageal echo dilated aortic root, and ascending aorta with mild aortic insufficiency 1/10  ascending aortic aneurysm repair 5/12 echo [facility] normal LV function EF 60%, moderate to severe left atrial enlargement, RVSP 32     Aortic insufficiency     Aortic valve disease     Aortic valve regurgitation     Arthritis     back and knee/ osteo    Cataract     Dental disorder     full top denture, partial bottom    Diverticulosis     Dyslipidemia 7/26/2010    Sees [facility] on lipitor 4/11 chol 159,trig 84,hdl 47,ldl 95,cpk 114 7/12 chol 163,trig 120,hdl 49,ldl 90, crp 1.1 on lipitor 20 mg     GERD (gastroesophageal reflux disease) 7/12/2012 6/07 EGD per [facility] hiatal hernia, start prevacid 30 mg 7/12 protonix 20 mg qday     Glaucoma     Heart burn     Heart murmur     Heart valve disease     \"leaking\", mitral valve    Hiatus hernia syndrome     High cholesterol     History of shingles 6/30/2011 2010 Back and left thorax      History of total knee arthroplasty 7/26/2010    4/10 MRI right knee complex tear medial meniscus, horizontal cleavage tear lateral meniscus, chondromalacia " patella, moderate-sized Baker's cyst 5/10 right meniscus knee repair  5/10 told by  had gout on surgery had pathology report, I await that report Question gout seen on pathology report done during repair of right meniscus knee surgery. 7/10 uric acid 6.3, we'll await starting allopurinol depending on the operative report 8/5/10 reviewed ortho notes , op report indicates crystal deposition, could be gout or pseudogout. No bx result sent over. Will need to get. My suspicion is chondrocalcinosis. 10/11  ortho note, MRI pending 8/7/10 reviewed pathology report right knee tissue, marked degenerative changes with focal calcification, no mention of gout. Based on this and a normal uric acid level, I do not believe she has gout, has no hyperuricemia medications would be indicated 10/11  ortho left knee meniscectomy and arthroscopy 1/12      HLD (hyperlipidemia)     Hypertension     Hypothyroid 4/8/2011 4/11 tsh 9 start synthroid 50 4/11 tsh 6,free t3 3.1,free t4 1.2,tpo negative 7/1/11 tsh 2.1 cont synthroid 50 mcg 7/12 tsh 3.4 cont synthroid 50 mcg     Indigestion     Mitral insufficiency     OSTEOPOROSIS 8/9/2010    Had been on fosamax [id number]  8/10 dexa LS -2.0,hip -1.5 await old dexa result, she will get copy for me 4/11 vit d 35 9/12 dexa LS -3.2,hip -1.4 2/13/13 relast infusion     Pain     back and right leg, can get as bad as 10/10    Preventative health care 7/26/2010    2002 pneum 2005 colon [facility] no records 4/11 vit d 35 9/11 shingles vaccine 9/12 mammogram 9/12 dexa LS -3.2,hip -1.4     Rheumatic fever     S/P appendectomy 7/26/2010    S/P TOMY (total abdominal hysterectomy) 7/26/2010    Sees gyn on HRT estradiol for 20 yrs () 11/08 mammo      Shingles 6/30/2011    Snoring     Status post lumbar surgery 7/26/2010 6/03 L4-5 epidural Dr. Jordan  7/06 overall for decompression, foraminotomy. L4-L5 posterior fusion, L4-L5 allograft        Stroke ([facility]) 1996    no residual problems     Thoracic aortic aneurysm without mention of rupture     Tricuspid insufficiency     Unspecified disorder of thyroid     hypo    Urinary bladder disorder     suprapubic catheter insertion 11/4       FAMILY HISTORY  Family History   Problem Relation Age of Onset    Stroke Mother     Heart Disease Father     Heart Disease Sister        SOCIAL HISTORY  Social History     Socioeconomic History    Marital status:    Tobacco Use    Smoking status: Never    Smokeless tobacco: Never    Tobacco comments:     none   Vaping Use    Vaping Use: Never used   Substance and Sexual Activity    Alcohol use: No     Alcohol/week: 0.0 oz    Drug use: Yes     Comment: CBD Oil for Arthritis    Sexual activity: Not Currently     Partners: Male       SURGICAL HISTORY  Past Surgical History:   Procedure Laterality Date    TENDON REPAIR Right 11/10/2016    Procedure: TENDON REPAIR - QUADRACEPS ;  Surgeon: Maxim Herrera M.D.;  Location: [facility];  Service:     KNEE ARTHROPLASTY TOTAL Right 9/8/2016    Procedure: KNEE ARTHROPLASTY TOTAL;  Surgeon: Maxim Herrera M.D.;  Location: [facility];  Service:     FUSION, SPINE, LUMBAR, PLIF  11/24/2015    Procedure: LUMBAR FUSION POSTERIOR L3-4, EXPLORATION OF FUSION L4-5 WITH INSTRUMENTATION;  Surgeon: Hermann Reis M.D.;  Location: [facility];  Service:     LUMBAR LAMINECTOMY DISKECTOMY  11/24/2015    Procedure: LUMBAR L3-4 LAMINECTOMY AND REDO L4-5;  Surgeon: Hermann Reis M.D.;  Location: [facility];  Service:     KNEE ARTHROPLASTY TOTAL  1/3/2012    Performed by YOSEF MEJÍA at [facility]    KNEE ARTHROSCOPY  10/18/2011    Performed by YOSEF MEJÍA at [facility]    MENISCECTOMY, KNEE, MEDIAL  10/18/2011    Performed by YOSEF MEJÍA at [facility]    AORTIC VALVE REPLACEMENT  1/12/2010    Performed by ISAÍAS NICOLE at  "SURGERY [facility] ORS    APPENDECTOMY      FUSION, SPINE, LUMBAR, PLIF      HYSTERECTOMY, TOTAL ABDOMINAL         CURRENT MEDICATIONS  Home Medications       Reviewed by Lynne Victoria R.N. (Registered Nurse) on 10/12/22 at 0937  Med List Status: Not Addressed     Medication Last Dose Status   benazepril (LOTENSIN) 40 MG tablet  Active   ELIQUIS 5 MG Tab  Active   ezetimibe (ZETIA) 10 MG Tab  Active   gabapentin (NEURONTIN) 100 MG Cap  Active   levothyroxine (SYNTHROID) 75 MCG Tab  Active   MAGNESIUM PO  Active   metoprolol SR (TOPROL XL) 50 MG TABLET SR 24 HR  Active   Multiple Vitamins-Minerals (CENTRUM SILVER ULTRA WOMENS PO)  Active   omeprazole (PRILOSEC) 20 MG delayed-release capsule  Active                    ALLERGIES  Allergies   Allergen Reactions    Amoxicillin Vomiting     Upset stomach, ok if needed for life saving treatment (per pt)    Codeine Nausea     Synthetic codones ok    Doxycycline Nausea     Nausea, Ok in life saving situation (per pt)    Sulfamethoxazole-Trimethoprim Vomiting and Nausea     Ok in a life saving situation (per pt)    Tramadol Vomiting and Nausea     nausea    Trazodone Vomiting     groggy       PHYSICAL EXAM  VITAL SIGNS: /86   Pulse 87   Temp 36.8 °C (98.3 °F) (Temporal)   Resp 18   Ht 1.626 m (5' 4\")   Wt 68 kg (150 lb)   SpO2 96%   BMI 25.75 kg/m²      Constitutional: Well developed, Well nourished, No acute distress, Non-toxic appearance.   HENT: Normocephalic, Atraumatic  Cardiovascular: Regular pulse  Lungs: No respiratory distress  Skin: Warm, Dry, no rash  Extremities: There is soft tissue swelling and ecchymosis in the distal leg down into the ankle, ankle is grossly stable to drawer testing, there is tenderness both to the medial and lateral malleoli, distal pulses DP and PT are 2+, sensation intact and the patient is able to wiggle her toes, there is a well-healed scar over the knee consistent with total knee replacement but there is no tenderness " throughout the knee  Neurologic: Alert, appropriate, follows commands  Psychiatric: Affect normal    RADIOLOGY/PROCEDURES  DX-TIBIA AND FIBULA RIGHT   Final Result      1.  Right distal fibular fracture at the metadiaphysis      2.  Probable medial malleolar fracture      DX-ANKLE 3+ VIEWS RIGHT   Final Result      Distal fibular and medial malleolar fracture        Results for orders placed or performed during the hospital encounter of 10/12/22   CBC WITH DIFFERENTIAL   Result Value Ref Range    WBC 8.4 4.8 - 10.8 K/uL    RBC 3.86 (L) 4.20 - 5.40 M/uL    Hemoglobin 11.1 (L) 12.0 - 16.0 g/dL    Hematocrit 34.6 (L) 37.0 - 47.0 %    MCV 89.6 81.4 - 97.8 fL    MCH 28.8 27.0 - 33.0 pg    MCHC 32.1 (L) 33.6 - 35.0 g/dL    RDW 48.7 35.9 - 50.0 fL    Platelet Count 225 164 - 446 K/uL    MPV 8.8 (L) 9.0 - 12.9 fL    Neutrophils-Polys 72.00 44.00 - 72.00 %    Lymphocytes 17.60 (L) 22.00 - 41.00 %    Monocytes 9.10 0.00 - 13.40 %    Eosinophils 0.20 0.00 - 6.90 %    Basophils 0.60 0.00 - 1.80 %    Immature Granulocytes 0.50 0.00 - 0.90 %    Nucleated RBC 0.00 /100 WBC    Neutrophils (Absolute) 6.04 2.00 - 7.15 K/uL    Lymphs (Absolute) 1.48 1.00 - 4.80 K/uL    Monos (Absolute) 0.76 0.00 - 0.85 K/uL    Eos (Absolute) 0.02 0.00 - 0.51 K/uL    Baso (Absolute) 0.05 0.00 - 0.12 K/uL    Immature Granulocytes (abs) 0.04 0.00 - 0.11 K/uL    NRBC (Absolute) 0.00 K/uL   COMP METABOLIC PANEL   Result Value Ref Range    Sodium 137 135 - 145 mmol/L    Potassium 4.2 3.6 - 5.5 mmol/L    Chloride 104 96 - 112 mmol/L    Co2 22 20 - 33 mmol/L    Anion Gap 11.0 7.0 - 16.0    Glucose 101 (H) 65 - 99 mg/dL    Bun 26 (H) 8 - 22 mg/dL    Creatinine 1.19 0.50 - 1.40 mg/dL    Calcium 9.4 8.4 - 10.2 mg/dL    AST(SGOT) 19 12 - 45 U/L    ALT(SGPT) 13 2 - 50 U/L    Alkaline Phosphatase 71 30 - 99 U/L    Total Bilirubin 0.5 0.1 - 1.5 mg/dL    Albumin 4.1 3.2 - 4.9 g/dL    Total Protein 6.7 6.0 - 8.2 g/dL    Globulin 2.6 1.9 - 3.5 g/dL    A-G Ratio 1.6  g/dL   ESTIMATED GFR   Result Value Ref Range    GFR (CKD-EPI) 45 (A) >60 mL/min/1.73 m 2         COURSE & MEDICAL DECISION MAKING  Pertinent Labs & Imaging studies reviewed. (See chart for details)  11:05 AM-we attempted to contact Dr. Loredo who the patient has seen in the office once (no response x2).  The patient has been operated on by [facility] previously and we subsequently contacted [facility] with Dr. Velez on-call.    This is an 86-year-old female who presents with a bimalleolar ankle fracture.  The fall appears to have been mechanical in nature.  Patient's labs are reassuring.  Patient will be admitted given her advanced age and history of recurrent falls in the context of significant fracture of her ankle likely requiring surgery.    FINAL IMPRESSION  1.  Bimalleolar ankle fracture  2.  Mechanical fall  3.         Electronically signed by: Kenji Herring M.D., 10/12/2022 9:41 AM

## 2022-10-12 NOTE — ASSESSMENT & PLAN NOTE
- She twisted her ankle, and was unable to bear weight.  Radiographs showing trimalleolar fracture of the right ankle.  -s/p open reduction internal fixation 10/14.  -Continue calcium + vit D.  Vitamin D level is normal.  Will need outpatient bone scan to evaluate for osteoporosis and need for bisphosphonates.   - continue pain control with oxycodone and IV Dilaudid.  - Patient will have low-risk below the knee procedure, with no prior history of VTE.  She is touch toe weightbearing on the right leg.  Will need on-going DVT prophylaxis on discharge.  Continue home Eliquis for atrial fibrillation, which should be adequate for postoperative DVT prophylaxis.  -PT/OT recommending skilled therapies.  SNF referrals being pursued.  She is also candidate for inpatient rehab placement, and she prefers to go there.  Awaiting paperwork to go through.

## 2022-10-12 NOTE — CONSULTS
"    CC: right ankle fracture    HPI:   Yvonne Wada is an 87 yo F with multiple medical problems as listed below who suffered a ground level fall resulting in a trimaleollar ankle fracture. She is on eliquis for Aortic valve replacement. She notes that she got out of bed and twisted on her ankle and fell on 10/11/22. She has ankle pain but no knee or hip pain. She has been unable to ambulate with NWB restrictions.     ROS: Positive for musculoskeletal pain and what is stated in the HPI and PMH, otherwise negative review of systems    PMH:   Past Medical History:   Diagnosis Date    AAA (abdominal aortic aneurysm) 7/26/2010    10/09 CT thorax dilated ascending thoracic aorta 4.9 cm 1/10 transesophageal echo dilated aortic root, and ascending aorta with mild aortic insufficiency 1/10  ascending aortic aneurysm repair 5/12 echo [facility] normal LV function EF 60%, moderate to severe left atrial enlargement, RVSP 32     Aortic insufficiency     Aortic valve disease     Aortic valve regurgitation     Arthritis     back and knee/ osteo    Cataract     Dental disorder     full top denture, partial bottom    Diverticulosis     Dyslipidemia 7/26/2010    Sees [facility] on lipitor 4/11 chol 159,trig 84,hdl 47,ldl 95,cpk 114 7/12 chol 163,trig 120,hdl 49,ldl 90, crp 1.1 on lipitor 20 mg     GERD (gastroesophageal reflux disease) 7/12/2012 6/07 EGD per [facility] hiatal hernia, start prevacid 30 mg 7/12 protonix 20 mg qday     Glaucoma     Heart burn     Heart murmur     Heart valve disease     \"leaking\", mitral valve    Hiatus hernia syndrome     High cholesterol     History of shingles 6/30/2011 2010 Back and left thorax      History of total knee arthroplasty 7/26/2010    4/10 MRI right knee complex tear medial meniscus, horizontal cleavage tear lateral meniscus, chondromalacia patella, moderate-sized Baker's cyst 5/10 right meniscus knee repair  5/10 told by  had gout on surgery had pathology report, I " await that report Question gout seen on pathology report done during repair of right meniscus knee surgery. 7/10 uric acid 6.3, we'll await starting allopurinol depending on the operative report 8/5/10 reviewed ortho notes , op report indicates crystal deposition, could be gout or pseudogout. No bx result sent over. Will need to get. My suspicion is chondrocalcinosis. 10/11  ortho note, MRI pending 8/7/10 reviewed pathology report right knee tissue, marked degenerative changes with focal calcification, no mention of gout. Based on this and a normal uric acid level, I do not believe she has gout, has no hyperuricemia medications would be indicated 10/11  ortho left knee meniscectomy and arthroscopy 1/12      HLD (hyperlipidemia)     Hypertension     Hypothyroid 4/8/2011 4/11 tsh 9 start synthroid 50 4/11 tsh 6,free t3 3.1,free t4 1.2,tpo negative 7/1/11 tsh 2.1 cont synthroid 50 mcg 7/12 tsh 3.4 cont synthroid 50 mcg     Indigestion     Mitral insufficiency     OSTEOPOROSIS 8/9/2010    Had been on fosamax [id number]  8/10 dexa LS -2.0,hip -1.5 await old dexa result, she will get copy for me 4/11 vit d 35 9/12 dexa LS -3.2,hip -1.4 2/13/13 relast infusion     Pain     back and right leg, can get as bad as 10/10    Preventative health care 7/26/2010    2002 pneum 2005 colon [facility] no records 4/11 vit d 35 9/11 shingles vaccine 9/12 mammogram 9/12 dexa LS -3.2,hip -1.4     Rheumatic fever     S/P appendectomy 7/26/2010    S/P TOMY (total abdominal hysterectomy) 7/26/2010    Sees gyn on HRT estradiol for 20 yrs () 11/08 mammo      Shingles 6/30/2011    Snoring     Status post lumbar surgery 7/26/2010 6/03 L4-5 epidural Dr. Jordan  7/06 overall for decompression, foraminotomy. L4-L5 posterior fusion, L4-L5 allograft       Stroke ([facility]) 1996    no residual problems     Thoracic aortic aneurysm without mention of rupture     Tricuspid insufficiency      Unspecified disorder of thyroid     hypo    Urinary bladder disorder     suprapubic catheter insertion 11/4    UTI (urinary tract infection) 11/12/2016 7/6/16 UTI klebsiella pneumoniae sensitive to all antibiotics except ampicillin, start bactrim x 5 days 1/18/19 UTI enterobacter cloacae resistant to ampicillin, cephalothin, penicillin, bactrim, sensitive to cefuroxime, ciprofloxacin and levaquin, nitrofurantoin 5/26/19 UTI klebsiella given omnicef, organism resistant ampicillin, ciprofloxacin, levofloxacin, bactrim 8/18/21  urology note        PSH:   Past Surgical History:   Procedure Laterality Date    TENDON REPAIR Right 11/10/2016    Procedure: TENDON REPAIR - QUADRACEPS ;  Surgeon: Maxim Herrera M.D.;  Location: [facility];  Service:     KNEE ARTHROPLASTY TOTAL Right 9/8/2016    Procedure: KNEE ARTHROPLASTY TOTAL;  Surgeon: Maxim Herrera M.D.;  Location: [facility];  Service:     FUSION, SPINE, LUMBAR, PLIF  11/24/2015    Procedure: LUMBAR FUSION POSTERIOR L3-4, EXPLORATION OF FUSION L4-5 WITH INSTRUMENTATION;  Surgeon: Hermann Reis M.D.;  Location: [facility];  Service:     LUMBAR LAMINECTOMY DISKECTOMY  11/24/2015    Procedure: LUMBAR L3-4 LAMINECTOMY AND REDO L4-5;  Surgeon: Hermann Reis M.D.;  Location: [facility];  Service:     KNEE ARTHROPLASTY TOTAL  1/3/2012    Performed by YOSEF MEJÍA at [facility]    KNEE ARTHROSCOPY  10/18/2011    Performed by YOSEF MEJÍA at [facility]    MENISCECTOMY, KNEE, MEDIAL  10/18/2011    Performed by YOSEF MEJÍA at [facility]    AORTIC VALVE REPLACEMENT  1/12/2010    Performed by ISAÍAS NICOLE at [facility]    APPENDECTOMY      FUSION, SPINE, LUMBAR, PLIF      HYSTERECTOMY, TOTAL ABDOMINAL         Meds:   (Not in a hospital admission)      Allergies:   Allergies   Allergen Reactions    Amoxicillin Vomiting     Upset stomach, ok if  needed for life saving treatment (per pt)    Codeine Nausea     Synthetic codones ok    Doxycycline Nausea     Nausea, Ok in life saving situation (per pt)    Sulfamethoxazole-Trimethoprim Vomiting and Nausea     Ok in a life saving situation (per pt)    Tramadol Vomiting and Nausea     nausea    Trazodone Vomiting     groggy       SH:   Social History     Socioeconomic History    Marital status:      Spouse name: Not on file    Number of children: Not on file    Years of education: Not on file    Highest education level: Not on file   Occupational History    Not on file   Tobacco Use    Smoking status: Never    Smokeless tobacco: Never    Tobacco comments:     none   Vaping Use    Vaping Use: Never used   Substance and Sexual Activity    Alcohol use: No     Alcohol/week: 0.0 oz    Drug use: Yes     Comment: CBD Oil for Arthritis    Sexual activity: Not Currently     Partners: Male   Other Topics Concern    Not on file   Social History Narrative    Not on file     Social Determinants of Health     Financial Resource Strain: Not on file   Food Insecurity: Not on file   Transportation Needs: Not on file   Physical Activity: Not on file   Stress: Not on file   Social Connections: Not on file   Intimate Partner Violence: Not on file   Housing Stability: Not on file       FH:   Family History   Problem Relation Age of Onset    Stroke Mother     Heart Disease Father     Heart Disease Sister        Physical Exam:   General: AO x4  Eyes: non-icteric  Breathing: nonlabored  MSK:   Right ankle with short leg splint in place. Fires dorsiflexors and plantarflexors of toes. Grossly intact sensation in L2-S1 distribution. Toes warm and well perfused    Imaging:   Ankle xrays and CT demonstrate displaced trimalleolar ankle fracture    Assessment/Plan:   85 yo F with multiple medical problems on eliquis who suffered a twist and fall injury resulting in displaced trimalleolar ankle fracture. She does have history of  ipsilateral TKA in 2016 as well. I had a long discussion with the patient about the injury as well as treatment options. Nonoperativ emanagement includes a period of nonweight bearing with splint or cast and has risks including nonunion, malunion, PTOA, skin complications. Risks of surgery were also discussed which include but are not limited to nonunion, malunion, hardware complications, wound healing issues, risks of anestehsia including stroke, heart attack and death. At this point, she wishes to begin surgical planning. This will likely be Friday with Dr. Huertas pending OR availability. Please hold eliquis today and keep NPO at midnight.      Cindy Velez M.D.

## 2022-10-12 NOTE — H&P
Hospital Medicine History & Physical Note    Date of Service  10/12/2022    Primary Care Physician  Tee Tran M.D.    Consultants  orthopedics    Specialist Names: Dr. Velez    Code Status  Full Code    Chief Complaint  Chief Complaint   Patient presents with    Ankle Injury     Fell in the night sustained a right ankle foot injury, very swollen and bruised. Unable to bear weight.       History of Presenting Illness  Yvonne Marie Wada is a 86 y.o. female with PMH anemia, aortic valve replacement on Eliquis, osteoporosis, HTN who presented 10/12/2022 right ankle pain. Patient reports last night she got out of bed and twisted her right ankle. Has associated swelling and pain, unable to bear weight.  Patient denies falling or trauma to the head, loss of consciousness.  Patient does have sensation in her foot.  Patient reports she only has pain with movement.  She denies any fevers, chills, chest pain, shortness of breath or abdominal pain.    In the ER vital signs stable, labs significant for hemoglobin 11.3, creatinine 1.06, GFR 51 and UA with blood, nitrites, leukocyte esterase, bacteria and WBCs.  XR shows distal fibular and medial malleolar fracture. Orthopedics consulted by ERP, CT pending.    I discussed the plan of care with patient and family.    Review of Systems  Review of Systems   Constitutional:  Negative for chills and fever.   HENT:  Negative for congestion.    Eyes:  Negative for blurred vision.   Respiratory:  Negative for cough and shortness of breath.    Cardiovascular:  Negative for chest pain and palpitations.   Gastrointestinal:  Negative for abdominal pain, nausea and vomiting.   Genitourinary:  Negative for dysuria.   Musculoskeletal:  Positive for joint pain. Negative for myalgias.   Skin:  Negative for rash.   Neurological:  Positive for weakness. Negative for dizziness and headaches.   Psychiatric/Behavioral:  Negative for depression.    All other systems reviewed and are  negative.    Past Medical History   has a past medical history of AAA (abdominal aortic aneurysm) (7/26/2010), Aortic insufficiency, Aortic valve disease, Aortic valve regurgitation, Arthritis, Cataract, Dental disorder, Diverticulosis, Dyslipidemia (7/26/2010), GERD (gastroesophageal reflux disease) (7/12/2012), Glaucoma, Heart burn, Heart murmur, Heart valve disease, Hiatus hernia syndrome, High cholesterol, History of shingles (6/30/2011), History of total knee arthroplasty (7/26/2010), HLD (hyperlipidemia), Hypertension, Hypothyroid (4/8/2011), Indigestion, Mitral insufficiency, OSTEOPOROSIS (8/9/2010), Pain, Preventative health care (7/26/2010), Rheumatic fever, S/P appendectomy (7/26/2010), S/P TOMY (total abdominal hysterectomy) (7/26/2010), Shingles (6/30/2011), Snoring, Status post lumbar surgery (7/26/2010), Stroke (HCC) (1996), Thoracic aortic aneurysm without mention of rupture, Tricuspid insufficiency, Unspecified disorder of thyroid, Urinary bladder disorder, and UTI (urinary tract infection) (11/12/2016).    Surgical History   has a past surgical history that includes fusion, spine, lumbar, plif; hysterectomy, total abdominal; appendectomy; aortic valve replacement (1/12/2010); knee arthroscopy (10/18/2011); meniscectomy, knee, medial (10/18/2011); knee arthroplasty total (1/3/2012); fusion, spine, lumbar, plif (11/24/2015); lumbar laminectomy diskectomy (11/24/2015); knee arthroplasty total (Right, 9/8/2016); and tendon repair (Right, 11/10/2016).     Family History  family history includes Heart Disease in her father and sister; Stroke in her mother.   Family history reviewed with patient. There is no family history that is pertinent to the chief complaint.     Social History   reports that she has never smoked. She has never used smokeless tobacco. She reports current drug use. She reports that she does not drink alcohol.    Allergies  Allergies   Allergen Reactions    Amoxicillin Vomiting     Upset  stomach, ok if needed for life saving treatment (per pt)    Codeine Nausea     Synthetic codones ok    Doxycycline Nausea     Nausea, Ok in life saving situation (per pt)    Sulfamethoxazole-Trimethoprim Vomiting and Nausea     Ok in a life saving situation (per pt)    Tramadol Vomiting and Nausea     nausea    Trazodone Vomiting     groggy       Medications  Prior to Admission Medications   Prescriptions Last Dose Informant Patient Reported? Taking?   Acetaminophen (TYLENOL PO) 10/12/2022 at 0430 Patient Yes Yes   Sig: Take 3 Tablets by mouth 2 times a day as needed (For pian). Pt is not sure the strength   ELIQUIS 5 MG Tab 10/11/2022 at 0600 Patient No No   Sig: TAKE 1 TABLET BY MOUTH TWICE A DAY   Patient taking differently: Take 5 mg by mouth 2 times a day.   MAGNESIUM PO 10/11/2022 at 1800 Patient Yes No   Sig: Take 1 capsule by mouth every evening.   Multiple Vitamins-Minerals (CENTRUM SILVER ULTRA WOMENS PO) 10/11/2022 Patient Yes No   Sig: Take 1 tablet by mouth every evening.   benazepril (LOTENSIN) 40 MG tablet 10/11/2022 at 0600 Patient No No   Sig: Take 1 Tablet by mouth every morning.   ezetimibe (ZETIA) 10 MG Tab 10/11/2022 at 0600 Patient No No   Sig: TAKE 1 TABLET BY MOUTH EVERY DAY   gabapentin (NEURONTIN) 100 MG Cap 10/11/2022 at 1800 Patient Yes Yes   Sig: Take 200 mg by mouth 3 times a day.   levothyroxine (SYNTHROID) 75 MCG Tab 10/11/2022 at 0600 Patient No No   Sig: Take 1 Tablet by mouth every morning on an empty stomach.   metoprolol SR (TOPROL XL) 50 MG TABLET SR 24 HR 10/11/2022 at 0600 Patient No No   Sig: Take 1 Tablet by mouth every morning.   omeprazole (PRILOSEC) 20 MG delayed-release capsule 10/11/2022 at 0600 Patient Yes No   Sig: Take 20 mg by mouth every morning.      Facility-Administered Medications: None       Physical Exam  Temp:  [36.8 °C (98.3 °F)] 36.8 °C (98.3 °F)  Pulse:  [87] 87  Resp:  [18] 18  BP: (138)/(86) 138/86  SpO2:  [96 %] 96 %  Blood Pressure : 138/86    Temperature: 36.8 °C (98.3 °F)   Pulse: 87   Respiration: 18   Pulse Oximetry: 96 %       Physical Exam  Vitals and nursing note reviewed.   Constitutional:       General: She is not in acute distress.  HENT:      Head: Normocephalic and atraumatic.      Right Ear: External ear normal.      Left Ear: External ear normal.      Nose: Nose normal.      Mouth/Throat:      Pharynx: Oropharynx is clear.   Eyes:      Extraocular Movements: Extraocular movements intact.      Conjunctiva/sclera: Conjunctivae normal.      Pupils: Pupils are equal, round, and reactive to light.   Cardiovascular:      Rate and Rhythm: Normal rate and regular rhythm.      Pulses: Normal pulses.      Heart sounds: Normal heart sounds.   Pulmonary:      Effort: Pulmonary effort is normal. No respiratory distress.      Breath sounds: Normal breath sounds.   Abdominal:      General: Abdomen is flat. There is no distension.      Palpations: Abdomen is soft.      Tenderness: There is no abdominal tenderness.   Musculoskeletal:         General: Swelling, tenderness and signs of injury present. Normal range of motion.      Cervical back: Normal range of motion and neck supple.      Right lower leg: No edema.      Left lower leg: No edema.      Comments: Right foot bandaged c/d/i   Skin:     General: Skin is warm and dry.      Comments: Healed scar over right knee   Neurological:      General: No focal deficit present.      Mental Status: She is alert and oriented to person, place, and time.      Cranial Nerves: No cranial nerve deficit.      Motor: Weakness present.   Psychiatric:         Mood and Affect: Mood normal.         Behavior: Behavior normal.       Laboratory:  Recent Labs     10/12/22  1205   WBC 8.4   RBC 3.86*   HEMOGLOBIN 11.1*   HEMATOCRIT 34.6*   MCV 89.6   MCH 28.8   MCHC 32.1*   RDW 48.7   PLATELETCT 225   MPV 8.8*     Recent Labs     10/12/22  1205   SODIUM 137   POTASSIUM 4.2   CHLORIDE 104   CO2 22   GLUCOSE 101*   BUN 26*    CREATININE 1.19   CALCIUM 9.4     Recent Labs     10/12/22  1205   ALTSGPT 13   ASTSGOT 19   ALKPHOSPHAT 71   TBILIRUBIN 0.5   GLUCOSE 101*         No results for input(s): NTPROBNP in the last 72 hours.      No results for input(s): TROPONINT in the last 72 hours.    Imaging:  DX-ANKLE 2- VIEWS RIGHT   Final Result      No change in distal fibular and medial malleolar fracture with presence of cast or splint      DX-TIBIA AND FIBULA RIGHT   Final Result      1.  Right distal fibular fracture at the metadiaphysis      2.  Probable medial malleolar fracture      DX-ANKLE 3+ VIEWS RIGHT   Final Result      Distal fibular and medial malleolar fracture      CT-ANKLE W/O PLUS RECONS RIGHT    (Results Pending)       X-Ray:  I have personally reviewed the images and compared with prior images.    Assessment/Plan:  Justification for Admission Status  I anticipate this patient is appropriate for observation status at this time because ankle fracture, orthopedics consulted. CT scan pending to determine further surgical plans.    * Tibia/fibula fracture, right, closed, initial encounter- (present on admission)  Assessment & Plan  Noted on XR  Bandage placed in ER  Orthopedics consulted  CT scan pending  Hold Eliquis in case of surgery  will keep n.p.o. for now   pain control  PT/OT    Paroxysmal atrial fibrillation (HCC)- (present on admission)  Assessment & Plan  followed by cardiology, Dr. Wheeler  continue Toprol  Held Eliquis    Senile osteoporosis- (present on admission)  Assessment & Plan  currently on prolia per PCP     Anemia- (present on admission)  Assessment & Plan  chronic, no signs of bleeding   trend H&H  last ferritin checked in 8/2022 low normal, may benefit from iron replacement  repeat iron/ferritin levels     CKD (chronic kidney disease) stage 3, GFR 30-59 ml/min (formerly [facility])- (present on admission)  Assessment & Plan  at baseline   avoid nephrotoxic medications   trend BMP    GERD (gastroesophageal reflux  disease)- (present on admission)  Assessment & Plan  Continue omeprazole    Hypothyroid- (present on admission)  Assessment & Plan  Continue home levothyroxine    Hypertension- (present on admission)  Assessment & Plan  Continue home metoprolol and BenzePril      VTE prophylaxis: SCDs/TEDs, held eliquis for possible surgery

## 2022-10-12 NOTE — ASSESSMENT & PLAN NOTE
-chronic, no signs of bleeding   -Ferritin normal, iron low consistent with anemia of chronic disease.    -Hemoglobin stable.

## 2022-10-12 NOTE — ASSESSMENT & PLAN NOTE
-Maintaining good control.  -Continue home metoprolol, and benazepril.  Monitor blood pressure trend closely.

## 2022-10-12 NOTE — ED NOTES
Med rec updated and complete, per pt and pts   Allergies reviewed, per pt   Interviewed pt with  at bedside with permission from pt.

## 2022-10-12 NOTE — ASSESSMENT & PLAN NOTE
-Renal function improved back to baseline.  Stable off IV fluids.  - avoid nephrotoxins, and continue to renally dose all medications.

## 2022-10-12 NOTE — PROGRESS NOTES
4 Eyes Skin Assessment Completed by RAMÍREZ Jacobs and RAMÍREZ Felix.    Head WDL  Ears WDL  Nose WDL  Mouth WDL  Neck WDL  Breast/Chest WDL  Shoulder Blades WDL  Spine WDL  (R) Arm/Elbow/Hand WDL  (L) Arm/Elbow/Hand WDL  Abdomen WDL  Groin WDL  Scrotum/Coccyx/Buttocks WDL  (R) Leg Splint  (L) Leg WDL  (R) Heel/Foot/Toe Splint  (L) Heel/Foot/Toe WDL          Devices In Places Pulse Ox      Interventions In Place Pillows    Possible Skin Injury No    Pictures Uploaded Into Epic N/A  Wound Consult Placed N/A  RN Wound Prevention Protocol Ordered No

## 2022-10-13 LAB
25(OH)D3 SERPL-MCNC: 53 NG/ML (ref 30–100)
ANION GAP SERPL CALC-SCNC: 9 MMOL/L (ref 7–16)
BUN SERPL-MCNC: 28 MG/DL (ref 8–22)
CALCIUM SERPL-MCNC: 9 MG/DL (ref 8.4–10.2)
CHLORIDE SERPL-SCNC: 105 MMOL/L (ref 96–112)
CO2 SERPL-SCNC: 16 MMOL/L (ref 20–33)
CREAT SERPL-MCNC: 1.26 MG/DL (ref 0.5–1.4)
ERYTHROCYTE [DISTWIDTH] IN BLOOD BY AUTOMATED COUNT: 54.8 FL (ref 35.9–50)
FERRITIN SERPL-MCNC: 61 NG/ML (ref 10–291)
GFR SERPLBLD CREATININE-BSD FMLA CKD-EPI: 42 ML/MIN/1.73 M 2
GLUCOSE SERPL-MCNC: 93 MG/DL (ref 65–99)
HCT VFR BLD AUTO: 34.7 % (ref 37–47)
HGB BLD-MCNC: 10.5 G/DL (ref 12–16)
IRON SATN MFR SERPL: 11 % (ref 15–55)
IRON SERPL-MCNC: 33 UG/DL (ref 40–170)
MCH RBC QN AUTO: 29.6 PG (ref 27–33)
MCHC RBC AUTO-ENTMCNC: 30.3 G/DL (ref 33.6–35)
MCV RBC AUTO: 97.7 FL (ref 81.4–97.8)
PLATELET # BLD AUTO: 182 K/UL (ref 164–446)
PMV BLD AUTO: 9.1 FL (ref 9–12.9)
POTASSIUM SERPL-SCNC: 4.5 MMOL/L (ref 3.6–5.5)
RBC # BLD AUTO: 3.55 M/UL (ref 4.2–5.4)
SODIUM SERPL-SCNC: 130 MMOL/L (ref 135–145)
TIBC SERPL-MCNC: 290 UG/DL (ref 250–450)
UIBC SERPL-MCNC: 257 UG/DL (ref 110–370)
WBC # BLD AUTO: 5.3 K/UL (ref 4.8–10.8)

## 2022-10-13 PROCEDURE — 83550 IRON BINDING TEST: CPT

## 2022-10-13 PROCEDURE — 82306 VITAMIN D 25 HYDROXY: CPT

## 2022-10-13 PROCEDURE — A9270 NON-COVERED ITEM OR SERVICE: HCPCS | Performed by: INTERNAL MEDICINE

## 2022-10-13 PROCEDURE — 85027 COMPLETE CBC AUTOMATED: CPT

## 2022-10-13 PROCEDURE — G0378 HOSPITAL OBSERVATION PER HR: HCPCS

## 2022-10-13 PROCEDURE — 94760 N-INVAS EAR/PLS OXIMETRY 1: CPT

## 2022-10-13 PROCEDURE — 700102 HCHG RX REV CODE 250 W/ 637 OVERRIDE(OP): Performed by: INTERNAL MEDICINE

## 2022-10-13 PROCEDURE — 82728 ASSAY OF FERRITIN: CPT

## 2022-10-13 PROCEDURE — 99226 PR SUBSEQUENT OBSERVATION CARE,LEVEL III: CPT | Performed by: INTERNAL MEDICINE

## 2022-10-13 PROCEDURE — 83540 ASSAY OF IRON: CPT

## 2022-10-13 PROCEDURE — 36415 COLL VENOUS BLD VENIPUNCTURE: CPT

## 2022-10-13 PROCEDURE — 51798 US URINE CAPACITY MEASURE: CPT

## 2022-10-13 PROCEDURE — 80048 BASIC METABOLIC PNL TOTAL CA: CPT

## 2022-10-13 RX ADMIN — Medication 1 TABLET: at 13:53

## 2022-10-13 RX ADMIN — GABAPENTIN 200 MG: 100 CAPSULE ORAL at 15:54

## 2022-10-13 RX ADMIN — GABAPENTIN 200 MG: 100 CAPSULE ORAL at 20:32

## 2022-10-13 RX ADMIN — OXYCODONE 5 MG: 5 TABLET ORAL at 10:15

## 2022-10-13 RX ADMIN — GABAPENTIN 200 MG: 100 CAPSULE ORAL at 10:15

## 2022-10-13 ASSESSMENT — FIBROSIS 4 INDEX: FIB4 SCORE: 2.46

## 2022-10-13 ASSESSMENT — PAIN DESCRIPTION - PAIN TYPE
TYPE: ACUTE PAIN

## 2022-10-13 NOTE — DISCHARGE PLANNING
Met with pt and her . Pt said she normally uses a walker but independent with ADLs. She needs help with IADLs so  does all of these including driving to appts.  Pt had [facility] ([facility]) last year.     PCP is Dr. Tee Tran.  [facility] and [facility] is pharmacy.     Care Transition Team Assessment    Information Source  Orientation Level: Oriented X4  Information Given By: Patient  Who is responsible for making decisions for patient? : Patient    Readmission Evaluation  Is this a readmission?: No    Elopement Risk  Legal Hold: No  Ambulatory or Self Mobile in Wheelchair: No-Not an Elopement Risk  Elopement Risk: Not at Risk for Elopement    Interdisciplinary Discharge Planning  Does Admitting Nurse Feel This Could be a Complex Discharge?: No  Primary Care Physician: Dr. Tee Tran  Lives with - Patient's Self Care Capacity: Spouse  Patient or legal guardian wants to designate a caregiver: No  Support Systems: Spouse / Significant Other  Housing / Facility: [address]  Do You Take your Prescribed Medications Regularly: Yes  Able to Return to Previous ADL's: Future Time w/Therapy  Mobility Issues: No  Prior Services: None, Home-Independent  Patient Prefers to be Discharged to:: Home  Assistance Needed: Yes  Durable Medical Equipment: Walker    Discharge Preparedness  What is your plan after discharge?: Home health care  What are your discharge supports?: Spouse  Prior Functional Level: Ambulatory, Independent with Activities of Daily Living, Independent with Medication Management  Difficulity with ADLs: Walking  Difficulity with IADLs: Laundry, Driving, Cooking, Shopping    Functional Assesment  Prior Functional Level: Ambulatory, Independent with Activities of Daily Living, Independent with Medication Management    Finances  Financial Barriers to Discharge: No  Prescription Coverage: Yes    Vision / Hearing Impairment  Vision Impairment : Yes  Right Eye Vision: Impaired, Wears  Glasses  Left Eye Vision: Impaired, Wears Glasses  Hearing Impairment : No    Values / Beliefs / Concerns  Values / Beliefs Concerns : No    Advance Directive  Advance Directive?: None    Domestic Abuse  Have you ever been the victim of abuse or violence?: No  Physical Abuse or Sexual Abuse: No  Verbal Abuse or Emotional Abuse: No  Possible Abuse/Neglect Reported to:: Not Applicable    Discharge Risks or Barriers  Discharge risks or barriers?: Post-acute placement / services  Patient risk factors: Vulnerable adult    Anticipated Discharge Information  Discharge Disposition: Discharged to home/self care (01)

## 2022-10-13 NOTE — CARE PLAN
The patient is Stable - Low risk of patient condition declining or worsening    Shift Goals  Clinical Goals: pt will state pain is a 6 out of 10 by the end of shift.  Patient Goals: Rest comfortably, minimize pain  Family Goals: n/a    Progress made toward(s) clinical / shift goals:  Pt being visited by her four children who are all very kind and interest in being part of her care and helping her to recognize when her pain is flaring up and when to take pain medication so that her pain will not get too bad. Seems like she has a solid support structure for recovery.    Patient is not progressing towards the following goals:

## 2022-10-13 NOTE — THERAPY
Occupational Therapy     Patient Name: Yvonne Marie Wada  Age:  86 y.o., Sex:  female  Medical Record #: [id number]  Today's Date: 10/13/2022    Discussed therapy orders with RN       10/13/22 0936   Interdisciplinary Plan of Care Collaboration   Collaboration Comments OT orders rec'd, per Dr. Velez (Ortho) surgery is recommended for pt's ankle fracture.  Will hold therapy eval until pt cleared to mobilize and perform ADl's post surgery.

## 2022-10-13 NOTE — CARE PLAN
The patient is Stable - Low risk of patient condition declining or worsening    Shift Goals  Clinical Goals: pt will state pain is a 6 out of 10 by the end of shift.  Patient Goals: Rest comfortably, minimize pain  Family Goals: n/a    Progress made toward(s) clinical / shift goals:  pt has been able to rest throughout the night. Pt has stated after pain meds her pain can be around a 6 or 7. Pt has called on call bell throughout the night and has stayed NPO and bedrest.     Patient is not progressing towards the following goals:

## 2022-10-13 NOTE — PROGRESS NOTES
Hospital Medicine Daily Progress Note    Date of Service  10/13/2022    Chief Complaint  Fall, right ankle foot injury    Hospital Course  Yvonne Marie Wada is a 86 y.o. female with anemia, aortic valve replacement on Eliquis, osteoporosis, hypertension, with history of ipsilateral TKA in 2016, admitted 10/12/2022 with twisting her ankle as she got out of bed the night prior, with resulting swelling and pain and inability to bear weight.  X-ray evaluation showed right distal fibular and medial malleolar fracture.  Creatinine 1.06.  Hemoglobin 11.3.  Orthopedics was consulted, who obtained a CT of the ankle which showed comminuted trimalleolar fracture of the right ankle, and recommended surgical intervention (likely Friday).  Eliquis was held.    Interval Problem Update  10/13/2022 - I reviewed the patient's chart. There were no significant overnight events. Remains hemodynamically stable and afebrile. Stable on RA.  No leukocytosis.  Hemoglobin 10.5.    > I have personally seen and examined the patient today.  She is comfortable.  States her pain is well controlled, but unable to bear weight as pain will increase movement.  She is hungry, and wants to eat.  Per orthopedics, early surgery will be tomorrow.  No other complaints such as nausea, vomiting, chest pain, shortness of breath, fevers or chills.      I have discussed this patient's plan of care and discharge plan at IDT rounds today with Case Management, Nursing, Nursing leadership, and other members of the IDT team.    Consultants/Specialty  Orthopedics    Code Status  Full Code    Disposition  Patient is not medically cleared for discharge.   Anticipate discharge to  TBD .  PT/OT evaluation postoperatively.  I have placed the appropriate orders for post-discharge needs.    Review of Systems  ROS     Pertinent positives/negatives as mentioned above.     A complete review of systems was personally done by me. All other systems were negative.       Physical  Exam  Temp:  [36.7 °C (98.1 °F)-37.1 °C (98.7 °F)] 37.1 °C (98.7 °F)  Pulse:  [60-72] 72  Resp:  [17-20] 20  BP: (107-145)/(50-72) 110/72  SpO2:  [91 %-97 %] 94 %    Physical Exam  Vitals reviewed.   Constitutional:       General: She is not in acute distress.     Appearance: Normal appearance. She is normal weight. She is not ill-appearing or diaphoretic.   HENT:      Head: Normocephalic and atraumatic.      Right Ear: External ear normal.      Left Ear: External ear normal.      Mouth/Throat:      Mouth: Mucous membranes are moist.      Pharynx: No oropharyngeal exudate or posterior oropharyngeal erythema.   Eyes:      General: No scleral icterus.     Extraocular Movements: Extraocular movements intact.      Conjunctiva/sclera: Conjunctivae normal.      Pupils: Pupils are equal, round, and reactive to light.   Cardiovascular:      Rate and Rhythm: Normal rate and regular rhythm.      Heart sounds: Normal heart sounds. No murmur heard.  Pulmonary:      Effort: Pulmonary effort is normal. No respiratory distress.      Breath sounds: Normal breath sounds. No stridor. No wheezing, rhonchi or rales.   Chest:      Chest wall: No tenderness.   Abdominal:      General: Bowel sounds are normal. There is no distension.      Palpations: Abdomen is soft. There is no mass.      Tenderness: There is no abdominal tenderness. There is no guarding or rebound.   Musculoskeletal:         General: Deformity (right ankle) present. No swelling. Normal range of motion.      Cervical back: Normal range of motion and neck supple. No rigidity. No muscular tenderness.   Lymphadenopathy:      Cervical: No cervical adenopathy.   Skin:     General: Skin is warm and dry.      Coloration: Skin is not jaundiced.      Findings: No rash.   Neurological:      General: No focal deficit present.      Mental Status: She is alert and oriented to person, place, and time. Mental status is at baseline.      Cranial Nerves: No cranial nerve deficit.    Psychiatric:         Mood and Affect: Mood normal.         Behavior: Behavior normal.         Thought Content: Thought content normal.         Judgment: Judgment normal.       Fluids    Intake/Output Summary (Last 24 hours) at 10/13/2022 1227  Last data filed at 10/13/2022 1100  Gross per 24 hour   Intake 240 ml   Output 620 ml   Net -380 ml       Laboratory  Recent Labs     10/12/22  1205 10/13/22  0037   WBC 8.4 5.3   RBC 3.86* 3.55*   HEMOGLOBIN 11.1* 10.5*   HEMATOCRIT 34.6* 34.7*   MCV 89.6 97.7   MCH 28.8 29.6   MCHC 32.1* 30.3*   RDW 48.7 54.8*   PLATELETCT 225 182   MPV 8.8* 9.1     Recent Labs     10/12/22  1205   SODIUM 137   POTASSIUM 4.2   CHLORIDE 104   CO2 22   GLUCOSE 101*   BUN 26*   CREATININE 1.19   CALCIUM 9.4                   Imaging  CT-ANKLE W/O PLUS RECONS RIGHT   Final Result      Comminuted trimalleolar fracture of the right ankle.      DX-ANKLE 2- VIEWS RIGHT   Final Result      No change in distal fibular and medial malleolar fracture with presence of cast or splint      DX-TIBIA AND FIBULA RIGHT   Final Result      1.  Right distal fibular fracture at the metadiaphysis      2.  Probable medial malleolar fracture      DX-ANKLE 3+ VIEWS RIGHT   Final Result      Distal fibular and medial malleolar fracture           Assessment/Plan  * Tibia/fibula fracture, right, closed, initial encounter- (present on admission)  Assessment & Plan  - She twisted her ankle, and was unable to bear weight.  Radiographs showing trimalleolar fracture of the right ankle.  -Plan for surgical fixation tomorrow by orthopedics.  - start calcium + vit D. Check Vit D level. Will need outpatient bone scan to evaluate for osteoporosis and need for bisphosphonates.   - continue pain control with oxycodone and IV Dilaudid.  - Patient will have low-risk below the knee procedure, with no prior history of VTE.  Anticipate that she will be nonweightbearing on the affected extremity postoperatively. Will need on-going DVT  prophylaxis on discharge.  She is already on Eliquis for atrial fibrillation, will resume postop.  - PT/OT eval postoperatively.      Anemia- (present on admission)  Assessment & Plan  -chronic, no signs of bleeding   -trend H&H, especially postop.  -last ferritin checked in 8/2022 low normal, may benefit from iron replacement.  Check ferritin, TIBC, iron.    Senile osteoporosis- (present on admission)  Assessment & Plan  -currently on prolia per PCP     Paroxysmal atrial fibrillation (HCC)- (present on admission)  Assessment & Plan  - rate controlled.   - continue metoprolol.   - holding eliquis for planned surgery tomorrow.     CKD (chronic kidney disease) stage 3, GFR 30-59 ml/min ([facility])- (present on admission)  Assessment & Plan  -Maintaining baseline renal function.    -Trend BMP.  - avoid nephrotoxins, and continue to renally dose all medications.    GERD (gastroesophageal reflux disease)- (present on admission)  Assessment & Plan  -Continue omeprazole    Hypothyroid- (present on admission)  Assessment & Plan  -Continue home levothyroxine    Hypertension- (present on admission)  Assessment & Plan  -Maintaining good control.  -Continue home metoprolol, and benazepril.  Monitor blood pressure trend closely.       VTE prophylaxis: SCDs/TEDs

## 2022-10-13 NOTE — PROGRESS NOTES
Texted Dr. Kenisha ROLDAN;adder scan was 664ml AT 0604. Kenisha harper text. No new orders at this time

## 2022-10-13 NOTE — CONSULTS
"  CC:   Right trimalleolar ankle fracture    HPI:   Patient is an 86-year-old female with past medical history of aortic valve replacement on Eliquis, osteoporosis and hypertension who presents after twisting injury yesterday to her right ankle.  She was going to the bathroom when her ankle twisted out from underneath her and she had immediate pain and difficulty bearing weight.  X-ray and CT scan revealed mildly displaced trimalleolar ankle fracture.  She was placed into a splint and orthopedics was consulted for management.  She reports her pain today is a 2-3 out of 10    ROS: Positive for musculoskeletal pain and what is stated in the HPI and PMH, otherwise negative review of systems    PMH:   Past Medical History:   Diagnosis Date    AAA (abdominal aortic aneurysm) 7/26/2010    10/09 CT thorax dilated ascending thoracic aorta 4.9 cm 1/10 transesophageal echo dilated aortic root, and ascending aorta with mild aortic insufficiency 1/10  ascending aortic aneurysm repair 5/12 echo [facility] normal LV function EF 60%, moderate to severe left atrial enlargement, RVSP 32     Aortic insufficiency     Aortic valve disease     Aortic valve regurgitation     Arthritis     back and knee/ osteo    Cataract     Dental disorder     full top denture, partial bottom    Diverticulosis     Dyslipidemia 7/26/2010    Sees [facility] on lipitor 4/11 chol 159,trig 84,hdl 47,ldl 95,cpk 114 7/12 chol 163,trig 120,hdl 49,ldl 90, crp 1.1 on lipitor 20 mg     GERD (gastroesophageal reflux disease) 7/12/2012 6/07 EGD per [facility] hiatal hernia, start prevacid 30 mg 7/12 protonix 20 mg qday     Glaucoma     Heart burn     Heart murmur     Heart valve disease     \"leaking\", mitral valve    Hiatus hernia syndrome     High cholesterol     History of shingles 6/30/2011 2010 Back and left thorax      History of total knee arthroplasty 7/26/2010    4/10 MRI right knee complex tear medial meniscus, horizontal cleavage tear lateral meniscus, " chondromalacia patella, moderate-sized Baker's cyst 5/10 right meniscus knee repair  5/10 told by  had gout on surgery had pathology report, I await that report Question gout seen on pathology report done during repair of right meniscus knee surgery. 7/10 uric acid 6.3, we'll await starting allopurinol depending on the operative report 8/5/10 reviewed ortho notes , op report indicates crystal deposition, could be gout or pseudogout. No bx result sent over. Will need to get. My suspicion is chondrocalcinosis. 10/11  ortho note, MRI pending 8/7/10 reviewed pathology report right knee tissue, marked degenerative changes with focal calcification, no mention of gout. Based on this and a normal uric acid level, I do not believe she has gout, has no hyperuricemia medications would be indicated 10/11  ortho left knee meniscectomy and arthroscopy 1/12      HLD (hyperlipidemia)     Hypertension     Hypothyroid 4/8/2011 4/11 tsh 9 start synthroid 50 4/11 tsh 6,free t3 3.1,free t4 1.2,tpo negative 7/1/11 tsh 2.1 cont synthroid 50 mcg 7/12 tsh 3.4 cont synthroid 50 mcg     Indigestion     Mitral insufficiency     OSTEOPOROSIS 8/9/2010    Had been on fosamax [id number]  8/10 dexa LS -2.0,hip -1.5 await old dexa result, she will get copy for me 4/11 vit d 35 9/12 dexa LS -3.2,hip -1.4 2/13/13 relast infusion     Pain     back and right leg, can get as bad as 10/10    Preventative health care 7/26/2010    2002 pneum 2005 colon [facility] no records 4/11 vit d 35 9/11 shingles vaccine 9/12 mammogram 9/12 dexa LS -3.2,hip -1.4     Rheumatic fever     S/P appendectomy 7/26/2010    S/P TOMY (total abdominal hysterectomy) 7/26/2010    Sees gyn on HRT estradiol for 20 yrs () 11/08 mammo      Shingles 6/30/2011    Snoring     Status post lumbar surgery 7/26/2010 6/03 L4-5 epidural Dr. Jordan  7/06 overall for decompression, foraminotomy. L4-L5 posterior fusion, L4-L5  allograft       Stroke ([facility]) 1996    no residual problems     Thoracic aortic aneurysm without mention of rupture     Tricuspid insufficiency     Unspecified disorder of thyroid     hypo    Urinary bladder disorder     suprapubic catheter insertion 11/4    UTI (urinary tract infection) 11/12/2016 7/6/16 UTI klebsiella pneumoniae sensitive to all antibiotics except ampicillin, start bactrim x 5 days 1/18/19 UTI enterobacter cloacae resistant to ampicillin, cephalothin, penicillin, bactrim, sensitive to cefuroxime, ciprofloxacin and levaquin, nitrofurantoin 5/26/19 UTI klebsiella given omnicef, organism resistant ampicillin, ciprofloxacin, levofloxacin, bactrim 8/18/21  urology note        PSH:   Past Surgical History:   Procedure Laterality Date    TENDON REPAIR Right 11/10/2016    Procedure: TENDON REPAIR - QUADRACEPS ;  Surgeon: Maxim Herrera M.D.;  Location: [facility];  Service:     KNEE ARTHROPLASTY TOTAL Right 9/8/2016    Procedure: KNEE ARTHROPLASTY TOTAL;  Surgeon: Maxim Herrera M.D.;  Location: [facility];  Service:     FUSION, SPINE, LUMBAR, PLIF  11/24/2015    Procedure: LUMBAR FUSION POSTERIOR L3-4, EXPLORATION OF FUSION L4-5 WITH INSTRUMENTATION;  Surgeon: Hermann Reis M.D.;  Location: [facility];  Service:     LUMBAR LAMINECTOMY DISKECTOMY  11/24/2015    Procedure: LUMBAR L3-4 LAMINECTOMY AND REDO L4-5;  Surgeon: Hermann Reis M.D.;  Location: [facility];  Service:     KNEE ARTHROPLASTY TOTAL  1/3/2012    Performed by YOSEF MEJÍA at [facility]    KNEE ARTHROSCOPY  10/18/2011    Performed by YOSEF MEJÍA at [facility]    MENISCECTOMY, KNEE, MEDIAL  10/18/2011    Performed by YOSEF MEJÍA at [facility]    AORTIC VALVE REPLACEMENT  1/12/2010    Performed by ISAÍAS NICOLE at [facility]    APPENDECTOMY      FUSION, SPINE, LUMBAR, PLIF      HYSTERECTOMY, TOTAL  ABDOMINAL         Meds:   Medications Prior to Admission   Medication Sig Dispense Refill Last Dose    gabapentin (NEURONTIN) 100 MG Cap Take 200 mg by mouth 3 times a day.   10/11/2022 at 1800    Acetaminophen (TYLENOL PO) Take 3 Tablets by mouth 2 times a day as needed (For munira). Pt is not sure the strength   10/12/2022 at 0430    ELIQUIS 5 MG Tab TAKE 1 TABLET BY MOUTH TWICE A DAY (Patient taking differently: Take 5 mg by mouth 2 times a day.) 180 Tablet 3 10/11/2022 at 0600    metoprolol SR (TOPROL XL) 50 MG TABLET SR 24 HR Take 1 Tablet by mouth every morning. 90 Tablet 3 10/11/2022 at 0600    ezetimibe (ZETIA) 10 MG Tab TAKE 1 TABLET BY MOUTH EVERY DAY 90 Tablet 3 10/11/2022 at 0600    levothyroxine (SYNTHROID) 75 MCG Tab Take 1 Tablet by mouth every morning on an empty stomach. 90 Tablet 3 10/11/2022 at 0600    benazepril (LOTENSIN) 40 MG tablet Take 1 Tablet by mouth every morning. 90 Tablet 3 10/11/2022 at 0600    MAGNESIUM PO Take 1 capsule by mouth every evening.   10/11/2022 at 1800    omeprazole (PRILOSEC) 20 MG delayed-release capsule Take 20 mg by mouth every morning.   10/11/2022 at 0600    Multiple Vitamins-Minerals (CENTRUM SILVER ULTRA WOMENS PO) Take 1 tablet by mouth every evening.   10/11/2022       Allergies:   Allergies   Allergen Reactions    Amoxicillin Vomiting     Upset stomach, ok if needed for life saving treatment (per pt)    Codeine Nausea     Synthetic codones ok    Doxycycline Nausea     Nausea, Ok in life saving situation (per pt)    Sulfamethoxazole-Trimethoprim Vomiting and Nausea     Ok in a life saving situation (per pt)    Tramadol Vomiting and Nausea     nausea    Trazodone Vomiting     groggy       SH:   Patient lives at home with her   Denies tobacco or alcohol  No ambulatory devices at baseline    FH:   Noncontributory    Physical Exam:   General: AO x4  Eyes: non-icteric  Breathing: nonlabored  MSK: Splint to the right lower extremity fitting well  Toes are warm  and well-perfused  Wiggles toes without difficulty  Sensation intact to plantar and dorsal foot    Imaging:   X-ray and CT scan show minimally displaced trimalleolar ankle fracture    Assessment/Plan:   86-year-old female with history of aortic valve replacement on Eliquis who presents with right mildly displaced trimalleolar ankle fracture.  I discussed the diagnosis with her and treatment options.  Given the trimalleolar nature of her ankle fracture is inherently unstable and I would recommend for surgical fixation in order to stabilize the ankle fracture and prevent late displacement of the fracture and also to allow her for earlier ambulation.  Given the minimally displaced nature we could attempt for a minimally invasive ankle fracture surgery with fibular nail and percutaneous screw fixation.  We will plan for surgery tomorrow afternoon.  We discussed risk and benefits at length of surgery including infection, wound problems, nonunion, malunion, hardware irritation, hardware failure, stiffness, early ankle arthritis, numbness and tingling, and need for revision surgery amongst other potential perioperative complications including heart attack, stroke, and including and up to death.  She clearly understands the risks of surgery and would like to proceed with fixation  -N.p.o. at midnight tonight for surgery tomorrow afternoon  -I discussed her case with hospitalist and will plan to hold Eliquis  -Patient will be touchdown weightbearing after surgery so may need placement and care facility for at least a few weeks prior to her progression of weightbearing

## 2022-10-14 ENCOUNTER — APPOINTMENT (OUTPATIENT)
Dept: RADIOLOGY | Facility: MEDICAL CENTER | Age: 87
End: 2022-10-14
Attending: ORTHOPAEDIC SURGERY
Payer: MEDICARE

## 2022-10-14 ENCOUNTER — ANESTHESIA (OUTPATIENT)
Dept: SURGERY | Facility: MEDICAL CENTER | Age: 87
End: 2022-10-14
Payer: MEDICARE

## 2022-10-14 ENCOUNTER — ANESTHESIA EVENT (OUTPATIENT)
Dept: SURGERY | Facility: MEDICAL CENTER | Age: 87
End: 2022-10-14
Payer: MEDICARE

## 2022-10-14 LAB
ANION GAP SERPL CALC-SCNC: 11 MMOL/L (ref 7–16)
BUN SERPL-MCNC: 35 MG/DL (ref 8–22)
CALCIUM SERPL-MCNC: 9.2 MG/DL (ref 8.4–10.2)
CHLORIDE SERPL-SCNC: 106 MMOL/L (ref 96–112)
CO2 SERPL-SCNC: 21 MMOL/L (ref 20–33)
CREAT SERPL-MCNC: 1.33 MG/DL (ref 0.5–1.4)
ERYTHROCYTE [DISTWIDTH] IN BLOOD BY AUTOMATED COUNT: 48.8 FL (ref 35.9–50)
GFR SERPLBLD CREATININE-BSD FMLA CKD-EPI: 39 ML/MIN/1.73 M 2
GLUCOSE SERPL-MCNC: 101 MG/DL (ref 65–99)
HCT VFR BLD AUTO: 31.3 % (ref 37–47)
HGB BLD-MCNC: 10 G/DL (ref 12–16)
MCH RBC QN AUTO: 28.7 PG (ref 27–33)
MCHC RBC AUTO-ENTMCNC: 31.9 G/DL (ref 33.6–35)
MCV RBC AUTO: 89.7 FL (ref 81.4–97.8)
PLATELET # BLD AUTO: 199 K/UL (ref 164–446)
PMV BLD AUTO: 9.5 FL (ref 9–12.9)
POTASSIUM SERPL-SCNC: 4.2 MMOL/L (ref 3.6–5.5)
RBC # BLD AUTO: 3.49 M/UL (ref 4.2–5.4)
SODIUM SERPL-SCNC: 138 MMOL/L (ref 135–145)
WBC # BLD AUTO: 5.9 K/UL (ref 4.8–10.8)

## 2022-10-14 PROCEDURE — 64447 NJX AA&/STRD FEMORAL NRV IMG: CPT | Mod: 59,RT | Performed by: EMERGENCY MEDICINE

## 2022-10-14 PROCEDURE — 64445 NJX AA&/STRD SCIATIC NRV IMG: CPT | Mod: 59,RT | Performed by: EMERGENCY MEDICINE

## 2022-10-14 PROCEDURE — 76942 ECHO GUIDE FOR BIOPSY: CPT | Mod: 26 | Performed by: EMERGENCY MEDICINE

## 2022-10-14 PROCEDURE — 700101 HCHG RX REV CODE 250: Performed by: EMERGENCY MEDICINE

## 2022-10-14 PROCEDURE — 99100 ANES PT EXTEME AGE<1 YR&>70: CPT | Performed by: EMERGENCY MEDICINE

## 2022-10-14 PROCEDURE — 73610 X-RAY EXAM OF ANKLE: CPT | Mod: RT

## 2022-10-14 PROCEDURE — 64447 NJX AA&/STRD FEMORAL NRV IMG: CPT | Performed by: ORTHOPAEDIC SURGERY

## 2022-10-14 PROCEDURE — 160029 HCHG SURGERY MINUTES - 1ST 30 MINS LEVEL 4: Performed by: ORTHOPAEDIC SURGERY

## 2022-10-14 PROCEDURE — 160035 HCHG PACU - 1ST 60 MINS PHASE I: Performed by: ORTHOPAEDIC SURGERY

## 2022-10-14 PROCEDURE — 99226 PR SUBSEQUENT OBSERVATION CARE,LEVEL III: CPT | Performed by: INTERNAL MEDICINE

## 2022-10-14 PROCEDURE — 700102 HCHG RX REV CODE 250 W/ 637 OVERRIDE(OP): Performed by: INTERNAL MEDICINE

## 2022-10-14 PROCEDURE — 160048 HCHG OR STATISTICAL LEVEL 1-5: Performed by: ORTHOPAEDIC SURGERY

## 2022-10-14 PROCEDURE — 700105 HCHG RX REV CODE 258: Performed by: INTERNAL MEDICINE

## 2022-10-14 PROCEDURE — 80048 BASIC METABOLIC PNL TOTAL CA: CPT

## 2022-10-14 PROCEDURE — 160009 HCHG ANES TIME/MIN: Performed by: ORTHOPAEDIC SURGERY

## 2022-10-14 PROCEDURE — 700111 HCHG RX REV CODE 636 W/ 250 OVERRIDE (IP): Performed by: EMERGENCY MEDICINE

## 2022-10-14 PROCEDURE — 700105 HCHG RX REV CODE 258: Performed by: ORTHOPAEDIC SURGERY

## 2022-10-14 PROCEDURE — A9270 NON-COVERED ITEM OR SERVICE: HCPCS | Performed by: INTERNAL MEDICINE

## 2022-10-14 PROCEDURE — 502000 HCHG MISC OR IMPLANTS RC 0278: Performed by: ORTHOPAEDIC SURGERY

## 2022-10-14 PROCEDURE — 01480 ANES OPEN PX LOWER L/A/F NOS: CPT | Performed by: EMERGENCY MEDICINE

## 2022-10-14 PROCEDURE — 700111 HCHG RX REV CODE 636 W/ 250 OVERRIDE (IP): Performed by: ORTHOPAEDIC SURGERY

## 2022-10-14 PROCEDURE — C1713 ANCHOR/SCREW BN/BN,TIS/BN: HCPCS | Performed by: ORTHOPAEDIC SURGERY

## 2022-10-14 PROCEDURE — G0378 HOSPITAL OBSERVATION PER HR: HCPCS

## 2022-10-14 PROCEDURE — 64445 NJX AA&/STRD SCIATIC NRV IMG: CPT | Performed by: ORTHOPAEDIC SURGERY

## 2022-10-14 PROCEDURE — 85027 COMPLETE CBC AUTOMATED: CPT

## 2022-10-14 PROCEDURE — 160002 HCHG RECOVERY MINUTES (STAT): Performed by: ORTHOPAEDIC SURGERY

## 2022-10-14 PROCEDURE — 51798 US URINE CAPACITY MEASURE: CPT

## 2022-10-14 PROCEDURE — 160041 HCHG SURGERY MINUTES - EA ADDL 1 MIN LEVEL 4: Performed by: ORTHOPAEDIC SURGERY

## 2022-10-14 PROCEDURE — 36415 COLL VENOUS BLD VENIPUNCTURE: CPT

## 2022-10-14 PROCEDURE — 700105 HCHG RX REV CODE 258: Performed by: EMERGENCY MEDICINE

## 2022-10-14 DEVICE — IMPLANTABLE DEVICE: Type: IMPLANTABLE DEVICE | Site: ANKLE | Status: FUNCTIONAL

## 2022-10-14 RX ORDER — SODIUM CHLORIDE, SODIUM LACTATE, POTASSIUM CHLORIDE, CALCIUM CHLORIDE 600; 310; 30; 20 MG/100ML; MG/100ML; MG/100ML; MG/100ML
INJECTION, SOLUTION INTRAVENOUS CONTINUOUS
Status: DISCONTINUED | OUTPATIENT
Start: 2022-10-14 | End: 2022-10-16

## 2022-10-14 RX ORDER — HYDROMORPHONE HYDROCHLORIDE 1 MG/ML
0.1 INJECTION, SOLUTION INTRAMUSCULAR; INTRAVENOUS; SUBCUTANEOUS
Status: DISCONTINUED | OUTPATIENT
Start: 2022-10-14 | End: 2022-10-14 | Stop reason: HOSPADM

## 2022-10-14 RX ORDER — DIPHENHYDRAMINE HYDROCHLORIDE 50 MG/ML
12.5 INJECTION INTRAMUSCULAR; INTRAVENOUS
Status: DISCONTINUED | OUTPATIENT
Start: 2022-10-14 | End: 2022-10-14 | Stop reason: HOSPADM

## 2022-10-14 RX ORDER — SODIUM CHLORIDE, SODIUM LACTATE, POTASSIUM CHLORIDE, CALCIUM CHLORIDE 600; 310; 30; 20 MG/100ML; MG/100ML; MG/100ML; MG/100ML
INJECTION, SOLUTION INTRAVENOUS CONTINUOUS
Status: ACTIVE | OUTPATIENT
Start: 2022-10-14 | End: 2022-10-14

## 2022-10-14 RX ORDER — ALBUTEROL SULFATE 2.5 MG/3ML
2.5 SOLUTION RESPIRATORY (INHALATION)
Status: DISCONTINUED | OUTPATIENT
Start: 2022-10-14 | End: 2022-10-14

## 2022-10-14 RX ORDER — ROPIVACAINE HYDROCHLORIDE 5 MG/ML
INJECTION, SOLUTION EPIDURAL; INFILTRATION; PERINEURAL
Status: COMPLETED | OUTPATIENT
Start: 2022-10-14 | End: 2022-10-14

## 2022-10-14 RX ORDER — DEXAMETHASONE SODIUM PHOSPHATE 4 MG/ML
INJECTION, SOLUTION INTRA-ARTICULAR; INTRALESIONAL; INTRAMUSCULAR; INTRAVENOUS; SOFT TISSUE
Status: COMPLETED | OUTPATIENT
Start: 2022-10-14 | End: 2022-10-14

## 2022-10-14 RX ORDER — DEXAMETHASONE SODIUM PHOSPHATE 4 MG/ML
INJECTION, SOLUTION INTRA-ARTICULAR; INTRALESIONAL; INTRAMUSCULAR; INTRAVENOUS; SOFT TISSUE PRN
Status: DISCONTINUED | OUTPATIENT
Start: 2022-10-14 | End: 2022-10-14 | Stop reason: SURG

## 2022-10-14 RX ORDER — VANCOMYCIN HYDROCHLORIDE 1 G/20ML
INJECTION, POWDER, LYOPHILIZED, FOR SOLUTION INTRAVENOUS
Status: COMPLETED | OUTPATIENT
Start: 2022-10-14 | End: 2022-10-14

## 2022-10-14 RX ORDER — HYDROMORPHONE HYDROCHLORIDE 1 MG/ML
0.2 INJECTION, SOLUTION INTRAMUSCULAR; INTRAVENOUS; SUBCUTANEOUS
Status: DISCONTINUED | OUTPATIENT
Start: 2022-10-14 | End: 2022-10-14 | Stop reason: HOSPADM

## 2022-10-14 RX ORDER — HALOPERIDOL 5 MG/ML
1 INJECTION INTRAMUSCULAR
Status: DISCONTINUED | OUTPATIENT
Start: 2022-10-14 | End: 2022-10-14 | Stop reason: HOSPADM

## 2022-10-14 RX ORDER — OXYCODONE HYDROCHLORIDE AND ACETAMINOPHEN 5; 325 MG/1; MG/1
2 TABLET ORAL
Status: DISCONTINUED | OUTPATIENT
Start: 2022-10-14 | End: 2022-10-14 | Stop reason: HOSPADM

## 2022-10-14 RX ORDER — ROCURONIUM BROMIDE 10 MG/ML
INJECTION, SOLUTION INTRAVENOUS PRN
Status: DISCONTINUED | OUTPATIENT
Start: 2022-10-14 | End: 2022-10-14 | Stop reason: SURG

## 2022-10-14 RX ORDER — HYDROMORPHONE HYDROCHLORIDE 1 MG/ML
0.4 INJECTION, SOLUTION INTRAMUSCULAR; INTRAVENOUS; SUBCUTANEOUS
Status: DISCONTINUED | OUTPATIENT
Start: 2022-10-14 | End: 2022-10-14 | Stop reason: HOSPADM

## 2022-10-14 RX ORDER — PHENYLEPHRINE HCL IN 0.9% NACL 0.5 MG/5ML
SYRINGE (ML) INTRAVENOUS PRN
Status: DISCONTINUED | OUTPATIENT
Start: 2022-10-14 | End: 2022-10-14 | Stop reason: SURG

## 2022-10-14 RX ORDER — LABETALOL HYDROCHLORIDE 5 MG/ML
5 INJECTION, SOLUTION INTRAVENOUS
Status: DISCONTINUED | OUTPATIENT
Start: 2022-10-14 | End: 2022-10-14 | Stop reason: HOSPADM

## 2022-10-14 RX ORDER — MEPERIDINE HYDROCHLORIDE 25 MG/ML
6.25 INJECTION INTRAMUSCULAR; INTRAVENOUS; SUBCUTANEOUS
Status: DISCONTINUED | OUTPATIENT
Start: 2022-10-14 | End: 2022-10-14 | Stop reason: HOSPADM

## 2022-10-14 RX ORDER — HYDRALAZINE HYDROCHLORIDE 20 MG/ML
5 INJECTION INTRAMUSCULAR; INTRAVENOUS
Status: DISCONTINUED | OUTPATIENT
Start: 2022-10-14 | End: 2022-10-14 | Stop reason: HOSPADM

## 2022-10-14 RX ORDER — LIDOCAINE HYDROCHLORIDE 20 MG/ML
INJECTION, SOLUTION EPIDURAL; INFILTRATION; INTRACAUDAL; PERINEURAL PRN
Status: DISCONTINUED | OUTPATIENT
Start: 2022-10-14 | End: 2022-10-14 | Stop reason: SURG

## 2022-10-14 RX ORDER — CEFAZOLIN SODIUM 1 G/3ML
INJECTION, POWDER, FOR SOLUTION INTRAMUSCULAR; INTRAVENOUS PRN
Status: DISCONTINUED | OUTPATIENT
Start: 2022-10-14 | End: 2022-10-14 | Stop reason: SURG

## 2022-10-14 RX ORDER — OXYCODONE HYDROCHLORIDE AND ACETAMINOPHEN 5; 325 MG/1; MG/1
1 TABLET ORAL
Status: DISCONTINUED | OUTPATIENT
Start: 2022-10-14 | End: 2022-10-14 | Stop reason: HOSPADM

## 2022-10-14 RX ORDER — LIDOCAINE HYDROCHLORIDE 10 MG/ML
INJECTION, SOLUTION EPIDURAL; INFILTRATION; INTRACAUDAL; PERINEURAL
Status: ACTIVE
Start: 2022-10-14 | End: 2022-10-15

## 2022-10-14 RX ORDER — SODIUM CHLORIDE, SODIUM LACTATE, POTASSIUM CHLORIDE, AND CALCIUM CHLORIDE .6; .31; .03; .02 G/100ML; G/100ML; G/100ML; G/100ML
500 INJECTION, SOLUTION INTRAVENOUS
Status: DISCONTINUED | OUTPATIENT
Start: 2022-10-14 | End: 2022-10-14 | Stop reason: HOSPADM

## 2022-10-14 RX ORDER — ONDANSETRON 2 MG/ML
4 INJECTION INTRAMUSCULAR; INTRAVENOUS
Status: DISCONTINUED | OUTPATIENT
Start: 2022-10-14 | End: 2022-10-14 | Stop reason: HOSPADM

## 2022-10-14 RX ADMIN — DEXAMETHASONE SODIUM PHOSPHATE 4 MG: 4 INJECTION, SOLUTION INTRA-ARTICULAR; INTRALESIONAL; INTRAMUSCULAR; INTRAVENOUS; SOFT TISSUE at 16:40

## 2022-10-14 RX ADMIN — PROPOFOL 100 MG: 10 INJECTION, EMULSION INTRAVENOUS at 16:15

## 2022-10-14 RX ADMIN — FENTANYL CITRATE 50 MCG: 50 INJECTION, SOLUTION INTRAMUSCULAR; INTRAVENOUS at 16:15

## 2022-10-14 RX ADMIN — PHENYLEPHRINE HYDROCHLORIDE 25 MCG/MIN: 10 INJECTION INTRAVENOUS at 16:51

## 2022-10-14 RX ADMIN — ROPIVACAINE HYDROCHLORIDE 15 ML: 5 INJECTION, SOLUTION EPIDURAL; INFILTRATION; PERINEURAL at 15:20

## 2022-10-14 RX ADMIN — Medication 200 MCG: at 16:35

## 2022-10-14 RX ADMIN — LIDOCAINE HYDROCHLORIDE 100 MG: 20 INJECTION, SOLUTION EPIDURAL; INFILTRATION; INTRACAUDAL at 16:15

## 2022-10-14 RX ADMIN — DEXAMETHASONE SODIUM PHOSPHATE 4 MG: 4 INJECTION, SOLUTION INTRA-ARTICULAR; INTRALESIONAL; INTRAMUSCULAR; INTRAVENOUS; SOFT TISSUE at 15:20

## 2022-10-14 RX ADMIN — ROPIVACAINE HYDROCHLORIDE 20 ML: 5 INJECTION, SOLUTION EPIDURAL; INFILTRATION; PERINEURAL at 15:15

## 2022-10-14 RX ADMIN — GABAPENTIN 200 MG: 100 CAPSULE ORAL at 20:36

## 2022-10-14 RX ADMIN — FENTANYL CITRATE 50 MCG: 50 INJECTION, SOLUTION INTRAMUSCULAR; INTRAVENOUS at 16:43

## 2022-10-14 RX ADMIN — SODIUM CHLORIDE, POTASSIUM CHLORIDE, SODIUM LACTATE AND CALCIUM CHLORIDE: 600; 310; 30; 20 INJECTION, SOLUTION INTRAVENOUS at 15:09

## 2022-10-14 RX ADMIN — CEFAZOLIN 2 G: 330 INJECTION, POWDER, FOR SOLUTION INTRAMUSCULAR; INTRAVENOUS at 16:33

## 2022-10-14 RX ADMIN — DEXAMETHASONE SODIUM PHOSPHATE 4 MG: 4 INJECTION, SOLUTION INTRA-ARTICULAR; INTRALESIONAL; INTRAMUSCULAR; INTRAVENOUS; SOFT TISSUE at 15:15

## 2022-10-14 RX ADMIN — PROPOFOL 50 MG: 10 INJECTION, EMULSION INTRAVENOUS at 17:48

## 2022-10-14 RX ADMIN — ROCURONIUM BROMIDE 50 MG: 10 INJECTION, SOLUTION INTRAVENOUS at 16:15

## 2022-10-14 RX ADMIN — SUGAMMADEX 200 MG: 100 INJECTION, SOLUTION INTRAVENOUS at 17:32

## 2022-10-14 RX ADMIN — SODIUM CHLORIDE, POTASSIUM CHLORIDE, SODIUM LACTATE AND CALCIUM CHLORIDE: 600; 310; 30; 20 INJECTION, SOLUTION INTRAVENOUS at 13:12

## 2022-10-14 ASSESSMENT — PAIN SCALES - GENERAL: PAIN_LEVEL: 3

## 2022-10-14 ASSESSMENT — PAIN DESCRIPTION - PAIN TYPE
TYPE: ACUTE PAIN
TYPE: SURGICAL PAIN
TYPE: ACUTE PAIN

## 2022-10-14 NOTE — PROGRESS NOTES
Hospital Medicine Daily Progress Note    Date of Service  10/14/2022    Chief Complaint  Fall, right ankle foot injury    Hospital Course  Yvonne Marie Wada is a 86 y.o. 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  X-ray evaluation showed right distal fibular and medial malleolar fracture.  Creatinine 1.06.  Hemoglobin 11.3.  Orthopedics was consulted, who obtained a CT of the ankle which showed comminuted trimalleolar fracture of the right ankle, and recommended surgical intervention (likely Friday).  Eliquis was held.    Interval Problem Update  10/14/2022 - I reviewed the patient's chart today. Uneventful night. VSS. Afebrile. Saturating well on RA.  Hemoglobin stable.  No leukocytosis.  Electrolytes remain normal.  Renal function slightly up with creatinine of 1.33 (baseline of 1.0-1.1).  Ferritin normal.  25-hydroxy vitamin D level is normal.    > I have personally seen and examined the patient today.  She continues to retain urine, requiring straight cath.  Dale catheter will be placed.  She denies any pain, and states that she does not want to take narcotics very frequently.  Last bowel movement was 2 days ago.  No nausea, vomiting, abdominal pain.      I have discussed this patient's plan of care and discharge plan at IDT rounds today with Case Management, Nursing, Nursing leadership, and other members of the IDT team.    Consultants/Specialty  Orthopedics    Code Status  Full Code    Disposition  Patient is not medically cleared for discharge.   Anticipate discharge to  TBD .  PT/OT evaluation postoperatively.  I have placed the appropriate orders for post-discharge needs.    Review of Systems  ROS     Pertinent positives/negatives as mentioned above.     A complete review of  systems was personally done by me. All other systems were negative.       Physical Exam  Temp:  [36.7 °C (98.1 °F)-37.8 °C (100.1 °F)] 36.7 °C (98.1 °F)  Pulse:  [72-83] 81  Resp:  [16-20] 16  BP: (107-129)/(54-86) 107/86  SpO2:  [91 %-97 %] 97 %    Physical Exam  Vitals reviewed.   Constitutional:       General: She is not in acute distress.     Appearance: Normal appearance. She is normal weight. She is not ill-appearing or diaphoretic.   HENT:      Head: Normocephalic and atraumatic.      Right Ear: External ear normal.      Left Ear: External ear normal.      Mouth/Throat:      Mouth: Mucous membranes are moist.      Pharynx: No oropharyngeal exudate or posterior oropharyngeal erythema.   Eyes:      General: No scleral icterus.     Extraocular Movements: Extraocular movements intact.      Conjunctiva/sclera: Conjunctivae normal.      Pupils: Pupils are equal, round, and reactive to light.   Cardiovascular:      Rate and Rhythm: Normal rate and regular rhythm.      Heart sounds: Normal heart sounds. No murmur heard.  Pulmonary:      Effort: Pulmonary effort is normal. No respiratory distress.      Breath sounds: Normal breath sounds. No stridor. No wheezing, rhonchi or rales.   Chest:      Chest wall: No tenderness.   Abdominal:      General: Bowel sounds are normal. There is no distension.      Palpations: Abdomen is soft. There is no mass.      Tenderness: There is no abdominal tenderness. There is no guarding or rebound.   Musculoskeletal:         General: Deformity (right ankle) present. No swelling. Normal range of motion.      Cervical back: Normal range of motion and neck supple. No rigidity. No muscular tenderness.   Lymphadenopathy:      Cervical: No cervical adenopathy.   Skin:     General: Skin is warm and dry.      Coloration: Skin is not jaundiced.      Findings: No rash.   Neurological:      General: No focal deficit present.      Mental Status: She is alert and oriented to person, place, and time.  Mental status is at baseline.      Cranial Nerves: No cranial nerve deficit.   Psychiatric:         Mood and Affect: Mood normal.         Behavior: Behavior normal.         Thought Content: Thought content normal.         Judgment: Judgment normal.     I have performed the physical examination today 10/14/2022.  In review of yesterday's note, there are no new changes except as documented above.      Fluids    Intake/Output Summary (Last 24 hours) at 10/14/2022 1033  Last data filed at 10/14/2022 0800  Gross per 24 hour   Intake 420 ml   Output 0 ml   Net 420 ml       Laboratory  Recent Labs     10/12/22  1205 10/13/22  0037 10/14/22  0430   WBC 8.4 5.3 5.9   RBC 3.86* 3.55* 3.49*   HEMOGLOBIN 11.1* 10.5* 10.0*   HEMATOCRIT 34.6* 34.7* 31.3*   MCV 89.6 97.7 89.7   MCH 28.8 29.6 28.7   MCHC 32.1* 30.3* 31.9*   RDW 48.7 54.8* 48.8   PLATELETCT 225 182 199   MPV 8.8* 9.1 9.5     Recent Labs     10/12/22  1205 10/13/22  0037 10/14/22  0430   SODIUM 137 130* 138   POTASSIUM 4.2 4.5 4.2   CHLORIDE 104 105 106   CO2 22 16* 21   GLUCOSE 101* 93 101*   BUN 26* 28* 35*   CREATININE 1.19 1.26 1.33   CALCIUM 9.4 9.0 9.2                   Imaging  CT-ANKLE W/O PLUS RECONS RIGHT   Final Result      Comminuted trimalleolar fracture of the right ankle.      DX-ANKLE 2- VIEWS RIGHT   Final Result      No change in distal fibular and medial malleolar fracture with presence of cast or splint      DX-TIBIA AND FIBULA RIGHT   Final Result      1.  Right distal fibular fracture at the metadiaphysis      2.  Probable medial malleolar fracture      DX-ANKLE 3+ VIEWS RIGHT   Final Result      Distal fibular and medial malleolar fracture           Assessment/Plan  * Tibia/fibula fracture, right, closed, initial encounter- (present on admission)  Assessment & Plan  - She twisted her ankle, and was unable to bear weight.  Radiographs showing trimalleolar fracture of the right ankle.  -Plan for surgical fixation this afternoon by  orthopedics.  -Continue calcium + vit D.  Vitamin D level is normal.  Will need outpatient bone scan to evaluate for osteoporosis and need for bisphosphonates.   - continue pain control with oxycodone and IV Dilaudid.  - Patient will have low-risk below the knee procedure, with no prior history of VTE.  Anticipate that she will be nonweightbearing on the affected extremity postoperatively. Will need on-going DVT prophylaxis on discharge.  She is already on Eliquis for atrial fibrillation, will resume postop.  - PT/OT eval postoperatively.      Anemia- (present on admission)  Assessment & Plan  -chronic, no signs of bleeding   -Ferritin normal, iron low consistent with anemia of chronic disease.    -Continue trend H&H, especially postop.      Senile osteoporosis- (present on admission)  Assessment & Plan  -currently on prolia per PCP     Paroxysmal atrial fibrillation (HCC)- (present on admission)  Assessment & Plan  - rate controlled.   - continue metoprolol.   - holding eliquis for planned surgery, resume postop once cleared by orthopedics.     CKD (chronic kidney disease) stage 3, GFR 30-59 ml/min ([facility])- (present on admission)  Assessment & Plan  -Renal function slightly up (1.33) from baseline (1.0-1.1).    -Start gentle IV fluid hydration with LR at 83 cc/h.  -Trend BMP.  - avoid nephrotoxins, and continue to renally dose all medications.    GERD (gastroesophageal reflux disease)- (present on admission)  Assessment & Plan  -Continue omeprazole    Hypothyroid- (present on admission)  Assessment & Plan  -Continue home levothyroxine    Hypertension- (present on admission)  Assessment & Plan  -Maintaining good control.  -Continue home metoprolol, and benazepril.  Monitor blood pressure trend closely.       VTE prophylaxis: SCDs/TEDs

## 2022-10-14 NOTE — PROGRESS NOTES
S: Patient reports pain well controlled    O: spling fitting well  Toes wwp  Wiggles toes    A/P   87 y/o female with R trimalleolar ankle fracture. We reviewed risks and benefits of surgery and she would like to proceed. Will plan for ORIF trimal fracture.

## 2022-10-14 NOTE — CARE PLAN
The patient is Stable - Low risk of patient condition declining or worsening    Shift Goals  Clinical Goals: Pt will be NPO at midnight  Patient Goals: rest and comfort  Family Goals: n/a    Progress made toward(s) clinical / shift goals:  Pt has been eduacted upon beginning of the shift about being on NPO by midnight. Pt verbalized understanding, pt has maintained NPO status starting midnight. Pt has been seen sleeping comfortably during this shift.    Patient is not progressing towards the following goals: N/a

## 2022-10-14 NOTE — PROGRESS NOTES
Received report from nightshift RN and assumed care of patient at 0700. Patient denies needs at this time. Call light in reach. Bed in lowest locked position. Hourly rounding to continue.

## 2022-10-14 NOTE — ANESTHESIA PROCEDURE NOTES
Peripheral Block    Date/Time: 10/14/2022 3:20 PM  Performed by: Daryl Elder M.D.  Authorized by: Daryl Elder M.D.     Patient Location:  Pre-op  Start Time:  10/14/2022 3:20 PM  End Time:  10/14/2022 3:25 PM  Reason for Block: at surgeon's request and post-op pain management ONLY    patient identified, IV checked, site marked, risks and benefits discussed, surgical consent, monitors and equipment checked, pre-op evaluation and timeout performed    Patient Position:  Supine  Prep: ChloraPrep    Monitoring:  Heart rate, continuous pulse ox and cardiac monitor  Block Region:  Lower Extremity  Lower Extremity - Block Type:  Selective FEMORAL nerve block at the Adductor Canal    Laterality:  Right  Procedures: ultrasound guided  Image captured, interpreted and electronically stored.  Local Infiltration:  Lidocaine  Strength:  1 %  Dose:  3 ml  Block Type:  Single-shot  Needle Length:  100mm  Needle Gauge:  21 G  Needle Localization:  Ultrasound guidance  Injection Assessment:  Negative aspiration for heme, no paresthesia on injection, incremental injection and local visualized surrounding nerve on ultrasound  Evidence of intravascular injection: No

## 2022-10-14 NOTE — ANESTHESIA PREPROCEDURE EVALUATION
Case: [id number] Date/Time: 10/14/22 1515    Procedure: RIGHT ANKLE OPEN REDUCTION INTERNAL FIXATION (Right: Ankle)    Anesthesia type: General    Location:  OR  / SURGERY [facility]    Surgeons: Ryan Huertas M.D.          Relevant Problems   PULMONARY   (positive) History of shingles      NEURO   (positive) History of insomnia   (positive) History of pelvic fracture   (positive) History of shingles   (positive) History of transient ischemic attack (TIA)      CARDIAC   (positive) Hypertension   (positive) Paroxysmal atrial fibrillation (HCC)      GI   (positive) GERD (gastroesophageal reflux disease)         (positive) CKD (chronic kidney disease) stage 3, GFR 30-59 ml/min (HCC)      ENDO   (positive) Hypothyroid      Other   (positive) Neck arthritis       Physical Exam    Airway   Mallampati: II  TM distance: <3 FB  Neck ROM: full       Cardiovascular - normal exam  Rhythm: regular  Rate: normal  (-) murmur     Dental   (+) lower dentures, upper dentures           Pulmonary - normal exam  Breath sounds clear to auscultation     Abdominal    Neurological - normal exam                 Anesthesia Plan    ASA 3       Plan - general and peripheral nerve block     Peripheral nerve block will be post-op pain control  Airway plan will be ETT    (Hx of ascending thoracic aneurysm repair, preserved EF, HTN, CKD)      Induction: intravenous    Postoperative Plan: Postoperative administration of opioids is intended.    Pertinent diagnostic labs and testing reviewed    Informed Consent:    Anesthetic plan and risks discussed with patient.    Use of blood products discussed with: patient whom consented to blood products.

## 2022-10-14 NOTE — ANESTHESIA PROCEDURE NOTES
Peripheral Block    Date/Time: 10/14/2022 3:15 PM  Performed by: Daryl Elder M.D.  Authorized by: Daryl Elder M.D.     Patient Location:  Pre-op  Start Time:  10/14/2022 3:15 PM  End Time:  10/14/2022 3:20 PM  Reason for Block: at surgeon's request and post-op pain management ONLY    patient identified, IV checked, site marked, risks and benefits discussed, surgical consent, monitors and equipment checked, pre-op evaluation and timeout performed    Patient Position:  Supine  Prep: ChloraPrep    Monitoring:  Heart rate, continuous pulse ox and cardiac monitor  Block Region:  Lower Extremity  Lower Extremity - Block Type:  Sciatic, pos/sub - SCIATIC nerve block, posterior or sub-gluteal approach    Laterality:  Right  Procedures: ultrasound guided  Image captured, interpreted and electronically stored.  Local Infiltration:  Lidocaine  Strength:  1 %  Dose:  3 ml  Block Type:  Single-shot  Needle Length:  100mm  Needle Gauge:  21 G  Needle Localization:  Ultrasound guidance  Injection Assessment:  Negative aspiration for heme, no paresthesia on injection, incremental injection and local visualized surrounding nerve on ultrasound  Evidence of intravascular injection: No

## 2022-10-14 NOTE — ANESTHESIA PROCEDURE NOTES
Airway    Date/Time: 10/14/2022 4:18 PM  Performed by: Daryl Elder M.D.  Authorized by: Daryl Elder M.D.     Location:  OR  Urgency:  Elective  Difficult Airway: No    Indications for Airway Management:  Anesthesia      Spontaneous Ventilation: absent    Sedation Level:  Deep  Preoxygenated: Yes    Patient Position:  Sniffing  Mask Difficulty Assessment:  2 - vent by mask + OA or adjuvant +/- NMBA  Final Airway Type:  Endotracheal airway  Final Endotracheal Airway:  ETT  Cuffed: Yes    Technique Used for Successful ETT Placement:  Direct laryngoscopy    Insertion Site:  Oral  Blade Type:  Miranda  Laryngoscope Blade/Videolaryngoscope Blade Size:  2  ETT Size (mm):  7.0  Measured from:  Gums  ETT to Gums (cm):  21  Placement Verified by: auscultation and capnometry    Cormack-Lehane Classification:  Grade IIa - partial view of glottis  Number of Attempts at Approach:  1

## 2022-10-14 NOTE — CARE PLAN
The patient is Stable - Low risk of patient condition declining or worsening    Shift Goals  Clinical Goals: Pain will stay at a tolerable 4/10 throughout shift  Patient Goals: rest and comfort  Family Goals: n/a    Progress made toward(s) clinical / shift goals:    Problem: Knowledge Deficit - Standard  Goal: Patient and family/care givers will demonstrate understanding of plan of care, disease process/condition, diagnostic tests and medications  Outcome: Progressing     Problem: Skin Integrity  Goal: Skin integrity is maintained or improved  Outcome: Progressing     Problem: Fall Risk  Goal: Patient will remain free from falls  Outcome: Progressing       Patient is not progressing towards the following goals:

## 2022-10-14 NOTE — PROGRESS NOTES
Received bedside report from day shift RN at 19:15.   Assumed pt care.   Pt seen AO4, but forgetful. No pain and nausea reported at this time.   POC discussed.   Safety and fall precautions in place.  Instructed pt to use call light, verbalized understanding.   Call light kept within reach.  Bladder scan performed at 8pm - volume at 397   No other needs at this time.   Will continue to monitor.

## 2022-10-15 PROBLEM — K59.00 CONSTIPATION: Status: RESOLVED | Noted: 2021-06-26 | Resolved: 2022-10-15

## 2022-10-15 PROBLEM — S82.401A TIBIA/FIBULA FRACTURE, RIGHT, CLOSED, INITIAL ENCOUNTER: Status: RESOLVED | Noted: 2022-10-12 | Resolved: 2022-10-15

## 2022-10-15 PROBLEM — S82.201A TIBIA/FIBULA FRACTURE, RIGHT, CLOSED, INITIAL ENCOUNTER: Status: RESOLVED | Noted: 2022-10-12 | Resolved: 2022-10-15

## 2022-10-15 PROBLEM — Z98.1 S/P ANKLE ARTHRODESIS: Status: ACTIVE | Noted: 2022-10-15

## 2022-10-15 LAB
ANION GAP SERPL CALC-SCNC: 10 MMOL/L (ref 7–16)
BUN SERPL-MCNC: 35 MG/DL (ref 8–22)
CALCIUM SERPL-MCNC: 8.6 MG/DL (ref 8.4–10.2)
CHLORIDE SERPL-SCNC: 105 MMOL/L (ref 96–112)
CO2 SERPL-SCNC: 20 MMOL/L (ref 20–33)
CREAT SERPL-MCNC: 1.27 MG/DL (ref 0.5–1.4)
ERYTHROCYTE [DISTWIDTH] IN BLOOD BY AUTOMATED COUNT: 48.9 FL (ref 35.9–50)
GFR SERPLBLD CREATININE-BSD FMLA CKD-EPI: 41 ML/MIN/1.73 M 2
GLUCOSE SERPL-MCNC: 151 MG/DL (ref 65–99)
HCT VFR BLD AUTO: 28.4 % (ref 37–47)
HGB BLD-MCNC: 9.2 G/DL (ref 12–16)
MCH RBC QN AUTO: 29.2 PG (ref 27–33)
MCHC RBC AUTO-ENTMCNC: 32.4 G/DL (ref 33.6–35)
MCV RBC AUTO: 90.2 FL (ref 81.4–97.8)
PLATELET # BLD AUTO: 190 K/UL (ref 164–446)
PMV BLD AUTO: 9.6 FL (ref 9–12.9)
POTASSIUM SERPL-SCNC: 4.6 MMOL/L (ref 3.6–5.5)
RBC # BLD AUTO: 3.15 M/UL (ref 4.2–5.4)
SODIUM SERPL-SCNC: 135 MMOL/L (ref 135–145)
WBC # BLD AUTO: 4.4 K/UL (ref 4.8–10.8)

## 2022-10-15 PROCEDURE — 94760 N-INVAS EAR/PLS OXIMETRY 1: CPT

## 2022-10-15 PROCEDURE — G0378 HOSPITAL OBSERVATION PER HR: HCPCS

## 2022-10-15 PROCEDURE — 36415 COLL VENOUS BLD VENIPUNCTURE: CPT

## 2022-10-15 PROCEDURE — 99232 SBSQ HOSP IP/OBS MODERATE 35: CPT | Performed by: INTERNAL MEDICINE

## 2022-10-15 PROCEDURE — 97166 OT EVAL MOD COMPLEX 45 MIN: CPT

## 2022-10-15 PROCEDURE — A9270 NON-COVERED ITEM OR SERVICE: HCPCS | Performed by: INTERNAL MEDICINE

## 2022-10-15 PROCEDURE — 80048 BASIC METABOLIC PNL TOTAL CA: CPT

## 2022-10-15 PROCEDURE — 97162 PT EVAL MOD COMPLEX 30 MIN: CPT

## 2022-10-15 PROCEDURE — 700102 HCHG RX REV CODE 250 W/ 637 OVERRIDE(OP): Performed by: INTERNAL MEDICINE

## 2022-10-15 PROCEDURE — 700105 HCHG RX REV CODE 258: Performed by: INTERNAL MEDICINE

## 2022-10-15 PROCEDURE — 85027 COMPLETE CBC AUTOMATED: CPT

## 2022-10-15 RX ORDER — BISACODYL 10 MG
10 SUPPOSITORY, RECTAL RECTAL
Status: DISCONTINUED | OUTPATIENT
Start: 2022-10-15 | End: 2022-10-17 | Stop reason: HOSPADM

## 2022-10-15 RX ORDER — AMOXICILLIN 250 MG
1 CAPSULE ORAL DAILY
Status: DISCONTINUED | OUTPATIENT
Start: 2022-10-15 | End: 2022-10-17 | Stop reason: HOSPADM

## 2022-10-15 RX ORDER — POLYETHYLENE GLYCOL 3350 17 G/17G
1 POWDER, FOR SOLUTION ORAL DAILY
Status: DISCONTINUED | OUTPATIENT
Start: 2022-10-15 | End: 2022-10-17 | Stop reason: HOSPADM

## 2022-10-15 RX ADMIN — GABAPENTIN 200 MG: 100 CAPSULE ORAL at 09:00

## 2022-10-15 RX ADMIN — GABAPENTIN 200 MG: 100 CAPSULE ORAL at 21:07

## 2022-10-15 RX ADMIN — LEVOTHYROXINE SODIUM 75 MCG: 0.07 TABLET ORAL at 06:13

## 2022-10-15 RX ADMIN — EZETIMIBE 10 MG: 10 TABLET ORAL at 06:13

## 2022-10-15 RX ADMIN — APIXABAN 5 MG: 5 TABLET, FILM COATED ORAL at 17:14

## 2022-10-15 RX ADMIN — SENNOSIDES AND DOCUSATE SODIUM 1 TABLET: 50; 8.6 TABLET ORAL at 15:49

## 2022-10-15 RX ADMIN — BENAZEPRIL HYDROCHLORIDE 40 MG: 10 TABLET ORAL at 06:10

## 2022-10-15 RX ADMIN — SODIUM CHLORIDE, POTASSIUM CHLORIDE, SODIUM LACTATE AND CALCIUM CHLORIDE: 600; 310; 30; 20 INJECTION, SOLUTION INTRAVENOUS at 06:08

## 2022-10-15 RX ADMIN — GABAPENTIN 200 MG: 100 CAPSULE ORAL at 15:48

## 2022-10-15 RX ADMIN — Medication 1 TABLET: at 06:13

## 2022-10-15 RX ADMIN — POLYETHYLENE GLYCOL 3350 1 PACKET: 17 POWDER, FOR SOLUTION ORAL at 15:48

## 2022-10-15 RX ADMIN — OMEPRAZOLE 20 MG: 20 CAPSULE, DELAYED RELEASE ORAL at 06:13

## 2022-10-15 RX ADMIN — METOPROLOL SUCCINATE 50 MG: 25 TABLET, EXTENDED RELEASE ORAL at 06:12

## 2022-10-15 ASSESSMENT — CHA2DS2 SCORE
SEX: FEMALE
AGE 65 TO 74: YES
PRIOR STROKE OR TIA OR THROMBOEMBOLISM: YES
HYPERTENSION: YES
AGE 75 OR GREATER: YES
DIABETES: NO
CHA2DS2 VASC SCORE: 7
CHF OR LEFT VENTRICULAR DYSFUNCTION: NO
VASCULAR DISEASE: NO

## 2022-10-15 ASSESSMENT — COGNITIVE AND FUNCTIONAL STATUS - GENERAL
MOBILITY SCORE: 12
TOILETING: A LOT
MOVING FROM LYING ON BACK TO SITTING ON SIDE OF FLAT BED: A LOT
CLIMB 3 TO 5 STEPS WITH RAILING: TOTAL
CLIMB 3 TO 5 STEPS WITH RAILING: A LOT
HELP NEEDED FOR BATHING: A LOT
SUGGESTED CMS G CODE MODIFIER MOBILITY: CL
DAILY ACTIVITIY SCORE: 17
SUGGESTED CMS G CODE MODIFIER DAILY ACTIVITY: CK
STANDING UP FROM CHAIR USING ARMS: A LOT
TOILETING: A LOT
MOBILITY SCORE: 12
WALKING IN HOSPITAL ROOM: TOTAL
TURNING FROM BACK TO SIDE WHILE IN FLAT BAD: A LITTLE
STANDING UP FROM CHAIR USING ARMS: A LOT
DRESSING REGULAR UPPER BODY CLOTHING: A LITTLE
MOVING TO AND FROM BED TO CHAIR: A LITTLE
MOVING TO AND FROM BED TO CHAIR: A LOT
SUGGESTED CMS G CODE MODIFIER DAILY ACTIVITY: CK
DRESSING REGULAR LOWER BODY CLOTHING: A LOT
HELP NEEDED FOR BATHING: A LOT
DRESSING REGULAR LOWER BODY CLOTHING: A LOT
SUGGESTED CMS G CODE MODIFIER MOBILITY: CL
MOVING FROM LYING ON BACK TO SITTING ON SIDE OF FLAT BED: A LOT
DAILY ACTIVITIY SCORE: 18
TURNING FROM BACK TO SIDE WHILE IN FLAT BAD: A LOT
WALKING IN HOSPITAL ROOM: A LOT

## 2022-10-15 ASSESSMENT — GAIT ASSESSMENTS
GAIT LEVEL OF ASSIST: MODERATE ASSIST
ASSISTIVE DEVICE: FRONT WHEEL WALKER
DISTANCE (FEET): 2

## 2022-10-15 ASSESSMENT — ACTIVITIES OF DAILY LIVING (ADL): TOILETING: INDEPENDENT

## 2022-10-15 ASSESSMENT — PAIN DESCRIPTION - PAIN TYPE
TYPE: SURGICAL PAIN
TYPE: SURGICAL PAIN

## 2022-10-15 NOTE — DISCHARGE PLANNING
Received Choice form at 1311  Agency/Facility Name: [facility]  Referral sent per Choice form @ [phone]     @0553  Agency/Facility Name: [facility]   Spoke To: Jensen  Outcome: Per Jensen, she left a voicemail accepting the pt to the facility.  LSW notified

## 2022-10-15 NOTE — PROGRESS NOTES
Received bedside report from day shift RN at 19:15.   Assumed pt care.   Pt seen AO4, No pain and nausea reported at this time.   POC discussed.   Safety and fall precautions in place.   Instructed pt to use call light, verbalized understanding.   Call light kept within reach.  No other needs at this time.   Will continue to monitor.

## 2022-10-15 NOTE — OP REPORT
DATE OF OPERATION: 10/14/2022     SURGEON: Ryan Huertas M.D.    ANESTHESIOLOGIST: Anesthesiologist: Daryl Elder M.D.    ANESTHESIA: General    SURGICAL FIRST ASST: none    PREOPERATIVE DIAGNOSIS: Right trimalleolar ankle fracture     POSTOPERATIVE DIAGNOSIS: Right trimalleolar ankle fracture     PROCEDURE:   Open reduction internal fixation of right trimalleolar ankle fracture with fixation of the medial and lateral malleoli  Open reduction internal fixation of right syndesmosis  Nonoperative treatment of right posterior malleolus fracture       EQUIPMENT USED: [employer]    INDICATIONS: Patient is an 86-year-old female who had a comminuted trimalleolar ankle fracture with fracture of the anterolateral distal tibia as well.  Fracture was minimally displaced but given the trimalleolar nature plus likely syndesmotic instability given posterior malleoli are and ATFL avulsion she was indicated for surgery to stabilize the ankle and hopefully prevent further displacement and avoid long-term treatment in a cast in an 86-year-old female.  I explained risks and benefits to her of surgery including infection, wound problems, nonunion, malunion, need for revision surgery, hardware failure, amongst other potential perioperative complications.  She clearly understand the risk benefits prior to proceeding    DETAILS OF PROCEDURE: Patient was met in the preoperative holding area and the right side was marked as the correct operative side.  Risk-benefit discussion was had and consent was signed.  She was brought to the operating room a timeout confirmed patient, laterality, and procedure.  Anesthesia was induced and an airway was established.  The tourniquet was placed onto the thigh and the limb was prepped and draped in the normal sterile surgical fashion.  Limb was exsanguinated and tourniquet was applied and surgery was begun.    We began by making a small percutaneous incision distal to the fibula.  We then dissected  down to the base of the fibula and then passed a guidewire checking to confirm good position on AP and lateral fluoroscopy.  We then placed some K wire distally and proximally to hold the fracture in place and then passed the K wire up the fibula.  Once we confirmed good position of the guidewire we then used the opening reamer to ream the distal fibula.  We then passed the small reamer up proximally as well and then selected a size 3.0 nail which passed easily into the fibula.  The fracture was well reduced throughout and maintained its reduction.  We then deployed the talons of the proximal portion of the nail.  We then drilled and placed 3 distal interlocking screws into the nail.  The most distal screw really did not have much bite so it was removed.  We then turned our attention to the medial side and we made a percutaneous incision and dissected up to the medial malleolus and then placed a percutaneous guidewire across the fracture.  We confirmed this on x-ray and then drilled and placed a single 4.0 partially-threaded cannulated screw.  We placed only 1 screw as her bone was very poor in quality and the fracture was quite small.  We now had her mortise nicely reduced and then we turned our attention to placing some syndesmotic screws.  The syndesmosis was nicely reduced on x-ray so we drilled and placed two 3.5 syndesmotic screws through the nail through the aiming jig.  These get good purchase and good solid fixation.  We then removed the jig and checked our final x-rays which showed good ankle mortise with stable hardware and no evidence of hardware complication.  We then irrigated all wounds well and closed with combination of 2-0 Monocryl and interrupted 3-0 nylon suture.  A soft dressing was placed and the patient was placed into a splint and awoken from anesthesia and brought to the postop care and without complication.     Postoperative care  Patient can be toe-touch weightbearing on the right  side  Will need splint down and wound check in 2 weeks  Can probably transition to a boot at the 2-week jennifer depending upon how wound is healing  DVT prophylaxis per primary team      COMPLICATIONS:none    ____________________________________   Ryan Huertas M.D.

## 2022-10-15 NOTE — ANESTHESIA POSTPROCEDURE EVALUATION
Patient: Yvonne Marie Wada    Procedure Summary     Date: 10/14/22 Room / Location: [place] [address] / SURGERY [facility]    Anesthesia Start: 1607 Anesthesia Stop: 1806    Procedure: RIGHT ANKLE OPEN REDUCTION INTERNAL FIXATION (Right: Ankle) Diagnosis: (right ankle fracture)    Surgeons: Ryan Huertas M.D. Responsible Provider: Daryl Elder M.D.    Anesthesia Type: general, peripheral nerve block ASA Status: 3          Final Anesthesia Type: general, peripheral nerve block  Last vitals  BP   Blood Pressure : 134/77    Temp   36.7 °C (98.1 °F)    Pulse   82   Resp   16    SpO2   95 %      Anesthesia Post Evaluation    Patient location during evaluation: PACU  Patient participation: complete - patient participated  Level of consciousness: awake and alert  Pain score: 3    Airway patency: patent  Anesthetic complications: no  Cardiovascular status: hemodynamically stable  Respiratory status: acceptable  Hydration status: euvolemic    PONV: none          No notable events documented.     Nurse Pain Score: 4 (NPRS)

## 2022-10-15 NOTE — ANESTHESIA TIME REPORT
Anesthesia Start and Stop Event Times     Date Time Event    10/14/2022 1505 Ready for Procedure     1607 Anesthesia Start     1806 Anesthesia Stop        Responsible Staff  10/14/22    Name Role Begin End    Daryl Elder M.D. Anesth 1607 1806        Overtime Reason:  no overtime (within assigned shift)    Comments:

## 2022-10-15 NOTE — PROGRESS NOTES
4 Eyes Skin Assessment Completed by RAMÍREZ Zazueta and RAMÍREZ Alexandre.    Head WDL  Ears WDL  Nose WDL  Mouth WDL  Neck WDL  Breast/Chest WDL  Shoulder Blades WDL  Spine WDL  (R) Arm/Elbow/Hand WDL  (L) Arm/Elbow/Hand WDL  Abdomen WDL  Groin WDL  Scrotum/Coccyx/Buttocks Redness and Blanching  (R) Leg sx incicion post ORIF  (L) Leg WDL  (R) Heel/Foot/Toe WDL  (L) Heel/Foot/Toe WDL          Devices In Places Nasal Cannula and Dale      Interventions In Place NC W/Ear Foams    Possible Skin Injury No    Pictures Uploaded Into Epic N/A  Wound Consult Placed N/A  RN Wound Prevention Protocol Ordered No

## 2022-10-15 NOTE — THERAPY
Occupational Therapy   Initial Evaluation     Patient Name: Yvonne Marie Wada  Age:  86 y.o., Sex:  female  Medical Record #: [id number]  Today's Date: 10/15/2022     Precautions  Precautions: (P) Toe Touch Weight Bearing Right Lower Extremity, Fall Risk    Assessment  Patient is 86 y.o. female admitted following GLF; R tibia/fibula fracture. S/p ORIF R ankle. Hx of multiple falls. Dale in place. A&Ox2-3, pleasantly confused. Cues for TTWB RLE. Shows good sit balance, fair- standing in FWW. ADL transfers with ModA,FWW, v/c's. Seated grooming with CGA. Pt lives with spouse; indep with ADL's, minimal household mobility prior. Pt is not safe for dc home at this time; rec snf upon dc. OT will follow while in house.               Plan  Recommend Occupational Therapy 3 times per week until therapy goals are met for the following treatments:  Neuro Re-Education / Balance, Self Care/Activities of Daily Living, and Therapeutic Activities.    DC Equipment Recommendations: (P) Unable to determine at this time  Discharge Recommendations: (P) Recommend post-acute placement for additional occupational therapy services prior to discharge home     Subjective  Pleasant and cooperative.     Objective     10/15/22 1117   Prior Living Situation   Prior Services None   Housing / Facility 1 Story House   Equipment Owned Ramp;Front-Wheel Walker;4-Wheel Walker;Tub / Shower Seat;Hand Held Shower   Lives with - Patient's Self Care Capacity Spouse   Comments pt has fall hx. minimal household ambulation.   Prior Level of ADL Function   Self Feeding Independent   Grooming / Hygiene Independent   Bathing Independent   Dressing Independent   Toileting Independent   Prior Level of IADL Function   Medication Management Requires Assist   Laundry Requires Assist   Kitchen Mobility Independent   Finances Requires Assist   Home Management Requires Assist   Shopping Requires Assist   Prior Level Of Mobility Independent With Device in Home   Driving /  Transportation Relatives / Others Provide Transportation   Occupation (Pre-Hospital Vocational) Retired Due To Age   Leisure Interests Television   History of Falls   History of Falls Yes   Precautions   Precautions Toe Touch Weight Bearing Right Lower Extremity;Fall Risk   Vitals   O2 Delivery Device None - Room Air   Pain 0 - 10 Group   Location Ankle   Location Orientation Right   Therapist Pain Assessment Post Activity Pain Same as Prior to Activity;Nurse Notified   Cognition    Cognition / Consciousness X   Comments Ox2-3; STM deficits.   Passive ROM Upper Body   Passive ROM Upper Body WDL   Active ROM Upper Body   Active ROM Upper Body  WDL   Dominant Hand Right   Strength Upper Body   Upper Body Strength  WDL   Sensation Upper Body   Upper Extremity Sensation  Not Tested   Upper Body Muscle Tone   Upper Body Muscle Tone  WDL   Coordination Upper Body   Coordination WDL   Balance Assessment   Sitting Balance (Static) Good   Sitting Balance (Dynamic) Fair +   Standing Balance (Static) Fair -   Standing Balance (Dynamic) Fair -   Weight Shift Sitting Fair   Weight Shift Standing Poor   Comments fww. cues for TTWB.   Bed Mobility    Supine to Sit Minimal Assist   ADL Assessment   Eating Supervision   Grooming Contact Guard Assist;Seated   Upper Body Dressing Minimal Assist   Lower Body Dressing Maximal Assist   Toileting Total Assist   Comments [place] in place   How much help from another person does the patient currently need...   Putting on and taking off regular lower body clothing? 2   Bathing (including washing, rinsing, and drying)? 2   Toileting, which includes using a toilet, bedpan, or urinal? 2   Putting on and taking off regular upper body clothing? 3   Taking care of personal grooming such as brushing teeth? 4   Eating meals? 4   6 Clicks Daily Activity Score 17   Functional Mobility   Sit to Stand Moderate Assist   Bed, Chair, Wheelchair Transfer Moderate Assist   Transfer Method Stand Step   Visual  Perception   Visual Perception  Not Tested   Activity Tolerance   Sitting in Chair >1hr   Sitting Edge of Bed >10   Standing 2x2   Short Term Goals   Short Term Goal # 1 ADL transfer with CGA within 5 days   Short Term Goal # 2 Toileting at [facility] with CGA within 5 days   Short Term Goal # 3 LB dressing with Kelsy within 5 days   Education Group   Role of Occupational Therapist Patient Response Patient;Family;Acceptance;Explanation;Verbal Demonstration   Problem List   Problem List Decreased Active Daily Living Skills;Decreased Activity Tolerance;Safety Awareness Deficits / Cognition;Impaired Postural Control / Balance;Decreased Functional Mobility   Anticipated Discharge Equipment and Recommendations   DC Equipment Recommendations Unable to determine at this time   Discharge Recommendations Recommend post-acute placement for additional occupational therapy services prior to discharge home   Interdisciplinary Plan of Care Collaboration   IDT Collaboration with  Nursing;Family / Caregiver   Patient Position at End of Therapy Seated;Family / Friend in Room;Tray Table within Reach;Call Light within Reach   Collaboration Comments aware of visit. DC planning- pt is not safe for dc  home at this time.

## 2022-10-15 NOTE — PROGRESS NOTES
Hospital Medicine Daily Progress Note    Date of Service  10/15/2022    Chief Complaint  Fall, right ankle foot injury    Hospital Course  Yvonne Marie Wada is a 86 y.o. 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  X-ray evaluation showed right distal fibular and medial malleolar fracture.  Creatinine 1.06.  Hemoglobin 11.3.  Orthopedics was consulted, who obtained a CT of the ankle which showed comminuted trimalleolar fracture of the right ankle, and recommended surgical intervention (likely Friday).  Eliquis was held.  She had a slight bump in her creatinine to 1.33, for which she was started on gentle IV fluid hydration.  Vitamin D level is normal.    Interval Problem Update  10/15/2022 - I reviewed the patient's chart. There were no significant overnight events. Remains hemodynamically stable and afebrile.  She is now status post right ankle open reduction internal fixation, uncomplicated the perioperative and postoperative course.  Stable on 2L O2 NC.  Slight drop in hemoglobin at 9.2 postop.  No leukocytosis.  Creatinine improved to 1.27.  Electrolytes are normal. Ortho recommends TTWB on the right leg.     > I have personally seen and examined the patient today.  Her pain is well controlled, rated 0 this morning.  Still has not had any bowel movements yet but passing gas.  No nausea, vomiting, chest pain or shortness of breath.    I have discussed this patient's plan of care and discharge plan at IDT rounds today with Case Management, Nursing, Nursing leadership, and other members of the IDT team.    Consultants/Specialty  Orthopedics    Code Status  Full Code    Disposition  Patient is not medically cleared for discharge.   Anticipate discharge to  TBD, suspect need for SNF  placement .  PT/OT evaluation postoperatively.  I have placed the appropriate orders for post-discharge needs.    Review of Systems  ROS     Pertinent positives/negatives as mentioned above.     A complete review of systems was personally done by me. All other systems were negative.       Physical Exam  Temp:  [36.2 °C (97.2 °F)-37.2 °C (99 °F)] 36.8 °C (98.2 °F)  Pulse:  [63-98] 83  Resp:  [16-20] 16  BP: ()/(46-77) 126/49  SpO2:  [94 %-98 %] 96 %    Physical Exam  Vitals reviewed.   Constitutional:       General: She is not in acute distress.     Appearance: Normal appearance. She is normal weight. She is not ill-appearing or diaphoretic.   HENT:      Head: Normocephalic and atraumatic.      Right Ear: External ear normal.      Left Ear: External ear normal.      Mouth/Throat:      Mouth: Mucous membranes are moist.      Pharynx: No oropharyngeal exudate or posterior oropharyngeal erythema.   Eyes:      General: No scleral icterus.     Extraocular Movements: Extraocular movements intact.      Conjunctiva/sclera: Conjunctivae normal.      Pupils: Pupils are equal, round, and reactive to light.   Cardiovascular:      Rate and Rhythm: Normal rate and regular rhythm.      Heart sounds: Normal heart sounds. No murmur heard.  Pulmonary:      Effort: Pulmonary effort is normal. No respiratory distress.      Breath sounds: Normal breath sounds. No stridor. No wheezing, rhonchi or rales.   Chest:      Chest wall: No tenderness.   Abdominal:      General: Bowel sounds are normal. There is no distension.      Palpations: Abdomen is soft. There is no mass.      Tenderness: There is no abdominal tenderness. There is no guarding or rebound.   Musculoskeletal:         General: No swelling or deformity. Normal range of motion.      Cervical back: Normal range of motion and neck supple. No rigidity. No muscular tenderness.      Comments: Right leg in cast   Lymphadenopathy:      Cervical: No cervical adenopathy.   Skin:      General: Skin is warm and dry.      Coloration: Skin is not jaundiced.      Findings: No rash.   Neurological:      General: No focal deficit present.      Mental Status: She is alert and oriented to person, place, and time. Mental status is at baseline.      Cranial Nerves: No cranial nerve deficit.   Psychiatric:         Mood and Affect: Mood normal.         Behavior: Behavior normal.         Thought Content: Thought content normal.         Judgment: Judgment normal.         Fluids    Intake/Output Summary (Last 24 hours) at 10/15/2022 1133  Last data filed at 10/15/2022 1000  Gross per 24 hour   Intake 2067 ml   Output 625 ml   Net 1442 ml       Laboratory  Recent Labs     10/13/22  0037 10/14/22  0430 10/15/22  0422   WBC 5.3 5.9 4.4*   RBC 3.55* 3.49* 3.15*   HEMOGLOBIN 10.5* 10.0* 9.2*   HEMATOCRIT 34.7* 31.3* 28.4*   MCV 97.7 89.7 90.2   MCH 29.6 28.7 29.2   MCHC 30.3* 31.9* 32.4*   RDW 54.8* 48.8 48.9   PLATELETCT 182 199 190   MPV 9.1 9.5 9.6     Recent Labs     10/13/22  0037 10/14/22  0430 10/15/22  0422   SODIUM 130* 138 135   POTASSIUM 4.5 4.2 4.6   CHLORIDE 105 106 105   CO2 16* 21 20   GLUCOSE 93 101* 151*   BUN 28* 35* 35*   CREATININE 1.26 1.33 1.27   CALCIUM 9.0 9.2 8.6                   Imaging  DX-ANKLE 3+ VIEWS RIGHT   Final Result      Fluoroscopic image(s) obtained during right ankle instrumentation. Please see the patient's chart for full procedural details.      Fluoroscopy time 2 minutes, 11 seconds.         CT-ANKLE W/O PLUS RECONS RIGHT   Final Result      Comminuted trimalleolar fracture of the right ankle.      DX-ANKLE 2- VIEWS RIGHT   Final Result      No change in distal fibular and medial malleolar fracture with presence of cast or splint      DX-TIBIA AND FIBULA RIGHT   Final Result      1.  Right distal fibular fracture at the metadiaphysis      2.  Probable medial malleolar fracture      DX-ANKLE 3+ VIEWS RIGHT   Final Result      Distal fibular and medial malleolar fracture       DX-PORTABLE FLUORO > 1 HOUR    (Results Pending)        Assessment/Plan  * Tibia/fibula fracture, right, closed, initial encounter- (present on admission)  Assessment & Plan  - She twisted her ankle, and was unable to bear weight.  Radiographs showing trimalleolar fracture of the right ankle.  -s/p open reduction internal fixation 10/14.  -Continue calcium + vit D.  Vitamin D level is normal.  Will need outpatient bone scan to evaluate for osteoporosis and need for bisphosphonates.   - continue pain control with oxycodone and IV Dilaudid.  - Patient will have low-risk below the knee procedure, with no prior history of VTE.  She is touch toe weightbearing on the right leg.  Will need on-going DVT prophylaxis on discharge.  Resume home Eliquis for atrial fibrillation, which should be adequate for postoperative DVT prophylaxis.  -Awaiting PT/OT, suspect she will need SNF placement.      Anemia- (present on admission)  Assessment & Plan  -chronic, no signs of bleeding   -Ferritin normal, iron low consistent with anemia of chronic disease.    -Continue trend H&H, especially now that she is postop.      Constipation- (present on admission)  Assessment & Plan  - No bowel movement for about 4 days now.  Change MiraLAX to daily, and start Tisha-Colace daily.  She is also on bowel regimen.  Encourage ambulation.    Senile osteoporosis- (present on admission)  Assessment & Plan  -currently on prolia per PCP     Paroxysmal atrial fibrillation (HCC)- (present on admission)  Assessment & Plan  - rate controlled.   - continue metoprolol.   -Resume Eliquis.      CKD (chronic kidney disease) stage 3, GFR 30-59 ml/min ([facility])- (present on admission)  Assessment & Plan  -Renal function improving slowly back to baseline (1.0-1.1).    -Continue gentle IV fluid hydration with LR at 83 cc/h.  -Trend BMP.  - avoid nephrotoxins, and continue to renally dose all medications.    GERD (gastroesophageal reflux disease)- (present on  admission)  Assessment & Plan  -Continue omeprazole    Hypothyroid- (present on admission)  Assessment & Plan  -Continue home levothyroxine    Hypertension- (present on admission)  Assessment & Plan  -Maintaining good control.  -Continue home metoprolol, and benazepril.  Monitor blood pressure trend closely.       VTE prophylaxis: SCDs/TEDs

## 2022-10-15 NOTE — OR SURGEON
Immediate Post OP Note    PreOp Diagnosis: Right trimalleolar ankle fracture      PostOp Diagnosis: Right trimalleolar ankle fracture      Procedure(s):  RIGHT ANKLE OPEN REDUCTION INTERNAL FIXATION - Wound Class: Clean    Surgeon(s):  Ryan Huertas M.D.    Anesthesiologist/Type of Anesthesia:  Anesthesiologist: Daryl Elder M.D./General    Surgical Staff:  Circulator: Monie Burris R.N.  Scrub Person: Bry Steele  Radiology Technologist: Castillo Daley    Specimens removed if any:  * No specimens in log *    Estimated Blood Loss: 50cc    Findings: trimal    Complications: none        10/14/2022 5:59 PM Ryan Huertas M.D.

## 2022-10-15 NOTE — CARE PLAN
The patient is Stable - Low risk of patient condition declining or worsening    Shift Goals  Clinical Goals: pT WILL BE FREE F  ROM FALLSthis shift  Patient Goals: Ambulate 50 feet this shift  Family Goals: n/a    Progress made toward(s) clinical / shift goals:      Patient is not progressing towards the following goals:

## 2022-10-15 NOTE — OR NURSING
1803: pt to PACU from OR via bed. Report received from anesthesia and RN. OPA in place, breathing spontaneous and nonlabored on 6 L O2 via mask. R ankle cast/dressing C/D/I, tips of R toes warm and dry with good capillary refill.     1808: rousing to voice and following commands, OPA removed.     1820: pt denies pain or nausea, tolerating sips of water.     1840: called pt's  and updated him. Called and gave report to Bettie ELMORE on GSU.     1848: discharged to room via bed by RN's stable on 2 L NC.

## 2022-10-15 NOTE — CARE PLAN
The patient is Stable - Low risk of patient condition declining or worsening    Shift Goals  Clinical Goals: Pt will be free from injury and fall  Patient Goals: rest and comfort  Family Goals: n/a    Progress made toward(s) clinical / shift goals:  safety and fall precautions kept in place. Bed kept locked and lowest position. Call light within reach. Hourly monitoring in place, comfort measures provided. Pt has been free from injury and fall, seen sleeping comfortably during this shift.    Patient is not progressing towards the following goals:N/a

## 2022-10-15 NOTE — DISCHARGE PLANNING
Case Management Discharge Planning    Admission Date: 10/12/2022  GMLOS:    ALOS: 0    6-Clicks ADL Score: 18  6-Clicks Mobility Score: 12  PT and/or OT Eval ordered: Yes  Post-acute Referrals Ordered: No  Post-acute Choice Obtained: Yes  Has referral(s) been sent to post-acute provider:  Yes      Anticipated Discharge Dispo: Discharge Disposition: D/T to home under [facility] care in anticipation of covered skilled care (06)    DME Needed: No    Action(s) Taken: Discussed pt in IDT rounds. Pt medically clear and PT recommending post acute placement. LSW met with pt at bedside for SNF choice. Pt reported she was recently at SNF, and did not like it, but would return there if she had to. Pt unable to recall name of SNF she was at (per chart review it was [facility]). Pt reported she would ideally like to go somewhere close to her home that has good ratings. Choice form faxed to Melita HELTON.    Escalations Completed: None    Medically Clear: Yes    Next Steps: Care coordination will f/u with SNF referrals    Barriers to Discharge: Pending Placement    Is the patient up for discharge tomorrow: No

## 2022-10-15 NOTE — THERAPY
Physical Therapy   Initial Evaluation     Patient Name: Yvonne Marie Wada  Age:  86 y.o., Sex:  female  Medical Record #: [id number]  Today's Date: 10/15/2022     Precautions  Precautions: (P) Toe Touch Weight Bearing Right Lower Extremity    Assessment  Patient is 86 y.o. female with a diagnosis of R tib/fib Fx with ORIF Pt lives at home with  and is not very active.Acute PT needed to improve bed mob,transfers and ambulation     Plan    Recommend Physical Therapy daily until therapy goals are met for the following treatments:  Bed Mobility, Gait Training, Neuro Re-Education / Balance, Therapeutic Activities, and Therapeutic Exercises    DC Equipment Recommendations: (P) None  Discharge Recommendations: (P) Recommend post-acute placement for additional physical therapy services prior to discharge home        Objective       10/15/22 1100   Charge Group   PT Evaluation PT Evaluation Mod   Total Time Spent   PT Total Time Yes   PT Evaluation Time Spent (Mins) 30   Initial Contact Note    Initial Contact Note Order Received and Verified, Physical Therapy Evaluation in Progress with Full Report to Follow.   Precautions   Precautions Toe Touch Weight Bearing Right Lower Extremity   Prior Living Situation   Prior Services Intermittent Physical Support for ADL Per Family   Housing / Facility 1 Story House   Steps Into Home 0   Equipment Owned Front-Wheel Walker;4-Wheel Walker   Lives with - Patient's Self Care Capacity Spouse   Prior Level of Functional Mobility   Bed Mobility Independent   Transfer Status Independent   Ambulation Independent   Distance Ambulation (Feet)   (household)   Assistive Devices Used Front-Wheel Walker   History of Falls   History of Falls Yes   Cognition    Cognition / Consciousness WDL   Passive ROM Lower Body   Passive ROM Lower Body X   Active ROM Lower Body    Active ROM Lower Body  X   Strength Lower Body   Lower Body Strength  X   Coordination Lower Body    Coordination Lower Body  WDL    Balance Assessment   Sitting Balance (Static) Good   Sitting Balance (Dynamic) Fair +   Standing Balance (Static) Fair   Standing Balance (Dynamic) Fair -   Weight Shift Sitting Fair   Weight Shift Standing Poor   Gait Analysis   Gait Level Of Assist Moderate Assist   Assistive Device Front Wheel Walker   Distance (Feet) 2   # of Times Distance was Traveled 1   Weight Bearing Status TTWB R   Bed Mobility    Supine to Sit Minimal Assist   Functional Mobility   Sit to Stand Moderate Assist   Bed, Chair, Wheelchair Transfer Moderate Assist   Transfer Method Stand Pivot   How much difficulty does the patient currently have...   Turning over in bed (including adjusting bedclothes, sheets and blankets)? 3   Sitting down on and standing up from a chair with arms (e.g., wheelchair, bedside commode, etc.) 2   Moving from lying on back to sitting on the side of the bed? 3   How much help from another person does the patient currently need...   Moving to and from a bed to a chair (including a wheelchair)? 2   Need to walk in a hospital room? 1   Climbing 3-5 steps with a railing? 1   6 clicks Mobility Score 12   Activity Tolerance   Sitting in Chair > 1hr   Sitting Edge of Bed 10   Standing 2 x 2 mins   Short Term Goals    Short Term Goal # 1 S with bed mob in 6 V   Short Term Goal # 2 S CGA transfers in 6 V   Short Term Goal # 3 CGA x 10 feet in 6 V   Problem List    Problems Impaired Bed Mobility;Impaired Transfers;Impaired Ambulation;Functional Strength Deficit;Functional ROM Deficit;Impaired Balance;Decreased Activity Tolerance   Anticipated Discharge Equipment and Recommendations   DC Equipment Recommendations None   Discharge Recommendations Recommend post-acute placement for additional physical therapy services prior to discharge home   Interdisciplinary Plan of Care Collaboration   IDT Collaboration with  Nursing   Session Information   Date / Session Number  10/15-1 1/7 10/21

## 2022-10-16 LAB
ANION GAP SERPL CALC-SCNC: 8 MMOL/L (ref 7–16)
BUN SERPL-MCNC: 36 MG/DL (ref 8–22)
CALCIUM SERPL-MCNC: 8.7 MG/DL (ref 8.4–10.2)
CHLORIDE SERPL-SCNC: 106 MMOL/L (ref 96–112)
CO2 SERPL-SCNC: 23 MMOL/L (ref 20–33)
CREAT SERPL-MCNC: 1.2 MG/DL (ref 0.5–1.4)
GFR SERPLBLD CREATININE-BSD FMLA CKD-EPI: 44 ML/MIN/1.73 M 2
GLUCOSE SERPL-MCNC: 115 MG/DL (ref 65–99)
POTASSIUM SERPL-SCNC: 4.2 MMOL/L (ref 3.6–5.5)
SODIUM SERPL-SCNC: 137 MMOL/L (ref 135–145)

## 2022-10-16 PROCEDURE — 700102 HCHG RX REV CODE 250 W/ 637 OVERRIDE(OP): Performed by: INTERNAL MEDICINE

## 2022-10-16 PROCEDURE — A9270 NON-COVERED ITEM OR SERVICE: HCPCS | Performed by: INTERNAL MEDICINE

## 2022-10-16 PROCEDURE — 36415 COLL VENOUS BLD VENIPUNCTURE: CPT

## 2022-10-16 PROCEDURE — 99232 SBSQ HOSP IP/OBS MODERATE 35: CPT | Performed by: INTERNAL MEDICINE

## 2022-10-16 PROCEDURE — 80048 BASIC METABOLIC PNL TOTAL CA: CPT

## 2022-10-16 PROCEDURE — 94760 N-INVAS EAR/PLS OXIMETRY 1: CPT

## 2022-10-16 PROCEDURE — G0378 HOSPITAL OBSERVATION PER HR: HCPCS

## 2022-10-16 RX ADMIN — METOPROLOL SUCCINATE 50 MG: 25 TABLET, EXTENDED RELEASE ORAL at 11:32

## 2022-10-16 RX ADMIN — Medication 1 TABLET: at 05:23

## 2022-10-16 RX ADMIN — APIXABAN 5 MG: 5 TABLET, FILM COATED ORAL at 18:04

## 2022-10-16 RX ADMIN — ACETAMINOPHEN 650 MG: 325 TABLET, FILM COATED ORAL at 18:07

## 2022-10-16 RX ADMIN — POLYETHYLENE GLYCOL 3350 1 PACKET: 17 POWDER, FOR SOLUTION ORAL at 05:23

## 2022-10-16 RX ADMIN — ACETAMINOPHEN 650 MG: 325 TABLET, FILM COATED ORAL at 08:32

## 2022-10-16 RX ADMIN — LEVOTHYROXINE SODIUM 75 MCG: 0.07 TABLET ORAL at 05:22

## 2022-10-16 RX ADMIN — APIXABAN 5 MG: 5 TABLET, FILM COATED ORAL at 05:23

## 2022-10-16 RX ADMIN — GABAPENTIN 200 MG: 100 CAPSULE ORAL at 08:32

## 2022-10-16 RX ADMIN — EZETIMIBE 10 MG: 10 TABLET ORAL at 05:23

## 2022-10-16 RX ADMIN — BENAZEPRIL HYDROCHLORIDE 40 MG: 10 TABLET ORAL at 05:22

## 2022-10-16 RX ADMIN — GABAPENTIN 200 MG: 100 CAPSULE ORAL at 15:00

## 2022-10-16 RX ADMIN — OMEPRAZOLE 20 MG: 20 CAPSULE, DELAYED RELEASE ORAL at 05:23

## 2022-10-16 ASSESSMENT — PAIN DESCRIPTION - PAIN TYPE
TYPE: SURGICAL PAIN

## 2022-10-16 ASSESSMENT — PATIENT HEALTH QUESTIONNAIRE - PHQ9
SUM OF ALL RESPONSES TO PHQ9 QUESTIONS 1 AND 2: 0
1. LITTLE INTEREST OR PLEASURE IN DOING THINGS: NOT AT ALL
2. FEELING DOWN, DEPRESSED, IRRITABLE, OR HOPELESS: NOT AT ALL

## 2022-10-16 NOTE — CARE PLAN
The patient is Stable - Low risk of patient condition declining or worsening    Shift Goals  Clinical Goals: Pain level at or below 4\10  Patient Goals: rest and comfort  Family Goals: n/a    Progress made toward(s) clinical / shift goals:    Problem: Knowledge Deficit - Standard  Goal: Patient and family/care givers will demonstrate understanding of plan of care, disease process/condition, diagnostic tests and medications  Outcome: Progressing       Patient is not progressing towards the following goals:

## 2022-10-16 NOTE — PROGRESS NOTES
Hospital Medicine Daily Progress Note    Date of Service  10/16/2022    Chief Complaint  Fall, right ankle foot injury    Hospital Course  Yvonne Marie Wada is a 86 y.o. 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  X-ray evaluation showed right distal fibular and medial malleolar fracture.  Creatinine 1.06.  Hemoglobin 11.3.  Orthopedics was consulted, who obtained a CT of the ankle which showed comminuted trimalleolar fracture of the right ankle, and recommended surgical intervention (likely Friday).  Eliquis was held.  She had a slight bump in her creatinine to 1.33, for which she was started on gentle IV fluid hydration.  Vitamin D level is normal. She underwent right ankle open reduction internal fixation, uncomplicated perioperative and postoperative course. Ortho recommends TTWB on the right leg.     Interval Problem Update  10/16/2022 - I reviewed the patient's chart today. Uneventful night. VSS. Afebrile. Saturating well on RA.  Creatinine improved to 1.20.  Electrolytes are normal.  PT/OT recommend SNF placement.    > I have personally seen and examined the patient today.  She is in good spirits today.  She states her pain is well controlled with oral analgesics with Tylenol and gabapentin.  She does not want to take narcotics.  She states she had a bowel movement.  No nausea, vomiting, chest pain, shortness of breath, abdominal pain.    I have discussed this patient's plan of care and discharge plan at IDT rounds today with Case Management, Nursing, Nursing leadership, and other members of the IDT team.      Consultants/Specialty  Orthopedics    Code Status  Full Code    Disposition  Patient is not medically cleared for discharge.   Anticipate discharge to to skilled nursing  facility.  PT/OT recommends need for skilled therapies and placement.  However she is observation status.  Referred to physiatry for possible inpatient rehab placement.  I have placed the appropriate orders for post-discharge needs.    Review of Systems  ROS     Pertinent positives/negatives as mentioned above.     A complete review of systems was personally done by me. All other systems were negative.       Physical Exam  Temp:  [36.4 °C (97.5 °F)-36.7 °C (98 °F)] 36.6 °C (97.9 °F)  Pulse:  [] 140  Resp:  [16-18] 16  BP: (103-148)/(46-65) 113/65  SpO2:  [92 %-100 %] 100 %    Physical Exam  Vitals reviewed.   Constitutional:       General: She is not in acute distress.     Appearance: Normal appearance. She is normal weight. She is not ill-appearing or diaphoretic.   HENT:      Head: Normocephalic and atraumatic.      Right Ear: External ear normal.      Left Ear: External ear normal.      Mouth/Throat:      Mouth: Mucous membranes are moist.      Pharynx: No oropharyngeal exudate or posterior oropharyngeal erythema.   Eyes:      General: No scleral icterus.     Extraocular Movements: Extraocular movements intact.      Conjunctiva/sclera: Conjunctivae normal.      Pupils: Pupils are equal, round, and reactive to light.   Cardiovascular:      Rate and Rhythm: Normal rate and regular rhythm.      Heart sounds: Normal heart sounds. No murmur heard.  Pulmonary:      Effort: Pulmonary effort is normal. No respiratory distress.      Breath sounds: Normal breath sounds. No stridor. No wheezing, rhonchi or rales.   Chest:      Chest wall: No tenderness.   Abdominal:      General: Bowel sounds are normal. There is no distension.      Palpations: Abdomen is soft. There is no mass.      Tenderness: There is no abdominal tenderness. There is no guarding or rebound.   Musculoskeletal:         General: No swelling or deformity. Normal range of motion.      Cervical back: Normal range of motion and neck supple. No rigidity.  No muscular tenderness.      Comments: Right leg in cast   Lymphadenopathy:      Cervical: No cervical adenopathy.   Skin:     General: Skin is warm and dry.      Coloration: Skin is not jaundiced.      Findings: No rash.   Neurological:      General: No focal deficit present.      Mental Status: She is alert and oriented to person, place, and time. Mental status is at baseline.      Cranial Nerves: No cranial nerve deficit.   Psychiatric:         Mood and Affect: Mood normal.         Behavior: Behavior normal.         Thought Content: Thought content normal.         Judgment: Judgment normal.     I have performed the physical examination today 10/16/2022.  In review of yesterday's note, there are no new changes except as documented above.      Fluids    Intake/Output Summary (Last 24 hours) at 10/16/2022 1033  Last data filed at 10/16/2022 0900  Gross per 24 hour   Intake 1640 ml   Output 1700 ml   Net -60 ml       Laboratory  Recent Labs     10/14/22  0430 10/15/22  0422   WBC 5.9 4.4*   RBC 3.49* 3.15*   HEMOGLOBIN 10.0* 9.2*   HEMATOCRIT 31.3* 28.4*   MCV 89.7 90.2   MCH 28.7 29.2   MCHC 31.9* 32.4*   RDW 48.8 48.9   PLATELETCT 199 190   MPV 9.5 9.6     Recent Labs     10/14/22  0430 10/15/22  0422 10/16/22  0243   SODIUM 138 135 137   POTASSIUM 4.2 4.6 4.2   CHLORIDE 106 105 106   CO2 21 20 23   GLUCOSE 101* 151* 115*   BUN 35* 35* 36*   CREATININE 1.33 1.27 1.20   CALCIUM 9.2 8.6 8.7                   Imaging  DX-ANKLE 3+ VIEWS RIGHT   Final Result      Fluoroscopic image(s) obtained during right ankle instrumentation. Please see the patient's chart for full procedural details.      Fluoroscopy time 2 minutes, 11 seconds.         CT-ANKLE W/O PLUS RECONS RIGHT   Final Result      Comminuted trimalleolar fracture of the right ankle.      DX-ANKLE 2- VIEWS RIGHT   Final Result      No change in distal fibular and medial malleolar fracture with presence of cast or splint      DX-TIBIA AND FIBULA RIGHT   Final  Result      1.  Right distal fibular fracture at the metadiaphysis      2.  Probable medial malleolar fracture      DX-ANKLE 3+ VIEWS RIGHT   Final Result      Distal fibular and medial malleolar fracture      DX-PORTABLE FLUORO > 1 HOUR    (Results Pending)        Assessment/Plan  * Tibia/fibula fracture, right, closed, initial encounter- (present on admission)  Assessment & Plan  - She twisted her ankle, and was unable to bear weight.  Radiographs showing trimalleolar fracture of the right ankle.  -s/p open reduction internal fixation 10/14.  -Continue calcium + vit D.  Vitamin D level is normal.  Will need outpatient bone scan to evaluate for osteoporosis and need for bisphosphonates.   - continue pain control with oxycodone and IV Dilaudid.  - Patient will have low-risk below the knee procedure, with no prior history of VTE.  She is touch toe weightbearing on the right leg.  Will need on-going DVT prophylaxis on discharge.  Continue home Eliquis for atrial fibrillation, which should be adequate for postoperative DVT prophylaxis.  -PT/OT recommending skilled therapies.  SNF referrals being pursued, however she is observation status.  Will refer to inpatient rehab as patient also prefers to go there.      Anemia- (present on admission)  Assessment & Plan  -chronic, no signs of bleeding   -Ferritin normal, iron low consistent with anemia of chronic disease.    -Hemoglobin stable.    Constipation- (present on admission)  Assessment & Plan  - Improved.  Continue daily MiraLAX, and Tisha-Colace, along with rest of bowel regimen.    Senile osteoporosis- (present on admission)  Assessment & Plan  -currently on prolia per PCP     Paroxysmal atrial fibrillation (HCC)- (present on admission)  Assessment & Plan  - rate controlled.   - continue metoprolol.   -Continue Eliquis.      CKD (chronic kidney disease) stage 3, GFR 30-59 ml/min ([facility])- (present on admission)  Assessment & Plan  -Renal function improved back to  baseline.  -Stop IV fluids.  -Trend BMP.  - avoid nephrotoxins, and continue to renally dose all medications.    GERD (gastroesophageal reflux disease)- (present on admission)  Assessment & Plan  -Continue omeprazole    Hypothyroid- (present on admission)  Assessment & Plan  -Continue home levothyroxine    Hypertension- (present on admission)  Assessment & Plan  -Maintaining good control.  -Continue home metoprolol, and benazepril.  Monitor blood pressure trend closely.       VTE prophylaxis: SCDs/TEDs

## 2022-10-16 NOTE — CONSULTS
Physical Medicine and Rehabilitation Consultation  Chart Review Consult    Date of initial consultation: 10/16/2022  Consulting provider: Reji Atkinson M.D.  Reason for consultation: assess for acute inpatient rehab appropriateness  LOS: 0 Day(s)    Chief complaint: right ankle fracture    HPI: The patient is a 86 y.o. female with a past medical history of anemia, tissue aortic valve replacement on eliquis, osteoperosis, HTN;  who presented on 10/12/2022  9:22 AM with right ankle injury sustained while getting out of bed. Patient was found to have comminuted trimalleolar fracture of the right ankle, and taken to the OR on 10/14/22 with Dr. Ryan Huertas for ORIF repair. Post op course has been uncomplicated. She does have some anemia, acute on chronic kidney disease, HTN and baseline a. Fib. Functionally she is taking a few steps with PT and requiring max-total assist with ADLs.  She has support from her spouse and an acceptable post discharge living situation.     THERAPY:  Restrictions: TTWB RLE   PT: Functional mobility   10/15: Walking 2' with FWW at mod assist     OT: ADLs  10/15: Total assist toile ting, max assist LBD     SLP:   NA    Social Hx:  1 SH  0 JENNIFER  With: spouse    IMAGING:  CT R ankle 10/12/22  Comminuted trimalleolar fracture of the right ankle.    PROCEDURES:  10/14/22 Ryan Huertas MD   Open reduction internal fixation of right trimalleolar ankle fracture with fixation of the medial and lateral malleoli  Open reduction internal fixation of right syndesmosis  Nonoperative treatment of right posterior malleolus fracture       PMH:  Past Medical History:   Diagnosis Date    AAA (abdominal aortic aneurysm) 7/26/2010    10/09 CT thorax dilated ascending thoracic aorta 4.9 cm 1/10 transesophageal echo dilated aortic root, and ascending aorta with mild aortic insufficiency 1/10  ascending aortic aneurysm repair 5/12 echo [facility] normal LV function EF 60%, moderate to severe left atrial  "enlargement, RVSP 32     Aortic insufficiency     Aortic valve disease     Aortic valve regurgitation     Arthritis     back and knee/ osteo    Cataract     Dental disorder     full top denture, partial bottom    Diverticulosis     Dyslipidemia 7/26/2010    Sees [facility] on lipitor 4/11 chol 159,trig 84,hdl 47,ldl 95,cpk 114 7/12 chol 163,trig 120,hdl 49,ldl 90, crp 1.1 on lipitor 20 mg     GERD (gastroesophageal reflux disease) 7/12/2012 6/07 EGD per [facility] hiatal hernia, start prevacid 30 mg 7/12 protonix 20 mg qday     Glaucoma     Heart burn     Heart murmur     Heart valve disease     \"leaking\", mitral valve    Hiatus hernia syndrome     High cholesterol     History of shingles 6/30/2011 2010 Back and left thorax      History of total knee arthroplasty 7/26/2010    4/10 MRI right knee complex tear medial meniscus, horizontal cleavage tear lateral meniscus, chondromalacia patella, moderate-sized Baker's cyst 5/10 right meniscus knee repair  5/10 told by  had gout on surgery had pathology report, I await that report Question gout seen on pathology report done during repair of right meniscus knee surgery. 7/10 uric acid 6.3, we'll await starting allopurinol depending on the operative report 8/5/10 reviewed ortho notes , op report indicates crystal deposition, could be gout or pseudogout. No bx result sent over. Will need to get. My suspicion is chondrocalcinosis. 10/11  ortho note, MRI pending 8/7/10 reviewed pathology report right knee tissue, marked degenerative changes with focal calcification, no mention of gout. Based on this and a normal uric acid level, I do not believe she has gout, has no hyperuricemia medications would be indicated 10/11  ortho left knee meniscectomy and arthroscopy 1/12      HLD (hyperlipidemia)     Hypertension     Hypothyroid 4/8/2011 4/11 tsh 9 start synthroid 50 4/11 tsh 6,free t3 3.1,free t4 1.2,tpo negative 7/1/11 " tsh 2.1 cont synthroid 50 mcg 7/12 tsh 3.4 cont synthroid 50 mcg     Indigestion     Mitral insufficiency     OSTEOPOROSIS 8/9/2010    Had been on fosamax [id number]  8/10 dexa LS -2.0,hip -1.5 await old dexa result, she will get copy for me 4/11 vit d 35 9/12 dexa LS -3.2,hip -1.4 2/13/13 relast infusion     Pain     back and right leg, can get as bad as 10/10    Preventative health care 7/26/2010    2002 pneum 2005 colon [facility] no records 4/11 vit d 35 9/11 shingles vaccine 9/12 mammogram 9/12 dexa LS -3.2,hip -1.4     Rheumatic fever     S/P appendectomy 7/26/2010    S/P TOMY (total abdominal hysterectomy) 7/26/2010    Sees gyn on HRT estradiol for 20 yrs () 11/08 mammo      Shingles 6/30/2011    Snoring     Status post lumbar surgery 7/26/2010 6/03 L4-5 epidural Dr. Jordan  7/06 overall for decompression, foraminotomy. L4-L5 posterior fusion, L4-L5 allograft       Stroke ([facility]) 1996    no residual problems     Thoracic aortic aneurysm without mention of rupture     Tricuspid insufficiency     Unspecified disorder of thyroid     hypo    Urinary bladder disorder     suprapubic catheter insertion 11/4    UTI (urinary tract infection) 11/12/2016 7/6/16 UTI klebsiella pneumoniae sensitive to all antibiotics except ampicillin, start bactrim x 5 days 1/18/19 UTI enterobacter cloacae resistant to ampicillin, cephalothin, penicillin, bactrim, sensitive to cefuroxime, ciprofloxacin and levaquin, nitrofurantoin 5/26/19 UTI klebsiella given omnicef, organism resistant ampicillin, ciprofloxacin, levofloxacin, bactrim 8/18/21  urology note        PSH:  Past Surgical History:   Procedure Laterality Date    TENDON REPAIR Right 11/10/2016    Procedure: TENDON REPAIR - QUADRACEPS ;  Surgeon: Maxim Herrera M.D.;  Location: SURGERY [facility];  Service:     KNEE ARTHROPLASTY TOTAL Right 9/8/2016    Procedure: KNEE ARTHROPLASTY TOTAL;  Surgeon: Maxim Herrera M.D.;  Location: SURGERY [facility];  Service:     FUSION, SPINE, LUMBAR, PLIF  11/24/2015    Procedure: LUMBAR FUSION POSTERIOR L3-4, EXPLORATION OF FUSION L4-5 WITH INSTRUMENTATION;  Surgeon: Hermann Reis M.D.;  Location: SURGERY [facility];  Service:     LUMBAR LAMINECTOMY DISKECTOMY  11/24/2015    Procedure: LUMBAR L3-4 LAMINECTOMY AND REDO L4-5;  Surgeon: Hermann Reis M.D.;  Location: SURGERY [facility];  Service:     KNEE ARTHROPLASTY TOTAL  1/3/2012    Performed by YOSEF MEJÍA at SURGERY [facility]    KNEE ARTHROSCOPY  10/18/2011    Performed by YOSEF MEJÍA at [facility]    MENISCECTOMY, KNEE, MEDIAL  10/18/2011    Performed by YOSEF MEJÍA at [facility]    AORTIC VALVE REPLACEMENT  1/12/2010    Performed by ISAÍAS NICOLE at SURGERY [facility]    APPENDECTOMY      FUSION, SPINE, LUMBAR, PLIF      HYSTERECTOMY, TOTAL ABDOMINAL         FHX:  Family History   Problem Relation Age of Onset    Stroke Mother     Heart Disease Father     Heart Disease Sister        Medications:  Current Facility-Administered Medications   Medication Dose    polyethylene glycol/lytes (MIRALAX) PACKET 1 Packet  1 Packet    And    magnesium hydroxide (MILK OF MAGNESIA) suspension 30 mL  30 mL    And    bisacodyl (DULCOLAX) suppository 10 mg  10 mg    senna-docusate (PERICOLACE or SENOKOT S) 8.6-50 MG per tablet 1 Tablet  1 Tablet    calcium/vitamin D 250-125 MG-UNIT tablet 1 Tablet  1 Tablet    acetaminophen (Tylenol) tablet 650 mg  650 mg    Pharmacy Consult Request ...Pain Management Review 1 Each  1 Each    oxyCODONE immediate-release (ROXICODONE) tablet 2.5 mg  2.5 mg    Or    oxyCODONE immediate-release (ROXICODONE) tablet 5 mg  5 mg    Or    HYDROmorphone (Dilaudid) injection 0.25 mg  0.25 mg    ondansetron (ZOFRAN) syringe/vial injection 4 mg  4 mg    ondansetron (ZOFRAN ODT) dispertab 4 mg  4 mg    labetalol (NORMODYNE/TRANDATE) injection 10 mg  10 mg    benazepril (LOTENSIN) tablet 40 mg   40 mg    apixaban (ELIQUIS) tablet 5 mg  5 mg    ezetimibe (ZETIA) tablet 10 mg  10 mg    gabapentin (NEURONTIN) capsule 200 mg  200 mg    levothyroxine (SYNTHROID) tablet 75 mcg  75 mcg    metoprolol SR (TOPROL XL) tablet 50 mg  50 mg    omeprazole (PRILOSEC) capsule 20 mg  20 mg       Allergies:  Allergies   Allergen Reactions    Amoxicillin Vomiting     Upset stomach, ok if needed for life saving treatment (per pt)    Codeine Nausea     Synthetic codones ok    Doxycycline Nausea     Nausea, Ok in life saving situation (per pt)    Sulfamethoxazole-Trimethoprim Vomiting and Nausea     Ok in a life saving situation (per pt)    Tramadol Vomiting and Nausea     nausea    Trazodone Vomiting     groggy       Labs: Reviewed and significant for   Recent Labs     10/14/22  0430 10/15/22  0422   RBC 3.49* 3.15*   HEMOGLOBIN 10.0* 9.2*   HEMATOCRIT 31.3* 28.4*   PLATELETCT 199 190     Recent Labs     10/14/22  0430 10/15/22  0422 10/16/22  0243   SODIUM 138 135 137   POTASSIUM 4.2 4.6 4.2   CHLORIDE 106 105 106   CO2 21 20 23   GLUCOSE 101* 151* 115*   BUN 35* 35* 36*   CREATININE 1.33 1.27 1.20   CALCIUM 9.2 8.6 8.7     Recent Results (from the past 24 hour(s))   Basic Metabolic Panel    Collection Time: 10/16/22  2:43 AM   Result Value Ref Range    Sodium 137 135 - 145 mmol/L    Potassium 4.2 3.6 - 5.5 mmol/L    Chloride 106 96 - 112 mmol/L    Co2 23 20 - 33 mmol/L    Glucose 115 (H) 65 - 99 mg/dL    Bun 36 (H) 8 - 22 mg/dL    Creatinine 1.20 0.50 - 1.40 mg/dL    Calcium 8.7 8.4 - 10.2 mg/dL    Anion Gap 8.0 7.0 - 16.0   ESTIMATED GFR    Collection Time: 10/16/22  2:43 AM   Result Value Ref Range    GFR (CKD-EPI) 44 (A) >60 mL/min/1.73 m 2         ASSESSMENT:  Patient is a 86 y.o. female admitted with right trimalleolar ankle fracture now s/p ORIF.      [facility] Code / Diagnosis to Support: 0008.9 - Orthopaedic Disorders: Other Orthopaedic    Rehabilitation: Impaired ADLs and mobility    Yvonne Marie Wada does apparently  continue to have moderately severe functional impairment with mobility and activities of daily living. This patient is a good candidate for acute inpatient rehabilitation, both reasonable and necessary, including 24 hr Physician supervision and rehab nursing care, evaluation and treatment by physical therapy, occupational therapy. Patient appears to be able to tolerate therapy 3 hours per day 5 days per week or a minimum of 15 hours per week.     Barriers to transfer include: Insurance authorization, TCCs to verify disposition, medical clearance and bed availability     Estimated length of stay is approximately 10-14 days with good rehabilitation potential.   Disposition is to discharge to premorbid independent living setting with the supportive care of  spouse/family and community resources.     Case to be reviewed by rehab admin for final decision on Monday 10/17/22      DVT PPX: Eliquis       Thank you for allowing us to participate in the care of this patient.     Jai Michele, DO   Physical Medicine and Rehabilitation

## 2022-10-17 ENCOUNTER — HOSPITAL ENCOUNTER (INPATIENT)
Facility: REHABILITATION | Age: 87
LOS: 12 days | DRG: 560 | End: 2022-10-29
Attending: PHYSICAL MEDICINE & REHABILITATION | Admitting: PHYSICAL MEDICINE & REHABILITATION
Payer: MEDICARE

## 2022-10-17 VITALS
DIASTOLIC BLOOD PRESSURE: 62 MMHG | HEART RATE: 74 BPM | SYSTOLIC BLOOD PRESSURE: 95 MMHG | RESPIRATION RATE: 16 BRPM | TEMPERATURE: 98.4 F | HEIGHT: 64 IN | WEIGHT: 155.2 LBS | BODY MASS INDEX: 26.5 KG/M2 | OXYGEN SATURATION: 95 %

## 2022-10-17 DIAGNOSIS — I48.0 PAF (PAROXYSMAL ATRIAL FIBRILLATION) (HCC): ICD-10-CM

## 2022-10-17 DIAGNOSIS — I10 ESSENTIAL HYPERTENSION: ICD-10-CM

## 2022-10-17 DIAGNOSIS — S82.201A TIBIA/FIBULA FRACTURE, RIGHT, CLOSED, INITIAL ENCOUNTER: ICD-10-CM

## 2022-10-17 DIAGNOSIS — S82.401A TIBIA/FIBULA FRACTURE, RIGHT, CLOSED, INITIAL ENCOUNTER: ICD-10-CM

## 2022-10-17 PROBLEM — S82.851A CLOSED RIGHT TRIMALLEOLAR FRACTURE, INITIAL ENCOUNTER: Status: ACTIVE | Noted: 2022-10-17

## 2022-10-17 LAB
ANION GAP SERPL CALC-SCNC: 13 MMOL/L (ref 7–16)
BUN SERPL-MCNC: 38 MG/DL (ref 8–22)
CALCIUM SERPL-MCNC: 8.7 MG/DL (ref 8.4–10.2)
CHLORIDE SERPL-SCNC: 107 MMOL/L (ref 96–112)
CO2 SERPL-SCNC: 18 MMOL/L (ref 20–33)
CREAT SERPL-MCNC: 1.36 MG/DL (ref 0.5–1.4)
ERYTHROCYTE [DISTWIDTH] IN BLOOD BY AUTOMATED COUNT: 53.9 FL (ref 35.9–50)
FLUAV RNA SPEC QL NAA+PROBE: NEGATIVE
FLUBV RNA SPEC QL NAA+PROBE: NEGATIVE
GFR SERPLBLD CREATININE-BSD FMLA CKD-EPI: 38 ML/MIN/1.73 M 2
GLUCOSE SERPL-MCNC: 92 MG/DL (ref 65–99)
HCT VFR BLD AUTO: 29.5 % (ref 37–47)
HGB BLD-MCNC: 9 G/DL (ref 12–16)
MCH RBC QN AUTO: 28.8 PG (ref 27–33)
MCHC RBC AUTO-ENTMCNC: 30.5 G/DL (ref 33.6–35)
MCV RBC AUTO: 94.6 FL (ref 81.4–97.8)
PLATELET # BLD AUTO: 191 K/UL (ref 164–446)
PMV BLD AUTO: 10.5 FL (ref 9–12.9)
POTASSIUM SERPL-SCNC: 4.7 MMOL/L (ref 3.6–5.5)
RBC # BLD AUTO: 3.12 M/UL (ref 4.2–5.4)
RSV RNA SPEC QL NAA+PROBE: NEGATIVE
SARS-COV-2 RNA RESP QL NAA+PROBE: NOTDETECTED
SODIUM SERPL-SCNC: 138 MMOL/L (ref 135–145)
SPECIMEN SOURCE: NORMAL
WBC # BLD AUTO: 7.3 K/UL (ref 4.8–10.8)

## 2022-10-17 PROCEDURE — 0241U HCHG SARS-COV-2 COVID-19 NFCT DS RESP RNA 4 TRGT MIC: CPT

## 2022-10-17 PROCEDURE — 700102 HCHG RX REV CODE 250 W/ 637 OVERRIDE(OP): Performed by: INTERNAL MEDICINE

## 2022-10-17 PROCEDURE — 99239 HOSP IP/OBS DSCHRG MGMT >30: CPT | Performed by: INTERNAL MEDICINE

## 2022-10-17 PROCEDURE — 85027 COMPLETE CBC AUTOMATED: CPT

## 2022-10-17 PROCEDURE — 80048 BASIC METABOLIC PNL TOTAL CA: CPT

## 2022-10-17 PROCEDURE — 94760 N-INVAS EAR/PLS OXIMETRY 1: CPT

## 2022-10-17 PROCEDURE — A9270 NON-COVERED ITEM OR SERVICE: HCPCS | Performed by: INTERNAL MEDICINE

## 2022-10-17 PROCEDURE — 99223 1ST HOSP IP/OBS HIGH 75: CPT | Performed by: PHYSICAL MEDICINE & REHABILITATION

## 2022-10-17 PROCEDURE — 36415 COLL VENOUS BLD VENIPUNCTURE: CPT

## 2022-10-17 PROCEDURE — G0378 HOSPITAL OBSERVATION PER HR: HCPCS

## 2022-10-17 PROCEDURE — 770010 HCHG ROOM/CARE - REHAB SEMI PRIVAT*

## 2022-10-17 PROCEDURE — A9270 NON-COVERED ITEM OR SERVICE: HCPCS | Performed by: PHYSICAL MEDICINE & REHABILITATION

## 2022-10-17 PROCEDURE — 700102 HCHG RX REV CODE 250 W/ 637 OVERRIDE(OP): Performed by: PHYSICAL MEDICINE & REHABILITATION

## 2022-10-17 RX ORDER — GABAPENTIN 100 MG/1
200 CAPSULE ORAL 3 TIMES DAILY
Status: DISCONTINUED | OUTPATIENT
Start: 2022-10-17 | End: 2022-10-19

## 2022-10-17 RX ORDER — ALUMINA, MAGNESIA, AND SIMETHICONE 2400; 2400; 240 MG/30ML; MG/30ML; MG/30ML
20 SUSPENSION ORAL
Status: DISCONTINUED | OUTPATIENT
Start: 2022-10-17 | End: 2022-10-29 | Stop reason: HOSPADM

## 2022-10-17 RX ORDER — LEVOTHYROXINE SODIUM 0.07 MG/1
75 TABLET ORAL
Status: CANCELLED | OUTPATIENT
Start: 2022-10-18

## 2022-10-17 RX ORDER — GABAPENTIN 100 MG/1
200 CAPSULE ORAL 3 TIMES DAILY
Status: CANCELLED | OUTPATIENT
Start: 2022-10-17

## 2022-10-17 RX ORDER — AMOXICILLIN 250 MG
2 CAPSULE ORAL 2 TIMES DAILY
Status: DISCONTINUED | OUTPATIENT
Start: 2022-10-17 | End: 2022-10-29 | Stop reason: HOSPADM

## 2022-10-17 RX ORDER — OXYCODONE HYDROCHLORIDE 5 MG/1
5 TABLET ORAL
Status: DISCONTINUED | OUTPATIENT
Start: 2022-10-17 | End: 2022-10-17

## 2022-10-17 RX ORDER — ONDANSETRON 4 MG/1
4 TABLET, ORALLY DISINTEGRATING ORAL 4 TIMES DAILY PRN
Status: DISCONTINUED | OUTPATIENT
Start: 2022-10-17 | End: 2022-10-29 | Stop reason: HOSPADM

## 2022-10-17 RX ORDER — OXYCODONE HYDROCHLORIDE 10 MG/1
10 TABLET ORAL
Status: DISCONTINUED | OUTPATIENT
Start: 2022-10-17 | End: 2022-10-17

## 2022-10-17 RX ORDER — ONDANSETRON 2 MG/ML
4 INJECTION INTRAMUSCULAR; INTRAVENOUS 4 TIMES DAILY PRN
Status: DISCONTINUED | OUTPATIENT
Start: 2022-10-17 | End: 2022-10-29 | Stop reason: HOSPADM

## 2022-10-17 RX ORDER — POLYETHYLENE GLYCOL 3350 17 G/17G
17 POWDER, FOR SOLUTION ORAL DAILY
Refills: 3 | Status: ON HOLD
Start: 2022-10-18 | End: 2022-10-21

## 2022-10-17 RX ORDER — LEVOTHYROXINE SODIUM 0.07 MG/1
75 TABLET ORAL
Status: DISCONTINUED | OUTPATIENT
Start: 2022-10-18 | End: 2022-10-29 | Stop reason: HOSPADM

## 2022-10-17 RX ORDER — OXYCODONE HYDROCHLORIDE 5 MG/1
2.5 TABLET ORAL
Status: DISCONTINUED | OUTPATIENT
Start: 2022-10-17 | End: 2022-10-29 | Stop reason: HOSPADM

## 2022-10-17 RX ORDER — OMEPRAZOLE 20 MG/1
20 CAPSULE, DELAYED RELEASE ORAL DAILY
Status: DISCONTINUED | OUTPATIENT
Start: 2022-10-17 | End: 2022-10-29 | Stop reason: HOSPADM

## 2022-10-17 RX ORDER — METOPROLOL SUCCINATE 25 MG/1
50 TABLET, EXTENDED RELEASE ORAL EVERY MORNING
Status: CANCELLED | OUTPATIENT
Start: 2022-10-18

## 2022-10-17 RX ORDER — BENAZEPRIL HYDROCHLORIDE 10 MG/1
40 TABLET ORAL EVERY MORNING
Status: DISCONTINUED | OUTPATIENT
Start: 2022-10-18 | End: 2022-10-29 | Stop reason: HOSPADM

## 2022-10-17 RX ORDER — TRAZODONE HYDROCHLORIDE 50 MG/1
50 TABLET ORAL
Status: DISCONTINUED | OUTPATIENT
Start: 2022-10-17 | End: 2022-10-17

## 2022-10-17 RX ORDER — ACETAMINOPHEN 325 MG/1
650 TABLET ORAL EVERY 4 HOURS PRN
Status: DISCONTINUED | OUTPATIENT
Start: 2022-10-17 | End: 2022-10-29 | Stop reason: HOSPADM

## 2022-10-17 RX ORDER — POLYETHYLENE GLYCOL 3350 17 G/17G
1 POWDER, FOR SOLUTION ORAL
Status: DISCONTINUED | OUTPATIENT
Start: 2022-10-17 | End: 2022-10-29 | Stop reason: HOSPADM

## 2022-10-17 RX ORDER — HYDRALAZINE HYDROCHLORIDE 25 MG/1
25 TABLET, FILM COATED ORAL EVERY 8 HOURS PRN
Status: DISCONTINUED | OUTPATIENT
Start: 2022-10-17 | End: 2022-10-29 | Stop reason: HOSPADM

## 2022-10-17 RX ORDER — OXYCODONE HYDROCHLORIDE 5 MG/1
5 TABLET ORAL
Status: DISCONTINUED | OUTPATIENT
Start: 2022-10-17 | End: 2022-10-29 | Stop reason: HOSPADM

## 2022-10-17 RX ORDER — METOPROLOL SUCCINATE 25 MG/1
25 TABLET, EXTENDED RELEASE ORAL ONCE
Status: COMPLETED | OUTPATIENT
Start: 2022-10-17 | End: 2022-10-17

## 2022-10-17 RX ORDER — EZETIMIBE 10 MG/1
10 TABLET ORAL DAILY
Status: CANCELLED | OUTPATIENT
Start: 2022-10-18

## 2022-10-17 RX ORDER — METOPROLOL SUCCINATE 25 MG/1
50 TABLET, EXTENDED RELEASE ORAL EVERY MORNING
Status: DISCONTINUED | OUTPATIENT
Start: 2022-10-18 | End: 2022-10-27

## 2022-10-17 RX ORDER — OXYCODONE HYDROCHLORIDE 5 MG/1
2.5-5 TABLET ORAL
Qty: 30 TABLET | Refills: 0 | Status: ON HOLD
Start: 2022-10-17 | End: 2022-10-28

## 2022-10-17 RX ORDER — ECHINACEA PURPUREA EXTRACT 125 MG
2 TABLET ORAL PRN
Status: DISCONTINUED | OUTPATIENT
Start: 2022-10-17 | End: 2022-10-29 | Stop reason: HOSPADM

## 2022-10-17 RX ORDER — AMOXICILLIN 250 MG
1 CAPSULE ORAL DAILY
Qty: 30 TABLET | Refills: 0 | Status: ON HOLD | OUTPATIENT
Start: 2022-10-18 | End: 2022-10-21

## 2022-10-17 RX ORDER — BENAZEPRIL HYDROCHLORIDE 10 MG/1
40 TABLET ORAL EVERY MORNING
Status: CANCELLED | OUTPATIENT
Start: 2022-10-18

## 2022-10-17 RX ORDER — MICONAZOLE NITRATE 20 MG/G
CREAM TOPICAL EVERY 6 HOURS PRN
Status: DISCONTINUED | OUTPATIENT
Start: 2022-10-17 | End: 2022-10-29 | Stop reason: HOSPADM

## 2022-10-17 RX ORDER — EZETIMIBE 10 MG/1
10 TABLET ORAL DAILY
Status: DISCONTINUED | OUTPATIENT
Start: 2022-10-18 | End: 2022-10-29 | Stop reason: HOSPADM

## 2022-10-17 RX ORDER — BISACODYL 10 MG
10 SUPPOSITORY, RECTAL RECTAL
Status: DISCONTINUED | OUTPATIENT
Start: 2022-10-17 | End: 2022-10-29 | Stop reason: HOSPADM

## 2022-10-17 RX ADMIN — Medication 1 TABLET: at 04:36

## 2022-10-17 RX ADMIN — LEVOTHYROXINE SODIUM 75 MCG: 0.07 TABLET ORAL at 04:37

## 2022-10-17 RX ADMIN — EZETIMIBE 10 MG: 10 TABLET ORAL at 04:36

## 2022-10-17 RX ADMIN — GABAPENTIN 200 MG: 100 CAPSULE ORAL at 21:00

## 2022-10-17 RX ADMIN — BENAZEPRIL HYDROCHLORIDE 40 MG: 10 TABLET ORAL at 04:37

## 2022-10-17 RX ADMIN — METOPROLOL SUCCINATE 25 MG: 25 TABLET, EXTENDED RELEASE ORAL at 17:51

## 2022-10-17 RX ADMIN — APIXABAN 5 MG: 5 TABLET, FILM COATED ORAL at 21:00

## 2022-10-17 RX ADMIN — GABAPENTIN 200 MG: 100 CAPSULE ORAL at 15:58

## 2022-10-17 RX ADMIN — APIXABAN 5 MG: 5 TABLET, FILM COATED ORAL at 04:37

## 2022-10-17 RX ADMIN — MAGNESIUM HYDROXIDE 30 ML: 400 SUSPENSION ORAL at 04:37

## 2022-10-17 RX ADMIN — GABAPENTIN 200 MG: 100 CAPSULE ORAL at 08:49

## 2022-10-17 RX ADMIN — SENNOSIDES AND DOCUSATE SODIUM 2 TABLET: 50; 8.6 TABLET ORAL at 15:58

## 2022-10-17 RX ADMIN — SENNOSIDES AND DOCUSATE SODIUM 1 TABLET: 50; 8.6 TABLET ORAL at 04:36

## 2022-10-17 RX ADMIN — SENNOSIDES AND DOCUSATE SODIUM 2 TABLET: 50; 8.6 TABLET ORAL at 21:00

## 2022-10-17 RX ADMIN — OMEPRAZOLE 20 MG: 20 CAPSULE, DELAYED RELEASE ORAL at 04:36

## 2022-10-17 RX ADMIN — OXYCODONE 2.5 MG: 5 TABLET ORAL at 17:59

## 2022-10-17 RX ADMIN — OMEPRAZOLE 20 MG: 20 CAPSULE, DELAYED RELEASE ORAL at 15:58

## 2022-10-17 RX ADMIN — METOPROLOL SUCCINATE 50 MG: 25 TABLET, EXTENDED RELEASE ORAL at 04:37

## 2022-10-17 ASSESSMENT — LIFESTYLE VARIABLES
EVER FELT BAD OR GUILTY ABOUT YOUR DRINKING: NO
EVER_SMOKED: NEVER
HAVE YOU EVER FELT YOU SHOULD CUT DOWN ON YOUR DRINKING: NO
HOW MANY TIMES IN THE PAST YEAR HAVE YOU HAD 5 OR MORE DRINKS IN A DAY: 0
CONSUMPTION TOTAL: NEGATIVE
ALCOHOL_USE: NO
AVERAGE NUMBER OF DAYS PER WEEK YOU HAVE A DRINK CONTAINING ALCOHOL: 0
TOTAL SCORE: 0
HAVE PEOPLE ANNOYED YOU BY CRITICIZING YOUR DRINKING: NO
TOTAL SCORE: 0
ON A TYPICAL DAY WHEN YOU DRINK ALCOHOL HOW MANY DRINKS DO YOU HAVE: 0
EVER HAD A DRINK FIRST THING IN THE MORNING TO STEADY YOUR NERVES TO GET RID OF A HANGOVER: NO
TOTAL SCORE: 0

## 2022-10-17 ASSESSMENT — CHA2DS2 SCORE
PRIOR STROKE OR TIA OR THROMBOEMBOLISM: YES
HYPERTENSION: YES
SEX: FEMALE
AGE 65 TO 74: YES
CHA2DS2 VASC SCORE: 7
VASCULAR DISEASE: NO
DIABETES: NO
CHF OR LEFT VENTRICULAR DYSFUNCTION: NO
AGE 75 OR GREATER: YES

## 2022-10-17 ASSESSMENT — PATIENT HEALTH QUESTIONNAIRE - PHQ9
10. IF YOU CHECKED OFF ANY PROBLEMS, HOW DIFFICULT HAVE THESE PROBLEMS MADE IT FOR YOU TO DO YOUR WORK, TAKE CARE OF THINGS AT HOME, OR GET ALONG WITH OTHER PEOPLE: NOT DIFFICULT AT ALL
1. LITTLE INTEREST OR PLEASURE IN DOING THINGS: NOT AT ALL
1. LITTLE INTEREST OR PLEASURE IN DOING THINGS: NOT AT ALL
SUM OF ALL RESPONSES TO PHQ9 QUESTIONS 1 AND 2: 0
9. THOUGHTS THAT YOU WOULD BE BETTER OFF DEAD, OR OF HURTING YOURSELF: NOT AT ALL
SUM OF ALL RESPONSES TO PHQ9 QUESTIONS 1 & 2: 0
2. FEELING DOWN, DEPRESSED, IRRITABLE, OR HOPELESS: NOT AT ALL
3. TROUBLE FALLING OR STAYING ASLEEP OR SLEEPING TOO MUCH: NOT AT ALL
7. TROUBLE CONCENTRATING ON THINGS, SUCH AS READING THE NEWSPAPER OR WATCHING TELEVISION: NOT AT ALL
SUM OF ALL RESPONSES TO PHQ QUESTIONS 1-9: 2
1. LITTLE INTEREST OR PLEASURE IN DOING THINGS: NOT AT ALL
SUM OF ALL RESPONSES TO PHQ9 QUESTIONS 1 AND 2: 0
2. FEELING DOWN, DEPRESSED OR HOPELESS: NOT AT ALL
4. FEELING TIRED OR HAVING LITTLE ENERGY: SEVERAL DAYS
6. FEELING BAD ABOUT YOURSELF - OR THAT YOU ARE A FAILURE OR HAVE LET YOURSELF OR YOUR FAMILY DOWN: NOT AT ALL
2. FEELING DOWN, DEPRESSED, IRRITABLE, OR HOPELESS: NOT AT ALL
8. MOVING OR SPEAKING SO SLOWLY THAT OTHER PEOPLE COULD HAVE NOTICED. OR THE OPPOSITE, BEING SO FIGETY OR RESTLESS THAT YOU HAVE BEEN MOVING AROUND A LOT MORE THAN USUAL: NOT AT ALL
5. POOR APPETITE OR OVEREATING: SEVERAL DAYS

## 2022-10-17 ASSESSMENT — PAIN DESCRIPTION - PAIN TYPE
TYPE: SURGICAL PAIN

## 2022-10-17 ASSESSMENT — FIBROSIS 4 INDEX: FIB4 SCORE: 2.35

## 2022-10-17 NOTE — PROGRESS NOTES
Attempted to call report. Rehab mentioned they would give this RN a call back.  Call back number provided.

## 2022-10-17 NOTE — PROGRESS NOTES
Patient due to void by 1500.  Verified with Dr. Atkinson voiding trial can continue at [facility].

## 2022-10-17 NOTE — PROGRESS NOTES
Hospital Medicine Daily Progress Note    Date of Service  10/17/2022    Chief Complaint  Fall, right ankle foot injury    Hospital Course  Yvonne Marie Wada is a 86 y.o. 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  X-ray evaluation showed right distal fibular and medial malleolar fracture.  Creatinine 1.06.  Hemoglobin 11.3.  Orthopedics was consulted, who obtained a CT of the ankle which showed comminuted trimalleolar fracture of the right ankle, and recommended surgical intervention (likely Friday).  Eliquis was held.  She had a slight bump in her creatinine to 1.33, for which she was started on gentle IV fluid hydration.  Vitamin D level is normal. She underwent right ankle open reduction internal fixation, uncomplicated perioperative and postoperative course. Ortho recommends TTWB on the right leg. PT/OT recommend SNF placement.    Interval Problem Update  10/17/2022 - I reviewed the patient's chart. There were no significant overnight events. Remains hemodynamically stable and afebrile. Stable on RA.  Hemoglobin stable at 9.0.  No leukocytosis.  Electrolytes and renal function are normal.  BUN 38.  Physiatry consulted and gave the opinion that since she is a good candidate for acute inpatient rehabilitation.    > I have personally seen and examined the patient today.  She is in good spirits today.  She denies any pain.  She had bowel movements.  Passing gas.  No nausea or vomiting.  No fevers or chills.      I have discussed this patient's plan of care and discharge plan at IDT rounds today with Case Management, Nursing, Nursing leadership, and other members of the IDT team.      Consultants/Specialty  Orthopedics    Code Status  Full Code    Disposition  Patient is not medically  cleared for discharge.   Anticipate discharge to to skilled nursing facility.  PT/OT recommends need for skilled therapies and placement.  Has a few accepting facilities.  Physiatry also feels that she is a good candidate for inpatient rehab, awaiting insurance authorization.  I have placed the appropriate orders for post-discharge needs.    Review of Systems  ROS     Pertinent positives/negatives as mentioned above.     A complete review of systems was personally done by me. All other systems were negative.       Physical Exam  Temp:  [36.8 °C (98.2 °F)-37 °C (98.6 °F)] 36.9 °C (98.4 °F)  Pulse:  [] 74  Resp:  [15-16] 16  BP: ()/(51-65) 95/62  SpO2:  [95 %-97 %] 95 %    Physical Exam  Vitals reviewed.   Constitutional:       General: She is not in acute distress.     Appearance: Normal appearance. She is normal weight. She is not ill-appearing or diaphoretic.   HENT:      Head: Normocephalic and atraumatic.      Right Ear: External ear normal.      Left Ear: External ear normal.      Mouth/Throat:      Mouth: Mucous membranes are moist.      Pharynx: No oropharyngeal exudate or posterior oropharyngeal erythema.   Eyes:      General: No scleral icterus.     Extraocular Movements: Extraocular movements intact.      Conjunctiva/sclera: Conjunctivae normal.      Pupils: Pupils are equal, round, and reactive to light.   Cardiovascular:      Rate and Rhythm: Normal rate and regular rhythm.      Heart sounds: Normal heart sounds. No murmur heard.  Pulmonary:      Effort: Pulmonary effort is normal. No respiratory distress.      Breath sounds: Normal breath sounds. No stridor. No wheezing, rhonchi or rales.   Chest:      Chest wall: No tenderness.   Abdominal:      General: Bowel sounds are normal. There is no distension.      Palpations: Abdomen is soft. There is no mass.      Tenderness: There is no abdominal tenderness. There is no guarding or rebound.   Musculoskeletal:         General: No swelling or  deformity. Normal range of motion.      Cervical back: Normal range of motion and neck supple. No rigidity. No muscular tenderness.      Comments: Right leg in cast   Lymphadenopathy:      Cervical: No cervical adenopathy.   Skin:     General: Skin is warm and dry.      Coloration: Skin is not jaundiced.      Findings: No rash.   Neurological:      General: No focal deficit present.      Mental Status: She is alert and oriented to person, place, and time. Mental status is at baseline.      Cranial Nerves: No cranial nerve deficit.   Psychiatric:         Mood and Affect: Mood normal.         Behavior: Behavior normal.         Thought Content: Thought content normal.         Judgment: Judgment normal.     I have performed the physical examination today 10/17/2022.  In review of yesterday's note, there are no new changes except as documented above.      Fluids    Intake/Output Summary (Last 24 hours) at 10/17/2022 1019  Last data filed at 10/17/2022 0900  Gross per 24 hour   Intake 480 ml   Output 1550 ml   Net -1070 ml       Laboratory  Recent Labs     10/15/22  0422 10/17/22  0228   WBC 4.4* 7.3   RBC 3.15* 3.12*   HEMOGLOBIN 9.2* 9.0*   HEMATOCRIT 28.4* 29.5*   MCV 90.2 94.6   MCH 29.2 28.8   MCHC 32.4* 30.5*   RDW 48.9 53.9*   PLATELETCT 190 191   MPV 9.6 10.5     Recent Labs     10/15/22  0422 10/16/22  0243 10/17/22  0228   SODIUM 135 137 138   POTASSIUM 4.6 4.2 4.7   CHLORIDE 105 106 107   CO2 20 23 18*   GLUCOSE 151* 115* 92   BUN 35* 36* 38*   CREATININE 1.27 1.20 1.36   CALCIUM 8.6 8.7 8.7                   Imaging  DX-ANKLE 3+ VIEWS RIGHT   Final Result      Fluoroscopic image(s) obtained during right ankle instrumentation. Please see the patient's chart for full procedural details.      Fluoroscopy time 2 minutes, 11 seconds.         DX-PORTABLE FLUORO > 1 HOUR   Final Result      Portable fluoroscopy utilized for 2 minutes 12 seconds.         INTERPRETING LOCATION: [address] [place] [address], [facility]       CT-ANKLE W/O PLUS RECONS RIGHT   Final Result      Comminuted trimalleolar fracture of the right ankle.      DX-ANKLE 2- VIEWS RIGHT   Final Result      No change in distal fibular and medial malleolar fracture with presence of cast or splint      DX-TIBIA AND FIBULA RIGHT   Final Result      1.  Right distal fibular fracture at the metadiaphysis      2.  Probable medial malleolar fracture      DX-ANKLE 3+ VIEWS RIGHT   Final Result      Distal fibular and medial malleolar fracture           Assessment/Plan  * Tibia/fibula fracture, right, closed, initial encounter- (present on admission)  Assessment & Plan  - She twisted her ankle, and was unable to bear weight.  Radiographs showing trimalleolar fracture of the right ankle.  -s/p open reduction internal fixation 10/14.  -Continue calcium + vit D.  Vitamin D level is normal.  Will need outpatient bone scan to evaluate for osteoporosis and need for bisphosphonates.   - continue pain control with oxycodone and IV Dilaudid.  - Patient will have low-risk below the knee procedure, with no prior history of VTE.  She is touch toe weightbearing on the right leg.  Will need on-going DVT prophylaxis on discharge.  Continue home Eliquis for atrial fibrillation, which should be adequate for postoperative DVT prophylaxis.  -PT/OT recommending skilled therapies.  SNF referrals being pursued.  She is also candidate for inpatient rehab placement, and she prefers to go there.  Awaiting paperwork to go through.    Anemia- (present on admission)  Assessment & Plan  -chronic, no signs of bleeding   -Ferritin normal, iron low consistent with anemia of chronic disease.    -Hemoglobin stable.    Constipation- (present on admission)  Assessment & Plan  - Improved.  Continue daily MiraLAX, and Tisha-Colace, along with rest of bowel regimen.    Senile osteoporosis- (present on admission)  Assessment & Plan  -currently on prolia per PCP     Paroxysmal atrial fibrillation (HCC)- (present on  admission)  Assessment & Plan  - rate controlled.   - continue metoprolol.   -Continue Eliquis.      CKD (chronic kidney disease) stage 3, GFR 30-59 ml/min ([facility])- (present on admission)  Assessment & Plan  -Renal function improved back to baseline.  Stable off IV fluids.  - avoid nephrotoxins, and continue to renally dose all medications.    GERD (gastroesophageal reflux disease)- (present on admission)  Assessment & Plan  -Continue omeprazole    Hypothyroid- (present on admission)  Assessment & Plan  -Continue home levothyroxine    Hypertension- (present on admission)  Assessment & Plan  -Maintaining good control.  -Continue home metoprolol, and benazepril.  Monitor blood pressure trend closely.       VTE prophylaxis: SCDs/TEDs

## 2022-10-17 NOTE — DISCHARGE SUMMARY
Discharge Summary    CHIEF COMPLAINT ON ADMISSION  Chief Complaint   Patient presents with    Ankle Injury     Fell in the night sustained a right ankle foot injury, very swollen and bruised. Unable to bear weight.       Reason for Admission  Fall, Ankle Injury    Admission Date  10/12/2022     CODE STATUS  Full Code    HPI & HOSPITAL COURSE  Yvonne Marie Wada is a 86 y.o. 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  X-ray evaluation showed right distal fibular and medial malleolar fracture.  Creatinine 1.06.  Hemoglobin 11.3.  Orthopedics was consulted, who obtained a CT of the ankle which showed comminuted trimalleolar fracture of the right ankle. She had a slight bump in her creatinine to 1.33, for which she was started on gentle IV fluid hydration.  Vitamin D level was normal. She underwent right ankle open reduction internal fixation, uncomplicated perioperative and postoperative course. Ortho recommends TTWB on the right leg. Her home eliquis was resumed postoperatively.     She remained clinically stable. She had issues with constipation which improved with bowel regimen. Her pain remained well controlled with oral analgesics. Hemoglobin remained stable. She had no signs of infection. Electrolytes and renal function are normal.  BUN 38. PT/OT recommend continued skilled therapies. [facility]     I have personally seen and examined the patient on the day of discharge. With her clinical improvement, she was deemed ready to discharge from the acute hospital as she did not have any further hospitalization needs. Patient felt comfortable going to acute rehab. The discharge plan was discussed with the patient, with which she was  agreeable to.     Therefore, she is discharged in good and stable condition to an inpatient rehabilitation hospital.      FOLLOW UP ITEMS POST DISCHARGE  * Tibia/fibula fracture, right, closed, initial encounter- (present on admission)  Assessment & Plan  - She twisted her ankle, and was unable to bear weight.  Radiographs showing trimalleolar fracture of the right ankle.  -s/p open reduction internal fixation 10/14.  -Continue calcium + vit D.  Vitamin D level is normal.  Will need outpatient bone scan to evaluate for osteoporosis and need for bisphosphonates.   - continue analgesics per rehab hospital.   - Patient will have low-risk below the knee procedure, with no prior history of VTE.  She is touch toe weightbearing on the right leg.  Will need on-going DVT prophylaxis on discharge.  Continue home Eliquis for atrial fibrillation, which should be adequate for postoperative DVT prophylaxis.  -PT/OT at the acute rehab hospital.     Anemia- (present on admission)  Assessment & Plan  -chronic, no signs of bleeding   -Ferritin normal, iron low consistent with anemia of chronic disease.    -Hemoglobin stable. Monitor.     Constipation- (present on admission)  Assessment & Plan  - Improved.  Continue daily MiraLAX, and Tisha-Colace, along with bowel regimen per rehab hospital.    Senile osteoporosis- (present on admission)  Assessment & Plan  -currently on prolia per PCP     Paroxysmal atrial fibrillation (HCC)- (present on admission)  Assessment & Plan  - rate controlled.   - continue metoprolol.   -Continue Eliquis.      CKD (chronic kidney disease) stage 3, GFR 30-59 ml/min ([facility])- (present on admission)  Assessment & Plan  -Renal function improved back to baseline.    - avoid nephrotoxins, and continue to renally dose all medications.    GERD (gastroesophageal reflux disease)- (present on admission)  Assessment & Plan  -Continue omeprazole    Hypothyroid- (present on admission)  Assessment & Plan  -Continue home  levothyroxine    Hypertension- (present on admission)  Assessment & Plan  -Maintaining good control.  -Continue home metoprolol, and benazepril.  Monitor blood pressure trend closely.        DISCHARGE DIAGNOSES  Principal Problem:    Tibia/fibula fracture, right, closed, initial encounter POA: Yes  Active Problems:    Constipation POA: Yes    Anemia POA: Yes        Hypertension (Chronic) POA: Yes        Hypothyroid (Chronic) POA: Yes        GERD (gastroesophageal reflux disease) (Chronic) POA: Yes    CKD (chronic kidney disease) stage 3, GFR 30-59 ml/min ([facility]) POA: Yes    Paroxysmal atrial fibrillation (HCC) POA: Yes        Senile osteoporosis POA: Yes  Resolved Problems:    * No resolved hospital problems. *      FOLLOW UP  Future Appointments   Date Time Provider Department Center   11/18/2022  8:45 AM [facility] None     No follow-up provider specified.    MEDICATIONS ON DISCHARGE     Medication List        START taking these medications        Instructions   calcium/vitamin D 250-125 MG-UNIT Tabs tablet  Start taking on: October 18, 2022   Take 1 Tablet by mouth every day.  Dose: 1 Tablet     oxyCODONE immediate-release 5 MG Tabs  Commonly known as: ROXICODONE   Take 0.5-1 Tablets by mouth every 3 hours as needed for Severe Pain for up to 3 days.  Dose: 2.5-5 mg     polyethylene glycol/lytes 17 g Pack  Start taking on: October 18, 2022  Commonly known as: MIRALAX   Take 1 Packet by mouth every day.  Dose: 17 g     senna-docusate 8.6-50 MG Tabs  Start taking on: October 18, 2022  Commonly known as: PERICOLACE or SENOKOT S   Take 1 Tablet by mouth every day.  Dose: 1 Tablet            CHANGE how you take these medications        Instructions   Eliquis 5mg Tabs  What changed: how much to take  Generic drug: apixaban   TAKE 1 TABLET BY MOUTH TWICE A DAY            CONTINUE taking these medications        Instructions   benazepril 40 MG tablet  Commonly known as: LOTENSIN   Take 1 Tablet by mouth  every morning.  Dose: 40 mg     CENTRUM SILVER ULTRA WOMENS PO   Take 1 tablet by mouth every evening.  Dose: 1 Tablet     ezetimibe 10 MG Tabs  Commonly known as: ZETIA   TAKE 1 TABLET BY MOUTH EVERY DAY  Dose: 10 mg     gabapentin 100 MG Caps  Commonly known as: NEURONTIN   Take 200 mg by mouth 3 times a day.  Dose: 200 mg     levothyroxine 75 MCG Tabs  Commonly known as: SYNTHROID   Take 1 Tablet by mouth every morning on an empty stomach.  Dose: 75 mcg     MAGNESIUM PO   Take 1 capsule by mouth every evening.  Dose: 1 Capsule     metoprolol SR 50 MG Tb24  Commonly known as: TOPROL XL   Take 1 Tablet by mouth every morning.  Dose: 50 mg     omeprazole 20 MG delayed-release capsule  Commonly known as: PRILOSEC   Take 20 mg by mouth every morning.  Dose: 20 mg     TYLENOL PO   Take 3 Tablets by mouth 2 times a day as needed (For pian). Pt is not sure the strength  Dose: 3 Tablet              Allergies  Allergies   Allergen Reactions    Amoxicillin Vomiting     Upset stomach, ok if needed for life saving treatment (per pt)    Codeine Nausea     Synthetic codones ok    Doxycycline Nausea     Nausea, Ok in life saving situation (per pt)    Sulfamethoxazole-Trimethoprim Vomiting and Nausea     Ok in a life saving situation (per pt)    Tramadol Vomiting and Nausea     nausea    Trazodone Vomiting     groggy       DIET  Orders Placed This Encounter   Procedures    Diet Order Diet: Cardiac; Second Modifier: (optional): Renal     Standing Status:   Standing     Number of Occurrences:   1     Order Specific Question:   Diet:     Answer:   Cardiac [6]     Order Specific Question:   Second Modifier: (optional)     Answer:   Renal [8]       ACTIVITY  As tolerated and directed by rehab.  Toe touch RIGHT leg    LINES, DRAINS, AND WOUNDS  This is an automated list. Peripheral IVs will be removed prior to discharge.  Peripheral IV 10/12/22 22 G Left Antecubital (Active)   Site Assessment Clean;Dry;Intact 10/17/22 0900   Dressing  Type Transparent 10/17/22 0900   Line Status Scrubbed the hub prior to access;Flushed;Saline locked 10/17/22 0900   Dressing Status Clean;Dry;Intact 10/17/22 0900   Dressing Intervention N/A 10/17/22 0900   Dressing Change Due 10/15/22 10/16/22 0100   Date Primary Tubing Changed 10/14/22 10/14/22 1400   NEXT Primary Tubing Change  10/21/22 10/14/22 1400   Infiltration Grading ([facility], [facility]) 0 10/17/22 0900   Phlebitis Scale ([facility] Only) 0 10/17/22 0900       Wound 10/14/22 Incision Ankle Right xeroform, 4x4s, soft roll, splint, ace wrap (Active)   Site Assessment PARAG 10/17/22 0900   Periwound Assessment PARAG 10/17/22 0900   Margins PARAG 10/17/22 0900   Closure PARAG 10/17/22 0900   Drainage Amount None 10/17/22 0900   Dressing Options Dry Roll Gauze 10/17/22 0900   Dressing Changed Observed 10/17/22 0900   Dressing Status Clean;Dry;Intact 10/16/22 1915       Peripheral IV 10/12/22 22 G Left Antecubital (Active)   Site Assessment Clean;Dry;Intact 10/17/22 0900   Dressing Type Transparent 10/17/22 0900   Line Status Scrubbed the hub prior to access;Flushed;Saline locked 10/17/22 0900   Dressing Status Clean;Dry;Intact 10/17/22 0900   Dressing Intervention N/A 10/17/22 0900   Dressing Change Due 10/15/22 10/16/22 0100   Date Primary Tubing Changed 10/14/22 10/14/22 1400   NEXT Primary Tubing Change  10/21/22 10/14/22 1400   Infiltration Grading ([facility], [facility]) 0 10/17/22 0900   Phlebitis Scale ([facility] Only) 0 10/17/22 0900               MENTAL STATUS ON TRANSFER             CONSULTATIONS  Orthopedics    PROCEDURES  As above    LABORATORY  Lab Results   Component Value Date    SODIUM 138 10/17/2022    POTASSIUM 4.7 10/17/2022    CHLORIDE 107 10/17/2022    CO2 18 (L) 10/17/2022    GLUCOSE 92 10/17/2022    BUN 38 (H) 10/17/2022    CREATININE 1.36 10/17/2022    CREATININE 1.05 (H) 02/07/2013        Lab Results   Component Value Date    WBC 7.3 10/17/2022    WBC 3.9 (L) 04/16/2011    HEMOGLOBIN 9.0 (L) 10/17/2022    HEMATOCRIT  29.5 (L) 10/17/2022    PLATELETCT 191 10/17/2022        Total time of the discharge process = 40 minutes.

## 2022-10-17 NOTE — DISCHARGE PLANNING
Case Management Discharge Planning    Admission Date: 10/12/2022  GMLOS:    ALOS: 0    6-Clicks ADL Score: 17  6-Clicks Mobility Score: 12  PT and/or OT Eval ordered: Yes  Post-acute Referrals Ordered: Yes  Post-acute Choice Obtained: Yes  Has referral(s) been sent to post-acute provider:  Yes      Anticipated Discharge Dispo: Discharge Disposition: Disch to  rehab facility or distinct part unit (62)    DME Needed: No    Action(s) Taken: Per David from [facility], pt accepted. Pt's spouse Ren would like to discuss transfer to [facility] with pt at bedside. David to f/u with pt and Ren regarding choice and update RNCM.    Spoke to pt and spouse Ren at bedside. Pt and spouse have decided to DC to [facility]. RNCM notified Jayashree Hernandez working on 1300 transport. Pending transport confirmation.    Received confirmation for transport to [facility] today 10/17 at 1300 via [facility].    COBRA delivered to Aaliyah ELMORE. Aaliyah to give COBRA to Pierre [facility].    Escalations Completed: None    Medically Clear: Yes    Next Steps: f/u with David regarding transport time    Barriers to Discharge: none

## 2022-10-17 NOTE — DISCHARGE PLANNING
Agency/Facility Name: [facility]   Spoke To: Keila   Outcome: DPA informed that SNF can accept Pt. SNF asked DPA if Pt is MC and if they can go by WC for SNF's transport services. DPA to update SNF.     CM team notified.     1105-  Agency/Facility Name: [facility]  Outcome: DPA received v-mail from Pierre at 1052, SNF says they can take Pt today. DPA to discuss with CM team and update SNF as needed.     CM team notified.     1156-  Agency/Facility Name: [facility]  Spoke To: Keila   Outcome: DPA informed SNF that Pt has plan to go to [facility], per RN CM.     1157-  Agency/Facility Name: [facility]  Spoke To: Pierre  Outcome: DPA informed SNF that Pt has plan to go to [facility], per RN CM.

## 2022-10-17 NOTE — DISCHARGE PLANNING
Following for post acute services Dr. Michele consult inddicates a candidate for IRF level of care. Need to verify return to community support. Per CM medically cleared for transfer. Will follow.

## 2022-10-17 NOTE — PROGRESS NOTES
- 4214 Pt arrived at [facility] from [facility] via wheelchair. Dr. Sutton to follow for diagnosis of right Tibia/fibula fx. Initial assessment initiated. Pt oriented to room and facility routine and safety measures; pt education binder provided and discussed. Pt A/O x 4, continent of bowel and has not urinated since davenport removal at [facility]. Moderate assist for transfers. All wounds photographed and documented; photos uploaded to . Pt complains of pain to right foot. Pt positioned for comfort in bed. Call light within reach, safety measures in place. Will continue to monitor.

## 2022-10-17 NOTE — PROGRESS NOTES
Received report from RAMÍREZ Martins.  Assumed patient care.  Patient is sleeping, no signs of distress. Bed alarm on, call light within reach, bed in low and locked position. Hourly rounding in place.

## 2022-10-17 NOTE — DISCHARGE PLANNING
Confirmed 1300  with GMT to [facility] CM notified Spouse notified.

## 2022-10-17 NOTE — PROGRESS NOTES
4 Eyes Skin Assessment Completed by RAMÍREZ Spence and RAMÍREZ Glasgow.    Head WDL  Ears WDL  Nose WDL  Mouth WDL  Neck WDL  Breast/Chest Redness  Shoulder Blades WDL  Spine WDL  (R) Arm/Elbow/Hand WDL  (L) Arm/Elbow/Hand WDL  Abdomen WDL  Groin WDL  Scrotum/Coccyx/Buttocks Redness  (R) Leg Bruising, Swelling, and Incision, scar  (L) Leg Scar  (R) Heel/Foot/Toe Bruising  (L) Heel/Foot/Toe WDL          Devices In Places none       Interventions In Place Pressure Redistribution Mattress, mepilex     Possible Skin Injury No    Pictures Uploaded Into Epic Yes  Wound Consult Placed N/A  RN Wound Prevention Protocol Ordered No

## 2022-10-17 NOTE — H&P
REHABILITATION HISTORY AND PHYSICAL/POST ADMISSION PHYSICAL EVALUATION    Date of Admission: 10/17/2022  Yvonne Marie Wada    [facility] Code / Diagnosis to Support: 0008.9 - Orthopaedic Disorders: Other Orthopaedic  Etiologic Diagnosis: Tibia/fibula fracture, right, closed, initial encounter    CC: Decreased mobility, ankle pain    HPI:  Adapted from Dr. Michele's PM&R consult:  The patient is a 86 y.o. female with a past medical history of anemia, tissue aortic valve replacement on eliquis, osteoperosis, HTN;  who presented on 10/12/2022  9:22 AM with right ankle injury sustained while getting out of bed. Patient was found to have comminuted trimalleolar fracture of the right ankle, and taken to the OR on 10/14/22 with Dr. Ryan Huertas for ORIF repair. Post op course has been uncomplicated. She does have some anemia, acute on chronic kidney disease, HTN and baseline a. Fib. Functionally she is taking a few steps with PT and requiring max-total assist with ADLs.  She has support from her spouse and an acceptable post discharge living situation.    Patient tolerated transfer to [facility]. She reports the gabapentin and Tylenol have seemed to work well for pain control. She reports her right foot feels a little numb, maybe 50% compared to the left. She reports decreased ambulation due to pain. Denies NVD. Denies SOB. Denies weakness although in cast so cannot move ankle.      REVIEW OF SYSTEMS:     Comprehensive 14 point ROS was reviewed and all were negative except as noted elsewhere in this document.     PMH:  Past Medical History:   Diagnosis Date    AAA (abdominal aortic aneurysm) 7/26/2010    10/09 CT thorax dilated ascending thoracic aorta 4.9 cm 1/10 transesophageal echo dilated aortic root, and ascending aorta with mild aortic insufficiency 1/10  ascending aortic aneurysm repair 5/12 echo [facility] normal LV function EF 60%, moderate to severe left atrial enlargement, RVSP 32     Aortic insufficiency     Aortic valve  "disease     Aortic valve regurgitation     Arthritis     back and knee/ osteo    Cataract     Dental disorder     full top denture, partial bottom    Diverticulosis     Dyslipidemia 7/26/2010    Sees [facility] on lipitor 4/11 chol 159,trig 84,hdl 47,ldl 95,cpk 114 7/12 chol 163,trig 120,hdl 49,ldl 90, crp 1.1 on lipitor 20 mg     GERD (gastroesophageal reflux disease) 7/12/2012 6/07 EGD per [facility] hiatal hernia, start prevacid 30 mg 7/12 protonix 20 mg qday     Glaucoma     Heart burn     Heart murmur     Heart valve disease     \"leaking\", mitral valve    Hiatus hernia syndrome     High cholesterol     History of shingles 6/30/2011 2010 Back and left thorax      History of total knee arthroplasty 7/26/2010    4/10 MRI right knee complex tear medial meniscus, horizontal cleavage tear lateral meniscus, chondromalacia patella, moderate-sized Baker's cyst 5/10 right meniscus knee repair  5/10 told by  had gout on surgery had pathology report, I await that report Question gout seen on pathology report done during repair of right meniscus knee surgery. 7/10 uric acid 6.3, we'll await starting allopurinol depending on the operative report 8/5/10 reviewed ortho notes , op report indicates crystal deposition, could be gout or pseudogout. No bx result sent over. Will need to get. My suspicion is chondrocalcinosis. 10/11  ortho note, MRI pending 8/7/10 reviewed pathology report right knee tissue, marked degenerative changes with focal calcification, no mention of gout. Based on this and a normal uric acid level, I do not believe she has gout, has no hyperuricemia medications would be indicated 10/11  ortho left knee meniscectomy and arthroscopy 1/12      HLD (hyperlipidemia)     Hypertension     Hypothyroid 4/8/2011 4/11 tsh 9 start synthroid 50 4/11 tsh 6,free t3 3.1,free t4 1.2,tpo negative 7/1/11 tsh 2.1 cont synthroid 50 mcg 7/12 tsh 3.4 cont synthroid 50 " mcg     Indigestion     Mitral insufficiency     OSTEOPOROSIS 8/9/2010    Had been on fosamax [id number]  8/10 dexa LS -2.0,hip -1.5 await old dexa result, she will get copy for me 4/11 vit d 35 9/12 dexa LS -3.2,hip -1.4 2/13/13 relast infusion     Pain     back and right leg, can get as bad as 10/10    Preventative health care 7/26/2010    2002 pneum 2005 colon [facility] no records 4/11 vit d 35 9/11 shingles vaccine 9/12 mammogram 9/12 dexa LS -3.2,hip -1.4     Rheumatic fever     S/P appendectomy 7/26/2010    S/P TOMY (total abdominal hysterectomy) 7/26/2010    Sees gyn on HRT estradiol for 20 yrs () 11/08 mammo      Shingles 6/30/2011    Snoring     Status post lumbar surgery 7/26/2010 6/03 L4-5 epidural Dr. Jordan  7/06 overall for decompression, foraminotomy. L4-L5 posterior fusion, L4-L5 allograft       Stroke ([facility]) 1996    no residual problems     Thoracic aortic aneurysm without mention of rupture     Tricuspid insufficiency     Unspecified disorder of thyroid     hypo    Urinary bladder disorder     suprapubic catheter insertion 11/4    UTI (urinary tract infection) 11/12/2016 7/6/16 UTI klebsiella pneumoniae sensitive to all antibiotics except ampicillin, start bactrim x 5 days 1/18/19 UTI enterobacter cloacae resistant to ampicillin, cephalothin, penicillin, bactrim, sensitive to cefuroxime, ciprofloxacin and levaquin, nitrofurantoin 5/26/19 UTI klebsiella given omnicef, organism resistant ampicillin, ciprofloxacin, levofloxacin, bactrim 8/18/21  urology note        PSH:  Past Surgical History:   Procedure Laterality Date    ORIF, ANKLE Right 10/14/2022    Procedure: RIGHT ANKLE OPEN REDUCTION INTERNAL FIXATION;  Surgeon: Ryan Huertas M.D.;  Location: SURGERY [facility];  Service: Orthopedics    TENDON REPAIR Right 11/10/2016    Procedure: TENDON REPAIR - QUADRACEPS ;  Surgeon: Maxim Herrera M.D.;  Location: SURGERY [facility];  Service:     KNEE ARTHROPLASTY TOTAL  Right 9/8/2016    Procedure: KNEE ARTHROPLASTY TOTAL;  Surgeon: Maxim Herrera M.D.;  Location: SURGERY [facility];  Service:     FUSION, SPINE, LUMBAR, PLIF  11/24/2015    Procedure: LUMBAR FUSION POSTERIOR L3-4, EXPLORATION OF FUSION L4-5 WITH INSTRUMENTATION;  Surgeon: Hermann Reis M.D.;  Location: SURGERY [facility];  Service:     LUMBAR LAMINECTOMY DISKECTOMY  11/24/2015    Procedure: LUMBAR L3-4 LAMINECTOMY AND REDO L4-5;  Surgeon: Hermann Reis M.D.;  Location: SURGERY [facility];  Service:     KNEE ARTHROPLASTY TOTAL  1/3/2012    Performed by YOSEF MEJÍA at [facility]    KNEE ARTHROSCOPY  10/18/2011    Performed by YOSEF MEJÍA at [facility]    MENISCECTOMY, KNEE, MEDIAL  10/18/2011    Performed by YOSEF MEJÍA at [facility]    AORTIC VALVE REPLACEMENT  1/12/2010    Performed by ISAÍAS NICOLE at SURGERY [facility]    APPENDECTOMY      FUSION, SPINE, LUMBAR, PLIF      HYSTERECTOMY, TOTAL ABDOMINAL         FAMILY HISTORY:  Family History   Problem Relation Age of Onset    Stroke Mother     Heart Disease Father     Heart Disease Sister        MEDICATIONS:  Current Facility-Administered Medications   Medication Dose    Respiratory Therapy Consult      Pharmacy Consult Request ...Pain Management Review 1 Each  1 Each    hydrALAZINE (APRESOLINE) tablet 25 mg  25 mg    acetaminophen (Tylenol) tablet 650 mg  650 mg    senna-docusate (PERICOLACE or SENOKOT S) 8.6-50 MG per tablet 2 Tablet  2 Tablet    And    polyethylene glycol/lytes (MIRALAX) PACKET 1 Packet  1 Packet    And    magnesium hydroxide (MILK OF MAGNESIA) suspension 30 mL  30 mL    And    bisacodyl (DULCOLAX) suppository 10 mg  10 mg    omeprazole (PRILOSEC) capsule 20 mg  20 mg    mag hydrox-al hydrox-simeth (MAALOX PLUS ES or MYLANTA DS) suspension 20 mL  20 mL    ondansetron (ZOFRAN ODT) dispertab 4 mg  4 mg    Or    ondansetron (ZOFRAN) syringe/vial injection 4 mg  4 mg     sodium chloride (OCEAN) 0.65 % nasal spray 2 Spray  2 Spray    apixaban (ELIQUIS) tablet 5 mg  5 mg    [START ON 10/18/2022] benazepril (LOTENSIN) tablet 40 mg  40 mg    [START ON 10/18/2022] calcium/vitamin D 250-125 MG-UNIT tablet 1 Tablet  1 Tablet    [START ON 10/18/2022] ezetimibe (ZETIA) tablet 10 mg  10 mg    gabapentin (NEURONTIN) capsule 200 mg  200 mg    [START ON 10/18/2022] levothyroxine (SYNTHROID) tablet 75 mcg  75 mcg    [START ON 10/18/2022] metoprolol SR (TOPROL XL) tablet 50 mg  50 mg    oxyCODONE immediate-release (ROXICODONE) tablet 2.5 mg  2.5 mg    oxyCODONE immediate-release (ROXICODONE) tablet 5 mg  5 mg       ALLERGIES:  Amoxicillin, Codeine, Doxycycline, Sulfamethoxazole-trimethoprim, Tramadol, and Trazodone    PSYCHOSOCIAL HISTORY:  Housing / Facility: 1 Story House  Steps Into Home: 0  Lives with - Patient's Self Care Capacity: Spouse  Equipment Owned: Ramp, Front-Wheel Walker, 4-Wheel Walker, Tub / Shower Seat, Hand Held Shower, Wheelchair     Prior Level of Function / Living Situation:   Physical Therapy: Prior Services: None  Housing / Facility: 1 Story House  Steps Into Home: 0  Equipment Owned: Ramp, Front-Wheel Walker, 4-Wheel Walker, Tub / Shower Seat, Hand Held Shower, Wheelchair  Lives with - Patient's Self Care Capacity: Spouse  Bed Mobility: Independent  Transfer Status: Independent  Ambulation: Independent  Distance Ambulation (Feet):  (household)  Assistive Devices Used: Front-Wheel Walker  Current Level of Function:   Gait Level Of Assist: Moderate Assist  Assistive Device: Front Wheel Walker  Distance (Feet): 2  Weight Bearing Status: TTWB R  Supine to Sit: Minimal Assist  Sit to Stand: Moderate Assist  Bed, Chair, Wheelchair Transfer: Moderate Assist  Transfer Method: Stand Step  Sitting in Chair: >1hr  Sitting Edge of Bed: >10  Standing: 2x2  Occupational Therapy:   Self Feeding: Independent  Grooming / Hygiene: Independent  Bathing: Independent  Dressing:  Independent  Toileting: Independent  Medication Management: Requires Assist  Laundry: Requires Assist  Kitchen Mobility: Independent  Finances: Requires Assist  Home Management: Requires Assist  Shopping: Requires Assist  Prior Level Of Mobility: Independent With Device in Home  Driving / Transportation: Relatives / Others Provide Transportation  Prior Services: None  Housing / Facility: 1 Story House  Occupation (Pre-Hospital Vocational): Retired Due To Age  Leisure Interests: Television  Current Level of Function:   Eating: Supervision  Upper Body Dressing: Minimal Assist  Lower Body Dressing: Maximal Assist  Toileting: Total Assist    CURRENT LEVEL OF FUNCTION:   Same as level of function prior to admission to [facility]    PHYSICAL EXAM:     VITAL SIGNS:   weight is 68.5 kg (151 lb). Her oral temperature is 37.2 °C (98.9 °F). Her blood pressure is 128/67 and her pulse is 145 (abnormal). Her respiration is 18 and oxygen saturation is 95%.     GENERAL: No apparent distress  HEENT: Normocephalic/atraumatic, EOMI, and PERRL  CARDIAC: Regular rate and rhythm, normal S1, S2   LUNGS: Clear to auscultation   ABDOMINAL: bowel sounds present, soft, and nontender    EXTREMITIES: no contractures, spasticity, or edema.  No calf tenderness bilaterally  NEURO:  Mental status:  A&Ox4 (person, place, date, situation) answers questions appropriately  Speech: fluent, no aphasia or dysarthria  Motor:  5/5 BUE  4/5 R HF/KE otherwise right ankle in cast, wiggling toes  4/5 LLE  Sensory: intact to light touch through out  DTRs: 2+ in bilateral patellar tendons      RADIOLOGY:  XR R ankle 10/12/22  IMPRESSION:     Distal fibular and medial malleolar fracture    LABS:  Recent Labs     10/15/22  0422 10/16/22  0243 10/17/22  0228   SODIUM 135 137 138   POTASSIUM 4.6 4.2 4.7   CHLORIDE 105 106 107   CO2 20 23 18*   GLUCOSE 151* 115* 92   BUN 35* 36* 38*   CREATININE 1.27 1.20 1.36   CALCIUM 8.6 8.7 8.7     Recent Labs      10/15/22  0422 10/17/22  0228   WBC 4.4* 7.3   RBC 3.15* 3.12*   HEMOGLOBIN 9.2* 9.0*   HEMATOCRIT 28.4* 29.5*   MCV 90.2 94.6   MCH 29.2 28.8   MCHC 32.4* 30.5*   RDW 48.9 53.9*   PLATELETCT 190 191   MPV 9.6 10.5             PRIMARY REHAB DIAGNOSIS:    This patient is a 86 y.o. female admitted for acute inpatient rehabilitation with   Tibia/fibula fracture, right, closed, initial encounter.    IMPAIRMENTS:   ADLs/IADLs  Mobility    SECONDARY DIAGNOSIS/MEDICAL CO-MORBIDITIES AFFECTING FUNCTION:  HTN/ A Fib  Iron deficiency Anemia  Azotemia  CKD3  Hypothyroidism    RELEVANT CHANGES SINCE PREADMISSION EVALUATION:    Status unchanged    The patient's rehabilitation potential is Good  The patient's medical prognosis is good    PLAN:   Discussion and Recommendations:   1. The patient requires an acute inpatient rehabilitation program with a coordinated program of care at an intensity and frequency not available at a lower level of care. This recommendation is substantiated by the patient's medical physicians who recommend that the patient's intervention and assessment of medical issues needs to be done at an acute level of care for patient's safety and maximum outcome.   2. A coordinated program of care will be supplied by an interdisciplinary team of physical therapy, occupational therapy, rehab physician, rehab nursing, and, if needed, speech therapy and rehab psychology. Rehab team presents a patient-specific rehabilitation and education program concentrating on prevention of future problems related to accessibility, mobility, skin, bowel, bladder, sexuality, and psychosocial and medical/surgical problems.   3. Need for Rehabilitation Physician: The rehab physician will be evaluating the patient on a multi-weekly basis to help coordinate the program of care. The rehab physician communicates between medical physicians, therapists, and nurses to maximize the patient's potential outcome. Specific areas in which the  rehab physician will be providing daily assessment include the following:   A. Assessing the patient's heart rate and blood pressure response (vitals monitoring) to activity and making adjustments in medications or conservative measures as needed.   B. The rehab physician will be assessing the frequency at which the program can be increased to allow the patient to reach optimal functional outcome.   C. The rehab physician will also provide assessments in daily skin care, especially in light of patient's impairments in mobility.   D. The rehab physician will provide special expertise in understanding how to work with functional impairment and recommend appropriate interventions, compensatory techniques, and education that will facilitate the patient's outcome.   4. Rehab R.N.   The rehab RN will be working with patient to carry over in room mobility and activities of daily living when the patient is not in 3 hours of skilled therapy. Rehab nursing will be working in conjunction with rehab physician to address all the medical issues above and continue to assess laboratory work and discuss abnormalities with the treating physicians, assess vitals, and response to activity, and discuss and report abnormalities with the rehab physician. Rehab RN will also continue daily skin care, supervise bladder/bowel program, instruct in medication administration, and ensure patient safety.   5. Rehab Therapy: Therapies to treat at intensity and frequency of (may change after completion of evaluation by all therapeutic disciplines):       PT:  Physical therapy to address mobility, transfer, gait training and evaluation for adaptive equipment needs 1 and 1/2 hour/day at least 5 days/week for the duration of the ELOS (see below)       OT:  Occupational therapy to address ADLs, self-care, home management training, functional mobility/transfers and assistive device evaluation, and community re-integration 1 and 1/2 hour/day at least 5  days/week for the duration of the ELOS (see below).     Medical management / Rehabilitation Issues/ Adverse Potential as part of rehabilitation plan     REHABILITATION ISSUES/ADVERSE POTENTIAL:  1.  R tib/fib fracture - mechanical injury on 10/12/22 s/p ORIF with Dr. Huertas on 10/154/22. TTWB Patient demonstrates functional deficits in strength, balance, coordination, and ADL's. Patient is admitted to [facility] for comprehensive rehabilitation therapy as described below.   Rehabilitation nursing monitors bowel and bladder control, educates on medication administration, co-morbidities and monitors patient safety.    2.  Neurostimulants: None at this time but continue to assess daily for need to initiate should status change.    3.  DVT prophylaxis:  Patient is on Eliquis for anticoagulation upon transfer. Encourage OOB. Monitor daily for signs and symptoms of DVT including but not limited to swelling and pain to prevent the development of DVT that may interfere with therapies.    4.  GI prophylaxis:  On prilosec to prevent gastritis/dyspepsia which may interfere with therapies.    5.  Pain: No issues with pain currently / Controlled with APAP/Oxycodone    6.  Nutrition/Dysphagia: Dietician monitors nutrient intake, recommend supplements prn and provide nutrition education to pt/family to promote optimal nutrition for wound healing/recovery.     7.  Bladder/bowel:  Start bowel and bladder program as described below, to prevent constipation, urinary retention (which may lead to UTI), and urinary incontinence (which will impact upon pt's functional independence).   - Post void bladder scans, I&O cath for PVRs >400  - up to commode after meal     8.  Skin/dermal ulcer prophylaxis: Monitor for new skin conditions with q.2 h. turns as required to prevent the development of skin breakdown.     9.  Cognition/Behavior: As needed psychologist provides adjustment counseling to illness and psychosocial  barriers that may be potential barriers to rehabilitation.     10. Respiratory therapy: RT performs O2 management prn, breathing retraining, pulmonary hygiene and bronchospasm management prn to optimize participation in therapies.     MEDICAL CO-MORBIDITIES/ADVERSE POTENTIAL AFFECTING FUNCTION:  R tib/fib fracture - mechanical injury on 10/12/22 s/p ORIF with Dr. Huertas on 10/154/22. TTWB  -PT and OT for mobility and ADLs  -Gabapentin 200 mg TID. PRN Oxycodone. PRN Tylenol    HTN/ A Fib - Patient on Metoprolol 50 mg daily, benazepril 40 mg daily, and Eliquis     HLD - Patient on Zetia 10 mg daily     Iron deficiency Anemia - Check AM CBC    Azotemia - Check AM CMP    CKD3 - Avoid nephrotoxic agents. Check AM CMP    Hypothyroidism - Patient on Levothyroxine 75 mcg daily    DVT Ppx - Patient on Eliquis    I personally performed a complete drug regimen review and no potential clinically significant medication issues were identified.     Pt was seen today for 72 min, and entire time spent in face-to-face contact was >50% in counseling and coordination of care as detailed in A/P above.        GOALS/EXPECTED LEVEL OF FUNCTION BASED ON CURRENT MEDICAL AND FUNCTIONAL STATUS (may change based on patient's medical status and rate of impairment recovery):  Transfers:   Modified Independent  Mobility/Gait:   Modified Independent  ADL's:   Modified Independent    DISPOSITION: Discharge to pre-morbid independent living setting with the supportive care of patient's family.    ELOS: 7-14 days

## 2022-10-17 NOTE — PROGRESS NOTES
Received bedside patient report from RAMÍREZ Jacobs. Patient resting comfortably in bed, no complaints at this time. Safety precautions in place. Will continue to monitor.

## 2022-10-17 NOTE — CARE PLAN
The patient is Stable - Low risk of patient condition declining or worsening    Shift Goals  Clinical Goals: Free from falls or injuries, Rest and sleep at least 4 hours, Pain Controlled 3/10  Patient Goals: Free from falls or injuries, Rest and sleep at least 4 hours, Pain Controlled 3/10  Family Goals: n/a    Progress made toward(s) clinical / shift goals: No falls or injuries, rested and slept at least 4 hours, pain controlled    Patient is not progressing towards the following goals:

## 2022-10-17 NOTE — DISCHARGE PLANNING
Following for post acute services call out to spouse Ren discussed IRF level of care. Discussed goals and expectation, length of stay, covid visitation. Goal to return home with Ren with increased independence for MOD I pivot transfer. Ren will be the return to community support person providing physical assistance with the limitation of lifting, as well as supervision for IADL's and transportation. Ren is agreeable to transfer but would like to discuss with Mala prior to transfer. Will follow CM aware.

## 2022-10-17 NOTE — PREADMISSION SCREENING NOTE
Pre-Admission Screening Form    Patient Information:   Name: Yvonne Marie Wada     MRN: [id number]       : 1935      Age: 86 y.o.   Gender: female      Race: White [7]       Marital Status:  [2]  Family Contact: AndresIsis  WadaRen        Relationship: Daughter [2]  Spouse [17]  Home Phone:   [phone]           Cell Phone: [phone]  Advanced Directives: None  Code Status:  FULL  Current Attending Provider: Reji Atkinson M.D.  Referring Physician: Dr. Atkinson      Physiatrist Consult: Dr. Michele       Referral Date: 10/16/2022  Primary Payor Source:  MEDICARE  Secondary Payor Source:  [place]    Medical Information:   Date of Admission to Acute Care Setting:10/12/2022  Room Number: 2221/02  Rehabilitation Diagnosis: 0008.9 - Orthopaedic Disorders: Other Orthopaedic  Immunization History   Administered Date(s) Administered    Influenza (IM) Preservative Free - HISTORICAL DATA 10/06/2011    Influenza Seasonal Injectable - Historical Data 10/02/2012    Influenza Vaccine Adult HD 2013, 10/01/2014, 10/15/2015, 2017, 2018, 2019, 2020, 10/07/2021    Influenza Vaccine Pediatric Split - Historical Data 10/09/2004    PFIZER SANTA CAP SARS-COV-2 VACCINATION (12+) 2022    PFIZER PURPLE CAP SARS-COV-2 VACCINATION (12+) 2021, 2021, 10/11/2021, 10/11/2021    Pneumococcal Conjugate Vaccine (Prevnar/PCV-13) 2015    Pneumococcal polysaccharide vaccine (PPSV-23) 10/09/2004, 2019    Tdap Vaccine 2020    Zoster Vaccine Live (ZVL) (Zostavax) - HISTORICAL DATA 2011    Zoster Vaccine Recombinant (RZV) (SHINGRIX) 2019, 2019     Allergies   Allergen Reactions    Amoxicillin Vomiting     Upset stomach, ok if needed for life saving treatment (per pt)    Codeine Nausea     Synthetic codones ok    Doxycycline Nausea     Nausea, Ok in life saving situation (per pt)    Sulfamethoxazole-Trimethoprim Vomiting and Nausea     Ok  "in a life saving situation (per pt)    Tramadol Vomiting and Nausea     nausea    Trazodone Vomiting     groggy     Past Medical History:   Diagnosis Date    AAA (abdominal aortic aneurysm) 7/26/2010    10/09 CT thorax dilated ascending thoracic aorta 4.9 cm 1/10 transesophageal echo dilated aortic root, and ascending aorta with mild aortic insufficiency 1/10  ascending aortic aneurysm repair 5/12 echo sn[facility] normal LV function EF 60%, moderate to severe left atrial enlargement, RVSP 32     Aortic insufficiency     Aortic valve disease     Aortic valve regurgitation     Arthritis     back and knee/ osteo    Cataract     Dental disorder     full top denture, partial bottom    Diverticulosis     Dyslipidemia 7/26/2010    Sees snca on lipitor 4/11 chol 159,trig 84,hdl 47,ldl 95,cpk 114 7/12 chol 163,trig 120,hdl 49,ldl 90, crp 1.1 on lipitor 20 mg     GERD (gastroesophageal reflux disease) 7/12/2012 6/07 EGD per [facility] hiatal hernia, start prevacid 30 mg 7/12 protonix 20 mg qday     Glaucoma     Heart burn     Heart murmur     Heart valve disease     \"leaking\", mitral valve    Hiatus hernia syndrome     High cholesterol     History of shingles 6/30/2011 2010 Back and left thorax      History of total knee arthroplasty 7/26/2010    4/10 MRI right knee complex tear medial meniscus, horizontal cleavage tear lateral meniscus, chondromalacia patella, moderate-sized Baker's cyst 5/10 right meniscus knee repair  5/10 told by  had gout on surgery had pathology report, I await that report Question gout seen on pathology report done during repair of right meniscus knee surgery. 7/10 uric acid 6.3, we'll await starting allopurinol depending on the operative report 8/5/10 reviewed ortho notes , op report indicates crystal deposition, could be gout or pseudogout. No bx result sent over. Will need to get. My suspicion is chondrocalcinosis. 10/11  ortho note, MRI pending 8/7/10 " reviewed pathology report right knee tissue, marked degenerative changes with focal calcification, no mention of gout. Based on this and a normal uric acid level, I do not believe she has gout, has no hyperuricemia medications would be indicated 10/11  ortho left knee meniscectomy and arthroscopy 1/12      HLD (hyperlipidemia)     Hypertension     Hypothyroid 4/8/2011 4/11 tsh 9 start synthroid 50 4/11 tsh 6,free t3 3.1,free t4 1.2,tpo negative 7/1/11 tsh 2.1 cont synthroid 50 mcg 7/12 tsh 3.4 cont synthroid 50 mcg     Indigestion     Mitral insufficiency     OSTEOPOROSIS 8/9/2010    Had been on fosamax [id number]  8/10 dexa LS -2.0,hip -1.5 await old dexa result, she will get copy for me 4/11 vit d 35 9/12 dexa LS -3.2,hip -1.4 2/13/13 relast infusion     Pain     back and right leg, can get as bad as 10/10    Preventative health care 7/26/2010    2002 pneum 2005 colon [facility] no records 4/11 vit d 35 9/11 shingles vaccine 9/12 mammogram 9/12 dexa LS -3.2,hip -1.4     Rheumatic fever     S/P appendectomy 7/26/2010    S/P TOMY (total abdominal hysterectomy) 7/26/2010    Sees gyn on HRT estradiol for 20 yrs () 11/08 mammo      Shingles 6/30/2011    Snoring     Status post lumbar surgery 7/26/2010 6/03 L4-5 epidural Dr. Jordan  7/06 overall for decompression, foraminotomy. L4-L5 posterior fusion, L4-L5 allograft       Stroke ([facility] 1996    no residual problems     Thoracic aortic aneurysm without mention of rupture     Tricuspid insufficiency     Unspecified disorder of thyroid     hypo    Urinary bladder disorder     suprapubic catheter insertion 11/4    UTI (urinary tract infection) 11/12/2016 7/6/16 UTI klebsiella pneumoniae sensitive to all antibiotics except ampicillin, start bactrim x 5 days 1/18/19 UTI enterobacter cloacae resistant to ampicillin, cephalothin, penicillin, bactrim, sensitive to cefuroxime, ciprofloxacin and levaquin, nitrofurantoin 5/26/19 UTI  klebsiella given omnicef, organism resistant ampicillin, ciprofloxacin, levofloxacin, bactrim 8/18/21  urology note      Past Surgical History:   Procedure Laterality Date    TENDON REPAIR Right 11/10/2016    Procedure: TENDON REPAIR - QUADRACEPS ;  Surgeon: Maxim Herrera M.D.;  Location: [facility];  Service:     KNEE ARTHROPLASTY TOTAL Right 9/8/2016    Procedure: KNEE ARTHROPLASTY TOTAL;  Surgeon: Maxim Herrera M.D.;  Location: SURGERY [facility];  Service:     FUSION, SPINE, LUMBAR, PLIF  11/24/2015    Procedure: LUMBAR FUSION POSTERIOR L3-4, EXPLORATION OF FUSION L4-5 WITH INSTRUMENTATION;  Surgeon: Hermann Reis M.D.;  Location: [facility];  Service:     LUMBAR LAMINECTOMY DISKECTOMY  11/24/2015    Procedure: LUMBAR L3-4 LAMINECTOMY AND REDO L4-5;  Surgeon: Hermann Reis M.D.;  Location: [facility];  Service:     KNEE ARTHROPLASTY TOTAL  1/3/2012    Performed by YOSEF MEJÍA at [facility]    KNEE ARTHROSCOPY  10/18/2011    Performed by YOSEF MEJÍA at [facility]    MENISCECTOMY, KNEE, MEDIAL  10/18/2011    Performed by YOSEF MEJÍA at [facility]    AORTIC VALVE REPLACEMENT  1/12/2010    Performed by ISAÍAS NICOLE at [facility]    APPENDECTOMY      FUSION, SPINE, LUMBAR, PLIF      HYSTERECTOMY, TOTAL ABDOMINAL         History Leading to Admission, Conditions that Caused the Need for Rehab (CMS):     Pauline Mccauley D.O.  Physician  [facility]  H&P     Signed  Date of Service:  10/12/2022 11:50 AM              Hospital Medicine History & Physical Note     Date of Service  10/12/2022     Primary Care Physician  Tee Tran M.D.     Consultants  orthopedics     Specialist Names: Dr. Velez     Code Status  Full Code     Chief Complaint    Chief Complaint  Patient presents with   Ankle Injury      Fell in the night sustained a right ankle foot injury, very swollen and bruised.  Unable to bear weight.      Justification for Admission Status  I anticipate this patient is appropriate for observation status at this time because ankle fracture, orthopedics consulted. CT scan pending to determine further surgical plans.     * Tibia/fibula fracture, right, closed, initial encounter- (present on admission)  Assessment & Plan  Noted on XR  Bandage placed in ER  Orthopedics consulted  CT scan pending  Hold Eliquis in case of surgery  will keep n.p.o. for now   pain control  PT/OT     Paroxysmal atrial fibrillation (HCC)- (present on admission)  Assessment & Plan  followed by cardiology, Dr. Wheeler  continue Toprol  Held Eliquis     Senile osteoporosis- (present on admission)  Assessment & Plan  currently on prolia per PCP      Anemia- (present on admission)  Assessment & Plan  chronic, no signs of bleeding   trend H&H  last ferritin checked in 8/2022 low normal, may benefit from iron replacement  repeat iron/ferritin levels      CKD (chronic kidney disease) stage 3, GFR 30-59 ml/min ([facility])- (present on admission)  Assessment & Plan  at baseline   avoid nephrotoxic medications   trend BMP     GERD (gastroesophageal reflux disease)- (present on admission)  Assessment & Plan  Continue omeprazole     Hypothyroid- (present on admission)  Assessment & Plan  Continue home levothyroxine     Hypertension- (present on admission)  Assessment & Plan  Continue home metoprolol and BenzePril        VTE prophylaxis: SCDs/TEDs, held eliquis for possible surgery     Ryan Huertas M.D.  Physician  Surgery Orthopedic  OP Report     Signed  Date of Service:  10/15/2022  4:49 PM              DATE OF OPERATION: 10/15/2022      SURGEON: Ryan Huertas M.D.     ANESTHESIOLOGIST: Anesthesiologist: Daryl Elder M.D.     ANESTHESIA: General     SURGICAL FIRST ASST: none     PREOPERATIVE DIAGNOSIS: Right trimalleolar ankle fracture      POSTOPERATIVE DIAGNOSIS: Right trimalleolar ankle fracture      PROCEDURE:   Open  reduction internal fixation of right trimalleolar ankle fracture with fixation of the medial and lateral malleoli  Open reduction internal fixation of right syndesmosis  Nonoperative treatment of right posterior malleolus fracture     Postoperative care  Patient can be toe-touch weightbearing on the right side  Will need splint down and wound check in 2 weeks  Can probably transition to a boot at the 2-week jennifer depending upon how wound is healing  DVT prophylaxis per primary team        COMPLICATIONS:none     ____________________________________   Ryan Huertas M.D.    Jai Michele D.O.  Physician  Physical Medicine & Rehab  Consults     Signed  Date of Service:  10/16/2022 11:00 AM          Consult Orders  IP Consult For Physiatry [[id number]] ordered by Reji Atkinson M.D. at 10/16/22 1013       Expand All Collapse All    Physical Medicine and Rehabilitation Consultation  Chart Review Consult     Date of initial consultation: 10/16/2022  Consulting provider: Reji Atkinson M.D.  Reason for consultation: assess for acute inpatient rehab appropriateness  LOS: 0 Day(s)     Chief complaint: right ankle fracture     HPI: The patient is a 86 y.o. female with a past medical history of anemia, tissue aortic valve replacement on eliquis, osteoperosis, HTN;  who presented on 10/12/2022  9:22 AM with right ankle injury sustained while getting out of bed. Patient was found to have comminuted trimalleolar fracture of the right ankle, and taken to the OR on 10/14/22 with Dr. Ryan Huertas for ORIF repair. Post op course has been uncomplicated. She does have some anemia, acute on chronic kidney disease, HTN and baseline a. Fib. Functionally she is taking a few steps with PT and requiring max-total assist with ADLs.  She has support from her spouse and an acceptable post discharge living situation.      THERAPY:  Restrictions: TTWB RLE   PT: Functional mobility   10/15: Walking 2' with FWW at mod assist      OT: ADLs  10/15:  "Total assist toile ting, max assist LBD      SLP:   NA     Social Hx:  1 SH  0 JENNIFER  With: spouse     IMAGING:  CT R ankle 10/12/22  Comminuted trimalleolar fracture of the right ankle.     PROCEDURES:  10/14/22 Ryan Huertas MD   Open reduction internal fixation of right trimalleolar ankle fracture with fixation of the medial and lateral malleoli  Open reduction internal fixation of right syndesmosis  Nonoperative treatment of right posterior malleolus fracture        PMH:    Past Medical History  Past Medical History:  Diagnosis Date   AAA (abdominal aortic aneurysm) 7/26/2010    10/09 CT thorax dilated ascending thoracic aorta 4.9 cm 1/10 transesophageal echo dilated aortic root, and ascending aorta with mild aortic insufficiency 1/10  ascending aortic aneurysm repair 5/12 echo snca normal LV function EF 60%, moderate to severe left atrial enlargement, RVSP 32    Aortic insufficiency     Aortic valve disease     Aortic valve regurgitation     Arthritis      back and knee/ osteo   Cataract     Dental disorder      full top denture, partial bottom   Diverticulosis     Dyslipidemia 7/26/2010    Sees snca on lipitor 4/11 chol 159,trig 84,hdl 47,ldl 95,cpk 114 7/12 chol 163,trig 120,hdl 49,ldl 90, crp 1.1 on lipitor 20 mg    GERD (gastroesophageal reflux disease) 7/12/2012 6/07 EGD per [facility] hiatal hernia, start prevacid 30 mg 7/12 protonix 20 mg qday    Glaucoma     Heart burn     Heart murmur     Heart valve disease      \"leaking\", mitral valve   Hiatus hernia syndrome     High cholesterol     History of shingles 6/30/2011 2010 Back and left thorax     History of total knee arthroplasty 7/26/2010    4/10 MRI right knee complex tear medial meniscus, horizontal cleavage tear lateral meniscus, chondromalacia patella, moderate-sized Baker's cyst 5/10 right meniscus knee repair  5/10 told by  had gout on surgery had pathology report, I await that report Question gout seen on pathology " report done during repair of right meniscus knee surgery. 7/10 uric acid 6.3, we'll await starting allopurinol depending on the operative report 8/5/10 reviewed ortho notes , op report indicates crystal deposition, could be gout or pseudogout. No bx result sent over. Will need to get. My suspicion is chondrocalcinosis. 10/11  ortho note, MRI pending 8/7/10 reviewed pathology report right knee tissue, marked degenerative changes with focal calcification, no mention of gout. Based on this and a normal uric acid level, I do not believe she has gout, has no hyperuricemia medications would be indicated 10/11  ortho left knee meniscectomy and arthroscopy 1/12     HLD (hyperlipidemia)     Hypertension     Hypothyroid 4/8/2011 4/11 tsh 9 start synthroid 50 4/11 tsh 6,free t3 3.1,free t4 1.2,tpo negative 7/1/11 tsh 2.1 cont synthroid 50 mcg 7/12 tsh 3.4 cont synthroid 50 mcg    Indigestion     Mitral insufficiency     OSTEOPOROSIS 8/9/2010    Had been on fosamax [id number]  8/10 dexa LS -2.0,hip -1.5 await old dexa result, she will get copy for me 4/11 vit d 35 9/12 dexa LS -3.2,hip -1.4 2/13/13 relast infusion    Pain      back and right leg, can get as bad as 10/10   Preventative health care 7/26/2010    2002 pneum 2005 colon [facility] no records 4/11 vit d 35 9/11 shingles vaccine 9/12 mammogram 9/12 dexa LS -3.2,hip -1.4    Rheumatic fever     S/P appendectomy 7/26/2010   S/P TOMY (total abdominal hysterectomy) 7/26/2010    Sees gyn on HRT estradiol for 20 yrs () 11/08 mammo     Shingles 6/30/2011   Snoring     Status post lumbar surgery 7/26/2010 6/03 L4-5 epidural Dr. Jordan  7/06 overall for decompression, foraminotomy. L4-L5 posterior fusion, L4-L5 allograft      Stroke ([facility]) 1996    no residual problems    Thoracic aortic aneurysm without mention of rupture     Tricuspid insufficiency     Unspecified disorder of thyroid      hypo   Urinary bladder  disorder      suprapubic catheter insertion 11/4   UTI (urinary tract infection) 11/12/2016 7/6/16 UTI klebsiella pneumoniae sensitive to all antibiotics except ampicillin, start bactrim x 5 days 1/18/19 UTI enterobacter cloacae resistant to ampicillin, cephalothin, penicillin, bactrim, sensitive to cefuroxime, ciprofloxacin and levaquin, nitrofurantoin 5/26/19 UTI klebsiella given omnicef, organism resistant ampicillin, ciprofloxacin, levofloxacin, bactrim 8/18/21  urology note           PSH:    Past Surgical History  Past Surgical History:  Procedure Laterality Date   TENDON REPAIR Right 11/10/2016    Procedure: TENDON REPAIR - QUADRACEPS ;  Surgeon: Maxim Herrera M.D.;  Location: [facility];  Service:    KNEE ARTHROPLASTY TOTAL Right 9/8/2016    Procedure: KNEE ARTHROPLASTY TOTAL;  Surgeon: Maxim Herrera M.D.;  Location: [facility];  Service:    FUSION, SPINE, LUMBAR, PLIF   11/24/2015    Procedure: LUMBAR FUSION POSTERIOR L3-4, EXPLORATION OF FUSION L4-5 WITH INSTRUMENTATION;  Surgeon: Hermann Reis M.D.;  Location: [facility];  Service:    LUMBAR LAMINECTOMY DISKECTOMY   11/24/2015    Procedure: LUMBAR L3-4 LAMINECTOMY AND REDO L4-5;  Surgeon: Hermann Reis M.D.;  Location: [facility];  Service:    KNEE ARTHROPLASTY TOTAL   1/3/2012    Performed by YOSEF MEJÍA at [facility]   KNEE ARTHROSCOPY   10/18/2011    Performed by YOSEF MEJÍA at [facility]   MENISCECTOMY, KNEE, MEDIAL   10/18/2011    Performed by YOSEF MEJÍA at [facility]   AORTIC VALVE REPLACEMENT   1/12/2010    Performed by ISAÍAS NICOLE at [facility]   APPENDECTOMY       FUSION, SPINE, LUMBAR, PLIF       HYSTERECTOMY, TOTAL ABDOMINAL              FHX:    Family History  Family History  Problem Relation Age of Onset   Stroke Mother     Heart Disease Father     Heart Disease Sister             Medications:    Current Facility-Administered Medications  Medication Dose   polyethylene glycol/lytes (MIRALAX) PACKET 1 Packet  1 Packet    And   magnesium hydroxide (MILK OF MAGNESIA) suspension 30 mL  30 mL    And   bisacodyl (DULCOLAX) suppository 10 mg  10 mg   senna-docusate (PERICOLACE or SENOKOT S) 8.6-50 MG per tablet 1 Tablet  1 Tablet   calcium/vitamin D 250-125 MG-UNIT tablet 1 Tablet  1 Tablet   acetaminophen (Tylenol) tablet 650 mg  650 mg   Pharmacy Consult Request ...Pain Management Review 1 Each  1 Each   oxyCODONE immediate-release (ROXICODONE) tablet 2.5 mg  2.5 mg    Or   oxyCODONE immediate-release (ROXICODONE) tablet 5 mg  5 mg    Or   HYDROmorphone (Dilaudid) injection 0.25 mg  0.25 mg   ondansetron (ZOFRAN) syringe/vial injection 4 mg  4 mg   ondansetron (ZOFRAN ODT) dispertab 4 mg  4 mg   labetalol (NORMODYNE/TRANDATE) injection 10 mg  10 mg   benazepril (LOTENSIN) tablet 40 mg  40 mg   apixaban (ELIQUIS) tablet 5 mg  5 mg   ezetimibe (ZETIA) tablet 10 mg  10 mg   gabapentin (NEURONTIN) capsule 200 mg  200 mg   levothyroxine (SYNTHROID) tablet 75 mcg  75 mcg   metoprolol SR (TOPROL XL) tablet 50 mg  50 mg   omeprazole (PRILOSEC) capsule 20 mg  20 mg        Allergies:    Allergies  Allergen Reactions   Amoxicillin Vomiting      Upset stomach, ok if needed for life saving treatment (per pt)   Codeine Nausea      Synthetic codones ok   Doxycycline Nausea      Nausea, Ok in life saving situation (per pt)   Sulfamethoxazole-Trimethoprim Vomiting and Nausea      Ok in a life saving situation (per pt)   Tramadol Vomiting and Nausea      nausea   Trazodone Vomiting      groggy        Labs: Reviewed and significant for     Recent Labs    10/14/22  0430 10/15/22  0422  RBC 3.49* 3.15*  HEMOGLOBIN 10.0* 9.2*  HEMATOCRIT 31.3* 28.4*  PLATELETCT 199 190       Recent Labs     10/14/22  0430 10/15/22  0422 10/16/22  0243  SODIUM 138 135 137  POTASSIUM 4.2 4.6 4.2  CHLORIDE 106 105 106  CO2 21 20 23  GLUCOSE 101* 151* 115*  BUN 35* 35* 36*  CREATININE 1.33 1.27 1.20  CALCIUM 9.2 8.6 8.7       Recent Results  Recent Results (from the past 24 hour(s))  Basic Metabolic Panel    Collection Time: 10/16/22  2:43 AM  Result Value Ref Range    Sodium 137 135 - 145 mmol/L    Potassium 4.2 3.6 - 5.5 mmol/L    Chloride 106 96 - 112 mmol/L    Co2 23 20 - 33 mmol/L    Glucose 115 (H) 65 - 99 mg/dL    Bun 36 (H) 8 - 22 mg/dL    Creatinine 1.20 0.50 - 1.40 mg/dL    Calcium 8.7 8.4 - 10.2 mg/dL    Anion Gap 8.0 7.0 - 16.0  ESTIMATED GFR    Collection Time: 10/16/22  2:43 AM  Result Value Ref Range    GFR (CKD-EPI) 44 (A) >60 mL/min/1.73 m 2             ASSESSMENT:  Patient is a 86 y.o. female admitted with right trimalleolar ankle fracture now s/p ORIF.       [facility] Code / Diagnosis to Support: 0008.9 - Orthopaedic Disorders: Other Orthopaedic     Rehabilitation: Impaired ADLs and mobility     Yvonne Marie Wada does apparently continue to have moderately severe functional impairment with mobility and activities of daily living. This patient is a good candidate for acute inpatient rehabilitation, both reasonable and necessary, including 24 hr Physician supervision and rehab nursing care, evaluation and treatment by physical therapy, occupational therapy. Patient appears to be able to tolerate therapy 3 hours per day 5 days per week or a minimum of 15 hours per week.      Barriers to transfer include: Insurance authorization, TCCs to verify disposition, medical clearance and bed availability      Estimated length of stay is approximately 10-14 days with good rehabilitation potential.   Disposition is to discharge to premorbid independent living setting with the supportive care of  spouse/family and community resources.      Case to be reviewed by rehab admin for final decision on Monday 10/17/22        DVT  PPX: Eliquis         Thank you for allowing us to participate in the care of this patient.      Jai Michele DO   Physical Medicine and Rehabilitation        Co-morbidities:  as listed above and below   Potential Risk - Complications: Contractures, Deep Vein Thrombosis, Incontinence, Malnutrition, Pain, Perceptual Impairment, Pressure Ulcer, Urinary Tract Infection, and falls weight bearing. Renal function and anemia   Level of Risk: High    Ongoing Medical Management Needed (Medical/Nursing Needs):   Patient Active Problem List    Diagnosis Date Noted    S/p ankle right orif 10/15/2022    Tibia/fibula fracture, right, closed, initial encounter 10/12/2022    Senile osteoporosis 08/22/2022    Neutropenia (HCC) 12/20/2021    Anemia 10/07/2021    Urinary retention 09/14/2021    History of pelvic fracture 08/30/2021    Constipation 06/26/2021    Gait disorder 04/23/2021    On continuous oral anticoagulation 12/14/2020    S/p hip right arthroplasty 11/29/2017    History of transient ischemic attack (TIA) 11/01/2017    History of shoulder pain 04/05/2017    History of insomnia 01/30/2017    UTI (urinary tract infection) 11/12/2016    Decreased GFR 04/15/2016    S/P thoracic aortic aneurysm repair 03/29/2014    Neck arthritis 02/28/2014    CKD (chronic kidney disease) stage 3, GFR 30-59 ml/min ([facility]) 09/26/2013    Tricuspid regurgitation     GERD (gastroesophageal reflux disease) 07/12/2012    History of shingles 06/30/2011    Hypothyroid 04/08/2011    Osteoporosis, idiopathic 08/09/2010    S/p lumbar surgery 07/26/2010    Hypertension 07/26/2010    Dyslipidemia 07/26/2010    S/P TOMY (total abdominal hysterectomy) 07/26/2010    Preventative health care 07/26/2010    S/P knee bilateral arthroplasty 07/26/2010    S/P appendectomy 07/26/2010    Paroxysmal atrial fibrillation (HCC) 07/26/2010       Current Vital Signs:   Temperature: 36.9 °C (98.4 °F) Pulse: 74 Respiration: 16 Blood Pressure : (!) 95/62 (Rn aware)  Weight:  "70.4 kg (155 lb 3.3 oz) Height: 162.6 cm (5' 4\")  Pulse Oximetry: 95 % O2 (LPM): 0      Completed Laboratory Reports:  Recent Labs     10/15/22  0422 10/16/22  0243 10/17/22  0228   WBC 4.4*  --  7.3   HEMOGLOBIN 9.2*  --  9.0*   HEMATOCRIT 28.4*  --  29.5*   PLATELETCT 190  --  191   SODIUM 135 137 138   POTASSIUM 4.6 4.2 4.7   BUN 35* 36* 38*   CREATININE 1.27 1.20 1.36   GLUCOSE 151* 115* 92     Additional Labs: Not Applicable    Prior Living Situation:   Housing / Facility: 1 Story House  Steps Into Home: 0  Lives with - Patient's Self Care Capacity: Spouse  Equipment Owned: Ramp, Front-Wheel Walker, 4-Wheel Walker, Tub / Shower Seat, Hand Held Shower, Wheelchair    Prior Level of Function / Living Situation:   Physical Therapy: Prior Services: None  Housing / Facility: 1 Story House  Steps Into Home: 0  Equipment Owned: Ramp, Front-Wheel Walker, 4-Wheel Walker, Tub / Shower Seat, Hand Held Shower, Wheelchair  Lives with - Patient's Self Care Capacity: Spouse  Bed Mobility: Independent  Transfer Status: Independent  Ambulation: Independent  Distance Ambulation (Feet):  (household)  Assistive Devices Used: Front-Wheel Walker  Current Level of Function:   Gait Level Of Assist: Moderate Assist  Assistive Device: Front Wheel Walker  Distance (Feet): 2  Weight Bearing Status: TTWB R  Supine to Sit: Minimal Assist  Sit to Stand: Moderate Assist  Bed, Chair, Wheelchair Transfer: Moderate Assist  Transfer Method: Stand Step  Sitting in Chair: >1hr  Sitting Edge of Bed: >10  Standing: 2x2  Occupational Therapy:   Self Feeding: Independent  Grooming / Hygiene: Independent  Bathing: Independent  Dressing: Independent  Toileting: Independent  Medication Management: Requires Assist  Laundry: Requires Assist  Kitchen Mobility: Independent  Finances: Requires Assist  Home Management: Requires Assist  Shopping: Requires Assist  Prior Level Of Mobility: Independent With Device in Home  Driving / Transportation: Relatives / " Others Provide Transportation  Prior Services: None  Housing / Facility: 1 Story House  Occupation (Pre-Hospital Vocational): Retired Due To Age  Leisure Interests: Television  Current Level of Function:   Eating: Supervision  Upper Body Dressing: Minimal Assist  Lower Body Dressing: Maximal Assist  Toileting: Total Assist  Speech Language Pathology:      Rehabilitation Prognosis/Potential: Good  Estimated Length of Stay: 10-14 days    Nursing:      Velma    Scope/Intensity of Services Recommended:  Physical Therapy: 1.5 hr / day  5 days / week. Therapeutic Interventions Required: Maximize Endurance, Mobility, Strength, and Safety  Occupational Therapy: 1.5 hr / day 5 days / week. Therapeutic Interventions Required: Maximize Self Care, ADLs, and Energy Conservation  Rehabilitation Nursin/7. Therapeutic Interventions Required: Monitor Pain, Skin, Wound(s), Vital Signs, Intake and Output, Labs, Safety, and Family Training  Rehabilitation Physician: 3 - 5 days / week. Therapeutic Interventions Required: Medical Management  Respiratory Care: evaluate . Therapeutic Interventions Required: Pulmonary Toileting  Dietician: Consult . Therapeutic Interventions Required: nutritional need     She requires 24-hour rehabilitation nursing to manage bowel and bladder function, skin care, surgical incision, wound, nutrition and fluid intake, pulmonary hygiene, pain control, safety, medication management, and patient/family goals. In addition, rehabilitation nursing will reiterate and reinforce therapy skills and equipment use, including ADLs, as well as provide education to the patient and family. Yvonne Marie Wada is willing to participate in and is able to tolerate the proposed plan of care.    Rehabilitation Goals and Plan (Expected frequency & duration of treatment in the IRF):   Return to the Community, Modified Independent Level of Care, and Minimal Assist Level of Care  Anticipated Date of Rehabilitation Admission:  10/17/2022  Patient/Family oriented IRF level of care/facility/plan: Yes  Patient/Family willing to participate in IRF care/facility/plan: Yes  Patient able to tolerate IRF level of care proposed: Yes  Patient has potential to benefit IRF level of care proposed: Yes  Comments: Not Applicable    Special Needs or Precautions - Medical Necessity:  Safety Concerns/Precautions:  Fall Risk / High Risk for Falls and Balance  Complex Wound Care: post surgical   Pain Management  Weight-bearing Status: Toe Touch Weight Bearing, Right, Lower Extremity  Splints/Braces/Orthotics: casted RLE  Cardiac Precautions  Current Medications:    Current Facility-Administered Medications Ordered in Epic   Medication Dose Route Frequency Provider Last Rate Last Admin    polyethylene glycol/lytes (MIRALAX) PACKET 1 Packet  1 Packet Oral DAILY Reji Atkinson M.D.   1 Packet at 10/16/22 0523    And    magnesium hydroxide (MILK OF MAGNESIA) suspension 30 mL  30 mL Oral QDAY PRN Reji Atkinson M.D.   30 mL at 10/17/22 0437    And    bisacodyl (DULCOLAX) suppository 10 mg  10 mg Rectal QDAY PRN Reji Atkinson M.D.        senna-docusate (PERICOLACE or SENOKOT S) 8.6-50 MG per tablet 1 Tablet  1 Tablet Oral DAILY Reji Atkinson M.D.   1 Tablet at 10/17/22 0436    calcium/vitamin D 250-125 MG-UNIT tablet 1 Tablet  1 Tablet Oral DAILY Reji Atkinson M.D.   1 Tablet at 10/17/22 0436    acetaminophen (Tylenol) tablet 650 mg  650 mg Oral Q6HRS PRN CARLO NguyenOKya   650 mg at 10/16/22 1807    Pharmacy Consult Request ...Pain Management Review 1 Each  1 Each Other PHARMACY TO DOSE Pauline Mccauley D.O.        oxyCODONE immediate-release (ROXICODONE) tablet 2.5 mg  2.5 mg Oral Q3HRS PRN CARLO NguyenO.        Or    oxyCODONE immediate-release (ROXICODONE) tablet 5 mg  5 mg Oral Q3HRS PRN CARLO NguyenO.   5 mg at 10/13/22 1015    Or    HYDROmorphone (Dilaudid) injection 0.25 mg  0.25 mg Intravenous Q3HRS PRN Pauline Mccauley D.O.         ondansetron (ZOFRAN) syringe/vial injection 4 mg  4 mg Intravenous Q4HRS PRN Pauline Mccauley, D.O.        ondansetron (ZOFRAN ODT) dispertab 4 mg  4 mg Oral Q4HRS PRN Pauline Mccauley, D.O.        labetalol (NORMODYNE/TRANDATE) injection 10 mg  10 mg Intravenous Q4HRS PRN Pauline Mccauley, D.O.        benazepril (LOTENSIN) tablet 40 mg  40 mg Oral QAM Pauline Mccauley, D.O.   40 mg at 10/17/22 0437    apixaban (ELIQUIS) tablet 5 mg  5 mg Oral BID Pauline Mccauley, D.O.   5 mg at 10/17/22 0437    ezetimibe (ZETIA) tablet 10 mg  10 mg Oral DAILY Pauline Mccauley, D.O.   10 mg at 10/17/22 0436    gabapentin (NEURONTIN) capsule 200 mg  200 mg Oral TID Pauline Mccauley, D.O.   200 mg at 10/17/22 0849    levothyroxine (SYNTHROID) tablet 75 mcg  75 mcg Oral AM ES Pauline Mccauley, D.O.   75 mcg at 10/17/22 0437    metoprolol SR (TOPROL XL) tablet 50 mg  50 mg Oral QAM Reji Atkinson M.D.   50 mg at 10/17/22 0437    omeprazole (PRILOSEC) capsule 20 mg  20 mg Oral QAM Pauline Mccauley, D.O.   20 mg at 10/17/22 0436     No current [facility]-ordered outpatient medications on file.     Diet:   DIET ORDERS (From admission to next 24h)       Start     Ordered    10/14/22 1952  Diet Order Diet: Cardiac; Second Modifier: (optional): Renal  ALL MEALS        Question Answer Comment   Diet: Cardiac    Second Modifier: (optional) Renal        10/14/22 1951                    Anticipated Discharge Destination / Patient/Family Goal:  Destination: Home with Assistance Support System: Spouse  Anticipated home health services: OT, PT, Nursing, and Aide  Previously used HH service/ provider: Not Applicable  Anticipated DME Needs: Not Applicable  Outpatient Services: PT  Alternative resources to address additional identified needs:   Following for post acute services call out to spouse Ren discussed IRF level of care. Discussed goals and expectation, length of stay, covid visitation. Goal to return home with Ren with increased independence for  MOD I pivot transfer. Ren will be the return to community support person providing physical assistance with the limitation of lifting, as well as supervision for IADL's and transportation. Ren is agreeable to transfer but would like to discuss with Mala prior to transfer. Will follow CM aware.     Future Appointments   Date Time Provider Department Center   11/18/2022  8:45 AM Kobi Wheeler M.D. [facility] None       Pre-Screen Completed: 10/17/2022 10:32 AM David Marin

## 2022-10-18 LAB
25(OH)D3 SERPL-MCNC: 46 NG/ML (ref 30–100)
ALBUMIN SERPL BCP-MCNC: 2.9 G/DL (ref 3.2–4.9)
ALBUMIN/GLOB SERPL: 1.1 G/DL
ALP SERPL-CCNC: 64 U/L (ref 30–99)
ALT SERPL-CCNC: 7 U/L (ref 2–50)
ANION GAP SERPL CALC-SCNC: 10 MMOL/L (ref 7–16)
AST SERPL-CCNC: 16 U/L (ref 12–45)
BASOPHILS # BLD AUTO: 0.5 % (ref 0–1.8)
BASOPHILS # BLD: 0.03 K/UL (ref 0–0.12)
BILIRUB SERPL-MCNC: 0.7 MG/DL (ref 0.1–1.5)
BUN SERPL-MCNC: 29 MG/DL (ref 8–22)
CALCIUM SERPL-MCNC: 8.5 MG/DL (ref 8.5–10.5)
CHLORIDE SERPL-SCNC: 106 MMOL/L (ref 96–112)
CO2 SERPL-SCNC: 24 MMOL/L (ref 20–33)
CREAT SERPL-MCNC: 1.1 MG/DL (ref 0.5–1.4)
EOSINOPHIL # BLD AUTO: 0.06 K/UL (ref 0–0.51)
EOSINOPHIL NFR BLD: 1.1 % (ref 0–6.9)
ERYTHROCYTE [DISTWIDTH] IN BLOOD BY AUTOMATED COUNT: 51.7 FL (ref 35.9–50)
EST. AVERAGE GLUCOSE BLD GHB EST-MCNC: 94 MG/DL
GFR SERPLBLD CREATININE-BSD FMLA CKD-EPI: 49 ML/MIN/1.73 M 2
GLOBULIN SER CALC-MCNC: 2.6 G/DL (ref 1.9–3.5)
GLUCOSE SERPL-MCNC: 105 MG/DL (ref 65–99)
HBA1C MFR BLD: 4.9 % (ref 4–5.6)
HCT VFR BLD AUTO: 26.1 % (ref 37–47)
HGB BLD-MCNC: 8.2 G/DL (ref 12–16)
IMM GRANULOCYTES # BLD AUTO: 0.04 K/UL (ref 0–0.11)
IMM GRANULOCYTES NFR BLD AUTO: 0.7 % (ref 0–0.9)
LYMPHOCYTES # BLD AUTO: 1.11 K/UL (ref 1–4.8)
LYMPHOCYTES NFR BLD: 19.9 % (ref 22–41)
MCH RBC QN AUTO: 28.7 PG (ref 27–33)
MCHC RBC AUTO-ENTMCNC: 31.4 G/DL (ref 33.6–35)
MCV RBC AUTO: 91.3 FL (ref 81.4–97.8)
MONOCYTES # BLD AUTO: 0.73 K/UL (ref 0–0.85)
MONOCYTES NFR BLD AUTO: 13.1 % (ref 0–13.4)
NEUTROPHILS # BLD AUTO: 3.62 K/UL (ref 2–7.15)
NEUTROPHILS NFR BLD: 64.7 % (ref 44–72)
NRBC # BLD AUTO: 0 K/UL
NRBC BLD-RTO: 0 /100 WBC
PLATELET # BLD AUTO: 223 K/UL (ref 164–446)
PMV BLD AUTO: 9.6 FL (ref 9–12.9)
POTASSIUM SERPL-SCNC: 4.1 MMOL/L (ref 3.6–5.5)
PROT SERPL-MCNC: 5.5 G/DL (ref 6–8.2)
RBC # BLD AUTO: 2.86 M/UL (ref 4.2–5.4)
SODIUM SERPL-SCNC: 140 MMOL/L (ref 135–145)
TSH SERPL DL<=0.005 MIU/L-ACNC: 1.65 UIU/ML (ref 0.38–5.33)
WBC # BLD AUTO: 5.6 K/UL (ref 4.8–10.8)

## 2022-10-18 PROCEDURE — 97110 THERAPEUTIC EXERCISES: CPT

## 2022-10-18 PROCEDURE — 97162 PT EVAL MOD COMPLEX 30 MIN: CPT

## 2022-10-18 PROCEDURE — A9270 NON-COVERED ITEM OR SERVICE: HCPCS | Performed by: PHYSICAL MEDICINE & REHABILITATION

## 2022-10-18 PROCEDURE — 82306 VITAMIN D 25 HYDROXY: CPT

## 2022-10-18 PROCEDURE — 36415 COLL VENOUS BLD VENIPUNCTURE: CPT

## 2022-10-18 PROCEDURE — 97166 OT EVAL MOD COMPLEX 45 MIN: CPT

## 2022-10-18 PROCEDURE — 80053 COMPREHEN METABOLIC PANEL: CPT

## 2022-10-18 PROCEDURE — 83036 HEMOGLOBIN GLYCOSYLATED A1C: CPT

## 2022-10-18 PROCEDURE — 700102 HCHG RX REV CODE 250 W/ 637 OVERRIDE(OP): Performed by: PHYSICAL MEDICINE & REHABILITATION

## 2022-10-18 PROCEDURE — 84443 ASSAY THYROID STIM HORMONE: CPT

## 2022-10-18 PROCEDURE — 99233 SBSQ HOSP IP/OBS HIGH 50: CPT | Performed by: PHYSICAL MEDICINE & REHABILITATION

## 2022-10-18 PROCEDURE — 770010 HCHG ROOM/CARE - REHAB SEMI PRIVAT*

## 2022-10-18 PROCEDURE — 97530 THERAPEUTIC ACTIVITIES: CPT

## 2022-10-18 PROCEDURE — 97535 SELF CARE MNGMENT TRAINING: CPT

## 2022-10-18 PROCEDURE — 85025 COMPLETE CBC W/AUTO DIFF WBC: CPT

## 2022-10-18 RX ADMIN — Medication 1 TABLET: at 09:18

## 2022-10-18 RX ADMIN — SENNOSIDES AND DOCUSATE SODIUM 2 TABLET: 50; 8.6 TABLET ORAL at 07:39

## 2022-10-18 RX ADMIN — GABAPENTIN 200 MG: 100 CAPSULE ORAL at 07:39

## 2022-10-18 RX ADMIN — OMEPRAZOLE 20 MG: 20 CAPSULE, DELAYED RELEASE ORAL at 07:38

## 2022-10-18 RX ADMIN — GABAPENTIN 200 MG: 100 CAPSULE ORAL at 15:07

## 2022-10-18 RX ADMIN — OXYCODONE 5 MG: 5 TABLET ORAL at 07:40

## 2022-10-18 RX ADMIN — APIXABAN 5 MG: 5 TABLET, FILM COATED ORAL at 20:19

## 2022-10-18 RX ADMIN — METOPROLOL SUCCINATE 50 MG: 25 TABLET, EXTENDED RELEASE ORAL at 09:18

## 2022-10-18 RX ADMIN — SENNOSIDES AND DOCUSATE SODIUM 2 TABLET: 50; 8.6 TABLET ORAL at 20:19

## 2022-10-18 RX ADMIN — EZETIMIBE 10 MG: 10 TABLET ORAL at 09:18

## 2022-10-18 RX ADMIN — BENAZEPRIL HYDROCHLORIDE 40 MG: 10 TABLET, COATED ORAL at 09:18

## 2022-10-18 RX ADMIN — APIXABAN 5 MG: 5 TABLET, FILM COATED ORAL at 07:38

## 2022-10-18 RX ADMIN — GABAPENTIN 200 MG: 100 CAPSULE ORAL at 20:19

## 2022-10-18 RX ADMIN — LEVOTHYROXINE SODIUM 75 MCG: 0.07 TABLET ORAL at 05:51

## 2022-10-18 SDOH — ECONOMIC STABILITY: TRANSPORTATION INSECURITY
IN THE PAST 12 MONTHS, HAS LACK OF RELIABLE TRANSPORTATION KEPT YOU FROM MEDICAL APPOINTMENTS, MEETINGS, WORK OR FROM GETTING THINGS NEEDED FOR DAILY LIVING?: NO

## 2022-10-18 SDOH — ECONOMIC STABILITY: TRANSPORTATION INSECURITY
IN THE PAST 12 MONTHS, HAS THE LACK OF TRANSPORTATION KEPT YOU FROM MEDICAL APPOINTMENTS OR FROM GETTING MEDICATIONS?: NO

## 2022-10-18 ASSESSMENT — BRIEF INTERVIEW FOR MENTAL STATUS (BIMS)
ASKED TO RECALL BLUE: YES, NO CUE REQUIRED
BIMS SUMMARY SCORE: 11
ASKED TO RECALL SOCK: YES, NO CUE REQUIRED
ASKED TO RECALL BED: YES, NO CUE REQUIRED
WHAT YEAR IS IT: MISSED BY MORE THAN 5 YEARS
WHAT MONTH IS IT: ACCURATE WITHIN 5 DAYS
WHAT DAY OF THE WEEK IS IT: INCORRECT
INITIAL REPETITION OF BED BLUE SOCK - FIRST ATTEMPT: 3

## 2022-10-18 ASSESSMENT — ACTIVITIES OF DAILY LIVING (ADL)
TOILETING: INDEPENDENT
BED_CHAIR_WHEELCHAIR_TRANSFER_DESCRIPTION: INCREASED TIME;INITIAL PREPARATION FOR TASK;SQUAT PIVOT TRANSFER TO WHEELCHAIR;SUPERVISION FOR SAFETY
BED_CHAIR_WHEELCHAIR_TRANSFER_DESCRIPTION: ADAPTIVE EQUIPMENT;INCREASED TIME;SET-UP OF EQUIPMENT;SUPERVISION FOR SAFETY;VERBAL CUEING

## 2022-10-18 ASSESSMENT — GAIT ASSESSMENTS
DISTANCE (FEET): 0
ASSISTIVE DEVICE: FRONT WHEEL WALKER
GAIT LEVEL OF ASSIST: UNABLE TO PARTICIPATE

## 2022-10-18 ASSESSMENT — PAIN DESCRIPTION - PAIN TYPE
TYPE: ACUTE PAIN;SURGICAL PAIN
TYPE: ACUTE PAIN

## 2022-10-18 NOTE — THERAPY
Occupational Therapy   Initial Evaluation     Patient Name: Yvonne Marie Wada  Age:  86 y.o., Sex:  female  Medical Record #: [id number]  Today's Date: 10/18/2022     Subjective    Pt seen at 0701 for 60 min eval and treat and at 1431 for 30 min trat, see note below for details.     Objective       10/18/22 0701   OT Charge Group   Charges Yes   OT Self Care / ADL 1   OT Evaluation OT Evaluation Mod   OT Total Time Spent   OT Individual Total Time Spent (Mins) 60   Prior Living Situation   Prior Services None   Housing / Facility 1 Story House   Steps Into Home 2   Bathroom Set up Walk In Shower;Grab Bars;Shower Chair   Equipment Owned 4-Wheel Walker;Wheelchair;Tub / Shower Seat;Grab Bar(s) In Tub / Shower;Grab Bar(s) By Toilet   Lives with - Patient's Self Care Capacity Spouse   Prior Level of ADL Function   Self Feeding Independent   Grooming / Hygiene Independent   Bathing Independent   Dressing Independent   Toileting Independent   Prior Level of IADL Function   Medication Management Unable To Determine At This Time   Laundry Independent   Kitchen Mobility Independent   Finances Requires Assist   Home Management Independent   Shopping Requires Assist   Prior Level Of Mobility Independent With Device in Home   Driving / Transportation Relatives / Others Provide Transportation   Occupation (Pre-Hospital Vocational) Retired Due To Age   Leisure Interests Television   Prior Functioning: Everyday Activities   Self Care Independent   Indoor Mobility (Ambulation) Independent   Stairs Independent   Functional Cognition Needed some help   Prior Device Use Walker   Pain 0 - 10 Group   Location Foot   Location Orientation Right   Pain Rating Scale (NPRS) 10   Therapist Pain Assessment During Activity;Prior to Activity;Post Activity  (discussed wt RN for pain management)   Non Verbal Descriptors   Non Verbal Scale  Grimacing;Moaning   Cognitive Pattern Assessment   Cognitive Pattern Assessment Used BIMS   Brief Interview for  "Mental Status (BIMS)   Repetition of Three Words (First Attempt) 3   Temporal Orientation: Year Missed by more than 5 years  (pt stated 2001)   Temporal Orientation: Month Accurate within 5 days   Temporal Orientation: Day Incorrect   Recall: \"Sock\" Yes, no cue required   Recall: \"Blue\" Yes, no cue required   Recall: \"Bed\" Yes, no cue required   BIMS Summary Score 11   Confusion Assessment Method (CAM)   Is there evidence of an acute change in mental status from the patient's baseline? Yes   Inattention Behavior not present   Disorganized thinking Behavior present, fluctuates (comes and goes, changes in severity)   Altered level of consciousness Behavior not present   Vision Screen   Vision   (pt waers glassess)   Active ROM Upper Body   Active ROM Upper Body  WDL   Dominant Hand Right   Strength Upper Body   Upper Body Strength  X   Gross Strength Generalized Weakness, Equal Bilaterally.    Balance Assessment   Sitting Balance (Static) Poor +   Bed Mobility    Supine to Sit Moderate Assist   Sit to Supine Moderate Assist   Eating   Assistance Needed Supervision   Physical Assistance Level No physical assistance   CARE Score - Eating 4   Eating Discharge Goal   Discharge Goal 6   Oral Hygiene   Assistance Needed Set-up / clean-up   Physical Assistance Level No physical assistance   CARE Score - Oral Hygiene 5   Oral Hygiene Discharge Goal   Discharge Goal 6   Shower/Bathe Self   Reason if not Attempted Medical concerns   CARE Score - Shower/Bathe Self 88   Shower/Bathe Self Discharge Goal   Discharge Goal 4   Upper Body Dressing   Assistance Needed Physical assistance   Physical Assistance Level 26%-50%   CARE Score - Upper Body Dressing 3   Upper Body Dressing Discharge Goal   Discharge Goal 6   Lower Body Dressing   Assistance Needed Physical assistance   Physical Assistance Level 76% or more   CARE Score - Lower Body Dressing 2   Lower Body Dressing Discharge Goal   Discharge Goal 6   Putting On/Taking Off " Footwear   Assistance Needed Physical assistance   Physical Assistance Level Total assistance   CARE Score - Putting On/Taking Off Footwear 1   Putting On/Taking Off Footwear Discharge Goal   Discharge Goal 6   Toileting Hygiene   Reason if not Attempted Medical concerns   CARE Score - Toileting Hygiene 88   Toileting Hygiene Discharge Goal   Discharge Goal 6   Toilet Transfer   Reason if not Attempted Medical concerns   CARE Score - Toilet Transfer 88   Toilet Transfer Discharge Goal   Discharge Goal 5   Hearing, Speech, and Vision   Ability to Hear Adequate   Ability to See in Adequate Light Adequate   Expression of Ideas and Wants Some difficulty   Understanding Verbal and Non-Verbal Content Understands   Functional Level of Assist   Eating Supervision   Grooming Supervision;Seated   Upper Body Dressing Minimal Assist   Upper Body Dressing Description Initial preparation for task;Increased time;Supervision for safety;Verbal cueing   Lower Body Dressing Maximal Assist  (Pt required A for manipulating brief whiel seated JANETTE/in bed and total A for doff/donning socks)   Bed, Chair, Wheelchair Transfer Unable to Participate  (d/t 10/10 pian in RLE this session)   Toilet Transfers Unable to Participate  (d/t 10/10 pian in RLE this session)   Tub / Shower Transfers Unable to Participate  (d/t 10/10 pian in RLE this session)   Comprehension Supervision   Expression Minimal Assist   Problem Solving Minimal Assist   Memory Minimal Assist   Problem List   Problem List Decreased Active Daily Living Skills;Decreased Homemaking Skills;Decreased Upper Extremity Strength Right;Decreased Upper Extremity Strength Left;Decreased Functional Mobility;Decreased Activity Tolerance;Impaired Vision;Impaired Postural Control / Balance   Precautions   Precautions Fall Risk;Toe Touch Weight Bearing Right Lower Extremity   Comments pain limited, pt has glassess   Current Discharge Plan   Current Discharge Plan Return to Prior Living  Situation   Benefit    Therapy Benefit Patient Would Benefit from Inpatient Rehab Occupational Therapy to Maximize [place] with ADLs, IADLs and Functional Mobility.   Interdisciplinary Plan of Care Collaboration   Patient Position at End of Therapy In Bed;Bed Alarm On;Call Light within Reach;Tray Table within Reach   Strengths & Barriers   Strengths Able to follow instructions;Motivated for self care and independence;Pleasant and cooperative   Barriers Decreased endurance;Fatigue;Generalized weakness;Impaired activity tolerance;Impaired balance;Impaired functional cognition;Pain;Limited mobility;Pain poorly managed;Visual impairment        10/18/22 1431   OT Charge Group   OT Self Care / ADL 1   OT Therapeutic Exercise  1   OT Total Time Spent   OT Individual Total Time Spent (Mins) 30   Precautions   Precautions Fall Risk;Toe Touch Weight Bearing Right Lower Extremity   Comments pain limited, pt has glassess   Functional Level of Assist   Grooming Supervision;Seated  (set-up A for oral hygiene)   Grooming Description Increased time;Initial preparation for task;Seated in wheelchair at sink;Verbal cueing;Supervision for safety   Bed, Chair, Wheelchair Transfer Total Assist  (Max A x1, CGA x1)   Bed Chair Wheelchair Transfer Description Increased time;Initial preparation for task;Squat pivot transfer to wheelchair;Supervision for safety  (w/c>bed)   Sitting Upper Body Exercises   Sitting Upper Body Exercises Yes   Upper Extremity Bike Minutes / Rest Breaks (See Comments)  (BUE motomed 5 min, for overall strengthening/edurance)   Interdisciplinary Plan of Care Collaboration   IDT Collaboration with  Certified Nursing Assistant   Patient Position at End of Therapy In Bed;Bed Alarm On;Call Light within Reach;Tray Table within Reach   Collaboration Comments A with transfer for w/c>bed at end of session       Assessment  Patient is 86 y.o. female with a diagnosis of s/p ORIF repair for comminuted trimalleolar fracture  of the right ankle following GLF. Pt she has had another recent fall in the bathroom. Further chart review shows pt presented to the ED on 8/14/2022 following fall in bathroom with positive head strike.    Additional factors influencing patient status / progress (ie: cognitive factors, co-morbidities, social support, etc): Pt reports living in a 1 story home, 2 JENNIFER, walk-in shower with GB and shower chair in place. Pt reports being independent with ADLs and unclear how independent with IADLs prior to admission.     At time of evaluation patient presents well below functional baseline w/ primary barriers being pain, activity tolerance, fatigue, TTWB precaution, decreased balance, decreased endurance and cognitive deficits. Pt unable to identify correct year, had difficulty with word finding throughout, and had increased time and VC for problem solving grooming. She will benefit from daily skilled OT to maximize independence w/ ADLs, IADLs, and functional mobility.      Recommend SLP evaluation for swallow and cognition.  Need to complete OT shower and assess toileting as soon as possible    Plan  Recommend Occupational Therapy  minutes per day 5-7 days per week for 2 weeks for the following treatments:  OT Group Therapy, OT Self Care/ADL, OT Cognitive Skill Dev, OT Community Reintegration, OT Neuro Re-Ed/Balance, OT Therapeutic Activity, OT Evaluation, and OT Therapeutic Exercise.    Passport items to be completed:  Perform bathroom transfers, complete dressing, complete feeding, get ready for the day, prepare a simple meal, participate in household tasks, adapt home for safety needs, demonstrate home exercise program, complete caregiver training     Goals:  Long term and short term goals have been discussed with patient and they are in agreement.    Occupational Therapy Goals (Active)       Problem: Bathing       Dates: Start: 10/18/22         Goal: STG-Within one week, patient will bathe with Min A overall  using AE/DME as needed.       Dates: Start: 10/18/22               Problem: Dressing       Dates: Start: 10/18/22         Goal: STG-Within one week, patient will dress UB with SBA overall using AE/DME as needed.       Dates: Start: 10/18/22            Goal: STG-Within one week, patient will dress LB with Mod A overall using AE/DME as needed.       Dates: Start: 10/18/22               Problem: Functional Transfers       Dates: Start: 10/18/22         Goal: STG-Within one week, patient will transfer to toilet with Max Ax1 overall using AE/DME as needed.       Dates: Start: 10/18/22               Problem: OT Long Term Goals       Dates: Start: 10/18/22         Goal: LTG-By discharge, patient will complete basic self care tasks with supervision overall using AE/DME as needed.       Dates: Start: 10/18/22            Goal: LTG-By discharge, patient will perform bathroom transfers with supervision overall using AE/DME as needed.       Dates: Start: 10/18/22               Problem: Toileting       Dates: Start: 10/18/22         Goal: STG-Within one week, patient will complete toileting tasks with Mod A overall using AE/DME as needed.       Dates: Start: 10/18/22

## 2022-10-18 NOTE — PROGRESS NOTES
Rehab Progress Note     Encounter Date: 10/18/2022    CC: Decreased mobility, pain control    Interval Events (Subjective)  Patient sitting up in room. She reports the pain was bad last night. She reports she is feeling better now. Per therapists she had a recent fall with head strike and concern for concussion. Discussed would have SLP evaluate patient for both cognition and swallow. Denies dizziness. Reviewed AM labs including anemia, and normal TSH. Dsicussed would continue to monitor.     Objective:  VITAL SIGNS: /56   Pulse 74   Temp 36.1 °C (97 °F) (Oral)   Resp 18   Wt 68.5 kg (151 lb)   SpO2 98%   BMI 25.92 kg/m²   Gen: NAD  Psych: Mood and affect appropriate  CV: RRR, no edema  Resp: CTAB, no upper airway sounds  Abd: NTND  Neuro: AOx3, following commands    Recent Results (from the past 72 hour(s))   Basic Metabolic Panel    Collection Time: 10/16/22  2:43 AM   Result Value Ref Range    Sodium 137 135 - 145 mmol/L    Potassium 4.2 3.6 - 5.5 mmol/L    Chloride 106 96 - 112 mmol/L    Co2 23 20 - 33 mmol/L    Glucose 115 (H) 65 - 99 mg/dL    Bun 36 (H) 8 - 22 mg/dL    Creatinine 1.20 0.50 - 1.40 mg/dL    Calcium 8.7 8.4 - 10.2 mg/dL    Anion Gap 8.0 7.0 - 16.0   ESTIMATED GFR    Collection Time: 10/16/22  2:43 AM   Result Value Ref Range    GFR (CKD-EPI) 44 (A) >60 mL/min/1.73 m 2   Basic Metabolic Panel    Collection Time: 10/17/22  2:28 AM   Result Value Ref Range    Sodium 138 135 - 145 mmol/L    Potassium 4.7 3.6 - 5.5 mmol/L    Chloride 107 96 - 112 mmol/L    Co2 18 (L) 20 - 33 mmol/L    Glucose 92 65 - 99 mg/dL    Bun 38 (H) 8 - 22 mg/dL    Creatinine 1.36 0.50 - 1.40 mg/dL    Calcium 8.7 8.4 - 10.2 mg/dL    Anion Gap 13.0 7.0 - 16.0   CBC WITHOUT DIFFERENTIAL    Collection Time: 10/17/22  2:28 AM   Result Value Ref Range    WBC 7.3 4.8 - 10.8 K/uL    RBC 3.12 (L) 4.20 - 5.40 M/uL    Hemoglobin 9.0 (L) 12.0 - 16.0 g/dL    Hematocrit 29.5 (L) 37.0 - 47.0 %    MCV 94.6 81.4 - 97.8 fL    MCH  28.8 27.0 - 33.0 pg    MCHC 30.5 (L) 33.6 - 35.0 g/dL    RDW 53.9 (H) 35.9 - 50.0 fL    Platelet Count 191 164 - 446 K/uL    MPV 10.5 9.0 - 12.9 fL   ESTIMATED GFR    Collection Time: 10/17/22  2:28 AM   Result Value Ref Range    GFR (CKD-EPI) 38 (A) >60 mL/min/1.73 m 2   COV-2, FLU A/B, AND RSV BY PCR (2-4 HOURS CEPHEID): Collect NP swab in VTM    Collection Time: 10/17/22  4:30 PM    Specimen: Respirate   Result Value Ref Range    Influenza virus A RNA Negative Negative    Influenza virus B, PCR Negative Negative    RSV, PCR Negative Negative    SARS-CoV-2 by PCR NotDetected     SARS-CoV-2 Source NP Swab    CBC with Differential    Collection Time: 10/18/22  5:43 AM   Result Value Ref Range    WBC 5.6 4.8 - 10.8 K/uL    RBC 2.86 (L) 4.20 - 5.40 M/uL    Hemoglobin 8.2 (L) 12.0 - 16.0 g/dL    Hematocrit 26.1 (L) 37.0 - 47.0 %    MCV 91.3 81.4 - 97.8 fL    MCH 28.7 27.0 - 33.0 pg    MCHC 31.4 (L) 33.6 - 35.0 g/dL    RDW 51.7 (H) 35.9 - 50.0 fL    Platelet Count 223 164 - 446 K/uL    MPV 9.6 9.0 - 12.9 fL    Neutrophils-Polys 64.70 44.00 - 72.00 %    Lymphocytes 19.90 (L) 22.00 - 41.00 %    Monocytes 13.10 0.00 - 13.40 %    Eosinophils 1.10 0.00 - 6.90 %    Basophils 0.50 0.00 - 1.80 %    Immature Granulocytes 0.70 0.00 - 0.90 %    Nucleated RBC 0.00 /100 WBC    Neutrophils (Absolute) 3.62 2.00 - 7.15 K/uL    Lymphs (Absolute) 1.11 1.00 - 4.80 K/uL    Monos (Absolute) 0.73 0.00 - 0.85 K/uL    Eos (Absolute) 0.06 0.00 - 0.51 K/uL    Baso (Absolute) 0.03 0.00 - 0.12 K/uL    Immature Granulocytes (abs) 0.04 0.00 - 0.11 K/uL    NRBC (Absolute) 0.00 K/uL   Comp Metabolic Panel (CMP)    Collection Time: 10/18/22  5:43 AM   Result Value Ref Range    Sodium 140 135 - 145 mmol/L    Potassium 4.1 3.6 - 5.5 mmol/L    Chloride 106 96 - 112 mmol/L    Co2 24 20 - 33 mmol/L    Anion Gap 10.0 7.0 - 16.0    Glucose 105 (H) 65 - 99 mg/dL    Bun 29 (H) 8 - 22 mg/dL    Creatinine 1.10 0.50 - 1.40 mg/dL    Calcium 8.5 8.5 - 10.5 mg/dL     AST(SGOT) 16 12 - 45 U/L    ALT(SGPT) 7 2 - 50 U/L    Alkaline Phosphatase 64 30 - 99 U/L    Total Bilirubin 0.7 0.1 - 1.5 mg/dL    Albumin 2.9 (L) 3.2 - 4.9 g/dL    Total Protein 5.5 (L) 6.0 - 8.2 g/dL    Globulin 2.6 1.9 - 3.5 g/dL    A-G Ratio 1.1 g/dL   TSH with Reflex to FT4    Collection Time: 10/18/22  5:43 AM   Result Value Ref Range    TSH 1.650 0.380 - 5.330 uIU/mL   Vitamin D, 25-hydroxy (blood)    Collection Time: 10/18/22  5:43 AM   Result Value Ref Range    25-Hydroxy   Vitamin D 25 46 30 - 100 ng/mL   ESTIMATED GFR    Collection Time: 10/18/22  5:43 AM   Result Value Ref Range    GFR (CKD-EPI) 49 (A) >60 mL/min/1.73 m 2       Current Facility-Administered Medications   Medication Frequency    Respiratory Therapy Consult Continuous RT    Pharmacy Consult Request ...Pain Management Review 1 Each PHARMACY TO DOSE    hydrALAZINE (APRESOLINE) tablet 25 mg Q8HRS PRN    acetaminophen (Tylenol) tablet 650 mg Q4HRS PRN    senna-docusate (PERICOLACE or SENOKOT S) 8.6-50 MG per tablet 2 Tablet BID    And    polyethylene glycol/lytes (MIRALAX) PACKET 1 Packet QDAY PRN    And    magnesium hydroxide (MILK OF MAGNESIA) suspension 30 mL QDAY PRN    And    bisacodyl (DULCOLAX) suppository 10 mg QDAY PRN    omeprazole (PRILOSEC) capsule 20 mg DAILY    mag hydrox-al hydrox-simeth (MAALOX PLUS ES or MYLANTA DS) suspension 20 mL Q2HRS PRN    ondansetron (ZOFRAN ODT) dispertab 4 mg 4X/DAY PRN    Or    ondansetron (ZOFRAN) syringe/vial injection 4 mg 4X/DAY PRN    sodium chloride (OCEAN) 0.65 % nasal spray 2 Spray PRN    apixaban (ELIQUIS) tablet 5 mg BID    benazepril (LOTENSIN) tablet 40 mg QAM    calcium/vitamin D 250-125 MG-UNIT tablet 1 Tablet DAILY    ezetimibe (ZETIA) tablet 10 mg DAILY    gabapentin (NEURONTIN) capsule 200 mg TID    levothyroxine (SYNTHROID) tablet 75 mcg AM ES    metoprolol SR (TOPROL XL) tablet 50 mg QAM    oxyCODONE immediate-release (ROXICODONE) tablet 2.5 mg Q3HRS PRN    oxyCODONE  immediate-release (ROXICODONE) tablet 5 mg Q3HRS PRN    miconazole 2%-zinc oxide (Lily) topical cream Q6HRS PRN       Orders Placed This Encounter   Procedures    Diet Order Diet: Cardiac; Second Modifier: (optional): Renal     Standing Status:   Standing     Number of Occurrences:   1     Order Specific Question:   Diet:     Answer:   Cardiac [6]     Order Specific Question:   Second Modifier: (optional)     Answer:   Renal [8]       Assessment:  Active Hospital Problems    Diagnosis     *Tibia/fibula fracture, right, closed, initial encounter     Closed right trimalleolar fracture, initial encounter     S/p ankle right orif     Anemia     Constipation     UTI (urinary tract infection)     CKD (chronic kidney disease) stage 3, GFR 30-59 ml/min ([facility])     GERD (gastroesophageal reflux disease)     Hypothyroid     Paroxysmal atrial fibrillation (HCC)     Hypertension        Medical Decision Making and Plan:  R tib/fib fracture - mechanical injury on 10/12/22 s/p ORIF with Dr. Huertas on 10/154/22. TTWB  -PT and OT for mobility and ADLs  -Gabapentin 200 mg TID. PRN Oxycodone. PRN Tylenol     Confusion - Patient with recent fall with head strike. May have had a mild TBI. Will have SLP assess    Dysphagia - Will have SLP evaluate for swallow    HTN/ A Fib - Patient on Metoprolol 50 mg daily, benazepril 40 mg daily, and Eliquis      HLD - Patient on Zetia 10 mg daily     Iron deficiency Anemia - Check AM CBC - 10     Azotemia - Check AM CMP - repeat normal      CKD3 - Avoid nephrotoxic agents. Check AM CMP - repeat normal      Hypothyroidism - Patient on Levothyroxine 75 mcg daily     DVT Ppx - Patient on Eliquis    Total time:  36 minutes.  I spent greater than 50% of the time for patient care and coordination on this date, including unit/floor time, and face-to-face time with the patient as per assessment and plan above.  Discussion included admission labs, anemia, improved azotemia, confusion, dysphagia, and possible TBI.  SLP to consult.     Mikayla Sutton M.D.

## 2022-10-18 NOTE — IDT DISCHARGE PLANNING
CASE MANAGEMENT INITIAL ASSESSMENT    Admit Date:  10/17/2022     CM reviewed the medical chart and will meet with the patient  to discuss role of case management ,discharge planning , and team conference.       Diagnosis: Tibia/fibula fracture, right, closed, initial encounter [S82.201A, S82.401A]  Closed right trimalleolar fracture, initial encounter [S82.211A]    Co-morbidities:   Patient Active Problem List    Diagnosis Date Noted    Closed right trimalleolar fracture, initial encounter 10/17/2022    S/p ankle right orif 10/15/2022    Tibia/fibula fracture, right, closed, initial encounter 10/12/2022    Senile osteoporosis 08/22/2022    Neutropenia (HCC) 12/20/2021    Anemia 10/07/2021    Urinary retention 09/14/2021    History of pelvic fracture 08/30/2021    Constipation 06/26/2021    Gait disorder 04/23/2021    On continuous oral anticoagulation 12/14/2020    S/p hip right arthroplasty 11/29/2017    History of transient ischemic attack (TIA) 11/01/2017    History of shoulder pain 04/05/2017    History of insomnia 01/30/2017    UTI (urinary tract infection) 11/12/2016    Decreased GFR 04/15/2016    S/P thoracic aortic aneurysm repair 03/29/2014    Neck arthritis 02/28/2014    CKD (chronic kidney disease) stage 3, GFR 30-59 ml/min ([facility]) 09/26/2013    Tricuspid regurgitation     GERD (gastroesophageal reflux disease) 07/12/2012    History of shingles 06/30/2011    Hypothyroid 04/08/2011    Osteoporosis, idiopathic 08/09/2010    S/p lumbar surgery 07/26/2010    Hypertension 07/26/2010    Dyslipidemia 07/26/2010    S/P TOMY (total abdominal hysterectomy) 07/26/2010    Preventative health care 07/26/2010    S/P knee bilateral arthroplasty 07/26/2010    S/P appendectomy 07/26/2010    Paroxysmal atrial fibrillation (HCC) 07/26/2010     Prior Living Situation:  Housing / Facility: 1 Story House  Lives with - Patient's Self Care Capacity: Spouse    Prior Level of Function:  Medication Management: Unable To Determine At  This Time  Finances: Requires Assist  Home Management: Independent  Shopping: Requires Assist  Prior Level Of Mobility: Independent With Device in Home  Driving / Transportation: Relatives / Others Provide Transportation    Support Systems:  Primary : Isis Campos  Other support systems: Spouse       Previous Services Utilized:   Equipment Owned: 4-Wheel Walker, Wheelchair, Tub / Shower Seat, Grab Bar(s) In Tub / Shower, Grab Bar(s) By Toilet  Prior Services: None    Other Information:  Occupation (Pre-Hospital Vocational): Retired Due To Age     Primary Payor Source: Medicare A, Medicare B  Primary Care Practitioner : Tee Ng    Patient / Family Goal:       Plan:  1. Continue to follow patient through hospitalization and provide discharge planning in collaboration with patient, family, physicians and ancillary services.     2. Utilize community resources to ensure a safe discharge.

## 2022-10-18 NOTE — FLOWSHEET NOTE
10/17/22 1709   Patient History   Pulmonary Diagnosis None   Procedures Relevant to Respiratory Status None   Home O2 No   Nocturnal CPAP No   Home Treatments/Frequency No   Protocol Pathways   Protocol Pathways None

## 2022-10-18 NOTE — FLOWSHEET NOTE
10/17/22 1706   Events/Summary/Plan   Events/Summary/Plan RT Assessment   Vital Signs   Pulse (!) 140   Respiration 16   Pulse Oximetry 98 %   $ Pulse Oximetry (Spot Check) Yes   Respiratory Assessment   Respiratory Pattern Within Normal Limits   Level of Consciousness Alert   Chest Exam   Work Of Breathing / Effort Within Normal Limits   Oxygen   O2 Delivery Device None - Room Air   Smoking History   Have you ever smoked Never

## 2022-10-18 NOTE — PROGRESS NOTES
Pt. with elevated HR of 150. Dr. Sutton was notified and received order to administer Metoprolol XL 25 mg x once.

## 2022-10-18 NOTE — CARE PLAN
"The patient is Stable - Low risk of patient condition declining or worsening    Shift Goals  Clinical Goals: Safety  Patient Goals: pain control    Problem: Fall Risk - Rehab  Goal: Patient will remain free from falls  Outcome: Progressing  Note: Muna Cuenca Fall risk Assessment Score: 11    Moderate fall risk Interventions  - Bed and strip alarm   - Yellow sign by the door   - Yellow wrist band \"Fall risk\"  - Room near to the nurse station  - Do not leave patient unattended in the bathroom  - Fall risk education provided       Problem: Skin Integrity  Goal: Patient's skin integrity will be maintained or improve  Outcome: Progressing  Note:   Jase Score: 18    Patient's skin remains intact and free from new or accidental injury this shift; no s/s of infection. RN wound protocol checked. Encouraged hydration and educated about the importance of nutrition to keep skin integrity. Will continue to monitor.       "

## 2022-10-18 NOTE — CARE PLAN
Problem: Fall Risk - Rehab  Goal: Patient will remain free from falls  Note: Pt uses call light for staff assistance. Waits for assistance does not attempt self transfer this shift. Able to verbalize needs.      Problem: Bladder / Voiding  Goal: Patient will establish and maintain regular urinary output  Note: Patient had a continent bladder episode using the toilet. Post void bladder scan was 237.No complaints from patient. Will continue to monitor.    The patient is Watcher - Medium risk of patient condition declining or worsening    Shift Goals  Clinical Goals: Safety  Patient Goals: pain control

## 2022-10-18 NOTE — CARE PLAN
Problem: Skin Integrity  Goal: Patient's skin integrity will be maintained or improve  Note: Patient had skin assessment; pictures uploaded into computer. No new or accidental injury this shift.  Will continue to monitor.      Problem: Infection  Goal: Patient will remain free from infection  Note: Patient remains free from s/s infection; afebrile.  Will continue to monitor.    The patient is Watcher - Medium risk of patient condition declining or worsening

## 2022-10-19 PROCEDURE — 700102 HCHG RX REV CODE 250 W/ 637 OVERRIDE(OP): Performed by: PHYSICAL MEDICINE & REHABILITATION

## 2022-10-19 PROCEDURE — 99232 SBSQ HOSP IP/OBS MODERATE 35: CPT | Performed by: PHYSICAL MEDICINE & REHABILITATION

## 2022-10-19 PROCEDURE — 770010 HCHG ROOM/CARE - REHAB SEMI PRIVAT*

## 2022-10-19 PROCEDURE — 97535 SELF CARE MNGMENT TRAINING: CPT

## 2022-10-19 PROCEDURE — A9270 NON-COVERED ITEM OR SERVICE: HCPCS | Performed by: PHYSICAL MEDICINE & REHABILITATION

## 2022-10-19 PROCEDURE — 97530 THERAPEUTIC ACTIVITIES: CPT

## 2022-10-19 PROCEDURE — 92523 SPEECH SOUND LANG COMPREHEN: CPT

## 2022-10-19 PROCEDURE — 97110 THERAPEUTIC EXERCISES: CPT

## 2022-10-19 PROCEDURE — 92610 EVALUATE SWALLOWING FUNCTION: CPT

## 2022-10-19 RX ORDER — GABAPENTIN 100 MG/1
100 CAPSULE ORAL 3 TIMES DAILY
Status: DISCONTINUED | OUTPATIENT
Start: 2022-10-19 | End: 2022-10-26

## 2022-10-19 RX ADMIN — SENNOSIDES AND DOCUSATE SODIUM 2 TABLET: 50; 8.6 TABLET ORAL at 19:46

## 2022-10-19 RX ADMIN — GABAPENTIN 100 MG: 100 CAPSULE ORAL at 19:46

## 2022-10-19 RX ADMIN — SENNOSIDES AND DOCUSATE SODIUM 2 TABLET: 50; 8.6 TABLET ORAL at 08:41

## 2022-10-19 RX ADMIN — LEVOTHYROXINE SODIUM 75 MCG: 0.07 TABLET ORAL at 05:40

## 2022-10-19 RX ADMIN — GABAPENTIN 200 MG: 100 CAPSULE ORAL at 08:43

## 2022-10-19 RX ADMIN — OXYCODONE 5 MG: 5 TABLET ORAL at 19:46

## 2022-10-19 RX ADMIN — OMEPRAZOLE 20 MG: 20 CAPSULE, DELAYED RELEASE ORAL at 08:41

## 2022-10-19 RX ADMIN — GABAPENTIN 100 MG: 100 CAPSULE ORAL at 15:00

## 2022-10-19 RX ADMIN — EZETIMIBE 10 MG: 10 TABLET ORAL at 08:42

## 2022-10-19 RX ADMIN — METOPROLOL SUCCINATE 50 MG: 25 TABLET, EXTENDED RELEASE ORAL at 08:43

## 2022-10-19 RX ADMIN — BENAZEPRIL HYDROCHLORIDE 40 MG: 10 TABLET, COATED ORAL at 08:41

## 2022-10-19 RX ADMIN — Medication 1 TABLET: at 08:43

## 2022-10-19 RX ADMIN — OXYCODONE 5 MG: 5 TABLET ORAL at 05:40

## 2022-10-19 RX ADMIN — APIXABAN 5 MG: 5 TABLET, FILM COATED ORAL at 08:43

## 2022-10-19 RX ADMIN — APIXABAN 5 MG: 5 TABLET, FILM COATED ORAL at 19:46

## 2022-10-19 ASSESSMENT — BRIEF INTERVIEW FOR MENTAL STATUS (BIMS)
ASKED TO RECALL BLUE: NO, COULD NOT RECALL
BIMS SUMMARY SCORE: 9
WHAT DAY OF THE WEEK IS IT: CORRECT
ASKED TO RECALL SOCK: NO, COULD NOT RECALL
WHAT YEAR IS IT: CORRECT
ASKED TO RECALL BED: NO, COULD NOT RECALL
INITIAL REPETITION OF BED BLUE SOCK - FIRST ATTEMPT: 3
WHAT MONTH IS IT: ACCURATE WITHIN 5 DAYS

## 2022-10-19 NOTE — THERAPY
Occupational Therapy  Daily Treatment     Patient Name: Yvonne Marie Wada  Age:  86 y.o., Sex:  female  Medical Record #: [id number]  Today's Date: 10/19/2022     Precautions  Precautions: Fall Risk, Toe Touch Weight Bearing Right Lower Extremity  Comments: limited by pain         Subjective    Pt in bed upon arrival, pleasant and agreeable to OT session.  Declined shower this session though states that she needs to use the bathroom.     Objective       10/19/22 1301   OT Charge Group   OT Self Care / ADL 2   OT Therapy Activity 1   OT Therapeutic Exercise  1   OT Total Time Spent   OT Individual Total Time Spent (Mins) 60   Functional Level of Assist   Upper Body Dressing Minimal Assist   Lower Body Dressing Maximal Assist  (to doff/don pants, attemted to utilized reacher to A but pt required A for threading R LE and completion of pants over hips pursuit)   Toileting Maximal Assist   Bed, Chair, Wheelchair Transfer Minimal Assist  (lateral scoot edge of bed to w/c)   Toilet Transfers Moderate Assist  (Stand pivot with GB w/c<>toilet; cues and reminders for WB precuations)   Sitting Upper Body Exercises   Sitting Upper Body Exercises Yes   Chest Press 3 sets of 10;Bilateral;Weight (See Comments for lbs)  (2# weight ball)   Front Arm Raise 3 sets of 10;Bilateral;Weight (See Comments for lbs)  (2# weight ball)   Bicep Curls 3 sets of 10;Bilateral;Weight (See Comments for lbs)  (2# weight ball)   Bed Mobility    Supine to Sit Standby Assist   Sit to Supine Moderate Assist  (RLE and trunk)   Interdisciplinary Plan of Care Collaboration   Patient Position at End of Therapy In Bed;Bed Alarm On;Call Light within Reach;Tray Table within Reach;Phone within Reach       Assessment    Pt tolerated session this date with increased ability to transfer from bed<>w/c and w/c<>padded commode over toilet. Pt does however cont to require Max A for toileting and LB dressing and may benefit from cont edu on LB AE including provided reacher  and dressing stick for pt to utilize while in rehab to assess ability ot carryover edu.    Strengths: Able to follow instructions, Motivated for self care and independence, Pleasant and cooperative  Barriers: Decreased endurance, Fatigue, Generalized weakness, Impaired activity tolerance, Impaired balance, Impaired functional cognition, Pain, Limited mobility, Pain poorly managed, Visual impairment    Plan    ADL with AE as needed, IADLs, functional mobility, transfers at FWW level when able, strength/endurance building , standing tolerance / balance  incorporating TTWB  right LE      Occupational Therapy Goals (Active)       Problem: Bathing       Dates: Start: 10/18/22         Goal: STG-Within one week, patient will bathe with Min A overall using AE/DME as needed.       Dates: Start: 10/18/22               Problem: Dressing       Dates: Start: 10/18/22         Goal: STG-Within one week, patient will dress UB with SBA overall using AE/DME as needed.       Dates: Start: 10/18/22            Goal: STG-Within one week, patient will dress LB with Mod A overall using AE/DME as needed.       Dates: Start: 10/18/22               Problem: Functional Transfers       Dates: Start: 10/18/22         Goal: STG-Within one week, patient will transfer to toilet with Max Ax1 overall using AE/DME as needed.       Dates: Start: 10/18/22               Problem: OT Long Term Goals       Dates: Start: 10/18/22         Goal: LTG-By discharge, patient will complete basic self care tasks with supervision overall using AE/DME as needed.       Dates: Start: 10/18/22            Goal: LTG-By discharge, patient will perform bathroom transfers with supervision overall using AE/DME as needed.       Dates: Start: 10/18/22               Problem: Toileting       Dates: Start: 10/18/22         Goal: STG-Within one week, patient will complete toileting tasks with Mod A overall using AE/DME as needed.       Dates: Start: 10/18/22

## 2022-10-19 NOTE — THERAPY
"Physical Therapy   Initial Evaluation     Patient Name: Yvonne Marie Wada  Age:  86 y.o., Sex:  female  Medical Record #: [id number]  Today's Date: 10/18/2022     Subjective    Patient received supine in bed; agreeable to PT.     Objective       10/18/22 0831   PT Charge Group   Charges Yes   PT Therapeutic Activities 1   PT Evaluation PT Evaluation Mod   PT Total Time Spent   PT Individual Total Time Spent (Mins) 60   Prior Living Situation   Prior Services Other (Comments)  (SO provides IADL assistance and transportation)   Housing / Facility 1 Story House   Steps Into Home   (\"a few\" at garage entrance (most commonly used) and front door; pt reports back door has a foldable ramp in place)   Steps In Home 0   Rail None   Bathroom Set up Bathtub / Shower Combination;Shower Glass Doors;Grab Bars;Shower Chair   Equipment Owned Front-Wheel Walker;Wheelchair;Tub / Shower Seat;Grab Bar(s) In Tub / Shower;Hand Held Shower;Ramp   Lives with - Patient's Self Care Capacity Spouse   Comments Pt reports household level ambulation but uses a wheelchair when in the community; reports mainly stays at home; reports 2 falls in past three months.   Prior Level of Functional Mobility   Bed Mobility Independent   Transfer Status Independent   Ambulation Independent   Distance Ambulation (Feet)   (household distances only)   Assistive Devices Used Front-Wheel Walker   Wheelchair Required Assist   Stairs Independent   Prior Functioning: Everyday Activities   Self Care Independent   Indoor Mobility (Ambulation) Independent   Stairs Independent   Functional Cognition Needed some help   Prior Device Use Walker;Manual wheelchair   Vitals   O2 (LPM) 0   O2 Delivery Device None - Room Air   Pain 0 - 10 Group   Location Foot;Leg   Location Orientation Right;Lower   Description Pressure;Intermittent   Comfort Goal 0   Therapist Pain Assessment   (pain increases in gravity dependent positions initially; appears spontaneously twice while sitting) "   Non Verbal Descriptors   Non Verbal Scale  Grimacing;Moaning   Cognition    Cognition / Consciousness X   Speech/ Communication Delayed Responses;Word Finding Impairment   Ability To Follow Commands 1 Step   Safety Awareness Impaired   New Learning Impaired   Sequencing Impaired  (mild)   Comments Patient reports has partial upper & lower dentures; pt reports wears glasses throughout day.   ABS (Agitated Behavior Scale)   Agitated Behavior Scale Performed No   Passive ROM Lower Body   Comments Unable to assess R ankle due to surgical wrapping with rigid posterior at this time   Active ROM Lower Body    Comments Unable to assess R ankle due to surgical wrapping with rigid posterior at this time   Strength Lower Body   Comments Grossly 3/5 to 3+/5 BLE MMTs tested in sitting but difficulty with task attention vs antalgia at times.   Bed Mobility    Supine to Sit Moderate Assist  (RLE and trunk)   Sit to Supine Moderate Assist  (RLE and trunk)   Sit to Stand Total Assist X 2  (Max Ax1 with Min A-CGA for a second person; gait belt; pt declined FWW use this session on two attempts)   Scooting Maximal Assist  (EOB)   Rolling Moderate Assist to Rt.;Moderate Assist to Lt.  (bed rail; cueing)   Roll Left and Right   Assistance Needed Physical assistance   Physical Assistance Level 26%-50%   CARE Score - Roll Left and Right 3   Roll Left and Right Discharge Goal   Discharge Goal 6   Sit to Lying   Assistance Needed Physical assistance   Physical Assistance Level 26%-50%   CARE Score - Sit to Lying 3   Sit to Lying Discharge Goal   Discharge Goal 6   Lying to Sitting on Side of Bed   Assistance Needed Physical assistance   Physical Assistance Level 26%-50%   CARE Score - Lying to Sitting on Side of Bed 3   Lying to Sitting on Side of Bed Discharge Goal   Discharge Goal 6   Sit to Stand   Assistance Needed Physical assistance   Physical Assistance Level Total assistance   CARE Score - Sit to Stand 1   Sit to Stand Discharge  Goal   Discharge Goal 3   Chair/Bed-to-Chair Transfer   Assistance Needed Physical assistance   Physical Assistance Level Total assistance   CARE Score - Chair/Bed-to-Chair Transfer 1   Chair/Bed-to-Chair Transfer Discharge Goal   Discharge Goal 3   Toilet Transfer   Reason if not Attempted Safety concerns   CARE Score - Toilet Transfer 88   Car Transfer   Reason if not Attempted Safety concerns   CARE Score - Car Transfer 88   Car Transfer Discharge Goal   Discharge Goal 3   Walk 10 Feet   Reason if not Attempted Safety concerns   CARE Score - Walk 10 Feet 88   Walk 10 Feet Discharge Goal   Discharge Goal 3   Walk 50 Feet with Two Turns   Reason if not Attempted Medical concerns   CARE Score - Walk 50 Feet with Two Turns 88   Walk 50 Feet with Two Turns Discharge Goal   Discharge Goal 88   Walk 150 Feet   Reason if not Attempted Medical concerns   CARE Score - Walk 150 Feet 88   Walk 150 Feet Discharge Goal   Discharge Goal 88   Walking 10 Feet on Uneven Surfaces   Reason if not Attempted Safety concerns   CARE Score - Walking 10 Feet on Uneven Surfaces 88   Walking 10 Feet on Uneven Surfaces Discharge Goal   Discharge Goal 88   1 Step (Curb)   Reason if not Attempted Safety concerns   CARE Score - 1 Step (Curb) 88   1 Step (Curb) Discharge Goal   Discharge Goal 88   4 Steps   Reason if not Attempted Medical concerns   CARE Score - 4 Steps 88   4 Steps Discharge Goal   Discharge Goal 88   12 Steps   Reason if not Attempted Medical concerns   CARE Score - 12 Steps 88   12 Steps Discharge Goal   Discharge Goal 88   Picking Up Object   Reason if not Attempted Safety concerns   CARE Score - Picking Up Object 88   Picking Up Object Discharge Goal   Discharge Goal 3   Wheel 50 Feet with Two Turns   Assistance Needed Physical assistance   Physical Assistance Level 51%-75%   CARE Score - Wheel 50 Feet with Two Turns 2   Type of Wheelchair/Scooter Manual   Wheel 50 Feet with Two Turns Discharge Goal   Discharge Goal 4    Wheel 150 Feet   Assistance Needed Physical assistance   Physical Assistance Level 76% or more   CARE Score - Wheel 150 Feet 2   Type of Wheelchair/Scooter Manual   Wheel 150 Feet Discharge Goal   Discharge Goal 3   Gait Functional Level of Assist    Gait Level Of Assist Unable to Participate   Assistive Device Front Wheel Walker   Distance (Feet) 0   # of Times Distance was Traveled 0   Deviation   (Safety concerns; limited by TTWB RLE precautions more than antalgia and new learning)   Stairs Functional Level of Assist   Level of Assist with Stairs Unable to Participate   # of Stairs Climbed 0   Stairs Description Safety concerns  (also limited by TTWB RLE precautions at this time)   Transfer Functional Level of Assist   Bed, Chair, Wheelchair Transfer Total Assist X 2   Bed Chair Wheelchair Transfer Description Adaptive equipment;Assist with one limb;Increased time;Initial preparation for task;Set-up of equipment;Squat pivot transfer to wheelchair;Supervision for safety;Verbal cueing  (Max Ax1 with Min Ax1 from a second person; gait belt; reach-pivot vs stand-pivot; LLE tibial guarding)   Problem List    Problems Pain;Impaired Bed Mobility;Impaired Transfers;Impaired Ambulation;Impaired Balance;Decreased Activity Tolerance;Safety Awareness Deficits / Cognition;Limited Knowledge of Post-Op Precautions   Precautions   Precautions Fall Risk;Toe Touch Weight Bearing Right Lower Extremity   Comments antalgia   Current Discharge Plan   Current Discharge Plan Return to Prior Living Situation   Interdisciplinary Plan of Care Collaboration   IDT Collaboration with  Nursing;Certified Nursing Assistant;Occupational Therapist   Patient Position at End of Therapy Seated;Chair Alarm On;Self Releasing Lap Belt Applied;Call Light within Reach;Tray Table within Reach;Phone within Reach;Other (Comments)  (CNA entering room)   Collaboration Comments CLOF, transfer A, meds admin, room change   Benefit   Therapy Benefit Patient  Would Benefit from Inpatient Rehabilitation Physical Therapy to Maximize Functional [place] with ADLs, IADLs and Mobility.   Strengths & Barriers   Strengths Pleasant and cooperative;Supportive family;Willingly participates in therapeutic activities   Barriers Confused;Decreased endurance;Dementia;Difficulty following instructions;Generalized weakness;Home accessibility;Impaired activity tolerance;Impaired balance;Impaired carryover of learning;Impaired insight/denial of deficits;Impaired functional cognition;Limited mobility;Pain     Max A and Total A with UB dressing and LB dressing, respectively.      Assessment  Patient is 86 y.o. female with a diagnosis of Comminuted trimalleolar fracture of the right ankle; pt is s/p ORIF on right syndesmosis and right trimalleolar ankle fracture with fixation of the medial and lateral malleoli.    Additional factors influencing patient status / progress (ie: cognitive factors, co-morbidities, social support, etc): TTWB RLE, impaired cognition, antalgia, high fall risk, expect w/c level mobility due to WB precautions at d/c, impaired subjective, impaired balance, benefits from re-direction to task and use of 1-step commands, pleasant.  PMH includes but is not limited to CKD stage 3, GERD, hypothyroid, paroxysmal atrial fibrillation, osteoporosis, neutropenia, urinary retention, R MARIBEL, TIA, R shoulder pain, insomnia, decreased GFR, thoracic aortic aneurysm repair, neck OA, tricuspid regugitation, shingles, Hx of L4-S1 fusion, dyslipidemia, total abdominal hysterectomy, B knee arthroplasty, appendectomy.        Plan  Recommend Physical Therapy  minutes per day 5-6 days per week for 2 weeks for the following treatments:  PT Group Therapy, PT Gait Training, PT Self Care/Home Eval, PT Therapeutic Exercises, PT TENS Application, PT Neuro Re-Ed/Balance, and PT Therapeutic Activity.    Passport items to be completed:  Get in/out of bed safely, in/out of a vehicle, safely  use mobility device, walk or wheel around home/community, navigate up and down stairs, show how to get up/down from the ground, ensure home is accessible, demonstrate HEP, complete caregiver training    Goals:  Long term and short term goals have been discussed with patient and they are in agreement.    Physical Therapy Problems (Active)       Problem: Balance       Dates: Start: 10/18/22         Goal: STG-Within one week, patient will maintain static standing in parallel bars for 30 seconds with Mod A, gait belt, cueing for TTWB precautions, and second person CGA for safety.       Dates: Start: 10/18/22               Problem: Mobility       Dates: Start: 10/18/22         Goal: STG-Within one week, patient will propel wheelchair 50 feet indoors with SPV and cueing with both turns and task attention.       Dates: Start: 10/18/22               Problem: Mobility Transfers       Dates: Start: 10/18/22         Goal: STG-Within one week, patient will perform bed mobility at Min A level with bed rail and cueing.       Dates: Start: 10/18/22            Goal: STG-Within one week, patient will sit to  parallel bars with Mod A, gait belt, cueing for TTWB precautions, and second person CGA for safety.       Dates: Start: 10/18/22            Goal: STG-Within one week, patient will transfer bed to chair with Mod A, gait belt, cueing for TTWB precautions, and second person CGA for safety.       Dates: Start: 10/18/22               Problem: PT-Long Term Goals       Dates: Start: 10/18/22         Goal: LTG-By discharge, patient will propel wheelchair 100 feet indoors at SPV level with cueing.       Dates: Start: 10/18/22            Goal: LTG-By discharge, patient will ambulate 10 feet with FWW, gait belt, cueing for WB precautions, and Min A.       Dates: Start: 10/18/22            Goal: LTG-By discharge, patient will transfer one surface to another with FWW, gait belt, cueing for WB precautions, and Min A.       Dates:  Start: 10/18/22            Goal: LTG-By discharge, patient will perform home exercise program with handout, cueing, and SBA.       Dates: Start: 10/18/22            Goal: LTG-By discharge, patient will transfer in/out of a car with FWW, gait belt, cueing for WB precautions, and Min A.       Dates: Start: 10/18/22

## 2022-10-19 NOTE — THERAPY
Physical Therapy   Daily Treatment     Patient Name: Yvonne Marie Wada  Age:  86 y.o., Sex:  female  Medical Record #: [id number]  Today's Date: 10/19/2022     Precautions  Precautions: Fall Risk, Toe Touch Weight Bearing Right Lower Extremity  Comments: limited by pain    Subjective    Patient received sitting in w/c at bedside; agreeable to PT.     Objective       10/19/22 1101   PT Charge Group   PT Therapeutic Exercise 2   PT Total Time Spent   PT Individual Total Time Spent (Mins) 30   Vitals   O2 (LPM) 0   O2 Delivery Device None - Room Air   Sitting Lower Body Exercises   Sitting Lower Body Exercises Yes   Ankle Pumps Left;3 sets of 10  (3 lbs LLE; no RLE due to fracture location)   Hip Abduction 3 sets of 10  (green/medium exercise band)   Hip Adduction 3 sets of 10  (ball)   Long Arc Quad 3 sets of 10  (3 lbs LLE; surgical splint on RLE)   Marching 2 sets of 10  (3 lbs LLE; surgical splint on RLE)   Other Exercises 3 sets of 10 of B toes flex<>ext   Interdisciplinary Plan of Care Collaboration   IDT Collaboration with  Occupational Therapist;Physician   Patient Position at End of Therapy Seated;Chair Alarm On;Self Releasing Lap Belt Applied;Call Light within Reach;Tray Table within Reach;Phone within Reach   Collaboration Comments TONY SPARROW present for 5 min POC discussion, also mentioned moving bed towards window placement due to yesterday afternoon & patient orientation.       Assessment    Patient has decreasing antalgia noted; improving activity tolerance and BLE strength with only Min A vs tactile cueing provided during LAQ and marching with initial set; impaired carryover with education but patient participates well with structured activity, visual cues, and re-direction to task prn.    Strengths: Pleasant and cooperative, Supportive family, Willingly participates in therapeutic activities  Barriers: Confused, Decreased endurance, Dementia, Difficulty following instructions, Generalized weakness, Home  accessibility, Impaired activity tolerance, Impaired balance, Impaired carryover of learning, Impaired insight/denial of deficits, Impaired functional cognition, Limited mobility, Pain    Plan    Wheelchair mobility and endurance  Bed mobility  Sit<>stands  Ther ex for BLE strengthening, CV endurance  Activity and sitting tolerances  Reinforce TTWB precautions with transfers & standing    Passport items to be completed:  Get in/out of bed safely, in/out of a vehicle, safely use mobility device, walk or wheel around home/community, navigate up and down stairs, show how to get up/down from the ground, ensure home is accessible, demonstrate HEP, complete caregiver training    Physical Therapy Problems (Active)       Problem: Balance       Dates: Start: 10/18/22         Goal: STG-Within one week, patient will maintain static standing in parallel bars for 30 seconds with Mod A, gait belt, cueing for TTWB precautions, and second person CGA for safety.       Dates: Start: 10/18/22               Problem: Mobility       Dates: Start: 10/18/22         Goal: STG-Within one week, patient will propel wheelchair 50 feet indoors with SPV and cueing with both turns and task attention.       Dates: Start: 10/18/22               Problem: Mobility Transfers       Dates: Start: 10/18/22         Goal: STG-Within one week, patient will perform bed mobility at Min A level with bed rail and cueing.       Dates: Start: 10/18/22            Goal: STG-Within one week, patient will sit to  parallel bars with Mod A, gait belt, cueing for TTWB precautions, and second person CGA for safety.       Dates: Start: 10/18/22            Goal: STG-Within one week, patient will transfer bed to chair with Mod A, gait belt, cueing for TTWB precautions, and second person CGA for safety.       Dates: Start: 10/18/22               Problem: PT-Long Term Goals       Dates: Start: 10/18/22         Goal: LTG-By discharge, patient will propel wheelchair 100  feet indoors at SPV level with cueing.       Dates: Start: 10/18/22            Goal: LTG-By discharge, patient will ambulate 10 feet with FWW, gait belt, cueing for WB precautions, and Min A.       Dates: Start: 10/18/22            Goal: LTG-By discharge, patient will transfer one surface to another with FWW, gait belt, cueing for WB precautions, and Min A.       Dates: Start: 10/18/22            Goal: LTG-By discharge, patient will perform home exercise program with handout, cueing, and SBA.       Dates: Start: 10/18/22            Goal: LTG-By discharge, patient will transfer in/out of a car with FWW, gait belt, cueing for WB precautions, and Min A.       Dates: Start: 10/18/22

## 2022-10-19 NOTE — THERAPY
Occupational Therapy  Daily Treatment     Patient Name: Yvonne Marie Wada  Age:  86 y.o., Sex:  female  Medical Record #: [id number]  Today's Date: 10/19/2022     Precautions  Precautions: (P) Fall Risk, Toe Touch Weight Bearing Right Lower Extremity  Comments: (P) limited by pain         Subjective    Agreeable to tx activity presented      Objective       10/19/22 1001   OT Charge Group   OT Therapy Activity 1   OT Therapeutic Exercise  1   OT Total Time Spent   OT Individual Total Time Spent (Mins) 30   Precautions   Precautions Fall Risk;Toe Touch Weight Bearing Right Lower Extremity   Comments limited by pain   Functional Level of Assist   Bed, Chair, Wheelchair Transfer Minimal Assist  (lateral scoot edge of bed to w/c   assist to keep weight off of right foot.)   Sitting Upper Body Exercises   Other Exercise rikshaw  x 10 reps x  3 facing and backed in with 15lbs   focus on biceps triceps and shoulder depressor strengthening.   Bed Mobility    Supine to Sit Minimal Assist   Scooting Minimal Assist   Interdisciplinary Plan of Care Collaboration   Patient Position at End of Therapy Seated;Call Light within Reach;Tray Table within Reach;Chair Alarm On       Assessment    Improved ability to follow directions improved orientation  and ability to assist with transfer this session   Extra time for transfer and bed mobility   Strengths: Able to follow instructions, Motivated for self care and independence, Pleasant and cooperative  Barriers: Decreased endurance, Fatigue, Generalized weakness, Impaired activity tolerance, Impaired balance, Impaired functional cognition, Pain, Limited mobility, Pain poorly managed, Visual impairment    Plan   ADL IADL , related mobility  transfer  at FWW level  strength/endurance building  standing tolerance / balance  incorporating TTWB  right LE    Occupational Therapy Goals (Active)       Problem: Bathing       Dates: Start: 10/18/22         Goal: STG-Within one week, patient will  bathe with Min A overall using AE/DME as needed.       Dates: Start: 10/18/22               Problem: Dressing       Dates: Start: 10/18/22         Goal: STG-Within one week, patient will dress UB with SBA overall using AE/DME as needed.       Dates: Start: 10/18/22            Goal: STG-Within one week, patient will dress LB with Mod A overall using AE/DME as needed.       Dates: Start: 10/18/22               Problem: Functional Transfers       Dates: Start: 10/18/22         Goal: STG-Within one week, patient will transfer to toilet with Max Ax1 overall using AE/DME as needed.       Dates: Start: 10/18/22               Problem: OT Long Term Goals       Dates: Start: 10/18/22         Goal: LTG-By discharge, patient will complete basic self care tasks with supervision overall using AE/DME as needed.       Dates: Start: 10/18/22            Goal: LTG-By discharge, patient will perform bathroom transfers with supervision overall using AE/DME as needed.       Dates: Start: 10/18/22               Problem: Toileting       Dates: Start: 10/18/22         Goal: STG-Within one week, patient will complete toileting tasks with Mod A overall using AE/DME as needed.       Dates: Start: 10/18/22

## 2022-10-19 NOTE — PROGRESS NOTES
Rehab Progress Note     Encounter Date: 10/19/2022    CC: Decreased mobility, pain control    Interval Events (Subjective)  Patient sitting up in therapy gym. She reports therapy is going well. She reports she is still confused. Per therapist she thought it was 2AM instead of 2PM. Discussed would ask to move to window bed to help with orientation. Will reduce Gabapentin as may be contributing to confusion. Denies NVD.     Objective:  VITAL SIGNS: /66   Pulse 77   Temp 36.6 °C (97.9 °F) (Oral)   Resp 18   Wt 68.5 kg (151 lb)   SpO2 97%   BMI 25.92 kg/m²   Gen: NAD  Psych: Mood and affect appropriate  CV: RRR, no edema  Resp: CTAB, no upper airway sounds  Abd: NTND  Neuro: AOx3, following commands  Unchanged from 10/18/22    Recent Results (from the past 72 hour(s))   Basic Metabolic Panel    Collection Time: 10/17/22  2:28 AM   Result Value Ref Range    Sodium 138 135 - 145 mmol/L    Potassium 4.7 3.6 - 5.5 mmol/L    Chloride 107 96 - 112 mmol/L    Co2 18 (L) 20 - 33 mmol/L    Glucose 92 65 - 99 mg/dL    Bun 38 (H) 8 - 22 mg/dL    Creatinine 1.36 0.50 - 1.40 mg/dL    Calcium 8.7 8.4 - 10.2 mg/dL    Anion Gap 13.0 7.0 - 16.0   CBC WITHOUT DIFFERENTIAL    Collection Time: 10/17/22  2:28 AM   Result Value Ref Range    WBC 7.3 4.8 - 10.8 K/uL    RBC 3.12 (L) 4.20 - 5.40 M/uL    Hemoglobin 9.0 (L) 12.0 - 16.0 g/dL    Hematocrit 29.5 (L) 37.0 - 47.0 %    MCV 94.6 81.4 - 97.8 fL    MCH 28.8 27.0 - 33.0 pg    MCHC 30.5 (L) 33.6 - 35.0 g/dL    RDW 53.9 (H) 35.9 - 50.0 fL    Platelet Count 191 164 - 446 K/uL    MPV 10.5 9.0 - 12.9 fL   ESTIMATED GFR    Collection Time: 10/17/22  2:28 AM   Result Value Ref Range    GFR (CKD-EPI) 38 (A) >60 mL/min/1.73 m 2   COV-2, FLU A/B, AND RSV BY PCR (2-4 HOURS CEPHEID): Collect NP swab in VTM    Collection Time: 10/17/22  4:30 PM    Specimen: Respirate   Result Value Ref Range    Influenza virus A RNA Negative Negative    Influenza virus B, PCR Negative Negative    RSV, PCR  Negative Negative    SARS-CoV-2 by PCR NotDetected     SARS-CoV-2 Source NP Swab    CBC with Differential    Collection Time: 10/18/22  5:43 AM   Result Value Ref Range    WBC 5.6 4.8 - 10.8 K/uL    RBC 2.86 (L) 4.20 - 5.40 M/uL    Hemoglobin 8.2 (L) 12.0 - 16.0 g/dL    Hematocrit 26.1 (L) 37.0 - 47.0 %    MCV 91.3 81.4 - 97.8 fL    MCH 28.7 27.0 - 33.0 pg    MCHC 31.4 (L) 33.6 - 35.0 g/dL    RDW 51.7 (H) 35.9 - 50.0 fL    Platelet Count 223 164 - 446 K/uL    MPV 9.6 9.0 - 12.9 fL    Neutrophils-Polys 64.70 44.00 - 72.00 %    Lymphocytes 19.90 (L) 22.00 - 41.00 %    Monocytes 13.10 0.00 - 13.40 %    Eosinophils 1.10 0.00 - 6.90 %    Basophils 0.50 0.00 - 1.80 %    Immature Granulocytes 0.70 0.00 - 0.90 %    Nucleated RBC 0.00 /100 WBC    Neutrophils (Absolute) 3.62 2.00 - 7.15 K/uL    Lymphs (Absolute) 1.11 1.00 - 4.80 K/uL    Monos (Absolute) 0.73 0.00 - 0.85 K/uL    Eos (Absolute) 0.06 0.00 - 0.51 K/uL    Baso (Absolute) 0.03 0.00 - 0.12 K/uL    Immature Granulocytes (abs) 0.04 0.00 - 0.11 K/uL    NRBC (Absolute) 0.00 K/uL   Comp Metabolic Panel (CMP)    Collection Time: 10/18/22  5:43 AM   Result Value Ref Range    Sodium 140 135 - 145 mmol/L    Potassium 4.1 3.6 - 5.5 mmol/L    Chloride 106 96 - 112 mmol/L    Co2 24 20 - 33 mmol/L    Anion Gap 10.0 7.0 - 16.0    Glucose 105 (H) 65 - 99 mg/dL    Bun 29 (H) 8 - 22 mg/dL    Creatinine 1.10 0.50 - 1.40 mg/dL    Calcium 8.5 8.5 - 10.5 mg/dL    AST(SGOT) 16 12 - 45 U/L    ALT(SGPT) 7 2 - 50 U/L    Alkaline Phosphatase 64 30 - 99 U/L    Total Bilirubin 0.7 0.1 - 1.5 mg/dL    Albumin 2.9 (L) 3.2 - 4.9 g/dL    Total Protein 5.5 (L) 6.0 - 8.2 g/dL    Globulin 2.6 1.9 - 3.5 g/dL    A-G Ratio 1.1 g/dL   HEMOGLOBIN A1C    Collection Time: 10/18/22  5:43 AM   Result Value Ref Range    Glycohemoglobin 4.9 4.0 - 5.6 %    Est Avg Glucose 94 mg/dL   TSH with Reflex to FT4    Collection Time: 10/18/22  5:43 AM   Result Value Ref Range    TSH 1.650 0.380 - 5.330 uIU/mL   Vitamin D,  25-hydroxy (blood)    Collection Time: 10/18/22  5:43 AM   Result Value Ref Range    25-Hydroxy   Vitamin D 25 46 30 - 100 ng/mL   ESTIMATED GFR    Collection Time: 10/18/22  5:43 AM   Result Value Ref Range    GFR (CKD-EPI) 49 (A) >60 mL/min/1.73 m 2       Current Facility-Administered Medications   Medication Frequency    Respiratory Therapy Consult Continuous RT    Pharmacy Consult Request ...Pain Management Review 1 Each PHARMACY TO DOSE    hydrALAZINE (APRESOLINE) tablet 25 mg Q8HRS PRN    acetaminophen (Tylenol) tablet 650 mg Q4HRS PRN    senna-docusate (PERICOLACE or SENOKOT S) 8.6-50 MG per tablet 2 Tablet BID    And    polyethylene glycol/lytes (MIRALAX) PACKET 1 Packet QDAY PRN    And    magnesium hydroxide (MILK OF MAGNESIA) suspension 30 mL QDAY PRN    And    bisacodyl (DULCOLAX) suppository 10 mg QDAY PRN    omeprazole (PRILOSEC) capsule 20 mg DAILY    mag hydrox-al hydrox-simeth (MAALOX PLUS ES or MYLANTA DS) suspension 20 mL Q2HRS PRN    ondansetron (ZOFRAN ODT) dispertab 4 mg 4X/DAY PRN    Or    ondansetron (ZOFRAN) syringe/vial injection 4 mg 4X/DAY PRN    sodium chloride (OCEAN) 0.65 % nasal spray 2 Spray PRN    apixaban (ELIQUIS) tablet 5 mg BID    benazepril (LOTENSIN) tablet 40 mg QAM    calcium/vitamin D 250-125 MG-UNIT tablet 1 Tablet DAILY    ezetimibe (ZETIA) tablet 10 mg DAILY    gabapentin (NEURONTIN) capsule 200 mg TID    levothyroxine (SYNTHROID) tablet 75 mcg AM ES    metoprolol SR (TOPROL XL) tablet 50 mg QAM    oxyCODONE immediate-release (ROXICODONE) tablet 2.5 mg Q3HRS PRN    oxyCODONE immediate-release (ROXICODONE) tablet 5 mg Q3HRS PRN    miconazole 2%-zinc oxide (Lily) topical cream Q6HRS PRN       Orders Placed This Encounter   Procedures    Diet Order Diet: Cardiac (meds whole 1-2 at a time with thins); Second Modifier: (optional): Renal     Standing Status:   Standing     Number of Occurrences:   1     Order Specific Question:   Diet:     Answer:   Cardiac [6]     Comments:    meds whole 1-2 at a time with thins     Order Specific Question:   Second Modifier: (optional)     Answer:   Renal [8]       Assessment:  Active Hospital Problems    Diagnosis     *Tibia/fibula fracture, right, closed, initial encounter     Closed right trimalleolar fracture, initial encounter     S/p ankle right orif     Anemia     Constipation     UTI (urinary tract infection)     CKD (chronic kidney disease) stage 3, GFR 30-59 ml/min (HCC)     GERD (gastroesophageal reflux disease)     Hypothyroid     Paroxysmal atrial fibrillation (HCC)     Hypertension        Medical Decision Making and Plan:  R tib/fib fracture - mechanical injury on 10/12/22 s/p ORIF with Dr. Huertas on 10/154/22. TTWB  -PT and OT for mobility and ADLs  -Gabapentin 200 mg TID. PRN Oxycodone. PRN Tylenol  -Reduce Gabapentin to 100 mg as ongoing confusion     Confusion - Patient with recent fall with head strike. May have had a mild TBI. Will have SLP assess    Dysphagia - Will have SLP evaluate for swallow    HTN/ A Fib - Patient on Metoprolol 50 mg daily, benazepril 40 mg daily, and Eliquis      HLD - Patient on Zetia 10 mg daily     Iron deficiency Anemia - Check AM CBC - 10     Azotemia - Check AM CMP - repeat normal      CKD3 - Avoid nephrotoxic agents. Check AM CMP - repeat normal      Hypothyroidism - Patient on Levothyroxine 75 mcg daily     DVT Ppx - Patient on Eliquis    Total time:  26 minutes.  I spent greater than 50% of the time for patient care, counseling, and coordination on this date, including unit/floor time, and face-to-face time with the patient as per interval events and assessment and plan above. Topics discussed included discharge planning, confusion, cognitive deficits with SLP, and reduce Gabapentin.     Mikayla Sutton M.D.

## 2022-10-19 NOTE — DIETARY
Nutrition services: Day 2 of admit.  Yvonne Marie Wada is a 86 y.o. female with admitting DX of Tibia/fibula fracture, right, closed, initial encounter; Closed right trimalleolar fracture, initial encounter.    Consult received for unsure wt loss, no poor PO intake PTA.    Assessment:  Weight: 68.5 kg (151 lb) - via bed scale  Body mass index is 25.92 kg/m²., BMI classification: Overweight; within ideal BMI range for age  Diet/Intake: Cardiac; renal: PO per ADLs, 25-75% x 3 meals    Evaluation:   PMHx includes anemia, aortic valve replacement on eliquis, HTN, osteoporosis. Recent admit to [facility] for R ankle fracture, WILY on CKD.  Per chart hx review, pt's wt generally trending up over past 12 months. Stated admit wt of 150 lb on 10/12/22 consistent w/ current admit wt 151 lb via bed scale.  PO intake prior to transfer to [facility] from [facility] appears to be adequate per ADLs.  Labs: BUN 29, GFR 49, alb 2.9  MAR: eliquis, calcium/vitamin D tablet, synthroid, prilosec, senna    Malnutrition Risk: 2 ASPEN criteria not met at this time; chart hx shows wt gain over past year; no poor PO intake PTA per admit screen    Recommendations/Plan:  Encourage intake of meals >50%.  Document intake of all PO as % taken in ADL's to provide interdisciplinary communication across all shifts.   Monitor weight.  Nutrition rep will continue to see patient for ongoing meal and snack preferences.

## 2022-10-19 NOTE — CARE PLAN
Problem: Bathing  Goal: STG-Within one week, patient will bathe with Min A overall using AE/DME as needed.  Outcome: Progressing     Problem: Dressing  Goal: STG-Within one week, patient will dress UB with SBA overall using AE/DME as needed.  Outcome: Progressing  Goal: STG-Within one week, patient will dress LB with Mod A overall using AE/DME as needed.  Outcome: Progressing     Problem: Toileting  Goal: STG-Within one week, patient will complete toileting tasks with Mod A overall using AE/DME as needed.  Outcome: Progressing     Problem: Functional Transfers  Goal: STG-Within one week, patient will transfer to toilet with Max Ax1 overall using AE/DME as needed.  Outcome: Met

## 2022-10-19 NOTE — CARE PLAN
Problem: Swallowing STGs  Goal: STG-Within one week, patient will tolerate regular textures/thin liquids with no overt s/sx of pen/asp, no c/o globus sensation for 2/2 meals with SPV  Outcome: Not Met  Note: Pt assessed 10/19/22. Will continue to target, anticipate dysphagia intervention to be short term.      Problem: Problem Solving STGs  Goal: STG-Within one week, patient will follow 1-2 step instructions to complete transfers, ADL related tasks with no more than 1 repetition.   Outcome: Not Met  Note: Pt assessed 10/19/22, will continue to target.      Problem: Memory STGs  Goal: STG-Within one week, patient will r/t therapies, transfer sequences, and safety precautions with MOD A in 4/5 trials  Outcome: Not Met  Note: Pt assessed 10/19/22, will continue to target.

## 2022-10-19 NOTE — THERAPY
"Speech Language Pathology   Initial Assessment     Patient Name: Yvonne Marie Wada  AGE:  86 y.o., SEX:  female  Medical Record #: [id number]  Today's Date: 10/19/2022     Subjective    Per Dr. Sutton HPI: \"    Pt agreeable to evaluations with education from SLP. Pt reports a history of difficulty swallowing, however, unable to elaborate further. Reports spouse handles all IADLs at baseline. When asking if this SLP could contact spouse to introduce self and role of SLP as well as pt performance on outcome assessment pt refusing stating \"he doesn't need to know.\" It is unclear if pt's CLOF is baseline or if change in cognition with most recent hospitalizations (current and previous in August when pt had fall with +headstrike).      Objective       10/19/22 0804   Evaluation Charges   Charges Yes   SLP Speech Language Evaluation Speech Sound Language Comprehension   SLP Oral Pharyngeal Evaluation Oral Pharyngeal Evaluation   SLP Total Time Spent   SLP Individual Total Time Spent (Mins) 75   Precautions   Precautions Fall Risk;Toe Touch Weight Bearing Right Lower Extremity   Comments impaired cognition, [place]   Prior Living Situation   Prior Services Other (Comments)  (spouse handles all IADLs per patient)   Housing / Facility [address] [facility]   Lives with - Patient's Self Care Capacity Spouse  (Ren)   Prior Level Of Function   Communication Unknown   Swallow Unknown   Dentition Edentulous   Dentures Lowers;Uppers   Hearing Impaired Both Ears  (pt does not wear hearing aids)   Hearing Aid None   Vision Reading    Patient's Primary Language English   Occupation (Pre-Hospital Vocational) Retired Due To Age   Comments Pt referred for cog/swallow evals per PT/OT reported concerns with cognition   Receptive Language / Auditory Comprehension   Receptive Language / Auditory Comprehension X   Identifies Pictures Minimal (4)   Answers Yes / No Personal Questions Supervision (5)   Answers Yes / No Simple / Contextual Questions " "Minimal (4)   Follows One Unit Commands Minimal (4)   Follows Two Unit Commands Severe (2)   Follows Three Unit Commands Severe (2)   Understands Simple, Structured Conversation  Moderate (3)   Expressive Language   Expressive Language (WDL) X   Naming Moderate (3)   Repeats Speech Accurately Moderate (3)   Automatic Language Appropriate Supervision (5)   Verbalizes Wants / Needs Supervision (5)   Sustain Dialogue Within Given Topic Minimal (4)   Reading Comprehension    Reading Comprehension (WDL) X   Reading Words Within Functional Limits (6-7)   Reading Sentences Minimal (4)   Reading Short Paragraphs  Moderate (3)   Following Written Direction Severe (2)   Functional Reading Materials Severe (2)   Written Language Expression   Written Language Expression (WDL) X   Dominant Hand Right   To Dictation Moderate (3)   Functional Writing (Name, Address) Within Functional Limits (6-7)   Spelling Accuracy Moderate (3)   Legibility Moderate (3)   Cognition   Cognitive-Linguistic (WDL) X   Attention to Task Moderate (3)   Simple Attention Severe (2)   Moderate Attention Severe (2)   Complex Attention Severe (2)   Orientation  Minimal (4)   Visual Scanning / Cancellation Skills Severe (2)   Verbal Short Term Memory 30 Minutes   Visual Short Term Memory Severe (2)   Prospective Memory Severe (2)   Functional Memory Activities Severe (2)   Written Sequencing Severe (2)   Functional Math / Financial Management Severe (2)   Clock Drawing Disorganization;Poor Planning;Omissions   ABS (Agitated Behavior Scale)   Agitated Behavior Scale Performed No   Cognitive Pattern Assessment   Cognitive Pattern Assessment Used BIMS   Brief Interview for Mental Status (BIMS)   Repetition of Three Words (First Attempt) 3   Temporal Orientation: Year Correct   Temporal Orientation: Month Accurate within 5 days   Temporal Orientation: Day Correct   Recall: \"Sock\" No, could not recall   Recall: \"Blue\" No, could not recall   Recall: \"Bed\" No, " "could not recall   BIMS Summary Score 9   Confusion Assessment Method (CAM)   Is there evidence of an acute change in mental status from the patient's baseline? Yes   Inattention Behavior present, fluctuates (comes and goes, changes in severity)   Disorganized thinking Behavior present, fluctuates (comes and goes, changes in severity)   Altered level of consciousness Behavior not present   Social / Pragmatic Communication   Social / Pragmatic Communication X   Topic Maintenance Minimal (4)   Tracheostomy   Tracheostomy No   Speech Mechanisms / Voice Production   Speech Mechanisms / Voice Production (WDL) WDL   Labial Function   Labial Function (WDL) WDL   Lingual Function   Lingual Function (WDL) X   Lingual Structure At Rest Within Functional Limits   Lingual Protrude Minimal  (deviates to right upon protrusion)   Lingual Retract Within Functional Limits   Lateralization No Impairment Right;No Impairment Left   Lick Lips (Circular) Within Functional Limits   Jaw Function   Jaw Function (WDL) WDL   Velar Function   Velar Function (WDL) WDL   Laryngeal Function   Laryngeal Function (WDL) WDL   Swallowing   Swallowing (WDL) X   Thin Liquid Minimal   Single Swallow Thin / Nectar (Cup) Minimal;Thin Liquid   Masticated Foods Within Functional Limits   Dysphagia Strategies / Recommendations   Strategies / Interventions Recommended (Yes / No) Yes   Compensatory Strategies Direct Supervision During Meals;Other (Comment)  (cues for \"quick swallow\" with liquids)   Diet / Liquid Recommendation Thin (0);Regular (7)   Medication Administration  Whole with Liquid Wash   Therapy Interventions Dysphagia Therapy By Speech Language Pathologist;Therapeutic Dining For Meals   Barriers To Oral Feeding   Barriers To Oral Feeding None   Cervical Ausculatation Not Tested   Pre-Feeding Oral Stimulation Trial Not Tested   Swallowing/Nutritional Status   Swallowing/Nutritional Status Regular food   Eating   Assistance Needed Supervision "   Physical Assistance Level No physical assistance   CARE Score - Eating 4   Eating Discharge Goal   Discharge Goal 6   Functional Level of Assist   Eating Supervision   Eating Description Insert dentures;Supervision for safety   Comprehension Moderate Assist   Comprehension Description Verbal cues;Glasses   Expression Moderate Assist   Expression Description Verbal cueing   Social Interaction Minimal Assist   Social Interaction Description Verbal cues;Increased time   Problem Solving Maximal Assist   Problem Solving Description Verbal cueing;Therapy schedule;Seat belt;Increased time;Bed/chair alarm   Memory Maximal Assist   Memory Description Verbal cueing;Therapy schedule;Seat belt;Increased time;Bed/chair alarm   Outcome Measures   Outcome Measures Utilized MASA;SCCAN   MASA (López Assessment of Swallowing Ability)   Alertness 10   Cooperation 10   Auditory Comprehension 8   Respiration 10   Respiratory Rate for Swallow 5   Dysphasia 5   Dyspraxia 5   Dysarthria 5   Saliva 5   Lip Seal 5   Tongue Movement 8   Tongue Strength 10   Tongue Coordination 10   Oral Preparation 10   Gag 5   Palate 10   Bolus Clearance 10   Oral Transit 6   Cough Reflex 5   Voluntary Cough 10   Voice 10   Trache 10   Pharygneal Phase 10   Pharyngeal Response 10   Diet Recommendations Regular   Fluid Recommendations Regular   Swallow Integrity Unlikely dysphagia/aspiration   Total Score 192   Dysphagia Severity No abnormality detected   Aspiration Severity No abnormality detected   SCCAN (Scales of Cognitive and Communicative Ability for Neurorehabilitation)   Oral Expression - Raw Score 9   Oral Expression - Scale Performance Score 47   Orientation - Raw Score 11   Orientation - Scale Performance Score 92   Memory - Raw Score 7   Memory - Scale Performance Score 37   Speech Comprehension - Raw Score 10   Speech Comprehension - Scale Performance Score 77   Reading Comprehension - Raw Score 6   Reading Comprehension - Scale Performance  "Score 50   Writing - Raw Score 4   Writing - Scale Performance Score 57   Attention - Raw Score 6   Attention - Scale Performance Score 38   Problem Solving - Raw Score 6   Problem Solving - Scale Performance Score 26   SCCAN Total Raw Score 49   SCCAN Degree of Severity Moderate Impairment   Problem List   Problem List Cognitive-Linguistic Deficits;Memory Deficit   Current Discharge Plan   Current Discharge Plan Return to Prior Living Situation   Benefit   Therapy Benefit Patient would benefit from Inpatient Rehab Speech-Language Pathology to address above identified deficits.   Interdisciplinary Plan of Care Collaboration   IDT Collaboration with  Physician;Nursing   Patient Position at End of Therapy Seated;Chair Alarm On;Call Light within Reach;Tray Table within Reach   Collaboration Comments nursing administered meds, CLOF and POC with physician   Strengths & Barriers   Strengths Willingly participates in therapeutic activities;Supportive family;Pleasant and cooperative   Barriers Impaired functional cognition   Speech Language Pathologist Assigned   Assigned SLP / Extension EA/60 cog/swallow TDINE NO SPLIT       Assessment    Patient is 86 y.o. female with a diagnosis of tibia/fibula fracture on right, s/p ORIF.  Additional factors influencing patient status/progress (ie: cognitive factors, co-morbidities, social support, etc): acute on CKD, a-fib, impaired cognitive, pt reports history of difficulty swallowing, hx of fall with +headstrike in August of this year, TTWB on R.    SWALLOW: Isolated oral motor exam reveals lingual deviation to R upon protrusion with deviation noted to left when attempting to lateralize. All other structures WFL. Pt assessed during breakfast with regular textures/thin liquids. Pt reports difficulty swallowing however, unable to explain further. With probing from SLP pt reports foods \"going down the wrong pipe\" and c/o globus sensation, however, no overt s/sx of pen/asp nor globus " "sensation noted during evaluation. Pt does exhibit oral holding of liquids for >5 seconds in isolation and when taking medications. Cues for \"quick swallow\" unsuccessful in increasing timeliness of swallow initiation. Recommend pt remain on regular textures/thin liquids, meds whole 1-2 at a time with thins. Pt to enter t-dine for supervision and ongoing assessment of tolerance of current diet. It is anticipated dysphagia intervention to be short term. No instrumental assessment is indicated at this time.     COGNITIVE: Pt performed as follows on the The Scales of Cognitive and Communicative Ability for Neurorehabilitation (SCCAN) which assesses cognitive-communicative deficits and functional ability in patients in rehabilitation hospitals, clinics, and skilled nursing facilities. The SCCAN is appropriate for a broad range of neurological patients, provides a measure of both impairment and functional ability.     SCCAN (Scales of Cognitive and Communicative Ability for Neurorehabilitation)  Oral Expression - Raw Score: 9  Oral Expression - Scale Performance Score: 47  Orientation - Raw Score: 11  Orientation - Scale Performance Score: 92  Memory - Raw Score: 7  Memory - Scale Performance Score: 37  Speech Comprehension - Raw Score: 10  Speech Comprehension - Scale Performance Score: 77  Reading Comprehension - Raw Score: 6  Reading Comprehension - Scale Performance Score: 50  Writing - Raw Score: 4  Writing - Scale Performance Score: 57  Attention - Raw Score: 6  Attention - Scale Performance Score: 38  Problem Solving - Raw Score: 6  Problem Solving - Scale Performance Score: 26  SCCAN Total Raw Score: 49  SCCAN Degree of Severity: Moderate Impairment    It should be noted that pt reached ceiling on several test sections due to inability to comprehend or recall instructions, frequently asking for multiple repetitions. Pt's score if borderline SEVERE Impairment overall (cut off is 46). Per patient spouse handles all " IADLs at baseline. It is unclear if cognitive deficits are new with most recent hospitalization, or with fall in August of this year. Pt refusing to allow SLP to contact spouse to verify at this time. Pt would benefit from cognitive linguistic intervention to address memory, carryover of novel training and recall of safety precautions at this time.         Plan  Recommend Speech Therapy 30-60 minutes per day 5-7 days per week for 2 weeks for the following treatments:  SLP Cognitive Skill Development and SLP Group Treatment.    Passport items to be completed:  arrive to therapy appointments on time, complete daily memory log entries, solve problems related to safety situations, review education related to hospitalization, complete caregiver training     Goals:  Long term and short term goals have been discussed with patient and they are in agreement.    Speech Therapy Problems (Active)       Problem: Memory STGs       Dates: Start: 10/19/22         Goal: STG-Within one week, patient will r/t therapies, transfer sequences, and safety precautions with MOD A in 4/5 trials       Dates: Start: 10/19/22               Problem: Problem Solving STGs       Dates: Start: 10/19/22         Goal: STG-Within one week, patient will follow 1-2 step instructions to complete transfers, ADL related tasks with no more than 1 repetition.        Dates: Start: 10/19/22               Problem: Speech/Swallowing LTGs       Dates: Start: 10/19/22         Goal: LTG-By discharge, patient will safely swallow regular textures/thin liquids with no overt s/sx of pen/asp to continue with baseline diet.        Dates: Start: 10/19/22            Goal: LTG-By discharge, patient will solve basic problems by utilizing compensatory intervention for memory and problem-solving to allow for safe completion of daily activities with SPV       Dates: Start: 10/19/22               Problem: Swallowing STGs       Dates: Start: 10/19/22         Goal: STG-Within one  week, patient will tolerate regular textures/thin liquids with no overt s/sx of pen/asp, no c/o globus sensation for 2/2 meals with SPV       Dates: Start: 10/19/22

## 2022-10-19 NOTE — THERAPY
"Physical Therapy   Daily Treatment     Patient Name: Yvonne Marie Wada  Age:  86 y.o., Sex:  female  Medical Record #: [id number]  Today's Date: 10/18/2022     Precautions  Precautions: Fall Risk, Toe Touch Weight Bearing Right Lower Extremity  Comments: pain limited, pt has glassess    Subjective    Patient received sleeping; startled when awakened; upon gazing at clock, reports \"I'm not doing therapy, it's 2 o'clock in the morning\" (may benefit from bed closer to window again); pt agreeable to PT after re-oriented.     Objective       10/18/22 1401   PT Charge Group   Charges Yes   PT Therapeutic Exercise 1   PT Therapeutic Activities 1   PT Total Time Spent   PT Individual Total Time Spent (Mins) 30   Vitals   O2 (LPM) 0   O2 Delivery Device None - Room Air   Wheelchair Functional Level of Assist   Wheelchair Assist   (Mod A outdoors especially hand-over-hand with turns; SBA-CGA indoors)   Distance Wheelchair (Feet or Distance) 18 ft outdoors, 35 ft indoors   Wheelchair Description Assistance with steering;Extra time;Leg rest management;Impaired coordination;Limited by fatigue;Requires incidental assist;Safety concerns;Supervision for safety;Verbal cueing   Transfer Functional Level of Assist   Bed, Chair, Wheelchair Transfer Total Assist X 2  (Max-Total Ax1 and second person at SBA level this session)   Bed Chair Wheelchair Transfer Description Adaptive equipment;Increased time;Set-up of equipment;Supervision for safety;Verbal cueing   Bed Mobility    Supine to Sit Moderate Assist   Sit to Stand Total Assist X 2   Scooting Maximal Assist   Interdisciplinary Plan of Care Collaboration   Patient Position at End of Therapy Seated;Chair Alarm On;Self Releasing Lap Belt Applied;Other (Comments)  (handoff to OT in gym)       Assessment    Patient has difficulty with problem solving during w/c propulsion (new task learning) especially with turns; requires A to prevent w/c veering to L off sidewalk; " participatory.    Strengths: Pleasant and cooperative, Supportive family, Willingly participates in therapeutic activities  Barriers: Confused, Decreased endurance, Dementia, Difficulty following instructions, Generalized weakness, Home accessibility, Impaired activity tolerance, Impaired balance, Impaired carryover of learning, Impaired insight/denial of deficits, Impaired functional cognition, Limited mobility, Pain    Plan    Wheelchair mobility and endurance  Bed mobility  Sit<>stands  Ther ex for BLE strengthening, CV endurance  Activity and sitting tolerances  Reinforce TTWB precautions with transfers & standing    Passport items to be completed:  Get in/out of bed safely, in/out of a vehicle, safely use mobility device, walk or wheel around home/community, navigate up and down stairs, show how to get up/down from the ground, ensure home is accessible, demonstrate HEP, complete caregiver training    Physical Therapy Problems (Active)       Problem: Balance       Dates: Start: 10/18/22         Goal: STG-Within one week, patient will maintain static standing in parallel bars for 30 seconds with Mod A, gait belt, cueing for TTWB precautions, and second person CGA for safety.       Dates: Start: 10/18/22               Problem: Mobility       Dates: Start: 10/18/22         Goal: STG-Within one week, patient will propel wheelchair 50 feet indoors with SPV and cueing with both turns and task attention.       Dates: Start: 10/18/22               Problem: Mobility Transfers       Dates: Start: 10/18/22         Goal: STG-Within one week, patient will perform bed mobility at Min A level with bed rail and cueing.       Dates: Start: 10/18/22            Goal: STG-Within one week, patient will sit to  parallel bars with Mod A, gait belt, cueing for TTWB precautions, and second person CGA for safety.       Dates: Start: 10/18/22            Goal: STG-Within one week, patient will transfer bed to chair with Mod A, gait  belt, cueing for TTWB precautions, and second person CGA for safety.       Dates: Start: 10/18/22               Problem: PT-Long Term Goals       Dates: Start: 10/18/22         Goal: LTG-By discharge, patient will propel wheelchair 100 feet indoors at SPV level with cueing.       Dates: Start: 10/18/22            Goal: LTG-By discharge, patient will ambulate 10 feet with FWW, gait belt, cueing for WB precautions, and Min A.       Dates: Start: 10/18/22            Goal: LTG-By discharge, patient will transfer one surface to another with FWW, gait belt, cueing for WB precautions, and Min A.       Dates: Start: 10/18/22            Goal: LTG-By discharge, patient will perform home exercise program with handout, cueing, and SBA.       Dates: Start: 10/18/22            Goal: LTG-By discharge, patient will transfer in/out of a car with FWW, gait belt, cueing for WB precautions, and Min A.       Dates: Start: 10/18/22

## 2022-10-20 ENCOUNTER — APPOINTMENT (OUTPATIENT)
Dept: MEDICAL GROUP | Facility: MEDICAL CENTER | Age: 87
End: 2022-10-20
Payer: MEDICARE

## 2022-10-20 PROCEDURE — 97530 THERAPEUTIC ACTIVITIES: CPT

## 2022-10-20 PROCEDURE — 97129 THER IVNTJ 1ST 15 MIN: CPT

## 2022-10-20 PROCEDURE — 92526 ORAL FUNCTION THERAPY: CPT

## 2022-10-20 PROCEDURE — 770010 HCHG ROOM/CARE - REHAB SEMI PRIVAT*

## 2022-10-20 PROCEDURE — 700102 HCHG RX REV CODE 250 W/ 637 OVERRIDE(OP): Performed by: PHYSICAL MEDICINE & REHABILITATION

## 2022-10-20 PROCEDURE — A9270 NON-COVERED ITEM OR SERVICE: HCPCS | Performed by: PHYSICAL MEDICINE & REHABILITATION

## 2022-10-20 PROCEDURE — 97535 SELF CARE MNGMENT TRAINING: CPT

## 2022-10-20 PROCEDURE — 97110 THERAPEUTIC EXERCISES: CPT

## 2022-10-20 PROCEDURE — 99233 SBSQ HOSP IP/OBS HIGH 50: CPT | Performed by: PHYSICAL MEDICINE & REHABILITATION

## 2022-10-20 PROCEDURE — 97130 THER IVNTJ EA ADDL 15 MIN: CPT

## 2022-10-20 RX ADMIN — APIXABAN 5 MG: 5 TABLET, FILM COATED ORAL at 09:34

## 2022-10-20 RX ADMIN — GABAPENTIN 100 MG: 100 CAPSULE ORAL at 21:03

## 2022-10-20 RX ADMIN — GABAPENTIN 100 MG: 100 CAPSULE ORAL at 15:21

## 2022-10-20 RX ADMIN — OMEPRAZOLE 20 MG: 20 CAPSULE, DELAYED RELEASE ORAL at 09:33

## 2022-10-20 RX ADMIN — SENNOSIDES AND DOCUSATE SODIUM 2 TABLET: 50; 8.6 TABLET ORAL at 09:33

## 2022-10-20 RX ADMIN — Medication 1 TABLET: at 09:35

## 2022-10-20 RX ADMIN — OXYCODONE 2.5 MG: 5 TABLET ORAL at 09:34

## 2022-10-20 RX ADMIN — BENAZEPRIL HYDROCHLORIDE 40 MG: 10 TABLET, COATED ORAL at 09:32

## 2022-10-20 RX ADMIN — METOPROLOL SUCCINATE 50 MG: 25 TABLET, EXTENDED RELEASE ORAL at 09:33

## 2022-10-20 RX ADMIN — EZETIMIBE 10 MG: 10 TABLET ORAL at 09:35

## 2022-10-20 RX ADMIN — OXYCODONE 5 MG: 5 TABLET ORAL at 21:03

## 2022-10-20 RX ADMIN — APIXABAN 5 MG: 5 TABLET, FILM COATED ORAL at 21:03

## 2022-10-20 RX ADMIN — GABAPENTIN 100 MG: 100 CAPSULE ORAL at 09:33

## 2022-10-20 RX ADMIN — LEVOTHYROXINE SODIUM 75 MCG: 0.07 TABLET ORAL at 05:27

## 2022-10-20 RX ADMIN — SENNOSIDES AND DOCUSATE SODIUM 2 TABLET: 50; 8.6 TABLET ORAL at 21:03

## 2022-10-20 ASSESSMENT — ACTIVITIES OF DAILY LIVING (ADL): BED_CHAIR_WHEELCHAIR_TRANSFER_DESCRIPTION: INCREASED TIME;SET-UP OF EQUIPMENT;SUPERVISION FOR SAFETY;VERBAL CUEING

## 2022-10-20 ASSESSMENT — PAIN DESCRIPTION - PAIN TYPE: TYPE: ACUTE PAIN

## 2022-10-20 NOTE — THERAPY
Speech Language Pathology  Daily Treatment     Patient Name: Yvonne Marie Wada  Age:  86 y.o., Sex:  female  Medical Record #: [id number]  Today's Date: 10/20/2022     Precautions  Precautions: Fall Risk, Toe Touch Weight Bearing Right Lower Extremity  Comments: limited by pain    Subjective    Pt seen at 1130 and 1404. Pt's son, Jimenez present for afternoon session and encouraged to attend session.      Objective       10/20/22 1404   Treatment Charges   SLP Swallowing Dysfunction Treatment Swallowing Dysfunction Treatment   SLP Cognitive Skill Development First 15 Minutes 1   SLP Cognitive Skill Development Additional 15 Minutes 3   SLP Total Time Spent   SLP Individual Total Time Spent (Mins) 90   Cognition   Verbal Short Term Memory Other (Comments)  (immediate memory)   Visual Short Term Memory Severe (2)   Prospective Memory Severe (2)   Functional Memory Activities Severe (2)   Dysphagia    Diet / Liquid Recommendation Thin (0);Regular (7)   Nutritional Liquid Intake Rating Scale Non thickened beverages   Nutritional Food Intake Rating Scale Total oral diet with no restrictions   Interdisciplinary Plan of Care Collaboration   IDT Collaboration with  Family / Caregiver;Certified Nursing Assistant;Physician   Patient Position at End of Therapy Seated;Call Light within Reach;Family / Friend in Room   Collaboration Comments son present for afternoon session, CLOF with physician during IDTC   Speech Language Pathologist Assigned   Assigned SLP / Extension EA/60 cog only SPLIT OK       Assessment    1130: Pt assessed with regular textures/thin liquids to ensure tolerance of current diet and no overt s/sx of pen/asp. Pt presents with no s/sx of pen/asp, no c/o globus sensation, independent for self feeding at this time. Recommend no further dysphagia intervention. Should change in status occur please reconsult for follow up swallow eval.     1404: Pt's son present for session and encouraged to attend. Per pt's son pt  "\"just like she always is\" as it pertains to cognition. Per patient report she does note changes in cognition from head strike in August, however, when reviewing outcome assessment pt stating \"this stuff isn't important.\" Ongoing education on role of ST and POC as it pertains to memory and carryover of novel training from therapy disciplines. Pt did not recall toe touch weight bearing precautions, stating she was non weight bearing at this time. Introduced pt to memory notebook, however, pt demonstrates difficulty transferring information discussed verbally to written format often writing different statement/topic than discussed immediately prior, I.e. when tasked with writing \"scrambled eggs, 1/2 english muffin\" pt wrote \"not a bk eater\"     Strengths: Willingly participates in therapeutic activities, Supportive family  Barriers: Hearing impairment, Impaired functional cognition    Plan    Continue to reinforce use of memory aids to assist with retention and carryover from other therapy disciplines.     Passport items to be completed:  complete daily memory log entries, solve problems related to safety situations, review education related to hospitalization, complete caregiver training     Speech Therapy Problems (Active)       Problem: Memory STGs       Dates: Start: 10/19/22         Goal: STG-Within one week, patient will r/t therapies, transfer sequences, and safety precautions with MOD A in 4/5 trials       Dates: Start: 10/19/22         Goal Note filed on 10/19/22 1602 by Elen Schneider MS,CCC-SLP       Pt assessed 10/19/22, will continue to target.                  Problem: Problem Solving STGs       Dates: Start: 10/19/22         Goal: STG-Within one week, patient will follow 1-2 step instructions to complete transfers, ADL related tasks with no more than 1 repetition.        Dates: Start: 10/19/22         Goal Note filed on 10/19/22 1602 by Elen Schneider MS,CCC-SLP       Pt assessed 10/19/22, will continue " to target.                  Problem: Speech/Swallowing LTGs       Dates: Start: 10/19/22         Goal: LTG-By discharge, patient will safely swallow regular textures/thin liquids with no overt s/sx of pen/asp to continue with baseline diet.        Dates: Start: 10/19/22            Goal: LTG-By discharge, patient will solve basic problems by utilizing compensatory intervention for memory and problem-solving to allow for safe completion of daily activities with SPV       Dates: Start: 10/19/22               Problem: Swallowing STGs       Dates: Start: 10/19/22         Goal: STG-Within one week, patient will tolerate regular textures/thin liquids with no overt s/sx of pen/asp, no c/o globus sensation for 2/2 meals with SPV       Dates: Start: 10/19/22         Goal Note filed on 10/19/22 1602 by Elen Schneider MS,CCC-SLP       Pt assessed 10/19/22. Will continue to target, anticipate dysphagia intervention to be short term.

## 2022-10-20 NOTE — THERAPY
Occupational Therapy  Daily Treatment     Patient Name: Yvonne Marie Wada  Age:  86 y.o., Sex:  female  Medical Record #: [id number]  Today's Date: 10/20/2022     Precautions  Precautions: Fall Risk, Toe Touch Weight Bearing Right Lower Extremity  Comments: limited by pain         Subjective    Pt seated in w/c at [facility] upon arrival, agreeable to OT session and requesting to use bathroom.       Objective      Correction to Note: pt seen at 0831 for 60 min this date     10/20/22 0931   OT Charge Group   OT Self Care / ADL 4   OT Total Time Spent   OT Individual Total Time Spent (Mins) 60   Functional Level of Assist   Bathing Minimal Assist   Bathing Description Grab bar;Hand held shower;Tub bench;Assit with perineal;Increased time;Initial preparation for task;Verbal cueing;Supervision for safety   Upper Body Dressing Supervision   Lower Body Dressing Maximal Assist   Toileting Maximal Assist   Bed, Chair, Wheelchair Transfer Minimal Assist   Toilet Transfers Moderate Assist  (Stand pivot with GB w/c<>toilet; cues and reminders for WB precuations)   Tub / Shower Transfers Total Assist X 2  (Max x1, SBA x1 for safety and to maintain WB precautions)   Interdisciplinary Plan of Care Collaboration   IDT Collaboration with  Nursing   Patient Position at End of Therapy In Bed;Bed Alarm On;Call Light within Reach;Tray Table within Reach;Phone within Reach;Other (Comments)   Collaboration Comments Meds and rewrapping RLE d/t getting wet in shower.     Assisted RN with limb mobility while in bed to complete re-wrapping bandage on RLE following shower    Assessment    Pt tolerated session, though stating fatigue and frustrated with all of the therapy that she is having to complete. Once in shower pt appreciative, but resistive at first to attempting. Pt required increased A with stand pivot transfer into shower this session ans requires a second person for safety. Pt would benefit from cont work on standing tolerance while  maintaining precautions.     Strengths: Able to follow instructions, Motivated for self care and independence, Pleasant and cooperative  Barriers: Decreased endurance, Fatigue, Generalized weakness, Impaired activity tolerance, Impaired balance, Impaired functional cognition, Pain, Limited mobility, Pain poorly managed, Visual impairment    Plan    ADL with AE as needed, IADLs, functional mobility, transfers at FWW level when able, strength/endurance building , standing tolerance / balance  incorporating TTWB  right LE       Occupational Therapy Goals (Active)       Problem: Bathing       Dates: Start: 10/18/22         Goal: STG-Within one week, patient will bathe with Min A overall using AE/DME as needed.       Dates: Start: 10/18/22               Problem: Dressing       Dates: Start: 10/18/22         Goal: STG-Within one week, patient will dress UB with SBA overall using AE/DME as needed.       Dates: Start: 10/18/22            Goal: STG-Within one week, patient will dress LB with Mod A overall using AE/DME as needed.       Dates: Start: 10/18/22               Problem: OT Long Term Goals       Dates: Start: 10/18/22         Goal: LTG-By discharge, patient will complete basic self care tasks with supervision overall using AE/DME as needed.       Dates: Start: 10/18/22            Goal: LTG-By discharge, patient will perform bathroom transfers with supervision overall using AE/DME as needed.       Dates: Start: 10/18/22               Problem: Toileting       Dates: Start: 10/18/22         Goal: STG-Within one week, patient will complete toileting tasks with Mod A overall using AE/DME as needed.       Dates: Start: 10/18/22

## 2022-10-20 NOTE — PROGRESS NOTES
Rehab Progress Note     Encounter Date: 10/20/2022    CC: Decreased mobility, pain control    Interval Events (Subjective)  Patient sitting up in room. She reports therapy is going well. She reports ongoing confusion. She reports she writes things down so she can remember. Denies NVD. Denies SOB. Discussed would have our IDT for her later today to discuss discharge planning.     IDT Team Meeting 10/20/2022    I, Mikayla Sutton M.D., was present and led the interdisciplinary team conference on 10/20/2022.  I led the IDT conference and agree with the IDT conference documentation and plan of care as noted below.     RN:  Diet Regular   % Meal     Pain    Sleep    Bowel Continent   Bladder Continent   In's & Out's    Dressing changed today    PT:  Bed Mobility    Transfers Min-maxA   Mobility modA   Stairs    Improving weight bearing   Clearing confusion    OT:  Eating    Grooming    Bathing Breanne   UB Dressing    LB Dressing    Toileting maxA   Shower & Transfer maxA     SLP:  49 on SCCAN so low moderate  Impaired working memory    CM:  Continues to work on disposition and DME needs.      DC/Disposition:  10/29/22    Objective:  VITAL SIGNS: /63   Pulse 75   Temp 36.6 °C (97.9 °F) (Oral)   Resp 18   Wt 68.5 kg (151 lb)   SpO2 95%   BMI 25.92 kg/m²   Gen: NAD  Psych: Mood and affect appropriate  CV: RRR, no edema  Resp: CTAB, no upper airway sounds  Abd: NTND  Neuro: AOx3, following commands    Recent Results (from the past 72 hour(s))   COV-2, FLU A/B, AND RSV BY PCR (2-4 HOURS CEPHEID): Collect NP swab in [facility]    Collection Time: 10/17/22  4:30 PM    Specimen: Respirate   Result Value Ref Range    Influenza virus A RNA Negative Negative    Influenza virus B, PCR Negative Negative    RSV, PCR Negative Negative    SARS-CoV-2 by PCR NotDetected     SARS-CoV-2 Source NP Swab    CBC with Differential    Collection Time: 10/18/22  5:43 AM   Result Value Ref Range    WBC 5.6 4.8 - 10.8 K/uL    RBC 2.86  (L) 4.20 - 5.40 M/uL    Hemoglobin 8.2 (L) 12.0 - 16.0 g/dL    Hematocrit 26.1 (L) 37.0 - 47.0 %    MCV 91.3 81.4 - 97.8 fL    MCH 28.7 27.0 - 33.0 pg    MCHC 31.4 (L) 33.6 - 35.0 g/dL    RDW 51.7 (H) 35.9 - 50.0 fL    Platelet Count 223 164 - 446 K/uL    MPV 9.6 9.0 - 12.9 fL    Neutrophils-Polys 64.70 44.00 - 72.00 %    Lymphocytes 19.90 (L) 22.00 - 41.00 %    Monocytes 13.10 0.00 - 13.40 %    Eosinophils 1.10 0.00 - 6.90 %    Basophils 0.50 0.00 - 1.80 %    Immature Granulocytes 0.70 0.00 - 0.90 %    Nucleated RBC 0.00 /100 WBC    Neutrophils (Absolute) 3.62 2.00 - 7.15 K/uL    Lymphs (Absolute) 1.11 1.00 - 4.80 K/uL    Monos (Absolute) 0.73 0.00 - 0.85 K/uL    Eos (Absolute) 0.06 0.00 - 0.51 K/uL    Baso (Absolute) 0.03 0.00 - 0.12 K/uL    Immature Granulocytes (abs) 0.04 0.00 - 0.11 K/uL    NRBC (Absolute) 0.00 K/uL   Comp Metabolic Panel (CMP)    Collection Time: 10/18/22  5:43 AM   Result Value Ref Range    Sodium 140 135 - 145 mmol/L    Potassium 4.1 3.6 - 5.5 mmol/L    Chloride 106 96 - 112 mmol/L    Co2 24 20 - 33 mmol/L    Anion Gap 10.0 7.0 - 16.0    Glucose 105 (H) 65 - 99 mg/dL    Bun 29 (H) 8 - 22 mg/dL    Creatinine 1.10 0.50 - 1.40 mg/dL    Calcium 8.5 8.5 - 10.5 mg/dL    AST(SGOT) 16 12 - 45 U/L    ALT(SGPT) 7 2 - 50 U/L    Alkaline Phosphatase 64 30 - 99 U/L    Total Bilirubin 0.7 0.1 - 1.5 mg/dL    Albumin 2.9 (L) 3.2 - 4.9 g/dL    Total Protein 5.5 (L) 6.0 - 8.2 g/dL    Globulin 2.6 1.9 - 3.5 g/dL    A-G Ratio 1.1 g/dL   HEMOGLOBIN A1C    Collection Time: 10/18/22  5:43 AM   Result Value Ref Range    Glycohemoglobin 4.9 4.0 - 5.6 %    Est Avg Glucose 94 mg/dL   TSH with Reflex to FT4    Collection Time: 10/18/22  5:43 AM   Result Value Ref Range    TSH 1.650 0.380 - 5.330 uIU/mL   Vitamin D, 25-hydroxy (blood)    Collection Time: 10/18/22  5:43 AM   Result Value Ref Range    25-Hydroxy   Vitamin D 25 46 30 - 100 ng/mL   ESTIMATED GFR    Collection Time: 10/18/22  5:43 AM   Result Value Ref Range     GFR (CKD-EPI) 49 (A) >60 mL/min/1.73 m 2       Current Facility-Administered Medications   Medication Frequency    gabapentin (NEURONTIN) capsule 100 mg TID    Respiratory Therapy Consult Continuous RT    hydrALAZINE (APRESOLINE) tablet 25 mg Q8HRS PRN    acetaminophen (Tylenol) tablet 650 mg Q4HRS PRN    senna-docusate (PERICOLACE or SENOKOT S) 8.6-50 MG per tablet 2 Tablet BID    And    polyethylene glycol/lytes (MIRALAX) PACKET 1 Packet QDAY PRN    And    magnesium hydroxide (MILK OF MAGNESIA) suspension 30 mL QDAY PRN    And    bisacodyl (DULCOLAX) suppository 10 mg QDAY PRN    omeprazole (PRILOSEC) capsule 20 mg DAILY    mag hydrox-al hydrox-simeth (MAALOX PLUS ES or MYLANTA DS) suspension 20 mL Q2HRS PRN    ondansetron (ZOFRAN ODT) dispertab 4 mg 4X/DAY PRN    Or    ondansetron (ZOFRAN) syringe/vial injection 4 mg 4X/DAY PRN    sodium chloride (OCEAN) 0.65 % nasal spray 2 Spray PRN    apixaban (ELIQUIS) tablet 5 mg BID    benazepril (LOTENSIN) tablet 40 mg QAM    calcium/vitamin D 250-125 MG-UNIT tablet 1 Tablet DAILY    ezetimibe (ZETIA) tablet 10 mg DAILY    levothyroxine (SYNTHROID) tablet 75 mcg AM ES    metoprolol SR (TOPROL XL) tablet 50 mg QAM    oxyCODONE immediate-release (ROXICODONE) tablet 2.5 mg Q3HRS PRN    oxyCODONE immediate-release (ROXICODONE) tablet 5 mg Q3HRS PRN    miconazole 2%-zinc oxide (Lily) topical cream Q6HRS PRN       Orders Placed This Encounter   Procedures    Diet Order Diet: Cardiac (meds whole 1-2 at a time with thins); Second Modifier: (optional): Renal     Standing Status:   Standing     Number of Occurrences:   1     Order Specific Question:   Diet:     Answer:   Cardiac [6]     Comments:   meds whole 1-2 at a time with thins     Order Specific Question:   Second Modifier: (optional)     Answer:   Renal [8]       Assessment:  Active Hospital Problems    Diagnosis     *Tibia/fibula fracture, right, closed, initial encounter     Closed right trimalleolar fracture, initial  encounter     S/p ankle right orif     Anemia     Constipation     UTI (urinary tract infection)     CKD (chronic kidney disease) stage 3, GFR 30-59 ml/min (HCC)     GERD (gastroesophageal reflux disease)     Hypothyroid     Paroxysmal atrial fibrillation (HCC)     Hypertension        Medical Decision Making and Plan:  R tib/fib fracture - mechanical injury on 10/12/22 s/p ORIF with Dr. Huertas on 10/154/22. TTWB  -PT and OT for mobility and ADLs  -Gabapentin 200 mg TID. PRN Oxycodone. PRN Tylenol  -Reduce Gabapentin to 100 mg as ongoing confusion     Confusion - Patient with recent fall with head strike. May have had a mild TBI. Will have SLP assess. With cognitive deficits. Will check UA as now paranoid.    Dysphagia - Will have SLP evaluate for swallow    HTN/ A Fib - Patient on Metoprolol 50 mg daily, benazepril 40 mg daily, and Eliquis      HLD - Patient on Zetia 10 mg daily     Iron deficiency Anemia - Check AM CBC - 10     Azotemia - Check AM CMP - repeat normal      CKD3 - Avoid nephrotoxic agents. Check AM CMP - repeat normal      Hypothyroidism - Patient on Levothyroxine 75 mcg daily     DVT Ppx - Patient on Eliquis    Total time:  36 minutes.  I spent greater than 50% of the time for patient care, counseling, and coordination on this date, including unit/floor time, and face-to-face time with the patient as per interval events and assessment and plan above. Topics discussed included discharge planning, paranoia/confusion, check UA, and monitor opiate use. Patient was discussed separately in IDT today; please see details above.    Mikayla Sutton M.D.

## 2022-10-20 NOTE — CARE PLAN
The patient is Stable - Low risk of patient condition declining or worsening    Shift Goals  Clinical Goals: Safety  Patient Goals: pain control    Progress made toward(s) clinical / shift goals:  pt a/o x3. Assessment completed as documented. Pain rated at 6/10 and pt received oxycodone prn. Meds taken whole without issue. R ankle splint is c/d/I. Safety precautions in place . All needs met.    Patient is not progressing towards the following goals:

## 2022-10-20 NOTE — THERAPY
Physical Therapy   Daily Treatment     Patient Name: Yvonne Marie Wada  Age:  86 y.o., Sex:  female  Medical Record #: [id number]  Today's Date: 10/20/2022     Precautions  Precautions: Fall Risk, Toe Touch Weight Bearing Right Lower Extremity  Comments: limited by pain    Subjective    Patient received supine in bed; agreeable to PT; reports increased pressure in lower RLE with transition from supine to sit, occasional/intermittent burning.; RN present initially.     Objective       10/20/22 0932   PT Charge Group   PT Therapeutic Exercise 1   PT Therapeutic Activities 1   PT Total Time Spent   PT Individual Total Time Spent (Mins) 30   Vitals   O2 (LPM) 0   O2 Delivery Device None - Room Air   Transfer Functional Level of Assist   Bed, Chair, Wheelchair Transfer Maximal Assist  (EOB to w/c)   Bed Chair Wheelchair Transfer Description Increased time;Set-up of equipment;Supervision for safety;Verbal cueing  (Reach-pivot; fair to good TTWB adherence; requires increased A with end of pivot and stand to sit in w/c this session)   Standing Lower Body Exercises   Standing Lower Body Exercises Yes   Other Exercises Sit<>stands 5 sets of 1 rep in // bars with MIn-Mod A, requires cueing for improved WB adherence & sequencing prn.  While standing, 3 reps each stand of R hip flexion with either marching or stepping ant<>post, Min A at waist.   Bed Mobility    Supine to Sit Contact Guard Assist   Sit to Stand Maximal Assist  (Mod A at EOB with 2 UE support; Max A with 1 UE support into w/c; Mod A to Min A in // bars x5 reps with cueing for fair to good TTWB RLE adherence)   Scooting Contact Guard Assist   Interdisciplinary Plan of Care Collaboration   IDT Collaboration with  Occupational Therapist;Nursing   Patient Position at End of Therapy Seated;Chair Alarm On;Self Releasing Lap Belt Applied;Call Light within Reach;Tray Table within Reach;Phone within Reach   Collaboration Comments treatment plan, meds admin        Assessment    Patient has improving sit<>stands when provided with BUE support and cueing for improved WB adherence; improving strength and decreasing antalgia noted; impaired standing tolerance at this time; pleasant and good participation within structured setting.    Strengths: Pleasant and cooperative, Supportive family, Willingly participates in therapeutic activities  Barriers: Confused, Decreased endurance, Dementia, Difficulty following instructions, Generalized weakness, Home accessibility, Impaired activity tolerance, Impaired balance, Impaired carryover of learning, Impaired insight/denial of deficits, Impaired functional cognition, Limited mobility, Pain    Plan    Wheelchair mobility and endurance  Bed mobility  Sit<>stands within // bars vs FWW  Ther ex for BLE strengthening, CV endurance  Activity and sitting tolerances  Reinforce TTWB precautions with transfers & standing    Passport items to be completed:  Get in/out of bed safely, in/out of a vehicle, safely use mobility device, walk or wheel around home/community, navigate up and down stairs, show how to get up/down from the ground, ensure home is accessible, demonstrate HEP, complete caregiver training    Physical Therapy Problems (Active)       Problem: Balance       Dates: Start: 10/18/22         Goal: STG-Within one week, patient will maintain static standing in parallel bars for 30 seconds with Mod A, gait belt, cueing for TTWB precautions, and second person CGA for safety.       Dates: Start: 10/18/22         Goal Note filed on 10/20/22 1138 by Alber Veras, PT       PT orestes completed on 10/18/22; will monitor pt's progress towards STGs this week.                 Problem: Mobility       Dates: Start: 10/18/22         Goal: STG-Within one week, patient will propel wheelchair 50 feet indoors with SPV and cueing with both turns and task attention.       Dates: Start: 10/18/22         Goal Note filed on 10/20/22 1138 by  Alber Veras, PT       PT eval completed on 10/18/22; will monitor pt's progress towards STGs this week.                 Problem: Mobility Transfers       Dates: Start: 10/18/22         Goal: STG-Within one week, patient will perform bed mobility at Min A level with bed rail and cueing.       Dates: Start: 10/18/22         Goal Note filed on 10/20/22 1138 by Alber Veras, PT       PT eval completed on 10/18/22; will monitor pt's progress towards STGs this week.              Goal: STG-Within one week, patient will sit to  parallel bars with Mod A, gait belt, cueing for TTWB precautions, and second person CGA for safety.       Dates: Start: 10/18/22         Goal Note filed on 10/20/22 1138 by Alber Veras, PT       PT eval completed on 10/18/22; will monitor pt's progress towards STGs this week.              Goal: STG-Within one week, patient will transfer bed to chair with Mod A, gait belt, cueing for TTWB precautions, and second person CGA for safety.       Dates: Start: 10/18/22         Goal Note filed on 10/20/22 1138 by Alber Veras, PT       PT eval completed on 10/18/22; will monitor pt's progress towards STGs this week.                 Problem: PT-Long Term Goals       Dates: Start: 10/18/22         Goal: LTG-By discharge, patient will propel wheelchair 100 feet indoors at SPV level with cueing.       Dates: Start: 10/18/22            Goal: LTG-By discharge, patient will ambulate 10 feet with FWW, gait belt, cueing for WB precautions, and Min A.       Dates: Start: 10/18/22            Goal: LTG-By discharge, patient will transfer one surface to another with FWW, gait belt, cueing for WB precautions, and Min A.       Dates: Start: 10/18/22            Goal: LTG-By discharge, patient will perform home exercise program with handout, cueing, and SBA.       Dates: Start: 10/18/22            Goal: LTG-By discharge, patient will transfer in/out of a car with FWW, gait belt,  cueing for WB precautions, and Min A.       Dates: Start: 10/18/22

## 2022-10-20 NOTE — CARE PLAN
Problem: Balance  Goal: STG-Within one week, patient will maintain static standing in parallel bars for 30 seconds with Mod A, gait belt, cueing for TTWB precautions, and second person CGA for safety.  Outcome: Progressing  Note: PT eval completed on 10/18/22; will monitor pt's progress towards STGs this week.     Problem: Mobility  Goal: STG-Within one week, patient will propel wheelchair 50 feet indoors with SPV and cueing with both turns and task attention.  Outcome: Progressing  Note: PT eval completed on 10/18/22; will monitor pt's progress towards STGs this week.     Problem: Mobility Transfers  Goal: STG-Within one week, patient will perform bed mobility at Min A level with bed rail and cueing.  Outcome: Progressing  Note: PT eval completed on 10/18/22; will monitor pt's progress towards STGs this week.  Goal: STG-Within one week, patient will sit to  parallel bars with Mod A, gait belt, cueing for TTWB precautions, and second person CGA for safety.  Outcome: Progressing  Note: PT eval completed on 10/18/22; will monitor pt's progress towards STGs this week.  Goal: STG-Within one week, patient will transfer bed to chair with Mod A, gait belt, cueing for TTWB precautions, and second person CGA for safety.  Outcome: Progressing  Note: PT eval completed on 10/18/22; will monitor pt's progress towards STGs this week.

## 2022-10-20 NOTE — THERAPY
10/20/22 1345   IDT Conference   IDT Conference I have reviewed and discussed the POC and barriers to discharge for this patient.  I am knowledgeable of the patient's needs and have attended the IDT Conference.  (D/C anticipated around next Saturday, 10/29/22)

## 2022-10-20 NOTE — PROGRESS NOTES
Received shift report and assumed care of patient.  Patient awake, calm and stable, currently positioned in bed for comfort and safety; call light within reach.  4/10 right leg pain at this time, see mar for medication.  Will continue to monitor.

## 2022-10-21 ENCOUNTER — HOSPITAL ENCOUNTER (EMERGENCY)
Facility: MEDICAL CENTER | Age: 87
End: 2022-10-21
Attending: EMERGENCY MEDICINE
Payer: COMMERCIAL

## 2022-10-21 ENCOUNTER — APPOINTMENT (OUTPATIENT)
Dept: RADIOLOGY | Facility: MEDICAL CENTER | Age: 87
End: 2022-10-21
Attending: EMERGENCY MEDICINE
Payer: COMMERCIAL

## 2022-10-21 VITALS
HEIGHT: 64 IN | RESPIRATION RATE: 21 BRPM | WEIGHT: 151 LBS | BODY MASS INDEX: 25.78 KG/M2 | TEMPERATURE: 98.3 F | DIASTOLIC BLOOD PRESSURE: 59 MMHG | OXYGEN SATURATION: 98 % | HEART RATE: 84 BPM | SYSTOLIC BLOOD PRESSURE: 120 MMHG

## 2022-10-21 DIAGNOSIS — Z87.81 S/P ORIF (OPEN REDUCTION INTERNAL FIXATION) FRACTURE: ICD-10-CM

## 2022-10-21 DIAGNOSIS — I48.91 ATRIAL FIBRILLATION WITH RVR (HCC): ICD-10-CM

## 2022-10-21 DIAGNOSIS — D64.9 SEVERE ANEMIA: ICD-10-CM

## 2022-10-21 DIAGNOSIS — Z79.01 ANTICOAGULATED: ICD-10-CM

## 2022-10-21 DIAGNOSIS — Z98.890 S/P ORIF (OPEN REDUCTION INTERNAL FIXATION) FRACTURE: ICD-10-CM

## 2022-10-21 LAB
ABO GROUP BLD: NORMAL
ALBUMIN SERPL BCP-MCNC: 3.4 G/DL (ref 3.2–4.9)
ALBUMIN/GLOB SERPL: 1.4 G/DL
ALP SERPL-CCNC: 78 U/L (ref 30–99)
ALT SERPL-CCNC: 11 U/L (ref 2–50)
ANION GAP SERPL CALC-SCNC: 13 MMOL/L (ref 7–16)
APPEARANCE UR: ABNORMAL
AST SERPL-CCNC: 14 U/L (ref 12–45)
BACTERIA #/AREA URNS HPF: ABNORMAL /HPF
BARCODED ABORH UBTYP: 6200
BARCODED PRD CODE UBPRD: NORMAL
BARCODED UNIT NUM UBUNT: NORMAL
BASOPHILS # BLD AUTO: 0.9 % (ref 0–1.8)
BASOPHILS # BLD: 0.05 K/UL (ref 0–0.12)
BILIRUB SERPL-MCNC: 0.6 MG/DL (ref 0.1–1.5)
BILIRUB UR QL STRIP.AUTO: NEGATIVE
BLD GP AB SCN SERPL QL: NORMAL
BUN SERPL-MCNC: 23 MG/DL (ref 8–22)
CALCIUM SERPL-MCNC: 8.8 MG/DL (ref 8.5–10.5)
CHLORIDE SERPL-SCNC: 102 MMOL/L (ref 96–112)
CO2 SERPL-SCNC: 20 MMOL/L (ref 20–33)
COLOR UR: YELLOW
COMPONENT R 8504R: NORMAL
CREAT SERPL-MCNC: 1.15 MG/DL (ref 0.5–1.4)
EKG IMPRESSION: NORMAL
EOSINOPHIL # BLD AUTO: 0.1 K/UL (ref 0–0.51)
EOSINOPHIL NFR BLD: 1.9 % (ref 0–6.9)
EPI CELLS #/AREA URNS HPF: ABNORMAL /HPF
ERYTHROCYTE [DISTWIDTH] IN BLOOD BY AUTOMATED COUNT: 49.6 FL (ref 35.9–50)
GFR SERPLBLD CREATININE-BSD FMLA CKD-EPI: 46 ML/MIN/1.73 M 2
GLOBULIN SER CALC-MCNC: 2.5 G/DL (ref 1.9–3.5)
GLUCOSE SERPL-MCNC: 149 MG/DL (ref 65–99)
GLUCOSE UR STRIP.AUTO-MCNC: NEGATIVE MG/DL
HCT VFR BLD AUTO: 25.2 % (ref 37–47)
HGB BLD-MCNC: 7.9 G/DL (ref 12–16)
HYALINE CASTS #/AREA URNS LPF: ABNORMAL /LPF
IMM GRANULOCYTES # BLD AUTO: 0.07 K/UL (ref 0–0.11)
IMM GRANULOCYTES NFR BLD AUTO: 1.3 % (ref 0–0.9)
KETONES UR STRIP.AUTO-MCNC: NEGATIVE MG/DL
LEUKOCYTE ESTERASE UR QL STRIP.AUTO: ABNORMAL
LYMPHOCYTES # BLD AUTO: 1.06 K/UL (ref 1–4.8)
LYMPHOCYTES NFR BLD: 19.7 % (ref 22–41)
MCH RBC QN AUTO: 28.6 PG (ref 27–33)
MCHC RBC AUTO-ENTMCNC: 31.3 G/DL (ref 33.6–35)
MCV RBC AUTO: 91.3 FL (ref 81.4–97.8)
MICRO URNS: ABNORMAL
MONOCYTES # BLD AUTO: 0.61 K/UL (ref 0–0.85)
MONOCYTES NFR BLD AUTO: 11.3 % (ref 0–13.4)
NEUTROPHILS # BLD AUTO: 3.5 K/UL (ref 2–7.15)
NEUTROPHILS NFR BLD: 64.9 % (ref 44–72)
NITRITE UR QL STRIP.AUTO: NEGATIVE
NRBC # BLD AUTO: 0 K/UL
NRBC BLD-RTO: 0 /100 WBC
PH UR STRIP.AUTO: 6 [PH] (ref 5–8)
PLATELET # BLD AUTO: 316 K/UL (ref 164–446)
PMV BLD AUTO: 9.4 FL (ref 9–12.9)
POTASSIUM SERPL-SCNC: 3.8 MMOL/L (ref 3.6–5.5)
PRODUCT TYPE UPROD: NORMAL
PROT SERPL-MCNC: 5.9 G/DL (ref 6–8.2)
PROT UR QL STRIP: 30 MG/DL
RBC # BLD AUTO: 2.76 M/UL (ref 4.2–5.4)
RBC # URNS HPF: ABNORMAL /HPF
RBC UR QL AUTO: NEGATIVE
RH BLD: NORMAL
SODIUM SERPL-SCNC: 135 MMOL/L (ref 135–145)
SP GR UR STRIP.AUTO: 1.01
T4 FREE SERPL-MCNC: 1.55 NG/DL (ref 0.93–1.7)
TROPONIN T SERPL-MCNC: 42 NG/L (ref 6–19)
TSH SERPL DL<=0.005 MIU/L-ACNC: 2.12 UIU/ML (ref 0.38–5.33)
UNIT STATUS USTAT: NORMAL
UROBILINOGEN UR STRIP.AUTO-MCNC: 1 MG/DL
WBC # BLD AUTO: 5.4 K/UL (ref 4.8–10.8)
WBC #/AREA URNS HPF: ABNORMAL /HPF

## 2022-10-21 PROCEDURE — 770010 HCHG ROOM/CARE - REHAB SEMI PRIVAT*

## 2022-10-21 PROCEDURE — 84484 ASSAY OF TROPONIN QUANT: CPT

## 2022-10-21 PROCEDURE — 99233 SBSQ HOSP IP/OBS HIGH 50: CPT | Performed by: PHYSICAL MEDICINE & REHABILITATION

## 2022-10-21 PROCEDURE — 36430 TRANSFUSION BLD/BLD COMPNT: CPT

## 2022-10-21 PROCEDURE — 36415 COLL VENOUS BLD VENIPUNCTURE: CPT

## 2022-10-21 PROCEDURE — 81001 URINALYSIS AUTO W/SCOPE: CPT

## 2022-10-21 PROCEDURE — 93005 ELECTROCARDIOGRAM TRACING: CPT

## 2022-10-21 PROCEDURE — 86901 BLOOD TYPING SEROLOGIC RH(D): CPT

## 2022-10-21 PROCEDURE — 80053 COMPREHEN METABOLIC PANEL: CPT

## 2022-10-21 PROCEDURE — 71045 X-RAY EXAM CHEST 1 VIEW: CPT

## 2022-10-21 PROCEDURE — 93005 ELECTROCARDIOGRAM TRACING: CPT | Mod: 76 | Performed by: EMERGENCY MEDICINE

## 2022-10-21 PROCEDURE — 85025 COMPLETE CBC W/AUTO DIFF WBC: CPT

## 2022-10-21 PROCEDURE — 87077 CULTURE AEROBIC IDENTIFY: CPT | Mod: 91

## 2022-10-21 PROCEDURE — P9016 RBC LEUKOCYTES REDUCED: HCPCS

## 2022-10-21 PROCEDURE — 97110 THERAPEUTIC EXERCISES: CPT

## 2022-10-21 PROCEDURE — A9270 NON-COVERED ITEM OR SERVICE: HCPCS | Performed by: PHYSICAL MEDICINE & REHABILITATION

## 2022-10-21 PROCEDURE — 97530 THERAPEUTIC ACTIVITIES: CPT

## 2022-10-21 PROCEDURE — 84439 ASSAY OF FREE THYROXINE: CPT

## 2022-10-21 PROCEDURE — 93010 ELECTROCARDIOGRAM REPORT: CPT | Performed by: INTERNAL MEDICINE

## 2022-10-21 PROCEDURE — 86900 BLOOD TYPING SEROLOGIC ABO: CPT

## 2022-10-21 PROCEDURE — 700102 HCHG RX REV CODE 250 W/ 637 OVERRIDE(OP): Performed by: PHYSICAL MEDICINE & REHABILITATION

## 2022-10-21 PROCEDURE — 84443 ASSAY THYROID STIM HORMONE: CPT

## 2022-10-21 PROCEDURE — 87086 URINE CULTURE/COLONY COUNT: CPT

## 2022-10-21 PROCEDURE — 96374 THER/PROPH/DIAG INJ IV PUSH: CPT

## 2022-10-21 PROCEDURE — 700111 HCHG RX REV CODE 636 W/ 250 OVERRIDE (IP): Performed by: EMERGENCY MEDICINE

## 2022-10-21 PROCEDURE — 86923 COMPATIBILITY TEST ELECTRIC: CPT

## 2022-10-21 PROCEDURE — 99285 EMERGENCY DEPT VISIT HI MDM: CPT

## 2022-10-21 PROCEDURE — 87186 SC STD MICRODIL/AGAR DIL: CPT

## 2022-10-21 PROCEDURE — 86850 RBC ANTIBODY SCREEN: CPT

## 2022-10-21 RX ORDER — NYSTATIN 100000 [USP'U]/G
POWDER TOPICAL 2 TIMES DAILY
Status: DISPENSED | OUTPATIENT
Start: 2022-10-21 | End: 2022-10-26

## 2022-10-21 RX ORDER — OXYCODONE HYDROCHLORIDE 5 MG/1
2.5-5 TABLET ORAL
Status: ON HOLD | COMMUNITY
End: 2022-10-28

## 2022-10-21 RX ORDER — AMOXICILLIN 250 MG
2 CAPSULE ORAL 2 TIMES DAILY
Status: SHIPPED | COMMUNITY
End: 2023-02-07

## 2022-10-21 RX ORDER — DILTIAZEM HYDROCHLORIDE 5 MG/ML
20 INJECTION INTRAVENOUS ONCE
Status: COMPLETED | OUTPATIENT
Start: 2022-10-21 | End: 2022-10-21

## 2022-10-21 RX ORDER — DILTIAZEM HYDROCHLORIDE 5 MG/ML
10 INJECTION INTRAVENOUS ONCE
Status: DISCONTINUED | OUTPATIENT
Start: 2022-10-21 | End: 2022-10-21 | Stop reason: HOSPADM

## 2022-10-21 RX ADMIN — GABAPENTIN 100 MG: 100 CAPSULE ORAL at 09:21

## 2022-10-21 RX ADMIN — OMEPRAZOLE 20 MG: 20 CAPSULE, DELAYED RELEASE ORAL at 09:21

## 2022-10-21 RX ADMIN — APIXABAN 5 MG: 5 TABLET, FILM COATED ORAL at 09:21

## 2022-10-21 RX ADMIN — SENNOSIDES AND DOCUSATE SODIUM 2 TABLET: 50; 8.6 TABLET ORAL at 09:21

## 2022-10-21 RX ADMIN — DILTIAZEM HYDROCHLORIDE 20 MG: 5 INJECTION INTRAVENOUS at 10:27

## 2022-10-21 RX ADMIN — Medication 1 TABLET: at 09:21

## 2022-10-21 RX ADMIN — SENNOSIDES AND DOCUSATE SODIUM 2 TABLET: 50; 8.6 TABLET ORAL at 19:34

## 2022-10-21 RX ADMIN — APIXABAN 5 MG: 5 TABLET, FILM COATED ORAL at 19:34

## 2022-10-21 RX ADMIN — EZETIMIBE 10 MG: 10 TABLET ORAL at 09:21

## 2022-10-21 RX ADMIN — GABAPENTIN 100 MG: 100 CAPSULE ORAL at 19:34

## 2022-10-21 RX ADMIN — LEVOTHYROXINE SODIUM 75 MCG: 0.07 TABLET ORAL at 05:40

## 2022-10-21 RX ADMIN — OXYCODONE 5 MG: 5 TABLET ORAL at 19:33

## 2022-10-21 ASSESSMENT — LIFESTYLE VARIABLES
HOW MANY TIMES IN THE PAST YEAR HAVE YOU HAD 5 OR MORE DRINKS IN A DAY: 0
TOTAL SCORE: 0
CONSUMPTION TOTAL: NEGATIVE
HAVE PEOPLE ANNOYED YOU BY CRITICIZING YOUR DRINKING: NO
DO YOU DRINK ALCOHOL: NO
EVER FELT BAD OR GUILTY ABOUT YOUR DRINKING: NO
HAVE YOU EVER FELT YOU SHOULD CUT DOWN ON YOUR DRINKING: NO
TOTAL SCORE: 0
TOTAL SCORE: 0
ON A TYPICAL DAY WHEN YOU DRINK ALCOHOL HOW MANY DRINKS DO YOU HAVE: 0
EVER HAD A DRINK FIRST THING IN THE MORNING TO STEADY YOUR NERVES TO GET RID OF A HANGOVER: NO
AVERAGE NUMBER OF DAYS PER WEEK YOU HAVE A DRINK CONTAINING ALCOHOL: 0

## 2022-10-21 ASSESSMENT — FIBROSIS 4 INDEX: FIB4 SCORE: 2.31

## 2022-10-21 NOTE — PROGRESS NOTES
Rehab Progress Note     Encounter Date: 10/21/2022    CC: Decreased mobility, pain control    Interval Events (Subjective)  Patient sitting up in room. She has had chest tightness and palpitations after therapy. Her HR has been from 140-150s. She reports low SBP. Rapid response was called by PT. UA was done overnight and positive, urine culture sent. Send to ED for work-up in case of sepsis vs uncontrolled A Fib.     IDT Team Meeting 10/20/2022  DC/Disposition:  10/29/22    Objective:  VITAL SIGNS: /62   Pulse (!) 152   Temp 36.9 °C (98.4 °F) (Oral)   Resp 19   Wt 68.5 kg (151 lb)   SpO2 95%   BMI 25.92 kg/m²   Gen: NAD  Psych: Mood and affect appropriate  CV: irregularly irregular, no edema  Resp: CTAB, no upper airway sounds  Abd: NTND  Neuro: AOx3, following commands but confused    Recent Results (from the past 72 hour(s))   URINALYSIS    Collection Time: 10/21/22  1:25 AM    Specimen: Urine, Clean Catch   Result Value Ref Range    Color Yellow     Character Cloudy (A)     Specific Gravity 1.013 <1.035    Ph 6.0 5.0 - 8.0    Glucose Negative Negative mg/dL    Ketones Negative Negative mg/dL    Protein 30 (A) Negative mg/dL    Bilirubin Negative Negative    Urobilinogen, Urine 1.0 Negative    Nitrite Negative Negative    Leukocyte Esterase Large (A) Negative    Occult Blood Negative Negative    Micro Urine Req Microscopic    URINE MICROSCOPIC (W/UA)    Collection Time: 10/21/22  1:25 AM   Result Value Ref Range    WBC Packed (A) /hpf    RBC 0-2 /hpf    Bacteria Many (A) None /hpf    Epithelial Cells Few /hpf    Hyaline Cast 0-2 /lpf   EKG (NOW)    Collection Time: 10/21/22  9:09 AM   Result Value Ref Range    Report       [facility]    Test Date:  2022-10-21  Pt Name:    YVONNE WADA                  Department: ER  MRN:        [id number]                      Room:       RD 09  Gender:     Female                       Technician: [id number]  :        1935                   Requested By:ER TRIAGE  PROTOCOL  Order #:    [id number]                    Reading MD:    Measurements  Intervals                                Axis  Rate:       150                          P:  CA:                                      QRS:        72  QRSD:       82                           T:          -7  QT:         280  QTc:        443    Interpretive Statements  Atrial fibrillation with rapid V-rate  Repolarization abnormality, prob rate related  Compared to ECG 2022 06:10:13  Early repolarization now present  Sinus rhythm no longer present  First degree AV block no longer present     EKG (NOW)    Collection Time: 10/21/22 10:02 AM   Result Value Ref Range    Report       [facility] Emergency Dept[facility]    Test Date:  2022-10-21  Pt Name:    YVONNE WADA                  Department: ER  MRN:        [id number]                      Room:       [facility]  Gender:     Female                       Technician: [id number]  :        1935                   Requested By:HODAN RECINOS  Order #:    [id number]                    Reading MD:    Measurements  Intervals                                Axis  Rate:       87                           P:          -62  CA:         226                          QRS:        34  QRSD:       102                          T:          12  QT:         354  QTc:        426    Interpretive Statements  Sinus or ectopic atrial rhythm  Atrial premature complex  Prolonged CA interval  Borderline low voltage, extremity leads  Compared to ECG 10/21/2022 09:09:08  Ectopic atrial rhythm now present  Atrial premature complex(es) now present  First degree AV block now present  Atrial fibrillation no longer present  Early repolarization n o longer present     EKG - STAT    Collection Time: 10/21/22 10:11 AM   Result Value Ref Range    Report       [facility] Emergency Dept[facility]    Test Date:  2022-10-21  Pt Name:    YVONNE WADA                  Department: ER  MRN:        [id number]                       Room:       [facility]  Gender:     Female                       Technician: [id number]  :        1935                   Requested By:HODAN RECINOS  Order #:    [id number]                    Reading MD:    Measurements  Intervals                                Axis  Rate:       142                          P:  OR:                                      QRS:        42  QRSD:       78                           T:          -61  QT:         277  QTc:        426    Interpretive Statements  Atrial fibrillation with rapid V-rate  Repolarization abnormality, prob rate related  Compared to ECG 10/21/2022 10:02:10  Early repolarization now present  Ectopic atrial rhythm no longer present  Atrial premature complex(es) no longer present  First degree AV block no longer present     CBC w/ Differential    Collection Time: 10/21/22 10:23 AM   Result Value Ref Range    WBC 5.4 4.8 - 10.8 K/uL    RBC 2.76 (L) 4.20 - 5.40 M/uL    Hemoglobin 7.9 (L) 12.0 - 16.0 g/dL    Hematocrit 25.2 (L) 37.0 - 47.0 %    MCV 91.3 81.4 - 97.8 fL    MCH 28.6 27.0 - 33.0 pg    MCHC 31.3 (L) 33.6 - 35.0 g/dL    RDW 49.6 35.9 - 50.0 fL    Platelet Count 316 164 - 446 K/uL    MPV 9.4 9.0 - 12.9 fL    Neutrophils-Polys 64.90 44.00 - 72.00 %    Lymphocytes 19.70 (L) 22.00 - 41.00 %    Monocytes 11.30 0.00 - 13.40 %    Eosinophils 1.90 0.00 - 6.90 %    Basophils 0.90 0.00 - 1.80 %    Immature Granulocytes 1.30 (H) 0.00 - 0.90 %    Nucleated RBC 0.00 /100 WBC    Neutrophils (Absolute) 3.50 2.00 - 7.15 K/uL    Lymphs (Absolute) 1.06 1.00 - 4.80 K/uL    Monos (Absolute) 0.61 0.00 - 0.85 K/uL    Eos (Absolute) 0.10 0.00 - 0.51 K/uL    Baso (Absolute) 0.05 0.00 - 0.12 K/uL    Immature Granulocytes (abs) 0.07 0.00 - 0.11 K/uL    NRBC (Absolute) 0.00 K/uL   Complete Metabolic Panel (CMP)    Collection Time: 10/21/22 10:23 AM   Result Value Ref Range    Sodium 135 135 - 145 mmol/L    Potassium 3.8 3.6 - 5.5 mmol/L    Chloride 102 96 - 112 mmol/L    Co2 20 20 - 33 mmol/L     Anion Gap 13.0 7.0 - 16.0    Glucose 149 (H) 65 - 99 mg/dL    Bun 23 (H) 8 - 22 mg/dL    Creatinine 1.15 0.50 - 1.40 mg/dL    Calcium 8.8 8.5 - 10.5 mg/dL    AST(SGOT) 14 12 - 45 U/L    ALT(SGPT) 11 2 - 50 U/L    Alkaline Phosphatase 78 30 - 99 U/L    Total Bilirubin 0.6 0.1 - 1.5 mg/dL    Albumin 3.4 3.2 - 4.9 g/dL    Total Protein 5.9 (L) 6.0 - 8.2 g/dL    Globulin 2.5 1.9 - 3.5 g/dL    A-G Ratio 1.4 g/dL   Troponin STAT    Collection Time: 10/21/22 10:23 AM   Result Value Ref Range    Troponin T 42 (H) 6 - 19 ng/L   ESTIMATED GFR    Collection Time: 10/21/22 10:23 AM   Result Value Ref Range    GFR (CKD-EPI) 46 (A) >60 mL/min/1.73 m 2   FREE THYROXINE    Collection Time: 10/21/22 11:00 AM   Result Value Ref Range    Free T-4 1.55 0.93 - 1.70 ng/dL   TSH    Collection Time: 10/21/22 11:00 AM   Result Value Ref Range    TSH 2.120 0.380 - 5.330 uIU/mL       Current Facility-Administered Medications   Medication Frequency    [MAR Hold - Suspended Admission] gabapentin (NEURONTIN) capsule 100 mg TID    [MAR Hold - Suspended Admission] Respiratory Therapy Consult Continuous RT    [MAR Hold - Suspended Admission] hydrALAZINE (APRESOLINE) tablet 25 mg Q8HRS PRN    [MAR Hold - Suspended Admission] acetaminophen (Tylenol) tablet 650 mg Q4HRS PRN    [MAR Hold - Suspended Admission] senna-docusate (PERICOLACE or SENOKOT S) 8.6-50 MG per tablet 2 Tablet BID    And    [MAR Hold - Suspended Admission] polyethylene glycol/lytes (MIRALAX) PACKET 1 Packet QDAY PRN    And    [MAR Hold - Suspended Admission] magnesium hydroxide (MILK OF MAGNESIA) suspension 30 mL QDAY PRN    And    [MAR Hold - Suspended Admission] bisacodyl (DULCOLAX) suppository 10 mg QDAY PRN    [MAR Hold - Suspended Admission] omeprazole (PRILOSEC) capsule 20 mg DAILY    [MAR Hold - Suspended Admission] mag hydrox-al hydrox-simeth (MAALOX PLUS ES or MYLANTA DS) suspension 20 mL Q2HRS PRN    [MAR Hold - Suspended Admission] ondansetron (ZOFRAN ODT) dispertab 4  mg 4X/DAY PRN    Or    [MAR Hold - Suspended Admission] ondansetron (ZOFRAN) syringe/vial injection 4 mg 4X/DAY PRN    [MAR Hold - Suspended Admission] sodium chloride (OCEAN) 0.65 % nasal spray 2 Spray PRN    [MAR Hold - Suspended Admission] apixaban (ELIQUIS) tablet 5 mg BID    [MAR Hold - Suspended Admission] benazepril (LOTENSIN) tablet 40 mg QAM    [MAR Hold - Suspended Admission] calcium/vitamin D 250-125 MG-UNIT tablet 1 Tablet DAILY    [MAR Hold - Suspended Admission] ezetimibe (ZETIA) tablet 10 mg DAILY    [MAR Hold - Suspended Admission] levothyroxine (SYNTHROID) tablet 75 mcg AM ES    [MAR Hold - Suspended Admission] metoprolol SR (TOPROL XL) tablet 50 mg QAM    [MAR Hold - Suspended Admission] oxyCODONE immediate-release (ROXICODONE) tablet 2.5 mg Q3HRS PRN    [MAR Hold - Suspended Admission] oxyCODONE immediate-release (ROXICODONE) tablet 5 mg Q3HRS PRN    [MAR Hold - Suspended Admission] miconazole 2%-zinc oxide (Lily) topical cream Q6HRS PRN     Current Outpatient Medications   Medication    ELIQUIS 5 MG Tab    senna-docusate (PERICOLACE OR SENOKOT S) 8.6-50 MG Tab    oxyCODONE immediate-release (ROXICODONE) 5 MG Tab    calcium/vitamin D 250-125 MG-UNIT Tab tablet    gabapentin (NEURONTIN) 100 MG Cap    metoprolol SR (TOPROL XL) 50 MG TABLET SR 24 HR    ezetimibe (ZETIA) 10 MG Tab    levothyroxine (SYNTHROID) 75 MCG Tab    benazepril (LOTENSIN) 40 MG tablet    omeprazole (PRILOSEC) 20 MG delayed-release capsule       Orders Placed This Encounter   Procedures    Diet Order Diet: Cardiac (meds whole 1-2 at a time with thins); Second Modifier: (optional): Renal     Standing Status:   Standing     Number of Occurrences:   1     Order Specific Question:   Diet:     Answer:   Cardiac [6]     Comments:   meds whole 1-2 at a time with thins     Order Specific Question:   Second Modifier: (optional)     Answer:   Renal [8]       Assessment:  Active Hospital Problems    Diagnosis     *Tibia/fibula fracture,  right, closed, initial encounter     Closed right trimalleolar fracture, initial encounter     S/p ankle right orif     Anemia     Constipation     UTI (urinary tract infection)     CKD (chronic kidney disease) stage 3, GFR 30-59 ml/min ([facility])     GERD (gastroesophageal reflux disease)     Hypothyroid     Paroxysmal atrial fibrillation (HCC)     Hypertension        Medical Decision Making and Plan:  R tib/fib fracture - mechanical injury on 10/12/22 s/p ORIF with Dr. Huertas on 10/154/22. TTWB  -PT and OT for mobility and ADLs  -Gabapentin 200 mg TID. PRN Oxycodone. PRN Tylenol  -Reduce Gabapentin to 100 mg as ongoing confusion     Tachycardia - Patient with UA+ early in AM on 10/21/22, new tachycardia into 140-150s. Concern for sepsis vs A Fib. Send to ED     Confusion - Patient with recent fall with head strike. May have had a mild TBI. Will have SLP assess. With cognitive deficits. Will check UA as now paranoid.    Dysphagia - Will have SLP evaluate for swallow    HTN/ A Fib - Patient on Metoprolol 50 mg daily, benazepril 40 mg daily, and Eliquis      HLD - Patient on Zetia 10 mg daily     Iron deficiency Anemia - Check AM CBC - 10     Azotemia - Check AM CMP - repeat normal      CKD3 - Avoid nephrotoxic agents. Check AM CMP - repeat normal      Hypothyroidism - Patient on Levothyroxine 75 mcg daily     DVT Ppx - Patient on Eliquis    Total time:  36 minutes.  I spent greater than 50% of the time for patient care, counseling, and coordination on this date, including unit/floor time, and face-to-face time with the patient as per interval events and assessment and plan above. Topics discussed included new elevated HR, UA positive, UCx sent, concern for sepsis vs A Fib, and transfer to ED.     Mikayla Sutton M.D.

## 2022-10-21 NOTE — DISCHARGE PLANNING
Case Management/IDT follow up.   IDT continues to recommend IRF level of care as patient continue to make progress with all therapies.   Projected dc date set for 10/29/22      DC needs:  Recommendations made for home health for PT/OT/SLP  Follow up with: PCP    Met with pt / family providing update from IDT and discussed plan of care.    Plan:  Continue to follow

## 2022-10-21 NOTE — ED NOTES
Pt resting in bed, transfusion in progress, no signs of reaction, VSS on RA, GCS 15, NAD, family at bedside, pt aware POC to discharge after unit of blood

## 2022-10-21 NOTE — DISCHARGE PLANNING
[facility] Rehabilitation Transitional Care Coordination    Patient transferred out acute from [facility].  Will follow for potential return as medically appropriate.

## 2022-10-21 NOTE — DISCHARGE INSTRUCTIONS
May be discharged after signing AMA form and receiving 1 unit of blood  For now we will hold on further Eliquis therapy until she is reassessed

## 2022-10-21 NOTE — ED PROVIDER NOTES
ED Provider Note    Scribed for Alban Hall M.D. by Clifford Harden. 10/21/2022  10:11 AM    Primary care provider: Tee Tran M.D.  Means of arrival: EMS  History obtained from: Patient  History limited by: None    CHIEF COMPLAINT  Chief Complaint   Patient presents with    Palpitations     Pt felt palpitations this AM, pt at rehab s/p tib fib ORIF, EKG at rehab captured A-fib 's, per EMS pt converted while transferring to their cot and was found to be in normal sinus rhythm rate 90's, during triage pt again went back into A-fib rate 140's. Hx A-fib on thinners. Pt is GCS 15, stable besides tachycardia, NAD.     Atrial Fibrillation     HPI  Yvonne Marie Wada is a 86 y.o. female who presents to the Emergency Department via EMS for heart palpitations onset today. Patient is currently at a rehabilitation facility for a previous right tib fib surgery. Patient has a splint around right ankle. Patient reports having a history of A-fib, and AAA, and she on on blood thinners for this. Per patient and RN, the patient began to have an atrial fibrillation with RVR episode which converted when EMS placed her on a stretcher. She reports no other associated symptoms and notes her palpitations have since ceased and improved. There are no alleviating or exacerbating factors. Patient denies associated chest pain.     REVIEW OF SYSTEMS  Pertinent negatives include no chest pain. As above, all other systems reviewed and are negative.   See HPI for further details.     PAST MEDICAL HISTORY   has a past medical history of AAA (abdominal aortic aneurysm) (7/26/2010), Aortic insufficiency, Aortic valve disease, Aortic valve regurgitation, Arthritis, Cataract, Dental disorder, Diverticulosis, Dyslipidemia (7/26/2010), GERD (gastroesophageal reflux disease) (7/12/2012), Glaucoma, Heart burn, Heart murmur, Heart valve disease, Hiatus hernia syndrome, High cholesterol, History of shingles (6/30/2011), History of total knee  arthroplasty (7/26/2010), HLD (hyperlipidemia), Hypertension, Hypothyroid (4/8/2011), Indigestion, Mitral insufficiency, OSTEOPOROSIS (8/9/2010), Pain, Preventative health care (7/26/2010), Rheumatic fever, S/P appendectomy (7/26/2010), S/P TOMY (total abdominal hysterectomy) (7/26/2010), Shingles (6/30/2011), Snoring, Status post lumbar surgery (7/26/2010), Stroke (HCC) (1996), Thoracic aortic aneurysm without mention of rupture, Tricuspid insufficiency, Unspecified disorder of thyroid, Urinary bladder disorder, and UTI (urinary tract infection) (11/12/2016).    SURGICAL HISTORY   has a past surgical history that includes fusion, spine, lumbar, plif; hysterectomy, total abdominal; appendectomy; aortic valve replacement (1/12/2010); knee arthroscopy (10/18/2011); meniscectomy, knee, medial (10/18/2011); knee arthroplasty total (1/3/2012); fusion, spine, lumbar, plif (11/24/2015); lumbar laminectomy diskectomy (11/24/2015); knee arthroplasty total (Right, 9/8/2016); tendon repair (Right, 11/10/2016); and orif, ankle (Right, 10/14/2022).    SOCIAL HISTORY  Social History     Tobacco Use    Smoking status: Never    Smokeless tobacco: Never    Tobacco comments:     none   Vaping Use    Vaping Use: Never used   Substance Use Topics    Alcohol use: No     Alcohol/week: 0.0 oz    Drug use: Yes     Comment: CBD Oil for Arthritis      Social History     Substance and Sexual Activity   Drug Use Yes    Comment: CBD Oil for Arthritis       FAMILY HISTORY  Family History   Problem Relation Age of Onset    Stroke Mother     Heart Disease Father     Heart Disease Sister        CURRENT MEDICATIONS  Home Medications       Reviewed by Mike Deal (Pharmacy Tech) on 10/21/22 at 1029  Med List Status: Complete     Medication Last Dose Status   benazepril (LOTENSIN) 40 MG tablet 10/20/2022 Active   calcium/vitamin D 250-125 MG-UNIT Tab tablet 10/21/2022 Active   ELIQUIS 5 MG Tab 10/21/2022 Active   ezetimibe (ZETIA) 10 MG Tab  "10/21/2022 Active   gabapentin (NEURONTIN) 100 MG Cap 10/21/2022 Active   levothyroxine (SYNTHROID) 75 MCG Tab 10/21/2022 Active   metoprolol SR (TOPROL XL) 50 MG TABLET SR 24 HR 10/20/2022 Active   omeprazole (PRILOSEC) 20 MG delayed-release capsule 10/21/2022 Active   oxyCODONE immediate-release (ROXICODONE) 5 MG Tab 10/20/2022 Active   senna-docusate (PERICOLACE OR SENOKOT S) 8.6-50 MG Tab 10/21/2022 Active                    ALLERGIES  Allergies   Allergen Reactions    Amoxicillin Vomiting     Upset stomach, ok if needed for life saving treatment (per pt)    Codeine Nausea     Synthetic codones ok    Doxycycline Nausea     Nausea, Ok in life saving situation (per pt)    Sulfamethoxazole-Trimethoprim Vomiting and Nausea     Ok in a life saving situation (per pt)    Tramadol Vomiting and Nausea     nausea    Trazodone Vomiting     groggy       PHYSICAL EXAM  VITAL SIGNS: /57   Pulse 76   Temp 37.2 °C (98.9 °F) (Temporal)   Resp 18   Ht 1.626 m (5' 4\")   Wt 68.5 kg (151 lb)   SpO2 94%   BMI 25.92 kg/m²     Constitutional: Well developed, Well nourished, No acute distress, Non-toxic appearance.   HENT: Normocephalic, Atraumatic, Bilateral external ears normal, Oropharynx is clear mucous membranes are moist. No oral exudates or nasal discharge.   Eyes: Pupils are equal round and reactive, EOMI, Conjunctiva normal, No discharge.   Neck: Normal range of motion, No tenderness, Supple, No stridor. No meningismus.  Lymphatic: No lymphadenopathy noted.   Cardiovascular: Tachycardic, irregular rhythm, without murmur rub or gallop.  Thorax & Lungs: Clear breath sounds bilaterally without wheezes, rhonchi or rales. There is no chest wall tenderness.   Abdomen: Soft non-tender non-distended. There is no rebound or guarding. No organomegaly is appreciated. Bowel sounds are normal.  Skin: Ecchymosis at base of great and second toe, perfusion well, capillary refill is less than 2 seconds. Without rash.   Back: No CVA " or spinal tenderness.   Extremities: Ecchymosis at base of great and second toe, perfusion well. Splint on the right ankle in place. Intact distal pulses, No edema, No tenderness, No cyanosis, No clubbing. Capillary refill is less than 2 seconds.  Musculoskeletal: Good range of motion in all major joints. No tenderness to palpation or major deformities noted.   Neurologic: Alert & oriented x 3, Normal motor function, Normal sensory function, No focal deficits noted. Reflexes are normal.  Psychiatric: Affect normal, Judgment normal, Mood normal. There is no suicidal ideation or patient reported hallucinations.     DIAGNOSTIC STUDIES / PROCEDURES    LABS  Labs Reviewed   CBC WITH DIFFERENTIAL - Abnormal; Notable for the following components:       Result Value    RBC 2.76 (*)     Hemoglobin 7.9 (*)     Hematocrit 25.2 (*)     MCHC 31.3 (*)     Lymphocytes 19.70 (*)     Immature Granulocytes 1.30 (*)     All other components within normal limits    Narrative:     Biotin intake of greater than 5 mg per day may interfere with  troponin levels, causing false low values.   COMP METABOLIC PANEL - Abnormal; Notable for the following components:    Glucose 149 (*)     Bun 23 (*)     Total Protein 5.9 (*)     All other components within normal limits    Narrative:     Biotin intake of greater than 5 mg per day may interfere with  troponin levels, causing false low values.   TROPONIN - Abnormal; Notable for the following components:    Troponin T 42 (*)     All other components within normal limits    Narrative:     Biotin intake of greater than 5 mg per day may interfere with  troponin levels, causing false low values.   ESTIMATED GFR - Abnormal; Notable for the following components:    GFR (CKD-EPI) 46 (*)     All other components within normal limits    Narrative:     Biotin intake of greater than 5 mg per day may interfere with  troponin levels, causing false low values.   FREE THYROXINE   TSH   COD (ADULT)   RELEASE RED  BLOOD CELLS   TRANSFUSE RED BLOOD CELLS-NURSING COMMUNICATION (UNITS)      All labs reviewed by me.    EKG Interpretation:  Results for orders placed or performed during the hospital encounter of 10/21/22   EKG (NOW)   Result Value Ref Range    Report       [facility]    Test Date:  2022-10-21  Pt Name:    YVONNE WADA                  Department: ER  MRN:        [id number]                      Room:       [facility]  Gender:     Female                       Technician: TLS  :        1935                   Requested By:ER TRIAGE PROTOCOL  Order #:    [id number]                    Reading MD:    Measurements  Intervals                                Axis  Rate:       150                          P:  DE:                                      QRS:        72  QRSD:       82                           T:          -7  QT:         280  QTc:        443    Interpretive Statements  Atrial fibrillation with rapid V-rate  Repolarization abnormality, prob rate related  Compared to ECG 2022 06:10:13  Early repolarization now present  Sinus rhythm no longer present  First degree AV block no longer present     EKG (NOW)   Result Value Ref Range    Report       [facility] Emergency Dept.    Test Date:  2022-10-21  Pt Name:    YVONNE WADA                  Department: ER  MRN:        [id number]                      Room:        09  Gender:     Female                       Technician: [id number]  :        [id number]                   Requested By:HODAN RECINOS  Order #:    [id number]                    Reading MD: HODAN RECINOS MD    Measurements  Intervals                                Axis  Rate:       87                           P:          -62  DE:         226                          QRS:        34  QRSD:       102                          T:          12  QT:         354  QTc:        426    Interpretive Statements  Sinus or ectopic atrial rhythm  Atrial premature complex  Prolonged DE interval  Borderline  low voltage, extremity leads  Compared to ECG 10/21/2022 09:09:08  Ectopic atrial rhythm now present  Atrial premature complex(es) now present  First degree AV block now present  Atrial fibrillation no longer present  Early  repolarization no longer present  Electronically Signed On 10- 15:47:33 PDT by HODAN RECINOS MD     EKG - STAT   Result Value Ref Range    Report       [facility] Emergency Dept.    Test Date:  2022-10-21  Pt Name:    YVONNE WADA                  Department: ER  MRN:        [id number]                      Room:       [facility]  Gender:     Female                       Technician: [id number]  :        1935                   Requested By:HODAN RECINOS  Order #:    [id number]                    Reading MD: HODAN RECINOS MD    Measurements  Intervals                                Axis  Rate:       142                          P:  IA:                                      QRS:        42  QRSD:       78                           T:          -61  QT:         277  QTc:        426    Interpretive Statements  Atrial fibrillation with rapid V-rate  Repolarization abnormality, prob rate related  Compared to ECG 10/21/2022 10:02:10  Early repolarization now present  Ectopic atrial rhythm no longer present  Atrial premature complex(es) no longer present  First degree AV block no longer present  Electronically Signed On 10- 15:47 :30 PDT by HODAN RECINOS MD           RADIOLOGY  DX-CHEST-PORTABLE (1 VIEW)   Final Result      No acute cardiopulmonary abnormality.        The radiologist's interpretation of all radiological studies have been reviewed by me.    COURSE & MEDICAL DECISION MAKING  Nursing notes, VS, PMSFHx reviewed in chart.    10:11 AM - Patient seen and examined at bedside. Ordered for DX-Chest and labs to evaluate the patients symptoms. Patient was treated with Cardizem 20 mg for her symptoms. Discussed plan of care with patient. I informed them that labs and imaging will  be ordered to evaluate symptoms. Patient is understanding and agreeable with plan.     X-ray shows no acute cardiopulmonary abnormalities.  EKG initially shows atrial fibrillation with rapid ventricular response and then she had a sinus rhythm thereafter with spontaneous conversion.  After a period in sinus she went back into atrial fibrillation and required diltiazem and then converted once again    12:04 PM - Labs were reviewed, the patients hemoglobin has been trending downward from 10-11 to now 7.9.     12:06 PM - Patient was reevaluated at bedside. Discussed lab and radiology results with the patient. A long conversation was had with the patient regarding plan of care. The patient states that she does not want to be in the hospital categorically. Patient agrees to receive one unit of blood and go off of her blood thinners due to the potential of a bleed. After which she will be discharged if she remains in normal sinus rhythm and she can go back to rehab    I have explained to the patient the risks and benefits of transfusion of blood products.  This includes, as appropriate, the risk of mild allergic reaction, hemolytic reaction, transfusion-associated lung injury, febrile reactions, circulatory or iron overload, and infection.    We discussed possible alternatives and their risks, including directed donation, autologous transfusion, and no transfusion, including IV or oral iron supplementation, as appropriate.  I believe the patient understands the risks and benefits and was able to express understanding.    At 3:50 PM the patient is almost done with her blood transfusion and should be able to go back to rehab without issue.  Again she agrees to sign an AMA form and also speaking with her rehabilitation doctor about restarting of her blood thinner but for now she will not take any    Please note a critical care time of 30 minutes is spent in the care of this patient outside of procedure  time    DISPOSITION:  Patient will be discharged back to rehab in stable condition.    FINAL IMPRESSION  1. Atrial fibrillation with RVR (HCC)    2. Anticoagulated    3. Severe anemia    4. S/P ORIF (open reduction internal fixation) fracture    5.  Critical care time, 30 minutes     Clifford REAGAN (Scribe), am scribing for, and in the presence of, Alban Hall M.D..    Electronically signed by: Clifford Harden (Scribe), 10/21/2022    Alban REAGAN M.D. personally performed the services described in this documentation, as scribed by Clifford Harden in my presence, and it is both accurate and complete.    The note accurately reflects work and decisions made by me.  Alban Hall M.D.  10/21/2022  3:49 PM

## 2022-10-21 NOTE — PROGRESS NOTES
"Patient working with PT Alber states \"my heart feels like its jumping out of my chest\". Alber took patient back to room and notified this RN. This RN assessment revealed HR steady in the 160's initially and BP in the 80's SBP, rapid response was called. Patient was placed in bed and EKG was done, decision made to send patient to [facility] for further work-up. PIV established prior to [facility] arrival. Report given to [facility].  "

## 2022-10-21 NOTE — ED NOTES
Pt resting in bed, remains in normal sinus rhythm, GCS 15, VSS on RA, NAD, pt aware POC to wait for results

## 2022-10-21 NOTE — ED TRIAGE NOTES
"Chief Complaint   Patient presents with    Palpitations     Pt felt palpitations this AM, pt at rehab s/p tib fib ORIF, EKG at rehab captured A-fib 's, per EMS pt converted while transferring to their cot and was found to be in normal sinus rhythm rate 90's, during triage pt again went back into A-fib rate 140's. Hx A-fib on thinners. Pt is GCS 15, stable besides tachycardia, NAD.     Atrial Fibrillation     Pt BIB EMS for above complaints, GCS 15, NAD, stable with tachycardia.    /68   Pulse (!) 141   Temp 37.2 °C (98.9 °F) (Temporal)   Resp 14   Ht 1.626 m (5' 4\")   Wt 68.5 kg (151 lb)   SpO2 95%   BMI 25.92 kg/m²     "

## 2022-10-21 NOTE — ED NOTES
Pt resting in bed, VSS on rA, gCS 15, NAD, unit completed, no s/sx of transfusion reaction, pt to be dc'd now as AMA back to rehab facility as pt is not willing to be admitted to hospital

## 2022-10-21 NOTE — CARE PLAN
The patient is Stable - Low risk of patient condition declining or worsening    Shift Goals  Clinical Goals: Safety  Patient Goals: pain control    Progress made toward(s) clinical / shift goals:  pt a/o x3. Assessment completed as documented. Pain rated at 6/10 and pt received oxycodone prn. Meds taken whole without issue. No S/S of SOB. RLE splint is c/d/I. VSS. Safety precautions in place. All needs met.    Patient is not progressing towards the following goals:

## 2022-10-21 NOTE — THERAPY
"Physical Therapy   Daily Treatment     Patient Name: Yvonne Marie Wada  Age:  86 y.o., Sex:  female  Medical Record #: [id number]  Today's Date: 10/21/2022     Precautions  Precautions: Fall Risk, Toe Touch Weight Bearing Right Lower Extremity  Comments: limited by pain    Subjective    Patient agreeable to PT. Reports didn't sleep well as she felt cold; provided warm blanket.     Objective       10/21/22 0831   Therapy Missed   Missed Therapy (Minutes) 30   Reason For Missed Therapy Medical - Patient on Hold from Therapy;Medical - Patient Is Not Medically Stable   PT Charge Group   PT Therapeutic Exercise 1   PT Therapeutic Activities 1   PT Total Time Spent   PT Individual Total Time Spent (Mins) 30   Vitals   O2 (LPM) 0   O2 Delivery Device None - Room Air   Vitals Comments Vitals per flow sheet afer pt report that she feels \"like my heart is pounding.\"  RN and CNA notified.   Sitting Lower Body Exercises   Sitting Lower Body Exercises   (4 lbs LLE; splint wrap on RLE)   Ankle Pumps Left;1 set of 10   Long Arc Quad 1 set of 10   Marching 1 set of 10;Reciprocal   Other Exercises 1 set of 15 reps of B toes flex<>ext   Bed Mobility    Supine to Sit Contact Guard Assist   Sit to Supine Moderate Assist  (provided due to medical status today)   Sit to Stand Maximal Assist  (Mod A initially at EOB; Max A sitting into w/c at end of transfer; fair TTWB adherence)   Scooting Contact Guard Assist   Interdisciplinary Plan of Care Collaboration   IDT Collaboration with  Nursing;Certified Nursing Assistant;Physician;Respiratory Therapist   Patient Position at End of Therapy In Bed;Call Light within Reach;Tray Table within Reach;Other (Comments);Bed Alarm On   Collaboration Comments Onset of pt's \"heart pounding\" but otherwise reports feels fine; tachycardia and hypotension present; pt reports decreased fluid intake recently since she couldn't find her water cup; medical team took over during rapid response; notified SLP of pt " Pt ambulated to room from [facility]. Changed into gown. Connected to monitor. Agree with triage note. Chart up for ERP. Call light in reach.    status for 0930 scheduled session; MD placing pt on medical hold; pt to transfer to [facility]; unbilled 30 min spent supporting medical team; therapy scheduling notified.       Assessment    Patient has good participation but was limited after taking vitals due to hypotension and tachycardia today; improving LE strength noted; pt requires cueing for increased adherence to TTWB precautions; pleasant.    Strengths: Pleasant and cooperative, Supportive family, Willingly participates in therapeutic activities  Barriers: Confused, Decreased endurance, Dementia, Difficulty following instructions, Generalized weakness, Home accessibility, Impaired activity tolerance, Impaired balance, Impaired carryover of learning, Impaired insight/denial of deficits, Impaired functional cognition, Limited mobility, Pain    Plan    Monitor vitals prn with return to therapy  Wheelchair mobility and endurance  Bed mobility  Sit<>stands within // bars vs FWW  Ther ex for BLE strengthening, CV endurance  Activity and sitting tolerances  Reinforce TTWB precautions with transfers & standing    Passport items to be completed:  Get in/out of bed safely, in/out of a vehicle, safely use mobility device, walk or wheel around home/community, navigate up and down stairs, show how to get up/down from the ground, ensure home is accessible, demonstrate HEP, complete caregiver training    Physical Therapy Problems (Active)       Problem: Balance       Dates: Start: 10/18/22         Goal: STG-Within one week, patient will maintain static standing in parallel bars for 30 seconds with Mod A, gait belt, cueing for TTWB precautions, and second person CGA for safety.       Dates: Start: 10/18/22         Goal Note filed on 10/20/22 1138 by Alber Veras, PT       PT humphreyal completed on 10/18/22; will monitor pt's progress towards STGs this week.                 Problem: Mobility       Dates: Start: 10/18/22         Goal: STG-Within one week, patient  will propel wheelchair 50 feet indoors with SPV and cueing with both turns and task attention.       Dates: Start: 10/18/22         Goal Note filed on 10/20/22 1138 by Alber Veras, PT       PT eval completed on 10/18/22; will monitor pt's progress towards STGs this week.                 Problem: Mobility Transfers       Dates: Start: 10/18/22         Goal: STG-Within one week, patient will perform bed mobility at Min A level with bed rail and cueing.       Dates: Start: 10/18/22         Goal Note filed on 10/20/22 1138 by Alber Veras, PT       PT eval completed on 10/18/22; will monitor pt's progress towards STGs this week.              Goal: STG-Within one week, patient will sit to  parallel bars with Mod A, gait belt, cueing for TTWB precautions, and second person CGA for safety.       Dates: Start: 10/18/22         Goal Note filed on 10/20/22 1138 by Alber Veras, PT       PT eval completed on 10/18/22; will monitor pt's progress towards STGs this week.              Goal: STG-Within one week, patient will transfer bed to chair with Mod A, gait belt, cueing for TTWB precautions, and second person CGA for safety.       Dates: Start: 10/18/22         Goal Note filed on 10/20/22 1138 by Alber Veras, PT       PT eval completed on 10/18/22; will monitor pt's progress towards STGs this week.                 Problem: PT-Long Term Goals       Dates: Start: 10/18/22         Goal: LTG-By discharge, patient will propel wheelchair 100 feet indoors at SPV level with cueing.       Dates: Start: 10/18/22            Goal: LTG-By discharge, patient will ambulate 10 feet with FWW, gait belt, cueing for WB precautions, and Min A.       Dates: Start: 10/18/22            Goal: LTG-By discharge, patient will transfer one surface to another with FWW, gait belt, cueing for WB precautions, and Min A.       Dates: Start: 10/18/22            Goal: LTG-By discharge, patient will perform home  exercise program with handout, cueing, and SBA.       Dates: Start: 10/18/22            Goal: LTG-By discharge, patient will transfer in/out of a car with FWW, gait belt, cueing for WB precautions, and Min A.       Dates: Start: 10/18/22

## 2022-10-22 LAB
ERYTHROCYTE [DISTWIDTH] IN BLOOD BY AUTOMATED COUNT: 48.7 FL (ref 35.9–50)
HCT VFR BLD AUTO: 28.8 % (ref 37–47)
HGB BLD-MCNC: 9.3 G/DL (ref 12–16)
MCH RBC QN AUTO: 28.4 PG (ref 27–33)
MCHC RBC AUTO-ENTMCNC: 32.3 G/DL (ref 33.6–35)
MCV RBC AUTO: 88.1 FL (ref 81.4–97.8)
PLATELET # BLD AUTO: 290 K/UL (ref 164–446)
PMV BLD AUTO: 9.7 FL (ref 9–12.9)
RBC # BLD AUTO: 3.27 M/UL (ref 4.2–5.4)
WBC # BLD AUTO: 4.3 K/UL (ref 4.8–10.8)

## 2022-10-22 PROCEDURE — 94760 N-INVAS EAR/PLS OXIMETRY 1: CPT

## 2022-10-22 PROCEDURE — A9270 NON-COVERED ITEM OR SERVICE: HCPCS | Performed by: PHYSICAL MEDICINE & REHABILITATION

## 2022-10-22 PROCEDURE — 700102 HCHG RX REV CODE 250 W/ 637 OVERRIDE(OP): Performed by: PHYSICAL MEDICINE & REHABILITATION

## 2022-10-22 PROCEDURE — 36415 COLL VENOUS BLD VENIPUNCTURE: CPT

## 2022-10-22 PROCEDURE — 85027 COMPLETE CBC AUTOMATED: CPT

## 2022-10-22 PROCEDURE — 770010 HCHG ROOM/CARE - REHAB SEMI PRIVAT*

## 2022-10-22 RX ADMIN — BENAZEPRIL HYDROCHLORIDE 40 MG: 10 TABLET, COATED ORAL at 08:07

## 2022-10-22 RX ADMIN — NYSTATIN: 100000 POWDER TOPICAL at 19:44

## 2022-10-22 RX ADMIN — EZETIMIBE 10 MG: 10 TABLET ORAL at 08:08

## 2022-10-22 RX ADMIN — APIXABAN 5 MG: 5 TABLET, FILM COATED ORAL at 08:07

## 2022-10-22 RX ADMIN — OXYCODONE 5 MG: 5 TABLET ORAL at 08:08

## 2022-10-22 RX ADMIN — SENNOSIDES AND DOCUSATE SODIUM 2 TABLET: 50; 8.6 TABLET ORAL at 08:07

## 2022-10-22 RX ADMIN — GABAPENTIN 100 MG: 100 CAPSULE ORAL at 14:18

## 2022-10-22 RX ADMIN — OMEPRAZOLE 20 MG: 20 CAPSULE, DELAYED RELEASE ORAL at 08:08

## 2022-10-22 RX ADMIN — GABAPENTIN 100 MG: 100 CAPSULE ORAL at 08:07

## 2022-10-22 RX ADMIN — LEVOTHYROXINE SODIUM 75 MCG: 0.07 TABLET ORAL at 05:55

## 2022-10-22 RX ADMIN — APIXABAN 5 MG: 5 TABLET, FILM COATED ORAL at 19:45

## 2022-10-22 RX ADMIN — NYSTATIN: 100000 POWDER TOPICAL at 08:10

## 2022-10-22 RX ADMIN — OXYCODONE 5 MG: 5 TABLET ORAL at 19:44

## 2022-10-22 RX ADMIN — METOPROLOL SUCCINATE 50 MG: 25 TABLET, EXTENDED RELEASE ORAL at 08:07

## 2022-10-22 RX ADMIN — GABAPENTIN 100 MG: 100 CAPSULE ORAL at 19:44

## 2022-10-22 NOTE — PROGRESS NOTES
[facility] in bed, patient denies complaints at this time, will continue to monitor.

## 2022-10-22 NOTE — CARE PLAN
The patient is Watcher - Medium risk of patient condition declining or worsening    Shift Goals  Clinical Goals: safety  Patient Goals: safety    Progress made toward(s) clinical / shift goals:  safety and skin integrity education given. Pt verbalizes understanding.    Patient is not progressing towards the following goals:

## 2022-10-22 NOTE — CARE PLAN
The patient is Watcher - Medium risk of patient condition declining or worsening    Shift Goals  Clinical Goals: safety  Patient Goals: safety      Problem: Fall Risk - Rehab  Goal: Patient will remain free from falls  Outcome: Progressing Pt uses call light consistently and appropriately. Waits for assistance does not attempt self transfer this shift. Able to verbalize needs.      Problem: Skin Integrity  Goal: Patient's skin integrity will be maintained or improve  Outcome: Not Met patient has redness under bilateral breasts/possible fungal, nystatin powder was ordered upon skin assessment when patient got back from ED, Noted swelling in right lower leg, patient has on splint/cast and swelling to toes 3+. Will pass on in report.

## 2022-10-22 NOTE — CARE PLAN
The patient is Stable - Low risk of patient condition declining or worsening    Shift Goals  Clinical Goals: safety  Patient Goals: safety      Problem: Fall Risk - Rehab  Goal: Patient will remain free from falls  Outcome: Progressing Pt uses call light consistently and appropriately. Waits for assistance does not attempt self transfer this shift. Able to verbalize needs.        Problem: Bladder / Voiding  Goal: Patient will establish and maintain regular urinary output  Outcome: Not Met patient incontinent to bladder despite time voiding.

## 2022-10-22 NOTE — ED NOTES
Pt went into rapid a-fib then converted herself back into normal sinus, MD aware, pt leaving now as AMA

## 2022-10-22 NOTE — FLOWSHEET NOTE
10/22/22 0946   Vital Signs   Pulse 74   Respiration 17   Pulse Oximetry 95 %   $ Pulse Oximetry (Spot Check) Yes   Respiratory Assessment   Respiratory Pattern Within Normal Limits   Level of Consciousness Alert   Chest Exam   Work Of Breathing / Effort Within Normal Limits   Breath Sounds   RUL Breath Sounds Clear   RML Breath Sounds Clear   RLL Breath Sounds Clear;Diminished   LOU Breath Sounds Clear   LLL Breath Sounds Clear;Diminished

## 2022-10-22 NOTE — PROGRESS NOTES
Patient care assumed. Report received from [facility] PURVI Escobar. Patient is alert and calm, resting in bed. Call light and bedside table within reach. Will continue to monitor.

## 2022-10-23 PROCEDURE — 97130 THER IVNTJ EA ADDL 15 MIN: CPT

## 2022-10-23 PROCEDURE — A9270 NON-COVERED ITEM OR SERVICE: HCPCS

## 2022-10-23 PROCEDURE — 97535 SELF CARE MNGMENT TRAINING: CPT

## 2022-10-23 PROCEDURE — 700102 HCHG RX REV CODE 250 W/ 637 OVERRIDE(OP): Performed by: PHYSICAL MEDICINE & REHABILITATION

## 2022-10-23 PROCEDURE — 99233 SBSQ HOSP IP/OBS HIGH 50: CPT | Performed by: PHYSICAL MEDICINE & REHABILITATION

## 2022-10-23 PROCEDURE — 97110 THERAPEUTIC EXERCISES: CPT

## 2022-10-23 PROCEDURE — 97110 THERAPEUTIC EXERCISES: CPT | Mod: CQ

## 2022-10-23 PROCEDURE — 97129 THER IVNTJ 1ST 15 MIN: CPT

## 2022-10-23 PROCEDURE — A9270 NON-COVERED ITEM OR SERVICE: HCPCS | Performed by: PHYSICAL MEDICINE & REHABILITATION

## 2022-10-23 PROCEDURE — 700102 HCHG RX REV CODE 250 W/ 637 OVERRIDE(OP)

## 2022-10-23 PROCEDURE — 97530 THERAPEUTIC ACTIVITIES: CPT

## 2022-10-23 PROCEDURE — 97530 THERAPEUTIC ACTIVITIES: CPT | Mod: CQ

## 2022-10-23 PROCEDURE — 770010 HCHG ROOM/CARE - REHAB SEMI PRIVAT*

## 2022-10-23 RX ORDER — CEFDINIR 300 MG/1
300 CAPSULE ORAL EVERY 12 HOURS
Status: COMPLETED | OUTPATIENT
Start: 2022-10-23 | End: 2022-10-28

## 2022-10-23 RX ORDER — CEFDINIR 300 MG/1
CAPSULE ORAL
Status: COMPLETED
Start: 2022-10-23 | End: 2022-10-23

## 2022-10-23 RX ADMIN — GABAPENTIN 100 MG: 100 CAPSULE ORAL at 14:45

## 2022-10-23 RX ADMIN — SENNOSIDES AND DOCUSATE SODIUM 2 TABLET: 50; 8.6 TABLET ORAL at 23:26

## 2022-10-23 RX ADMIN — GABAPENTIN 100 MG: 100 CAPSULE ORAL at 09:46

## 2022-10-23 RX ADMIN — GABAPENTIN 100 MG: 100 CAPSULE ORAL at 23:26

## 2022-10-23 RX ADMIN — NYSTATIN: 100000 POWDER TOPICAL at 23:27

## 2022-10-23 RX ADMIN — BENAZEPRIL HYDROCHLORIDE 40 MG: 10 TABLET, COATED ORAL at 09:47

## 2022-10-23 RX ADMIN — APIXABAN 5 MG: 5 TABLET, FILM COATED ORAL at 09:46

## 2022-10-23 RX ADMIN — OXYCODONE 5 MG: 5 TABLET ORAL at 23:26

## 2022-10-23 RX ADMIN — METOPROLOL SUCCINATE 50 MG: 25 TABLET, EXTENDED RELEASE ORAL at 09:46

## 2022-10-23 RX ADMIN — CEFDINIR 300 MG: 300 CAPSULE ORAL at 23:28

## 2022-10-23 RX ADMIN — OXYCODONE 5 MG: 5 TABLET ORAL at 14:44

## 2022-10-23 RX ADMIN — APIXABAN 5 MG: 5 TABLET, FILM COATED ORAL at 23:25

## 2022-10-23 RX ADMIN — SENNOSIDES AND DOCUSATE SODIUM 2 TABLET: 50; 8.6 TABLET ORAL at 09:47

## 2022-10-23 RX ADMIN — LEVOTHYROXINE SODIUM 75 MCG: 0.07 TABLET ORAL at 06:04

## 2022-10-23 RX ADMIN — OMEPRAZOLE 20 MG: 20 CAPSULE, DELAYED RELEASE ORAL at 09:48

## 2022-10-23 RX ADMIN — OXYCODONE 5 MG: 5 TABLET ORAL at 09:46

## 2022-10-23 RX ADMIN — EZETIMIBE 10 MG: 10 TABLET ORAL at 09:47

## 2022-10-23 ASSESSMENT — GAIT ASSESSMENTS
DEVIATION: STEP TO;DECREASED BASE OF SUPPORT;BRADYKINETIC;DECREASED HEEL STRIKE;DECREASED TOE OFF
DISTANCE (FEET): 4
ASSISTIVE DEVICE: PARALLEL BARS
GAIT LEVEL OF ASSIST: MODERATE ASSIST

## 2022-10-23 ASSESSMENT — PAIN DESCRIPTION - PAIN TYPE
TYPE: ACUTE PAIN
TYPE: ACUTE PAIN

## 2022-10-23 NOTE — THERAPY
"Physical Therapy   Daily Treatment     Patient Name: Yvonne Marie Wada  Age:  86 y.o., Sex:  female  Medical Record #: [id number]  Today's Date: 10/23/2022     Precautions  Precautions: Fall Risk, Toe Touch Weight Bearing Right Lower Extremity  Comments: limited by pain    Subjective    Pt in the bathroom finishing up with CNA. Agreeable to PT session    \"Ill do anything I need to to get out of here\"     Objective     10/23/22 0931   PT Charge Group   PT Therapeutic Exercise 2   PT Therapeutic Activities 2   Supervising Physical Therapist Alberto Veras   PT Total Time Spent   PT Individual Total Time Spent (Mins) 60   Gait Functional Level of Assist    Gait Level Of Assist Moderate Assist   Assistive Device Parallel Bars   Distance (Feet) 4   # of Times Distance was Traveled 1   Deviation Step To;Decreased Base Of Support;Bradykinetic;Decreased Heel Strike;Decreased Toe Off  (5 hops in // with modA, wc follow max vc for technique to promote TTWB)   Wheelchair Functional Level of Assist   Wheelchair Assist Minimal Assist   Distance Wheelchair (Feet or Distance) 25   Wheelchair Description Assistance with steering;Extra time;Leg rest management;Impaired coordination;Limited by fatigue;Safety concerns;Supervision for safety;Verbal cueing;Requires incidental assist   Stairs Functional Level of Assist   Level of Assist with Stairs Unable to Participate   # of Stairs Climbed 0   Stairs Description Safety concerns   Sitting Lower Body Exercises   Sitting Lower Body Exercises Yes   Ankle Pumps 3 sets of 10   Hip Abduction 3 sets of 10   Hip Adduction 3 sets of 10   Long Arc Quad 3 sets of 10   Marching 3 sets of 10   Hamstring Curl 3 sets of 10   Comments #4 L #2 R, green band for resistance   Bed Mobility    Sit to Stand Minimal Assist   Scooting   (pulling up on //)   Neuro-Muscular Treatments   Neuro-Muscular Treatments Verbal Cuing;Sequencing   Interdisciplinary Plan of Care Collaboration   IDT Collaboration with  Therapy " Tech;Nursing;Occupational Therapist   Patient Position at End of Therapy Seated;Self Releasing Lap Belt Applied;Call Light within Reach   Collaboration Comments assist with tx, pain medication, CLOF     Pt participated in standing reaching activity in // with Breanne for balance, using L UE to reach across midline for rings, mod VC for TTWB/kick R LE out. Activity tindicated in order to improve pt's ability to assist with clothing management during toilet transfers    Assessment    Pt able to maintain TTWB with STS transition in // however fair/poor adherence to WBing status with attempt at hopping in the bars. Pt completed seated LE exercise without c/o inc pain  Strengths: Pleasant and cooperative, Supportive family, Willingly participates in therapeutic activities  Barriers: Confused, Decreased endurance, Dementia, Difficulty following instructions, Generalized weakness, Home accessibility, Impaired activity tolerance, Impaired balance, Impaired carryover of learning, Impaired insight/denial of deficits, Impaired functional cognition, Limited mobility, Pain    Plan    Monitor vitals prn with return to therapy  Wheelchair mobility and endurance  Bed mobility  Sit<>stands within // bars vs FWW  Ther ex for BLE strengthening, CV endurance  Activity and sitting tolerances  Reinforce TTWB precautions with transfers & standing     Passport items to be completed:  Get in/out of bed safely, in/out of a vehicle, safely use mobility device, walk or wheel around home/community, navigate up and down stairs, show how to get up/down from the ground, ensure home is accessible, demonstrate HEP, complete caregiver training    Physical Therapy Problems (Active)       Problem: Balance       Dates: Start: 10/18/22         Goal: STG-Within one week, patient will maintain static standing in parallel bars for 30 seconds with Mod A, gait belt, cueing for TTWB precautions, and second person CGA for safety.       Dates: Start: 10/18/22          Goal Note filed on 10/20/22 1138 by Alber Veras, PT       PT eval completed on 10/18/22; will monitor pt's progress towards STGs this week.                 Problem: Mobility       Dates: Start: 10/18/22         Goal: STG-Within one week, patient will propel wheelchair 50 feet indoors with SPV and cueing with both turns and task attention.       Dates: Start: 10/18/22         Goal Note filed on 10/20/22 1138 by Alber Veras, PT       PT eval completed on 10/18/22; will monitor pt's progress towards STGs this week.                 Problem: Mobility Transfers       Dates: Start: 10/18/22         Goal: STG-Within one week, patient will perform bed mobility at Min A level with bed rail and cueing.       Dates: Start: 10/18/22         Goal Note filed on 10/20/22 1138 by Alber Veras, PT       PT eval completed on 10/18/22; will monitor pt's progress towards STGs this week.              Goal: STG-Within one week, patient will sit to  parallel bars with Mod A, gait belt, cueing for TTWB precautions, and second person CGA for safety.       Dates: Start: 10/18/22         Goal Note filed on 10/20/22 1138 by Alber Veras, PT       PT eval completed on 10/18/22; will monitor pt's progress towards STGs this week.              Goal: STG-Within one week, patient will transfer bed to chair with Mod A, gait belt, cueing for TTWB precautions, and second person CGA for safety.       Dates: Start: 10/18/22         Goal Note filed on 10/20/22 1138 by Alber Veras, PT       PT eval completed on 10/18/22; will monitor pt's progress towards STGs this week.                 Problem: PT-Long Term Goals       Dates: Start: 10/18/22         Goal: LTG-By discharge, patient will propel wheelchair 100 feet indoors at SPV level with cueing.       Dates: Start: 10/18/22            Goal: LTG-By discharge, patient will ambulate 10 feet with FWW, gait belt, cueing for WB precautions, and Min A.        Dates: Start: 10/18/22            Goal: LTG-By discharge, patient will transfer one surface to another with FWW, gait belt, cueing for WB precautions, and Min A.       Dates: Start: 10/18/22            Goal: LTG-By discharge, patient will perform home exercise program with handout, cueing, and SBA.       Dates: Start: 10/18/22            Goal: LTG-By discharge, patient will transfer in/out of a car with FWW, gait belt, cueing for WB precautions, and Min A.       Dates: Start: 10/18/22

## 2022-10-23 NOTE — THERAPY
"Occupational Therapy  Daily Treatment     Patient Name: Yvonne Marie Wada  Age:  86 y.o., Sex:  female  Medical Record #: [id number]  Today's Date: 10/23/2022     Precautions  Precautions: (P) Fall Risk, Toe Touch Weight Bearing Right Lower Extremity  Comments: (P) intermittent pain and limited endurance         Subjective    Pt in bed upon arrival, pleasant and agreeable to OT session.  Spouse present, but left once pt was up and in chair.     \"Ill do whatever it takes to go home... do you think that I will be ready by the end of this week?\"     Objective       10/23/22 1331   OT Charge Group   OT Self Care / ADL 2   OT Therapy Activity 1   OT Therapeutic Exercise  1   OT Total Time Spent   OT Individual Total Time Spent (Mins) 60   Precautions   Precautions Fall Risk;Toe Touch Weight Bearing Right Lower Extremity   Comments intermittent pain and limited endurance   Functional Level of Assist   Toileting Moderate Assist  (Pt required A for balance and A to pull up pants)   Bed, Chair, Wheelchair Transfer Minimal Assist  (Squat pivot; EOB<>w/c second person available but did not A)   Toilet Transfers Moderate Assist  (Stand pivot with GB w/c<>toilet; cues and reminders for WB precuations)   Sitting Upper Body Exercises   Upper Extremity Bike Level 2 Resistance  (BUE motomed, 10 min total, 0 RB)   Bed Mobility    Supine to Sit Standby Assist   Sit to Supine Minimal Assist   Interdisciplinary Plan of Care Collaboration   IDT Collaboration with  Therapy Tech;Certified Nursing Assistant   Patient Position at End of Therapy In Bed;Bed Alarm On;Call Light within Reach;Tray Table within Reach;Phone within Reach   Collaboration Comments assist with tx     Completed donning/doffing sock this session with SBA overall and increased time    Complete standing in // bar activity: decreased UE support to 1 hand, using RUE pt grabbed Velcro checkers on R side and placed them on the board on her left side to increase overall use of UE " when standing. Pt required CGA during activity. Pt able to complete 3 rounds, 1- placing 6 pieces, 2- placing 6 pieces, 3- placing 9 pieces.    Assessment    Pt tolerated session though still requires Mod-Max A for toileting d/t need for A with balance and A for completion of managing clothing post void. Pt able to complete for the first time this session clothing management pre void and don/doff socks. Pt making improvement but will cont to benefit from standing balance and practice with using 1 UE to complete task while at a GB.  Strengths: Able to follow instructions, Motivated for self care and independence, Pleasant and cooperative  Barriers: Decreased endurance, Fatigue, Generalized weakness, Impaired activity tolerance, Impaired balance, Impaired functional cognition, Pain, Limited mobility, Pain poorly managed, Visual impairment    Plan    ADL with AE as needed, IADLs, functional mobility, transfers at FWW level when able, strength/endurance building , standing tolerance / balance  incorporating TTWB  right LE       Occupational Therapy Goals (Active)       Problem: Bathing       Dates: Start: 10/18/22         Goal: STG-Within one week, patient will bathe with Min A overall using AE/DME as needed.       Dates: Start: 10/18/22               Problem: Dressing       Dates: Start: 10/18/22         Goal: STG-Within one week, patient will dress UB with SBA overall using AE/DME as needed.       Dates: Start: 10/18/22            Goal: STG-Within one week, patient will dress LB with Mod A overall using AE/DME as needed.       Dates: Start: 10/18/22               Problem: OT Long Term Goals       Dates: Start: 10/18/22         Goal: LTG-By discharge, patient will complete basic self care tasks with supervision overall using AE/DME as needed.       Dates: Start: 10/18/22            Goal: LTG-By discharge, patient will perform bathroom transfers with supervision overall using AE/DME as needed.       Dates: Start:  10/18/22               Problem: Toileting       Dates: Start: 10/18/22         Goal: STG-Within one week, patient will complete toileting tasks with Mod A overall using AE/DME as needed.       Dates: Start: 10/18/22

## 2022-10-23 NOTE — CARE PLAN
The patient is Watcher - Medium risk of patient condition declining or worsening    Shift Goals  Clinical Goals: safety  Patient Goals: safety    Progress made toward(s) clinical / shift goals:  education provided on POC and safety precautions. Pt verbalizes understanding.    Patient is not progressing towards the following goals:

## 2022-10-23 NOTE — THERAPY
Speech Language Pathology  Daily Treatment     Patient Name: Yvonne Marie Wada  Age:  86 y.o., Sex:  female  Medical Record #: [id number]  Today's Date: 10/23/2022     Precautions  Precautions: Fall Risk, Toe Touch Weight Bearing Right Lower Extremity  Comments: intermittent pain and limited endurance    Subjective    Pt pleasant and cooperative during tx.  Pt requested tx at bedside, as she had just finished OT.      Objective       10/23/22 1501   Treatment Charges   SLP Cognitive Skill Development First 15 Minutes 1   SLP Cognitive Skill Development Additional 15 Minutes 3   SLP Total Time Spent   SLP Individual Total Time Spent (Mins) 60   Receptive Language / Auditory Comprehension   Follows One Unit Commands Supervision (5)   Follows Two Unit Commands Supervision (5)   Cognition   Functional Memory Activities Moderate (3)       Assessment    1 step directives: 100% independent.  2 step directives: 100% independent.  Pt required mod verbal cues recall daily events.  SLP acted as scribe and entered information into memory book.  Pt recalled use of call light and use of WC brakes given min-mod verbal.  Pt verbalized bed to WC transfer sequence given min verbal cues.      Strengths: Willingly participates in therapeutic activities, Supportive family  Barriers: Hearing impairment, Impaired functional cognition    Plan    Transfer sequence, 2-3 step directives, recall        Speech Therapy Problems (Active)       Problem: Memory STGs       Dates: Start: 10/19/22         Goal: STG-Within one week, patient will r/t therapies, transfer sequences, and safety precautions with MOD A in 4/5 trials       Dates: Start: 10/19/22         Goal Note filed on 10/19/22 1602 by Elen Schneider MS,CCC-SLP       Pt assessed 10/19/22, will continue to target.                  Problem: Problem Solving STGs       Dates: Start: 10/19/22         Goal: STG-Within one week, patient will follow 1-2 step instructions to complete transfers, ADL  related tasks with no more than 1 repetition.        Dates: Start: 10/19/22         Goal Note filed on 10/19/22 1602 by Elen Schneider MS,CCC-SLP       Pt assessed 10/19/22, will continue to target.                  Problem: Speech/Swallowing LTGs       Dates: Start: 10/19/22         Goal: LTG-By discharge, patient will safely swallow regular textures/thin liquids with no overt s/sx of pen/asp to continue with baseline diet.        Dates: Start: 10/19/22            Goal: LTG-By discharge, patient will solve basic problems by utilizing compensatory intervention for memory and problem-solving to allow for safe completion of daily activities with SPV       Dates: Start: 10/19/22               Problem: Swallowing STGs       Dates: Start: 10/19/22         Goal: STG-Within one week, patient will tolerate regular textures/thin liquids with no overt s/sx of pen/asp, no c/o globus sensation for 2/2 meals with SPV       Dates: Start: 10/19/22         Goal Note filed on 10/19/22 1602 by Elen Schneider MS,CCC-SLP       Pt assessed 10/19/22. Will continue to target, anticipate dysphagia intervention to be short term.

## 2022-10-23 NOTE — PROGRESS NOTES
Rehab Progress Note     Encounter Date: 10/23/2022    CC: Decreased mobility, pain control    Interval Events (Subjective)  Patient was seen in her room.  Reports that she has not had any recurrence of symptoms of chest tightness or palpitations that led to ED visit.    Reports that therapies have been going well.    IDT Team Meeting 10/20/2022  DC/Disposition:  10/29/22    Objective:  VITAL SIGNS: /73   Pulse 60   Temp 36.4 °C (97.6 °F) (Axillary)   Resp 16   Wt 68.5 kg (151 lb)   SpO2 97%   BMI 25.92 kg/m²   Gen: no acute distress  Psych: Mood and affect appropriate  CV: Regular rate and rhythm  Resp: CTA bilaterally, no upper airway sounds  Abd: soft, nontender, nondistended  Neuro: AOx4    Recent Results (from the past 72 hour(s))   URINALYSIS    Collection Time: 10/21/22  1:25 AM    Specimen: Urine, Clean Catch   Result Value Ref Range    Color Yellow     Character Cloudy (A)     Specific Gravity 1.013 <1.035    Ph 6.0 5.0 - 8.0    Glucose Negative Negative mg/dL    Ketones Negative Negative mg/dL    Protein 30 (A) Negative mg/dL    Bilirubin Negative Negative    Urobilinogen, Urine 1.0 Negative    Nitrite Negative Negative    Leukocyte Esterase Large (A) Negative    Occult Blood Negative Negative    Micro Urine Req Microscopic    URINE MICROSCOPIC (W/UA)    Collection Time: 10/21/22  1:25 AM   Result Value Ref Range    WBC Packed (A) /hpf    RBC 0-2 /hpf    Bacteria Many (A) None /hpf    Epithelial Cells Few /hpf    Hyaline Cast 0-2 /lpf   URINE CULTURE-EXISTING-LESS THAN 48 HOURS    Collection Time: 10/21/22  6:52 AM    Specimen: Urine, Clean Catch   Result Value Ref Range    Significant Indicator POS (POS)     Source UR     Site URINE, CLEAN CATCH     Culture Result Usual urogenital mor 10-50,000 cfu/mL (A)     Culture Result Escherichia coli  >100,000 cfu/mL   (A)        Susceptibility    Escherichia coli - PHYLLIS     Ampicillin >16 Resistant mcg/mL     Ceftriaxone <=1 Sensitive mcg/mL      Cefazolin* 16 Sensitive mcg/mL      * Breakpoints when Cefazolin is used for therapy of infections  other than uncomplicated UTIs due to Enterobacterales are as  follows:  PHYLLIS and Interpretation:  <=2 S  4 I  >=8 R       Ciprofloxacin >2 Resistant mcg/mL     Cefepime <=2 Sensitive mcg/mL     Cefuroxime <=4 Sensitive mcg/mL     Nitrofurantoin <=32 Sensitive mcg/mL     Gentamicin <=2 Sensitive mcg/mL     Ampicillin/sulbactam >16/8 Resistant mcg/mL     Levofloxacin >4 Resistant mcg/mL     Minocycline >8 Resistant mcg/mL     Pip/Tazobactam <=8 Sensitive mcg/mL     Trimeth/Sulfa <=0.5/9.5 Sensitive mcg/mL     Tigecycline <=2 Sensitive mcg/mL     Tobramycin <=2 Sensitive mcg/mL   EKG (NOW)    Collection Time: 10/21/22  9:09 AM   Result Value Ref Range    Report       [facility]    Test Date:  2022-10-21  Pt Name:    YVONNE WADA                  Department: ER  MRN:        [id number]                      Room:       [facility]  Gender:     Female                       Technician: TLS  :        1935                   Requested By:ER TRIAGE PROTOCOL  Order #:    [id number]                    Reading MD: Fred Jones MD    Measurements  Intervals                                Axis  Rate:       150                          P:  IA:                                      QRS:        72  QRSD:       82                           T:          -7  QT:         280  QTc:        443    Interpretive Statements  Atrial fibrillation with rapid V-rate  Repolarization abnormality, prob rate related  Compared to ECG 2022 06:10:13  Early repolarization now present  Sinus rhythm no longer present  First degree AV block no longer present  Electronically Signed On 10- 16:44:30 PDT by Fred Jones MD     EKG (NOW)    Collection Time: 10/21/22 10:02 AM   Result Value Ref Range    Report       [facility] Emergency [facility]    Test Date:  2022-10-21  Pt Name:    YVONNE WADA                   Department: ER  MRN:        [id number]                      Room:        09  Gender:     Female                       Technician: [id number]  :        [id number]                   Requested By:HODAN RECINOS  Order #:    [id number]                    Reading MD: HODAN RECINOS MD    Measurements  Intervals                                Axis  Rate:       87                           P:          -62  IA:         226                          QRS:        34  QRSD:       102                          T:          12  QT:         354  QTc:        426    Interpretive Statements  Sinus or ectopic atrial rhythm  Atrial premature complex  Prolonged IA interval  Borderline low voltage, extremity leads  Compared to ECG 10/21/2022 09:09:08  Ectopic atrial rhythm now present  Atrial premature complex(es) now present  First degree AV block now present  Atrial fibrillation no longer present  Early  repolarization no longer present  Electronically Signed On 10- 15:47:33 PDT by OHDAN RECINOS MD     EKG - STAT    Collection Time: 10/21/22 10:11 AM   Result Value Ref Range    Report       [facility] Emergency Dept[facility]    Test Date:  2022-10-21  Pt Name:    YVONNE WADA                  Department: ER  MRN:        [id number]                      Room:        09  Gender:     Female                       Technician: [id number]  :        [id number]                   Requested By:HODAN RECINOS  Order #:    [id number]                    Reading MD: HODAN RECINOS MD    Measurements  Intervals                                Axis  Rate:       142                          P:  IA:                                      QRS:        42  QRSD:       78                           T:          -61  QT:         277  QTc:        426    Interpretive Statements  Atrial fibrillation with rapid V-rate  Repolarization abnormality, prob rate related  Compared to ECG 10/21/2022 10:02:10  Early repolarization now present  Ectopic atrial rhythm no longer  present  Atrial premature complex(es) no longer present  First degree AV block no longer present  Electronically Signed On 10- 15:47 :30 PDT by HODAN RECINOS MD     CBC w/ Differential    Collection Time: 10/21/22 10:23 AM   Result Value Ref Range    WBC 5.4 4.8 - 10.8 K/uL    RBC 2.76 (L) 4.20 - 5.40 M/uL    Hemoglobin 7.9 (L) 12.0 - 16.0 g/dL    Hematocrit 25.2 (L) 37.0 - 47.0 %    MCV 91.3 81.4 - 97.8 fL    MCH 28.6 27.0 - 33.0 pg    MCHC 31.3 (L) 33.6 - 35.0 g/dL    RDW 49.6 35.9 - 50.0 fL    Platelet Count 316 164 - 446 K/uL    MPV 9.4 9.0 - 12.9 fL    Neutrophils-Polys 64.90 44.00 - 72.00 %    Lymphocytes 19.70 (L) 22.00 - 41.00 %    Monocytes 11.30 0.00 - 13.40 %    Eosinophils 1.90 0.00 - 6.90 %    Basophils 0.90 0.00 - 1.80 %    Immature Granulocytes 1.30 (H) 0.00 - 0.90 %    Nucleated RBC 0.00 /100 WBC    Neutrophils (Absolute) 3.50 2.00 - 7.15 K/uL    Lymphs (Absolute) 1.06 1.00 - 4.80 K/uL    Monos (Absolute) 0.61 0.00 - 0.85 K/uL    Eos (Absolute) 0.10 0.00 - 0.51 K/uL    Baso (Absolute) 0.05 0.00 - 0.12 K/uL    Immature Granulocytes (abs) 0.07 0.00 - 0.11 K/uL    NRBC (Absolute) 0.00 K/uL   Complete Metabolic Panel (CMP)    Collection Time: 10/21/22 10:23 AM   Result Value Ref Range    Sodium 135 135 - 145 mmol/L    Potassium 3.8 3.6 - 5.5 mmol/L    Chloride 102 96 - 112 mmol/L    Co2 20 20 - 33 mmol/L    Anion Gap 13.0 7.0 - 16.0    Glucose 149 (H) 65 - 99 mg/dL    Bun 23 (H) 8 - 22 mg/dL    Creatinine 1.15 0.50 - 1.40 mg/dL    Calcium 8.8 8.5 - 10.5 mg/dL    AST(SGOT) 14 12 - 45 U/L    ALT(SGPT) 11 2 - 50 U/L    Alkaline Phosphatase 78 30 - 99 U/L    Total Bilirubin 0.6 0.1 - 1.5 mg/dL    Albumin 3.4 3.2 - 4.9 g/dL    Total Protein 5.9 (L) 6.0 - 8.2 g/dL    Globulin 2.5 1.9 - 3.5 g/dL    A-G Ratio 1.4 g/dL   Troponin STAT    Collection Time: 10/21/22 10:23 AM   Result Value Ref Range    Troponin T 42 (H) 6 - 19 ng/L   ESTIMATED GFR    Collection Time: 10/21/22 10:23 AM   Result Value Ref Range     GFR (CKD-EPI) 46 (A) >60 mL/min/1.73 m 2   COD - Adult (Type and Screen)    Collection Time: 10/21/22 10:23 AM   Result Value Ref Range    ABO Grouping Only A     Rh Grouping Only POS     Antibody Screen-Cod NEG     Component R       R3                  Red Blood Cells3    [id number]   transfused   10/21/22   13:58      Product Type Red Blood Cells LR Pheresis     Dispense Status transfused     Unit Number (Barcoded) [id number]     Product Code (Barcoded) L9269U29     Blood Type (Barcoded) 6200    FREE THYROXINE    Collection Time: 10/21/22 11:00 AM   Result Value Ref Range    Free T-4 1.55 0.93 - 1.70 ng/dL   TSH    Collection Time: 10/21/22 11:00 AM   Result Value Ref Range    TSH 2.120 0.380 - 5.330 uIU/mL   CBC WITHOUT DIFFERENTIAL    Collection Time: 10/22/22  5:45 AM   Result Value Ref Range    WBC 4.3 (L) 4.8 - 10.8 K/uL    RBC 3.27 (L) 4.20 - 5.40 M/uL    Hemoglobin 9.3 (L) 12.0 - 16.0 g/dL    Hematocrit 28.8 (L) 37.0 - 47.0 %    MCV 88.1 81.4 - 97.8 fL    MCH 28.4 27.0 - 33.0 pg    MCHC 32.3 (L) 33.6 - 35.0 g/dL    RDW 48.7 35.9 - 50.0 fL    Platelet Count 290 164 - 446 K/uL    MPV 9.7 9.0 - 12.9 fL       Current Facility-Administered Medications   Medication Frequency    nystatin (MYCOSTATIN) powder BID    gabapentin (NEURONTIN) capsule 100 mg TID    Respiratory Therapy Consult Continuous RT    hydrALAZINE (APRESOLINE) tablet 25 mg Q8HRS PRN    acetaminophen (Tylenol) tablet 650 mg Q4HRS PRN    senna-docusate (PERICOLACE or SENOKOT S) 8.6-50 MG per tablet 2 Tablet BID    And    polyethylene glycol/lytes (MIRALAX) PACKET 1 Packet QDAY PRN    And    magnesium hydroxide (MILK OF MAGNESIA) suspension 30 mL QDAY PRN    And    bisacodyl (DULCOLAX) suppository 10 mg QDAY PRN    omeprazole (PRILOSEC) capsule 20 mg DAILY    mag hydrox-al hydrox-simeth (MAALOX PLUS ES or MYLANTA DS) suspension 20 mL Q2HRS PRN    ondansetron (ZOFRAN ODT) dispertab 4 mg 4X/DAY PRN    Or    ondansetron (ZOFRAN) syringe/vial  injection 4 mg 4X/DAY PRN    sodium chloride (OCEAN) 0.65 % nasal spray 2 Spray PRN    apixaban (ELIQUIS) tablet 5 mg BID    benazepril (LOTENSIN) tablet 40 mg QAM    calcium/vitamin D 250-125 MG-UNIT tablet 1 Tablet DAILY    ezetimibe (ZETIA) tablet 10 mg DAILY    levothyroxine (SYNTHROID) tablet 75 mcg AM ES    metoprolol SR (TOPROL XL) tablet 50 mg QAM    oxyCODONE immediate-release (ROXICODONE) tablet 2.5 mg Q3HRS PRN    oxyCODONE immediate-release (ROXICODONE) tablet 5 mg Q3HRS PRN    miconazole 2%-zinc oxide (Lily) topical cream Q6HRS PRN       Orders Placed This Encounter   Procedures    Diet Order Diet: Cardiac (meds whole 1-2 at a time with thins); Second Modifier: (optional): Renal     Standing Status:   Standing     Number of Occurrences:   1     Order Specific Question:   Diet:     Answer:   Cardiac [6]     Comments:   meds whole 1-2 at a time with thins     Order Specific Question:   Second Modifier: (optional)     Answer:   Renal [8]       Assessment:  Active Hospital Problems    Diagnosis     *Tibia/fibula fracture, right, closed, initial encounter     Closed right trimalleolar fracture, initial encounter     S/p ankle right orif     Anemia     Constipation     UTI (urinary tract infection)     CKD (chronic kidney disease) stage 3, GFR 30-59 ml/min ([facility])     GERD (gastroesophageal reflux disease)     Hypothyroid     Paroxysmal atrial fibrillation ([facility])     Hypertension        Medical Decision Making and Plan:  R tib/fib fracture - mechanical injury on 10/12/22 s/p ORIF with Dr. Huertas on 10/154/22. TTWB  -PT and OT for mobility and ADLs  -Gabapentin 200 mg TID. PRN Oxycodone. PRN Tylenol  -Reduce Gabapentin to 100 mg as ongoing confusion    UTI  Sensitivities of culture reviewed.  Ceftinir 300mg bid x5 day    Confusion - Patient with recent fall with head strike. May have had a mild TBI. Will have SLP assess. With cognitive deficits.  Continue SLP    Dysphagia - Will have SLP evaluate for  swallow    HTN/ A Fib - Patient on Metoprolol 50 mg daily, benazepril 40 mg daily, and Eliquis      HLD - Continue Zetia 10 mg daily     Iron deficiency Anemia - Check AM CBC - 10, 10/22 9.3     Azotemia - Check AM CMP - repeat normal      CKD3 - Avoid nephrotoxic agents. Check AM CMP - repeat normal      Hypothyroidism - Patient on Levothyroxine 75 mcg daily     DVT Ppx - Patient on Eliquis    Total time:  37 minutes.  I spent greater than 50% of the time for patient care, counseling, and coordination on this date, including unit/floor time, and face-to-face time with the patient as per interval events and assessment and plan above. Topics discussed included code status, ED visit, pharmacy for medication given age and renal function/allergies.    Bulmaro Lewis M.D.

## 2022-10-24 PROCEDURE — 97130 THER IVNTJ EA ADDL 15 MIN: CPT

## 2022-10-24 PROCEDURE — 97535 SELF CARE MNGMENT TRAINING: CPT

## 2022-10-24 PROCEDURE — 97530 THERAPEUTIC ACTIVITIES: CPT

## 2022-10-24 PROCEDURE — 700102 HCHG RX REV CODE 250 W/ 637 OVERRIDE(OP): Performed by: PHYSICAL MEDICINE & REHABILITATION

## 2022-10-24 PROCEDURE — A9270 NON-COVERED ITEM OR SERVICE: HCPCS | Performed by: PHYSICAL MEDICINE & REHABILITATION

## 2022-10-24 PROCEDURE — 97116 GAIT TRAINING THERAPY: CPT

## 2022-10-24 PROCEDURE — 97110 THERAPEUTIC EXERCISES: CPT

## 2022-10-24 PROCEDURE — 97129 THER IVNTJ 1ST 15 MIN: CPT

## 2022-10-24 PROCEDURE — 770010 HCHG ROOM/CARE - REHAB SEMI PRIVAT*

## 2022-10-24 PROCEDURE — 99232 SBSQ HOSP IP/OBS MODERATE 35: CPT | Performed by: PHYSICAL MEDICINE & REHABILITATION

## 2022-10-24 RX ORDER — CALCIUM CARBONATE-CHOLECALCIFEROL TAB 250 MG-125 UNIT 250-125 MG-UNIT
1 TAB ORAL DAILY
Status: DISCONTINUED | OUTPATIENT
Start: 2022-10-25 | End: 2022-10-29 | Stop reason: HOSPADM

## 2022-10-24 RX ADMIN — APIXABAN 5 MG: 5 TABLET, FILM COATED ORAL at 09:15

## 2022-10-24 RX ADMIN — GABAPENTIN 100 MG: 100 CAPSULE ORAL at 20:14

## 2022-10-24 RX ADMIN — BENAZEPRIL HYDROCHLORIDE 40 MG: 10 TABLET, COATED ORAL at 09:14

## 2022-10-24 RX ADMIN — NYSTATIN 1 APPLICATION: 100000 POWDER TOPICAL at 20:15

## 2022-10-24 RX ADMIN — SENNOSIDES AND DOCUSATE SODIUM 2 TABLET: 50; 8.6 TABLET ORAL at 20:14

## 2022-10-24 RX ADMIN — GABAPENTIN 100 MG: 100 CAPSULE ORAL at 14:53

## 2022-10-24 RX ADMIN — CEFDINIR 300 MG: 300 CAPSULE ORAL at 20:14

## 2022-10-24 RX ADMIN — APIXABAN 5 MG: 5 TABLET, FILM COATED ORAL at 20:15

## 2022-10-24 RX ADMIN — SENNOSIDES AND DOCUSATE SODIUM 2 TABLET: 50; 8.6 TABLET ORAL at 09:15

## 2022-10-24 RX ADMIN — Medication 1 TABLET: at 12:25

## 2022-10-24 RX ADMIN — EZETIMIBE 10 MG: 10 TABLET ORAL at 09:15

## 2022-10-24 RX ADMIN — OMEPRAZOLE 20 MG: 20 CAPSULE, DELAYED RELEASE ORAL at 09:14

## 2022-10-24 RX ADMIN — LEVOTHYROXINE SODIUM 75 MCG: 0.07 TABLET ORAL at 05:27

## 2022-10-24 RX ADMIN — GABAPENTIN 100 MG: 100 CAPSULE ORAL at 09:14

## 2022-10-24 RX ADMIN — METOPROLOL SUCCINATE 50 MG: 25 TABLET, EXTENDED RELEASE ORAL at 09:14

## 2022-10-24 RX ADMIN — OXYCODONE 5 MG: 5 TABLET ORAL at 20:15

## 2022-10-24 RX ADMIN — CEFDINIR 300 MG: 300 CAPSULE ORAL at 09:15

## 2022-10-24 ASSESSMENT — GAIT ASSESSMENTS
ASSISTIVE DEVICE: PARALLEL BARS
DISTANCE (FEET): 8
DEVIATION: STEP TO;DECREASED BASE OF SUPPORT;BRADYKINETIC;DECREASED HEEL STRIKE;DECREASED TOE OFF
GAIT LEVEL OF ASSIST: MINIMAL ASSIST

## 2022-10-24 ASSESSMENT — PAIN DESCRIPTION - PAIN TYPE
TYPE: ACUTE PAIN

## 2022-10-24 ASSESSMENT — ACTIVITIES OF DAILY LIVING (ADL)
BED_CHAIR_WHEELCHAIR_TRANSFER_DESCRIPTION: ADAPTIVE EQUIPMENT;INCREASED TIME;SET-UP OF EQUIPMENT;SQUAT PIVOT TRANSFER TO WHEELCHAIR;SUPERVISION FOR SAFETY;VERBAL CUEING

## 2022-10-24 NOTE — DISCHARGE PLANNING
Per PT/OT recs HHC, blanket HHC ref given to Jane HELTON.     DCP: Home with resume 4WW and HHC pend acceptance via [facility]. CM to cont. Following for other poss needs. Pend med clearance.

## 2022-10-24 NOTE — CARE PLAN
"  Problem: Knowledge Deficit - Standard  Goal: Patient and family/care givers will demonstrate understanding of plan of care, disease process/condition, diagnostic tests and medications  Outcome: Progressing  Note: Pt agrees with plan of care tonight regarding medications and safety.  Will continue to monitor patient.      Problem: Fall Risk - Rehab  Goal: Patient will remain free from falls  Outcome: Progressing  Note: Muna Cuenca Fall risk Assessment Score:  13      Moderate fall risk Interventions  - Bed and strip alarm   - Yellow sign by the door   - Yellow wrist band \"Fall risk\"  - Room near to the nurse station  - Do not leave patient unattended in the bathroom  - Fall risk education provided         Problem: Pain - Standard  Goal: Alleviation of pain or a reduction in pain to the patient’s comfort goal  Outcome: Progressing  Note: C/o pain to RLE, medicated with Gabapentin and prn Oxycodone.  Has + relief.  See MAR and doc flow sheet.  Will continue to monitor patient.     The patient is Stable - Low risk of patient condition declining or worsening    Shift Goals  Clinical Goals: Safety  Patient Goals: Sleep well    Progress made toward(s) clinical / shift goals:  progressing        "

## 2022-10-24 NOTE — PROGRESS NOTES
Received shift report and assumed care of patient.  Patient awake, calm and stable, currently positioned in bed for comfort and safety; call light within reach. 3/10 right leg pain at this time.  Will continue to monitor.

## 2022-10-24 NOTE — PROGRESS NOTES
Rehab Progress Note     Encounter Date: 10/24/2022    CC: Decreased mobility, pain control    Interval Events (Subjective)  Patient sitting up in room. She was transferred back from ED on Friday evening. She reports she just wants to go home as soon as possible. She denies pain. Denies chest pain. Denies palpitations. She was started on treatment for UTI.     IDT Team Meeting 10/20/2022  DC/Disposition:  10/29/22    Objective:  VITAL SIGNS: /77   Pulse 81   Temp 36.6 °C (97.9 °F) (Axillary)   Resp 18   Wt 68.5 kg (151 lb)   SpO2 93%   BMI 25.92 kg/m²   Gen: NAD  Psych: Mood and affect appropriate  CV: RRR, no edema  Resp: CTAB, no upper airway sounds  Abd: NTND  Neuro: AOx3, following commands    Recent Results (from the past 72 hour(s))   CBC WITHOUT DIFFERENTIAL    Collection Time: 10/22/22  5:45 AM   Result Value Ref Range    WBC 4.3 (L) 4.8 - 10.8 K/uL    RBC 3.27 (L) 4.20 - 5.40 M/uL    Hemoglobin 9.3 (L) 12.0 - 16.0 g/dL    Hematocrit 28.8 (L) 37.0 - 47.0 %    MCV 88.1 81.4 - 97.8 fL    MCH 28.4 27.0 - 33.0 pg    MCHC 32.3 (L) 33.6 - 35.0 g/dL    RDW 48.7 35.9 - 50.0 fL    Platelet Count 290 164 - 446 K/uL    MPV 9.7 9.0 - 12.9 fL       Current Facility-Administered Medications   Medication Frequency    [START ON 10/25/2022] calcium-vitamin D 250-3.125 MG-MCG tablet 1 Tablet DAILY    cefdinir (OMNICEF) capsule 300 mg Q12HRS    nystatin (MYCOSTATIN) powder BID    gabapentin (NEURONTIN) capsule 100 mg TID    Respiratory Therapy Consult Continuous RT    hydrALAZINE (APRESOLINE) tablet 25 mg Q8HRS PRN    acetaminophen (Tylenol) tablet 650 mg Q4HRS PRN    senna-docusate (PERICOLACE or SENOKOT S) 8.6-50 MG per tablet 2 Tablet BID    And    polyethylene glycol/lytes (MIRALAX) PACKET 1 Packet QDAY PRN    And    magnesium hydroxide (MILK OF MAGNESIA) suspension 30 mL QDAY PRN    And    bisacodyl (DULCOLAX) suppository 10 mg QDAY PRN    omeprazole (PRILOSEC) capsule 20 mg DAILY    mag hydrox-al hydrox-simeth  (MAALOX PLUS ES or MYLANTA DS) suspension 20 mL Q2HRS PRN    ondansetron (ZOFRAN ODT) dispertab 4 mg 4X/DAY PRN    Or    ondansetron (ZOFRAN) syringe/vial injection 4 mg 4X/DAY PRN    sodium chloride (OCEAN) 0.65 % nasal spray 2 Spray PRN    apixaban (ELIQUIS) tablet 5 mg BID    benazepril (LOTENSIN) tablet 40 mg QAM    ezetimibe (ZETIA) tablet 10 mg DAILY    levothyroxine (SYNTHROID) tablet 75 mcg AM ES    metoprolol SR (TOPROL XL) tablet 50 mg QAM    oxyCODONE immediate-release (ROXICODONE) tablet 2.5 mg Q3HRS PRN    oxyCODONE immediate-release (ROXICODONE) tablet 5 mg Q3HRS PRN    miconazole 2%-zinc oxide (Lily) topical cream Q6HRS PRN       Orders Placed This Encounter   Procedures    Diet Order Diet: Cardiac (meds whole 1-2 at a time with thins); Second Modifier: (optional): Renal     Standing Status:   Standing     Number of Occurrences:   1     Order Specific Question:   Diet:     Answer:   Cardiac [6]     Comments:   meds whole 1-2 at a time with thins     Order Specific Question:   Second Modifier: (optional)     Answer:   Renal [8]       Assessment:  Active Hospital Problems    Diagnosis     *Tibia/fibula fracture, right, closed, initial encounter     Closed right trimalleolar fracture, initial encounter     S/p ankle right orif     Anemia     Constipation     UTI (urinary tract infection)     CKD (chronic kidney disease) stage 3, GFR 30-59 ml/min ([facility])     GERD (gastroesophageal reflux disease)     Hypothyroid     Paroxysmal atrial fibrillation ([facility])     Hypertension        Medical Decision Making and Plan:  R tib/fib fracture - mechanical injury on 10/12/22 s/p ORIF with Dr. Huertas on 10/154/22. TTWB  -PT and OT for mobility and ADLs  -Gabapentin 200 mg TID. PRN Oxycodone. PRN Tylenol  -Reduce Gabapentin to 100 mg as ongoing confusion     Tachycardia - Patient with UA+ early in AM on 10/21/22, new tachycardia into 140-150s. Concern for sepsis vs A Fib. Converted in ED with amiodarone.     Confusion -  Patient with recent fall with head strike. May have had a mild TBI. Will have SLP assess. With cognitive deficits. Will check UA as now paranoid.    Dysphagia - Will have SLP evaluate for swallow    HTN/ A Fib - Patient on Metoprolol 50 mg daily, benazepril 40 mg daily, and Eliquis      HLD - Patient on Zetia 10 mg daily     Iron deficiency Anemia - Check AM CBC - 10. Repeat 7.9, had transfusion on 10/21/22 with repeat 9.3. Will monitor     Pannus rash - Started on Nystatin, refusing at times.     Azotemia - Check AM CMP - repeat normal    UTI - UA positive on 10/21/22. UCx with E coli sensitive to omnicef      CKD3 - Avoid nephrotoxic agents. Check AM CMP - repeat normal      Hypothyroidism - Patient on Levothyroxine 75 mcg daily     DVT Ppx - Patient on Eliquis    Total time:  26 minutes.  I spent greater than 50% of the time for patient care, counseling, and coordination on this date, including unit/floor time, and face-to-face time with the patient as per interval events and assessment and plan above. Topics discussed included discharge planning, started on antibiotics, reviewed UCx, pannus rash, and Nystatin to continue.     Mikayla Sutton M.D.

## 2022-10-24 NOTE — THERAPY
Physical Therapy   Daily Treatment     Patient Name: Yvonne Marie Wada  Age:  86 y.o., Sex:  female  Medical Record #: [id number]  Today's Date: 10/24/2022     Precautions  Precautions: Fall Risk, Toe Touch Weight Bearing Right Lower Extremity  Comments: intermittent pain and limited endurance    Subjective    Patient received supine with elevated HOB; agreeable to PT; SO present throughout session.     Objective       10/24/22 1301   PT Charge Group   PT Gait Training 1   PT Therapeutic Exercise 2   PT Therapeutic Activities 1   PT Total Time Spent   PT Individual Total Time Spent (Mins) 60   Vitals   O2 (LPM) 0   O2 Delivery Device None - Room Air   Gait Functional Level of Assist    Gait Level Of Assist Minimal Assist  (but second person for w/c follow)   Assistive Device Parallel Bars   Distance (Feet) 8   # of Times Distance was Traveled 2   Deviation Step To;Decreased Base Of Support;Bradykinetic;Decreased Heel Strike;Decreased Toe Off  (max cueing for good TTWB RLE adherence with fair to good carryover; improving endurance)   Wheelchair Functional Level of Assist   Wheelchair Assist   (Min A with 90-degree & 180-degree turns; otherwise SBA indoors today)   Distance Wheelchair (Feet or Distance) 180   Wheelchair Description Assistance with steering;Extra time;Leg rest management;Impaired coordination;Supervision for safety;Verbal cueing   Stairs Functional Level of Assist   Level of Assist with Stairs Unable to Participate   # of Stairs Climbed 0   Stairs Description Safety concerns  (Pt's SO reports is able to bump pt's w/c up/down the 2 platform JENNIFER without difficulty)   Transfer Functional Level of Assist   Bed, Chair, Wheelchair Transfer Minimal Assist   Bed Chair Wheelchair Transfer Description Adaptive equipment;Increased time;Set-up of equipment;Squat pivot transfer to wheelchair;Supervision for safety;Verbal cueing  (good TTWB RLE with max cueing; second person present for SBA safety but not required)    Sitting Lower Body Exercises   Sitting Lower Body Exercises   (4 lbs LLE; splint cast still on RLE; green/medium hip ABD band)   Ankle Pumps   (2x 10 L ankle pumps and 2x15 B toes flex<>ext)   Hip Abduction 2 sets of 10   Long Arc Quad 2 sets of 10   Marching 2 sets of 10   Bed Mobility    Supine to Sit Standby Assist   Sit to Stand Minimal Assist   Scooting Contact Guard Assist   Interdisciplinary Plan of Care Collaboration   IDT Collaboration with  Family / Caregiver;Therapy Tech   Patient Position at End of Therapy Seated;Chair Alarm On;Self Releasing Lap Belt Applied;Call Light within Reach;Tray Table within Reach;Family / Friend in Room   Collaboration Comments SO observing all CLOF today, receptive to education, reinforces pt's subjective and DME from eval, great social support from SO.  Tech A for first half of session.       Assessment    Patient has great social support from SO, very good receptiveness and understanding with education; pt's communication is increased today including increasing carryover with education as well; improving endurance noted with ambulation and w/c endurance; improving TTWB adherence with mod-max cueing provided during standing tasks; pleasant.    Strengths: Pleasant and cooperative, Supportive family, Willingly participates in therapeutic activities  Barriers: Confused, Decreased endurance, Dementia, Difficulty following instructions, Generalized weakness, Home accessibility, Impaired activity tolerance, Impaired balance, Impaired carryover of learning, Impaired insight/denial of deficits, Impaired functional cognition, Limited mobility, Pain    Plan    Wheelchair mobility and endurance  Bed mobility and transfers  Ambulation within // bars vs FWW  Ther ex for BLE strengthening and CV endurance  Reinforce TTWB precautions     Passport items to be completed:  Get in/out of bed safely, in/out of a vehicle, safely use mobility device, walk or wheel around home/community, navigate  up and down stairs, show how to get up/down from the ground, ensure home is accessible, demonstrate HEP, complete caregiver training    Physical Therapy Problems (Active)       Problem: Balance       Dates: Start: 10/18/22         Goal: STG-Within one week, patient will maintain static standing in parallel bars for 30 seconds with Mod A, gait belt, cueing for TTWB precautions, and second person CGA for safety.       Dates: Start: 10/18/22         Goal Note filed on 10/20/22 1138 by Alber Veras, PT       PT eval completed on 10/18/22; will monitor pt's progress towards STGs this week.                 Problem: Mobility       Dates: Start: 10/18/22         Goal: STG-Within one week, patient will propel wheelchair 50 feet indoors with SPV and cueing with both turns and task attention.       Dates: Start: 10/18/22         Goal Note filed on 10/20/22 1138 by Alber Veras, PT       PT eval completed on 10/18/22; will monitor pt's progress towards STGs this week.                 Problem: Mobility Transfers       Dates: Start: 10/18/22         Goal: STG-Within one week, patient will perform bed mobility at Min A level with bed rail and cueing.       Dates: Start: 10/18/22         Goal Note filed on 10/20/22 1138 by Alber Veras, PT       PT eval completed on 10/18/22; will monitor pt's progress towards STGs this week.              Goal: STG-Within one week, patient will sit to  parallel bars with Mod A, gait belt, cueing for TTWB precautions, and second person CGA for safety.       Dates: Start: 10/18/22         Goal Note filed on 10/20/22 1138 by Alber Veras, PT       PT eval completed on 10/18/22; will monitor pt's progress towards STGs this week.              Goal: STG-Within one week, patient will transfer bed to chair with Mod A, gait belt, cueing for TTWB precautions, and second person CGA for safety.       Dates: Start: 10/18/22         Goal Note filed on 10/20/22 1138 by  Alber Veras, PT       PT orestes completed on 10/18/22; will monitor pt's progress towards STGs this week.                 Problem: PT-Long Term Goals       Dates: Start: 10/18/22         Goal: LTG-By discharge, patient will propel wheelchair 100 feet indoors at SPV level with cueing.       Dates: Start: 10/18/22            Goal: LTG-By discharge, patient will ambulate 10 feet with FWW, gait belt, cueing for WB precautions, and Min A.       Dates: Start: 10/18/22            Goal: LTG-By discharge, patient will transfer one surface to another with FWW, gait belt, cueing for WB precautions, and Min A.       Dates: Start: 10/18/22            Goal: LTG-By discharge, patient will perform home exercise program with handout, cueing, and SBA.       Dates: Start: 10/18/22            Goal: LTG-By discharge, patient will transfer in/out of a car with FWW, gait belt, cueing for WB precautions, and Min A.       Dates: Start: 10/18/22

## 2022-10-24 NOTE — PROGRESS NOTES
Received bedside shift report from Dionna SANFORD RN regarding patient and assumed care. Patient awake, calm and stable, currently positioned in bed for comfort and safety; call light within reach. Denies pain or discomfort at this time. Will continue to monitor.

## 2022-10-24 NOTE — THERAPY
"Occupational Therapy  Daily Treatment     Patient Name: Yvonne Marie Wada  Age:  86 y.o., Sex:  female  Medical Record #: [id number]  Today's Date: 10/24/2022     Precautions  Precautions: Fall Risk, Toe Touch Weight Bearing Right Lower Extremity  Comments: intermittent pain and limited endurance         Subjective    Pt seated on toilet upon arrival, agreeable to OT session.  Pt tearful during session stating \"last night was not a good night\" but did not elaborate other than stating \"Im tired\" and \"This is a lot of work\" when referring to therapy.    During session pt stated \"Im having some A-fib symptoms\" notified RN and CNA took vitals see 0915 flowsheet.     Objective       10/24/22 0899   OT Charge Group   OT Self Care / ADL 2   OT Therapy Activity 2   OT Total Time Spent   OT Individual Total Time Spent (Mins) 60   Functional Level of Assist   Lower Body Dressing Moderate Assist  (A for management of RLE threading off and on; A for completion of pants over hips pursuit)   Toileting Moderate Assist  (Pt required A for balance and A to pull up pants)   Bed, Chair, Wheelchair Transfer Minimal Assist  (Squat pivot; w/c>EOB)   Toilet Transfers Minimal Assist  (Stand pivot with GB w/c<>toilet)   Bed Mobility    Sit to Supine Minimal Assist   Interdisciplinary Plan of Care Collaboration   IDT Collaboration with  Therapy Tech   Patient Position at End of Therapy In Bed;Bed Alarm On;Call Light within Reach;Tray Table within Reach;Phone within Reach   Collaboration Comments therapy tx for first 30 min for assist with tx     Pt completed 3 different activity cards for sequencing various tasks and 5 different safety awareness scenarios. Pt able to complete sequencing with min A overall and able to identify all unsafe situations.    Pt complete activity in // bars sit<>stands x2 standing while maintaining TTWB for 30 sec, 45 sec prior to requesting seated RB.     Assessment    Pt with some improvement in ADLs this date though " still requiring mod A for LB dressing and toileting. Pt at times presents with self limiting behaviors. Discussed with pt overall goals in order to go home and pt receptive, though behavior is still limiting at this point. Pt would cont to benefit from standing balance/ tolerance in order to increase overall ability to complete LB dressing and transfers.    Strengths: Able to follow instructions, Motivated for self care and independence, Pleasant and cooperative  Barriers: Decreased endurance, Fatigue, Generalized weakness, Impaired activity tolerance, Impaired balance, Impaired functional cognition, Pain, Limited mobility, Pain poorly managed, Visual impairment    Plan    ADL with AE as needed, IADLs, functional mobility, transfers at FWW level when able, strength/endurance building , standing tolerance / balance  incorporating TTWB  right LE       Occupational Therapy Goals (Active)       Problem: Bathing       Dates: Start: 10/18/22         Goal: STG-Within one week, patient will bathe with Min A overall using AE/DME as needed.       Dates: Start: 10/18/22               Problem: Dressing       Dates: Start: 10/18/22         Goal: STG-Within one week, patient will dress UB with SBA overall using AE/DME as needed.       Dates: Start: 10/18/22            Goal: STG-Within one week, patient will dress LB with Mod A overall using AE/DME as needed.       Dates: Start: 10/18/22               Problem: OT Long Term Goals       Dates: Start: 10/18/22         Goal: LTG-By discharge, patient will complete basic self care tasks with supervision overall using AE/DME as needed.       Dates: Start: 10/18/22            Goal: LTG-By discharge, patient will perform bathroom transfers with supervision overall using AE/DME as needed.       Dates: Start: 10/18/22               Problem: Toileting       Dates: Start: 10/18/22         Goal: STG-Within one week, patient will complete toileting tasks with Mod A overall using AE/DME as  needed.       Dates: Start: 10/18/22

## 2022-10-24 NOTE — THERAPY
"Speech Language Pathology  Daily Treatment     Patient Name: Yvonne Marie Wada  Age:  86 y.o., Sex:  female  Medical Record #: [id number]  Today's Date: 10/24/2022     Precautions  Precautions: Fall Risk, Toe Touch Weight Bearing Right Lower Extremity  Comments: intermittent pain and limited endurance    Subjective    \"What does this have to do with my walking?\" Explained role of ST and assistance with carryover of novel training in other therapy disciplines including PT. Pt is bed seeking, refusing to stay in chair following therapy, nor refusing to sit in w/c for lunch \"I'll eat in bed.\"      Objective       10/24/22 1004   Treatment Charges   SLP Cognitive Skill Development First 15 Minutes 1   SLP Cognitive Skill Development Additional 15 Minutes 3   SLP Total Time Spent   SLP Individual Total Time Spent (Mins) 60   Cognition   Moderate Attention Severe (2)   Functional Memory Activities Minimal (4)   Interdisciplinary Plan of Care Collaboration   Patient Position at End of Therapy In Bed;Call Light within Reach       Assessment    Pt recalled name of OT worked with this morning, reports working on safety cards. Pt also recalled working with ST from day prior. Did not recall/nor summarize activities completed during this session without cues. Multi-step written instructions targeted with pt averaging 60% across 5 trials IND. Pt benefits from high contrast visual aids (ie. Highlighter, crossing items out) to aid in placekeeping and avoiding repetition of completing same direction over multiple times.     Strengths: Willingly participates in therapeutic activities, Supportive family  Barriers: Hearing impairment, Impaired functional cognition    Plan    Continue to target fxl recall of safety precautions, transfers and activities completed in PT/OT    Passport items to be completed:  complete daily memory log entries, solve problems related to safety situations, review education related to hospitalization, complete " caregiver training     Speech Therapy Problems (Active)       Problem: Memory STGs       Dates: Start: 10/19/22         Goal: STG-Within one week, patient will r/t therapies, transfer sequences, and safety precautions with MOD A in 4/5 trials       Dates: Start: 10/19/22         Goal Note filed on 10/19/22 1602 by Elen Schneider MS,CCC-SLP       Pt assessed 10/19/22, will continue to target.                  Problem: Problem Solving STGs       Dates: Start: 10/19/22         Goal: STG-Within one week, patient will follow 1-2 step instructions to complete transfers, ADL related tasks with no more than 1 repetition.        Dates: Start: 10/19/22         Goal Note filed on 10/19/22 1602 by Elen Schneider MS,CCC-SLP       Pt assessed 10/19/22, will continue to target.                  Problem: Speech/Swallowing LTGs       Dates: Start: 10/19/22         Goal: LTG-By discharge, patient will safely swallow regular textures/thin liquids with no overt s/sx of pen/asp to continue with baseline diet.        Dates: Start: 10/19/22            Goal: LTG-By discharge, patient will solve basic problems by utilizing compensatory intervention for memory and problem-solving to allow for safe completion of daily activities with SPV       Dates: Start: 10/19/22               Problem: Swallowing STGs       Dates: Start: 10/19/22         Goal: STG-Within one week, patient will tolerate regular textures/thin liquids with no overt s/sx of pen/asp, no c/o globus sensation for 2/2 meals with SPV       Dates: Start: 10/19/22         Goal Note filed on 10/19/22 1602 by Elen Schneider MS,CCC-SLP       Pt assessed 10/19/22. Will continue to target, anticipate dysphagia intervention to be short term.

## 2022-10-25 LAB
ANION GAP SERPL CALC-SCNC: 11 MMOL/L (ref 7–16)
BASOPHILS # BLD AUTO: 0.9 % (ref 0–1.8)
BASOPHILS # BLD: 0.04 K/UL (ref 0–0.12)
BUN SERPL-MCNC: 14 MG/DL (ref 8–22)
CALCIUM SERPL-MCNC: 8.7 MG/DL (ref 8.5–10.5)
CHLORIDE SERPL-SCNC: 104 MMOL/L (ref 96–112)
CO2 SERPL-SCNC: 22 MMOL/L (ref 20–33)
CREAT SERPL-MCNC: 1.02 MG/DL (ref 0.5–1.4)
EOSINOPHIL # BLD AUTO: 0.2 K/UL (ref 0–0.51)
EOSINOPHIL NFR BLD: 4.5 % (ref 0–6.9)
ERYTHROCYTE [DISTWIDTH] IN BLOOD BY AUTOMATED COUNT: 47.9 FL (ref 35.9–50)
GFR SERPLBLD CREATININE-BSD FMLA CKD-EPI: 54 ML/MIN/1.73 M 2
GLUCOSE SERPL-MCNC: 91 MG/DL (ref 65–99)
HCT VFR BLD AUTO: 29.4 % (ref 37–47)
HGB BLD-MCNC: 9.2 G/DL (ref 12–16)
IMM GRANULOCYTES # BLD AUTO: 0.08 K/UL (ref 0–0.11)
IMM GRANULOCYTES NFR BLD AUTO: 1.8 % (ref 0–0.9)
LYMPHOCYTES # BLD AUTO: 1.15 K/UL (ref 1–4.8)
LYMPHOCYTES NFR BLD: 25.9 % (ref 22–41)
MCH RBC QN AUTO: 28.3 PG (ref 27–33)
MCHC RBC AUTO-ENTMCNC: 31.3 G/DL (ref 33.6–35)
MCV RBC AUTO: 90.5 FL (ref 81.4–97.8)
MONOCYTES # BLD AUTO: 0.54 K/UL (ref 0–0.85)
MONOCYTES NFR BLD AUTO: 12.2 % (ref 0–13.4)
NEUTROPHILS # BLD AUTO: 2.43 K/UL (ref 2–7.15)
NEUTROPHILS NFR BLD: 54.7 % (ref 44–72)
NRBC # BLD AUTO: 0 K/UL
NRBC BLD-RTO: 0 /100 WBC
PLATELET # BLD AUTO: 292 K/UL (ref 164–446)
PMV BLD AUTO: 9.4 FL (ref 9–12.9)
POTASSIUM SERPL-SCNC: 4.2 MMOL/L (ref 3.6–5.5)
RBC # BLD AUTO: 3.25 M/UL (ref 4.2–5.4)
SODIUM SERPL-SCNC: 137 MMOL/L (ref 135–145)
WBC # BLD AUTO: 4.4 K/UL (ref 4.8–10.8)

## 2022-10-25 PROCEDURE — 97110 THERAPEUTIC EXERCISES: CPT

## 2022-10-25 PROCEDURE — A9270 NON-COVERED ITEM OR SERVICE: HCPCS | Performed by: PHYSICAL MEDICINE & REHABILITATION

## 2022-10-25 PROCEDURE — 770010 HCHG ROOM/CARE - REHAB SEMI PRIVAT*

## 2022-10-25 PROCEDURE — 80048 BASIC METABOLIC PNL TOTAL CA: CPT

## 2022-10-25 PROCEDURE — 700102 HCHG RX REV CODE 250 W/ 637 OVERRIDE(OP): Performed by: PHYSICAL MEDICINE & REHABILITATION

## 2022-10-25 PROCEDURE — 85025 COMPLETE CBC W/AUTO DIFF WBC: CPT

## 2022-10-25 PROCEDURE — 97116 GAIT TRAINING THERAPY: CPT

## 2022-10-25 PROCEDURE — 97129 THER IVNTJ 1ST 15 MIN: CPT

## 2022-10-25 PROCEDURE — 36415 COLL VENOUS BLD VENIPUNCTURE: CPT

## 2022-10-25 PROCEDURE — 97535 SELF CARE MNGMENT TRAINING: CPT

## 2022-10-25 PROCEDURE — 97530 THERAPEUTIC ACTIVITIES: CPT

## 2022-10-25 PROCEDURE — 99232 SBSQ HOSP IP/OBS MODERATE 35: CPT | Performed by: PHYSICAL MEDICINE & REHABILITATION

## 2022-10-25 PROCEDURE — 90791 PSYCH DIAGNOSTIC EVALUATION: CPT | Performed by: PSYCHOLOGIST

## 2022-10-25 PROCEDURE — 97130 THER IVNTJ EA ADDL 15 MIN: CPT

## 2022-10-25 RX ORDER — OXYCODONE HYDROCHLORIDE 5 MG/1
5 TABLET ORAL
Status: DISCONTINUED | OUTPATIENT
Start: 2022-10-25 | End: 2022-10-26

## 2022-10-25 RX ADMIN — NYSTATIN 1 APPLICATION: 100000 POWDER TOPICAL at 21:25

## 2022-10-25 RX ADMIN — CEFDINIR 300 MG: 300 CAPSULE ORAL at 10:47

## 2022-10-25 RX ADMIN — OXYCODONE 5 MG: 5 TABLET ORAL at 21:22

## 2022-10-25 RX ADMIN — METOPROLOL SUCCINATE 50 MG: 25 TABLET, EXTENDED RELEASE ORAL at 10:46

## 2022-10-25 RX ADMIN — NYSTATIN: 100000 POWDER TOPICAL at 09:00

## 2022-10-25 RX ADMIN — BENAZEPRIL HYDROCHLORIDE 40 MG: 10 TABLET, COATED ORAL at 10:47

## 2022-10-25 RX ADMIN — APIXABAN 5 MG: 5 TABLET, FILM COATED ORAL at 10:47

## 2022-10-25 RX ADMIN — SENNOSIDES AND DOCUSATE SODIUM 2 TABLET: 50; 8.6 TABLET ORAL at 21:22

## 2022-10-25 RX ADMIN — OMEPRAZOLE 20 MG: 20 CAPSULE, DELAYED RELEASE ORAL at 10:47

## 2022-10-25 RX ADMIN — OXYCODONE 5 MG: 5 TABLET ORAL at 18:11

## 2022-10-25 RX ADMIN — SENNOSIDES AND DOCUSATE SODIUM 2 TABLET: 50; 8.6 TABLET ORAL at 10:47

## 2022-10-25 RX ADMIN — APIXABAN 5 MG: 5 TABLET, FILM COATED ORAL at 21:22

## 2022-10-25 RX ADMIN — CEFDINIR 300 MG: 300 CAPSULE ORAL at 21:22

## 2022-10-25 RX ADMIN — EZETIMIBE 10 MG: 10 TABLET ORAL at 10:47

## 2022-10-25 RX ADMIN — GABAPENTIN 100 MG: 100 CAPSULE ORAL at 10:47

## 2022-10-25 RX ADMIN — Medication 1 TABLET: at 10:52

## 2022-10-25 RX ADMIN — LEVOTHYROXINE SODIUM 75 MCG: 0.07 TABLET ORAL at 06:04

## 2022-10-25 RX ADMIN — GABAPENTIN 100 MG: 100 CAPSULE ORAL at 21:22

## 2022-10-25 RX ADMIN — GABAPENTIN 100 MG: 100 CAPSULE ORAL at 15:27

## 2022-10-25 ASSESSMENT — PAIN DESCRIPTION - PAIN TYPE
TYPE: ACUTE PAIN

## 2022-10-25 ASSESSMENT — GAIT ASSESSMENTS
GAIT LEVEL OF ASSIST: MINIMAL ASSIST
DISTANCE (FEET): 8
ASSISTIVE DEVICE: FRONT WHEEL WALKER
DEVIATION: STEP TO;DECREASED BASE OF SUPPORT;BRADYKINETIC;DECREASED HEEL STRIKE;DECREASED TOE OFF

## 2022-10-25 ASSESSMENT — ACTIVITIES OF DAILY LIVING (ADL)
BED_CHAIR_WHEELCHAIR_TRANSFER_DESCRIPTION: ADAPTIVE EQUIPMENT;INCREASED TIME;SET-UP OF EQUIPMENT;SUPERVISION FOR SAFETY;VERBAL CUEING;INITIAL PREPARATION FOR TASK
BED_CHAIR_WHEELCHAIR_TRANSFER_DESCRIPTION: ADAPTIVE EQUIPMENT;INCREASED TIME;SET-UP OF EQUIPMENT;SUPERVISION FOR SAFETY;VERBAL CUEING;SQUAT PIVOT TRANSFER TO WHEELCHAIR

## 2022-10-25 NOTE — PROGRESS NOTES
PSYCHOLOGICAL CONSULTATION:  Reason for admission: Tibia/fibula fracture, right, closed, initial encounter [S82.201A, S82.401A]  Closed right trimalleolar fracture, initial encounter [S82.851A]  Reason for consult: Difficulty with adjustment to hospitalization  Requesting Physician: Herman    Legal status: Voluntary    Chief Complaint: Reduced functionality    HPI: Yvonne Marie Wada is a 86 y.o. female with a past medical history of anemia, tissue aortic valve replacement on eliquis, osteoperosis, HTN;  who presented on 10/12/2022  9:22 AM with right ankle injury sustained while getting out of bed. Patient was found to have comminuted trimalleolar fracture of the right ankle, and taken to the OR on 10/14/22 with Dr. Ryan Huertas for ORIF repair. Post op course has been uncomplicated. She does have some anemia, acute on chronic kidney disease, HTN and baseline a. Fib. Functionally she is taking a few steps with PT and requiring max-total assist with ADLs.  She has support from her spouse and an acceptable post discharge living situation.    Psychology was consulted due to difficulty with adjustment to hospitalization and injury.    Risk Assessment: current symptoms as reported by pt.  Suicidal Ideation: Denies    Homicidal Ideation: Denies      Psychiatric Review of Systems:current symptoms as reported by pt.  Depression: Endorses some mild depressive symptoms in the context of hospitalization and reduced functionality  [place]: Denies   Anxiety/Panic Attacks: Denies   PTSD symptom: Denies   Psychosis: Denies   Other:        Medical Review of Systems: as reported by pt. All systems reviewed. Only those found to be + are noted below. All others are negative.   Neurological:    TBIs: Denies   SZs: Denies   Strokes: Denies   Other:   Other medical symptoms:   Thyroid: Denies   Diabetes: Denies   Cardiovascular disease: Endorses    Past Psychiatric Hx: None reported    Family Psychiatric Hx: None reported    Social Hx:  "  Social History     Socioeconomic History    Marital status:    Tobacco Use    Smoking status: Never    Smokeless tobacco: Never    Tobacco comments:     none   Vaping Use    Vaping Use: Never used   Substance and Sexual Activity    Alcohol use: No     Alcohol/week: 0.0 oz    Drug use: Yes     Comment: CBD Oil for Arthritis    Sexual activity: Not Currently     Partners: Male     Social Determinants of Health     Transportation Needs: No Transportation Needs    Lack of Transportation (Medical): No    Lack of Transportation (Non-Medical): No        Medical Hx: Chart reviewed. Only those findings of potential interest to psychiatry are noted below:  Past Medical History:   Diagnosis Date    AAA (abdominal aortic aneurysm) 7/26/2010    10/09 CT thorax dilated ascending thoracic aorta 4.9 cm 1/10 transesophageal echo dilated aortic root, and ascending aorta with mild aortic insufficiency 1/10  ascending aortic aneurysm repair 5/12 echo [facility] normal LV function EF 60%, moderate to severe left atrial enlargement, RVSP 32     Aortic insufficiency     Aortic valve disease     Aortic valve regurgitation     Arthritis     back and knee/ osteo    Cataract     Dental disorder     full top denture, partial bottom    Diverticulosis     Dyslipidemia 7/26/2010    Sees [facility] on lipitor 4/11 chol 159,trig 84,hdl 47,ldl 95,cpk 114 7/12 chol 163,trig 120,hdl 49,ldl 90, crp 1.1 on lipitor 20 mg     GERD (gastroesophageal reflux disease) 7/12/2012 6/07 EGD per [facility] hiatal hernia, start prevacid 30 mg 7/12 protonix 20 mg qday     Glaucoma     Heart burn     Heart murmur     Heart valve disease     \"leaking\", mitral valve    Hiatus hernia syndrome     High cholesterol     History of shingles 6/30/2011 2010 Back and left thorax      History of total knee arthroplasty 7/26/2010    4/10 MRI right knee complex tear medial meniscus, horizontal cleavage tear lateral meniscus, chondromalacia patella, moderate-sized Baker's " cyst 5/10 right meniscus knee repair  5/10 told by  had gout on surgery had pathology report, I await that report Question gout seen on pathology report done during repair of right meniscus knee surgery. 7/10 uric acid 6.3, we'll await starting allopurinol depending on the operative report 8/5/10 reviewed ortho notes , op report indicates crystal deposition, could be gout or pseudogout. No bx result sent over. Will need to get. My suspicion is chondrocalcinosis. 10/11  ortho note, MRI pending 8/7/10 reviewed pathology report right knee tissue, marked degenerative changes with focal calcification, no mention of gout. Based on this and a normal uric acid level, I do not believe she has gout, has no hyperuricemia medications would be indicated 10/11  ortho left knee meniscectomy and arthroscopy 1/12      HLD (hyperlipidemia)     Hypertension     Hypothyroid 4/8/2011 4/11 tsh 9 start synthroid 50 4/11 tsh 6,free t3 3.1,free t4 1.2,tpo negative 7/1/11 tsh 2.1 cont synthroid 50 mcg 7/12 tsh 3.4 cont synthroid 50 mcg     Indigestion     Mitral insufficiency     OSTEOPOROSIS 8/9/2010    Had been on fosamax [id number]  8/10 dexa LS -2.0,hip -1.5 await old dexa result, she will get copy for me 4/11 vit d 35 9/12 dexa LS -3.2,hip -1.4 2/13/13 relast infusion     Pain     back and right leg, can get as bad as 10/10    Preventative health care 7/26/2010    2002 pneum 2005 colon [facility] no records 4/11 vit d 35 9/11 shingles vaccine 9/12 mammogram 9/12 dexa LS -3.2,hip -1.4     Rheumatic fever     S/P appendectomy 7/26/2010    S/P TOMY (total abdominal hysterectomy) 7/26/2010    Sees gyn on HRT estradiol for 20 yrs () 11/08 mammo      Shingles 6/30/2011    Snoring     Status post lumbar surgery 7/26/2010 6/03 L4-5 epidural Dr. Jordan  7/06 overall for decompression, foraminotomy. L4-L5 posterior fusion, L4-L5 allograft       Stroke ([facility] 1996     no residual problems     Thoracic aortic aneurysm without mention of rupture     Tricuspid insufficiency     Unspecified disorder of thyroid     hypo    Urinary bladder disorder     suprapubic catheter insertion 11/4    UTI (urinary tract infection) 11/12/2016 7/6/16 UTI klebsiella pneumoniae sensitive to all antibiotics except ampicillin, start bactrim x 5 days 1/18/19 UTI enterobacter cloacae resistant to ampicillin, cephalothin, penicillin, bactrim, sensitive to cefuroxime, ciprofloxacin and levaquin, nitrofurantoin 5/26/19 UTI klebsiella given omnicef, organism resistant ampicillin, ciprofloxacin, levofloxacin, bactrim 8/18/21  urology note      Medical Conditions:     Allergies: Amoxicillin, Codeine, Doxycycline, Sulfamethoxazole-trimethoprim, Tramadol, and Trazodone  Medications (currently prescribed at [facility]):    Current Facility-Administered Medications:     oxyCODONE immediate-release (ROXICODONE) tablet 5 mg, 5 mg, Oral, QHS, Mikayla Sutton M.D.    calcium-vitamin D 250-3.125 MG-MCG tablet 1 Tablet, 1 Tablet, Oral, DAILY, Mikayla Sutton M.D., 1 Tablet at 10/25/22 1052    cefdinir (OMNICEF) capsule 300 mg, 300 mg, Oral, Q12HRS, Bulmaro Lewis M.D., 300 mg at 10/25/22 1047    nystatin (MYCOSTATIN) powder, , Topical, BID, Mikayla Sutton M.D., Given at 10/25/22 0900    gabapentin (NEURONTIN) capsule 100 mg, 100 mg, Oral, TID, Mikayla Sutton M.D., 100 mg at 10/25/22 1047    Respiratory Therapy Consult, , Nebulization, Continuous RT, Mikayla Sutton M.D.    hydrALAZINE (APRESOLINE) tablet 25 mg, 25 mg, Oral, Q8HRS PRN, Mikayla Sutton M.D.    acetaminophen (Tylenol) tablet 650 mg, 650 mg, Oral, Q4HRS PRN, Mikayla Sutton M.D.    senna-docusate (PERICOLACE or SENOKOT S) 8.6-50 MG per tablet 2 Tablet, 2 Tablet, Oral, BID, 2 Tablet at 10/25/22 1047 **AND** polyethylene glycol/lytes (MIRALAX) PACKET 1 Packet, 1 Packet, Oral, QDAY PRN  **AND** magnesium hydroxide (MILK OF MAGNESIA) suspension 30 mL, 30 mL, Oral, QDAY PRN **AND** bisacodyl (DULCOLAX) suppository 10 mg, 10 mg, Rectal, QDAY PRN, Mikayla Sutton M.D.    omeprazole (PRILOSEC) capsule 20 mg, 20 mg, Oral, DAILY, Mikayla Sutton M.D., 20 mg at 10/25/22 1047    mag hydrox-al hydrox-simeth (MAALOX PLUS ES or MYLANTA DS) suspension 20 mL, 20 mL, Oral, Q2HRS PRN, Mikayla Sutton M.D.    ondansetron (ZOFRAN ODT) dispertab 4 mg, 4 mg, Oral, 4X/DAY PRN **OR** ondansetron (ZOFRAN) syringe/vial injection 4 mg, 4 mg, Intramuscular, 4X/DAY PRN, Mikayla Sutton M.D.    sodium chloride (OCEAN) 0.65 % nasal spray 2 Spray, 2 Spray, Nasal, PRN, Mikayla Sutton M.D.    apixaban (ELIQUIS) tablet 5 mg, 5 mg, Oral, BID, Mikayla Sutton M.D., 5 mg at 10/25/22 1047    benazepril (LOTENSIN) tablet 40 mg, 40 mg, Oral, QAM, Mikayla Sutton M.D., 40 mg at 10/25/22 1047    ezetimibe (ZETIA) tablet 10 mg, 10 mg, Oral, DAILY, Mikayla Sutton M.D., 10 mg at 10/25/22 1047    levothyroxine (SYNTHROID) tablet 75 mcg, 75 mcg, Oral, AM ES, Mikayla Sutton M.D., 75 mcg at 10/25/22 0604    metoprolol SR (TOPROL XL) tablet 50 mg, 50 mg, Oral, QAM, Mikayla Sutton M.D., 50 mg at 10/25/22 1046    oxyCODONE immediate-release (ROXICODONE) tablet 2.5 mg, 2.5 mg, Oral, Q3HRS PRN, Mikayla Sutton M.D., 2.5 mg at 10/20/22 0934    oxyCODONE immediate-release (ROXICODONE) tablet 5 mg, 5 mg, Oral, Q3HRS PRN, Mikayla Sutton M.D., 5 mg at 10/24/22 2015    miconazole 2%-zinc oxide (Lily) topical cream, , Topical, Q6HRS PRN, Mikayla Sutton M.D.  Labs:  Recent Results (from the past 24 hour(s))   CBC WITH DIFFERENTIAL    Collection Time: 10/25/22  5:49 AM   Result Value Ref Range    WBC 4.4 (L) 4.8 - 10.8 K/uL    RBC 3.25 (L) 4.20 - 5.40 M/uL    Hemoglobin 9.2 (L) 12.0 - 16.0 g/dL    Hematocrit 29.4 (L) 37.0 - 47.0 %    MCV  90.5 81.4 - 97.8 fL    MCH 28.3 27.0 - 33.0 pg    MCHC 31.3 (L) 33.6 - 35.0 g/dL    RDW 47.9 35.9 - 50.0 fL    Platelet Count 292 164 - 446 K/uL    MPV 9.4 9.0 - 12.9 fL    Neutrophils-Polys 54.70 44.00 - 72.00 %    Lymphocytes 25.90 22.00 - 41.00 %    Monocytes 12.20 0.00 - 13.40 %    Eosinophils 4.50 0.00 - 6.90 %    Basophils 0.90 0.00 - 1.80 %    Immature Granulocytes 1.80 (H) 0.00 - 0.90 %    Nucleated RBC 0.00 /100 WBC    Neutrophils (Absolute) 2.43 2.00 - 7.15 K/uL    Lymphs (Absolute) 1.15 1.00 - 4.80 K/uL    Monos (Absolute) 0.54 0.00 - 0.85 K/uL    Eos (Absolute) 0.20 0.00 - 0.51 K/uL    Baso (Absolute) 0.04 0.00 - 0.12 K/uL    Immature Granulocytes (abs) 0.08 0.00 - 0.11 K/uL    NRBC (Absolute) 0.00 K/uL   Basic Metabolic Panel    Collection Time: 10/25/22  5:49 AM   Result Value Ref Range    Sodium 137 135 - 145 mmol/L    Potassium 4.2 3.6 - 5.5 mmol/L    Chloride 104 96 - 112 mmol/L    Co2 22 20 - 33 mmol/L    Glucose 91 65 - 99 mg/dL    Bun 14 8 - 22 mg/dL    Creatinine 1.02 0.50 - 1.40 mg/dL    Calcium 8.7 8.5 - 10.5 mg/dL    Anion Gap 11.0 7.0 - 16.0   ESTIMATED GFR    Collection Time: 10/25/22  5:49 AM   Result Value Ref Range    GFR (CKD-EPI) 54 (A) >60 mL/min/1.73 m 2          Cranial Imaging Hx: Head CT 8/14/2022 IMPRESSION:     1.  Diffuse atrophy and white matter changes.  2.  No acute intracranial hemorrhage or territorial infarct.  Other:    Psychiatric Examination:  Vitals: Blood pressure (!) 149/67, pulse 68, temperature 36.9 °C (98.5 °F), temperature source Oral, resp. rate 17, weight 68.5 kg (151 lb), SpO2 96 %, not currently breastfeeding.  Musculoskeletal: Laying in bed normal psychomotor activity, no tics or unusual mannerisms noted  Appearance and Eye Contact: Easily established rapport WDWN, appropriate dress and grooming. Behavior is calm, cooperative,  appropriate eye contact  Attention/Alertness: Alert  Thought Process: Linear logical goal directed  Thought Content: No psychotic  "processes noted  Speech: Clear with normal rate and rhythm  Mood: \"Doing okay\"            Affect: Euthymic         SI/HI: Denies    Memory: Recent and remote memory appear intact     Orientation: Alert and oriented to person place and time  Insight into symptoms: Within normal limits  Judgement into symptoms: Within normal limits    Neuropsychological Testing:   Not formally tested          ASSESSMENT: Yvonne Marie Wada is a 86 y.o. female with a past medical history of anemia, tissue aortic valve replacement on eliquis, osteoperosis, HTN;  who presented on 10/12/2022  9:22 AM with right ankle injury sustained while getting out of bed. Patient was found to have comminuted trimalleolar fracture of the right ankle, and taken to the OR on 10/14/22 with Dr. Ryan Huertas for ORIF repair. Post op course has been uncomplicated. She does have some anemia, acute on chronic kidney disease, HTN and baseline a. Fib. Functionally she is taking a few steps with PT and requiring max-total assist with ADLs.  She has support from her spouse and an acceptable post discharge living situation.    Psychology was consulted due to difficulty with adjustment to hospitalization and injury.  Session focused on assessment of current functioning as well as psychoeducation regarding cognitive and emotional impact of hospitalization.  Patient reports she is doing well and feels supported by her , she is looking forward to returning home.  We will continue to follow through discharge to ensure appropriate engagement with therapies and medical staff    DSM5 Diagnostic Considerations: Adjustment disorder with depressed mood      PLAN:  Records reviewed: Yes  Discussed patient with other provider: Herman  Continue to follow  Thank you for the consult.    Una Szymanski, Ph.D.  "

## 2022-10-25 NOTE — PROGRESS NOTES
Received bedside shift report from Triny GEIGER RN regarding patient and assumed care. Patient awake, calm and stable, currently positioned in bed for comfort and safety; call light within reach. Denies pain or discomfort at this time. Will continue to monitor.

## 2022-10-25 NOTE — THERAPY
Physical Therapy   Daily Treatment     Patient Name: Yvonne Marie Wada  Age:  86 y.o., Sex:  female  Medical Record #: [id number]  Today's Date: 10/25/2022     Precautions  Precautions: Fall Risk, Toe Touch Weight Bearing Right Lower Extremity  Comments: intermittent pain and limited endurance    Subjective    Patient received supine with elevated HOB for both sessions; agreeable to PT.     Objective       10/25/22 1301 10/25/22 1531   PT Charge Group   PT Gait Training  --  1   PT Therapeutic Exercise 2  --    PT Therapeutic Activities  --  1   PT Total Time Spent   PT Individual Total Time Spent (Mins) 30 30   Vitals   O2 (LPM) 0 0   O2 Delivery Device None - Room Air None - Room Air   Gait Functional Level of Assist    Gait Level Of Assist  --  Minimal Assist  (and second person for w/c follow)   Assistive Device  --  Front Wheel Walker  (and gait belt and R foot bubble wrap)   Distance (Feet)  --  8   # of Times Distance was Traveled  --  1   Deviation  --  Step To;Decreased Base Of Support;Bradykinetic;Decreased Heel Strike;Decreased Toe Off  (mod cueing for good TTWB RLE adherence with good to great carryover; cueing for sequencing)   Wheelchair Functional Level of Assist   Wheelchair Assist Minimal Assist  --    Distance Wheelchair (Feet or Distance) 180 without fatigue noted  --    Wheelchair Description Assistance with steering;Extra time;Leg rest management;Impaired coordination;Safety concerns;Supervision for safety;Verbal cueing  --    Stairs Functional Level of Assist   Level of Assist with Stairs Unable to Participate  --    # of Stairs Climbed 0  --    Stairs Description Safety concerns  (limited by TTWB precautions at this time)  --    Transfer Functional Level of Assist   Bed, Chair, Wheelchair Transfer   (Min A from EOB to w/c; SBA from second person for safety) Minimal Assist   Bed Chair Wheelchair Transfer Description Adaptive equipment;Increased time;Set-up of equipment;Supervision for  safety;Verbal cueing;Initial preparation for task  (initial cueing provided for TTWB RLE; cueing for sequencing) Adaptive equipment;Increased time;Set-up of equipment;Supervision for safety;Verbal cueing;Squat pivot transfer to wheelchair  (good TTWB adherence continues, with cueing provided initially; bubble wrap in place today on R foot)   Sitting Lower Body Exercises   Sitting Lower Body Exercises   (4 lbs LLE; 2.5 lbs R distal thigh; hip ADD ball; purple/difficult hip ABD band; green/medium L knee flex band)  --    Ankle Pumps 2 sets of 10;Left  --    Hip Abduction 2 sets of 10  --    Hip Adduction 2 sets of 10  --    Long Arc Quad 2 sets of 10  --    Marching 2 sets of 10  --    Hamstring Curl 2 sets of 10;Left  --    Other Exercises 2 sets of 10 reps for R toes ext<>flex  --    Bed Mobility    Supine to Sit Supervised Supervised   Sit to Supine  --  Standby Assist   Sit to Stand Minimal Assist Minimal Assist  (5 reps at FWW, Min A, gait belt, cueing for TTWB prior to each rep)   Scooting Minimal Assist Contact Guard Assist   Rolling Standby Assist Supervised   Interdisciplinary Plan of Care Collaboration   IDT Collaboration with  Family / Caregiver;Therapy Tech Therapy Tech   Patient Position at End of Therapy Seated;Chair Alarm On;Self Releasing Lap Belt Applied;Call Light within Reach;Family / Friend in Room In Bed;Call Light within Reach;Tray Table within Reach;Phone within Reach   Collaboration Comments SO present throughout; discussed transfers, CLOF, goals, and progress.  Tech A during transfer. Tech A for w/c follow with ambulation, safety with transfers       Assessment    Patient has improving sit<>stands and TTWB adherence today; decreasing A noted with bed mobility; improving ambulation; good motivation; impaired carryover with education; good social support from SO.    Strengths: Pleasant and cooperative, Supportive family, Willingly participates in therapeutic activities  Barriers: Confused,  Decreased endurance, Dementia, Difficulty following instructions, Generalized weakness, Home accessibility, Impaired activity tolerance, Impaired balance, Impaired carryover of learning, Impaired insight/denial of deficits, Impaired functional cognition, Limited mobility, Pain    Plan    Wheelchair mobility and endurance  Decreased A with bed transfers, initiate car transfers as weather allows  Ambulation x10 ft with FWW  Ther ex for BLE strengthening and CV endurance  Reinforce TTWB precautions     Passport items to be completed:  Get in/out of bed safely, in/out of a vehicle, safely use mobility device, walk or wheel around home/community, navigate up and down stairs, show how to get up/down from the ground, ensure home is accessible, demonstrate HEP, complete caregiver training    Physical Therapy Problems (Active)       Problem: Balance       Dates: Start: 10/18/22         Goal: STG-Within one week, patient will maintain static standing in parallel bars for 30 seconds with Mod A, gait belt, cueing for TTWB precautions, and second person CGA for safety.       Dates: Start: 10/18/22         Goal Note filed on 10/20/22 1138 by Alber Veras, PT       PT orestes completed on 10/18/22; will monitor pt's progress towards STGs this week.                 Problem: Mobility       Dates: Start: 10/18/22         Goal: STG-Within one week, patient will propel wheelchair 50 feet indoors with SPV and cueing with both turns and task attention.       Dates: Start: 10/18/22         Goal Note filed on 10/20/22 1138 by Alber Veras, PT       PT orestes completed on 10/18/22; will monitor pt's progress towards STGs this week.                 Problem: Mobility Transfers       Dates: Start: 10/18/22         Goal: STG-Within one week, patient will perform bed mobility at Min A level with bed rail and cueing.       Dates: Start: 10/18/22         Goal Note filed on 10/20/22 1138 by Alber Veras, PT       ADITYA carlisle  completed on 10/18/22; will monitor pt's progress towards STGs this week.              Goal: STG-Within one week, patient will sit to  parallel bars with Mod A, gait belt, cueing for TTWB precautions, and second person CGA for safety.       Dates: Start: 10/18/22         Goal Note filed on 10/20/22 1138 by Alber Veras, PT       PT eval completed on 10/18/22; will monitor pt's progress towards STGs this week.              Goal: STG-Within one week, patient will transfer bed to chair with Mod A, gait belt, cueing for TTWB precautions, and second person CGA for safety.       Dates: Start: 10/18/22         Goal Note filed on 10/20/22 1138 by Alber Veras, PT       PT eval completed on 10/18/22; will monitor pt's progress towards STGs this week.                 Problem: PT-Long Term Goals       Dates: Start: 10/18/22         Goal: LTG-By discharge, patient will propel wheelchair 100 feet indoors at SPV level with cueing.       Dates: Start: 10/18/22            Goal: LTG-By discharge, patient will ambulate 10 feet with FWW, gait belt, cueing for WB precautions, and Min A.       Dates: Start: 10/18/22            Goal: LTG-By discharge, patient will transfer one surface to another with FWW, gait belt, cueing for WB precautions, and Min A.       Dates: Start: 10/18/22            Goal: LTG-By discharge, patient will perform home exercise program with handout, cueing, and SBA.       Dates: Start: 10/18/22            Goal: LTG-By discharge, patient will transfer in/out of a car with FWW, gait belt, cueing for WB precautions, and Min A.       Dates: Start: 10/18/22

## 2022-10-25 NOTE — THERAPY
Speech Language Pathology  Daily Treatment     Patient Name: Yvonne Marie Wada  Age:  86 y.o., Sex:  female  Medical Record #: [id number]  Today's Date: 10/25/2022     Precautions  Precautions: Fall Risk, Toe Touch Weight Bearing Right Lower Extremity  Comments: intermittent pain and limited endurance    Subjective    Patient pleasant and cooperative.     Objective       10/25/22 0853   Treatment Charges   SLP Cognitive Skill Development First 15 Minutes 1   SLP Cognitive Skill Development Additional 15 Minutes 3   SLP Total Time Spent   SLP Individual Total Time Spent (Mins) 60   Cognition   Attention to Task Moderate (3)   Simple Attention Moderate (3)   Moderate Attention Severe (2)   Prospective Memory Severe (2)       Assessment    Patient had not initiated recording in memory log.  She verbalized recall of breakfast items. She did not recall weight baring status.  Reviewed instructions for use of  memory log and advised patient to pay attention to when she is asked to change how she moves or plans her moves to improve balance and safety and to record these changes in her memory log.  Patient worked on sequencing ranking 4 written steps.  She required mod to max A to initiate ranking and to order steps correctly.  Patient appeared to need directions reviewed for each new task item.    Strengths: Willingly participates in therapeutic activities, Supportive family  Barriers: Hearing impairment, Impaired functional cognition    Plan    Target recall of safety sequencing and precautions, attention for following 1-2 step directives.    Passport items to be completed:  Express basic needs, understand food/liquid recommendations, consistently follow swallow precautions, manage finances, manage medications, arrive to therapy appointments on time, complete daily memory log entries, solve problems related to safety situations, review education related to hospitalization, complete caregiver training     Speech Therapy  Problems (Active)       Problem: Memory STGs       Dates: Start: 10/19/22         Goal: STG-Within one week, patient will r/t therapies, transfer sequences, and safety precautions with MOD A in 4/5 trials       Dates: Start: 10/19/22         Goal Note filed on 10/19/22 1602 by Elen Schneider MS,CCC-SLP       Pt assessed 10/19/22, will continue to target.                  Problem: Problem Solving STGs       Dates: Start: 10/19/22         Goal: STG-Within one week, patient will follow 1-2 step instructions to complete transfers, ADL related tasks with no more than 1 repetition.        Dates: Start: 10/19/22         Goal Note filed on 10/19/22 1602 by Elen Schneider MS,CCC-SLP       Pt assessed 10/19/22, will continue to target.                  Problem: Speech/Swallowing LTGs       Dates: Start: 10/19/22         Goal: LTG-By discharge, patient will safely swallow regular textures/thin liquids with no overt s/sx of pen/asp to continue with baseline diet.        Dates: Start: 10/19/22            Goal: LTG-By discharge, patient will solve basic problems by utilizing compensatory intervention for memory and problem-solving to allow for safe completion of daily activities with SPV       Dates: Start: 10/19/22               Problem: Swallowing STGs       Dates: Start: 10/19/22         Goal: STG-Within one week, patient will tolerate regular textures/thin liquids with no overt s/sx of pen/asp, no c/o globus sensation for 2/2 meals with SPV       Dates: Start: 10/19/22         Goal Note filed on 10/19/22 1602 by Elen Schneider MS,CCC-SLP       Pt assessed 10/19/22. Will continue to target, anticipate dysphagia intervention to be short term.

## 2022-10-25 NOTE — THERAPY
Occupational Therapy  Daily Treatment     Patient Name: Yvonne Marie Wada  Age:  86 y.o., Sex:  female  Medical Record #: [id number]  Today's Date: 10/25/2022     Precautions  Precautions: Fall Risk, Toe Touch Weight Bearing Right Lower Extremity  Comments: intermittent pain and limited endurance         Subjective    Pt stated that she has a tub at home with GB and shower seat in place, though no tub transfer bench.     Objective       10/25/22 0931   OT Charge Group   OT Self Care / ADL 2   OT Therapy Activity 1   OT Therapeutic Exercise  1   OT Total Time Spent   OT Individual Total Time Spent (Mins) 60   Functional Level of Assist   Bed, Chair, Wheelchair Transfer Minimal Assist   Tub / Shower Transfers Minimal Assist  (dry run with tub trasnfer bench, w/c<>TTB with squat pivot overall)   Sitting Upper Body Exercises   Upper Extremity Bike Level 2 Resistance  (BUE motomed, 10 min total, 0 RB for overall strength and endurance)   Balance   Comments completed bean bag toss in // bars this session pt pt reaching cross body to grab bean bag and then toss them. Pt able to stand while maintaining WB precautions. Pt stood for 45 sec and 1 min 15 sec.   Bed Mobility    Supine to Sit Standby Assist   Sit to Supine Standby Assist   Interdisciplinary Plan of Care Collaboration   Patient Position at End of Therapy In Bed;Bed Alarm On;Call Light within Reach;Tray Table within Reach       Assessment    Pt tolerated session well with focus on UB dressing, shower transfer, and overall strength/endurance. Pt able to tolerate increased standing at // bars with 1 UE for support, which may increase overall ability to complete LB dressing/toileting. Pt able to complete tub transfer bench transfer from w/c with min A overall and VC to ensure safety (second person available though not needed).  Strengths: Able to follow instructions, Motivated for self care and independence, Pleasant and cooperative  Barriers: Decreased endurance,  Fatigue, Generalized weakness, Impaired activity tolerance, Impaired balance, Impaired functional cognition, Pain, Limited mobility, Pain poorly managed, Visual impairment    Plan    Shower tomorrow with focus on LB dressing, ADL with AE as needed, IADLs, functional mobility, transfers at FWW level when able, strength/endurance building , standing tolerance / balance  incorporating TTWB  right LE       Occupational Therapy Goals (Active)       Problem: Bathing       Dates: Start: 10/18/22         Goal: STG-Within one week, patient will bathe with Min A overall using AE/DME as needed.       Dates: Start: 10/18/22               Problem: Dressing       Dates: Start: 10/18/22         Goal: STG-Within one week, patient will dress UB with SBA overall using AE/DME as needed.       Dates: Start: 10/18/22            Goal: STG-Within one week, patient will dress LB with Mod A overall using AE/DME as needed.       Dates: Start: 10/18/22               Problem: OT Long Term Goals       Dates: Start: 10/18/22         Goal: LTG-By discharge, patient will complete basic self care tasks with supervision overall using AE/DME as needed.       Dates: Start: 10/18/22            Goal: LTG-By discharge, patient will perform bathroom transfers with supervision overall using AE/DME as needed.       Dates: Start: 10/18/22               Problem: Toileting       Dates: Start: 10/18/22         Goal: STG-Within one week, patient will complete toileting tasks with Mod A overall using AE/DME as needed.       Dates: Start: 10/18/22

## 2022-10-25 NOTE — PROGRESS NOTES
Rehab Progress Note     Encounter Date: 10/25/2022    CC: Decreased mobility, pain control    Interval Events (Subjective)  Patient sitting up in room. She reports therapy is going well. She reports her pain is worst at night. She reports it did keep her up some last night. Discussed would evaluate medications. Norrisies NVBANDAR.     IDT Team Meeting 10/20/2022  DC/Disposition:  10/29/22    Objective:  VITAL SIGNS: BP (!) 149/67   Pulse 68   Temp 36.9 °C (98.5 °F) (Oral)   Resp 17   Wt 68.5 kg (151 lb)   SpO2 96%   BMI 25.92 kg/m²   Gen: NAD  Psych: Mood and affect appropriate  CV: RRR, no edema  Resp: CTAB, no upper airway sounds  Abd: NTND  Neuro: AOx3, following commands  Unchanged from 10/24/22    Recent Results (from the past 72 hour(s))   CBC WITH DIFFERENTIAL    Collection Time: 10/25/22  5:49 AM   Result Value Ref Range    WBC 4.4 (L) 4.8 - 10.8 K/uL    RBC 3.25 (L) 4.20 - 5.40 M/uL    Hemoglobin 9.2 (L) 12.0 - 16.0 g/dL    Hematocrit 29.4 (L) 37.0 - 47.0 %    MCV 90.5 81.4 - 97.8 fL    MCH 28.3 27.0 - 33.0 pg    MCHC 31.3 (L) 33.6 - 35.0 g/dL    RDW 47.9 35.9 - 50.0 fL    Platelet Count 292 164 - 446 K/uL    MPV 9.4 9.0 - 12.9 fL    Neutrophils-Polys 54.70 44.00 - 72.00 %    Lymphocytes 25.90 22.00 - 41.00 %    Monocytes 12.20 0.00 - 13.40 %    Eosinophils 4.50 0.00 - 6.90 %    Basophils 0.90 0.00 - 1.80 %    Immature Granulocytes 1.80 (H) 0.00 - 0.90 %    Nucleated RBC 0.00 /100 WBC    Neutrophils (Absolute) 2.43 2.00 - 7.15 K/uL    Lymphs (Absolute) 1.15 1.00 - 4.80 K/uL    Monos (Absolute) 0.54 0.00 - 0.85 K/uL    Eos (Absolute) 0.20 0.00 - 0.51 K/uL    Baso (Absolute) 0.04 0.00 - 0.12 K/uL    Immature Granulocytes (abs) 0.08 0.00 - 0.11 K/uL    NRBC (Absolute) 0.00 K/uL   Basic Metabolic Panel    Collection Time: 10/25/22  5:49 AM   Result Value Ref Range    Sodium 137 135 - 145 mmol/L    Potassium 4.2 3.6 - 5.5 mmol/L    Chloride 104 96 - 112 mmol/L    Co2 22 20 - 33 mmol/L    Glucose 91 65 - 99 mg/dL     Bun 14 8 - 22 mg/dL    Creatinine 1.02 0.50 - 1.40 mg/dL    Calcium 8.7 8.5 - 10.5 mg/dL    Anion Gap 11.0 7.0 - 16.0   ESTIMATED GFR    Collection Time: 10/25/22  5:49 AM   Result Value Ref Range    GFR (CKD-EPI) 54 (A) >60 mL/min/1.73 m 2       Current Facility-Administered Medications   Medication Frequency    calcium-vitamin D 250-3.125 MG-MCG tablet 1 Tablet DAILY    cefdinir (OMNICEF) capsule 300 mg Q12HRS    nystatin (MYCOSTATIN) powder BID    gabapentin (NEURONTIN) capsule 100 mg TID    Respiratory Therapy Consult Continuous RT    hydrALAZINE (APRESOLINE) tablet 25 mg Q8HRS PRN    acetaminophen (Tylenol) tablet 650 mg Q4HRS PRN    senna-docusate (PERICOLACE or SENOKOT S) 8.6-50 MG per tablet 2 Tablet BID    And    polyethylene glycol/lytes (MIRALAX) PACKET 1 Packet QDAY PRN    And    magnesium hydroxide (MILK OF MAGNESIA) suspension 30 mL QDAY PRN    And    bisacodyl (DULCOLAX) suppository 10 mg QDAY PRN    omeprazole (PRILOSEC) capsule 20 mg DAILY    mag hydrox-al hydrox-simeth (MAALOX PLUS ES or MYLANTA DS) suspension 20 mL Q2HRS PRN    ondansetron (ZOFRAN ODT) dispertab 4 mg 4X/DAY PRN    Or    ondansetron (ZOFRAN) syringe/vial injection 4 mg 4X/DAY PRN    sodium chloride (OCEAN) 0.65 % nasal spray 2 Spray PRN    apixaban (ELIQUIS) tablet 5 mg BID    benazepril (LOTENSIN) tablet 40 mg QAM    ezetimibe (ZETIA) tablet 10 mg DAILY    levothyroxine (SYNTHROID) tablet 75 mcg AM ES    metoprolol SR (TOPROL XL) tablet 50 mg QAM    oxyCODONE immediate-release (ROXICODONE) tablet 2.5 mg Q3HRS PRN    oxyCODONE immediate-release (ROXICODONE) tablet 5 mg Q3HRS PRN    miconazole 2%-zinc oxide (Lily) topical cream Q6HRS PRN       Orders Placed This Encounter   Procedures    Diet Order Diet: Cardiac (meds whole 1-2 at a time with thins); Second Modifier: (optional): Renal     Standing Status:   Standing     Number of Occurrences:   1     Order Specific Question:   Diet:     Answer:   Cardiac [6]     Comments:   meds  whole 1-2 at a time with thins     Order Specific Question:   Second Modifier: (optional)     Answer:   Renal [8]       Assessment:  Active Hospital Problems    Diagnosis     *Tibia/fibula fracture, right, closed, initial encounter     Closed right trimalleolar fracture, initial encounter     S/p ankle right orif     Anemia     Constipation     UTI (urinary tract infection)     CKD (chronic kidney disease) stage 3, GFR 30-59 ml/min (HCC)     GERD (gastroesophageal reflux disease)     Hypothyroid     Paroxysmal atrial fibrillation (HCC)     Hypertension        Medical Decision Making and Plan:  R tib/fib fracture - mechanical injury on 10/12/22 s/p ORIF with Dr. Huertas on 10/154/22. TTWB  -PT and OT for mobility and ADLs  -Gabapentin 200 mg TID. PRN Oxycodone. PRN Tylenol  -Reduce Gabapentin to 100 mg as ongoing confusion. Schedule Oxycodone 5 mg QHS     Tachycardia - Patient with UA+ early in AM on 10/21/22, new tachycardia into 140-150s. Concern for sepsis vs A Fib. Converted in ED with amiodarone.     Confusion - Patient with recent fall with head strike. May have had a mild TBI. Will have SLP assess. With cognitive deficits. Will check UA as now paranoid. UA positive. Started on Omnicef    Dysphagia - Will have SLP evaluate for swallow    HTN/ A Fib - Patient on Metoprolol 50 mg daily, benazepril 40 mg daily, and Eliquis      HLD - Patient on Zetia 10 mg daily     Iron deficiency Anemia - Check AM CBC - 10. Repeat 7.9, had transfusion on 10/21/22 with repeat 9.3. Will monitor     Pannus rash - Started on Nystatin, refusing at times.     Azotemia - Check AM CMP - repeat normal    UTI - UA positive on 10/21/22. UCx with E coli sensitive to omnicef      CKD3 - Avoid nephrotoxic agents. Check AM CMP - repeat normal      Hypothyroidism - Patient on Levothyroxine 75 mcg daily     DVT Ppx - Patient on Eliquis    Total time:  26 minutes.  I spent greater than 50% of the time for patient care, counseling, and coordination  on this date, including unit/floor time, and face-to-face time with the patient as per interval events and assessment and plan above. Topics discussed included improving mobility, night time pain and start schedule Oxycodone at night.     Mikayla Sutton M.D.

## 2022-10-25 NOTE — CARE PLAN
Problem: Skin Integrity  Goal: Skin integrity is maintained or improved  Outcome: Progressing  Note: Patient's skin remains intact and free from new or accidental injury this shift.  Will continue to monitor.    The patient is Stable - Low risk of patient condition declining or worsening    Shift Goals  Clinical Goals: Safety  Patient Goals: Sleep well    Progress made toward(s) clinical / shift goals:  no s/s worsening skin breakdown    Patient is not progressing towards the following goals:

## 2022-10-26 PROCEDURE — 97129 THER IVNTJ 1ST 15 MIN: CPT

## 2022-10-26 PROCEDURE — 770010 HCHG ROOM/CARE - REHAB SEMI PRIVAT*

## 2022-10-26 PROCEDURE — 700102 HCHG RX REV CODE 250 W/ 637 OVERRIDE(OP): Performed by: PHYSICAL MEDICINE & REHABILITATION

## 2022-10-26 PROCEDURE — A9270 NON-COVERED ITEM OR SERVICE: HCPCS | Performed by: PHYSICAL MEDICINE & REHABILITATION

## 2022-10-26 PROCEDURE — 97530 THERAPEUTIC ACTIVITIES: CPT

## 2022-10-26 PROCEDURE — 99232 SBSQ HOSP IP/OBS MODERATE 35: CPT | Performed by: PHYSICAL MEDICINE & REHABILITATION

## 2022-10-26 PROCEDURE — 97535 SELF CARE MNGMENT TRAINING: CPT

## 2022-10-26 PROCEDURE — 97130 THER IVNTJ EA ADDL 15 MIN: CPT

## 2022-10-26 RX ORDER — OXYCODONE HYDROCHLORIDE 5 MG/1
7.5 TABLET ORAL
Status: DISCONTINUED | OUTPATIENT
Start: 2022-10-26 | End: 2022-10-29 | Stop reason: HOSPADM

## 2022-10-26 RX ORDER — GABAPENTIN 100 MG/1
100 CAPSULE ORAL 2 TIMES DAILY
Status: DISCONTINUED | OUTPATIENT
Start: 2022-10-26 | End: 2022-10-29 | Stop reason: HOSPADM

## 2022-10-26 RX ADMIN — SENNOSIDES AND DOCUSATE SODIUM 2 TABLET: 50; 8.6 TABLET ORAL at 07:48

## 2022-10-26 RX ADMIN — EZETIMIBE 10 MG: 10 TABLET ORAL at 07:48

## 2022-10-26 RX ADMIN — GABAPENTIN 100 MG: 100 CAPSULE ORAL at 07:48

## 2022-10-26 RX ADMIN — BENAZEPRIL HYDROCHLORIDE 40 MG: 10 TABLET, COATED ORAL at 07:48

## 2022-10-26 RX ADMIN — OMEPRAZOLE 20 MG: 20 CAPSULE, DELAYED RELEASE ORAL at 07:48

## 2022-10-26 RX ADMIN — APIXABAN 5 MG: 5 TABLET, FILM COATED ORAL at 21:25

## 2022-10-26 RX ADMIN — GABAPENTIN 100 MG: 100 CAPSULE ORAL at 21:25

## 2022-10-26 RX ADMIN — APIXABAN 5 MG: 5 TABLET, FILM COATED ORAL at 07:48

## 2022-10-26 RX ADMIN — METOPROLOL SUCCINATE 50 MG: 25 TABLET, EXTENDED RELEASE ORAL at 07:48

## 2022-10-26 RX ADMIN — CEFDINIR 300 MG: 300 CAPSULE ORAL at 07:48

## 2022-10-26 RX ADMIN — SENNOSIDES AND DOCUSATE SODIUM 2 TABLET: 50; 8.6 TABLET ORAL at 21:24

## 2022-10-26 RX ADMIN — LEVOTHYROXINE SODIUM 75 MCG: 0.07 TABLET ORAL at 05:58

## 2022-10-26 RX ADMIN — Medication 1 TABLET: at 07:48

## 2022-10-26 RX ADMIN — OXYCODONE 7.5 MG: 5 TABLET ORAL at 21:24

## 2022-10-26 RX ADMIN — CEFDINIR 300 MG: 300 CAPSULE ORAL at 21:24

## 2022-10-26 ASSESSMENT — ACTIVITIES OF DAILY LIVING (ADL)
BED_CHAIR_WHEELCHAIR_TRANSFER_DESCRIPTION: INCREASED TIME;SET-UP OF EQUIPMENT;VERBAL CUEING;SUPERVISION FOR SAFETY
TUB_SHOWER_TRANSFER_DESCRIPTION: GRAB BAR;SHOWER BENCH

## 2022-10-26 ASSESSMENT — GAIT ASSESSMENTS
GAIT LEVEL OF ASSIST: MINIMAL ASSIST
ASSISTIVE DEVICE: FRONT WHEEL WALKER

## 2022-10-26 NOTE — PROGRESS NOTES
Rehab Progress Note     Encounter Date: 10/26/2022    CC: Decreased mobility, pain control    Interval Events (Subjective)  Patient sitting up in room. She reports therapy is going well. Her daughter has concerns about her gabapentin and confusion. Discussed we did trial an increase but now lower. Discussed could speak about it closer to discharge. Denies NVD.     IDT Team Meeting 10/20/2022  DC/Disposition:  10/29/22    Objective:  VITAL SIGNS: /62   Pulse 66   Temp 36.9 °C (98.5 °F) (Oral)   Resp 17   Wt 68.5 kg (151 lb)   SpO2 97%   BMI 25.92 kg/m²   Gen: NAD  Psych: Mood and affect appropriate  CV: RRR, no edema  Resp: CTAB, no upper airway sounds  Abd: NTND  Neuro: AOx3, following commands    Recent Results (from the past 72 hour(s))   CBC WITH DIFFERENTIAL    Collection Time: 10/25/22  5:49 AM   Result Value Ref Range    WBC 4.4 (L) 4.8 - 10.8 K/uL    RBC 3.25 (L) 4.20 - 5.40 M/uL    Hemoglobin 9.2 (L) 12.0 - 16.0 g/dL    Hematocrit 29.4 (L) 37.0 - 47.0 %    MCV 90.5 81.4 - 97.8 fL    MCH 28.3 27.0 - 33.0 pg    MCHC 31.3 (L) 33.6 - 35.0 g/dL    RDW 47.9 35.9 - 50.0 fL    Platelet Count 292 164 - 446 K/uL    MPV 9.4 9.0 - 12.9 fL    Neutrophils-Polys 54.70 44.00 - 72.00 %    Lymphocytes 25.90 22.00 - 41.00 %    Monocytes 12.20 0.00 - 13.40 %    Eosinophils 4.50 0.00 - 6.90 %    Basophils 0.90 0.00 - 1.80 %    Immature Granulocytes 1.80 (H) 0.00 - 0.90 %    Nucleated RBC 0.00 /100 WBC    Neutrophils (Absolute) 2.43 2.00 - 7.15 K/uL    Lymphs (Absolute) 1.15 1.00 - 4.80 K/uL    Monos (Absolute) 0.54 0.00 - 0.85 K/uL    Eos (Absolute) 0.20 0.00 - 0.51 K/uL    Baso (Absolute) 0.04 0.00 - 0.12 K/uL    Immature Granulocytes (abs) 0.08 0.00 - 0.11 K/uL    NRBC (Absolute) 0.00 K/uL   Basic Metabolic Panel    Collection Time: 10/25/22  5:49 AM   Result Value Ref Range    Sodium 137 135 - 145 mmol/L    Potassium 4.2 3.6 - 5.5 mmol/L    Chloride 104 96 - 112 mmol/L    Co2 22 20 - 33 mmol/L    Glucose 91 65 -  99 mg/dL    Bun 14 8 - 22 mg/dL    Creatinine 1.02 0.50 - 1.40 mg/dL    Calcium 8.7 8.5 - 10.5 mg/dL    Anion Gap 11.0 7.0 - 16.0   ESTIMATED GFR    Collection Time: 10/25/22  5:49 AM   Result Value Ref Range    GFR (CKD-EPI) 54 (A) >60 mL/min/1.73 m 2       Current Facility-Administered Medications   Medication Frequency    oxyCODONE immediate-release (ROXICODONE) tablet 5 mg QHS    calcium-vitamin D 250-3.125 MG-MCG tablet 1 Tablet DAILY    cefdinir (OMNICEF) capsule 300 mg Q12HRS    nystatin (MYCOSTATIN) powder BID    gabapentin (NEURONTIN) capsule 100 mg TID    Respiratory Therapy Consult Continuous RT    hydrALAZINE (APRESOLINE) tablet 25 mg Q8HRS PRN    acetaminophen (Tylenol) tablet 650 mg Q4HRS PRN    senna-docusate (PERICOLACE or SENOKOT S) 8.6-50 MG per tablet 2 Tablet BID    And    polyethylene glycol/lytes (MIRALAX) PACKET 1 Packet QDAY PRN    And    magnesium hydroxide (MILK OF MAGNESIA) suspension 30 mL QDAY PRN    And    bisacodyl (DULCOLAX) suppository 10 mg QDAY PRN    omeprazole (PRILOSEC) capsule 20 mg DAILY    mag hydrox-al hydrox-simeth (MAALOX PLUS ES or MYLANTA DS) suspension 20 mL Q2HRS PRN    ondansetron (ZOFRAN ODT) dispertab 4 mg 4X/DAY PRN    Or    ondansetron (ZOFRAN) syringe/vial injection 4 mg 4X/DAY PRN    sodium chloride (OCEAN) 0.65 % nasal spray 2 Spray PRN    apixaban (ELIQUIS) tablet 5 mg BID    benazepril (LOTENSIN) tablet 40 mg QAM    ezetimibe (ZETIA) tablet 10 mg DAILY    levothyroxine (SYNTHROID) tablet 75 mcg AM ES    metoprolol SR (TOPROL XL) tablet 50 mg QAM    oxyCODONE immediate-release (ROXICODONE) tablet 2.5 mg Q3HRS PRN    oxyCODONE immediate-release (ROXICODONE) tablet 5 mg Q3HRS PRN    miconazole 2%-zinc oxide (Lily) topical cream Q6HRS PRN       Orders Placed This Encounter   Procedures    Diet Order Diet: Cardiac (meds whole 1-2 at a time with thins); Second Modifier: (optional): Renal     Standing Status:   Standing     Number of Occurrences:   1     Order  Specific Question:   Diet:     Answer:   Cardiac [6]     Comments:   meds whole 1-2 at a time with thins     Order Specific Question:   Second Modifier: (optional)     Answer:   Renal [8]       Assessment:  Active Hospital Problems    Diagnosis     *Tibia/fibula fracture, right, closed, initial encounter     Closed right trimalleolar fracture, initial encounter     S/p ankle right orif     Anemia     Constipation     UTI (urinary tract infection)     CKD (chronic kidney disease) stage 3, GFR 30-59 ml/min ([facility])     GERD (gastroesophageal reflux disease)     Hypothyroid     Paroxysmal atrial fibrillation (HCC)     Hypertension        Medical Decision Making and Plan:  R tib/fib fracture - mechanical injury on 10/12/22 s/p ORIF with Dr. Huertas on 10/154/22. TTWB  -PT and OT for mobility and ADLs  -Gabapentin 200 mg TID. PRN Oxycodone. PRN Tylenol  -Reduce Gabapentin to 100 mg as ongoing confusion. Schedule Oxycodone 5 mg QHS. Reduce Gabapentin to 100 mg BID. Increase Oxycodone to 7.5     Tachycardia - Patient with UA+ early in AM on 10/21/22, new tachycardia into 140-150s. Concern for sepsis vs A Fib. Converted in ED with amiodarone.     Confusion - Patient with recent fall with head strike. May have had a mild TBI. Will have SLP assess. With cognitive deficits. Will check UA as now paranoid. UA positive. Started on Omnicef    Dysphagia - Will have SLP evaluate for swallow    HTN/ A Fib - Patient on Metoprolol 50 mg daily, benazepril 40 mg daily, and Eliquis      HLD - Patient on Zetia 10 mg daily     Iron deficiency Anemia - Check AM CBC - 10. Repeat 7.9, had transfusion on 10/21/22 with repeat 9.3. Will monitor     Pannus rash - Started on Nystatin, refusing at times.     Azotemia - Check AM CMP - repeat normal    UTI - UA positive on 10/21/22. UCx with E coli sensitive to omnicef      CKD3 - Avoid nephrotoxic agents. Check AM CMP - repeat normal      Hypothyroidism - Patient on Levothyroxine 75 mcg daily     DVT Ppx -  Patient on Eliquis    Total time:  26 minutes.  I spent greater than 50% of the time for patient care, counseling, and coordination on this date, including unit/floor time, and face-to-face time with the patient as per interval events and assessment and plan above. Topics discussed included discharge planning, pain, reduce gabapentin and increase Oxycodone.     Mikayla Sutton M.D.

## 2022-10-26 NOTE — THERAPY
Occupational Therapy  Daily Treatment     Patient Name: Yvonne Marie Wada  Age:  86 y.o., Sex:  female  Medical Record #: [id number]  Today's Date: 10/26/2022     Precautions  Precautions: Fall Risk, Toe Touch Weight Bearing Right Lower Extremity  Comments: intermittent pain and limited endurance         Subjective    Pt in bed upon arrival, read agreeable to OT session focusing on shower . Discussed with CM of pt concern about going home and pt not willing to help with doffing pants and need for tub transfer bench and reacher to A in independent dressing.     Objective       10/26/22 0931   OT Charge Group   OT Self Care / ADL 4   OT Total Time Spent   OT Individual Total Time Spent (Mins) 60   Functional Level of Assist   Grooming Independent;Seated   Bathing Standby Assist   Bathing Description Hand held shower;Grab bar;Tub bench;Increased time;Initial preparation for task;Set up for wound protection;Supervision for safety   Upper Body Dressing Modified Independent   Lower Body Dressing Minimal Assist  (Max VC for use of reacher and dressing stick to compelte donning/doffing, though with encuragement pt able to compelte donning with SBA dn doffing with Min A for management of pants over cast.)   Bed, Chair, Wheelchair Transfer Contact Guard Assist  (stand pivot)   Tub / Shower Transfers Contact Guard Assist   Tub Shower Transfer Description Grab bar;Shower bench   Interdisciplinary Plan of Care Collaboration   IDT Collaboration with  Therapy Tech   Patient Position at End of Therapy Seated;Chair Alarm On;Self Releasing Lap Belt Applied;Call Light within Reach;Tray Table within Reach   Collaboration Comments Tech present for therapy tx     Assessment    Pt completed ADLs with increased independence from w/c level. Pt able to complete transfers this session with CGA overall and dressing with Min A overall to manage doffing pants opver cast. Pt will need tub transfer bench, reacher, dressing stick, and possible A from  spouse with doffing pants upon d/c. Need to provide pt with handouts of DME/AE, though discussed with CM and CM will call spouse to follow up.  Strengths: Able to follow instructions, Motivated for self care and independence, Pleasant and cooperative  Barriers: Decreased endurance, Fatigue, Generalized weakness, Impaired activity tolerance, Impaired balance, Impaired functional cognition, Pain, Limited mobility, Pain poorly managed, Visual impairment    Plan    Focus on LB dressing, ADL with AE as needed, practice tub transfer bench again, strength/endurance building , standing tolerance / balance  incorporating TTWB  right LE    Occupational Therapy Goals (Active)       Problem: Bathing       Dates: Start: 10/18/22         Goal: STG-Within one week, patient will bathe with Min A overall using AE/DME as needed.       Dates: Start: 10/18/22               Problem: Dressing       Dates: Start: 10/18/22         Goal: STG-Within one week, patient will dress UB with SBA overall using AE/DME as needed.       Dates: Start: 10/18/22            Goal: STG-Within one week, patient will dress LB with Mod A overall using AE/DME as needed.       Dates: Start: 10/18/22               Problem: OT Long Term Goals       Dates: Start: 10/18/22         Goal: LTG-By discharge, patient will complete basic self care tasks with supervision overall using AE/DME as needed.       Dates: Start: 10/18/22            Goal: LTG-By discharge, patient will perform bathroom transfers with supervision overall using AE/DME as needed.       Dates: Start: 10/18/22               Problem: Toileting       Dates: Start: 10/18/22         Goal: STG-Within one week, patient will complete toileting tasks with Mod A overall using AE/DME as needed.       Dates: Start: 10/18/22

## 2022-10-26 NOTE — PROGRESS NOTES
Received bedside shift report from [place] CLINE RN regarding patient and assumed care. Patient awake, calm and stable, currently positioned in bed for comfort and safety; call light within reach. Denies pain or discomfort at this time. Will continue to monitor.

## 2022-10-27 PROCEDURE — 97161 PT EVAL LOW COMPLEX 20 MIN: CPT

## 2022-10-27 PROCEDURE — 97130 THER IVNTJ EA ADDL 15 MIN: CPT

## 2022-10-27 PROCEDURE — 700102 HCHG RX REV CODE 250 W/ 637 OVERRIDE(OP): Performed by: PHYSICAL MEDICINE & REHABILITATION

## 2022-10-27 PROCEDURE — 97129 THER IVNTJ 1ST 15 MIN: CPT

## 2022-10-27 PROCEDURE — A9270 NON-COVERED ITEM OR SERVICE: HCPCS | Performed by: PHYSICAL MEDICINE & REHABILITATION

## 2022-10-27 PROCEDURE — 97535 SELF CARE MNGMENT TRAINING: CPT

## 2022-10-27 PROCEDURE — 99233 SBSQ HOSP IP/OBS HIGH 50: CPT | Performed by: PHYSICAL MEDICINE & REHABILITATION

## 2022-10-27 PROCEDURE — 97110 THERAPEUTIC EXERCISES: CPT

## 2022-10-27 PROCEDURE — 97162 PT EVAL MOD COMPLEX 30 MIN: CPT

## 2022-10-27 PROCEDURE — 97530 THERAPEUTIC ACTIVITIES: CPT

## 2022-10-27 PROCEDURE — 770010 HCHG ROOM/CARE - REHAB SEMI PRIVAT*

## 2022-10-27 RX ORDER — METOPROLOL SUCCINATE 25 MG/1
75 TABLET, EXTENDED RELEASE ORAL EVERY MORNING
Status: DISCONTINUED | OUTPATIENT
Start: 2022-10-28 | End: 2022-10-29 | Stop reason: HOSPADM

## 2022-10-27 RX ADMIN — CEFDINIR 300 MG: 300 CAPSULE ORAL at 21:10

## 2022-10-27 RX ADMIN — SENNOSIDES AND DOCUSATE SODIUM 2 TABLET: 50; 8.6 TABLET ORAL at 21:10

## 2022-10-27 RX ADMIN — Medication 1 TABLET: at 08:52

## 2022-10-27 RX ADMIN — GABAPENTIN 100 MG: 100 CAPSULE ORAL at 08:52

## 2022-10-27 RX ADMIN — APIXABAN 5 MG: 5 TABLET, FILM COATED ORAL at 21:12

## 2022-10-27 RX ADMIN — GABAPENTIN 100 MG: 100 CAPSULE ORAL at 21:11

## 2022-10-27 RX ADMIN — OMEPRAZOLE 20 MG: 20 CAPSULE, DELAYED RELEASE ORAL at 08:51

## 2022-10-27 RX ADMIN — LEVOTHYROXINE SODIUM 75 MCG: 0.07 TABLET ORAL at 05:13

## 2022-10-27 RX ADMIN — METOPROLOL SUCCINATE 50 MG: 25 TABLET, EXTENDED RELEASE ORAL at 08:52

## 2022-10-27 RX ADMIN — OXYCODONE 7.5 MG: 5 TABLET ORAL at 21:11

## 2022-10-27 RX ADMIN — APIXABAN 5 MG: 5 TABLET, FILM COATED ORAL at 08:52

## 2022-10-27 RX ADMIN — CEFDINIR 300 MG: 300 CAPSULE ORAL at 08:52

## 2022-10-27 RX ADMIN — EZETIMIBE 10 MG: 10 TABLET ORAL at 08:52

## 2022-10-27 RX ADMIN — BENAZEPRIL HYDROCHLORIDE 40 MG: 10 TABLET, COATED ORAL at 08:51

## 2022-10-27 ASSESSMENT — PATIENT HEALTH QUESTIONNAIRE - PHQ9
2. FEELING DOWN, DEPRESSED, IRRITABLE, OR HOPELESS: NOT AT ALL
1. LITTLE INTEREST OR PLEASURE IN DOING THINGS: NOT AT ALL
SUM OF ALL RESPONSES TO PHQ9 QUESTIONS 1 AND 2: 0

## 2022-10-27 ASSESSMENT — PAIN DESCRIPTION - PAIN TYPE: TYPE: ACUTE PAIN

## 2022-10-27 ASSESSMENT — ACTIVITIES OF DAILY LIVING (ADL)
BED_CHAIR_WHEELCHAIR_TRANSFER_DESCRIPTION: INCREASED TIME;SET-UP OF EQUIPMENT;SUPERVISION FOR SAFETY
TOILET_TRANSFER_DESCRIPTION: GRAB BAR;SUPERVISION FOR SAFETY;VERBAL CUEING

## 2022-10-27 NOTE — PROGRESS NOTES
"REHABILITATION PSYCHOLOGY FOLLOW-UP:  Reason for admission: Tibia/fibula fracture, right, closed, initial encounter [S82.201A, S82.401A]  Closed right trimalleolar fracture, initial encounter [S82.851A]  Length of Visit: 18 minutes    Chief Complaint: Reduced functionality    S: Met with the patient for brief individual psychotherapy. Patient presented with a euthymic affect and reported a \"doing okay\" mood.  Patient reports continuing to be an good spirits and feeling supported by family.  Daughter independently discussed with current provider cognitive impact of medications such as Gabapentin.  Will continue to follow    O: Psychiatric Examination:  Vitals: Blood pressure 123/61, pulse 64, temperature 36.4 °C (97.5 °F), temperature source Oral, resp. rate 17, weight 68.5 kg (151 lb), SpO2 95 %, not currently breastfeeding.  Musculoskeletal: Laying in bed normal psychomotor activity, no tics or unusual mannerisms noted  Appearance and Eye Contact: Easily established rapport WDWN, appropriate dress and grooming. Behavior is calm, cooperative,  appropriate eye contact  Attention/Alertness: Alert  Thought Process: Linear logical goal directed  Thought Content: No psychotic processes noted  Speech: Clear with normal rate and rhythm  Mood: \"Doing okay\"            Affect: Euthymic         SI/HI: Denies     Memory: Recent and remote memory appear intact     Orientation: Alert and oriented to person place and time  Insight into symptoms: Within normal limits  Judgement into symptoms: Within normal limits        ASSESSMENT: Yvonne Marie Wada is a 86 y.o. female with a past medical history of anemia, tissue aortic valve replacement on eliquis, osteoperosis, HTN;  who presented on 10/12/2022  9:22 AM with right ankle injury sustained while getting out of bed. Patient was found to have comminuted trimalleolar fracture of the right ankle, and taken to the OR on 10/14/22 with Dr. Ryan Huertas for ORIF repair. Post op course has " been uncomplicated. She does have some anemia, acute on chronic kidney disease, HTN and baseline a. Fib. Functionally she is taking a few steps with PT and requiring max-total assist with ADLs.  She has support from her spouse and an acceptable post discharge living situation.     Psychology was consulted due to difficulty with adjustment to hospitalization and injury.  Session focused on assessment of current functioning as well as psychoeducation regarding cognitive and emotional impact of hospitalization.  Patient reports she is doing well and feels supported by her , she is looking forward to returning home.  We will continue to follow through discharge to ensure appropriate engagement with therapies and medical staff     DSM5 Diagnostic Considerations: Adjustment disorder with depressed mood        PLAN:  Records reviewed: Yes  Discussed patient with other provider: Herman  Continue to follow  Thank you for the consult.     Una Szymanski, Ph.D

## 2022-10-27 NOTE — CARE PLAN
"The patient is Stable - Low risk of patient condition declining or worsening    Shift Goals  Clinical Goals: Safety  Patient Goals: Sleep well    Progress made toward(s) clinical / shift goals:      Problem: Fall Risk - Rehab  Goal: Patient will remain free from falls  Outcome: Progressing  Note: Muna Cuenca Fall risk Assessment Score: 13        Moderate fall risk Interventions  - Bed and strip alarm   - Yellow sign by the door   - Yellow wrist band \"Fall risk\"  - Do not leave patient unattended in the bathroom  - Fall risk education provided       Problem: Pain - Standard  Goal: Alleviation of pain or a reduction in pain to the patient’s comfort goal  Outcome: Progressing  Note: Pt on scheduled oxycodone 7.5mg at bedtime for c/o right lower leg pain with adequate relief. Pt sleeps good. Will continue to monitor.       Patient is not progressing towards the following goals:      " Is This A New Presentation, Or A Follow-Up?: Rash

## 2022-10-27 NOTE — CARE PLAN
"The patient is Stable - Low risk of patient condition declining or worsening    Shift Goals  Clinical Goals: Safety  Patient Goals: Sleep well    Progress made toward(s) clinical / shift goals:    Problem: Fall Risk - Rehab  Goal: Patient will remain free from falls  Outcome: Progressing     Muna Cuenca Fall risk Assessment : 13      Moderate fall risk Interventions  - Bed and strip alarm   - Yellow sign by the door   - Yellow wrist band \"Fall risk\"  - Room near to the nurse station  - Do not leave patient unattended in the bathroom  - Fall risk education provided  Pt uses call light consistently and appropriately. Waits for assistance does not attempt self transfer this shift. Able to verbalize needs.   "

## 2022-10-27 NOTE — THERAPY
Occupational Therapy  Daily Treatment     Patient Name: Yvonne Marie Wada  Age:  86 y.o., Sex:  female  Medical Record #: [id number]  Today's Date: 10/27/2022     Precautions  Precautions: Fall Risk, Toe Touch Weight Bearing Right Lower Extremity  Comments: intermittent pain and limited endurance         Subjective    Pt on toilet upon arrival, stating that she did not feel good and did not want therapy this morning. With encouragement pt agreeable to complete ADL tasks this morning for OT session.       Objective       10/27/22 0701   OT Charge Group   OT Self Care / ADL 2   OT Therapy Activity 2   OT Total Time Spent   OT Individual Total Time Spent (Mins) 60   Functional Level of Assist   Grooming Independent;Seated   Lower Body Dressing Standby Assist  (use of reacher)   Toileting Contact Guard Assist   Bed, Chair, Wheelchair Transfer Contact Guard Assist  (w/c>bed)   Toilet Transfers Contact Guard Assist  (toileting>w/c)   Toilet Transfer Description Grab bar;Supervision for safety;Verbal cueing   Sitting Upper Body Exercises   Comments briefly went over UB HEP this session with pt able to teach back 3 of the exercises, provided pt with green theraband to complete exercises at home.   Interdisciplinary Plan of Care Collaboration   Patient Position at End of Therapy In Bed;Call Light within Reach;Tray Table within Reach;Phone within Reach     Provided pt with handout for reacher/hip kit for LB dressing and tub trasnfer bench. Pt may need assist to complete obtaining these items and may benefit from a care chest application. Will discuss with CM. Discussed with pt to have pt ask daughter to help, though pt was resistive to this.    Assessment    Pt tolerated session, though required encouragement to participate this morning. Pt stated that she did not feel well this morning, though was unable to state why. She completed LB dressing while seated at toilet with increased independence. Pt declined use of FWW to  "practice transfers today stating \"Its too difficult, I dont want that\". Pt receptive to edu on continued discussion of needed DME, though may need someone to A with obtaining this prior to d/c.     Strengths: Able to follow instructions, Motivated for self care and independence, Pleasant and cooperative  Barriers: Decreased endurance, Fatigue, Generalized weakness, Impaired activity tolerance, Impaired balance, Impaired functional cognition, Pain, Limited mobility, Pain poorly managed, Visual impairment    Plan    D/C IRF shower tomorrow, use reacher for LB dressing and LHS for shower (pt does not have one in room).      Occupational Therapy Goals (Active)       Problem: Bathing       Dates: Start: 10/18/22         Goal: STG-Within one week, patient will bathe with Min A overall using AE/DME as needed.       Dates: Start: 10/18/22               Problem: Dressing       Dates: Start: 10/18/22         Goal: STG-Within one week, patient will dress UB with SBA overall using AE/DME as needed.       Dates: Start: 10/18/22            Goal: STG-Within one week, patient will dress LB with Mod A overall using AE/DME as needed.       Dates: Start: 10/18/22               Problem: OT Long Term Goals       Dates: Start: 10/18/22         Goal: LTG-By discharge, patient will complete basic self care tasks with supervision overall using AE/DME as needed.       Dates: Start: 10/18/22            Goal: LTG-By discharge, patient will perform bathroom transfers with supervision overall using AE/DME as needed.       Dates: Start: 10/18/22               Problem: Toileting       Dates: Start: 10/18/22         Goal: STG-Within one week, patient will complete toileting tasks with Mod A overall using AE/DME as needed.       Dates: Start: 10/18/22              "

## 2022-10-27 NOTE — THERAPY
10/27/22 1350   IDT Conference   IDT Conference I have reviewed and discussed the POC and barriers to discharge for this patient.  I am knowledgeable of the patient's needs and have attended the IDT Conference.

## 2022-10-27 NOTE — PROGRESS NOTES
Rehab Progress Note     Encounter Date: 10/27/2022    CC: Decreased mobility, pain control    Interval Events (Subjective)  Patient sitting up in room. She reports she is making good progress. Her family is working on training. She does have some assistance needs and they will be able to do it. Denies NVD. Denies SOB. Discussed would have our final IDT later today.     IDT Team Meeting 10/27/2022    IMikayla M.D., was present and led the interdisciplinary team conference on 10/27/2022.  I led the IDT conference and agree with the IDT conference documentation and plan of care as noted below.     RN:  Diet Regular   % Meal     Pain    Sleep    Bowel Continent   Bladder Continent   In's & Out's      PT:  Bed Mobility    Transfers Breanne   Mobility Breanne   Stairs      OT:  Eating    Grooming    Bathing CGA   UB Dressing    LB Dressing SBA   Toileting    Shower & Transfer CGA     CM:  Continues to work on disposition and DME needs.      DC/Disposition:  10/29/22    Objective:  VITAL SIGNS: /61   Pulse 64   Temp 36.4 °C (97.5 °F) (Oral)   Resp 17   Wt 68.5 kg (151 lb)   SpO2 95%   BMI 25.92 kg/m²   Gen: NAD  Psych: Mood and affect appropriate  CV: RRR, no edema  Resp: CTAB, no upper airway sounds  Abd: NTND  Neuro: AOx3, following commands  Unchanged from 10/26/22    Recent Results (from the past 72 hour(s))   CBC WITH DIFFERENTIAL    Collection Time: 10/25/22  5:49 AM   Result Value Ref Range    WBC 4.4 (L) 4.8 - 10.8 K/uL    RBC 3.25 (L) 4.20 - 5.40 M/uL    Hemoglobin 9.2 (L) 12.0 - 16.0 g/dL    Hematocrit 29.4 (L) 37.0 - 47.0 %    MCV 90.5 81.4 - 97.8 fL    MCH 28.3 27.0 - 33.0 pg    MCHC 31.3 (L) 33.6 - 35.0 g/dL    RDW 47.9 35.9 - 50.0 fL    Platelet Count 292 164 - 446 K/uL    MPV 9.4 9.0 - 12.9 fL    Neutrophils-Polys 54.70 44.00 - 72.00 %    Lymphocytes 25.90 22.00 - 41.00 %    Monocytes 12.20 0.00 - 13.40 %    Eosinophils 4.50 0.00 - 6.90 %    Basophils 0.90 0.00 - 1.80 %    Immature  Granulocytes 1.80 (H) 0.00 - 0.90 %    Nucleated RBC 0.00 /100 WBC    Neutrophils (Absolute) 2.43 2.00 - 7.15 K/uL    Lymphs (Absolute) 1.15 1.00 - 4.80 K/uL    Monos (Absolute) 0.54 0.00 - 0.85 K/uL    Eos (Absolute) 0.20 0.00 - 0.51 K/uL    Baso (Absolute) 0.04 0.00 - 0.12 K/uL    Immature Granulocytes (abs) 0.08 0.00 - 0.11 K/uL    NRBC (Absolute) 0.00 K/uL   Basic Metabolic Panel    Collection Time: 10/25/22  5:49 AM   Result Value Ref Range    Sodium 137 135 - 145 mmol/L    Potassium 4.2 3.6 - 5.5 mmol/L    Chloride 104 96 - 112 mmol/L    Co2 22 20 - 33 mmol/L    Glucose 91 65 - 99 mg/dL    Bun 14 8 - 22 mg/dL    Creatinine 1.02 0.50 - 1.40 mg/dL    Calcium 8.7 8.5 - 10.5 mg/dL    Anion Gap 11.0 7.0 - 16.0   ESTIMATED GFR    Collection Time: 10/25/22  5:49 AM   Result Value Ref Range    GFR (CKD-EPI) 54 (A) >60 mL/min/1.73 m 2       Current Facility-Administered Medications   Medication Frequency    oxyCODONE immediate-release (ROXICODONE) tablet 7.5 mg QHS    gabapentin (NEURONTIN) capsule 100 mg BID    calcium-vitamin D 250-3.125 MG-MCG tablet 1 Tablet DAILY    cefdinir (OMNICEF) capsule 300 mg Q12HRS    Respiratory Therapy Consult Continuous RT    hydrALAZINE (APRESOLINE) tablet 25 mg Q8HRS PRN    acetaminophen (Tylenol) tablet 650 mg Q4HRS PRN    senna-docusate (PERICOLACE or SENOKOT S) 8.6-50 MG per tablet 2 Tablet BID    And    polyethylene glycol/lytes (MIRALAX) PACKET 1 Packet QDAY PRN    And    magnesium hydroxide (MILK OF MAGNESIA) suspension 30 mL QDAY PRN    And    bisacodyl (DULCOLAX) suppository 10 mg QDAY PRN    omeprazole (PRILOSEC) capsule 20 mg DAILY    mag hydrox-al hydrox-simeth (MAALOX PLUS ES or MYLANTA DS) suspension 20 mL Q2HRS PRN    ondansetron (ZOFRAN ODT) dispertab 4 mg 4X/DAY PRN    Or    ondansetron (ZOFRAN) syringe/vial injection 4 mg 4X/DAY PRN    sodium chloride (OCEAN) 0.65 % nasal spray 2 Spray PRN    apixaban (ELIQUIS) tablet 5 mg BID    benazepril (LOTENSIN) tablet 40 mg QAM     ezetimibe (ZETIA) tablet 10 mg DAILY    levothyroxine (SYNTHROID) tablet 75 mcg AM ES    metoprolol SR (TOPROL XL) tablet 50 mg QAM    oxyCODONE immediate-release (ROXICODONE) tablet 2.5 mg Q3HRS PRN    oxyCODONE immediate-release (ROXICODONE) tablet 5 mg Q3HRS PRN    miconazole 2%-zinc oxide (Lily) topical cream Q6HRS PRN       Orders Placed This Encounter   Procedures    Diet Order Diet: Cardiac (meds whole 1-2 at a time with thins); Second Modifier: (optional): Renal     Standing Status:   Standing     Number of Occurrences:   1     Order Specific Question:   Diet:     Answer:   Cardiac [6]     Comments:   meds whole 1-2 at a time with thins     Order Specific Question:   Second Modifier: (optional)     Answer:   Renal [8]       Assessment:  Active Hospital Problems    Diagnosis     *Tibia/fibula fracture, right, closed, initial encounter     Closed right trimalleolar fracture, initial encounter     S/p ankle right orif     Anemia     Constipation     UTI (urinary tract infection)     CKD (chronic kidney disease) stage 3, GFR 30-59 ml/min ([facility])     GERD (gastroesophageal reflux disease)     Hypothyroid     Paroxysmal atrial fibrillation ([facility])     Hypertension        Medical Decision Making and Plan:  R tib/fib fracture - mechanical injury on 10/12/22 s/p ORIF with Dr. Huertas on 10/154/22. TTWB  -PT and OT for mobility and ADLs  -Gabapentin 200 mg TID. PRN Oxycodone. PRN Tylenol  -Reduce Gabapentin to 100 mg as ongoing confusion. Schedule Oxycodone 5 mg QHS. Reduce Gabapentin to 100 mg BID. Increase Oxycodone to 7.5     Tachycardia - Patient with UA+ early in AM on 10/21/22, new tachycardia into 140-150s. Concern for sepsis vs A Fib. Converted in ED with amiodarone.     Confusion - Patient with recent fall with head strike. May have had a mild TBI. Will have SLP assess. With cognitive deficits. Will check UA as now paranoid. UA positive. Started on Omnicef.     Dysphagia - Will have SLP evaluate for swallow.  Advanced to regular    HTN/ A Fib - Patient on Metoprolol 50 mg daily, benazepril 40 mg daily, and Eliquis. Into 140s, increase to 75 mg      HLD - Patient on Zetia 10 mg daily     Iron deficiency Anemia - Check AM CBC - 10. Repeat 7.9, had transfusion on 10/21/22 with repeat 9.3. Will monitor     Pannus rash - Started on Nystatin, refusing at times.     Azotemia - Check AM CMP - repeat normal    UTI - UA positive on 10/21/22. UCx with E coli sensitive to omnicef      CKD3 - Avoid nephrotoxic agents. Check AM CMP - repeat normal      Hypothyroidism - Patient on Levothyroxine 75 mcg daily     DVT Ppx - Patient on Eliquis    Total time:  36 minutes.  I spent greater than 50% of the time for patient care, counseling, and coordination on this date, including unit/floor time, and face-to-face time with the patient as per interval events and assessment and plan above. Topics discussed included discharge planning, elevated SBP and increase BB. Patient was discussed separately in IDT today; please see details above.    Mikayla Sutton M.D.

## 2022-10-27 NOTE — CARE PLAN
Problem: Balance  Goal: STG-Within one week, patient will maintain static standing in parallel bars for 30 seconds with Mod A, gait belt, cueing for TTWB precautions, and second person CGA for safety.  Outcome: Met     Problem: Mobility  Goal: STG-Within one week, patient will propel wheelchair 50 feet indoors with SPV and cueing with both turns and task attention.  Outcome: Met     Problem: Mobility Transfers  Goal: STG-Within one week, patient will perform bed mobility at Min A level with bed rail and cueing.  Outcome: Met  Goal: STG-Within one week, patient will sit to  parallel bars with Mod A, gait belt, cueing for TTWB precautions, and second person CGA for safety.  Outcome: Met  Goal: STG-Within one week, patient will transfer bed to chair with Mod A, gait belt, cueing for TTWB precautions, and second person CGA for safety.  Outcome: Met

## 2022-10-27 NOTE — CARE PLAN
Problem: Skin Integrity  Goal: Skin integrity is maintained or improved  Note: RLE was assessed and  cleansed. New dressing was applied. Sutures in place.  Dr. Sutton was notified about redness, and swelling in the affected area. Small pressure injury noted on the right shin area. Picture was taken and wound consult was ordered.    The patient is Watcher - Medium risk of patient condition declining or worsening    Shift Goals  Clinical Goals: Safety  Patient Goals: Sleep well

## 2022-10-27 NOTE — DISCHARGE PLANNING
Case Management/IDT follow up.   IDT continues to recommend IRF level of care as patient continue to make progress with all therapies.   Projected dc date set for 10/29/22.      DC needs:  Recommendations made for home health for PT/OT/SLP  Follow up with: PCP and ortho  Met with pt / family providing update from IDT and discussed plan of care.    Plan:  Continue to follow

## 2022-10-27 NOTE — CARE PLAN
Problem: Speech/Swallowing LTGs  Goal: LTG-By discharge, patient will safely swallow regular textures/thin liquids with no overt s/sx of pen/asp to continue with baseline diet.   Outcome: Met  Note: Pt is currently tolerating 7- Regular Textures and 0-Thin liquids      Problem: Swallowing STGs  Goal: STG-Within one week, patient will tolerate regular textures/thin liquids with no overt s/sx of pen/asp, no c/o globus sensation for 2/2 meals with SPV  Outcome: Met  Note: Pt is currently tolerating 7- Regular Textures and 0-Thin liquids      Problem: Problem Solving STGs  Goal: STG-Within one week, patient will follow 1-2 step instructions to complete transfers, ADL related tasks with no more than 1 repetition.   Outcome: Met  Note: Pt is able to follow 1 and 2 step directions with no more than 1 repetition required      Problem: Memory STGs  Goal: STG-Within one week, patient will r/t therapies, transfer sequences, and safety precautions with MOD A in 4/5 trials  Outcome: Not Met  Note: Continue to target

## 2022-10-27 NOTE — THERAPY
Physical Therapy   Daily Treatment     Patient Name: Yvonne Marie Wada  Age:  86 y.o., Sex:  female  Medical Record #: [id number]  Today's Date: 10/27/2022     Precautions  Precautions: Fall Risk, Toe Touch Weight Bearing Right Lower Extremity  Comments: intermittent pain and limited endurance    Subjective    Patient agreeable to PT.     Objective       10/27/22 0926   PT Charge Group   PT Therapeutic Exercise 2   PT Therapeutic Activities 2   PT Total Time Spent   PT Individual Total Time Spent (Mins) 60   Vitals   O2 (LPM) 0   O2 Delivery Device None - Room Air   Non Verbal Descriptors   Non Verbal Scale  Calm   Wheelchair Functional Level of Assist   Wheelchair Assist Supervised   Distance Wheelchair (Feet or Distance) 180 feet without fatigue noted   Wheelchair Description Extra time;Leg rest management;Verbal cueing  (cueing for path finding, increased time throughout but pt able to problem solving L and R turns without cueing)   Stairs Functional Level of Assist   Level of Assist with Stairs Unable to Participate   # of Stairs Climbed 0   Stairs Description Safety concerns   Transfer Functional Level of Assist   Bed, Chair, Wheelchair Transfer Contact Guard Assist   Bed Chair Wheelchair Transfer Description Increased time;Set-up of equipment;Supervision for safety  (FWW, gait belt, pt able to demonstrate great TTWB RLE)   Supine Lower Body Exercise   Comments Discussed floor recovery and pt able to verbalize understanding and plan to use EMS with cell phone or home phone, no cueing but discussed home safety, WB precautions, w/c mobility, transfers with FWW and CGA, and stairs not an option at this time (SO can bump w/c up 2 platform steps to enter home).   Sitting Lower Body Exercises   Sitting Lower Body Exercises   (5 lbs LLE, purple/difficult hip ABD band, green/medium LLE press band.  Issued written seated HEP handout.)   Ankle Pumps Left;1 set of 15   Hip Abduction 1 set of 15   Long Arc Quad 1 set of 15    Marching 1 set of 15   Hamstring Curl 1 set of 15  (Left only if performed as picture in handout)   Other Exercises Left leg press with green TB, cueing prn, 1x 15 reps   Bed Mobility    Supine to Sit Modified Independent   Sit to Supine Supervised   Sit to Stand Contact Guard Assist  (FWW, gait belt, setup, increased time, good TTWB RLE adherence)   Scooting Supervised   Rolling Modified Independent   Interdisciplinary Plan of Care Collaboration   IDT Collaboration with  Occupational Therapist;Physician;Speech Therapist;Therapy Tech   Patient Position at End of Therapy In Bed;Call Light within Reach;Tray Table within Reach;Phone within Reach   Collaboration Comments CLOF, progress.  Final family training setup with SO Ren for tomorrow, 10/27/22, from [id number] with therapies.       Assessment    Patient has improving transfers and good understanding of seated HEP; improving w/c mobility indoors with only path finding cues, good turning without cueing; no endurance limitation or activity tolerance limitation noted; improving cognition this week continues.    Strengths: Pleasant and cooperative, Supportive family, Willingly participates in therapeutic activities  Barriers: Confused, Decreased endurance, Dementia, Difficulty following instructions, Generalized weakness, Home accessibility, Impaired activity tolerance, Impaired balance, Impaired carryover of learning, Impaired insight/denial of deficits, Impaired functional cognition, Limited mobility, Pain    Plan    Family training tomorrow with SO hands on  Complete d/c mobility IRF-[place]  Complete patient passport and education  D/C home scheduled for 10/29/22      Physical Therapy Problems (Active)       Problem: PT-Long Term Goals       Dates: Start: 10/18/22         Goal: LTG-By discharge, patient will propel wheelchair 100 feet indoors at SPV level with cueing.       Dates: Start: 10/18/22            Goal: LTG-By discharge, patient will ambulate 10 feet  with FWW, gait belt, cueing for WB precautions, and Min A.       Dates: Start: 10/18/22            Goal: LTG-By discharge, patient will transfer one surface to another with FWW, gait belt, cueing for WB precautions, and Min A.       Dates: Start: 10/18/22            Goal: LTG-By discharge, patient will perform home exercise program with handout, cueing, and SBA.       Dates: Start: 10/18/22            Goal: LTG-By discharge, patient will transfer in/out of a car with FWW, gait belt, cueing for WB precautions, and Min A.       Dates: Start: 10/18/22

## 2022-10-27 NOTE — THERAPY
Physical Therapy   Daily Treatment     Patient Name: Yvonne Marie Wada  Age:  86 y.o., Sex:  female  Medical Record #: [id number]  Today's Date: 10/26/2022     Precautions  Precautions: Fall Risk, Toe Touch Weight Bearing Right Lower Extremity  Comments: intermittent pain and limited endurance    Subjective    Patient received supine with elevated HOB; reports feeling cold in bedroom; agreeable to PT.     Objective       10/26/22 1431   PT Charge Group   PT Therapeutic Activities 4   PT Total Time Spent   PT Individual Total Time Spent (Mins) 60   Gait Functional Level of Assist    Gait Level Of Assist Minimal Assist   Assistive Device Front Wheel Walker   Distance (Feet)   (2-6 feet during transfers today)   # of Times Distance was Traveled 11   Deviation Step To;Increased Base Of Support;Bradykinetic;Decreased Heel Strike;Decreased Toe Off;Other (Comment)  (focus on FWW use with very short distances and good TTWB RLE adherence, cueing for sequencing, requires ceuing for new task learning with moving posterior to surface including keeping FWW proximal)   Transfer Functional Level of Assist   Bed, Chair, Wheelchair Transfer Minimal Assist  (and second person with CGA-SBA for safety with new tasks today)   Bed Chair Wheelchair Transfer Description Increased time;Set-up of equipment;Verbal cueing;Supervision for safety  (FWW level with 2 hospital beds, 2 standard beds, 1 tub transfer bench, and 1 car transfer.  x55 minutes of transfer education, training, performance, and review today.)   Bed Mobility    Supine to Sit Supervised   Sit to Supine Supervised   Sit to Stand Minimal Assist  (CGA-Min A at FWW; Min A at EOB without FWW)   Scooting Supervised   Rolling Modified Independent   Interdisciplinary Plan of Care Collaboration   IDT Collaboration with  Occupational Therapist;Therapy Tech   Patient Position at End of Therapy In Bed;Call Light within Reach;Tray Table within Reach;Phone within Reach   Collaboration  Comments Tech A throghout session.  CLOF and progress with OT.       Assessment    Patient has improving transfers, activity tolerance, and continues to demonstrate good TTWB adherence throughout session; communication and verbalization continues to be improved this week; pt requires increased cueing for sequencing of posterior FWW movement prior to sitting on surfaces; great motivation and pleasant.    Strengths: Pleasant and cooperative, Supportive family, Willingly participates in therapeutic activities  Barriers: Confused, Decreased endurance, Dementia, Difficulty following instructions, Generalized weakness, Home accessibility, Impaired activity tolerance, Impaired balance, Impaired carryover of learning, Impaired insight/denial of deficits, Impaired functional cognition, Limited mobility, Pain    Plan    Work on hands on participation with SO when present  FWW use with all transfers while maintaining TTWB RLE precautions  W/c level mobility for distances beyond 10 feet  Issue handout for Seated HEP  Complete patient passport and education  D/C home scheduled for 10/29/22    Passport items to be completed:  Get in/out of bed safely, in/out of a vehicle, safely use mobility device, walk or wheel around home/community, navigate up and down stairs, show how to get up/down from the ground, ensure home is accessible, demonstrate HEP, complete caregiver training    Physical Therapy Problems (Active)       Problem: Balance       Dates: Start: 10/18/22         Goal: STG-Within one week, patient will maintain static standing in parallel bars for 30 seconds with Mod A, gait belt, cueing for TTWB precautions, and second person CGA for safety.       Dates: Start: 10/18/22         Goal Note filed on 10/20/22 1138 by Alber Veras, PT       PT orestes completed on 10/18/22; will monitor pt's progress towards STGs this week.                 Problem: Mobility       Dates: Start: 10/18/22         Goal: STG-Within one week,  patient will propel wheelchair 50 feet indoors with SPV and cueing with both turns and task attention.       Dates: Start: 10/18/22         Goal Note filed on 10/20/22 1138 by Alber Veras, PT       PT eval completed on 10/18/22; will monitor pt's progress towards STGs this week.                 Problem: Mobility Transfers       Dates: Start: 10/18/22         Goal: STG-Within one week, patient will perform bed mobility at Min A level with bed rail and cueing.       Dates: Start: 10/18/22         Goal Note filed on 10/20/22 1138 by Alber Veras, PT       PT eval completed on 10/18/22; will monitor pt's progress towards STGs this week.              Goal: STG-Within one week, patient will sit to  parallel bars with Mod A, gait belt, cueing for TTWB precautions, and second person CGA for safety.       Dates: Start: 10/18/22         Goal Note filed on 10/20/22 1138 by Alber Veras, PT       PT eval completed on 10/18/22; will monitor pt's progress towards STGs this week.              Goal: STG-Within one week, patient will transfer bed to chair with Mod A, gait belt, cueing for TTWB precautions, and second person CGA for safety.       Dates: Start: 10/18/22         Goal Note filed on 10/20/22 1138 by Alber Veras, PT       PT eval completed on 10/18/22; will monitor pt's progress towards STGs this week.                 Problem: PT-Long Term Goals       Dates: Start: 10/18/22         Goal: LTG-By discharge, patient will propel wheelchair 100 feet indoors at SPV level with cueing.       Dates: Start: 10/18/22            Goal: LTG-By discharge, patient will ambulate 10 feet with FWW, gait belt, cueing for WB precautions, and Min A.       Dates: Start: 10/18/22            Goal: LTG-By discharge, patient will transfer one surface to another with FWW, gait belt, cueing for WB precautions, and Min A.       Dates: Start: 10/18/22            Goal: LTG-By discharge, patient will perform  home exercise program with handout, cueing, and SBA.       Dates: Start: 10/18/22            Goal: LTG-By discharge, patient will transfer in/out of a car with FWW, gait belt, cueing for WB precautions, and Min A.       Dates: Start: 10/18/22

## 2022-10-27 NOTE — THERAPY
Speech Language Pathology  Daily Treatment     Patient Name: Yvonne Marie Wada  Age:  86 y.o., Sex:  female  Medical Record #: [id number]  Today's Date: 10/27/2022     Precautions  Precautions: Fall Risk, Toe Touch Weight Bearing Right Lower Extremity  Comments: intermittent pain and limited endurance    Subjective    Pt was initially reluctant to participate in ST; however with encouragement was willing to transfer out of bed for this ST session      Objective       10/27/22 1401   Treatment Charges   SLP Cognitive Skill Development First 15 Minutes 1   SLP Cognitive Skill Development Additional 15 Minutes 3   SLP Total Time Spent   SLP Individual Total Time Spent (Mins) 60   SCCAN (Scales of Cognitive and Communicative Ability for Neurorehabilitation)   Oral Expression - Raw Score 9   Oral Expression - Scale Performance Score 47   Orientation - Raw Score 11   Orientation - Scale Performance Score 92   Memory - Raw Score 8   Memory - Scale Performance Score 42   Speech Comprehension - Raw Score 11   Speech Comprehension - Scale Performance Score 85   Reading Comprehension - Raw Score 9   Reading Comprehension - Scale Performance Score 75   Writing - Raw Score 3   Writing - Scale Performance Score 43   Attention - Raw Score 7   Attention - Scale Performance Score 44   Problem Solving - Raw Score 10   Problem Solving - Scale Performance Score 43   SCCAN Total Raw Score 56   SCCAN Degree of Severity Moderate Impairment       Assessment    Completed outcome assessment using the SCCAN in anticipation of pt's upcoming discharge on 10/29.  Pt achieved an overall score of 56/94 (increased from pt's initial score of 49/94) indicating pt's cognition is moderately impaired.  Pt demonstrated improvements in the areas of memory (37% to 42%), speech comprehension (77% to 85%), reading comprehension (50% to 75%), attention (38% to 44%), and problem solving (26% to 43%).  Pt also completed one step written directions independently to  achieve 75% accuracy.  Min cues required for attention to details to increase pt's accuracy to 100%.     Strengths: Willingly participates in therapeutic activities, Supportive family  Barriers: Hearing impairment, Impaired functional cognition    Plan    Continue targeting simple attention, family training?         Speech Therapy Problems (Active)       Problem: Memory STGs       Dates: Start: 10/19/22         Goal: STG-Within one week, patient will r/t therapies, transfer sequences, and safety precautions with MOD A in 4/5 trials       Dates: Start: 10/19/22         Goal Note filed on 10/27/22 0757 by Johnny Ramos MS,CCC-SLP       Continue to target                  Problem: Speech/Swallowing LTGs       Dates: Start: 10/19/22         Goal: LTG-By discharge, patient will solve basic problems by utilizing compensatory intervention for memory and problem-solving to allow for safe completion of daily activities with SPV       Dates: Start: 10/19/22               Problem: Swallowing STGs       Dates: Start: 10/19/22

## 2022-10-27 NOTE — WOUND TEAM
[facility]  Inpatient Services  Initial Wound and Skin Care Evaluation    Admission Date: 10/17/2022     Last order of IP CONSULT TO WOUND CARE was found on 10/26/2022 from Hospital Encounter on 10/17/2022     HPI, PMH, SH: Reviewed    Past Surgical History:   Procedure Laterality Date    ORIF, ANKLE Right 10/14/2022    Procedure: RIGHT ANKLE OPEN REDUCTION INTERNAL FIXATION;  Surgeon: Ryan Huertas M.D.;  Location: SURGERY [facility];  Service: Orthopedics    TENDON REPAIR Right 11/10/2016    Procedure: TENDON REPAIR - QUADRACEPS ;  Surgeon: Maxim Herrera M.D.;  Location: SURGERY [facility];  Service:     KNEE ARTHROPLASTY TOTAL Right 9/8/2016    Procedure: KNEE ARTHROPLASTY TOTAL;  Surgeon: Maxim Herrera M.D.;  Location: SURGERY [facility];  Service:     FUSION, SPINE, LUMBAR, PLIF  11/24/2015    Procedure: LUMBAR FUSION POSTERIOR L3-4, EXPLORATION OF FUSION L4-5 WITH INSTRUMENTATION;  Surgeon: Hermann Reis M.D.;  Location: SURGERY [facility];  Service:     LUMBAR LAMINECTOMY DISKECTOMY  11/24/2015    Procedure: LUMBAR L3-4 LAMINECTOMY AND REDO L4-5;  Surgeon: Hermann Reis M.D.;  Location: SURGERY [facility];  Service:     KNEE ARTHROPLASTY TOTAL  1/3/2012    Performed by YOSEF MEJÍA at [facility]    KNEE ARTHROSCOPY  10/18/2011    Performed by YOSEF MEJÍA at [facility]    MENISCECTOMY, KNEE, MEDIAL  10/18/2011    Performed by YOSEF MEJÍA at [facility]    AORTIC VALVE REPLACEMENT  1/12/2010    Performed by ISAÍAS NICOLE at [facility]    APPENDECTOMY      FUSION, SPINE, LUMBAR, PLIF      HYSTERECTOMY, TOTAL ABDOMINAL       Social History     Tobacco Use    Smoking status: Never    Smokeless tobacco: Never    Tobacco comments:     none   Substance Use Topics    Alcohol use: No     Alcohol/week: 0.0 oz     No chief complaint on file.    Diagnosis: Tibia/fibula fracture, right, closed, initial  encounter [S82.201A, S82.401A]  Closed right trimalleolar fracture, initial encounter [S82.750N]    Unit where seen by Wound Team: 15/01     WOUND CONSULT/FOLLOW UP RELATED TO:  R lateral leg     WOUND HISTORY:  Pt suffered an ankle fracture and is casted.      WOUND ASSESSMENT/LDA                 Wound 10/14/22 Incision Ankle Right xeroform, 4x4s, soft roll, splint, ace wrap (Active)       Wound 10/17/22 Pretibial Distal right lateral ankle (Active)   Wound Image   10/27/22 2200   Site Assessment Yellow 10/27/22 2200   Periwound Assessment Edema 10/27/22 2200   Margins  10/27/22 2100   Closure  10/25/22 2122   Drainage Amount Scant 10/27/22 2200   Drainage Description Serous 10/27/22 2200   Treatments Site care 10/26/22 1700   Wound Cleansing Approved Wound Cleanser 10/25/22 2122   Periwound Protectant Not Applicable 10/24/22 2015   Dressing Cleansing/Solutions Not Applicable 10/25/22 2122   Dressing Options Hydrofera Blue Ready;Mepilex 10/27/22 2200   Dressing Changed Changed 10/27/22 2200   Dressing Status Clean;Dry;Intact 10/27/22 0845   Dressing Change/Treatment Frequency Every 48 hrs, and As Needed 10/27/22 2200   NEXT Dressing Change/Treatment Date 10/29/22 10/27/22 2200   NEXT Weekly Photo (Inpatient Only) 11/03/22 10/27/22 2200   WOUND NURSE ONLY - Pressure Injury Stage 2 10/27/22 2200   Wound Length (cm) 0.7 cm 10/27/22 2200   Wound Width (cm) 0.7 cm 10/27/22 2200   Wound Surface Area (cm^2) 0.49 cm^2 10/27/22 2200   Wound Odor None 10/27/22 2200   Pulses 2+;Right;DP;PT;Doppler 10/27/22 2200   Exposed Structures None 10/27/22 2200   WOUND NURSE ONLY - Time Spent with Patient (mins) 60 10/27/22 2200             Vascular: 10/27/22 Doppler R ankle 2+ DP and PT    LENIN:   No results found.    Lab Values:    Lab Results   Component Value Date/Time    WBC 4.4 (L) 10/25/2022 05:49 AM    WBC 3.9 (L) 04/16/2011 12:00 AM    RBC 3.25 (L) 10/25/2022 05:49 AM    RBC 4.63 04/16/2011 12:00 AM    HEMOGLOBIN 9.2 (L)  10/25/2022 05:49 AM    HEMATOCRIT 29.4 (L) 10/25/2022 05:49 AM    CREACTPROT 0.08 08/20/2020 11:49 AM    SEDRATEWES 14 10/14/2021 03:31 PM    HBA1C 4.9 10/18/2022 05:43 AM        Culture Results show:  No results found for this or any previous visit (from the past 720 hour(s)).    Pain Level/Medicated:  no report of pain with wound care       INTERVENTIONS BY WOUND TEAM:  Performed standard wound care which includes appropriate positioning, dressing removal and non-selective debridement. Pictures and measurements obtained weekly if/when required.  Preparation for Dressing removal:   Non-selectively Debrided with:   ns and gauze.  Sharp debridement:   Tisha wound: Cleansed with saline  Primary Dressing: hydrofera blue  Secondary (Outer) Dressing: mepilex, ABD and tubi E    Interdisciplinary consultation: Patient, Bedside RN (),     EVALUATION / RATIONALE FOR TREATMENT:  Most Recent Date: 10/27/22 small stage 2 R lateral leg from plaster cast that is now off loaded with mepilex.  Tubi E to manage edema  :      Goals: Steady decrease in wound area and depth weekly.    WOUND TEAM PLAN OF CARE ([X] for frequency of wound follow up,):   Nursing to follow dressing orders written for wound care. Contact wound team if area fails to progress, deteriorates or with any questions/concerns if something comes up before next scheduled follow up (See below as to whether wound is following and frequency of wound follow up)  Dressing changes by wound team:                   Dressing changes by wound team:                   Follow up 3 times weekly:                NPWT change 3 times weekly:     Follow up 1-2 times weekly:    X  Follow up Bi-Monthly:    Follow up Monthly (High Risk):                               Follow up as needed:     Other (explain):     NURSING PLAN OF CARE ORDERS (X):  Dressing changes: See Dressing Care orders: X  Skin care: See Skin Care orders:   RN Prevention Protocol:   Rectal tube care: See Rectal Tube Care  orders:   Other orders:    RSKIN:   CURRENTLY IN PLACE (X), APPLIED THIS VISIT (A), ORDERED (O):   Q shift Jase:  X  Q shift pressure point assessments:  X    Surface/Positioning   Pressure redistribution mattress            Low Airloss          ICU Low Airloss     Bariatric YOSHI     Waffle cushion        Waffle Overlay     O     Reposition q 2 hours    X  TAPs Turning system     Z Amador Pillow     Offloading/Redistribution   Sacral Mepilex (Silicone dressing)   O  Heel Mepilex (Silicone dressing)       O  Heel float boots (Prevalon boot)             Float Heels off Bed with Pillows           Respiratory   Silicone O2 tubing         Gray Foam Ear protectors     Cannula fixation Device (Tender )          High flow offloading Clip    Elastic head band offloading device      Anchorfast                                                         Trach with Optifoam split foam             Containment/Moisture Prevention     Rectal tube or BMS    Purwick/Condom Cath        Dale Catheter    Barrier wipes           Barrier paste       Antifungal tx      Interdry        Mobilization       Up to chair        Ambulate      PT/OT  X    Nutrition       Dietician        Diabetes Education      PO     TF     TPN     NPO   # days     Other        Anticipated discharge plans:   LTACH:        SNF/Rehab:                  Home Health Care:  X pt may need ongoing wound care for R leg wound and incision care       Outpatient Wound Center:            Self/Family Care:        Other:

## 2022-10-27 NOTE — CARE PLAN
Problem: Bathing  Goal: STG-Within one week, patient will bathe with Min A overall using AE/DME as needed.  Outcome: Met     Problem: Dressing  Goal: STG-Within one week, patient will dress UB with SBA overall using AE/DME as needed.  Outcome: Met  Goal: STG-Within one week, patient will dress LB with Mod A overall using AE/DME as needed.  Outcome: Met     Problem: Toileting  Goal: STG-Within one week, patient will complete toileting tasks with Mod A overall using AE/DME as needed.  Outcome: Met

## 2022-10-28 PROBLEM — K59.00 CONSTIPATION: Status: RESOLVED | Noted: 2021-06-26 | Resolved: 2022-10-28

## 2022-10-28 PROCEDURE — 700102 HCHG RX REV CODE 250 W/ 637 OVERRIDE(OP): Performed by: PHYSICAL MEDICINE & REHABILITATION

## 2022-10-28 PROCEDURE — 99233 SBSQ HOSP IP/OBS HIGH 50: CPT | Performed by: PHYSICAL MEDICINE & REHABILITATION

## 2022-10-28 PROCEDURE — 97116 GAIT TRAINING THERAPY: CPT

## 2022-10-28 PROCEDURE — 97530 THERAPEUTIC ACTIVITIES: CPT

## 2022-10-28 PROCEDURE — 97130 THER IVNTJ EA ADDL 15 MIN: CPT

## 2022-10-28 PROCEDURE — 770010 HCHG ROOM/CARE - REHAB SEMI PRIVAT*

## 2022-10-28 PROCEDURE — 97129 THER IVNTJ 1ST 15 MIN: CPT

## 2022-10-28 PROCEDURE — A9270 NON-COVERED ITEM OR SERVICE: HCPCS | Performed by: PHYSICAL MEDICINE & REHABILITATION

## 2022-10-28 RX ORDER — METOPROLOL SUCCINATE 50 MG/1
75 TABLET, EXTENDED RELEASE ORAL EVERY MORNING
Qty: 90 TABLET | Refills: 3 | Status: SHIPPED | OUTPATIENT
Start: 2022-10-28 | End: 2023-02-27

## 2022-10-28 RX ORDER — OXYCODONE HYDROCHLORIDE 5 MG/1
2.5-5 TABLET ORAL EVERY 12 HOURS PRN
Qty: 28 TABLET | Refills: 0 | Status: SHIPPED | OUTPATIENT
Start: 2022-10-28 | End: 2022-11-07

## 2022-10-28 RX ADMIN — APIXABAN 5 MG: 5 TABLET, FILM COATED ORAL at 21:56

## 2022-10-28 RX ADMIN — OXYCODONE 7.5 MG: 5 TABLET ORAL at 21:56

## 2022-10-28 RX ADMIN — OMEPRAZOLE 20 MG: 20 CAPSULE, DELAYED RELEASE ORAL at 08:27

## 2022-10-28 RX ADMIN — SENNOSIDES AND DOCUSATE SODIUM 2 TABLET: 50; 8.6 TABLET ORAL at 08:27

## 2022-10-28 RX ADMIN — SENNOSIDES AND DOCUSATE SODIUM 2 TABLET: 50; 8.6 TABLET ORAL at 21:57

## 2022-10-28 RX ADMIN — METOPROLOL SUCCINATE 75 MG: 25 TABLET, EXTENDED RELEASE ORAL at 08:31

## 2022-10-28 RX ADMIN — Medication 1 TABLET: at 08:27

## 2022-10-28 RX ADMIN — EZETIMIBE 10 MG: 10 TABLET ORAL at 08:27

## 2022-10-28 RX ADMIN — GABAPENTIN 100 MG: 100 CAPSULE ORAL at 08:27

## 2022-10-28 RX ADMIN — BENAZEPRIL HYDROCHLORIDE 40 MG: 10 TABLET, COATED ORAL at 08:27

## 2022-10-28 RX ADMIN — LEVOTHYROXINE SODIUM 75 MCG: 0.07 TABLET ORAL at 06:11

## 2022-10-28 RX ADMIN — APIXABAN 5 MG: 5 TABLET, FILM COATED ORAL at 08:28

## 2022-10-28 RX ADMIN — GABAPENTIN 100 MG: 100 CAPSULE ORAL at 21:56

## 2022-10-28 RX ADMIN — CEFDINIR 300 MG: 300 CAPSULE ORAL at 08:27

## 2022-10-28 ASSESSMENT — BRIEF INTERVIEW FOR MENTAL STATUS (BIMS)
INITIAL REPETITION OF BED BLUE SOCK - FIRST ATTEMPT: 3
ASKED TO RECALL SOCK: YES, NO CUE REQUIRED
ASKED TO RECALL BLUE: YES, NO CUE REQUIRED
WHAT MONTH IS IT: ACCURATE WITHIN 5 DAYS
COGNITIVE PATTERN ASSESSMENT USED: BIMS
BIMS SUMMARY SCORE: 10
WHAT DAY OF THE WEEK IS IT: CORRECT
WHAT MONTH IS IT: MISSED BY 6 DAYS TO 1 MONTH
ASKED TO RECALL BLUE: NO, COULD NOT RECALL
INITIAL REPETITION OF BED BLUE SOCK - FIRST ATTEMPT: 3
WHAT DAY OF THE WEEK IS IT: CORRECT
ASKED TO RECALL BED: YES, NO CUE REQUIRED
WHAT YEAR IS IT: CORRECT
BIMS SUMMARY SCORE: 15
WHAT YEAR IS IT: CORRECT
ASKED TO RECALL SOCK: YES, AFTER CUEING (SOMETHING TO WEAR")"
COGNITIVE PATTERN ASSESSMENT USED: BIMS
ASKED TO RECALL BED: YES, AFTER CUEING (A PIECE OF FURNITURE")"

## 2022-10-28 ASSESSMENT — GAIT ASSESSMENTS
DISTANCE (FEET): 12
ASSISTIVE DEVICE: FRONT WHEEL WALKER
GAIT LEVEL OF ASSIST: MINIMAL ASSIST

## 2022-10-28 ASSESSMENT — ACTIVITIES OF DAILY LIVING (ADL)
BED_CHAIR_WHEELCHAIR_TRANSFER_DESCRIPTION: ADAPTIVE EQUIPMENT;INCREASED TIME;SET-UP OF EQUIPMENT;SUPERVISION FOR SAFETY;VERBAL CUEING
TOILET_TRANSFER_LEVEL_OF_ASSIST: REQUIRES PHYSICAL ASSIST WITH TOILET TRANSFER
TOILETING_LEVEL_OF_ASSIST: REQUIRES PHYSICAL ASSIST WITH TOILETING
SHOWER_TRANSFER_LEVEL_OF_ASSIST: REQUIRES PHYSICAL ASSIST WITH SHOWER TRANSFER
TUB_SHOWER_TRANSFER_DESCRIPTION: GRAB BAR;SHOWER BENCH;SET-UP OF EQUIPMENT;SUPERVISION FOR SAFETY;VERBAL CUEING

## 2022-10-28 ASSESSMENT — PAIN DESCRIPTION - PAIN TYPE: TYPE: ACUTE PAIN

## 2022-10-28 NOTE — THERAPY
Occupational Therapy  Daily Treatment     Patient Name: Yvonne Marie Wada  Age:  86 y.o., Sex:  female  Medical Record #: [id number]  Today's Date: 10/28/2022     Precautions  Precautions: (P) Fall Risk, Toe Touch Weight Bearing Right Lower Extremity  Comments: intermittent pain and limited endurance         Subjective    Patient lying in bed and agreeable to OT.  She was unaware spouse was scheduled to come in for training.  Spouse arrived a few minutes after start of session.     Objective       10/28/22 1031   OT Charge Group   OT Therapy Activity 4   OT Total Time Spent   OT Individual Total Time Spent (Mins) 60   Precautions   Precautions Fall Risk;Toe Touch Weight Bearing Right Lower Extremity   Cognition    Ability To Follow Commands 1 Step   Safety Awareness Impaired   New Learning Impaired   Sequencing Impaired   Comments Weightbears more than TTWB R LE with use of FWW   Functional Level of Assist   Toilet Transfers Contact Guard Assist   Toilet Transfer Description Grab bar;Adaptive equipment;Verbal cueing;Supervision for safety;Set-up of equipment  (CGA with gb and FWW with assist of spouse)   Tub / Shower Transfers Contact Guard Assist   Tub Shower Transfer Description Grab bar;Shower bench;Set-up of equipment;Supervision for safety;Verbal cueing  (CGA with FWW sidestepping in/out of stall shower with cues x 2 attempts (first with OT, second with spouse))   Bed Mobility    Supine to Sit Modified Independent   Scooting Supervised   Interdisciplinary Plan of Care Collaboration   IDT Collaboration with  Family / Caregiver;Physical Therapist;Physician   Patient Position at End of Therapy Seated;Chair Alarm On;Self Releasing Lap Belt Applied;Call Light within Reach;Tray Table within Reach;Phone within Reach;Family / Friend in Room   Collaboration Comments spouse present for family training; Dr. Sutton rounded with patient and spouse; OT informed primary PT about patient's spouse with posterior LOB during  training and his impaired cognition     Family training completed with spouse.  Reviewed toilet transfers and dry stall shower transfer using FWW for support.  OT demonstrated and then patient's spouse completed with CGA of gait belt.  OT reviewed handouts of hip kit and tub bench (both patient and spouse state they have a stall shower with shower chair that they use).  Reeducated patient and educated spouse on how to use AE for LB Dressing.  They think they already have a hip kit at home from previous surgeries.      Assessment    Spouse lost balance posteriorly on one occasion as patient was about to commence a dry stall shower transfer, but he self corrected.  He had difficulty remembering details about their bathroom such as shower setup and DME by the toilet.  Patient with poor TTWB to R LE this session with use of FWW.    Strengths: Able to follow instructions, Motivated for self care and independence, Pleasant and cooperative  Barriers: Decreased endurance, Fatigue, Generalized weakness, Impaired activity tolerance, Impaired balance, Impaired functional cognition, Pain, Limited mobility, Pain poorly managed, Visual impairment    Plan    D/C home tomorrow with spouse    Occupational Therapy Goals (Active)       Problem: OT Long Term Goals       Dates: Start: 10/18/22         Goal: LTG-By discharge, patient will complete basic self care tasks with supervision overall using AE/DME as needed.       Dates: Start: 10/18/22            Goal: LTG-By discharge, patient will perform bathroom transfers with supervision overall using AE/DME as needed.       Dates: Start: 10/18/22

## 2022-10-28 NOTE — CARE PLAN
The patient is Stable - Low risk of patient condition declining or worsening    Shift Goals  Clinical Goals: Safety  Patient Goals: Sleep well    Progress made toward(s) clinical / shift goals:      Problem: Bladder / Voiding  Goal: Patient will establish and maintain regular urinary output  Outcome: Progressing  Note: Pt continent of bladder. Voiding adequately in the BR. She denies dysuria.     Problem: Bowel Elimination  Goal: Patient will participate in bowel management program  Outcome: Progressing  Note: Pt continent of bowel. On bowel meds. LBM 10/27/22.       Patient is not progressing towards the following goals:

## 2022-10-28 NOTE — THERAPY
Speech Language Pathology  Daily Treatment     Patient Name: Yvonne Marie Wada  Age:  86 y.o., Sex:  female  Medical Record #: [id number]  Today's Date: 10/28/2022     Precautions  Precautions: Fall Risk, Toe Touch Weight Bearing Right Lower Extremity  Comments: intermittent pain and limited endurance    Subjective    Pt seen at 1000 and 1334. Spouse present for family training in afternoon session. Pt eager to d/c home tomorrow.      Objective       10/28/22 1334   Treatment Charges   SLP Cognitive Skill Development First 15 Minutes 1   SLP Cognitive Skill Development Additional 15 Minutes 3   SLP Total Time Spent   SLP Individual Total Time Spent (Mins) 60   Interdisciplinary Plan of Care Collaboration   IDT Collaboration with  Family / Caregiver   Patient Position at End of Therapy Seated;Chair Alarm On;Self Releasing Lap Belt Applied;Family / Friend in Room   Collaboration Comments d/c planning and family training/education with pt and spouse       Assessment    D/c planning and family training completed with pt and spouse. Handouts provided on compensatory strategies for memory, attention, planning/organizing with use of external aids and assistance from family. Pt will continue to require assistance with all IADLs at home, spouse confirms this will be provided. All questions answered at this time. Pt and spouse in agreement with POC.     Strengths: Willingly participates in therapeutic activities, Supportive family  Barriers: Hearing impairment, Impaired functional cognition    Plan    Anticipated d/c scheduled for 10/29/22    Passport items to be completed:      Speech Therapy Problems (Active)       Problem: Memory STGs       Dates: Start: 10/19/22         Goal: STG-Within one week, patient will r/t therapies, transfer sequences, and safety precautions with MOD A in 4/5 trials       Dates: Start: 10/19/22         Goal Note filed on 10/27/22 1862 by Johnny Ramos MS,CCC-SLP       Continue to target                   Problem: Speech/Swallowing LTGs       Dates: Start: 10/19/22         Goal: LTG-By discharge, patient will solve basic problems by utilizing compensatory intervention for memory and problem-solving to allow for safe completion of daily activities with SPV       Dates: Start: 10/19/22               Problem: Swallowing STGs       Dates: Start: 10/19/22

## 2022-10-28 NOTE — PROGRESS NOTES
Rehab Progress Note     Encounter Date: 10/28/2022    CC: Decreased mobility, pain control    Interval Events (Subjective)  Patient sitting up in therapy gym.  is here for training. She denies pain at rest. Denies NVD. Discussed about discharge medications including pain control and opiates. Denies    IDT Team Meeting 10/27/2022  DC/Disposition:  10/29/22    Objective:  VITAL SIGNS: /64   Pulse 70   Temp 36.6 °C (97.9 °F) (Oral)   Resp 18   Wt 68.5 kg (151 lb)   SpO2 92%   BMI 25.92 kg/m²   Gen: NAD  Psych: Mood and affect appropriate  CV: RRR, no edema  Resp: CTAB, no upper airway sounds  Abd: NTND  Neuro: AOx4, following commands    No results found for this or any previous visit (from the past 72 hour(s)).      Current Facility-Administered Medications   Medication Frequency    metoprolol SR (TOPROL XL) tablet 75 mg QAM    oxyCODONE immediate-release (ROXICODONE) tablet 7.5 mg QHS    gabapentin (NEURONTIN) capsule 100 mg BID    calcium-vitamin D 250-3.125 MG-MCG tablet 1 Tablet DAILY    Respiratory Therapy Consult Continuous RT    hydrALAZINE (APRESOLINE) tablet 25 mg Q8HRS PRN    acetaminophen (Tylenol) tablet 650 mg Q4HRS PRN    senna-docusate (PERICOLACE or SENOKOT S) 8.6-50 MG per tablet 2 Tablet BID    And    polyethylene glycol/lytes (MIRALAX) PACKET 1 Packet QDAY PRN    And    magnesium hydroxide (MILK OF MAGNESIA) suspension 30 mL QDAY PRN    And    bisacodyl (DULCOLAX) suppository 10 mg QDAY PRN    omeprazole (PRILOSEC) capsule 20 mg DAILY    mag hydrox-al hydrox-simeth (MAALOX PLUS ES or MYLANTA DS) suspension 20 mL Q2HRS PRN    ondansetron (ZOFRAN ODT) dispertab 4 mg 4X/DAY PRN    Or    ondansetron (ZOFRAN) syringe/vial injection 4 mg 4X/DAY PRN    sodium chloride (OCEAN) 0.65 % nasal spray 2 Spray PRN    apixaban (ELIQUIS) tablet 5 mg BID    benazepril (LOTENSIN) tablet 40 mg QAM    ezetimibe (ZETIA) tablet 10 mg DAILY    levothyroxine (SYNTHROID) tablet 75 mcg AM ES    oxyCODONE  immediate-release (ROXICODONE) tablet 2.5 mg Q3HRS PRN    oxyCODONE immediate-release (ROXICODONE) tablet 5 mg Q3HRS PRN    miconazole 2%-zinc oxide (Lily) topical cream Q6HRS PRN       Orders Placed This Encounter   Procedures    Diet Order Diet: Cardiac (meds whole 1-2 at a time with thins); Second Modifier: (optional): Renal     Standing Status:   Standing     Number of Occurrences:   1     Order Specific Question:   Diet:     Answer:   Cardiac [6]     Comments:   meds whole 1-2 at a time with thins     Order Specific Question:   Second Modifier: (optional)     Answer:   Renal [8]       Assessment:  Active Hospital Problems    Diagnosis     *Tibia/fibula fracture, right, closed, initial encounter     Closed right trimalleolar fracture, initial encounter     S/p ankle right orif     Anemia     Constipation     UTI (urinary tract infection)     CKD (chronic kidney disease) stage 3, GFR 30-59 ml/min ([facility])     GERD (gastroesophageal reflux disease)     Hypothyroid     Paroxysmal atrial fibrillation ([facility])     Hypertension        Medical Decision Making and Plan:  R tib/fib fracture - mechanical injury on 10/12/22 s/p ORIF with Dr. Huertas on 10/154/22. TTWB  -PT and OT for mobility and ADLs  -Gabapentin 200 mg TID. PRN Oxycodone. PRN Tylenol  -Reduce Gabapentin to 100 mg as ongoing confusion. Schedule Oxycodone 5 mg QHS. Reduce Gabapentin to 100 mg BID. Increase Oxycodone to 7.5  I discussed the risks and benefits of using opiate medications for pain control.  I discussed the risk of addiction, potential for overdose, and respiratory depression (and the potential need for opiate antagonist therapy if this occurs).  I encouraged the patient to take this medication sparingly with the expressed goal of weaning off the medication as soon as is clinically appropriate.  I informed the patient that we are only able to provide a 14 day supply of these medications at discharge and that they will be responsible for requesting any  refills needed from their primary care provider or their surgeon.  We discussed the need to safely secure these medications to prevent theft, inadvertent ingestion, or misuse.  Any unused medication should be immediately disposed of through a sanctioned medication disposal program.  We discussed adjunctive pain medications and conservative therapies at length.I answered the patient's questions regarding this treatment, and the patient indicated understanding and willingness to proceed.    Confusion - Patient with recent fall with head strike. May have had a mild TBI. Will have SLP assess. With cognitive deficits. Will check UA as now paranoid. UA positive. Started on Omnicef.     Dysphagia - Will have SLP evaluate for swallow. Advanced to regular    HTN/ A Fib - Patient on Metoprolol 50 mg daily, benazepril 40 mg daily, and Eliquis. Into 140s, increase to 75 mg      HLD - Patient on Zetia 10 mg daily     Iron deficiency Anemia - Check AM CBC - 10. Repeat 7.9, had transfusion on 10/21/22 with repeat 9.3. Will monitor     Pannus rash - Started on Nystatin, refusing at times.     Azotemia - Check AM CMP - repeat normal    UTI - UA positive on 10/21/22. UCx with E coli sensitive to omnicef. Completed omnicef      CKD3 - Avoid nephrotoxic agents. Check AM CMP - repeat normal      Hypothyroidism - Patient on Levothyroxine 75 mcg daily     DVT Ppx - Patient on Eliquis    Total time:  36 minutes.  I spent greater than 50% of the time for patient care, counseling, and coordination on this date, including unit/floor time, and face-to-face time with the patient as per interval events and assessment and plan above. Topics discussed included discharge planning, discharge medications, and opiate counseling.     Mikayla Sutton M.D.

## 2022-10-28 NOTE — DISCHARGE SUMMARY
Rehab Discharge Summary    Admission Date: 10/17/2022    Discharge Date: 10/29/2022      Attending Provider: Jai Michele DO for Mikayla Sutton MD/PhD    Admission Diagnosis:   Active Hospital Problems    Diagnosis     *Tibia/fibula fracture, right, closed, initial encounter     Closed right trimalleolar fracture, initial encounter     S/p ankle right orif     Anemia     Constipation     UTI (urinary tract infection)     CKD (chronic kidney disease) stage 3, GFR 30-59 ml/min ([facility])     GERD (gastroesophageal reflux disease)     Hypothyroid     Paroxysmal atrial fibrillation ([facility])     Hypertension        Discharge Diagnosis:  Active Hospital Problems    Diagnosis     *Tibia/fibula fracture, right, closed, initial encounter     Closed right trimalleolar fracture, initial encounter     S/p ankle right orif     Anemia     Constipation     UTI (urinary tract infection)     CKD (chronic kidney disease) stage 3, GFR 30-59 ml/min ([facility])     GERD (gastroesophageal reflux disease)     Hypothyroid     Paroxysmal atrial fibrillation ([facility])     Hypertension        HPI per H&P:  The patient is a 86 y.o. female with a past medical history of anemia, tissue aortic valve replacement on eliquis, osteoperosis, HTN;  who presented on 10/12/2022  9:22 AM with right ankle injury sustained while getting out of bed. Patient was found to have comminuted trimalleolar fracture of the right ankle, and taken to the OR on 10/14/22 with Dr. Ryan Huertas for ORIF repair. Post op course has been uncomplicated. She does have some anemia, acute on chronic kidney disease, HTN and baseline a. Fib. Functionally she is taking a few steps with PT and requiring max-total assist with ADLs.  She has support from her spouse and an acceptable post discharge living situation.    Patient was [facility] to [facility] on 10/17/2022.     Hospital Course by Problem List:  R tib/fib fracture - mechanical injury on 10/12/22 s/p ORIF with   Aleksandar on 10/154/22. TTWB  -PT and OT for mobility and ADLs  -Gabapentin 200 mg TID. PRN Oxycodone. PRN Tylenol  -Reduce Gabapentin to 100 mg as ongoing confusion. Schedule Oxycodone 5 mg QHS. Reduce Gabapentin to 100 mg BID. Increase Oxycodone to 7.5  I discussed the risks and benefits of using opiate medications for pain control.  I discussed the risk of addiction, potential for overdose, and respiratory depression (and the potential need for opiate antagonist therapy if this occurs).  I encouraged the patient to take this medication sparingly with the expressed goal of weaning off the medication as soon as is clinically appropriate.  I informed the patient that we are only able to provide a 14 day supply of these medications at discharge and that they will be responsible for requesting any refills needed from their primary care provider or their surgeon.  We discussed the need to safely secure these medications to prevent theft, inadvertent ingestion, or misuse.  Any unused medication should be immediately disposed of through a sanctioned medication disposal program.  We discussed adjunctive pain medications and conservative therapies at length.I answered the patient's questions regarding this treatment, and the patient indicated understanding and willingness to proceed.     Confusion - Patient with recent fall with head strike. May have had a mild TBI. Will have SLP assess. With cognitive deficits. Will check UA as now paranoid. UA positive. Started on Omnicef.      Dysphagia - Will have SLP evaluate for swallow. Advanced to regular     HTN/ A Fib - Patient on Metoprolol 50 mg daily, benazepril 40 mg daily, and Eliquis. Into 140s, increase to 75 mg      HLD - Patient on Zetia 10 mg daily     Iron deficiency Anemia - Check AM CBC - 10. Repeat 7.9, had transfusion on 10/21/22 with repeat 9.3. Will monitor     Pannus rash - Started on Nystatin, refusing at times.      Azotemia - Check AM CMP - repeat normal     UTI -  UA positive on 10/21/22. UCx with E coli sensitive to omnicef. Completed omnicef      CKD3 - Avoid nephrotoxic agents. Check AM CMP - repeat normal      Hypothyroidism - Patient on Levothyroxine 75 mcg daily     DVT Ppx - Patient on Eliquis       Functional Status at Discharge  Eating:  Independent  Eating Description:  Insert dentures, Supervision for safety  Grooming:  Independent, Seated  Grooming Description:  Increased time, Initial preparation for task, Seated in wheelchair at sink, Verbal cueing, Supervision for safety  Bathing:  Standby Assist  Bathing Description:  Hand held shower, Grab bar, Tub bench, Increased time, Initial preparation for task, Set up for wound protection, Supervision for safety  Upper Body Dressing:  Modified Independent  Upper Body Dressing Description:  Initial preparation for task, Increased time, Supervision for safety, Verbal cueing  Lower Body Dressing:  Standby Assist (use of reacher)  Lower Body Dressing Description:     Discharge Location : Home  Patient Discharging with Assist of: Spouse / Significant Other  Level of Supervision Required: Intermittent Supervision  Recommended Equipment for Discharge: Front-Wheeled Walker;Sock Aid;Reacher;Dressing Stick;Shower Chair;Grab Bars by Toilet;Grab Bars in Tub / Shower  Recommended Services Upon Discharge: Home Health Occupational Therapy  Long Term Goals Met: 0  Long Term Goals Not Met: 2  Reason(s) for Goals Not Met: TTWB, impaired balance  Criteria for Termination of Services: Maximum Function Achieved for Inpatient Rehabilitation  Walk:  Minimal Assist  Distance Walked:   (2-6 feet during transfers today)  Number of Times Distance Was Traveled:  11  Assistive Device:  Front Wheel Walker  Gait Deviation:  Step To, Increased Base Of Support, Bradykinetic, Decreased Heel Strike, Decreased Toe Off, Other (Comment) (focus on FWW use with very short distances and good TTWB RLE adherence, cueing for sequencing, requires ceuing for new  task learning with moving posterior to surface including keeping FWW proximal)  Wheelchair:  Supervised  Distance Propelled:  180 feet without fatigue noted   Wheelchair Description:  Extra time, Leg rest management, Verbal cueing (cueing for path finding, increased time throughout but pt able to problem solving L and R turns without cueing)  Stairs Unable to Participate  Stairs Description Safety concerns     Comprehension:  Supervision  Comprehension Description:  Glasses, Verbal cues  Expression:  Supervision  Expression Description:  Verbal cueing  Social Interaction:  Supervision  Social Interaction Description:  Low stimulation environment, Increased time, Verbal cues  Problem Solving:  Moderate Assist  Problem Solving Description:  Increased time, Verbal cueing, Therapy schedule, Seat belt, Bed/chair alarm  Memory:  Moderate Assist  Memory Description:  Increased time, Verbal cueing, Therapy schedule, Bed/chair alarm, Seat belt  Progress since Admit: Pt has made fair progress since admission. Slight improvement across all domains tested with the exception of oral expression and orientation (no change in score). Pt continues to present with overall moderate impairments with severe deficits noted memory, attention, problem solving per outcome assessment. Pt has stated she does not want further ST services at this time. Recommend pt to d/c home with support of spouse and adult children living within closer proximity to the [place] area to continue to assist with all IADLs.  Discharge Location : Home  Patient Discharging with Assist of: Spouse / Significant Other  Level of Supervision Required: 24 Hour Supervision  Recommended Services Upon Discharge: No Follow-Up Speech Therapy Recommended (pt is requesting no further ST services upon d/c.)  Long Term Goals Met: 1/2  Long Term Goals Not Met: 1/2  Reason(s) for Goals Not Met: Pt is MIN to MAX A for memory and problem solving at this time.  Criteria for Termination  of Services: Maximum Function Achieved for Inpatient Rehabilitation    IJai DO personally performed a complete drug regimen review and no potential clinically significant medication issues were identified.   Discharge Medication:     Medication List        ASK your doctor about these medications        Instructions   benazepril 40 MG tablet  Commonly known as: LOTENSIN   Take 1 Tablet by mouth every morning.  Dose: 40 mg     calcium/vitamin D 250-125 MG-UNIT Tabs tablet   Take 1 Tablet by mouth every day.  Dose: 1 Tablet     Eliquis 5mg Tabs  Generic drug: apixaban   TAKE 1 TABLET BY MOUTH TWICE A DAY     ezetimibe 10 MG Tabs  Commonly known as: ZETIA   TAKE 1 TABLET BY MOUTH EVERY DAY  Dose: 10 mg     gabapentin 100 MG Caps  Commonly known as: NEURONTIN   Take 100 mg by mouth 3 times a day.  Dose: 100 mg     levothyroxine 75 MCG Tabs  Commonly known as: SYNTHROID   Take 1 Tablet by mouth every morning on an empty stomach.  Dose: 75 mcg     metoprolol SR 50 MG Tb24  Commonly known as: TOPROL XL   Take 1 Tablet by mouth every morning.  Dose: 50 mg     omeprazole 20 MG delayed-release capsule  Commonly known as: PRILOSEC   Take 20 mg by mouth every morning.  Dose: 20 mg     * oxyCODONE immediate-release 5 MG Tabs  Commonly known as: ROXICODONE   Take 2.5-5 mg by mouth every 3 days as needed for Severe Pain.  Dose: 2.5-5 mg     * oxyCODONE immediate-release 5 MG Tabs  Commonly known as: ROXICODONE  Ask about: Should I take this medication?   Take 0.5-1 Tablets by mouth every 3 hours as needed for Severe Pain for up to 3 days.  Dose: 2.5-5 mg     senna-docusate 8.6-50 MG Tabs  Commonly known as: PERICOLACE or SENOKOT S   Take 2 Tablets by mouth 2 times a day.  Dose: 2 Tablet           * This list has 2 medication(s) that are the same as other medications prescribed for you. Read the directions carefully, and ask your doctor or other care provider to review them with you.                  Discharge  Diet:  Regular    Discharge Activity:  TTWB RLE     Disposition:  Patient to discharge home with family support and community resources.     Equipment:  FWW, WC    Follow-up & Discharge Instructions:  Follow up with your primary care provider (PCP) within 7-10 days of discharge to review your medications and take over your care.     If you develop chest pain, fever, chills, change in neurologic function (weakness, sensation changes, vision changes), or other concerning sxs, seek immediate medical attention or call 911.      Condition on Discharge:  Good    More than 35 minutes was spent on discharging this patient, including face-to-face time, prescription management, and the dictation of this note.    Jai Michele, DO   Physical Medicine and Rehabilitation   10/29/2022

## 2022-10-28 NOTE — CARE PLAN
"The patient is Stable - Low risk of patient condition declining or worsening    Shift Goals  Clinical Goals: Safety  Patient Goals: Sleep well      Problem: Fall Risk - Rehab  Goal: Patient will remain free from falls  Note: [facility] [place] Fall risk Assessment Score: 13      Moderate fall risk Interventions  - Bed and strip alarm   - Yellow sign by the door   - Yellow wrist band \"Fall risk\"  - Room near to the nurse station  - Do not leave patient unattended in the bathroom  - Fall risk education provided       "

## 2022-10-28 NOTE — DISCHARGE INSTRUCTIONS
[facility] NURSING DISCHARGE INSTRUCTIONS    Blood Pressure : 123/61  Weight: 68.5 kg (151 lb)  Nursing recommendations for Yvonne Marie Wada at time of discharge are as follows:  Client verbalized understanding of all discharge instructions and prescriptions.     Review all your home medications and newly ordered medications with your doctor and/or pharmacist. Follow medication instructions as directed by your doctor and/or pharmacist.    Pain Management: OXYCODONE IMMEDIATE RELEASE 5 MG TABS. DOSE 2.5-5 MG. TAKE 0.5-1 TABLET BY MOUTH EVERY 12 HOURS AS NEEDED FOR SEVERE PAIN FOR UP TO 14 DAYS.  Discharge Pain Medication Instructions:  Comfort Goal: Sleep Comfortably  Notify your primary care provider if pain is unrelieved with these measures, if the pain is new, or increased in intensity.    Discharge Skin Characteristics: Warm, Dry  Discharge Skin Exam: SURGICAL INCISIONS WITH SUTURES TO RIGHT LEG. PRESSURE INJURY TO RIGHT LEG.     Skin / Wound Care Instructions: Please contact your primary care physician for any change in skin integrity. CONTACT PHYSICIAN IF YOU HAVE NEW ONSET PAIN OR INCREASED PAIN TO RIGHT LEG. CONTACT PHYSICIAN IF YOU HAVE NEW ONSET NUMBNESS OR TINGLING OR DECREASED SENSATION TO RIGHT LEG.   Dressing Care  (Dressing Care)  EVERY FORTY-EIGHT HOURS        Comments: RIGHT LATERAL LEG - nursing to clean with saline and gauze, cover with hydrofera blue and mepilex to off load, place tubi E from base of toes to 2 fingers below knee crease (remove if pt complains of any discomfort).  Place an additional ABD under lateral edge of plaster cast where wound is to add additional padding.  Wrap with dry rolled gauze and Ace not too tight to hold cast in place.          If You Have Surgical Incisions / Wounds:  Monitor surgical site(s) for signs of increased swelling, redness or symptoms of drainage from the site or fever as this could indicate signs and symptoms of infection. If  these symptoms are noted, notifiy your primary care provider.      Discharge Safety Instructions: Should Not Be Left Alone In The House     Discharge Safety Concerns: Weakness, Unsteady Gait  The interdisciplinary team has made recommendation that you should not be left alone  in the house due to weakness and unsteady gait  Anti-embolic stockings are not required to increase circulation to the lower extremities.    Discharge Diet: CARDIAC RENAL  Discharge Liquids: THIN  Discharge Bowel Function: Continent  Please contact your primary care physician for any changes in bowel habits.  Discharge Bowel Program:    Discharge Bladder Function: Continent  Discharge Urinary Devices: Brief      Nursing Discharge Plan:        Case Management Discharge Instructions:   Discharge Location:    Agency Name/Address/Phone:    Home Health:    Outpatient Services:    DME Provider/Phone:    Medical Equipment Ordered:    Prescription Faxed to:        Discharge Medication Instructions:  Below are the medications your physician expects you to take upon discharge:      Fall Prevention in the Home, Adult  Falls can cause injuries and can affect people from all age groups. There are many simple things that you can do to make your home safe and to help prevent falls. Ask for help when making these changes, if needed.  What actions can I take to prevent falls?  General instructions  Use good lighting in all rooms. Replace any light bulbs that burn out.  Turn on lights if it is dark. Use night-lights.  Place frequently used items in easy-to-reach places. Lower the shelves around your home if necessary.  Set up furniture so that there are clear paths around it. Avoid moving your furniture around.  Remove throw rugs and other tripping hazards from the floor.  Avoid walking on wet floors.  Fix any uneven floor surfaces.  Add color or contrast paint or tape to grab bars and handrails in your home. Place contrasting color strips on the first and last  steps of stairways.  When you use a stepladder, make sure that it is completely opened and that the sides are firmly locked. Have someone hold the ladder while you are using it. Do not climb a closed stepladder.  Be aware of any and all pets.  What can I do in the bathroom?         Keep the floor dry. Immediately clean up any water that spills onto the floor.  Remove soap buildup in the tub or shower on a regular basis.  Use non-skid mats or decals on the floor of the tub or shower.  Attach bath mats securely with double-sided, non-slip rug tape.  If you need to sit down while you are in the shower, use a plastic, non-slip stool.  Install grab bars by the toilet and in the tub and shower. Do not use towel bars as grab bars.  What can I do in the bedroom?  Make sure that a bedside light is easy to reach.  Do not use oversized bedding that drapes onto the floor.  Have a firm chair that has side arms to use for getting dressed.  What can I do in the kitchen?  Clean up any spills right away.  If you need to reach for something above you, use a sturdy step stool that has a grab bar.  Keep electrical cables out of the way.  Do not use floor polish or wax that makes floors slippery. If you must use wax, make sure that it is non-skid floor wax.  What can I do in the stairways?  Do not leave any items on the stairs.  Make sure that you have a light switch at the top of the stairs and the bottom of the stairs. Have them installed if you do not have them.  Make sure that there are handrails on both sides of the stairs. Fix handrails that are broken or loose. Make sure that handrails are as long as the stairways.  Install non-slip stair treads on all stairs in your home.  Avoid having throw rugs at the top or bottom of stairways, or secure the rugs with carpet tape to prevent them from moving.  Choose a carpet design that does not hide the edge of steps on the stairway.  Check any carpeting to make sure that it is firmly  attached to the stairs. Fix any carpet that is loose or worn.  What can I do on the outside of my home?  Use bright outdoor lighting.  Regularly repair the edges of walkways and driveways and fix any cracks.  Remove high doorway thresholds.  Trim any shrubbery on the main path into your home.  Regularly check that handrails are securely fastened and in good repair. Both sides of any steps should have handrails.  Install guardrails along the edges of any raised decks or porches.  Clear walkways of debris and clutter, including tools and rocks.  Have leaves, snow, and ice cleared regularly.  Use sand or salt on walkways during winter months.  In the garage, clean up any spills right away, including grease or oil spills.  What other actions can I take?  Wear closed-toe shoes that fit well and support your feet. Wear shoes that have rubber soles or low heels.  Use mobility aids as needed, such as canes, walkers, scooters, and crutches.  Review your medicines with your health care provider. Some medicines can cause dizziness or changes in blood pressure, which increase your risk of falling.  Talk with your health care provider about other ways that you can decrease your risk of falls. This may include working with a physical therapist or  to improve your strength, balance, and endurance.  Where to find more information  Centers for Disease Control and PreventionFLORIAN: https://www.cdc.gov  National [place] on Aging: https://eu6bxpo.[place]ico.nih.gov  Contact a health care provider if:  You are afraid of falling at home.  You feel weak, drowsy, or dizzy at home.  You fall at home.  Summary  There are many simple things that you can do to make your home safe and to help prevent falls.  Ways to make your home safe include removing tripping hazards and installing grab bars in the bathroom.  Ask for help when making these changes in your home.  This information is not intended to replace advice given to you by your  health care provider. Make sure you discuss any questions you have with your health care provider.  Document Released: 12/08/2003 Document Revised: 11/30/2018 Document Reviewed: 08/02/2018  Elsevier Patient Education © 2020 [employer]      Depression / Suicide Risk    As you are discharged from this [facility] facility, it is important to learn how to keep safe from harming yourself.    Recognize the warning signs:  Abrupt changes in personality, positive or negative- including increase in energy   Giving away possessions  Change in eating patterns- significant weight changes-  positive or negative  Change in sleeping patterns- unable to sleep or sleeping all the time   Unwillingness or inability to communicate  Depression  Unusual sadness, discouragement and loneliness  Talk of wanting to die  Neglect of personal appearance   Rebelliousness- reckless behavior  Withdrawal from people/activities they love  Confusion- inability to concentrate     If you or a loved one observes any of these behaviors or has concerns about self-harm, here's what you can do:  Talk about it- your feelings and reasons for harming yourself  Remove any means that you might use to hurt yourself (examples: pills, rope, extension cords, firearm)  Get professional help from the community (Mental Health, Substance Abuse, psychological counseling)  Do not be alone:Call your Safe Contact- someone whom you trust who will be there for you.  Call your local CRISIS HOTLINE 153-4269 or [phone]  Call your local Children's Mobile Crisis Response Team [place] (010) 855-5323 or www.ZeroTurnaround  Call the toll free National Suicide Prevention Hotlines   National Suicide Prevention Lifeline 422-134-OEWA (2425)  [place] [facility] 800-SUICIDE (189-1426)        Speech Therapy Discharge Instructions for Yvonne Marie Wada    10/28/2022    Diet: Regular (7), Thin (0)  Swallow Strategies: NA  Other Diet Instructions: NA  Supervision: 24  hour supervision is recommended for safety at this time.  Cognition / Communication: It has been a pleasure working with you Mala. Please keep up the good work at home and best of luck in your continued recovery! ~ lEen Schneider MS, CCC-SLP    What is memory?   Memory is the ability to learn, store, and retrieve information. New or increasing problems with any or all of these 3 stages of memory often occur after a traumatic brain injury, stroke, brain tumor, multiple sclerosis, or other kind of injury or illness that affects your nervous system.   Some kinds of memory problems may also occur as part of normal aging, when many people have more trouble retrieving new information.     Types of Memory:    Long-term (remote): memory for old, well-learned information that has been “rehearsed” (used) over time, such as the name of a childhood pet, memories of past vacations, or where you went to high school. Long-term memory tends to be retained after injury or illness.   Short-term (recent): memory for new experiences that took place a few minutes, hours, or days ago, such as what you had for breakfast or what you did yesterday. Short-term memory tends to be the most severely affected after injury. People who have had brain injuries may have problems with their attention span, how much memory they can store, how quickly they can think, and how efficiently they learn. These memory problems will make it hard to understand and save short-term memories so that they can be correctly rehearsed and stored in long-term memory.   Immediate (working): memory for information that is current, that you usually keep track of mentally, such as a phone number you look up, directions someone just gave you, or keeping track of numbers in your head when you add or subtract.   Prospective: the ability to remember to do something in the future, such as remembering to take a medicine, go to an appointment, or follow through on an assignment  or project.       Strategies to Help Improve Your Memory   Your speech therapist can work with you to develop strategies to help you remember new information. There are 2 main types of strategies to help your memory: internal reminders and external reminders.     Internal Reminders     Rehearsal: retelling yourself information you just learned, or restating it out loud in your own words.   Repetition: saying the same information over and over, either silently or out loud.   Clarification: asking someone else to repeat or rephrase information.   Chunking: grouping items together to reduce the number of items to remember, such as grouping 7-digit phone numbers into 2 chunks, one with 3 numbers and the other with 4 numbers.   Rhyming: making a rhyme out of important information.   Acronyms or alphabet cueing: creating a letter for each word you want to remember, or vice versa. One example is using the sentence “Every Good Boy Does Fine” to remember that the lines of a treble staff in music are the notes E, G, B, D, and F.   Imagery (also called visualization): creating pictures of the information in your mind.   Association: linking old information or habits with the new, such as remembering to take your medicine every night at the same time that you brush your teeth.   Personal meaning: making the new information meaningful or emotionally important to you in some way.     External Reminders     Using a paper or electronic calendar or day planner.   Setting timers or alarms to remind you to do something.   Writing down reminders such as to-do lists, shopping lists, and project outlines.   Recording new information with a voice recorder.   Using a medicine organizing tool.   Creating specific, permanent places for important items. One example is always putting your keys, wallet, and cell phone in the same place every time you get home.    To increase your ability to pay attention and concentrate it is recommended you  follow these strategies:     Monitor your fatigue: Monitoring our fatigue is probably one of the most important strategies we can use. Many people suffer from higher levels of fatigue than normal and must be aware that fatigue will have a major impact on attention capabilities. This means scheduling activities that require your attention at times when you feel at your best.  Make a schedule: Choose a time that’s quiet and unhurried -- maybe at night before you go to bed -- and plan out the next day, down to the task. Use a reminder sidney, timer, or alarm to help you stick to that schedule. Alternate things you want to do with ones you don’t to help your mind stay engaged.  Allow plenty of time to complete tasks.  Be realistic about time: Your brain is wired differently than other people’s, and it may take you longer to get things done. That’s OK. Figure out a realistic time frame for your daily tasks -- and don’t forget to build in time for breaks if you think you’ll need them.  Quiet your mind by quieting your space: When it’s time to buckle down and get something done, take away the distractions. Use noise-canceling headphones to drown out sounds. Put your phone on silent. Work in a room with a door you can close. If you can do your job from home, set up the space in a way that helps you focus.  Control clutter: Another way to quiet your brain is to clear your space of things you don’t need. It can prevent distractions, and it can help you stay organized because you’ll have fewer things to tidy up.  Get some organizational helpers like under-the-bed containers or over-the-door holders. Ask a friend to help if it seems like you’re swimming in a sea of debris and you don’t know where to start.    Mala would benefit from assistance managing their medications.  It is recommended that you purchase a pill organizer to sort medications in on a weekly basis.  Implementing a system to help remind you to take your  medications will also be helpful (Ex: setting alarms, having a friend/family member call as a reminder).      Best of luck in your continued recovery! ~ Elen Schneider MS, CCC-SLP    Occupational Therapy Discharge Instructions for Yvonne Marie Wada    10/28/2022    Level of Assist Required for Eating: Able to Complete Eating without Assist  Level of Assist Required for Grooming: Able to Complete Grooming without Assist  Level of Assist Required for Dressing: Requires Physical Assist with Dressing  Level of Assist Required for Toileting: Requires Physical Assist with Toileting  Level of Assist Required for Toilet Transfer: Requires Physical Assist with Toilet Transfer  Equipment for Toilet Transfer: Grab Bars by Toilet, Other (fww)  Level of Assist Required for Bathing: Requires Physical Assist with Bathing  Equipment for Bathing: Shower Chair, Grab Bars in Tub / Shower  Level of Assist Required for Shower Transfer: Requires Physical Assist with Shower Transfer  Equipment for Shower Transfer: Shower Chair, Other, Grab Bars in Tub / Shower (walker)  Level of Assist Required for Home Mgmt: Requires Physical Assist with Home Management  Level of Assist Required for Meal Prep: Requires Physical Assist with Meal Preparation  Driving: May not Drive, Please Contact Physician for Further Information  Home Exercise Program: Refer to Home Exercise Program Handout for Details        Physical Therapy Discharge Instructions for Yvonne Marie Wada    10/28/2022    Weight Bearing Status - Patient Should: Bear Toe-Touch Weight Right Leg  Level of Assist Required for Ambulation: Physical Assist on Flat Surfaces, Requires Wheelchair for Mobility at This Time, Should Not Attempt Curbs at This Time, Should Not Attempt Stairs at This Time  Distance Patient May Ambulate: 10 feet or less  Device Recommended for Ambulation: Front-Wheeled Walker (and Gait belt)  Level of Assist Required to Propel Wheelchair: Supervision  Level of Assist  Required for Transfers:  (Assist for Balance with bed transfers; Physical Assist with car transfers)  Device Recommended for Transfers: Front-Wheeled Walker (and Gait belt)  Home Exercise Program: Refer to Home Exercise Program Handout for Details  Prosthesis / Orthosis Recommendation / Location:  (as recommended by the doctor)

## 2022-10-29 VITALS
OXYGEN SATURATION: 98 % | TEMPERATURE: 98.6 F | DIASTOLIC BLOOD PRESSURE: 58 MMHG | BODY MASS INDEX: 25.92 KG/M2 | WEIGHT: 151 LBS | SYSTOLIC BLOOD PRESSURE: 126 MMHG | HEART RATE: 73 BPM | RESPIRATION RATE: 16 BRPM

## 2022-10-29 PROCEDURE — A9270 NON-COVERED ITEM OR SERVICE: HCPCS | Performed by: PHYSICAL MEDICINE & REHABILITATION

## 2022-10-29 PROCEDURE — 99239 HOSP IP/OBS DSCHRG MGMT >30: CPT | Performed by: PHYSICAL MEDICINE & REHABILITATION

## 2022-10-29 PROCEDURE — 700102 HCHG RX REV CODE 250 W/ 637 OVERRIDE(OP): Performed by: PHYSICAL MEDICINE & REHABILITATION

## 2022-10-29 RX ADMIN — LEVOTHYROXINE SODIUM 75 MCG: 0.07 TABLET ORAL at 06:34

## 2022-10-29 RX ADMIN — OMEPRAZOLE 20 MG: 20 CAPSULE, DELAYED RELEASE ORAL at 08:44

## 2022-10-29 RX ADMIN — SENNOSIDES AND DOCUSATE SODIUM 2 TABLET: 50; 8.6 TABLET ORAL at 08:40

## 2022-10-29 RX ADMIN — APIXABAN 5 MG: 5 TABLET, FILM COATED ORAL at 08:39

## 2022-10-29 RX ADMIN — GABAPENTIN 100 MG: 100 CAPSULE ORAL at 08:44

## 2022-10-29 RX ADMIN — METOPROLOL SUCCINATE 75 MG: 25 TABLET, EXTENDED RELEASE ORAL at 08:44

## 2022-10-29 RX ADMIN — BENAZEPRIL HYDROCHLORIDE 40 MG: 10 TABLET, COATED ORAL at 08:39

## 2022-10-29 RX ADMIN — Medication 1 TABLET: at 08:40

## 2022-10-29 RX ADMIN — EZETIMIBE 10 MG: 10 TABLET ORAL at 08:44

## 2022-10-29 NOTE — THERAPY
"Physical Therapy   Daily Treatment     Patient Name: Yvonne Marie Wada  Age:  86 y.o., Sex:  female  Medical Record #: [id number]  Today's Date: 10/28/2022     Precautions  Precautions: Fall Risk, Toe Touch Weight Bearing Right Lower Extremity  Comments: intermittent pain and limited endurance    Subjective    Patient agreeable to PT; SO present.     Objective       10/28/22 1231   PT Charge Group   PT Gait Training 1   PT Therapeutic Activities 3   PT Total Time Spent   PT Individual Total Time Spent (Mins) 60   Non Verbal Descriptors   Non Verbal Scale  Calm   Gait Functional Level of Assist    Gait Level Of Assist Minimal Assist  (SO provided guarding)   Assistive Device Front Wheel Walker   Distance (Feet) 12   # of Times Distance was Traveled 1   Deviation   (min cueing for RLE TTWB adherence, sequencing)   Wheelchair Functional Level of Assist   Wheelchair Assist Supervised   Distance Wheelchair (Feet or Distance) 180 feet without fatigue noted   Wheelchair Description Extra time;Leg rest management;Verbal cueing  (cueing for path finding, increased time throughout but pt able to problem solving L and R turns without cueing)   Stairs Functional Level of Assist   Level of Assist with Stairs Unable to Participate   # of Stairs Climbed 0   Stairs Description Safety concerns  (TTWB RLE at this time. SO demonstrated bumping a w/c up/down a 6\" curb without cueing required for sequencing or safe performance; reviewed w/c safety with pt and SO.)   Transfer Functional Level of Assist   Bed, Chair, Wheelchair Transfer Contact Guard Assist   Bed Chair Wheelchair Transfer Description Adaptive equipment;Increased time;Set-up of equipment;Supervision for safety;Verbal cueing  (2 reps; also one rep at car transfer but with Min A)   Sitting Lower Body Exercises   Sitting Lower Body Exercises   (reviewed seated HEP handout, with purple and green TB present)   Bed Mobility    Supine to Sit Modified Independent   Sit to Supine " Supervised   Sit to Stand Contact Guard Assist  (FWW; Min A once, fair to good TTWB adherence)   Scooting Supervised   Rolling Modified Independent   Interdisciplinary Plan of Care Collaboration   IDT Collaboration with  Family / Caregiver;Occupational Therapist;Speech Therapist   Patient Position at End of Therapy Seated;Chair Alarm On;Self Releasing Lap Belt Applied;Call Light within Reach;Tray Table within Reach;Phone within Reach;Family / Friend in Room   Collaboration Comments family training with SO providing hands on for all activities today        10/28/22 1231   Roll Left and Right   Assistance Needed Independent   Physical Assistance Level No physical assistance   CARE Score - Roll Left and Right 6   Sit to Lying   Assistance Needed Supervision   Physical Assistance Level No physical assistance   CARE Score - Sit to Lying 4   Lying to Sitting on Side of Bed   Assistance Needed Independent   Physical Assistance Level No physical assistance   CARE Score - Lying to Sitting on Side of Bed 6   Sit to Stand   Assistance Needed Incidental touching   Physical Assistance Level No physical assistance   CARE Score - Sit to Stand 4   Chair/Bed-to-Chair Transfer   Assistance Needed Incidental touching   Physical Assistance Level No physical assistance   CARE Score - Chair/Bed-to-Chair Transfer 4   Car Transfer   Assistance Needed Physical assistance   Physical Assistance Level 25% or less   CARE Score - Car Transfer 3   Walk 10 Feet   Assistance Needed Physical assistance   Physical Assistance Level 25% or less   CARE Score - Walk 10 Feet 3   Walk 50 Feet with Two Turns   Reason if not Attempted Medical concerns   CARE Score - Walk 50 Feet with Two Turns 88   Walk 150 Feet   Reason if not Attempted Medical concerns   CARE Score - Walk 150 Feet 88   Walking 10 Feet on Uneven Surfaces   Reason if not Attempted Safety concerns   CARE Score - Walking 10 Feet on Uneven Surfaces 88   1 Step (Curb)   Reason if not Attempted  Safety concerns   CARE Score - 1 Step (Curb) 88   4 Steps   Reason if not Attempted Medical concerns   CARE Score - 4 Steps 88   12 Steps   Reason if not Attempted Medical concerns   CARE Score - 12 Steps 88   Picking Up Object   Reason if not Attempted Safety concerns   CARE Score - Picking Up Object 88   Wheel 50 Feet with Two Turns   Assistance Needed Verbal cues   Physical Assistance Level No physical assistance   CARE Score - Wheel 50 Feet with Two Turns 4   Type of Wheelchair/Scooter Manual   Wheel 150 Feet   Assistance Needed Verbal cues   Physical Assistance Level No physical assistance   CARE Score - Wheel 150 Feet 4   Type of Wheelchair/Scooter Manual     Completed rehab patient passport, reviewed precautions, pt and SO report that home health nursing will perform the wrapping on the R lower LE, reviewed HEP handout, reviewed safe FWW use during transfers and leaving 1.5-2 feet between w/c and surface for safe caregiver handling.    Assessment    Fair to great TTWB adherence throughout session, improves with cueing for FWW proximity during walking/hopping; good social support from SO and hands on guarding for all activities today; pt and SO receptive to education; patient is ready for the next level of care.    Strengths: Pleasant and cooperative, Supportive family, Willingly participates in therapeutic activities  Barriers: Confused, Decreased endurance, Dementia, Difficulty following instructions, Generalized weakness, Home accessibility, Impaired activity tolerance, Impaired balance, Impaired carryover of learning, Impaired insight/denial of deficits, Impaired functional cognition, Limited mobility, Pain    Plan    D/c home tomorrow with SO; already report having manual w/c and FWW; Home health PT    Passport items to be completed: none.    Physical Therapy Problems (Active)       Problem: PT-Long Term Goals       Dates: Start: 10/18/22         Goal: LTG-By discharge, patient will propel wheelchair 100  feet indoors at SPV level with cueing.       Dates: Start: 10/18/22            Goal: LTG-By discharge, patient will ambulate 10 feet with FWW, gait belt, cueing for WB precautions, and Min A.       Dates: Start: 10/18/22            Goal: LTG-By discharge, patient will transfer one surface to another with FWW, gait belt, cueing for WB precautions, and Min A.       Dates: Start: 10/18/22            Goal: LTG-By discharge, patient will perform home exercise program with handout, cueing, and SBA.       Dates: Start: 10/18/22            Goal: LTG-By discharge, patient will transfer in/out of a car with FWW, gait belt, cueing for WB precautions, and Min A.       Dates: Start: 10/18/22

## 2022-10-29 NOTE — PROGRESS NOTES
Shift report received from SHUKRI Escobar RN, POC discussed. Pt is in the bathroom w/ no s/s distress noted. CNA in pt room. Call light and bedside table w/in reach. Monitoring in progress.

## 2022-10-29 NOTE — CARE PLAN
"The patient is Stable - Low risk of patient condition declining or worsening    Shift Goals  Clinical Goals: Safety  Patient Goals: Sleep well    Problem: Fall Risk - Rehab  Goal: Patient will remain free from falls  Outcome: Progressing  Note: Muna Cuenca Fall risk Assessment Score: 13    Moderate fall risk Interventions  - Bed and strip alarm   - Yellow sign by the door   - Yellow wrist band \"Fall risk\"  - Room near to the nurse station  - Do not leave patient unattended in the bathroom  - Fall risk education provided       Problem: Skin Integrity  Goal: Patient's skin integrity will be maintained or improve  Outcome: Progressing  Note:   Jase Score: 17    Patient's skin remains intact and free from new or accidental injury this shift; no s/s of infection. RN wound protocol checked. Encouraged hydration and educated about the importance of nutrition to keep skin integrity. Will continue to monitor.       "

## 2022-10-29 NOTE — PROGRESS NOTES
Patient discharged to home per order. All discharge instructions reviewed with patient and her , including wound care for RLE. they verbalize understanding. Dressings to RLE changed on the night shift, pt declines to have dressing changed prior to d/c as the order is to change dressing every 48 hours.; signed copies of discharge paperwork placed in chart. Patient has all belongings; signed form in chart. Patient left facility at approx 1305 via w/c accompanied by her  and Rehab CNA. Have enjoyed working with this pleasant patient.

## 2022-10-31 ENCOUNTER — PATIENT OUTREACH (OUTPATIENT)
Dept: MEDICAL GROUP | Facility: MEDICAL CENTER | Age: 87
End: 2022-10-31
Payer: MEDICARE

## 2022-10-31 NOTE — PROGRESS NOTES
Subjective:     Yvonne Marie Wada is a 86 y.o. female who presents for Hospital Follow-up.    POST DISCHARGE CALL:  Discharge Date:10/29/2022   Date of Outreach Call: 10/31/2022  8:59 AM  Now that you're home, how are you doing? Fair- doing the best I can  Did you receive any new prescriptions? No  Were you able to fill those medications? *  How did you fill those prescriptions? *  If not able to fill prescriptions, why? *    Do you have questions about your medications? No  Do you have a follow-up appointment scheduled?Yes  Any issues or paperwork you wish to discuss with your PCP? *  Does patient qualify for CCM Program? Yes  If patient qualifies, was CCM Program Introduced? Yes  If patient does not qualify, comment? *  Number of Attempts: 1  Current or previous attempts completed within two business days of discharge? Yes  Provided education regarding treatment plan, medication, self-management, ADLs? Yes  Has patient completed Advance Directive? If yes, advise them to bring to appointment. No  Care Manager phone number provided? Yes  Is there anything else I can help you with? No  Chief Complaint? palpitations, afib, s/p R tib/fib ORIF, R ankle ORIF, and s/p closed R trimalleolar fx  Admitting Dx? EMS  Discharge Diagnosis? s/p R tib/fib ORIF, R ankle ORIF, and s/p closed R trimalleolar fx, paroxysmal afib  Additional Comments? *    HPI:   Recently hospitalized for ***    Current medicines (including reconciliation performed today)  Current Outpatient Medications   Medication Sig Dispense Refill    ELIQUIS 5 MG Tab TAKE 1 TABLET BY MOUTH TWICE A DAY (Patient taking differently: Take 5 mg by mouth 2 times a day.) 180 Tablet 3    metoprolol SR (TOPROL XL) 50 MG TABLET SR 24 HR Take 1.5 Tablets by mouth every morning. 90 Tablet 3    oxyCODONE immediate-release (ROXICODONE) 5 MG Tab Take 0.5-1 Tablets by mouth every 12 hours as needed for Severe Pain for up to 14 days. 28 Tablet 0    senna-docusate (PERICOLACE OR  SENOKOT S) 8.6-50 MG Tab Take 2 Tablets by mouth 2 times a day.      calcium/vitamin D 250-125 MG-UNIT Tab tablet Take 1 Tablet by mouth every day. 60 Tablet     gabapentin (NEURONTIN) 100 MG Cap Take 100 mg by mouth 3 times a day.      ezetimibe (ZETIA) 10 MG Tab TAKE 1 TABLET BY MOUTH EVERY DAY 90 Tablet 3    levothyroxine (SYNTHROID) 75 MCG Tab Take 1 Tablet by mouth every morning on an empty stomach. 90 Tablet 3    benazepril (LOTENSIN) 40 MG tablet Take 1 Tablet by mouth every morning. 90 Tablet 3    omeprazole (PRILOSEC) 20 MG delayed-release capsule Take 20 mg by mouth every morning.       No current facility-administered medications for this visit.       Allergies:   Amoxicillin, Codeine, Doxycycline, Sulfamethoxazole-trimethoprim, Tramadol, and Trazodone    Social History     Tobacco Use    Smoking status: Never    Smokeless tobacco: Never    Tobacco comments:     none   Vaping Use    Vaping Use: Never used   Substance Use Topics    Alcohol use: No     Alcohol/week: 0.0 oz    Drug use: Yes     Comment: CBD Oil for Arthritis       ROS:  ***    Objective:     There were no vitals filed for this visit.  There is no height or weight on file to calculate BMI.    Physical Exam:  ***    Assessment and Plan:   There are no diagnoses linked to this encounter.    - Chart and discharge summary were reviewed.   - Hospitalization and results reviewed with patient.   - Medications reviewed including instructions regarding high risk medications, dosing and side effects.  - Recommended Services: {COORDINATION OF SERVICES:20405}  - Advance directive/POLST on file?  {Yes/No:20266}    Follow-up:No follow-ups on file.    Face-to-face transitional care management services with {TCM Complexity:83651} medical decision complexity were provided.     LACE+ Historical Score Over Time (0-28: Low, 29-58: Medium, 59+: High): 21      {                                                      reference text will not save to note  Coding guide    32043        - face-to-face within 14 day        - moderate decision complexity (LACE+ score of 28-58)  82615       - face-to-face within 7 days       - high medical decision complexity (LACE+ score 59+)    :54885}

## 2022-11-04 ENCOUNTER — APPOINTMENT (OUTPATIENT)
Dept: MEDICAL GROUP | Facility: MEDICAL CENTER | Age: 87
End: 2022-11-04
Payer: MEDICARE

## 2022-11-04 ENCOUNTER — TELEPHONE (OUTPATIENT)
Dept: MEDICAL GROUP | Facility: MEDICAL CENTER | Age: 87
End: 2022-11-04

## 2022-11-04 PROBLEM — S82.851A CLOSED RIGHT TRIMALLEOLAR FRACTURE, INITIAL ENCOUNTER: Status: RESOLVED | Noted: 2022-10-17 | Resolved: 2022-11-04

## 2022-11-04 PROBLEM — S82.401A TIBIA/FIBULA FRACTURE, RIGHT, CLOSED, INITIAL ENCOUNTER: Status: RESOLVED | Noted: 2022-10-12 | Resolved: 2022-11-04

## 2022-11-04 PROBLEM — Z86.2 HISTORY OF NEUTROPENIA: Status: ACTIVE | Noted: 2021-12-20

## 2022-11-04 PROBLEM — S82.201A TIBIA/FIBULA FRACTURE, RIGHT, CLOSED, INITIAL ENCOUNTER: Status: RESOLVED | Noted: 2022-10-12 | Resolved: 2022-11-04

## 2022-11-04 NOTE — TELEPHONE ENCOUNTER
Could you call the patient and see if she would mind rescheduling her appointment? She was scheduled for this morning Friday, November 4 at 11:00 but she was overbooked at this appointment spot and I was not notified about that.    We have an opening on Monday at 1020.  I will tentatively place her in that spot, please see if she does not mind changing her appointment to Monday and I apologize for the late notification but I only realized now that someone had double booked this patient without my knowledge for Friday at 11.

## 2022-11-07 ENCOUNTER — PATIENT MESSAGE (OUTPATIENT)
Dept: HEALTH INFORMATION MANAGEMENT | Facility: OTHER | Age: 87
End: 2022-11-07

## 2022-11-07 ENCOUNTER — OFFICE VISIT (OUTPATIENT)
Dept: MEDICAL GROUP | Facility: MEDICAL CENTER | Age: 87
End: 2022-11-07
Payer: MEDICARE

## 2022-11-07 VITALS
TEMPERATURE: 97.2 F | DIASTOLIC BLOOD PRESSURE: 78 MMHG | SYSTOLIC BLOOD PRESSURE: 162 MMHG | HEART RATE: 66 BPM | BODY MASS INDEX: 23.9 KG/M2 | HEIGHT: 64 IN | WEIGHT: 140 LBS | OXYGEN SATURATION: 97 %

## 2022-11-07 DIAGNOSIS — Z87.440 HISTORY OF UTI: ICD-10-CM

## 2022-11-07 DIAGNOSIS — S82.891S CLOSED FRACTURE OF RIGHT ANKLE, SEQUELA: ICD-10-CM

## 2022-11-07 DIAGNOSIS — D64.9 ANEMIA, UNSPECIFIED TYPE: ICD-10-CM

## 2022-11-07 DIAGNOSIS — Z23 NEEDS FLU SHOT: ICD-10-CM

## 2022-11-07 DIAGNOSIS — R26.9 GAIT DISORDER: ICD-10-CM

## 2022-11-07 DIAGNOSIS — M81.0 SENILE OSTEOPOROSIS: ICD-10-CM

## 2022-11-07 PROCEDURE — 90662 IIV NO PRSV INCREASED AG IM: CPT | Performed by: FAMILY MEDICINE

## 2022-11-07 PROCEDURE — G0008 ADMIN INFLUENZA VIRUS VAC: HCPCS | Performed by: FAMILY MEDICINE

## 2022-11-07 PROCEDURE — 99495 TRANSJ CARE MGMT MOD F2F 14D: CPT | Performed by: INTERNAL MEDICINE

## 2022-11-07 RX ORDER — CEFDINIR 300 MG/1
CAPSULE ORAL
COMMUNITY
End: 2022-11-07

## 2022-11-07 ASSESSMENT — FIBROSIS 4 INDEX: FIB4 SCORE: 1.23

## 2022-11-07 NOTE — PROGRESS NOTES
Subjective     Yvonne Marie Wada is a 86 y.o. female who presents with hospital follow up       Subjective:     Yvonne Marie Wada is a 86 y.o. female who presents for Hospital Follow-up  Ankle fracture    Patient is here with her  status post hospitalization for ankle fracture, status post ORIF right ankle fracture with fixation medial lateral malleolar by Dr. Huertas orthopedics October 14, went to rehabilitation Hospital from October 17 October 29, discharged home.  Has home health set up through [facility], with home health nursing.  Medications, allergies, medical history, surgical history, social history, family history  reviewed and updated  Also reviewed the rehabilitation hospital recommendations.  Discharged with walking boot, front wheel walker, she has a wheelchair at home.   is at home to support her.  She uses her walking boot at all times.  Has had follow-up with orthopedics, no current foot pain.  Does have some swelling at the end of the day.  Has not used narcotics since she has been home.  She is not on any regular NSAIDs.  No subsequent falls.  Does need help with ADLs including bathing, toileting, transfers from the bed.  Appetite is fine, no nausea, vomiting, no significant depression or anxiety.  No memory issues.  Hospital records reviewed  10/12/22 hospital admission, 10/17/22 hospital discharge, patient admitted for falling trying to get out of bed, x-ray showed right distal fibular and medial malleolar fracture, seen by orthopedics, status post right ankle ORIF, transferred to inpatient rehabilitation hospital  10/12/22 xray right ankle distal fibular and medial malleolar fracture  10/12/22 xray right tibia and fibula, right distal fibular fracture at metadiaphysis, probable medial malleolar fracture  10/12/22 CT ankle without plus reconstruction, comminuted trimalleolar fracture right ankle  10/14/22  orthopedic operative note ORIF right trimalleolar ankle fracture with  fixation medial lateral malleoli  10/17/22 rehabilitation hospital admission, 10/29/22 rehabilitation hospital discharge, requires max assist with ADLs, support from spouse and acceptable post discharge living situation, functional status at discharge dentures for eating, independent grooming, bathing standby assist with hand-held shower, grab bar, bench, recommend front wheel walker, distance walked 2 to 6 feet, distance propelled wheelchair 180 feet, discharged on gabapentin 100 mg bid, oxycodone 7.5 mg   10/28/22 physical therapy discharge note, able to propel wheelchair 100 feet, able to ambulate 10 feet with front wheel walker    Laboratory test reviewed  8/14/22 hgb 11,hct 35  10/12/22 hgb 11,hct 35,iron 33,%sat 11,ferritin 61 hospital admit ankle fracture   10/21/22 hgb 7.9,hct 25.2 transfused RBC s/p ankle orif  10/25/22 hgb 9.2,hct 29,mcv 90  10/18/22 tsh 1.6 on synthroid 75 mcg  10/12/22 bun 26,creat 1.19,GFR 45 hospitalized ankle fracture  10/25/22 bun 14,creat 1.02,GFR 54 rehab hospital discharge    UTI diagnosed at rehab, she completed course of antibiotics  10/21/22 UTI e.coli resistant to ampicillin,ciprofloxacin,levaquin, sensitive to cefuroxime,bactrim,macrodantin      POST DISCHARGE CALL:  Discharge Date:10/29/2022   Date of Outreach Call: 10/31/2022  8:59 AM  Now that you're home, how are you doing? Fair  Did you receive any new prescriptions? Yes but she is not taking pain medications  Were you able to fill those medications?  She does have her medications  How did you fill those prescriptions?   picks up prescriptions for her  If not able to fill prescriptions, why?  Not applicable  Do you have questions about your medications? No  Do you have a follow-up appointment scheduled?Yes  Any issues or paperwork you wish to discuss with your PCP?  No paperwork  Does patient qualify for CCM Program? Yes  If patient qualifies, was CCM Program Introduced? Yes  If patient does not qualify, comment?   No comments  Number of Attempts: 1  Current or previous attempts completed within two business days of discharge? Yes  Provided education regarding treatment plan, medication, self-management, ADLs? Yes  Has patient completed Advance Directive? If yes, advise them to bring to appointment. No  Care Manager phone number provided? Yes  Is there anything else I can help you with? No  Chief Complaint? palpitations, afib, s/p R tib/fib ORIF, R ankle ORIF, and s/p closed R trimalleolar fx  Admitting Dx? EMS  Discharge Diagnosis? s/p R tib/fib ORIF, R ankle ORIF, and s/p closed R trimalleolar fx, paroxysmal afib  Additional Comments? no            Current medicines (including reconciliation performed today)  Current Outpatient Medications   Medication Sig Dispense Refill    ELIQUIS 5 MG Tab TAKE 1 TABLET BY MOUTH TWICE A DAY (Patient taking differently: Take 5 mg by mouth 2 times a day.) 180 Tablet 3    benazepril (LOTENSIN) 40 MG tablet TAKE 1 TABLET BY MOUTH EVERY DAY IN THE MORNING 90 Tablet 1    metoprolol SR (TOPROL XL) 50 MG TABLET SR 24 HR Take 1.5 Tablets by mouth every morning. 90 Tablet 3    senna-docusate (PERICOLACE OR SENOKOT S) 8.6-50 MG Tab Take 2 Tablets by mouth 2 times a day.      calcium/vitamin D 250-125 MG-UNIT Tab tablet Take 1 Tablet by mouth every day. 60 Tablet     gabapentin (NEURONTIN) 100 MG Cap Take 100 mg by mouth 3 times a day.      ezetimibe (ZETIA) 10 MG Tab TAKE 1 TABLET BY MOUTH EVERY DAY 90 Tablet 3    levothyroxine (SYNTHROID) 75 MCG Tab Take 1 Tablet by mouth every morning on an empty stomach. 90 Tablet 3    omeprazole (PRILOSEC) 20 MG delayed-release capsule Take 20 mg by mouth every morning.       No current facility-administered medications for this visit.       Allergies:   Amoxicillin, Codeine, Doxycycline, Sulfamethoxazole-trimethoprim, Tramadol, and Trazodone    Social History     Tobacco Use    Smoking status: Never    Smokeless tobacco: Never    Tobacco comments:     none  "  Vaping Use    Vaping Use: Never used   Substance Use Topics    Alcohol use: No     Alcohol/week: 0.0 oz    Drug use: Yes     Comment: CBD Oil for Arthritis     Patient Active Problem List   Diagnosis    S/p lumbar surgery    Hypertension    Dyslipidemia    S/P TOMY (total abdominal hysterectomy)    Preventative health care    S/P knee bilateral arthroplasty    S/P appendectomy    Osteoporosis, idiopathic    Hypothyroid    History of shingles    GERD (gastroesophageal reflux disease)    Tricuspid regurgitation    Neck arthritis    S/P thoracic aortic aneurysm repair    Decreased GFR    History of insomnia    History of shoulder pain    History of transient ischemic attack (TIA)    S/p hip right arthroplasty    Paroxysmal atrial fibrillation (HCC)    On continuous oral anticoagulation    Gait disorder    History of pelvic fracture    Urinary retention    Anemia    History of neutropenia    Senile osteoporosis    S/p ankle right orif         Objective:     Vitals:    11/07/22 1018   BP: (!) 162/78   BP Location: Right arm   Patient Position: Sitting   BP Cuff Size: Adult   Pulse: 66   Temp: 36.2 °C (97.2 °F)   SpO2: 97%   Weight: 63.5 kg (140 lb)   Height: 1.626 m (5' 4\")     Body mass index is 24.03 kg/m².            Current Outpatient Medications   Medication Sig Dispense Refill    ELIQUIS 5 MG Tab TAKE 1 TABLET BY MOUTH TWICE A DAY (Patient taking differently: Take 5 mg by mouth 2 times a day.) 180 Tablet 3    cefdinir (OMNICEF) 300 MG Cap cefdinir 300 mg capsule   TAKE 1 CAPSULE BY MOUTH TWICE A DAY      benazepril (LOTENSIN) 40 MG tablet TAKE 1 TABLET BY MOUTH EVERY DAY IN THE MORNING 90 Tablet 1    metoprolol SR (TOPROL XL) 50 MG TABLET SR 24 HR Take 1.5 Tablets by mouth every morning. 90 Tablet 3    oxyCODONE immediate-release (ROXICODONE) 5 MG Tab Take 0.5-1 Tablets by mouth every 12 hours as needed for Severe Pain for up to 14 days. 28 Tablet 0    senna-docusate (PERICOLACE OR SENOKOT S) 8.6-50 MG Tab Take 2 " Tablets by mouth 2 times a day.      calcium/vitamin D 250-125 MG-UNIT Tab tablet Take 1 Tablet by mouth every day. 60 Tablet     gabapentin (NEURONTIN) 100 MG Cap Take 100 mg by mouth 3 times a day.      ezetimibe (ZETIA) 10 MG Tab TAKE 1 TABLET BY MOUTH EVERY DAY 90 Tablet 3    levothyroxine (SYNTHROID) 75 MCG Tab Take 1 Tablet by mouth every morning on an empty stomach. 90 Tablet 3    omeprazole (PRILOSEC) 20 MG delayed-release capsule Take 20 mg by mouth every morning.       No current facility-administered medications for this visit.       anemia  9/28/20 hgb 12.6,hct 38  6/22/21 hgb 9,hct 29,mcv 86  9/15/21 hgb 8.0,hct 26,mcv 76  9/15/21 FIT negative  10/14/21 hgb 9.8,hct 33,mcv 85,iron 23,%sat 6,ferritin 70,b12 716,retic 2.6%,SPEP negative  11/4/21 hgb 11.1,hct 35,mcv 84  12/20/21 off iron  1/15/22 hgb 11,hct 35,mcv 86,iron 45,%sat 12,ferritin 32,b12 839 off iron (%sat improved)  5/10/22 hgb 10.4,hct 33.5,mcv 87  8/2/22 hgb 11.4,hct 36.4,mcv 88,iron 73,%sat 19,ferritin 29,b12 777,folate 39,flow cytometry negative,retic 1.8%, zinc 108,copper 99,SPEP negative  8/14/22 hgb11,hct 35  10/12/22 hgb 11,hct 35,iron 33,%sat 11,ferritin 61 hospital admit ankle fracture   10/21/22 hgb 7.9,hct 25.2 transfused RBC s/p ankle orif  10/25/22 hgb 9.2,hct 29,mcv 90     Decreased GFR  5/31/09 bun 18,creat 1.2  1/14/10 bun 28,creat 1.3,GFR 40  9/20/12 bun 37,creat 1.1,GFR 48  9/25/13 bun 25,creat 1.2,GFR 44  9/26/14 bun 26,creat 1.1,GFR 45  2/23/15 bun 20,creat 1.1,GFR 48  4/15/16 bun 31,creat 1.1,GFR 45  7/6/16 bun 29,creat 1.1,GFR 44   5/12/17 bun 35,creat 1.1,GFR 44  11/2/17 bun 22,creat 1.28,GFR 40  12/9/17 bun 29,creat 1.1,GFR 43  10/4/18 bun 31,creat 1.5 at [facility]  1/18/19 bun 33,creat 1.34,GFR 38,PTH 53,calcium 9.7,phos 3.6,spep negative  2/4/20 bun 30,creat 1.24, GFR 41, PTH 58, calcium 9.7, phos 3.9  8/20/20 bun 35,creat 1.12,GFR 46  9/28/20 bun 31,creat 1.1,GFR 47  6/8/21 bun 38,creat 1.46,GFR 34  6/27/21 Na  125,bun 64,creat 1.52 (hospital admission acute sacral fracture)  9/15/21 bun 25,creat 0.84,GFR>60  10/14/21 bun 15,creat 0.75,GFR>60,PTH 31calcium 9.4,phos 3.3,SPEP negative  5/10/22 bun 23,creat 1.18,GFR 45  8/14/22 bun 27,creat 1.06,GFR 51  10/15/22 bun 35,creat 1.27,GFR 41 s/p ankle orif  10/12/22 bun 26,creat 1.19,GFR 45 hospitalized ankle fracture  10/25/22 bun 14,creat 1.02,GFR 54 rehab hospital discharge     Dyslipidemia  4/11 chol 159,trig 84,hdl 47,ldl 95,cpk 114  7/12 chol 163,trig 120,hdl 49,ldl 90, crp 1.1 on lipitor 20 mg  3/26/13 chol 159,trig 99,hdl 46,ldl 93 on lipitor 20 mg  9/25/13 chol 164,trig 100,hdl 47,ldl 97 on lipitor 20 mg  9/12/14 cardiology note lower extremity weakness, hold lipitor x3 months, labs ordered  12/15/14 cardiology start low dose lipitor 10 mg  1/22/15 off lipitor will resume 10 mg and repeat labs 4 weeks  2/24/15 chol 179,trig 111,hdl 54,ldl 103 on lipitor 10 mg  2/1/16 cardiology note restart lipitor 20 mg    4/15/16 chol 163,trig 102,hdl 48,ldl 95 on lipitor 20 mg  5/12/17 chol 186,trig 101,hdl 47,ldl 119 on lipitor 20 mg intermittently will start taking daily  11/1/17 chol 146,trig 73,hdl 42,ldl 89 on lipitor 20 mg  12/9/17 chol 133,trig 74,hdl 49,ldl 69 on lipitor 80 mg higher dose due to recent transient ischemic attack  10/4/18 chol 126,trig 98,hdl 44,ldl 62 on lipitor 80 mg at [facility]  1/18/19 chol 161,trig 103,hdl 45,ldl 95 on lipitor 80 mg  2/4/20 chol 121,trig 71,hdl 45,ldl 62 on lipitor 80 mg  8/17/20 stop lipitor due to muscle pain   9/17/20 improved off lipitor repeat lipid pending  10/3/20 chol 240,trig 111,hdl 60,ldl 158, recommend start crestor 10 mg and repeat labs 6 weeks  10/26/20 chol 158,trig 106,hdl 62,ldl 75 on crestor 20 mg  4/20/21  off statin, she agrees to try zetia  1/15/22 chol 205,trig 80,hdl 56,ldl 133 did not start zetia, will start zetia 10 mg  Lipitor,crestor muscle pain      gait disorder  4/22/21 custom PT evaluation    3/1/22 custom PT evaluation      Gastroesophageal reflux  6/27/07 EGD per [facility] hiatal hernia, start prevacid 30 mg  7/12 protonix 20 mg qday  11/16/12 [facility] note cont prilosec  1/5/16 change to protonix 40 mg from prilosec  2/4/20 vit d 67  10/14/21 vit d 78,b12 716 on prilosec  1/15/22 vit d 81,b12 839 on prilosec  5/10/22 magnesium 2.0, vit d 72     history insomnia   1/30/17 trial of trazodone 50 mg  4/4/17 side effects with trazodone drowsiness, discontinued    History neutropenia  4/16/11 wbc 3.9  7/30/13 wbc 5.6  11/7/16 wbc 5.7  5/11/17 wbc 4.7  2/20/18 wbc 7.2  3/6/18 wbc 5.9  1/16/19 wbc 4.0  6/8/19 wbc 4.3 (44%N, 39%L)  2/4/20 wbc 4.1 (42%N,41%L)  8/20/20 wbc 4.6  11/4/21 wbc 4.0  1/15/22 wbc 3.9 (43%N,36%L)  5/10/22 wbc 4.0  8/2/22 wbc 4.3,b12 777,flow cytometry negative,SPEP negative  8/14/22 wbc 4.9  10/18/22 wbc 5.6     History pelvic fracture  6/22/21 CT pelvis without post reconstruction, comminuted mildly displaced fracture of right inferior and superior pubic rami, right sacral ritu fracture, right hip prosthesis normally located, moderate degenerative change left hip  6/25/21 hospital admission, 6/28/21 hospital discharge, admitted with pelvic fracture, seen by orthopedics no surgical intervention necessary, seen by physical therapy and occupational therapy recommended skilled nursing placement, urinary retention discharged with davenport catheter  6/25/21 CT pelvis without, stable appearance of minimally displaced fractures right sacral ritu, right pubic ramus posteriorly, and right ischial ramus, no acute pelvic fracture, no hip dislocation, right hip total arthroplasty in place  6/27/21 physical therapy hospital note recommend postacute placement for additional physical therapy services  10/7/21 on neurontin 100 mg tid, increase to 200 mg tid     history of pulmonary hypertension  11/17/18 cardiology note asymptomatic, continue cholesterol medication, continue to monitor echo aortic valve, repaired  ascending aorta, follow-up yearly  11/21/18 echo normal LV function, EF 60%, mild LVH, grade 2 diastolic dysfunction, mild aortic insufficiency, moderate tricuspid regurgitation, RVSP 50  12/6/19 echo normal LV function EF 65%, RVSP 43, moderate tricuspid regurgitation, mild to moderate aortic insufficiency  10/22/20 echo EF 70%, grade 1 diastolic dysfunction, mild AR, RVSP 30     History shingles     History shoulder pain  4/4/17 right shoulder pain right shoulder x-ray ordered, right knee pain, referral custom PT, tried celebrex no benefit, will try naproxen 500 mg twice daily and zanaflex at night  4/5/17 x-ray right shoulder calcifications consistent with calcific tendinitis or hydroxyapatite deposition  5/12/17 MRI right shoulder ordered, persistent pain despite physical therapy  5/18/17 MRI right shoulder; full-thickness supraspinatus tendon tear  5/22/17 right shoulder injection, has been going to physical therapy 14 treatments some improvement, right shoulder injection provided, if no improvement in has appt  in october, if need sooner appt try   6/9/17 custom PT note   7/10/17 custom PT note       History TIA  11/1/17 hospital admission, 11/2/17 hospital discharge, facial numbness, transient numbness in both hands, resolved, CT, MRI head no acute infarct, blood pressure stable, discharged home, patient states she was on aspirin at the time of admission  11/1/17 CT head stable right mid brain likely chronic lacunar infarction, periventricular white matter disease  11/1/17 MRI brain no acute abnormality, moderate chronic microvascular stomach disease, chronic microhemorrhages left frontal and right parietal lobes, chronic lacunar infarct left basal ganglia and blanco, or cerebral atrophy  11/1/17 MR-MRA head without; negative  11/2/17 echo normal LV size and function, EF 70%, RVSP 50, mild AR and TR  11/28/17 awaiting possible right hip replacement per orthopedics , given recent  possible TIA, cannot provide clearance, she will like second opinion from cardiology referral made to dr.bryan de leon, changed asa to plavix  12/12/17 ultrasound carotid less than 50% internal carotid stenosis bilateral  12/20/17 dr.bryan de leon cardiology note most recent echocardiogram looks fine, will repeat CT scan follow aortic repair  2/20/18 episode of supraventricular tachycardia in the emergency room, davenport catheter removed, symptoms resolved with repeat EKG sinus rhythm  11/17/18 cardiology note asymptomatic, continue cholesterol medication, continue to monitor echo aortic valve, repaired ascending aorta, follow-up yearly  11/21/18 echo normal LV function, EF 60%, mild LVH, grade 2 diastolic dysfunction, mild aortic insufficiency, moderate tricuspid regurgitation, RVSP 50  12/6/19 echo normal LV function EF 65%, RVSP 43, moderate tricuspid regurgitation, mild to moderate aortic insufficiency  10/22/20 echo EF 70%, grade 1 diastolic dysfunction, mild AR, RVSP 30   12/14/20 cardiology note maintaining sinus rhythm, given frequent symptomatic episodes of paroxysmal atrial fibrillation and age, recommend amiodarone 200 mg, follow-up 6 month  4/20/21  sinus on exam, patient would like to discontinue amiodarone understanding she likely will return to intermittent atrial fibrillation, discussed sotalol, dronedarone, tikosyn as alternatives, patient declines any of those alternatives, ablation is not appropriate given her age   1/10/22 cardiology note sinus rhythm, atrial fibrillation episodes are not significantly symptomatic, recommend she check her heart rate when atrial fibrillation episodes occurs, repeat follow-up echo ordered, follow-up 6 months  3/22/22 echo EF 55%, diastolic dysfunction grade II, mild MR and TR, RVSP 28     Hypertension  1/10/11 [facility] note cardiac catheterization normal systolic function  3/11 [facility] echo moderate aortic insufficiency, moderate mitral insufficiency, moderate  tricuspid insufficiency, normal LV function  5/12 echo [facility] normal LV function EF 60%, moderate to severe left atrial enlargement, RVSP 32    9/26/13 CT thoracic aorta no evidence of aneurysm, small hiatal hernia  9/26/13 Persantine thallium uniform breast tissue attenuation, cannot rule out underlying ischemic, LVEF 67%  10/3/13 on toprol-XL 25 mg half a tablet daily    12/13/13 cardiology note cont same meds, repeat echo and follow up 6 months  1/2/14 echo mild LVH, grade 1 diastolic dysfunction, ascending aortic 4.0, no change compared with ZAK 2010  5/15/14 cardiology note; increase benazepril to 40 mg qday,continue toprol XL 25 mg daily  6/17/15 cardiology note continue meds, repeat echo 6 months  2/1/16 cardiology note moderate pulmonary hypertension and snoring, nocturnal pulse oximetry ordered  6/30/16 cardiology note patient declines overnight pulse oximetry  11/1/17 echo normal LV size and function, EF 70%, mild aortic insufficiency and mild tricuspid regurgitation, RVSP 50, aortic root 3.2 cm  11/3/17 cardiology note hypertension stable, status post thoracic aortic aneurysm repair, headache unspecified, ESR ordered  11/28/17 on benazepril 40 mg,toprol 25mg, add norvasc 5 mg  12/12/17 ultrasound carotid less than 50% internal carotid stenosis bilateral  12/20/17  [facility] cardiology note most recent echocardiogram looks fine, will repeat CT scan follow aortic repair  2/20/18 episode of supraventricular tachycardia in the emergency room, davenport catheter removed, symptoms resolved with repeat EKG sinus rhythm  11/17/18 cardiology note asymptomatic, continue cholesterol medication, continue to monitor echo aortic valve, repaired ascending aorta, follow-up yearly  11/21/18 echo normal LV function, EF 60%, mild LVH, grade 2 diastolic dysfunction, mild aortic insufficiency, moderate tricuspid regurgitation, RVSP 50  1/18/19 urine mac 45 on benazepril 40 mg, toprol 25 mg, norvasc 5 mg  12/6/19 echo normal  LV function EF 65%, RVSP 43, moderate tricuspid regurgitation, mild to moderate aortic insufficiency  10/22/20 echo EF 70%, grade 1 diastolic dysfunction, mild AR, RVSP 30   12/14/20 cardiology note maintaining sinus rhythm, given frequent symptomatic episodes of paroxysmal atrial fibrillation and age, recommend amiodarone 200 mg, follow-up 6 month  4/6/21 on lotensin 40 mg and metoprolol 25 mg  4/20/21  sinus on exam, patient would like to discontinue amiodarone understanding she likely will return to intermittent atrial fibrillation, discussed sotalol, dronedarone, tikosyn as alternatives, patient declines any of those alternatives, ablation is not appropriate given her age   1/10/22 cardiology note sinus rhythm, atrial fibrillation episodes are not significantly symptomatic, recommend she check her heart rate when atrial fibrillation episodes occurs, repeat follow-up echo ordered, follow-up 6 months  3/22/22 echo EF 55%, diastolic dysfunction grade II, mild MR and TR, RVSP 28  4/18/22 on lotensin 40 mg and metoprolol 25 mg  5/24/22  cardiology note paroxysmal atrial fibrillation, sinus today, on amiodarone, blood pressures not controlled, increase toprol from 25 to 50 mg, suggest CTA aorta, follow-up 6 months      Hypothyroid  4/11 tsh 9 start synthroid 50  4/11 tsh 6,free t3 3.1,free t4 1.2,tpo negative  7/1/11 tsh 2.1 cont synthroid 50 mcg  7/12 tsh 3.4 cont synthroid 50 mcg  7/31/13 tsh 3.2 on synthroid 50 mcg  9/25/13 tsh 1.7 on synthroid 50 mcg  2/24/15 tsh 4.9 on synthroid 50 mcg; increase to synthroid 75 mcg, repeat tsh in 6 weeks  4/14/15 tsh 1.1 on synthroid 75 mcg  4/15/16 tsh 2.3 on synthroid 75 mcg  5/12/17 tsh 1.2 on synthroid 75 mcg  11/1/17 tsh 2.1 on synthroid 75 mcg  1/18/19 tsh 2.2 on synthroid 75 mcg  2/4/20 tsh 3.1 on synthroid 75 mcg  8/20/20 tsh 1.6 on synthroid 75 mcg  10/14/21 tsh 1.5 on synthroid 75 mcg  1/15/22 tsh 3.4 on synthroid 75 mcg  10/18/22 tsh 1.6 on  synthroid 75 mcg     neck arthritis  2/10/14 [facility] note; x-ray cervical spine DJD, right shoulder steroid injection  8/14/22 CT cervical spine several multilevel degenerative changes, no acute fracture     Osteoporosis  Had been on fosamax [id number]    8/10 dexa LS -2.0,hip -1.5 await old dexa result, she will get copy for me  4/11 vit d 35  9/12 dexa LS -3.2,hip -1.4  2/13/13 reclast IV  7/31/13 vit d 33 cont 2000 units  2/18/14 reclast IV  2/2/15 dexa LS -3.1,hip -1.9; FRAX 35.5 % major,12.6% hip  2/18/15 reclast IV  2/24/15 vit d 33  4/15/16 vit d 36  5/12/17 vit d 36  8/7/18 dexa left forearm -4.2,hip -2.5 resume reclast   8/31/18 reclast IV  1/18/19 vit d 27 increase from 2000 to 4000 units  2/4/20 vit d 67, PTH 58  6/25/21 vit d 75  10/14/21 vit d 78  1/15/22 vit d 81  5/11/22 vit d 72  8/14/22 CT thoracic spine moderate multilevel degenerative changes, accentuated kyphosis, mild/moderate compression deformities mild height loss T3 chronic, moderate loss T7 and T8 chronic, mild height loss T11 chronic     Paroxysmal atrial fibrillation  11/1/17 hospital admission, 11/2/17 hospital discharge, facial numbness, transient numbness in both hands, resolved, CT, MRI head no acute infarct, blood pressure stable, discharged home, patient states she was on aspirin at the time of admission  11/1/17 CT head stable right mid brain likely chronic lacunar infarction, periventricular white matter disease  11/1/17 MRI brain no acute abnormality, moderate chronic microvascular stomach disease, chronic microhemorrhages left frontal and right parietal lobes, chronic lacunar infarct left basal ganglia and blanco, or cerebral atrophy  11/1/17 MR-MRA head without; negative  2/20/17 episode of supraventricular tachycardia in the emergency room, davenport catheter removed, symptoms resolved with repeat EKG sinus rhythm  12/20/17  [facility] cardiology note most recent echocardiogram looks fine, will repeat CT scan follow aortic  repair  11/17/18 cardiology note asymptomatic, continue cholesterol medication, continue to monitor echo aortic valve, repaired ascending aorta, follow-up yearly  11/21/18 echo normal LV function, EF 60%, mild LVH, grade 2 diastolic dysfunction, mild aortic insufficiency, moderate tricuspid regurgitation, RVSP 50  12/6/19 echo normal LV function EF 65%, RVSP 43, moderate tricuspid regurgitation, mild to moderate aortic insufficiency  9/28/20 emergency room note EKG atrial fibrillation new onset  9/30/20 cardiology note sinus rhythm, start eliquis 5 mg bid and increase toprol to 25 mg daily, echo ordered, follow up 3 months   10/22/20 echo EF 70%, grade 1 diastolic dysfunction, mild AR, RVSP 30   12/14/20 cardiology note maintaining sinus rhythm, given frequent symptomatic episodes of paroxysmal atrial fibrillation and age, recommend amiodarone 200 mg, follow-up 6 month  4/20/21  cardiology note sinus on exam, patient would like to discontinue amiodarone understanding she likely will return to intermittent atrial fibrillation, discussed sotalol, dronedarone, tikosyn as alternatives, patient declines any of those alternatives, ablation is not appropriate given her age  12/20/21 on metoprolol and eliquis  1/10/22 cardiology note sinus rhythm, atrial fibrillation episodes are not significantly symptomatic, recommend she check her heart rate when atrial fibrillation episodes occurs, repeat follow-up echo ordered, follow-up 6 months   3/22/22 echo EF 55%, diastolic dysfunction grade II, mild MR and TR, RVSP 28  5/24/22  cardiology note paroxysmal atrial fibrillation, sinus today, on amiodarone, blood pressures not controlled, increase toprol from 25 to 50 mg, suggest CTA aorta, follow-up 6 months  10/21/22 hospital EKG a.fibrillation     Preventative health  6/27/09 colon per [facility] no records  10/19/04 pneumovax   9/9/11 zoster vaccine  4/14/15 prevnar  8/7/18 dexa left forearm -4.2,hip  -2.5  9/28/19 pneumovax  11/7/19 mammogram  11/12/19 shingrix second  9/17/20 tdap   4/19/22 covid pfizer 4th  5/11/22 vit d 72    s/p ankle right orif  10/12/22 ER note patient fell getting out of bed  10/12/22 x-ray right ankle distal fibular and medial malleolar fracture  10/12/22 x-ray right tibia and fibula, right distal fibular fracture at metadiaphysis, probable medial malleolar fracture  10/12/22 CT right ankle without plus reconstruction, recent comminuted fracture distal right fibula including lateral malleolus, 2 mm lateral offset distal fragments with slight apex medial angulation, recent comminuted fracture posterior malleolus with extension into the lateral and anterior margins of the tibial plafond and comminuted fracture medial malleolus up to 3 mm separation and offset  10/14/22  orthopedic operative note ORIF right trimalleolar ankle fracture with fixation of the medial and lateral malleoli, internal fixation right syndesmosis  10/17/22 rehabilitation hospital admission, 10/29/22 rehabilitation hospital discharge, requires max assist with ADLs, support from spouse and acceptable post discharge living situation, functional status at discharge dentures for eating, independent grooming, bathing standby assist with hand-held shower, grab bar, bench, recommend front wheel walker, distance walked 2 to 6 feet, distance propelled wheelchair 180 feet, discharged on gabapentin 100 mg bid, oxycodone 7.5 mg      s/p hip arthroplasty  9/21/17 MRI right hip mild trochanteric bursitis, mild femoral acetabular joint arthritis  10/12/17  orthopedic no proceed with right total hip arthroplasty, x-ray AP pelvis and right hip shows early arthrosis with calcifications and DJD  2/14/18  orthopedics operative note at [facility] right hip total arthroplasty, gluteus medius and minimus tendon repair  3/27/18 [place] [facility] note   5/8/18 [facility] [facility] note xray right hip stable total hip  arthroplasty, continue physical therapy  4/3/19 [facility] discharge  4/5/21  [facility] pain note right trochanteric bursitis steroid injection, trigger point injection low back  4/22/21 [facility] evaluation   6/21/21 [facility] discharge note   6/25/21 CT pelvis without, stable appearance of minimally displaced fractures right sacral ritu, right pubic ramus posteriorly, and right ischial ramus, no acute pelvic fracture, no hip dislocation, right hip total arthroplasty in place     s/p knee arthroplasty  4/10 MRI right knee complex tear medial meniscus, horizontal cleavage tear lateral meniscus, chondromalacia patella, moderate-sized baker's cyst  5/10 right meniscus knee repair   5/10 told by  had gout on surgery had pathology report, I await that report, question gout seen on pathology report done during repair of right meniscus knee surgery.  7/10 uric acid 6.3, await starting allopurinol depending on the operative report  8/5/10 reviewed orthopedics notes , operative report indicates crystal deposition, could be gout or pseudogout, no biopsy results, suspect chondrocalcinosis  10/11 dr.parlasca najera note, MRI pending  8/7/10 reviewed pathology report right knee tissue, marked degenerative changes with focal calcification, no mention of gout. Based on this and a normal uric acid level, I do not believe she has gout, has no hyperuricemia medications would be indicated  10/11 dr.parlasca najera left knee meniscectomy and arthroscopy  1/12 dr.parlasca najera left knee arthroplasty  2/29/16  orthopedics x-ray right knee advanced DJD on the right knee corticosteroid injection performed, prescription voltaren gel  6/13/16  orthopedic note x-rays demonstrate right knee advanced DJD and resurfaced patella, offer right knee total replacement followed by patellar resurfacing after she recovers from her right knee  9/8/16  orthopedic's operative note right knee total  arthroplasty  11/2/16  orthopedic note, follow-up right knee, x-ray right knee shows subluxation patella, traumatic evulsion VMO needs reexploration and repair  11/10/16  orthopedic's operative note VMO quadriceps avulsion repair status post total knee replacement  11/23/16  orthopedic no follow-up quadriceps repair, continue crutches in full extension, follow-up one month and then start passive range of motion 0-40°  2/4/17  orthopedic note, continue physical therapy, follow-up this summer  4/17/17 [facility] note  6/9/17 [facility] note     s/p lumbar surgery  6/03 L4-5 epidural     7/06  spinal surgery operative note decompression, foraminotomy. L4-L5 posterior fusion, L4-L5 allograft    3/5/14 MRI lumbar spine grade 2 spondylolisthesis L4-L5 with previous laminectomy and posterior fusion, left sided pedicle screw fixation L4-L5, moderate central canal stenosis L5-S1 secondary to facet arthropathy, mild to moderate multilevel neural foraminal narrowing  12/8/14  pain [facility] note; right lower extremity radiculitis L4-L5 lesion, myofascial lumbosacral pain, trochanteric bursitis, followup as needed  4/2/15  pain procedure note right L4-L5 and right L5-S1 SSNRB under fluoroscopy  4/27/15  pain note; right trochanteric bursal injection, trigger point injections performed, also set up SI joint injections  8/3/15 [facility] [place] neurosurgery note, lumbar x-ray flexion and extension, MRI lumbar spine without contrast, followup   8/9/15 MRI lumbar spine, previous L4-L5 left  fusion and laminectomy, L3-L4 moderate bilateral facet arthropathy, mild disc bulge, L4-L5 severe bilateral facet arthropathy, moderate to severe bilateral neural foraminal stenosis, L5-S1 moderate facet arthropathy  8/28/15  neurosurgery note previous posterior fusion L4-L5, does have L3-L4 disc bulge with central canal stenosis, recommend L3-L5 decompression  8/31/15   pain note; right lower extremity radiculitis, myofascial lumbosacral pain, start hydrocodone 5 mg, continued SI joint exercises, perform trochanteric bursal injection right hip, trigger points lumbar region  10/28/15  neurosurgery note, will schedule for posterior L3-L4 laminectomy and fusion with redo L4-5 laminectomy and exploration of fusion, surgical clearance per cardiology   11/24/15  neurosurgery operative note expiration removal L4-L5 hardware on the left, bilateral facetectomies L4, L3-L4 transforaminal lumbar interbody fusion  12/9/15 [facility] note s/p L4-S1 fusion on 11/24/15 , follow up in 4 weeks  12/23/15  neurosurgery note, clear to restart physical therapy if she would like after one month, follow-up 3 months  6/9/20 dr.arraiz NELSON pain note trigger point injections trochanteric bursa right side  8/25/20 dr.arraiz NELSON pain procedure note epidural right L4 nerve root   4/5/21 dr.arraiz NELSON pain note right trochanteric bursitis steroid injection, trigger point injection low back  4/22/21 [facility] evaluation   6/21/21 [facility] discharge note   6/27/22 on neurontin 300 mg two tid will taper off     s/p TOMY  Sees gyn on HRT estradiol for 20 yrs ()     s/p thoracic aneurysm repair  6/29/06 CT-CTA chest with and without postprocessing; stable ascending thoracic aortic aneurysm maximum diameter 4.8, just distal to the aortic valve maximum diameter 4.3  4/9/07 CT-CTA chest with and without processing; stable ascending aortic thoracic aneurysm 4.5 x 4.6 cm  6/8/09 CT chest with; stable 4.7 ascending aorta aneurysm  10/15/09 CT chest without; dilated ascending thoracic aorta 4.9 cm aneurysm increased from 4.7  1/11/10  cardiovascular surgery operative note ascending thoracic aorta aneurysm repair  9/26/13 CT thoracic aorta evaluation; status post repair ascending thoracic aortic aneurysm without evidence of dissection or leak  1/2/14  echo mild LVH, grade 1 diastolic dysfunction, ascending aortic 4.0, no change compared with ZAK 2010  5/15/14 cardiology note  6/17/15 cardiology note cont meds,repeat echo 6 months  2/1/16 cardiology note moderate pulmonary hypertension and snoring, nocturnal pulse oximetry ordered  11/1/17 echo normal LV size and function, EF 70%, mild aortic insufficiency and mild tricuspid regurgitation, RVSP 50, aortic root 3.2 cm  11/3/17 cardiology note stable  12/20/17  [facility] cardiology note most recent echocardiogram looks fine, will repeat CT scan follow aortic repair  11/17/18 cardiology note asymptomatic, continue cholesterol medication, continue to monitor echo aortic valve, repaired ascending aorta, follow-up yearly  11/21/18 echo normal LV function, EF 60%, mild LVH, grade 2 diastolic dysfunction, mild aortic insufficiency, moderate tricuspid regurgitation, RVSP 50  12/6/19 echo normal LV function EF 65%, RVSP 43, moderate tricuspid regurgitation, mild to moderate aortic insufficiency  10/22/20 echo EF 70%, grade 1 diastolic dysfunction, mild AR, RVSP 30   4/20/21  sinus on exam, patient would like to discontinue amiodarone understanding she likely will return to intermittent atrial fibrillation, discussed sotalol, dronedarone, tikosyn as alternatives, patient declines any of those alternatives, ablation is not appropriate given her age   1/10/22 cardiology note sinus rhythm, atrial fibrillation episodes are not significantly symptomatic, recommend she check her heart rate when atrial fibrillation episodes occurs, repeat follow-up echo ordered, follow-up 6 months  3/22/22 echo EF 55%, diastolic dysfunction grade II, mild MR and TR, RVSP 28  5/24/22  cardiology note paroxysmal atrial fibrillation, sinus today, on amiodarone, blood pressures not controlled, increase toprol from 25 to 50 mg, suggest CTA aorta, follow-up 6 months     Tricuspid regurgitation  11/17/18 cardiology note  asymptomatic, continue cholesterol medication, continue to monitor echo aortic valve, repaired ascending aorta, follow-up yearly  11/21/18 echo normal LV function, EF 60%, mild LVH, grade 2 diastolic dysfunction, mild aortic insufficiency, moderate tricuspid regurgitation, RVSP 50  12/6/19 echo normal LV function EF 65%, RVSP 43, moderate tricuspid regurgitation, mild to moderate aortic insufficiency  10/22/20 echo EF 70%, grade 1 diastolic dysfunction, mild AR, RVSP 30   4/20/21  sinus on exam, patient would like to discontinue amiodarone understanding she likely will return to intermittent atrial fibrillation, discussed sotalol, dronedarone, tikosyn as alternatives, patient declines any of those alternatives, ablation is not appropriate given her age   3/22/22 echo EF 55%, diastolic dysfunction grade II, mild MR and TR, RVSP 28  5/24/22  cardiology note paroxysmal atrial fibrillation, sinus today, on amiodarone, blood pressures not controlled, increase toprol from 25 to 50 mg, suggest CTA aorta, follow-up 6 months     Urinary retention  8/18/21  urology note urinary retention, urethral davenport catheter in place, catheter exchange performed, failed voiding trial today, follow-up 1 month  9/28/21 urology note, urodynamic study cystometrogram showing bladder compliance is reduced, detrusor pressure during filling is high, leaking observed during the filling phase, electromyography shows no change in activity with increased abdominal pressure, EMG activity no change during voiding, complex CMG uroflow patient leak small amount prior to catheter falling out but when attempted to void was not able to, impression is poor detrusor compliance, complete retention, no uninhibited contractions, normal capacity, normal bladder sensation, impaired pelvic floor response to voiding; suprapubic tube discussed with patient as best option to manage bladder while she is healing from a pelvic  break  9/28/21 urology note davenport catheter insertion post void residual 481 mL, will schedule suprapubic catheter  11/4/21 suprapubic catherer placement in hospital for urinary retention  12/6/21 urology note catheter exchange  1/9/22 urology note suprapubic catheter exchange  3/8/22 urology note suprapubic catheter exchange  6/6/22 urology note suprapubic catheter exchange  7/7/12 urology note suprapubic catheter exchange  8/11/22 urology [place] note suprapubic catheter exchange, urodynamic study showed minimal bladder contractions throughout, exchanging SPT every month, she has tried periodically clamping SPT and was able to void on her own with urgency but overall stable, discussed catheter removal and subsequent TOV as symptoms are stable when voiding, she is agreeable, she will complete diary of voiding quantities with SPT plugged and arrange SPT removal if      UTI  7/6/16 UTI klebsiella pneumoniae sensitive to all antibiotics except ampicillin, start bactrim x 5 days  1/18/19 UTI enterobacter cloacae resistant to ampicillin, cephalothin, penicillin, bactrim, sensitive to cefuroxime, ciprofloxacin and levaquin, nitrofurantoin  5/26/19 UTI klebsiella given omnicef, organism resistant ampicillin, ciprofloxacin, levofloxacin, bactrim  8/18/21  urology note urinary retention, urethral davenport catheter in place, catheter exchange performed, failed voiding trial today, follow-up 1 month  11/4/21 suprapubic catherer placement in hospital for urinary retention  10/21/22 UTI e.coli resistant to ampicillin,ciprofloxacin,levaquin, sensitive to cefuroxime,bactrim,macrodantin           Patient Active Problem List   Diagnosis    S/p lumbar surgery    Hypertension    Dyslipidemia    S/P TOMY (total abdominal hysterectomy)    Preventative health care    S/P knee bilateral arthroplasty    S/P appendectomy    Osteoporosis, idiopathic    Hypothyroid    History of shingles    GERD (gastroesophageal reflux disease)     Tricuspid regurgitation    Neck arthritis    S/P thoracic aortic aneurysm repair    Decreased GFR    History of insomnia    History of shoulder pain    History of transient ischemic attack (TIA)    S/p hip right arthroplasty    Paroxysmal atrial fibrillation (HCC)    On continuous oral anticoagulation    Gait disorder    History of pelvic fracture    Urinary retention    Anemia    History of neutropenia    Senile osteoporosis    S/p ankle right orif                    Patient Care Team:  Tee Tran M.D. as PCP - General  Koib Wheeler M.D. as Consulting Physician (Interventional Cardiology)  Brett Santos II, O.D. as Consulting Physician (Optometry)  Angelic Adams as Consulting Physician (Dentistry)  Michelle Randolph R.N. as RN Care Coordinator     ROS           Objective          Physical Exam  Vitals and nursing note reviewed.   Constitutional:       Appearance: Normal appearance.   HENT:      Head: Normocephalic and atraumatic.      Right Ear: External ear normal.      Left Ear: External ear normal.   Eyes:      Conjunctiva/sclera: Conjunctivae normal.   Cardiovascular:      Rate and Rhythm: Normal rate and regular rhythm.      Heart sounds: Normal heart sounds.   Pulmonary:      Effort: Pulmonary effort is normal.      Breath sounds: Normal breath sounds.   Abdominal:      General: There is no distension.   Musculoskeletal:         General: Swelling present.   Skin:     Findings: No bruising.   Neurological:      General: No focal deficit present.      Mental Status: She is alert.   Psychiatric:         Mood and Affect: Mood normal.         Behavior: Behavior normal.   Right walking boot removed, mild pedal edema, no open sores, lesions, ulcerations                        Assessment & Plan        Assessment  #1 status post right ankle fracture ORIF on October 14 by Dr. Huertas from orthopedics, went to rehabilitation [facility] from October 17 through October 20, 2019 discharged home, she has been set  up with home health through [facility] has been out to see her last week, physical therapy and Occupational Therapy has also been arranged, she has a walking boot and needs assistant with ADLs such as bathing, hand-held shower, transfers, transportation, meal preparation and shopping, she is able to do grooming and dentures, has not been taking pain medications as she states she does not have significant pain    #2 paroxysmal atrial fibrillation, sinus on my exam today, on amiodarone, Eliquis and toprol per cardiology    #3 hypertension on Lotensin and Toprol per cardiology, blood pressure slightly high today but has been 130 range at home    #4 hypothyroid on Synthroid, last TSH 1.6 on October 18    #5 ckd 3a 10/25/22 bun 14,creat 1.02,GFR 54, no regular NSAIDs    #6 anemia 10/25/22 hgb 9.2,hct 29,mcv 90 chronic in nature but decrease since her surgery, she did receive 1 packed RBC in the hospital    #7 recent UTI 10/21/22 UTI e.coli resistant to ampicillin,ciprofloxacin,levaquin, sensitive to cefuroxime,bactrim,macrodantin, completed course of antibiotics as prescribed by rehabilitation [facility]    #8 osteoporosis and recurrent fragility fracture despite reclast    Plan  #1 continue home health nursing, physical therapy and occupational therapy through [facility] for recovery from ankle fracture status post surgery, gait imbalance, weakness    #2 follow-up orthopedics    #3 falling precautions    #4 check blood pressure regular and record call follow-up cardiology    #5 continue no NSAIDs, anticoagulation precautions and education on Eliquis    #6 recheck labs 1 week by [facility], labs ordered we will fax that to [facility]    #7 influenza vaccine high-dose today    #8 she can get the updated COVID-vaccine at the pharmacy    #9 she does have a wheelchair, walker and the DMV handicap permit    #10 dexa    #11 change from reclast to prolia at infusion center order placed    #12 follow up 6 weeks        - Chart and  discharge summary were reviewed.   - Hospitalization and results reviewed with patient.   - Medications reviewed including instructions regarding high risk medications, dosing and side effects.  - Recommended Services: Referral to Home Health  - Advance directive/POLST on file?  No

## 2022-11-08 ENCOUNTER — TELEPHONE (OUTPATIENT)
Dept: MEDICAL GROUP | Facility: MEDICAL CENTER | Age: 87
End: 2022-11-08
Payer: MEDICARE

## 2022-11-08 ENCOUNTER — HOSPITAL ENCOUNTER (OUTPATIENT)
Facility: MEDICAL CENTER | Age: 87
End: 2022-11-08
Attending: INTERNAL MEDICINE
Payer: MEDICARE

## 2022-11-08 LAB
ALBUMIN SERPL BCP-MCNC: 4.1 G/DL (ref 3.2–4.9)
ALBUMIN/GLOB SERPL: 1.5 G/DL
ALP SERPL-CCNC: 94 U/L (ref 30–99)
ALT SERPL-CCNC: 12 U/L (ref 2–50)
ANION GAP SERPL CALC-SCNC: 12 MMOL/L (ref 7–16)
AST SERPL-CCNC: 20 U/L (ref 12–45)
BASOPHILS # BLD AUTO: 0.9 % (ref 0–1.8)
BASOPHILS # BLD: 0.04 K/UL (ref 0–0.12)
BILIRUB SERPL-MCNC: 0.5 MG/DL (ref 0.1–1.5)
BUN SERPL-MCNC: 13 MG/DL (ref 8–22)
CALCIUM SERPL-MCNC: 9.7 MG/DL (ref 8.5–10.5)
CHLORIDE SERPL-SCNC: 102 MMOL/L (ref 96–112)
CO2 SERPL-SCNC: 24 MMOL/L (ref 20–33)
CREAT SERPL-MCNC: 0.87 MG/DL (ref 0.5–1.4)
EOSINOPHIL # BLD AUTO: 0.09 K/UL (ref 0–0.51)
EOSINOPHIL NFR BLD: 2 % (ref 0–6.9)
ERYTHROCYTE [DISTWIDTH] IN BLOOD BY AUTOMATED COUNT: 48.2 FL (ref 35.9–50)
FERRITIN SERPL-MCNC: 109 NG/ML (ref 10–291)
FOLATE SERPL-MCNC: 25.6 NG/ML
GFR SERPLBLD CREATININE-BSD FMLA CKD-EPI: 65 ML/MIN/1.73 M 2
GLOBULIN SER CALC-MCNC: 2.7 G/DL (ref 1.9–3.5)
GLUCOSE SERPL-MCNC: 94 MG/DL (ref 65–99)
HCT VFR BLD AUTO: 37.3 % (ref 37–47)
HGB BLD-MCNC: 11.7 G/DL (ref 12–16)
IMM GRANULOCYTES # BLD AUTO: 0.05 K/UL (ref 0–0.11)
IMM GRANULOCYTES NFR BLD AUTO: 1.1 % (ref 0–0.9)
IRON SATN MFR SERPL: 13 % (ref 15–55)
IRON SERPL-MCNC: 40 UG/DL (ref 40–170)
LYMPHOCYTES # BLD AUTO: 1.32 K/UL (ref 1–4.8)
LYMPHOCYTES NFR BLD: 29.9 % (ref 22–41)
MCH RBC QN AUTO: 27.9 PG (ref 27–33)
MCHC RBC AUTO-ENTMCNC: 31.4 G/DL (ref 33.6–35)
MCV RBC AUTO: 89 FL (ref 81.4–97.8)
MONOCYTES # BLD AUTO: 0.51 K/UL (ref 0–0.85)
MONOCYTES NFR BLD AUTO: 11.5 % (ref 0–13.4)
NEUTROPHILS # BLD AUTO: 2.41 K/UL (ref 2–7.15)
NEUTROPHILS NFR BLD: 54.6 % (ref 44–72)
NRBC # BLD AUTO: 0 K/UL
NRBC BLD-RTO: 0 /100 WBC
PLATELET # BLD AUTO: 268 K/UL (ref 164–446)
PMV BLD AUTO: 9.8 FL (ref 9–12.9)
POTASSIUM SERPL-SCNC: 3.8 MMOL/L (ref 3.6–5.5)
PROT SERPL-MCNC: 6.8 G/DL (ref 6–8.2)
RBC # BLD AUTO: 4.19 M/UL (ref 4.2–5.4)
SODIUM SERPL-SCNC: 138 MMOL/L (ref 135–145)
TIBC SERPL-MCNC: 311 UG/DL (ref 250–450)
UIBC SERPL-MCNC: 271 UG/DL (ref 110–370)
VIT B12 SERPL-MCNC: 797 PG/ML (ref 211–911)
WBC # BLD AUTO: 4.4 K/UL (ref 4.8–10.8)

## 2022-11-08 PROCEDURE — 82746 ASSAY OF FOLIC ACID SERUM: CPT

## 2022-11-08 PROCEDURE — 82607 VITAMIN B-12: CPT

## 2022-11-08 PROCEDURE — 80053 COMPREHEN METABOLIC PANEL: CPT

## 2022-11-08 PROCEDURE — 85025 COMPLETE CBC W/AUTO DIFF WBC: CPT

## 2022-11-08 PROCEDURE — 82728 ASSAY OF FERRITIN: CPT

## 2022-11-08 PROCEDURE — 83540 ASSAY OF IRON: CPT

## 2022-11-08 PROCEDURE — 83550 IRON BINDING TEST: CPT

## 2022-11-09 ENCOUNTER — PATIENT MESSAGE (OUTPATIENT)
Dept: HEALTH INFORMATION MANAGEMENT | Facility: OTHER | Age: 87
End: 2022-11-09

## 2022-11-09 NOTE — TELEPHONE ENCOUNTER
Called the patient and left a message, please notify her that her blood test showed that her blood counts have improved, and essentially is back to normal.  Her liver function, kidney function, iron, vitamin B12 levels are normal.  Based on these results, have her continue her current medications.

## 2022-11-15 ENCOUNTER — TELEPHONE (OUTPATIENT)
Dept: MEDICAL GROUP | Facility: MEDICAL CENTER | Age: 87
End: 2022-11-15
Payer: MEDICARE

## 2022-11-15 DIAGNOSIS — S82.899S CLOSED FRACTURE OF ANKLE, UNSPECIFIED LATERALITY, SEQUELA: ICD-10-CM

## 2022-11-16 NOTE — TELEPHONE ENCOUNTER
Can they fax over an order form or can they take a verbal order?  I agree and approve the Occupational Therapy home health order through [facility].

## 2022-11-28 ENCOUNTER — PATIENT OUTREACH (OUTPATIENT)
Dept: HEALTH INFORMATION MANAGEMENT | Facility: OTHER | Age: 87
End: 2022-11-28
Payer: MEDICARE

## 2022-11-28 ENCOUNTER — PATIENT MESSAGE (OUTPATIENT)
Dept: HEALTH INFORMATION MANAGEMENT | Facility: OTHER | Age: 87
End: 2022-11-28

## 2022-11-28 DIAGNOSIS — I15.0 RENOVASCULAR HYPERTENSION: Chronic | ICD-10-CM

## 2022-11-28 NOTE — PROGRESS NOTES
CHW Nessa contacted pt to offer Personal Care Management program. CHW was unable to reach pt. CHW was unable to leave contact information via  due to number being busy. CHW sent [employer] message providing CCM contact information. CHW encouraged pt to call if anything was needed. CHW will not continue to follow at this time.

## 2022-12-01 ENCOUNTER — APPOINTMENT (OUTPATIENT)
Dept: RADIOLOGY | Facility: MEDICAL CENTER | Age: 87
End: 2022-12-01
Attending: EMERGENCY MEDICINE
Payer: MEDICARE

## 2022-12-01 ENCOUNTER — HOSPITAL ENCOUNTER (EMERGENCY)
Facility: MEDICAL CENTER | Age: 87
End: 2022-12-01
Attending: EMERGENCY MEDICINE
Payer: MEDICARE

## 2022-12-01 VITALS
SYSTOLIC BLOOD PRESSURE: 129 MMHG | RESPIRATION RATE: 25 BRPM | OXYGEN SATURATION: 95 % | HEART RATE: 76 BPM | TEMPERATURE: 98.3 F | DIASTOLIC BLOOD PRESSURE: 72 MMHG

## 2022-12-01 DIAGNOSIS — I48.0 PAROXYSMAL ATRIAL FIBRILLATION (HCC): ICD-10-CM

## 2022-12-01 LAB
ALBUMIN SERPL BCP-MCNC: 3.5 G/DL (ref 3.2–4.9)
ALBUMIN/GLOB SERPL: 1.3 G/DL
ALP SERPL-CCNC: 71 U/L (ref 30–99)
ALT SERPL-CCNC: 14 U/L (ref 2–50)
ANION GAP SERPL CALC-SCNC: 9 MMOL/L (ref 7–16)
AST SERPL-CCNC: 31 U/L (ref 12–45)
BASOPHILS # BLD AUTO: 0.9 % (ref 0–1.8)
BASOPHILS # BLD: 0.04 K/UL (ref 0–0.12)
BILIRUB SERPL-MCNC: 0.5 MG/DL (ref 0.1–1.5)
BUN SERPL-MCNC: 12 MG/DL (ref 8–22)
CALCIUM SERPL-MCNC: 9.4 MG/DL (ref 8.4–10.2)
CHLORIDE SERPL-SCNC: 102 MMOL/L (ref 96–112)
CO2 SERPL-SCNC: 23 MMOL/L (ref 20–33)
CREAT SERPL-MCNC: 0.87 MG/DL (ref 0.5–1.4)
EKG IMPRESSION: NORMAL
EOSINOPHIL # BLD AUTO: 0.05 K/UL (ref 0–0.51)
EOSINOPHIL NFR BLD: 1.1 % (ref 0–6.9)
ERYTHROCYTE [DISTWIDTH] IN BLOOD BY AUTOMATED COUNT: 50.5 FL (ref 35.9–50)
GFR SERPLBLD CREATININE-BSD FMLA CKD-EPI: 65 ML/MIN/1.73 M 2
GLOBULIN SER CALC-MCNC: 2.8 G/DL (ref 1.9–3.5)
GLUCOSE SERPL-MCNC: 110 MG/DL (ref 65–99)
HCT VFR BLD AUTO: 38.4 % (ref 37–47)
HGB BLD-MCNC: 12.2 G/DL (ref 12–16)
IMM GRANULOCYTES # BLD AUTO: 0.02 K/UL (ref 0–0.11)
IMM GRANULOCYTES NFR BLD AUTO: 0.5 % (ref 0–0.9)
LYMPHOCYTES # BLD AUTO: 1.44 K/UL (ref 1–4.8)
LYMPHOCYTES NFR BLD: 32.5 % (ref 22–41)
MAGNESIUM SERPL-MCNC: 1.7 MG/DL (ref 1.5–2.5)
MCH RBC QN AUTO: 28.5 PG (ref 27–33)
MCHC RBC AUTO-ENTMCNC: 31.8 G/DL (ref 33.6–35)
MCV RBC AUTO: 89.7 FL (ref 81.4–97.8)
MONOCYTES # BLD AUTO: 0.47 K/UL (ref 0–0.85)
MONOCYTES NFR BLD AUTO: 10.6 % (ref 0–13.4)
NEUTROPHILS # BLD AUTO: 2.41 K/UL (ref 2–7.15)
NEUTROPHILS NFR BLD: 54.4 % (ref 44–72)
NRBC # BLD AUTO: 0 K/UL
NRBC BLD-RTO: 0 /100 WBC
NT-PROBNP SERPL IA-MCNC: 482 PG/ML (ref 0–125)
PHOSPHATE SERPL-MCNC: 3.5 MG/DL (ref 2.5–4.5)
PLATELET # BLD AUTO: 173 K/UL (ref 164–446)
PMV BLD AUTO: 9.1 FL (ref 9–12.9)
POTASSIUM SERPL-SCNC: 4.8 MMOL/L (ref 3.6–5.5)
PROT SERPL-MCNC: 6.3 G/DL (ref 6–8.2)
RBC # BLD AUTO: 4.28 M/UL (ref 4.2–5.4)
SODIUM SERPL-SCNC: 134 MMOL/L (ref 135–145)
TROPONIN T SERPL-MCNC: 24 NG/L (ref 6–19)
TSH SERPL DL<=0.005 MIU/L-ACNC: 2.38 UIU/ML (ref 0.38–5.33)
WBC # BLD AUTO: 4.4 K/UL (ref 4.8–10.8)

## 2022-12-01 PROCEDURE — 84100 ASSAY OF PHOSPHORUS: CPT

## 2022-12-01 PROCEDURE — 84443 ASSAY THYROID STIM HORMONE: CPT

## 2022-12-01 PROCEDURE — 80053 COMPREHEN METABOLIC PANEL: CPT

## 2022-12-01 PROCEDURE — 36415 COLL VENOUS BLD VENIPUNCTURE: CPT

## 2022-12-01 PROCEDURE — 85025 COMPLETE CBC W/AUTO DIFF WBC: CPT

## 2022-12-01 PROCEDURE — 83735 ASSAY OF MAGNESIUM: CPT

## 2022-12-01 PROCEDURE — 99285 EMERGENCY DEPT VISIT HI MDM: CPT

## 2022-12-01 PROCEDURE — 84484 ASSAY OF TROPONIN QUANT: CPT

## 2022-12-01 PROCEDURE — 93005 ELECTROCARDIOGRAM TRACING: CPT | Performed by: EMERGENCY MEDICINE

## 2022-12-01 PROCEDURE — 71045 X-RAY EXAM CHEST 1 VIEW: CPT

## 2022-12-01 PROCEDURE — 83880 ASSAY OF NATRIURETIC PEPTIDE: CPT

## 2022-12-01 NOTE — ED NOTES
Pt requesting to use restroom. Pt states that at home she uses a walker to get around. Walker provided to pt and tech assisting pt to the restroom.  Pt made to [place] and than stated that was to far for her to want to walk, tech to use karely-steady to bathroom.

## 2022-12-01 NOTE — ED PROVIDER NOTES
ED Provider Note    CHIEF COMPLAINT  Chief Complaint   Patient presents with    Atrial Fibrillation     Pt BIB EMS from home for AFIB w/ RVR - HR in the 160s, pt self converted upon arrival to ED HR in the 80s    Dizziness     X 24 hours       HPI  Yvonne Marie Wada is a 87 y.o. female who presents to the emergency department complaint of palpitations, dizziness.  She states that she has had palpitations and slight dizziness for approximately 24 hours.  She does have history of paroxysmal atrial fibrillation and takes Eliquis daily.  She denies chest pain, found shortness of breath, loss of sensation or strength to arms or legs, recent fall.  She does have a history of a right foot fracture and had a boot removed 2 days ago.  Mass arrived the patient was in atrial fibrillation with RVR rate 140 bpm but normal blood pressure.    REVIEW OF SYSTEMS  Positives as above. Pertinent negatives include chest pain, loss of sensation or strength to arms or legs, headache, visual changes, fever, dysuria.  All other 10 review of systems are negative    PAST MEDICAL HISTORY  Past Medical History:   Diagnosis Date    AAA (abdominal aortic aneurysm) 7/26/2010    10/09 CT thorax dilated ascending thoracic aorta 4.9 cm 1/10 transesophageal echo dilated aortic root, and ascending aorta with mild aortic insufficiency 1/10  ascending aortic aneurysm repair 5/12 echo [facility] normal LV function EF 60%, moderate to severe left atrial enlargement, RVSP 32     Aortic insufficiency     Aortic valve disease     Aortic valve regurgitation     Arthritis     back and knee/ osteo    Cataract     Dental disorder     full top denture, partial bottom    Diverticulosis     Dyslipidemia 7/26/2010    Sees [facility] on lipitor 4/11 chol 159,trig 84,hdl 47,ldl 95,cpk 114 7/12 chol 163,trig 120,hdl 49,ldl 90, crp 1.1 on lipitor 20 mg     GERD (gastroesophageal reflux disease) 7/12/2012 6/07 EGD per [facility] hiatal hernia, start prevacid 30 mg 7/12  "protonix 20 mg qday     Glaucoma     Heart burn     Heart murmur     Heart valve disease     \"leaking\", mitral valve    Hiatus hernia syndrome     High cholesterol     History of shingles 6/30/2011    2010 Back and left thorax      History of total knee arthroplasty 7/26/2010    4/10 MRI right knee complex tear medial meniscus, horizontal cleavage tear lateral meniscus, chondromalacia patella, moderate-sized Baker's cyst 5/10 right meniscus knee repair  5/10 told by  had gout on surgery had pathology report, I await that report Question gout seen on pathology report done during repair of right meniscus knee surgery. 7/10 uric acid 6.3, we'll await starting allopurinol depending on the operative report 8/5/10 reviewed ortho notes , op report indicates crystal deposition, could be gout or pseudogout. No bx result sent over. Will need to get. My suspicion is chondrocalcinosis. 10/11  ortho note, MRI pending 8/7/10 reviewed pathology report right knee tissue, marked degenerative changes with focal calcification, no mention of gout. Based on this and a normal uric acid level, I do not believe she has gout, has no hyperuricemia medications would be indicated 10/11  ortho left knee meniscectomy and arthroscopy 1/12      HLD (hyperlipidemia)     Hypertension     Hypothyroid 4/8/2011 4/11 tsh 9 start synthroid 50 4/11 tsh 6,free t3 3.1,free t4 1.2,tpo negative 7/1/11 tsh 2.1 cont synthroid 50 mcg 7/12 tsh 3.4 cont synthroid 50 mcg     Indigestion     Mitral insufficiency     OSTEOPOROSIS 8/9/2010    Had been on fosamax [id number]  8/10 dexa LS -2.0,hip -1.5 await old dexa result, she will get copy for me 4/11 vit d 35 9/12 dexa LS -3.2,hip -1.4 2/13/13 relast infusion     Pain     back and right leg, can get as bad as 10/10    Preventative health care 7/26/2010 2002 pneum 2005 colon [facility] no records 4/11 vit d 35 9/11 shingles vaccine 9/12 mammogram 9/12 " dexa LS -3.2,hip -1.4     Rheumatic fever     S/P appendectomy 7/26/2010    S/P TOMY (total abdominal hysterectomy) 7/26/2010    Sees gyn on HRT estradiol for 20 yrs () 11/08 mammo      Shingles 6/30/2011    Snoring     Status post lumbar surgery 7/26/2010 6/03 L4-5 epidural Dr. Jordan  7/06 overall for decompression, foraminotomy. L4-L5 posterior fusion, L4-L5 allograft       Stroke ([facility]) 1996    no residual problems     Thoracic aortic aneurysm without mention of rupture     Tricuspid insufficiency     Unspecified disorder of thyroid     hypo    Urinary bladder disorder     suprapubic catheter insertion 11/4    UTI (urinary tract infection) 11/12/2016 7/6/16 UTI klebsiella pneumoniae sensitive to all antibiotics except ampicillin, start bactrim x 5 days 1/18/19 UTI enterobacter cloacae resistant to ampicillin, cephalothin, penicillin, bactrim, sensitive to cefuroxime, ciprofloxacin and levaquin, nitrofurantoin 5/26/19 UTI klebsiella given omnicef, organism resistant ampicillin, ciprofloxacin, levofloxacin, bactrim 8/18/21  urology note        FAMILY HISTORY  Noncontributory    SOCIAL HISTORY  Social History     Socioeconomic History    Marital status:    Tobacco Use    Smoking status: Never    Smokeless tobacco: Never    Tobacco comments:     none   Vaping Use    Vaping Use: Never used   Substance and Sexual Activity    Alcohol use: No     Alcohol/week: 0.0 oz    Drug use: Yes     Comment: CBD Oil for Arthritis    Sexual activity: Not Currently     Partners: Male     Social Determinants of Health     Transportation Needs: No Transportation Needs    Lack of Transportation (Medical): No    Lack of Transportation (Non-Medical): No       SURGICAL HISTORY  Past Surgical History:   Procedure Laterality Date    ORIF, ANKLE Right 10/14/2022    Procedure: RIGHT ANKLE OPEN REDUCTION INTERNAL FIXATION;  Surgeon: Ryan Huertas M.D.;  Location: SURGERY [facility];  Service:  Orthopedics    TENDON REPAIR Right 11/10/2016    Procedure: TENDON REPAIR - QUADRACEPS ;  Surgeon: Maxim Herrera M.D.;  Location: SURGERY [facility];  Service:     KNEE ARTHROPLASTY TOTAL Right 9/8/2016    Procedure: KNEE ARTHROPLASTY TOTAL;  Surgeon: Maxim Herrera M.D.;  Location: SURGERY [facility];  Service:     FUSION, SPINE, LUMBAR, PLIF  11/24/2015    Procedure: LUMBAR FUSION POSTERIOR L3-4, EXPLORATION OF FUSION L4-5 WITH INSTRUMENTATION;  Surgeon: Hermann Reis M.D.;  Location: SURGERY [facility];  Service:     LUMBAR LAMINECTOMY DISKECTOMY  11/24/2015    Procedure: LUMBAR L3-4 LAMINECTOMY AND REDO L4-5;  Surgeon: Hermann Reis M.D.;  Location: [facility];  Service:     KNEE ARTHROPLASTY TOTAL  1/3/2012    Performed by YOSEF MEJÍA at [facility]    KNEE ARTHROSCOPY  10/18/2011    Performed by YOSEF MEJÍA at [facility]    MENISCECTOMY, KNEE, MEDIAL  10/18/2011    Performed by YOSEF MEJÍA at [facility]    AORTIC VALVE REPLACEMENT  1/12/2010    Performed by ISAÍAS NICOLE at [facility]    APPENDECTOMY      FUSION, SPINE, LUMBAR, PLIF      HYSTERECTOMY, TOTAL ABDOMINAL         CURRENT MEDICATIONS  Home Medications       Reviewed by Dalia Tsang R.N. (Registered Nurse) on 12/01/22 at 1033  Med List Status: Not Addressed     Medication Last Dose Status   benazepril (LOTENSIN) 40 MG tablet  Active   calcium/vitamin D 250-125 MG-UNIT Tab tablet  Active   ELIQUIS 5 MG Tab  Active   ezetimibe (ZETIA) 10 MG Tab  Active   gabapentin (NEURONTIN) 100 MG Cap  Active   levothyroxine (SYNTHROID) 75 MCG Tab  Active   metoprolol SR (TOPROL XL) 50 MG TABLET SR 24 HR  Active   omeprazole (PRILOSEC) 20 MG delayed-release capsule  Active   senna-docusate (PERICOLACE OR SENOKOT S) 8.6-50 MG Tab  Active                    ALLERGIES  Allergies   Allergen Reactions    Amoxicillin Vomiting     Upset stomach, ok if needed for  life saving treatment (per pt)    Codeine Nausea     Synthetic codones ok    Doxycycline Nausea     Nausea, Ok in life saving situation (per pt)    Sulfamethoxazole-Trimethoprim Vomiting and Nausea     Ok in a life saving situation (per pt)    Tramadol Vomiting and Nausea     nausea    Trazodone Vomiting     groggy       PHYSICAL EXAM  VITAL SIGNS: /72   Pulse 76   Temp 36.8 °C (98.3 °F) (Temporal)   Resp (!) 25   SpO2 95%      Constitutional: Well developed, Well nourished, No acute distress, Non-toxic appearance.   Eyes: PERRLA, EOMI, Conjunctiva normal, No discharge.   Cardiovascular: Normal heart rate, Normal rhythm, No murmurs, No rubs, No gallops, and intact distal pulses.   Thorax & Lungs:  No respiratory distress, no rales, no rhonchi, No wheezing, No chest wall tenderness.   Abdomen: Bowel sounds normal, Soft, No tenderness, No guarding, No rebound, No pulsatile masses.   Skin: Warm, Dry, No erythema, No rash.   Extremities: Full range of motion, no deformity, no slight right foot edema, distal pulses are brisk throughout  Neurologic: Alert & oriented x 3, No focal deficits noted, acting appropriately on exam.  Psychiatric: Affect normal for clinical presentation.      LABORATORY/ECG  Results for orders placed or performed during the hospital encounter of 12/01/22   CBC w/ Differential   Result Value Ref Range    WBC 4.4 (L) 4.8 - 10.8 K/uL    RBC 4.28 4.20 - 5.40 M/uL    Hemoglobin 12.2 12.0 - 16.0 g/dL    Hematocrit 38.4 37.0 - 47.0 %    MCV 89.7 81.4 - 97.8 fL    MCH 28.5 27.0 - 33.0 pg    MCHC 31.8 (L) 33.6 - 35.0 g/dL    RDW 50.5 (H) 35.9 - 50.0 fL    Platelet Count 173 164 - 446 K/uL    MPV 9.1 9.0 - 12.9 fL    Neutrophils-Polys 54.40 44.00 - 72.00 %    Lymphocytes 32.50 22.00 - 41.00 %    Monocytes 10.60 0.00 - 13.40 %    Eosinophils 1.10 0.00 - 6.90 %    Basophils 0.90 0.00 - 1.80 %    Immature Granulocytes 0.50 0.00 - 0.90 %    Nucleated RBC 0.00 /100 WBC    Neutrophils (Absolute) 2.41  2.00 - 7.15 K/uL    Lymphs (Absolute) 1.44 1.00 - 4.80 K/uL    Monos (Absolute) 0.47 0.00 - 0.85 K/uL    Eos (Absolute) 0.05 0.00 - 0.51 K/uL    Baso (Absolute) 0.04 0.00 - 0.12 K/uL    Immature Granulocytes (abs) 0.02 0.00 - 0.11 K/uL    NRBC (Absolute) 0.00 K/uL   Complete Metabolic Panel (CMP)   Result Value Ref Range    Sodium 134 (L) 135 - 145 mmol/L    Potassium 4.8 3.6 - 5.5 mmol/L    Chloride 102 96 - 112 mmol/L    Co2 23 20 - 33 mmol/L    Anion Gap 9.0 7.0 - 16.0    Glucose 110 (H) 65 - 99 mg/dL    Bun 12 8 - 22 mg/dL    Creatinine 0.87 0.50 - 1.40 mg/dL    Calcium 9.4 8.4 - 10.2 mg/dL    AST(SGOT) 31 12 - 45 U/L    ALT(SGPT) 14 2 - 50 U/L    Alkaline Phosphatase 71 30 - 99 U/L    Total Bilirubin 0.5 0.1 - 1.5 mg/dL    Albumin 3.5 3.2 - 4.9 g/dL    Total Protein 6.3 6.0 - 8.2 g/dL    Globulin 2.8 1.9 - 3.5 g/dL    A-G Ratio 1.3 g/dL   Troponin STAT   Result Value Ref Range    Troponin T 24 (H) 6 - 19 ng/L   proBrain Natriuretic Peptide, NT   Result Value Ref Range    NT-proBNP 482 (H) 0 - 125 pg/mL   Magnesium   Result Value Ref Range    Magnesium 1.7 1.5 - 2.5 mg/dL   Phosphorus   Result Value Ref Range    Phosphorus 3.5 2.5 - 4.5 mg/dL   TSH WITH REFLEX TO FT4   Result Value Ref Range    TSH 2.380 0.380 - 5.330 uIU/mL   ESTIMATED GFR   Result Value Ref Range    GFR (CKD-EPI) 65 >60 mL/min/1.73 m 2   EKG   Result Value Ref Range    Report       [facility] Emergency Dept.    Test Date:  2022  Pt Name:    YVONNE WADA                  Department: [facility]  MRN:        [id number]                      Room:       -ROOM 7  Gender:     Female                       Technician: [id number]  :        [id number]                   Requested By:KAREN SALES  Order #:    [id number]                    Reading MD: KAREN SALES, DO    Measurements  Intervals                                Axis  Rate:       71                           P:          -24  MN:         236                           QRS:        49  QRSD:       90                           T:          38  QT:         380  QTc:        413    Interpretive Statements  SINUS RHYTHM  FIRST DEGREE AV BLOCK  Compared to ECG 10/21/2022 10:11:49  First degree AV block now present  Atrial fibrillation no longer present  Early repolarization no longer present  Electronically Signed On 12-1-2022 13:59:12 PST by KAREN SALES, DO           RADIOLOGY/PROCEDURES  DX-CHEST-PORTABLE (1 VIEW)   Final Result      1.  Mild cardiomegaly.      2.  Linear atelectasis within the left lung base.            COURSE & MEDICAL DECISION MAKING  Pertinent Labs & Imaging studies reviewed. (See chart for details)  This is a [facility] 87-year-old female with a heart score of 6 that presents with atrial fibrillation with RVR.  The patient converted coming into the hospital here and now is in normal sinus rhythm.  She has no significant complaints.  Here in the emergency department, EKG shows normal sinus rhythm with a slight first-degree AV block.  The patient has no evidence of significant elevated troponins her last troponin here was in the 40 range now is 24.  Initially she has a equivocal BNP of 4 and 82.  She has no singly electrolyte as well.  Do not expect patient to have pulm embolism or aortic dissection or aneurysm surgical repair abnormality.  The patient was reevaluated after approximate 2 hours here in the emergency department, she had normal vital signs, and she was amatory that difficulty, she had no evidence of endorgan damage.  She will be discharged she is instructed to follow-up with her cardiologist Dr. Santos back for further evaluation management and possible adjustment of her medications.      FINAL IMPRESSION     1. Paroxysmal atrial fibrillation (HCC)        DISPOSITION:  Patient will be discharged home in stable condition.    FOLLOW UP:  [facility], Emergency Dept  [address]  [facility]  [address]  [phone]    If symptoms worsen    Tee Tran M.D.  [address]  [address]  [place] [address]  [phone]    Schedule an appointment as soon as possible for a visit         Electronically signed by: Jose Gonzales D.O., 12/1/2022 10:37 AM

## 2022-12-01 NOTE — ED NOTES
Pt provided with discharge paper work and follow up care instructions. Pt declines any questions at this time. PT requesting wheelchair to leave ER.

## 2022-12-01 NOTE — ED TRIAGE NOTES
Chief Complaint   Patient presents with   • Atrial Fibrillation     Pt BIB EMS from home for AFIB w/ RVR - HR in the 160s, pt self converted upon arrival to ED HR in the 80s   • Dizziness     X 24 hours     BP (!) 140/83   Pulse 85   Temp 36.7 °C (98.1 °F) (Temporal)   Resp (!) 26   SpO2 95%     Pt BIB [facility] from home; PIV placed, 200 mls NS given by EMS PTA

## 2023-01-05 ENCOUNTER — TELEPHONE (OUTPATIENT)
Dept: MEDICAL GROUP | Facility: MEDICAL CENTER | Age: 88
End: 2023-01-05

## 2023-01-05 ENCOUNTER — HOSPITAL ENCOUNTER (OUTPATIENT)
Facility: MEDICAL CENTER | Age: 88
End: 2023-01-05
Attending: INTERNAL MEDICINE
Payer: MEDICARE

## 2023-01-05 ENCOUNTER — OFFICE VISIT (OUTPATIENT)
Dept: MEDICAL GROUP | Facility: MEDICAL CENTER | Age: 88
End: 2023-01-05
Payer: MEDICARE

## 2023-01-05 VITALS
TEMPERATURE: 97.9 F | HEIGHT: 64 IN | DIASTOLIC BLOOD PRESSURE: 84 MMHG | SYSTOLIC BLOOD PRESSURE: 158 MMHG | HEART RATE: 73 BPM | BODY MASS INDEX: 25.61 KG/M2 | WEIGHT: 150 LBS

## 2023-01-05 DIAGNOSIS — N39.0 URINARY TRACT INFECTION WITHOUT HEMATURIA, SITE UNSPECIFIED: ICD-10-CM

## 2023-01-05 DIAGNOSIS — R35.0 URINARY FREQUENCY: ICD-10-CM

## 2023-01-05 DIAGNOSIS — M81.0 SENILE OSTEOPOROSIS: ICD-10-CM

## 2023-01-05 DIAGNOSIS — I48.0 PAROXYSMAL ATRIAL FIBRILLATION (HCC): ICD-10-CM

## 2023-01-05 DIAGNOSIS — I15.0 RENOVASCULAR HYPERTENSION: Chronic | ICD-10-CM

## 2023-01-05 DIAGNOSIS — D64.9 ANEMIA, UNSPECIFIED TYPE: ICD-10-CM

## 2023-01-05 DIAGNOSIS — D70.9 NEUTROPENIA, UNSPECIFIED TYPE (HCC): ICD-10-CM

## 2023-01-05 LAB
APPEARANCE UR: NORMAL
BILIRUB UR STRIP-MCNC: NORMAL MG/DL
COLOR UR AUTO: YELLOW
GLUCOSE UR STRIP.AUTO-MCNC: NORMAL MG/DL
KETONES UR STRIP.AUTO-MCNC: NORMAL MG/DL
LEUKOCYTE ESTERASE UR QL STRIP.AUTO: NORMAL
NITRITE UR QL STRIP.AUTO: NORMAL
PH UR STRIP.AUTO: 7 [PH] (ref 5–8)
PROT UR QL STRIP: 30 MG/DL
RBC UR QL AUTO: NORMAL
SP GR UR STRIP.AUTO: 1.01
UROBILINOGEN UR STRIP-MCNC: 0.2 MG/DL

## 2023-01-05 PROCEDURE — 81002 URINALYSIS NONAUTO W/O SCOPE: CPT | Performed by: INTERNAL MEDICINE

## 2023-01-05 PROCEDURE — 87186 SC STD MICRODIL/AGAR DIL: CPT

## 2023-01-05 PROCEDURE — 87086 URINE CULTURE/COLONY COUNT: CPT

## 2023-01-05 PROCEDURE — 87077 CULTURE AEROBIC IDENTIFY: CPT

## 2023-01-05 PROCEDURE — 99214 OFFICE O/P EST MOD 30 MIN: CPT | Performed by: INTERNAL MEDICINE

## 2023-01-05 ASSESSMENT — FIBROSIS 4 INDEX: FIB4 SCORE: 4.12

## 2023-01-05 NOTE — PROGRESS NOTES
Subjective     Yvonne Marie Wada is a 87 y.o. female who presents with ankle fracture Follow-Up (/)            HPI  Patient here for follow-up ankle fracture, ORIF right ankle fracture with fixation October 14, went to the rehabilitation [facility], saw orthopedics in follow-up in November, transition out of her boot, completed home physical therapy.  She has not yet followed up for outpatient physical therapy.  Currently uses walker for ambulation, no falls.  She still feels weak and unsteady at times.  Seen in the emergency room December 1 for palpitations, blood pressure in the /72, heart rate 76, EKG showed sinus rhythm, discharged home.  She did have a follow-up appoint with cardiology but that was canceled and rescheduled for February 1.  Blood pressure at home 145/80 and heart rate will range from 70s she is on metoprolol 50 mg although the chart indicates she should be on 50 mg 1-1/2/day, she is also on Eliquis.  No regular NSAIDs.  Osteoporosis, Prolia ordered last year, has not yet had that administered  Sees cardiology for atrial fibriallation on Eliquis and Toprol  Hypothyroid on Synthroid 75 mcg daily  No NSAIDs on the Eliquis  Some occasional urinary frequency, no burning or blood in her urine, history of UTI in October      Current Outpatient Medications   Medication Sig Dispense Refill    ELIQUIS 5 MG Tab TAKE 1 TABLET BY MOUTH TWICE A DAY (Patient taking differently: Take 5 mg by mouth 2 times a day.) 180 Tablet 3    benazepril (LOTENSIN) 40 MG tablet TAKE 1 TABLET BY MOUTH EVERY DAY IN THE MORNING 90 Tablet 1    metoprolol SR (TOPROL XL) 50 MG TABLET SR 24 HR Take 1.5 Tablets by mouth every morning. 90 Tablet 3    senna-docusate (PERICOLACE OR SENOKOT S) 8.6-50 MG Tab Take 2 Tablets by mouth 2 times a day.      calcium/vitamin D 250-125 MG-UNIT Tab tablet Take 1 Tablet by mouth every day. 60 Tablet     gabapentin (NEURONTIN) 100 MG Cap Take 100 mg by mouth 3 times a day.      ezetimibe (ZETIA)  10 MG Tab TAKE 1 TABLET BY MOUTH EVERY DAY 90 Tablet 3    levothyroxine (SYNTHROID) 75 MCG Tab Take 1 Tablet by mouth every morning on an empty stomach. 90 Tablet 3    omeprazole (PRILOSEC) 20 MG delayed-release capsule Take 20 mg by mouth every morning.       No current facility-administered medications for this visit.       anemia  9/28/20 hgb 12.6,hct 38  6/22/21 hgb 9,hct 29,mcv 86  9/15/21 hgb 8.0,hct 26,mcv 76  9/15/21 FIT negative  10/14/21 hgb 9.8,hct 33,mcv 85,iron 23,%sat 6,ferritin 70,b12 716,retic 2.6%,SPEP negative  11/4/21 hgb 11.1,hct 35,mcv 84  12/20/21 off iron  1/15/22 hgb 11,hct 35,mcv 86,iron 45,%sat 12,ferritin 32,b12 839 off iron (%sat improved)  5/10/22 hgb 10.4,hct 33.5,mcv 87  8/2/22 hgb 11.4,hct 36.4,mcv 88,iron 73,%sat 19,ferritin 29,b12 777,folate 39,flow cytometry negative,retic 1.8%, zinc 108,copper 99,SPEP negative  8/14/22 hgb11,hct 35  10/12/22 hgb 11,hct 35,iron 33,%sat 11,ferritin 61 hospital admit ankle fracture   10/21/22 hgb 7.9,hct 25.2 transfused RBC s/p ankle orif  10/25/22 hgb 9.2,hct 29,mcv 90  11/8/22 hgb 11.7,hct 37,iron 40,%sat 13,ferritin 109,b12 797,folate 25     Decreased GFR  5/31/09 bun 18,creat 1.2  1/14/10 bun 28,creat 1.3,GFR 40  9/20/12 bun 37,creat 1.1,GFR 48  9/25/13 bun 25,creat 1.2,GFR 44  9/26/14 bun 26,creat 1.1,GFR 45  2/23/15 bun 20,creat 1.1,GFR 48  4/15/16 bun 31,creat 1.1,GFR 45  7/6/16 bun 29,creat 1.1,GFR 44   5/12/17 bun 35,creat 1.1,GFR 44  11/2/17 bun 22,creat 1.28,GFR 40  12/9/17 bun 29,creat 1.1,GFR 43  10/4/18 bun 31,creat 1.5 at [facility]  1/18/19 bun 33,creat 1.34,GFR 38,PTH 53,calcium 9.7,phos 3.6,spep negative  2/4/20 bun 30,creat 1.24, GFR 41, PTH 58, calcium 9.7, phos 3.9  8/20/20 bun 35,creat 1.12,GFR 46  9/28/20 bun 31,creat 1.1,GFR 47  6/8/21 bun 38,creat 1.46,GFR 34  6/27/21 Na 125,bun 64,creat 1.52 (hospital admission acute sacral fracture)  9/15/21 bun 25,creat 0.84,GFR>60  10/14/21 bun 15,creat 0.75,GFR>60,PTH 31calcium  9.4,phos 3.3,SPEP negative  5/10/22 bun 23,creat 1.18,GFR 45  8/14/22 bun 27,creat 1.06,GFR 51  10/15/22 bun 35,creat 1.27,GFR 41 s/p ankle orif  10/12/22 bun 26,creat 1.19,GFR 45 hospitalized ankle fracture  10/25/22 bun 14,creat 1.02,GFR 54 rehab hospital discharge  11/8/22 bun 13,creat 0.87,GFR 65     Dyslipidemia  4/11 chol 159,trig 84,hdl 47,ldl 95,cpk 114  7/12 chol 163,trig 120,hdl 49,ldl 90, crp 1.1 on lipitor 20 mg  3/26/13 chol 159,trig 99,hdl 46,ldl 93 on lipitor 20 mg  9/25/13 chol 164,trig 100,hdl 47,ldl 97 on lipitor 20 mg  9/12/14 cardiology note lower extremity weakness, hold lipitor x3 months, labs ordered  12/15/14 cardiology start low dose lipitor 10 mg  1/22/15 off lipitor will resume 10 mg and repeat labs 4 weeks  2/24/15 chol 179,trig 111,hdl 54,ldl 103 on lipitor 10 mg  2/1/16 cardiology note restart lipitor 20 mg    4/15/16 chol 163,trig 102,hdl 48,ldl 95 on lipitor 20 mg  5/12/17 chol 186,trig 101,hdl 47,ldl 119 on lipitor 20 mg intermittently will start taking daily  11/1/17 chol 146,trig 73,hdl 42,ldl 89 on lipitor 20 mg  12/9/17 chol 133,trig 74,hdl 49,ldl 69 on lipitor 80 mg higher dose due to recent transient ischemic attack  10/4/18 chol 126,trig 98,hdl 44,ldl 62 on lipitor 80 mg at [facility]  1/18/19 chol 161,trig 103,hdl 45,ldl 95 on lipitor 80 mg  2/4/20 chol 121,trig 71,hdl 45,ldl 62 on lipitor 80 mg  8/17/20 stop lipitor due to muscle pain   9/17/20 improved off lipitor repeat lipid pending  10/3/20 chol 240,trig 111,hdl 60,ldl 158, recommend start crestor 10 mg and repeat labs 6 weeks  10/26/20 chol 158,trig 106,hdl 62,ldl 75 on crestor 20 mg  4/20/21  off statin, she agrees to try zetia  1/15/22 chol 205,trig 80,hdl 56,ldl 133 did not start zetia, will start zetia 10 mg  Lipitor,crestor muscle pain      gait disorder  4/22/21 custom PT evaluation   3/1/22 [facility] PT evaluation      Gastroesophageal reflux  6/27/07 EGD per [facility] hiatal hernia, start prevacid 30  mg  7/12 protonix 20 mg qday  11/16/12 [facility] note cont prilosec  1/5/16 change to protonix 40 mg from prilosec  2/4/20 vit d 67  10/14/21 vit d 78,b12 716 on prilosec  1/15/22 vit d 81,b12 839 on prilosec  5/10/22 magnesium 2.0, vit d 72  11/8/22 b12 797,vit d 65     history insomnia   1/30/17 trial of trazodone 50 mg  4/4/17 side effects with trazodone drowsiness, discontinued     History neutropenia  4/16/11 wbc 3.9  7/30/13 wbc 5.6  11/7/16 wbc 5.7  5/11/17 wbc 4.7  2/20/18 wbc 7.2  3/6/18 wbc 5.9  1/16/19 wbc 4.0  6/8/19 wbc 4.3 (44%N, 39%L)  2/4/20 wbc 4.1 (42%N,41%L)  8/20/20 wbc 4.6  11/4/21 wbc 4.0  1/15/22 wbc 3.9 (43%N,36%L)  5/10/22 wbc 4.0  8/2/22 wbc 4.3,b12 777,flow cytometry negative,SPEP negative  8/14/22 wbc 4.9  10/18/22 wbc 5.6  11/8/22 wbc 4.4     History pelvic fracture  6/22/21 CT pelvis without post reconstruction, comminuted mildly displaced fracture of right inferior and superior pubic rami, right sacral ritu fracture, right hip prosthesis normally located, moderate degenerative change left hip  6/25/21 hospital admission, 6/28/21 hospital discharge, admitted with pelvic fracture, seen by orthopedics no surgical intervention necessary, seen by physical therapy and occupational therapy recommended skilled nursing placement, urinary retention discharged with davenport catheter  6/25/21 CT pelvis without, stable appearance of minimally displaced fractures right sacral ritu, right pubic ramus posteriorly, and right ischial ramus, no acute pelvic fracture, no hip dislocation, right hip total arthroplasty in place  6/27/21 physical therapy hospital note recommend postacute placement for additional physical therapy services  10/7/21 on neurontin 100 mg tid, increase to 200 mg tid     history of pulmonary hypertension  11/17/18 cardiology note asymptomatic, continue cholesterol medication, continue to monitor echo aortic valve, repaired ascending aorta, follow-up yearly  11/21/18 echo normal LV function,  EF 60%, mild LVH, grade 2 diastolic dysfunction, mild aortic insufficiency, moderate tricuspid regurgitation, RVSP 50  12/6/19 echo normal LV function EF 65%, RVSP 43, moderate tricuspid regurgitation, mild to moderate aortic insufficiency  10/22/20 echo EF 70%, grade 1 diastolic dysfunction, mild AR, RVSP 30     History shingles     History shoulder pain  4/4/17 right shoulder pain right shoulder x-ray ordered, right knee pain, referral [facility] PT, tried celebrex no benefit, will try naproxen 500 mg twice daily and zanaflex at night  4/5/17 x-ray right shoulder calcifications consistent with calcific tendinitis or hydroxyapatite deposition  5/12/17 MRI right shoulder ordered, persistent pain despite physical therapy  5/18/17 MRI right shoulder; full-thickness supraspinatus tendon tear  5/22/17 right shoulder injection, has been going to physical therapy 14 treatments some improvement, right shoulder injection provided, if no improvement in has appt  in october, if need sooner appt try   6/9/17 [facility] PT note   7/10/17 custom PT note       History TIA  11/1/17 hospital admission, 11/2/17 hospital discharge, facial numbness, transient numbness in both hands, resolved, CT, MRI head no acute infarct, blood pressure stable, discharged home, patient states she was on aspirin at the time of admission  11/1/17 CT head stable right mid brain likely chronic lacunar infarction, periventricular white matter disease  11/1/17 MRI brain no acute abnormality, moderate chronic microvascular stomach disease, chronic microhemorrhages left frontal and right parietal lobes, chronic lacunar infarct left basal ganglia and blanco, or cerebral atrophy  11/1/17 MR-MRA head without; negative  11/2/17 echo normal LV size and function, EF 70%, RVSP 50, mild AR and TR  11/28/17 awaiting possible right hip replacement per orthopedics , given recent possible TIA, cannot provide clearance, she will like second opinion  from cardiology referral made to dr.bryan de leon, changed asa to plavix  12/12/17 ultrasound carotid less than 50% internal carotid stenosis bilateral  12/20/17 dr.bryan de leon cardiology note most recent echocardiogram looks fine, will repeat CT scan follow aortic repair  2/20/18 episode of supraventricular tachycardia in the emergency room, davenport catheter removed, symptoms resolved with repeat EKG sinus rhythm  11/17/18 cardiology note asymptomatic, continue cholesterol medication, continue to monitor echo aortic valve, repaired ascending aorta, follow-up yearly  11/21/18 echo normal LV function, EF 60%, mild LVH, grade 2 diastolic dysfunction, mild aortic insufficiency, moderate tricuspid regurgitation, RVSP 50  12/6/19 echo normal LV function EF 65%, RVSP 43, moderate tricuspid regurgitation, mild to moderate aortic insufficiency  10/22/20 echo EF 70%, grade 1 diastolic dysfunction, mild AR, RVSP 30   12/14/20 cardiology note maintaining sinus rhythm, given frequent symptomatic episodes of paroxysmal atrial fibrillation and age, recommend amiodarone 200 mg, follow-up 6 month  4/20/21  sinus on exam, patient would like to discontinue amiodarone understanding she likely will return to intermittent atrial fibrillation, discussed sotalol, dronedarone, tikosyn as alternatives, patient declines any of those alternatives, ablation is not appropriate given her age   1/10/22 cardiology note sinus rhythm, atrial fibrillation episodes are not significantly symptomatic, recommend she check her heart rate when atrial fibrillation episodes occurs, repeat follow-up echo ordered, follow-up 6 months  3/22/22 echo EF 55%, diastolic dysfunction grade II, mild MR and TR, RVSP 28    history uti  10/21/22 UTI e.coli resistant to ampicillin,ciprofloxacin,levaquin, sensitive to cefuroxime,bactrim,macrodantin     Hypertension  1/10/11 [facility] note cardiac catheterization normal systolic function  3/11 [facility] echo moderate  aortic insufficiency, moderate mitral insufficiency, moderate tricuspid insufficiency, normal LV function  5/12 echo [facility] normal LV function EF 60%, moderate to severe left atrial enlargement, RVSP 32    9/26/13 CT thoracic aorta no evidence of aneurysm, small hiatal hernia  9/26/13 Persantine thallium uniform breast tissue attenuation, cannot rule out underlying ischemic, LVEF 67%  10/3/13 on toprol-XL 25 mg half a tablet daily    12/13/13 cardiology note cont same meds, repeat echo and follow up 6 months  1/2/14 echo mild LVH, grade 1 diastolic dysfunction, ascending aortic 4.0, no change compared with ZAK 2010  5/15/14 cardiology note; increase benazepril to 40 mg qday,continue toprol XL 25 mg daily  6/17/15 cardiology note continue meds, repeat echo 6 months  2/1/16 cardiology note moderate pulmonary hypertension and snoring, nocturnal pulse oximetry ordered  6/30/16 cardiology note patient declines overnight pulse oximetry  11/1/17 echo normal LV size and function, EF 70%, mild aortic insufficiency and mild tricuspid regurgitation, RVSP 50, aortic root 3.2 cm  11/3/17 cardiology note hypertension stable, status post thoracic aortic aneurysm repair, headache unspecified, ESR ordered  11/28/17 on benazepril 40 mg,toprol 25mg, add norvasc 5 mg  12/12/17 ultrasound carotid less than 50% internal carotid stenosis bilateral  12/20/17  [facility] cardiology note most recent echocardiogram looks fine, will repeat CT scan follow aortic repair  2/20/18 episode of supraventricular tachycardia in the emergency room, davenport catheter removed, symptoms resolved with repeat EKG sinus rhythm  11/17/18 cardiology note asymptomatic, continue cholesterol medication, continue to monitor echo aortic valve, repaired ascending aorta, follow-up yearly  11/21/18 echo normal LV function, EF 60%, mild LVH, grade 2 diastolic dysfunction, mild aortic insufficiency, moderate tricuspid regurgitation, RVSP 50  1/18/19 urine mac 45 on  benazepril 40 mg, toprol 25 mg, norvasc 5 mg  12/6/19 echo normal LV function EF 65%, RVSP 43, moderate tricuspid regurgitation, mild to moderate aortic insufficiency  10/22/20 echo EF 70%, grade 1 diastolic dysfunction, mild AR, RVSP 30   12/14/20 cardiology note maintaining sinus rhythm, given frequent symptomatic episodes of paroxysmal atrial fibrillation and age, recommend amiodarone 200 mg, follow-up 6 month  4/6/21 on lotensin 40 mg and metoprolol 25 mg  4/20/21  sinus on exam, patient would like to discontinue amiodarone understanding she likely will return to intermittent atrial fibrillation, discussed sotalol, dronedarone, tikosyn as alternatives, patient declines any of those alternatives, ablation is not appropriate given her age   1/10/22 cardiology note sinus rhythm, atrial fibrillation episodes are not significantly symptomatic, recommend she check her heart rate when atrial fibrillation episodes occurs, repeat follow-up echo ordered, follow-up 6 months  3/22/22 echo EF 55%, diastolic dysfunction grade II, mild MR and TR, RVSP 28  4/18/22 on lotensin 40 mg and metoprolol 25 mg  5/24/22  cardiology note paroxysmal atrial fibrillation, sinus today, on amiodarone, blood pressures not controlled, increase toprol from 25 to 50 mg, suggest CTA aorta, follow-up 6 months      Hypothyroid  4/11 tsh 9 start synthroid 50  4/11 tsh 6,free t3 3.1,free t4 1.2,tpo negative  7/1/11 tsh 2.1 cont synthroid 50 mcg  7/12 tsh 3.4 cont synthroid 50 mcg  7/31/13 tsh 3.2 on synthroid 50 mcg  9/25/13 tsh 1.7 on synthroid 50 mcg  2/24/15 tsh 4.9 on synthroid 50 mcg; increase to synthroid 75 mcg, repeat tsh in 6 weeks  4/14/15 tsh 1.1 on synthroid 75 mcg  4/15/16 tsh 2.3 on synthroid 75 mcg  5/12/17 tsh 1.2 on synthroid 75 mcg  11/1/17 tsh 2.1 on synthroid 75 mcg  1/18/19 tsh 2.2 on synthroid 75 mcg  2/4/20 tsh 3.1 on synthroid 75 mcg  8/20/20 tsh 1.6 on synthroid 75 mcg  10/14/21 tsh 1.5 on synthroid  75 mcg  1/15/22 tsh 3.4 on synthroid 75 mcg  10/18/22 tsh 1.6 on synthroid 75 mcg     neck arthritis  2/10/14 [facility] note; x-ray cervical spine DJD, right shoulder steroid injection  8/14/22 CT cervical spine several multilevel degenerative changes, no acute fracture     Osteoporosis  Had been on fosamax [id number]    8/10 dexa LS -2.0,hip -1.5 await old dexa result, she will get copy for me  4/11 vit d 35  9/12 dexa LS -3.2,hip -1.4  2/13/13 reclast IV  7/31/13 vit d 33 cont 2000 units  2/18/14 reclast IV  2/2/15 dexa LS -3.1,hip -1.9; FRAX 35.5 % major,12.6% hip  2/18/15 reclast IV  2/24/15 vit d 33  4/15/16 vit d 36  5/12/17 vit d 36  8/7/18 dexa left forearm -4.2,hip -2.5 resume reclast   8/31/18 reclast IV  1/18/19 vit d 27 increase from 2000 to 4000 units  2/4/20 vit d 67, PTH 58  6/25/21 vit d 75  10/14/21 vit d 78  1/15/22 vit d 81  5/11/22 vit d 72  8/14/22 CT thoracic spine moderate multilevel degenerative changes, accentuated kyphosis, mild/moderate compression deformities mild height loss T3 chronic, moderate loss T7 and T8 chronic, mild height loss T11 chronic  11/8/22 vit d 65     Paroxysmal atrial fibrillation  11/1/17 hospital admission, 11/2/17 hospital discharge, facial numbness, transient numbness in both hands, resolved, CT, MRI head no acute infarct, blood pressure stable, discharged home, patient states she was on aspirin at the time of admission  11/1/17 CT head stable right mid brain likely chronic lacunar infarction, periventricular white matter disease  11/1/17 MRI brain no acute abnormality, moderate chronic microvascular stomach disease, chronic microhemorrhages left frontal and right parietal lobes, chronic lacunar infarct left basal ganglia and blanco, or cerebral atrophy  11/1/17 MR-MRA head without; negative  2/20/17 episode of supraventricular tachycardia in the emergency room, davenport catheter removed, symptoms resolved with repeat EKG sinus rhythm  12/20/17  [facility] cardiology  note most recent echocardiogram looks fine, will repeat CT scan follow aortic repair  11/17/18 cardiology note asymptomatic, continue cholesterol medication, continue to monitor echo aortic valve, repaired ascending aorta, follow-up yearly  11/21/18 echo normal LV function, EF 60%, mild LVH, grade 2 diastolic dysfunction, mild aortic insufficiency, moderate tricuspid regurgitation, RVSP 50  12/6/19 echo normal LV function EF 65%, RVSP 43, moderate tricuspid regurgitation, mild to moderate aortic insufficiency  9/28/20 emergency room note EKG atrial fibrillation new onset  9/30/20 cardiology note sinus rhythm, start eliquis 5 mg bid and increase toprol to 25 mg daily, echo ordered, follow up 3 months   10/22/20 echo EF 70%, grade 1 diastolic dysfunction, mild AR, RVSP 30   12/14/20 cardiology note maintaining sinus rhythm, given frequent symptomatic episodes of paroxysmal atrial fibrillation and age, recommend amiodarone 200 mg, follow-up 6 month  4/20/21  cardiology note sinus on exam, patient would like to discontinue amiodarone understanding she likely will return to intermittent atrial fibrillation, discussed sotalol, dronedarone, tikosyn as alternatives, patient declines any of those alternatives, ablation is not appropriate given her age  12/20/21 on metoprolol and eliquis  1/10/22 cardiology note sinus rhythm, atrial fibrillation episodes are not significantly symptomatic, recommend she check her heart rate when atrial fibrillation episodes occurs, repeat follow-up echo ordered, follow-up 6 months   3/22/22 echo EF 55%, diastolic dysfunction grade II, mild MR and TR, RVSP 28  5/24/22  cardiology note paroxysmal atrial fibrillation, sinus today, on amiodarone, blood pressures not controlled, increase toprol from 25 to 50 mg, suggest CTA aorta, follow-up 6 months  10/21/22 hospital EKG a.fibrillation      Preventative health  6/27/09 colon per [facility] no records  10/19/04 pneumovax   9/9/11  zoster vaccine  4/14/15 prevnar  8/7/18 dexa left forearm -4.2,hip -2.5  9/28/19 pneumovax  11/7/19 mammogram  11/12/19 shingrix second  9/17/20 tdap   11/8/22 vit d 65  11/23/22 covid bivalent     s/p ankle right orif  10/12/22 ER note patient fell getting out of bed  10/12/22 x-ray right ankle distal fibular and medial malleolar fracture  10/12/22 x-ray right tibia and fibula, right distal fibular fracture at metadiaphysis, probable medial malleolar fracture  10/12/22 CT right ankle without plus reconstruction, recent comminuted fracture distal right fibula including lateral malleolus, 2 mm lateral offset distal fragments with slight apex medial angulation, recent comminuted fracture posterior malleolus with extension into the lateral and anterior margins of the tibial plafond and comminuted fracture medial malleolus up to 3 mm separation and offset  10/14/22  orthopedic operative note ORIF right trimalleolar ankle fracture with fixation of the medial and lateral malleoli, internal fixation right syndesmosis  10/17/22 rehabilitation hospital admission, 10/29/22 rehabilitation hospital discharge, requires max assist with ADLs, support from spouse and acceptable post discharge living situation, functional status at discharge dentures for eating, independent grooming, bathing standby assist with hand-held shower, grab bar, bench, recommend front wheel walker, distance walked 2 to 6 feet, distance propelled wheelchair 180 feet, discharged on gabapentin 100 mg bid, oxycodone 7.5 mg   11/30/22  orthopedic note 6-week follow-up ORIF ankle fracture, doing well, will transition out of boot and continue physical therapy, follow-up 2 months     s/p hip arthroplasty  9/21/17 MRI right hip mild trochanteric bursitis, mild femoral acetabular joint arthritis  10/12/17  orthopedic no proceed with right total hip arthroplasty, x-ray AP pelvis and right hip shows early arthrosis with calcifications and  DJD  2/14/18  orthopedics operative note at [facility] right hip total arthroplasty, gluteus medius and minimus tendon repair  3/27/18 [place] [facility] note   5/8/18 [place] [facility] note xray right hip stable total hip arthroplasty, continue physical therapy  4/3/19 [facility] discharge  4/5/21  [facility] pain note right trochanteric bursitis steroid injection, trigger point injection low back  4/22/21 [facility] PT evaluation   6/21/21 [facility] PT discharge note   6/25/21 CT pelvis without, stable appearance of minimally displaced fractures right sacral ritu, right pubic ramus posteriorly, and right ischial ramus, no acute pelvic fracture, no hip dislocation, right hip total arthroplasty in place     s/p knee arthroplasty  4/10 MRI right knee complex tear medial meniscus, horizontal cleavage tear lateral meniscus, chondromalacia patella, moderate-sized baker's cyst  5/10 right meniscus knee repair   5/10 told by  had gout on surgery had pathology report, I await that report, question gout seen on pathology report done during repair of right meniscus knee surgery.  7/10 uric acid 6.3, await starting allopurinol depending on the operative report  8/5/10 reviewed orthopedics notes , operative report indicates crystal deposition, could be gout or pseudogout, no biopsy results, suspect chondrocalcinosis  10/11 dr.parlasca najera note, MRI pending  8/7/10 reviewed pathology report right knee tissue, marked degenerative changes with focal calcification, no mention of gout. Based on this and a normal uric acid level, I do not believe she has gout, has no hyperuricemia medications would be indicated  10/11 dr.parlasca najera left knee meniscectomy and arthroscopy  1/12 dr.parlasca najera left knee arthroplasty  2/29/16  orthopedics x-ray right knee advanced DJD on the right knee corticosteroid injection performed, prescription voltaren gel  6/13/16  orthopedic note x-rays  demonstrate right knee advanced DJD and resurfaced patella, offer right knee total replacement followed by patellar resurfacing after she recovers from her right knee  9/8/16  orthopedic's operative note right knee total arthroplasty  11/2/16  orthopedic note, follow-up right knee, x-ray right knee shows subluxation patella, traumatic evulsion VMO needs reexploration and repair  11/10/16  orthopedic's operative note VMO quadriceps avulsion repair status post total knee replacement  11/23/16  orthopedic no follow-up quadriceps repair, continue crutches in full extension, follow-up one month and then start passive range of motion 0-40°  2/4/17  orthopedic note, continue physical therapy, follow-up this summer  4/17/17 custom PT note  6/9/17 custom PT note     s/p lumbar surgery  6/03 L4-5 epidural     7/06  spinal surgery operative note decompression, foraminotomy. L4-L5 posterior fusion, L4-L5 allograft    3/5/14 MRI lumbar spine grade 2 spondylolisthesis L4-L5 with previous laminectomy and posterior fusion, left sided pedicle screw fixation L4-L5, moderate central canal stenosis L5-S1 secondary to facet arthropathy, mild to moderate multilevel neural foraminal narrowing  12/8/14  pain OMAR note; right lower extremity radiculitis L4-L5 lesion, myofascial lumbosacral pain, trochanteric bursitis, followup as needed  4/2/15  pain procedure note right L4-L5 and right L5-S1 SSNRB under fluoroscopy  4/27/15  pain note; right trochanteric bursal injection, trigger point injections performed, also set up SI joint injections  8/3/15 [facility] neurosurgery note, lumbar x-ray flexion and extension, MRI lumbar spine without contrast, followup   8/9/15 MRI lumbar spine, previous L4-L5 left  fusion and laminectomy, L3-L4 moderate bilateral facet arthropathy, mild disc bulge, L4-L5 severe bilateral facet arthropathy, moderate to severe bilateral  neural foraminal stenosis, L5-S1 moderate facet arthropathy  8/28/15  neurosurgery note previous posterior fusion L4-L5, does have L3-L4 disc bulge with central canal stenosis, recommend L3-L5 decompression  8/31/15  pain note; right lower extremity radiculitis, myofascial lumbosacral pain, start hydrocodone 5 mg, continued SI joint exercises, perform trochanteric bursal injection right hip, trigger points lumbar region  10/28/15  neurosurgery note, will schedule for posterior L3-L4 laminectomy and fusion with redo L4-5 laminectomy and exploration of fusion, surgical clearance per cardiology   11/24/15  neurosurgery operative note expiration removal L4-L5 hardware on the left, bilateral facetectomies L4, L3-L4 transforaminal lumbar interbody fusion  12/9/15 [facility] neurosurgery note s/p L4-S1 fusion on 11/24/15 , follow up in 4 weeks  12/23/15  neurosurgery note, clear to restart physical therapy if she would like after one month, follow-up 3 months  6/9/20  [facility] pain note trigger point injections trochanteric bursa right side  8/25/20  [facility] pain procedure note epidural right L4 nerve root   4/5/21  [facility] pain note right trochanteric bursitis steroid injection, trigger point injection low back  4/22/21 custom PT evaluation   6/21/21 custom PT discharge note   6/27/22 on neurontin 300 mg two tid will taper off     s/p TOMY  Sees gyn on HRT estradiol for 20 yrs ()     s/p thoracic aneurysm repair  6/29/06 CT-CTA chest with and without postprocessing; stable ascending thoracic aortic aneurysm maximum diameter 4.8, just distal to the aortic valve maximum diameter 4.3  4/9/07 CT-CTA chest with and without processing; stable ascending aortic thoracic aneurysm 4.5 x 4.6 cm  6/8/09 CT chest with; stable 4.7 ascending aorta aneurysm  10/15/09 CT chest without; dilated ascending thoracic aorta 4.9 cm aneurysm increased from 4.7  1/11/10   cardiovascular surgery operative note ascending thoracic aorta aneurysm repair  9/26/13 CT thoracic aorta evaluation; status post repair ascending thoracic aortic aneurysm without evidence of dissection or leak  1/2/14 echo mild LVH, grade 1 diastolic dysfunction, ascending aortic 4.0, no change compared with ZAK 2010  5/15/14 cardiology note  6/17/15 cardiology note cont meds,repeat echo 6 months  2/1/16 cardiology note moderate pulmonary hypertension and snoring, nocturnal pulse oximetry ordered  11/1/17 echo normal LV size and function, EF 70%, mild aortic insufficiency and mild tricuspid regurgitation, RVSP 50, aortic root 3.2 cm  11/3/17 cardiology note stable  12/20/17  [facility] cardiology note most recent echocardiogram looks fine, will repeat CT scan follow aortic repair  11/17/18 cardiology note asymptomatic, continue cholesterol medication, continue to monitor echo aortic valve, repaired ascending aorta, follow-up yearly  11/21/18 echo normal LV function, EF 60%, mild LVH, grade 2 diastolic dysfunction, mild aortic insufficiency, moderate tricuspid regurgitation, RVSP 50  12/6/19 echo normal LV function EF 65%, RVSP 43, moderate tricuspid regurgitation, mild to moderate aortic insufficiency  10/22/20 echo EF 70%, grade 1 diastolic dysfunction, mild AR, RVSP 30   4/20/21  sinus on exam, patient would like to discontinue amiodarone understanding she likely will return to intermittent atrial fibrillation, discussed sotalol, dronedarone, tikosyn as alternatives, patient declines any of those alternatives, ablation is not appropriate given her age   1/10/22 cardiology note sinus rhythm, atrial fibrillation episodes are not significantly symptomatic, recommend she check her heart rate when atrial fibrillation episodes occurs, repeat follow-up echo ordered, follow-up 6 months  3/22/22 echo EF 55%, diastolic dysfunction grade II, mild MR and TR, RVSP 28  5/24/22   cardiology note paroxysmal atrial fibrillation, sinus today, on amiodarone, blood pressures not controlled, increase toprol from 25 to 50 mg, suggest CTA aorta, follow-up 6 months     Tricuspid regurgitation  11/17/18 cardiology note asymptomatic, continue cholesterol medication, continue to monitor echo aortic valve, repaired ascending aorta, follow-up yearly  11/21/18 echo normal LV function, EF 60%, mild LVH, grade 2 diastolic dysfunction, mild aortic insufficiency, moderate tricuspid regurgitation, RVSP 50  12/6/19 echo normal LV function EF 65%, RVSP 43, moderate tricuspid regurgitation, mild to moderate aortic insufficiency  10/22/20 echo EF 70%, grade 1 diastolic dysfunction, mild AR, RVSP 30   4/20/21  sinus on exam, patient would like to discontinue amiodarone understanding she likely will return to intermittent atrial fibrillation, discussed sotalol, dronedarone, tikosyn as alternatives, patient declines any of those alternatives, ablation is not appropriate given her age   3/22/22 echo EF 55%, diastolic dysfunction grade II, mild MR and TR, RVSP 28  5/24/22  cardiology note paroxysmal atrial fibrillation, sinus today, on amiodarone, blood pressures not controlled, increase toprol from 25 to 50 mg, suggest CTA aorta, follow-up 6 months     Urinary retention  8/18/21  urology note urinary retention, urethral davenport catheter in place, catheter exchange performed, failed voiding trial today, follow-up 1 month  9/28/21 urology note, urodynamic study cystometrogram showing bladder compliance is reduced, detrusor pressure during filling is high, leaking observed during the filling phase, electromyography shows no change in activity with increased abdominal pressure, EMG activity no change during voiding, complex CMG uroflow patient leak small amount prior to catheter falling out but when attempted to void was not able to, impression is poor detrusor compliance, complete  retention, no uninhibited contractions, normal capacity, normal bladder sensation, impaired pelvic floor response to voiding; suprapubic tube discussed with patient as best option to manage bladder while she is healing from a pelvic break  9/28/21 urology note davenport catheter insertion post void residual 481 mL, will schedule suprapubic catheter  11/4/21 suprapubic catherer placement in hospital for urinary retention  12/6/21 urology note catheter exchange  1/9/22 urology note suprapubic catheter exchange  3/8/22 urology note suprapubic catheter exchange  6/6/22 urology note suprapubic catheter exchange  7/7/12 urology note suprapubic catheter exchange  8/11/22 urology [place] note suprapubic catheter exchange, urodynamic study showed minimal bladder contractions throughout, exchanging SPT every month, she has tried periodically clamping SPT and was able to void on her own with urgency but overall stable, discussed catheter removal and subsequent TOV as symptoms are stable when voiding, she is agreeable, she will complete diary of voiding quantities with SPT plugged and arrange SPT removal if stable  10/21/22 UTI e.coli resistant to ampicillin,ciprofloxacin,levaquin, sensitive to cefuroxime,bactrim,macrodantin     UTI  7/6/16 UTI klebsiella pneumoniae sensitive to all antibiotics except ampicillin, start bactrim x 5 days  1/18/19 UTI enterobacter cloacae resistant to ampicillin, cephalothin, penicillin, bactrim, sensitive to cefuroxime, ciprofloxacin and levaquin, nitrofurantoin  5/26/19 UTI klebsiella given omnicef, organism resistant ampicillin, ciprofloxacin, levofloxacin, bactrim  8/18/21  urology note urinary retention, urethral davenport catheter in place, catheter exchange performed, failed voiding trial today, follow-up 1 month  11/4/21 suprapubic catherer placement in hospital for urinary retention  10/21/22 UTI e.coli resistant to ampicillin,ciprofloxacin,levaquin, sensitive to  "cefuroxime,bactrim,macrodantin                 Patient Active Problem List   Diagnosis    S/p lumbar surgery    Hypertension    Dyslipidemia    S/P TOMY (total abdominal hysterectomy)    Preventative health care    S/P knee bilateral arthroplasty    S/P appendectomy    Osteoporosis, idiopathic    Hypothyroid    History of shingles    GERD (gastroesophageal reflux disease)    Tricuspid regurgitation    Neck arthritis    S/P thoracic aortic aneurysm repair    Decreased GFR    History of insomnia    History of shoulder pain    History of transient ischemic attack (TIA)    S/p hip right arthroplasty    Paroxysmal atrial fibrillation (HCC)    On continuous oral anticoagulation    Gait disorder    History of pelvic fracture    Urinary retention    Anemia    History of neutropenia    Senile osteoporosis    S/p ankle right orif    History of UTI                Patient Care Team:  Tee Tran M.D. as PCP - General  Kobi Wheeler M.D. as Consulting Physician (Interventional Cardiology)  Brett Santos II, O.D. as Consulting Physician (Optometry)  Angelic Adams as Consulting Physician (Dentistry)      ROS           Objective     BP (!) 158/84 (BP Location: Right arm, Patient Position: Sitting, BP Cuff Size: Adult)   Pulse 73   Temp 36.6 °C (97.9 °F)   Ht 1.626 m (5' 4\")   Wt 68 kg (150 lb)   BMI 25.75 kg/m²      Physical Exam  Vitals and nursing note reviewed.   Constitutional:       Appearance: Normal appearance.   HENT:      Head: Normocephalic and atraumatic.      Right Ear: External ear normal.      Left Ear: External ear normal.   Eyes:      Conjunctiva/sclera: Conjunctivae normal.   Cardiovascular:      Rate and Rhythm: Normal rate and regular rhythm.      Heart sounds: Normal heart sounds.   Pulmonary:      Effort: Pulmonary effort is normal. No respiratory distress.      Breath sounds: Normal breath sounds.   Abdominal:      General: There is no distension.   Musculoskeletal:         General: No " swelling.   Skin:     Coloration: Skin is not jaundiced.      Findings: No bruising.   Neurological:      General: No focal deficit present.      Mental Status: She is alert.   Psychiatric:         Mood and Affect: Mood normal.         Behavior: Behavior normal.   Right ankle no significant edema, normal dorsiflexion, plantarflexion, no tenderness to palpation                              Assessment & Plan         Assessment  #1 status post right ankle ORIF in October recovering well, completed home physical therapy, has seen orthopedics and of November, walking boot has been removed, currently walker for ambulation, no falls    #2 paroxysmal atrial fibrillation sinus on exam, recent ER visit December 1, EKG showed normal sinus, followed by cardiology remains on metoprolol 50 mg, Eliquis, she has had episodes of symptoms of palpitations which have been occurring more commonly, no associated chest pain with that, no clear triggers, she has symptoms of heart racing periodically at least in the record she should be on metoprolol 50 mg 1-1/2/day but she is only taking 50 mg daily    #3 hypertension blood pressures running 130s on metoprolol 50 mg and lotensin 40 mg    #4 osteoporosis due for dexa, prolia ordered previously, she has not had the opportunity to have that done yet    #5 Urinary frequency, no symptoms of burning, no blood in the urine    #6 neutropenia WBC 4.4 no recurrent infections          Plan  #1 follow-up physical therapy, referral placed to [facility] physical therapy    #2 increase metoprolol to 50 mg 1-1/2 tablets daily, which is 75 mg total per day, check blood pressure and heart rate daily and record, continue lotensin     #3 follow-up cardiology    #4 bone density has been ordered she can call to schedule that test, I will order that just in case    #5 continue no NSAIDs    #6 urinalysis point-of-care ordered, sent for culture    #7 prolia ordered in epic treatment plan again     #8 follow up  orthopedics    #9 follow up 3 months

## 2023-01-06 DIAGNOSIS — N39.0 URINARY TRACT INFECTION WITHOUT HEMATURIA, SITE UNSPECIFIED: ICD-10-CM

## 2023-01-06 DIAGNOSIS — R35.0 URINARY FREQUENCY: ICD-10-CM

## 2023-01-06 RX ORDER — METHYLPREDNISOLONE SODIUM SUCCINATE 125 MG/2ML
125 INJECTION, POWDER, LYOPHILIZED, FOR SOLUTION INTRAMUSCULAR; INTRAVENOUS PRN
Status: CANCELLED | OUTPATIENT
Start: 2023-07-05

## 2023-01-06 RX ORDER — EPINEPHRINE 1 MG/ML(1)
0.5 AMPUL (ML) INJECTION PRN
Status: CANCELLED | OUTPATIENT
Start: 2023-07-05

## 2023-01-06 RX ORDER — DIPHENHYDRAMINE HYDROCHLORIDE 50 MG/ML
50 INJECTION INTRAMUSCULAR; INTRAVENOUS PRN
Status: CANCELLED | OUTPATIENT
Start: 2023-07-05

## 2023-01-09 ENCOUNTER — TELEPHONE (OUTPATIENT)
Dept: MEDICAL GROUP | Facility: MEDICAL CENTER | Age: 88
End: 2023-01-09
Payer: MEDICARE

## 2023-01-09 RX ORDER — CEFUROXIME AXETIL 250 MG/1
250 TABLET ORAL 2 TIMES DAILY
Qty: 14 TABLET | Refills: 0 | Status: ON HOLD | OUTPATIENT
Start: 2023-01-09 | End: 2023-02-08

## 2023-01-09 NOTE — TELEPHONE ENCOUNTER
Malayazan Samson Wada  [phone] (home)     Jaclyn called and LM. States that when she saw you last week, you told her you were sending an Rx to the pharmacy that would help her with her urinary issues. She has checked her pharmacy several times and she states that they have not rec'd anything. Please advise.

## 2023-01-09 NOTE — TELEPHONE ENCOUNTER
Please let the patient know that I was waiting for the urine culture result.  The final urine culture result was not available on Saturday or Sunday.    I did check just now and the urine test result is final and shows an infection so we will start her on antibiotic called cefuroxime, 1 pill twice a day for 7 days.  The main side effects are nausea, loose stools and a rash.  I will send that to [facility].

## 2023-01-26 ENCOUNTER — TELEPHONE (OUTPATIENT)
Dept: MEDICAL GROUP | Facility: MEDICAL CENTER | Age: 88
End: 2023-01-26
Payer: MEDICARE

## 2023-01-26 DIAGNOSIS — R26.9 GAIT DISORDER: ICD-10-CM

## 2023-01-26 DIAGNOSIS — M79.606 PAIN OF LOWER EXTREMITY, UNSPECIFIED LATERALITY: ICD-10-CM

## 2023-01-26 NOTE — TELEPHONE ENCOUNTER
There still is an active referral open to physical therapy from last year.    I will resubmit the physical therapy order again.

## 2023-01-26 NOTE — TELEPHONE ENCOUNTER
Yvonne Marie Wada  [phone] (home)     Pt called and LM. States that she has asked you several times for a REF to PT. Still has not been contacted. Please advise.

## 2023-02-07 ENCOUNTER — HOSPITAL ENCOUNTER (OUTPATIENT)
Facility: MEDICAL CENTER | Age: 88
End: 2023-02-08
Attending: EMERGENCY MEDICINE | Admitting: HOSPITALIST
Payer: MEDICARE

## 2023-02-07 ENCOUNTER — APPOINTMENT (OUTPATIENT)
Dept: RADIOLOGY | Facility: MEDICAL CENTER | Age: 88
End: 2023-02-07
Attending: EMERGENCY MEDICINE
Payer: MEDICARE

## 2023-02-07 ENCOUNTER — APPOINTMENT (OUTPATIENT)
Dept: CARDIOLOGY | Facility: MEDICAL CENTER | Age: 88
End: 2023-02-07
Attending: HOSPITALIST
Payer: MEDICARE

## 2023-02-07 DIAGNOSIS — Z86.73 HISTORY OF TRANSIENT ISCHEMIC ATTACK (TIA): Primary | ICD-10-CM

## 2023-02-07 DIAGNOSIS — N18.32 STAGE 3B CHRONIC KIDNEY DISEASE: ICD-10-CM

## 2023-02-07 DIAGNOSIS — Z98.1 S/P ANKLE ARTHRODESIS: ICD-10-CM

## 2023-02-07 DIAGNOSIS — I48.0 PAROXYSMAL ATRIAL FIBRILLATION (HCC): ICD-10-CM

## 2023-02-07 DIAGNOSIS — S09.90XA CLOSED HEAD INJURY, INITIAL ENCOUNTER: ICD-10-CM

## 2023-02-07 DIAGNOSIS — R42 DIZZINESS: ICD-10-CM

## 2023-02-07 DIAGNOSIS — R55 SYNCOPE AND COLLAPSE: ICD-10-CM

## 2023-02-07 DIAGNOSIS — R55 SYNCOPE, UNSPECIFIED SYNCOPE TYPE: ICD-10-CM

## 2023-02-07 DIAGNOSIS — R07.9 CHEST PAIN, UNSPECIFIED TYPE: ICD-10-CM

## 2023-02-07 DIAGNOSIS — I15.0 RENOVASCULAR HYPERTENSION: Chronic | ICD-10-CM

## 2023-02-07 PROBLEM — G44.309 POST-TRAUMATIC HEADACHE: Status: ACTIVE | Noted: 2023-02-07

## 2023-02-07 PROBLEM — R35.0 URINARY FREQUENCY: Status: ACTIVE | Noted: 2023-02-07

## 2023-02-07 LAB
ALBUMIN SERPL BCP-MCNC: 3.9 G/DL (ref 3.2–4.9)
ALBUMIN/GLOB SERPL: 1.3 G/DL
ALP SERPL-CCNC: 69 U/L (ref 30–99)
ALT SERPL-CCNC: 18 U/L (ref 2–50)
ANION GAP SERPL CALC-SCNC: 13 MMOL/L (ref 7–16)
APPEARANCE UR: CLEAR
AST SERPL-CCNC: 28 U/L (ref 12–45)
BACTERIA #/AREA URNS HPF: ABNORMAL /HPF
BASOPHILS # BLD AUTO: 0.7 % (ref 0–1.8)
BASOPHILS # BLD: 0.04 K/UL (ref 0–0.12)
BILIRUB SERPL-MCNC: 0.3 MG/DL (ref 0.1–1.5)
BILIRUB UR QL STRIP.AUTO: NEGATIVE
BUN SERPL-MCNC: 23 MG/DL (ref 8–22)
CALCIUM ALBUM COR SERPL-MCNC: 9.4 MG/DL (ref 8.5–10.5)
CALCIUM SERPL-MCNC: 9.3 MG/DL (ref 8.5–10.5)
CHLORIDE SERPL-SCNC: 104 MMOL/L (ref 96–112)
CO2 SERPL-SCNC: 20 MMOL/L (ref 20–33)
COLOR UR: YELLOW
CREAT SERPL-MCNC: 0.87 MG/DL (ref 0.5–1.4)
EOSINOPHIL # BLD AUTO: 0.1 K/UL (ref 0–0.51)
EOSINOPHIL NFR BLD: 1.7 % (ref 0–6.9)
EPI CELLS #/AREA URNS HPF: NEGATIVE /HPF
ERYTHROCYTE [DISTWIDTH] IN BLOOD BY AUTOMATED COUNT: 47.8 FL (ref 35.9–50)
GFR SERPLBLD CREATININE-BSD FMLA CKD-EPI: 65 ML/MIN/1.73 M 2
GLOBULIN SER CALC-MCNC: 2.9 G/DL (ref 1.9–3.5)
GLUCOSE SERPL-MCNC: 111 MG/DL (ref 65–99)
GLUCOSE UR STRIP.AUTO-MCNC: NEGATIVE MG/DL
HCT VFR BLD AUTO: 39.7 % (ref 37–47)
HGB BLD-MCNC: 13 G/DL (ref 12–16)
HYALINE CASTS #/AREA URNS LPF: ABNORMAL /LPF
IMM GRANULOCYTES # BLD AUTO: 0.04 K/UL (ref 0–0.11)
IMM GRANULOCYTES NFR BLD AUTO: 0.7 % (ref 0–0.9)
KETONES UR STRIP.AUTO-MCNC: NEGATIVE MG/DL
LEUKOCYTE ESTERASE UR QL STRIP.AUTO: ABNORMAL
LYMPHOCYTES # BLD AUTO: 1.64 K/UL (ref 1–4.8)
LYMPHOCYTES NFR BLD: 27.2 % (ref 22–41)
MCH RBC QN AUTO: 28.5 PG (ref 27–33)
MCHC RBC AUTO-ENTMCNC: 32.7 G/DL (ref 33.6–35)
MCV RBC AUTO: 87.1 FL (ref 81.4–97.8)
MICRO URNS: ABNORMAL
MONOCYTES # BLD AUTO: 0.68 K/UL (ref 0–0.85)
MONOCYTES NFR BLD AUTO: 11.3 % (ref 0–13.4)
NEUTROPHILS # BLD AUTO: 3.52 K/UL (ref 2–7.15)
NEUTROPHILS NFR BLD: 58.4 % (ref 44–72)
NITRITE UR QL STRIP.AUTO: NEGATIVE
NRBC # BLD AUTO: 0 K/UL
NRBC BLD-RTO: 0 /100 WBC
PH UR STRIP.AUTO: 7 [PH] (ref 5–8)
PLATELET # BLD AUTO: 212 K/UL (ref 164–446)
PMV BLD AUTO: 9.7 FL (ref 9–12.9)
POTASSIUM SERPL-SCNC: 3.8 MMOL/L (ref 3.6–5.5)
PROT SERPL-MCNC: 6.8 G/DL (ref 6–8.2)
PROT UR QL STRIP: 30 MG/DL
RBC # BLD AUTO: 4.56 M/UL (ref 4.2–5.4)
RBC # URNS HPF: ABNORMAL /HPF
RBC UR QL AUTO: NEGATIVE
SODIUM SERPL-SCNC: 137 MMOL/L (ref 135–145)
SP GR UR STRIP.AUTO: 1.01
TROPONIN T SERPL-MCNC: 19 NG/L (ref 6–19)
TROPONIN T SERPL-MCNC: 26 NG/L (ref 6–19)
TSH SERPL DL<=0.005 MIU/L-ACNC: 2.28 UIU/ML (ref 0.38–5.33)
UROBILINOGEN UR STRIP.AUTO-MCNC: 0.2 MG/DL
WBC # BLD AUTO: 6 K/UL (ref 4.8–10.8)
WBC #/AREA URNS HPF: ABNORMAL /HPF

## 2023-02-07 PROCEDURE — 36415 COLL VENOUS BLD VENIPUNCTURE: CPT

## 2023-02-07 PROCEDURE — 96374 THER/PROPH/DIAG INJ IV PUSH: CPT | Mod: XU

## 2023-02-07 PROCEDURE — 81001 URINALYSIS AUTO W/SCOPE: CPT

## 2023-02-07 PROCEDURE — G0378 HOSPITAL OBSERVATION PER HR: HCPCS

## 2023-02-07 PROCEDURE — 84484 ASSAY OF TROPONIN QUANT: CPT

## 2023-02-07 PROCEDURE — 93306 TTE W/DOPPLER COMPLETE: CPT

## 2023-02-07 PROCEDURE — 84443 ASSAY THYROID STIM HORMONE: CPT

## 2023-02-07 PROCEDURE — 700101 HCHG RX REV CODE 250: Performed by: EMERGENCY MEDICINE

## 2023-02-07 PROCEDURE — 99222 1ST HOSP IP/OBS MODERATE 55: CPT | Performed by: HOSPITALIST

## 2023-02-07 PROCEDURE — 93005 ELECTROCARDIOGRAM TRACING: CPT | Performed by: EMERGENCY MEDICINE

## 2023-02-07 PROCEDURE — 71045 X-RAY EXAM CHEST 1 VIEW: CPT

## 2023-02-07 PROCEDURE — A9270 NON-COVERED ITEM OR SERVICE: HCPCS | Performed by: HOSPITALIST

## 2023-02-07 PROCEDURE — 72125 CT NECK SPINE W/O DYE: CPT

## 2023-02-07 PROCEDURE — 80053 COMPREHEN METABOLIC PANEL: CPT

## 2023-02-07 PROCEDURE — 99285 EMERGENCY DEPT VISIT HI MDM: CPT

## 2023-02-07 PROCEDURE — 70450 CT HEAD/BRAIN W/O DYE: CPT

## 2023-02-07 PROCEDURE — 700102 HCHG RX REV CODE 250 W/ 637 OVERRIDE(OP): Performed by: HOSPITALIST

## 2023-02-07 PROCEDURE — 85025 COMPLETE CBC W/AUTO DIFF WBC: CPT

## 2023-02-07 RX ORDER — EZETIMIBE 10 MG/1
10 TABLET ORAL DAILY
Status: DISCONTINUED | OUTPATIENT
Start: 2023-02-08 | End: 2023-02-08 | Stop reason: HOSPADM

## 2023-02-07 RX ORDER — ONDANSETRON 4 MG/1
4 TABLET, ORALLY DISINTEGRATING ORAL EVERY 4 HOURS PRN
Status: DISCONTINUED | OUTPATIENT
Start: 2023-02-07 | End: 2023-02-08 | Stop reason: HOSPADM

## 2023-02-07 RX ORDER — DILTIAZEM HYDROCHLORIDE 5 MG/ML
10 INJECTION INTRAVENOUS EVERY 6 HOURS PRN
Status: DISCONTINUED | OUTPATIENT
Start: 2023-02-07 | End: 2023-02-08 | Stop reason: HOSPADM

## 2023-02-07 RX ORDER — METOPROLOL TARTRATE 1 MG/ML
5 INJECTION, SOLUTION INTRAVENOUS
Status: DISCONTINUED | OUTPATIENT
Start: 2023-02-07 | End: 2023-02-07

## 2023-02-07 RX ORDER — ACETAMINOPHEN 325 MG/1
650 TABLET ORAL EVERY 6 HOURS PRN
Status: DISCONTINUED | OUTPATIENT
Start: 2023-02-07 | End: 2023-02-08 | Stop reason: HOSPADM

## 2023-02-07 RX ORDER — OMEPRAZOLE 20 MG/1
20 CAPSULE, DELAYED RELEASE ORAL EVERY MORNING
Status: DISCONTINUED | OUTPATIENT
Start: 2023-02-07 | End: 2023-02-07

## 2023-02-07 RX ORDER — POLYETHYLENE GLYCOL 3350 17 G/17G
1 POWDER, FOR SOLUTION ORAL
Status: DISCONTINUED | OUTPATIENT
Start: 2023-02-07 | End: 2023-02-08 | Stop reason: HOSPADM

## 2023-02-07 RX ORDER — BENAZEPRIL HYDROCHLORIDE 20 MG/1
40 TABLET ORAL EVERY MORNING
Status: DISCONTINUED | OUTPATIENT
Start: 2023-02-08 | End: 2023-02-08 | Stop reason: HOSPADM

## 2023-02-07 RX ORDER — LEVOTHYROXINE SODIUM 0.07 MG/1
75 TABLET ORAL
Status: DISCONTINUED | OUTPATIENT
Start: 2023-02-08 | End: 2023-02-08 | Stop reason: HOSPADM

## 2023-02-07 RX ORDER — METOPROLOL TARTRATE 1 MG/ML
5 INJECTION, SOLUTION INTRAVENOUS EVERY 6 HOURS PRN
Status: DISCONTINUED | OUTPATIENT
Start: 2023-02-07 | End: 2023-02-07

## 2023-02-07 RX ORDER — ONDANSETRON 2 MG/ML
4 INJECTION INTRAMUSCULAR; INTRAVENOUS EVERY 4 HOURS PRN
Status: DISCONTINUED | OUTPATIENT
Start: 2023-02-07 | End: 2023-02-08 | Stop reason: HOSPADM

## 2023-02-07 RX ORDER — GABAPENTIN 100 MG/1
100 CAPSULE ORAL 3 TIMES DAILY
Status: DISCONTINUED | OUTPATIENT
Start: 2023-02-07 | End: 2023-02-08 | Stop reason: HOSPADM

## 2023-02-07 RX ORDER — METOPROLOL SUCCINATE 25 MG/1
75 TABLET, EXTENDED RELEASE ORAL EVERY MORNING
Status: DISCONTINUED | OUTPATIENT
Start: 2023-02-08 | End: 2023-02-08 | Stop reason: HOSPADM

## 2023-02-07 RX ORDER — BISACODYL 10 MG
10 SUPPOSITORY, RECTAL RECTAL
Status: DISCONTINUED | OUTPATIENT
Start: 2023-02-07 | End: 2023-02-08 | Stop reason: HOSPADM

## 2023-02-07 RX ORDER — AMOXICILLIN 250 MG
2 CAPSULE ORAL 2 TIMES DAILY
Status: DISCONTINUED | OUTPATIENT
Start: 2023-02-07 | End: 2023-02-08 | Stop reason: HOSPADM

## 2023-02-07 RX ADMIN — APIXABAN 5 MG: 5 TABLET, FILM COATED ORAL at 17:52

## 2023-02-07 RX ADMIN — SENNOSIDES AND DOCUSATE SODIUM 2 TABLET: 50; 8.6 TABLET ORAL at 17:52

## 2023-02-07 RX ADMIN — ACETAMINOPHEN 650 MG: 325 TABLET, FILM COATED ORAL at 23:11

## 2023-02-07 RX ADMIN — METOPROLOL TARTRATE 5 MG: 1 INJECTION, SOLUTION INTRAVENOUS at 14:36

## 2023-02-07 ASSESSMENT — ENCOUNTER SYMPTOMS
CLAUDICATION: 0
HEMOPTYSIS: 0
COUGH: 0
PSYCHIATRIC NEGATIVE: 1
DIZZINESS: 1
SPUTUM PRODUCTION: 0
HEADACHES: 1
FOCAL WEAKNESS: 0
ABDOMINAL PAIN: 0
FEVER: 0
NAUSEA: 0
DOUBLE VISION: 0
PALPITATIONS: 0
VOMITING: 0
ORTHOPNEA: 0
CONSTIPATION: 0
BLURRED VISION: 0
WEAKNESS: 1
MUSCULOSKELETAL NEGATIVE: 1
HEARTBURN: 0

## 2023-02-07 ASSESSMENT — PAIN DESCRIPTION - PAIN TYPE
TYPE: ACUTE PAIN
TYPE: ACUTE PAIN

## 2023-02-07 ASSESSMENT — CHA2DS2 SCORE
AGE 65 TO 74: NO
CHF OR LEFT VENTRICULAR DYSFUNCTION: NO
VASCULAR DISEASE: NO
DIABETES: NO
PRIOR STROKE OR TIA OR THROMBOEMBOLISM: NO
CHA2DS2 VASC SCORE: 4
SEX: FEMALE
HYPERTENSION: YES
AGE 75 OR GREATER: YES

## 2023-02-07 ASSESSMENT — LIFESTYLE VARIABLES
HAVE YOU EVER FELT YOU SHOULD CUT DOWN ON YOUR DRINKING: NO
EVER FELT BAD OR GUILTY ABOUT YOUR DRINKING: NO
CONSUMPTION TOTAL: NEGATIVE
TOTAL SCORE: 0
ON A TYPICAL DAY WHEN YOU DRINK ALCOHOL HOW MANY DRINKS DO YOU HAVE: 0
HOW MANY TIMES IN THE PAST YEAR HAVE YOU HAD 5 OR MORE DRINKS IN A DAY: 0
TOTAL SCORE: 0
HAVE PEOPLE ANNOYED YOU BY CRITICIZING YOUR DRINKING: NO
AVERAGE NUMBER OF DAYS PER WEEK YOU HAVE A DRINK CONTAINING ALCOHOL: 0
EVER HAD A DRINK FIRST THING IN THE MORNING TO STEADY YOUR NERVES TO GET RID OF A HANGOVER: NO
ALCOHOL_USE: NO
TOTAL SCORE: 0

## 2023-02-07 ASSESSMENT — FIBROSIS 4 INDEX
FIB4 SCORE: 2.68
FIB4 SCORE: 4.12

## 2023-02-07 NOTE — ED NOTES
Pt to R7 from CT. Pt is A&Ox3, disoriented to time, and awake in bed. Pt placed on cardiac monitor, pulse ox, and automatic BP. VSS, saturating above 95% on RA, ST in the 100s. Call light within reach. Pt stated she would not attempt ambulation without staff present. Non-skid socks on. Clean, well-lit environment provided.

## 2023-02-07 NOTE — ED PROVIDER NOTES
ER Provider Note    Scribed for Heladio Dobbisn M.d. by Criselda Baldwin. 2/7/2023  1:12 PM    Primary Care Provider: Tee Tran M.D.    CHIEF COMPLAINT  Chief Complaint   Patient presents with    T-5000 GLF     Pt BIB EMS, report of GLF onto carpet, hit back of head. -LOC, +blood thinners. Hx  a-fib and recent GLF. Per  pt has been getting slightly confused lately, weakness x 1 wk.     Atrial Fibrillation    Chest Pain    Knee Pain     EXTERNAL RECORDS REVIEWED  Outpatient Notes Patient has a history of a fib and is followed by cardiology    HPI/ROS  LIMITATION TO HISTORY   Select: : None  OUTSIDE HISTORIAN(S):  EMS      Yvonne Marie Wada is a 87 y.o. female who presents to the ED for evaluation of possible syncope onset today. Patient describes she was standing up out of her chair today and became lightheaded causing her to fall to the ground. Patient believes she lost consciousness but is unsure. She endorses hitting the back of her head on the ground. Patient has associated headache that she rates a 9/10,a dn intermittent chest pains. She states her chest pains have only been present since the fall. Patient adds she has had 3 falls in the last 3 months and has been becoming increasingly more weak recently. She has a history of stroke and takes eliquis. Patient has additional history of hypertension.    PAST MEDICAL HISTORY  Past Medical History:   Diagnosis Date    AAA (abdominal aortic aneurysm) 7/26/2010    10/09 CT thorax dilated ascending thoracic aorta 4.9 cm 1/10 transesophageal echo dilated aortic root, and ascending aorta with mild aortic insufficiency 1/10  ascending aortic aneurysm repair 5/12 echo [facility] normal LV function EF 60%, moderate to severe left atrial enlargement, RVSP 32     Aortic insufficiency     Aortic valve disease     Aortic valve regurgitation     Arthritis     back and knee/ osteo    Cataract     Dental disorder     full top denture, partial bottom     "Diverticulosis     Dyslipidemia 7/26/2010    Sees [facility] on lipitor 4/11 chol 159,trig 84,hdl 47,ldl 95,cpk 114 7/12 chol 163,trig 120,hdl 49,ldl 90, crp 1.1 on lipitor 20 mg     GERD (gastroesophageal reflux disease) 7/12/2012 6/07 EGD per [facility] hiatal hernia, start prevacid 30 mg 7/12 protonix 20 mg qday     Glaucoma     Heart burn     Heart murmur     Heart valve disease     \"leaking\", mitral valve    Hiatus hernia syndrome     High cholesterol     History of shingles 6/30/2011 2010 Back and left thorax      History of total knee arthroplasty 7/26/2010    4/10 MRI right knee complex tear medial meniscus, horizontal cleavage tear lateral meniscus, chondromalacia patella, moderate-sized Baker's cyst 5/10 right meniscus knee repair  5/10 told by  had gout on surgery had pathology report, I await that report Question gout seen on pathology report done during repair of right meniscus knee surgery. 7/10 uric acid 6.3, we'll await starting allopurinol depending on the operative report 8/5/10 reviewed ortho notes , op report indicates crystal deposition, could be gout or pseudogout. No bx result sent over. Will need to get. My suspicion is chondrocalcinosis. 10/11  ortho note, MRI pending 8/7/10 reviewed pathology report right knee tissue, marked degenerative changes with focal calcification, no mention of gout. Based on this and a normal uric acid level, I do not believe she has gout, has no hyperuricemia medications would be indicated 10/11  ortho left knee meniscectomy and arthroscopy 1/12      HLD (hyperlipidemia)     Hypertension     Hypothyroid 4/8/2011 4/11 tsh 9 start synthroid 50 4/11 tsh 6,free t3 3.1,free t4 1.2,tpo negative 7/1/11 tsh 2.1 cont synthroid 50 mcg 7/12 tsh 3.4 cont synthroid 50 mcg     Indigestion     Mitral insufficiency     OSTEOPOROSIS 8/9/2010    Had been on fosamax [id number]  8/10 dexa LS -2.0,hip -1.5 await old dexa " result, she will get copy for me 4/11 vit d 35 9/12 dexa LS -3.2,hip -1.4 2/13/13 relast infusion     Pain     back and right leg, can get as bad as 10/10    Preventative health care 7/26/2010    2002 pneum 2005 colon [facility] no records 4/11 vit d 35 9/11 shingles vaccine 9/12 mammogram 9/12 dexa LS -3.2,hip -1.4     Rheumatic fever     S/P appendectomy 7/26/2010    S/P TOMY (total abdominal hysterectomy) 7/26/2010    Sees gyn on HRT estradiol for 20 yrs () 11/08 mammo      Shingles 6/30/2011    Snoring     Status post lumbar surgery 7/26/2010 6/03 L4-5 epidural Dr. Jordan  7/06 overall for decompression, foraminotomy. L4-L5 posterior fusion, L4-L5 allograft       Stroke ([facility]) 1996    no residual problems     Thoracic aortic aneurysm without mention of rupture     Tricuspid insufficiency     Unspecified disorder of thyroid     hypo    Urinary bladder disorder     suprapubic catheter insertion 11/4    UTI (urinary tract infection) 11/12/2016 7/6/16 UTI klebsiella pneumoniae sensitive to all antibiotics except ampicillin, start bactrim x 5 days 1/18/19 UTI enterobacter cloacae resistant to ampicillin, cephalothin, penicillin, bactrim, sensitive to cefuroxime, ciprofloxacin and levaquin, nitrofurantoin 5/26/19 UTI klebsiella given omnicef, organism resistant ampicillin, ciprofloxacin, levofloxacin, bactrim 8/18/21  urology note        SURGICAL HISTORY  Past Surgical History:   Procedure Laterality Date    ORIF, ANKLE Right 10/14/2022    Procedure: RIGHT ANKLE OPEN REDUCTION INTERNAL FIXATION;  Surgeon: Ryan Huertas M.D.;  Location: SURGERY [facility];  Service: Orthopedics    TENDON REPAIR Right 11/10/2016    Procedure: TENDON REPAIR - QUADRACEPS ;  Surgeon: Maxim Herrera M.D.;  Location: SURGERY [facility];  Service:     KNEE ARTHROPLASTY TOTAL Right 9/8/2016    Procedure: KNEE ARTHROPLASTY TOTAL;  Surgeon: Maxim Herrera M.D.;  Location: SURGERY [facility];  Service:      FUSION, SPINE, LUMBAR, PLIF  11/24/2015    Procedure: LUMBAR FUSION POSTERIOR L3-4, EXPLORATION OF FUSION L4-5 WITH INSTRUMENTATION;  Surgeon: Hermann Reis M.D.;  Location: SURGERY [facility];  Service:     LUMBAR LAMINECTOMY DISKECTOMY  11/24/2015    Procedure: LUMBAR L3-4 LAMINECTOMY AND REDO L4-5;  Surgeon: Hermann Reis M.D.;  Location: SURGERY [facility];  Service:     KNEE ARTHROPLASTY TOTAL  1/3/2012    Performed by YOSEF MEJÍA at SURGERY [facility]    KNEE ARTHROSCOPY  10/18/2011    Performed by YOSEF MEJÍA at SURGERY [facility]    MENISCECTOMY, KNEE, MEDIAL  10/18/2011    Performed by YOSEF MEJÍA at SURGERY [facility]    AORTIC VALVE REPLACEMENT  1/12/2010    Performed by ISAÍAS NICOLE at SURGERY [facility]    APPENDECTOMY      FUSION, SPINE, LUMBAR, PLIF      HYSTERECTOMY, TOTAL ABDOMINAL         FAMILY HISTORY  Family History   Problem Relation Age of Onset    Stroke Mother     Heart Disease Father     Heart Disease Sister        SOCIAL HISTORY   reports that she has never smoked. She has never used smokeless tobacco. She reports current drug use. She reports that she does not drink alcohol.    CURRENT MEDICATIONS  Current Discharge Medication List        CONTINUE these medications which have NOT CHANGED    Details   ELIQUIS 5 MG Tab TAKE 1 TABLET BY MOUTH TWICE A DAY  Qty: 180 Tablet, Refills: 3    Associated Diagnoses: Essential hypertension; PAF (paroxysmal atrial fibrillation) ([facility])      levothyroxine (SYNTHROID) 75 MCG Tab TAKE 1 TABLET BY MOUTH EVERY DAY IN THE MORNING ON AN EMPTY STOMACH  Qty: 90 Tablet, Refills: 2    Associated Diagnoses: Hypothyroidism, unspecified type      cefUROXime (CEFTIN) 250 MG Tab Take 1 Tablet by mouth 2 times a day.  Qty: 14 Tablet, Refills: 0      benazepril (LOTENSIN) 40 MG tablet TAKE 1 TABLET BY MOUTH EVERY DAY IN THE MORNING  Qty: 90 Tablet, Refills: 1      metoprolol SR (TOPROL XL) 50 MG TABLET SR 24 HR Take  "1.5 Tablets by mouth every morning.  Qty: 90 Tablet, Refills: 3    Associated Diagnoses: PAF (paroxysmal atrial fibrillation) ([facility]); Essential hypertension      calcium/vitamin D 250-125 MG-UNIT Tab tablet Take 1 Tablet by mouth every day.  Qty: 60 Tablet    Associated Diagnoses: Senile osteoporosis      ezetimibe (ZETIA) 10 MG Tab TAKE 1 TABLET BY MOUTH EVERY DAY  Qty: 90 Tablet, Refills: 3    Associated Diagnoses: Dyslipidemia             ALLERGIES  Amoxicillin, Codeine, Doxycycline, Sulfamethoxazole-trimethoprim, Tramadol, and Trazodone    PHYSICAL EXAM  BP (!) 158/78   Pulse 66   Temp 36.4 °C (97.6 °F) (Temporal)   Resp 16   Ht 1.626 m (5' 4\")   Wt 64.5 kg (142 lb 3.2 oz)   SpO2 91%   BMI 24.41 kg/m²   Constitutional: Well developed, Well nourished, mild distress,    HENT: Normocephalic, small hematoma occipital area   Eyes: PERRLA, EOMI, Conjunctiva normal, No discharge.   Neck: mild midline c-spine tenderness, Supple, No stridor.   Cardiovascular: Normal heart rate, Normal rhythm.   Thorax & Lungs: Clear to auscultation bilaterally, No respiratory distress, No wheezing, No crackles.   Abdomen: Soft, No tenderness, No masses, No pulsatile masses.   Skin: Warm, Dry, No erythema, No rash.    Musculoskeletal: No major deformities noted.  Intact distal pulses  Neurologic: Awake, alert. Moves all extremities spontaneously.  Psychiatric: Affect normal, Judgment normal, Mood normal.     DIAGNOSTIC STUDIES    Labs:   Results for orders placed or performed during the hospital encounter of 02/07/23   CBC WITH DIFFERENTIAL   Result Value Ref Range    WBC 6.0 4.8 - 10.8 K/uL    RBC 4.56 4.20 - 5.40 M/uL    Hemoglobin 13.0 12.0 - 16.0 g/dL    Hematocrit 39.7 37.0 - 47.0 %    MCV 87.1 81.4 - 97.8 fL    MCH 28.5 27.0 - 33.0 pg    MCHC 32.7 (L) 33.6 - 35.0 g/dL    RDW 47.8 35.9 - 50.0 fL    Platelet Count 212 164 - 446 K/uL    MPV 9.7 9.0 - 12.9 fL    Neutrophils-Polys 58.40 44.00 - 72.00 %    Lymphocytes 27.20 22.00 - " 41.00 %    Monocytes 11.30 0.00 - 13.40 %    Eosinophils 1.70 0.00 - 6.90 %    Basophils 0.70 0.00 - 1.80 %    Immature Granulocytes 0.70 0.00 - 0.90 %    Nucleated RBC 0.00 /100 WBC    Neutrophils (Absolute) 3.52 2.00 - 7.15 K/uL    Lymphs (Absolute) 1.64 1.00 - 4.80 K/uL    Monos (Absolute) 0.68 0.00 - 0.85 K/uL    Eos (Absolute) 0.10 0.00 - 0.51 K/uL    Baso (Absolute) 0.04 0.00 - 0.12 K/uL    Immature Granulocytes (abs) 0.04 0.00 - 0.11 K/uL    NRBC (Absolute) 0.00 K/uL   COMP METABOLIC PANEL   Result Value Ref Range    Sodium 137 135 - 145 mmol/L    Potassium 3.8 3.6 - 5.5 mmol/L    Chloride 104 96 - 112 mmol/L    Co2 20 20 - 33 mmol/L    Anion Gap 13.0 7.0 - 16.0    Glucose 111 (H) 65 - 99 mg/dL    Bun 23 (H) 8 - 22 mg/dL    Creatinine 0.87 0.50 - 1.40 mg/dL    Calcium 9.3 8.5 - 10.5 mg/dL    AST(SGOT) 28 12 - 45 U/L    ALT(SGPT) 18 2 - 50 U/L    Alkaline Phosphatase 69 30 - 99 U/L    Total Bilirubin 0.3 0.1 - 1.5 mg/dL    Albumin 3.9 3.2 - 4.9 g/dL    Total Protein 6.8 6.0 - 8.2 g/dL    Globulin 2.9 1.9 - 3.5 g/dL    A-G Ratio 1.3 g/dL   TROPONIN   Result Value Ref Range    Troponin T 19 6 - 19 ng/L   CORRECTED CALCIUM   Result Value Ref Range    Correct Calcium 9.4 8.5 - 10.5 mg/dL   ESTIMATED GFR   Result Value Ref Range    GFR (CKD-EPI) 65 >60 mL/min/1.73 m 2   EKG   Result Value Ref Range    Report       [facility]    Test Date:  2023  Pt Name:    YVONNE WADA                  Department:   MRN:        [id number]                      Room:       T203  Gender:     Female                       Technician: RODOLFO  :        [id number]                   Requested By:INEZ Wan #:    [id number]                    Reading MD:    Measurements  Intervals                                Axis  Rate:       56                           P:          -89  MD:         321                          QRS:        32  QRSD:       86                           T:          34  QT:         410  QTc:         396    Interpretive Statements  Sinus or ectopic atrial bradycardia  Prolonged TN interval  Consider left ventricular hypertrophy  Compared to ECG 12/01/2022 11:33:27  Bradycardia, nonsinus now present  Sinus rhythm no longer present       EKG:  I have independently interpreted this EKG which shows   Rhythm: a rib with rapid ventricular response  Rate: 123  Axis: normal  Ectopy: none  Conduction: normal  ST Segments: no acute change  T Waves: no acute change  Q Waves: none  Clinical Impression: a rib with rapid ventricular response    Radiology:   The attending emergency physician has independently interpreted the diagnostic imaging associated with this visit and am waiting the final reading from the radiologist.   Preliminary interpretation is a follows: No intracranial abnormality  Radiologist interpretation:   DX-CHEST-PORTABLE (1 VIEW)   Final Result      No acute cardiac or pulmonary abnormalities are identified.      CT-CSPINE WITHOUT PLUS RECONS   Final Result      Degenerative change without evidence of cervical spine fracture.      CT-HEAD W/O   Final Result      1. No CT evidence of acute infarct, hemorrhage or mass.   2. Moderate global parenchymal atrophy. Chronic small vessel ischemic changes.      EC-ECHOCARDIOGRAM COMPLETE W/O CONT    (Results Pending)       COURSE & MEDICAL DECISION MAKING     ED Observation Status? No; Patient does not meet criteria for ED Observation.     INITIAL ASSESSMENT, COURSE AND PLAN  1:24 PM - Patient seen and examined at bedside. Patient presents as a code TBI. Discussed plan of care, including labs and imaging. Patient agrees to the plan of care. Ordered for chest x-ray, CT head, CT c-spine, CBC w/ diff, CMP, troponin, and EKG to evaluate her symptoms.     2:31 PM - Reviewed the patient's lab and imaging results. Paged hospitalist.    2:33 PM - I discussed the patient's case and the above findings with Dr. Bess (Hospitalist) who will consult the patient for admission.  Patient care will be transferred at this time. She will be treated with metoprolol 5 mg.     Care Narrative: Patient is coming in as a TBI.  Apparently patient had a syncopal episode when she stood up and fell and hit her head.  Patient has had several recent falls.  I believe there is likely a syncopal versus near syncopal episode followed by the head injury.  Work-up here was unremarkable, the patient is in atrial fibrillation with rapid ventricular response, give the patient some Lopressor.  Work-up here was unremarkable, discussed case with hospitalist for hospitalization.  Of note the patient is also having some chest pain and can be evaluated for cardiac causes    ADDITIONAL PROBLEM LIST  Chest pain  Syncope  Head injury    DISPOSITION AND DISCUSSIONS  I have discussed management of the patient with the following physicians and JENNIFER's:  Dr. Bess (Hospitalist)    Discussion of management with other [facility] or appropriate source(s): None     Barriers to care at this time, including but not limited to:  none .     Decision tools and prescription drugs considered including, but not limited to: HEART Score 4 .    DISPOSITION:  Patient will be hospitalized by Dr. Bess  in guarded condition.     FINAL DIANGOSIS  1. Syncope, unspecified syncope type    2. Closed head injury, initial encounter    3. Chest pain, unspecified type          I, Criselda Baldwin (Emmett), am scribing for, and in the presence of, Heladio Dobbins M.D..    Electronically signed by: Criselda Baldwin (Emmett), 2/7/2023    IHeladio M.D. personally performed the services described in this documentation, as scribed by Criselda Baldwin in my presence, and it is both accurate and complete.   The note accurately reflects work and decisions made by me.  Heladio Dobbins M.D.  2/7/2023  5:46 PM

## 2023-02-07 NOTE — H&P
Hospital Medicine History & Physical Note    Date of Service  2/7/2023    Primary Care Physician  Tee Tran M.D.    Consultants      Code Status  Full Code    Chief Complaint  Chief Complaint   Patient presents with    T-5000 GLF     Pt BIB EMS, report of GLF onto carpet, hit back of head. -LOC, +blood thinners. Hx  a-fib and recent GLF. Per  pt has been getting slightly confused lately, weakness x 1 wk.     Atrial Fibrillation    Chest Pain    Knee Pain       History of Presenting Illness  Yvonne Marie Wada is a 87 y.o. female who presented 2/7/2023 with past medical history of paroxsymal A-fib, stroke , hypertension ,aortic insufficiency, arthritis, GERD, dyslipidemia comes emergency department after having a syncopal episode.  Patient states that for the last several days she has been feeling dizzy and this morning she felt lightheaded and fell to the ground.  There was reported loss of consciousness and she hit the back of her head.  There was no laceration.  Patient is on Eliquis for her history of A-fib.    Patient is complaining of a headache in the back of her head throbbing intensity 8 out of 10, intermittent with no radiation      I discussed the plan of care with patient.    Review of Systems  Review of Systems   Constitutional:  Positive for malaise/fatigue. Negative for fever.   HENT:  Negative for ear discharge, ear pain, hearing loss and tinnitus.    Eyes:  Negative for blurred vision and double vision.   Respiratory:  Negative for cough, hemoptysis and sputum production.    Cardiovascular:  Positive for chest pain. Negative for palpitations, orthopnea, claudication and leg swelling.   Gastrointestinal:  Negative for abdominal pain, constipation, heartburn, nausea and vomiting.   Genitourinary:  Positive for frequency.   Musculoskeletal: Negative.    Skin:  Negative for itching and rash.   Neurological:  Positive for dizziness, weakness and headaches. Negative for focal weakness.    Psychiatric/Behavioral: Negative.       Past Medical History   has a past medical history of AAA (abdominal aortic aneurysm) (7/26/2010), Aortic insufficiency, Aortic valve disease, Aortic valve regurgitation, Arthritis, Cataract, Dental disorder, Diverticulosis, Dyslipidemia (7/26/2010), GERD (gastroesophageal reflux disease) (7/12/2012), Glaucoma, Heart burn, Heart murmur, Heart valve disease, Hiatus hernia syndrome, High cholesterol, History of shingles (6/30/2011), History of total knee arthroplasty (7/26/2010), HLD (hyperlipidemia), Hypertension, Hypothyroid (4/8/2011), Indigestion, Mitral insufficiency, OSTEOPOROSIS (8/9/2010), Pain, Preventative health care (7/26/2010), Rheumatic fever, S/P appendectomy (7/26/2010), S/P TOMY (total abdominal hysterectomy) (7/26/2010), Shingles (6/30/2011), Snoring, Status post lumbar surgery (7/26/2010), Stroke ([facility]) (1996), Thoracic aortic aneurysm without mention of rupture, Tricuspid insufficiency, Unspecified disorder of thyroid, Urinary bladder disorder, and UTI (urinary tract infection) (11/12/2016).    Surgical History   has a past surgical history that includes fusion, spine, lumbar, plif; hysterectomy, total abdominal; appendectomy; aortic valve replacement (1/12/2010); knee arthroscopy (10/18/2011); meniscectomy, knee, medial (10/18/2011); knee arthroplasty total (1/3/2012); fusion, spine, lumbar, plif (11/24/2015); lumbar laminectomy diskectomy (11/24/2015); knee arthroplasty total (Right, 9/8/2016); tendon repair (Right, 11/10/2016); and orif, ankle (Right, 10/14/2022).     Family History  family history includes Heart Disease in her father and sister; Stroke in her mother.   Family history reviewed with patient. There is no family history that is pertinent to the chief complaint.     Social History   reports that she has never smoked. She has never used smokeless tobacco. She reports current drug use. She reports that she does not drink  alcohol.    Allergies  Allergies   Allergen Reactions    Amoxicillin Vomiting     Upset stomach, ok if needed for life saving treatment (per pt)    Codeine Nausea     Synthetic codones ok    Doxycycline Nausea     Nausea, Ok in life saving situation (per pt)    Sulfamethoxazole-Trimethoprim Vomiting and Nausea     Ok in a life saving situation (per pt)    Tramadol Vomiting and Nausea     nausea    Trazodone Vomiting     groggy       Medications  Prior to Admission Medications   Prescriptions Last Dose Informant Patient Reported? Taking?   ELIQUIS 5 MG Tab  MAR from Other Facility No No   Sig: TAKE 1 TABLET BY MOUTH TWICE A DAY   Patient taking differently: Take 5 mg by mouth 2 times a day.   benazepril (LOTENSIN) 40 MG tablet   No No   Sig: TAKE 1 TABLET BY MOUTH EVERY DAY IN THE MORNING   calcium/vitamin D 250-125 MG-UNIT Tab tablet  MAR from Other Facility No No   Sig: Take 1 Tablet by mouth every day.   cefUROXime (CEFTIN) 250 MG Tab   No No   Sig: Take 1 Tablet by mouth 2 times a day.   ezetimibe (ZETIA) 10 MG Tab  MAR from Other Facility No No   Sig: TAKE 1 TABLET BY MOUTH EVERY DAY   gabapentin (NEURONTIN) 100 MG Cap  MAR from Other Facility Yes No   Sig: Take 100 mg by mouth 3 times a day.   levothyroxine (SYNTHROID) 75 MCG Tab   No No   Sig: TAKE 1 TABLET BY MOUTH EVERY DAY IN THE MORNING ON AN EMPTY STOMACH   metoprolol SR (TOPROL XL) 50 MG TABLET SR 24 HR   No No   Sig: Take 1.5 Tablets by mouth every morning.   omeprazole (PRILOSEC) 20 MG delayed-release capsule  MAR from Other Facility Yes No   Sig: Take 20 mg by mouth every morning.   senna-docusate (PERICOLACE OR SENOKOT S) 8.6-50 MG Tab  MAR from Other Facility Yes No   Sig: Take 2 Tablets by mouth 2 times a day.      Facility-Administered Medications: None       Physical Exam  Temp:  [36.4 °C (97.5 °F)] 36.4 °C (97.5 °F)  Pulse:  [108-132] 132  Resp:  [15-18] 16  BP: (148-170)/() 156/99  SpO2:  [94 %-96 %] 94 %  Blood Pressure : (!) 156/99    Temperature: 36.4 °C (97.5 °F)   Pulse: (!) 132   Respiration: 16   Pulse Oximetry: 94 %       Physical Exam  Constitutional:       General: She is not in acute distress.     Appearance: She is not ill-appearing, toxic-appearing or diaphoretic.   HENT:      Head: Normocephalic.      Nose: No congestion.      Mouth/Throat:      Mouth: Mucous membranes are moist.   Eyes:      General: No scleral icterus.     Extraocular Movements: Extraocular movements intact.      Pupils: Pupils are equal, round, and reactive to light.   Cardiovascular:      Rate and Rhythm: Tachycardia present. Rhythm irregular.      Pulses: Normal pulses.      Heart sounds: No murmur heard.    No gallop.   Pulmonary:      Effort: No respiratory distress.      Breath sounds: No stridor. No wheezing, rhonchi or rales.   Abdominal:      General: Abdomen is flat. There is no distension.      Palpations: There is no mass.      Tenderness: There is no abdominal tenderness. There is no guarding.      Hernia: No hernia is present.   Musculoskeletal:         General: No swelling, tenderness or deformity.      Cervical back: Normal range of motion. No rigidity.      Right lower leg: No edema.      Left lower leg: No edema.   Lymphadenopathy:      Cervical: No cervical adenopathy.   Skin:     General: Skin is warm.      Coloration: Skin is not jaundiced or pale.      Findings: No bruising or erythema.   Neurological:      General: No focal deficit present.      Mental Status: She is oriented to person, place, and time.      Cranial Nerves: No cranial nerve deficit.      Sensory: No sensory deficit.      Motor: No weakness.      Coordination: Coordination normal.      Gait: Gait normal.       Laboratory:  Recent Labs     02/07/23  1330   WBC 6.0   RBC 4.56   HEMOGLOBIN 13.0   HEMATOCRIT 39.7   MCV 87.1   MCH 28.5   MCHC 32.7*   RDW 47.8   PLATELETCT 212   MPV 9.7     Recent Labs     02/07/23  1330   SODIUM 137   POTASSIUM 3.8   CHLORIDE 104   CO2 20    GLUCOSE 111*   BUN 23*   CREATININE 0.87   CALCIUM 9.3     Recent Labs     02/07/23  1330   ALTSGPT 18   ASTSGOT 28   ALKPHOSPHAT 69   TBILIRUBIN 0.3   GLUCOSE 111*         No results for input(s): NTPROBNP in the last 72 hours.      Recent Labs     02/07/23  1330   TROPONINT 19       Imaging:  DX-CHEST-PORTABLE (1 VIEW)   Final Result      No acute cardiac or pulmonary abnormalities are identified.      CT-CSPINE WITHOUT PLUS RECONS   Final Result      Degenerative change without evidence of cervical spine fracture.      CT-HEAD W/O   Final Result      1. No CT evidence of acute infarct, hemorrhage or mass.   2. Moderate global parenchymal atrophy. Chronic small vessel ischemic changes.      EC-ECHOCARDIOGRAM COMPLETE W/O CONT    (Results Pending)       EKG:  I have personally reviewed the images and compared with prior images.    Nsr rate 56    Telemetry shows afib with rate 124    Assessment/Plan:  Justification for Admission Status  I anticipate this patient is appropriate for observation status at this time because I expect patient will require less than 48 hours for further evaluation and treatment of her syncope        * Syncope and collapse- (present on admission)  Assessment & Plan  Etiology unclear    Check orthostatics to help  assess volume status    Check serial troponins    Monitor on telemetry    Control heart rate    Check echocardiogram    Urinary frequency- (present on admission)  Assessment & Plan  With history of recurrent UTI    Check UA and treat accordingly    Post-traumatic headache- (present on admission)  Assessment & Plan  CT head negative    Monitor closely for worsening headache or any focal deficits    Low threshold for discontinuation of Eliquis    Paroxysmal atrial fibrillation (HCC)- (present on admission)  Assessment & Plan  With RVR    Continue Toprol-XL    Labetalol IV for heart rate greater than 120    CKD (chronic kidney disease) stage 3, GFR 30-59 ml/min (HCC)- (present on  admission)  Assessment & Plan  Encourage p.o. fluid intake    Renal dose all medications    Avoid nephrotoxins    Dyslipidemia- (present on admission)  Assessment & Plan  Continue Zetia        VTE prophylaxis: therapeutic anticoagulation with eliquis

## 2023-02-07 NOTE — ED TRIAGE NOTES
Chief Complaint   Patient presents with    T-5000 GLF     Pt BIB EMS, report of GLF onto carpet, hit back of head. -LOC, +blood thinners. Hx  a-fib and recent GLF. Per  pt has been getting slightly confused lately, weakness x 1 wk.     Atrial Fibrillation    Chest Pain    Knee Pain

## 2023-02-07 NOTE — ED NOTES
Med Rec completed per patient and    Allergies reviewed    Patient completed a 7 day course of Ceftin about 1 month ago

## 2023-02-07 NOTE — ED NOTES
Phone report to RAMÍREZ Martinez. All questions answered. Pt transported off unit with ED staff on portable monitor. Pt awake and breathing with even, unlabored respirations on RA at time of transfer.

## 2023-02-08 ENCOUNTER — PHARMACY VISIT (OUTPATIENT)
Dept: PHARMACY | Facility: MEDICAL CENTER | Age: 88
End: 2023-02-08
Payer: COMMERCIAL

## 2023-02-08 VITALS
DIASTOLIC BLOOD PRESSURE: 78 MMHG | RESPIRATION RATE: 16 BRPM | OXYGEN SATURATION: 96 % | TEMPERATURE: 98.4 F | SYSTOLIC BLOOD PRESSURE: 153 MMHG | WEIGHT: 142.2 LBS | BODY MASS INDEX: 24.28 KG/M2 | HEIGHT: 64 IN | HEART RATE: 68 BPM

## 2023-02-08 PROBLEM — R35.0 URINARY FREQUENCY: Status: RESOLVED | Noted: 2023-02-07 | Resolved: 2023-02-08

## 2023-02-08 PROBLEM — R55 SYNCOPE AND COLLAPSE: Status: RESOLVED | Noted: 2023-02-07 | Resolved: 2023-02-08

## 2023-02-08 PROBLEM — G44.309 POST-TRAUMATIC HEADACHE: Status: RESOLVED | Noted: 2023-02-07 | Resolved: 2023-02-08

## 2023-02-08 LAB
ANION GAP SERPL CALC-SCNC: 11 MMOL/L (ref 7–16)
BUN SERPL-MCNC: 21 MG/DL (ref 8–22)
CALCIUM SERPL-MCNC: 9.3 MG/DL (ref 8.5–10.5)
CHLORIDE SERPL-SCNC: 104 MMOL/L (ref 96–112)
CO2 SERPL-SCNC: 23 MMOL/L (ref 20–33)
CREAT SERPL-MCNC: 0.84 MG/DL (ref 0.5–1.4)
EKG IMPRESSION: NORMAL
ERYTHROCYTE [DISTWIDTH] IN BLOOD BY AUTOMATED COUNT: 47.7 FL (ref 35.9–50)
GFR SERPLBLD CREATININE-BSD FMLA CKD-EPI: 68 ML/MIN/1.73 M 2
GLUCOSE SERPL-MCNC: 95 MG/DL (ref 65–99)
HCT VFR BLD AUTO: 37.7 % (ref 37–47)
HGB BLD-MCNC: 12.4 G/DL (ref 12–16)
LV EJECT FRACT  99904: 60
LV EJECT FRACT MOD 2C 99903: 62.08
LV EJECT FRACT MOD 4C 99902: 55.88
LV EJECT FRACT MOD BP 99901: 57.9
MCH RBC QN AUTO: 28.2 PG (ref 27–33)
MCHC RBC AUTO-ENTMCNC: 32.9 G/DL (ref 33.6–35)
MCV RBC AUTO: 85.9 FL (ref 81.4–97.8)
PLATELET # BLD AUTO: 215 K/UL (ref 164–446)
PMV BLD AUTO: 9.6 FL (ref 9–12.9)
POTASSIUM SERPL-SCNC: 3.9 MMOL/L (ref 3.6–5.5)
RBC # BLD AUTO: 4.39 M/UL (ref 4.2–5.4)
SODIUM SERPL-SCNC: 138 MMOL/L (ref 135–145)
TROPONIN T SERPL-MCNC: 25 NG/L (ref 6–19)
TROPONIN T SERPL-MCNC: 26 NG/L (ref 6–19)
WBC # BLD AUTO: 4.2 K/UL (ref 4.8–10.8)

## 2023-02-08 PROCEDURE — 96375 TX/PRO/DX INJ NEW DRUG ADDON: CPT

## 2023-02-08 PROCEDURE — RXMED WILLOW AMBULATORY MEDICATION CHARGE: Performed by: HOSPITALIST

## 2023-02-08 PROCEDURE — 700111 HCHG RX REV CODE 636 W/ 250 OVERRIDE (IP): Performed by: HOSPITALIST

## 2023-02-08 PROCEDURE — 93010 ELECTROCARDIOGRAM REPORT: CPT | Performed by: INTERNAL MEDICINE

## 2023-02-08 PROCEDURE — 99239 HOSP IP/OBS DSCHRG MGMT >30: CPT | Performed by: HOSPITALIST

## 2023-02-08 PROCEDURE — 700102 HCHG RX REV CODE 250 W/ 637 OVERRIDE(OP): Performed by: HOSPITALIST

## 2023-02-08 PROCEDURE — 97162 PT EVAL MOD COMPLEX 30 MIN: CPT

## 2023-02-08 PROCEDURE — 85027 COMPLETE CBC AUTOMATED: CPT

## 2023-02-08 PROCEDURE — A9270 NON-COVERED ITEM OR SERVICE: HCPCS | Performed by: HOSPITALIST

## 2023-02-08 PROCEDURE — 80048 BASIC METABOLIC PNL TOTAL CA: CPT

## 2023-02-08 PROCEDURE — G0378 HOSPITAL OBSERVATION PER HR: HCPCS

## 2023-02-08 PROCEDURE — 84484 ASSAY OF TROPONIN QUANT: CPT | Mod: 91

## 2023-02-08 PROCEDURE — 97166 OT EVAL MOD COMPLEX 45 MIN: CPT

## 2023-02-08 PROCEDURE — 93306 TTE W/DOPPLER COMPLETE: CPT | Mod: 26 | Performed by: INTERNAL MEDICINE

## 2023-02-08 RX ORDER — HYDRALAZINE HYDROCHLORIDE 20 MG/ML
10 INJECTION INTRAMUSCULAR; INTRAVENOUS ONCE
Status: COMPLETED | OUTPATIENT
Start: 2023-02-08 | End: 2023-02-08

## 2023-02-08 RX ORDER — HYDRALAZINE HYDROCHLORIDE 25 MG/1
25 TABLET, FILM COATED ORAL 3 TIMES DAILY
Qty: 90 TABLET | Refills: 3 | Status: SHIPPED | OUTPATIENT
Start: 2023-02-08 | End: 2023-03-02

## 2023-02-08 RX ORDER — MECLIZINE HCL 12.5 MG/1
12.5 TABLET ORAL 3 TIMES DAILY PRN
Qty: 30 TABLET | Refills: 0 | Status: SHIPPED | OUTPATIENT
Start: 2023-02-08 | End: 2023-10-08

## 2023-02-08 RX ADMIN — EZETIMIBE 10 MG: 10 TABLET ORAL at 05:11

## 2023-02-08 RX ADMIN — HYDRALAZINE HYDROCHLORIDE 10 MG: 20 INJECTION INTRAMUSCULAR; INTRAVENOUS at 10:42

## 2023-02-08 RX ADMIN — APIXABAN 5 MG: 5 TABLET, FILM COATED ORAL at 05:11

## 2023-02-08 RX ADMIN — METOPROLOL SUCCINATE 75 MG: 25 TABLET, EXTENDED RELEASE ORAL at 07:00

## 2023-02-08 RX ADMIN — LEVOTHYROXINE SODIUM 75 MCG: 0.07 TABLET ORAL at 05:11

## 2023-02-08 RX ADMIN — BENAZEPRIL HYDROCHLORIDE 40 MG: 20 TABLET ORAL at 05:11

## 2023-02-08 ASSESSMENT — GAIT ASSESSMENTS
ASSISTIVE DEVICE: FRONT WHEEL WALKER
GAIT LEVEL OF ASSIST: SUPERVISED
DISTANCE (FEET): 50
DEVIATION: BRADYKINETIC;SHUFFLED GAIT

## 2023-02-08 ASSESSMENT — COGNITIVE AND FUNCTIONAL STATUS - GENERAL
WALKING IN HOSPITAL ROOM: A LITTLE
MOVING TO AND FROM BED TO CHAIR: A LOT
DAILY ACTIVITIY SCORE: 21
SUGGESTED CMS G CODE MODIFIER DAILY ACTIVITY: CJ
TURNING FROM BACK TO SIDE WHILE IN FLAT BAD: A LITTLE
SUGGESTED CMS G CODE MODIFIER MOBILITY: CK
PERSONAL GROOMING: A LITTLE
HELP NEEDED FOR BATHING: A LITTLE
TOILETING: A LITTLE
MOBILITY SCORE: 17
MOVING FROM LYING ON BACK TO SITTING ON SIDE OF FLAT BED: A LITTLE
STANDING UP FROM CHAIR USING ARMS: A LITTLE
CLIMB 3 TO 5 STEPS WITH RAILING: A LITTLE

## 2023-02-08 ASSESSMENT — PAIN DESCRIPTION - PAIN TYPE: TYPE: ACUTE PAIN

## 2023-02-08 ASSESSMENT — LIFESTYLE VARIABLES: REASON UNABLE TO ASSESS: N

## 2023-02-08 ASSESSMENT — ACTIVITIES OF DAILY LIVING (ADL): TOILETING: INDEPENDENT

## 2023-02-08 NOTE — FACE TO FACE
Face to Face Supporting Documentation - Home Health    The encounter with this patient was in whole or in part the primary reason for home health admission.    Date of encounter:   Patient:                    MRN:                       YOB: 2022  Yvonne Marie Wada  [id number]  1935     Home health to see patient for:  Skilled Nursing care for assessment, interventions & education  Pt ot    Skilled need for:  Exacerbation of Chronic Disease State     Afib  Frequent falls  Chronic anticoagulation    Skilled nursing interventions to include:  Venous access care    Homebound status evidenced by:  Need the aid of supportive devices such as crutches, canes, wheelchairs or walkers. Leaving home requires a considerable and taxing effort. There is a normal inability to leave the home.    Community Physician to provide follow up care: Tee Tran M.D.     Optional Interventions? No      I certify the face to face encounter for this home health care referral meets the CMS requirements and the encounter/clinical assessment with the patient was, in whole, or in part, for the medical condition(s) listed above, which is the primary reason for home health care. Based on my clinical findings: the service(s) are medically necessary, support the need for home health care, and the homebound criteria are met.  I certify that this patient has had a face to face encounter by myself.  Jeff Bess M.D. - NPI: [id number]

## 2023-02-08 NOTE — CARE PLAN
The patient is Watcher - Medium risk of patient condition declining or worsening    Shift Goals  Clinical Goals: Echocardiogram, troponin, monitor heart rhythm  Patient Goals: Comfort  Family Goals: No family present    Progress made toward(s) clinical / shift goals:    Problem: Knowledge Deficit - Standard  Goal: Patient and family/care givers will demonstrate understanding of plan of care, disease process/condition, diagnostic tests and medications  Outcome: Progressing     Problem: Fall Risk  Goal: Patient will remain free from falls  Outcome: Progressing     Patient updated on plan of care including tests to be completed and to call when needing to get out of bed.       Patient is not progressing towards the following goals:

## 2023-02-08 NOTE — CARE PLAN
The patient is Watcher - Medium risk of patient condition declining or worsening    Shift Goals  Clinical Goals: Echocardiogram, trend troponins, tele monitor  Patient Goals: rest  Family Goals: not at bedside    Progress made toward(s) clinical / shift goals:    Problem: Knowledge Deficit - Standard  Goal: Patient and family/care givers will demonstrate understanding of plan of care, disease process/condition, diagnostic tests and medications  Outcome: Progressing  Expected end date: 2/8/2023     Problem: Fall Risk  Goal: Patient will remain free from falls  Outcome: Progressing  Expected end date: 2/8/2023     Problem: Pain - Standard  Goal: Alleviation of pain or a reduction in pain to the patient’s comfort goal  Outcome: Progressing   Expected end date: 2/8/2023

## 2023-02-08 NOTE — SENIOR ADMIT NOTE
Pt in bed awake,a&ox4,pt's BP was high in the morning,after metoprolol BP is  better,no c/o HA,pt refused gabapentin saying she stopped taking ,pleasant,follow commands.

## 2023-02-08 NOTE — PROGRESS NOTES
Tele monitor review   Rhythm: Sinus krista w/ rare PACs  HR: 45 - 56  0.27 / 0.13 / 0.46

## 2023-02-08 NOTE — PROGRESS NOTES
Patient arrived to [facility]. [place] paperwork, meds, and follow up appointments reviewed with patient. PIV removed. Awaiting meds from pharmacy. Ride home with family.

## 2023-02-08 NOTE — DISCHARGE PLANNING
Care Transition Team Assessment    Spoke with patient at bedside and verified all information. Lives with spouse in single story house with 1-2 steps to enter. PCP Dr. Tran. Has Medicare and [place] insurance. Uses Walker, Cane or W/C to get around. Uses [facility] for meds. Family will be ride @ D/C. Anticipate [facility] ot D/C.     Information Source  Orientation Level: Disoriented to time, Oriented to place, Oriented to situation, Oriented to person  Information Given By: Patient    Readmission Evaluation  Is this a readmission?: No    Interdisciplinary Discharge Planning  Primary Care Physician: Tee Tran  Lives with - Patient's Self Care Capacity: Spouse  Patient or legal guardian wants to designate a caregiver: No  Support Systems: Children, Spouse / Significant Other  Housing / Facility: 1 Story House  Do You Take your Prescribed Medications Regularly: Yes  Able to Return to Previous ADL's: Yes  Mobility Issues: Yes  Prior Services: Home-Independent  Patient Prefers to be Discharged to:: Home  Assistance Needed: Yes  Durable Medical Equipment: Walker, Other - Specify (Cane and Wheel Chair)    Discharge Preparedness  What are your discharge supports?: Spouse, Child  Prior Functional Level: Uses Cane, Uses Walker, Uses Wheelchair    Functional Assesment  Prior Functional Level: Uses Cane, Uses Walker, Uses Wheelchair    Finances  Prescription Coverage: Yes    Discharge Risks or Barriers  Patient risk factors: Vulnerable adult    Anticipated Discharge Information  Discharge Disposition: D/T to home under A care in anticipation of covered skilled care (06)  Discharge Address: [address]  Discharge Contact Phone Number: [phone]

## 2023-02-08 NOTE — THERAPY
Physical Therapy   Initial Evaluation     Patient Name: Yvonne Marie Wada  Age:  87 y.o., Sex:  female  Medical Record #: [id number]  Today's Date: 2/8/2023     Precautions: Fall Risk    Assessment  Patient is 87 y.o. female presenting acutely s/p GLF. Pt reports frequent falls recently. Pt demonstrates fair fxnl strength however is very FOF and therefor is extremely cautious with mob. Today she was able to negotiate household distances with FWW and SPV. Recommend return home and resume HHPT to cont improving strength and balance to limit fall risk.     Plan    Physical Therapy Initial Treatment Plan   Treatment Plan : Neuro Re-Education / Balance, Gait Training, Equipment, Self Care / Home Evaluation, Therapeutic Activities, Therapeutic Exercise, Stair Training  Treatment Frequency: 3 Times per Week  Duration: Until Therapy Goals Met    DC Equipment Recommendations: None  Discharge Recommendations: Recommend home health for continued physical therapy services     Objective    Prior Living Situation   Prior Services Skilled Home Health Services   Housing / Facility 1 Story House   Steps Into Home 2   Equipment Owned Front-Wheel Walker   Lives with - Patient's Self Care Capacity Spouse   Prior Level of Functional Mobility   Bed Mobility Independent   Transfer Status Independent   Ambulation Independent   Distance Ambulation (Feet)   (household distances)   Assistive Devices Used Front-Wheel Walker   Stairs Independent   History of Falls   History of Falls Yes   Date of Last Fall   (reason for admit, reports 3 recent falls)   Cognition    Cognition / Consciousness X   Level of Consciousness Alert   Initiation Impaired   Comments pt anxious and very FOF   Strength Upper Body   Upper Body Strength  WDL   Active ROM Lower Body    Active ROM Lower Body  WDL   Strength Lower Body   Lower Body Strength  WDL   Comments BLE strength 4/5   Sensation Lower Body   Lower Extremity Sensation   WDL   Balance Assessment   Sitting  Balance (Static) Fair +   Sitting Balance (Dynamic) Fair +   Standing Balance (Static) Fair   Standing Balance (Dynamic) Fair   Weight Shift Sitting Fair   Weight Shift Standing Fair   Comments w/ FWW   Bed Mobility    Supine to Sit Standby Assist   Sit to Supine   (Up in chair post)   Gait Analysis   Gait Level Of Assist Supervised   Assistive Device Front Wheel Walker   Distance (Feet) 50   # of Times Distance was Traveled 1   Deviation Bradykinetic;Shuffled Gait   # of Stairs Climbed 0   Weight Bearing Status No restrictions   Functional Mobility   Sit to Stand Supervised   Bed, Chair, Wheelchair Transfer Supervised   Transfer Method Stand Step   Short Term Goals    Short Term Goal # 1 Pt will ascend/descend 1 step with FWW and SPV within 6 visits.

## 2023-02-08 NOTE — DISCHARGE PLANNING
Updated by therapy that they are recommending HHC. Spoke with patient and verified all information. Discussed with patient that therapy recommending HHC. Patient agreeable to HHC. Choice form filled out  with verbal consent. Choice form signed by patient. Choice form faxed to Donald HELTON. Message sent to Donald HELTON with request to scan to Media tab.

## 2023-02-08 NOTE — ASSESSMENT & PLAN NOTE
CT head negative    Monitor closely for worsening headache or any focal deficits    Low threshold for discontinuation of Eliquis

## 2023-02-08 NOTE — ASSESSMENT & PLAN NOTE
Etiology unclear    Check orthostatics to help  assess volume status    Check serial troponins    Monitor on telemetry    Control heart rate    Check echocardiogram

## 2023-02-08 NOTE — PROGRESS NOTES
4 Eyes Skin Assessment Completed by RAÍMREZ Dave and RAMÍREZ Tineo.    Head WDL  Ears WDL  Nose WDL  Mouth WDL  Neck WDL  Breast/Chest WDL  Shoulder Blades WDL  Spine Scar  (R) Arm/Elbow/Hand WDL  (L) Arm/Elbow/Hand WDL  Abdomen WDL  Groin WDL  Scrotum/Coccyx/Buttocks WDL  (R) Leg Scar  (L) Leg Scar  (R) Heel/Foot/Toe WDL  (L) Heel/Foot/Toe WDL          Devices In Places Tele Box and Pulse Ox      Interventions In Place Pillows    Possible Skin Injury No    Pictures Uploaded Into Epic N/A  Wound Consult Placed N/A  RN Wound Prevention Protocol Ordered No

## 2023-02-08 NOTE — DISCHARGE SUMMARY
Discharge Summary    CHIEF COMPLAINT ON ADMISSION  Chief Complaint   Patient presents with    T-5000 GLF     Pt BIB EMS, report of GLF onto carpet, hit back of head. -LOC, +blood thinners. Hx  a-fib and recent GLF. Per  pt has been getting slightly confused lately, weakness x 1 wk.     Atrial Fibrillation    Chest Pain    Knee Pain       Reason for Admission  TBI     Admission Date  2/7/2023    CODE STATUS  Full Code    HPI & HOSPITAL COURSE  Yvonne Marie Wada is a 87 y.o. female who presented 2/7/2023 with past medical history of paroxsymal A-fib, stroke , hypertension ,aortic insufficiency, arthritis, GERD, dyslipidemia comes emergency department after having a syncopal episode.  Patient states that for the last several days she has been feeling dizzy and this morning she felt lightheaded and fell to the ground.  There was reported loss of consciousness and she hit the back of her head.  There was no laceration.  Patient is on Eliquis for her history of A-fib.     Patient is complaining of a headache in the back of her head throbbing intensity 8 out of 10, intermittent with no radiation  ==================================    Patient's headache improved.  Still complains of mild dizziness for which we gave some low-dose meclizine.  Patient fearful to walk due to her history of falls.  Was seen by PT OT who cleared the patient for home health.  Home health was ordered.  No evidence of infection.  Appears euvolemic..  Cardio did not show any acute abnormality and patient converted to a normal sinus rhythm after heart rate was controlled    Therefore, she is discharged in good and stable condition to home with organized home healthcare and close outpatient follow-up.    The patient recovered much more quickly than anticipated on admission.    Discharge Date  2/8    FOLLOW UP ITEMS POST DISCHARGE  Pcp 1 week    DISCHARGE DIAGNOSES  Principal Problem (Resolved):    Syncope and collapse POA: Yes  Active  Problems:    Dyslipidemia (Chronic) POA: Yes      Overview: 4/11 chol 159,trig 84,hdl 47,ldl 95,cpk 114      7/12 chol 163,trig 120,hdl 49,ldl 90, crp 1.1 on lipitor 20 mg      3/26/13 chol 159,trig 99,hdl 46,ldl 93 on lipitor 20 mg      9/25/13 chol 164,trig 100,hdl 47,ldl 97 on lipitor 20 mg      9/12/14 cardiology note lower extremity weakness, hold lipitor x3 months,       labs ordered      12/15/14 cardiology start low dose lipitor 10 mg      1/22/15 off lipitor will resume 10 mg and repeat labs 4 weeks      2/24/15 chol 179,trig 111,hdl 54,ldl 103 on lipitor 10 mg      2/1/16 cardiology note restart lipitor 20 mg       4/15/16 chol 163,trig 102,hdl 48,ldl 95 on lipitor 20 mg      5/12/17 chol 186,trig 101,hdl 47,ldl 119 on lipitor 20 mg intermittently       will start taking daily      9/18/17 resume lipitor       11/1/17 chol 146,trig 73,hdl 42,ldl 89 on lipitor 20 mg      12/9/17 chol 133,trig 74,hdl 49,ldl 69 on lipitor 80 mg higher dose due to       recent transient ischemic attack      10/4/18 chol 126,trig 98,hdl 44,ldl 62 on lipitor 80 mg at [facility]      1/18/19 chol 161,trig 103,hdl 45,ldl 95 on lipitor 80 mg      2/4/20 chol 121,trig 71,hdl 45,ldl 62 on lipitor 80 mg      8/17/20 stop lipitor due to muscle pain       9/17/20 improved off lipitor repeat lipid pending      10/3/20 chol 240,trig 111,hdl 60,ldl 158, recommend start crestor 10 mg       and repeat labs 6 weeks      10/26/20 chol 158,trig 106,hdl 62,ldl 75 on crestor 20 mg      4/20/21  off statin, she agrees to try zetia      1/15/22 chol 205,trig 80,hdl 56,ldl 133 did not start zetia, will start       zetia 10 mg      Lipitor,crestor muscle pain           CKD (chronic kidney disease) stage 3, GFR 30-59 ml/min ([facility], [facility] within [facility]) POA: Yes    Paroxysmal atrial fibrillation (HCC) POA: Yes      Overview: 11/1/17 hospital admission, 11/2/17 hospital discharge, facial numbness,       transient numbness in both hands, resolved, CT,  MRI head no acute infarct,       blood pressure stable, discharged home, patient states she was on aspirin       at the time of admission      11/1/17 CT head stable right mid brain likely chronic lacunar infarction,       periventricular white matter disease      11/1/17 MRI brain no acute abnormality, moderate chronic microvascular       stomach disease, chronic microhemorrhages left frontal and right parietal       lobes, chronic lacunar infarct left basal ganglia and blanco, or cerebral       atrophy      11/1/17 MR-MRA head without; negative      2/20/17 episode of supraventricular tachycardia in the emergency room,       davenport catheter removed, symptoms resolved with repeat EKG sinus rhythm      12/20/17  [facility]ology note most recent echocardiogram       looks fine, will repeat CT scan follow aortic repair      11/17/18 cardiology note asymptomatic, continue cholesterol medication,       continue to monitor echo aortic valve, repaired ascending aorta, follow-up       yearly      11/21/18 echo normal LV function, EF 60%, mild LVH, grade 2 diastolic       dysfunction, mild aortic insufficiency, moderate tricuspid regurgitation,       RVSP 50      12/6/19 echo normal LV function EF 65%, RVSP 43, moderate tricuspid       regurgitation, mild to moderate aortic insufficiency      9/28/20 emergency room note EKG atrial fibrillation new onset      9/30/20 cardiology note sinus rhythm, start eliquis 5 mg bid and increase       toprol to 25 mg daily, echo ordered, follow up 3 months       10/22/20 echo EF 70%, grade 1 diastolic dysfunction, mild AR, RVSP 30       12/14/20 cardiology note maintaining sinus rhythm, given frequent       symptomatic episodes of paroxysmal atrial fibrillation and age, recommend       amiodarone 200 mg, follow-up 6 months      4/20/21  cardiology note sinus on exam, patient would like to       discontinue amiodarone understanding she likely will return to        intermittent atrial fibrillation, discussed sotalol, dronedarone, tikosyn       as alternatives, patient declines any of those alternatives, ablation is       not appropriate given her age       12/20/21 on metoprolol and eliquis      1/10/22 cardiology note sinus rhythm, atrial fibrillation episodes are not       significantly symptomatic, recommend she check her heart rate when atrial       fibrillation episodes occurs, repeat follow-up echo ordered, follow-up 6       months      3/22/22 echo EF 55%, diastolic dysfunction grade II, mild MR and TR, RVSP       28      5/24/22  cardiology note paroxysmal atrial fibrillation,       sinus today, on amiodarone, blood pressures not controlled, increase       toprol from 25 to 50 mg, continue eliquis, suggest CTA aorta, follow-up 6       months      10/21/22 hospital EKG a.fibrillation       12/1/22 EKG sinus on metoprolol 50 mg and eliquis      1/5/23 increase metoprolol to 75 mg qday and continue eliquis                 Chronic anticoagulation POA: Yes  Resolved Problems:    Post-traumatic headache POA: Yes    Urinary frequency POA: Yes      FOLLOW UP  Future Appointments   Date Time Provider Department Center   3/2/2023 12:45 PM Kobi Wheeler M.D. [facility] None     No follow-up provider specified.    MEDICATIONS ON DISCHARGE     Medication List        START taking these medications        Instructions   hydrALAZINE 25 MG Tabs  Commonly known as: APRESOLINE   Take 1 Tablet by mouth 3 times a day.  Dose: 25 mg     meclizine 12.5 MG Tabs  Commonly known as: ANTIVERT   Take 1 Tablet by mouth 3 times a day as needed for Dizziness.  Dose: 12.5 mg            CHANGE how you take these medications        Instructions   benazepril 40 MG tablet  What changed: when to take this  Commonly known as: LOTENSIN   TAKE 1 TABLET BY MOUTH EVERY DAY IN THE MORNING     Eliquis 5mg Tabs  What changed: how much to take  Generic drug: apixaban   TAKE 1 TABLET BY MOUTH TWICE A  DAY            CONTINUE taking these medications        Instructions   calcium/vitamin D 250-125 MG-UNIT Tabs tablet   Take 1 Tablet by mouth every day.  Dose: 1 Tablet     ezetimibe 10 MG Tabs  Commonly known as: ZETIA   TAKE 1 TABLET BY MOUTH EVERY DAY  Dose: 10 mg     levothyroxine 75 MCG Tabs  Commonly known as: SYNTHROID   TAKE 1 TABLET BY MOUTH EVERY DAY IN THE MORNING ON AN EMPTY STOMACH     metoprolol SR 50 MG Tb24  Commonly known as: TOPROL XL   Take 1.5 Tablets by mouth every morning.  Dose: 75 mg            STOP taking these medications      cefUROXime 250 MG Tabs  Commonly known as: CEFTIN              Allergies  Allergies   Allergen Reactions    Amoxicillin Vomiting     Upset stomach, ok if needed for life saving treatment (per pt)    Codeine Nausea     Synthetic codones ok    Doxycycline Nausea     Nausea, Ok in life saving situation (per pt)    Sulfamethoxazole-Trimethoprim Vomiting and Nausea     Ok in a life saving situation (per pt)    Tramadol Vomiting and Nausea     nausea    Trazodone Vomiting     groggy       DIET  Orders Placed This Encounter   Procedures    Diet Order Diet: Cardiac     Standing Status:   Standing     Number of Occurrences:   1     Order Specific Question:   Diet:     Answer:   Cardiac [6]       ACTIVITY  As tolerated.  Weight bearing as tolerated    CONSULTATIONS      PROCEDURES      LABORATORY  Lab Results   Component Value Date    SODIUM 138 02/08/2023    POTASSIUM 3.9 02/08/2023    CHLORIDE 104 02/08/2023    CO2 23 02/08/2023    GLUCOSE 95 02/08/2023    BUN 21 02/08/2023    CREATININE 0.84 02/08/2023    CREATININE 1.05 (H) 02/07/2013        Lab Results   Component Value Date    WBC 4.2 (L) 02/08/2023    WBC 3.9 (L) 04/16/2011    HEMOGLOBIN 12.4 02/08/2023    HEMATOCRIT 37.7 02/08/2023    PLATELETCT 215 02/08/2023        Total time of the discharge process exceeds 38  minutes.

## 2023-02-08 NOTE — DISCHARGE INSTRUCTIONS
Syncope  Syncope is when you pass out (faint) for a short time. It is caused by a sudden decrease in blood flow to the brain. Signs that you may be about to pass out include:  Feeling dizzy or light-headed.  Feeling sick to your stomach (nauseous).  Seeing all white or all black.  Having cold, clammy skin.  If you pass out, get help right away. Call your local emergency services (911 in the [place] Do not drive yourself to the hospital.  Follow these instructions at home:  Watch for any changes in your symptoms. Take these actions to stay safe and help with your symptoms:  Lifestyle  Do not drive, use machinery, or play sports until your doctor says it is okay.  Do not drink alcohol.  Do not use any products that contain nicotine or tobacco, such as cigarettes and e-cigarettes. If you need help quitting, ask your doctor.  Drink enough fluid to keep your pee (urine) pale yellow.  General instructions  Take over-the-counter and prescription medicines only as told by your doctor.  If you are taking blood pressure or heart medicine, sit up and stand up slowly. Spend a few minutes getting ready to sit and then stand. This can help you feel less dizzy.  Have someone stay with you until you feel stable.  If you start to feel like you might pass out, lie down right away and raise (elevate) your feet above the level of your heart. Breathe deeply and steadily. Wait until all of the symptoms are gone.  Keep all follow-up visits as told by your doctor. This is important.  Get help right away if:  You have a very bad headache.  You pass out once or more than once.  You have pain in your chest, belly, or back.  You have a very fast or uneven heartbeat (palpitations).  It hurts to breathe.  You are bleeding from your mouth or your bottom (rectum).  You have black or tarry poop (stool).  You have jerky movements that you cannot control (seizure).  You are confused.  You have trouble walking.  You are very weak.  You have vision  problems.  These symptoms may be an emergency. Do not wait to see if the symptoms will go away. Get medical help right away. Call your local emergency services (911 in the [place] Do not drive yourself to the hospital.  Summary  Syncope is when you pass out (faint) for a short time. It is caused by a sudden decrease in blood flow to the brain.  Signs that you may be about to faint include feeling dizzy, light-headed, or sick to your stomach, seeing all white or all black, or having cold, clammy skin.  If you start to feel like you might pass out, lie down right away and raise (elevate) your feet above the level of your heart. Breathe deeply and steadily. Wait until all of the symptoms are gone.  This information is not intended to replace advice given to you by your health care provider. Make sure you discuss any questions you have with your health care provider.  Document Released: 06/05/2009 Document Revised: 01/30/2019 Document Reviewed: 01/30/2019  Elsevier Patient Education © 2020 [employer]

## 2023-02-08 NOTE — THERAPY
Occupational Therapy   Initial Evaluation     Patient Name: Yvonne Marie Wada  Age:  87 y.o., Sex:  female  Medical Record #: [id number]  Today's Date: 2/8/2023     Precautions  Precautions: Fall Risk  Comments: very anxious throughout session    Assessment  Patient is 87 y.o. female admitted after unwitnessed syncope/collapse, found to have  BP sitting in chair 182/82, then after a short period of time 166/74. Standing /84; RN informed. After txf to /96, after 2 min on other arm 179/82, returned to supine 181/88, supine 174/79; no c/o HA or dizziness. Unclear if d/t anxiousness related to fear of falling, but RN reported will be getting pt meds. Educated on adaptive techniques for ADLs, safety with txfs and FWW, energy conservation, seated bathing/g/h, pausing to assess for dizziness prior to taking steps, and importance of continued OOB activity.  present and supportive; reports can assist 24/7 PRN. He reports he assists at baseline with IADLs and didn't witness any of pt's falls. Currently limited by decreased functional mobility, activity tolerance, cognition, HTN, and balance which are currently affecting pt's ability to complete ADLs/IADLs at baseline. Will continue to follow.     Plan    Occupational Therapy Initial Treatment Plan   Treatment Interventions: Self Care / Activities of Daily Living, Adaptive Equipment, Neuro Re-Education / Balance, Therapeutic Exercises, Therapeutic Activity  Treatment Frequency: 4 Times per Week  Duration: Until Therapy Goals Met    DC Equipment Recommendations: Grab Bar(s) by Toilet  Discharge Recommendations: Recommend home health for continued occupational therapy services (as long as  can assist PRN 24/7. Appears to have functional strength for ADL/txfs, but limited by BP this session.)     Objective     02/08/23 1006   Prior Living Situation   Prior Services Skilled Home Health Services   Housing / Facility 1 Story House   Steps Into Home 2  "  Bathroom Set up Walk In Shower;Shower Chair;Grab Bars   Equipment Owned Front-Wheel Walker;Tub / Shower Seat;Grab Bar(s) In Tub / Shower;Raised Toilet Seat With Arms   Lives with - Patient's Self Care Capacity Spouse   Comments  present and supportive; reports can assist 24/7 PRN. Reports assists at baseline with IADLs and didn't witness any of pt's falls.   Prior Level of ADL Function   Self Feeding Independent   Grooming / Hygiene Independent   Bathing Independent   Dressing Independent   Toileting Independent   Prior Level of IADL Function   Medication Management Independent   Laundry Dependent   Kitchen Mobility Requires Assist   Finances Independent   Home Management Dependent   Shopping Dependent   Prior Level Of Mobility Independent With Device in Home   Driving / Transportation Relatives / Others Provide Transportation   History of Falls   History of Falls Yes   Date of Last Fall   (reason for admit. reports x3 recent falls, but doesn't \"remember any of them\")   Precautions   Precautions Fall Risk   Comments very anxious throughout session   Vitals   O2 Delivery Device None - Room Air   Vitals Comments BP sitting in chair 182/82, then after a short period of time 166/74. Standing /84; RN informed. After txf to /96, after 2 min on other arm 179/82, returned to supine 181/88, supine 174/79. Unclear if d/t anxiousness, but RN reported will be getting pt meds.   Pain 0 - 10 Group   Therapist Pain Assessment Post Activity Pain Same as Prior to Activity;During Activity;Nurse Notified;0   Cognition    Cognition / Consciousness X   Level of Consciousness Alert   Safety Awareness Impaired   New Learning Impaired   Initiation Impaired   Comments cooperative with therapy, but mostly flat affect except for anxiousness and fear of falling   Passive ROM Upper Body   Passive ROM Upper Body WDL   Active ROM Upper Body   Active ROM Upper Body  WDL   Strength Upper Body   Upper Body Strength  WDL "   Comments functional for light ADLs   Balance Assessment   Sitting Balance (Static) Good   Sitting Balance (Dynamic) Fair +   Standing Balance (Static) Fair   Standing Balance (Dynamic) Fair -   Weight Shift Sitting Fair   Weight Shift Standing Fair   Comments w/ FWW; limited by BP   Bed Mobility    Supine to Sit   (found up in chair)   Sit to Supine Standby Assist   Scooting Supervised   Comments HOB elevated   ADL Assessment   Eating Supervision   Grooming   (declined need)   Lower Body Dressing Supervision   Toileting   (declined need)   Comments Educated on adaptive techniques for ADLs, safety with txfs and FWW, energy conservation/safety at sink, seated bathing, and importance of continued OOB activity.   Functional Mobility   Sit to Stand Supervised   Toilet Transfers Standby Assist  (BSC)   Transfer Method Stand Step   Mobility w/FWW; chair> 3 steps to BSC>bed. limited by BP   Edema / Skin Assessment   Edema / Skin  Not Assessed   Activity Tolerance   Comments limited by BP and volition/anxiousness   Patient / Family Goals   Patient / Family Goal #1 to go home   Short Term Goals   Short Term Goal # 1 toilet txf with SPV   Short Term Goal # 2 standing g/h with SPV   Education Group   Education Provided Energy Conservation;Home Safety;Transfers;Role of Occupational Therapist;Activities of Daily Living;Adaptive Equipment;Pathology of bedrest   Role of Occupational Therapist Patient Response Patient;Acceptance;Explanation;Verbal Demonstration;Reinforcement Needed   Energy Conservation Patient Response Patient;Acceptance;Explanation;Verbal Demonstration;Reinforcement Needed;Family   Home Safety Patient Response Patient;Acceptance;Explanation;Verbal Demonstration;Reinforcement Needed;Family   Transfers Patient Response Patient;Acceptance;Explanation;Action Demonstration;Reinforcement Needed   ADL Patient Response Patient;Acceptance;Explanation;Verbal Demonstration;Reinforcement Needed   Adaptive Equipment Patient  Response Patient;Acceptance;Explanation;Verbal Demonstration;Reinforcement Needed   Pathology of Bedrest Patient Response Patient;Acceptance;Explanation;Verbal Demonstration;Reinforcement Needed   Additional Comments  came in middle of session   Occupational Therapy Initial Treatment Plan    Treatment Interventions Self Care / Activities of Daily Living;Adaptive Equipment;Neuro Re-Education / Balance;Therapeutic Exercises;Therapeutic Activity   Treatment Frequency 4 Times per Week   Duration Until Therapy Goals Met   Problem List   Problem List Decreased Active Daily Living Skills;Decreased Homemaking Skills;Decreased Upper Extremity Strength Right;Decreased Upper Extremity Strength Left;Decreased Functional Mobility;Decreased Activity Tolerance;Safety Awareness Deficits / Cognition;Impaired Cognitive Function;Impaired Postural Control / Balance

## 2023-02-08 NOTE — PROGRESS NOTES
4 Eyes Skin Assessment Completed by Lamont, RN and RAMÍREZ Martinez.    Head WDL  Ears WDL  Nose WDL  Mouth WDL  Neck WDL  Breast/Chest WDL  Shoulder Blades WDL  Spine WDL  (R) Arm/Elbow/Hand WDL  (L) Arm/Elbow/Hand WDL  Abdomen WDL  Groin WDL  Scrotum/Coccyx/Buttocks WDL  (R) Leg WDL  (L) Leg WDL  (R) Heel/Foot/Toe WDL  (L) Heel/Foot/Toe WDL          Devices In Places Tele Box and Pulse Ox      Interventions In Place Pillows    Possible Skin Injury No    Pictures Uploaded Into Epic N/A  Wound Consult Placed N/A  RN Wound Prevention Protocol Ordered No

## 2023-02-09 ENCOUNTER — PATIENT OUTREACH (OUTPATIENT)
Dept: MEDICAL GROUP | Facility: MEDICAL CENTER | Age: 88
End: 2023-02-09
Payer: MEDICARE

## 2023-02-09 NOTE — PROGRESS NOTES
Patient outreach for TCM follow up.  Reviewed discharge instructions and new and current medications.  Patient verbalized understanding.  Confirmed follow up appointment for 02/13/23 . HH coming out on Monday for evaluation. Patient declined any further questions or concerns.

## 2023-02-10 PROBLEM — Z86.2 HISTORY OF NEUTROPENIA: Status: RESOLVED | Noted: 2021-12-20 | Resolved: 2023-02-10

## 2023-02-13 ENCOUNTER — OFFICE VISIT (OUTPATIENT)
Dept: MEDICAL GROUP | Facility: MEDICAL CENTER | Age: 88
End: 2023-02-13
Payer: MEDICARE

## 2023-02-13 VITALS
TEMPERATURE: 97.6 F | HEART RATE: 70 BPM | WEIGHT: 136 LBS | DIASTOLIC BLOOD PRESSURE: 60 MMHG | BODY MASS INDEX: 23.22 KG/M2 | SYSTOLIC BLOOD PRESSURE: 112 MMHG | HEIGHT: 64 IN

## 2023-02-13 DIAGNOSIS — Z87.81 HISTORY OF PELVIC FRACTURE: ICD-10-CM

## 2023-02-13 DIAGNOSIS — I15.0 RENOVASCULAR HYPERTENSION: Chronic | ICD-10-CM

## 2023-02-13 DIAGNOSIS — Z98.890 STATUS POST LUMBAR SURGERY: Chronic | ICD-10-CM

## 2023-02-13 DIAGNOSIS — F32.1 DEPRESSION, MAJOR, SINGLE EPISODE, MODERATE (HCC): ICD-10-CM

## 2023-02-13 DIAGNOSIS — I48.0 PAROXYSMAL ATRIAL FIBRILLATION (HCC): ICD-10-CM

## 2023-02-13 DIAGNOSIS — Z71.89 COMPLEX CARE COORDINATION: ICD-10-CM

## 2023-02-13 DIAGNOSIS — R33.9 URINARY RETENTION: ICD-10-CM

## 2023-02-13 PROCEDURE — 99496 TRANSJ CARE MGMT HIGH F2F 7D: CPT | Performed by: INTERNAL MEDICINE

## 2023-02-13 RX ORDER — DULOXETIN HYDROCHLORIDE 30 MG/1
30 CAPSULE, DELAYED RELEASE ORAL DAILY
Qty: 30 CAPSULE | Refills: 2 | Status: SHIPPED | OUTPATIENT
Start: 2023-02-13 | End: 2023-04-09

## 2023-02-13 ASSESSMENT — FIBROSIS 4 INDEX: FIB4 SCORE: 2.639865316429777424
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
## 2023-02-14 ENCOUNTER — PATIENT OUTREACH (OUTPATIENT)
Dept: HEALTH INFORMATION MANAGEMENT | Facility: OTHER | Age: 88
End: 2023-02-14
Payer: MEDICARE

## 2023-02-14 NOTE — PROGRESS NOTES
Reason for referral: CCM received referral stating Pt needs  support Consultation and Participation from Jeff Bess M.D on 2/13/23.     CHW Interventions: CHW contacted [facility] and confirmed referral was received. Requested a SW consult be placed for continuity of care. Representative stated Pt was seen on 2/10/23 and a SW consult would be added to this referral.      Plan: Per work flow, Pt is established with [facility] services and will be receiving assistance from SW there.   CHW for Chronic Care Program has been notified and assigned.

## 2023-02-27 ENCOUNTER — TELEPHONE (OUTPATIENT)
Dept: MEDICAL GROUP | Facility: MEDICAL CENTER | Age: 88
End: 2023-02-27
Payer: MEDICARE

## 2023-02-27 DIAGNOSIS — I10 ESSENTIAL HYPERTENSION: ICD-10-CM

## 2023-02-27 DIAGNOSIS — I48.0 PAF (PAROXYSMAL ATRIAL FIBRILLATION) (HCC): ICD-10-CM

## 2023-02-27 RX ORDER — METOPROLOL SUCCINATE 50 MG/1
50 TABLET, EXTENDED RELEASE ORAL EVERY MORNING
Qty: 90 TABLET | Refills: 3
Start: 2023-02-27 | End: 2023-07-24 | Stop reason: SDUPTHER

## 2023-02-27 NOTE — TELEPHONE ENCOUNTER
Rosa @ [facility], [place]  [phone]    Rosa called and LM, states that several times that she has visited [facility], her BP and pulse have been quite low. Wants to know if you want parameters on records for her BP and Pulse.     EXAMPLE: If her pulse is below __, do not administer BP Meds for __ hours.    Please advise.

## 2023-02-28 NOTE — TELEPHONE ENCOUNTER
Could [facility] send us the blood pressure and heart rate numbers they have for the last 2 weeks?  I do receive paperwork to sign but that does not include her vitals.    She is on metoprolol 50 mg daily which will slow her heart rate and that is what her cardiologist would like.  If her heart rate is less than 60 she could take 1/2 tablet of the metoprolol daily.  If her heart rate is less than 50 she can hold the metoprolol for that day.    If her systolic blood pressure is less than 120 she could hold the hydralazine.

## 2023-03-01 NOTE — TELEPHONE ENCOUNTER
Spoke with Rosa and she will collect the logged info from pt's  and fax to us. I relayed you medication instructions.

## 2023-03-02 ENCOUNTER — OFFICE VISIT (OUTPATIENT)
Dept: CARDIOLOGY | Facility: MEDICAL CENTER | Age: 88
End: 2023-03-02
Payer: MEDICARE

## 2023-03-02 VITALS
OXYGEN SATURATION: 96 % | HEART RATE: 60 BPM | SYSTOLIC BLOOD PRESSURE: 120 MMHG | DIASTOLIC BLOOD PRESSURE: 66 MMHG | HEIGHT: 64 IN | RESPIRATION RATE: 14 BRPM | BODY MASS INDEX: 22.53 KG/M2 | WEIGHT: 132 LBS

## 2023-03-02 DIAGNOSIS — R29.6 FREQUENT FALLS: ICD-10-CM

## 2023-03-02 DIAGNOSIS — I10 ESSENTIAL HYPERTENSION: ICD-10-CM

## 2023-03-02 DIAGNOSIS — Z79.01 ON CONTINUOUS ORAL ANTICOAGULATION: ICD-10-CM

## 2023-03-02 DIAGNOSIS — E78.5 DYSLIPIDEMIA: ICD-10-CM

## 2023-03-02 DIAGNOSIS — I48.0 PAF (PAROXYSMAL ATRIAL FIBRILLATION) (HCC): ICD-10-CM

## 2023-03-02 DIAGNOSIS — R54 FRAILTY: ICD-10-CM

## 2023-03-02 DIAGNOSIS — I35.1 NONRHEUMATIC AORTIC VALVE INSUFFICIENCY: ICD-10-CM

## 2023-03-02 PROCEDURE — 99214 OFFICE O/P EST MOD 30 MIN: CPT | Performed by: INTERNAL MEDICINE

## 2023-03-02 ASSESSMENT — FIBROSIS 4 INDEX: FIB4 SCORE: 2.639865316429777424

## 2023-03-02 NOTE — PROGRESS NOTES
"  Subjective:   Yvonne Marie Wada is an 87 y.o. female who presents today for follow-up of repaired thoracic ascending aorta aneurysm in 2010, aortic insufficiency, hypertension hyperlipidemia and PAF on OAC.    In the interim since last visit she was hospitalized and just released last month after ground-level fall with hitting her head without significant intracranial bleeding.  She had a prolonged convalescence after this and is is not quite back to her full strength.  She continues to have issues with falls and balance despite physical and Occupational Therapy.    Past Medical History:   Diagnosis Date    AAA (abdominal aortic aneurysm) 7/26/2010    10/09 CT thorax dilated ascending thoracic aorta 4.9 cm 1/10 transesophageal echo dilated aortic root, and ascending aorta with mild aortic insufficiency 1/10  ascending aortic aneurysm repair 5/12 echo [facility] normal LV function EF 60%, moderate to severe left atrial enlargement, RVSP 32     Aortic insufficiency     Aortic valve disease     Aortic valve regurgitation     Arthritis     back and knee/ osteo    Cataract     Dental disorder     full top denture, partial bottom    Diverticulosis     Dyslipidemia 7/26/2010    Sees [facility] on lipitor 4/11 chol 159,trig 84,hdl 47,ldl 95,cpk 114 7/12 chol 163,trig 120,hdl 49,ldl 90, crp 1.1 on lipitor 20 mg     GERD (gastroesophageal reflux disease) 7/12/2012 6/07 EGD per [facility] hiatal hernia, start prevacid 30 mg 7/12 protonix 20 mg qday     Glaucoma     Heart burn     Heart murmur     Heart valve disease     \"leaking\", mitral valve    Hiatus hernia syndrome     High cholesterol     History of shingles 6/30/2011 2010 Back and left thorax      History of total knee arthroplasty 7/26/2010    4/10 MRI right knee complex tear medial meniscus, horizontal cleavage tear lateral meniscus, chondromalacia patella, moderate-sized Baker's cyst 5/10 right meniscus knee repair  5/10 told by  had gout on " surgery had pathology report, I await that report Question gout seen on pathology report done during repair of right meniscus knee surgery. 7/10 uric acid 6.3, we'll await starting allopurinol depending on the operative report 8/5/10 reviewed ortho notes , op report indicates crystal deposition, could be gout or pseudogout. No bx result sent over. Will need to get. My suspicion is chondrocalcinosis. 10/11  ortho note, MRI pending 8/7/10 reviewed pathology report right knee tissue, marked degenerative changes with focal calcification, no mention of gout. Based on this and a normal uric acid level, I do not believe she has gout, has no hyperuricemia medications would be indicated 10/11  ortho left knee meniscectomy and arthroscopy 1/12      HLD (hyperlipidemia)     Hypertension     Hypothyroid 4/8/2011 4/11 tsh 9 start synthroid 50 4/11 tsh 6,free t3 3.1,free t4 1.2,tpo negative 7/1/11 tsh 2.1 cont synthroid 50 mcg 7/12 tsh 3.4 cont synthroid 50 mcg     Indigestion     Mitral insufficiency     OSTEOPOROSIS 8/9/2010    Had been on fosamax [id number]  8/10 dexa LS -2.0,hip -1.5 await old dexa result, she will get copy for me 4/11 vit d 35 9/12 dexa LS -3.2,hip -1.4 2/13/13 relast infusion     Pain     back and right leg, can get as bad as 10/10    Preventative health care 7/26/2010    2002 pneum 2005 colon [facility] no records 4/11 vit d 35 9/11 shingles vaccine 9/12 mammogram 9/12 dexa LS -3.2,hip -1.4     Rheumatic fever     S/P appendectomy 7/26/2010    S/P TOMY (total abdominal hysterectomy) 7/26/2010    Sees gyn on HRT estradiol for 20 yrs () 11/08 mammo      Shingles 6/30/2011    Snoring     Status post lumbar surgery 7/26/2010 6/03 L4-5 epidural Dr. Jordan  7/06 overall for decompression, foraminotomy. L4-L5 posterior fusion, L4-L5 allograft       Stroke (HCC) 1996    no residual problems     Thoracic aortic aneurysm without mention of rupture      Tricuspid insufficiency     Unspecified disorder of thyroid     hypo    Urinary bladder disorder     suprapubic catheter insertion 11/4    UTI (urinary tract infection) 11/12/2016 7/6/16 UTI klebsiella pneumoniae sensitive to all antibiotics except ampicillin, start bactrim x 5 days 1/18/19 UTI enterobacter cloacae resistant to ampicillin, cephalothin, penicillin, bactrim, sensitive to cefuroxime, ciprofloxacin and levaquin, nitrofurantoin 5/26/19 UTI klebsiella given omnicef, organism resistant ampicillin, ciprofloxacin, levofloxacin, bactrim 8/18/21  urology note      Past Surgical History:   Procedure Laterality Date    ORIF, ANKLE Right 10/14/2022    Procedure: RIGHT ANKLE OPEN REDUCTION INTERNAL FIXATION;  Surgeon: Ryan Huertas M.D.;  Location: [facility];  Service: Orthopedics    TENDON REPAIR Right 11/10/2016    Procedure: TENDON REPAIR - QUADRACEPS ;  Surgeon: Maxim Herrera M.D.;  Location: [facility];  Service:     KNEE ARTHROPLASTY TOTAL Right 9/8/2016    Procedure: KNEE ARTHROPLASTY TOTAL;  Surgeon: Maxim Herrera M.D.;  Location: [facility];  Service:     FUSION, SPINE, LUMBAR, PLIF  11/24/2015    Procedure: LUMBAR FUSION POSTERIOR L3-4, EXPLORATION OF FUSION L4-5 WITH INSTRUMENTATION;  Surgeon: Hermann Reis M.D.;  Location: [facility];  Service:     LUMBAR LAMINECTOMY DISKECTOMY  11/24/2015    Procedure: LUMBAR L3-4 LAMINECTOMY AND REDO L4-5;  Surgeon: Hermann Reis M.D.;  Location: [facility];  Service:     KNEE ARTHROPLASTY TOTAL  1/3/2012    Performed by YOSEF MEJÍA at [facility]    KNEE ARTHROSCOPY  10/18/2011    Performed by YOSEF MEJÍA at [facility]    MENISCECTOMY, KNEE, MEDIAL  10/18/2011    Performed by YOSEF MEJÍA at [facility]    AORTIC VALVE REPLACEMENT  1/12/2010    Performed by ISAÍAS NICOLE at [facility]    APPENDECTOMY      FUSION,  SPINE, LUMBAR, PLIF      HYSTERECTOMY, TOTAL ABDOMINAL       Family History   Problem Relation Age of Onset    Stroke Mother     Heart Disease Father     Heart Disease Sister      Social History     Socioeconomic History    Marital status:      Spouse name: Not on file    Number of children: Not on file    Years of education: Not on file    Highest education level: Not on file   Occupational History    Not on file   Tobacco Use    Smoking status: Never    Smokeless tobacco: Never   Vaping Use    Vaping Use: Never used   Substance and Sexual Activity    Alcohol use: No     Alcohol/week: 0.0 oz    Drug use: Not Currently     Comment: CBD Oil for Arthritis    Sexual activity: Not Currently     Partners: Male   Other Topics Concern    Not on file   Social History Narrative    Not on file     Social Determinants of Health     Financial Resource Strain: Not on file   Food Insecurity: Not on file   Transportation Needs: No Transportation Needs    Lack of Transportation (Medical): No    Lack of Transportation (Non-Medical): No   Physical Activity: Not on file   Stress: Not on file   Social Connections: Not on file   Intimate Partner Violence: Not on file   Housing Stability: Not on file     Allergies   Allergen Reactions    Amoxicillin Vomiting     Upset stomach, ok if needed for life saving treatment (per pt)    Codeine Nausea     Synthetic codones ok    Doxycycline Nausea     Nausea, Ok in life saving situation (per pt)    Sulfamethoxazole-Trimethoprim Vomiting and Nausea     Ok in a life saving situation (per pt)    Tramadol Vomiting and Nausea     nausea    Trazodone Vomiting     groggy     Outpatient Encounter Medications as of 3/2/2023   Medication Sig Dispense Refill    metoprolol SR (TOPROL XL) 50 MG TABLET SR 24 HR Take 1 Tablet by mouth every morning. 90 Tablet 3    DULoxetine (CYMBALTA) 30 MG Cap DR Particles Take 1 Capsule by mouth every day. 30 Capsule 2    meclizine (ANTIVERT) 12.5 MG Tab Take 1 Tablet  "by mouth 3 times a day as needed for Dizziness. 30 Tablet 0    ezetimibe (ZETIA) 10 MG Tab TAKE 1 TABLET BY MOUTH EVERY DAY 90 Tablet 3    benazepril (LOTENSIN) 40 MG tablet TAKE 1 TABLET BY MOUTH EVERY DAY IN THE MORNING 90 Tablet 3    levothyroxine (SYNTHROID) 75 MCG Tab TAKE 1 TABLET BY MOUTH EVERY DAY IN THE MORNING ON AN EMPTY STOMACH (Patient taking differently: Take 75 mcg by mouth every morning on an empty stomach.) 90 Tablet 2    calcium/vitamin D 250-125 MG-UNIT Tab tablet Take 1 Tablet by mouth every day. 60 Tablet     ELIQUIS 5 MG Tab TAKE 1 TABLET BY MOUTH TWICE A DAY (Patient taking differently: Take 5 mg by mouth 2 times a day.) 180 Tablet 3    [DISCONTINUED] hydrALAZINE (APRESOLINE) 25 MG Tab Take 1 Tablet by mouth 3 times a day. (Patient not taking: Reported on 3/2/2023) 90 Tablet 3    [DISCONTINUED] metoprolol SR (TOPROL XL) 50 MG TABLET SR 24 HR Take 1.5 Tablets by mouth every morning. 90 Tablet 3     No facility-administered encounter medications on file as of 3/2/2023.     Review of Systems   All other systems reviewed and are negative.     Objective:   /66 (BP Location: Left arm, Patient Position: Sitting, BP Cuff Size: Adult)   Pulse 60   Resp 14   Ht 1.626 m (5' 4\")   Wt 59.9 kg (132 lb)   SpO2 96%   BMI 22.66 kg/m²     Physical Exam  Vitals reviewed.   Constitutional:       General: She is not in acute distress.     Appearance: She is well-developed. She is not diaphoretic.   HENT:      Head: Normocephalic and atraumatic.      Right Ear: External ear normal.      Left Ear: External ear normal.      Mouth/Throat:      Pharynx: No oropharyngeal exudate.   Eyes:      General: No scleral icterus.        Right eye: No discharge.         Left eye: No discharge.      Conjunctiva/sclera: Conjunctivae normal.      Pupils: Pupils are equal, round, and reactive to light.   Neck:      Thyroid: No thyromegaly.      Vascular: No JVD.      Trachea: No tracheal deviation.   Cardiovascular:      " Rate and Rhythm: Normal rate and regular rhythm.      Chest Wall: PMI is not displaced.      Pulses:           Carotid pulses are 2+ on the right side and 2+ on the left side.       Radial pulses are 2+ on the right side and 2+ on the left side.        Popliteal pulses are 2+ on the right side and 2+ on the left side.        Dorsalis pedis pulses are 2+ on the right side and 2+ on the left side.        Posterior tibial pulses are 2+ on the right side and 2+ on the left side.      Heart sounds: S1 normal and S2 normal. Murmur heard.   Crescendo decrescendo systolic murmur is present with a grade of 2/6.   Decrescendo diastolic murmur is present with a grade of 2/4.     No friction rub. No gallop. No S3 or S4 sounds.   Pulmonary:      Effort: Pulmonary effort is normal. No respiratory distress.      Breath sounds: Normal breath sounds. No wheezing or rales.   Chest:      Chest wall: No tenderness.   Abdominal:      General: Bowel sounds are normal. There is no distension.      Palpations: Abdomen is soft.      Tenderness: There is no abdominal tenderness.   Musculoskeletal:         General: No tenderness. Normal range of motion.      Cervical back: Normal range of motion and neck supple.   Skin:     General: Skin is warm and dry.      Findings: No erythema or rash.   Neurological:      Mental Status: She is alert and oriented to person, place, and time.      Cranial Nerves: No cranial nerve deficit (Cranial nerves II through XII grossly intact).   Psychiatric:         Behavior: Behavior normal.         Thought Content: Thought content normal.         Judgment: Judgment normal.   No change in physical exam since prior 12/14/2020    LABS:  Lab Results   Component Value Date/Time    CHOLSTRLTOT 205 (H) 01/15/2022 09:45 AM     (H) 01/15/2022 09:45 AM    HDL 56 01/15/2022 09:45 AM    TRIGLYCERIDE 80 01/15/2022 09:45 AM       Lab Results   Component Value Date/Time    WBC 4.2 (L) 02/08/2023 04:55 AM    WBC 3.9 (L)  04/16/2011 12:00 AM    RBC 4.39 02/08/2023 04:55 AM    RBC 4.63 04/16/2011 12:00 AM    HEMOGLOBIN 12.4 02/08/2023 04:55 AM    HEMATOCRIT 37.7 02/08/2023 04:55 AM    MCV 85.9 02/08/2023 04:55 AM    MCV 92 04/16/2011 12:00 AM    NEUTSPOLYS 58.40 02/07/2023 01:30 PM    LYMPHOCYTES 27.20 02/07/2023 01:30 PM    MONOCYTES 11.30 02/07/2023 01:30 PM    EOSINOPHILS 1.70 02/07/2023 01:30 PM    BASOPHILS 0.70 02/07/2023 01:30 PM    ANISOCYTOSIS 1+ 10/14/2021 03:31 PM     Lab Results   Component Value Date/Time    SODIUM 138 02/08/2023 04:55 AM    POTASSIUM 3.9 02/08/2023 04:55 AM    CHLORIDE 104 02/08/2023 04:55 AM    CO2 23 02/08/2023 04:55 AM    GLUCOSE 95 02/08/2023 04:55 AM    BUN 21 02/08/2023 04:55 AM    CREATININE 0.84 02/08/2023 04:55 AM    CREATININE 1.05 (H) 02/07/2013 11:31 AM    BUNCREATRAT 25 12/08/2017 08:57 AM    BUNCREATRAT 30 (H) 02/07/2013 11:31 AM     Lab Results   Component Value Date    HBA1C 4.9 10/18/2022      Lab Results   Component Value Date/Time    ALKPHOSPHAT 69 02/07/2023 01:30 PM    ASTSGOT 28 02/07/2023 01:30 PM    ALTSGPT 18 02/07/2023 01:30 PM    TBILIRUBIN 0.3 02/07/2023 01:30 PM      Lab Results   Component Value Date/Time    BNPBTYPENAT 181 (H) 02/20/2018 10:05 AM      Lab Results   Component Value Date/Time    TSH 2.320 04/14/2016 08:27 AM     Lab Results   Component Value Date/Time    PROTHROMBTM 13.1 11/04/2021 12:40 PM    INR 1.02 11/04/2021 12:40 PM        ECHO CONCLUSIONS (11/2/2017):  Normal left ventricular chamber size.  Mild concentric left ventricular hypertrophy.  Left ventricular ejection fraction is visually estimated to be 70%.  Mild aortic insufficiency.  Mild tricuspid regurgitation.  Estimated right ventricular systolic pressure  is 50 mmHg.    EKG (2/20/2018):  I have personally reviewed the EKG this visit and discussed with the patient.  SINUS RHYTHM       Assessment:     1. PAF (paroxysmal atrial fibrillation) ([facility])  REFERRAL TO CARDIOLOGY      2. On continuous oral  anticoagulation  Basic Metabolic Panel    REFERRAL TO CARDIOLOGY      3. Nonrheumatic aortic valve insufficiency        4. Essential hypertension        5. Dyslipidemia        6. Frequent falls  REFERRAL TO CARDIOLOGY      7. Frailty  REFERRAL TO CARDIOLOGY          Medical Decision Making:  Today's Assessment / Status / Plan:     Persistent sinus rhythm.  Frequent falls now with trauma recommend discontinuation of oral anticoagulation to watchman left atrial appendage occluder device.  I will refer her for this.  We discussed these things.  Discussed with her  as well.  For the meantime I recommend continuing her oral anticoagulation and have ordered laboratory studies to monitor this high risk medication.  Continue other medical therapy and follow-up after.

## 2023-03-09 ENCOUNTER — TELEPHONE (OUTPATIENT)
Dept: HEALTH INFORMATION MANAGEMENT | Facility: OTHER | Age: 88
End: 2023-03-09
Payer: MEDICARE

## 2023-03-27 ENCOUNTER — HOSPITAL ENCOUNTER (OUTPATIENT)
Facility: MEDICAL CENTER | Age: 88
End: 2023-03-27
Attending: INTERNAL MEDICINE
Payer: MEDICARE

## 2023-03-27 ENCOUNTER — OFFICE VISIT (OUTPATIENT)
Dept: MEDICAL GROUP | Facility: MEDICAL CENTER | Age: 88
End: 2023-03-27
Payer: MEDICARE

## 2023-03-27 VITALS
WEIGHT: 132 LBS | BODY MASS INDEX: 22.53 KG/M2 | OXYGEN SATURATION: 99 % | TEMPERATURE: 97.9 F | HEIGHT: 64 IN | DIASTOLIC BLOOD PRESSURE: 80 MMHG | SYSTOLIC BLOOD PRESSURE: 140 MMHG | RESPIRATION RATE: 16 BRPM | HEART RATE: 72 BPM

## 2023-03-27 DIAGNOSIS — I15.0 RENOVASCULAR HYPERTENSION: Chronic | ICD-10-CM

## 2023-03-27 DIAGNOSIS — R32 URINARY INCONTINENCE, UNSPECIFIED TYPE: ICD-10-CM

## 2023-03-27 DIAGNOSIS — Z00.00 PREVENTATIVE HEALTH CARE: Chronic | ICD-10-CM

## 2023-03-27 DIAGNOSIS — N39.0 URINARY TRACT INFECTION WITHOUT HEMATURIA, SITE UNSPECIFIED: ICD-10-CM

## 2023-03-27 DIAGNOSIS — Z98.890 S/P THORACIC AORTIC ANEURYSM REPAIR: Chronic | ICD-10-CM

## 2023-03-27 DIAGNOSIS — Z86.79 S/P THORACIC AORTIC ANEURYSM REPAIR: Chronic | ICD-10-CM

## 2023-03-27 DIAGNOSIS — I48.0 PAROXYSMAL ATRIAL FIBRILLATION (HCC): ICD-10-CM

## 2023-03-27 DIAGNOSIS — Z78.9 POLST (PHYSICIAN ORDERS FOR LIFE-SUSTAINING TREATMENT): ICD-10-CM

## 2023-03-27 DIAGNOSIS — F32.1 DEPRESSION, MAJOR, SINGLE EPISODE, MODERATE (HCC): ICD-10-CM

## 2023-03-27 PROBLEM — K64.9 HEMORRHOID: Status: ACTIVE | Noted: 2023-03-27

## 2023-03-27 LAB
APPEARANCE UR: NORMAL
BILIRUB UR STRIP-MCNC: NEGATIVE MG/DL
COLOR UR AUTO: NORMAL
GLUCOSE UR STRIP.AUTO-MCNC: NEGATIVE MG/DL
KETONES UR STRIP.AUTO-MCNC: NEGATIVE MG/DL
LEUKOCYTE ESTERASE UR QL STRIP.AUTO: NORMAL
NITRITE UR QL STRIP.AUTO: NEGATIVE
PH UR STRIP.AUTO: 6.5 [PH] (ref 5–8)
PROT UR QL STRIP: NORMAL MG/DL
RBC UR QL AUTO: NEGATIVE
SP GR UR STRIP.AUTO: 1.02
UROBILINOGEN UR STRIP-MCNC: NORMAL MG/DL

## 2023-03-27 PROCEDURE — 99214 OFFICE O/P EST MOD 30 MIN: CPT | Performed by: INTERNAL MEDICINE

## 2023-03-27 PROCEDURE — 87186 SC STD MICRODIL/AGAR DIL: CPT

## 2023-03-27 PROCEDURE — 81002 URINALYSIS NONAUTO W/O SCOPE: CPT | Performed by: INTERNAL MEDICINE

## 2023-03-27 PROCEDURE — 87086 URINE CULTURE/COLONY COUNT: CPT

## 2023-03-27 PROCEDURE — 87077 CULTURE AEROBIC IDENTIFY: CPT

## 2023-03-27 ASSESSMENT — PATIENT HEALTH QUESTIONNAIRE - PHQ9
8. MOVING OR SPEAKING SO SLOWLY THAT OTHER PEOPLE COULD HAVE NOTICED. OR THE OPPOSITE, BEING SO FIGETY OR RESTLESS THAT YOU HAVE BEEN MOVING AROUND A LOT MORE THAN USUAL: NOT AT ALL
SUM OF ALL RESPONSES TO PHQ QUESTIONS 1-9: 0
9. THOUGHTS THAT YOU WOULD BE BETTER OFF DEAD, OR OF HURTING YOURSELF: NOT AT ALL
1. LITTLE INTEREST OR PLEASURE IN DOING THINGS: NOT AT ALL
3. TROUBLE FALLING OR STAYING ASLEEP OR SLEEPING TOO MUCH: NOT AT ALL
6. FEELING BAD ABOUT YOURSELF - OR THAT YOU ARE A FAILURE OR HAVE LET YOURSELF OR YOUR FAMILY DOWN: NOT AL ALL
SUM OF ALL RESPONSES TO PHQ9 QUESTIONS 1 AND 2: 0
2. FEELING DOWN, DEPRESSED, IRRITABLE, OR HOPELESS: NOT AT ALL
4. FEELING TIRED OR HAVING LITTLE ENERGY: NOT AT ALL
7. TROUBLE CONCENTRATING ON THINGS, SUCH AS READING THE NEWSPAPER OR WATCHING TELEVISION: NOT AT ALL
5. POOR APPETITE OR OVEREATING: NOT AT ALL

## 2023-03-27 ASSESSMENT — FIBROSIS 4 INDEX: FIB4 SCORE: 2.639865316429777424

## 2023-03-27 NOTE — PROGRESS NOTES
Subjective     Yvonne Marie Wada is a 87 y.o. female who presents with Follow-Up (Bump growing on back near tail bone.)            HPI    Patient is here with her daughter.  Patient has had a bump over her rectal area for the past few weeks, it can become painful with prolonged sitting.  No blood in stools, no melena, hematochezia, does notice that her stools have been harder as she has been more constipated.  She tries to eat fiber in her diet with apples and fruits. Mood has been stable, she tried Cymbalta but it made her more irritable so she discontinued that medication, off the Cymbalta she has not noticed any worsening depression.  Followed by cardiology for paroxysmal atrial fibrillation, patient is off anticoagulation due to recurrent falls per cardiology.  No chest pain, palpitations, lightheadedness.  Has not fallen since we have last seen her.  Using walker for ambulation.  Also urinary incontinecn when stands up but not when cough or sneezing, no blood in her urine, no dysuria, no pain with urination, no history of recurrent UTIs, no fevers.  She has a history of urinary retention previously had suprapubic catheter placement in the hospital 2021, subsequently was able to void on her own, catheter removed, she sees urology periodically, last UTI in January.      Current Outpatient Medications   Medication Sig Dispense Refill    hydrocortisone 2.5 % Cream topical cream Apply 1 Application. topically 2 times a day. Apply to hemorrhoid area 28 g 1    metoprolol SR (TOPROL XL) 50 MG TABLET SR 24 HR Take 1 Tablet by mouth every morning. 90 Tablet 3    DULoxetine (CYMBALTA) 30 MG Cap DR Particles Take 1 Capsule by mouth every day. 30 Capsule 2    meclizine (ANTIVERT) 12.5 MG Tab Take 1 Tablet by mouth 3 times a day as needed for Dizziness. 30 Tablet 0    ezetimibe (ZETIA) 10 MG Tab TAKE 1 TABLET BY MOUTH EVERY DAY 90 Tablet 3    benazepril (LOTENSIN) 40 MG tablet TAKE 1 TABLET BY MOUTH EVERY DAY IN THE MORNING  90 Tablet 3    levothyroxine (SYNTHROID) 75 MCG Tab TAKE 1 TABLET BY MOUTH EVERY DAY IN THE MORNING ON AN EMPTY STOMACH (Patient taking differently: Take 75 mcg by mouth every morning on an empty stomach.) 90 Tablet 2    calcium/vitamin D 250-125 MG-UNIT Tab tablet Take 1 Tablet by mouth every day. 60 Tablet     ELIQUIS 5 MG Tab TAKE 1 TABLET BY MOUTH TWICE A DAY (Patient taking differently: Take 5 mg by mouth 2 times a day.) 180 Tablet 3     No current facility-administered medications for this visit.           anemia  9/28/20 hgb 12.6,hct 38  6/22/21 hgb 9,hct 29,mcv 86  9/15/21 hgb 8.0,hct 26,mcv 76  9/15/21 FIT negative  10/14/21 hgb 9.8,hct 33,mcv 85,iron 23,%sat 6,ferritin 70,b12 716,retic 2.6%,SPEP negative  11/4/21 hgb 11.1,hct 35,mcv 84  12/20/21 off iron  1/15/22 hgb 11,hct 35,mcv 86,iron 45,%sat 12,ferritin 32,b12 839 off iron (%sat improved)  5/10/22 hgb 10.4,hct 33.5,mcv 87  8/2/22 hgb 11.4,hct 36.4,mcv 88,iron 73,%sat 19,ferritin 29,b12 777,folate 39,flow cytometry negative,retic 1.8%, zinc 108,copper 99,SPEP negative  8/14/22 hgb11,hct 35  10/12/22 hgb 11,hct 35,iron 33,%sat 11,ferritin 61 hospital admit ankle fracture   10/21/22 hgb 7.9,hct 25.2 transfused RBC s/p ankle orif  10/25/22 hgb 9.2,hct 29,mcv 90  11/8/22 hgb 11.7,hct 37,iron 40,%sat 13,ferritin 109,b12 797,folate 25  12/1/22 hgb 12,hct 38  2/8/23 hgb 12.4,hct 38     Decreased GFR  5/31/09 bun 18,creat 1.2  1/14/10 bun 28,creat 1.3,GFR 40  9/20/12 bun 37,creat 1.1,GFR 48  9/25/13 bun 25,creat 1.2,GFR 44  9/26/14 bun 26,creat 1.1,GFR 45  2/23/15 bun 20,creat 1.1,GFR 48  4/15/16 bun 31,creat 1.1,GFR 45  7/6/16 bun 29,creat 1.1,GFR 44   5/12/17 bun 35,creat 1.1,GFR 44  11/2/17 bun 22,creat 1.28,GFR 40  12/9/17 bun 29,creat 1.1,GFR 43  10/4/18 bun 31,creat 1.5 at [facility]  1/18/19 bun 33,creat 1.34,GFR 38,PTH 53,calcium 9.7,phos 3.6,spep negative  2/4/20 bun 30,creat 1.24, GFR 41, PTH 58, calcium 9.7, phos 3.9  8/20/20 bun 35,creat  1.12,GFR 46  9/28/20 bun 31,creat 1.1,GFR 47  6/8/21 bun 38,creat 1.46,GFR 34  6/27/21 Na 125,bun 64,creat 1.52 (hospital admission acute sacral fracture)  9/15/21 bun 25,creat 0.84,GFR>60  10/14/21 bun 15,creat 0.75,GFR>60,PTH 31calcium 9.4,phos 3.3,SPEP negative  5/10/22 bun 23,creat 1.18,GFR 45  8/14/22 bun 27,creat 1.06,GFR 51  10/15/22 bun 35,creat 1.27,GFR 41 s/p ankle orif  10/12/22 bun 26,creat 1.19,GFR 45 hospitalized ankle fracture  10/25/22 bun 14,creat 1.02,GFR 54 rehab hospital discharge  11/8/22 bun 13,creat 0.87,GFR 65  2/8/23 bun 21,creat 0.84,GFR 68     Dyslipidemia  4/11 chol 159,trig 84,hdl 47,ldl 95,cpk 114  7/12 chol 163,trig 120,hdl 49,ldl 90, crp 1.1 on lipitor 20 mg  3/26/13 chol 159,trig 99,hdl 46,ldl 93 on lipitor 20 mg  9/25/13 chol 164,trig 100,hdl 47,ldl 97 on lipitor 20 mg  9/12/14 cardiology note lower extremity weakness, hold lipitor x3 months, labs ordered  12/15/14 cardiology start low dose lipitor 10 mg  1/22/15 off lipitor will resume 10 mg and repeat labs 4 weeks  2/24/15 chol 179,trig 111,hdl 54,ldl 103 on lipitor 10 mg  2/1/16 cardiology note restart lipitor 20 mg    4/15/16 chol 163,trig 102,hdl 48,ldl 95 on lipitor 20 mg  5/12/17 chol 186,trig 101,hdl 47,ldl 119 on lipitor 20 mg intermittently will start taking daily  11/1/17 chol 146,trig 73,hdl 42,ldl 89 on lipitor 20 mg  12/9/17 chol 133,trig 74,hdl 49,ldl 69 on lipitor 80 mg higher dose due to recent transient ischemic attack  10/4/18 chol 126,trig 98,hdl 44,ldl 62 on lipitor 80 mg at [facility]  1/18/19 chol 161,trig 103,hdl 45,ldl 95 on lipitor 80 mg  2/4/20 chol 121,trig 71,hdl 45,ldl 62 on lipitor 80 mg  8/17/20 stop lipitor due to muscle pain   9/17/20 improved off lipitor repeat lipid pending  10/3/20 chol 240,trig 111,hdl 60,ldl 158, recommend start crestor 10 mg and repeat labs 6 weeks  10/26/20 chol 158,trig 106,hdl 62,ldl 75 on crestor 20 mg  4/20/21  off statin, she agrees to try  zetia  1/15/22 chol 205,trig 80,hdl 56,ldl 133 did not start zetia, will start zetia 10 mg  Lipitor,crestor muscle pain      gait disorder  4/22/21 custom PT evaluation   3/1/22 custom PT evaluation      Gastroesophageal reflux  6/27/07 EGD per [facility] hiatal hernia, start prevacid 30 mg  7/12 protonix 20 mg qday  11/16/12 [facility] note cont prilosec  1/5/16 change to protonix 40 mg from prilosec  2/4/20 vit d 67  10/14/21 vit d 78,b12 716 on prilosec  1/15/22 vit d 81,b12 839 on prilosec  5/10/22 magnesium 2.0, vit d 72  11/8/22 b12 797,vit d 65     history insomnia   1/30/17 trial of trazodone 50 mg  4/4/17 side effects with trazodone drowsiness, discontinued     History neutropenia  4/16/11 wbc 3.9  7/30/13 wbc 5.6  11/7/16 wbc 5.7  5/11/17 wbc 4.7  2/20/18 wbc 7.2  3/6/18 wbc 5.9  1/16/19 wbc 4.0  6/8/19 wbc 4.3 (44%N, 39%L)  2/4/20 wbc 4.1 (42%N,41%L)  8/20/20 wbc 4.6  11/4/21 wbc 4.0  1/15/22 wbc 3.9 (43%N,36%L)  5/10/22 wbc 4.0  8/2/22 wbc 4.3,b12 777,flow cytometry negative,SPEP negative  8/14/22 wbc 4.9  10/18/22 wbc 5.6  11/8/22 wbc 4.4     History pelvic fracture  6/22/21 CT pelvis without post reconstruction, comminuted mildly displaced fracture of right inferior and superior pubic rami, right sacral ritu fracture, right hip prosthesis normally located, moderate degenerative change left hip  6/25/21 hospital admission, 6/28/21 hospital discharge, admitted with pelvic fracture, seen by orthopedics no surgical intervention necessary, seen by physical therapy and occupational therapy recommended skilled nursing placement, urinary retention discharged with davenport catheter  6/25/21 CT pelvis without, stable appearance of minimally displaced fractures right sacral ritu, right pubic ramus posteriorly, and right ischial ramus, no acute pelvic fracture, no hip dislocation, right hip total arthroplasty in place  6/27/21 physical therapy hospital note recommend postacute placement for additional physical therapy  services  10/7/21 on neurontin 100 mg tid, increase to 200 mg tid  2/13/23 off neurontin      history shingles     History shoulder pain  4/4/17 right shoulder pain right shoulder x-ray ordered, right knee pain, referral custom PT, tried celebrex no benefit, will try naproxen 500 mg twice daily and zanaflex at night  4/5/17 x-ray right shoulder calcifications consistent with calcific tendinitis or hydroxyapatite deposition  5/12/17 MRI right shoulder ordered, persistent pain despite physical therapy  5/18/17 MRI right shoulder; full-thickness supraspinatus tendon tear  5/22/17 right shoulder injection, has been going to physical therapy 14 treatments some improvement, right shoulder injection provided, if no improvement in has appt  in october, if need sooner appt try   6/9/17 custom PT note   7/10/17 custom PT note       History TIA  11/1/17 hospital admission, 11/2/17 hospital discharge, facial numbness, transient numbness in both hands, resolved, CT, MRI head no acute infarct, blood pressure stable, discharged home, patient states she was on aspirin at the time of admission  11/1/17 CT head stable right mid brain likely chronic lacunar infarction, periventricular white matter disease  11/1/17 MRI brain no acute abnormality, moderate chronic microvascular stomach disease, chronic microhemorrhages left frontal and right parietal lobes, chronic lacunar infarct left basal ganglia and blanco, or cerebral atrophy  11/1/17 MR-MRA head without; negative  11/2/17 echo normal LV size and function, EF 70%, RVSP 50, mild AR and TR  11/28/17 awaiting possible right hip replacement per orthopedics , given recent possible TIA, cannot provide clearance, she will like second opinion from cardiology referral made to dr.bryan de leon, changed asa to plavix  12/12/17 ultrasound carotid less than 50% internal carotid stenosis bilateral  12/20/17 dr.bryan de leon cardiology note most recent echocardiogram  looks fine, will repeat CT scan follow aortic repair  2/20/18 episode of supraventricular tachycardia in the emergency room, davenport catheter removed, symptoms resolved with repeat EKG sinus rhythm  11/17/18 cardiology note asymptomatic, continue cholesterol medication, continue to monitor echo aortic valve, repaired ascending aorta, follow-up yearly  11/21/18 echo normal LV function, EF 60%, mild LVH, grade 2 diastolic dysfunction, mild aortic insufficiency, moderate tricuspid regurgitation, RVSP 50  12/6/19 echo normal LV function EF 65%, RVSP 43, moderate tricuspid regurgitation, mild to moderate aortic insufficiency  10/22/20 echo EF 70%, grade 1 diastolic dysfunction, mild AR, RVSP 30   12/14/20 cardiology note maintaining sinus rhythm, given frequent symptomatic episodes of paroxysmal atrial fibrillation and age, recommend amiodarone 200 mg, follow-up 6 month  4/20/21  sinus on exam, patient would like to discontinue amiodarone understanding she likely will return to intermittent atrial fibrillation, discussed sotalol, dronedarone, tikosyn as alternatives, patient declines any of those alternatives, ablation is not appropriate given her age   1/10/22 cardiology note sinus rhythm, atrial fibrillation episodes are not significantly symptomatic, recommend she check her heart rate when atrial fibrillation episodes occurs, repeat follow-up echo ordered, follow-up 6 months  3/22/22 echo EF 55%, diastolic dysfunction grade II, mild MR and TR, RVSP 28  2/7/23 CT head moderate global atrophy, small vessel chronic ischemic change  2/8/23 echo mild LVH, EF 60%, normal diastolic function, RVSP 30, mild MR and aortic valve regurgitation     history uti  10/21/22 UTI e.coli resistant to ampicillin,ciprofloxacin,levaquin, sensitive to cefuroxime,bactrim,macrodantin  1/5/23 UTI E. coli resistant to ampicillin, ciprofloxacin, levaquin, sensitive to cefuroxime, nitrofurantoin, bactrim     Hypertension  1/10/11 [facility]  note cardiac catheterization normal systolic function  3/11 [facility] echo moderate aortic insufficiency, moderate mitral insufficiency, moderate tricuspid insufficiency, normal LV function  5/12 echo [facility] normal LV function EF 60%, moderate to severe left atrial enlargement, RVSP 32    9/26/13 CT thoracic aorta no evidence of aneurysm, small hiatal hernia  9/26/13 Persantine thallium uniform breast tissue attenuation, cannot rule out underlying ischemic, LVEF 67%  10/3/13 on toprol-XL 25 mg half a tablet daily    12/13/13 cardiology note cont same meds, repeat echo and follow up 6 months  1/2/14 echo mild LVH, grade 1 diastolic dysfunction, ascending aortic 4.0, no change compared with [facility] 2010  5/15/14 cardiology note; increase benazepril to 40 mg qday,continue toprol XL 25 mg daily  6/17/15 cardiology note continue meds, repeat echo 6 months  2/1/16 cardiology note moderate pulmonary hypertension and snoring, nocturnal pulse oximetry ordered  6/30/16 cardiology note patient declines overnight pulse oximetry  11/1/17 echo normal LV size and function, EF 70%, mild aortic insufficiency and mild tricuspid regurgitation, RVSP 50, aortic root 3.2 cm  11/3/17 cardiology note hypertension stable, status post thoracic aortic aneurysm repair, headache unspecified, ESR ordered  11/28/17 on benazepril 40 mg,toprol 25mg, add norvasc 5 mg  12/12/17 ultrasound carotid less than 50% internal carotid stenosis bilateral  12/20/17  [facility] cardiology note most recent echocardiogram looks fine, will repeat CT scan follow aortic repair  2/20/18 episode of supraventricular tachycardia in the emergency room, davenport catheter removed, symptoms resolved with repeat EKG sinus rhythm  11/17/18 cardiology note asymptomatic, continue cholesterol medication, continue to monitor echo aortic valve, repaired ascending aorta, follow-up yearly  11/21/18 echo normal LV function, EF 60%, mild LVH, grade 2 diastolic dysfunction, mild aortic  insufficiency, moderate tricuspid regurgitation, RVSP 50  1/18/19 urine mac 45 on benazepril 40 mg, toprol 25 mg, norvasc 5 mg  12/6/19 echo normal LV function EF 65%, RVSP 43, moderate tricuspid regurgitation, mild to moderate aortic insufficiency  10/22/20 echo EF 70%, grade 1 diastolic dysfunction, mild AR, RVSP 30   12/14/20 cardiology note maintaining sinus rhythm, given frequent symptomatic episodes of paroxysmal atrial fibrillation and age, recommend amiodarone 200 mg, follow-up 6 month  4/6/21 on lotensin 40 mg and metoprolol 25 mg  4/20/21  sinus on exam, patient would like to discontinue amiodarone understanding she likely will return to intermittent atrial fibrillation, discussed sotalol, dronedarone, tikosyn as alternatives, patient declines any of those alternatives, ablation is not appropriate given her age   1/10/22 cardiology note sinus rhythm, atrial fibrillation episodes are not significantly symptomatic, recommend she check her heart rate when atrial fibrillation episodes occurs, repeat follow-up echo ordered, follow-up 6 months  3/22/22 echo EF 55%, diastolic dysfunction grade II, mild MR and TR, RVSP 28  4/18/22 on lotensin 40 mg and metoprolol 25 mg  5/24/22  cardiology note paroxysmal atrial fibrillation, sinus today, on amiodarone, blood pressures not controlled, increase toprol from 25 to 50 mg, suggest CTA aorta, follow-up 6 months   1/5/23 increase metoprolol to 75 mg qday and continue lotensin 40 mg  2/7/23 CT head moderate global atrophy, small vessel chronic ischemic change  2/7/23 hosptial admission, 2/8/23 hospital discharge, lightheaded, loss of consciousness, seen by occupational therapy, physical therapy, cleared for home discharge, ER note indicate sinus rate 56 discharged on toprol 50 mg, lotensin 40 mg, hydralazine 25 mg tid (new)  2/8/23 echo mild LVH, EF 60%, normal diastolic function, RVSP 30, mild MR and aortic valve regurgitation  2/13/23 on toprol  50 mg, lotensin 40 mg, hydralazine 25 mg tid  3/2/23 cardiology note sinus rhythm, frequent falls recommend discontinuation of anticoagulation referred for watchman left atrial appendage occluder device but in the meantime continue oral anticoagulation and repeat labs     Hypothyroid  4/11 tsh 9 start synthroid 50  4/11 tsh 6,free t3 3.1,free t4 1.2,tpo negative  7/1/11 tsh 2.1 cont synthroid 50 mcg  7/12 tsh 3.4 cont synthroid 50 mcg  7/31/13 tsh 3.2 on synthroid 50 mcg  9/25/13 tsh 1.7 on synthroid 50 mcg  2/24/15 tsh 4.9 on synthroid 50 mcg; increase to synthroid 75 mcg, repeat tsh in 6 weeks  4/14/15 tsh 1.1 on synthroid 75 mcg  4/15/16 tsh 2.3 on synthroid 75 mcg  5/12/17 tsh 1.2 on synthroid 75 mcg  11/1/17 tsh 2.1 on synthroid 75 mcg  1/18/19 tsh 2.2 on synthroid 75 mcg  2/4/20 tsh 3.1 on synthroid 75 mcg  8/20/20 tsh 1.6 on synthroid 75 mcg  10/14/21 tsh 1.5 on synthroid 75 mcg  1/15/22 tsh 3.4 on synthroid 75 mcg  10/18/22 tsh 1.6 on synthroid 75 mcg  2/8/23 tsh 4.2 on synthroid 75 mcg     neck arthritis  2/10/14 [facility] note; x-ray cervical spine DJD, right shoulder steroid injection  8/14/22 CT cervical spine several multilevel degenerative changes, no acute fracture  2/7/23 CT neck without plus reconstruction, multilevel to space narrowing, joint arthropathy, anterolisthesis C3-C4 5 mm     neutropenia  4/16/11 wbc 3.9  7/30/13 wbc 5.6  11/7/16 wbc 5.7  5/11/17 wbc 4.7  2/20/18 wbc 7.2  3/6/18 wbc 5.9  1/16/19 wbc 4.0  6/8/19 wbc 4.3 (44%N, 39%L)  2/4/20 wbc 4.1 (42%N,41%L)  8/20/20 wbc 4.6  11/4/21 wbc 4.0  1/15/22 wbc 3.9 (43%N,36%L)  5/10/22 wbc 4.0  8/2/22 wbc 4.3,b12 777,flow cytometry negative,SPEP negative  8/14/22 wbc 4.9  10/18/22 wbc 5.6  11/8/22 wbc 4.4  2/7/23 wbc 6.0  2/8/23 wbc 4.2     Osteoporosis  Had been on fosamax [id number]    8/10 dexa LS -2.0,hip -1.5 await old dexa result, she will get copy for me  4/11 vit d 35  9/12 dexa LS -3.2,hip -1.4  2/13/13 reclast IV  7/31/13 vit d 33 cont 2000  units  2/18/14 reclast IV  2/2/15 dexa LS -3.1,hip -1.9; FRAX 35.5 % major,12.6% hip  2/18/15 reclast IV  2/24/15 vit d 33  4/15/16 vit d 36  5/12/17 vit d 36  8/7/18 dexa left forearm -4.2,hip -2.5 resume reclast   8/31/18 reclast IV  1/18/19 vit d 27 increase from 2000 to 4000 units  2/4/20 vit d 67, PTH 58  6/25/21 vit d 75  10/14/21 vit d 78  1/15/22 vit d 81  5/11/22 vit d 72  8/14/22 CT thoracic spine moderate multilevel degenerative changes, accentuated kyphosis, mild/moderate compression deformities mild height loss T3 chronic, moderate loss T7 and T8 chronic, mild height loss T11 chronic  11/8/22 vit d 65     Paroxysmal atrial fibrillation  11/1/17 hospital admission, 11/2/17 hospital discharge, facial numbness, transient numbness in both hands, resolved, CT, MRI head no acute infarct, blood pressure stable, discharged home, patient states she was on aspirin at the time of admission  11/1/17 CT head stable right mid brain likely chronic lacunar infarction, periventricular white matter disease  11/1/17 MRI brain no acute abnormality, moderate chronic microvascular stomach disease, chronic microhemorrhages left frontal and right parietal lobes, chronic lacunar infarct left basal ganglia and blanco, or cerebral atrophy  11/1/17 MR-MRA head without; negative  2/20/17 episode of supraventricular tachycardia in the emergency room, davenport catheter removed, symptoms resolved with repeat EKG sinus rhythm  12/20/17  [facility] cardiology note most recent echocardiogram looks fine, will repeat CT scan follow aortic repair  11/17/18 cardiology note asymptomatic, continue cholesterol medication, continue to monitor echo aortic valve, repaired ascending aorta, follow-up yearly  11/21/18 echo normal LV function, EF 60%, mild LVH, grade 2 diastolic dysfunction, mild aortic insufficiency, moderate tricuspid regurgitation, RVSP 50  12/6/19 echo normal LV function EF 65%, RVSP 43, moderate tricuspid regurgitation, mild to  moderate aortic insufficiency  9/28/20 emergency room note EKG atrial fibrillation new onset  9/30/20 cardiology note sinus rhythm, start eliquis 5 mg bid and increase toprol to 25 mg daily, echo ordered, follow up 3 months   10/22/20 echo EF 70%, grade 1 diastolic dysfunction, mild AR, RVSP 30   12/14/20 cardiology note maintaining sinus rhythm, given frequent symptomatic episodes of paroxysmal atrial fibrillation and age, recommend amiodarone 200 mg, follow-up 6 month  4/20/21  cardiology note sinus on exam, patient would like to discontinue amiodarone understanding she likely will return to intermittent atrial fibrillation, discussed sotalol, dronedarone, tikosyn as alternatives, patient declines any of those alternatives, ablation is not appropriate given her age  12/20/21 on metoprolol and eliquis  1/10/22 cardiology note sinus rhythm, atrial fibrillation episodes are not significantly symptomatic, recommend she check her heart rate when atrial fibrillation episodes occurs, repeat follow-up echo ordered, follow-up 6 months   3/22/22 echo EF 55%, diastolic dysfunction grade II, mild MR and TR, RVSP 28  5/24/22  cardiology note paroxysmal atrial fibrillation, sinus today, on amiodarone, blood pressures not controlled, increase toprol from 25 to 50 mg, suggest CTA aorta, follow-up 6 months  10/21/22 hospital EKG a.fibrillation   12/1/22 EKG sinus on metoprolol 50 mg and eliquis  1/5/23 increase metoprolol to 75 mg qday and continue eliquis   2/7/23 hosptial admission, 2/8/23 hospital discharge, lightheaded, loss of consciousness, seen by occupational therapy, physical therapy, cleared for home discharge, ER note indicate sinus rate 56, discharged on toprol 50 mg and eliquis  2/8/23 echo mild LVH, EF 60%, normal diastolic function, RVSP 30, mild MR and aortic valve regurgitation  3/2/23 cardiology note sinus rhythm, frequent falls recommend discontinuation of anticoagulation referred for  watchman left atrial appendage occluder device but in the meantime continue oral anticoagulation and repeat labs    Preventative health  6/27/09 colon per [facility] no records  10/19/04 pneumovax   9/9/11 zoster vaccine  4/14/15 prevnar  8/7/18 dexa left forearm -4.2,hip -2.5  9/28/19 pneumovax  11/7/19 mammogram  11/12/19 shingrix second  9/17/20 tdap   11/8/22 vit d 65  11/23/22 covid bivalent     s/p ankle right orif  10/12/22 ER note patient fell getting out of bed  10/12/22 x-ray right ankle distal fibular and medial malleolar fracture  10/12/22 x-ray right tibia and fibula, right distal fibular fracture at metadiaphysis, probable medial malleolar fracture  10/12/22 CT right ankle without plus reconstruction, recent comminuted fracture distal right fibula including lateral malleolus, 2 mm lateral offset distal fragments with slight apex medial angulation, recent comminuted fracture posterior malleolus with extension into the lateral and anterior margins of the tibial plafond and comminuted fracture medial malleolus up to 3 mm separation and offset  10/14/22  orthopedic operative note ORIF right trimalleolar ankle fracture with fixation of the medial and lateral malleoli, internal fixation right syndesmosis  10/17/22 rehabilitation hospital admission, 10/29/22 rehabilitation hospital discharge, requires max assist with ADLs, support from spouse and acceptable post discharge living situation, functional status at discharge dentures for eating, independent grooming, bathing standby assist with hand-held shower, grab bar, bench, recommend front wheel walker, distance walked 2 to 6 feet, distance propelled wheelchair 180 feet, discharged on gabapentin 100 mg bid, oxycodone 7.5 mg   11/30/22  orthopedic note 6-week follow-up ORIF ankle fracture, doing well, will transition out of boot and continue physical therapy, follow-up 2 months     s/p hip arthroplasty  9/21/17 MRI right hip mild trochanteric bursitis,  mild femoral acetabular joint arthritis  10/12/17  orthopedic no proceed with right total hip arthroplasty, x-ray AP pelvis and right hip shows early arthrosis with calcifications and DJD  2/14/18  orthopedics operative note at [facility] right hip total arthroplasty, gluteus medius and minimus tendon repair  3/27/18 [place] [facility] note   5/8/18 [place] [facility] note xray right hip stable total hip arthroplasty, continue physical therapy  4/3/19 custom PT discharge  4/5/21  [facility] pain note right trochanteric bursitis steroid injection, trigger point injection low back  4/22/21 custom PT evaluation   6/21/21 custom PT discharge note   6/25/21 CT pelvis without, stable appearance of minimally displaced fractures right sacral ritu, right pubic ramus posteriorly, and right ischial ramus, no acute pelvic fracture, no hip dislocation, right hip total arthroplasty in place     s/p knee arthroplasty  4/10 MRI right knee complex tear medial meniscus, horizontal cleavage tear lateral meniscus, chondromalacia patella, moderate-sized baker's cyst  5/10 right meniscus knee repair   5/10 told by  had gout on surgery had pathology report, I await that report, question gout seen on pathology report done during repair of right meniscus knee surgery.  7/10 uric acid 6.3, await starting allopurinol depending on the operative report  8/5/10 reviewed orthopedics notes , operative report indicates crystal deposition, could be gout or pseudogout, no biopsy results, suspect chondrocalcinosis  10/11 dr.parlasca najera note, MRI pending  8/7/10 reviewed pathology report right knee tissue, marked degenerative changes with focal calcification, no mention of gout. Based on this and a normal uric acid level, I do not believe she has gout, has no hyperuricemia medications would be indicated  10/11 dr.parlasca najera left knee meniscectomy and arthroscopy  1/12 dr.parlasca najera left knee  arthroplasty  2/29/16  orthopedics x-ray right knee advanced DJD on the right knee corticosteroid injection performed, prescription voltaren gel  6/13/16  orthopedic note x-rays demonstrate right knee advanced DJD and resurfaced patella, offer right knee total replacement followed by patellar resurfacing after she recovers from her right knee  9/8/16  orthopedic's operative note right knee total arthroplasty  11/2/16  orthopedic note, follow-up right knee, x-ray right knee shows subluxation patella, traumatic evulsion VMO needs reexploration and repair  11/10/16  orthopedic's operative note VMO quadriceps avulsion repair status post total knee replacement  11/23/16  orthopedic no follow-up quadriceps repair, continue crutches in full extension, follow-up one month and then start passive range of motion 0-40°  2/4/17  orthopedic note, continue physical therapy, follow-up this summer  4/17/17 custom PT note  6/9/17 custom PT note     s/p lumbar surgery  6/03 L4-5 epidural     7/06  spinal surgery operative note decompression, foraminotomy. L4-L5 posterior fusion, L4-L5 allograft    3/5/14 MRI lumbar spine grade 2 spondylolisthesis L4-L5 with previous laminectomy and posterior fusion, left sided pedicle screw fixation L4-L5, moderate central canal stenosis L5-S1 secondary to facet arthropathy, mild to moderate multilevel neural foraminal narrowing  12/8/14  pain [facility] note; right lower extremity radiculitis L4-L5 lesion, myofascial lumbosacral pain, trochanteric bursitis, followup as needed  4/2/15  pain procedure note right L4-L5 and right L5-S1 SSNRB under fluoroscopy  4/27/15  pain note; right trochanteric bursal injection, trigger point injections performed, also set up SI joint injections  8/3/15 [facility] neurosurgery note, lumbar x-ray flexion and extension, MRI lumbar spine without contrast, followup   8/9/15 MRI  lumbar spine, previous L4-L5 left  fusion and laminectomy, L3-L4 moderate bilateral facet arthropathy, mild disc bulge, L4-L5 severe bilateral facet arthropathy, moderate to severe bilateral neural foraminal stenosis, L5-S1 moderate facet arthropathy  8/28/15  neurosurgery note previous posterior fusion L4-L5, does have L3-L4 disc bulge with central canal stenosis, recommend L3-L5 decompression  8/31/15  pain note; right lower extremity radiculitis, myofascial lumbosacral pain, start hydrocodone 5 mg, continued SI joint exercises, perform trochanteric bursal injection right hip, trigger points lumbar region  10/28/15  neurosurgery note, will schedule for posterior L3-L4 laminectomy and fusion with redo L4-5 laminectomy and exploration of fusion, surgical clearance per cardiology   11/24/15  neurosurgery operative note expiration removal L4-L5 hardware on the left, bilateral facetectomies L4, L3-L4 transforaminal lumbar interbody fusion  12/9/15 [facility] neurosurgery note s/p L4-S1 fusion on 11/24/15 , follow up in 4 weeks  12/23/15  neurosurgery note, clear to restart physical therapy if she would like after one month, follow-up 3 months  6/9/20  [facility] pain note trigger point injections trochanteric bursa right side  8/25/20  [facility] pain procedure note epidural right L4 nerve root   4/5/21  [facility] pain note right trochanteric bursitis steroid injection, trigger point injection low back  4/22/21 custom PT evaluation   6/21/21 custom PT discharge note   6/27/22 on neurontin 300 mg two tid will taper off  2/13/23 off neurontin      s/p TOMY  Sees gyn on HRT estradiol for 20 yrs ()     s/p thoracic aneurysm repair  6/29/06 CT-CTA chest with and without postprocessing; stable ascending thoracic aortic aneurysm maximum diameter 4.8, just distal to the aortic valve maximum diameter 4.3  4/9/07 CT-CTA chest with and without processing; stable  ascending aortic thoracic aneurysm 4.5 x 4.6 cm  6/8/09 CT chest with; stable 4.7 ascending aorta aneurysm  10/15/09 CT chest without; dilated ascending thoracic aorta 4.9 cm aneurysm increased from 4.7  1/11/10  cardiovascular surgery operative note ascending thoracic aorta aneurysm repair  9/26/13 CT thoracic aorta evaluation; status post repair ascending thoracic aortic aneurysm without evidence of dissection or leak  1/2/14 echo mild LVH, grade 1 diastolic dysfunction, ascending aortic 4.0, no change compared with ZAK 2010  5/15/14 cardiology note  6/17/15 cardiology note cont meds,repeat echo 6 months  2/1/16 cardiology note moderate pulmonary hypertension and snoring, nocturnal pulse oximetry ordered  11/1/17 echo normal LV size and function, EF 70%, mild aortic insufficiency and mild tricuspid regurgitation, RVSP 50, aortic root 3.2 cm  11/3/17 cardiology note stable  12/20/17  [facility] cardiology note most recent echocardiogram looks fine, will repeat CT scan follow aortic repair  11/17/18 cardiology note asymptomatic, continue cholesterol medication, continue to monitor echo aortic valve, repaired ascending aorta, follow-up yearly  11/21/18 echo normal LV function, EF 60%, mild LVH, grade 2 diastolic dysfunction, mild aortic insufficiency, moderate tricuspid regurgitation, RVSP 50  12/6/19 echo normal LV function EF 65%, RVSP 43, moderate tricuspid regurgitation, mild to moderate aortic insufficiency  10/22/20 echo EF 70%, grade 1 diastolic dysfunction, mild AR, RVSP 30   4/20/21  sinus on exam, patient would like to discontinue amiodarone understanding she likely will return to intermittent atrial fibrillation, discussed sotalol, dronedarone, tikosyn as alternatives, patient declines any of those alternatives, ablation is not appropriate given her age   1/10/22 cardiology note sinus rhythm, atrial fibrillation episodes are not significantly symptomatic, recommend she check  her heart rate when atrial fibrillation episodes occurs, repeat follow-up echo ordered, follow-up 6 months  3/22/22 echo EF 55%, diastolic dysfunction grade II, mild MR and TR, RVSP 28  5/24/22  cardiology note paroxysmal atrial fibrillation, sinus today, on amiodarone, blood pressures not controlled, increase toprol from 25 to 50 mg, suggest CTA aorta, follow-up 6 months  2/8/23 echo mild LVH, EF 60%, normal diastolic function, RVSP 30, mild MR and aortic valve regurgitation  3/2/23 cardiology note sinus rhythm, frequent falls recommend discontinuation of anticoagulation referred for watchman left atrial appendage occluder device but in the meantime continue oral anticoagulation and repeat labs    Urinary retention  8/18/21  urology note urinary retention, urethral davenport catheter in place, catheter exchange performed, failed voiding trial today, follow-up 1 month  9/28/21 urology note, urodynamic study cystometrogram showing bladder compliance is reduced, detrusor pressure during filling is high, leaking observed during the filling phase, electromyography shows no change in activity with increased abdominal pressure, EMG activity no change during voiding, complex CMG uroflow patient leak small amount prior to catheter falling out but when attempted to void was not able to, impression is poor detrusor compliance, complete retention, no uninhibited contractions, normal capacity, normal bladder sensation, impaired pelvic floor response to voiding; suprapubic tube discussed with patient as best option to manage bladder while she is healing from a pelvic break  9/28/21 urology note davenport catheter insertion post void residual 481 mL, will schedule suprapubic catheter  11/4/21 suprapubic catherer placement in hospital for urinary retention  12/6/21 urology note catheter exchange  1/9/22 urology note suprapubic catheter exchange  3/8/22 urology note suprapubic catheter exchange  6/6/22 urology note  suprapubic catheter exchange  7/7/12 urology note suprapubic catheter exchange  8/11/22 urology [place] note suprapubic catheter exchange, urodynamic study showed minimal bladder contractions throughout, exchanging SPT every month, she has tried periodically clamping SPT and was able to void on her own with urgency but overall stable, discussed catheter removal and subsequent TOV as symptoms are stable when voiding, she is agreeable, she will complete diary of voiding quantities with SPT plugged and arrange SPT removal if stable  10/21/22 UTI e.coli resistant to ampicillin,ciprofloxacin,levaquin, sensitive to cefuroxime,bactrim,macrodantin  2/13/23 now urinating on her own                   Patient Active Problem List   Diagnosis    S/p lumbar surgery    Hypertension    Dyslipidemia    S/P TOMY (total abdominal hysterectomy)    Preventative health care    S/P knee bilateral arthroplasty    S/P appendectomy    Osteoporosis, idiopathic    Hypothyroid    History of shingles    GERD (gastroesophageal reflux disease)    Neck arthritis    S/P thoracic aortic aneurysm repair    Decreased GFR    History of insomnia    History of shoulder pain    History of transient ischemic attack (TIA)    S/p hip right arthroplasty    Paroxysmal atrial fibrillation (HCC)    Chronic anticoagulation    Gait disorder    History of pelvic fracture    Urinary retention    Anemia    Senile osteoporosis    S/p ankle right orif    History of UTI    Neutropenia (HCC)    Depression, major, single episode, moderate (HCC)                  Patient Care Team:  Tee Tran M.D. as PCP - General  Kobi Wheeler M.D. as Consulting Physician (Interventional Cardiology)  Brett Santos II, O.D. as Consulting Physician (Optometry)  Angelic Adams as Consulting Physician (Dentistry)                               ROS           Objective     BP (!) 140/80 (Patient Position: Sitting)   Pulse 72   Temp 36.6 °C (97.9 °F) (Temporal)   Resp 16   Ht 1.626  "m (5' 4\")   Wt 59.9 kg (132 lb)   SpO2 99%   BMI 22.66 kg/m²      Physical Exam  Vitals and nursing note reviewed.   Constitutional:       General: She is not in acute distress.     Appearance: Normal appearance. She is not ill-appearing.   HENT:      Head: Normocephalic and atraumatic.      Right Ear: External ear normal.      Left Ear: External ear normal.   Eyes:      Conjunctiva/sclera: Conjunctivae normal.   Cardiovascular:      Comments: S1-S2 regular  Pulmonary:      Effort: Pulmonary effort is normal.      Breath sounds: Normal breath sounds.   Abdominal:      General: There is no distension.      Comments: Rectal exam with medical assistant Jo Ann and daughter present, external hemorrhoid, no fistula, no pruritus ani   Musculoskeletal:         General: No swelling.      Cervical back: Neck supple.   Skin:     Findings: No bruising.   Neurological:      General: No focal deficit present.      Mental Status: She is alert.   Psychiatric:         Mood and Affect: Mood normal.         Behavior: Behavior normal.     Normal affect, insight, judgment                      Assessment & Plan   Assessment  #1 hemorrhoid external, no fistula or fissure    #2 paroxysmal atrial fibrillation, regular on exam today followed by cardiology, off anticoagulation per cardiology due to recurrent falls    #3 constipation likely contributing to external hemorrhoid    #4 depression major, single episode, cannot tolerate Cymbalta, off medication mood stable    #5 status post thoracic aortic aneurysm repair    #6 hypothyroid on Synthroid 75 mcg TSH last month 4.2     #7 occasional urinary incontinence with standing, no dysuria, no hematuria, history of UTI previously    Plan  #1 Hytone cream twice daily to affected area hemorrhoid    #2 MiraLAX one bid mixed with water, she has this at home, ensure adequate hydration with water    #3 high-fiber diet, aiming for 20 to 30 g of fiber daily, ensure adequate hydration    #4 if Hytone " does not work may try topical nitroglycerin    #5 continue off Cymbalta due to side effects, offered a different medication she declines a different medication, advised her to call us if her symptoms worsen, she agrees but she would not like to be on a mood medication at this point in time    #6 POLST form completed, patient is competent to make her own decision, requesting DNR status    #7 Point-of-care urinalysis, will send for culture, advised patient we will notify her with final result    #8 follow-up cardiology, following precautions, continue walker for ambulation    #9 follow-up with myself 3 months

## 2023-03-30 ENCOUNTER — TELEPHONE (OUTPATIENT)
Dept: MEDICAL GROUP | Facility: MEDICAL CENTER | Age: 88
End: 2023-03-30
Payer: MEDICARE

## 2023-03-30 RX ORDER — CEFUROXIME AXETIL 250 MG/1
250 TABLET ORAL 2 TIMES DAILY
Qty: 10 TABLET | Refills: 0 | Status: SHIPPED | OUTPATIENT
Start: 2023-03-30 | End: 2023-04-04

## 2023-03-30 NOTE — TELEPHONE ENCOUNTER
Notified with urine result, will treat with cefuroxime, has had Omnicef in the past without reaction, 1 pill twice a day cefuroxime 250 mg x 5 days

## 2023-04-03 ENCOUNTER — TELEPHONE (OUTPATIENT)
Dept: MEDICAL GROUP | Facility: MEDICAL CENTER | Age: 88
End: 2023-04-03
Payer: MEDICARE

## 2023-04-03 NOTE — TELEPHONE ENCOUNTER
Mala Samson Wada  [phone] (home)     Pt called and says she is feeling worse but her  will not bring her back to the office. Does not know what to do. Wants to know what you think she should do. Wants to know if she should just wait it out? Please advise.

## 2023-04-03 NOTE — TELEPHONE ENCOUNTER
Could you ask her what specifically is worse?  Is it the pain in the rectal area?  Has she had any blood in her stools?  If she is still constipated?  Does she have any fevers, nausea, vomiting, abdominal pain?

## 2023-04-06 ENCOUNTER — HOSPITAL ENCOUNTER (OUTPATIENT)
Dept: RADIOLOGY | Facility: MEDICAL CENTER | Age: 88
End: 2023-04-06
Attending: INTERNAL MEDICINE
Payer: MEDICARE

## 2023-04-06 ENCOUNTER — TELEPHONE (OUTPATIENT)
Dept: MEDICAL GROUP | Facility: MEDICAL CENTER | Age: 88
End: 2023-04-06
Payer: MEDICARE

## 2023-04-06 ENCOUNTER — OFFICE VISIT (OUTPATIENT)
Dept: MEDICAL GROUP | Facility: MEDICAL CENTER | Age: 88
End: 2023-04-06
Payer: MEDICARE

## 2023-04-06 VITALS
HEIGHT: 64 IN | HEART RATE: 67 BPM | TEMPERATURE: 98.4 F | WEIGHT: 130 LBS | DIASTOLIC BLOOD PRESSURE: 70 MMHG | BODY MASS INDEX: 22.2 KG/M2 | SYSTOLIC BLOOD PRESSURE: 136 MMHG | OXYGEN SATURATION: 97 % | RESPIRATION RATE: 16 BRPM

## 2023-04-06 DIAGNOSIS — M89.8X6 TIBIAL PAIN: ICD-10-CM

## 2023-04-06 DIAGNOSIS — M25.571 ACUTE RIGHT ANKLE PAIN: ICD-10-CM

## 2023-04-06 DIAGNOSIS — M79.671 FOOT PAIN, RIGHT: ICD-10-CM

## 2023-04-06 DIAGNOSIS — I15.0 RENOVASCULAR HYPERTENSION: Chronic | ICD-10-CM

## 2023-04-06 DIAGNOSIS — M25.561 ACUTE PAIN OF RIGHT KNEE: ICD-10-CM

## 2023-04-06 DIAGNOSIS — I48.0 PAROXYSMAL ATRIAL FIBRILLATION (HCC): ICD-10-CM

## 2023-04-06 PROCEDURE — 73562 X-RAY EXAM OF KNEE 3: CPT | Mod: RT

## 2023-04-06 PROCEDURE — 73590 X-RAY EXAM OF LOWER LEG: CPT | Mod: RT

## 2023-04-06 PROCEDURE — 73610 X-RAY EXAM OF ANKLE: CPT | Mod: RT

## 2023-04-06 PROCEDURE — 99214 OFFICE O/P EST MOD 30 MIN: CPT | Performed by: INTERNAL MEDICINE

## 2023-04-06 PROCEDURE — 73630 X-RAY EXAM OF FOOT: CPT | Mod: RT

## 2023-04-06 ASSESSMENT — FIBROSIS 4 INDEX: FIB4 SCORE: 2.639865316429777424

## 2023-04-06 NOTE — TELEPHONE ENCOUNTER
Pt states that all of her abdominal issues have resolved and she is doing much better. No more pain. Never had blood in her stool. And not fever, nausea or vomiting. She needs nothing re: this issue.

## 2023-04-06 NOTE — PROGRESS NOTES
Subjective     Yvonne Marie Wada is a 87 y.o. female who presents with fall            HPI  Add-on appointment same day patient is brought in by her .  Patient and her  went for a walk yesterday using her 4 wheeled walker with a seat and hand brakes, when nearly 3 miles when she got tired, and lost her balance and fell to her right knee hitting her right knee, no head trauma, did not fall on her back, has had right knee, right ankle, right foot pain since yesterday.  She had home physical therapy 3 days ago and went for shorter walk with her physical therapist without any issues.  Paroxysmal atrial fibrillation followed by cardiology on Toprol 50 mg daily, Lotensin 40 mg, last note I see from cardiology is to have her off anticoagulation due to recurrent falls pending watchman but she remains on Eliquis.  No significant bruising of her legs, she does have pain of the right knee, right pretibial area, and right foot and ankle. History of bilateral knee arthroplasty, right hip arthroplasty, right ankle ORIF  Prior to the fall, she did not have any chest pain, palpitations, lightheadedness, she has paroxysmal atrial fibrillation, is on Toprol, Lotensin, blood pressures have been stable at home 130/70.    Current Outpatient Medications   Medication Sig Dispense Refill    hydrocortisone 2.5 % Cream topical cream Apply 1 Application. topically 2 times a day. Apply to hemorrhoid area 28 g 1    metoprolol SR (TOPROL XL) 50 MG TABLET SR 24 HR Take 1 Tablet by mouth every morning. 90 Tablet 3    DULoxetine (CYMBALTA) 30 MG Cap DR Particles Take 1 Capsule by mouth every day. 30 Capsule 2    meclizine (ANTIVERT) 12.5 MG Tab Take 1 Tablet by mouth 3 times a day as needed for Dizziness. 30 Tablet 0    ezetimibe (ZETIA) 10 MG Tab TAKE 1 TABLET BY MOUTH EVERY DAY 90 Tablet 3    benazepril (LOTENSIN) 40 MG tablet TAKE 1 TABLET BY MOUTH EVERY DAY IN THE MORNING 90 Tablet 3    levothyroxine (SYNTHROID) 75 MCG Tab TAKE 1  TABLET BY MOUTH EVERY DAY IN THE MORNING ON AN EMPTY STOMACH (Patient taking differently: Take 75 mcg by mouth every morning on an empty stomach.) 90 Tablet 2    calcium/vitamin D 250-125 MG-UNIT Tab tablet Take 1 Tablet by mouth every day. 60 Tablet     ELIQUIS 5 MG Tab TAKE 1 TABLET BY MOUTH TWICE A DAY (Patient taking differently: Take 5 mg by mouth 2 times a day.) 180 Tablet 3     No current facility-administered medications for this visit.     Patient Active Problem List   Diagnosis    S/p lumbar surgery    Hypertension    Dyslipidemia    S/P TOMY (total abdominal hysterectomy)    Preventative health care    S/P knee bilateral arthroplasty    S/P appendectomy    Osteoporosis, idiopathic    Hypothyroid    History of shingles    GERD (gastroesophageal reflux disease)    Neck arthritis    S/P thoracic aortic aneurysm repair    Decreased GFR    History of insomnia    History of shoulder pain    History of transient ischemic attack (TIA)    S/p hip right arthroplasty    Paroxysmal atrial fibrillation (HCC)    Chronic anticoagulation    Gait disorder    History of pelvic fracture    Urinary retention    Anemia    Senile osteoporosis    S/p ankle right orif    History of UTI    Neutropenia (HCC)    Depression, major, single episode, moderate (HCC)    POLST (Physician Orders for Life-Sustaining Treatment)    Hemorrhoid           Patient Care Team:  Tee Tran M.D. as PCP - General  Kobi Wheeler M.D. as Consulting Physician (Interventional Cardiology)  Brett Santos II, O.D. as Consulting Physician (Optometry)  Aneglic Adams as Consulting Physician (Dentistry)        ROS           Objective           Physical Exam  Vitals and nursing note reviewed.   Constitutional:       Appearance: Normal appearance.   HENT:      Head: Normocephalic and atraumatic.      Right Ear: External ear normal.      Left Ear: External ear normal.   Eyes:      Conjunctiva/sclera: Conjunctivae normal.   Cardiovascular:      Rate and  Rhythm: Normal rate and regular rhythm.      Heart sounds: Normal heart sounds.   Pulmonary:      Effort: Pulmonary effort is normal.      Breath sounds: Normal breath sounds.   Abdominal:      General: There is no distension.   Skin:     Coloration: Skin is not jaundiced.      Findings: No bruising.   Neurological:      General: No focal deficit present.      Mental Status: She is alert.   Psychiatric:         Behavior: Behavior normal.         Thought Content: Thought content normal.   Right knee tender to palpation medial aspect, normal flexion and extension, negative Nayeli's, no knee effusion, no edema, no popliteal tenderness, tender right anterior pretibial region to palpation, no edema, no bruising or ecchymosis, right ankle tender to palpation medial aspect normal dorsiflexion plantarflexion, some right lateral ankle edema, right midfoot no tenderness, some tenderness of the right plantar arch and tender to her toes to palpation although no toe deformities, lower extremities no open sores, lesions, ulcerations  Neurologic pleasant, normal affect, insight, judgment          Assessment & Plan        Assessment  #1 fall yesterday, no head trauma, fell to her right knee when walking, I suspect she was a bit fatigued as she walked quite a bit yesterday nearly 3 miles and she has not been walking regularly at all this winter, she did go for a walk with physical therapy at home 2 days prior to that but only went a block, no evidence of tachyarrhythmia or orthostatic hypotension precipitating her fall    #2 right knee tenderness medial aspect    #3 right pretibial lower extremity tenderness to palpation, no open sores or lesions    #4 right ankle medial aspect tenderness    #5 right toes tender to palpation no evidence of fracture by exam    #6 paroxysmal atrial fibrillation sinus on exam on Toprol, still on Eliquis although last cardiology note indicated discontinuing anticoagulation    #7 hypertension blood  pressure stable on Toprol, Lotensin, hydralazine    Plan  #1  X-ray right knee, x-ray right tib-fib, x-ray right ankle, x-ray right foot    #2 continue checking blood pressure    #3 she already has physical therapy, continue physical therapy at home, continue walker for ambulation, orthostatic precautions and following precautions    #4 follow-up with cardiology regarding anticoagulation    #5 follow-up 3 months

## 2023-04-06 NOTE — TELEPHONE ENCOUNTER
Mala Samson Wada  [phone] (home)     Mala went for a walk yesterday and fell. Did not hit her head but sprained her R leg/ankle/foot. Would like to get XRays ordered.

## 2023-04-06 NOTE — TELEPHONE ENCOUNTER
I cannot order multiple x-rays of her right lower extremity without seeing her.  I do have an opening at 2:00 today, please see if she can make that appointment.

## 2023-04-07 ENCOUNTER — TELEPHONE (OUTPATIENT)
Dept: MEDICAL GROUP | Facility: MEDICAL CENTER | Age: 88
End: 2023-04-07
Payer: MEDICARE

## 2023-04-07 NOTE — TELEPHONE ENCOUNTER
Called and left a message, x-ray right knee, right tib-fib, right ankle, right foot negative for fracture

## 2023-04-07 NOTE — TELEPHONE ENCOUNTER
----- Message from Tee Tran M.D. sent at 4/7/2023  2:38 AM PDT -----  Called the patient and left a message, please notify her that the ankle x-ray is negative for fracture.  Please see other result notes.

## 2023-04-07 NOTE — TELEPHONE ENCOUNTER
----- Message from Tee Tran M.D. sent at 4/7/2023  2:39 AM PDT -----  Called the patient left a message, please notify her that the foot x-ray shows no evidence of fracture, please see the other result notes

## 2023-04-07 NOTE — TELEPHONE ENCOUNTER
----- Message from Tee Tran M.D. sent at 4/7/2023  2:40 AM PDT -----  Please notify the patient that her right lower leg x-rays negative for fracture, please see the other result notes

## 2023-04-07 NOTE — TELEPHONE ENCOUNTER
----- Message from Tee Tran M.D. sent at 4/7/2023  2:39 AM PDT -----  Called and left a message, please notify the patient that the right knee x-ray shows no evidence of fracture, please see the other result notes.

## 2023-04-17 DIAGNOSIS — I10 ESSENTIAL HYPERTENSION: ICD-10-CM

## 2023-04-17 DIAGNOSIS — I48.0 PAF (PAROXYSMAL ATRIAL FIBRILLATION) (HCC): ICD-10-CM

## 2023-04-17 NOTE — TELEPHONE ENCOUNTER
Meggan    Received request via: Patient    Was the patient seen in the last year in this department? Yes    Does the patient have an active prescription (recently filled or refills available) for medication(s) requested? No    Does the patient have residential Plus and need 100 day supply (blood pressure, diabetes and cholesterol meds only)? Patient does not have SCP

## 2023-04-21 ENCOUNTER — TELEPHONE (OUTPATIENT)
Dept: CARDIOLOGY | Facility: MEDICAL CENTER | Age: 88
End: 2023-04-21

## 2023-04-21 DIAGNOSIS — I48.0 PAF (PAROXYSMAL ATRIAL FIBRILLATION) (HCC): ICD-10-CM

## 2023-04-21 DIAGNOSIS — I10 ESSENTIAL HYPERTENSION: ICD-10-CM

## 2023-04-21 NOTE — TELEPHONE ENCOUNTER
TW    Caller: Yvonne Wada    Medication Name and Dosage:   ELIQUIS 5 MG Tab    Medication amount left: 6 tablet left    Preferred Pharmacy:  [facility]/pharmacy #[id number] - [place] - [address]   [address] [place] [address]   Phone:  [phone]  Fax:  [phone]   AD #:  [id number]    Other questions (Topic): Medication got routed to PCP instead of us. Patient is worried she wont get her medication on time now.    Callback Number (Will only call for issues): [phone]    Thank you,  -Abril GONZALEZ

## 2023-05-27 ENCOUNTER — APPOINTMENT (OUTPATIENT)
Dept: RADIOLOGY | Facility: MEDICAL CENTER | Age: 88
End: 2023-05-27
Attending: EMERGENCY MEDICINE
Payer: MEDICARE

## 2023-05-27 ENCOUNTER — HOSPITAL ENCOUNTER (EMERGENCY)
Facility: MEDICAL CENTER | Age: 88
End: 2023-05-27
Attending: EMERGENCY MEDICINE
Payer: MEDICARE

## 2023-05-27 VITALS
TEMPERATURE: 98 F | DIASTOLIC BLOOD PRESSURE: 70 MMHG | HEIGHT: 64 IN | SYSTOLIC BLOOD PRESSURE: 143 MMHG | OXYGEN SATURATION: 94 % | HEART RATE: 68 BPM | WEIGHT: 130 LBS | BODY MASS INDEX: 22.2 KG/M2 | RESPIRATION RATE: 18 BRPM

## 2023-05-27 DIAGNOSIS — R00.2 PALPITATIONS: ICD-10-CM

## 2023-05-27 LAB
ALBUMIN SERPL BCP-MCNC: 3.8 G/DL (ref 3.2–4.9)
ALBUMIN/GLOB SERPL: 1.4 G/DL
ALP SERPL-CCNC: 52 U/L (ref 30–99)
ALT SERPL-CCNC: 28 U/L (ref 2–50)
ANION GAP SERPL CALC-SCNC: 14 MMOL/L (ref 7–16)
AST SERPL-CCNC: 29 U/L (ref 12–45)
BASOPHILS # BLD AUTO: 0.9 % (ref 0–1.8)
BASOPHILS # BLD: 0.05 K/UL (ref 0–0.12)
BILIRUB SERPL-MCNC: 0.5 MG/DL (ref 0.1–1.5)
BUN SERPL-MCNC: 28 MG/DL (ref 8–22)
CALCIUM ALBUM COR SERPL-MCNC: 9.8 MG/DL (ref 8.5–10.5)
CALCIUM SERPL-MCNC: 9.6 MG/DL (ref 8.5–10.5)
CHLORIDE SERPL-SCNC: 102 MMOL/L (ref 96–112)
CO2 SERPL-SCNC: 20 MMOL/L (ref 20–33)
CREAT SERPL-MCNC: 1.06 MG/DL (ref 0.5–1.4)
EKG IMPRESSION: NORMAL
EOSINOPHIL # BLD AUTO: 0.06 K/UL (ref 0–0.51)
EOSINOPHIL NFR BLD: 1.1 % (ref 0–6.9)
ERYTHROCYTE [DISTWIDTH] IN BLOOD BY AUTOMATED COUNT: 45.1 FL (ref 35.9–50)
GFR SERPLBLD CREATININE-BSD FMLA CKD-EPI: 51 ML/MIN/1.73 M 2
GLOBULIN SER CALC-MCNC: 2.7 G/DL (ref 1.9–3.5)
GLUCOSE SERPL-MCNC: 103 MG/DL (ref 65–99)
HCT VFR BLD AUTO: 38.1 % (ref 37–47)
HGB BLD-MCNC: 12.8 G/DL (ref 12–16)
IMM GRANULOCYTES # BLD AUTO: 0.02 K/UL (ref 0–0.11)
IMM GRANULOCYTES NFR BLD AUTO: 0.4 % (ref 0–0.9)
LYMPHOCYTES # BLD AUTO: 1.63 K/UL (ref 1–4.8)
LYMPHOCYTES NFR BLD: 30.2 % (ref 22–41)
MCH RBC QN AUTO: 30 PG (ref 27–33)
MCHC RBC AUTO-ENTMCNC: 33.6 G/DL (ref 32.2–35.5)
MCV RBC AUTO: 89.2 FL (ref 81.4–97.8)
MONOCYTES # BLD AUTO: 0.51 K/UL (ref 0–0.85)
MONOCYTES NFR BLD AUTO: 9.4 % (ref 0–13.4)
NEUTROPHILS # BLD AUTO: 3.13 K/UL (ref 1.82–7.42)
NEUTROPHILS NFR BLD: 58 % (ref 44–72)
NRBC # BLD AUTO: 0 K/UL
NRBC BLD-RTO: 0 /100 WBC (ref 0–0.2)
PLATELET # BLD AUTO: 202 K/UL (ref 164–446)
PMV BLD AUTO: 9 FL (ref 9–12.9)
POTASSIUM SERPL-SCNC: 3.9 MMOL/L (ref 3.6–5.5)
PROT SERPL-MCNC: 6.5 G/DL (ref 6–8.2)
RBC # BLD AUTO: 4.27 M/UL (ref 4.2–5.4)
SODIUM SERPL-SCNC: 136 MMOL/L (ref 135–145)
TROPONIN T SERPL-MCNC: 22 NG/L (ref 6–19)
WBC # BLD AUTO: 5.4 K/UL (ref 4.8–10.8)

## 2023-05-27 PROCEDURE — 93005 ELECTROCARDIOGRAM TRACING: CPT | Performed by: EMERGENCY MEDICINE

## 2023-05-27 PROCEDURE — 93005 ELECTROCARDIOGRAM TRACING: CPT

## 2023-05-27 PROCEDURE — 36415 COLL VENOUS BLD VENIPUNCTURE: CPT

## 2023-05-27 PROCEDURE — 71045 X-RAY EXAM CHEST 1 VIEW: CPT

## 2023-05-27 PROCEDURE — 80053 COMPREHEN METABOLIC PANEL: CPT

## 2023-05-27 PROCEDURE — 99285 EMERGENCY DEPT VISIT HI MDM: CPT

## 2023-05-27 PROCEDURE — 84484 ASSAY OF TROPONIN QUANT: CPT

## 2023-05-27 PROCEDURE — 85025 COMPLETE CBC W/AUTO DIFF WBC: CPT

## 2023-05-27 ASSESSMENT — FIBROSIS 4 INDEX: FIB4 SCORE: 2.639865316429777424
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
## 2023-05-27 NOTE — ED NOTES
SW met with pt about housing options. Pt would like to go home, OK per SW. Discharge instructions discussed with pt, verbalizes understanding. PIV removed. All belongings with pt when leaving unit. Pt amb to lobby steady gait.

## 2023-05-27 NOTE — ED TRIAGE NOTES
"Chief Complaint   Patient presents with    Palpitations     Pt began having chest palpitations x 2 hrs ago. Pt was found in afib rvr upon ems arrival. 150's. Hx afib. +Nausea. Per ems pt converted to sinus in ambulance. Denies CP. C/o mild SOB.     Pt bib ems for above complaint. GCS 15. 4mg zofran and 100ml given PTA.    /66   Pulse 71   Resp 20   Ht 1.626 m (5' 4\")   Wt 59 kg (130 lb)   SpO2 97%   BMI 22.31 kg/m²     "
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
## 2023-06-14 ENCOUNTER — OFFICE VISIT (OUTPATIENT)
Dept: CARDIOLOGY | Facility: MEDICAL CENTER | Age: 88
End: 2023-06-14
Attending: INTERNAL MEDICINE
Payer: MEDICARE

## 2023-06-14 VITALS
OXYGEN SATURATION: 96 % | BODY MASS INDEX: 21.51 KG/M2 | HEART RATE: 68 BPM | DIASTOLIC BLOOD PRESSURE: 80 MMHG | HEIGHT: 64 IN | SYSTOLIC BLOOD PRESSURE: 152 MMHG | RESPIRATION RATE: 18 BRPM | WEIGHT: 126 LBS

## 2023-06-14 DIAGNOSIS — R29.6 FREQUENT FALLS: ICD-10-CM

## 2023-06-14 DIAGNOSIS — I35.1 NONRHEUMATIC AORTIC VALVE INSUFFICIENCY: ICD-10-CM

## 2023-06-14 DIAGNOSIS — I10 ESSENTIAL HYPERTENSION: ICD-10-CM

## 2023-06-14 DIAGNOSIS — I48.0 PAROXYSMAL ATRIAL FIBRILLATION (HCC): ICD-10-CM

## 2023-06-14 DIAGNOSIS — Z98.890 S/P THORACIC AORTIC ANEURYSM REPAIR: ICD-10-CM

## 2023-06-14 DIAGNOSIS — Z86.79 S/P THORACIC AORTIC ANEURYSM REPAIR: ICD-10-CM

## 2023-06-14 DIAGNOSIS — Z79.01 ON CONTINUOUS ORAL ANTICOAGULATION: ICD-10-CM

## 2023-06-14 PROCEDURE — 99215 OFFICE O/P EST HI 40 MIN: CPT | Performed by: INTERNAL MEDICINE

## 2023-06-14 PROCEDURE — 3079F DIAST BP 80-89 MM HG: CPT | Performed by: INTERNAL MEDICINE

## 2023-06-14 PROCEDURE — 99212 OFFICE O/P EST SF 10 MIN: CPT | Performed by: INTERNAL MEDICINE

## 2023-06-14 PROCEDURE — 3077F SYST BP >= 140 MM HG: CPT | Performed by: INTERNAL MEDICINE

## 2023-06-14 ASSESSMENT — FIBROSIS 4 INDEX: FIB4 SCORE: 2.33

## 2023-06-14 NOTE — PROGRESS NOTES
"  Subjective:   Yvonne Marie Wada is an 87 y.o. female who presents today for follow-up of repaired thoracic ascending aorta aneurysm in 2010, aortic insufficiency, hypertension hyperlipidemia and PAF on OAC.    Referred last visit for watchman due to her recurrent falls and trauma and need for stroke prevention with a high stroke risk.  Unable to connect with the referral and returns requesting another attempt.  Also wished to discuss atrial fibrillation ablation.  She has no current symptomology relating to her A-fib    Past Medical History:   Diagnosis Date    AAA (abdominal aortic aneurysm) ([facility]) 7/26/2010    10/09 CT thorax dilated ascending thoracic aorta 4.9 cm 1/10 transesophageal echo dilated aortic root, and ascending aorta with mild aortic insufficiency 1/10  ascending aortic aneurysm repair 5/12 echo [facility] normal LV function EF 60%, moderate to severe left atrial enlargement, RVSP 32     Aortic insufficiency     Aortic valve disease     Aortic valve regurgitation     Arthritis     back and knee/ osteo    Cataract     Dental disorder     full top denture, partial bottom    Diverticulosis     Dyslipidemia 7/26/2010    Sees [facility] on lipitor 4/11 chol 159,trig 84,hdl 47,ldl 95,cpk 114 7/12 chol 163,trig 120,hdl 49,ldl 90, crp 1.1 on lipitor 20 mg     GERD (gastroesophageal reflux disease) 7/12/2012 6/07 EGD per [facility] hiatal hernia, start prevacid 30 mg 7/12 protonix 20 mg qday     Glaucoma     Heart burn     Heart murmur     Heart valve disease     \"leaking\", mitral valve    Hiatus hernia syndrome     High cholesterol     History of shingles 6/30/2011 2010 Back and left thorax      History of total knee arthroplasty 7/26/2010    4/10 MRI right knee complex tear medial meniscus, horizontal cleavage tear lateral meniscus, chondromalacia patella, moderate-sized Baker's cyst 5/10 right meniscus knee repair  5/10 told by  had gout on surgery had pathology report, I await that " report Question gout seen on pathology report done during repair of right meniscus knee surgery. 7/10 uric acid 6.3, we'll await starting allopurinol depending on the operative report 8/5/10 reviewed ortho notes , op report indicates crystal deposition, could be gout or pseudogout. No bx result sent over. Will need to get. My suspicion is chondrocalcinosis. 10/11  ortho note, MRI pending 8/7/10 reviewed pathology report right knee tissue, marked degenerative changes with focal calcification, no mention of gout. Based on this and a normal uric acid level, I do not believe she has gout, has no hyperuricemia medications would be indicated 10/11  ortho left knee meniscectomy and arthroscopy 1/12      HLD (hyperlipidemia)     Hypertension     Hypothyroid 4/8/2011 4/11 tsh 9 start synthroid 50 4/11 tsh 6,free t3 3.1,free t4 1.2,tpo negative 7/1/11 tsh 2.1 cont synthroid 50 mcg 7/12 tsh 3.4 cont synthroid 50 mcg     Indigestion     Mitral insufficiency     OSTEOPOROSIS 8/9/2010    Had been on fosamax [id number]  8/10 dexa LS -2.0,hip -1.5 await old dexa result, she will get copy for me 4/11 vit d 35 9/12 dexa LS -3.2,hip -1.4 2/13/13 relast infusion     Pain     back and right leg, can get as bad as 10/10    Preventative health care 7/26/2010    2002 pneum 2005 colon [facility] no records 4/11 vit d 35 9/11 shingles vaccine 9/12 mammogram 9/12 dexa LS -3.2,hip -1.4     Rheumatic fever     S/P appendectomy 7/26/2010    S/P TOMY (total abdominal hysterectomy) 7/26/2010    Sees gyn on HRT estradiol for 20 yrs () 11/08 mammo      Shingles 6/30/2011    Snoring     Status post lumbar surgery 7/26/2010 6/03 L4-5 epidural Dr. Jordan  7/06 overall for decompression, foraminotomy. L4-L5 posterior fusion, L4-L5 allograft       Stroke ([facility]) 1996    no residual problems     Thoracic aortic aneurysm without mention of rupture     Tricuspid insufficiency     Unspecified disorder  of thyroid     hypo    Urinary bladder disorder     suprapubic catheter insertion 11/4    UTI (urinary tract infection) 11/12/2016 7/6/16 UTI klebsiella pneumoniae sensitive to all antibiotics except ampicillin, start bactrim x 5 days 1/18/19 UTI enterobacter cloacae resistant to ampicillin, cephalothin, penicillin, bactrim, sensitive to cefuroxime, ciprofloxacin and levaquin, nitrofurantoin 5/26/19 UTI klebsiella given omnicef, organism resistant ampicillin, ciprofloxacin, levofloxacin, bactrim 8/18/21  urology note      Past Surgical History:   Procedure Laterality Date    ORIF, ANKLE Right 10/14/2022    Procedure: RIGHT ANKLE OPEN REDUCTION INTERNAL FIXATION;  Surgeon: Ryan Huertas M.D.;  Location: [facility];  Service: Orthopedics    TENDON REPAIR Right 11/10/2016    Procedure: TENDON REPAIR - QUADRACEPS ;  Surgeon: Maxim Herrera M.D.;  Location: [facility];  Service:     KNEE ARTHROPLASTY TOTAL Right 9/8/2016    Procedure: KNEE ARTHROPLASTY TOTAL;  Surgeon: Maxim Herrera M.D.;  Location: [facility];  Service:     FUSION, SPINE, LUMBAR, PLIF  11/24/2015    Procedure: LUMBAR FUSION POSTERIOR L3-4, EXPLORATION OF FUSION L4-5 WITH INSTRUMENTATION;  Surgeon: Hermann Reis M.D.;  Location: [facility];  Service:     LUMBAR LAMINECTOMY DISKECTOMY  11/24/2015    Procedure: LUMBAR L3-4 LAMINECTOMY AND REDO L4-5;  Surgeon: Hermann Reis M.D.;  Location: [facility];  Service:     KNEE ARTHROPLASTY TOTAL  1/3/2012    Performed by YOSEF MEJÍA at [facility]    KNEE ARTHROSCOPY  10/18/2011    Performed by YOSEF MEJÍA at [facility]    MENISCECTOMY, KNEE, MEDIAL  10/18/2011    Performed by YOSEF MEJÍA at [facility]    AORTIC VALVE REPLACEMENT  1/12/2010    Performed by ISAÍAS NICOLE at [facility]    APPENDECTOMY      FUSION, SPINE, LUMBAR, PLIF      HYSTERECTOMY, TOTAL  ABDOMINAL       Family History   Problem Relation Age of Onset    Stroke Mother     Heart Disease Father     Heart Disease Sister      Social History     Socioeconomic History    Marital status:      Spouse name: Not on file    Number of children: Not on file    Years of education: Not on file    Highest education level: Not on file   Occupational History    Not on file   Tobacco Use    Smoking status: Never    Smokeless tobacco: Never   Vaping Use    Vaping Use: Never used   Substance and Sexual Activity    Alcohol use: No     Alcohol/week: 0.0 oz    Drug use: Not Currently     Comment: CBD Oil for Arthritis    Sexual activity: Not Currently     Partners: Male   Other Topics Concern    Not on file   Social History Narrative    Not on file     Social Determinants of Health     Financial Resource Strain: Not on file   Food Insecurity: Not on file   Transportation Needs: No Transportation Needs (10/18/2022)    PRAPARE - Transportation     Lack of Transportation (Medical): No     Lack of Transportation (Non-Medical): No   Physical Activity: Not on file   Stress: Not on file   Social Connections: Not on file   Intimate Partner Violence: Not on file   Housing Stability: Not on file     Allergies   Allergen Reactions    Amoxicillin Vomiting     Upset stomach, ok if needed for life saving treatment (per pt)    Codeine Nausea     Synthetic codones ok    Doxycycline Nausea     Nausea, Ok in life saving situation (per pt)    Sulfamethoxazole-Trimethoprim Vomiting and Nausea     Ok in a life saving situation (per pt)    Tramadol Vomiting and Nausea     nausea    Trazodone Vomiting     groggy     Outpatient Encounter Medications as of 6/14/2023   Medication Sig Dispense Refill    apixaban (ELIQUIS) 5mg Tab Take 1 Tablet by mouth 2 times a day. 180 Tablet 3    DULoxetine (CYMBALTA) 30 MG Cap DR Particles TAKE 1 CAPSULE BY MOUTH EVERY DAY 30 Capsule 2    hydrocortisone 2.5 % Cream topical cream Apply 1 Application.  "topically 2 times a day. Apply to hemorrhoid area 28 g 1    metoprolol SR (TOPROL XL) 50 MG TABLET SR 24 HR Take 1 Tablet by mouth every morning. 90 Tablet 3    meclizine (ANTIVERT) 12.5 MG Tab Take 1 Tablet by mouth 3 times a day as needed for Dizziness. 30 Tablet 0    ezetimibe (ZETIA) 10 MG Tab TAKE 1 TABLET BY MOUTH EVERY DAY 90 Tablet 3    benazepril (LOTENSIN) 40 MG tablet TAKE 1 TABLET BY MOUTH EVERY DAY IN THE MORNING 90 Tablet 3    levothyroxine (SYNTHROID) 75 MCG Tab TAKE 1 TABLET BY MOUTH EVERY DAY IN THE MORNING ON AN EMPTY STOMACH (Patient taking differently: Take 75 mcg by mouth every morning on an empty stomach.) 90 Tablet 2    calcium/vitamin D 250-125 MG-UNIT Tab tablet Take 1 Tablet by mouth every day. 60 Tablet      No facility-administered encounter medications on file as of 6/14/2023.     Review of Systems   All other systems reviewed and are negative.       Objective:   BP (!) 152/80 (BP Location: Left arm, Patient Position: Sitting, BP Cuff Size: Adult)   Pulse 68   Resp 18   Ht 1.626 m (5' 4\")   Wt 57.2 kg (126 lb)   SpO2 96%   BMI 21.63 kg/m²     Physical Exam  Vitals reviewed.   Constitutional:       General: She is not in acute distress.     Appearance: She is well-developed. She is not diaphoretic.   HENT:      Head: Normocephalic and atraumatic.      Right Ear: External ear normal.      Left Ear: External ear normal.      Mouth/Throat:      Pharynx: No oropharyngeal exudate.   Eyes:      General: No scleral icterus.        Right eye: No discharge.         Left eye: No discharge.      Conjunctiva/sclera: Conjunctivae normal.      Pupils: Pupils are equal, round, and reactive to light.   Neck:      Thyroid: No thyromegaly.      Vascular: No JVD.      Trachea: No tracheal deviation.   Cardiovascular:      Rate and Rhythm: Normal rate and regular rhythm.      Chest Wall: PMI is not displaced.      Pulses:           Carotid pulses are 2+ on the right side and 2+ on the left side.       " Radial pulses are 2+ on the right side and 2+ on the left side.        Popliteal pulses are 2+ on the right side and 2+ on the left side.        Dorsalis pedis pulses are 2+ on the right side and 2+ on the left side.        Posterior tibial pulses are 2+ on the right side and 2+ on the left side.      Heart sounds: S1 normal and S2 normal. Murmur heard.      Crescendo decrescendo systolic murmur is present with a grade of 2/6.      Decrescendo diastolic murmur is present with a grade of 2/4.      No friction rub. No gallop. No S3 or S4 sounds.   Pulmonary:      Effort: Pulmonary effort is normal. No respiratory distress.      Breath sounds: Normal breath sounds. No wheezing or rales.   Chest:      Chest wall: No tenderness.   Abdominal:      General: Bowel sounds are normal. There is no distension.      Palpations: Abdomen is soft.      Tenderness: There is no abdominal tenderness.   Musculoskeletal:         General: No tenderness. Normal range of motion.      Cervical back: Normal range of motion and neck supple.   Skin:     General: Skin is warm and dry.      Findings: No erythema or rash.   Neurological:      Mental Status: She is alert and oriented to person, place, and time.      Cranial Nerves: No cranial nerve deficit (Cranial nerves II through XII grossly intact).   Psychiatric:         Behavior: Behavior normal.         Thought Content: Thought content normal.         Judgment: Judgment normal.     No change in physical exam since prior 12/14/2020    LABS:  Lab Results   Component Value Date/Time    CHOLSTRLTOT 205 (H) 01/15/2022 09:45 AM     (H) 01/15/2022 09:45 AM    HDL 56 01/15/2022 09:45 AM    TRIGLYCERIDE 80 01/15/2022 09:45 AM       Lab Results   Component Value Date/Time    WBC 5.4 05/27/2023 11:13 AM    WBC 3.9 (L) 04/16/2011 12:00 AM    RBC 4.27 05/27/2023 11:13 AM    RBC 4.63 04/16/2011 12:00 AM    HEMOGLOBIN 12.8 05/27/2023 11:13 AM    HEMATOCRIT 38.1 05/27/2023 11:13 AM    MCV 89.2  05/27/2023 11:13 AM    MCV 92 04/16/2011 12:00 AM    NEUTSPOLYS 58.00 05/27/2023 11:13 AM    LYMPHOCYTES 30.20 05/27/2023 11:13 AM    MONOCYTES 9.40 05/27/2023 11:13 AM    EOSINOPHILS 1.10 05/27/2023 11:13 AM    BASOPHILS 0.90 05/27/2023 11:13 AM    ANISOCYTOSIS 1+ 10/14/2021 03:31 PM     Lab Results   Component Value Date/Time    SODIUM 136 05/27/2023 11:13 AM    POTASSIUM 3.9 05/27/2023 11:13 AM    CHLORIDE 102 05/27/2023 11:13 AM    CO2 20 05/27/2023 11:13 AM    GLUCOSE 103 (H) 05/27/2023 11:13 AM    BUN 28 (H) 05/27/2023 11:13 AM    CREATININE 1.06 05/27/2023 11:13 AM    CREATININE 1.05 (H) 02/07/2013 11:31 AM    BUNCREATRAT 25 12/08/2017 08:57 AM    BUNCREATRAT 30 (H) 02/07/2013 11:31 AM     Lab Results   Component Value Date    HBA1C 4.9 10/18/2022      Lab Results   Component Value Date/Time    ALKPHOSPHAT 52 05/27/2023 11:13 AM    ASTSGOT 29 05/27/2023 11:13 AM    ALTSGPT 28 05/27/2023 11:13 AM    TBILIRUBIN 0.5 05/27/2023 11:13 AM      Lab Results   Component Value Date/Time    BNPBTYPENAT 181 (H) 02/20/2018 10:05 AM      Lab Results   Component Value Date/Time    TSH 2.320 04/14/2016 08:27 AM     Lab Results   Component Value Date/Time    PROTHROMBTM 13.1 11/04/2021 12:40 PM    INR 1.02 11/04/2021 12:40 PM          Assessment:     1. Paroxysmal atrial fibrillation (HCC)  REFERRAL TO CARDIOLOGY      2. On continuous oral anticoagulation  REFERRAL TO CARDIOLOGY    Basic Metabolic Panel      3. Nonrheumatic aortic valve insufficiency        4. Frequent falls  REFERRAL TO CARDIOLOGY      5. S/P thoracic aortic aneurysm repair        6. Essential hypertension            Medical Decision Making:  Today's Assessment / Status / Plan:     Persistent sinus rhythm.  Frequent falls now with trauma recommend transition from oral anticoagulation to watchman left atrial appendage occluder device.  I will refer her for this.  We discussed these things.  Discussed with her  as well.  For the meantime I recommend  continuing her oral anticoagulation which is a high risk medication being monitored with laboratory studies and today.  Echocardiogram shows stable valvular function.  Continue other medical therapy.  We discussed the real risk of morbidity and mortality associated with her excessive bleeding risk while on OAC interested in transitioning to a Watchman device continue other medical therapy and follow-up after.

## 2023-06-22 ENCOUNTER — OFFICE VISIT (OUTPATIENT)
Dept: CARDIOLOGY | Facility: MEDICAL CENTER | Age: 88
End: 2023-06-22
Attending: INTERNAL MEDICINE
Payer: MEDICARE

## 2023-06-22 VITALS
HEIGHT: 64 IN | DIASTOLIC BLOOD PRESSURE: 68 MMHG | OXYGEN SATURATION: 96 % | WEIGHT: 126 LBS | SYSTOLIC BLOOD PRESSURE: 130 MMHG | HEART RATE: 75 BPM | BODY MASS INDEX: 21.51 KG/M2 | RESPIRATION RATE: 14 BRPM

## 2023-06-22 DIAGNOSIS — I48.0 PAROXYSMAL ATRIAL FIBRILLATION (HCC): ICD-10-CM

## 2023-06-22 DIAGNOSIS — R29.6 FREQUENT FALLS: ICD-10-CM

## 2023-06-22 PROCEDURE — 3075F SYST BP GE 130 - 139MM HG: CPT | Performed by: INTERNAL MEDICINE

## 2023-06-22 PROCEDURE — 3078F DIAST BP <80 MM HG: CPT | Performed by: INTERNAL MEDICINE

## 2023-06-22 PROCEDURE — 99212 OFFICE O/P EST SF 10 MIN: CPT | Performed by: INTERNAL MEDICINE

## 2023-06-22 PROCEDURE — 93010 ELECTROCARDIOGRAM REPORT: CPT | Performed by: INTERNAL MEDICINE

## 2023-06-22 PROCEDURE — 93005 ELECTROCARDIOGRAM TRACING: CPT | Performed by: INTERNAL MEDICINE

## 2023-06-22 PROCEDURE — 99214 OFFICE O/P EST MOD 30 MIN: CPT | Performed by: INTERNAL MEDICINE

## 2023-06-22 ASSESSMENT — FIBROSIS 4 INDEX: FIB4 SCORE: 2.33

## 2023-06-22 NOTE — PROGRESS NOTES
Arrhythmia Clinic Note (New Patient)    DOS: 6/22/2023    Referring physician: Kobi Wheeler MD    Chief complaint/Reason for consult: ?WATCHMAN    HPI:  Pt is an 86 yo F. She has history of mitral valve disease, AAA, thoracic aortic aneurysm, HTN, and paroxysmal AF. Sounds like not terribly high burden but symptomatic when it happens. She has frequent falls. Within last year 5 falls, some hitting her head. She otherwise has been on Eliquis. No evidence of internal major bleeding. Referred by cardiology for consideration of BETTY closure. No significant complaints today.    ROS (+ in BOLD):  Constitutional: Fevers/chills/fatigue/weightloss  HEENT: Blurry vision/eye pain/sore throat/hearing loss  Respiratory: Shortness of breath/cough  Cardiovascular: Chest pain/palpitations/edema/orthopnea/syncope  GI: Nausea/vomitting/diarrhea  MSK: Arthralgias/myagias/muscle weakness  Skin: Rash/sores  Neurological: Numbness/tremors/vertigo  Endocrine: Excessive thirst/polyuria/cold intolerance/heat intolerance  Psych: Depression/anxiety    Past Medical History:   Diagnosis Date    AAA (abdominal aortic aneurysm) ([facility]) 7/26/2010    10/09 CT thorax dilated ascending thoracic aorta 4.9 cm 1/10 transesophageal echo dilated aortic root, and ascending aorta with mild aortic insufficiency 1/10  ascending aortic aneurysm repair 5/12 echo [facility] normal LV function EF 60%, moderate to severe left atrial enlargement, RVSP 32     Aortic insufficiency     Aortic valve disease     Aortic valve regurgitation     Arthritis     back and knee/ osteo    Cataract     Dental disorder     full top denture, partial bottom    Diverticulosis     Dyslipidemia 7/26/2010    Sees [facility] on lipitor 4/11 chol 159,trig 84,hdl 47,ldl 95,cpk 114 7/12 chol 163,trig 120,hdl 49,ldl 90, crp 1.1 on lipitor 20 mg     GERD (gastroesophageal reflux disease) 7/12/2012 6/07 EGD per [facility] hiatal hernia, start prevacid 30 mg 7/12 protonix 20 mg qday     Glaucoma      "Heart burn     Heart murmur     Heart valve disease     \"leaking\", mitral valve    Hiatus hernia syndrome     High cholesterol     History of shingles 6/30/2011    2010 Back and left thorax      History of total knee arthroplasty 7/26/2010    4/10 MRI right knee complex tear medial meniscus, horizontal cleavage tear lateral meniscus, chondromalacia patella, moderate-sized Baker's cyst 5/10 right meniscus knee repair  5/10 told by  had gout on surgery had pathology report, I await that report Question gout seen on pathology report done during repair of right meniscus knee surgery. 7/10 uric acid 6.3, we'll await starting allopurinol depending on the operative report 8/5/10 reviewed ortho notes , op report indicates crystal deposition, could be gout or pseudogout. No bx result sent over. Will need to get. My suspicion is chondrocalcinosis. 10/11  ortho note, MRI pending 8/7/10 reviewed pathology report right knee tissue, marked degenerative changes with focal calcification, no mention of gout. Based on this and a normal uric acid level, I do not believe she has gout, has no hyperuricemia medications would be indicated 10/11  ortho left knee meniscectomy and arthroscopy 1/12      HLD (hyperlipidemia)     Hypertension     Hypothyroid 4/8/2011 4/11 tsh 9 start synthroid 50 4/11 tsh 6,free t3 3.1,free t4 1.2,tpo negative 7/1/11 tsh 2.1 cont synthroid 50 mcg 7/12 tsh 3.4 cont synthroid 50 mcg     Indigestion     Mitral insufficiency     OSTEOPOROSIS 8/9/2010    Had been on fosamax [id number]  8/10 dexa LS -2.0,hip -1.5 await old dexa result, she will get copy for me 4/11 vit d 35 9/12 dexa LS -3.2,hip -1.4 2/13/13 relast infusion     Pain     back and right leg, can get as bad as 10/10    Preventative health care 7/26/2010 2002 pneum 2005 colon [facility] no records 4/11 vit d 35 9/11 shingles vaccine 9/12 mammogram 9/12 dexa LS -3.2,hip -1.4     Rheumatic " fever     S/P appendectomy 7/26/2010    S/P TOMY (total abdominal hysterectomy) 7/26/2010    Sees gyn on HRT estradiol for 20 yrs () 11/08 mammo      Shingles 6/30/2011    Snoring     Status post lumbar surgery 7/26/2010 6/03 L4-5 epidural Dr. Jordan  7/06 overall for decompression, foraminotomy. L4-L5 posterior fusion, L4-L5 allograft       Stroke (HCC) 1996    no residual problems     Thoracic aortic aneurysm without mention of rupture     Tricuspid insufficiency     Unspecified disorder of thyroid     hypo    Urinary bladder disorder     suprapubic catheter insertion 11/4    UTI (urinary tract infection) 11/12/2016 7/6/16 UTI klebsiella pneumoniae sensitive to all antibiotics except ampicillin, start bactrim x 5 days 1/18/19 UTI enterobacter cloacae resistant to ampicillin, cephalothin, penicillin, bactrim, sensitive to cefuroxime, ciprofloxacin and levaquin, nitrofurantoin 5/26/19 UTI klebsiella given omnicef, organism resistant ampicillin, ciprofloxacin, levofloxacin, bactrim 8/18/21  urology note        Past Surgical History:   Procedure Laterality Date    ORIF, ANKLE Right 10/14/2022    Procedure: RIGHT ANKLE OPEN REDUCTION INTERNAL FIXATION;  Surgeon: Ryan Huertas M.D.;  Location: [facility];  Service: Orthopedics    TENDON REPAIR Right 11/10/2016    Procedure: TENDON REPAIR - QUADRACEPS ;  Surgeon: Maxim Herrera M.D.;  Location: [facility];  Service:     KNEE ARTHROPLASTY TOTAL Right 9/8/2016    Procedure: KNEE ARTHROPLASTY TOTAL;  Surgeon: Maxim Herrera M.D.;  Location: [facility];  Service:     FUSION, SPINE, LUMBAR, PLIF  11/24/2015    Procedure: LUMBAR FUSION POSTERIOR L3-4, EXPLORATION OF FUSION L4-5 WITH INSTRUMENTATION;  Surgeon: Hermann Reis M.D.;  Location: [facility];  Service:     LUMBAR LAMINECTOMY DISKECTOMY  11/24/2015    Procedure: LUMBAR L3-4 LAMINECTOMY AND REDO L4-5;  Surgeon: Hermann Reis M.D.;   Location: SURGERY [facility];  Service:     KNEE ARTHROPLASTY TOTAL  1/3/2012    Performed by YOSEF MEJÍA at SURGERY [facility]    KNEE ARTHROSCOPY  10/18/2011    Performed by YOSEF MEJÍA at SURGERY [facility] ORS    MENISCECTOMY, KNEE, MEDIAL  10/18/2011    Performed by YOSEF MEJÍA at SURGERY [facility]    AORTIC VALVE REPLACEMENT  1/12/2010    Performed by ISAÍAS NICOLE at SURGERY [facility]    APPENDECTOMY      FUSION, SPINE, LUMBAR, PLIF      HYSTERECTOMY, TOTAL ABDOMINAL         Social History     Socioeconomic History    Marital status:      Spouse name: Not on file    Number of children: Not on file    Years of education: Not on file    Highest education level: Not on file   Occupational History    Not on file   Tobacco Use    Smoking status: Never    Smokeless tobacco: Never   Vaping Use    Vaping Use: Never used   Substance and Sexual Activity    Alcohol use: No     Alcohol/week: 0.0 oz    Drug use: Not Currently     Comment: CBD Oil for Arthritis    Sexual activity: Not Currently     Partners: Male   Other Topics Concern    Not on file   Social History Narrative    Not on file     Social Determinants of Health     Financial Resource Strain: Not on file   Food Insecurity: Not on file   Transportation Needs: No Transportation Needs (10/18/2022)    PRAPARE - Transportation     Lack of Transportation (Medical): No     Lack of Transportation (Non-Medical): No   Physical Activity: Not on file   Stress: Not on file   Social Connections: Not on file   Intimate Partner Violence: Not on file   Housing Stability: Not on file       Family History   Problem Relation Age of Onset    Stroke Mother     Heart Disease Father     Heart Disease Sister        Allergies   Allergen Reactions    Amoxicillin Vomiting     Upset stomach, ok if needed for life saving treatment (per pt)    Codeine Nausea     Synthetic codones ok    Doxycycline Nausea     Nausea, Ok in life saving  "situation (per pt)    Sulfamethoxazole-Trimethoprim Vomiting and Nausea     Ok in a life saving situation (per pt)    Tramadol Vomiting and Nausea     nausea    Trazodone Vomiting     groggy       Current Outpatient Medications   Medication Sig Dispense Refill    apixaban (ELIQUIS) 2.5mg Tab Take 1 Tablet by mouth 2 times a day. 60 Tablet 5    hydrocortisone 2.5 % Cream topical cream Apply 1 Application. topically 2 times a day. Apply to hemorrhoid area 28 g 1    metoprolol SR (TOPROL XL) 50 MG TABLET SR 24 HR Take 1 Tablet by mouth every morning. 90 Tablet 3    meclizine (ANTIVERT) 12.5 MG Tab Take 1 Tablet by mouth 3 times a day as needed for Dizziness. 30 Tablet 0    ezetimibe (ZETIA) 10 MG Tab TAKE 1 TABLET BY MOUTH EVERY DAY 90 Tablet 3    benazepril (LOTENSIN) 40 MG tablet TAKE 1 TABLET BY MOUTH EVERY DAY IN THE MORNING 90 Tablet 3    levothyroxine (SYNTHROID) 75 MCG Tab TAKE 1 TABLET BY MOUTH EVERY DAY IN THE MORNING ON AN EMPTY STOMACH (Patient taking differently: Take 75 mcg by mouth every morning on an empty stomach.) 90 Tablet 2    calcium/vitamin D 250-125 MG-UNIT Tab tablet Take 1 Tablet by mouth every day. 60 Tablet     DULoxetine (CYMBALTA) 30 MG Cap DR Particles TAKE 1 CAPSULE BY MOUTH EVERY DAY (Patient not taking: Reported on 6/22/2023) 30 Capsule 2     No current facility-administered medications for this visit.       Physical Exam:  Vitals:    06/22/23 1029   BP: 130/68   BP Location: Left arm   Patient Position: Sitting   BP Cuff Size: Adult   Pulse: 75   Resp: 14   SpO2: 96%   Weight: 57.2 kg (126 lb)   Height: 1.626 m (5' 4\")     General appearance: NAD, conversant, thin, frail  Eyes: anicteric sclerae, no lid-lag; PERRLA  HENT: Atraumatic; moist mucous membranes, no ulcerations  Neck: Trachea midline; FROM, supple, no thyromegaly  Lungs: CTA, with normal respiratory effort and no intercostal retractions  CV: RRR, no MRGs, no JVD  Abdomen: Soft, non-tender; normal bowel sounds, no " HSM  Extremities: No peripheral edema. +diffuse upper extremity ecchymosis  Skin: Normal temperature, turgor and texture; no rash or ulcers  Psych: Appropriate affect, alert and oriented to person, place and time    Data:  Labs reviewed    Prior echo/stress reviewed:  Preserved function    EKG interpreted by me:  Sinus    Impression/Plan:  1. Paroxysmal atrial fibrillation (HCC)  EKG    apixaban (ELIQUIS) 2.5mg Tab      2. Frequent falls          -CHADSVasc of 5, I agree with need to address risk of thromboembolism  -I will lower her Eliquis to 2.5 BID for age/weight  -I discussed long term use of OAC vs BETTY closure, risks/benefits and risks of procedure  -I think not unreasonable for her given frailty and frequent falls  -All questions answered and she would like to proceed    Dianne Ji MD

## 2023-06-23 ENCOUNTER — TELEPHONE (OUTPATIENT)
Dept: CARDIOLOGY | Facility: MEDICAL CENTER | Age: 88
End: 2023-06-23
Payer: MEDICARE

## 2023-06-23 DIAGNOSIS — Z95.818 PRESENCE OF WATCHMAN LEFT ATRIAL APPENDAGE CLOSURE DEVICE: ICD-10-CM

## 2023-06-23 NOTE — TELEPHONE ENCOUNTER
Patient scheduled for watchman w/ZAK on 8-23-23 with Dr. Ji. Patient has been instructed to check in at 7:00 for 9:00 case time. Message sent to authorizations. Watchman rep aware of this date.

## 2023-06-23 NOTE — TELEPHONE ENCOUNTER
Patient scheduled for post watchman w/ZAK w/anesthesia on 10-6-23 with Dr. Infante. Patient has been instructed to check in at 7:00 for 9:00 case time. Message sent to yolette DOCKERY to Dr. Infante

## 2023-06-23 NOTE — TELEPHONE ENCOUNTER
Thalia,    Can you please enter an additional ZAK order for the follow up ZAK?    Thank You,  Mya

## 2023-06-23 NOTE — TELEPHONE ENCOUNTER
----- Message from Dianne Ji M.D. sent at 6/22/2023 11:48 AM PDT -----  Let's do watchman, no meds to hold       127

## 2023-06-24 NOTE — ASSESSMENT & PLAN NOTE
Nephrology Neurology Patient has not been able to eat or drink well at home.  Creatinine 2.44 on admission.  Baseline appears to be 1.5 or less.  Baseline CKD stage III.  -Avoid nephrotoxic agents  -We will hold patient's ACE inhibitor  -Continue to monitor   Infectious Disease Critical Care

## 2023-07-06 ENCOUNTER — HOSPITAL ENCOUNTER (OUTPATIENT)
Dept: RADIOLOGY | Facility: MEDICAL CENTER | Age: 88
End: 2023-07-06
Attending: INTERNAL MEDICINE
Payer: MEDICARE

## 2023-07-06 DIAGNOSIS — M81.0 SENILE OSTEOPOROSIS: ICD-10-CM

## 2023-07-06 PROCEDURE — 77080 DXA BONE DENSITY AXIAL: CPT

## 2023-07-07 ENCOUNTER — TELEPHONE (OUTPATIENT)
Dept: MEDICAL GROUP | Facility: MEDICAL CENTER | Age: 88
End: 2023-07-07
Payer: MEDICARE

## 2023-07-07 NOTE — TELEPHONE ENCOUNTER
----- Message from Tee Tran M.D. sent at 7/7/2023 12:09 AM PDT -----  Please call the patient, notify her that the bone density test still shows that she has osteoporosis of the forearm and at the hip, the bone strength of her hip has improved 3% from her last test however her strength in the forearm has decreased 18% and she still is at high risk for fractures.  She should resume the Prolia injections.  There still is an active order in the computer system for the Prolia, please have her contact the [facility] infusion center [phone] to schedule her next Prolia injection.

## 2023-07-07 NOTE — TELEPHONE ENCOUNTER
Please call the patient, notify her that the bone density test still shows that she has osteoporosis of the forearm and at the hip, the bone strength of her hip has improved 3% from her last test however her strength in the forearm has decreased 18% and she still is at high risk for fractures.  She should resume the Prolia injections.  There still is an active order in the computer system for the Prolia, please have her contact the [facility] center [phone] to schedule her next Prolia injection.

## 2023-07-10 ENCOUNTER — TELEPHONE (OUTPATIENT)
Dept: MEDICAL GROUP | Facility: MEDICAL CENTER | Age: 88
End: 2023-07-10
Payer: MEDICARE

## 2023-07-10 NOTE — TELEPHONE ENCOUNTER
Pt and her daughter thinks that she would benefit from PT. Please order.    Pt's hemorrhoid is not getting better and wants to know if there is anything more you can do. Please advise.

## 2023-07-10 NOTE — TELEPHONE ENCOUNTER
There already is an active physical therapy order for [facility] physical therapy that was placed in January.  It looks like that physical therapy referral has been authorized and is still in the system and it is open.    She can try topical nitroglycerin ointment which may help with spasms of the rectal area from hemorrhoids, I am not sure if [facility] carries that as she may need to go to a compound pharmacy but I will send a prescription to [facility] for the nitroglycerin ointment to apply rectally twice a day.    If this does not work or is not available she would have to follow-up with GI for hemorrhoid banding.

## 2023-07-11 ENCOUNTER — APPOINTMENT (OUTPATIENT)
Dept: ADMISSIONS | Facility: MEDICAL CENTER | Age: 88
DRG: 274 | End: 2023-07-11
Attending: INTERNAL MEDICINE
Payer: MEDICARE

## 2023-07-11 ENCOUNTER — APPOINTMENT (OUTPATIENT)
Dept: ADMISSIONS | Facility: MEDICAL CENTER | Age: 88
End: 2023-07-11
Attending: STUDENT IN AN ORGANIZED HEALTH CARE EDUCATION/TRAINING PROGRAM
Payer: MEDICARE

## 2023-07-12 RX ORDER — NITROGLYCERIN 4 MG/G
1 OINTMENT RECTAL 2 TIMES DAILY
Qty: 30 G | Refills: 0 | Status: SHIPPED | OUTPATIENT
Start: 2023-07-12 | End: 2023-10-08

## 2023-07-24 DIAGNOSIS — I48.0 PAF (PAROXYSMAL ATRIAL FIBRILLATION) (HCC): ICD-10-CM

## 2023-07-24 DIAGNOSIS — I10 ESSENTIAL HYPERTENSION: ICD-10-CM

## 2023-07-24 RX ORDER — METOPROLOL SUCCINATE 50 MG/1
50 TABLET, EXTENDED RELEASE ORAL EVERY MORNING
Qty: 90 TABLET | Refills: 3 | Status: ON HOLD | OUTPATIENT
Start: 2023-07-24 | End: 2023-10-26 | Stop reason: SDUPTHER

## 2023-07-24 NOTE — TELEPHONE ENCOUNTER
Is the patient due for a refill? Yes    Was the patient seen the past year? Yes    Date of last office visit: 6/14/2023    Does the patient have an upcoming appointment?  Yes   If yes, When? 12/12/2023    Provider to refill:TW    Does the patients insurance require a 100 day supply?  No

## 2023-07-26 ENCOUNTER — OUTPATIENT INFUSION SERVICES (OUTPATIENT)
Dept: ONCOLOGY | Facility: MEDICAL CENTER | Age: 88
End: 2023-07-26
Attending: INTERNAL MEDICINE
Payer: MEDICARE

## 2023-07-26 VITALS
WEIGHT: 133.16 LBS | OXYGEN SATURATION: 97 % | SYSTOLIC BLOOD PRESSURE: 126 MMHG | TEMPERATURE: 98.4 F | DIASTOLIC BLOOD PRESSURE: 59 MMHG | HEART RATE: 56 BPM | RESPIRATION RATE: 16 BRPM | HEIGHT: 64 IN | BODY MASS INDEX: 22.73 KG/M2

## 2023-07-26 DIAGNOSIS — M81.0 SENILE OSTEOPOROSIS: ICD-10-CM

## 2023-07-26 LAB
CA-I BLD ISE-SCNC: 1.28 MMOL/L (ref 1.1–1.3)
CREAT BLD-MCNC: 1.1 MG/DL (ref 0.5–1.4)

## 2023-07-26 PROCEDURE — 700111 HCHG RX REV CODE 636 W/ 250 OVERRIDE (IP): Mod: JZ,JG | Performed by: INTERNAL MEDICINE

## 2023-07-26 PROCEDURE — 96372 THER/PROPH/DIAG INJ SC/IM: CPT

## 2023-07-26 PROCEDURE — 36415 COLL VENOUS BLD VENIPUNCTURE: CPT

## 2023-07-26 PROCEDURE — 82565 ASSAY OF CREATININE: CPT

## 2023-07-26 PROCEDURE — 82330 ASSAY OF CALCIUM: CPT

## 2023-07-26 RX ORDER — EPINEPHRINE 1 MG/ML(1)
0.5 AMPUL (ML) INJECTION PRN
OUTPATIENT
Start: 2024-01-01

## 2023-07-26 RX ORDER — METHYLPREDNISOLONE SODIUM SUCCINATE 125 MG/2ML
125 INJECTION, POWDER, LYOPHILIZED, FOR SOLUTION INTRAMUSCULAR; INTRAVENOUS PRN
OUTPATIENT
Start: 2024-01-01

## 2023-07-26 RX ORDER — DIPHENHYDRAMINE HYDROCHLORIDE 50 MG/ML
50 INJECTION INTRAMUSCULAR; INTRAVENOUS PRN
OUTPATIENT
Start: 2024-01-01

## 2023-07-26 RX ADMIN — DENOSUMAB 60 MG: 60 INJECTION SUBCUTANEOUS at 15:20

## 2023-07-26 ASSESSMENT — FIBROSIS 4 INDEX: FIB4 SCORE: 2.33

## 2023-07-26 NOTE — PROGRESS NOTES
Mala arrived ambulatory for Q 6 month Prolia injection. She denies any s/s acute infection or illness, denies dental procedures in the past 4 weeks or upcoming dental procedures, denies s/s of hypocalcimia.  Pt confirms taking calcuim/vitamin D at home.    Istat Ca/Cr lab drawn from right AC, sterile gauze and coban to site.  Lab parameters met, Prolia injected Sub Q to back right arm per MAR, bandaid applied to site.  Mala tolerated well, discharged in no apparent distress, next appointment scheduled and confirmed.

## 2023-07-27 ENCOUNTER — TELEPHONE (OUTPATIENT)
Dept: CARDIOLOGY | Facility: MEDICAL CENTER | Age: 88
End: 2023-07-27
Payer: MEDICARE

## 2023-07-27 NOTE — TELEPHONE ENCOUNTER
Patient called, [phone], and discussed Prolia shot. She received the shot yesterday on 7/26/2023. She states she was told not to have any procedures or dental work in the next 6 months and wanted to verify if okay to move forward with watchman w/ZAK on 10/6/2023. Advised her to also reach out to her physician who provided the shot, but patient would like SS input as well.     To SS: Please advise if any contraindication on our end for patient to proceed with procedure as planned. Thank you!

## 2023-07-27 NOTE — TELEPHONE ENCOUNTER
Dianne Ji M.D.  You 16 minutes ago (1:43 PM)     I am unfamiliar with the prolia shot. If the administering MD recommends deferring procedures for > 3 mo then we will go along with that recommendation.      Patient called, [phone], and provided SS's recommendation. She will clarify with administering MD and reach out if the procedure needs to be rescheduled. All questions/concerns answered at this time, patient appreciative of information given.

## 2023-07-27 NOTE — TELEPHONE ENCOUNTER
Caller: Yvonne Marie Wada     Topic/issue: Patient is sched for a Procedure 8/23/23 , she recently had a Prolia shot, one that she receives every six months. She was told by the doctor that administered the shot she was not able to even have dental work . She would like to verify this will not cause an issue with her procedure.    The patient would like a call to discuss .    Callback Number: [phone]      Thank you   Blanca LARES

## 2023-08-09 ENCOUNTER — PRE-ADMISSION TESTING (OUTPATIENT)
Dept: ADMISSIONS | Facility: MEDICAL CENTER | Age: 88
DRG: 274 | End: 2023-08-09
Attending: INTERNAL MEDICINE
Payer: MEDICARE

## 2023-08-09 DIAGNOSIS — Z01.812 PRE-OPERATIVE LABORATORY EXAMINATION: ICD-10-CM

## 2023-08-09 DIAGNOSIS — Z01.810 PRE-OPERATIVE CARDIOVASCULAR EXAMINATION: ICD-10-CM

## 2023-08-21 ENCOUNTER — PRE-ADMISSION TESTING (OUTPATIENT)
Dept: ADMISSIONS | Facility: MEDICAL CENTER | Age: 88
DRG: 274 | End: 2023-08-21
Attending: INTERNAL MEDICINE
Payer: MEDICARE

## 2023-08-21 DIAGNOSIS — Z01.812 PRE-OPERATIVE LABORATORY EXAMINATION: ICD-10-CM

## 2023-08-21 DIAGNOSIS — Z01.810 PRE-OPERATIVE CARDIOVASCULAR EXAMINATION: ICD-10-CM

## 2023-08-21 LAB
ANION GAP SERPL CALC-SCNC: 13 MMOL/L (ref 7–16)
APTT PPP: 28.2 SEC (ref 24.7–36)
BASOPHILS # BLD AUTO: 1.2 % (ref 0–1.8)
BASOPHILS # BLD: 0.07 K/UL (ref 0–0.12)
BUN SERPL-MCNC: 26 MG/DL (ref 8–22)
CALCIUM SERPL-MCNC: 10.3 MG/DL (ref 8.5–10.5)
CHLORIDE SERPL-SCNC: 96 MMOL/L (ref 96–112)
CO2 SERPL-SCNC: 22 MMOL/L (ref 20–33)
CREAT SERPL-MCNC: 1.06 MG/DL (ref 0.5–1.4)
EKG IMPRESSION: NORMAL
EOSINOPHIL # BLD AUTO: 0.08 K/UL (ref 0–0.51)
EOSINOPHIL NFR BLD: 1.3 % (ref 0–6.9)
ERYTHROCYTE [DISTWIDTH] IN BLOOD BY AUTOMATED COUNT: 46.5 FL (ref 35.9–50)
GFR SERPLBLD CREATININE-BSD FMLA CKD-EPI: 51 ML/MIN/1.73 M 2
GLUCOSE SERPL-MCNC: 95 MG/DL (ref 65–99)
HCT VFR BLD AUTO: 43.3 % (ref 37–47)
HGB BLD-MCNC: 14.5 G/DL (ref 12–16)
IMM GRANULOCYTES # BLD AUTO: 0.04 K/UL (ref 0–0.11)
IMM GRANULOCYTES NFR BLD AUTO: 0.7 % (ref 0–0.9)
INR PPP: 1.1 (ref 0.87–1.13)
LYMPHOCYTES # BLD AUTO: 1.8 K/UL (ref 1–4.8)
LYMPHOCYTES NFR BLD: 29.8 % (ref 22–41)
MCH RBC QN AUTO: 30.7 PG (ref 27–33)
MCHC RBC AUTO-ENTMCNC: 33.5 G/DL (ref 32.2–35.5)
MCV RBC AUTO: 91.7 FL (ref 81.4–97.8)
MONOCYTES # BLD AUTO: 0.69 K/UL (ref 0–0.85)
MONOCYTES NFR BLD AUTO: 11.4 % (ref 0–13.4)
NEUTROPHILS # BLD AUTO: 3.36 K/UL (ref 1.82–7.42)
NEUTROPHILS NFR BLD: 55.6 % (ref 44–72)
NRBC # BLD AUTO: 0 K/UL
NRBC BLD-RTO: 0 /100 WBC (ref 0–0.2)
PLATELET # BLD AUTO: 237 K/UL (ref 164–446)
PMV BLD AUTO: 9.1 FL (ref 9–12.9)
POTASSIUM SERPL-SCNC: 4.4 MMOL/L (ref 3.6–5.5)
PROTHROMBIN TIME: 14.1 SEC (ref 12–14.6)
RBC # BLD AUTO: 4.72 M/UL (ref 4.2–5.4)
SODIUM SERPL-SCNC: 131 MMOL/L (ref 135–145)
WBC # BLD AUTO: 6 K/UL (ref 4.8–10.8)

## 2023-08-21 PROCEDURE — 85025 COMPLETE CBC W/AUTO DIFF WBC: CPT

## 2023-08-21 PROCEDURE — 36415 COLL VENOUS BLD VENIPUNCTURE: CPT

## 2023-08-21 PROCEDURE — 93005 ELECTROCARDIOGRAM TRACING: CPT | Performed by: INTERNAL MEDICINE

## 2023-08-21 PROCEDURE — 93005 ELECTROCARDIOGRAM TRACING: CPT

## 2023-08-21 PROCEDURE — 85730 THROMBOPLASTIN TIME PARTIAL: CPT

## 2023-08-21 PROCEDURE — 93010 ELECTROCARDIOGRAM REPORT: CPT | Performed by: INTERNAL MEDICINE

## 2023-08-21 PROCEDURE — 80048 BASIC METABOLIC PNL TOTAL CA: CPT

## 2023-08-21 PROCEDURE — 85610 PROTHROMBIN TIME: CPT

## 2023-08-22 LAB — EKG IMPRESSION: NORMAL

## 2023-08-23 ENCOUNTER — ANESTHESIA (OUTPATIENT)
Dept: CARDIOLOGY | Facility: MEDICAL CENTER | Age: 88
DRG: 274 | End: 2023-08-23
Payer: MEDICARE

## 2023-08-23 ENCOUNTER — APPOINTMENT (OUTPATIENT)
Dept: CARDIOLOGY | Facility: MEDICAL CENTER | Age: 88
DRG: 274 | End: 2023-08-23
Attending: INTERNAL MEDICINE
Payer: MEDICARE

## 2023-08-23 ENCOUNTER — ANESTHESIA EVENT (OUTPATIENT)
Dept: CARDIOLOGY | Facility: MEDICAL CENTER | Age: 88
DRG: 274 | End: 2023-08-23
Payer: MEDICARE

## 2023-08-23 ENCOUNTER — HOSPITAL ENCOUNTER (INPATIENT)
Facility: MEDICAL CENTER | Age: 88
LOS: 1 days | DRG: 274 | End: 2023-08-24
Attending: INTERNAL MEDICINE | Admitting: INTERNAL MEDICINE
Payer: MEDICARE

## 2023-08-23 DIAGNOSIS — Z95.818 PRESENCE OF WATCHMAN LEFT ATRIAL APPENDAGE CLOSURE DEVICE: ICD-10-CM

## 2023-08-23 DIAGNOSIS — Z01.812 PRE-OPERATIVE LABORATORY EXAMINATION: ICD-10-CM

## 2023-08-23 DIAGNOSIS — I48.0 PAROXYSMAL ATRIAL FIBRILLATION (HCC): ICD-10-CM

## 2023-08-23 DIAGNOSIS — Z01.810 PRE-OPERATIVE CARDIOVASCULAR EXAMINATION: ICD-10-CM

## 2023-08-23 LAB
ACT BLD: 203 SEC (ref 74–137)
EKG IMPRESSION: NORMAL
LV EJECT FRACT  99904: 65

## 2023-08-23 PROCEDURE — C1893 INTRO/SHEATH, FIXED,NON-PEEL: HCPCS

## 2023-08-23 PROCEDURE — 700111 HCHG RX REV CODE 636 W/ 250 OVERRIDE (IP): Performed by: ANESTHESIOLOGY

## 2023-08-23 PROCEDURE — 700101 HCHG RX REV CODE 250: Performed by: INTERNAL MEDICINE

## 2023-08-23 PROCEDURE — 02L73DK OCCLUSION OF LEFT ATRIAL APPENDAGE WITH INTRALUMINAL DEVICE, PERCUTANEOUS APPROACH: ICD-10-PCS | Performed by: INTERNAL MEDICINE

## 2023-08-23 PROCEDURE — 700101 HCHG RX REV CODE 250: Performed by: ANESTHESIOLOGY

## 2023-08-23 PROCEDURE — 160002 HCHG RECOVERY MINUTES (STAT)

## 2023-08-23 PROCEDURE — 700105 HCHG RX REV CODE 258: Performed by: INTERNAL MEDICINE

## 2023-08-23 PROCEDURE — 700117 HCHG RX CONTRAST REV CODE 255: Performed by: INTERNAL MEDICINE

## 2023-08-23 PROCEDURE — 160036 HCHG PACU - EA ADDL 30 MINS PHASE I

## 2023-08-23 PROCEDURE — 93325 DOPPLER ECHO COLOR FLOW MAPG: CPT

## 2023-08-23 PROCEDURE — 770020 HCHG ROOM/CARE - TELE (206)

## 2023-08-23 PROCEDURE — 700102 HCHG RX REV CODE 250 W/ 637 OVERRIDE(OP): Performed by: ANESTHESIOLOGY

## 2023-08-23 PROCEDURE — 700111 HCHG RX REV CODE 636 W/ 250 OVERRIDE (IP)

## 2023-08-23 PROCEDURE — 160035 HCHG PACU - 1ST 60 MINS PHASE I

## 2023-08-23 PROCEDURE — 85347 COAGULATION TIME ACTIVATED: CPT

## 2023-08-23 PROCEDURE — A9270 NON-COVERED ITEM OR SERVICE: HCPCS | Performed by: ANESTHESIOLOGY

## 2023-08-23 PROCEDURE — 33340 PERQ CLSR TCAT L ATR APNDGE: CPT | Mod: Q0 | Performed by: INTERNAL MEDICINE

## 2023-08-23 PROCEDURE — A9270 NON-COVERED ITEM OR SERVICE: HCPCS | Performed by: NURSE PRACTITIONER

## 2023-08-23 PROCEDURE — 700102 HCHG RX REV CODE 250 W/ 637 OVERRIDE(OP): Performed by: NURSE PRACTITIONER

## 2023-08-23 PROCEDURE — 700101 HCHG RX REV CODE 250

## 2023-08-23 PROCEDURE — 700102 HCHG RX REV CODE 250 W/ 637 OVERRIDE(OP): Performed by: INTERNAL MEDICINE

## 2023-08-23 PROCEDURE — A9270 NON-COVERED ITEM OR SERVICE: HCPCS | Performed by: INTERNAL MEDICINE

## 2023-08-23 RX ORDER — OXYCODONE HCL 5 MG/5 ML
10 SOLUTION, ORAL ORAL
Status: COMPLETED | OUTPATIENT
Start: 2023-08-23 | End: 2023-08-23

## 2023-08-23 RX ORDER — IPRATROPIUM BROMIDE AND ALBUTEROL SULFATE 2.5; .5 MG/3ML; MG/3ML
3 SOLUTION RESPIRATORY (INHALATION)
Status: DISCONTINUED | OUTPATIENT
Start: 2023-08-23 | End: 2023-08-23 | Stop reason: HOSPADM

## 2023-08-23 RX ORDER — HYDROMORPHONE HYDROCHLORIDE 1 MG/ML
0.2 INJECTION, SOLUTION INTRAMUSCULAR; INTRAVENOUS; SUBCUTANEOUS
Status: DISCONTINUED | OUTPATIENT
Start: 2023-08-23 | End: 2023-08-23 | Stop reason: HOSPADM

## 2023-08-23 RX ORDER — ONDANSETRON 2 MG/ML
INJECTION INTRAMUSCULAR; INTRAVENOUS PRN
Status: DISCONTINUED | OUTPATIENT
Start: 2023-08-23 | End: 2023-08-23 | Stop reason: SURG

## 2023-08-23 RX ORDER — CEFAZOLIN SODIUM 1 G/3ML
INJECTION, POWDER, FOR SOLUTION INTRAMUSCULAR; INTRAVENOUS PRN
Status: DISCONTINUED | OUTPATIENT
Start: 2023-08-23 | End: 2023-08-23 | Stop reason: SURG

## 2023-08-23 RX ORDER — EZETIMIBE 10 MG/1
10 TABLET ORAL DAILY
Status: DISCONTINUED | OUTPATIENT
Start: 2023-08-23 | End: 2023-08-24 | Stop reason: HOSPADM

## 2023-08-23 RX ORDER — SODIUM CHLORIDE 9 MG/ML
INJECTION, SOLUTION INTRAVENOUS CONTINUOUS
Status: DISCONTINUED | OUTPATIENT
Start: 2023-08-23 | End: 2023-08-24 | Stop reason: HOSPADM

## 2023-08-23 RX ORDER — HEPARIN SODIUM 200 [USP'U]/100ML
INJECTION, SOLUTION INTRAVENOUS
Status: COMPLETED
Start: 2023-08-23 | End: 2023-08-23

## 2023-08-23 RX ORDER — HYDROMORPHONE HYDROCHLORIDE 1 MG/ML
0.1 INJECTION, SOLUTION INTRAMUSCULAR; INTRAVENOUS; SUBCUTANEOUS
Status: DISCONTINUED | OUTPATIENT
Start: 2023-08-23 | End: 2023-08-23 | Stop reason: HOSPADM

## 2023-08-23 RX ORDER — METOPROLOL SUCCINATE 50 MG/1
50 TABLET, EXTENDED RELEASE ORAL EVERY MORNING
Status: DISCONTINUED | OUTPATIENT
Start: 2023-08-23 | End: 2023-08-24 | Stop reason: HOSPADM

## 2023-08-23 RX ORDER — OXYCODONE HCL 5 MG/5 ML
5 SOLUTION, ORAL ORAL
Status: COMPLETED | OUTPATIENT
Start: 2023-08-23 | End: 2023-08-23

## 2023-08-23 RX ORDER — HEPARIN SODIUM 1000 [USP'U]/ML
INJECTION, SOLUTION INTRAVENOUS; SUBCUTANEOUS
Status: COMPLETED
Start: 2023-08-23 | End: 2023-08-23

## 2023-08-23 RX ORDER — DIPHENHYDRAMINE HYDROCHLORIDE 50 MG/ML
12.5 INJECTION INTRAMUSCULAR; INTRAVENOUS
Status: DISCONTINUED | OUTPATIENT
Start: 2023-08-23 | End: 2023-08-23 | Stop reason: HOSPADM

## 2023-08-23 RX ORDER — LIDOCAINE HYDROCHLORIDE 20 MG/ML
INJECTION, SOLUTION EPIDURAL; INFILTRATION; INTRACAUDAL; PERINEURAL PRN
Status: DISCONTINUED | OUTPATIENT
Start: 2023-08-23 | End: 2023-08-23 | Stop reason: SURG

## 2023-08-23 RX ORDER — HYDROMORPHONE HYDROCHLORIDE 1 MG/ML
0.4 INJECTION, SOLUTION INTRAMUSCULAR; INTRAVENOUS; SUBCUTANEOUS
Status: DISCONTINUED | OUTPATIENT
Start: 2023-08-23 | End: 2023-08-23 | Stop reason: HOSPADM

## 2023-08-23 RX ORDER — HALOPERIDOL 5 MG/ML
1 INJECTION INTRAMUSCULAR
Status: DISCONTINUED | OUTPATIENT
Start: 2023-08-23 | End: 2023-08-23 | Stop reason: HOSPADM

## 2023-08-23 RX ORDER — HYDRALAZINE HYDROCHLORIDE 20 MG/ML
5 INJECTION INTRAMUSCULAR; INTRAVENOUS
Status: DISCONTINUED | OUTPATIENT
Start: 2023-08-23 | End: 2023-08-23 | Stop reason: HOSPADM

## 2023-08-23 RX ORDER — BENAZEPRIL HYDROCHLORIDE 20 MG/1
40 TABLET ORAL EVERY MORNING
Status: DISCONTINUED | OUTPATIENT
Start: 2023-08-23 | End: 2023-08-24 | Stop reason: HOSPADM

## 2023-08-23 RX ORDER — NYSTATIN 100000 [USP'U]/G
POWDER TOPICAL 2 TIMES DAILY
Status: DISCONTINUED | OUTPATIENT
Start: 2023-08-23 | End: 2023-08-24 | Stop reason: HOSPADM

## 2023-08-23 RX ORDER — DEXAMETHASONE SODIUM PHOSPHATE 4 MG/ML
INJECTION, SOLUTION INTRA-ARTICULAR; INTRALESIONAL; INTRAMUSCULAR; INTRAVENOUS; SOFT TISSUE PRN
Status: DISCONTINUED | OUTPATIENT
Start: 2023-08-23 | End: 2023-08-23 | Stop reason: SURG

## 2023-08-23 RX ORDER — ACETAMINOPHEN 325 MG/1
650 TABLET ORAL EVERY 4 HOURS PRN
Status: DISCONTINUED | OUTPATIENT
Start: 2023-08-23 | End: 2023-08-24 | Stop reason: HOSPADM

## 2023-08-23 RX ORDER — SODIUM CHLORIDE, SODIUM LACTATE, POTASSIUM CHLORIDE, CALCIUM CHLORIDE 600; 310; 30; 20 MG/100ML; MG/100ML; MG/100ML; MG/100ML
INJECTION, SOLUTION INTRAVENOUS CONTINUOUS
Status: ACTIVE | OUTPATIENT
Start: 2023-08-23 | End: 2023-08-23

## 2023-08-23 RX ORDER — BUPIVACAINE HYDROCHLORIDE 5 MG/ML
INJECTION, SOLUTION EPIDURAL; INTRACAUDAL
Status: COMPLETED
Start: 2023-08-23 | End: 2023-08-23

## 2023-08-23 RX ORDER — SODIUM CHLORIDE, SODIUM LACTATE, POTASSIUM CHLORIDE, CALCIUM CHLORIDE 600; 310; 30; 20 MG/100ML; MG/100ML; MG/100ML; MG/100ML
INJECTION, SOLUTION INTRAVENOUS CONTINUOUS
Status: DISCONTINUED | OUTPATIENT
Start: 2023-08-23 | End: 2023-08-23 | Stop reason: HOSPADM

## 2023-08-23 RX ORDER — LIDOCAINE HYDROCHLORIDE 20 MG/ML
INJECTION, SOLUTION INFILTRATION; PERINEURAL
Status: COMPLETED
Start: 2023-08-23 | End: 2023-08-23

## 2023-08-23 RX ORDER — DULOXETIN HYDROCHLORIDE 30 MG/1
30 CAPSULE, DELAYED RELEASE ORAL DAILY
Status: DISCONTINUED | OUTPATIENT
Start: 2023-08-24 | End: 2023-08-24 | Stop reason: HOSPADM

## 2023-08-23 RX ORDER — LIDOCAINE HYDROCHLORIDE 40 MG/ML
SOLUTION TOPICAL
Status: COMPLETED
Start: 2023-08-23 | End: 2023-08-23

## 2023-08-23 RX ORDER — LIDOCAINE HYDROCHLORIDE 40 MG/ML
SOLUTION TOPICAL PRN
Status: DISCONTINUED | OUTPATIENT
Start: 2023-08-23 | End: 2023-08-23 | Stop reason: SURG

## 2023-08-23 RX ORDER — LEVOTHYROXINE SODIUM 0.07 MG/1
75 TABLET ORAL
Status: DISCONTINUED | OUTPATIENT
Start: 2023-08-23 | End: 2023-08-24 | Stop reason: HOSPADM

## 2023-08-23 RX ORDER — ONDANSETRON 2 MG/ML
4 INJECTION INTRAMUSCULAR; INTRAVENOUS
Status: DISCONTINUED | OUTPATIENT
Start: 2023-08-23 | End: 2023-08-23 | Stop reason: HOSPADM

## 2023-08-23 RX ADMIN — EZETIMIBE 10 MG: 10 TABLET ORAL at 15:41

## 2023-08-23 RX ADMIN — OXYCODONE HYDROCHLORIDE 5 MG: 5 SOLUTION ORAL at 10:56

## 2023-08-23 RX ADMIN — APIXABAN 2.5 MG: 2.5 TABLET, FILM COATED ORAL at 18:11

## 2023-08-23 RX ADMIN — HEPARIN SODIUM: 1000 INJECTION, SOLUTION INTRAVENOUS; SUBCUTANEOUS at 10:03

## 2023-08-23 RX ADMIN — HEPARIN SODIUM 4000 UNITS: 200 INJECTION, SOLUTION INTRAVENOUS at 08:45

## 2023-08-23 RX ADMIN — ROCURONIUM BROMIDE 50 MG: 10 INJECTION, SOLUTION INTRAVENOUS at 09:15

## 2023-08-23 RX ADMIN — BENAZEPRIL HYDROCHLORIDE 40 MG: 20 TABLET, FILM COATED ORAL at 15:42

## 2023-08-23 RX ADMIN — PROPOFOL 100 MG: 10 INJECTION, EMULSION INTRAVENOUS at 09:15

## 2023-08-23 RX ADMIN — ONDANSETRON 4 MG: 2 INJECTION INTRAMUSCULAR; INTRAVENOUS at 10:07

## 2023-08-23 RX ADMIN — LIDOCAINE HYDROCHLORIDE 0.5 ML: 10 INJECTION, SOLUTION EPIDURAL; INFILTRATION; INTRACAUDAL at 08:56

## 2023-08-23 RX ADMIN — LEVOTHYROXINE SODIUM 75 MCG: 0.07 TABLET ORAL at 15:41

## 2023-08-23 RX ADMIN — SUGAMMADEX 200 MG: 100 INJECTION, SOLUTION INTRAVENOUS at 10:07

## 2023-08-23 RX ADMIN — SODIUM CHLORIDE, POTASSIUM CHLORIDE, SODIUM LACTATE AND CALCIUM CHLORIDE: 600; 310; 30; 20 INJECTION, SOLUTION INTRAVENOUS at 08:56

## 2023-08-23 RX ADMIN — PROPOFOL 40 MG: 10 INJECTION, EMULSION INTRAVENOUS at 10:05

## 2023-08-23 RX ADMIN — NYSTATIN: 100000 POWDER TOPICAL at 20:26

## 2023-08-23 RX ADMIN — IOHEXOL 80 ML: 350 INJECTION, SOLUTION INTRAVENOUS at 09:00

## 2023-08-23 RX ADMIN — METOPROLOL SUCCINATE 50 MG: 50 TABLET, EXTENDED RELEASE ORAL at 15:41

## 2023-08-23 RX ADMIN — LIDOCAINE HYDROCHLORIDE 4 ML: 40 SOLUTION TOPICAL at 09:16

## 2023-08-23 RX ADMIN — BUPIVACAINE HYDROCHLORIDE: 5 INJECTION, SOLUTION EPIDURAL; INTRACAUDAL at 09:10

## 2023-08-23 RX ADMIN — LIDOCAINE HYDROCHLORIDE: 20 INJECTION, SOLUTION INFILTRATION; PERINEURAL at 09:10

## 2023-08-23 RX ADMIN — ACETAMINOPHEN 650 MG: 325 TABLET, FILM COATED ORAL at 18:11

## 2023-08-23 RX ADMIN — LIDOCAINE HYDROCHLORIDE 60 MG: 20 INJECTION, SOLUTION EPIDURAL; INFILTRATION; INTRACAUDAL at 09:15

## 2023-08-23 RX ADMIN — CEFAZOLIN 2 G: 1 INJECTION, POWDER, FOR SOLUTION INTRAMUSCULAR; INTRAVENOUS at 09:15

## 2023-08-23 RX ADMIN — PROPOFOL 20 MG: 10 INJECTION, EMULSION INTRAVENOUS at 09:10

## 2023-08-23 RX ADMIN — SODIUM CHLORIDE: 9 INJECTION, SOLUTION INTRAVENOUS at 13:13

## 2023-08-23 RX ADMIN — DEXAMETHASONE SODIUM PHOSPHATE 8 MG: 4 INJECTION INTRA-ARTICULAR; INTRALESIONAL; INTRAMUSCULAR; INTRAVENOUS; SOFT TISSUE at 09:15

## 2023-08-23 ASSESSMENT — COGNITIVE AND FUNCTIONAL STATUS - GENERAL
MOVING FROM LYING ON BACK TO SITTING ON SIDE OF FLAT BED: A LITTLE
MOBILITY SCORE: 16
DRESSING REGULAR UPPER BODY CLOTHING: A LITTLE
STANDING UP FROM CHAIR USING ARMS: A LITTLE
TOILETING: A LITTLE
TURNING FROM BACK TO SIDE WHILE IN FLAT BAD: A LITTLE
CLIMB 3 TO 5 STEPS WITH RAILING: A LOT
HELP NEEDED FOR BATHING: A LITTLE
SUGGESTED CMS G CODE MODIFIER DAILY ACTIVITY: CK
DRESSING REGULAR LOWER BODY CLOTHING: A LITTLE
DAILY ACTIVITIY SCORE: 19
MOVING TO AND FROM BED TO CHAIR: A LITTLE
PERSONAL GROOMING: A LITTLE
WALKING IN HOSPITAL ROOM: A LOT
SUGGESTED CMS G CODE MODIFIER MOBILITY: CK

## 2023-08-23 ASSESSMENT — PAIN DESCRIPTION - PAIN TYPE
TYPE: ACUTE PAIN
TYPE: ACUTE PAIN
TYPE: ACUTE PAIN;SURGICAL PAIN
TYPE: CHRONIC PAIN
TYPE: ACUTE PAIN;SURGICAL PAIN

## 2023-08-23 ASSESSMENT — LIFESTYLE VARIABLES
HAVE PEOPLE ANNOYED YOU BY CRITICIZING YOUR DRINKING: NO
HOW MANY TIMES IN THE PAST YEAR HAVE YOU HAD 5 OR MORE DRINKS IN A DAY: 0
TOTAL SCORE: 0
EVER FELT BAD OR GUILTY ABOUT YOUR DRINKING: NO
AVERAGE NUMBER OF DAYS PER WEEK YOU HAVE A DRINK CONTAINING ALCOHOL: 0
TOTAL SCORE: 0
TOTAL SCORE: 0
ALCOHOL_USE: NO
HAVE YOU EVER FELT YOU SHOULD CUT DOWN ON YOUR DRINKING: NO
ON A TYPICAL DAY WHEN YOU DRINK ALCOHOL HOW MANY DRINKS DO YOU HAVE: 0
EVER HAD A DRINK FIRST THING IN THE MORNING TO STEADY YOUR NERVES TO GET RID OF A HANGOVER: NO
CONSUMPTION TOTAL: NEGATIVE

## 2023-08-23 ASSESSMENT — FIBROSIS 4 INDEX
FIB4 SCORE: 1.99

## 2023-08-23 ASSESSMENT — PAIN SCALES - GENERAL: PAIN_LEVEL: 3

## 2023-08-23 NOTE — ANESTHESIA TIME REPORT
Anesthesia Start and Stop Event Times     Date Time Event    8/23/2023 0857 Ready for Procedure     0904 Anesthesia Start     1019 Anesthesia Stop        Responsible Staff  08/23/23    Name Role Begin End    Pankaj Vargas M.D. Anesth 0904 1019        Overtime Reason:  no overtime (within assigned shift)    Comments: Complete ZAK exam

## 2023-08-23 NOTE — OR NURSING
"Post procedure 4 hour bedrest complete at 1400.  Re-assess pt's right groin at that time, pt may require 2 more hours bedrest for a total of 6 hours per Dr. Ji.     Pt on RA.  No c/o nausea, tolerating PO fluids and medication. Right groin CDI, soft.  Right pedal pulse 2+. Pt c/o right groin \"burning, soreness,\" and headache.  Medicated per MAR.  Pain now 2/10, tolerating per pt.      Pt uses walker.    Post procedure EKG completed in [facility].     Upper denture and lower partial in place.   Glasses in place.  Right knee brace in place.  One clear pt belonging bag on [facility].    Transferred on monitor by this RN.     Bedside handoff to Deborah ELMORE.  "

## 2023-08-23 NOTE — PROGRESS NOTES
4 Eyes Skin Assessment Completed by Deborah ELMORE and Nicole ELMORE.    Head WDL   Ears WDL  Nose WDL  Mouth WDL dry lips  Neck WDL  Breast/Chest Redness, Rash, and Excoriation beneath right breast   Shoulder Blades WDL  Spine WDL  (R) Arm/Elbow/Hand Bruising  (L) Arm/Elbow/Hand Bruising  Abdomen WDL  Groin Bruising, Swelling, and Incision  Scrotum/Coccyx/Buttocks WDL  (R) Leg WDL  (L) Leg WDL  (R) Heel/Foot/Toe Redness and Blanching  (L) Heel/Foot/Toe Redness and Blanching          Devices In Places Tele Box, Blood Pressure Cuff, and Pulse Ox      Interventions In Place Pillows    Possible Skin Injury No    Pictures Uploaded Into Epic Yes  Wound Consult Placed N/A  RN Wound Prevention Protocol Ordered No

## 2023-08-23 NOTE — ANESTHESIA PROCEDURE NOTES
ZAK    Date/Time: 8/23/2023 9:20 AM    Performed by: Pankaj Vargas M.D.  Authorized by: Pankaj Vargas M.D.    Start Time:8/23/2023 9:20 AM  Preanesthetic Checklist: patient identified, IV checked, site marked, risks and benefits discussed, surgical consent, monitors and equipment checked, pre-op evaluation and timeout performed    Indication for ZAK: diagnostic   Patient Location: OR  Intubated: Yes  Bite Block: Yes  Heart Visualized: Yes  Insertion: atraumatic    **See FULL ZAK report in patient's chart via CV Synapse**

## 2023-08-23 NOTE — ANESTHESIA POSTPROCEDURE EVALUATION
Patient: Yvonne Marie Wada    Procedure Summary     Date: 08/23/23 Room / Location: [facility]    Anesthesia Start: 0904 Anesthesia Stop: 1019    Procedure: CL-LEFT ATRIAL APPENDAGE CLOSURE Diagnosis:       Paroxysmal atrial fibrillation (HCC)      Paroxysmal atrial fibrillation      (See Associated Dx)    Scheduled Providers: Dianne Ji M.D.; Pankaj Vargas M.D. Responsible Provider: Pankaj Vargas M.D.    Anesthesia Type: general ASA Status: 3          Final Anesthesia Type: general  Last vitals  BP   Blood Pressure : 124/87    Temp   36.7 °C (98.1 °F)    Pulse   (!) 124   Resp   19    SpO2   94 %      Anesthesia Post Evaluation    Patient location during evaluation: PACU  Patient participation: complete - patient participated  Level of consciousness: sleepy but conscious  Pain score: 3    Airway patency: patent  Anesthetic complications: no  Cardiovascular status: hemodynamically stable  Respiratory status: acceptable  Hydration status: balanced    PONV: none          There were no known notable events for this encounter.     Nurse Pain Score: 3 (NPRS)

## 2023-08-23 NOTE — ANESTHESIA PREPROCEDURE EVALUATION
" Date/Time: 08/23/23 0930    Scheduled providers: Dianne Ji M.D.; Pankaj Vargas M.D.    Procedure: CL-LEFT ATRIAL APPENDAGE CLOSURE    Diagnosis:       Paroxysmal atrial fibrillation (HCC) [I48.0]      Paroxysmal atrial fibrillation [I48.0]    Indications: See Associated Dx    Location: [facility] - Cath Lab - [facility]        Past Medical History:   Diagnosis Date   • AAA (abdominal aortic aneurysm) (HCC) 7/26/2010    10/09 CT thorax dilated ascending thoracic aorta 4.9 cm 1/10 transesophageal echo dilated aortic root, and ascending aorta with mild aortic insufficiency 1/10  ascending aortic aneurysm repair 5/12 echo snca normal LV function EF 60%, moderate to severe left atrial enlargement, RVSP 32    • Aortic insufficiency    • Aortic valve disease    • Aortic valve regurgitation    • Arthritis     back and knee/ osteo   • Cataract    • Dental disorder     full top denture, partial bottom   • Diverticulosis    • Dyslipidemia 7/26/2010    Sees snca on lipitor 4/11 chol 159,trig 84,hdl 47,ldl 95,cpk 114 7/12 chol 163,trig 120,hdl 49,ldl 90, crp 1.1 on lipitor 20 mg    • GERD (gastroesophageal reflux disease) 7/12/2012    6/07 EGD per [facility] hiatal hernia, start prevacid 30 mg 7/12 protonix 20 mg qday    • Glaucoma    • Heart burn    • Heart murmur    • Heart valve disease     \"leaking\", mitral valve   • Hiatus hernia syndrome    • High cholesterol    • History of shingles 6/30/2011    2010 Back and left thorax     • History of total knee arthroplasty 7/26/2010    4/10 MRI right knee complex tear medial meniscus, horizontal cleavage tear lateral meniscus, chondromalacia patella, moderate-sized Baker's cyst 5/10 right meniscus knee repair  5/10 told by  had gout on surgery had pathology report, I await that report Question gout seen on pathology report done during repair of right meniscus knee surgery. 7/10 uric acid 6.3, we'll await starting allopurinol depending " on the operative report 8/5/10 reviewed ortho notes , op report indicates crystal deposition, could be gout or pseudogout. No bx result sent over. Will need to get. My suspicion is chondrocalcinosis. 10/11  ortho note, MRI pending 8/7/10 reviewed pathology report right knee tissue, marked degenerative changes with focal calcification, no mention of gout. Based on this and a normal uric acid level, I do not believe she has gout, has no hyperuricemia medications would be indicated 10/11  ortho left knee meniscectomy and arthroscopy 1/12     • HLD (hyperlipidemia)    • Hypertension    • Hypothyroid 4/8/2011 4/11 tsh 9 start synthroid 50 4/11 tsh 6,free t3 3.1,free t4 1.2,tpo negative 7/1/11 tsh 2.1 cont synthroid 50 mcg 7/12 tsh 3.4 cont synthroid 50 mcg    • Indigestion    • Mitral insufficiency    • OSTEOPOROSIS 8/9/2010    Had been on fosamax [id number]  8/10 dexa LS -2.0,hip -1.5 await old dexa result, she will get copy for me 4/11 vit d 35 9/12 dexa LS -3.2,hip -1.4 2/13/13 relast infusion    • Pain     back and right leg, can get as bad as 10/10   • Preventative health care 7/26/2010 2002 pneum 2005 colon [facility] no records 4/11 vit d 35 9/11 shingles vaccine 9/12 mammogram 9/12 dexa LS -3.2,hip -1.4    • Rheumatic fever    • S/P appendectomy 7/26/2010   • S/P TOMY (total abdominal hysterectomy) 7/26/2010    Sees gyn on HRT estradiol for 20 yrs () 11/08 mammo     • Shingles 6/30/2011   • Snoring    • Status post lumbar surgery 7/26/2010 6/03 L4-5 epidural Dr. Jordan  7/06 overall for decompression, foraminotomy. L4-L5 posterior fusion, L4-L5 allograft      • Stroke (HCC) 1996    no residual problems    • Thoracic aortic aneurysm without mention of rupture    • Tricuspid insufficiency    • Unspecified disorder of thyroid     hypo   • Urinary bladder disorder     suprapubic catheter insertion 11/4   • UTI (urinary tract infection) 11/12/2016     7/6/16 UTI klebsiella pneumoniae sensitive to all antibiotics except ampicillin, start bactrim x 5 days 1/18/19 UTI enterobacter cloacae resistant to ampicillin, cephalothin, penicillin, bactrim, sensitive to cefuroxime, ciprofloxacin and levaquin, nitrofurantoin 5/26/19 UTI klebsiella given omnicef, organism resistant ampicillin, ciprofloxacin, levofloxacin, bactrim 8/18/21  urology note      Past Surgical History:   Procedure Laterality Date   • ORIF, ANKLE Right 10/14/2022    Procedure: RIGHT ANKLE OPEN REDUCTION INTERNAL FIXATION;  Surgeon: Ryan Huertas M.D.;  Location: [facility];  Service: Orthopedics   • TENDON REPAIR Right 11/10/2016    Procedure: TENDON REPAIR - QUADRACEPS ;  Surgeon: Maxim Herrera M.D.;  Location: [facility];  Service:    • KNEE ARTHROPLASTY TOTAL Right 9/8/2016    Procedure: KNEE ARTHROPLASTY TOTAL;  Surgeon: Maxim Herrera M.D.;  Location: [facility];  Service:    • FUSION, SPINE, LUMBAR, PLIF  11/24/2015    Procedure: LUMBAR FUSION POSTERIOR L3-4, EXPLORATION OF FUSION L4-5 WITH INSTRUMENTATION;  Surgeon: Hermann Reis M.D.;  Location: [facility];  Service:    • LUMBAR LAMINECTOMY DISKECTOMY  11/24/2015    Procedure: LUMBAR L3-4 LAMINECTOMY AND REDO L4-5;  Surgeon: Hermann Reis M.D.;  Location: [facility];  Service:    • KNEE ARTHROPLASTY TOTAL  1/3/2012    Performed by YOSEF MEJÍA at [facility]   • KNEE ARTHROSCOPY  10/18/2011    Performed by YOSEF MEJÍA at [facility]   • MENISCECTOMY, KNEE, MEDIAL  10/18/2011    Performed by YOSEF MEJÍA at [facility]   • AORTIC VALVE REPLACEMENT  1/12/2010    Performed by ISAÍAS NICOLE at [facility]   • APPENDECTOMY     • FUSION, SPINE, LUMBAR, PLIF     • HYSTERECTOMY, TOTAL ABDOMINAL       Current Outpatient Medications   Medication Instructions   • apixaban (ELIQUIS) 2.5 mg, Oral, 2 TIMES DAILY   •  benazepril (LOTENSIN) 40 MG tablet TAKE 1 TABLET BY MOUTH EVERY DAY IN THE MORNING   • calcium/vitamin D 250-125 MG-UNIT Tab tablet 1 Tablet, Oral, DAILY   • DULoxetine (CYMBALTA) 30 mg, Oral, DAILY   • ezetimibe (ZETIA) 10 mg, Oral, DAILY   • hydrocortisone 2.5 % Cream topical cream 1 Application , Topical, 2 TIMES DAILY, Apply to hemorrhoid area   • levothyroxine (SYNTHROID) 75 MCG Tab TAKE 1 TABLET BY MOUTH EVERY DAY IN THE MORNING ON AN EMPTY STOMACH   • meclizine (ANTIVERT) 12.5 mg, Oral, 3 TIMES DAILY PRN   • metoprolol SR (TOPROL XL) 50 mg, Oral, EVERY MORNING   • nitroglycerin (RECTIV) 0.4 % ointment 1 Inch, Rectal, 2 TIMES DAILY, X10 days for hemorrhoid.     Lab Results   Component Value Date/Time    SODIUM 131 (L) 08/21/2023 01:11 PM    POTASSIUM 4.4 08/21/2023 01:11 PM    CHLORIDE 96 08/21/2023 01:11 PM    CO2 22 08/21/2023 01:11 PM    GLUCOSE 95 08/21/2023 01:11 PM    BUN 26 (H) 08/21/2023 01:11 PM    CREATININE 1.06 08/21/2023 01:11 PM    CREATININE 1.05 (H) 02/07/2013 11:31 AM    BUNCREATRAT 25 12/08/2017 08:57 AM    BUNCREATRAT 30 (H) 02/07/2013 11:31 AM      Lab Results   Component Value Date/Time    WBC 6.0 08/21/2023 01:11 PM    WBC 3.9 (L) 04/16/2011 12:00 AM    RBC 4.72 08/21/2023 01:11 PM    RBC 4.63 04/16/2011 12:00 AM    HEMOGLOBIN 14.5 08/21/2023 01:11 PM    HEMATOCRIT 43.3 08/21/2023 01:11 PM    MCV 91.7 08/21/2023 01:11 PM    MCV 92 04/16/2011 12:00 AM    MCH 30.7 08/21/2023 01:11 PM    MCH 30.9 04/16/2011 12:00 AM    MCHC 33.5 08/21/2023 01:11 PM    MPV 9.1 08/21/2023 01:11 PM    NEUTSPOLYS 55.60 08/21/2023 01:11 PM    LYMPHOCYTES 29.80 08/21/2023 01:11 PM    MONOCYTES 11.40 08/21/2023 01:11 PM    EOSINOPHILS 1.30 08/21/2023 01:11 PM    BASOPHILS 1.20 08/21/2023 01:11 PM    ANISOCYTOSIS 1+ 10/14/2021 03:31 PM      TTE 2/2023:  CONCLUSIONS  Mild concentric left ventricular hypertrophy.   Normal left ventricular systolic function.   The left ventricular ejection fraction is estimated to be  60%.   Mild mitral and aortic valve regurgitation.   Right ventricular systolic pressure is estimated to be 30 mmHg.    Relevant Problems   CARDIAC   (positive) Hemorrhoid   (positive) Hypertension   (positive) Paroxysmal atrial fibrillation (HCC)      GI   (positive) GERD (gastroesophageal reflux disease)      ENDO   (positive) Hypothyroid      Other   (positive) Neck arthritis       Physical Exam    Airway   Mallampati: II  TM distance: >3 FB  Neck ROM: full       Cardiovascular   Rhythm: irregular  Rate: abnormal  (+) murmur     Dental   (+) upper dentures, lower dentures           Pulmonary - normal exam  Breath sounds clear to auscultation     Abdominal    Neurological - normal exam               Anesthesia Plan    ASA 3   ASA physical status 3 criteria: hypertension - poorly controlled and CVA or TIA - history (> 3 months)    Plan - general       Airway plan will be ETT  ZAK Planned        Induction: intravenous    Postoperative Plan: Postoperative administration of opioids is intended.    Pertinent diagnostic labs and testing reviewed    Informed Consent:    Anesthetic plan and risks discussed with patient.    Use of blood products discussed with: patient whom consented to blood products.       Patient fully consented for general anesthesia. Anesthetic procedure and risks discussed in detail. Risks include but are not limited to PONV, pain, sore throat, damage to teeth/lips/gums, positioning injury, allergic reaction, vocal cord or esophageal injury, and/or cardiopulmonary problems up to and including death. Patient indicates complete understanding. All patient questions answered to full satisfaction and they agree to proceed as planned.

## 2023-08-23 NOTE — OR NURSING
[id number]: pt's  Ren phoned and updated on pt status in Recovery and POC.  Ren is waiting at home for room assignment.  1203: Ren updated on pt status in Recovery and transfer to T820.

## 2023-08-23 NOTE — PROGRESS NOTES
1300 - Patient arrived to room 820 from PACU. Oriented to room et floor. Placed on telemetry, verified with central monitoring. Allowed time for questions. Plan for admission discussed. Belongings documented in chart. Call light and belongings within reach, safety measures and fall precautions in place, white board updated. Groin site soft, et intact. Suture visible.     1400 - Patient sat up by family member. Noted small amount of serosanguinous drainage at groin site, marked with marker. Educated about movement within limitations post op.     1500 - Patient assisted to sitting position via 2 RN's. Tolerated well. No further bleeding noted. Standing beside bed, tolerated only for about 40 seconds. Ortho BP's WNL.     1600 - Discussed nystatin powder with Demetria LEUNG for rash beneath right breast.     1800 - Patient given tylenol for headache. Asking for snacks at this time.     1910 - Reported off to Radha ELMORE

## 2023-08-23 NOTE — OR NURSING
1007: report received from Marielle ELMORE, Cath Lab.  1016: pt arrived TPACU with anesthesia and RN on monitor and 8 L simple mask. Right groin CDI, soft.  Right pedal pulse 2+.  VSS, afebrile, CRAVEN, A/O x4, sleepy, wakes easily to voice.   Glasses and dentures on chart.

## 2023-08-23 NOTE — PROGRESS NOTES
Patient in pre-op, assessment completed, patient and  bill updated on plan of care, all questions answered, no further needs at this time, call light within reach.

## 2023-08-23 NOTE — OP REPORT
"[facility] Electrophysiology/Structural Heart Procedure Note     Procedure(s) Performed:   1) Watchman BETTY closure    Indication(s):  Paroxysmal atrial fibrillation  H/o of GI bleeding    Physician(s): Dianne Ji M.D.     Resident/Assistant(s): None     Anesthesia: General endotracheal anesthetic with Dr. Pankaj Vargas     Specimen(s) Removed: None     Estimated Blood Loss:  30cc     Complications:  None     Description of Procedure:   After informed written consent, the patient was brought to the cath lab in the fasting, non-sedated state. The patient was prepped and draped in the usual sterile fashion. Femoral venous access was obtained using the modified Seldinger technique. In the right femoral vein, an 8 Fr sheath were inserted over 0.035” guidewire and exchanged for a 16 Fr outer sheath. An 8.5 Fr [place] trans-septal sheath. ZAK was used to identify the atrial septum, and left atrial appendage.  A [place] trans-septal needle was inserted to into the trans-septal sheath, and under ultrasound guidance a inferior/posterior trans-septal location was used to cross into the left atrium. Intravenous heparin was given to target an -300. A stiff exchange length 0.035\" wire was inserted into the left atrium/left pulmonary veins, over which we exchanged to the WATCHMAN delivery sheath. The WATCHMAN device was prepped and flushed. A pigtail catheter was advanced into the delivery sheath into the left atrium and then after counter clockwise torque maneuvered into the body of the left atrial appendage. Cine was taken to verify the location of the pigtail in the appendage and to assess for depth. The sheath was then advanced over the pigtail until sufficient depth was reached.  The pigtail was removed, and under wet to wet connection the WATCHMAN device was advanced through the delivery sheath until markers were aligned. The sheath was retracted slowly to deploy the device. After the device was deployed we " assessed again for leak with contrast injected through the sheath as well as a tug test. The initial device failed to completely cover the posterior os as the device was largely in the anterior lobe of the appendage. Our leak was < 5 mm but we decided to aim for better coverage here. The original device was removed and we upsized from 24 mm device to 27 mm device and redeployed in the same manner. There was still posterior leak here but <3 mm at the VC. We verified good position, anchoring, size, and seal with no/minimal leak with ZAK. After all criteria were met we detached the device from the delivery system. At the end of the procedure, heparin was reversed with protamine, the catheter and sheaths were removed, and hemostasis was achieved by manual compression along with a figure of 8 silk suture. Following recovery from anesthesia, the patient was transferred to the PPU in good condition.        Fluoroscopy time:  11.3 minutes    Contrast used: 80 cc     Device implanted:  Size  27mm  LOT # [id number]   Max appendage pre-measurement 18 mm  Max device measurement 21 mm (24%) compression     Impressions:    1. Successful BETTY closure      Recommendations:  Continue Eliquis x 45 days   2.   Admit to monitored bedrest.  3.   Echo and removal of figure of 8 suture in the AM

## 2023-08-23 NOTE — ANESTHESIA PROCEDURE NOTES
Airway    Date/Time: 8/23/2023 9:16 AM    Performed by: Pankaj Vargas M.D.  Authorized by: Pankaj Vargas M.D.    Location:  OR  Urgency:  Elective  Indications for Airway Management:  Anesthesia      Spontaneous Ventilation: absent    Sedation Level:  Deep  Preoxygenated: Yes    Patient Position:  Sniffing  Final Airway Type:  Endotracheal airway  Final Endotracheal Airway:  ETT  Cuffed: Yes    Technique Used for Successful ETT Placement:  Direct laryngoscopy    Insertion Site:  Oral  Blade Type:  Nilay  Laryngoscope Blade/Videolaryngoscope Blade Size:  3  ETT Size (mm):  7.0  Measured from:  Teeth  ETT to Teeth (cm):  19  Placement Verified by: auscultation and capnometry    Cormack-Lehane Classification:  Grade I - full view of glottis  Number of Attempts at Approach:  1   Upper partial removed

## 2023-08-23 NOTE — H&P
"EP Pre-procedure History and Physical    Date of service: 8/23/2023    Reason for visit/Chief complaint: PAF    HPI:   Pt is an 86 yo F. She has history of mitral valve disease, AAA, thoracic aortic aneurysm, HTN, and paroxysmal AF. Sounds like not terribly high burden but symptomatic when it happens. She has frequent falls. Within last year 5 falls, some hitting her head. She otherwise has been on Eliquis. No evidence of internal major bleeding. Referred by cardiology for consideration of BETTY closure.    Past Medical History:   Diagnosis Date    AAA (abdominal aortic aneurysm) (HCC) 7/26/2010    10/09 CT thorax dilated ascending thoracic aorta 4.9 cm 1/10 transesophageal echo dilated aortic root, and ascending aorta with mild aortic insufficiency 1/10  ascending aortic aneurysm repair 5/12 echo snca normal LV function EF 60%, moderate to severe left atrial enlargement, RVSP 32     Aortic insufficiency     Aortic valve disease     Aortic valve regurgitation     Arthritis     back and knee/ osteo    Cataract     Dental disorder     full top denture, partial bottom    Diverticulosis     Dyslipidemia 7/26/2010    Sees snca on lipitor 4/11 chol 159,trig 84,hdl 47,ldl 95,cpk 114 7/12 chol 163,trig 120,hdl 49,ldl 90, crp 1.1 on lipitor 20 mg     GERD (gastroesophageal reflux disease) 7/12/2012 6/07 EGD per [facility] hiatal hernia, start prevacid 30 mg 7/12 protonix 20 mg qday     Glaucoma     Heart burn     Heart murmur     Heart valve disease     \"leaking\", mitral valve    Hiatus hernia syndrome     High cholesterol     History of shingles 6/30/2011 2010 Back and left thorax      History of total knee arthroplasty 7/26/2010    4/10 MRI right knee complex tear medial meniscus, horizontal cleavage tear lateral meniscus, chondromalacia patella, moderate-sized Baker's cyst 5/10 right meniscus knee repair  5/10 told by  had gout on surgery had pathology report, I await that report Question " gout seen on pathology report done during repair of right meniscus knee surgery. 7/10 uric acid 6.3, we'll await starting allopurinol depending on the operative report 8/5/10 reviewed ortho notes , op report indicates crystal deposition, could be gout or pseudogout. No bx result sent over. Will need to get. My suspicion is chondrocalcinosis. 10/11  ortho note, MRI pending 8/7/10 reviewed pathology report right knee tissue, marked degenerative changes with focal calcification, no mention of gout. Based on this and a normal uric acid level, I do not believe she has gout, has no hyperuricemia medications would be indicated 10/11  ortho left knee meniscectomy and arthroscopy 1/12      HLD (hyperlipidemia)     Hypertension     Hypothyroid 4/8/2011 4/11 tsh 9 start synthroid 50 4/11 tsh 6,free t3 3.1,free t4 1.2,tpo negative 7/1/11 tsh 2.1 cont synthroid 50 mcg 7/12 tsh 3.4 cont synthroid 50 mcg     Indigestion     Mitral insufficiency     OSTEOPOROSIS 8/9/2010    Had been on fosamax [id number]  8/10 dexa LS -2.0,hip -1.5 await old dexa result, she will get copy for me 4/11 vit d 35 9/12 dexa LS -3.2,hip -1.4 2/13/13 relast infusion     Pain     back and right leg, can get as bad as 10/10    Preventative health care 7/26/2010    2002 pneum 2005 colon [facility] no records 4/11 vit d 35 9/11 shingles vaccine 9/12 mammogram 9/12 dexa LS -3.2,hip -1.4     Rheumatic fever     S/P appendectomy 7/26/2010    S/P TOMY (total abdominal hysterectomy) 7/26/2010    Sees gyn on HRT estradiol for 20 yrs () 11/08 mammo      Shingles 6/30/2011    Snoring     Status post lumbar surgery 7/26/2010 6/03 L4-5 epidural Dr. Jordan  7/06 overall for decompression, foraminotomy. L4-L5 posterior fusion, L4-L5 allograft       Stroke ([facility]) 1996    no residual problems     Thoracic aortic aneurysm without mention of rupture     Tricuspid insufficiency     Unspecified disorder of thyroid      hypo    Urinary bladder disorder     suprapubic catheter insertion 11/4    UTI (urinary tract infection) 11/12/2016 7/6/16 UTI klebsiella pneumoniae sensitive to all antibiotics except ampicillin, start bactrim x 5 days 1/18/19 UTI enterobacter cloacae resistant to ampicillin, cephalothin, penicillin, bactrim, sensitive to cefuroxime, ciprofloxacin and levaquin, nitrofurantoin 5/26/19 UTI klebsiella given omnicef, organism resistant ampicillin, ciprofloxacin, levofloxacin, bactrim 8/18/21  urology note      Past Surgical History:   Procedure Laterality Date    ORIF, ANKLE Right 10/14/2022    Procedure: RIGHT ANKLE OPEN REDUCTION INTERNAL FIXATION;  Surgeon: Ryan Huertas M.D.;  Location: [facility];  Service: Orthopedics    TENDON REPAIR Right 11/10/2016    Procedure: TENDON REPAIR - QUADRACEPS ;  Surgeon: Maxim Herrera M.D.;  Location: [facility];  Service:     KNEE ARTHROPLASTY TOTAL Right 9/8/2016    Procedure: KNEE ARTHROPLASTY TOTAL;  Surgeon: Maxim Herrera M.D.;  Location: [facility];  Service:     FUSION, SPINE, LUMBAR, PLIF  11/24/2015    Procedure: LUMBAR FUSION POSTERIOR L3-4, EXPLORATION OF FUSION L4-5 WITH INSTRUMENTATION;  Surgeon: Hermann Reis M.D.;  Location: [facility];  Service:     LUMBAR LAMINECTOMY DISKECTOMY  11/24/2015    Procedure: LUMBAR L3-4 LAMINECTOMY AND REDO L4-5;  Surgeon: Hermann Reis M.D.;  Location: [facility];  Service:     KNEE ARTHROPLASTY TOTAL  1/3/2012    Performed by YOSEF MEJÍA at [facility]    KNEE ARTHROSCOPY  10/18/2011    Performed by YOSEF MEJÍA at [facility]    MENISCECTOMY, KNEE, MEDIAL  10/18/2011    Performed by YOSEF MEJÍA at [facility]    AORTIC VALVE REPLACEMENT  1/12/2010    Performed by ISAÍAS NICOLE at [facility]    APPENDECTOMY      FUSION, SPINE, LUMBAR, PLIF      HYSTERECTOMY, TOTAL ABDOMINAL       Family  "History   Problem Relation Age of Onset    Stroke Mother     Heart Disease Father     Heart Disease Sister        Physical Exam:  Vitals:    08/23/23 0713   BP: (!) 138/91   Pulse: 91   Resp: 16   Temp: 36.1 °C (96.9 °F)   TempSrc: Temporal   SpO2: 97%   Weight: 60 kg (132 lb 4.4 oz)   Height: 1.626 m (5' 4\")     Gen: NAD, conversant  HEENT: PERRL, EOMI  LUNGS: CTA B, no w/r/r  CV: RRR, no m/r/g, no JVD  Abd: Soft, NT/ND, +BS  Ext: no edema, warm and well perfused    Labs reviewed    EKG interpreted by me:  WILBURib    Impression/Recs:  1. Pre-operative cardiovascular examination  EKG    EKG    Basic Metabolic Panel    Basic Metabolic Panel    CBC WITH DIFFERENTIAL    CBC WITH DIFFERENTIAL    PT    PT    APTT    APTT      2. Pre-operative laboratory examination  EKG    EKG    Basic Metabolic Panel    Basic Metabolic Panel    CBC WITH DIFFERENTIAL    CBC WITH DIFFERENTIAL    PT    PT    APTT    APTT      3. Paroxysmal atrial fibrillation (HCC)  CL-LEFT ATRIAL APPENDAGE CLOSURE    CL-LEFT ATRIAL APPENDAGE CLOSURE      4. Presence of Watchman left atrial appendage closure device  EC-ZAK W/O CONT    EC-ZAK W/O CONT        -Risks/benefits/alternatives discussed  -All questions answered  -Proceed with WATCHMAN implant    Dianne Ji MD      "

## 2023-08-24 ENCOUNTER — APPOINTMENT (OUTPATIENT)
Dept: CARDIOLOGY | Facility: MEDICAL CENTER | Age: 88
DRG: 274 | End: 2023-08-24
Attending: INTERNAL MEDICINE
Payer: MEDICARE

## 2023-08-24 VITALS
SYSTOLIC BLOOD PRESSURE: 127 MMHG | HEART RATE: 85 BPM | OXYGEN SATURATION: 91 % | WEIGHT: 132.94 LBS | HEIGHT: 64 IN | TEMPERATURE: 98.6 F | RESPIRATION RATE: 17 BRPM | BODY MASS INDEX: 22.7 KG/M2 | DIASTOLIC BLOOD PRESSURE: 74 MMHG

## 2023-08-24 PROCEDURE — 700102 HCHG RX REV CODE 250 W/ 637 OVERRIDE(OP): Performed by: INTERNAL MEDICINE

## 2023-08-24 PROCEDURE — 93325 DOPPLER ECHO COLOR FLOW MAPG: CPT

## 2023-08-24 PROCEDURE — 700105 HCHG RX REV CODE 258: Performed by: INTERNAL MEDICINE

## 2023-08-24 PROCEDURE — 93325 DOPPLER ECHO COLOR FLOW MAPG: CPT | Mod: 26 | Performed by: INTERNAL MEDICINE

## 2023-08-24 PROCEDURE — A9270 NON-COVERED ITEM OR SERVICE: HCPCS | Performed by: INTERNAL MEDICINE

## 2023-08-24 PROCEDURE — 93308 TTE F-UP OR LMTD: CPT | Mod: 26 | Performed by: INTERNAL MEDICINE

## 2023-08-24 RX ADMIN — BENAZEPRIL HYDROCHLORIDE 40 MG: 20 TABLET, FILM COATED ORAL at 05:57

## 2023-08-24 RX ADMIN — APIXABAN 2.5 MG: 2.5 TABLET, FILM COATED ORAL at 05:56

## 2023-08-24 RX ADMIN — LEVOTHYROXINE SODIUM 75 MCG: 0.07 TABLET ORAL at 05:57

## 2023-08-24 RX ADMIN — SODIUM CHLORIDE: 9 INJECTION, SOLUTION INTRAVENOUS at 04:00

## 2023-08-24 RX ADMIN — NYSTATIN: 100000 POWDER TOPICAL at 05:56

## 2023-08-24 RX ADMIN — METOPROLOL SUCCINATE 50 MG: 50 TABLET, EXTENDED RELEASE ORAL at 05:56

## 2023-08-24 RX ADMIN — DULOXETINE HYDROCHLORIDE 30 MG: 30 CAPSULE, DELAYED RELEASE ORAL at 05:57

## 2023-08-24 RX ADMIN — EZETIMIBE 10 MG: 10 TABLET ORAL at 05:57

## 2023-08-24 ASSESSMENT — PAIN DESCRIPTION - PAIN TYPE
TYPE: ACUTE PAIN
TYPE: ACUTE PAIN

## 2023-08-24 NOTE — PROGRESS NOTES
Monitor Summary:   Rhythm: Atrial fibrillation  Rate: 88 - 102  Measurement: ../.08/..  Ectopy: Rare PVC    12 hour chart check

## 2023-08-24 NOTE — PROGRESS NOTES
Bedside report received from RAMÍREZ Cabrera. Pt on RA. Patient A&O x 4 but forgetful. Pt does not complain of pain at this time. POC discussed with patient. Patient verbalizes understanding. Call light and belongings within reach. Bed locked in lowest position, alarm and fall precautions in place.

## 2023-08-24 NOTE — DISCHARGE PLANNING
Case Management Discharge Planning    Admission Date: 8/23/2023  GMLOS: 1.8  ALOS: 1    6-Clicks ADL Score: 19  6-Clicks Mobility Score: 16    Anticipated Discharge Dispo: Discharge Disposition: D/T to home     LMSW updated by RN that pt and pts  are denying HH.     No other CM needs at this time.     Pt to go home self care.

## 2023-08-24 NOTE — CARE PLAN
The patient is Stable - Low risk of patient condition declining or worsening         Progress made toward(s) clinical / shift goals:  Watchman placed today       Problem: Pain - Standard  Goal: Alleviation of pain or a reduction in pain to the patient’s comfort goal  Outcome: Progressing     Problem: Fall Risk  Goal: Patient will remain free from falls  Outcome: Progressing     Problem: Knowledge Deficit - Standard  Goal: Patient and family/care givers will demonstrate understanding of plan of care, disease process/condition, diagnostic tests and medications  Outcome: Progressing     Problem: Skin Integrity  Goal: Skin Integrity is maintained or improved  Outcome: Progressing     Problem: Risk for Infection, Impaired Wound Healing  Goal: Remain free from signs and symptoms of infection  Outcome: Progressing     Problem: Risk for Impaired Mobility--Activity Intolerance  Goal: Mobilize and/or Transfer Safely with [place]  Outcome: Progressing  Goal: Activity Level appropriate for Discharge or Transfer  Outcome: Progressing       Patient is not progressing towards the following goals:

## 2023-08-24 NOTE — CARE PLAN
The patient is Stable - Low risk of patient condition declining or worsening    Shift Goals  Clinical Goals: Pt's R groin site will not have excessive drainage this shift  Patient Goals: sleep  Family Goals: PARAG    Progress made toward(s) clinical / shift goals: minimal serosanguinous drainage from R groin site, Post-op VS completed, q 4 neuros stable, pt has remained free from falls     Patient is not progressing towards the following goals: pt intermittently forgetful    Problem: Fall Risk  Goal: Patient will remain free from falls  Outcome: Progressing     Problem: Acute Care of the Cardiac Cath Patient  Goal: Post Procedure Optimal Outcome for the Cardiac Cath Patient  Outcome: Progressing

## 2023-08-24 NOTE — DISCHARGE SUMMARY
Discharge Summary    CHIEF COMPLAINT ON ADMISSION  Elective admission for WATCHMAN procedure     CODE STATUS  Full Code     HPI & HOSPITAL COURSE  Yvonne Wada is a very pleasant 87 y.o. year old male electively admitted for left atrial appendage closure due to his history of frequent falls raising safety concern for ongoing use of anticoagulation.      Patient underwent successful BETTY closure via WATCHMEN with Dr. Ji on 8/24/2023.     Patient has been seen and examined in EP rounds this AM. AFL on tele. EKG and vital signs have been reviewed and are WNL. Patient denies any chest pain, dyspnea, dizziness, paraesthesias, or other complaints. Physical exam is without acute abnormality; specifically his right femoral access site is clean and dry.  The figure eight suture was removed without difficulty or evidence of hematoma or bleeding.     Limited TTE this am showed no evidence of pericardial effusion.      Post WATCHMEN discharge instructions were reviewed with patient, all questions were answered.      Therefore, she is discharged in good and stable condition with close outpatient follow-up as listed below.      PROCEDURES  LORELEI procedure, Dr. Ji, 8/23/2023. Please see full procedure note for details.      CONSULTATIONS  None      FOLLOW UP  Future Appointments   Date Time Provider Department Center   10/6/2023  9:00 AM [facility] EXAM 3 ZAK [facility]   10/11/2023 10:45 AM [facility] None   12/12/2023 11:15 AM [facility] None   1/31/2024  [facility]       MEDICATIONS ON DISCHARGE     Medication List        CHANGE how you take these medications        Instructions   apixaban 2.5mg Tabs  What changed: when to take this  Commonly known as: Eliquis   Take 1 Tablet by mouth 2 times a day.  Dose: 2.5 mg            CONTINUE taking these medications        Instructions   benazepril 40 MG tablet  Commonly known as: Lotensin   TAKE 1 TABLET  BY MOUTH EVERY DAY IN THE MORNING     calcium/vitamin D 250-125 MG-UNIT Tabs tablet   Take 1 Tablet by mouth every day.  Dose: 1 Tablet     diclofenac sodium 1 % Gel  Commonly known as: Voltaren   APPLY 2 GRAMS TO THE AFFECTED AREA(S) BY TOPICAL ROUTE 4 TIMES PER DAY     ezetimibe 10 MG Tabs  Commonly known as: Zetia   TAKE 1 TABLET BY MOUTH EVERY DAY  Dose: 10 mg     hydrocortisone 2.5 % Crea topical cream   APPLY 1 APPLICATION. TOPICALLY 2 TIMES A DAY. APPLY TO HEMORRHOID AREA  Dose: 1 Application      levothyroxine 75 MCG Tabs  Commonly known as: Synthroid   TAKE 1 TABLET BY MOUTH EVERY DAY IN THE MORNING ON AN EMPTY STOMACH     meclizine 12.5 MG Tabs  Commonly known as: Antivert   Take 1 Tablet by mouth 3 times a day as needed for Dizziness.  Dose: 12.5 mg     metoprolol SR 50 MG Tb24  Commonly known as: Toprol XL   Take 1 Tablet by mouth every morning.  Dose: 50 mg     SENOKOT PO   Take  by mouth.            ASK your doctor about these medications        Instructions   DULoxetine 30 MG Cpep  Commonly known as: Cymbalta   TAKE 1 CAPSULE BY MOUTH EVERY DAY  Dose: 30 mg     nitroglycerin 0.4 % ointment  Commonly known as: Rectiv   Doctor's comments: Please cancel the 0.2% ointment ordered previously.  Insert 1 Inch into the rectum 2 times a day. X10 days for hemorrhoid.  Dose: 1 Inch

## 2023-08-24 NOTE — DISCHARGE INSTRUCTIONS
[facility] POST WATCHMAN INSTRUCTIONS  No lifting > 10 lbs x 1 week.  No baths or hot tubs x 1 week.      May shower on 8/25/2023 and take off groin dressing and leave site uncovered.  Please try to keep Steri-strip in place and allow to come off on its own.  Continue to monitor site daily for warmth, redness, discolored drainage.    3. Please do not miss any doses of your blood thinner.      4. Please keep all follow up appointments scheduled for you.  You are scheduled for a wound check appointment in one week.  You are also scheduled for procedure follow up with EP clinic.     5. A transesophageal echocardiogram (ZAK) will be scheduled for you  to check the healing of the Watchman.  This procedure is completed at [facility] same day procedures.  We use sedation/anesthesia for this procedure so you will need a .     6. Please walk and take deep breaths after discharge.  After discharge, if you experience severe chest pain, shortness of breath, neurological changes, high fever, severe dizziness, trouble with catheter site needs to be seen in the emergency dept.

## 2023-08-24 NOTE — PROGRESS NOTES
Pt being DC to DC marcelluse. Echo resulted and discussed with CECILIA Mendoza prior to DC. Right groin site CDI with no sign of hematoma. Pt educated on post procedural instructions, pt verbalizes understanding. IV removed by pt, tele monitor checked in.

## 2023-08-30 ENCOUNTER — TELEPHONE (OUTPATIENT)
Dept: CARDIOLOGY | Facility: MEDICAL CENTER | Age: 88
End: 2023-08-30
Payer: MEDICARE

## 2023-08-30 NOTE — TELEPHONE ENCOUNTER
1 WEEK POST WATCHMAN FOLLOW UP WOUND CHECK:    Patient is s/p BETTY closure with Watchman device by Dr. Ji on (08/23/2023) for PAF with intolerance to anticoagulation.    Wound check completed in office. Right femoral access site appears CDI/CLARICE. No evidence of active bleeding, edema, erythema, or hematoma present.     Patient to follow up in 3-4 weeks as previously scheduled. Patient instructed to contact the office with any questions or concerns.     Future Appointments   Date Time Provider Department Center   10/6/2023  9:00 AM [facility] EXAM 3 ZAK [facility]   10/11/2023 10:45 AM [facility] None   12/12/2023 11:15 AM [facility] None   [facility]/31/2024  [facility]       Karen Farooq R.N. [facility]  [phone]

## 2023-10-05 ENCOUNTER — ANESTHESIA EVENT (OUTPATIENT)
Dept: CARDIOLOGY | Facility: MEDICAL CENTER | Age: 88
End: 2023-10-05
Payer: MEDICARE

## 2023-10-05 NOTE — OR NURSING
Spoke with patient via telephone.  Attempted to do RN pre admit appointment for procedure scheduled for 10/6/23.  Patient states that she did not know about the procedure and is going to cancel procedure.  Instructed patient to call cardiology today and phone number provided.  RN pre admit not completed.

## 2023-10-06 ENCOUNTER — APPOINTMENT (OUTPATIENT)
Dept: CARDIOLOGY | Facility: MEDICAL CENTER | Age: 88
End: 2023-10-06
Attending: INTERNAL MEDICINE
Payer: MEDICARE

## 2023-10-06 ENCOUNTER — ANESTHESIA (OUTPATIENT)
Dept: CARDIOLOGY | Facility: MEDICAL CENTER | Age: 88
End: 2023-10-06
Payer: MEDICARE

## 2023-10-06 ENCOUNTER — HOSPITAL ENCOUNTER (OUTPATIENT)
Facility: MEDICAL CENTER | Age: 88
End: 2023-10-06
Attending: INTERNAL MEDICINE | Admitting: INTERNAL MEDICINE
Payer: MEDICARE

## 2023-10-06 VITALS
HEART RATE: 99 BPM | HEIGHT: 64 IN | WEIGHT: 134.92 LBS | OXYGEN SATURATION: 96 % | SYSTOLIC BLOOD PRESSURE: 157 MMHG | DIASTOLIC BLOOD PRESSURE: 99 MMHG | BODY MASS INDEX: 23.03 KG/M2 | RESPIRATION RATE: 18 BRPM | TEMPERATURE: 96.8 F

## 2023-10-06 DIAGNOSIS — I48.0 PAROXYSMAL ATRIAL FIBRILLATION (HCC): ICD-10-CM

## 2023-10-06 PROCEDURE — 700111 HCHG RX REV CODE 636 W/ 250 OVERRIDE (IP): Performed by: ANESTHESIOLOGY

## 2023-10-06 PROCEDURE — 160002 HCHG RECOVERY MINUTES (STAT)

## 2023-10-06 PROCEDURE — 160046 HCHG PACU - 1ST 60 MINS PHASE II

## 2023-10-06 PROCEDURE — 700105 HCHG RX REV CODE 258: Performed by: ANESTHESIOLOGY

## 2023-10-06 PROCEDURE — 93319 3D ECHO IMG CGEN CAR ANOMAL: CPT

## 2023-10-06 PROCEDURE — 93312 ECHO TRANSESOPHAGEAL: CPT | Mod: 26 | Performed by: INTERNAL MEDICINE

## 2023-10-06 PROCEDURE — 700105 HCHG RX REV CODE 258: Performed by: INTERNAL MEDICINE

## 2023-10-06 PROCEDURE — 93319 3D ECHO IMG CGEN CAR ANOMAL: CPT | Performed by: INTERNAL MEDICINE

## 2023-10-06 PROCEDURE — 700101 HCHG RX REV CODE 250: Performed by: ANESTHESIOLOGY

## 2023-10-06 PROCEDURE — 160035 HCHG PACU - 1ST 60 MINS PHASE I

## 2023-10-06 RX ORDER — LIDOCAINE HYDROCHLORIDE 20 MG/ML
INJECTION, SOLUTION EPIDURAL; INFILTRATION; INTRACAUDAL; PERINEURAL PRN
Status: DISCONTINUED | OUTPATIENT
Start: 2023-10-06 | End: 2023-10-06 | Stop reason: SURG

## 2023-10-06 RX ORDER — HYDRALAZINE HYDROCHLORIDE 20 MG/ML
5 INJECTION INTRAMUSCULAR; INTRAVENOUS
Status: DISCONTINUED | OUTPATIENT
Start: 2023-10-06 | End: 2023-10-06 | Stop reason: HOSPADM

## 2023-10-06 RX ORDER — OXYCODONE HCL 5 MG/5 ML
5 SOLUTION, ORAL ORAL
Status: DISCONTINUED | OUTPATIENT
Start: 2023-10-06 | End: 2023-10-06 | Stop reason: HOSPADM

## 2023-10-06 RX ORDER — HYDROMORPHONE HYDROCHLORIDE 1 MG/ML
0.5 INJECTION, SOLUTION INTRAMUSCULAR; INTRAVENOUS; SUBCUTANEOUS
Status: DISCONTINUED | OUTPATIENT
Start: 2023-10-06 | End: 2023-10-06 | Stop reason: HOSPADM

## 2023-10-06 RX ORDER — ONDANSETRON 2 MG/ML
4 INJECTION INTRAMUSCULAR; INTRAVENOUS
Status: DISCONTINUED | OUTPATIENT
Start: 2023-10-06 | End: 2023-10-06 | Stop reason: HOSPADM

## 2023-10-06 RX ORDER — EPHEDRINE SULFATE 50 MG/ML
5 INJECTION, SOLUTION INTRAVENOUS
Status: DISCONTINUED | OUTPATIENT
Start: 2023-10-06 | End: 2023-10-06 | Stop reason: HOSPADM

## 2023-10-06 RX ORDER — LABETALOL HYDROCHLORIDE 5 MG/ML
5 INJECTION, SOLUTION INTRAVENOUS
Status: DISCONTINUED | OUTPATIENT
Start: 2023-10-06 | End: 2023-10-06 | Stop reason: HOSPADM

## 2023-10-06 RX ORDER — HYDROMORPHONE HYDROCHLORIDE 1 MG/ML
0.4 INJECTION, SOLUTION INTRAMUSCULAR; INTRAVENOUS; SUBCUTANEOUS
Status: DISCONTINUED | OUTPATIENT
Start: 2023-10-06 | End: 2023-10-06 | Stop reason: HOSPADM

## 2023-10-06 RX ORDER — HYDROMORPHONE HYDROCHLORIDE 1 MG/ML
0.2 INJECTION, SOLUTION INTRAMUSCULAR; INTRAVENOUS; SUBCUTANEOUS
Status: DISCONTINUED | OUTPATIENT
Start: 2023-10-06 | End: 2023-10-06 | Stop reason: HOSPADM

## 2023-10-06 RX ORDER — SODIUM CHLORIDE, SODIUM LACTATE, POTASSIUM CHLORIDE, CALCIUM CHLORIDE 600; 310; 30; 20 MG/100ML; MG/100ML; MG/100ML; MG/100ML
INJECTION, SOLUTION INTRAVENOUS CONTINUOUS
Status: DISCONTINUED | OUTPATIENT
Start: 2023-10-06 | End: 2023-10-06 | Stop reason: HOSPADM

## 2023-10-06 RX ORDER — HALOPERIDOL 5 MG/ML
1 INJECTION INTRAMUSCULAR
Status: DISCONTINUED | OUTPATIENT
Start: 2023-10-06 | End: 2023-10-06 | Stop reason: HOSPADM

## 2023-10-06 RX ORDER — ONDANSETRON 2 MG/ML
INJECTION INTRAMUSCULAR; INTRAVENOUS PRN
Status: DISCONTINUED | OUTPATIENT
Start: 2023-10-06 | End: 2023-10-06 | Stop reason: SURG

## 2023-10-06 RX ORDER — DIPHENHYDRAMINE HYDROCHLORIDE 50 MG/ML
12.5 INJECTION INTRAMUSCULAR; INTRAVENOUS
Status: DISCONTINUED | OUTPATIENT
Start: 2023-10-06 | End: 2023-10-06 | Stop reason: HOSPADM

## 2023-10-06 RX ORDER — SODIUM CHLORIDE, SODIUM LACTATE, POTASSIUM CHLORIDE, CALCIUM CHLORIDE 600; 310; 30; 20 MG/100ML; MG/100ML; MG/100ML; MG/100ML
INJECTION, SOLUTION INTRAVENOUS
Status: DISCONTINUED | OUTPATIENT
Start: 2023-10-06 | End: 2023-10-06 | Stop reason: SURG

## 2023-10-06 RX ORDER — OXYCODONE HCL 5 MG/5 ML
10 SOLUTION, ORAL ORAL
Status: DISCONTINUED | OUTPATIENT
Start: 2023-10-06 | End: 2023-10-06 | Stop reason: HOSPADM

## 2023-10-06 RX ADMIN — PROPOFOL 50 MG: 10 INJECTION, EMULSION INTRAVENOUS at 09:02

## 2023-10-06 RX ADMIN — LIDOCAINE HYDROCHLORIDE 30 MG: 20 INJECTION, SOLUTION EPIDURAL; INFILTRATION; INTRACAUDAL at 09:02

## 2023-10-06 RX ADMIN — PROPOFOL 100 MCG/KG/MIN: 10 INJECTION, EMULSION INTRAVENOUS at 09:03

## 2023-10-06 RX ADMIN — SODIUM CHLORIDE, POTASSIUM CHLORIDE, SODIUM LACTATE AND CALCIUM CHLORIDE: 600; 310; 30; 20 INJECTION, SOLUTION INTRAVENOUS at 08:47

## 2023-10-06 RX ADMIN — ONDANSETRON 4 MG: 2 INJECTION INTRAMUSCULAR; INTRAVENOUS at 08:56

## 2023-10-06 RX ADMIN — SODIUM CHLORIDE, POTASSIUM CHLORIDE, SODIUM LACTATE AND CALCIUM CHLORIDE: 600; 310; 30; 20 INJECTION, SOLUTION INTRAVENOUS at 07:54

## 2023-10-06 ASSESSMENT — FIBROSIS 4 INDEX: FIB4 SCORE: 1.99

## 2023-10-06 NOTE — ANESTHESIA POSTPROCEDURE EVALUATION
Patient: Yvonne Marie Wada    Procedure Summary     Date: 10/06/23 Room / Location: [facility] - Echocardiology [facility]    Anesthesia Start: 0847 Anesthesia Stop: 0922    Procedure: EC-ZAK W/O CONT Diagnosis:       Paroxysmal atrial fibrillation (HCC)      Presence of other cardiac implants and grafts    Scheduled Providers: Oscar Mcnamara D.O.; Gage Saenz M.D. Responsible Provider: Gage Saenz M.D.    Anesthesia Type: MAC ASA Status: 3          Final Anesthesia Type: MAC  Last vitals  BP   Blood Pressure : (!) 143/90    Temp   36.2 °C (97.1 °F)    Pulse   84   Resp   18    SpO2   97 %      Anesthesia Post Evaluation    Patient location during evaluation: PACU  Patient participation: complete - patient participated  Level of consciousness: awake and alert    Airway patency: patent  Anesthetic complications: no  Cardiovascular status: hemodynamically stable  Respiratory status: acceptable  Hydration status: euvolemic    PONV: none          No notable events documented.     Nurse Pain Score: 0 (NPRS)

## 2023-10-06 NOTE — ANESTHESIA PREPROCEDURE EVALUATION
Date/Time: 10/06/23 0900    Scheduled providers: Oscar Mcnamara D.O.    Procedure: EC-ZAK W/ CONT    Diagnosis:       Paroxysmal atrial fibrillation (HCC) [I48.0]      Presence of other cardiac implants and grafts [Z95.818]    Location: [facility] - Echocardiology [facility]          Relevant Problems   CARDIAC   (positive) Hemorrhoid   (positive) Hypertension   (positive) Paroxysmal atrial fibrillation (HCC)      GI   (positive) GERD (gastroesophageal reflux disease)      ENDO   (positive) Hypothyroid      Other   (positive) Neck arthritis     s/p Watchman 8/23/23    ZAK:  The left ventricle is normal in size and thickness. Ejection fraction   visually estimated at 65%. Normal systolic wall motion.  Mild mitral and aortic regurgitation.  Enlarged ascending aorta with a diameter measured at 4.0cm.    Physical Exam    Airway   Mallampati: II  TM distance: >3 FB  Neck ROM: full       Cardiovascular - normal exam  Rhythm: regular  Rate: normal  (-) murmur     Dental   (+) upper dentures, lower dentures           Pulmonary - normal exam  Breath sounds clear to auscultation     Abdominal    Neurological - normal exam               Anesthesia Plan    ASA 3   ASA physical status 3 criteria: a thrombophilic disease requiring anticoagulation    Plan - MAC               Induction: intravenous      Pertinent diagnostic labs and testing reviewed    Informed Consent:    Anesthetic plan and risks discussed with patient.

## 2023-10-06 NOTE — H&P
Physician H&P    Patient ID:  Malavonne Marie Wada  [id number]  87 y.o. female  1935    History:  Primary Diagnosis: Atrial fibrillation status post watchman left atrial occlusion device on 8/23/2023    HPI:  87-year-old female with known history of paroxysmal atrial fibrillation and prior history of GI bleeding underwent watchman placement back on 8/23/2023.  She has been referred for transesophageal echocardiogram to assess overall watchman left atrial appendage occlusion device placement as well as epithelialization.    Past Medical History:  has a past medical history of AAA (abdominal aortic aneurysm) ([facility]) (7/26/2010), Aortic insufficiency, Aortic valve disease, Aortic valve regurgitation, Arthritis, Cataract, Dental disorder, Diverticulosis, Dyslipidemia (7/26/2010), GERD (gastroesophageal reflux disease) (7/12/2012), Glaucoma, Heart burn, Heart murmur, Heart valve disease, Hiatus hernia syndrome, High cholesterol, History of shingles (6/30/2011), History of total knee arthroplasty (7/26/2010), HLD (hyperlipidemia), Hypertension, Hypothyroid (4/8/2011), Indigestion, Mitral insufficiency, OSTEOPOROSIS (8/9/2010), Pain, Preventative health care (7/26/2010), Rheumatic fever, S/P appendectomy (7/26/2010), S/P TOMY (total abdominal hysterectomy) (7/26/2010), Shingles (6/30/2011), Snoring, Status post lumbar surgery (7/26/2010), Stroke ([facility]) (1996), Thoracic aortic aneurysm without mention of rupture, Tricuspid insufficiency, Unspecified disorder of thyroid, Urinary bladder disorder, and UTI (urinary tract infection) (11/12/2016).    She has no past medical history of CAD (coronary artery disease), COPD, or Liver disease.  Past Surgical History:  has a past surgical history that includes fusion, spine, lumbar, plif; hysterectomy, total abdominal; appendectomy; aortic valve replacement (1/12/2010); knee arthroscopy (10/18/2011); meniscectomy, knee, medial (10/18/2011); knee arthroplasty total (1/3/2012); fusion,  spine, lumbar, plif (11/24/2015); lumbar laminectomy diskectomy (11/24/2015); knee arthroplasty total (Right, 9/8/2016); tendon repair (Right, 11/10/2016); and orif, ankle (Right, 10/14/2022).  Past Social History:  reports that she has never smoked. She has never used smokeless tobacco. She reports that she does not currently use drugs. She reports that she does not drink alcohol.  Past Family History:   Family History   Problem Relation Age of Onset    Stroke Mother     Heart Disease Father     Heart Disease Sister      Allergies: Amoxicillin, Codeine, Doxycycline, Sulfamethoxazole-trimethoprim, Tramadol, and Trazodone    Current Medications:  Prior to Admission medications    Medication Sig Start Date End Date Taking? Authorizing Provider   Sennosides (SENOKOT PO) Take  by mouth.    Physician Outpatient   diclofenac sodium (VOLTAREN) 1 % Gel APPLY 2 GRAMS TO THE AFFECTED AREA(S) BY TOPICAL ROUTE 4 TIMES PER DAY 7/25/23   Physician Outpatient   DULoxetine (CYMBALTA) 30 MG Cap DR Particles TAKE 1 CAPSULE BY MOUTH EVERY DAY  Patient not taking: Reported on 8/23/2023 8/13/23   Tee Tran M.D.   hydrocortisone 2.5 % Cream topical cream APPLY 1 APPLICATION. TOPICALLY 2 TIMES A DAY. APPLY TO HEMORRHOID AREA 8/11/23   Tee Tran M.D.   metoprolol SR (TOPROL XL) 50 MG TABLET SR 24 HR Take 1 Tablet by mouth every morning. 7/24/23   Kobi Wheeler M.D.   nitroglycerin (RECTIV) 0.4 % ointment Insert 1 Inch into the rectum 2 times a day. X10 days for hemorrhoid.  Patient not taking: Reported on 8/23/2023 7/12/23   Tee Tran M.D.   apixaban (ELIQUIS) 2.5mg Tab Take 1 Tablet by mouth 2 times a day. 6/22/23   Dianne Ji M.D.   meclizine (ANTIVERT) 12.5 MG Tab Take 1 Tablet by mouth 3 times a day as needed for Dizziness. 2/8/23   Jeff Bess M.D.   ezetimibe (ZETIA) 10 MG Tab TAKE 1 TABLET BY MOUTH EVERY DAY 2/8/23   Tee Tran M.D.   benazepril (LOTENSIN) 40 MG tablet TAKE 1 TABLET BY MOUTH  "EVERY DAY IN THE MORNING 2/8/23   Tee Tran M.D.   levothyroxine (SYNTHROID) 75 MCG Tab TAKE 1 TABLET BY MOUTH EVERY DAY IN THE MORNING ON AN EMPTY STOMACH  Patient taking differently: Take 75 mcg by mouth every morning on an empty stomach. 1/18/23   Tee Tran M.D.   calcium/vitamin D 250-125 MG-UNIT Tab tablet Take 1 Tablet by mouth every day. 10/18/22   Reji Atkinson M.D.       Review of Systems:  ROS  BP (!) 141/102   Pulse (!) 113   Temp 36.4 °C (97.5 °F) (Temporal)   Resp 18   Ht 1.626 m (5' 4\")   Wt 61.2 kg (134 lb 14.7 oz)   SpO2 96%     Physical Examination:  Physical Exam  Vitals and nursing note reviewed.   Constitutional:       Appearance: Normal appearance.   HENT:      Head: Normocephalic and atraumatic.      Nose: Nose normal.      Mouth/Throat:      Mouth: Mucous membranes are moist.      Pharynx: Oropharynx is clear.   Eyes:      Pupils: Pupils are equal, round, and reactive to light.   Cardiovascular:      Rate and Rhythm: Normal rate. Rhythm irregular.      Heart sounds: No murmur heard.     No friction rub. No gallop.   Pulmonary:      Effort: Pulmonary effort is normal.      Breath sounds: Normal breath sounds.   Abdominal:      General: Bowel sounds are normal.      Palpations: Abdomen is soft.   Musculoskeletal:         General: Normal range of motion.      Cervical back: Normal range of motion and neck supple.   Skin:     General: Skin is warm and dry.   Neurological:      General: No focal deficit present.      Mental Status: She is alert and oriented to person, place, and time. Mental status is at baseline.   Psychiatric:         Mood and Affect: Mood normal.         Behavior: Behavior normal.         Thought Content: Thought content normal.         Judgment: Judgment normal.         Impression:  Atrial fibrillation with left atrial appendage occlusion device (watchman) placed on August 23, 2023    Plan:  Transesophageal echocardiogram to assess overall watchman " placement as well as epithelialization.    Pre Procedure Assessment:  <EXAMSHORT2>      Pre Procedure update:   No changes.    Oscar Mcnamara D.O.  10/6/2023

## 2023-10-06 NOTE — OR NURSING
Pt A&O. VSS on RA. Pt tolerating sips of water. Pt denies pain and nausea.   Shalom, pt's  called and updated.

## 2023-10-06 NOTE — PROCEDURES
Transesophageal echocardiogram    Indications for the procedure this is an 87-year-old female with known history of atrial fibrillation prior GI bleeding ultimately underwent Watchman procedure on 8/23/2023 being referred for transesophageal echocardiogram to assess overall epithelialization.    Preprocedural diagnosis atrial fibrillation with left atrial appendage occlusion device  Postprocedural diagnosis: Atrial fibrillation with left atrial appendage occlusion device procedural details:    Patient was identified in the preoperative staging area.  Procedure alternatives risk and questions were answered to the best the patient satisfaction informed verbal and written consent was obtained.  Patient was then transported to the procedural area.  A brief timeout was performed.  Patient was then assessed by anesthesia and given propofol for sedation.  Please see separately dictated documentation for full details.  After appropriate sedation the ZAK probe was passed easily into the posterior oropharynx and into the mid esophagus.  Limited 2D, color flow and 3D imaging was performed on the left atrial appendage to assess the overall left atrial appendage occlusion device (watchman).  Patient tolerated the procedure.  No blood loss and no immediate complications    Findings: Successful transesophageal echocardiogram demonstrating epithelialization of the left atrial appendage occlusion device.  Proper placement of the left atrial appendage occlusion device.    Conclusions  1.  Successful watchman placement with complete epithelialization    Recommendations  1.  Follow-up with Dr. Ji as scheduled   2. Continue current medications  3.  Call the cardiology office with any questions or concerns at [phone]

## 2023-10-06 NOTE — DISCHARGE INSTRUCTIONS
HOME CARE INSTRUCTIONS    ACTIVITY: Rest and take it easy for the first 24 hours.  A responsible adult is recommended to remain with you during that time.  It is normal to feel sleepy.  We encourage you to not do anything that requires balance, judgment or coordination.    FOR 24 HOURS DO NOT:  Drive, operate machinery or run household appliances.  Drink beer or alcoholic beverages.  Make important decisions or sign legal documents.    SPECIAL INSTRUCTIONS:     ENDOSCOPY HOME CARE INSTRUCTIONS    ZAK - TRANSESOPHAGEAL ECHOCARDIOGRAM  1. Don't drive or drink alcohol for 24 hours. The medication you received will make you too drowsy.  2. If you begin to vomit bloody material, or develop black or bloody stools, call your doctor as soon as possible.  3. If you have any neck, chest, abdominal pain or temp of 100 degrees, call your doctor.  4. Make follow up with MD.      DIET: To avoid nausea, slowly advance diet as tolerated, avoiding spicy or greasy foods for the first day.  Add more substantial food to your diet according to your physician's instructions.  Babies can be fed formula or breast milk as soon as they are hungry.  INCREASE FLUIDS AND FIBER TO AVOID CONSTIPATION.    MEDICATIONS: Resume taking daily medication.  Take prescribed pain medication with food.  If no medication is prescribed, you may take non-aspirin pain medication if needed.  PAIN MEDICATION CAN BE VERY CONSTIPATING.  Take a stool softener or laxative such as senokot, pericolace, or milk of magnesia if needed.    A follow-up appointment should be arranged with your doctor in 1-2 weeks; call to schedule.    You should CALL YOUR PHYSICIAN if you develop:  Fever greater than 101 degrees F.  Pain not relieved by medication, or persistent nausea or vomiting.  Excessive bleeding (blood soaking through dressing) or unexpected drainage from the wound.  Extreme redness or swelling around the incision site, drainage of pus or foul smelling  drainage.  Inability to urinate or empty your bladder within 8 hours.  Problems with breathing or chest pain.    You should call 911 if you develop problems with breathing or chest pain.  If you are unable to contact your doctor or surgical center, you should go to the nearest emergency room or urgent care center.  Physician's telephone #: Dr. Mcnamara [phone].    MILD FLU-LIKE SYMPTOMS ARE NORMAL.  YOU MAY EXPERIENCE GENERALIZED MUSCLE ACHES, THROAT IRRITATION, HEADACHE AND/OR SOME NAUSEA.    If any questions arise, call your doctor.  If your doctor is not available, please feel free to call the Surgical Center at [phone].  The Center is open Monday through Friday from 7AM to 7PM.      A registered nurse may call you a few days after your surgery to see how you are doing after your procedure.    You may also receive a survey in the mail within the next two weeks and we ask that you take a few moments to complete the survey and return it to us.  Our goal is to provide you with very good care and we value your comments.     Depression / Suicide Risk    As you are discharged from this [facility] facility, it is important to learn how to keep safe from harming yourself.    Recognize the warning signs:  Abrupt changes in personality, positive or negative- including increase in energy   Giving away possessions  Change in eating patterns- significant weight changes-  positive or negative  Change in sleeping patterns- unable to sleep or sleeping all the time   Unwillingness or inability to communicate  Depression  Unusual sadness, discouragement and loneliness  Talk of wanting to die  Neglect of personal appearance   Rebelliousness- reckless behavior  Withdrawal from people/activities they love  Confusion- inability to concentrate     If you or a loved one observes any of these behaviors or has concerns about self-harm, here's what you can do:  Talk about it- your feelings and reasons for harming  yourself  Remove any means that you might use to hurt yourself (examples: pills, rope, extension cords, firearm)  Get professional help from the community (Mental Health, Substance Abuse, psychological counseling)  Do not be alone:Call your Safe Contact- someone whom you trust who will be there for you.  Call your local CRISIS HOTLINE 032-8842 or 498-700-7987  Call your local Children's Mobile Crisis Response Team [place] (574) 796-0123 or www.Mobile365 (fka Inph[employer]Match)  Call the toll free National Suicide Prevention Hotlines   National Suicide Prevention Lifeline 753-269-CDLT (1340)  National Hope Line Network 800-SUICIDE (398-3210)    I acknowledge receipt and understanding of these Home Care instructions.

## 2023-10-06 NOTE — ANESTHESIA TIME REPORT
Anesthesia Start and Stop Event Times     Date Time Event    10/6/2023 0840 Ready for Procedure     0847 Anesthesia Start     0922 Anesthesia Stop        Responsible Staff  10/06/23    Name Role Begin End    Gage Saenz M.D. Anesth 0847 0922        Overtime Reason:  no overtime (within assigned shift)    Comments:

## 2023-10-06 NOTE — OR NURSING
Patient arrived to unit at 1035. Patient is AOx4. Transferred to chair appropriately. Patient's pain is 0/10. Declines pain medication at this time. Patient voiding appropriately. Tolerating liquids at this time. Discharge instructions discussed with the patient. Patient denies any further questions at this time. Patient discharged home to responsible adult at 1115.

## 2023-10-08 ENCOUNTER — APPOINTMENT (OUTPATIENT)
Dept: RADIOLOGY | Facility: MEDICAL CENTER | Age: 88
End: 2023-10-08
Attending: EMERGENCY MEDICINE
Payer: MEDICARE

## 2023-10-08 ENCOUNTER — HOSPITAL ENCOUNTER (OUTPATIENT)
Facility: MEDICAL CENTER | Age: 88
End: 2023-10-12
Attending: EMERGENCY MEDICINE | Admitting: HOSPITALIST
Payer: MEDICARE

## 2023-10-08 DIAGNOSIS — S22.000A THORACIC COMPRESSION FRACTURE, CLOSED, INITIAL ENCOUNTER (HCC): ICD-10-CM

## 2023-10-08 LAB
ALBUMIN SERPL BCP-MCNC: 3.9 G/DL (ref 3.2–4.9)
ALBUMIN/GLOB SERPL: 1.6 G/DL
ALP SERPL-CCNC: 53 U/L (ref 30–99)
ALT SERPL-CCNC: 13 U/L (ref 2–50)
ANION GAP SERPL CALC-SCNC: 11 MMOL/L (ref 7–16)
APPEARANCE UR: CLEAR
APTT PPP: 30.1 SEC (ref 24.7–36)
AST SERPL-CCNC: 21 U/L (ref 12–45)
BACTERIA #/AREA URNS HPF: ABNORMAL /HPF
BASOPHILS # BLD AUTO: 1.1 % (ref 0–1.8)
BASOPHILS # BLD: 0.05 K/UL (ref 0–0.12)
BILIRUB SERPL-MCNC: 0.6 MG/DL (ref 0.1–1.5)
BILIRUB UR QL STRIP.AUTO: NEGATIVE
BUN SERPL-MCNC: 15 MG/DL (ref 8–22)
CALCIUM ALBUM COR SERPL-MCNC: 8.9 MG/DL (ref 8.5–10.5)
CALCIUM SERPL-MCNC: 8.8 MG/DL (ref 8.5–10.5)
CHLORIDE SERPL-SCNC: 106 MMOL/L (ref 96–112)
CO2 SERPL-SCNC: 23 MMOL/L (ref 20–33)
COLOR UR: YELLOW
CREAT SERPL-MCNC: 0.96 MG/DL (ref 0.5–1.4)
EKG IMPRESSION: NORMAL
EOSINOPHIL # BLD AUTO: 0.11 K/UL (ref 0–0.51)
EOSINOPHIL NFR BLD: 2.3 % (ref 0–6.9)
EPI CELLS #/AREA URNS HPF: NEGATIVE /HPF
ERYTHROCYTE [DISTWIDTH] IN BLOOD BY AUTOMATED COUNT: 47.3 FL (ref 35.9–50)
GFR SERPLBLD CREATININE-BSD FMLA CKD-EPI: 57 ML/MIN/1.73 M 2
GLOBULIN SER CALC-MCNC: 2.4 G/DL (ref 1.9–3.5)
GLUCOSE SERPL-MCNC: 105 MG/DL (ref 65–99)
GLUCOSE UR STRIP.AUTO-MCNC: NEGATIVE MG/DL
HCT VFR BLD AUTO: 41.9 % (ref 37–47)
HGB BLD-MCNC: 13.6 G/DL (ref 12–16)
HYALINE CASTS #/AREA URNS LPF: ABNORMAL /LPF
IMM GRANULOCYTES # BLD AUTO: 0.06 K/UL (ref 0–0.11)
IMM GRANULOCYTES NFR BLD AUTO: 1.3 % (ref 0–0.9)
INR PPP: 1.25 (ref 0.87–1.13)
KETONES UR STRIP.AUTO-MCNC: NEGATIVE MG/DL
LEUKOCYTE ESTERASE UR QL STRIP.AUTO: NEGATIVE
LYMPHOCYTES # BLD AUTO: 0.88 K/UL (ref 1–4.8)
LYMPHOCYTES NFR BLD: 18.7 % (ref 22–41)
MCH RBC QN AUTO: 31 PG (ref 27–33)
MCHC RBC AUTO-ENTMCNC: 32.5 G/DL (ref 32.2–35.5)
MCV RBC AUTO: 95.4 FL (ref 81.4–97.8)
MICRO URNS: ABNORMAL
MONOCYTES # BLD AUTO: 0.36 K/UL (ref 0–0.85)
MONOCYTES NFR BLD AUTO: 7.6 % (ref 0–13.4)
NEUTROPHILS # BLD AUTO: 3.25 K/UL (ref 1.82–7.42)
NEUTROPHILS NFR BLD: 69 % (ref 44–72)
NITRITE UR QL STRIP.AUTO: POSITIVE
NRBC # BLD AUTO: 0 K/UL
NRBC BLD-RTO: 0 /100 WBC (ref 0–0.2)
PH UR STRIP.AUTO: 6 [PH] (ref 5–8)
PLATELET # BLD AUTO: 178 K/UL (ref 164–446)
PMV BLD AUTO: 8.5 FL (ref 9–12.9)
POTASSIUM SERPL-SCNC: 4.3 MMOL/L (ref 3.6–5.5)
PROT SERPL-MCNC: 6.3 G/DL (ref 6–8.2)
PROT UR QL STRIP: 30 MG/DL
PROTHROMBIN TIME: 15.9 SEC (ref 12–14.6)
RBC # BLD AUTO: 4.39 M/UL (ref 4.2–5.4)
RBC # URNS HPF: ABNORMAL /HPF
RBC UR QL AUTO: NEGATIVE
SODIUM SERPL-SCNC: 140 MMOL/L (ref 135–145)
SP GR UR STRIP.AUTO: 1.01
TROPONIN T SERPL-MCNC: 22 NG/L (ref 6–19)
UROBILINOGEN UR STRIP.AUTO-MCNC: 0.2 MG/DL
WBC # BLD AUTO: 4.7 K/UL (ref 4.8–10.8)
WBC #/AREA URNS HPF: ABNORMAL /HPF

## 2023-10-08 PROCEDURE — 84484 ASSAY OF TROPONIN QUANT: CPT

## 2023-10-08 PROCEDURE — 36415 COLL VENOUS BLD VENIPUNCTURE: CPT

## 2023-10-08 PROCEDURE — 70450 CT HEAD/BRAIN W/O DYE: CPT

## 2023-10-08 PROCEDURE — 81001 URINALYSIS AUTO W/SCOPE: CPT

## 2023-10-08 PROCEDURE — 72128 CT CHEST SPINE W/O DYE: CPT

## 2023-10-08 PROCEDURE — 72170 X-RAY EXAM OF PELVIS: CPT

## 2023-10-08 PROCEDURE — G0378 HOSPITAL OBSERVATION PER HR: HCPCS

## 2023-10-08 PROCEDURE — 96375 TX/PRO/DX INJ NEW DRUG ADDON: CPT

## 2023-10-08 PROCEDURE — 700105 HCHG RX REV CODE 258: Performed by: HOSPITALIST

## 2023-10-08 PROCEDURE — 99285 EMERGENCY DEPT VISIT HI MDM: CPT

## 2023-10-08 PROCEDURE — 72131 CT LUMBAR SPINE W/O DYE: CPT

## 2023-10-08 PROCEDURE — 85025 COMPLETE CBC W/AUTO DIFF WBC: CPT

## 2023-10-08 PROCEDURE — A9270 NON-COVERED ITEM OR SERVICE: HCPCS | Performed by: HOSPITALIST

## 2023-10-08 PROCEDURE — 302146: Performed by: HOSPITALIST

## 2023-10-08 PROCEDURE — 71045 X-RAY EXAM CHEST 1 VIEW: CPT

## 2023-10-08 PROCEDURE — 85730 THROMBOPLASTIN TIME PARTIAL: CPT

## 2023-10-08 PROCEDURE — 700102 HCHG RX REV CODE 250 W/ 637 OVERRIDE(OP): Performed by: HOSPITALIST

## 2023-10-08 PROCEDURE — 93005 ELECTROCARDIOGRAM TRACING: CPT | Performed by: EMERGENCY MEDICINE

## 2023-10-08 PROCEDURE — 700111 HCHG RX REV CODE 636 W/ 250 OVERRIDE (IP): Mod: JZ | Performed by: HOSPITALIST

## 2023-10-08 PROCEDURE — 700111 HCHG RX REV CODE 636 W/ 250 OVERRIDE (IP): Mod: JZ | Performed by: EMERGENCY MEDICINE

## 2023-10-08 PROCEDURE — 96376 TX/PRO/DX INJ SAME DRUG ADON: CPT

## 2023-10-08 PROCEDURE — 72125 CT NECK SPINE W/O DYE: CPT

## 2023-10-08 PROCEDURE — 99222 1ST HOSP IP/OBS MODERATE 55: CPT | Performed by: HOSPITALIST

## 2023-10-08 PROCEDURE — 80053 COMPREHEN METABOLIC PANEL: CPT

## 2023-10-08 PROCEDURE — 96374 THER/PROPH/DIAG INJ IV PUSH: CPT

## 2023-10-08 PROCEDURE — 85610 PROTHROMBIN TIME: CPT

## 2023-10-08 RX ORDER — BENAZEPRIL HYDROCHLORIDE 20 MG/1
40 TABLET ORAL EVERY MORNING
Status: DISCONTINUED | OUTPATIENT
Start: 2023-10-08 | End: 2023-10-12 | Stop reason: HOSPADM

## 2023-10-08 RX ORDER — MORPHINE SULFATE 4 MG/ML
2 INJECTION INTRAVENOUS ONCE
Status: COMPLETED | OUTPATIENT
Start: 2023-10-08 | End: 2023-10-08

## 2023-10-08 RX ORDER — M-VIT,TX,IRON,MINS/CALC/FOLIC 27MG-0.4MG
1 TABLET ORAL DAILY
COMMUNITY

## 2023-10-08 RX ORDER — ONDANSETRON 2 MG/ML
4 INJECTION INTRAMUSCULAR; INTRAVENOUS ONCE
Status: COMPLETED | OUTPATIENT
Start: 2023-10-08 | End: 2023-10-08

## 2023-10-08 RX ORDER — ACETAMINOPHEN 500 MG
1000 TABLET ORAL EVERY 6 HOURS
Status: DISCONTINUED | OUTPATIENT
Start: 2023-10-08 | End: 2023-10-12 | Stop reason: HOSPADM

## 2023-10-08 RX ORDER — LEVOTHYROXINE SODIUM 0.07 MG/1
75 TABLET ORAL
Status: DISCONTINUED | OUTPATIENT
Start: 2023-10-08 | End: 2023-10-12 | Stop reason: HOSPADM

## 2023-10-08 RX ORDER — AMOXICILLIN 500 MG/1
500 CAPSULE ORAL 4 TIMES DAILY
Status: ON HOLD | COMMUNITY
Start: 2023-09-20 | End: 2023-10-12

## 2023-10-08 RX ORDER — METOPROLOL SUCCINATE 50 MG/1
50 TABLET, EXTENDED RELEASE ORAL EVERY MORNING
Status: DISCONTINUED | OUTPATIENT
Start: 2023-10-08 | End: 2023-10-12 | Stop reason: HOSPADM

## 2023-10-08 RX ORDER — BISACODYL 10 MG
10 SUPPOSITORY, RECTAL RECTAL
Status: DISCONTINUED | OUTPATIENT
Start: 2023-10-08 | End: 2023-10-12 | Stop reason: HOSPADM

## 2023-10-08 RX ORDER — ACETAMINOPHEN 500 MG
1000 TABLET ORAL EVERY 6 HOURS PRN
Status: DISCONTINUED | OUTPATIENT
Start: 2023-10-13 | End: 2023-10-12 | Stop reason: HOSPADM

## 2023-10-08 RX ORDER — OXYCODONE HYDROCHLORIDE 5 MG/1
5 TABLET ORAL
Status: DISCONTINUED | OUTPATIENT
Start: 2023-10-08 | End: 2023-10-12 | Stop reason: HOSPADM

## 2023-10-08 RX ORDER — POLYETHYLENE GLYCOL 3350 17 G/17G
1 POWDER, FOR SOLUTION ORAL
Status: DISCONTINUED | OUTPATIENT
Start: 2023-10-08 | End: 2023-10-12 | Stop reason: HOSPADM

## 2023-10-08 RX ORDER — AMOXICILLIN 250 MG
2 CAPSULE ORAL 2 TIMES DAILY
Status: DISCONTINUED | OUTPATIENT
Start: 2023-10-08 | End: 2023-10-12 | Stop reason: HOSPADM

## 2023-10-08 RX ORDER — OXYCODONE HYDROCHLORIDE 5 MG/1
2.5 TABLET ORAL
Status: DISCONTINUED | OUTPATIENT
Start: 2023-10-08 | End: 2023-10-12 | Stop reason: HOSPADM

## 2023-10-08 RX ORDER — MORPHINE SULFATE 4 MG/ML
2 INJECTION INTRAVENOUS
Status: DISCONTINUED | OUTPATIENT
Start: 2023-10-08 | End: 2023-10-12 | Stop reason: HOSPADM

## 2023-10-08 RX ORDER — SODIUM CHLORIDE 9 MG/ML
INJECTION, SOLUTION INTRAVENOUS CONTINUOUS
Status: ACTIVE | OUTPATIENT
Start: 2023-10-08 | End: 2023-10-09

## 2023-10-08 RX ADMIN — DOCUSATE SODIUM 50 MG AND SENNOSIDES 8.6 MG 2 TABLET: 8.6; 5 TABLET, FILM COATED ORAL at 08:50

## 2023-10-08 RX ADMIN — BENAZEPRIL HYDROCHLORIDE 40 MG: 20 TABLET ORAL at 09:12

## 2023-10-08 RX ADMIN — ACETAMINOPHEN 1000 MG: 500 TABLET, FILM COATED ORAL at 12:24

## 2023-10-08 RX ADMIN — SODIUM CHLORIDE: 9 INJECTION, SOLUTION INTRAVENOUS at 09:51

## 2023-10-08 RX ADMIN — MORPHINE SULFATE 2 MG: 4 INJECTION, SOLUTION INTRAMUSCULAR; INTRAVENOUS at 07:36

## 2023-10-08 RX ADMIN — MORPHINE SULFATE 2 MG: 4 INJECTION, SOLUTION INTRAMUSCULAR; INTRAVENOUS at 18:25

## 2023-10-08 RX ADMIN — OXYCODONE 5 MG: 5 TABLET ORAL at 09:33

## 2023-10-08 RX ADMIN — LEVOTHYROXINE SODIUM 75 MCG: 0.07 TABLET ORAL at 08:50

## 2023-10-08 RX ADMIN — ACETAMINOPHEN 1000 MG: 500 TABLET, FILM COATED ORAL at 18:25

## 2023-10-08 RX ADMIN — METOPROLOL SUCCINATE 50 MG: 50 TABLET, EXTENDED RELEASE ORAL at 08:50

## 2023-10-08 RX ADMIN — OXYCODONE 5 MG: 5 TABLET ORAL at 20:13

## 2023-10-08 RX ADMIN — DOCUSATE SODIUM 50 MG AND SENNOSIDES 8.6 MG 2 TABLET: 8.6; 5 TABLET, FILM COATED ORAL at 18:24

## 2023-10-08 RX ADMIN — MORPHINE SULFATE 2 MG: 4 INJECTION, SOLUTION INTRAMUSCULAR; INTRAVENOUS at 12:24

## 2023-10-08 RX ADMIN — ONDANSETRON 4 MG: 2 INJECTION INTRAMUSCULAR; INTRAVENOUS at 07:36

## 2023-10-08 ASSESSMENT — COGNITIVE AND FUNCTIONAL STATUS - GENERAL
WALKING IN HOSPITAL ROOM: A LOT
MOVING TO AND FROM BED TO CHAIR: A LOT
STANDING UP FROM CHAIR USING ARMS: A LOT
DRESSING REGULAR LOWER BODY CLOTHING: A LOT
TURNING FROM BACK TO SIDE WHILE IN FLAT BAD: A LITTLE
SUGGESTED CMS G CODE MODIFIER DAILY ACTIVITY: CK
MOVING FROM LYING ON BACK TO SITTING ON SIDE OF FLAT BED: A LITTLE
MOBILITY SCORE: 14
CLIMB 3 TO 5 STEPS WITH RAILING: A LOT
HELP NEEDED FOR BATHING: A LOT
TOILETING: A LOT
SUGGESTED CMS G CODE MODIFIER MOBILITY: CL
DAILY ACTIVITIY SCORE: 17
DRESSING REGULAR UPPER BODY CLOTHING: A LITTLE

## 2023-10-08 ASSESSMENT — ENCOUNTER SYMPTOMS
LOSS OF CONSCIOUSNESS: 1
HEMOPTYSIS: 0
COUGH: 0
NAUSEA: 0
VOMITING: 0
BACK PAIN: 1
PALPITATIONS: 0
CHILLS: 0
DIZZINESS: 0
SPUTUM PRODUCTION: 0
ORTHOPNEA: 0

## 2023-10-08 ASSESSMENT — PAIN DESCRIPTION - PAIN TYPE
TYPE: ACUTE PAIN
TYPE: ACUTE PAIN

## 2023-10-08 ASSESSMENT — FIBROSIS 4 INDEX: FIB4 SCORE: 1.99

## 2023-10-08 NOTE — ED NOTES
Med rec completed per patient and SO at bedside  Allergies reviewed    Patient completed a seven day course of Amoxicillin 500 mg four times daily on 09/27    Patient is currently on Eliquis, last dose given was 10/07 at 1900

## 2023-10-08 NOTE — H&P
Hospital Medicine History & Physical Note    Date of Service  10/8/2023    Primary Care Physician  Tee Tran M.D.    Consultants  neurosurgery    Specialist Names: Abdunur    Code Status  Prior    Chief Complaint  Chief Complaint   Patient presents with    Fall     Pt was walking to the BR when she became dizzy and fell backwards. +head strike, -LOC, +thinners  Hx of vertigo.   Complaining of head, neck and back pain       History of Presenting Illness  Yvonne Marie Wada is a 87 y.o. female who presented 10/8/2023 with fall.    I have reviewed some of the recent provider documentation available to me in the patient's medical chart.  Records are briefly summarized: Yvonne Wada has past medical history includes paroxysmal atrial fibrillation gastrointestinal bleeding.  Patient underwent Watchman procedure in August 2023.  On 10/6/2023, she had a ZAK to evaluate for epithelialization.    Patient states that she got up today to use the bathroom,  she felt dizzy and  lightheaded and fell to the ground, losing consciousness for short period of time.  Patient had pain in her mid back immediately after the fall.  She does not have pain in her neck, hip or knees.    I discussed the plan of care with patient, bedside RN, and Dr. Moreno of emergency medicine, we discussed CT scan finding results and plans to treat with TLSO brace. .    Review of Systems  Review of Systems   Constitutional:  Negative for chills and malaise/fatigue.   Respiratory:  Negative for cough, hemoptysis and sputum production.    Cardiovascular:  Negative for chest pain, palpitations and orthopnea.   Gastrointestinal:  Negative for nausea and vomiting.   Musculoskeletal:  Positive for back pain.   Skin:  Negative for itching and rash.   Neurological:  Positive for loss of consciousness. Negative for dizziness.   All other systems reviewed and are negative.      Past Medical History   has a past medical history of AAA (abdominal aortic aneurysm)  (HCC) (7/26/2010), Aortic insufficiency, Aortic valve disease, Aortic valve regurgitation, Arthritis, Cataract, Dental disorder, Diverticulosis, Dyslipidemia (7/26/2010), GERD (gastroesophageal reflux disease) (7/12/2012), Glaucoma, Heart burn, Heart murmur, Heart valve disease, Hiatus hernia syndrome, High cholesterol, History of shingles (6/30/2011), History of total knee arthroplasty (7/26/2010), HLD (hyperlipidemia), Hypertension, Hypothyroid (4/8/2011), Indigestion, Mitral insufficiency, OSTEOPOROSIS (8/9/2010), Pain, Preventative health care (7/26/2010), Rheumatic fever, S/P appendectomy (7/26/2010), S/P TOMY (total abdominal hysterectomy) (7/26/2010), Shingles (6/30/2011), Snoring, Status post lumbar surgery (7/26/2010), Stroke (HCC) (1996), Thoracic aortic aneurysm without mention of rupture, Tricuspid insufficiency, Unspecified disorder of thyroid, Urinary bladder disorder, and UTI (urinary tract infection) (11/12/2016).    Surgical History   has a past surgical history that includes fusion, spine, lumbar, plif; hysterectomy, total abdominal; appendectomy; aortic valve replacement (1/12/2010); knee arthroscopy (10/18/2011); meniscectomy, knee, medial (10/18/2011); knee arthroplasty total (1/3/2012); fusion, spine, lumbar, plif (11/24/2015); lumbar laminectomy diskectomy (11/24/2015); knee arthroplasty total (Right, 9/8/2016); tendon repair (Right, 11/10/2016); and orif, ankle (Right, 10/14/2022).     Family History  family history includes Heart Disease in her father and sister; Stroke in her mother.   Family history reviewed with patient. There is no family history that is pertinent to the chief complaint.     Social History   reports that she has never smoked. She has never used smokeless tobacco. She reports that she does not currently use drugs. She reports that she does not drink alcohol.    Allergies  Allergies   Allergen Reactions    Codeine Nausea     Synthetic codones ok    Doxycycline Nausea      Nausea, Ok in life saving situation (per pt)    Sulfamethoxazole-Trimethoprim Vomiting and Nausea     Ok in a life saving situation (per pt)    Tramadol Vomiting and Nausea     nausea    Trazodone Vomiting     groggy       Medications  Prior to Admission Medications   Prescriptions Last Dose Informant Patient Reported? Taking?   amoxicillin (AMOXIL) 500 MG Cap   Yes No   Sig: Take 500 mg by mouth 4 times a day. 7 day course   apixaban (ELIQUIS) 2.5mg Tab   No No   Sig: Take 1 Tablet by mouth 2 times a day.   benazepril (LOTENSIN) 40 MG tablet   No No   Sig: TAKE 1 TABLET BY MOUTH EVERY DAY IN THE MORNING   levothyroxine (SYNTHROID) 75 MCG Tab  Patient, Family Member No No   Sig: TAKE 1 TABLET BY MOUTH EVERY DAY IN THE MORNING ON AN EMPTY STOMACH   Patient taking differently: Take 75 mcg by mouth every morning on an empty stomach.   metoprolol SR (TOPROL XL) 50 MG TABLET SR 24 HR   No No   Sig: Take 1 Tablet by mouth every morning.      Facility-Administered Medications: None       Physical Exam  Temp:  [36.7 °C (98 °F)] 36.7 °C (98 °F)  Pulse:  [107] 107  Resp:  [20] 20  BP: (143)/(92) 143/92  SpO2:  [90 %] 90 %  Blood Pressure : (!) 143/92   Temperature: 36.7 °C (98 °F)   Pulse: (!) 107   Respiration: 20   Pulse Oximetry: 90 %       Physical Exam  Constitutional:       General: She is not in acute distress.     Appearance: Normal appearance. She is normal weight. She is not ill-appearing.   HENT:      Head: Normocephalic and atraumatic.      Right Ear: External ear normal.      Left Ear: External ear normal.      Nose: Nose normal.      Mouth/Throat:      Mouth: Mucous membranes are moist.      Pharynx: Oropharynx is clear.   Eyes:      Extraocular Movements: Extraocular movements intact.      Conjunctiva/sclera: Conjunctivae normal.      Pupils: Pupils are equal, round, and reactive to light.   Cardiovascular:      Rate and Rhythm: Tachycardia present. Rhythm irregular.      Pulses: Normal pulses.   Pulmonary:      " Effort: Pulmonary effort is normal. No respiratory distress.      Breath sounds: Normal breath sounds.   Abdominal:      General: Abdomen is flat. Bowel sounds are normal.      Palpations: Abdomen is soft.   Musculoskeletal:         General: Normal range of motion.      Cervical back: Normal range of motion and neck supple.      Comments: Skin on back, sacrum, and lower extremities examined, there is no evidence of bruising or abrasion   Skin:     General: Skin is warm and dry.      Capillary Refill: Capillary refill takes less than 2 seconds.      Coloration: Skin is not jaundiced.   Neurological:      General: No focal deficit present.      Mental Status: She is alert and oriented to person, place, and time.      Cranial Nerves: No cranial nerve deficit.      Gait: Gait normal.   Psychiatric:         Mood and Affect: Mood normal.         Behavior: Behavior normal.         Laboratory:  Recent Labs     10/08/23  0645   WBC 4.7*   RBC 4.39   HEMOGLOBIN 13.6   HEMATOCRIT 41.9   MCV 95.4   MCH 31.0   MCHC 32.5   RDW 47.3   PLATELETCT 178   MPV 8.5*     Recent Labs     10/08/23  0645   SODIUM 140   POTASSIUM 4.3   CHLORIDE 106   CO2 23   GLUCOSE 105*   BUN 15   CREATININE 0.96   CALCIUM 8.8     Recent Labs     10/08/23  0645   ALTSGPT 13   ASTSGOT 21   ALKPHOSPHAT 53   TBILIRUBIN 0.6   GLUCOSE 105*     Recent Labs     10/08/23  0645   APTT 30.1   INR 1.25*     No results for input(s): \"NTPROBNP\" in the last 72 hours.      Recent Labs     10/08/23  0645   TROPONINT 22*       Imaging:  DX-PELVIS-1 OR 2 VIEWS   Final Result      1.  No acute displaced fracture identified.   2.  There are old fractures of the right obturator ring.   3.  There is osteopenia.      DX-CHEST-PORTABLE (1 VIEW)   Final Result      1.  Linear left lower lobe opacity likely atelectasis with elevation of the left hemidiaphragm.   2.  Mild interstitial opacity could represent vascular congestion/edema.      CT-LSPINE W/O PLUS RECONS   Final Result "      1.  No acute displaced fracture of the lumbar spine.   2.  There is multilevel postoperative changes at L3-L5 levels with degenerative L4-5 anterolisthesis.   3.  Multilevel degenerative disc disease and arthropathy.      CT-TSPINE W/O PLUS RECONS   Final Result      1.  Interval progression of a now severe T7 compression deformity, age-indeterminate.   2.  Progression of a mild T8 compression deformity, age indeterminate.      CT-CSPINE WITHOUT PLUS RECONS   Final Result      1.  There is no acute fracture of the cervical spine.   2.  Stable degenerative changes throughout the cervical spine.         CT-HEAD W/O   Final Result      1.  There is no acute intracranial hemorrhage or infarct.      2.  White matter lucencies most consistent with small vessel ischemic change versus demyelination or gliosis.      3.  There is cerebral atrophy.             CT scan of the head, images were reviewed by myself, there is no evidence of intracranial bleeding    Assessment/Plan:  Justification for Admission Status  I anticipate this patient is appropriate for observation status at this time because thoracic progression fracture requiring pain control, PT OT, and neurosurgical consultation    Patient will need a Med/Surg with telemetry bed on MEDICAL service .  The need is secondary to syncopal episode leading to fall.    * Thoracic compression fracture, closed, initial encounter (HCC)- (present on admission)  Assessment & Plan  Patient suffered a thoracic compression fracture after fall  Patient requires hospitalization observation care for pain control and neurosurgical evaluation  I have ordered intravenous morphine, monitor for side effects which include history of depression  Dr. Bynum of neurosurgery to see patient  TLSO brace  PT/OT    Syncope- (present on admission)  Assessment & Plan  Patient reports loss of consciousness prior to the fall  Symptoms are most consistent with orthostatic hypotension  Monitor on  telemetry  Gentle IV fluid hydration    Paroxysmal atrial fibrillation (HCC)- (present on admission)  Assessment & Plan  Status post Watchman procedure  Hold anticoagulation until seen by neurosurgery    POLST (Physician Orders for Life-Sustaining Treatment)- (present on admission)  Assessment & Plan  Confirmed with the patient and her   Patient's current wishes are consistent with her POLST  DNR/DNI    Hypertension- (present on admission)  Assessment & Plan  Outpatient blood pressure medications with hold parameters        VTE prophylaxis: pharmacologic prophylaxis contraindicated due to potential need for kyphoplasty

## 2023-10-08 NOTE — ED PROVIDER NOTES
ED Provider Note    CHIEF COMPLAINT  Chief Complaint   Patient presents with    Fall     Pt was walking to the BR when she became dizzy and fell backwards. +head strike, -LOC, +thinners  Hx of vertigo.   Complaining of head, neck and back pain       EXTERNAL RECORDS REVIEWED  Inpatient Notes several falls previously last of which was in February of this year.  Patient also has extensive history of paroxysmal atrial fibrillation.  Recently underwent watchman 2 months prior and had follow-up ZAK 2 days prior which showed complete epithelialization      HPI/ROS      LIMITATION TO HISTORY   Select: : None  OUTSIDE HISTORIAN(S):  EMS at time of arrival and  later in patient's care    Yvonne Marie Wada is a 87 y.o. female who presents to the emergency department as a code TBI for ground-level fall.  Patient gotten up to go to the bathroom and had fallen backwards landing on her butt and then falling back on her back and hit striking her head.  Negative loss of consciousness she is on anticoagulation she reports posterior head neck pain as well as complete back pain.  Denies pain in her pelvis she has some slight pain of her left lower chest no abdominal pain no recent illness cannot recall cause of her fall but feels as though she got off balance.    PAST MEDICAL HISTORY   has a past medical history of AAA (abdominal aortic aneurysm) (HCC) (7/26/2010), Aortic insufficiency, Aortic valve disease, Aortic valve regurgitation, Arthritis, Cataract, Dental disorder, Diverticulosis, Dyslipidemia (7/26/2010), GERD (gastroesophageal reflux disease) (7/12/2012), Glaucoma, Heart burn, Heart murmur, Heart valve disease, Hiatus hernia syndrome, High cholesterol, History of shingles (6/30/2011), History of total knee arthroplasty (7/26/2010), HLD (hyperlipidemia), Hypertension, Hypothyroid (4/8/2011), Indigestion, Mitral insufficiency, OSTEOPOROSIS (8/9/2010), Pain, Preventative health care (7/26/2010), Rheumatic fever, S/P  appendectomy (7/26/2010), S/P TOMY (total abdominal hysterectomy) (7/26/2010), Shingles (6/30/2011), Snoring, Status post lumbar surgery (7/26/2010), Stroke ([facility]) (1996), Thoracic aortic aneurysm without mention of rupture, Tricuspid insufficiency, Unspecified disorder of thyroid, Urinary bladder disorder, and UTI (urinary tract infection) (11/12/2016).    SURGICAL HISTORY   has a past surgical history that includes fusion, spine, lumbar, plif; hysterectomy, total abdominal; appendectomy; aortic valve replacement (1/12/2010); knee arthroscopy (10/18/2011); meniscectomy, knee, medial (10/18/2011); knee arthroplasty total (1/3/2012); fusion, spine, lumbar, plif (11/24/2015); lumbar laminectomy diskectomy (11/24/2015); knee arthroplasty total (Right, 9/8/2016); tendon repair (Right, 11/10/2016); and orif, ankle (Right, 10/14/2022).    FAMILY HISTORY  Family History   Problem Relation Age of Onset    Stroke Mother     Heart Disease Father     Heart Disease Sister        SOCIAL HISTORY  Social History     Tobacco Use    Smoking status: Never    Smokeless tobacco: Never   Vaping Use    Vaping Use: Never used   Substance and Sexual Activity    Alcohol use: No     Alcohol/week: 0.0 oz    Drug use: Not Currently     Comment: CBD Oil for Arthritis    Sexual activity: Not Currently     Partners: Male       CURRENT MEDICATIONS  Home Medications    **Home medications have not yet been reviewed for this encounter**         ALLERGIES  Allergies   Allergen Reactions    Amoxicillin Vomiting     Upset stomach, ok if needed for life saving treatment (per pt)    Codeine Nausea     Synthetic codones ok    Doxycycline Nausea     Nausea, Ok in life saving situation (per pt)    Sulfamethoxazole-Trimethoprim Vomiting and Nausea     Ok in a life saving situation (per pt)    Tramadol Vomiting and Nausea     nausea    Trazodone Vomiting     groggy       PHYSICAL EXAM  VITAL SIGNS: BP (!) 143/92   Pulse (!) 107   Temp 36.7 °C (98 °F)  "(Temporal)   Resp 20   Ht 1.626 m (5' 4\")   Wt 60.8 kg (134 lb)   SpO2 90%   BMI 23.00 kg/m²      Pulse ox interpretation: I interpret this pulse ox as normal.  Constitutional: Alert and oriented x 3, moderate distress  HEENT: Atraumatic normocephalic, pupils are equal round reactive to light extraocular movements are intact. The nares is clear, external ears are normal, mouth shows moist mucous membranes normal dentition for age  Neck: Supple, no JVD no tracheal deviation  Cardiovascular: Regular rate and rhythm no murmur rub or gallop 2+ pulses peripherally x4  Thorax & Lungs: No respiratory distress, no wheezes rales or rhonchi, No chest tenderness.   GI: Soft nontender nondistended positive bowel sounds, no peritoneal signs  Skin: Warm dry no acute rash or lesion  Musculoskeletal: Moving all extremities with full range and 5 of 5 strength no acute  deformity  Neurologic: Cranial nerves III through XII are grossly intact no sensory deficit no cerebellar dysfunction   Psychiatric: Appropriate affect for situation at this time      DIAGNOSTIC STUDIES / PROCEDURES  Results for orders placed or performed during the hospital encounter of 10/08/23   CBC WITH DIFFERENTIAL   Result Value Ref Range    WBC 4.7 (L) 4.8 - 10.8 K/uL    RBC 4.39 4.20 - 5.40 M/uL    Hemoglobin 13.6 12.0 - 16.0 g/dL    Hematocrit 41.9 37.0 - 47.0 %    MCV 95.4 81.4 - 97.8 fL    MCH 31.0 27.0 - 33.0 pg    MCHC 32.5 32.2 - 35.5 g/dL    RDW 47.3 35.9 - 50.0 fL    Platelet Count 178 164 - 446 K/uL    MPV 8.5 (L) 9.0 - 12.9 fL    Neutrophils-Polys 69.00 44.00 - 72.00 %    Lymphocytes 18.70 (L) 22.00 - 41.00 %    Monocytes 7.60 0.00 - 13.40 %    Eosinophils 2.30 0.00 - 6.90 %    Basophils 1.10 0.00 - 1.80 %    Immature Granulocytes 1.30 (H) 0.00 - 0.90 %    Nucleated RBC 0.00 0.00 - 0.20 /100 WBC    Neutrophils (Absolute) 3.25 1.82 - 7.42 K/uL    Lymphs (Absolute) 0.88 (L) 1.00 - 4.80 K/uL    Monos (Absolute) 0.36 0.00 - 0.85 K/uL    Eos " (Absolute) 0.11 0.00 - 0.51 K/uL    Baso (Absolute) 0.05 0.00 - 0.12 K/uL    Immature Granulocytes (abs) 0.06 0.00 - 0.11 K/uL    NRBC (Absolute) 0.00 K/uL   COMP METABOLIC PANEL   Result Value Ref Range    Sodium 140 135 - 145 mmol/L    Potassium 4.3 3.6 - 5.5 mmol/L    Chloride 106 96 - 112 mmol/L    Co2 23 20 - 33 mmol/L    Anion Gap 11.0 7.0 - 16.0    Glucose 105 (H) 65 - 99 mg/dL    Bun 15 8 - 22 mg/dL    Creatinine 0.96 0.50 - 1.40 mg/dL    Calcium 8.8 8.5 - 10.5 mg/dL    Correct Calcium 8.9 8.5 - 10.5 mg/dL    AST(SGOT) 21 12 - 45 U/L    ALT(SGPT) 13 2 - 50 U/L    Alkaline Phosphatase 53 30 - 99 U/L    Total Bilirubin 0.6 0.1 - 1.5 mg/dL    Albumin 3.9 3.2 - 4.9 g/dL    Total Protein 6.3 6.0 - 8.2 g/dL    Globulin 2.4 1.9 - 3.5 g/dL    A-G Ratio 1.6 g/dL   TROPONIN   Result Value Ref Range    Troponin T 22 (H) 6 - 19 ng/L   PRTOTHROMBIN TIME (INR)   Result Value Ref Range    PT 15.9 (H) 12.0 - 14.6 sec    INR 1.25 (H) 0.87 - 1.13   APTT   Result Value Ref Range    APTT 30.1 24.7 - 36.0 sec   URINALYSIS    Specimen: Urine, Clean Catch   Result Value Ref Range    Color Yellow     Character Clear     Specific Gravity 1.013 <1.035    Ph 6.0 5.0 - 8.0    Glucose Negative Negative mg/dL    Ketones Negative Negative mg/dL    Protein 30 (A) Negative mg/dL    Bilirubin Negative Negative    Urobilinogen, Urine 0.2 Negative    Nitrite Positive (A) Negative    Leukocyte Esterase Negative Negative    Occult Blood Negative Negative    Micro Urine Req Microscopic    ESTIMATED GFR   Result Value Ref Range    GFR (CKD-EPI) 57 (A) >60 mL/min/1.73 m 2   URINE MICROSCOPIC (W/UA)   Result Value Ref Range    WBC 0-2 /hpf    RBC 2-5 (A) /hpf    Bacteria Moderate (A) None /hpf    Epithelial Cells Negative /hpf    Hyaline Cast 3-5 (A) /lpf   EKG   Result Value Ref Range    Report       [facility] Emergency Dept.    Test Date:  2023-10-08  Pt Name:    YVONNE WADA                  Department: ER  MRN:        [id number]                       Room:       [facility]  Gender:     Female                       Technician: [id number]  :        [id number]                   Requested By:WAYNE PAIGE  Order #:    [id number]                    Reading MD: WAYNE PAIGE MD    Measurements  Intervals                                Axis  Rate:       75                           P:          0  MO:         0                            QRS:        47  QRSD:       91                           T:          57  QT:         357  QTc:        399    Interpretive Statements  Atrial fibrillation  Compared to ECG 2023 11:17:33  Atrial flutter no longer present  Electronically Signed On 10- 07:51:24 PDT by WAYNE PAIGE MD        RADIOLOGY  I have independently interpreted the diagnostic imaging associated with this visit and am waiting the final reading from the radiologist.   My preliminary interpretation is as follows:   T7 compression fracture with minimal retropulsion complete loss of height.    Radiologist interpretation:   DX-PELVIS-1 OR 2 VIEWS   Final Result      1.  No acute displaced fracture identified.   2.  There are old fractures of the right obturator ring.   3.  There is osteopenia.      DX-CHEST-PORTABLE (1 VIEW)   Final Result      1.  Linear left lower lobe opacity likely atelectasis with elevation of the left hemidiaphragm.   2.  Mild interstitial opacity could represent vascular congestion/edema.      CT-LSPINE W/O PLUS RECONS   Final Result      1.  No acute displaced fracture of the lumbar spine.   2.  There is multilevel postoperative changes at L3-L5 levels with degenerative L4-5 anterolisthesis.   3.  Multilevel degenerative disc disease and arthropathy.      CT-TSPINE W/O PLUS RECONS   Final Result      1.  Interval progression of a now severe T7 compression deformity, age-indeterminate.   2.  Progression of a mild T8 compression deformity, age indeterminate.      CT-CSPINE WITHOUT PLUS RECONS   Final  Result      1.  There is no acute fracture of the cervical spine.   2.  Stable degenerative changes throughout the cervical spine.         CT-HEAD W/O   Final Result      1.  There is no acute intracranial hemorrhage or infarct.      2.  White matter lucencies most consistent with small vessel ischemic change versus demyelination or gliosis.      3.  There is cerebral atrophy.               COURSE & MEDICAL DECISION MAKING    ED Observation Status? No; Patient does not meet criteria for ED Observation.     ASSESSMENT, COURSE AND PLAN  Care Narrative: 87-year-old female with ground-level fall.  Dizziness prior to the event possibly related possibly to his loss of balance that she does have a history of such.  Patient complained of total back pain after the event and some minimal neck pain her CT set tyrone are unremarkable T spines demonstrate severe T7 compression fracture with near total loss of height and some minimal retropulsion she also has some progression of her minimal previous T8 fracture.  Laboratory results are reassuring.  Urine sample is somewhat odd with no white cells no leukocyte esterase however nitrite positive with bacteria present.  No clear indication for antibiotics at this point.  We will wait for cultures..  I discussed this with neurosurgeon Dr. Rahman  We both agree that patient will likely need TLSO brace Multimodal pain control likely PT OT.  Patient will be admitted to the hospitalist for this.  I discussed this with hospitalist Dr. Chawla.  Patient also just recently had transesophageal echo which showed complete epithelialization of her Watchman procedure.  Think it would be beneficial to discuss with cardiology to see if patient can be discontinued off of anticoagulation.  Patient admitted in guarded condition      ADDITIONAL PROBLEM LIST    DISPOSITION AND DISCUSSIONS    I have discussed management of the patient with the following physicians and JENNIFER's:    MD Lenka:  Surgery  MD Denton: Hospitalist  Discussion of management with other QHP or appropriate source(s):      Escalation of care considered, and ultimately not performed:    Barriers to care at this time, including but not limited to: .   Decision tools and prescription drugs considered including, but not limited to: .  .  FINAL DIAGNOSIS  1.  Ground-level fall  2.  Closed head injury  3.  T7 compression fracture with minimal retropulsion  4.  T8 compression fracture  5.  Chronic anticoagulation       Electronically signed by: Jose Moreno M.D.,

## 2023-10-08 NOTE — ED TRIAGE NOTES
Chief Complaint   Patient presents with    Fall     Pt was walking to the BR when she became dizzy and fell backwards. +head strike, -LOC, +thinners  Hx of vertigo.   Complaining of head, neck and back pain     Pt BIB [facility] from home for above complaint. Pt activated as TBI and evaluated at charge desk by Dr. Moreno. PIV placed by EMS, pt received 100 mcg fentanyl and 4 mg zofran prior to arrival.     C-collar placed in ED. Pt transported to CT, report to Dyana ELMORE.

## 2023-10-08 NOTE — ASSESSMENT & PLAN NOTE
Confirmed with the patient and her   Patient's current wishes are consistent with her POLST  DNR/DNI

## 2023-10-08 NOTE — ASSESSMENT & PLAN NOTE
Patient suffered a thoracic compression fracture after fall  Patient requires hospitalization observation care for pain control and neurosurgical evaluation  I have ordered intravenous morphine, monitor for side effects which include history of depression  Dr. Bynum of neurosurgery to see patient  TLSO brace  PT/OT

## 2023-10-08 NOTE — ED NOTES
Bedside report given to oncoming RN Selena, relinquished care of patient. POC discussed with patient at bedside. Call light within reach, all needs addressed at this time. Pt on ROOM AIR.

## 2023-10-08 NOTE — ED NOTES
Pt aware of need for urine sample. States she is unsure if she will be able to urinate using a purewick.

## 2023-10-08 NOTE — ASSESSMENT & PLAN NOTE
Patient reports loss of consciousness prior to the fall  Symptoms are most consistent with orthostatic hypotension  Monitor on telemetry  Gentle IV fluid hydration

## 2023-10-09 LAB
ANION GAP SERPL CALC-SCNC: 9 MMOL/L (ref 7–16)
BASOPHILS # BLD AUTO: 0.7 % (ref 0–1.8)
BASOPHILS # BLD: 0.04 K/UL (ref 0–0.12)
BUN SERPL-MCNC: 18 MG/DL (ref 8–22)
CALCIUM SERPL-MCNC: 8.3 MG/DL (ref 8.5–10.5)
CHLORIDE SERPL-SCNC: 105 MMOL/L (ref 96–112)
CO2 SERPL-SCNC: 23 MMOL/L (ref 20–33)
CREAT SERPL-MCNC: 0.99 MG/DL (ref 0.5–1.4)
EOSINOPHIL # BLD AUTO: 0.11 K/UL (ref 0–0.51)
EOSINOPHIL NFR BLD: 1.9 % (ref 0–6.9)
ERYTHROCYTE [DISTWIDTH] IN BLOOD BY AUTOMATED COUNT: 46.8 FL (ref 35.9–50)
GFR SERPLBLD CREATININE-BSD FMLA CKD-EPI: 55 ML/MIN/1.73 M 2
GLUCOSE SERPL-MCNC: 90 MG/DL (ref 65–99)
HCT VFR BLD AUTO: 37.8 % (ref 37–47)
HGB BLD-MCNC: 12.8 G/DL (ref 12–16)
IMM GRANULOCYTES # BLD AUTO: 0.03 K/UL (ref 0–0.11)
IMM GRANULOCYTES NFR BLD AUTO: 0.5 % (ref 0–0.9)
LYMPHOCYTES # BLD AUTO: 1.49 K/UL (ref 1–4.8)
LYMPHOCYTES NFR BLD: 26.3 % (ref 22–41)
MCH RBC QN AUTO: 32.3 PG (ref 27–33)
MCHC RBC AUTO-ENTMCNC: 33.9 G/DL (ref 32.2–35.5)
MCV RBC AUTO: 95.5 FL (ref 81.4–97.8)
MONOCYTES # BLD AUTO: 0.76 K/UL (ref 0–0.85)
MONOCYTES NFR BLD AUTO: 13.4 % (ref 0–13.4)
NEUTROPHILS # BLD AUTO: 3.23 K/UL (ref 1.82–7.42)
NEUTROPHILS NFR BLD: 57.2 % (ref 44–72)
NRBC # BLD AUTO: 0 K/UL
NRBC BLD-RTO: 0 /100 WBC (ref 0–0.2)
PLATELET # BLD AUTO: 165 K/UL (ref 164–446)
PMV BLD AUTO: 9.1 FL (ref 9–12.9)
POTASSIUM SERPL-SCNC: 4.4 MMOL/L (ref 3.6–5.5)
RBC # BLD AUTO: 3.96 M/UL (ref 4.2–5.4)
SODIUM SERPL-SCNC: 137 MMOL/L (ref 135–145)
WBC # BLD AUTO: 5.7 K/UL (ref 4.8–10.8)

## 2023-10-09 PROCEDURE — 97166 OT EVAL MOD COMPLEX 45 MIN: CPT

## 2023-10-09 PROCEDURE — A9270 NON-COVERED ITEM OR SERVICE: HCPCS | Performed by: STUDENT IN AN ORGANIZED HEALTH CARE EDUCATION/TRAINING PROGRAM

## 2023-10-09 PROCEDURE — A9270 NON-COVERED ITEM OR SERVICE: HCPCS | Performed by: HOSPITALIST

## 2023-10-09 PROCEDURE — 700102 HCHG RX REV CODE 250 W/ 637 OVERRIDE(OP): Performed by: STUDENT IN AN ORGANIZED HEALTH CARE EDUCATION/TRAINING PROGRAM

## 2023-10-09 PROCEDURE — 700105 HCHG RX REV CODE 258: Performed by: HOSPITALIST

## 2023-10-09 PROCEDURE — 36415 COLL VENOUS BLD VENIPUNCTURE: CPT

## 2023-10-09 PROCEDURE — 700102 HCHG RX REV CODE 250 W/ 637 OVERRIDE(OP): Performed by: HOSPITALIST

## 2023-10-09 PROCEDURE — G0378 HOSPITAL OBSERVATION PER HR: HCPCS

## 2023-10-09 PROCEDURE — 97163 PT EVAL HIGH COMPLEX 45 MIN: CPT

## 2023-10-09 PROCEDURE — 85025 COMPLETE CBC W/AUTO DIFF WBC: CPT

## 2023-10-09 PROCEDURE — 99232 SBSQ HOSP IP/OBS MODERATE 35: CPT | Performed by: STUDENT IN AN ORGANIZED HEALTH CARE EDUCATION/TRAINING PROGRAM

## 2023-10-09 PROCEDURE — 80048 BASIC METABOLIC PNL TOTAL CA: CPT

## 2023-10-09 PROCEDURE — 93005 ELECTROCARDIOGRAM TRACING: CPT | Performed by: STUDENT IN AN ORGANIZED HEALTH CARE EDUCATION/TRAINING PROGRAM

## 2023-10-09 RX ADMIN — DOCUSATE SODIUM 50 MG AND SENNOSIDES 8.6 MG 2 TABLET: 8.6; 5 TABLET, FILM COATED ORAL at 06:03

## 2023-10-09 RX ADMIN — ACETAMINOPHEN 1000 MG: 500 TABLET, FILM COATED ORAL at 01:18

## 2023-10-09 RX ADMIN — APIXABAN 2.5 MG: 5 TABLET, FILM COATED ORAL at 17:45

## 2023-10-09 RX ADMIN — SODIUM CHLORIDE: 9 INJECTION, SOLUTION INTRAVENOUS at 01:18

## 2023-10-09 RX ADMIN — OXYCODONE 5 MG: 5 TABLET ORAL at 17:44

## 2023-10-09 RX ADMIN — BENAZEPRIL HYDROCHLORIDE 40 MG: 20 TABLET ORAL at 06:03

## 2023-10-09 RX ADMIN — OXYCODONE 5 MG: 5 TABLET ORAL at 01:21

## 2023-10-09 RX ADMIN — ACETAMINOPHEN 1000 MG: 500 TABLET, FILM COATED ORAL at 23:28

## 2023-10-09 RX ADMIN — LEVOTHYROXINE SODIUM 75 MCG: 0.07 TABLET ORAL at 06:03

## 2023-10-09 RX ADMIN — METOPROLOL SUCCINATE 50 MG: 50 TABLET, EXTENDED RELEASE ORAL at 06:03

## 2023-10-09 RX ADMIN — ACETAMINOPHEN 1000 MG: 500 TABLET, FILM COATED ORAL at 10:10

## 2023-10-09 RX ADMIN — ACETAMINOPHEN 1000 MG: 500 TABLET, FILM COATED ORAL at 06:02

## 2023-10-09 RX ADMIN — DOCUSATE SODIUM 50 MG AND SENNOSIDES 8.6 MG 2 TABLET: 8.6; 5 TABLET, FILM COATED ORAL at 17:45

## 2023-10-09 RX ADMIN — OXYCODONE 5 MG: 5 TABLET ORAL at 10:10

## 2023-10-09 RX ADMIN — ACETAMINOPHEN 1000 MG: 500 TABLET, FILM COATED ORAL at 17:45

## 2023-10-09 ASSESSMENT — COGNITIVE AND FUNCTIONAL STATUS - GENERAL
DRESSING REGULAR LOWER BODY CLOTHING: A LOT
CLIMB 3 TO 5 STEPS WITH RAILING: TOTAL
DRESSING REGULAR UPPER BODY CLOTHING: A LOT
MOBILITY SCORE: 10
MOVING FROM LYING ON BACK TO SITTING ON SIDE OF FLAT BED: A LOT
STANDING UP FROM CHAIR USING ARMS: A LOT
HELP NEEDED FOR BATHING: A LOT
SUGGESTED CMS G CODE MODIFIER MOBILITY: CL
EATING MEALS: A LITTLE
WALKING IN HOSPITAL ROOM: TOTAL
SUGGESTED CMS G CODE MODIFIER DAILY ACTIVITY: CK
TOILETING: A LOT
DAILY ACTIVITIY SCORE: 14
PERSONAL GROOMING: A LITTLE
MOVING TO AND FROM BED TO CHAIR: A LOT
TURNING FROM BACK TO SIDE WHILE IN FLAT BAD: A LOT

## 2023-10-09 ASSESSMENT — CHA2DS2 SCORE
PRIOR STROKE OR TIA OR THROMBOEMBOLISM: YES
AGE 75 OR GREATER: YES
HYPERTENSION: YES
CHA2DS2 VASC SCORE: 6
SEX: FEMALE
CHF OR LEFT VENTRICULAR DYSFUNCTION: NO
VASCULAR DISEASE: NO
AGE 65 TO 74: NO
DIABETES: NO

## 2023-10-09 ASSESSMENT — PAIN DESCRIPTION - PAIN TYPE
TYPE: ACUTE PAIN
TYPE: ACUTE PAIN

## 2023-10-09 ASSESSMENT — GAIT ASSESSMENTS: GAIT LEVEL OF ASSIST: UNABLE TO PARTICIPATE

## 2023-10-09 ASSESSMENT — ACTIVITIES OF DAILY LIVING (ADL): TOILETING: INDEPENDENT

## 2023-10-09 ASSESSMENT — FIBROSIS 4 INDEX: FIB4 SCORE: 3.04

## 2023-10-09 NOTE — CONSULTS
Surgery Neurosurgery Consultation    Date of Service  10/8/2023    Referring Physician  Pankaj Chawla M.D.    Consulting Physician  Leonidas Rahman M.D.    Reason for Consultation  T7 and T8 compression fractures    History of Presenting Illness  87 y.o. female who presented 10/8/2023 with back pain.  She has an acute on chronic T7 compression fracture as well as an acute T8 compression fracture.  She is a very poor historian and so history is difficult.  She thinks that she has been in the hospital, same bed for at least 3 days.  She states that her pain is mild to moderate at this point in the middle of her back.  She denies numbness, tingling and neurologic symptoms in her lower extremities    Review of Systems  Review of Systems   Musculoskeletal:  Positive for back pain.       Past Medical History   has a past medical history of AAA (abdominal aortic aneurysm) ([facility]) (7/26/2010), Aortic insufficiency, Aortic valve disease, Aortic valve regurgitation, Arthritis, Cataract, Dental disorder, Diverticulosis, Dyslipidemia (7/26/2010), GERD (gastroesophageal reflux disease) (7/12/2012), Glaucoma, Heart burn, Heart murmur, Heart valve disease, Hiatus hernia syndrome, High cholesterol, History of shingles (6/30/2011), History of total knee arthroplasty (7/26/2010), HLD (hyperlipidemia), Hypertension, Hypothyroid (4/8/2011), Indigestion, Mitral insufficiency, OSTEOPOROSIS (8/9/2010), Pain, Preventative health care (7/26/2010), Rheumatic fever, S/P appendectomy (7/26/2010), S/P TOMY (total abdominal hysterectomy) (7/26/2010), Shingles (6/30/2011), Snoring, Status post lumbar surgery (7/26/2010), Stroke ([facility]) (1996), Thoracic aortic aneurysm without mention of rupture, Tricuspid insufficiency, Unspecified disorder of thyroid, Urinary bladder disorder, and UTI (urinary tract infection) (11/12/2016).    She has no past medical history of CAD (coronary artery disease), COPD, or Liver disease.    Surgical History   has a past  surgical history that includes fusion, spine, lumbar, plif; hysterectomy, total abdominal; appendectomy; aortic valve replacement (1/12/2010); knee arthroscopy (10/18/2011); meniscectomy, knee, medial (10/18/2011); knee arthroplasty total (1/3/2012); fusion, spine, lumbar, plif (11/24/2015); lumbar laminectomy diskectomy (11/24/2015); knee arthroplasty total (Right, 9/8/2016); tendon repair (Right, 11/10/2016); and orif, ankle (Right, 10/14/2022).    Family History  family history includes Heart Disease in her father and sister; Stroke in her mother.    Social History   reports that she has never smoked. She has never used smokeless tobacco. She reports that she does not currently use drugs. She reports that she does not drink alcohol.    Medications  Prior to Admission Medications   Prescriptions Last Dose Informant Patient Reported? Taking?   Ascorbic Acid (VITAMIN C PO) 10/7/2023 at AM Significant Other, Patient Yes Yes   Sig: Take 1 Tablet by mouth every day.   Cholecalciferol (VITAMIN D3 PO) 10/7/2023 at AM Significant Other, Patient Yes Yes   Sig: Take 1 Tablet by mouth every day.   amoxicillin (AMOXIL) 500 MG Cap 9/27/2023 at CMPLT Significant Other, Patient Yes No   Sig: Take 500 mg by mouth 4 times a day. 7 day course   apixaban (ELIQUIS) 2.5mg Tab 10/7/2023 at 1900 Significant Other, Patient No No   Sig: Take 1 Tablet by mouth 2 times a day.   benazepril (LOTENSIN) 40 MG tablet 10/7/2023 at AM Significant Other, Patient No No   Sig: TAKE 1 TABLET BY MOUTH EVERY DAY IN THE MORNING   levothyroxine (SYNTHROID) 75 MCG Tab 10/7/2023 at AM Patient, Significant Other No No   Sig: TAKE 1 TABLET BY MOUTH EVERY DAY IN THE MORNING ON AN EMPTY STOMACH   Patient taking differently: Take 75 mcg by mouth every morning on an empty stomach.   metoprolol SR (TOPROL XL) 50 MG TABLET SR 24 HR 10/7/2023 at AM Significant Other, Patient No No   Sig: Take 1 Tablet by mouth every morning.   therapeutic multivitamin-minerals  (THERAGRAN-M) Tab 10/7/2023 at AM Significant Other, Patient Yes Yes   Sig: Take 1 Tablet by mouth every day.      Facility-Administered Medications: None       Allergies  Allergies   Allergen Reactions    Codeine Nausea     Synthetic codones ok    Doxycycline Nausea     Nausea, Ok in life saving situation (per pt)    Sulfamethoxazole-Trimethoprim Vomiting and Nausea     Ok in a life saving situation (per pt)    Tramadol Vomiting and Nausea     nausea    Trazodone Vomiting     groggy       Physical Exam  Temp:  [36.2 °C (97.2 °F)-36.7 °C (98 °F)] 36.4 °C (97.5 °F)  Pulse:  [] 84  Resp:  [12-22] 18  BP: (110-150)/(59-92) 130/76  SpO2:  [90 %-97 %] 97 %    Physical Exam  Neurological:      Mental Status: She is confused.      GCS: GCS eye subscore is 4. GCS verbal subscore is 4. GCS motor subscore is 6.      Comments: Moves all extremities symmetrically, 4/5 throughout  Sensation intact in the bilateral upper and lower extremities         Fluids  Date 10/08/23 0700 - 10/09/23 0659   Shift 2045-0225 3767-8164 0592-1476 24 Hour Total   INTAKE   P.O.  240  240   Shift Total  240  240   OUTPUT   Urine 0 0  0   Shift Total 0 0  0   Weight (kg) 60.8 60.8 60.8 60.8       Laboratory  Recent Labs     10/08/23  0645   WBC 4.7*   RBC 4.39   HEMOGLOBIN 13.6   HEMATOCRIT 41.9   MCV 95.4   MCH 31.0   MCHC 32.5   RDW 47.3   PLATELETCT 178   MPV 8.5*     Recent Labs     10/08/23  0645   SODIUM 140   POTASSIUM 4.3   CHLORIDE 106   CO2 23   GLUCOSE 105*   BUN 15   CREATININE 0.96   CALCIUM 8.8     Recent Labs     10/08/23  0645   APTT 30.1   INR 1.25*                 Imaging  DX-PELVIS-1 OR 2 VIEWS   Final Result      1.  No acute displaced fracture identified.   2.  There are old fractures of the right obturator ring.   3.  There is osteopenia.      DX-CHEST-PORTABLE (1 VIEW)   Final Result      1.  Linear left lower lobe opacity likely atelectasis with elevation of the left hemidiaphragm.   2.  Mild interstitial opacity could  represent vascular congestion/edema.      CT-LSPINE W/O PLUS RECONS   Final Result      1.  No acute displaced fracture of the lumbar spine.   2.  There is multilevel postoperative changes at L3-L5 levels with degenerative L4-5 anterolisthesis.   3.  Multilevel degenerative disc disease and arthropathy.      CT-TSPINE W/O PLUS RECONS   Final Result      1.  Interval progression of a now severe T7 compression deformity, age-indeterminate.   2.  Progression of a mild T8 compression deformity, age indeterminate.      CT-CSPINE WITHOUT PLUS RECONS   Final Result      1.  There is no acute fracture of the cervical spine.   2.  Stable degenerative changes throughout the cervical spine.         CT-HEAD W/O   Final Result      1.  There is no acute intracranial hemorrhage or infarct.      2.  White matter lucencies most consistent with small vessel ischemic change versus demyelination or gliosis.      3.  There is cerebral atrophy.             Assessment/Plan  TLSO for comfort is fine.  Compression fractures by nature are not unstable, no surgery indicated  Consider vertebroplasty with interventional radiology if not able to mobilize in the coming days due to pain  Will need to follow-up with her primary care physician regarding osteoporosis    Please call with any questions or concerns

## 2023-10-09 NOTE — PROGRESS NOTES
Hospital Medicine Daily Progress Note    Date of Service  10/9/2023    Chief Complaint  Yvonne Marie Wada is a 87 y.o. female admitted 10/8/2023 with FALL    Hospital Course  Yvonne Marie Wada is a 87 y.o. female who presented 10/8/2023 with fall. she felt dizzy and  lightheaded and fell to the ground, losing consciousness for short period of time. CT showed progression of a now severe T7 T8 compression deformity, age-indeterminate.  Neurosurgery consulted, no intervention indicated.  Recommended TLSO     Yvonne Wada has past medical history includes paroxysmal atrial fibrillation gastrointestinal bleeding.  Patient underwent Watchman procedure in August 2023.  On 10/6/2023, she had a ZAK to evaluate for epithelialization.       Interval Problem Update  Patient was seen and examined at bedside  Reported the back pain, not able to walk    PT recommended postacute  PMR consulted    No surgery planned, I resumed the Eliquis for A-fib    Continue telemetry monitor for syncope episode.  Likely vasovagal    I have discussed this patient's plan of care and discharge plan at IDT rounds today with Case Management, Nursing, Nursing leadership, and other members of the IDT team.    Consultants/Specialty  neurosurgery    Code Status  DNAR/DNI    Disposition  The patient is not medically cleared for discharge to home or a post-acute facility.      I have placed the appropriate orders for post-discharge needs.    Review of Systems  All 12 systems were reviewed and negative except as mentioned above       Physical Exam  Temp:  [36.3 °C (97.3 °F)-37.1 °C (98.8 °F)] 36.6 °C (97.9 °F)  Pulse:  [62-89] 72  Resp:  [16-20] 16  BP: (105-127)/(56-82) 111/75  SpO2:  [93 %-99 %] 93 %    Physical Exam  Constitutional:       General: She is in acute distress.      Appearance: She is ill-appearing.   HENT:      Head: Normocephalic.      Nose: Nose normal.      Mouth/Throat:      Mouth: Mucous membranes are moist.   Eyes:      Extraocular  Movements: Extraocular movements intact.      Conjunctiva/sclera: Conjunctivae normal.   Cardiovascular:      Rate and Rhythm: Normal rate. Rhythm irregular.      Pulses: Normal pulses.      Heart sounds: Normal heart sounds.   Pulmonary:      Effort: Pulmonary effort is normal.      Breath sounds: Normal breath sounds.   Abdominal:      General: Bowel sounds are normal.      Palpations: Abdomen is soft.      Tenderness: There is no abdominal tenderness.   Musculoskeletal:         General: Tenderness present. No swelling. Normal range of motion.      Cervical back: Normal range of motion.   Skin:     General: Skin is warm.   Neurological:      Mental Status: She is alert and oriented to person, place, and time. Mental status is at baseline.   Psychiatric:         Mood and Affect: Mood normal.         Fluids    Intake/Output Summary (Last 24 hours) at 10/9/2023 1626  Last data filed at 10/9/2023 0559  Gross per 24 hour   Intake 764 ml   Output --   Net 764 ml       Laboratory  Recent Labs     10/08/23  0645 10/09/23  0106   WBC 4.7* 5.7   RBC 4.39 3.96*   HEMOGLOBIN 13.6 12.8   HEMATOCRIT 41.9 37.8   MCV 95.4 95.5   MCH 31.0 32.3   MCHC 32.5 33.9   RDW 47.3 46.8   PLATELETCT 178 165   MPV 8.5* 9.1     Recent Labs     10/08/23  0645 10/09/23  0106   SODIUM 140 137   POTASSIUM 4.3 4.4   CHLORIDE 106 105   CO2 23 23   GLUCOSE 105* 90   BUN 15 18   CREATININE 0.96 0.99   CALCIUM 8.8 8.3*     Recent Labs     10/08/23  0645   APTT 30.1   INR 1.25*               Imaging  DX-PELVIS-1 OR 2 VIEWS   Final Result      1.  No acute displaced fracture identified.   2.  There are old fractures of the right obturator ring.   3.  There is osteopenia.      DX-CHEST-PORTABLE (1 VIEW)   Final Result      1.  Linear left lower lobe opacity likely atelectasis with elevation of the left hemidiaphragm.   2.  Mild interstitial opacity could represent vascular congestion/edema.      CT-LSPINE W/O PLUS RECONS   Final Result      1.  No acute  displaced fracture of the lumbar spine.   2.  There is multilevel postoperative changes at L3-L5 levels with degenerative L4-5 anterolisthesis.   3.  Multilevel degenerative disc disease and arthropathy.      CT-TSPINE W/O PLUS RECONS   Final Result      1.  Interval progression of a now severe T7 compression deformity, age-indeterminate.   2.  Progression of a mild T8 compression deformity, age indeterminate.      CT-CSPINE WITHOUT PLUS RECONS   Final Result      1.  There is no acute fracture of the cervical spine.   2.  Stable degenerative changes throughout the cervical spine.         CT-HEAD W/O   Final Result      1.  There is no acute intracranial hemorrhage or infarct.      2.  White matter lucencies most consistent with small vessel ischemic change versus demyelination or gliosis.      3.  There is cerebral atrophy.              Assessment/Plan  * Thoracic compression fracture, closed, initial encounter (HCC)- (present on admission)  Assessment & Plan  Patient suffered a thoracic compression fracture after fall  Patient requires hospitalization observation care for pain control and neurosurgical evaluation  I have ordered intravenous morphine, monitor for side effects which include history of depression  Dr. Bynum of neurosurgery to see patient  TLSO brace  PT/OT    POLST (Physician Orders for Life-Sustaining Treatment)- (present on admission)  Assessment & Plan  Confirmed with the patient and her   Patient's current wishes are consistent with her POLST  DNR/DNI    Syncope- (present on admission)  Assessment & Plan  Patient reports loss of consciousness prior to the fall  Symptoms are most consistent with orthostatic hypotension  Monitor on telemetry  Gentle IV fluid hydration    Paroxysmal atrial fibrillation (HCC)- (present on admission)  Assessment & Plan  Status post Watchman procedure  Resumed Eliquis    Hypertension- (present on admission)  Assessment & Plan  Outpatient blood pressure  medications with hold parameters         VTE prophylaxis: Eliquis    I have performed a physical exam and reviewed and updated ROS and Plan today (10/9/2023). In review of yesterday's note (10/8/2023), there are no changes except as documented above.

## 2023-10-09 NOTE — THERAPY
Physical Therapy   Initial Evaluation     Patient Name: Yvonne Marie Wada  Age:  87 y.o., Sex:  female  Medical Record #: [id number]  Today's Date: 10/9/2023     Precautions  Precautions: Fall Risk;Spinal / Back Precautions ;TLSO (Thoracolumbosacral orthosis)  Comments: TLSO for comfort    Assessment  Patient is 87 y.o. female with GLF at home after c/o feeling dizzy and  lightheaded, losing consciousness for short period of time after falling.  Findings + for T7, T8 compression fracture, non-surgical per neurosx consult, but plan pending as to whether pt may need vertebroplastly if pain cannot be managed well enough for pt to walk. .  Additional factors influencing patient status / progress : today, pt was limited by pain, needed mod assist for most activity, tolerated sitting EOB to have brace donned with max assist, asking for BTB, able to stand briefly to fix linen. Pt was unable to attempt ambulation today. Pt was NPO this am, will benefit from pain medication before next treatment to see if able to participate more fully. PT to follow. .      Plan    Physical Therapy Initial Treatment Plan   Treatment Plan : Bed Mobility, Gait Training, Neuro Re-Education / Balance, Stair Training, Therapeutic Activities, Orthotics Training   Treatment Frequency: 4 Times per Week  Duration: Until Therapy Goals Met    DC Equipment Recommendations: Unable to determine at this time  Discharge Recommendations: Recommend post-acute placement for additional physical therapy services prior to discharge home            Objective       10/09/23 0948   Charge Group   PT Evaluation PT Evaluation High   Total Time Spent   PT Total Time Yes   PT Evaluation Time Spent (Mins) 30   PT Total Time Spent (Calculated) 30   Initial Contact Note    Initial Contact Note Order Received and Verified, Physical Therapy Evaluation in Progress with Full Report to Follow.   Precautions   Precautions Fall Risk;Spinal / Back Precautions ;TLSO  "(Thoracolumbosacral orthosis)   Comments TLSO for comfort   Pain 0 - 10 Group   Location Back   Therapist Pain Assessment   (after therapy, not rated, pt reports \"I feel like I'm going to throw up\", nsg updated.)   Prior Living Situation   Prior Services Unable To Determine At This Time   Housing / Facility 1 Story House   Steps Into Home 2   Steps In Home 0   Rail Right Rail (Steps into Home)   Equipment Owned 4-Wheel Walker   Lives with - Patient's Self Care Capacity Spouse   Prior Level of Functional Mobility   Bed Mobility Independent   Transfer Status Independent   Ambulation Independent   Ambulation Distance household, pt reports spending all her time at home   Assistive Devices Used 4-Wheel Walker   Stairs Independent   Comments pt reports that she does not drive, her spouse does the driving and grocery shopping.   History of Falls   History of Falls Yes   Date of Last Fall   (part of admit)   Cognition    Cognition / Consciousness X   Speech/ Communication Delayed Responses   Ability To Follow Commands   (inconsistent command following, confused re log roll)   Safety Awareness Impaired   New Learning Impaired   Attention Impaired   Comments pt not able to follow cues for log roll with back to bed, appears not to have absorbed education done earlier.   Active ROM Lower Body    Active ROM Lower Body  WDL   Comments functional for sitting, transfers, but avoids full motion due to back pain   Strength Lower Body   Lower Body Strength  X   Comments LE function limited by back pain.   Other Treatments   Other Treatments Provided TLSO donnned with total assist while seated at EOB.   Balance Assessment   Sitting Balance (Static) Fair -   Sitting Balance (Dynamic) Fair -   Standing Balance (Static) Trace +   Bed Mobility    Supine to Sit Moderate Assist   Sit to Supine Maximal Assist  (not following log roll cues.)   Scooting Moderate Assist   Rolling Moderate Assist to Rt.;Moderate Assist to Lt.   Comments ed done " re log roll, pt unable to retain education.   Gait Analysis   Gait Level Of Assist Unable to Participate   Functional Mobility   Sit to Stand Moderate Assist   Bed, Chair, Wheelchair Transfer Unable to Participate   How much difficulty does the patient currently have...   Turning over in bed (including adjusting bedclothes, sheets and blankets)? 2   Sitting down on and standing up from a chair with arms (e.g., wheelchair, bedside commode, etc.) 2   Moving from lying on back to sitting on the side of the bed? 2   How much help from another person does the patient currently need...   Moving to and from a bed to a chair (including a wheelchair)? 2   Need to walk in a hospital room? 1   Climbing 3-5 steps with a railing? 1   6 clicks Mobility Score 10   Activity Tolerance   Sitting Edge of Bed 5 min max   Standing few seconds only   Short Term Goals    Short Term Goal # 1 Pt will perform bed mobility with supervision in 6 visits to improve functional indep.   Short Term Goal # 2 Pt will transfer with supervision in 6 visits to improve functional indep.   Short Term Goal # 3 Pt will ambulate x 100 feet using FWW with cg assist in 6 visits to improve functional indep.   Short Term Goal # 4 Pt will negotiate 2 stairs with supervision in 6 visits to access home.   Education Group   Education Provided Spine Precautions   Spine Precautions Patient Response Patient;Acceptance;Explanation;Reinforcement Needed   Additional Comments Ed done re log roll rational and brace education, but pt not able to internalize education.   Physical Therapy Initial Treatment Plan    Treatment Plan  Bed Mobility;Gait Training;Neuro Re-Education / Balance;Stair Training;Therapeutic Activities;Orthotics Training    Treatment Frequency 4 Times per Week   Duration Until Therapy Goals Met   Problem List    Problems Impaired Bed Mobility;Pain;Impaired Transfers;Impaired Ambulation;Impaired Balance;Decreased Activity Tolerance;Motor Planning /  Sequencing;Safety Awareness Deficits / Cognition   Anticipated Discharge Equipment and Recommendations   DC Equipment Recommendations Unable to determine at this time   Discharge Recommendations Recommend post-acute placement for additional physical therapy services prior to discharge home   Interdisciplinary Plan of Care Collaboration   IDT Collaboration with  Nursing   Patient Position at End of Therapy In Bed;Call Light within Reach;Tray Table within Reach;Phone within Reach;Bed Alarm On   Collaboration Comments nsg updated   Session Information   Date / Session Number  10/9-1 (1/4, 10/15)

## 2023-10-09 NOTE — DISCHARGE PLANNING
143  Received Choice form at 0057  Agency/Facility Name: [facility]   Referral sent per Choice form @ [phone]

## 2023-10-09 NOTE — DISCHARGE PLANNING
Per therapy recommendations, recommending post-acute placement. Per patient and daughter, would like referral sent [facility]. Should she not be appropriate for them, patient is only in OBS status and would need a 3-day INPT stay for SNF (if family agreeable SNF).

## 2023-10-09 NOTE — THERAPY
Occupational Therapy   Initial Evaluation     Patient Name: Yvonne Marie Wada  Age:  87 y.o., Sex:  female  Medical Record #: [id number]  Today's Date: 10/9/2023     Precautions  Precautions: Fall Risk, Spinal / Back Precautions , TLSO (Thoracolumbosacral orthosis)  Comments: TLSO for comfort    Assessment  Patient is 87 y.o. female admitted for acute on chronic T7 compression fx and T8 compression fractures 2/2 GLF at home following syncope episode. Per NSGY consult, non-op with TLSO for comfort, but possible vertebroplasty. Pt currently unable to mobilize past EOB due to pain.  Pt is a questionable historian. Pt reports living with  who is home 24/7 and available to [facility]. Currently limited by cognition, AROM, strength, balance, activity tolerance, functional mobility, adherence to precautions, safety awareness, and pain which are currently affecting patients ability to complete ADL/IADLs at baseline. Will continue to follow.     Plan    Occupational Therapy Initial Treatment Plan   Treatment Interventions: Self Care / Activities of Daily Living, Adaptive Equipment, Neuro Re-Education / Balance, Therapeutic Exercises, Therapeutic Activity  Treatment Frequency: 4 Times per Week  Duration: Until Therapy Goals Met    DC Equipment Recommendations: Unable to determine at this time  Discharge Recommendations: Recommend post-acute placement for additional occupational therapy services prior to discharge home     Objective     10/09/23 0943   Prior Living Situation   Prior Services Unable To Determine At This Time   Housing / Facility 1 Story House   Steps Into Home 2   Steps In Home 0   Bathroom Set up Walk In Shower;Grab Bars;Shower Chair   Equipment Owned 4-Wheel Walker;Tub / Shower Seat;Grab Bar(s) In Tub / Shower;Grab Bar(s) By Toilet;Raised Toilet Seat With Arms   Lives with - Patient's Self Care Capacity Spouse   Comments Pt is a questionable historian. Pt reports living with  who is home 24/7 and  "available to [facility] PRN.   Prior Level of ADL Function   Self Feeding Independent   Grooming / Hygiene Independent   Bathing Independent   Dressing Independent   Toileting Independent   Prior Level of IADL Function   Medication Management Independent   Laundry Independent   Kitchen Mobility Independent   Finances Independent   Home Management Independent   Shopping Independent   Prior Level Of Mobility Independent With Device in Community;Independent With Device in Home  (4WW)   Precautions   Precautions Fall Risk;TLSO (Thoracolumbosacral orthosis);Spinal / Back Precautions    Comments TLSO for comfort   Vitals   O2 (LPM) 1   O2 Delivery Device Silicone Nasal Cannula   Vitals Comments BP supine 112/66, sitting 131/88, and pt c/o nausea and put BTB before BP in standing.   Pain 0 - 10 Group   Location Back;Rib Cage   Description Aching   Therapist Pain Assessment Nurse Notified;During Activity;Post Activity  (Pt said \"it hurts a lot\")   Cognition    Cognition / Consciousness X   Speech/ Communication Delayed Responses;Hard of Hearing   Ability To Follow Commands 1 Step   Safety Awareness Impaired   New Learning Impaired   Attention Impaired   Comments Cooperative but flat affect. Pt inconsistent with responding to questions and following cues. Unable to determine retention of education.   Passive ROM Upper Body   Passive ROM Upper Body X   Active ROM Upper Body   Active ROM Upper Body  X   Comments Pt had difficulty lifting arms while donning/doffing TLSO due to weakness.   Strength Upper Body   Upper Body Strength  X   Gross Strength Generalized Weakness, Equal Bilaterally.    Balance Assessment   Sitting Balance (Static) Fair -   Sitting Balance (Dynamic) Fair -   Standing Balance (Static) Trace +   Weight Shift Sitting Fair   Comments                                                      w/B HHA   Bed Mobility    Supine to Sit Moderate Assist   Sit to Supine Maximal Assist   Scooting Contact Guard Assist   Rolling " Moderate Assist to Rt.   Comments Pt educated on log roll getting out of bed. Pt unable to demonstrate retention of log roll getting BTB.   ADL Assessment   Upper Body Dressing Maximal Assist  (TLSO)   Lower Body Dressing Maximal Assist   Toileting Maximal Assist  (Bedpan, purewick)   Comments Pt educated on body mechanics for bed mobility while adhereing to spinal precautions, don/dof TLSO, and pathology of bedrest. Recommend reinforcement and spinal precaution handout.   Functional Mobility   Sit to Stand Moderate Assist  (x2 ppl)   Toilet Transfer Unable to Participate   Edema / Skin Assessment   Edema / Skin  Not Assessed   Activity Tolerance   Comments Limited by weakness, fatigue, pain, and volition.   Patient / Family Goals   Patient / Family Goal #1 To go home   Short Term Goals   Short Term Goal # 1 Don/doff TLSO w/ min A   Short Term Goal # 2 Verbalize and maintain spinal precautions during seated ADLs w/ min A and no v/cs   Short Term Goal #3                                         BSC txf with min A   Short Term Goal #4                                         Seated g/h with SPV using BUEs   Education Group   Education Provided Spinal Precautions;Role of Occupational Therapist;Pathology of bedrest;Brace Wear and Care   Role of Occupational Therapist Patient Response Patient;Acceptance;Explanation;Verbal Demonstration;Reinforcement Needed;No Learning Evidence   Spinal Precautions Patient Response Patient;Acceptance;Explanation;Verbal Demonstration;Reinforcement Needed;No Learning Evidence   Brace Wear & Care Patient Response Patient;Acceptance;Explanation;Demonstration;Verbal Demonstration;Reinforcement Needed;No Learning Evidence   Pathology of Bedrest Patient Response Patient;Acceptance;Explanation;Verbal Demonstration;Reinforcement Needed;No Learning Evidence   Occupational Therapy Initial Treatment Plan    Treatment Interventions Self Care / Activities of Daily Living;Adaptive Equipment;Neuro  Re-Education / Balance;Therapeutic Exercises;Therapeutic Activity   Treatment Frequency 4 Times per Week   Duration Until Therapy Goals Met   Problem List   Problem List Decreased Active Daily Living Skills;Decreased Homemaking Skills;Decreased Upper Extremity Strength Right;Decreased Upper Extremity Strength Left;Decreased Upper Extremity AROM Right;Decreased Upper Extremity PROM Right;Decreased Upper Extremity AROM Left;Decreased Upper Extremity PROM Left;Decreased Functional Mobility;Decreased Activity Tolerance;Safety Awareness Deficits / Cognition;Impaired Cognitive Function;Impaired Postural Control / Balance;Other (Comments)  (Limited knowledge of spinal precautions)

## 2023-10-10 LAB
ANION GAP SERPL CALC-SCNC: 10 MMOL/L (ref 7–16)
BUN SERPL-MCNC: 20 MG/DL (ref 8–22)
CALCIUM SERPL-MCNC: 8.3 MG/DL (ref 8.5–10.5)
CHLORIDE SERPL-SCNC: 105 MMOL/L (ref 96–112)
CO2 SERPL-SCNC: 22 MMOL/L (ref 20–33)
CREAT SERPL-MCNC: 0.96 MG/DL (ref 0.5–1.4)
EKG IMPRESSION: NORMAL
ERYTHROCYTE [DISTWIDTH] IN BLOOD BY AUTOMATED COUNT: 46.7 FL (ref 35.9–50)
GFR SERPLBLD CREATININE-BSD FMLA CKD-EPI: 57 ML/MIN/1.73 M 2
GLUCOSE SERPL-MCNC: 98 MG/DL (ref 65–99)
HCT VFR BLD AUTO: 39.4 % (ref 37–47)
HGB BLD-MCNC: 12.7 G/DL (ref 12–16)
MAGNESIUM SERPL-MCNC: 1.8 MG/DL (ref 1.5–2.5)
MCH RBC QN AUTO: 31 PG (ref 27–33)
MCHC RBC AUTO-ENTMCNC: 32.2 G/DL (ref 32.2–35.5)
MCV RBC AUTO: 96.1 FL (ref 81.4–97.8)
PHOSPHATE SERPL-MCNC: 3.1 MG/DL (ref 2.5–4.5)
PLATELET # BLD AUTO: 145 K/UL (ref 164–446)
PMV BLD AUTO: 9.1 FL (ref 9–12.9)
POTASSIUM SERPL-SCNC: 4.2 MMOL/L (ref 3.6–5.5)
RBC # BLD AUTO: 4.1 M/UL (ref 4.2–5.4)
SODIUM SERPL-SCNC: 137 MMOL/L (ref 135–145)
WBC # BLD AUTO: 5.2 K/UL (ref 4.8–10.8)

## 2023-10-10 PROCEDURE — A9270 NON-COVERED ITEM OR SERVICE: HCPCS | Performed by: STUDENT IN AN ORGANIZED HEALTH CARE EDUCATION/TRAINING PROGRAM

## 2023-10-10 PROCEDURE — G0378 HOSPITAL OBSERVATION PER HR: HCPCS

## 2023-10-10 PROCEDURE — 700102 HCHG RX REV CODE 250 W/ 637 OVERRIDE(OP): Performed by: STUDENT IN AN ORGANIZED HEALTH CARE EDUCATION/TRAINING PROGRAM

## 2023-10-10 PROCEDURE — A9270 NON-COVERED ITEM OR SERVICE: HCPCS | Performed by: HOSPITALIST

## 2023-10-10 PROCEDURE — 99232 SBSQ HOSP IP/OBS MODERATE 35: CPT | Performed by: INTERNAL MEDICINE

## 2023-10-10 PROCEDURE — 85027 COMPLETE CBC AUTOMATED: CPT

## 2023-10-10 PROCEDURE — G2212 PROLONG OUTPT/OFFICE VIS: HCPCS | Performed by: PHYSICAL MEDICINE & REHABILITATION

## 2023-10-10 PROCEDURE — 93010 ELECTROCARDIOGRAM REPORT: CPT | Performed by: INTERNAL MEDICINE

## 2023-10-10 PROCEDURE — 51798 US URINE CAPACITY MEASURE: CPT

## 2023-10-10 PROCEDURE — 80048 BASIC METABOLIC PNL TOTAL CA: CPT

## 2023-10-10 PROCEDURE — 700102 HCHG RX REV CODE 250 W/ 637 OVERRIDE(OP): Performed by: NURSE PRACTITIONER

## 2023-10-10 PROCEDURE — A9270 NON-COVERED ITEM OR SERVICE: HCPCS | Performed by: NURSE PRACTITIONER

## 2023-10-10 PROCEDURE — 84100 ASSAY OF PHOSPHORUS: CPT

## 2023-10-10 PROCEDURE — 83735 ASSAY OF MAGNESIUM: CPT

## 2023-10-10 PROCEDURE — 700102 HCHG RX REV CODE 250 W/ 637 OVERRIDE(OP): Performed by: HOSPITALIST

## 2023-10-10 PROCEDURE — 700105 HCHG RX REV CODE 258: Performed by: NURSE PRACTITIONER

## 2023-10-10 PROCEDURE — 82652 VIT D 1 25-DIHYDROXY: CPT

## 2023-10-10 PROCEDURE — 36415 COLL VENOUS BLD VENIPUNCTURE: CPT

## 2023-10-10 PROCEDURE — 99215 OFFICE O/P EST HI 40 MIN: CPT | Mod: 25 | Performed by: PHYSICAL MEDICINE & REHABILITATION

## 2023-10-10 RX ORDER — SODIUM CHLORIDE 9 MG/ML
250 INJECTION, SOLUTION INTRAVENOUS ONCE
Status: COMPLETED | OUTPATIENT
Start: 2023-10-10 | End: 2023-10-10

## 2023-10-10 RX ADMIN — DOCUSATE SODIUM 50 MG AND SENNOSIDES 8.6 MG 2 TABLET: 8.6; 5 TABLET, FILM COATED ORAL at 17:05

## 2023-10-10 RX ADMIN — APIXABAN 2.5 MG: 5 TABLET, FILM COATED ORAL at 17:05

## 2023-10-10 RX ADMIN — ACETAMINOPHEN 1000 MG: 500 TABLET, FILM COATED ORAL at 23:50

## 2023-10-10 RX ADMIN — LEVOTHYROXINE SODIUM 75 MCG: 0.07 TABLET ORAL at 06:04

## 2023-10-10 RX ADMIN — APIXABAN 2.5 MG: 5 TABLET, FILM COATED ORAL at 06:04

## 2023-10-10 RX ADMIN — OXYCODONE 5 MG: 5 TABLET ORAL at 02:58

## 2023-10-10 RX ADMIN — ACETAMINOPHEN 1000 MG: 500 TABLET, FILM COATED ORAL at 06:04

## 2023-10-10 RX ADMIN — SODIUM CHLORIDE 250 ML: 9 INJECTION, SOLUTION INTRAVENOUS at 21:39

## 2023-10-10 RX ADMIN — METOPROLOL TARTRATE 25 MG: 25 TABLET, FILM COATED ORAL at 21:39

## 2023-10-10 RX ADMIN — OXYCODONE 2.5 MG: 5 TABLET ORAL at 20:54

## 2023-10-10 RX ADMIN — BENAZEPRIL HYDROCHLORIDE 40 MG: 20 TABLET ORAL at 06:04

## 2023-10-10 RX ADMIN — ACETAMINOPHEN 1000 MG: 500 TABLET, FILM COATED ORAL at 11:45

## 2023-10-10 RX ADMIN — DOCUSATE SODIUM 50 MG AND SENNOSIDES 8.6 MG 2 TABLET: 8.6; 5 TABLET, FILM COATED ORAL at 06:04

## 2023-10-10 RX ADMIN — ACETAMINOPHEN 1000 MG: 500 TABLET, FILM COATED ORAL at 17:05

## 2023-10-10 RX ADMIN — OXYCODONE 5 MG: 5 TABLET ORAL at 11:45

## 2023-10-10 RX ADMIN — METOPROLOL SUCCINATE 50 MG: 50 TABLET, EXTENDED RELEASE ORAL at 06:04

## 2023-10-10 ASSESSMENT — COGNITIVE AND FUNCTIONAL STATUS - GENERAL
DAILY ACTIVITIY SCORE: 18
HELP NEEDED FOR BATHING: A LITTLE
MOVING FROM LYING ON BACK TO SITTING ON SIDE OF FLAT BED: A LOT
MOBILITY SCORE: 13
PERSONAL GROOMING: A LITTLE
TURNING FROM BACK TO SIDE WHILE IN FLAT BAD: A LITTLE
DRESSING REGULAR LOWER BODY CLOTHING: A LOT
WALKING IN HOSPITAL ROOM: A LOT
SUGGESTED CMS G CODE MODIFIER DAILY ACTIVITY: CK
TOILETING: A LITTLE
DRESSING REGULAR UPPER BODY CLOTHING: A LITTLE
MOVING TO AND FROM BED TO CHAIR: A LOT
CLIMB 3 TO 5 STEPS WITH RAILING: A LOT
STANDING UP FROM CHAIR USING ARMS: A LOT
SUGGESTED CMS G CODE MODIFIER MOBILITY: CL

## 2023-10-10 ASSESSMENT — LIFESTYLE VARIABLES
DOES PATIENT WANT TO STOP DRINKING: NO
TOTAL SCORE: 0
ALCOHOL_USE: NO
TOTAL SCORE: 0
HAVE PEOPLE ANNOYED YOU BY CRITICIZING YOUR DRINKING: NO
EVER HAD A DRINK FIRST THING IN THE MORNING TO STEADY YOUR NERVES TO GET RID OF A HANGOVER: NO
ON A TYPICAL DAY WHEN YOU DRINK ALCOHOL HOW MANY DRINKS DO YOU HAVE: 0
HAVE YOU EVER FELT YOU SHOULD CUT DOWN ON YOUR DRINKING: NO
AVERAGE NUMBER OF DAYS PER WEEK YOU HAVE A DRINK CONTAINING ALCOHOL: 0
HOW MANY TIMES IN THE PAST YEAR HAVE YOU HAD 5 OR MORE DRINKS IN A DAY: 0
CONSUMPTION TOTAL: NEGATIVE
EVER FELT BAD OR GUILTY ABOUT YOUR DRINKING: NO
TOTAL SCORE: 0

## 2023-10-10 ASSESSMENT — PATIENT HEALTH QUESTIONNAIRE - PHQ9
9. THOUGHTS THAT YOU WOULD BE BETTER OFF DEAD, OR OF HURTING YOURSELF: NOT AT ALL
2. FEELING DOWN, DEPRESSED, IRRITABLE, OR HOPELESS: SEVERAL DAYS
SUM OF ALL RESPONSES TO PHQ QUESTIONS 1-9: 3
6. FEELING BAD ABOUT YOURSELF - OR THAT YOU ARE A FAILURE OR HAVE LET YOURSELF OR YOUR FAMILY DOWN: NOT AL ALL
4. FEELING TIRED OR HAVING LITTLE ENERGY: SEVERAL DAYS
7. TROUBLE CONCENTRATING ON THINGS, SUCH AS READING THE NEWSPAPER OR WATCHING TELEVISION: NOT AT ALL
1. LITTLE INTEREST OR PLEASURE IN DOING THINGS: SEVERAL DAYS
8. MOVING OR SPEAKING SO SLOWLY THAT OTHER PEOPLE COULD HAVE NOTICED. OR THE OPPOSITE, BEING SO FIGETY OR RESTLESS THAT YOU HAVE BEEN MOVING AROUND A LOT MORE THAN USUAL: NOT AT ALL
3. TROUBLE FALLING OR STAYING ASLEEP OR SLEEPING TOO MUCH: NOT AT ALL
5. POOR APPETITE OR OVEREATING: NOT AT ALL
SUM OF ALL RESPONSES TO PHQ9 QUESTIONS 1 AND 2: 2

## 2023-10-10 ASSESSMENT — FIBROSIS 4 INDEX: FIB4 SCORE: 3.45

## 2023-10-10 ASSESSMENT — PAIN DESCRIPTION - PAIN TYPE
TYPE: ACUTE PAIN

## 2023-10-10 NOTE — CARE PLAN
The patient is Stable - Low risk of patient condition declining or worsening    Shift Goals  Clinical Goals: denies pain, PT/OT  Patient Goals: TSLO brace OOB    Progress made toward(s) clinical / shift goals:      Patient is not progressing towards the following goals:      Problem: Pain - Standard  Goal: Alleviation of pain or a reduction in pain to the patient’s comfort goal  Outcome: Progressing     Problem: Knowledge Deficit - Standard  Goal: Patient and family/care givers will demonstrate understanding of plan of care, disease process/condition, diagnostic tests and medications  Outcome: Progressing     Problem: Fall Risk  Goal: Patient will remain free from falls  Outcome: Progressing

## 2023-10-10 NOTE — CARE PLAN
The patient is Stable - Low risk of patient condition declining or worsening    Shift Goals  Clinical Goals: Patient will remain safe throughout shift without ground level fall or accidental injury. Patient will acheive adequate pain relief throughout shift.  Patient Goals: Denied  Family Goals: PARAG    Progress made toward(s) clinical / shift goals:  Patient provided a verbal discussion related to POC. Patient verbalized understanding. However, patient is A&Ox2 and may require additional re-enforcement of education and POC. Re-oriented patient as needed. Provided calm relaxing environment. Patient educated to use call light for assistance. Fall precautions in place. Staff will assist with mobilization. Hourly rounding in place. Pt is able to verbalize pain on a scale of 1-10 and is able to verbalize comfort goal. Pain management plan followed and pt educated on nonpharmacologic and pharmacological comfort measures.       Patient is not progressing towards the following goals: N/A

## 2023-10-10 NOTE — DISCHARGE PLANNING
[facility] Transitional [facility] Coordination    Referral from:  [facility]  Insurance Provider on Facesheet:VAISHALI  Potential Rehab Diagnosis:     Chart review indicates patient may have on going medical management and may have therapy needs to possibly meet inpatient rehab facility criteria with the goal of returning to community.    D/C support: spouse/daughter will verify d/c support      Physiatry consultation forwarded  per protocol.          Thank you for the referral.

## 2023-10-10 NOTE — PROGRESS NOTES
Noted patient has had <50 mL of urine output to purewik suction catheter. Performed bladder scan = 300 mL.     Patient found to be incontinent of urine.

## 2023-10-10 NOTE — CARE PLAN
Problem: Pain - Standard  Goal: Alleviation of pain or a reduction in pain to the patient’s comfort goal  Outcome: Progressing     Problem: Knowledge Deficit - Standard  Goal: Patient and family/care givers will demonstrate understanding of plan of care, disease process/condition, diagnostic tests and medications  Outcome: Progressing     Problem: Fall Risk  Goal: Patient will remain free from falls  Outcome: Progressing     Problem: Skin Integrity  Goal: Skin integrity is maintained or improved  Outcome: Progressing   The patient is Stable - Low risk of patient condition declining or worsening    Shift Goals  Clinical Goals: Patient will remain safe throughout shift without ground level fall or accidental injury. Patient will acheive adequate pain relief throughout shift.  Patient Goals: Denied  Family Goals: PARAG    Progress made toward(s) clinical / shift goals:  Pain well controlled with prn medication. Patient remains free from falls, Call light within reach, educated to call if something is needed, and bed is in lowest position.     Patient is not progressing towards the following goals:

## 2023-10-10 NOTE — CONSULTS
Physical Medicine and Rehabilitation Consultation              Date of initial consultation: 10/10/2023  Requesting provider: ordered by Mag Mayfield M.D. at 10/10/23 1011  Consulting provider: Dalia Benson D.O.  Reason for consultation: assess for acute inpatient rehab appropriateness  LOS: 0 Day(s)    Chief complaint: dizziness and fall     HPI: The patient is a 87 y.o.  female with a past medical history of  paroxysmal afib s/p watchman procedure August 2023 on Eliquis , HTN, HLD, GERD, Hypothyroidism, prior inpatient rehab stay in October 2022 after right tib-fib fracture, and history of known thoracic aortic aneurysm  ;  who presented on 10/8/2023  5:48 AM with dizziness and a GLF, suspected syncopal episode. Per documentation, patient had gotten up to go to the bathroom when she felt dizzy, and fell landing on the ground with  + LOC. Upon eval in the ED, CT Head was negative for acute intracranial hemorrhage or infract, CT C spine negative for fractures, CT T spine showed interval progression of a now severe T7 compression fracture and mild T8 compression deformity. Neurosurgery was consulted, with recommendations for non op management and use of TLSO for comfort. Patient's hospital course has been notable for thrombocytopenia with plts down to 145 . She has had HTN and is on tele due to the syncopal episode, which was suspected secondary to orthostatic hypertension.     Patient seen and examined at bedside.   visiting at bedside.  Patient reports she feels okay.  Patient reports he has back pain.  Patient otherwise reports that she is feeling fine.  Denies headache, lightheadedness, dizziness, chest pain, shortness of breath or abdominal pain.  Patient did have an incontinent episode in bed, notified nursing.  Patient reports she was unable to get up and out of bed in time due to her back pain.   reported that patient had been in inpatient rehab before, and would like to return  there.    Social Hx:  Patient lives with  spouse in a 1 story house with 2 JENNIFER.   2 JENNIFER  At prior level of function MOD I with 4 WW     Tobacco: Denies   Alcohol: Denies   Drugs: Denies     THERAPY:  Restrictions: Fall Risk / Spinal Back precautions/ TLSO for comfort   PT: Functional mobility   10/9 Mod A bed mobility, Mod A sit to stand, unable to participate in gait     OT: ADLs  10/9 Mod A bed mobility, Max A lower body dressing, Max A toileting, Mod A x 2 people for sit to stand.     SLP:   None     IMAGING:  DX-PELVIS-1 OR 2 VIEWS   Final Result       1.  No acute displaced fracture identified.   2.  There are old fractures of the right obturator ring.   3.  There is osteopenia.       DX-CHEST-PORTABLE (1 VIEW)   Final Result       1.  Linear left lower lobe opacity likely atelectasis with elevation of the left hemidiaphragm.   2.  Mild interstitial opacity could represent vascular congestion/edema.       CT-LSPINE W/O PLUS RECONS   Final Result       1.  No acute displaced fracture of the lumbar spine.   2.  There is multilevel postoperative changes at L3-L5 levels with degenerative L4-5 anterolisthesis.   3.  Multilevel degenerative disc disease and arthropathy.       CT-TSPINE W/O PLUS RECONS   Final Result       1.  Interval progression of a now severe T7 compression deformity, age-indeterminate.   2.  Progression of a mild T8 compression deformity, age indeterminate.       CT-CSPINE WITHOUT PLUS RECONS   Final Result       1.  There is no acute fracture of the cervical spine.   2.  Stable degenerative changes throughout the cervical spine.           CT-HEAD W/O   Final Result       1.  There is no acute intracranial hemorrhage or infarct.       2.  White matter lucencies most consistent with small vessel ischemic change versus demyelination or gliosis.       3.  There is cerebral atrophy.       PROCEDURES:  None     PMH:  Past Medical History:   Diagnosis Date    AAA (abdominal aortic aneurysm) (HCC)  "7/26/2010    10/09 CT thorax dilated ascending thoracic aorta 4.9 cm 1/10 transesophageal echo dilated aortic root, and ascending aorta with mild aortic insufficiency 1/10  ascending aortic aneurysm repair 5/12 echo [facility] normal LV function EF 60%, moderate to severe left atrial enlargement, RVSP 32     Aortic insufficiency     Aortic valve disease     Aortic valve regurgitation     Arthritis     back and knee/ osteo    Cataract     Dental disorder     full top denture, partial bottom    Diverticulosis     Dyslipidemia 7/26/2010    Sees [facility] on lipitor 4/11 chol 159,trig 84,hdl 47,ldl 95,cpk 114 7/12 chol 163,trig 120,hdl 49,ldl 90, crp 1.1 on lipitor 20 mg     GERD (gastroesophageal reflux disease) 7/12/2012 6/07 EGD per [facility] hiatal hernia, start prevacid 30 mg 7/12 protonix 20 mg qday     Glaucoma     Heart burn     Heart murmur     Heart valve disease     \"leaking\", mitral valve    Hiatus hernia syndrome     High cholesterol     History of shingles 6/30/2011 2010 Back and left thorax      History of total knee arthroplasty 7/26/2010    4/10 MRI right knee complex tear medial meniscus, horizontal cleavage tear lateral meniscus, chondromalacia patella, moderate-sized Baker's cyst 5/10 right meniscus knee repair  5/10 told by  had gout on surgery had pathology report, I await that report Question gout seen on pathology report done during repair of right meniscus knee surgery. 7/10 uric acid 6.3, we'll await starting allopurinol depending on the operative report 8/5/10 reviewed ortho notes , op report indicates crystal deposition, could be gout or pseudogout. No bx result sent over. Will need to get. My suspicion is chondrocalcinosis. 10/11  ortho note, MRI pending 8/7/10 reviewed pathology report right knee tissue, marked degenerative changes with focal calcification, no mention of gout. Based on this and a normal uric acid level, I do not believe she has " gout, has no hyperuricemia medications would be indicated 10/11  ortho left knee meniscectomy and arthroscopy 1/12      HLD (hyperlipidemia)     Hypertension     Hypothyroid 4/8/2011 4/11 tsh 9 start synthroid 50 4/11 tsh 6,free t3 3.1,free t4 1.2,tpo negative 7/1/11 tsh 2.1 cont synthroid 50 mcg 7/12 tsh 3.4 cont synthroid 50 mcg     Indigestion     Mitral insufficiency     OSTEOPOROSIS 8/9/2010    Had been on fosamax [id number]  8/10 dexa LS -2.0,hip -1.5 await old dexa result, she will get copy for me 4/11 vit d 35 9/12 dexa LS -3.2,hip -1.4 2/13/13 relast infusion     Pain     back and right leg, can get as bad as 10/10    Preventative health care 7/26/2010 2002 pneum 2005 colon [facility] no records 4/11 vit d 35 9/11 shingles vaccine 9/12 mammogram 9/12 dexa LS -3.2,hip -1.4     Rheumatic fever     S/P appendectomy 7/26/2010    S/P TOMY (total abdominal hysterectomy) 7/26/2010    Sees gyn on HRT estradiol for 20 yrs () 11/08 mammo      Shingles 6/30/2011    Snoring     Status post lumbar surgery 7/26/2010 6/03 L4-5 epidural Dr. Jordan  7/06 overall for decompression, foraminotomy. L4-L5 posterior fusion, L4-L5 allograft       Stroke ([facility]) 1996    no residual problems     Thoracic aortic aneurysm without mention of rupture     Tricuspid insufficiency     Unspecified disorder of thyroid     hypo    Urinary bladder disorder     suprapubic catheter insertion 11/4    UTI (urinary tract infection) 11/12/2016 7/6/16 UTI klebsiella pneumoniae sensitive to all antibiotics except ampicillin, start bactrim x 5 days 1/18/19 UTI enterobacter cloacae resistant to ampicillin, cephalothin, penicillin, bactrim, sensitive to cefuroxime, ciprofloxacin and levaquin, nitrofurantoin 5/26/19 UTI klebsiella given omnicef, organism resistant ampicillin, ciprofloxacin, levofloxacin, bactrim 8/18/21  urology note        PSH:  Past Surgical History:   Procedure Laterality Date    ORIF,  ANKLE Right 10/14/2022    Procedure: RIGHT ANKLE OPEN REDUCTION INTERNAL FIXATION;  Surgeon: Ryan Huertas M.D.;  Location: SURGERY [facility] [place];  Service: Orthopedics    TENDON REPAIR Right 11/10/2016    Procedure: TENDON REPAIR - QUADRACEPS ;  Surgeon: Maxim Herrera M.D.;  Location: SURGERY [facility];  Service:     KNEE ARTHROPLASTY TOTAL Right 9/8/2016    Procedure: KNEE ARTHROPLASTY TOTAL;  Surgeon: Maxim Herrera M.D.;  Location: SURGERY [facility];  Service:     FUSION, SPINE, LUMBAR, PLIF  11/24/2015    Procedure: LUMBAR FUSION POSTERIOR L3-4, EXPLORATION OF FUSION L4-5 WITH INSTRUMENTATION;  Surgeon: Hermann Reis M.D.;  Location: SURGERY [facility];  Service:     LUMBAR LAMINECTOMY DISKECTOMY  11/24/2015    Procedure: LUMBAR L3-4 LAMINECTOMY AND REDO L4-5;  Surgeon: Hermann Reis M.D.;  Location: SURGERY [facility];  Service:     KNEE ARTHROPLASTY TOTAL  1/3/2012    Performed by YOSEF MEJÍA at [facility]    KNEE ARTHROSCOPY  10/18/2011    Performed by YOSEF MEJÍA at [facility]    MENISCECTOMY, KNEE, MEDIAL  10/18/2011    Performed by YOSEF MEJÍA at [facility]    AORTIC VALVE REPLACEMENT  1/12/2010    Performed by ISAÍSA NICOLE at [facility]    APPENDECTOMY      FUSION, SPINE, LUMBAR, PLIF      HYSTERECTOMY, TOTAL ABDOMINAL         FHX:  Family History   Problem Relation Age of Onset    Stroke Mother     Heart Disease Father     Heart Disease Sister        Medications:  Current Facility-Administered Medications   Medication Dose    apixaban (Eliquis) tablet 2.5 mg  2.5 mg    senna-docusate (Pericolace Or Senokot S) 8.6-50 MG per tablet 2 Tablet  2 Tablet    And    polyethylene glycol/lytes (Miralax) PACKET 1 Packet  1 Packet    And    magnesium hydroxide (Milk Of Magnesia) suspension 30 mL  30 mL    And    bisacodyl (Dulcolax) suppository 10 mg  10 mg    benazepril (Lotensin) tablet 40 mg  40 mg     "levothyroxine (Synthroid) tablet 75 mcg  75 mcg    metoprolol SR (Toprol XL) tablet 50 mg  50 mg    Pharmacy Consult Request ...Pain Management Review 1 Each  1 Each    acetaminophen (Tylenol) tablet 1,000 mg  1,000 mg    Followed by    [START ON 10/13/2023] acetaminophen (Tylenol) tablet 1,000 mg  1,000 mg    oxyCODONE immediate-release (Roxicodone) tablet 2.5 mg  2.5 mg    Or    oxyCODONE immediate-release (Roxicodone) tablet 5 mg  5 mg    Or    morphine 4 MG/ML injection 2 mg  2 mg       Allergies:  Allergies   Allergen Reactions    Codeine Nausea     Synthetic codones ok    Doxycycline Nausea     Nausea, Ok in life saving situation (per pt)    Sulfamethoxazole-Trimethoprim Vomiting and Nausea     Ok in a life saving situation (per pt)    Tramadol Vomiting and Nausea     nausea    Trazodone Vomiting     groggy       Physical Exam:  Vitals: BP (!) 145/96   Pulse (!) 117   Temp 36.5 °C (97.7 °F) (Temporal)   Resp 16   Ht 1.626 m (5' 4\")   Wt 58.2 kg (128 lb 4.9 oz)   SpO2 96%   Gen: NAD, laying comfortably in bed,  at bedside  Head:  NC/AT  Eyes/ Nose/ Mouth: PERRLA, moist mucous membranes  Cardio: RRR, good distal perfusion, warm extremities  Pulm: normal respiratory effort, no cyanosis, on 2 L  Abd: Soft NTND, negative borborygmi   Ext: No peripheral edema. No calf tenderness. No clubbing.    Mental status:  A&Ox4 (person, place, date, situation) answers questions appropriately follows commands  Speech: fluent, no aphasia or dysarthria    CRANIAL NERVES:  2,3: visual acuity grossly intact, PERRL  3,4,6: EOMI bilaterally, no nystagmus or diplopia  5: sensation intact to light touch bilaterally and symmetric  7: no facial asymmetry  8: hearing grossly intact      Motor:      Upper Extremity  Myotome R L   Shoulder flexion C5 5/5 5/5   Elbow flexion C5 5/5 5/5   Wrist extension C6 5/5 5/5   Elbow extension C7 5/5 5/5   Finger flexion C8 5/5 5/5   Finger abduction T1 5/5 5/5     Lower Extremity Myotome R " L   Hip flexion L2 5/5 5/5   Knee extension L3 5/5 5/5   Ankle dorsiflexion L4 5/5 4, chronic /5   Toe extension L5 5/5 5/5   Ankle plantarflexion S1 5/5 5/5       Negative Pronator drift bilaterally    Sensory:   intact to light touch through out    DTRs: 2+ in bilateral  biceps  No clonus at bilateral ankles  Negative Barton b/l     Tone: no spasticity noted, no cogwheeling noted    Coordination:   intact finger to nose bilaterally  intact fine motor with fingers bilaterally    Labs: Reviewed and significant for   Recent Labs     10/08/23  0645 10/09/23  0106 10/10/23  0135   RBC 4.39 3.96* 4.10*   HEMOGLOBIN 13.6 12.8 12.7   HEMATOCRIT 41.9 37.8 39.4   PLATELETCT 178 165 145*   PROTHROMBTM 15.9*  --   --    APTT 30.1  --   --    INR 1.25*  --   --      Recent Labs     10/08/23  0645 10/09/23  0106 10/10/23  0135   SODIUM 140 137 137   POTASSIUM 4.3 4.4 4.2   CHLORIDE 106 105 105   CO2 23 23 22   GLUCOSE 105* 90 98   BUN 15 18 20   CREATININE 0.96 0.99 0.96   CALCIUM 8.8 8.3* 8.3*     Recent Results (from the past 24 hour(s))   EKG    Collection Time: 10/09/23  7:59 PM   Result Value Ref Range    Report       [facility]    Test Date:  2023-10-09  Pt Name:    YVONNE WADA                  Department: [facility]  MRN:        [id number]                      Room:       T727  Gender:     Female                       Technician: MARILYN  :        [id number]                   Requested By:VAN ANDERSON  Order #:    [id number]                    Reading MD: Amarjit Villa MD    Measurements  Intervals                                Axis  Rate:       102                          P:          0  SC:         0                            QRS:        44  QRSD:       90                           T:          76  QT:         351  QTc:        458    Interpretive Statements  Atrial fibrillation  Compared to ECG 10/08/2023 06:53:27  No significant changes  Electronically Signed On 10- 04:02:05 PDT by Amarjit Villa MD     Basic  Metabolic Panel    Collection Time: 10/10/23  1:35 AM   Result Value Ref Range    Sodium 137 135 - 145 mmol/L    Potassium 4.2 3.6 - 5.5 mmol/L    Chloride 105 96 - 112 mmol/L    Co2 22 20 - 33 mmol/L    Glucose 98 65 - 99 mg/dL    Bun 20 8 - 22 mg/dL    Creatinine 0.96 0.50 - 1.40 mg/dL    Calcium 8.3 (L) 8.5 - 10.5 mg/dL    Anion Gap 10.0 7.0 - 16.0   CBC WITHOUT DIFFERENTIAL    Collection Time: 10/10/23  1:35 AM   Result Value Ref Range    WBC 5.2 4.8 - 10.8 K/uL    RBC 4.10 (L) 4.20 - 5.40 M/uL    Hemoglobin 12.7 12.0 - 16.0 g/dL    Hematocrit 39.4 37.0 - 47.0 %    MCV 96.1 81.4 - 97.8 fL    MCH 31.0 27.0 - 33.0 pg    MCHC 32.2 32.2 - 35.5 g/dL    RDW 46.7 35.9 - 50.0 fL    Platelet Count 145 (L) 164 - 446 K/uL    MPV 9.1 9.0 - 12.9 fL   MAGNESIUM    Collection Time: 10/10/23  1:35 AM   Result Value Ref Range    Magnesium 1.8 1.5 - 2.5 mg/dL   PHOSPHORUS    Collection Time: 10/10/23  1:35 AM   Result Value Ref Range    Phosphorus 3.1 2.5 - 4.5 mg/dL   ESTIMATED GFR    Collection Time: 10/10/23  1:35 AM   Result Value Ref Range    GFR (CKD-EPI) 57 (A) >60 mL/min/1.73 m 2         ASSESSMENT:  Patient is a 87 y.o. female admitted with thoracic compression fracture    C Code / Diagnosis to Support: 0008.9 - Orthopaedic Disorders: Other Orthopaedic    Rehabilitation: Impaired ADLs and mobility  Patient is a good candidate for inpatient rehab based on needs for PT, OT, see disposition details below    Barriers to transfer include: Insurance authorization, TCCs to verify disposition, medical clearance and bed availability     Additional Recommendations:  T7 compression fracture  - Sustained during a ground-level fall 2 to suspected orthostatic hypotension/syncope  - CT head negative for intracranial hemorrhage or infarct  - CT T-spine showed an interval progression of a now severe T7 compression deformity, age-indeterminate as well as progression of a mild T8 compression deformity  - Neurosurgery consulted,  recommending nonoperative management  - TLSO for comfort  - Continue with PT/OT/SLP, patient's pain is improving, has no strength deficits or neurologic deficits    Syncopal episode  Orthostatic hypotension  -Syncopal episode may have been due to orthostatic hypotension, patient has not been hypotensive during admission  - May have been related to dehydration, patient has been fluid resuscitated  - Monitor for orthostatic hypotension with attempts for up and out of bed  -Patient's mobility was limited with therapies earlier due to back pain    Atrial fibrillation  - Known diagnosis, is on Eliquis in the outpatient setting  - Eliquis restarted  - Status post watchman's procedure in August 2023    Hypertension  - In the outpatient setting patient is on metoprolol and Lotensin, monitoring for orthostatic hypotension after medications  - Has not currently been hypotensive on a 10/10    Dispo:  - patient is currently functioning below their level of baseline, recommend post acute rehab  - recommend IRF level therapy with 3hr of therapy 5 days per week  - piror to acceptance to IRF, will need confirmation of discharge support from   - TCC to assist with insurance auth and DC support         Medical Complexity:  T7 compression fracture  Syncopal episode  Orthostatic hypotension  Atrial fibrillation  Hypertension  Impaired mobility and ADLs        DVT PPX: Eliquis       Thank you for allowing us to participate in the care of this patient.     Patient was seen for 81 minutes on unit/floor of which > 50% of time was spent on counseling and coordination of care regarding the above, including prognosis, risk reduction, benefits of treatment, and options for next stage of care.    Dalia Benson D.O.   Physical Medicine and Rehabilitation     Please note that this dictation was created using voice recognition software. I have made every reasonable attempt to correct obvious errors, but there may be errors of grammar and  possibly content that I did not discover before finalizing the note.

## 2023-10-10 NOTE — PROGRESS NOTES
4 Eyes Skin Assessment Completed by Rani RN and RAMÍREZ Solares.    Head WDL  Ears WDL  Nose WDL  Mouth WDL  Neck WDL  Breast/Chest Redness  Shoulder Blades WDL  Spine WDL  (R) Arm/Elbow/Hand WDL  (L) Arm/Elbow/Hand WDL  Abdomen WDL  Groin WDL  Scrotum/Coccyx/Buttocks Redness and Blanching  (R) Leg WDL  (L) Leg WDL  (R) Heel/Foot/Toe WDL  (L) Heel/Foot/Toe WDL          Devices In Places Nasal Cannula      Interventions In Place Gray Ear Foams    Possible Skin Injury No    Pictures Uploaded Into Epic N/A  Wound Consult Placed N/A  RN Wound Prevention Protocol Ordered No

## 2023-10-10 NOTE — PROGRESS NOTES
Received bedside report from RN Monica, pt care assumed. VS WDL, pt assessment complete. Pt A&Ox2 disoriented to time and situation, no c/o pain at this time. POC discussed with pt and verbalizes no questions. Pt denies any additional needs at this time. Bed locked and in lowest position, bed alarm on. Pt educated on fall risk and verbalized understanding, call light within reach, hourly rounding initiated.

## 2023-10-10 NOTE — PROGRESS NOTES
Bedside report received from night RN; assumed pt care. Pt assessment complete. Pt on A&Ox3, confusion regarding time. Reviewed plan of care with pt. Pt on tele. Patient on 2L via NC. All needs met at this time. Chart and labs reviewed. Bed in lowest position, and 2 side rails up. Pt educated on call light; call light with in reach.

## 2023-10-11 ENCOUNTER — APPOINTMENT (OUTPATIENT)
Dept: CARDIOLOGY | Facility: MEDICAL CENTER | Age: 88
End: 2023-10-11
Attending: NURSE PRACTITIONER
Payer: MEDICARE

## 2023-10-11 LAB
1,25(OH)2D3 SERPL-MCNC: 63 PG/ML (ref 19.9–79.3)
ANION GAP SERPL CALC-SCNC: 12 MMOL/L (ref 7–16)
BUN SERPL-MCNC: 16 MG/DL (ref 8–22)
CALCIUM SERPL-MCNC: 8.6 MG/DL (ref 8.5–10.5)
CHLORIDE SERPL-SCNC: 102 MMOL/L (ref 96–112)
CO2 SERPL-SCNC: 20 MMOL/L (ref 20–33)
CREAT SERPL-MCNC: 0.78 MG/DL (ref 0.5–1.4)
ERYTHROCYTE [DISTWIDTH] IN BLOOD BY AUTOMATED COUNT: 43.8 FL (ref 35.9–50)
GFR SERPLBLD CREATININE-BSD FMLA CKD-EPI: 74 ML/MIN/1.73 M 2
GLUCOSE SERPL-MCNC: 86 MG/DL (ref 65–99)
HCT VFR BLD AUTO: 42 % (ref 37–47)
HGB BLD-MCNC: 14.1 G/DL (ref 12–16)
MCH RBC QN AUTO: 31.1 PG (ref 27–33)
MCHC RBC AUTO-ENTMCNC: 33.6 G/DL (ref 32.2–35.5)
MCV RBC AUTO: 92.5 FL (ref 81.4–97.8)
PLATELET # BLD AUTO: 154 K/UL (ref 164–446)
PMV BLD AUTO: 9.1 FL (ref 9–12.9)
POTASSIUM SERPL-SCNC: 4.1 MMOL/L (ref 3.6–5.5)
RBC # BLD AUTO: 4.54 M/UL (ref 4.2–5.4)
SODIUM SERPL-SCNC: 134 MMOL/L (ref 135–145)
WBC # BLD AUTO: 4.9 K/UL (ref 4.8–10.8)

## 2023-10-11 PROCEDURE — 36415 COLL VENOUS BLD VENIPUNCTURE: CPT

## 2023-10-11 PROCEDURE — 99232 SBSQ HOSP IP/OBS MODERATE 35: CPT | Performed by: INTERNAL MEDICINE

## 2023-10-11 PROCEDURE — 80048 BASIC METABOLIC PNL TOTAL CA: CPT

## 2023-10-11 PROCEDURE — C9803 HOPD COVID-19 SPEC COLLECT: HCPCS | Performed by: INTERNAL MEDICINE

## 2023-10-11 PROCEDURE — 700102 HCHG RX REV CODE 250 W/ 637 OVERRIDE(OP): Performed by: STUDENT IN AN ORGANIZED HEALTH CARE EDUCATION/TRAINING PROGRAM

## 2023-10-11 PROCEDURE — 85027 COMPLETE CBC AUTOMATED: CPT

## 2023-10-11 PROCEDURE — G0378 HOSPITAL OBSERVATION PER HR: HCPCS

## 2023-10-11 PROCEDURE — 700102 HCHG RX REV CODE 250 W/ 637 OVERRIDE(OP): Performed by: HOSPITALIST

## 2023-10-11 PROCEDURE — A9270 NON-COVERED ITEM OR SERVICE: HCPCS | Performed by: HOSPITALIST

## 2023-10-11 PROCEDURE — A9270 NON-COVERED ITEM OR SERVICE: HCPCS | Performed by: STUDENT IN AN ORGANIZED HEALTH CARE EDUCATION/TRAINING PROGRAM

## 2023-10-11 RX ADMIN — BENAZEPRIL HYDROCHLORIDE 40 MG: 20 TABLET ORAL at 04:23

## 2023-10-11 RX ADMIN — DOCUSATE SODIUM 50 MG AND SENNOSIDES 8.6 MG 2 TABLET: 8.6; 5 TABLET, FILM COATED ORAL at 04:20

## 2023-10-11 RX ADMIN — DOCUSATE SODIUM 50 MG AND SENNOSIDES 8.6 MG 2 TABLET: 8.6; 5 TABLET, FILM COATED ORAL at 17:23

## 2023-10-11 RX ADMIN — LEVOTHYROXINE SODIUM 75 MCG: 0.07 TABLET ORAL at 04:19

## 2023-10-11 RX ADMIN — APIXABAN 2.5 MG: 5 TABLET, FILM COATED ORAL at 04:20

## 2023-10-11 RX ADMIN — ACETAMINOPHEN 1000 MG: 500 TABLET, FILM COATED ORAL at 12:21

## 2023-10-11 RX ADMIN — METOPROLOL SUCCINATE 50 MG: 50 TABLET, EXTENDED RELEASE ORAL at 04:23

## 2023-10-11 RX ADMIN — APIXABAN 2.5 MG: 5 TABLET, FILM COATED ORAL at 17:23

## 2023-10-11 RX ADMIN — ACETAMINOPHEN 1000 MG: 500 TABLET, FILM COATED ORAL at 23:10

## 2023-10-11 RX ADMIN — ACETAMINOPHEN 1000 MG: 500 TABLET, FILM COATED ORAL at 18:05

## 2023-10-11 ASSESSMENT — COGNITIVE AND FUNCTIONAL STATUS - GENERAL
TURNING FROM BACK TO SIDE WHILE IN FLAT BAD: A LITTLE
WALKING IN HOSPITAL ROOM: A LOT
STANDING UP FROM CHAIR USING ARMS: A LOT
MOVING TO AND FROM BED TO CHAIR: A LOT
EATING MEALS: A LITTLE
DRESSING REGULAR UPPER BODY CLOTHING: A LITTLE
PERSONAL GROOMING: A LITTLE
HELP NEEDED FOR BATHING: A LOT
SUGGESTED CMS G CODE MODIFIER MOBILITY: CL
SUGGESTED CMS G CODE MODIFIER DAILY ACTIVITY: CK
TOILETING: A LOT
MOBILITY SCORE: 13
DRESSING REGULAR LOWER BODY CLOTHING: A LITTLE
CLIMB 3 TO 5 STEPS WITH RAILING: A LOT
DAILY ACTIVITIY SCORE: 16
MOVING FROM LYING ON BACK TO SITTING ON SIDE OF FLAT BED: A LOT

## 2023-10-11 ASSESSMENT — FIBROSIS 4 INDEX: FIB4 SCORE: 3.25

## 2023-10-11 ASSESSMENT — PAIN DESCRIPTION - PAIN TYPE
TYPE: ACUTE PAIN
TYPE: ACUTE PAIN

## 2023-10-11 NOTE — DISCHARGE PLANNING
PM&R consult complete, states patient is a good candidate for IPR.    Would appreciate updated PT/OT notes.  TCC continues to follow for medical clearance

## 2023-10-11 NOTE — DISCHARGE PLANNING
LMSW spoke with Jyotsna SIFUENTES to clarify that pt is possibly able to DC tomorrow and able to go to IPR. Jyotsna stated if pt can get covid tested prior to transportation it can help IPR with possible admission. LMSW will reach out to nurse for covid test. Pending [facility] will follow up.

## 2023-10-11 NOTE — PROGRESS NOTES
Report received from RAMÍREZ Cunningham. Pt transported to 821. Pt A&Ox3, disoriented to time. Vitals stable. Pt oriented to room.

## 2023-10-11 NOTE — PROGRESS NOTES
4 Eyes Skin Assessment Completed by RAMÍREZ Bueno and RAMÍREZ Ochoa.    Head WDL  Ears WDL  Nose WDL  Mouth WDL  Neck WDL  Breast/Chest -- R and L breast: Redness  Shoulder Blades WDL  Spine WDL  (R) Arm/Elbow/Hand WDL  (L) Arm/Elbow/Hand WDL  Abdomen WDL  Groin WDL  Scrotum/Coccyx/Buttocks -- Sacrum: Redness  (R) Leg Swelling  (L) Leg Swelling  (R) Heel/Foot/Toe WDL  (L) Heel/Foot/Toe WDL          Devices In Places Tele Box, Blood Pressure Cuff, Pulse Ox, and Nasal Cannula      Interventions In Place Gray Ear Foams, Pillows, and Pressure Redistribution Mattress    Possible Skin Injury No    Pictures Uploaded Into Epic N/A  Wound Consult Placed N/A  RN Wound Prevention Protocol Ordered Yes

## 2023-10-11 NOTE — PROGRESS NOTES
NOC HOSPITALIST CROSS COVER    Notified by RN regarding patient having A-fib with rvr, with rates in the 120s. Transferred back to telemetry due to concern for possible need for Lopressor if rates become further uncontrolled.      Vitals:    10/11/23 0400   BP: 116/70   Pulse: (!) 105   Resp: 18   Temp: 36.3 °C (97.3 °F)   SpO2: 95%       -----------------------------------------------------------------------------------------------------------    Electronically signed by:  Shabbir Stern, KADEN, APRN, CANDYP-BC  Hospitalist Services

## 2023-10-11 NOTE — PROGRESS NOTES
Hospital Medicine Daily Progress Note    Date of Service  10/11/2023    Chief Complaint  Yvonne Marie Wada is a 87 y.o. female admitted 10/8/2023 with FALL    Hospital Course  Yvonne Marie Wada is a 87 y.o. female who presented 10/8/2023 with fall. she felt dizzy and  lightheaded and fell to the ground, losing consciousness for short period of time. CT showed progression of a now severe T7 T8 compression deformity, age-indeterminate.  Neurosurgery consulted, no intervention indicated.  Recommended TLSO     Yvonne Wada has past medical history includes paroxysmal atrial fibrillation gastrointestinal bleeding.  Patient underwent Watchman procedure in August 2023.  On 10/6/2023, she had a ZAK to evaluate for epithelialization.       Interval Problem Update  Patient was seen and examined at bedside  Reported the back pain, not able to walk  PT recommended postacute  PMR consulted  No surgery planned, I resumed the Eliquis for A-fib  Continue telemetry monitor for syncope episode.  Likely vasovagal  10/10:Patient seen and examined, resting in bed, afebrile, still with back pain, no nausea or vomiting   Seen by neurosurgery but no need for surgery, will PT/OT   Pain management with oral and IV narcotics monitor for side effects   Rehab evaluating if possible candidate for  rehab  10/11: Patient resting in bed, afebrile, has some back pain, no nausea or vomiting   Seen by neurosurgery and TLSO recommended   Cont pain management with PO an IV narcotics  Awaiting placement   I have discussed this patient's plan of care and discharge plan at IDT rounds today with Case Management, Nursing, Nursing leadership, and other members of the IDT team.    Consultants/Specialty  neurosurgery    Code Status  DNAR/DNI    Disposition  The patient is medically cleared for discharge to home or a post-acute facility.      I have placed the appropriate orders for post-discharge needs.    Review of Systems  All 12 systems were reviewed and  negative except as mentioned above       Physical Exam  Temp:  [36.3 °C (97.3 °F)-37.1 °C (98.8 °F)] 37.1 °C (98.8 °F)  Pulse:  [] 110  Resp:  [17-20] 17  BP: (116-155)/(70-97) 136/82  SpO2:  [94 %-98 %] 94 %    Physical Exam  Constitutional:       General: She is in acute distress.      Appearance: She is ill-appearing.   HENT:      Head: Normocephalic.      Nose: Nose normal.      Mouth/Throat:      Mouth: Mucous membranes are moist.   Eyes:      Extraocular Movements: Extraocular movements intact.      Conjunctiva/sclera: Conjunctivae normal.   Cardiovascular:      Rate and Rhythm: Normal rate. Rhythm irregular.      Pulses: Normal pulses.      Heart sounds: Normal heart sounds.   Pulmonary:      Effort: Pulmonary effort is normal.      Breath sounds: Normal breath sounds.   Abdominal:      General: Bowel sounds are normal.      Palpations: Abdomen is soft.      Tenderness: There is no abdominal tenderness.   Musculoskeletal:         General: Tenderness present. No swelling. Normal range of motion.      Cervical back: Normal range of motion.   Skin:     General: Skin is warm.   Neurological:      Mental Status: She is alert and oriented to person, place, and time. Mental status is at baseline.   Psychiatric:         Mood and Affect: Mood normal.         Fluids    Intake/Output Summary (Last 24 hours) at 10/11/2023 1349  Last data filed at 10/11/2023 0400  Gross per 24 hour   Intake --   Output 250 ml   Net -250 ml         Laboratory  Recent Labs     10/09/23  0106 10/10/23  0135 10/11/23  0100   WBC 5.7 5.2 4.9   RBC 3.96* 4.10* 4.54   HEMOGLOBIN 12.8 12.7 14.1   HEMATOCRIT 37.8 39.4 42.0   MCV 95.5 96.1 92.5   MCH 32.3 31.0 31.1   MCHC 33.9 32.2 33.6   RDW 46.8 46.7 43.8   PLATELETCT 165 145* 154*   MPV 9.1 9.1 9.1       Recent Labs     10/09/23  0106 10/10/23  0135 10/11/23  0100   SODIUM 137 137 134*   POTASSIUM 4.4 4.2 4.1   CHLORIDE 105 105 102   CO2 23 22 20   GLUCOSE 90 98 86   BUN 18 20 16    CREATININE 0.99 0.96 0.78   CALCIUM 8.3* 8.3* 8.6                       Imaging  DX-PELVIS-1 OR 2 VIEWS   Final Result      1.  No acute displaced fracture identified.   2.  There are old fractures of the right obturator ring.   3.  There is osteopenia.      DX-CHEST-PORTABLE (1 VIEW)   Final Result      1.  Linear left lower lobe opacity likely atelectasis with elevation of the left hemidiaphragm.   2.  Mild interstitial opacity could represent vascular congestion/edema.      CT-LSPINE W/O PLUS RECONS   Final Result      1.  No acute displaced fracture of the lumbar spine.   2.  There is multilevel postoperative changes at L3-L5 levels with degenerative L4-5 anterolisthesis.   3.  Multilevel degenerative disc disease and arthropathy.      CT-TSPINE W/O PLUS RECONS   Final Result      1.  Interval progression of a now severe T7 compression deformity, age-indeterminate.   2.  Progression of a mild T8 compression deformity, age indeterminate.      CT-CSPINE WITHOUT PLUS RECONS   Final Result      1.  There is no acute fracture of the cervical spine.   2.  Stable degenerative changes throughout the cervical spine.         CT-HEAD W/O   Final Result      1.  There is no acute intracranial hemorrhage or infarct.      2.  White matter lucencies most consistent with small vessel ischemic change versus demyelination or gliosis.      3.  There is cerebral atrophy.                Assessment/Plan  * Thoracic compression fracture, closed, initial encounter (HCC)- (present on admission)  Assessment & Plan  Patient suffered a thoracic compression fracture after fall  Patient requires hospitalization observation care for pain control and neurosurgical evaluation  I have ordered intravenous morphine, monitor for side effects which include history of depression  Dr. Bynum of neurosurgery to see patient  TLSO brace  PT/OT    POLST (Physician Orders for Life-Sustaining Treatment)- (present on admission)  Assessment &  Plan  Confirmed with the patient and her   Patient's current wishes are consistent with her POLST  DNR/DNI    Syncope- (present on admission)  Assessment & Plan  Patient reports loss of consciousness prior to the fall  Symptoms are most consistent with orthostatic hypotension  Monitor on telemetry  Gentle IV fluid hydration    Paroxysmal atrial fibrillation (HCC)- (present on admission)  Assessment & Plan  Status post Watchman procedure  Resumed Eliquis    Hypertension- (present on admission)  Assessment & Plan  Outpatient blood pressure medications with hold parameters         VTE prophylaxis: Eliquis    I have performed a physical exam and reviewed and updated ROS and Plan today (10/11/2023). In review of yesterday's note (10/10/2023), there are no changes except as documented above.

## 2023-10-11 NOTE — PROGRESS NOTES
Hospital Medicine Daily Progress Note    Date of Service  10/10/2023    Chief Complaint  Yvonne Marie Wada is a 87 y.o. female admitted 10/8/2023 with FALL    Hospital Course  Yvonne Marie Wada is a 87 y.o. female who presented 10/8/2023 with fall. she felt dizzy and  lightheaded and fell to the ground, losing consciousness for short period of time. CT showed progression of a now severe T7 T8 compression deformity, age-indeterminate.  Neurosurgery consulted, no intervention indicated.  Recommended TLSO     Yvonne Wada has past medical history includes paroxysmal atrial fibrillation gastrointestinal bleeding.  Patient underwent Watchman procedure in August 2023.  On 10/6/2023, she had a ZAK to evaluate for epithelialization.       Interval Problem Update  Patient was seen and examined at bedside  Reported the back pain, not able to walk  PT recommended postacute  PMR consulted  No surgery planned, I resumed the Eliquis for A-fib  Continue telemetry monitor for syncope episode.  Likely vasovagal  10/10:Patient seen and examined, resting in bed, afebrile, still with back pain, no nausea or vomiting   Seen by neurosurgery but no need for surgery, will PT/OT   Pain management with oral and IV narcotics monitor for side effects   Rehab evaluating if possible candidate for  rehab  I have discussed this patient's plan of care and discharge plan at IDT rounds today with Case Management, Nursing, Nursing leadership, and other members of the IDT team.    Consultants/Specialty  neurosurgery    Code Status  DNAR/DNI    Disposition  The patient is not medically cleared for discharge to home or a post-acute facility.      I have placed the appropriate orders for post-discharge needs.    Review of Systems  All 12 systems were reviewed and negative except as mentioned above       Physical Exam  Temp:  [36.1 °C (96.9 °F)-36.7 °C (98.1 °F)] (P) 36.5 °C (97.7 °F)  Pulse:  [] (P) 118  Resp:  [16-20] (P) 20  BP: (136-155)/(87-97)  (P) 125/91  SpO2:  [91 %-96 %] (P) 94 %    Physical Exam  Constitutional:       General: She is in acute distress.      Appearance: She is ill-appearing.   HENT:      Head: Normocephalic.      Nose: Nose normal.      Mouth/Throat:      Mouth: Mucous membranes are moist.   Eyes:      Extraocular Movements: Extraocular movements intact.      Conjunctiva/sclera: Conjunctivae normal.   Cardiovascular:      Rate and Rhythm: Normal rate. Rhythm irregular.      Pulses: Normal pulses.      Heart sounds: Normal heart sounds.   Pulmonary:      Effort: Pulmonary effort is normal.      Breath sounds: Normal breath sounds.   Abdominal:      General: Bowel sounds are normal.      Palpations: Abdomen is soft.      Tenderness: There is no abdominal tenderness.   Musculoskeletal:         General: Tenderness present. No swelling. Normal range of motion.      Cervical back: Normal range of motion.   Skin:     General: Skin is warm.   Neurological:      Mental Status: She is alert and oriented to person, place, and time. Mental status is at baseline.   Psychiatric:         Mood and Affect: Mood normal.         Fluids    Intake/Output Summary (Last 24 hours) at 10/10/2023 2210  Last data filed at 10/10/2023 0607  Gross per 24 hour   Intake 800 ml   Output 0 ml   Net 800 ml       Laboratory  Recent Labs     10/08/23  0645 10/09/23  0106 10/10/23  0135   WBC 4.7* 5.7 5.2   RBC 4.39 3.96* 4.10*   HEMOGLOBIN 13.6 12.8 12.7   HEMATOCRIT 41.9 37.8 39.4   MCV 95.4 95.5 96.1   MCH 31.0 32.3 31.0   MCHC 32.5 33.9 32.2   RDW 47.3 46.8 46.7   PLATELETCT 178 165 145*   MPV 8.5* 9.1 9.1     Recent Labs     10/08/23  0645 10/09/23  0106 10/10/23  0135   SODIUM 140 137 137   POTASSIUM 4.3 4.4 4.2   CHLORIDE 106 105 105   CO2 23 23 22   GLUCOSE 105* 90 98   BUN 15 18 20   CREATININE 0.96 0.99 0.96   CALCIUM 8.8 8.3* 8.3*     Recent Labs     10/08/23  0645   APTT 30.1   INR 1.25*               Imaging  DX-PELVIS-1 OR 2 VIEWS   Final Result      1.  No  acute displaced fracture identified.   2.  There are old fractures of the right obturator ring.   3.  There is osteopenia.      DX-CHEST-PORTABLE (1 VIEW)   Final Result      1.  Linear left lower lobe opacity likely atelectasis with elevation of the left hemidiaphragm.   2.  Mild interstitial opacity could represent vascular congestion/edema.      CT-LSPINE W/O PLUS RECONS   Final Result      1.  No acute displaced fracture of the lumbar spine.   2.  There is multilevel postoperative changes at L3-L5 levels with degenerative L4-5 anterolisthesis.   3.  Multilevel degenerative disc disease and arthropathy.      CT-TSPINE W/O PLUS RECONS   Final Result      1.  Interval progression of a now severe T7 compression deformity, age-indeterminate.   2.  Progression of a mild T8 compression deformity, age indeterminate.      CT-CSPINE WITHOUT PLUS RECONS   Final Result      1.  There is no acute fracture of the cervical spine.   2.  Stable degenerative changes throughout the cervical spine.         CT-HEAD W/O   Final Result      1.  There is no acute intracranial hemorrhage or infarct.      2.  White matter lucencies most consistent with small vessel ischemic change versus demyelination or gliosis.      3.  There is cerebral atrophy.              Assessment/Plan  * Thoracic compression fracture, closed, initial encounter (HCC)- (present on admission)  Assessment & Plan  Patient suffered a thoracic compression fracture after fall  Patient requires hospitalization observation care for pain control and neurosurgical evaluation  I have ordered intravenous morphine, monitor for side effects which include history of depression  Dr. Bynum of neurosurgery to see patient  TLSO brace  PT/OT    POLST (Physician Orders for Life-Sustaining Treatment)- (present on admission)  Assessment & Plan  Confirmed with the patient and her   Patient's current wishes are consistent with her POLST  DNR/DNI    Syncope- (present on  admission)  Assessment & Plan  Patient reports loss of consciousness prior to the fall  Symptoms are most consistent with orthostatic hypotension  Monitor on telemetry  Gentle IV fluid hydration    Paroxysmal atrial fibrillation (HCC)- (present on admission)  Assessment & Plan  Status post Watchman procedure  Resumed Eliquis    Hypertension- (present on admission)  Assessment & Plan  Outpatient blood pressure medications with hold parameters         VTE prophylaxis: Eliquis    I have performed a physical exam and reviewed and updated ROS and Plan today (10/10/2023). In review of yesterday's note (10/9/2023), there are no changes except as documented above.

## 2023-10-11 NOTE — DISCHARGE PLANNING
Following for post acute services. Spoke with Daughter , Isis about goals and expectation for IRF level of care. Discussed therapy plan for 3 hours per day 5 days a week. Discussed therapy schedules 30/45 or 60 minute sessions typically 1.5 hours between breakfast and lunch and another 1.5 hours between lunch and dinner. Discussed PT/OT and SLP roles. Discussed potential length of stay. Discussed comprehensive medical surveillance by physiatry as well as hospitalist team. Discussed patient to nursing ratio. Discussed insurance MCR coverage for the program. Discussed visitation and clothing requirements. Isis and her brother Jose Elias will be primary support for Mala   to return to the community. Discussed participation with therapy program when visiting.  Family/ patient rehab goals are  to achieve stand by assist  with transfers and mobility, self care goal back to baseline  Home is a sing;e  story with 2 step(s) to enter.  Discussed discharge planner role and team conference. DME in the home includes a 4 wheeled walker  In the event of additional support need sisters  Answered all questions and addressed concerns.  Left my direct line for any other questions.

## 2023-10-11 NOTE — PROGRESS NOTES
Assumed care of patient at bedside report from night shift RN. Updated on POC. Patient currently A & O x 3; oriented to person, place, and situation, disoriented to time. Currently on 2 L O2 via nasal cannula; up without complaints of acute pain. Call light within reach. Whiteboard updated. Fall precautions in place. Bed locked and in lowest position. All questions answered. No other needs indicated at this time.

## 2023-10-11 NOTE — CARE PLAN
The patient is Stable - Low risk of patient condition declining or worsening    Shift Goals  Clinical Goals: monitor HR/rhyth, VS  Patient Goals: Rest  Family Goals: PARAG    Progress made toward(s) clinical / shift goals:    Problem: Fall Risk  Goal: Patient will remain free from falls  Description: Target End Date:  Prior to discharge or change in level of care    Document interventions on the [place] Fall Risk Assessment    1.  Assess for fall risk factors  2.  Implement fall precautions  Outcome: Progressing  Note: Pt is a high fall risk. Pt is a max assist, utilizes FWW at home. Pt educated to utilize call light prior to ambulation. Bed is in lowest, locked position, upper bed rails in place, treaded socks on, and call light within reach.     Problem: Skin Integrity  Goal: Skin integrity is maintained or improved  Description: Target End Date:  Prior to discharge or change in level of care    Document interventions on Skin Risk/Jase flowsheet groups and corresponding LDA    1.  Assess and monitor skin integrity, appearance and/or temperature  2.  Assess risk factors for impaired skin integrity and/or pressures ulcers  3.  Implement precautions to protect skin integrity in collaboration with interdisciplinary team  4.  Implement pressure ulcer prevention protocol if at risk for skin breakdown  5.  Confirm wound care consult if at risk for skin breakdown  6.  Ensure patient use of pressure relieving devices  (Low air loss bed, waffle overlay, heel protectors, ROHO cushion, etc)  Outcome: Progressing  Note: Pt sacrum is red, with blanchable and non-blanchable areas. Ordered barrier cream and a waffle for the patient. Pt turns self from side to side in bed.

## 2023-10-12 ENCOUNTER — HOSPITAL ENCOUNTER (INPATIENT)
Facility: REHABILITATION | Age: 88
LOS: 14 days | DRG: 560 | End: 2023-10-26
Attending: PHYSICAL MEDICINE & REHABILITATION | Admitting: PHYSICAL MEDICINE & REHABILITATION
Payer: MEDICARE

## 2023-10-12 VITALS
DIASTOLIC BLOOD PRESSURE: 98 MMHG | RESPIRATION RATE: 17 BRPM | HEIGHT: 64 IN | TEMPERATURE: 97.9 F | OXYGEN SATURATION: 93 % | BODY MASS INDEX: 24.62 KG/M2 | WEIGHT: 144.18 LBS | HEART RATE: 105 BPM | SYSTOLIC BLOOD PRESSURE: 144 MMHG

## 2023-10-12 DIAGNOSIS — I10 ESSENTIAL HYPERTENSION: ICD-10-CM

## 2023-10-12 DIAGNOSIS — S22.000A THORACIC COMPRESSION FRACTURE, CLOSED, INITIAL ENCOUNTER (HCC): ICD-10-CM

## 2023-10-12 DIAGNOSIS — S22.068A OTHER FRACTURE OF T7-T8 THORACIC VERTEBRA, INITIAL ENCOUNTER FOR CLOSED FRACTURE (HCC): ICD-10-CM

## 2023-10-12 DIAGNOSIS — I48.0 PAF (PAROXYSMAL ATRIAL FIBRILLATION) (HCC): ICD-10-CM

## 2023-10-12 LAB
FLUAV RNA SPEC QL NAA+PROBE: NEGATIVE
FLUBV RNA SPEC QL NAA+PROBE: NEGATIVE
RSV RNA SPEC QL NAA+PROBE: NEGATIVE
SARS-COV-2 RNA RESP QL NAA+PROBE: NOTDETECTED
SPECIMEN SOURCE: NORMAL

## 2023-10-12 PROCEDURE — A9270 NON-COVERED ITEM OR SERVICE: HCPCS | Performed by: PHYSICAL MEDICINE & REHABILITATION

## 2023-10-12 PROCEDURE — A9270 NON-COVERED ITEM OR SERVICE: HCPCS | Performed by: STUDENT IN AN ORGANIZED HEALTH CARE EDUCATION/TRAINING PROGRAM

## 2023-10-12 PROCEDURE — 99239 HOSP IP/OBS DSCHRG MGMT >30: CPT | Performed by: INTERNAL MEDICINE

## 2023-10-12 PROCEDURE — 0241U HCHG SARS-COV-2 COVID-19 NFCT DS RESP RNA 4 TRGT MIC: CPT

## 2023-10-12 PROCEDURE — 700102 HCHG RX REV CODE 250 W/ 637 OVERRIDE(OP): Performed by: PHYSICAL MEDICINE & REHABILITATION

## 2023-10-12 PROCEDURE — A9270 NON-COVERED ITEM OR SERVICE: HCPCS | Performed by: HOSPITALIST

## 2023-10-12 PROCEDURE — 770010 HCHG ROOM/CARE - REHAB SEMI PRIVAT*

## 2023-10-12 PROCEDURE — 99223 1ST HOSP IP/OBS HIGH 75: CPT | Performed by: PHYSICAL MEDICINE & REHABILITATION

## 2023-10-12 PROCEDURE — 700102 HCHG RX REV CODE 250 W/ 637 OVERRIDE(OP): Performed by: STUDENT IN AN ORGANIZED HEALTH CARE EDUCATION/TRAINING PROGRAM

## 2023-10-12 PROCEDURE — 700102 HCHG RX REV CODE 250 W/ 637 OVERRIDE(OP): Performed by: HOSPITALIST

## 2023-10-12 PROCEDURE — G0378 HOSPITAL OBSERVATION PER HR: HCPCS

## 2023-10-12 PROCEDURE — 97530 THERAPEUTIC ACTIVITIES: CPT

## 2023-10-12 RX ORDER — BISACODYL 10 MG
10 SUPPOSITORY, RECTAL RECTAL
Status: DISCONTINUED | OUTPATIENT
Start: 2023-10-12 | End: 2023-10-26 | Stop reason: HOSPADM

## 2023-10-12 RX ORDER — OXYCODONE HYDROCHLORIDE 5 MG/1
5 TABLET ORAL
Qty: 30 TABLET | Refills: 0 | Status: ON HOLD
Start: 2023-10-12 | End: 2023-10-26

## 2023-10-12 RX ORDER — HYDRALAZINE HYDROCHLORIDE 25 MG/1
25 TABLET, FILM COATED ORAL EVERY 8 HOURS PRN
Status: DISCONTINUED | OUTPATIENT
Start: 2023-10-12 | End: 2023-10-26 | Stop reason: HOSPADM

## 2023-10-12 RX ORDER — ACETAMINOPHEN 500 MG
1000 TABLET ORAL EVERY 6 HOURS
Status: COMPLETED | OUTPATIENT
Start: 2023-10-12 | End: 2023-10-13

## 2023-10-12 RX ORDER — OXYCODONE HYDROCHLORIDE 5 MG/1
5 TABLET ORAL
Status: DISCONTINUED | OUTPATIENT
Start: 2023-10-12 | End: 2023-10-26 | Stop reason: HOSPADM

## 2023-10-12 RX ORDER — METOPROLOL SUCCINATE 50 MG/1
50 TABLET, EXTENDED RELEASE ORAL EVERY MORNING
Status: CANCELLED | OUTPATIENT
Start: 2023-10-13

## 2023-10-12 RX ORDER — ACETAMINOPHEN 325 MG/1
650 TABLET ORAL EVERY 4 HOURS PRN
Status: DISCONTINUED | OUTPATIENT
Start: 2023-10-12 | End: 2023-10-14

## 2023-10-12 RX ORDER — ECHINACEA PURPUREA EXTRACT 125 MG
2 TABLET ORAL PRN
Status: DISCONTINUED | OUTPATIENT
Start: 2023-10-12 | End: 2023-10-26 | Stop reason: HOSPADM

## 2023-10-12 RX ORDER — OMEPRAZOLE 20 MG/1
20 CAPSULE, DELAYED RELEASE ORAL DAILY
Status: DISCONTINUED | OUTPATIENT
Start: 2023-10-13 | End: 2023-10-26 | Stop reason: HOSPADM

## 2023-10-12 RX ORDER — TRAZODONE HYDROCHLORIDE 50 MG/1
50 TABLET ORAL
Status: DISCONTINUED | OUTPATIENT
Start: 2023-10-12 | End: 2023-10-26 | Stop reason: HOSPADM

## 2023-10-12 RX ORDER — OXYCODONE HYDROCHLORIDE 5 MG/1
2.5 TABLET ORAL
Status: DISCONTINUED | OUTPATIENT
Start: 2023-10-12 | End: 2023-10-26 | Stop reason: HOSPADM

## 2023-10-12 RX ORDER — BENAZEPRIL HYDROCHLORIDE 10 MG/1
40 TABLET ORAL EVERY MORNING
Status: DISCONTINUED | OUTPATIENT
Start: 2023-10-13 | End: 2023-10-26 | Stop reason: HOSPADM

## 2023-10-12 RX ORDER — POLYETHYLENE GLYCOL 3350 17 G/17G
1 POWDER, FOR SOLUTION ORAL
Status: DISCONTINUED | OUTPATIENT
Start: 2023-10-12 | End: 2023-10-26 | Stop reason: HOSPADM

## 2023-10-12 RX ORDER — AMOXICILLIN 250 MG
2 CAPSULE ORAL 2 TIMES DAILY
Status: DISCONTINUED | OUTPATIENT
Start: 2023-10-12 | End: 2023-10-26 | Stop reason: HOSPADM

## 2023-10-12 RX ORDER — ONDANSETRON 4 MG/1
4 TABLET, ORALLY DISINTEGRATING ORAL 4 TIMES DAILY PRN
Status: DISCONTINUED | OUTPATIENT
Start: 2023-10-12 | End: 2023-10-26 | Stop reason: HOSPADM

## 2023-10-12 RX ORDER — ONDANSETRON 2 MG/ML
4 INJECTION INTRAMUSCULAR; INTRAVENOUS 4 TIMES DAILY PRN
Status: DISCONTINUED | OUTPATIENT
Start: 2023-10-12 | End: 2023-10-26 | Stop reason: HOSPADM

## 2023-10-12 RX ORDER — ALUMINA, MAGNESIA, AND SIMETHICONE 2400; 2400; 240 MG/30ML; MG/30ML; MG/30ML
20 SUSPENSION ORAL
Status: DISCONTINUED | OUTPATIENT
Start: 2023-10-12 | End: 2023-10-26 | Stop reason: HOSPADM

## 2023-10-12 RX ORDER — ACETAMINOPHEN 500 MG
1000 TABLET ORAL EVERY 6 HOURS
Status: CANCELLED | OUTPATIENT
Start: 2023-10-12 | End: 2023-10-13

## 2023-10-12 RX ORDER — LEVOTHYROXINE SODIUM 0.07 MG/1
75 TABLET ORAL
Status: DISCONTINUED | OUTPATIENT
Start: 2023-10-13 | End: 2023-10-26 | Stop reason: HOSPADM

## 2023-10-12 RX ORDER — METOPROLOL SUCCINATE 25 MG/1
50 TABLET, EXTENDED RELEASE ORAL EVERY MORNING
Status: DISCONTINUED | OUTPATIENT
Start: 2023-10-13 | End: 2023-10-13

## 2023-10-12 RX ORDER — BENAZEPRIL HYDROCHLORIDE 20 MG/1
40 TABLET ORAL EVERY MORNING
Status: CANCELLED | OUTPATIENT
Start: 2023-10-13

## 2023-10-12 RX ORDER — ACETAMINOPHEN 500 MG
1000 TABLET ORAL EVERY 6 HOURS PRN
Status: DISCONTINUED | OUTPATIENT
Start: 2023-10-13 | End: 2023-10-26 | Stop reason: HOSPADM

## 2023-10-12 RX ORDER — LEVOTHYROXINE SODIUM 0.07 MG/1
75 TABLET ORAL
Status: CANCELLED | OUTPATIENT
Start: 2023-10-13

## 2023-10-12 RX ORDER — ACETAMINOPHEN 500 MG
1000 TABLET ORAL EVERY 6 HOURS PRN
Status: CANCELLED | OUTPATIENT
Start: 2023-10-13

## 2023-10-12 RX ADMIN — SENNOSIDES AND DOCUSATE SODIUM 2 TABLET: 50; 8.6 TABLET ORAL at 20:20

## 2023-10-12 RX ADMIN — ACETAMINOPHEN 1000 MG: 500 TABLET ORAL at 17:03

## 2023-10-12 RX ADMIN — OXYCODONE HYDROCHLORIDE 5 MG: 5 TABLET ORAL at 15:48

## 2023-10-12 RX ADMIN — APIXABAN 2.5 MG: 2.5 TABLET, FILM COATED ORAL at 20:20

## 2023-10-12 RX ADMIN — BENAZEPRIL HYDROCHLORIDE 40 MG: 20 TABLET ORAL at 05:22

## 2023-10-12 RX ADMIN — ACETAMINOPHEN 1000 MG: 500 TABLET, FILM COATED ORAL at 05:22

## 2023-10-12 RX ADMIN — DOCUSATE SODIUM 50 MG AND SENNOSIDES 8.6 MG 2 TABLET: 8.6; 5 TABLET, FILM COATED ORAL at 05:23

## 2023-10-12 RX ADMIN — METOPROLOL SUCCINATE 50 MG: 50 TABLET, EXTENDED RELEASE ORAL at 05:23

## 2023-10-12 RX ADMIN — APIXABAN 2.5 MG: 5 TABLET, FILM COATED ORAL at 05:23

## 2023-10-12 RX ADMIN — LEVOTHYROXINE SODIUM 75 MCG: 0.07 TABLET ORAL at 05:23

## 2023-10-12 ASSESSMENT — PATIENT HEALTH QUESTIONNAIRE - PHQ9
2. FEELING DOWN, DEPRESSED, IRRITABLE, OR HOPELESS: NOT AT ALL
SUM OF ALL RESPONSES TO PHQ9 QUESTIONS 1 AND 2: 0
SUM OF ALL RESPONSES TO PHQ9 QUESTIONS 1 AND 2: 0
1. LITTLE INTEREST OR PLEASURE IN DOING THINGS: NOT AT ALL
SUM OF ALL RESPONSES TO PHQ9 QUESTIONS 1 AND 2: 0

## 2023-10-12 ASSESSMENT — COGNITIVE AND FUNCTIONAL STATUS - GENERAL
STANDING UP FROM CHAIR USING ARMS: A LITTLE
TURNING FROM BACK TO SIDE WHILE IN FLAT BAD: A LOT
WALKING IN HOSPITAL ROOM: A LOT
MOVING FROM LYING ON BACK TO SITTING ON SIDE OF FLAT BED: UNABLE
CLIMB 3 TO 5 STEPS WITH RAILING: TOTAL
SUGGESTED CMS G CODE MODIFIER MOBILITY: CL
MOBILITY SCORE: 10
MOVING TO AND FROM BED TO CHAIR: UNABLE

## 2023-10-12 ASSESSMENT — FIBROSIS 4 INDEX
FIB4 SCORE: 3.25
FIB4 SCORE: 3.25

## 2023-10-12 ASSESSMENT — LIFESTYLE VARIABLES
TOTAL SCORE: 0
CONSUMPTION TOTAL: NEGATIVE
AVERAGE NUMBER OF DAYS PER WEEK YOU HAVE A DRINK CONTAINING ALCOHOL: 0
EVER FELT BAD OR GUILTY ABOUT YOUR DRINKING: NO
TOTAL SCORE: 0
ALCOHOL_USE: NO
HOW MANY TIMES IN THE PAST YEAR HAVE YOU HAD 5 OR MORE DRINKS IN A DAY: 0
HAVE YOU EVER FELT YOU SHOULD CUT DOWN ON YOUR DRINKING: NO
EVER HAD A DRINK FIRST THING IN THE MORNING TO STEADY YOUR NERVES TO GET RID OF A HANGOVER: NO
TOTAL SCORE: 0
ON A TYPICAL DAY WHEN YOU DRINK ALCOHOL HOW MANY DRINKS DO YOU HAVE: 0
HAVE PEOPLE ANNOYED YOU BY CRITICIZING YOUR DRINKING: NO

## 2023-10-12 ASSESSMENT — GAIT ASSESSMENTS: GAIT LEVEL OF ASSIST: UNABLE TO PARTICIPATE

## 2023-10-12 ASSESSMENT — COPD QUESTIONNAIRES
HAVE YOU SMOKED AT LEAST 100 CIGARETTES IN YOUR ENTIRE LIFE: NO/DON'T KNOW
COPD SCREENING SCORE: 2
DO YOU EVER COUGH UP ANY MUCUS OR PHLEGM?: NO/ONLY WITH OCCASIONAL COLDS OR INFECTIONS
DURING THE PAST 4 WEEKS HOW MUCH DID YOU FEEL SHORT OF BREATH: NONE/LITTLE OF THE TIME

## 2023-10-12 ASSESSMENT — PAIN DESCRIPTION - PAIN TYPE: TYPE: ACUTE PAIN

## 2023-10-12 NOTE — PROGRESS NOTES
4 Eyes Skin Assessment Completed by RAMÍREZ Parker and Myah RN.    Head WDL  Ears WDL  Nose WDL  Mouth WDL  Neck WDL  Breast/Chest WDL  Shoulder Blades WDL  Spine WDL  (R) Arm/Elbow/Hand WDL  (L) Arm/Elbow/Hand WDL  Abdomen WDL  Groin WDL  Scrotum/Coccyx/Buttocks Redness and Blanching  (R) Leg WDL  (L) Leg WDL  (R) Heel/Foot/Toe Redness, Blanching, and Boggy  (L) Heel/Foot/Toe Redness, Blanching, and Boggy          Devices In Places none      Interventions In Place Waffle Overlay, Pillows, Heels Loaded W/Pillows, and Pressure Redistribution Mattress    Possible Skin Injury No    Pictures Uploaded Into Epic yes  Wound Consult Placed N/A  RN Wound Prevention Protocol Ordered Yes

## 2023-10-12 NOTE — CARE PLAN
Problem: Fall Risk - Rehab  Goal: Patient will remain free from falls  Note: Patient remains free from falls this shift. Patient was educated on using the call light to prevent falls. Patients bed is in the lowest position. The patients belongings are placed in near proximity to the patient.       Problem: Pain - Standard  Goal: Alleviation of pain or a reduction in pain to the patient’s comfort goal  Flowsheets (Taken 10/12/2023 7208)  Pain Rating Scale (NPRS): 10   The patient is Stable - Low risk of patient condition declining or worsening

## 2023-10-12 NOTE — PROGRESS NOTES
Patient admitted to facility at 1230; accompanied by hospital transport.  Patient assisted to room and positioned in bed for comfort and safety; call light within reach.  Patient assisted with stowing belongings and oriented to room and facility.

## 2023-10-12 NOTE — PROGRESS NOTES
Bedside report received from night RN, pt care assumed, assessment completed. Pt is A&O4, pain 0/10, Afib on the monitor. Updated on POC, questions answered. Bed in lowest, locked position, treaded socks on, call light and belongings within reach.

## 2023-10-12 NOTE — PROGRESS NOTES
NURSING DAILY NOTE    Name: Yvonne Marie Wada   Date of Admission: 10/12/2023   Admitting Diagnosis: Other fracture of T7-t8 thoracic vertebra, initial encounter for closed fracture (HCC)  Attending Physician: Mikayla Sutton M.d.  Allergies: Codeine, Doxycycline, Sulfamethoxazole-trimethoprim, Tramadol, and Trazodone    Safety  Patient Assist     Patient Precautions     Precaution Comments     Bed Transfer Status     Toilet Transfer Status      Assistive Devices     Oxygen  None - Room Air  Diet/Therapeutic Dining  Current Diet Order   Procedures    Diet Order Diet: Regular     Pill Administration  whole  Agitated Behavioral Scale     ABS Level of Severity       Fall Risk  Has the patient had a fall this admission?   No  [place] Fall Risk Scoring  18, HIGH RISK  Fall Risk Safety Measures  bed alarm, chair alarm, poor balance, and low vision/ hearing    Vitals  Temperature: 36.7 °C (98 °F)  Temp src: Oral  Pulse: 98  Respiration: 18  Blood Pressure : 134/83  Blood Pressure MAP (Calculated): 100 MM HG  BP Location: Right, Upper Arm     Oxygen  Pulse Oximetry: 95 %  O2 Delivery Device: None - Room Air    Bowel and Bladder  Last Bowel Movement   (prior to hospitalization)  Stool Type     Bowel Device     Continent  Bladder: Did not void   Bowel: No movement  Bladder Function     Genitourinary Assessment        Skin  Jase Score   16  Sensory Interventions   Bed Types: Standard/Trauma Mattress  Skin Preventative Measures: Pillows in Use for Support / Positioning  Moisture Interventions  Moisturizers/Barriers: Barrier Wipes      Pain  Pain Rating Scale  10 - As bad as it could be, nothing else matters  Pain Location     Pain Location Orientation     Pain Interventions   Declines    ADLs    Bathing      Linen Change      Personal Hygiene     Chlorhexidine Bath      Oral Care     Teeth/Dentures     Shave     Nutrition Percentage Eaten  Lunch, Between  50-75% Consumed  Environmental Precautions     Patient Turns/Positioning  Patient Turns Self from Side to Side  Patient Turns Assistance/Tolerance     Bed Positions     Head of Bed Elevated         Psychosocial/Neurologic Assessment  Psychosocial Assessment  Psychosocial (WDL):  Within Defined Limits  Neurologic Assessment  Neuro (WDL): Exceptions to WDL  Level of Consciousness: Alert  Orientation Level: Oriented X4  Cognition: Follows commands  Speech: Clear, Delayed responses  Pupil Assesment: No  Motor Function/Sensation Assessment: Sensation  RUE Sensation: Full sensation  LUE Sensation: Full sensation  RLE Sensation: Numbness  LLE Sensation: Numbness  EENT (WDL):  WDL Except    Cardio/Pulmonary Assessment  Edema      Respiratory Breath Sounds     Cardiac Assessment   Cardiac (WDL):  WDL Except

## 2023-10-12 NOTE — PREADMISSION SCREENING NOTE
Pre-Admission Screening Form    Patient Information:   Name: Yvonne Marie Wada     MRN: [id number]       : 1935      Age: 87 y.o.   Gender: female      Race: White [7]       Marital Status:  [2]  Family Contact: FlorecitanaaIsiscy Wada,William        Relationship: Daughter [2]  Spouse [17]  Home Phone:              Cell Phone: [phone]  Advanced Directives: Copy in Chart  Code Status:  DNAR/DNI  Current Attending Provider: Luke Parker M.D.  Referring Physician: Dr. Mayfield      Physiatrist Consult: Dr. Benson       Referral Date: 10/10/2023  Primary Payor Source:  MEDICARE  Secondary Payor Source:  [facility]    Medical Information:   Date of Admission to Acute Care Setting:10/8/2023  Room Number: T821/00  Rehabilitation Diagnosis: 0008.9 - Orthopaedic Disorders: Other Orthopaedic  Immunization History   Administered Date(s) Administered    Influenza (IM) Preservative Free - HISTORICAL DATA 10/06/2011    Influenza Seasonal Injectable - Historical Data 10/02/2012    Influenza Vaccine Adult HD 2013, 10/01/2014, 10/15/2015, 2017, 2018, 2019, 2020, 10/07/2021, 2022    Influenza Vaccine Pediatric Split - Historical Data 10/09/2004    PFIZER BIVALENT SARS-COV-2 VACCINE (12+) 2022    PFIZER SANTA CAP SARS-COV-2 VACCINATION (12+) 2022    PFIZER PURPLE CAP SARS-COV-2 VACCINATION (12+) 2021, 2021, 10/11/2021, 10/11/2021    Pneumococcal Conjugate Vaccine (Prevnar/PCV-13) 2015    Pneumococcal polysaccharide vaccine (PPSV-23) 10/09/2004, 2019    Tdap Vaccine 2020    Zoster Vaccine Live (ZVL) (Zostavax) - HISTORICAL DATA 2011    Zoster Vaccine Recombinant (RZV) (SHINGRIX) 2019, 2019     Allergies   Allergen Reactions    Codeine Nausea     Synthetic codones ok    Doxycycline Nausea     Nausea, Ok in life saving situation (per pt)    Sulfamethoxazole-Trimethoprim Vomiting and Nausea     Ok in a life saving situation (per  "pt)    Tramadol Vomiting and Nausea     nausea    Trazodone Vomiting     groggy     Past Medical History:   Diagnosis Date    AAA (abdominal aortic aneurysm) ([facility]) 7/26/2010    10/09 CT thorax dilated ascending thoracic aorta 4.9 cm 1/10 transesophageal echo dilated aortic root, and ascending aorta with mild aortic insufficiency 1/10  ascending aortic aneurysm repair 5/12 echo [facility] normal LV function EF 60%, moderate to severe left atrial enlargement, RVSP 32     Aortic insufficiency     Aortic valve disease     Aortic valve regurgitation     Arthritis     back and knee/ osteo    Cataract     Dental disorder     full top denture, partial bottom    Diverticulosis     Dyslipidemia 7/26/2010    Sees snca on lipitor 4/11 chol 159,trig 84,hdl 47,ldl 95,cpk 114 7/12 chol 163,trig 120,hdl 49,ldl 90, crp 1.1 on lipitor 20 mg     GERD (gastroesophageal reflux disease) 7/12/2012 6/07 EGD per [facility] hiatal hernia, start prevacid 30 mg 7/12 protonix 20 mg qday     Glaucoma     Heart burn     Heart murmur     Heart valve disease     \"leaking\", mitral valve    Hiatus hernia syndrome     High cholesterol     History of shingles 6/30/2011 2010 Back and left thorax      History of total knee arthroplasty 7/26/2010    4/10 MRI right knee complex tear medial meniscus, horizontal cleavage tear lateral meniscus, chondromalacia patella, moderate-sized Baker's cyst 5/10 right meniscus knee repair  5/10 told by  had gout on surgery had pathology report, I await that report Question gout seen on pathology report done during repair of right meniscus knee surgery. 7/10 uric acid 6.3, we'll await starting allopurinol depending on the operative report 8/5/10 reviewed ortho notes , op report indicates crystal deposition, could be gout or pseudogout. No bx result sent over. Will need to get. My suspicion is chondrocalcinosis. 10/11  ortho note, MRI pending 8/7/10 reviewed pathology report " right knee tissue, marked degenerative changes with focal calcification, no mention of gout. Based on this and a normal uric acid level, I do not believe she has gout, has no hyperuricemia medications would be indicated 10/11  ortho left knee meniscectomy and arthroscopy 1/12      HLD (hyperlipidemia)     Hypertension     Hypothyroid 4/8/2011 4/11 tsh 9 start synthroid 50 4/11 tsh 6,free t3 3.1,free t4 1.2,tpo negative 7/1/11 tsh 2.1 cont synthroid 50 mcg 7/12 tsh 3.4 cont synthroid 50 mcg     Indigestion     Mitral insufficiency     OSTEOPOROSIS 8/9/2010    Had been on fosamax [id number]  8/10 dexa LS -2.0,hip -1.5 await old dexa result, she will get copy for me 4/11 vit d 35 9/12 dexa LS -3.2,hip -1.4 2/13/13 relast infusion     Pain     back and right leg, can get as bad as 10/10    Preventative health care 7/26/2010    2002 pneum 2005 colon [facility] no records 4/11 vit d 35 9/11 shingles vaccine 9/12 mammogram 9/12 dexa LS -3.2,hip -1.4     Rheumatic fever     S/P appendectomy 7/26/2010    S/P TOMY (total abdominal hysterectomy) 7/26/2010    Sees gyn on HRT estradiol for 20 yrs () 11/08 mammo      Shingles 6/30/2011    Snoring     Status post lumbar surgery 7/26/2010 6/03 L4-5 epidural Dr. Jordan  7/06 overall for decompression, foraminotomy. L4-L5 posterior fusion, L4-L5 allograft       Stroke ([facility] 1996    no residual problems     Thoracic aortic aneurysm without mention of rupture     Tricuspid insufficiency     Unspecified disorder of thyroid     hypo    Urinary bladder disorder     suprapubic catheter insertion 11/4    UTI (urinary tract infection) 11/12/2016 7/6/16 UTI klebsiella pneumoniae sensitive to all antibiotics except ampicillin, start bactrim x 5 days 1/18/19 UTI enterobacter cloacae resistant to ampicillin, cephalothin, penicillin, bactrim, sensitive to cefuroxime, ciprofloxacin and levaquin, nitrofurantoin 5/26/19 UTI klebsiella given omnicef, organism  resistant ampicillin, ciprofloxacin, levofloxacin, bactrim 8/18/21  urology note      Past Surgical History:   Procedure Laterality Date    ORIF, ANKLE Right 10/14/2022    Procedure: RIGHT ANKLE OPEN REDUCTION INTERNAL FIXATION;  Surgeon: Ryan Huertas M.D.;  Location: [facility];  Service: Orthopedics    TENDON REPAIR Right 11/10/2016    Procedure: TENDON REPAIR - QUADRACEPS ;  Surgeon: Maxim Herrera M.D.;  Location: [facility];  Service:     KNEE ARTHROPLASTY TOTAL Right 9/8/2016    Procedure: KNEE ARTHROPLASTY TOTAL;  Surgeon: Maxim Herrera M.D.;  Location: [facility];  Service:     FUSION, SPINE, LUMBAR, PLIF  11/24/2015    Procedure: LUMBAR FUSION POSTERIOR L3-4, EXPLORATION OF FUSION L4-5 WITH INSTRUMENTATION;  Surgeon: Hermann Reis M.D.;  Location: [facility];  Service:     LUMBAR LAMINECTOMY DISKECTOMY  11/24/2015    Procedure: LUMBAR L3-4 LAMINECTOMY AND REDO L4-5;  Surgeon: Hermann Reis M.D.;  Location: [facility];  Service:     KNEE ARTHROPLASTY TOTAL  1/3/2012    Performed by YOSEF MEJÍA at [facility]    KNEE ARTHROSCOPY  10/18/2011    Performed by YOSEF MEJÍA at [facility]    MENISCECTOMY, KNEE, MEDIAL  10/18/2011    Performed by YOSEF MEJÍA at [facility]    AORTIC VALVE REPLACEMENT  1/12/2010    Performed by ISAÍAS NICOLE at [facility]    APPENDECTOMY      FUSION, SPINE, LUMBAR, PLIF      HYSTERECTOMY, TOTAL ABDOMINAL         History Leading to Admission, Conditions that Caused the Need for Rehab (CMS): GLF. Syncope, dehydration, A Fib  HTN Impaired mobility   Co-morbidities:  As listed above and below   Potential Risk - Complications: Cognitive Impairment, Incontinence, and Pain  Level of Risk: High    Ongoing Medical Management Needed (Medical/Nursing Needs):   Patient Active Problem List    Diagnosis Date Noted    Thoracic compression fracture,  closed, initial encounter (HCC) 10/08/2023    POLST (Physician Orders for Life-Sustaining Treatment) 03/27/2023    Hemorrhoid 03/27/2023    Depression, major, single episode, moderate (HCC) 02/13/2023    Syncope 02/07/2023    Neutropenia (HCC) 01/05/2023    History of UTI 11/07/2022    S/p ankle right orif 10/15/2022    Senile osteoporosis 08/22/2022    Anemia 10/07/2021    Urinary retention 09/14/2021    History of pelvic fracture 08/30/2021    Gait disorder 04/23/2021    Chronic anticoagulation 12/14/2020    S/p hip right arthroplasty 11/29/2017    History of transient ischemic attack (TIA) 11/01/2017    History of shoulder pain 04/05/2017    History of insomnia 01/30/2017    Decreased GFR 04/15/2016    S/P thoracic aortic aneurysm repair 03/29/2014    Neck arthritis 02/28/2014    GERD (gastroesophageal reflux disease) 07/12/2012    History of shingles 06/30/2011    Hypothyroid 04/08/2011    Osteoporosis, idiopathic 08/09/2010    S/p lumbar surgery 07/26/2010    Hypertension 07/26/2010    Dyslipidemia 07/26/2010    S/P TOMY (total abdominal hysterectomy) 07/26/2010    Preventative health care 07/26/2010    S/P knee bilateral arthroplasty 07/26/2010    S/P appendectomy 07/26/2010    Paroxysmal atrial fibrillation (HCC) 07/26/2010     Date of initial consultation: 10/10/2023  Requesting provider: ordered by Mag Mayfield M.D. at 10/10/23 1011  Consulting provider: Dalia Benson D.O.  Reason for consultation: assess for acute inpatient rehab appropriateness  LOS: 0 Day(s)     Chief complaint: dizziness and fall      HPI: The patient is a 87 y.o.  female with a past medical history of  paroxysmal afib s/p watchman procedure August 2023 on Eliquis , HTN, HLD, GERD, Hypothyroidism, prior inpatient rehab stay in October 2022 after right tib-fib fracture, and history of known thoracic aortic aneurysm  ;  who presented on 10/8/2023  5:48 AM with dizziness and a GLF, suspected syncopal episode. Per documentation, patient had  gotten up to go to the bathroom when she felt dizzy, and fell landing on the ground with  + LOC. Upon eval in the ED, CT Head was negative for acute intracranial hemorrhage or infract, CT C spine negative for fractures, CT T spine showed interval progression of a now severe T7 compression fracture and mild T8 compression deformity. Neurosurgery was consulted, with recommendations for non op management and use of TLSO for comfort. Patient's hospital course has been notable for thrombocytopenia with plts down to 145 . She has had HTN and is on tele due to the syncopal episode, which was suspected secondary to orthostatic hypertension.      Patient seen and examined at bedside.   visiting at bedside.  Patient reports she feels okay.  Patient reports he has back pain.  Patient otherwise reports that she is feeling fine.  Denies headache, lightheadedness, dizziness, chest pain, shortness of breath or abdominal pain.  Patient did have an incontinent episode in bed, notified nursing.  Patient reports she was unable to get up and out of bed in time due to her back pain.   reported that patient had been in inpatient rehab before, and would like to return there.     Social Hx:  Patient lives with  spouse in a 1 story house with 2 JENNIFER.   2 JENNIFER  At prior level of function MOD I with 4 WW      Tobacco: Denies   Alcohol: Denies   Drugs: Denies      THERAPY:  Restrictions: Fall Risk / Spinal Back precautions/ TLSO for comfort   PT: Functional mobility   10/9 Mod A bed mobility, Mod A sit to stand, unable to participate in gait      OT: ADLs  10/9 Mod A bed mobility, Max A lower body dressing, Max A toileting, Mod A x 2 people for sit to stand.      SLP:   None      IMAGING:  DX-PELVIS-1 OR 2 VIEWS  Final Result     1.  No acute displaced fracture identified.  2.  There are old fractures of the right obturator ring.  3.  There is osteopenia.     DX-CHEST-PORTABLE (1 VIEW)  Final Result     1.  Linear left lower lobe  opacity likely atelectasis with elevation of the left hemidiaphragm.  2.  Mild interstitial opacity could represent vascular congestion/edema.     CT-LSPINE W/O PLUS RECONS  Final Result     1.  No acute displaced fracture of the lumbar spine.  2.  There is multilevel postoperative changes at L3-L5 levels with degenerative L4-5 anterolisthesis.  3.  Multilevel degenerative disc disease and arthropathy.     CT-TSPINE W/O PLUS RECONS  Final Result     1.  Interval progression of a now severe T7 compression deformity, age-indeterminate.  2.  Progression of a mild T8 compression deformity, age indeterminate.     CT-CSPINE WITHOUT PLUS RECONS  Final Result     1.  There is no acute fracture of the cervical spine.  2.  Stable degenerative changes throughout the cervical spine.        CT-HEAD W/O  Final Result     1.  There is no acute intracranial hemorrhage or infarct.     2.  White matter lucencies most consistent with small vessel ischemic change versus demyelination or gliosis.     3.  There is cerebral atrophy.        PROCEDURES:  None      PMH:  Past Medical History  Past Medical History:  Diagnosis Date   AAA (abdominal aortic aneurysm) ([facility]) 7/26/2010    10/09 CT thorax dilated ascending thoracic aorta 4.9 cm 1/10 transesophageal echo dilated aortic root, and ascending aorta with mild aortic insufficiency 1/10  ascending aortic aneurysm repair 5/12 echo snca normal LV function EF 60%, moderate to severe left atrial enlargement, RVSP 32    Aortic insufficiency     Aortic valve disease     Aortic valve regurgitation     Arthritis      back and knee/ osteo   Cataract     Dental disorder      full top denture, partial bottom   Diverticulosis     Dyslipidemia 7/26/2010    Sees [facility] on lipitor 4/11 chol 159,trig 84,hdl 47,ldl 95,cpk 114 7/12 chol 163,trig 120,hdl 49,ldl 90, crp 1.1 on lipitor 20 mg    GERD (gastroesophageal reflux disease) 7/12/2012 6/07 EGD per [facility] hiatal hernia, start prevacid 30 mg 7/12  "protonix 20 mg qday    Glaucoma     Heart burn     Heart murmur     Heart valve disease      \"leaking\", mitral valve   Hiatus hernia syndrome     High cholesterol     History of shingles 6/30/2011    2010 Back and left thorax     History of total knee arthroplasty 7/26/2010    4/10 MRI right knee complex tear medial meniscus, horizontal cleavage tear lateral meniscus, chondromalacia patella, moderate-sized Baker's cyst 5/10 right meniscus knee repair  5/10 told by  had gout on surgery had pathology report, I await that report Question gout seen on pathology report done during repair of right meniscus knee surgery. 7/10 uric acid 6.3, we'll await starting allopurinol depending on the operative report 8/5/10 reviewed ortho notes , op report indicates crystal deposition, could be gout or pseudogout. No bx result sent over. Will need to get. My suspicion is chondrocalcinosis. 10/11  ortho note, MRI pending 8/7/10 reviewed pathology report right knee tissue, marked degenerative changes with focal calcification, no mention of gout. Based on this and a normal uric acid level, I do not believe she has gout, has no hyperuricemia medications would be indicated 10/11  ortho left knee meniscectomy and arthroscopy 1/12     HLD (hyperlipidemia)     Hypertension     Hypothyroid 4/8/2011 4/11 tsh 9 start synthroid 50 4/11 tsh 6,free t3 3.1,free t4 1.2,tpo negative 7/1/11 tsh 2.1 cont synthroid 50 mcg 7/12 tsh 3.4 cont synthroid 50 mcg    Indigestion     Mitral insufficiency     OSTEOPOROSIS 8/9/2010    Had been on fosamax [id number]  8/10 dexa LS -2.0,hip -1.5 await old dexa result, she will get copy for me 4/11 vit d 35 9/12 dexa LS -3.2,hip -1.4 2/13/13 relast infusion    Pain      back and right leg, can get as bad as 10/10   Preventative health care 7/26/2010 2002 pneum 2005 colon [facility] no records 4/11 vit d 35 9/11 shingles vaccine 9/12 mammogram 9/12 dexa LS " -3.2,hip -1.4    Rheumatic fever     S/P appendectomy 7/26/2010   S/P TOMY (total abdominal hysterectomy) 7/26/2010    Sees gyn on HRT estradiol for 20 yrs () 11/08 mammo     Shingles 6/30/2011   Snoring     Status post lumbar surgery 7/26/2010 6/03 L4-5 epidural Dr. Jordan  7/06 overall for decompression, foraminotomy. L4-L5 posterior fusion, L4-L5 allograft      Stroke ([facility]) 1996    no residual problems    Thoracic aortic aneurysm without mention of rupture     Tricuspid insufficiency     Unspecified disorder of thyroid      hypo   Urinary bladder disorder      suprapubic catheter insertion 11/4   UTI (urinary tract infection) 11/12/2016 7/6/16 UTI klebsiella pneumoniae sensitive to all antibiotics except ampicillin, start bactrim x 5 days 1/18/19 UTI enterobacter cloacae resistant to ampicillin, cephalothin, penicillin, bactrim, sensitive to cefuroxime, ciprofloxacin and levaquin, nitrofurantoin 5/26/19 UTI klebsiella given omnicef, organism resistant ampicillin, ciprofloxacin, levofloxacin, bactrim 8/18/21  urology note           PSH:  Past Surgical History  Past Surgical History:  Procedure Laterality Date   ORIF, ANKLE Right 10/14/2022    Procedure: RIGHT ANKLE OPEN REDUCTION INTERNAL FIXATION;  Surgeon: Ryan Huertas M.D.;  Location: [facility];  Service: Orthopedics   TENDON REPAIR Right 11/10/2016    Procedure: TENDON REPAIR - QUADRACEPS ;  Surgeon: Maxim Herrera M.D.;  Location: [facility];  Service:    KNEE ARTHROPLASTY TOTAL Right 9/8/2016    Procedure: KNEE ARTHROPLASTY TOTAL;  Surgeon: Maxim Herrera M.D.;  Location: [facility];  Service:    FUSION, SPINE, LUMBAR, PLIF   11/24/2015    Procedure: LUMBAR FUSION POSTERIOR L3-4, EXPLORATION OF FUSION L4-5 WITH INSTRUMENTATION;  Surgeon: Hermann Reis M.D.;  Location: [facility];  Service:    LUMBAR LAMINECTOMY DISKECTOMY   11/24/2015    Procedure: LUMBAR L3-4 LAMINECTOMY  AND REDO L4-5;  Surgeon: Hermann Reis M.D.;  Location: [facility];  Service:    KNEE ARTHROPLASTY TOTAL   1/3/2012    Performed by YOSEF MEJÍA at SUR[facility]ERY [facility]   KNEE ARTHROSCOPY   10/18/2011    Performed by YOSEF MEJÍA at [facility]   MENISCECTOMY, KNEE, MEDIAL   10/18/2011    Performed by YOSEF MEJÍA at [facility]   AORTIC VALVE REPLACEMENT   1/12/2010    Performed by ISAÍAS NICOLE at SUR[facility]ERY [facility]   APPENDECTOMY       FUSION, SPINE, LUMBAR, PLIF       HYSTERECTOMY, TOTAL ABDOMINAL              FHX:  Family History  Family History  Problem Relation Age of Onset   Stroke Mother     Heart Disease Father     Heart Disease Sister            Medications:  Current Facility-Administered Medications  Medication Dose   apixaban (Eliquis) tablet 2.5 mg  2.5 mg   senna-docusate (Pericolace Or Senokot S) 8.6-50 MG per tablet 2 Tablet  2 Tablet    And   polyethylene glycol/lytes (Miralax) PACKET 1 Packet  1 Packet    And   magnesium hydroxide (Milk Of Magnesia) suspension 30 mL  30 mL    And   bisacodyl (Dulcolax) suppository 10 mg  10 mg   benazepril (Lotensin) tablet 40 mg  40 mg   levothyroxine (Synthroid) tablet 75 mcg  75 mcg   metoprolol SR (Toprol XL) tablet 50 mg  50 mg   Pharmacy Consult Request ...Pain Management Review 1 Each  1 Each   acetaminophen (Tylenol) tablet 1,000 mg  1,000 mg    Followed by   [START ON 10/13/2023] acetaminophen (Tylenol) tablet 1,000 mg  1,000 mg   oxyCODONE immediate-release (Roxicodone) tablet 2.5 mg  2.5 mg    Or   oxyCODONE immediate-release (Roxicodone) tablet 5 mg  5 mg    Or   morphine 4 MG/ML injection 2 mg  2 mg        Allergies:  Allergies  Allergen Reactions   Codeine Nausea      Synthetic codones ok   Doxycycline Nausea      Nausea, Ok in life saving situation (per pt)   Sulfamethoxazole-Trimethoprim Vomiting and Nausea      Ok in a life saving situation (per pt)   Tramadol Vomiting and Nausea      " nausea   Trazodone Vomiting      groggy        Physical Exam:  Vitals: BP (!) 145/96   Pulse (!) 117   Temp 36.5 °C (97.7 °F) (Temporal)   Resp 16   Ht 1.626 m (5' 4\")   Wt 58.2 kg (128 lb 4.9 oz)   SpO2 96%   Gen: NAD, laying comfortably in bed,  at bedside  Head:  NC/AT  Eyes/ Nose/ Mouth: PERRLA, moist mucous membranes  Cardio: RRR, good distal perfusion, warm extremities  Pulm: normal respiratory effort, no cyanosis, on 2 L  Abd: Soft NTND, negative borborygmi   Ext: No peripheral edema. No calf tenderness. No clubbing.     Mental status:  A&Ox4 (person, place, date, situation) answers questions appropriately follows commands  Speech: fluent, no aphasia or dysarthria     CRANIAL NERVES:  2,3: visual acuity grossly intact, PERRL  3,4,6: EOMI bilaterally, no nystagmus or diplopia  5: sensation intact to light touch bilaterally and symmetric  7: no facial asymmetry  8: hearing grossly intact        Motor:                            Upper Extremity  Myotome R L  Shoulder flexion C5 5/5 5/5  Elbow flexion C5 5/5 5/5  Wrist extension C6 5/5 5/5  Elbow extension C7 5/5 5/5  Finger flexion C8 5/5 5/5  Finger abduction T1 5/5 5/5     Lower Extremity Myotome R L  Hip flexion L2 5/5 5/5  Knee extension L3 5/5 5/5  Ankle dorsiflexion L4 5/5 4, chronic /5  Toe extension L5 5/5 5/5  Ankle plantarflexion S1 5/5 5/5        Negative Pronator drift bilaterally     Sensory:   intact to light touch through out     DTRs: 2+ in bilateral  biceps  No clonus at bilateral ankles  Negative Barton b/l      Tone: no spasticity noted, no cogwheeling noted     Coordination:   intact finger to nose bilaterally  intact fine motor with fingers bilaterally     Labs: Reviewed and significant for   Recent Labs    10/08/23  0645 10/09/23  0106 10/10/23  0135  RBC 4.39 3.96* 4.10*  HEMOGLOBIN 13.6 12.8 12.7  HEMATOCRIT 41.9 37.8 39.4  PLATELETCT 178 165 145*  PROTHROMBTM 15.9*  --   --   APTT 30.1  --   --   INR 1.25*  --   --    "   Recent Labs    10/08/23  0645 10/09/23  0106 10/10/23  0135  SODIUM 140 137 137  POTASSIUM 4.3 4.4 4.2  CHLORIDE 106 105 105  CO2 23 23 22  GLUCOSE 105* 90 98  BUN 15 18 20  CREATININE 0.96 0.99 0.96  CALCIUM 8.8 8.3* 8.3*     Recent Results  Recent Results (from the past 24 hour(s))  EKG    Collection Time: 10/09/23  7:59 PM  Result Value Ref Range    Report          [facility]     Test Date:  2023-10-09  Pt Name:    YVONNE WADA                  Department: 171  MRN:        [id number]                      Room:       [facility]  Gender:     Female                       Technician: MARILYN  :        [id number]                   Requested By:VAN ANDERSON  Order #:    [id number]                    Reading MD: Amarjit Villa MD     Measurements  Intervals                                Axis  Rate:       102                          P:          0  HI:         0                            QRS:        44  QRSD:       90                           T:          76  QT:         351  QTc:        458     Interpretive Statements  Atrial fibrillation  Compared to ECG 10/08/2023 06:53:27  No significant changes  Electronically Signed On 10- 04:02:05 PDT by Amarjit Villa MD     Basic Metabolic Panel    Collection Time: 10/10/23  1:35 AM  Result Value Ref Range    Sodium 137 135 - 145 mmol/L    Potassium 4.2 3.6 - 5.5 mmol/L    Chloride 105 96 - 112 mmol/L    Co2 22 20 - 33 mmol/L    Glucose 98 65 - 99 mg/dL    Bun 20 8 - 22 mg/dL    Creatinine 0.96 0.50 - 1.40 mg/dL    Calcium 8.3 (L) 8.5 - 10.5 mg/dL    Anion Gap 10.0 7.0 - 16.0  CBC WITHOUT DIFFERENTIAL    Collection Time: 10/10/23  1:35 AM  Result Value Ref Range    WBC 5.2 4.8 - 10.8 K/uL    RBC 4.10 (L) 4.20 - 5.40 M/uL    Hemoglobin 12.7 12.0 - 16.0 g/dL    Hematocrit 39.4 37.0 - 47.0 %    MCV 96.1 81.4 - 97.8 fL    MCH 31.0 27.0 - 33.0 pg    MCHC 32.2 32.2 - 35.5 g/dL    RDW 46.7 35.9 - 50.0 fL    Platelet Count 145 (L) 164 - 446 K/uL    MPV 9.1 9.0 - 12.9  fL  MAGNESIUM    Collection Time: 10/10/23  1:35 AM  Result Value Ref Range    Magnesium 1.8 1.5 - 2.5 mg/dL  PHOSPHORUS    Collection Time: 10/10/23  1:35 AM  Result Value Ref Range    Phosphorus 3.1 2.5 - 4.5 mg/dL  ESTIMATED GFR    Collection Time: 10/10/23  1:35 AM  Result Value Ref Range    GFR (CKD-EPI) 57 (A) >60 mL/min/1.73 m 2             ASSESSMENT:  Patient is a 87 y.o. female admitted with thoracic compression fracture     [facility] Code / Diagnosis to Support: 0008.9 - Orthopaedic Disorders: Other Orthopaedic     Rehabilitation: Impaired ADLs and mobility  Patient is a good candidate for inpatient rehab based on needs for PT, OT, see disposition details below     Barriers to transfer include: Insurance authorization, TCCs to verify disposition, medical clearance and bed availability      Additional Recommendations:  T7 compression fracture  - Sustained during a ground-level fall 2 to suspected orthostatic hypotension/syncope  - CT head negative for intracranial hemorrhage or infarct  - CT T-spine showed an interval progression of a now severe T7 compression deformity, age-indeterminate as well as progression of a mild T8 compression deformity  - Neurosurgery consulted, recommending nonoperative management  - TLSO for comfort  - Continue with PT/OT/SLP, patient's pain is improving, has no strength deficits or neurologic deficits     Syncopal episode  Orthostatic hypotension  -Syncopal episode may have been due to orthostatic hypotension, patient has not been hypotensive during admission  - May have been related to dehydration, patient has been fluid resuscitated  - Monitor for orthostatic hypotension with attempts for up and out of bed  -Patient's mobility was limited with therapies earlier due to back pain     Atrial fibrillation  - Known diagnosis, is on Eliquis in the outpatient setting  - Eliquis restarted  - Status post watchman's procedure in August 2023     Hypertension  - In the outpatient setting  patient is on metoprolol and Lotensin, monitoring for orthostatic hypotension after medications  - Has not currently been hypotensive on a 10/10     Dispo:  - patient is currently functioning below their level of baseline, recommend post acute rehab  - recommend IRF level therapy with 3hr of therapy 5 days per week  - piror to acceptance to IRF, will need confirmation of discharge support from   - TCC to assist with insurance auth and DC support            Medical Complexity:  T7 compression fracture  Syncopal episode  Orthostatic hypotension  Atrial fibrillation  Hypertension  Impaired mobility and ADLs           DVT PPX: Eliquis              Date of Service  10/8/2023     Referring Physician  Pankaj Chawla M.D.     Consulting Physician  Leonidas Rahman M.D.     Reason for Consultation  T7 and T8 compression fractures     History of Presenting Illness  87 y.o. female who presented 10/8/2023 with back pain.  She has an acute on chronic T7 compression fracture as well as an acute T8 compression fracture.  She is a very poor historian and so history is difficult.  She thinks that she has been in the hospital, same bed for at least 3 days.  She states that her pain is mild to moderate at this point in the middle of her back.  She denies numbness, tingling and neurologic symptoms in her lower extremities     Review of Systems  Review of Systems   Musculoskeletal:  Positive for back pain.         Past Medical History   has a past medical history of AAA (abdominal aortic aneurysm) (HCC) (7/26/2010), Aortic insufficiency, Aortic valve disease, Aortic valve regurgitation, Arthritis, Cataract, Dental disorder, Diverticulosis, Dyslipidemia (7/26/2010), GERD (gastroesophageal reflux disease) (7/12/2012), Glaucoma, Heart burn, Heart murmur, Heart valve disease, Hiatus hernia syndrome, High cholesterol, History of shingles (6/30/2011), History of total knee arthroplasty (7/26/2010), HLD (hyperlipidemia), Hypertension,  Hypothyroid (4/8/2011), Indigestion, Mitral insufficiency, OSTEOPOROSIS (8/9/2010), Pain, Preventative health care (7/26/2010), Rheumatic fever, S/P appendectomy (7/26/2010), S/P TOMY (total abdominal hysterectomy) (7/26/2010), Shingles (6/30/2011), Snoring, Status post lumbar surgery (7/26/2010), Stroke (HCC) (1996), Thoracic aortic aneurysm without mention of rupture, Tricuspid insufficiency, Unspecified disorder of thyroid, Urinary bladder disorder, and UTI (urinary tract infection) (11/12/2016).     She has no past medical history of CAD (coronary artery disease), COPD, or Liver disease.     Surgical History   has a past surgical history that includes fusion, spine, lumbar, plif; hysterectomy, total abdominal; appendectomy; aortic valve replacement (1/12/2010); knee arthroscopy (10/18/2011); meniscectomy, knee, medial (10/18/2011); knee arthroplasty total (1/3/2012); fusion, spine, lumbar, plif (11/24/2015); lumbar laminectomy diskectomy (11/24/2015); knee arthroplasty total (Right, 9/8/2016); tendon repair (Right, 11/10/2016); and orif, ankle (Right, 10/14/2022).     Family History  family history includes Heart Disease in her father and sister; Stroke in her mother.     Social History   reports that she has never smoked. She has never used smokeless tobacco. She reports that she does not currently use drugs. She reports that she does not drink alcohol.     Medications  Prior to Admission Medications  Prescriptions Last Dose Informant Patient Reported? Taking?  Ascorbic Acid (VITAMIN C PO) 10/7/2023 at AM Significant Other, Patient Yes Yes  Sig: Take 1 Tablet by mouth every day.  Cholecalciferol (VITAMIN D3 PO) 10/7/2023 at AM Significant Other, Patient Yes Yes  Sig: Take 1 Tablet by mouth every day.  amoxicillin (AMOXIL) 500 MG Cap 9/27/2023 at CMPLT Significant Other, Patient Yes No  Sig: Take 500 mg by mouth 4 times a day. 7 day course  apixaban (ELIQUIS) 2.5mg Tab 10/7/2023 at 1900 Significant Other,  Patient No No  Sig: Take 1 Tablet by mouth 2 times a day.  benazepril (LOTENSIN) 40 MG tablet 10/7/2023 at AM Significant Other, Patient No No  Sig: TAKE 1 TABLET BY MOUTH EVERY DAY IN THE MORNING  levothyroxine (SYNTHROID) 75 MCG Tab 10/7/2023 at AM Patient, Significant Other No No  Sig: TAKE 1 TABLET BY MOUTH EVERY DAY IN THE MORNING ON AN EMPTY STOMACH  Patient taking differently: Take 75 mcg by mouth every morning on an empty stomach.  metoprolol SR (TOPROL XL) 50 MG TABLET SR 24 HR 10/7/2023 at AM Significant Other, Patient No No  Sig: Take 1 Tablet by mouth every morning.  therapeutic multivitamin-minerals (THERAGRAN-M) Tab 10/7/2023 at AM Significant Other, Patient Yes Yes  Sig: Take 1 Tablet by mouth every day.    Facility-Administered Medications: None        Allergies  Allergies  Allergen Reactions   Codeine Nausea      Synthetic codones ok   Doxycycline Nausea      Nausea, Ok in life saving situation (per pt)   Sulfamethoxazole-Trimethoprim Vomiting and Nausea      Ok in a life saving situation (per pt)   Tramadol Vomiting and Nausea      nausea   Trazodone Vomiting      groggy        Physical Exam  Temp:  [36.2 °C (97.2 °F)-36.7 °C (98 °F)] 36.4 °C (97.5 °F)  Pulse:  [] 84  Resp:  [12-22] 18  BP: (110-150)/(59-92) 130/76  SpO2:  [90 %-97 %] 97 %     Physical Exam  Neurological:      Mental Status: She is confused.      GCS: GCS eye subscore is 4. GCS verbal subscore is 4. GCS motor subscore is 6.      Comments: Moves all extremities symmetrically, 4/5 throughout  Sensation intact in the bilateral upper and lower extremities            Fluids  Date 10/08/23 0700 - 10/09/23 0659  Shift 3589-2907 5301-7345 4151-7267 24 Hour Total  INTAKE  P.O.   240   240  Shift Total   240   240  OUTPUT  Urine 0 0   0  Shift Total 0 0   0  Weight (kg) 60.8 60.8 60.8 60.8        Laboratory  Recent Labs     10/08/23  0645  WBC 4.7*  RBC 4.39  HEMOGLOBIN 13.6  HEMATOCRIT 41.9  MCV 95.4  MCH 31.0  MCHC 32.5  RDW 47.3  PLATELETCT 178  MPV 8.5*     Recent Labs    10/08/23  0645  SODIUM 140  POTASSIUM 4.3  CHLORIDE 106  CO2 23  GLUCOSE 105*  BUN 15  CREATININE 0.96  CALCIUM 8.8     Recent Labs    10/08/23  0645  APTT 30.1  INR 1.25*                 Imaging  DX-PELVIS-1 OR 2 VIEWS  Final Result     1.  No acute displaced fracture identified.  2.  There are old fractures of the right obturator ring.  3.  There is osteopenia.     DX-CHEST-PORTABLE (1 VIEW)  Final Result     1.  Linear left lower lobe opacity likely atelectasis with elevation of the left hemidiaphragm.  2.  Mild interstitial opacity could represent vascular congestion/edema.     CT-LSPINE W/O PLUS RECONS  Final Result     1.  No acute displaced fracture of the lumbar spine.  2.  There is multilevel postoperative changes at L3-L5 levels with degenerative L4-5 anterolisthesis.  3.  Multilevel degenerative disc disease and arthropathy.     CT-TSPINE W/O PLUS RECONS  Final Result     1.  Interval progression of a now severe T7 compression deformity, age-indeterminate.  2.  Progression of a mild T8 compression deformity, age indeterminate.     CT-CSPINE WITHOUT PLUS RECONS  Final Result     1.  There is no acute fracture of the cervical spine.  2.  Stable degenerative changes throughout the cervical spine.        CT-HEAD W/O  Final Result     1.  There is no acute intracranial hemorrhage or infarct.     2.  White matter lucencies most consistent with small vessel ischemic change versus demyelination or gliosis.     3.  There is cerebral atrophy.              Assessment/Plan  TLSO for comfort is fine.  Compression fractures by nature are not unstable, no surgery indicated  Consider vertebroplasty with interventional radiology if not able to mobilize in the coming days due to pain  Will need to follow-up with her primary care physician regarding osteoporosis    "  Please call with any questions or concerns                   Current Vital Signs:   Temperature: 36.6 °C (97.9 °F) Pulse: (!) 105 Respiration: 17 Blood Pressure : (!) (P) 140/94  Weight: 65.4 kg (144 lb 2.9 oz) Height: 162.6 cm (5' 4\")  Pulse Oximetry: 93 % O2 (LPM): 0      Completed Laboratory Reports:  Recent Labs     10/10/23  0135 10/11/23  0100   WBC 5.2 4.9   HEMOGLOBIN 12.7 14.1   HEMATOCRIT 39.4 42.0   PLATELETCT 145* 154*   SODIUM 137 134*   POTASSIUM 4.2 4.1   BUN 20 16   CREATININE 0.96 0.78   GLUCOSE 98 86     Additional Labs: Not Applicable    Prior Living Situation:   Housing / Facility: 1 Story House  Steps Into Home: 2  Steps In Home: 0  Lives with - Patient's Self Care Capacity: Spouse  Equipment Owned: 4-Wheel Walker    Prior Level of Function / Living Situation:   Physical Therapy: Prior Services: Unable To Determine At This Time  Housing / Facility: 1 Story House  Steps Into Home: 2  Steps In Home: 0  Rail: Right Rail (Steps into Home)  Bathroom Set up: Walk In Shower, Grab Bars, Shower Chair  Equipment Owned: 4-Wheel Walker  Lives with - Patient's Self Care Capacity: Spouse  Bed Mobility: Independent  Transfer Status: Independent  Ambulation: Independent  Assistive Devices Used: 4-Wheel Walker  Stairs: Independent  Current Level of Function:   Gait Level Of Assist: Unable to Participate  Supine to Sit: Moderate Assist  Sit to Supine: Maximal Assist (not following log roll cues.)  Scooting: Moderate Assist  Rolling: Moderate Assist to Rt., Moderate Assist to Lt.  Comments: ed done re log roll, pt unable to retain education.  Sit to Stand: Moderate Assist  Bed, Chair, Wheelchair Transfer: Unable to Participate  Toilet Transfers: Unable to Participate  Sitting Edge of Bed: 5 min max  Standing: few seconds only  Occupational Therapy:   Self Feeding: Independent  Grooming / Hygiene: Independent  Bathing: Independent  Dressing: Independent  Toileting: Independent  Medication Management: " Independent  Laundry: Independent  Kitchen Mobility: Independent  Finances: Independent  Home Management: Independent  Shopping: Independent  Prior Level Of Mobility: Independent With Device in Community, Independent With Device in Home (4WW)  Prior Services: Unable To Determine At This Time  Housing / Facility: [address]  Current Level of Function:   Upper Body Dressing: Maximal Assist (TLSO)  Lower Body Dressing: Maximal Assist  Toileting: Maximal Assist (Bedpan, purewick)  Speech Language Pathology:      Rehabilitation Prognosis/Potential: Good  Estimated Length of Stay: 10-12 days    Nursing:      Incontinent    Scope/Intensity of Services Recommended:  Physical Therapy: 1.5 hr / day  5 days / week. Therapeutic Interventions Required: Maximize Endurance, Mobility, Strength, and Safety  Occupational Therapy: 1.5 hr / day 5 days / week. Therapeutic Interventions Required: Maximize Self Care, ADLs, IADLs, and Energy Conservation  Rehabilitation Nursin/7. Therapeutic Interventions Required: Monitor Pain, Skin, Wound(s), Vital Signs, Intake and Output, Labs, Safety, Aspiration Risk, and Family Training  Rehabilitation Physician: 3 - 5 days / week. Therapeutic Interventions Required: Medical Management  Respiratory Care: consult . Therapeutic Interventions Required: Pulmonary Toileting  Dietician: consult . Therapeutic Interventions Required: Please advise on a diet that is both healthy and promotes healing     She requires 24-hour rehabilitation nursing to manage bowel and bladder function, skin care, nutrition and fluid intake, pulmonary hygiene, pain control, safety, medication management, and patient/family goals. In addition, rehabilitation nursing will reiterate and reinforce therapy skills and equipment use, including ADLs, as well as provide education to the patient and family. Yvonne Marie Wada is willing to participate in and is able to tolerate the proposed plan of care.    Rehabilitation Goals and  Plan (Expected frequency & duration of treatment in the IRF):   Return to the Community and Family Able to Provide 24/7 Assistance  Anticipated Date of Rehabilitation Admission: 10/12/2023  Patient/Family oriented IRF level of care/facility/plan: Yes  Patient/Family willing to participate in IRF care/facility/plan: Yes  Patient able to tolerate IRF level of care proposed: Yes  Patient has potential to benefit IRF level of care proposed: Yes  Comments: Following for post acute services. Spoke with Daughter , Isis about goals and expectation for IRF level of care. Discussed therapy plan for 3 hours per day 5 days a week. Discussed therapy schedules 30/45 or 60 minute sessions typically 1.5 hours between breakfast and lunch and another 1.5 hours between lunch and dinner. Discussed PT/OT and SLP roles. Discussed potential length of stay. Discussed comprehensive medical surveillance by physiatry as well as hospitalist team. Discussed patient to nursing ratio. Discussed insurance MCR coverage for the program. Discussed visitation and clothing requirements. Isis and her brother Jose Elias will be primary support for Mala   to return to the community. Discussed participation with therapy program when visiting.  Family/ patient rehab goals are  to achieve stand by assist  with transfers and mobility, self care goal back to baseline  Home is a sing;e  story with 2 step(s) to enter.  Discussed discharge planner role and team conference. DME in the home includes a 4 wheeled walker  In the event of additional support need sisters  Answered all questions and addressed concerns.  Left my direct line for any other questions.         Special Needs or Precautions - Medical Necessity:  Safety Concerns/Precautions:  Fall Risk / High Risk for Falls and Balance  Diet:   DIET ORDERS (From admission to next 24h)       Start     Ordered    10/09/23 1003  Diet Order Diet: Regular  ALL MEALS        Question:  Diet:  Answer:  Regular     10/09/23 1003                    Anticipated Discharge Destination / Patient/Family Goal:  Destination: Home with Assistance Support System: Spouse and Family   Anticipated home health services: Not Applicable  Previously used  service/ provider: Not Applicable  Anticipated DME Needs: Walker and Wheelchair  Outpatient Services: OT and PT  Alternative resources to address additional identified needs:   Future Appointments   Date Time Provider Department Center   12/12/2023 11:15 AM Kobi Wheeler M.D. [facility] None   [facility]31[facility]2024  2[facility]       Pre-Screen Completed: 10/12/2023 9:37 AM Jyotsna Rodriguez L.P.N.

## 2023-10-12 NOTE — PROGRESS NOTES
Patient being discharged. Pt educated on discharge instructions and new prescriptions, verbalized understanding. PIV removed, monitor checked in. Patient going [facility] via GMT

## 2023-10-12 NOTE — THERAPY
Physical Therapy   Daily Treatment     Patient Name: Yvonne Marie Wada  Age:  87 y.o., Sex:  female  Medical Record #: [id number]  Today's Date: 10/12/2023     Precautions  Precautions: Fall Risk;Spinal / Back Precautions ;TLSO (Thoracolumbosacral orthosis)  Comments: TLSO for comfort    Assessment    Pt agreeable to PT tx session, continues to be limited by balance deficits, weakness, decreased activity tolerance, and fear of falling.  Pt mobilized as detailed below with min A, required cues for compensatory strategies including log roll.  BP elevated as documented below, RN notified; anticipate hypertension in part due to anxiety.  Encouraged pt to continue ambulating with nursing staff including sitting up in chair for meals and mobilizing to [facility] as needed.  Will continue to follow.     Plan    Treatment Plan Status: Continue Current Treatment Plan  Type of Treatment: Bed Mobility, Gait Training, Neuro Re-Education / Balance, Stair Training, Therapeutic Activities, Orthotics Training   Treatment Frequency: 4 Times per Week  Treatment Duration: Until Therapy Goals Met    DC Equipment Recommendations: Unable to determine at this time  Discharge Recommendations: Recommend post-acute placement for additional physical therapy services prior to discharge home     Objective     10/12/23 0939   Precautions   Precautions Fall Risk;Spinal / Back Precautions ;TLSO (Thoracolumbosacral orthosis)   Comments TLSO for comfort   Vitals   Patient BP Position Sitting   Blood Pressure  (!) 166/104 (RN notified)   Pain 0 - 10 Group   Therapist Pain Assessment Post Activity Pain Same as Prior to Activity;Nurse Notified   Cognition    Cognition / Consciousness X   Speech/ Communication Delayed Responses   Level of Consciousness Alert   Safety Awareness Impaired   New Learning Impaired   Attention Impaired   Comments Anxious & fearful of falling   Other Treatments   Other Treatments Provided Total A to oly TLSO   Balance   Sitting Balance  (Static) Fair   Sitting Balance (Dynamic) Fair -   Standing Balance (Static) Poor +   Standing Balance (Dynamic) Poor   Weight Shift Sitting Poor   Weight Shift Standing Poor   Skilled Intervention Verbal Cuing;Tactile Cuing;Compensatory Strategies   Bed Mobility    Supine to Sit Minimal Assist   Sit to Supine Minimal Assist   Scooting Minimal Assist   Skilled Intervention Verbal Cuing;Tactile Cuing;Compensatory Strategies   Comments cues for log roll   Gait Analysis   Gait Level Of Assist Unable to Participate   Functional Mobility   Sit to Stand Minimal Assist   Bed, Chair, Wheelchair Transfer Minimal Assist   Toilet Transfers Minimal Assist   Transfer Method Stand Pivot   Mobility supine > EOB <> BSC > back to bed   Skilled Intervention Verbal Cuing;Tactile Cuing;Facilitation   Comments Small LOB with pivot back to bed, pt fearful falling   Activity Tolerance   Sitting in Chair 5+ min on BSC   Sitting Edge of Bed 5 min   Standing 2 min   Short Term Goals    Short Term Goal # 1 Pt will perform bed mobility with supervision in 6 visits to improve functional indep.   Goal Outcome # 1 goal not met   Short Term Goal # 2 Pt will transfer with supervision in 6 visits to improve functional indep.   Goal Outcome # 2 Goal not met   Short Term Goal # 3 Pt will ambulate x 100 feet using FWW with cg assist in 6 visits to improve functional indep.   Goal Outcome # 3 Goal not met   Short Term Goal # 4 Pt will negotiate 2 stairs with supervision in 6 visits to access home.   Goal Outcome # 4 Goal not met   Physical Therapy Treatment Plan   Physical Therapy Treatment Plan Continue Current Treatment Plan

## 2023-10-12 NOTE — PROGRESS NOTES
Report received from outgoing nurse, care transferred at 1900. Patient currently in Afib 80-90 on the monitor and A&Ox 3. Patient reports back pain, heat packs applied. Whiteboard updated with plan for the day and names of staff. No other questions or concerns at this time from the patient. Call light within reach, fall precautions in place.

## 2023-10-12 NOTE — DISCHARGE PLANNING
Following for medical clearance.      0930 Patient is medically cleared. Pending transport to [facility]    0955 left message on daughter Isis's phone to update her on transfer.          GMT to  patient today 12-12:30   Care team notified , via Voalte   bedside RN to call [phone] for nurse to nurse report.

## 2023-10-12 NOTE — DISCHARGE SUMMARY
Discharge Summary    CHIEF COMPLAINT ON ADMISSION  Chief Complaint   Patient presents with    Fall     Pt was walking to the BR when she became dizzy and fell backwards. +head strike, -LOC, +thinners  Hx of vertigo.   Complaining of head, neck and back pain       Reason for Admission  TBI    Admission Date  10/8/2023     CODE STATUS  DNAR/DNI    HPI & HOSPITAL COURSE  This is a 87 y.o. female who presented 10/8/2023 with fall. she felt dizzy and  lightheaded and fell to the ground, losing consciousness for short period of time. CT showed progression of a now severe T7 T8 compression deformity, age-indeterminate.  Neurosurgery consulted, no intervention indicated.  Recommended TLSO. She was then restarted back on her eliquis fro her A.fibb. She was seen by physical therapy and rehab has been recommended.   Patient will be discharge to rehab today     The patient met 2-midnight criteria for an inpatient stay at the time of discharge.      FOLLOW UP ITEMS POST DISCHARGE  Neurosurgey     DISCHARGE DIAGNOSES  Principal Problem:    Thoracic compression fracture, closed, initial encounter ([facility]) (POA: Yes)  Active Problems:    Hypertension (Chronic) (POA: Yes)      Overview: 1/10/11 [facility] note cardiac catheterization normal systolic function      3/11 [facility] echo moderate aortic insufficiency, moderate mitral       insufficiency, moderate tricuspid insufficiency, normal LV function      5/12 echo [facility] normal LV function EF 60%, moderate to severe left atrial       enlargement, RVSP 32       9/26/13 CT thoracic aorta no evidence of aneurysm, small hiatal hernia      9/26/13 persantine thallium uniform breast tissue attenuation, cannot rule       out underlying ischemic, LVEF 67%      10/3/13 on toprol-XL 25 mg half a tablet daily       12/13/13 cardiology note cont same meds, repeat echo and follow up 6       months      1/2/14 echo mild LVH, grade 1 diastolic dysfunction, ascending aortic 4.0,       no change compared with [facility]  2010      5/15/14 cardiology note; increase benazepril to 40 mg qday,continue toprol       XL 25 mg daily      6/17/15 cardiology note continue meds, repeat echo 6 months      2/1/16 cardiology note moderate pulmonary hypertension and snoring,       nocturnal pulse oximetry ordered      6/30/16 cardiology note patient declines overnight pulse oximetry      1/16/16 echo EF 65%,grade 2-3 diastolic dysfunction, mild MR, RVSP 40,       aorta root 3.5 cm      11/1/17 echo normal LV size and function, EF 70%, mild aortic       insufficiency and mild tricuspid regurgitation, RVSP 50, aortic root 3.2       cm      11/3/17 cardiology note hypertension stable, status post thoracic aortic       aneurysm repair, headache unspecified, ESR ordered      11/28/17 on benazepril 40 mg,toprol 25 mg, add norvasc 5 mg      12/12/17 ultrasound carotid less than 50% internal carotid stenosis       bilateral      [facility] most recent echocardiogram       looks fine, will repeat CT scan follow aortic repair      2/20/18 episode of supraventricular tachycardia in the emergency room,       davenport catheter removed, symptoms resolved with repeat EKG sinus rhythm      11/17/18 cardiology note asymptomatic, continue cholesterol medication,       continue to monitor echo aortic valve, repaired ascending aorta, follow-up       yearly      11/21/18 echo normal LV function, EF 60%, mild LVH, grade 2 diastolic       dysfunction, mild aortic insufficiency, moderate tricuspid regurgitation,       RVSP 50      1/18/19 urine mac 45 on benazepril 40 mg, toprol 25 mg, norvasc 5 mg      12/6/19 echo normal LV function EF 65%, RVSP 43, moderate tricuspid       regurgitation, mild to moderate aortic insufficiency      10/22/20 echo EF 70%, grade 1 diastolic dysfunction, mild AR, RVSP 30       12/14/20 cardiology note maintaining sinus rhythm, given frequent       symptomatic episodes of paroxysmal atrial fibrillation and age,  recommend       amiodarone 200 mg, follow-up 6 months      4/6/21 on lotensin 40 mg and metoprolol 25 mg      4/20/21  sinus on exam, patient would like to discontinue       amiodarone understanding she likely will return to intermittent atrial       fibrillation, discussed sotalol, dronedarone, tikosyn as alternatives,       patient declines any of those alternatives, ablation is not appropriate       given her age       1/10/22 cardiology note sinus rhythm, atrial fibrillation episodes are not       significantly symptomatic, recommend she check her heart rate when atrial       fibrillation episodes occurs, repeat follow-up echo ordered, follow-up 6       months      3/22/22 echo EF 55%, diastolic dysfunction grade II, mild MR and TR, RVSP       28      4/18/22 on lotensin 40 mg and metoprolol 25 mg      5/24/22  cardiology note paroxysmal atrial fibrillation,       sinus today, on amiodarone, blood pressures not controlled, increase       toprol from 25 to 50 mg, suggest CTA aorta, follow-up 6 months       1/5/23 increase metoprolol to 75 mg qday and continue lotensin 40 mg      2/7/23 CT head moderate global atrophy, small vessel chronic ischemic       change      2/7/23 hosptial admission, 2/8/23 hospital discharge, lightheaded, loss of       consciousness, seen by occupational therapy, physical therapy, cleared for       home discharge, ER note indicate sinus rate 56 discharged on toprol 50 mg,       lotensin 40 mg, hydralazine 25 mg tid (new)      2/8/23 echo mild LVH, EF 60%, normal diastolic function, RVSP 30, mild MR       and aortic valve regurgitation      2/13/23 on toprol 50 mg, lotensin 40 mg, hydralazine 25 mg tid      3/2/23 cardiology note sinus rhythm, frequent falls recommend       discontinuation of anticoagulation referred for watchman left atrial       appendage occluder device but in the meantime continue oral       anticoagulation and repeat labs      3/2/23  cardiology discontinued hydralazine      4/6/23 on toprol 50 mg,lotensin 40 mg                             Paroxysmal atrial fibrillation (HCC) (POA: Yes)      Overview: 11/1/17 hospital admission, 11/2/17 hospital discharge, facial numbness,       transient numbness in both hands, resolved, CT, MRI head no acute infarct,       blood pressure stable, discharged home, patient states she was on aspirin       at the time of admission      11/1/17 CT head stable right mid brain likely chronic lacunar infarction,       periventricular white matter disease      11/1/17 MRI brain no acute abnormality, moderate chronic microvascular       stomach disease, chronic microhemorrhages left frontal and right parietal       lobes, chronic lacunar infarct left basal ganglia and blanco, or cerebral       atrophy      11/1/17 MR-MRA head without; negative      2/20/17 episode of supraventricular tachycardia in the emergency room,       davenport catheter removed, symptoms resolved with repeat EKG sinus rhythm      12/20/17  [facility] cardiology note most recent echocardiogram       looks fine, will repeat CT scan follow aortic repair      11/17/18 cardiology note asymptomatic, continue cholesterol medication,       continue to monitor echo aortic valve, repaired ascending aorta, follow-up       yearly      11/21/18 echo normal LV function, EF 60%, mild LVH, grade 2 diastolic       dysfunction, mild aortic insufficiency, moderate tricuspid regurgitation,       RVSP 50      12/6/19 echo normal LV function EF 65%, RVSP 43, moderate tricuspid       regurgitation, mild to moderate aortic insufficiency      9/28/20 emergency room note EKG atrial fibrillation new onset      9/30/20 cardiology note sinus rhythm, start eliquis 5 mg bid and increase       toprol to 25 mg daily, echo ordered, follow up 3 months       10/22/20 echo EF 70%, grade 1 diastolic dysfunction, mild AR, RVSP 30       12/14/20 cardiology note maintaining sinus rhythm,  given frequent       symptomatic episodes of paroxysmal atrial fibrillation and age, recommend       amiodarone 200 mg, follow-up 6 months      4/20/21  cardiology note sinus on exam, patient would like to       discontinue amiodarone understanding she likely will return to       intermittent atrial fibrillation, discussed sotalol, dronedarone, tikosyn       as alternatives, patient declines any of those alternatives, ablation is       not appropriate given her age       12/20/21 on metoprolol and eliquis      1/10/22 cardiology note sinus rhythm, atrial fibrillation episodes are not       significantly symptomatic, recommend she check her heart rate when atrial       fibrillation episodes occurs, repeat follow-up echo ordered, follow-up 6       months      3/22/22 echo EF 55%, diastolic dysfunction grade II, mild MR and TR, RVSP       28      5/24/22  cardiology note paroxysmal atrial fibrillation,       sinus today, on amiodarone, blood pressures not controlled, increase       toprol from 25 to 50 mg, continue eliquis, suggest CTA aorta, follow-up 6       months      10/21/22 hospital EKG a.fibrillation       12/1/22 EKG sinus on metoprolol 50 mg and eliquis      1/5/23 increase metoprolol to 75 mg qday and continue eliquis       2/7/23 hosptial admission, 2/8/23 hospital discharge, lightheaded, loss of       consciousness, seen by occupational therapy, physical therapy, cleared for       home discharge, ER note indicate sinus rate 56, discharged on toprol 50 mg       and eliquis      2/8/23 echo mild LVH, EF 60%, normal diastolic function, RVSP 30, mild MR       and aortic valve regurgitation      3/2/23 cardiology note sinus rhythm, frequent falls recommend       discontinuation of anticoagulation referred for watchman left atrial       appendage occluder device but in the meantime continue oral       anticoagulation and repeat labs      4/6/23 still on eliquis asked her to check with  cardiology                       Syncope (POA: Yes)    POLST (Physician Orders for Life-Sustaining Treatment) (POA: Yes)      Overview: 3/27/23 POLST form completed scanned into system  Resolved Problems:    * No resolved hospital problems. *      FOLLOW UP  Future Appointments   Date Time Provider Department Center   12/12/2023 11:15 AM Kobi Wheeler M.D. [facility] None   1/31/2024  2[facility]     No follow-up provider specified.    MEDICATIONS ON DISCHARGE     Medication List        START taking these medications        Instructions   oxyCODONE immediate-release 5 MG Tabs  Commonly known as: Roxicodone   Take 1 Tablet by mouth every 3 hours as needed for Severe Pain for up to 4 days.  Dose: 5 mg            CONTINUE taking these medications        Instructions   apixaban 2.5mg Tabs  Commonly known as: Eliquis   Take 1 Tablet by mouth 2 times a day.  Dose: 2.5 mg     benazepril 40 MG tablet  Commonly known as: Lotensin   TAKE 1 TABLET BY MOUTH EVERY DAY IN THE MORNING     levothyroxine 75 MCG Tabs  Commonly known as: Synthroid   TAKE 1 TABLET BY MOUTH EVERY DAY IN THE MORNING ON AN EMPTY STOMACH     metoprolol SR 50 MG Tb24  Commonly known as: Toprol XL   Take 1 Tablet by mouth every morning.  Dose: 50 mg     therapeutic multivitamin-minerals Tabs   Take 1 Tablet by mouth every day.  Dose: 1 Tablet     VITAMIN D3 PO   Take 1 Tablet by mouth every day.  Dose: 1 Tablet            STOP taking these medications      amoxicillin 500 MG Caps  Commonly known as: Amoxil     VITAMIN C PO              Allergies  Allergies   Allergen Reactions    Codeine Nausea     Synthetic codones ok    Doxycycline Nausea     Nausea, Ok in life saving situation (per pt)    Sulfamethoxazole-Trimethoprim Vomiting and Nausea     Ok in a life saving situation (per pt)    Tramadol Vomiting and Nausea     nausea    Trazodone Vomiting     groggy       DIET  No orders of the defined types were placed in this  encounter.      ACTIVITY  As tolerated.  Weight bearing as tolerated    LINES, DRAINS, AND WOUNDS  This is an automated list. Peripheral IVs will be removed prior to discharge.                     MENTAL STATUS ON TRANSFER             CONSULTATIONS  Neurosurgery     PROCEDURES  None     LABORATORY  Lab Results   Component Value Date    SODIUM 134 (L) 10/11/2023    POTASSIUM 4.1 10/11/2023    CHLORIDE 102 10/11/2023    CO2 20 10/11/2023    GLUCOSE 86 10/11/2023    BUN 16 10/11/2023    CREATININE 0.78 10/11/2023    CREATININE 1.05 (H) 02/07/2013        Lab Results   Component Value Date    WBC 4.9 10/11/2023    WBC 3.9 (L) 04/16/2011    HEMOGLOBIN 14.1 10/11/2023    HEMATOCRIT 42.0 10/11/2023    PLATELETCT 154 (L) 10/11/2023        Total time of the discharge process exceeds 35 minutes.

## 2023-10-12 NOTE — CARE PLAN
The patient is Stable - Low risk of patient condition declining or worsening    Shift Goals  Clinical Goals: Monitor Vitals, Back stability, pain control  Patient Goals: sleep  Family Goals: na    Progress made toward(s) clinical / shift goals:    Problem: Pain - Standard  Goal: Alleviation of pain or a reduction in pain to the patient’s comfort goal  Outcome: Progressing     Problem: Skin Integrity  Goal: Skin integrity is maintained or improved  Outcome: Progressing       Patient is not progressing towards the following goals:

## 2023-10-13 ENCOUNTER — APPOINTMENT (OUTPATIENT)
Dept: OCCUPATIONAL THERAPY | Facility: REHABILITATION | Age: 88
DRG: 560 | End: 2023-10-13
Attending: PHYSICAL MEDICINE & REHABILITATION
Payer: MEDICARE

## 2023-10-13 ENCOUNTER — APPOINTMENT (OUTPATIENT)
Dept: PHYSICAL THERAPY | Facility: REHABILITATION | Age: 88
DRG: 560 | End: 2023-10-13
Attending: PHYSICAL MEDICINE & REHABILITATION
Payer: MEDICARE

## 2023-10-13 LAB
25(OH)D3 SERPL-MCNC: 58 NG/ML (ref 30–100)
ALBUMIN SERPL BCP-MCNC: 3.5 G/DL (ref 3.2–4.9)
ALBUMIN/GLOB SERPL: 1.3 G/DL
ALP SERPL-CCNC: 55 U/L (ref 30–99)
ALT SERPL-CCNC: 10 U/L (ref 2–50)
ANION GAP SERPL CALC-SCNC: 12 MMOL/L (ref 7–16)
AST SERPL-CCNC: 17 U/L (ref 12–45)
BASOPHILS # BLD AUTO: 0.7 % (ref 0–1.8)
BASOPHILS # BLD: 0.03 K/UL (ref 0–0.12)
BILIRUB SERPL-MCNC: 0.5 MG/DL (ref 0.1–1.5)
BUN SERPL-MCNC: 14 MG/DL (ref 8–22)
CALCIUM ALBUM COR SERPL-MCNC: 8.9 MG/DL (ref 8.5–10.5)
CALCIUM SERPL-MCNC: 8.5 MG/DL (ref 8.5–10.5)
CHLORIDE SERPL-SCNC: 102 MMOL/L (ref 96–112)
CO2 SERPL-SCNC: 20 MMOL/L (ref 20–33)
CREAT SERPL-MCNC: 0.82 MG/DL (ref 0.5–1.4)
EOSINOPHIL # BLD AUTO: 0.12 K/UL (ref 0–0.51)
EOSINOPHIL NFR BLD: 2.9 % (ref 0–6.9)
ERYTHROCYTE [DISTWIDTH] IN BLOOD BY AUTOMATED COUNT: 43.5 FL (ref 35.9–50)
EST. AVERAGE GLUCOSE BLD GHB EST-MCNC: 103 MG/DL
GFR SERPLBLD CREATININE-BSD FMLA CKD-EPI: 69 ML/MIN/1.73 M 2
GLOBULIN SER CALC-MCNC: 2.7 G/DL (ref 1.9–3.5)
GLUCOSE SERPL-MCNC: 97 MG/DL (ref 65–99)
HBA1C MFR BLD: 5.2 % (ref 4–5.6)
HCT VFR BLD AUTO: 41.8 % (ref 37–47)
HGB BLD-MCNC: 14.2 G/DL (ref 12–16)
IMM GRANULOCYTES # BLD AUTO: 0.03 K/UL (ref 0–0.11)
IMM GRANULOCYTES NFR BLD AUTO: 0.7 % (ref 0–0.9)
LYMPHOCYTES # BLD AUTO: 0.81 K/UL (ref 1–4.8)
LYMPHOCYTES NFR BLD: 19.8 % (ref 22–41)
MCH RBC QN AUTO: 30.9 PG (ref 27–33)
MCHC RBC AUTO-ENTMCNC: 34 G/DL (ref 32.2–35.5)
MCV RBC AUTO: 91.1 FL (ref 81.4–97.8)
MONOCYTES # BLD AUTO: 0.54 K/UL (ref 0–0.85)
MONOCYTES NFR BLD AUTO: 13.2 % (ref 0–13.4)
NEUTROPHILS # BLD AUTO: 2.57 K/UL (ref 1.82–7.42)
NEUTROPHILS NFR BLD: 62.7 % (ref 44–72)
NRBC # BLD AUTO: 0 K/UL
NRBC BLD-RTO: 0 /100 WBC (ref 0–0.2)
PLATELET # BLD AUTO: 181 K/UL (ref 164–446)
PMV BLD AUTO: 9.8 FL (ref 9–12.9)
POTASSIUM SERPL-SCNC: 3.6 MMOL/L (ref 3.6–5.5)
PROT SERPL-MCNC: 6.2 G/DL (ref 6–8.2)
RBC # BLD AUTO: 4.59 M/UL (ref 4.2–5.4)
SODIUM SERPL-SCNC: 134 MMOL/L (ref 135–145)
TSH SERPL DL<=0.005 MIU/L-ACNC: 5.16 UIU/ML (ref 0.38–5.33)
WBC # BLD AUTO: 4.1 K/UL (ref 4.8–10.8)

## 2023-10-13 PROCEDURE — 97116 GAIT TRAINING THERAPY: CPT

## 2023-10-13 PROCEDURE — A9270 NON-COVERED ITEM OR SERVICE: HCPCS | Performed by: PHYSICAL MEDICINE & REHABILITATION

## 2023-10-13 PROCEDURE — 97166 OT EVAL MOD COMPLEX 45 MIN: CPT

## 2023-10-13 PROCEDURE — 700111 HCHG RX REV CODE 636 W/ 250 OVERRIDE (IP): Performed by: PHYSICAL MEDICINE & REHABILITATION

## 2023-10-13 PROCEDURE — 85025 COMPLETE CBC W/AUTO DIFF WBC: CPT

## 2023-10-13 PROCEDURE — 97535 SELF CARE MNGMENT TRAINING: CPT

## 2023-10-13 PROCEDURE — 36415 COLL VENOUS BLD VENIPUNCTURE: CPT

## 2023-10-13 PROCEDURE — 700102 HCHG RX REV CODE 250 W/ 637 OVERRIDE(OP): Performed by: PHYSICAL MEDICINE & REHABILITATION

## 2023-10-13 PROCEDURE — 83036 HEMOGLOBIN GLYCOSYLATED A1C: CPT

## 2023-10-13 PROCEDURE — 770010 HCHG ROOM/CARE - REHAB SEMI PRIVAT*

## 2023-10-13 PROCEDURE — 97162 PT EVAL MOD COMPLEX 30 MIN: CPT

## 2023-10-13 PROCEDURE — 80053 COMPREHEN METABOLIC PANEL: CPT

## 2023-10-13 PROCEDURE — 84443 ASSAY THYROID STIM HORMONE: CPT

## 2023-10-13 PROCEDURE — 82306 VITAMIN D 25 HYDROXY: CPT

## 2023-10-13 PROCEDURE — 99233 SBSQ HOSP IP/OBS HIGH 50: CPT | Performed by: PHYSICAL MEDICINE & REHABILITATION

## 2023-10-13 RX ORDER — METOPROLOL SUCCINATE 25 MG/1
75 TABLET, EXTENDED RELEASE ORAL EVERY MORNING
Status: DISCONTINUED | OUTPATIENT
Start: 2023-10-14 | End: 2023-10-14

## 2023-10-13 RX ADMIN — SENNOSIDES AND DOCUSATE SODIUM 2 TABLET: 50; 8.6 TABLET ORAL at 21:59

## 2023-10-13 RX ADMIN — OXYCODONE HYDROCHLORIDE 5 MG: 5 TABLET ORAL at 17:12

## 2023-10-13 RX ADMIN — APIXABAN 2.5 MG: 2.5 TABLET, FILM COATED ORAL at 08:27

## 2023-10-13 RX ADMIN — ACETAMINOPHEN 1000 MG: 500 TABLET ORAL at 05:09

## 2023-10-13 RX ADMIN — METOPROLOL SUCCINATE 50 MG: 25 TABLET, EXTENDED RELEASE ORAL at 08:27

## 2023-10-13 RX ADMIN — ACETAMINOPHEN 1000 MG: 500 TABLET ORAL at 00:21

## 2023-10-13 RX ADMIN — ONDANSETRON 4 MG: 4 TABLET, ORALLY DISINTEGRATING ORAL at 11:38

## 2023-10-13 RX ADMIN — APIXABAN 2.5 MG: 2.5 TABLET, FILM COATED ORAL at 21:59

## 2023-10-13 RX ADMIN — LEVOTHYROXINE SODIUM 75 MCG: 0.07 TABLET ORAL at 06:09

## 2023-10-13 RX ADMIN — BENAZEPRIL HYDROCHLORIDE 40 MG: 10 TABLET, FILM COATED ORAL at 08:24

## 2023-10-13 RX ADMIN — OXYCODONE HYDROCHLORIDE 5 MG: 5 TABLET ORAL at 22:01

## 2023-10-13 RX ADMIN — ACETAMINOPHEN 1000 MG: 500 TABLET ORAL at 11:39

## 2023-10-13 RX ADMIN — OMEPRAZOLE 20 MG: 20 CAPSULE, DELAYED RELEASE ORAL at 08:23

## 2023-10-13 RX ADMIN — SENNOSIDES AND DOCUSATE SODIUM 2 TABLET: 50; 8.6 TABLET ORAL at 08:24

## 2023-10-13 ASSESSMENT — BALANCE ASSESSMENTS
PLACE ALTERNATE FOOT ON STEP OR STOOL WHILE STANDING UNSUPPORTED: 0
LONG VERSION TOTAL SCORE (MAX 56): 16
STANDING UNSUPPORTED: 0
MEDICARE IMPAIRMENT PERCENTAGE: 71
STANDING UNSUPPORTED ONE FOOT IN FRONT: 0
PICK UP OBJECT FROM THE FLOOR FROM A STANDING POSITION: 1
STANDING ON ONE LEG: 0
LONG VERSION TOTAL SCORE (MAX 56): 16
LOOK OVER LEFT AND RIGHT SHOULDERS WHILE STANDING: 0
SITTING TO STANDING: 2
STANDING UNSUPPORTED WITH EYES CLOSED: 1
TRANSFERS: 3
TURN 360 DEGREES: 0
REACHING FORWARD WITH OUTSTRETCHED ARM WHILE STANDING: 2
SITTING UNSUPPORTED: 4
STANDING UNSUPPORTED WITH FEET TOGETHER: 1
STANDING TO SITTING: 2

## 2023-10-13 ASSESSMENT — GAIT ASSESSMENTS
DEVIATION: INCREASED BASE OF SUPPORT;BRADYKINETIC;SHUFFLED GAIT;DECREASED HEEL STRIKE;DECREASED TOE OFF
GAIT LEVEL OF ASSIST: CONTACT GUARD ASSIST
DISTANCE (FEET): 10
ASSISTIVE DEVICE: PARALLEL BARS
DISTANCE (FEET): 25
DEVIATION: INCREASED BASE OF SUPPORT;BRADYKINETIC;SHUFFLED GAIT;DECREASED HEEL STRIKE;DECREASED TOE OFF
GAIT LEVEL OF ASSIST: CONTACT GUARD ASSIST
ASSISTIVE DEVICE: FRONT WHEEL WALKER

## 2023-10-13 ASSESSMENT — PAIN DESCRIPTION - PAIN TYPE
TYPE: ACUTE PAIN

## 2023-10-13 ASSESSMENT — BRIEF INTERVIEW FOR MENTAL STATUS (BIMS)
ASKED TO RECALL SOCK: NO, COULD NOT RECALL
ASKED TO RECALL BED: NO, COULD NOT RECALL
BIMS SUMMARY SCORE: 10
WHAT MONTH IS IT: ACCURATE WITHIN 5 DAYS
INITIAL REPETITION OF BED BLUE SOCK - FIRST ATTEMPT: 3
ASKED TO RECALL BLUE: YES, NO CUE REQUIRED
WHAT DAY OF THE WEEK IS IT: INCORRECT
WHAT YEAR IS IT: CORRECT

## 2023-10-13 ASSESSMENT — CHA2DS2 SCORE
DIABETES: NO
VASCULAR DISEASE: NO
CHA2DS2 VASC SCORE: 5
AGE 75 OR GREATER: YES
PRIOR STROKE OR TIA OR THROMBOEMBOLISM: YES
AGE 65 TO 74: NO
HYPERTENSION: NO
SEX: FEMALE

## 2023-10-13 ASSESSMENT — ACTIVITIES OF DAILY LIVING (ADL)
BED_CHAIR_WHEELCHAIR_TRANSFER_DESCRIPTION: INCREASED TIME;SUPERVISION FOR SAFETY;VERBAL CUEING
TOILETING: INDEPENDENT
TOILET_TRANSFER_DESCRIPTION: GRAB BAR;INCREASED TIME;INITIAL PREPARATION FOR TASK;SUPERVISION FOR SAFETY;VERBAL CUEING

## 2023-10-13 NOTE — THERAPY
Physical Therapy   Daily Treatment     Patient Name: Yvonne Marie Wada  Age:  87 y.o., Sex:  female  Medical Record #: [id number]  Today's Date: 10/13/2023     Precautions  Precautions: Spinal / Back Precautions , Fall Risk  Comments: TLSO for comfort OOB    Subjective    Patient is found sleeping in bed, agreeable to participate. Was found in half twisted position with trunk in supine with hips sidelying despite education earlier regarding log roll and spinal precautions. Patient does report mid back pain. RN aware.      Objective       10/13/23 1331   PT Charge Group   PT Gait Training (Units) 2   PT Total Time Spent   PT Individual Total Time Spent (Mins) 30   Gait Functional Level of Assist    Gait Level Of Assist Contact Guard Assist   Assistive Device Front Wheel Walker   Distance (Feet) 25   # of Times Distance was Traveled 3   Deviation Increased Base Of Support;Bradykinetic;Shuffled Gait;Decreased Heel Strike;Decreased Toe Off  (crouched posture, encouragement to keep going.)   Interdisciplinary Plan of Care Collaboration   IDT Collaboration with  Nursing   Patient Position at End of Therapy Seated;Chair Alarm On;Self Releasing Lap Belt Applied;Call Light within Reach;Tray Table within Reach;Phone within Reach   Collaboration Comments RN reports she'll put a waffle cushion on patient's bed         Assessment    Patient requires significant encouragement to participate when she's tired.   Strengths: Pleasant and cooperative, Supportive family  Barriers: Decreased endurance, Difficulty following instructions, Generalized weakness, Impaired activity tolerance, Impaired balance, Impaired carryover of learning, Impaired insight/denial of deficits, Impaired functional cognition, Limited mobility, Pain    Plan    Endurance, balance, strength, walking as able    DME       Passport items to be completed:  Get in/out of bed safely, in/out of a vehicle, safely use mobility device, walk or wheel around home/community,  navigate up and down stairs, show how to get up/down from the ground, ensure home is accessible, demonstrate HEP, complete caregiver training    Physical Therapy Problems (Active)       Problem: Balance       Dates: Start:  10/13/23         Goal: STG-Within one week, patient will improve Jaramillo to 22/56 (16/56 on eval)       Dates: Start:  10/13/23               Problem: Mobility       Dates: Start:  10/13/23         Goal: STG-Within one week, patient will ambulate 100 ft with FWW and CGA between rest breaks       Dates: Start:  10/13/23            Goal: STG-Within one week, patient will ambulate up/down a curb with FWW and CGA       Dates: Start:  10/13/23               Problem: Mobility Transfers       Dates: Start:  10/13/23         Goal: STG-Within one week, patient will perform bed mobility from flat bed via log roll with minimal cuing and supervision assist       Dates: Start:  10/13/23            Goal: STG-Within one week, patient will transfer in/out of car with FWW and CGA       Dates: Start:  10/13/23               Problem: PT-Long Term Goals       Dates: Start:  10/13/23         Goal: LTG-By discharge, patient will ambulate 250 ft between rest breaks with LRAD and supervision assist       Dates: Start:  10/13/23            Goal: LTG-By discharge, patient will transfer in/out of a car with supervision assist       Dates: Start:  10/13/23            Goal: LTG-By discharge, patient will improve Jaramillo to >40/56 to reduce fall risk       Dates: Start:  10/13/23

## 2023-10-13 NOTE — THERAPY
Physical Therapy   Initial Evaluation     Patient Name: Yvonne Marie Wada  Age:  87 y.o., Sex:  female  Medical Record #: [id number]  Today's Date: 10/13/2023     Subjective    Patient found in bed,  at bedside. Patient is laying in half sidelying position with truncal twist, spent time educating re: spinal precautions, log roll, and less painful positioning. Patient reports 8/10 pain, RN aware and will medicate. Suspect this position is one that patient spends a lot of time in prior to injury.     Objective       10/13/23 1031   PT Charge Group   PT Gait Training (Units) 1   PT Evaluation PT Evaluation Mod   PT Total Time Spent   PT Individual Total Time Spent (Mins) 60   Precautions   Precautions Spinal / Back Precautions ;Fall Risk   Comments TLSO for comfort OOB   Gait Functional Level of Assist    Gait Level Of Assist Contact Guard Assist   Assistive Device Parallel Bars   Distance (Feet) 10   # of Times Distance was Traveled 2   Deviation Increased Base Of Support;Bradykinetic;Shuffled Gait;Decreased Heel Strike;Decreased Toe Off  (short stride)   Transfer Functional Level of Assist   Bed, Chair, Wheelchair Transfer Contact Guard Assist   Bed Chair Wheelchair Transfer Description Increased time;Supervision for safety;Verbal cueing   Toilet Transfers Contact Guard Assist   Toilet Transfer Description Grab bar;Increased time;Initial preparation for task;Supervision for safety;Verbal cueing   Bed Mobility    Supine to Sit Contact Guard Assist   Sit to Supine Contact Guard Assist   Sit to Stand Contact Guard Assist   Scooting Contact Guard Assist   Rolling Contact Guard Assist   Interdisciplinary Plan of Care Collaboration   IDT Collaboration with  Family / Caregiver;Nursing   Patient Position at End of Therapy Seated;Self Releasing Lap Belt Applied;Chair Alarm On;Call Light within Reach;Tray Table within Reach;Phone within Reach;Family / Friend in Room   Collaboration Comments family verifies PLOF, home set  up   Physical Therapist Assigned   Assigned PT / Treatment Time / Comments EB 60   Strengths & Barriers   Strengths Pleasant and cooperative;Supportive family   Barriers Decreased endurance;Difficulty following instructions;Generalized weakness;Impaired activity tolerance;Impaired balance;Impaired carryover of learning;Impaired insight/denial of deficits;Impaired functional cognition;Limited mobility;Pain        10/13/23 1032   Outcome Measures   Outcome Measures Utilized Jaramillo Balance Scale   Jaramillo Balance Scale   Sitting Unsupported (Score 0-4) 4   Change Of Positon: Sitting To Standing (Score 0-4) 2   Change Of Positon: Standing To Sitting (Score 0-4) 2   Transfers (Score 0-4) 3   Standing Unsupported (Score 0-4) 0   Standing With Eyes Closed (Score 0-4) 1   Standing With Feet Together (Score 0-4) 1   Tandem Standing (Score 0-4) 0   Standing On One Leg (Score 0-4) 0   Turning Trunk (Feet Fixed) (Score 0-4) 0   Retrieving Objects From Floor (Score 0-4) 1   Turning 360 Degrees (Score 0-4) 0   Stool Stepping (Score 0-4) 0   Reaching Forward While Standing (Score 0-4) 2   Jaramillo Balance Total Score (0-56) 16       Assessment  Patient is 87 y.o. female with a diagnosis of  paroxysmal afib s/p watchman procedure August 2023 on Eliquis , HTN, HLD, GERD, Hypothyroidism, prior inpatient rehab stay in October 2022 after right tib-fib fracture, and history of known thoracic aortic aneurysm  ;  who presented on 10/8/2023  5:48 AM with dizziness and a GLF, suspected syncopal episode. Per documentation, patient had gotten up to go to the bathroom when she felt dizzy, and fell landing on the ground with  + LOC. Upon eval in the ED, CT Head was negative for acute intracranial hemorrhage or infract, CT C spine negative for fractures, CT T spine showed interval progression of a now severe T7 compression fracture and mild T8 compression deformity. Neurosurgery was consulted, with recommendations for non op management and use of TLSO  for comfort. Patient's hospital course has been notable for thrombocytopenia with plts down to 145 . She has had HTN and is on tele due to the syncopal episode, which was suspected secondary to orthostatic hypertension.      Additional factors influencing patient status / progress (ie: cognitive factors, co-morbidities, social support, etc): cognition limitations with moderate impairments found on previous admission to this facility, poor safety awareness, history of falls, dependence on  for household management/ community mobility.      Plan  Recommend Physical Therapy  minutes per day 5-7 days per week for 10-14 days for the following treatments:  PT Group Therapy, PT Gait Training, PT Self Care/Home Eval, PT Therapeutic Exercises, PT Neuro Re-Ed/Balance, PT Therapeutic Activity, and PT Evaluation.    Passport items to be completed:  Get in/out of bed safely, in/out of a vehicle, safely use mobility device, walk or wheel around home/community, navigate up and down stairs, show how to get up/down from the ground, ensure home is accessible, demonstrate HEP, complete caregiver training    Goals:  Long term and short term goals have been discussed with patient and spouse and they are in agreement.    Physical Therapy Problems (Active)       Problem: Balance       Dates: Start:  10/13/23         Goal: STG-Within one week, patient will improve Jaramillo to 22/56 (16/56 on eval)       Dates: Start:  10/13/23               Problem: Mobility       Dates: Start:  10/13/23         Goal: STG-Within one week, patient will ambulate 100 ft with FWW and CGA between rest breaks       Dates: Start:  10/13/23            Goal: STG-Within one week, patient will ambulate up/down a curb with FWW and CGA       Dates: Start:  10/13/23               Problem: Mobility Transfers       Dates: Start:  10/13/23         Goal: STG-Within one week, patient will perform bed mobility from flat bed via log roll with minimal cuing and  supervision assist       Dates: Start:  10/13/23            Goal: STG-Within one week, patient will transfer in/out of car with FWW and CGA       Dates: Start:  10/13/23               Problem: PT-Long Term Goals       Dates: Start:  10/13/23         Goal: LTG-By discharge, patient will ambulate 250 ft between rest breaks with LRAD and supervision assist       Dates: Start:  10/13/23            Goal: LTG-By discharge, patient will transfer in/out of a car with supervision assist       Dates: Start:  10/13/23            Goal: LTG-By discharge, patient will improve Jaramillo to >40/56 to reduce fall risk       Dates: Start:  10/13/23

## 2023-10-13 NOTE — DISCHARGE PLANNING
"CASE MANAGEMENT INITIAL ASSESSMENT    Admit Date:  10/12/2023     I met with pt to discuss role of case management / discharge planning / team conference.  She provided info for assessment.    Patient is a  87 y.o. female transferred from [facility] where she was hospitalized from 10/8 to 10/12/23.  Pt reports \"I had to be catherized today...that's not happened before\".      CT T spine showed interval progression of a now severe T7 compression fracture and mild T8 compression deformity. Neurosurgery was consulted, with recommendations for non op management and use of TLSO for comfort    Diagnosis: Other fracture of T7-t8 thoracic vertebra, initial encounter for closed fracture;     Co-morbidities:   Patient Active Problem List    Diagnosis Date Noted    Other fracture of T7-t8 thoracic vertebra, initial encounter for closed fracture ([facility]) 10/12/2023    Thoracic compression fracture, closed, initial encounter ([facility]) 10/08/2023    POLST (Physician Orders for Life-Sustaining Treatment) 03/27/2023    Hemorrhoid 03/27/2023    Depression, major, single episode, moderate ([facility]) 02/13/2023    Syncope 02/07/2023    Neutropenia ([facility]) 01/05/2023    History of UTI 11/07/2022    S/p ankle right orif 10/15/2022    Senile osteoporosis 08/22/2022    Anemia 10/07/2021    Urinary retention 09/14/2021    History of pelvic fracture 08/30/2021    Gait disorder 04/23/2021    Chronic anticoagulation 12/14/2020    S/p hip right arthroplasty 11/29/2017    History of transient ischemic attack (TIA) 11/01/2017    History of shoulder pain 04/05/2017    History of insomnia 01/30/2017    Decreased GFR 04/15/2016    S/P thoracic aortic aneurysm repair 03/29/2014    Neck arthritis 02/28/2014    GERD (gastroesophageal reflux disease) 07/12/2012    History of shingles 06/30/2011    Hypothyroid 04/08/2011    Osteoporosis, idiopathic 08/09/2010    S/p lumbar surgery 07/26/2010    Hypertension 07/26/2010    Dyslipidemia 07/26/2010    S/P TOMY (total abdominal " hysterectomy) 07/26/2010    Preventative health care 07/26/2010    S/P knee bilateral arthroplasty 07/26/2010    S/P appendectomy 07/26/2010    Paroxysmal atrial fibrillation (H[facility]) 07/26/2010     Prior Living Situation:  Housing / Facility: 1 Story House in [place]; 2 stefanie; walk in shower  Lives with - Patient's Self Care Capacity: Spouse Williams Wada    Prior Level of Function:  Medication Management: Requires Assist  Finances: Requires Assist  Home Management: Dependent  Shopping: Dependent  Prior Level Of Mobility: Independent With Device in Home  Driving / Transportation: Relatives / Others Provide Transportation (spouse drives)    Support Systems:  Primary : Isis Campos Dtr   Other support systems: Spouse Ren and son Steve Wada  Power of  (Name & Phone): yes    Previous Services Utilized:   Equipment Owned: Front-Wheel Walker, 4-Wheel Walker, Wheelchair, Grab Bar(s) In Tub / Shower, Tub / Shower Seat, Single Point Cane  Prior Services:  Pt reports she has used home health before, unable to remember name of agency.     Other Information:  Occupation (Pre-Hospital Vocational): Retired Due To Age   Primary Payor Source: Medicare A  Secondary Payor Source: Other (Comments) ([place])  Primary Care Practitioner : Dr Tee Tran  Other MD: Dr Leonidas Workman Neurosurgeon    Patient / Family Goal:  Pain management; improve function    Plan:  1. Continue to follow patient through hospitalization and provide discharge planning in collaboration with patient, family, physicians and ancillary services.     2. Utilize community resources to ensure a safe discharge.   Anticipate home health; follow up w/ pcp/

## 2023-10-13 NOTE — DIETARY
"Nutrition services: Day 1 of admit.  Yvonne Marie Wada is a 87 y.o. female with admitting DX of Other fracture of T7-t8 thoracic vertebra,    Consult received for MST of 2 on nutrition screen due to report of 14-23 lb wt loss x 6 months. No report of poor PO PTA.      Assessment:  Height: 164.6 cm (5' 4.8\")  Weight: 66.5 kg (146 lb 9.7 oz)  Body mass index is 24.55 kg/m²., BMI classification: acceptable based on age  Diet/Intake: regular/50-75% x 1 and <25% x 1.     Evaluation:   Pt also noted to have poor PO at [facility].   Wt hx reviewed in Epic. Pt's admit wt at [facility] was 56.7 kg. Admit wt here of 66.5 kg is questionable because pt's PO intake has been poor at 0 to 25-50%. Wt loss based on pt's admit wt at [facility] was 13.7 kg (19%) x 1 year. Wt loss is not significant but is worth noting.   Pt's  confirmed wt loss. Pt was not able to provide details on PO intake. Pt's  said that she was eating fine until right before her hospitalization when it dropped to <50% of normal.   Pt does not appear malnourished.   Pt said that she does not have an appetite. Nausea noted by pt's .   Pt agreed to Boost Plus supplements TID w/ meals for additional nutrition.   Meds: PRN Zofran provided today    Malnutrition Risk: ASPEN criteria not met for malnutrition but risk noted due to hx of wt loss and current poor PO    Recommendations/Plan:  Add Boost Plus to meals TID   Encourage intake of ~50%  Document intake of all meals and supplements  as % taken in ADL's to provide interdisciplinary communication across all shifts.   Monitor weight.  Nutrition rep will continue to see patient for ongoing meal and snack preferences.       RD will follow.  "

## 2023-10-13 NOTE — THERAPY
Occupational Therapy   Initial Evaluation     Patient Name: Yvonne Marie Wada  Age:  87 y.o., Sex:  female  Medical Record #: [id number]  Today's Date: 10/13/2023     Subjective    Pt seen at 8:30 am - pt in bed eating breakfast upon arrival. Pt requested an emesis bag, no emesis throughout session, flat affect, not motivated. Unable to complete shower d/t limited time, pt agreeable to shower at 12:30pm. However at 12:30 pt refused shower d/t pain, still holding onto emesis bag. Willing to get OOB to use toilet again, but there after refused d/t limited sitting tolerance in w/c and wanted to return to bed.      Objective       10/13/23 0110   OT Charge Group   Charges Yes   OT Self Care / ADL (Units) 3   OT Evaluation OT Evaluation Mod   OT Total Time Spent   OT Individual Total Time Spent (Mins) 90   Prior Living Situation   Prior Services Home-Independent   Housing / Facility 1 Story House   Steps Into Home 2   Steps In Home 0   Bathroom Set up Walk In Shower;Grab Bars;Shower Chair   Equipment Owned 4-Wheel Walker;Grab Bar(s) In Tub / Shower;Grab Bar(s) By Toilet;Tub / Shower Seat   Lives with - Patient's Self Care Capacity Spouse   Comments Pt reports that shes lives with spouse in [place], 1 adult child lives locally and assist as needed   Prior Level of ADL Function   Self Feeding Independent   Grooming / Hygiene Independent   Bathing Independent   Dressing Independent   Toileting Independent   Prior Level of IADL Function   Medication Management Requires Assist   Laundry Dependent   Kitchen Mobility Requires Assist   Finances Requires Assist   Home Management Dependent   Shopping Dependent   Prior Level Of Mobility Independent With Device in Home   Driving / Transportation Relatives / Others Provide Transportation  (spouse drives)   Occupation (Pre-Hospital Vocational) Retired Due To Age   Comments spouse completes IADL's   Prior Functioning: Everyday Activities   Self Care Independent   Indoor Mobility (Ambulation)  "Independent   Functional Cognition Needed some help   Prior Device Use Walker   Pain 0 - 10 Group   Location Back   Therapist Pain Assessment 8   Cognition    Cognition / Consciousness X   Level of Consciousness Alert   New Learning Impaired   Initiation Impaired   Comments pt unable to recall being at this hospital last year, she thought she was at a different facility   ABS (Agitated Behavior Scale)   Agitated Behavior Scale Performed No   Cognitive Pattern Assessment   Cognitive Pattern Assessment Used BIMS   Brief Interview for Mental Status (BIMS)   Repetition of Three Words (First Attempt) 3   Temporal Orientation: Year Correct   Temporal Orientation: Month Accurate within 5 days   Temporal Orientation: Day Incorrect   Recall: \"Sock\" No, could not recall   Recall: \"Blue\" Yes, no cue required   Recall: \"Bed\" No, could not recall   BIMS Summary Score 10   Confusion Assessment Method (CAM)   Is there evidence of an acute change in mental status from the patient's baseline? No   Inattention Behavior not present   Disorganized thinking Behavior not present   Altered level of consciousness Behavior not present   Passive ROM Upper Body   Passive ROM Upper Body WDL   Active ROM Upper Body   Active ROM Upper Body  WDL   Dominant Hand Right   Strength Upper Body   Upper Body Strength  X   Gross Strength Generalized Weakness, Equal Bilaterally.    Sensation Upper Body   Upper Extremity Sensation  WDL   Bed Mobility    Supine to Sit Contact Guard Assist   Sit to Supine Contact Guard Assist   Sit to Stand Minimal Assist   Scooting Contact Guard Assist   Coordination Upper Body   Coordination WDL   Eating   Assistance Needed Set-up / clean-up   Physical Assistance Level No physical assistance   CARE Score - Eating 5   Eating Discharge Goal   Discharge Goal 6   Oral Hygiene   Assistance Needed Set-up / clean-up   Physical Assistance Level No physical assistance   CARE Score - Oral Hygiene 5   Oral Hygiene Discharge Goal "   Discharge Goal 6   Shower/Bathe Self   Reason if not Attempted Refused to perform   CARE Score - Shower/Bathe Self 7   Upper Body Dressing   Assistance Needed Physical assistance   Physical Assistance Level 25% or less   CARE Score - Upper Body Dressing 3   Lower Body Dressing   Assistance Needed Physical assistance   Physical Assistance Level Total assistance   CARE Score - Lower Body Dressing 1   Lower Body Dressing Discharge Goal   Discharge Goal 4   Putting On/Taking Off Footwear   Assistance Needed Physical assistance   Physical Assistance Level Total assistance   CARE Score - Putting On/Taking Off Footwear 1   Putting On/Taking Off Footwear Discharge Goal   Discharge Goal 4   Toileting Hygiene   Assistance Needed Physical assistance   Physical Assistance Level 76% or more   CARE Score - Toileting Hygiene 2   Toileting Hygiene Discharge Goal   Discharge Goal 5   Toilet Transfer   Assistance Needed Physical assistance   Physical Assistance Level 25% or less   CARE Score - Toilet Transfer 3   Toilet Transfer Discharge Goal   Discharge Goal 4   Functional Level of Assist   Grooming Supervision;Seated   Bathing   (refused)   Upper Body Dressing Minimal Assist   Lower Body Dressing Total Assist   Toileting Moderate Assist   Toilet Transfers Contact Guard Assist  (stand pivot w/c<>toilet with GB)   Tub / Shower Transfers Refused   Comprehension Supervision   Expression Supervision   Problem Solving Minimal Assist   Memory Minimal Assist   Problem List   Problem List Decreased Active Daily Living Skills;Decreased Homemaking Skills;Decreased Upper Extremity Strength Right;Decreased Upper Extremity Strength Left;Decreased Functional Mobility;Decreased Activity Tolerance;Impaired Postural Control / Balance;Limited Knowledge of Post Op Precautions   Precautions   Precautions Spinal / Back Precautions ;Fall Risk   Comments TLSO for comfort OOB   Current Discharge Plan   Current Discharge Plan Return to Prior Living  Situation   Benefit    Therapy Benefit Patient Would Benefit from Inpatient Rehab Occupational Therapy to Maximize [place] with ADLs, IADLs and Functional Mobility.   Interdisciplinary Plan of Care Collaboration   IDT Collaboration with  Physical Therapist   Patient Position at End of Therapy In Bed;Call Light within Reach;Tray Table within Reach;Bed Alarm On   Collaboration Comments re: CLOF   Strengths & Barriers   Strengths Able to follow instructions;Supportive family;Alert and oriented   Barriers Decreased endurance;Generalized weakness;Impaired balance;Impaired activity tolerance;Pain;Lack of motivation       Assessment  Patient is 87 y.o. female with a diagnosis of T7/T8 Fracture - Patient with GLF on 10/8/23 with suspected syncope found to be in A fib and with T7/T8 compression fracture. Evaluated by NSG and recommending TLSO for comfort when OOB.  Additional factors influencing patient status / progress (ie: cognitive factors, co-morbidities, social support, etc): Pt's presents below their functional performance baseline as demo during the OT evaluation. Pt demo impaired standing balance, impaired functional cognition, limited knowledge of spinal precautions, pain, decrease strength, activity tolerance, and coordination that impacts full participation with ADL's and functional mobility. Pt would continue to benefit from daily skilled OT services to address above barriers and maximize safety and independence prior to DC home.      Plan  Recommend Occupational Therapy  minutes per day 5-7 days per week for 7-10 days for the following treatments:  OT Group Therapy, OT Self Care/ADL, OT Cognitive Skill Dev, OT Community Reintegration, OT Neuro Re-Ed/Balance, OT Therapeutic Activity, and OT Therapeutic Exercise.    Passport items to be completed:  Perform bathroom transfers, complete dressing, complete feeding, get ready for the day, prepare a simple meal, participate in household tasks, adapt home  for safety needs, demonstrate home exercise program, complete caregiver training     Goals:  Long term and short term goals have been discussed with patient and they are in agreement.    Occupational Therapy Goals (Active)       Problem: Bathing       Dates: Start:  10/13/23         Goal: STG-Within one week, patient will bathe with Min A using DME/AE as needed       Dates: Start:  10/13/23               Problem: Dressing       Dates: Start:  10/13/23         Goal: STG-Within one week, patient will dress LB with Min A using DME/AE as needed       Dates: Start:  10/13/23               Problem: Functional Transfers       Dates: Start:  10/13/23         Goal: STG-Within one week, patient will transfer to toilet with SBA using DME/AE as needed       Dates: Start:  10/13/23               Problem: OT Long Term Goals       Dates: Start:  10/13/23         Goal: LTG-By discharge, patient will complete basic self care tasks with supervision using DME/AE as needed       Dates: Start:  10/13/23            Goal: LTG-By discharge, patient will perform bathroom transfers with supervision - set-up using DME/AE as needed       Dates: Start:  10/13/23               Problem: Toileting       Dates: Start:  10/13/23         Goal: STG-Within one week, patient will complete toileting tasks with Min A using DME/AE as needed       Dates: Start:  10/13/23

## 2023-10-13 NOTE — CARE PLAN
"  Problem: Fall Risk - Rehab  Goal: Patient will remain free from falls  Outcome: Progressing  Note: [place] Fall risk Assessment Score: 18    High fall risk Interventions   - Bed and strip alarm   - Yellow sign by the door   - Yellow wrist band \"Fall risk\"  - Room near to the nurse station  - Do not leave patient unattended in the bathroom  - Fall risk education provided       Problem: Pain - Standard  Goal: Alleviation of pain or a reduction in pain to the patient’s comfort goal  Outcome: Progressing  Note: Assessed for pain and discomfort , pt on pain mgt with tylenol , needs anticipated and attended.   The patient is Stable - Low risk of patient condition declining or worsening     Progress made toward(s) clinical / shift goals:  Pt free from fall and injury.    "

## 2023-10-13 NOTE — PROGRESS NOTES
"  Physical Medicine & Rehabilitation Progress Note    Encounter Date: 10/13/2023    Chief Complaint: Decreased mobility, weakness    Interval Events (Subjective):  Patient sitting up in room. She reports her evaluations have been going well. She reports back pain with movement. She denies NVD. Denies SOB.    Objective:  VITAL SIGNS: BP (!) 145/105 Comment: Rn notified  Pulse (!) 109 Comment: Rn notified  Temp 36.8 °C (98.2 °F) (Oral)   Resp 17   Ht 1.646 m (5' 4.8\")   Wt 66.5 kg (146 lb 9.7 oz)   SpO2 93%   BMI 24.55 kg/m²   Gen: NAD  Psych: Mood and affect appropriate  CV: RRR, 0 edema  Resp: CTAB, no upper airway sounds  Abd: NTND  Neuro: AOx4, following commands    Laboratory Values:  Recent Results (from the past 72 hour(s))   CBC WITHOUT DIFFERENTIAL    Collection Time: 10/11/23  1:00 AM   Result Value Ref Range    WBC 4.9 4.8 - 10.8 K/uL    RBC 4.54 4.20 - 5.40 M/uL    Hemoglobin 14.1 12.0 - 16.0 g/dL    Hematocrit 42.0 37.0 - 47.0 %    MCV 92.5 81.4 - 97.8 fL    MCH 31.1 27.0 - 33.0 pg    MCHC 33.6 32.2 - 35.5 g/dL    RDW 43.8 35.9 - 50.0 fL    Platelet Count 154 (L) 164 - 446 K/uL    MPV 9.1 9.0 - 12.9 fL   Basic Metabolic Panel    Collection Time: 10/11/23  1:00 AM   Result Value Ref Range    Sodium 134 (L) 135 - 145 mmol/L    Potassium 4.1 3.6 - 5.5 mmol/L    Chloride 102 96 - 112 mmol/L    Co2 20 20 - 33 mmol/L    Glucose 86 65 - 99 mg/dL    Bun 16 8 - 22 mg/dL    Creatinine 0.78 0.50 - 1.40 mg/dL    Calcium 8.6 8.5 - 10.5 mg/dL    Anion Gap 12.0 7.0 - 16.0   ESTIMATED GFR    Collection Time: 10/11/23  1:00 AM   Result Value Ref Range    GFR (CKD-EPI) 74 >60 mL/min/1.73 m 2   CoV-2, Flu A/B, And RSV by PCR ([facility])    Collection Time: 10/12/23  5:30 AM    Specimen: Nasopharyngeal; Respirate   Result Value Ref Range    Influenza virus A RNA Negative Negative    Influenza virus B, PCR Negative Negative    RSV, PCR Negative Negative    SARS-CoV-2 by PCR NotDetected     SARS-CoV-2 Source NP Swab  "   CBC with Differential    Collection Time: 10/13/23  6:41 AM   Result Value Ref Range    WBC 4.1 (L) 4.8 - 10.8 K/uL    RBC 4.59 4.20 - 5.40 M/uL    Hemoglobin 14.2 12.0 - 16.0 g/dL    Hematocrit 41.8 37.0 - 47.0 %    MCV 91.1 81.4 - 97.8 fL    MCH 30.9 27.0 - 33.0 pg    MCHC 34.0 32.2 - 35.5 g/dL    RDW 43.5 35.9 - 50.0 fL    Platelet Count 181 164 - 446 K/uL    MPV 9.8 9.0 - 12.9 fL    Neutrophils-Polys 62.70 44.00 - 72.00 %    Lymphocytes 19.80 (L) 22.00 - 41.00 %    Monocytes 13.20 0.00 - 13.40 %    Eosinophils 2.90 0.00 - 6.90 %    Basophils 0.70 0.00 - 1.80 %    Immature Granulocytes 0.70 0.00 - 0.90 %    Nucleated RBC 0.00 0.00 - 0.20 /100 WBC    Neutrophils (Absolute) 2.57 1.82 - 7.42 K/uL    Lymphs (Absolute) 0.81 (L) 1.00 - 4.80 K/uL    Monos (Absolute) 0.54 0.00 - 0.85 K/uL    Eos (Absolute) 0.12 0.00 - 0.51 K/uL    Baso (Absolute) 0.03 0.00 - 0.12 K/uL    Immature Granulocytes (abs) 0.03 0.00 - 0.11 K/uL    NRBC (Absolute) 0.00 K/uL   Comp Metabolic Panel (CMP)    Collection Time: 10/13/23  6:41 AM   Result Value Ref Range    Sodium 134 (L) 135 - 145 mmol/L    Potassium 3.6 3.6 - 5.5 mmol/L    Chloride 102 96 - 112 mmol/L    Co2 20 20 - 33 mmol/L    Anion Gap 12.0 7.0 - 16.0    Glucose 97 65 - 99 mg/dL    Bun 14 8 - 22 mg/dL    Creatinine 0.82 0.50 - 1.40 mg/dL    Calcium 8.5 8.5 - 10.5 mg/dL    Correct Calcium 8.9 8.5 - 10.5 mg/dL    AST(SGOT) 17 12 - 45 U/L    ALT(SGPT) 10 2 - 50 U/L    Alkaline Phosphatase 55 30 - 99 U/L    Total Bilirubin 0.5 0.1 - 1.5 mg/dL    Albumin 3.5 3.2 - 4.9 g/dL    Total Protein 6.2 6.0 - 8.2 g/dL    Globulin 2.7 1.9 - 3.5 g/dL    A-G Ratio 1.3 g/dL   HEMOGLOBIN A1C    Collection Time: 10/13/23  6:41 AM   Result Value Ref Range    Glycohemoglobin 5.2 4.0 - 5.6 %    Est Avg Glucose 103 mg/dL   TSH with Reflex to FT4    Collection Time: 10/13/23  6:41 AM   Result Value Ref Range    TSH 5.160 0.380 - 5.330 uIU/mL   Vitamin D, 25-hydroxy (blood)    Collection Time: 10/13/23   6:41 AM   Result Value Ref Range    25-Hydroxy   Vitamin D 25 58 30 - 100 ng/mL   ESTIMATED GFR    Collection Time: 10/13/23  6:41 AM   Result Value Ref Range    GFR (CKD-EPI) 69 >60 mL/min/1.73 m 2       Medications:  Scheduled Medications   Medication Dose Frequency    Pharmacy Consult Request  1 Each PHARMACY TO DOSE    senna-docusate  2 Tablet BID    omeprazole  20 mg DAILY    apixaban  2.5 mg BID    benazepril  40 mg QAM    levothyroxine  75 mcg AM ES    metoprolol SR  50 mg QAM     PRN medications: Respiratory Therapy Consult, hydrALAZINE, acetaminophen, senna-docusate **AND** polyethylene glycol/lytes **AND** magnesium hydroxide **AND** bisacodyl, mag hydrox-al hydrox-simeth, ondansetron **OR** ondansetron, traZODone, sodium chloride, oxyCODONE immediate-release **OR** oxyCODONE immediate-release, [COMPLETED] acetaminophen **FOLLOWED BY** acetaminophen    Diet:  Current Diet Order   Procedures    Diet Order Diet: Regular       Medical Decision Making and Plan:  T7/T8 Fracture - Patient with GLF on 10/8/23 with suspected syncope found to be in A fib and with T7/T8 compression fracture. Evaluated by NSG and recommending TLSO for comfort when OOB  -PT and OT for mobility and ADLs. Per guidelines, 15 hours per week between PT, OT and/or SLP.  -Follow-up NSG. Continue TLSO for comfort     HTN/A fib - Patient on Benazepril 40 mg and Metoprolol 50 mg. Continue Benazepril and Metoprolol -> 75 mg     Hyponatremia - Check AM CMP - 134, will continue monitor     Hypothyroidism - Patient on Levothyroxine 75 mcg      Leukopenia - 4.1 on admission, recheck 10/16    Thrombocytopenia - Check AM CBC - 181, improved     Pain - Patient on PRN Tylenol and Oxycodone     Skin - Patient at risk for skin breakdown due to debility in areas including sacrum, achilles, elbows and head in addition to other sites. Nursing to assess skin daily.      GI Ppx - Patient on Prilosec for GERD prophylaxis. Patient on Senna-docusate for  constipation prophylaxis.      DVT Ppx - Patient Eliquis on transfer.  ____________________________________    T. Ned Sutton MD/PhD  [facility] - Physical Medicine & Rehabilitation   [facility] - Brain Injury Medicine   ____________________________________    Total time:  50 minutes. Time spent included pre-rounding review of vitals and tests, unit/floor time, face-to-face time with the patient including physical examination, care coordination, counseling of patient and/or family, ordering medications/procedures/tests, discussion with CM, PT, OT, SLP and/or other healthcare providers, and documentation in the electronic medical record. Topics discussed included admission labs, hyponatremia, elevated SBP, and increase BB.

## 2023-10-13 NOTE — PROGRESS NOTES
NURSING DAILY NOTE    Name: Yvonne Marie Wada   Date of Admission: 10/12/2023   Admitting Diagnosis: Other fracture of T7-t8 thoracic vertebra, initial encounter for closed fracture (HCC)  Attending Physician: Mikayla Sutton M.d.  Allergies: Codeine, Doxycycline, Sulfamethoxazole-trimethoprim, Tramadol, and Trazodone    Safety  Patient Assist     Patient Precautions     Precaution Comments     Bed Transfer Status     Toilet Transfer Status      Assistive Devices     Oxygen  None - Room Air  Diet/Therapeutic Dining  Current Diet Order   Procedures    Diet Order Diet: Regular     Pill Administration  whole and one at a time   Agitated Behavioral Scale     ABS Level of Severity       Fall Risk  Has the patient had a fall this admission?   No  Muna Cuenca Fall Risk Scoring  18, HIGH RISK  Fall Risk Safety Measures  bed alarm, chair alarm, poor balance, and low vision/ hearing    Vitals  Temperature: 36.6 °C (97.9 °F)  Temp src: Oral  Pulse: 92  Respiration: 18  Blood Pressure : (!) 152/86 (notified RAMÍREZ Batista)  Blood Pressure MAP (Calculated): 108 MM HG  BP Location: Right, Upper Arm  Patient BP Position: Supine     Oxygen  Pulse Oximetry: 91 %  O2 Delivery Device: None - Room Air    Bowel and Bladder  Last Bowel Movement   (prior to hospitalization)  Stool Type     Bowel Device     Continent  Bladder: Did not void   Bowel: No movement  Bladder Function  Urine Void (mL):  (large, incontinence)  Number of Times Voided: 1  Urine Color: Unable To Evaluate  Genitourinary Assessment   Urine Color: Unable To Evaluate  Bladder Scan: Post Void  $ Bladder Scan Results (mL): 357 (notified RAMÍREZ Batista)    Skin  Jase Score   16  Sensory Interventions   Bed Types: Standard/Trauma Mattress  Skin Preventative Measures: Pillows in Use for Support / Positioning  Moisture Interventions  Moisturizers/Barriers: Barrier Wipes      Pain  Pain Rating Scale  0 - No Pain  Pain  Location     Pain Location Orientation     Pain Interventions   [place]    ADLs    Bathing      Linen Change      Personal Hygiene  Moist Tisha Wipes  Chlorhexidine Bath      Oral Care     Teeth/Dentures     Shave     Nutrition Percentage Eaten  Lunch, Between 50-75% Consumed  Environmental Precautions     Patient Turns/Positioning  Patient Turns Self from Side to Side  Patient Turns Assistance/Tolerance     Bed Positions  Bed Controls On, Bed Locked  Head of Bed Elevated         Psychosocial/Neurologic Assessment  Psychosocial Assessment  Psychosocial (WDL):  Within Defined Limits  Neurologic Assessment  Neuro (WDL): Exceptions to WDL  Level of Consciousness: Alert  Orientation Level: Oriented X4  Cognition: Follows commands  Speech: Clear, Delayed responses  Pupil Assesment: No  Motor Function/Sensation Assessment: Sensation  RUE Sensation: Full sensation  LUE Sensation: Full sensation  RLE Sensation: Numbness  LLE Sensation: Numbness  EENT (WDL):  WDL Except    Cardio/Pulmonary Assessment  Edema      Respiratory Breath Sounds     Cardiac Assessment   Cardiac (WDL):  WDL Except

## 2023-10-14 ENCOUNTER — APPOINTMENT (OUTPATIENT)
Dept: OCCUPATIONAL THERAPY | Facility: REHABILITATION | Age: 88
DRG: 560 | End: 2023-10-14
Attending: PHYSICAL MEDICINE & REHABILITATION
Payer: MEDICARE

## 2023-10-14 ENCOUNTER — APPOINTMENT (OUTPATIENT)
Dept: RADIOLOGY | Facility: REHABILITATION | Age: 88
DRG: 560 | End: 2023-10-14
Attending: HOSPITALIST
Payer: MEDICARE

## 2023-10-14 LAB — GLUCOSE BLD STRIP.AUTO-MCNC: 97 MG/DL (ref 65–99)

## 2023-10-14 PROCEDURE — 700102 HCHG RX REV CODE 250 W/ 637 OVERRIDE(OP): Performed by: PHYSICAL MEDICINE & REHABILITATION

## 2023-10-14 PROCEDURE — 74018 RADEX ABDOMEN 1 VIEW: CPT

## 2023-10-14 PROCEDURE — 93005 ELECTROCARDIOGRAM TRACING: CPT | Performed by: HOSPITALIST

## 2023-10-14 PROCEDURE — 770010 HCHG ROOM/CARE - REHAB SEMI PRIVAT*

## 2023-10-14 PROCEDURE — 82962 GLUCOSE BLOOD TEST: CPT

## 2023-10-14 PROCEDURE — 99233 SBSQ HOSP IP/OBS HIGH 50: CPT | Performed by: PHYSICAL MEDICINE & REHABILITATION

## 2023-10-14 PROCEDURE — A9270 NON-COVERED ITEM OR SERVICE: HCPCS | Performed by: PHYSICAL MEDICINE & REHABILITATION

## 2023-10-14 PROCEDURE — 700101 HCHG RX REV CODE 250: Performed by: PHYSICAL MEDICINE & REHABILITATION

## 2023-10-14 RX ORDER — HYDROCORTISONE 25 MG/G
CREAM TOPICAL EVERY 6 HOURS PRN
Status: DISCONTINUED | OUTPATIENT
Start: 2023-10-14 | End: 2023-10-26 | Stop reason: HOSPADM

## 2023-10-14 RX ORDER — METOPROLOL SUCCINATE 100 MG/1
100 TABLET, EXTENDED RELEASE ORAL EVERY MORNING
Status: DISCONTINUED | OUTPATIENT
Start: 2023-10-15 | End: 2023-10-26 | Stop reason: HOSPADM

## 2023-10-14 RX ORDER — HYDROCORTISONE 25 MG/G
CREAM TOPICAL PRN
Status: DISCONTINUED | OUTPATIENT
Start: 2023-10-14 | End: 2023-10-14

## 2023-10-14 RX ORDER — BISACODYL 10 MG
10 SUPPOSITORY, RECTAL RECTAL DAILY
Status: DISCONTINUED | OUTPATIENT
Start: 2023-10-14 | End: 2023-10-16

## 2023-10-14 RX ORDER — ENEMA 19; 7 G/133ML; G/133ML
1 ENEMA RECTAL ONCE
Status: COMPLETED | OUTPATIENT
Start: 2023-10-14 | End: 2023-10-14

## 2023-10-14 RX ADMIN — OXYCODONE HYDROCHLORIDE 5 MG: 5 TABLET ORAL at 05:24

## 2023-10-14 RX ADMIN — OMEPRAZOLE 20 MG: 20 CAPSULE, DELAYED RELEASE ORAL at 08:45

## 2023-10-14 RX ADMIN — SENNOSIDES AND DOCUSATE SODIUM 2 TABLET: 50; 8.6 TABLET ORAL at 08:45

## 2023-10-14 RX ADMIN — MAGNESIUM HYDROXIDE 30 ML: 1200 LIQUID ORAL at 05:24

## 2023-10-14 RX ADMIN — POLYETHYLENE GLYCOL 3350 1 PACKET: 17 POWDER, FOR SOLUTION ORAL at 05:24

## 2023-10-14 RX ADMIN — BISACODYL 10 MG: 10 SUPPOSITORY RECTAL at 10:04

## 2023-10-14 RX ADMIN — APIXABAN 2.5 MG: 2.5 TABLET, FILM COATED ORAL at 19:42

## 2023-10-14 RX ADMIN — LEVOTHYROXINE SODIUM 75 MCG: 0.07 TABLET ORAL at 05:01

## 2023-10-14 RX ADMIN — METOPROLOL SUCCINATE 75 MG: 25 TABLET, EXTENDED RELEASE ORAL at 08:45

## 2023-10-14 RX ADMIN — SODIUM PHOSPHATE, DIBASIC AND SODIUM PHOSPHATE, MONOBASIC, UNSPECIFIED FORM 133 ML: 7; 19 ENEMA RECTAL at 12:08

## 2023-10-14 RX ADMIN — SENNOSIDES AND DOCUSATE SODIUM 2 TABLET: 50; 8.6 TABLET ORAL at 19:42

## 2023-10-14 RX ADMIN — BENAZEPRIL HYDROCHLORIDE 40 MG: 10 TABLET, FILM COATED ORAL at 08:45

## 2023-10-14 RX ADMIN — OXYCODONE HYDROCHLORIDE 5 MG: 5 TABLET ORAL at 13:00

## 2023-10-14 RX ADMIN — APIXABAN 2.5 MG: 2.5 TABLET, FILM COATED ORAL at 08:45

## 2023-10-14 ASSESSMENT — PAIN DESCRIPTION - PAIN TYPE
TYPE: ACUTE PAIN

## 2023-10-14 NOTE — PROGRESS NOTES
NURSING DAILY NOTE    Name: Yvonne Marie Wada   Date of Admission: 10/12/2023   Admitting Diagnosis: Other fracture of T7-t8 thoracic vertebra, initial encounter for closed fracture (HCC)  Attending Physician: Mikayla Sutton M.d.  Allergies: Codeine, Doxycycline, Sulfamethoxazole-trimethoprim, Tramadol, and Trazodone    Safety  Patient Assist     Patient Precautions  Spinal / Back Precautions , Fall Risk  Precaution Comments  TLSO for comfort OOB  Bed Transfer Status  Contact Guard Assist  Toilet Transfer Status   Contact Guard Assist  Assistive Devices     Oxygen  None - Room Air  Diet/Therapeutic Dining  Current Diet Order   Procedures    Diet Order Diet: Regular     Pill Administration  whole  Agitated Behavioral Scale     ABS Level of Severity       Fall Risk  Has the patient had a fall this admission?   No  Muna Cuenca Fall Risk Scoring  18, HIGH RISK  Fall Risk Safety Measures  bed alarm, chair alarm, and poor balance    Vitals  Temperature: 36.2 °C (97.2 °F)  Temp src: Oral  Pulse: 96 (Rn notified)  Respiration: 17  Blood Pressure : 127/67  Blood Pressure MAP (Calculated): 87 MM HG  BP Location: Upper Arm, Right  Patient BP Position: Lying Left Side     Oxygen  Pulse Oximetry: 95 %  O2 Delivery Device: None - Room Air    Bowel and Bladder  Last Bowel Movement  10/13/23  Stool Type  Patient stated  Bowel Device     Continent  Bladder: Did not void   Bowel: No movement  Bladder Function  Urine Void (mL):  (large, incontinence)  Number of Times Voided: 1  Urine Color: Unable To Evaluate  Wet Diaper Count: 1  Genitourinary Assessment   Urine Color: Unable To Evaluate  Bladder Scan: Post Void  $ Bladder Scan Results (mL): 565    Skin  Jase Score   16  Sensory Interventions   Bed Types: Standard/Trauma Mattress  Skin Preventative Measures: Pillows in Use for Support / Positioning  Moisture Interventions  Moisturizers/Barriers: Moisturizer        Pain  Pain Rating Scale  8 - Awful, hard to do anything  Pain Location  Back  Pain Location Orientation  Mid  Pain Interventions   Declines    ADLs    Bathing      Linen Change      Personal Hygiene  Moist Tisha Wipes  Chlorhexidine Bath      Oral Care     Teeth/Dentures     Shave     Nutrition Percentage Eaten  Lunch, Less than 25% Consumed  Environmental Precautions     Patient Turns/Positioning  Patient Turns Self from Side to Side  Patient Turns Assistance/Tolerance     Bed Positions  Bed Controls On, Bed Locked  Head of Bed Elevated         Psychosocial/Neurologic Assessment  Psychosocial Assessment  Psychosocial (WDL):  Within Defined Limits  Neurologic Assessment  Neuro (WDL): Exceptions to WDL  Level of Consciousness: Alert  Orientation Level: Oriented X4  Cognition: Follows commands  Speech: Clear, Delayed responses  Pupil Assesment: No  Motor Function/Sensation Assessment: Sensation  RUE Sensation: Full sensation  LUE Sensation: Full sensation  RLE Sensation: Numbness  LLE Sensation: Numbness  EENT (WDL):  WDL Except    Cardio/Pulmonary Assessment  Edema      Respiratory Breath Sounds     Cardiac Assessment   Cardiac (WDL):  WDL Except

## 2023-10-14 NOTE — PROGRESS NOTES
0930 RN notified pt's HR fluctuating from 60's to 150's after c/o pain from impacted stool. All other vitals WNR. FSBS 98. MD and charge nurse notifed,. EKG ordered. Suppository given.

## 2023-10-14 NOTE — CARE PLAN
The patient is Stable - Low risk of patient condition declining or worsening    Shift Goals  Clinical Goals: pain managment  Patient Goals: snacks, pain mgmt  Family Goals: bill    Progress made toward(s) clinical / shift goals:  Pt is medicated 1x this shift for pain.pt uses 0-10 pain scale to rate pain. Pt's pain goal is to sleep comfortably this shift, pt is seen sleeping during rounding and during pain reassessments.     Patient is not progressing towards the following goals:

## 2023-10-14 NOTE — PROGRESS NOTES
NURSING DAILY NOTE    Name: Yvonne Marie Wada   Date of Admission: 10/12/2023   Admitting Diagnosis: Other fracture of T7-t8 thoracic vertebra, initial encounter for closed fracture (HCC)  Attending Physician: Mikayla Sutton M.d.  Allergies: Codeine, Doxycycline, Sulfamethoxazole-trimethoprim, Tramadol, and Trazodone    Safety  Patient Assist     Patient Precautions  Spinal / Back Precautions , Fall Risk  Precaution Comments  TLSO for comfort OOB  Bed Transfer Status  Contact Guard Assist  Toilet Transfer Status   Contact Guard Assist  Assistive Devices     Oxygen  None - Room Air  Diet/Therapeutic Dining  Current Diet Order   Procedures    Diet Order Diet: Regular     Pill Administration  whole  Agitated Behavioral Scale     ABS Level of Severity       Fall Risk  Has the patient had a fall this admission?   No  Muna Cuenca Fall Risk Scoring  21, HIGH RISK  Fall Risk Safety Measures  bed alarm and chair alarm    Vitals  Temperature: 36.2 °C (97.2 °F)  Temp src: Temporal  Pulse: 79  Respiration: 16  Blood Pressure : 136/73  Blood Pressure MAP (Calculated): 94 MM HG  BP Location: Upper Arm, Right  Patient BP Position: Lying Left Side     Oxygen  Pulse Oximetry: 94 %  O2 Delivery Device: None - Room Air    Bowel and Bladder  Last Bowel Movement  10/13/23  Stool Type  Patient stated  Bowel Device     Continent  Bladder: Did not void   Bowel: No movement  Bladder Function  Urine Void (mL): 0 ml  Number of Times Voided: 1  Urine Color: Unable To Evaluate  Wet Diaper Count: 1  Genitourinary Assessment   Bladder Assessment (WDL):  WDL Except  Dale Catheter: Not Applicable  Urinary Elimination: Incontinence, Stress Incontinence (intermittent incont)  Urine Color: Unable To Evaluate  Bladder Device: Bathroom  Bladder Scan: Post Void  $ Bladder Scan Results (mL): 360    Skin  Jase Score   17  Sensory Interventions   Bed Types: Standard/Trauma Mattress  Skin  Preventative Measures: Pillows in Use for Support / Positioning  Moisture Interventions  Moisturizers/Barriers: Moisturizer       Pain  Pain Rating Scale  7 - Focus of attention, prevents doing daily activities  Pain Location  Back  Pain Location Orientation  Mid  Pain Interventions   Rest    ADLs    Bathing      Linen Change      Personal Hygiene  Moist Tisha Wipes  Chlorhexidine Bath      Oral Care     Teeth/Dentures     Shave     Nutrition Percentage Eaten  Lunch, Less than 25% Consumed  Environmental Precautions  Treaded Slipper Socks on Patient, Personal Belongings, Wastebasket, Call Bell etc. in Easy Reach, Transferred to Stronger Side, Report Given to Other Health Care Providers Regarding Fall Risk, Bed in Low Position, Communication Sign for Patients & Families, Mobility Assessed & Appropriate Sign Placed  Patient Turns/Positioning  Patient Turns Self from Side to Side  Patient Turns Assistance/Tolerance     Bed Positions  Bed Controls On, Bed Locked  Head of Bed Elevated  Self regulated      Psychosocial/Neurologic Assessment  Psychosocial Assessment  Psychosocial (WDL):  Within Defined Limits  Neurologic Assessment  Neuro (WDL): Exceptions to WDL  Level of Consciousness: Alert  Orientation Level: Oriented X4  Cognition: Follows commands, Appropriate judgement, Appropriate safety awareness, Appropriate attention/concentration  Speech: Clear  Pupil Assesment: No  Motor Function/Sensation Assessment: Sensation  RUE Sensation: Full sensation  LUE Sensation: Full sensation  RLE Sensation: Numbness  LLE Sensation: Numbness  EENT (WDL):  WDL Except    Cardio/Pulmonary Assessment  Edema      Respiratory Breath Sounds     Cardiac Assessment   Cardiac (WDL):  WDL Except (hx a fib)

## 2023-10-14 NOTE — THERAPY
Missed Therapy    Patient Name: Yvonne Marie Wada  Age:  87 y.o., Sex:  female  Medical Record #: [id number]  Today's Date: 10/14/2023    Discussed missed therapy with Dr. Sutton, RN, Charge RN, and .       10/14/23 0931   Therapy Missed   Missed Therapy (Minutes) 60   Reason For Missed Therapy Medical - Patient on Hold from Therapy;Medical - Patient with Nursing;Medical - Patient not Able To Participate;Medical - Patient Is Not Medically Stable  (pt reporting chest heaviness, 10/10 back pain, and variable HR between . RN and MD notified.)   Vitals   Pulse (!) 135   Blood Pressure  (!) 147/79   Pulse Oximetry 94 %   O2 Delivery Device None - Room Air   Vitals Comments RN, MD, and RT notified.   Shower/Bathe Self   Reason if not Attempted Medical concerns   CARE Score - Shower/Bathe Self 88

## 2023-10-14 NOTE — PROGRESS NOTES
Pt with irregular HR 60's-150's. C/o CP. Dr Yan present, order for ECG and Abdominal XR obtained.

## 2023-10-14 NOTE — CARE PLAN
The patient is Stable - Low risk of patient condition declining or worsening    Shift Goals  Clinical Goals: Safety, pain  Patient Goals: Safety  Family Goals: Education    Progress made toward(s) clinical / shift goals: Pt's VSS, pain controlled with PRN pain medication, retaining urine MD notified, straight cath x1, RA, incontinent of bladder, last BM 10/13.

## 2023-10-14 NOTE — PROGRESS NOTES
"  Physical Medicine & Rehabilitation Progress Note    Encounter Date: 10/14/2023    Chief Complaint: Decreased mobility, weakness    Interval Events (Subjective):  Patient sitting up in room. Patient with rapid response due to pain. Initially noted as chest pain but then constipation. Check KUB which showed moderate constipation in distal colon per my read. She denies chest pain but HR is elevated. Discussed enema.     Objective:  VITAL SIGNS: BP (!) 144/99   Pulse (!) 134 Comment: varies 60's to 150's  Temp 36.7 °C (98.1 °F)   Resp 19   Ht 1.646 m (5' 4.8\")   Wt 66.5 kg (146 lb 9.7 oz)   SpO2 94%   BMI 24.55 kg/m²   Gen: NAD  Psych: Mood and affect appropriate  CV: Irregularly Irregular, 0 edema  Resp: CTAB, no upper airway sounds  Abd: NT, mild distension   Neuro: AOx3, following commands    Laboratory Values:  Recent Results (from the past 72 hour(s))   CoV-2, Flu A/B, And RSV by PCR ([employer])    Collection Time: 10/12/23  5:30 AM    Specimen: Nasopharyngeal; Respirate   Result Value Ref Range    Influenza virus A RNA Negative Negative    Influenza virus B, PCR Negative Negative    RSV, PCR Negative Negative    SARS-CoV-2 by PCR NotDetected     SARS-CoV-2 Source NP Swab    CBC with Differential    Collection Time: 10/13/23  6:41 AM   Result Value Ref Range    WBC 4.1 (L) 4.8 - 10.8 K/uL    RBC 4.59 4.20 - 5.40 M/uL    Hemoglobin 14.2 12.0 - 16.0 g/dL    Hematocrit 41.8 37.0 - 47.0 %    MCV 91.1 81.4 - 97.8 fL    MCH 30.9 27.0 - 33.0 pg    MCHC 34.0 32.2 - 35.5 g/dL    RDW 43.5 35.9 - 50.0 fL    Platelet Count 181 164 - 446 K/uL    MPV 9.8 9.0 - 12.9 fL    Neutrophils-Polys 62.70 44.00 - 72.00 %    Lymphocytes 19.80 (L) 22.00 - 41.00 %    Monocytes 13.20 0.00 - 13.40 %    Eosinophils 2.90 0.00 - 6.90 %    Basophils 0.70 0.00 - 1.80 %    Immature Granulocytes 0.70 0.00 - 0.90 %    Nucleated RBC 0.00 0.00 - 0.20 /100 WBC    Neutrophils (Absolute) 2.57 1.82 - 7.42 K/uL    Lymphs (Absolute) 0.81 (L) 1.00 - 4.80 " K/uL    Monos (Absolute) 0.54 0.00 - 0.85 K/uL    Eos (Absolute) 0.12 0.00 - 0.51 K/uL    Baso (Absolute) 0.03 0.00 - 0.12 K/uL    Immature Granulocytes (abs) 0.03 0.00 - 0.11 K/uL    NRBC (Absolute) 0.00 K/uL   Comp Metabolic Panel (CMP)    Collection Time: 10/13/23  6:41 AM   Result Value Ref Range    Sodium 134 (L) 135 - 145 mmol/L    Potassium 3.6 3.6 - 5.5 mmol/L    Chloride 102 96 - 112 mmol/L    Co2 20 20 - 33 mmol/L    Anion Gap 12.0 7.0 - 16.0    Glucose 97 65 - 99 mg/dL    Bun 14 8 - 22 mg/dL    Creatinine 0.82 0.50 - 1.40 mg/dL    Calcium 8.5 8.5 - 10.5 mg/dL    Correct Calcium 8.9 8.5 - 10.5 mg/dL    AST(SGOT) 17 12 - 45 U/L    ALT(SGPT) 10 2 - 50 U/L    Alkaline Phosphatase 55 30 - 99 U/L    Total Bilirubin 0.5 0.1 - 1.5 mg/dL    Albumin 3.5 3.2 - 4.9 g/dL    Total Protein 6.2 6.0 - 8.2 g/dL    Globulin 2.7 1.9 - 3.5 g/dL    A-G Ratio 1.3 g/dL   HEMOGLOBIN A1C    Collection Time: 10/13/23  6:41 AM   Result Value Ref Range    Glycohemoglobin 5.2 4.0 - 5.6 %    Est Avg Glucose 103 mg/dL   TSH with Reflex to FT4    Collection Time: 10/13/23  6:41 AM   Result Value Ref Range    TSH 5.160 0.380 - 5.330 uIU/mL   Vitamin D, 25-hydroxy (blood)    Collection Time: 10/13/23  6:41 AM   Result Value Ref Range    25-Hydroxy   Vitamin D 25 58 30 - 100 ng/mL   ESTIMATED GFR    Collection Time: 10/13/23  6:41 AM   Result Value Ref Range    GFR (CKD-EPI) 69 >60 mL/min/1.73 m 2   POCT glucose device results    Collection Time: 10/14/23  9:55 AM   Result Value Ref Range    POC Glucose, Blood 97 65 - 99 mg/dL   EKG    Collection Time: 10/14/23 10:02 AM   Result Value Ref Range    Report       [facility]    Test Date:  2023-10-14  Pt Name:    YVONNE WADA                  Department: [facility] [place]  MRN:        [id number]                      Room:       [facility]  Gender:     Female                       Technician: [id number] WT  :        [id number]                   Requested By:JHONY FARRELL  Order #:    [id number]                     Reading MD:    Measurements  Intervals                                Axis  Rate:       77                           P:          0  NY:         0                            QRS:        17  QRSD:       101                          T:          0  QT:         347  QTc:        393    Interpretive Statements  Atrial fibrillation  Nonspecific T abnormalities, inferior leads  Compared to ECG 10/09/2023 19:59:04  T-wave abnormality now present         Medications:  Scheduled Medications   Medication Dose Frequency    bisacodyl  10 mg DAILY    sodium phosphate  1 Each Once    metoprolol SR  75 mg QAM    Pharmacy Consult Request  1 Each PHARMACY TO DOSE    senna-docusate  2 Tablet BID    omeprazole  20 mg DAILY    apixaban  2.5 mg BID    benazepril  40 mg QAM    levothyroxine  75 mcg AM ES     PRN medications: hydrocortisone rectal, Respiratory Therapy Consult, hydrALAZINE, acetaminophen, senna-docusate **AND** polyethylene glycol/lytes **AND** magnesium hydroxide **AND** bisacodyl, mag hydrox-al hydrox-simeth, ondansetron **OR** ondansetron, traZODone, sodium chloride, oxyCODONE immediate-release **OR** oxyCODONE immediate-release, [COMPLETED] acetaminophen **FOLLOWED BY** acetaminophen    Diet:  Current Diet Order   Procedures    Diet Order Diet: Regular       Medical Decision Making and Plan:  T7/T8 Fracture - Patient with GLF on 10/8/23 with suspected syncope found to be in A fib and with T7/T8 compression fracture. Evaluated by NSG and recommending TLSO for comfort when OOB  -PT and OT for mobility and ADLs. Per guidelines, 15 hours per week between PT, OT and/or SLP.  -Follow-up NSG. Continue TLSO for comfort     HTN/A fib - Patient on Benazepril 40 mg and Metoprolol 50 mg. Continue Benazepril and Metoprolol -> 75 mg -> 100 mg     Hyponatremia - Check AM CMP - 134, will continue monitor     Hypothyroidism - Patient on Levothyroxine 75 mcg      Leukopenia - 4.1 on admission, recheck 10/16    Thrombocytopenia - Check AM  CBC - 181, improved     Pain - Patient on PRN Tylenol and Oxycodone     Skin - Patient at risk for skin breakdown due to debility in areas including sacrum, achilles, elbows and head in addition to other sites. Nursing to assess skin daily.      GI Ppx - Patient on Prilosec for GERD prophylaxis. Patient on Senna-docusate for constipation prophylaxis.   -Severe constipation on 10/14, painful abdominal chest pain. EKG with A fib.  KUB with moderate constipation. Give Enema     DVT Ppx - Patient Eliquis on transfer.  ____________________________________    T. Ned Sutton MD/PhD  [facility] - Physical Medicine & Rehabilitation   [facility] - Brain Injury Medicine   ____________________________________    Total time:  50 minutes. Time spent included pre-rounding review of vitals and tests, unit/floor time, face-to-face time with the patient including physical examination, care coordination, counseling of patient and/or family, ordering medications/procedures/tests, discussion with CM, PT, OT, SLP and/or other healthcare providers, and documentation in the electronic medical record. Topics discussed included rapid response, a fib, constipation, increase BB, and do bowel clean out.

## 2023-10-15 ENCOUNTER — APPOINTMENT (OUTPATIENT)
Dept: PHYSICAL THERAPY | Facility: REHABILITATION | Age: 88
DRG: 560 | End: 2023-10-15
Attending: PHYSICAL MEDICINE & REHABILITATION
Payer: MEDICARE

## 2023-10-15 ENCOUNTER — APPOINTMENT (OUTPATIENT)
Dept: OCCUPATIONAL THERAPY | Facility: REHABILITATION | Age: 88
DRG: 560 | End: 2023-10-15
Attending: PHYSICAL MEDICINE & REHABILITATION
Payer: MEDICARE

## 2023-10-15 LAB — EKG IMPRESSION: NORMAL

## 2023-10-15 PROCEDURE — 93010 ELECTROCARDIOGRAM REPORT: CPT | Performed by: STUDENT IN AN ORGANIZED HEALTH CARE EDUCATION/TRAINING PROGRAM

## 2023-10-15 PROCEDURE — 97110 THERAPEUTIC EXERCISES: CPT

## 2023-10-15 PROCEDURE — 97535 SELF CARE MNGMENT TRAINING: CPT | Mod: CO

## 2023-10-15 PROCEDURE — 700102 HCHG RX REV CODE 250 W/ 637 OVERRIDE(OP): Performed by: PHYSICAL MEDICINE & REHABILITATION

## 2023-10-15 PROCEDURE — 97116 GAIT TRAINING THERAPY: CPT

## 2023-10-15 PROCEDURE — A9270 NON-COVERED ITEM OR SERVICE: HCPCS | Performed by: PHYSICAL MEDICINE & REHABILITATION

## 2023-10-15 PROCEDURE — 770010 HCHG ROOM/CARE - REHAB SEMI PRIVAT*

## 2023-10-15 PROCEDURE — 97530 THERAPEUTIC ACTIVITIES: CPT

## 2023-10-15 RX ADMIN — BENAZEPRIL HYDROCHLORIDE 40 MG: 10 TABLET, FILM COATED ORAL at 08:36

## 2023-10-15 RX ADMIN — APIXABAN 2.5 MG: 2.5 TABLET, FILM COATED ORAL at 08:36

## 2023-10-15 RX ADMIN — METOPROLOL SUCCINATE 100 MG: 100 TABLET, EXTENDED RELEASE ORAL at 08:36

## 2023-10-15 RX ADMIN — OMEPRAZOLE 20 MG: 20 CAPSULE, DELAYED RELEASE ORAL at 08:36

## 2023-10-15 RX ADMIN — OXYCODONE HYDROCHLORIDE 5 MG: 5 TABLET ORAL at 08:37

## 2023-10-15 RX ADMIN — APIXABAN 2.5 MG: 2.5 TABLET, FILM COATED ORAL at 21:27

## 2023-10-15 RX ADMIN — SENNOSIDES AND DOCUSATE SODIUM 2 TABLET: 50; 8.6 TABLET ORAL at 08:36

## 2023-10-15 RX ADMIN — BISACODYL 10 MG: 10 SUPPOSITORY RECTAL at 09:29

## 2023-10-15 RX ADMIN — LEVOTHYROXINE SODIUM 75 MCG: 0.07 TABLET ORAL at 05:30

## 2023-10-15 ASSESSMENT — ACTIVITIES OF DAILY LIVING (ADL)
TOILET_TRANSFER_DESCRIPTION: GRAB BAR;INCREASED TIME;INITIAL PREPARATION FOR TASK;SET-UP OF EQUIPMENT;SUPERVISION FOR SAFETY;VERBAL CUEING
TOILET_TRANSFER_DESCRIPTION: GRAB BAR;VERBAL CUEING

## 2023-10-15 ASSESSMENT — PAIN DESCRIPTION - PAIN TYPE
TYPE: ACUTE PAIN
TYPE: ACUTE PAIN

## 2023-10-15 ASSESSMENT — GAIT ASSESSMENTS
GAIT LEVEL OF ASSIST: CONTACT GUARD ASSIST
ASSISTIVE DEVICE: FRONT WHEEL WALKER
DISTANCE (FEET): 40
DEVIATION: INCREASED BASE OF SUPPORT;BRADYKINETIC;SHUFFLED GAIT;DECREASED HEEL STRIKE;DECREASED TOE OFF
GAIT LEVEL OF ASSIST: CONTACT GUARD ASSIST
ASSISTIVE DEVICE: FRONT WHEEL WALKER
DISTANCE (FEET): 30

## 2023-10-15 NOTE — CARE PLAN
The patient is Stable - Low risk of patient condition declining or worsening    Shift Goals  Clinical Goals: pain control  Patient Goals: Safety  Family Goals: Education    Progress made toward(s) clinical / shift goals:    Problem: Pain - Standard  Goal: Alleviation of pain or a reduction in pain to the patient’s comfort goal  Outcome: Progressing     Patient reports satisfactory pain control and decrease intensity after pharmacological pain management.

## 2023-10-15 NOTE — CARE PLAN
The patient is Watcher - Medium risk of patient condition declining or worsening    Shift Goals  Clinical Goals: Safety, pain control  Patient Goals: Safety  Family Goals: Education    Progress made toward(s) clinical / shift goals:  Pt impacted this AM, relieved with PRN enema. HR between , MD aware, EKG ordered. Pain controlled with PRN medication, continent of bladder, PVR<400.

## 2023-10-15 NOTE — THERAPY
"Occupational Therapy  Daily Treatment     Patient Name: Yvonne Marie Wada  Age:  87 y.o., Sex:  female  Medical Record #: [id number]  Today's Date: 10/15/2023     Precautions  Precautions: TLSO (Thoracolumbosacral orthosis), Spinal / Back Precautions   Comments: TLSO for comfort OOB         Subjective    Pt supine upon arrival. Pt agreeable with encouragement.     Objective       10/15/23 1230   OT Charge Group   OT Self Care / ADL (Units) 2   OT Total Time Spent   OT Individual Total Time Spent (Mins) 30   Precautions   Precautions TLSO (Thoracolumbosacral orthosis);Spinal / Back Precautions    Comments TLSO for comfort OOB   Functional Level of Assist   Grooming Supervision   Grooming Description Seated in wheelchair at sink;Increased time  (Pt stating \"this is too much, I cant\" but with education and encouragement able to complete task)   Toileting Moderate Assist   Toileting Description Assist to pull pants up;Assist to pull pants down;Grab bar;Increased time;Verbal cueing  (VC's for sequencing and safety)   Toilet Transfers Contact Guard Assist   Toilet Transfer Description Grab bar;Verbal cueing   Bed Mobility    Supine to Sit Contact Guard Assist  (Max VC's for sequencing and using log rolling technique)   Sit to Supine Minimal Assist   Interdisciplinary Plan of Care Collaboration   Patient Position at End of Therapy In Bed;Bed Alarm On;Call Light within Reach;Tray Table within Reach;Phone within Reach;Family / Friend in Room         Assessment    Pt requiring VC's for safety and sequencing transfers. Pt demo decreased strength, balance, and endurance d/t weakness and fatigue. Pt limited by pain but verbalized wanting to return home to [facility].   Strengths: Able to follow instructions, Supportive family, Alert and oriented  Barriers: Decreased endurance, Generalized weakness, Impaired balance, Impaired activity tolerance, Pain, Lack of motivation    Plan    Recommend Occupational Therapy  minutes per day 5-7 " days per week for 7-10 days for the following treatments:  OT Group Therapy, OT Self Care/ADL, OT Cognitive Skill Dev, OT Community Reintegration, OT Neuro Re-Ed/Balance, OT Therapeutic Activity, and OT Therapeutic Exercise.     Passport items to be completed:  Perform bathroom transfers, complete dressing, complete feeding, get ready for the day, prepare a simple meal, participate in household tasks, adapt home for safety needs, demonstrate home exercise program, complete caregiver training     Occupational Therapy Goals (Active)       Problem: Bathing       Dates: Start:  10/13/23         Goal: STG-Within one week, patient will bathe with Min A using DME/AE as needed       Dates: Start:  10/13/23               Problem: Dressing       Dates: Start:  10/13/23         Goal: STG-Within one week, patient will dress LB with Min A using DME/AE as needed       Dates: Start:  10/13/23               Problem: Functional Transfers       Dates: Start:  10/13/23         Goal: STG-Within one week, patient will transfer to toilet with SBA using DME/AE as needed       Dates: Start:  10/13/23               Problem: OT Long Term Goals       Dates: Start:  10/13/23         Goal: LTG-By discharge, patient will complete basic self care tasks with supervision using DME/AE as needed       Dates: Start:  10/13/23            Goal: LTG-By discharge, patient will perform bathroom transfers with supervision - set-up using DME/AE as needed       Dates: Start:  10/13/23               Problem: Toileting       Dates: Start:  10/13/23         Goal: STG-Within one week, patient will complete toileting tasks with Min A using DME/AE as needed       Dates: Start:  10/13/23

## 2023-10-15 NOTE — THERAPY
Physical Therapy   Daily Treatment     Patient Name: Yvonne Marie Wada  Age:  87 y.o., Sex:  female  Medical Record #: [id number]  Today's Date: 10/15/2023     Precautions  Precautions: (P) Spinal / Back Precautions , TLSO (Thoracolumbosacral orthosis), Fall Risk  Comments: (P) TLSO for comfort OOB    Subjective    I want to try to use the bathroom     Objective       10/15/23 0931   PT Charge Group   PT Gait Training (Units) 1   PT Therapeutic Activities (Units) 3   PT Total Time Spent   PT Individual Total Time Spent (Mins) 60   Precautions   Precautions Spinal / Back Precautions ;TLSO (Thoracolumbosacral orthosis);Fall Risk   Comments TLSO for comfort OOB   Vitals   Pulse 88   Blood Pressure  118/63   Pulse Oximetry 95 %   O2 Delivery Device None - Room Air   Pain   Intervention Declines  (c/o nausea post medication pass)   Non Verbal Descriptors   Non Verbal Scale  Grimacing   Cognition    Level of Consciousness Alert   ABS (Agitated Behavior Scale)   Agitated Behavior Scale Performed No   Sleep/Wake Cycle   Sleep & Rest Resting   Gait Functional Level of Assist    Gait Level Of Assist Contact Guard Assist   Assistive Device Front Wheel Walker   Distance (Feet) 30   # of Times Distance was Traveled 2   Deviation Increased Base Of Support;Bradykinetic;Shuffled Gait;Decreased Heel Strike;Decreased Toe Off   Wheelchair Functional Level of Assist   Wheelchair Assist Moderate Assist   Distance Wheelchair (Feet or Distance) 100   Wheelchair Description Assistance with steering;Extra time;Leg rest management;Impaired coordination   Transfer Functional Level of Assist   Bed, Chair, Wheelchair Transfer Contact Guard Assist   Bed Chair Wheelchair Transfer Description Increased time;Initial preparation for task;Set-up of equipment;Supervision for safety;Adaptive equipment   Toilet Transfers Contact Guard Assist   Toilet Transfer Description Grab bar;Increased time;Initial preparation for task;Set-up of equipment;Supervision  for safety;Verbal cueing   Bed Mobility    Supine to Sit Contact Guard Assist   Sit to Supine Moderate Assist   Sit to Stand Contact Guard Assist   Scooting Maximal Assist   Rolling Minimal Assist to Rt.   Neuro-Muscular Treatments   Neuro-Muscular Treatments Sequencing   Interdisciplinary Plan of Care Collaboration   IDT Collaboration with  Nursing   Patient Position at End of Therapy In Bed;Bed Alarm On;Other (Comments)   Collaboration Comments RN notified of return to supine for suppository   PT DME Recommendations   Assistive Device Front Wheeled Walker  (patient reports she has one at home)     Max assist for clothing management for toileting.  Max assist with donning TLSO.    Assessment    Patient with limited participation this morning due to fear of pain increasing. Anxious about getting constipated again from taking pain medication. Education on need to increase mobility for bowel health. Poor coordination or participation with bed mobility strategies. Unable to repeat spinal precautions or assist with donning TLSO.    Strengths: Pleasant and cooperative, Supportive family  Barriers: Decreased endurance, Difficulty following instructions, Generalized weakness, Impaired activity tolerance, Impaired balance, Impaired carryover of learning, Impaired insight/denial of deficits, Impaired functional cognition, Limited mobility, Pain    Plan    Endurance, balance, strength, walking as able    DME  PT DME Recommendations  Assistive Device: (P) Front Wheeled Walker (patient reports she has one at home)    Passport items to be completed:  Get in/out of bed safely, in/out of a vehicle, safely use mobility device, walk or wheel around home/community, navigate up and down stairs, show how to get up/down from the ground, ensure home is accessible, demonstrate HEP, complete caregiver training    Physical Therapy Problems (Active)       Problem: Balance       Dates: Start:  10/13/23         Goal: STG-Within one week,  patient will improve Jaramillo to 22/56 (16/56 on eval)       Dates: Start:  10/13/23               Problem: Mobility       Dates: Start:  10/13/23         Goal: STG-Within one week, patient will ambulate 100 ft with FWW and CGA between rest breaks       Dates: Start:  10/13/23            Goal: STG-Within one week, patient will ambulate up/down a curb with FWW and CGA       Dates: Start:  10/13/23               Problem: Mobility Transfers       Dates: Start:  10/13/23         Goal: STG-Within one week, patient will perform bed mobility from flat bed via log roll with minimal cuing and supervision assist       Dates: Start:  10/13/23            Goal: STG-Within one week, patient will transfer in/out of car with FWW and CGA       Dates: Start:  10/13/23               Problem: PT-Long Term Goals       Dates: Start:  10/13/23         Goal: LTG-By discharge, patient will ambulate 250 ft between rest breaks with LRAD and supervision assist       Dates: Start:  10/13/23            Goal: LTG-By discharge, patient will transfer in/out of a car with supervision assist       Dates: Start:  10/13/23            Goal: LTG-By discharge, patient will improve Jaramillo to >40/56 to reduce fall risk       Dates: Start:  10/13/23

## 2023-10-15 NOTE — CARE PLAN
"  Problem: Fall Risk - Rehab  Goal: Patient will remain free from falls  Note: Muna Cuenca Fall risk Assessment Score: 21    High fall risk Interventions   - Alarming seatbelt  - Wander guard  - Bed and strip alarm   - Yellow sign by the door   - Yellow wrist band \"Fall risk\"  - Room near to the nurse station  - Do not leave patient unattended in the bathroom  - Fall risk education provided    Irregular pulse rate  beats / min. CN notified. Will monitor.     The patient is Watcher - Medium risk of patient condition declining or worsening    Shift Goals  Clinical Goals: Safety, pain control  Patient Goals: Safety  Family Goals: Education        "

## 2023-10-15 NOTE — PROGRESS NOTES
NURSING DAILY NOTE    Name: Yvonne Marie Wada   Date of Admission: 10/12/2023   Admitting Diagnosis: Other fracture of T7-t8 thoracic vertebra, initial encounter for closed fracture (HCC)  Attending Physician: Mikayla Sutton M.d.  Allergies: Codeine, Doxycycline, Sulfamethoxazole-trimethoprim, Tramadol, and Trazodone    Safety  Patient Assist  mod  Patient Precautions  Spinal / Back Precautions , Fall Risk  Precaution Comments  TLSO for comfort OOB  Bed Transfer Status  Contact Guard Assist  Toilet Transfer Status   Contact Guard Assist  Assistive Devices  Hand held assist, Rails, Wheelchair  Oxygen  None - Room Air  Diet/Therapeutic Dining  Current Diet Order   Procedures    Diet Order Diet: Regular     Pill Administration  whole  Agitated Behavioral Scale     ABS Level of Severity       Fall Risk  Has the patient had a fall this admission?   No  Muna Cuenca Fall Risk Scoring  21, HIGH RISK  Fall Risk Safety Measures  bed alarm, chair alarm, and poor balance    Vitals  Temperature: 36.5 °C (97.7 °F)  Temp src: Oral  Pulse: 86 (manual)  Respiration: 19  Blood Pressure : 124/74  Blood Pressure MAP (Calculated): 91 MM HG  BP Location: Left, Upper Arm  Patient BP Position: Supine     Oxygen  Pulse Oximetry: 94 %  O2 Delivery Device: None - Room Air    Bowel and Bladder  Last Bowel Movement  10/14/23  Stool Type  Patient stated  Bowel Device     Continent  Bladder: Did not void   Bowel: No movement  Bladder Function  Urine Void (mL): 0 ml  Number of Times Voided: 1  Urine Color: Unable To Evaluate  Wet Diaper Count: 1  Genitourinary Assessment   Bladder Assessment (WDL):  WDL Except  Dale Catheter: Not Applicable  Urinary Elimination: Incontinence, Stress Incontinence (intermittent incont)  Urine Color: Unable To Evaluate  Number of Bladder Accidents: 1  Total Number of Bladder of Accidents in Last 7 Days: 1  Bladder Device: Bathroom  Bladder Scan:  Post Void  $ Bladder Scan Results (mL): 213    Skin  Jase Score   17  Sensory Interventions   Bed Types: Standard/Trauma Mattress  Skin Preventative Measures: Pillows in Use for Support / Positioning  Moisture Interventions  Moisturizers/Barriers: Moisturizer       Pain  Pain Rating Scale  0 - No Pain  Pain Location  Back  Pain Location Orientation  Mid  Pain Interventions   Declines    ADLs    Bathing   Staff, Partial Bed Bath (cleaned after urinary incont)  Linen Change   Complete  Personal Hygiene  Moist Tisha Wipes  Chlorhexidine Bath      Oral Care     Teeth/Dentures     Shave     Nutrition Percentage Eaten  Lunch, Between % Consumed  Environmental Precautions  Treaded Slipper Socks on Patient, Personal Belongings, Wastebasket, Call Bell etc. in Easy Reach, Transferred to Stronger Side, Report Given to Other Health Care Providers Regarding Fall Risk, Bed in Low Position, Communication Sign for Patients & Families, Mobility Assessed & Appropriate Sign Placed  Patient Turns/Positioning  Patient Turns Self from Side to Side  Patient Turns Assistance/Tolerance     Bed Positions  Bed Controls On, Bed Locked  Head of Bed Elevated  Self regulated      Psychosocial/Neurologic Assessment  Psychosocial Assessment  Psychosocial (WDL):  Within Defined Limits  Neurologic Assessment  Neuro (WDL): Exceptions to WDL  Level of Consciousness: Alert  Orientation Level: Oriented X4  Cognition: Follows commands, Appropriate judgement, Appropriate safety awareness, Appropriate attention/concentration  Speech: Clear  Pupil Assesment: No  Motor Function/Sensation Assessment: Sensation  RUE Sensation: Full sensation  LUE Sensation: Full sensation  RLE Sensation: Numbness  LLE Sensation: Numbness  EENT (WDL):  WDL Except    Cardio/Pulmonary Assessment  Edema      Respiratory Breath Sounds     Cardiac Assessment   Cardiac (WDL):  WDL Except (hx a fib)

## 2023-10-15 NOTE — THERAPY
Missed Therapy    Patient Name: Yvonne Marie Wada  Age:  87 y.o., Sex:  female  Medical Record #: [id number]  Today's Date: 10/15/2023    Discussed missed therapy with RN and Dr. Sutton       10/15/23 1031   Therapy Missed   Missed Therapy (Minutes) 60   Reason For Missed Therapy Medical - Patient on Hold from Therapy;Medical - Patient has Nausea / Vomiting;Medical - Patient not Able To Participate  (pt reporting nausea, max pain, and presenting with HR between  and BP of 121/93 (102))

## 2023-10-15 NOTE — THERAPY
Physical Therapy   Daily Treatment     Patient Name: Yvonne Marie Wada  Age:  87 y.o., Sex:  female  Medical Record #: [id number]  Today's Date: 10/15/2023     Precautions  Precautions: (P) TLSO (Thoracolumbosacral orthosis), Spinal / Back Precautions   Comments: (P) TLSO for comfort OOB    Subjective    Patient agreeable to get OOB for treatment. Reports having BM     Objective       10/15/23 1331   PT Charge Group   PT Gait Training (Units) 1   PT Therapeutic Exercise (Units) 1   PT Total Time Spent   PT Individual Total Time Spent (Mins) 30   Precautions   Precautions TLSO (Thoracolumbosacral orthosis);Spinal / Back Precautions    Comments TLSO for comfort OOB   Vitals   Pulse (!) 106   Pulse Oximetry 95 %   Vitals Comments post gait training   Pain   Intervention Medication (see MAR)   Pain 0 - 10 Group   Location Back   Location Orientation Mid   Pain Rating Scale (NPRS) 5   Description Aching   Gait Functional Level of Assist    Gait Level Of Assist Contact Guard Assist   Assistive Device Front Wheel Walker  (declined TLSO use)   Distance (Feet) 40   # of Times Distance was Traveled 2   Deviation Increased Base Of Support;Bradykinetic;Shuffled Gait;Antalgic  (forward lean; VC for erect posture)   Transfer Functional Level of Assist   Bed, Chair, Wheelchair Transfer Contact Guard Assist   Bed Chair Wheelchair Transfer Description Squat pivot transfer to wheelchair;Set-up of equipment;Increased time;Supervision for safety   Sitting Lower Body Exercises   Ankle Pumps 2 sets of 10   Hip Abduction 2 sets of 10;Light Resistance Theraband   Long Arc Quad 2 sets of 10;Bilateral   Marching Reciprocal;2 sets of 10   Hamstring Curl 2 sets of 10;Bilateral;Light Resistance Theraband   Comments orange theraband used for resistance   Neuro-Muscular Treatments   Neuro-Muscular Treatments Postural Facilitation;Sequencing;Verbal Cuing   Interdisciplinary Plan of Care Collaboration   IDT Collaboration with  Nursing;Occupational  Therapist   Patient Position at End of Therapy Seated;Chair Alarm On;Call Light within Reach;Tray Table within Reach;Phone within Reach   Collaboration Comments RN notified of patient positioning in chair; CLOF with OT tx         Assessment    Patient will slight increase in participation this afternoon. Reports her legs were strong prior to fall and that pain is limiting mobility at this time. Reports she does not like using FWW here or at home. Patient required encouragement to stay in up chair to eat late lunch.  Strengths: Supportive family  Barriers: Decreased endurance, Difficulty following instructions, Generalized weakness, Impaired activity tolerance, Impaired balance, Impaired carryover of learning, Impaired insight/denial of deficits, Impaired functional cognition, Limited mobility, Pain    Plan    Endurance, balance, strength, walking as able    DME  PT DME Recommendations  Assistive Device: Front Wheeled Walker (patient reports she has one at home)    Passport items to be completed:  Get in/out of bed safely, in/out of a vehicle, safely use mobility device, walk or wheel around home/community, navigate up and down stairs, show how to get up/down from the ground, ensure home is accessible, demonstrate HEP, complete caregiver training    Physical Therapy Problems (Active)       Problem: Balance       Dates: Start:  10/13/23         Goal: STG-Within one week, patient will improve Jaramillo to 22/56 (16/56 on eval)       Dates: Start:  10/13/23               Problem: Mobility       Dates: Start:  10/13/23         Goal: STG-Within one week, patient will ambulate 100 ft with FWW and CGA between rest breaks       Dates: Start:  10/13/23            Goal: STG-Within one week, patient will ambulate up/down a curb with FWW and CGA       Dates: Start:  10/13/23               Problem: Mobility Transfers       Dates: Start:  10/13/23         Goal: STG-Within one week, patient will perform bed mobility from flat bed via  log roll with minimal cuing and supervision assist       Dates: Start:  10/13/23            Goal: STG-Within one week, patient will transfer in/out of car with FWW and CGA       Dates: Start:  10/13/23               Problem: PT-Long Term Goals       Dates: Start:  10/13/23         Goal: LTG-By discharge, patient will ambulate 250 ft between rest breaks with LRAD and supervision assist       Dates: Start:  10/13/23            Goal: LTG-By discharge, patient will transfer in/out of a car with supervision assist       Dates: Start:  10/13/23            Goal: LTG-By discharge, patient will improve Jaramillo to >40/56 to reduce fall risk       Dates: Start:  10/13/23

## 2023-10-16 ENCOUNTER — APPOINTMENT (OUTPATIENT)
Dept: PHYSICAL THERAPY | Facility: REHABILITATION | Age: 88
DRG: 560 | End: 2023-10-16
Attending: PHYSICAL MEDICINE & REHABILITATION
Payer: MEDICARE

## 2023-10-16 ENCOUNTER — APPOINTMENT (OUTPATIENT)
Dept: OCCUPATIONAL THERAPY | Facility: REHABILITATION | Age: 88
DRG: 560 | End: 2023-10-16
Attending: PHYSICAL MEDICINE & REHABILITATION
Payer: MEDICARE

## 2023-10-16 LAB
ANION GAP SERPL CALC-SCNC: 10 MMOL/L (ref 7–16)
BASOPHILS # BLD AUTO: 1 % (ref 0–1.8)
BASOPHILS # BLD: 0.04 K/UL (ref 0–0.12)
BUN SERPL-MCNC: 17 MG/DL (ref 8–22)
CALCIUM SERPL-MCNC: 9.1 MG/DL (ref 8.5–10.5)
CHLORIDE SERPL-SCNC: 103 MMOL/L (ref 96–112)
CO2 SERPL-SCNC: 21 MMOL/L (ref 20–33)
CREAT SERPL-MCNC: 0.8 MG/DL (ref 0.5–1.4)
EOSINOPHIL # BLD AUTO: 0.12 K/UL (ref 0–0.51)
EOSINOPHIL NFR BLD: 3 % (ref 0–6.9)
ERYTHROCYTE [DISTWIDTH] IN BLOOD BY AUTOMATED COUNT: 45.7 FL (ref 35.9–50)
GFR SERPLBLD CREATININE-BSD FMLA CKD-EPI: 71 ML/MIN/1.73 M 2
GLUCOSE SERPL-MCNC: 114 MG/DL (ref 65–99)
HCT VFR BLD AUTO: 40 % (ref 37–47)
HGB BLD-MCNC: 13.3 G/DL (ref 12–16)
IMM GRANULOCYTES # BLD AUTO: 0.04 K/UL (ref 0–0.11)
IMM GRANULOCYTES NFR BLD AUTO: 1 % (ref 0–0.9)
LYMPHOCYTES # BLD AUTO: 0.94 K/UL (ref 1–4.8)
LYMPHOCYTES NFR BLD: 23.9 % (ref 22–41)
MAGNESIUM SERPL-MCNC: 1.9 MG/DL (ref 1.5–2.5)
MCH RBC QN AUTO: 30.8 PG (ref 27–33)
MCHC RBC AUTO-ENTMCNC: 33.3 G/DL (ref 32.2–35.5)
MCV RBC AUTO: 92.6 FL (ref 81.4–97.8)
MONOCYTES # BLD AUTO: 0.62 K/UL (ref 0–0.85)
MONOCYTES NFR BLD AUTO: 15.7 % (ref 0–13.4)
NEUTROPHILS # BLD AUTO: 2.18 K/UL (ref 1.82–7.42)
NEUTROPHILS NFR BLD: 55.4 % (ref 44–72)
NRBC # BLD AUTO: 0 K/UL
NRBC BLD-RTO: 0 /100 WBC (ref 0–0.2)
PLATELET # BLD AUTO: 203 K/UL (ref 164–446)
PMV BLD AUTO: 9.4 FL (ref 9–12.9)
POTASSIUM SERPL-SCNC: 4.2 MMOL/L (ref 3.6–5.5)
RBC # BLD AUTO: 4.32 M/UL (ref 4.2–5.4)
SODIUM SERPL-SCNC: 134 MMOL/L (ref 135–145)
WBC # BLD AUTO: 3.9 K/UL (ref 4.8–10.8)

## 2023-10-16 PROCEDURE — 36415 COLL VENOUS BLD VENIPUNCTURE: CPT

## 2023-10-16 PROCEDURE — 99232 SBSQ HOSP IP/OBS MODERATE 35: CPT | Performed by: PHYSICAL MEDICINE & REHABILITATION

## 2023-10-16 PROCEDURE — 80048 BASIC METABOLIC PNL TOTAL CA: CPT

## 2023-10-16 PROCEDURE — A9270 NON-COVERED ITEM OR SERVICE: HCPCS | Performed by: PHYSICAL MEDICINE & REHABILITATION

## 2023-10-16 PROCEDURE — 770010 HCHG ROOM/CARE - REHAB SEMI PRIVAT*

## 2023-10-16 PROCEDURE — 83735 ASSAY OF MAGNESIUM: CPT

## 2023-10-16 PROCEDURE — 97112 NEUROMUSCULAR REEDUCATION: CPT

## 2023-10-16 PROCEDURE — 97530 THERAPEUTIC ACTIVITIES: CPT

## 2023-10-16 PROCEDURE — 700102 HCHG RX REV CODE 250 W/ 637 OVERRIDE(OP): Performed by: PHYSICAL MEDICINE & REHABILITATION

## 2023-10-16 PROCEDURE — 85025 COMPLETE CBC W/AUTO DIFF WBC: CPT

## 2023-10-16 PROCEDURE — 97535 SELF CARE MNGMENT TRAINING: CPT

## 2023-10-16 RX ORDER — ACETAMINOPHEN 500 MG
1000 TABLET ORAL 3 TIMES DAILY
Status: DISCONTINUED | OUTPATIENT
Start: 2023-10-16 | End: 2023-10-26 | Stop reason: HOSPADM

## 2023-10-16 RX ADMIN — SENNOSIDES AND DOCUSATE SODIUM 2 TABLET: 50; 8.6 TABLET ORAL at 08:22

## 2023-10-16 RX ADMIN — APIXABAN 2.5 MG: 2.5 TABLET, FILM COATED ORAL at 08:23

## 2023-10-16 RX ADMIN — APIXABAN 2.5 MG: 2.5 TABLET, FILM COATED ORAL at 19:49

## 2023-10-16 RX ADMIN — SENNOSIDES AND DOCUSATE SODIUM 2 TABLET: 50; 8.6 TABLET ORAL at 19:48

## 2023-10-16 RX ADMIN — ACETAMINOPHEN 1000 MG: 500 TABLET ORAL at 14:26

## 2023-10-16 RX ADMIN — BENAZEPRIL HYDROCHLORIDE 40 MG: 10 TABLET, FILM COATED ORAL at 08:22

## 2023-10-16 RX ADMIN — OMEPRAZOLE 20 MG: 20 CAPSULE, DELAYED RELEASE ORAL at 08:23

## 2023-10-16 RX ADMIN — ACETAMINOPHEN 1000 MG: 500 TABLET ORAL at 19:48

## 2023-10-16 RX ADMIN — LEVOTHYROXINE SODIUM 75 MCG: 0.07 TABLET ORAL at 05:30

## 2023-10-16 RX ADMIN — METOPROLOL SUCCINATE 100 MG: 100 TABLET, EXTENDED RELEASE ORAL at 08:23

## 2023-10-16 ASSESSMENT — PAIN DESCRIPTION - PAIN TYPE
TYPE: ACUTE PAIN
TYPE: ACUTE PAIN

## 2023-10-16 ASSESSMENT — PATIENT HEALTH QUESTIONNAIRE - PHQ9
SUM OF ALL RESPONSES TO PHQ9 QUESTIONS 1 AND 2: 0
1. LITTLE INTEREST OR PLEASURE IN DOING THINGS: NOT AT ALL
SUM OF ALL RESPONSES TO PHQ9 QUESTIONS 1 AND 2: 0
1. LITTLE INTEREST OR PLEASURE IN DOING THINGS: NOT AT ALL
2. FEELING DOWN, DEPRESSED, IRRITABLE, OR HOPELESS: NOT AT ALL
2. FEELING DOWN, DEPRESSED, IRRITABLE, OR HOPELESS: NOT AT ALL

## 2023-10-16 ASSESSMENT — ACTIVITIES OF DAILY LIVING (ADL): TOILET_TRANSFER_DESCRIPTION: GRAB BAR;ADAPTIVE EQUIPMENT

## 2023-10-16 ASSESSMENT — GAIT ASSESSMENTS: GAIT LEVEL OF ASSIST: REFUSED

## 2023-10-16 NOTE — PROGRESS NOTES
"  Physical Medicine & Rehabilitation Progress Note    Encounter Date: 10/16/2023    Chief Complaint: Decreased mobility, weakness    Interval Events (Subjective):  Patient sitting up in room. She reports she is doing OK but therapy was painful.  She reports it is mostly in the back. Denies NVD. Denies constipation.      Objective:  VITAL SIGNS: BP (!) 140/88   Pulse 84   Temp 36.5 °C (97.7 °F) (Oral)   Resp 18   Ht 1.646 m (5' 4.8\")   Wt 66.5 kg (146 lb 9.7 oz)   SpO2 92%   BMI 24.55 kg/m²   Gen: NAD  Psych: Mood and affect appropriate  CV: RRR, 0 edema  Resp: CTAB, no upper airway sounds  Abd: NTND  Neuro: AOx3, following commands    Laboratory Values:  Recent Results (from the past 72 hour(s))   POCT glucose device results    Collection Time: 10/14/23  9:55 AM   Result Value Ref Range    POC Glucose, Blood 97 65 - 99 mg/dL   EKG    Collection Time: 10/14/23 10:02 AM   Result Value Ref Range    Report       [facility]    Test Date:  2023-10-14  Pt Name:    YVONNE WADA                  Department: [facility]  MRN:        [id number]                      Room:       [facility]  Gender:     Female                       Technician: [id number] WT  :        [id number]                   Requested By:JHONY FARRELL  Order #:    [id number]                    Reading MD: William Infante MD    Measurements  Intervals                                Axis  Rate:       77                           P:          0  OK:         0                            QRS:        17  QRSD:       101                          T:          0  QT:         347  QTc:        393    Interpretive Statements  Atrial fibrillation  Nonspecific T abnormalities, inferior leads  Electronically Signed On 10- 22:55:38 PDT by William Infante MD     CBC WITH DIFFERENTIAL    Collection Time: 10/16/23  6:21 AM   Result Value Ref Range    WBC 3.9 (L) 4.8 - 10.8 K/uL    RBC 4.32 4.20 - 5.40 M/uL    Hemoglobin 13.3 12.0 - 16.0 g/dL    Hematocrit 40.0 37.0 - 47.0 %    MCV 92.6 81.4 - " 97.8 fL    MCH 30.8 27.0 - 33.0 pg    MCHC 33.3 32.2 - 35.5 g/dL    RDW 45.7 35.9 - 50.0 fL    Platelet Count 203 164 - 446 K/uL    MPV 9.4 9.0 - 12.9 fL    Neutrophils-Polys 55.40 44.00 - 72.00 %    Lymphocytes 23.90 22.00 - 41.00 %    Monocytes 15.70 (H) 0.00 - 13.40 %    Eosinophils 3.00 0.00 - 6.90 %    Basophils 1.00 0.00 - 1.80 %    Immature Granulocytes 1.00 (H) 0.00 - 0.90 %    Nucleated RBC 0.00 0.00 - 0.20 /100 WBC    Neutrophils (Absolute) 2.18 1.82 - 7.42 K/uL    Lymphs (Absolute) 0.94 (L) 1.00 - 4.80 K/uL    Monos (Absolute) 0.62 0.00 - 0.85 K/uL    Eos (Absolute) 0.12 0.00 - 0.51 K/uL    Baso (Absolute) 0.04 0.00 - 0.12 K/uL    Immature Granulocytes (abs) 0.04 0.00 - 0.11 K/uL    NRBC (Absolute) 0.00 K/uL   Basic Metabolic Panel    Collection Time: 10/16/23  6:21 AM   Result Value Ref Range    Sodium 134 (L) 135 - 145 mmol/L    Potassium 4.2 3.6 - 5.5 mmol/L    Chloride 103 96 - 112 mmol/L    Co2 21 20 - 33 mmol/L    Glucose 114 (H) 65 - 99 mg/dL    Bun 17 8 - 22 mg/dL    Creatinine 0.80 0.50 - 1.40 mg/dL    Calcium 9.1 8.5 - 10.5 mg/dL    Anion Gap 10.0 7.0 - 16.0   MAGNESIUM    Collection Time: 10/16/23  6:21 AM   Result Value Ref Range    Magnesium 1.9 1.5 - 2.5 mg/dL   ESTIMATED GFR    Collection Time: 10/16/23  6:21 AM   Result Value Ref Range    GFR (CKD-EPI) 71 >60 mL/min/1.73 m 2       Medications:  Scheduled Medications   Medication Dose Frequency    bisacodyl  10 mg DAILY    metoprolol SR  100 mg QA    Pharmacy Consult Request  1 Each PHARMACY TO DOSE    senna-docusate  2 Tablet BID    omeprazole  20 mg DAILY    apixaban  2.5 mg BID    benazepril  40 mg QAM    levothyroxine  75 mcg AM ES     PRN medications: hydrocortisone rectal, Respiratory Therapy Consult, hydrALAZINE, senna-docusate **AND** polyethylene glycol/lytes **AND** magnesium hydroxide **AND** bisacodyl, mag hydrox-al hydrox-simeth, ondansetron **OR** ondansetron, traZODone, sodium chloride, oxyCODONE immediate-release **OR**  oxyCODONE immediate-release, [COMPLETED] acetaminophen **FOLLOWED BY** acetaminophen    Diet:  Current Diet Order   Procedures    Diet Order Diet: Regular       Medical Decision Making and Plan:  T7/T8 Fracture - Patient with GLF on 10/8/23 with suspected syncope found to be in A fib and with T7/T8 compression fracture. Evaluated by NSG and recommending TLSO for comfort when OOB  -PT and OT for mobility and ADLs. Per guidelines, 15 hours per week between PT, OT and/or SLP.  -Follow-up NSG. Continue TLSO for comfort     HTN/A fib - Patient on Benazepril 40 mg and Metoprolol 50 mg. Continue Benazepril and Metoprolol -> 75 mg -> 100 mg     Hyponatremia - Check AM CMP - 134, will continue monitor     Hypothyroidism - Patient on Levothyroxine 75 mcg      Leukopenia - 4.1 on admission, recheck 10/16    Thrombocytopenia - Check AM CBC - 181, improved     Pain - Patient on PRN Tylenol and Oxycodone     Skin - Patient at risk for skin breakdown due to debility in areas including sacrum, achilles, elbows and head in addition to other sites. Nursing to assess skin daily.      GI Ppx - Patient on Prilosec for GERD prophylaxis. Patient on Senna-docusate for constipation prophylaxis.   -Severe constipation on 10/14, painful abdominal chest pain. EKG with A fib.  KUB with moderate constipation. Give Enema, suppository. Had BM, discontinue Suppository     DVT Ppx - Patient Eliquis on transfer. Continue Eliquis, dose changed to 2.5 mg as weight/age for ppx   ____________________________________    T. Ned Sutton MD/PhD  [facility] - Physical Medicine & Rehabilitation   [facility] - Brain Injury Medicine   ____________________________________

## 2023-10-16 NOTE — PROGRESS NOTES
NURSING DAILY NOTE    Name: Yvonne Marie Wada   Date of Admission: 10/12/2023   Admitting Diagnosis: Other fracture of T7-t8 thoracic vertebra, initial encounter for closed fracture (HCC)  Attending Physician: Mikayla Sutton M.d.  Allergies: Codeine, Doxycycline, Sulfamethoxazole-trimethoprim, Tramadol, and Trazodone    Safety  Patient Assist  Mod assist  Patient Precautions  TLSO (Thoracolumbosacral orthosis), Spinal / Back Precautions   Precaution Comments  TLSO for comfort OOB  Bed Transfer Status  Contact Guard Assist  Toilet Transfer Status   Contact Guard Assist  Assistive Devices  Rails  Oxygen  None - Room Air  Diet/Therapeutic Dining  Current Diet Order   Procedures    Diet Order Diet: Regular     Pill Administration  whole  Agitated Behavioral Scale     ABS Level of Severity       Fall Risk  Has the patient had a fall this admission?   No  Muna Cuenca Fall Risk Scoring  21, HIGH RISK  Fall Risk Safety Measures  bed alarm and chair alarm    Vitals  Temperature: 37 °C (98.6 °F)  Temp src: Oral  Pulse: (!) 102 (Nurse noted.)  Respiration: 18  Blood Pressure : 130/80  Blood Pressure MAP (Calculated): 97 MM HG  BP Location: Right, Upper Arm  Patient BP Position: Supine     Oxygen  Pulse Oximetry: 95 %  O2 (LPM): 0  O2 Delivery Device: None - Room Air    Bowel and Bladder  Last Bowel Movement  10/15/23  Stool Type  Type 2: Sausage shaped, but lumpy  Bowel Device     Continent  Bladder: Did not void   Bowel: No movement  Bladder Function  Urine Void (mL): 0 ml  Number of Times Voided: 1  Urine Color: Unable To Evaluate  Wet Diaper Count: 1  Genitourinary Assessment   Bladder Assessment (WDL):  WDL Except  Dale Catheter: Not Applicable  Urinary Elimination: Incontinence, Stress Incontinence (intermittent incont)  Urine Color: Unable To Evaluate  Number of Bladder Accidents: 1  Total Number of Bladder of Accidents in Last 7 Days: 1  Bladder  "Device: Bathroom  Bladder Scan: Post Void  $ Bladder Scan Results (mL): 213    Skin  Jase Score   17  Sensory Interventions   Bed Types: Standard/Trauma Mattress  Skin Preventative Measures: Pillows in Use for Support / Positioning  Moisture Interventions  Moisturizers/Barriers: Barrier Cream      Pain  Pain Rating Scale  0 - No Pain  Pain Location  Back  Pain Location Orientation  Mid  Pain Interventions   Declines    ADLs    Bathing   Patient Refused Bathing (Patient said: \"Not tonight\". Nurse noted.)  Linen Change   Complete  Personal Hygiene  Perineal Care, Moist Tisha Wipes, Change Tisha Pads  Chlorhexidine Bath      Oral Care     Teeth/Dentures     Shave     Nutrition Percentage Eaten  Breakfast, Less than 25% Consumed  Environmental Precautions  Treaded Slipper Socks on Patient, Bed in Low Position  Patient Turns/Positioning  Patient Turns Self from Side to Side  Patient Turns Assistance/Tolerance     Bed Positions  Bed Controls On, Bed Locked  Head of Bed Elevated  Self regulated      Psychosocial/Neurologic Assessment  Psychosocial Assessment  Psychosocial (WDL):  Within Defined Limits  Neurologic Assessment  Neuro (WDL): Exceptions to WDL  Level of Consciousness: Alert  Orientation Level: Oriented X4  Cognition: Follows commands, Appropriate judgement, Appropriate safety awareness, Appropriate attention/concentration  Speech: Clear  Pupil Assesment: No  Motor Function/Sensation Assessment: Sensation  RUE Sensation: Full sensation  LUE Sensation: Full sensation  RLE Sensation: Numbness  LLE Sensation: Numbness  EENT (WDL):  WDL Except    Cardio/Pulmonary Assessment  Edema      Respiratory Breath Sounds     Cardiac Assessment   Cardiac (WDL):  WDL Except (hx a fib)       "

## 2023-10-16 NOTE — DIETARY
Nutrition Update:    Day 4 of admit.  Yvonne Marie Wada is a 87 y.o. female with admitting DX of Other fracture of T7-t8 thoracic vertebra, initial encounter for closed fracture (HCC) [S22.653M].  Patient being followed to optimize nutrition.    Current Diet: regular w/ Boost TID w/ meals.     Recorded PO inconsistent w/ most meals < 25%. Of note most meals recorded are breakfast. Three out of the 4 breakfasts were <25%. On the 14th PO was 50-75% of breakfast and % of lunch. No documentation of intake at lunch or dinner yesterday. No documentation of of PO Boost supplements.     RD visited pt in her room. She said her appetite is improving. PO at breakfast was poor at < 25%. She said that she is not a breakfast person. When asked how she did at lunch and dinner yesterday, she said that she ate her dinner. She also said that she is drinking all of the Boost.     Problem: Nutritional:  Goal: Achieve adequate nutritional intake  Description: Patient will consume ~50% of meals and supplements  Outcome: progressing    RD following.

## 2023-10-16 NOTE — CARE PLAN
The patient is Stable - Low risk of patient condition declining or worsening    Shift Goals  Clinical Goals: Pain management  Patient Goals: Safety  Family Goals: Education    Progress made toward(s) clinical / shift goals:    Problem: Pain - Standard  Goal: Alleviation of pain or a reduction in pain to the patient’s comfort goal  Outcome: Progressing     Patient reports satisfactory pain control and decrease intensity after pharmacological pain management.

## 2023-10-16 NOTE — CARE PLAN
"  Problem: Fall Risk - Rehab  Goal: Patient will remain free from falls  Note: Muna Cuenca Fall risk Assessment Score: 21    High fall risk Interventions   - Alarming seatbelt  - Wander guard  - Bed and strip alarm   - Yellow sign by the door   - Yellow wrist band \"Fall risk\"  - Room near to the nurse station  - Do not leave patient unattended in the bathroom  - Fall risk education provided           The patient is Watcher - Medium risk of patient condition declining or worsening    Shift Goals  Clinical Goals: Pain management  Patient Goals: Safety  Family Goals: Education    "

## 2023-10-16 NOTE — PROGRESS NOTES
NURSING DAILY NOTE    Name: Yvonne Marie Wada   Date of Admission: 10/12/2023   Admitting Diagnosis: Other fracture of T7-t8 thoracic vertebra, initial encounter for closed fracture (HCC)  Attending Physician: Mikayla Sutton M.d.  Allergies: Codeine, Doxycycline, Sulfamethoxazole-trimethoprim, Tramadol, and Trazodone    Safety  Patient Assist  Mod assist  Patient Precautions  TLSO (Thoracolumbosacral orthosis), Spinal / Back Precautions   Precaution Comments  TLSO for comfort OOB  Bed Transfer Status  Contact Guard Assist  Toilet Transfer Status   Contact Guard Assist  Assistive Devices  Hand held assist, Rails, Wheelchair  Oxygen  None - Room Air  Diet/Therapeutic Dining  Current Diet Order   Procedures    Diet Order Diet: Regular     Pill Administration  whole  Agitated Behavioral Scale     ABS Level of Severity       Fall Risk  Has the patient had a fall this admission?   No  Muna Cuenca Fall Risk Scoring  21, HIGH RISK  Fall Risk Safety Measures  bed alarm and chair alarm    Vitals  Temperature: 36.9 °C (98.5 °F)  Temp src: Oral  Pulse: 98  Respiration: 18  Blood Pressure : 118/75  Blood Pressure MAP (Calculated): 89 MM HG  BP Location: Left, Upper Arm  Patient BP Position: Supine     Oxygen  Pulse Oximetry: 95 %  O2 Delivery Device: None - Room Air    Bowel and Bladder  Last Bowel Movement  10/15/23  Stool Type  Type 2: Sausage shaped, but lumpy  Bowel Device     Continent  Bladder: Did not void   Bowel: No movement  Bladder Function  Urine Void (mL): 0 ml  Number of Times Voided: 1  Urine Color: Yellow  Wet Diaper Count: 1  Genitourinary Assessment   Bladder Assessment (WDL):  WDL Except  Dale Catheter: Not Applicable  Urinary Elimination: Incontinence, Stress Incontinence (intermittent incont)  Urine Color: Yellow  Number of Bladder Accidents: 1  Total Number of Bladder of Accidents in Last 7 Days: 1  Bladder Device: Bathroom  Bladder Scan:  Post Void  $ Bladder Scan Results (mL): 213    Skin  Jase Score   17  Sensory Interventions   Bed Types: Standard/Trauma Mattress  Skin Preventative Measures: Pillows in Use for Support / Positioning  Moisture Interventions  Moisturizers/Barriers: Barrier Cream      Pain  Pain Rating Scale  5 - Interrupts some activities  Pain Location  Back  Pain Location Orientation  Mid  Pain Interventions   Medication (see MAR)    ADLs    Bathing   Staff, Partial Bed Bath (cleaned after urinary incont)  Linen Change   Complete  Personal Hygiene  Moist Tisha Wipes  Chlorhexidine Bath      Oral Care     Teeth/Dentures     Shave     Nutrition Percentage Eaten  Breakfast, Less than 25% Consumed  Environmental Precautions  Treaded Slipper Socks on Patient, Bed in Low Position  Patient Turns/Positioning  Patient Turns Self from Side to Side  Patient Turns Assistance/Tolerance     Bed Positions  Bed Controls On, Bed Locked  Head of Bed Elevated  Self regulated      Psychosocial/Neurologic Assessment  Psychosocial Assessment  Psychosocial (WDL):  Within Defined Limits  Neurologic Assessment  Neuro (WDL): Exceptions to WDL  Level of Consciousness: Alert  Orientation Level: Oriented X4  Cognition: Follows commands, Appropriate judgement, Appropriate safety awareness, Appropriate attention/concentration  Speech: Clear  Pupil Assesment: No  Motor Function/Sensation Assessment: Sensation  RUE Sensation: Full sensation  LUE Sensation: Full sensation  RLE Sensation: Numbness  LLE Sensation: Numbness  EENT (WDL):  WDL Except    Cardio/Pulmonary Assessment  Edema      Respiratory Breath Sounds     Cardiac Assessment   Cardiac (WDL):  WDL Except (hx a fib)

## 2023-10-16 NOTE — THERAPY
"Missed Therapy    Patient Name: Yvonne Marie Wada  Age:  87 y.o., Sex:  female  Medical Record #: [id number]  Today's Date: 10/16/2023    Discussed missed therapy with physician, therapy manager, and scheduling office.     Time Error: this note was at 0931.     10/16/23 0831   Therapy Missed   Missed Therapy (Minutes) 30   Reason For Missed Therapy Non-Medical - Patient Refused   Transfer Functional Level of Assist   Bed, Chair, Wheelchair Transfer Refused   Interdisciplinary Plan of Care Collaboration   IDT Collaboration with  Physician   Patient Position at End of Therapy In Bed;Call Light within Reach;Tray Table within Reach   Collaboration Comments Pt offered TENs application, trial of 16\"W, supine exercise. \"I just want to sleep\". Physician aware.     Pt provided with a variety of gentle treatment options, but was not persuaded to participate.    "

## 2023-10-16 NOTE — THERAPY
"Occupational Therapy  Daily Treatment     Patient Name: Yvonne Marie Wada  Age:  87 y.o., Sex:  female  Medical Record #: [id number]  Today's Date: 10/16/2023     Precautions  Precautions: (P) TLSO (Thoracolumbosacral orthosis), Spinal / Back Precautions   Comments: (P) TLSO for comfort OOB    Subjective    Patient in bed upon arrival for bed sessions, agreeable to participate in OT w/ consistent encouragement required throughout session. Frequently stated \"I need to get back to bed,\" however participated when redirected.     Declined to participate in shower.     When asked if moist heat pack beneficial for back pain, patient responded \"I don't know.\"     Objective     10/16/23 0931 10/16/23 1401   OT Charge Group   OT Self Care / ADL (Units) 1 1   OT Neuromuscular Re-education / Balance (Units) 1 1   OT Therapy Activity (Units)  --  2   OT Total Time Spent   OT Individual Total Time Spent (Mins) 30 60   Precautions   Precautions TLSO (Thoracolumbosacral orthosis);Spinal / Back Precautions  TLSO (Thoracolumbosacral orthosis);Spinal / Back Precautions    Comments TLSO for comfort OOB TLSO for comfort OOB   Vitals   O2 Delivery Device None - Room Air None - Room Air   Cognition    Level of Consciousness Alert Alert   Sleep/Wake Cycle   Sleep & Rest Awake Awake   Functional Level of Assist   Grooming  --  Supervision;Seated   Bed, Chair, Wheelchair Transfer Minimal Assist  (min A txfr bed > w/c , CGA txfr back to bed (FWW level))  --    Toilet Transfers  --  Contact Guard Assist  (wc <> toilet w/ GB)   Sitting Lower Body Exercises   Nustep  --  Resistance Level 1  (x 5 mins @ slow pace (~100 steps))   Interdisciplinary Plan of Care Collaboration   IDT Collaboration with  Family / Caregiver  --    Patient Position at End of Therapy In Bed;Call Light within Reach;Tray Table within Reach;Family / Friend in Room In Bed;Call Light within Reach;Tray Table within Reach;Phone within Reach   Collaboration Comments spouse present " at end of session  --      CGA for wc <> Nustep txfrs w/ FWW     Trialed 2 different TENS units to address back pain (1 during each of the OT sessions); patient did not tolerate well and requested to turn off (despite variability w/ intensity level and frequency)    Trialed moist heat pack to address back pain w/ poor results    Patient walked entire length of room fwd/back w/ FWW @ CGA level w/ increased time     Functional mobility ~ 10' in therapy gym w/ FWW @ CGA level w/ increased time    Patient c/o nausea following AM session, BP /70    Assessment    Patient w/ poor activity tolerance both sessions however participated w/ mod-max encouragement. Presents w/ bed seeking and self-limiting behavior however benefits from distraction to maximize therapeutic participation. No significant improvement in back pain reported w/ moist heat and TENS trials. Patient demo's ability to complete txfrs @ CGA level however txfrs variable due to decreased safety awareness/impaired functional sequencing (required up to min A).   Strengths: Able to follow instructions, Supportive family, Alert and oriented  Barriers: Decreased endurance, Generalized weakness, Impaired balance, Impaired activity tolerance, Pain, Lack of motivation    Plan  Progress OOB activity tolerance, incorporate standing as tolerated, general strengthening/endurance training, pain management, ADLs, functional txfrs/mobility     Occupational Therapy Goals (Active)       Problem: Bathing       Dates: Start:  10/13/23         Goal: STG-Within one week, patient will bathe with Min A using DME/AE as needed       Dates: Start:  10/13/23               Problem: Dressing       Dates: Start:  10/13/23         Goal: STG-Within one week, patient will dress LB with Min A using DME/AE as needed       Dates: Start:  10/13/23               Problem: Functional Transfers       Dates: Start:  10/13/23         Goal: STG-Within one week, patient will transfer to toilet with  SBA using DME/AE as needed       Dates: Start:  10/13/23               Problem: OT Long Term Goals       Dates: Start:  10/13/23         Goal: LTG-By discharge, patient will complete basic self care tasks with supervision using DME/AE as needed       Dates: Start:  10/13/23            Goal: LTG-By discharge, patient will perform bathroom transfers with supervision - set-up using DME/AE as needed       Dates: Start:  10/13/23               Problem: Toileting       Dates: Start:  10/13/23         Goal: STG-Within one week, patient will complete toileting tasks with Min A using DME/AE as needed       Dates: Start:  10/13/23

## 2023-10-16 NOTE — THERAPY
"Physical Therapy   Daily Treatment     Patient Name: Yvonne Marie Wada  Age:  87 y.o., Sex:  female  Medical Record #: [id number]  Today's Date: 10/16/2023     Precautions  Precautions: TLSO (Thoracolumbosacral orthosis), Spinal / Back Precautions   Comments: TLSO for comfort OOB    Subjective    \"I just want to rest\". Pt was agreeable to OOB for first half of session, and to in-bed activities after return to bed.      Objective       10/16/23 0830   PT Charge Group   PT Therapeutic Activities (Units) 4   PT Total Time Spent   PT Individual Total Time Spent (Mins) 60   Precautions   Precautions TLSO (Thoracolumbosacral orthosis);Spinal / Back Precautions    Comments TLSO for comfort OOB   Gait Functional Level of Assist    Gait Level Of Assist Refused   Transfer Functional Level of Assist   Bed, Chair, Wheelchair Transfer Contact Guard Assist   Bed Chair Wheelchair Transfer Description Adaptive equipment;Set-up of equipment;Other (comment)  (Impaired sequencing)   Toilet Transfers Contact Guard Assist   Toilet Transfer Description Grab bar;Adaptive equipment   Supine Lower Body Exercise   Abdominal Bracing 1 set of 10   Hip Adduction  1 set of 10;Bilateral  (pillow squeezes)   Gluteal Isometrics 1 set of 10;Bilateral   Bed Mobility    Supine to Sit Minimal Assist   Sit to Supine Contact Guard Assist  (poor compliance with log roll, despite education and cueing)   Sit to Stand Contact Guard Assist   Scooting Contact Guard Assist   Rolling Contact Guard Assist   Neuro-Muscular Treatments   Neuro-Muscular Treatments Weight Shift Left;Weight Shift Right;Verbal Cuing;Tactile Cuing;Sequencing   Comments Switched from 20\"W wc to 18\"W wc. Secondary switch to 16\" W wc for improved trunk stability/ postural control. Pt introduced to C[facility] Station. Positioning with pillows for supine and sidelying to stabilize posture, and priyanka with pain management/ comfort. Standing during toileting CGA with UE support.   Interdisciplinary Plan " "of Care Collaboration   IDT Collaboration with  Occupational Therapist   Patient Position at End of Therapy In Bed;Call Light within Reach;Tray Table within Reach   Collaboration Comments Dec initiation, trial TENs for pain managemen.   Physical Therapist Assigned   Assigned PT / Treatment Time / Comments EB, rest between all treatments as able.         Assessment    Pt was reluctant to participate, but did transfer OOB to trial a smaller wc, and perform toileting needs. Pt refused to transfer/ambulate with FWW, and did not follow recommendations for compliance with log roll/ spinal precautions during bed mobility and transfers. Pt was limited by fatigue, and dec initiation. Pt was introduced to abdominal bracing and gentle supine therapeutic supine exercise. After trial of 18\"W wc, it was determined that patient may benefit from further postural support, so a 16\"W has been added to the room. Pt declined trial during this session.     Strengths: Pleasant and cooperative, Supportive family  Barriers: Decreased endurance, Difficulty following instructions, Generalized weakness, Impaired activity tolerance, Impaired balance, Impaired carryover of learning, Impaired insight/denial of deficits, Impaired functional cognition, Limited mobility, Pain    Plan    Activity tolerance, OOB tolerance, functional mobility with FWW, spinal precaution compliance/ log roll review.     DME  PT DME Recommendations  Assistive Device: Front Wheeled Walker (patient reports she has one at home)    Passport items to be completed:  Get in/out of bed safely, in/out of a vehicle, safely use mobility device, walk or wheel around home/community, navigate up and down stairs, show how to get up/down from the ground, ensure home is accessible, demonstrate HEP, complete caregiver training    Physical Therapy Problems (Active)       Problem: Balance       Dates: Start:  10/13/23         Goal: STG-Within one week, patient will improve Jaramillo to 22/56 " (16/56 on eval)       Dates: Start:  10/13/23               Problem: Mobility       Dates: Start:  10/13/23         Goal: STG-Within one week, patient will ambulate 100 ft with FWW and CGA between rest breaks       Dates: Start:  10/13/23            Goal: STG-Within one week, patient will ambulate up/down a curb with FWW and CGA       Dates: Start:  10/13/23               Problem: Mobility Transfers       Dates: Start:  10/13/23         Goal: STG-Within one week, patient will perform bed mobility from flat bed via log roll with minimal cuing and supervision assist       Dates: Start:  10/13/23            Goal: STG-Within one week, patient will transfer in/out of car with FWW and CGA       Dates: Start:  10/13/23               Problem: PT-Long Term Goals       Dates: Start:  10/13/23         Goal: LTG-By discharge, patient will ambulate 250 ft between rest breaks with LRAD and supervision assist       Dates: Start:  10/13/23            Goal: LTG-By discharge, patient will transfer in/out of a car with supervision assist       Dates: Start:  10/13/23            Goal: LTG-By discharge, patient will improve Jaramillo to >40/56 to reduce fall risk       Dates: Start:  10/13/23

## 2023-10-17 ENCOUNTER — APPOINTMENT (OUTPATIENT)
Dept: OCCUPATIONAL THERAPY | Facility: REHABILITATION | Age: 88
DRG: 560 | End: 2023-10-17
Attending: PHYSICAL MEDICINE & REHABILITATION
Payer: MEDICARE

## 2023-10-17 ENCOUNTER — APPOINTMENT (OUTPATIENT)
Dept: PHYSICAL THERAPY | Facility: REHABILITATION | Age: 88
DRG: 560 | End: 2023-10-17
Attending: PHYSICAL MEDICINE & REHABILITATION
Payer: MEDICARE

## 2023-10-17 PROCEDURE — 700111 HCHG RX REV CODE 636 W/ 250 OVERRIDE (IP): Performed by: PHYSICAL MEDICINE & REHABILITATION

## 2023-10-17 PROCEDURE — A9270 NON-COVERED ITEM OR SERVICE: HCPCS | Performed by: PHYSICAL MEDICINE & REHABILITATION

## 2023-10-17 PROCEDURE — 97110 THERAPEUTIC EXERCISES: CPT

## 2023-10-17 PROCEDURE — 97116 GAIT TRAINING THERAPY: CPT

## 2023-10-17 PROCEDURE — 700102 HCHG RX REV CODE 250 W/ 637 OVERRIDE(OP): Performed by: PHYSICAL MEDICINE & REHABILITATION

## 2023-10-17 PROCEDURE — 99232 SBSQ HOSP IP/OBS MODERATE 35: CPT | Performed by: PHYSICAL MEDICINE & REHABILITATION

## 2023-10-17 PROCEDURE — 770010 HCHG ROOM/CARE - REHAB SEMI PRIVAT*

## 2023-10-17 PROCEDURE — 97535 SELF CARE MNGMENT TRAINING: CPT

## 2023-10-17 RX ADMIN — LEVOTHYROXINE SODIUM 75 MCG: 0.07 TABLET ORAL at 05:59

## 2023-10-17 RX ADMIN — BENAZEPRIL HYDROCHLORIDE 40 MG: 10 TABLET, FILM COATED ORAL at 07:51

## 2023-10-17 RX ADMIN — METOPROLOL SUCCINATE 100 MG: 100 TABLET, EXTENDED RELEASE ORAL at 07:51

## 2023-10-17 RX ADMIN — ACETAMINOPHEN 1000 MG: 500 TABLET ORAL at 13:32

## 2023-10-17 RX ADMIN — ACETAMINOPHEN 1000 MG: 500 TABLET ORAL at 19:39

## 2023-10-17 RX ADMIN — OMEPRAZOLE 20 MG: 20 CAPSULE, DELAYED RELEASE ORAL at 08:21

## 2023-10-17 RX ADMIN — SENNOSIDES AND DOCUSATE SODIUM 2 TABLET: 50; 8.6 TABLET ORAL at 19:40

## 2023-10-17 RX ADMIN — ONDANSETRON 4 MG: 4 TABLET, ORALLY DISINTEGRATING ORAL at 15:51

## 2023-10-17 RX ADMIN — APIXABAN 2.5 MG: 2.5 TABLET, FILM COATED ORAL at 19:40

## 2023-10-17 RX ADMIN — ACETAMINOPHEN 1000 MG: 500 TABLET ORAL at 07:51

## 2023-10-17 RX ADMIN — APIXABAN 2.5 MG: 2.5 TABLET, FILM COATED ORAL at 08:21

## 2023-10-17 ASSESSMENT — BALANCE ASSESSMENTS
MEDICARE IMPAIRMENT PERCENTAGE: 84
STANDING ON ONE LEG: 0
TRANSFERS: 1
STANDING TO SITTING: 3
STANDING UNSUPPORTED: 0
LONG VERSION TOTAL SCORE (MAX 56): 9
PLACE ALTERNATE FOOT ON STEP OR STOOL WHILE STANDING UNSUPPORTED: 0
TURN 360 DEGREES: 0
STANDING UNSUPPORTED ONE FOOT IN FRONT: 0
REACHING FORWARD WITH OUTSTRETCHED ARM WHILE STANDING: 0
STANDING UNSUPPORTED WITH FEET TOGETHER: 0
SITTING UNSUPPORTED: 3
SITTING TO STANDING: 2
LOOK OVER LEFT AND RIGHT SHOULDERS WHILE STANDING: 0
STANDING UNSUPPORTED WITH EYES CLOSED: 0
PICK UP OBJECT FROM THE FLOOR FROM A STANDING POSITION: 0
LONG VERSION TOTAL SCORE (MAX 56): 9

## 2023-10-17 ASSESSMENT — ACTIVITIES OF DAILY LIVING (ADL)
BED_CHAIR_WHEELCHAIR_TRANSFER_DESCRIPTION: ADAPTIVE EQUIPMENT;INCREASED TIME;SET-UP OF EQUIPMENT;SUPERVISION FOR SAFETY
BED_CHAIR_WHEELCHAIR_TRANSFER_DESCRIPTION: INCREASED TIME;INITIAL PREPARATION FOR TASK;SUPERVISION FOR SAFETY;VERBAL CUEING
TOILET_TRANSFER_DESCRIPTION: ADAPTIVE EQUIPMENT;GRAB BAR;INCREASED TIME;INITIAL PREPARATION FOR TASK;SET-UP OF EQUIPMENT;SUPERVISION FOR SAFETY;VERBAL CUEING
TOILETING_LEVEL_OF_ASSIST_DESCRIPTION: ASSIST TO PULL PANTS UP;ASSIST TO PULL PANTS DOWN;ASSIST FOR HYGIENE

## 2023-10-17 ASSESSMENT — GAIT ASSESSMENTS
ASSISTIVE DEVICE: FRONT WHEEL WALKER;4 WHEEL WALKER
DISTANCE (FEET): 75
GAIT LEVEL OF ASSIST: CONTACT GUARD ASSIST

## 2023-10-17 NOTE — THERAPY
"Occupational Therapy  Daily Treatment     Patient Name: Yvonne Marie Wada  Age:  87 y.o., Sex:  female  Medical Record #: [id number]  Today's Date: 10/17/2023     Precautions  Precautions: (P) TLSO (Thoracolumbosacral orthosis)  Comments: (P) for comfort         Subjective    \"I cant do it, just put me in bed\"    Pt seen for 2 session on this date at [id number] and [id number]. Pt self limiting and bed seeking for both sessions with limited active participation.      Objective       10/17/23 0931 10/17/23 1301   OT Charge Group   Charges Yes Yes   OT Self Care / ADL (Units) 1  --    OT Therapeutic Exercise (Units) 3 2   OT Total Time Spent   OT Individual Total Time Spent (Mins) 60 30   Precautions   Precautions TLSO (Thoracolumbosacral orthosis) TLSO (Thoracolumbosacral orthosis)   Comments for comfort for comfort   Functional Level of Assist   Toileting Maximal Assist  --    Toileting Description Assist to pull pants up;Assist to pull pants down;Assist for hygiene  --    Bed, Chair, Wheelchair Transfer Contact Guard Assist  --    Bed Chair Wheelchair Transfer Description Adaptive equipment;Increased time;Set-up of equipment;Supervision for safety  --    Toilet Transfers Contact Guard Assist  --    Toilet Transfer Description Adaptive equipment;Grab bar;Increased time;Initial preparation for task;Set-up of equipment;Supervision for safety;Verbal cueing  --    Supine Upper Body Exercises   Supine Upper Body Exercises  --  Yes   Chest Press  --  1 set of 15;Light Resistance Theraband   Bicep Curl  --  1 set of 15;Light Resistance Theraband   Elbow Extension  --  1 set of 15;Light Resistance Theraband   Sitting Upper Body Exercises   Sitting Upper Body Exercises Yes  --    Bilateral Row 1 set of 10;Light Resistance Theraband  --    Bicep Curls 1 set of 10;Light Resistance Theraband  --    Supine Lower Body Exercise   Supine Lower Body Exercises  --  Yes   Hip Abduction  --  1 set of 15;Light Resistance Theraband   Hip " Adduction   --  1 set of 15;Light Resistance Theraband   Sitting Lower Body Exercises   Sitting Lower Body Exercises Yes  --    Hip Abduction 1 set of 10;Light Resistance Theraband  --    Long Arc Quad 1 set of 10;Light Resistance Theraband  --    Hamstring Curl 1 set of 10;Light Resistance Theraband  --    Interdisciplinary Plan of Care Collaboration   IDT Collaboration with  Physician;Physical Therapist Physician;Physical Therapist   Patient Position at End of Therapy In Bed;Chair Alarm On;Bed Alarm On;Call Light within Reach;Tray Table within Reach In Bed;Bed Alarm On;Call Light within Reach;Tray Table within Reach   Collaboration Comments performance performance         Assessment    Pt with poor participation and tolerance to todays OT treatment focused on ADLs and therex. Pt is self limiting and bed seeking, requiring max encouragement to continue participation ~ every 5-10 minutes. Pt is slow moving and requires max cues to initiate task. Questionable progress to be made if this performance continues.     Strengths: Able to follow instructions, Supportive family, Alert and oriented  Barriers: Decreased endurance, Generalized weakness, Impaired balance, Impaired activity tolerance, Pain, Lack of motivation    Plan    Continue POC, promote OOB mobility, ambulation, ADLs    DME  TBD    Occupational Therapy Goals (Active)       Problem: Bathing       Dates: Start:  10/13/23         Goal: STG-Within one week, patient will bathe with Min A using DME/AE as needed       Dates: Start:  10/13/23         Goal Note filed on 10/17/23 1130 by Priya Sesay OT       Pt refusing to bathe                 Problem: Dressing       Dates: Start:  10/13/23         Goal: STG-Within one week, patient will dress LB with Min A using DME/AE as needed       Dates: Start:  10/13/23         Goal Note filed on 10/17/23 1130 by Priya Sesay OT       No active attempts to improve task                 Problem: Functional Transfers        Dates: Start:  10/13/23         Goal: STG-Within one week, patient will transfer to toilet with SBA using DME/AE as needed       Dates: Start:  10/13/23         Goal Note filed on 10/17/23 1130 by Priya Sesay OT       No carry over with safety techniques                 Problem: OT Long Term Goals       Dates: Start:  10/13/23         Goal: LTG-By discharge, patient will complete basic self care tasks with supervision using DME/AE as needed       Dates: Start:  10/13/23            Goal: LTG-By discharge, patient will perform bathroom transfers with supervision - set-up using DME/AE as needed       Dates: Start:  10/13/23               Problem: Toileting       Dates: Start:  10/13/23         Goal: STG-Within one week, patient will complete toileting tasks with Min A using DME/AE as needed       Dates: Start:  10/13/23         Goal Note filed on 10/17/23 1130 by Priya Sesay OT       Limited participation in task

## 2023-10-17 NOTE — PROGRESS NOTES
"  Physical Medicine & Rehabilitation Progress Note    Encounter Date: 10/17/2023    Chief Complaint: Decreased mobility, weakness    Interval Events (Subjective):  Patient sitting up in room. She reports pain is limiting her today. Denies NVD. Denies SOB. Discussed at length about doing therapy and need for 3 hours of therapy. She is concerned about being sent to nursing facility. Discussed will have IDT later today.     _____________________________________  Interdisciplinary Team Conference   Most recent IDT on 10/17/2023    Mikayla REAGAN M.D./Ph.D., was present and led the interdisciplinary team conference on 10/17/2023.  I led the IDT conference and agree with the IDT conference documentation and plan of care as noted below.     Nursing:  Diet Current Diet Order   Procedures    Diet Order Diet: Regular       Eating ADL        % of Last Meal  Oral Nutrition: Between 25-50% Consumed   Sleep    Bowel Last BM: 10/17/23   Bladder Incontinent   Barriers to Discharge Home:  Pain limited    Physical Therapy:  Bed Mobility    Transfers Contact Guard Assist  Increased time, Initial preparation for task, Supervision for safety, Verbal cueing   Mobility Contact Guard Assist   Stairs    Barriers to Discharge Home:  Refusing; bed seeking    Occupational Therapy:  Grooming Supervision, Seated   Bathing  (refused)   UB Dressing Minimal Assist   LB Dressing Total Assist   Toileting Moderate Assist   Shower & Transfer    Barriers to Discharge Home:      Case Management:  Continues to work on disposition and DME needs.      Discharge Date/Disposition:  10/25/23  _____________________________________     Objective:  VITAL SIGNS: BP (!) 156/88   Pulse 94   Temp 36.4 °C (97.5 °F) (Oral)   Resp 18   Ht 1.646 m (5' 4.8\")   Wt 66.5 kg (146 lb 9.7 oz)   SpO2 94%   BMI 24.55 kg/m²   Gen: NAD  Psych: Mood and affect appropriate  CV: RRR, 0 edema  Resp: CTAB, no upper airway sounds  Abd: NTND  Neuro: AOx3, following " commands  Unchanged from 10/16/23    Laboratory Values:  Recent Results (from the past 72 hour(s))   CBC WITH DIFFERENTIAL    Collection Time: 10/16/23  6:21 AM   Result Value Ref Range    WBC 3.9 (L) 4.8 - 10.8 K/uL    RBC 4.32 4.20 - 5.40 M/uL    Hemoglobin 13.3 12.0 - 16.0 g/dL    Hematocrit 40.0 37.0 - 47.0 %    MCV 92.6 81.4 - 97.8 fL    MCH 30.8 27.0 - 33.0 pg    MCHC 33.3 32.2 - 35.5 g/dL    RDW 45.7 35.9 - 50.0 fL    Platelet Count 203 164 - 446 K/uL    MPV 9.4 9.0 - 12.9 fL    Neutrophils-Polys 55.40 44.00 - 72.00 %    Lymphocytes 23.90 22.00 - 41.00 %    Monocytes 15.70 (H) 0.00 - 13.40 %    Eosinophils 3.00 0.00 - 6.90 %    Basophils 1.00 0.00 - 1.80 %    Immature Granulocytes 1.00 (H) 0.00 - 0.90 %    Nucleated RBC 0.00 0.00 - 0.20 /100 WBC    Neutrophils (Absolute) 2.18 1.82 - 7.42 K/uL    Lymphs (Absolute) 0.94 (L) 1.00 - 4.80 K/uL    Monos (Absolute) 0.62 0.00 - 0.85 K/uL    Eos (Absolute) 0.12 0.00 - 0.51 K/uL    Baso (Absolute) 0.04 0.00 - 0.12 K/uL    Immature Granulocytes (abs) 0.04 0.00 - 0.11 K/uL    NRBC (Absolute) 0.00 K/uL   Basic Metabolic Panel    Collection Time: 10/16/23  6:21 AM   Result Value Ref Range    Sodium 134 (L) 135 - 145 mmol/L    Potassium 4.2 3.6 - 5.5 mmol/L    Chloride 103 96 - 112 mmol/L    Co2 21 20 - 33 mmol/L    Glucose 114 (H) 65 - 99 mg/dL    Bun 17 8 - 22 mg/dL    Creatinine 0.80 0.50 - 1.40 mg/dL    Calcium 9.1 8.5 - 10.5 mg/dL    Anion Gap 10.0 7.0 - 16.0   MAGNESIUM    Collection Time: 10/16/23  6:21 AM   Result Value Ref Range    Magnesium 1.9 1.5 - 2.5 mg/dL   ESTIMATED GFR    Collection Time: 10/16/23  6:21 AM   Result Value Ref Range    GFR (CKD-EPI) 71 >60 mL/min/1.73 m 2       Medications:  Scheduled Medications   Medication Dose Frequency    acetaminophen  1,000 mg TID    metoprolol SR  100 mg QAM    senna-docusate  2 Tablet BID    omeprazole  20 mg DAILY    apixaban  2.5 mg BID    benazepril  40 mg QAM    levothyroxine  75 mcg AM ES     PRN medications:  hydrocortisone rectal, Respiratory Therapy Consult, hydrALAZINE, senna-docusate **AND** polyethylene glycol/lytes **AND** magnesium hydroxide **AND** bisacodyl, mag hydrox-al hydrox-simeth, ondansetron **OR** ondansetron, traZODone, sodium chloride, oxyCODONE immediate-release **OR** oxyCODONE immediate-release, [COMPLETED] acetaminophen **FOLLOWED BY** acetaminophen    Diet:  Current Diet Order   Procedures    Diet Order Diet: Regular       Medical Decision Making and Plan:  T7/T8 Fracture - Patient with GLF on 10/8/23 with suspected syncope found to be in A fib and with T7/T8 compression fracture. Evaluated by NSG and recommending TLSO for comfort when OOB  -PT and OT for mobility and ADLs. Per guidelines, 15 hours per week between PT, OT and/or SLP.  -Follow-up NSG. Continue TLSO for comfort     HTN/A fib - Patient on Benazepril 40 mg and Metoprolol 50 mg. Metoprolol has been increased to 100 mg. Continue Benazepril and Metoprolol 100 mg BID     Hyponatremia - Check AM CMP - 134, will continue monitor     Hypothyroidism - Patient on Levothyroxine 75 mcg      Leukopenia - 4.1 on admission, recheck 10/16    Thrombocytopenia - Check AM CBC - 181, improved     Pain - Patient on PRN Tylenol and Oxycodone. Schedule Tylenol     Skin - Patient at risk for skin breakdown due to debility in areas including sacrum, achilles, elbows and head in addition to other sites. Nursing to assess skin daily.      GI Ppx - Patient on Prilosec for GERD prophylaxis. Patient on Senna-docusate for constipation prophylaxis.   -Severe constipation on 10/14, painful abdominal chest pain. EKG with A fib.  KUB with moderate constipation. Give Enema, suppository. Had BM, discontinue Suppository     DVT Ppx - Patient Eliquis on transfer. Continue Eliquis  ____________________________________    T. Ned Sutton MD/PhD  [facility] - Physical Medicine & Rehabilitation   [facility] - Brain Injury Medicine   ____________________________________

## 2023-10-17 NOTE — CARE PLAN
Problem: Balance  Goal: STG-Within one week, patient will improve Jaramillo to 22/56 (16/56 on eval)  Outcome: Not Met     Problem: Mobility  Goal: STG-Within one week, patient will ambulate up/down a curb with FWW and CGA  Outcome: Not Met     Problem: Mobility Transfers  Goal: STG-Within one week, patient will transfer in/out of car with FWW and CGA  Outcome: Not Met     Problem: Mobility  Goal: STG-Within one week, patient will ambulate 100 ft with FWW and CGA between rest breaks  Outcome: Met     Problem: Mobility Transfers  Goal: STG-Within one week, patient will perform bed mobility from flat bed via log roll with minimal cuing and supervision assist  Outcome: Met

## 2023-10-17 NOTE — CARE PLAN
The patient is Watcher - Medium risk of patient condition declining or worsening    Shift Goals  Clinical Goals: pain control    Problem: Skin Integrity  Goal: Skin integrity is maintained or improved  Note: Patient has sacral redness, waffle overlay refused     Problem: Pain - Standard  Goal: Alleviation of pain or a reduction in pain to the patient’s comfort goal  Note: Patient is having back pain, scheduled tylenol has been given

## 2023-10-17 NOTE — PROGRESS NOTES
NURSING DAILY NOTE    Name: Yvonne Marie Wada   Date of Admission: 10/12/2023   Admitting Diagnosis: Other fracture of T7-t8 thoracic vertebra, initial encounter for closed fracture (HCC)  Attending Physician: Mikayla Sutton M.d.  Allergies: Codeine, Doxycycline, Sulfamethoxazole-trimethoprim, Tramadol, and Trazodone    Safety  Patient Assist  Mod assist  Patient Precautions  TLSO (Thoracolumbosacral orthosis), Spinal / Back Precautions   Precaution Comments  TLSO for comfort OOB  Bed Transfer Status  Minimal Assist (min A txfr bed > w/c , CGA txfr back to bed (FWW level))  Toilet Transfer Status   Contact Guard Assist (wc <> toilet w/ GB)  Assistive Devices  Rails  Oxygen  None - Room Air  Diet/Therapeutic Dining  Current Diet Order   Procedures    Diet Order Diet: Regular     Pill Administration  whole  Agitated Behavioral Scale     ABS Level of Severity       Fall Risk  Has the patient had a fall this admission?   No  Muna Cuenca Fall Risk Scoring  21, HIGH RISK  Fall Risk Safety Measures  bed alarm and chair alarm    Vitals  Temperature: 36.6 °C (97.8 °F)  Temp src: Oral  Pulse: 92  Respiration: 18  Blood Pressure : 122/78  Blood Pressure MAP (Calculated): 93 MM HG  BP Location: Left, Upper Arm  Patient BP Position: Supine     Oxygen  Pulse Oximetry: 95 %  O2 (LPM): 0  O2 Delivery Device: None - Room Air    Bowel and Bladder  Last Bowel Movement  10/16/23  Stool Type  Type 2: Sausage shaped, but lumpy  Bowel Device     Continent  Bladder: Did not void   Bowel: No movement  Bladder Function  Urine Void (mL): 0 ml  Number of Times Voided: 1  Urine Color: Unable To Evaluate  Wet Diaper Count: 1  Genitourinary Assessment   Bladder Assessment (WDL):  WDL Except  Dale Catheter: Not Applicable  Urinary Elimination: Incontinence, Stress Incontinence (intermittent incont)  Urine Color: Unable To Evaluate  Number of Bladder Accidents: 1  Total Number of  "Bladder of Accidents in Last 7 Days: 1  Bladder Device: Bathroom  Bladder Scan: Post Void  $ Bladder Scan Results (mL): 124    Skin  Jase Score   17  Sensory Interventions   Bed Types: Standard/Trauma Mattress  Skin Preventative Measures: Pillows in Use for Support / Positioning  Moisture Interventions  Moisturizers/Barriers: Barrier Cream      Pain  Pain Rating Scale  0 - No Pain  Pain Location  Back  Pain Location Orientation  Mid  Pain Interventions   Declines    ADLs    Bathing   Patient Refused Bathing (Patient said: \"Not tonight\". Nurse noted.)  Linen Change   Complete  Personal Hygiene   (clean, dry & dressed)  Chlorhexidine Bath      Oral Care     Teeth/Dentures     Shave     Nutrition Percentage Eaten  Less than 25% Consumed  Environmental Precautions  Treaded Slipper Socks on Patient, Bed in Low Position  Patient Turns/Positioning  Patient Turns Self from Side to Side  Patient Turns Assistance/Tolerance     Bed Positions  Bed Controls On, Bed Locked  Head of Bed Elevated  Self regulated      Psychosocial/Neurologic Assessment  Psychosocial Assessment  Psychosocial (WDL):  Within Defined Limits  Neurologic Assessment  Neuro (WDL): Exceptions to WDL  Level of Consciousness: Alert  Orientation Level: Oriented X4  Cognition: Follows commands, Appropriate judgement, Appropriate safety awareness, Appropriate attention/concentration  Speech: Clear  Pupil Assesment: No  Motor Function/Sensation Assessment: Sensation  RUE Sensation: Full sensation  LUE Sensation: Full sensation  RLE Sensation: Numbness  LLE Sensation: Numbness  EENT (WDL):  WDL Except    Cardio/Pulmonary Assessment  Edema      Respiratory Breath Sounds     Cardiac Assessment   Cardiac (WDL):  WDL Except (hx a fib)       "

## 2023-10-17 NOTE — THERAPY
Physical Therapy   Daily Treatment     Patient Name: Yvonne Marie Wada  Age:  87 y.o., Sex:  female  Medical Record #: [id number]  Today's Date: 10/17/2023     Precautions  Precautions: TLSO (Thoracolumbosacral orthosis)  Comments: for comfort    Subjective    Patient requires max encouragement to participate, is found in bed prior to both sessions. Questioned patient about lack of waffle cushion, patient reports it hurts her back. RN reports patient does have redness on her sacrum.     Objective       10/17/23 0831 10/17/23 1515   PT Charge Group   PT Gait Training (Units) 3  --    PT Therapeutic Exercise (Units)  --  3   PT Total Time Spent   PT Individual Total Time Spent (Mins) 45 45   Gait Functional Level of Assist    Gait Level Of Assist Contact Guard Assist  --    Assistive Device Front Wheel Walker;4 Wheel Walker  --    Distance (Feet) 75  --    # of Times Distance was Traveled 3  --    Deviation Increased Base Of Support;Bradykinetic;Shuffled Gait;Antalgic  (forward flexed, verbal cues for improving posture. Refuses TLSO)  --    Transfer Functional Level of Assist   Bed, Chair, Wheelchair Transfer Contact Guard Assist  --    Bed Chair Wheelchair Transfer Description Increased time;Initial preparation for task;Supervision for safety;Verbal cueing  --    Sitting Lower Body Exercises   Ankle Pumps  --  1 set of 10;Bilateral   Long Arc Quad  --  1 set of 10;Bilateral   Marching  --  Reciprocal;1 set of 10   Hamstring Curl  --  1 set of 10;Bilateral;Light Resistance Theraband   Nustep Resistance Level 1  (5 mins 96 steps)  --    Bed Mobility    Supine to Sit Minimal Assist  --    Sit to Stand Contact Guard Assist  --    Jaramillo Balance Scale   Sitting Unsupported (Score 0-4)  --  3   Change Of Positon: Sitting To Standing (Score 0-4)  --  2   Change Of Positon: Standing To Sitting (Score 0-4)  --  3   Transfers (Score 0-4)  --  1   Standing Unsupported (Score 0-4)  --  0   Standing With Eyes Closed (Score 0-4)  --  0    Standing With Feet Together (Score 0-4)  --  0   Tandem Standing (Score 0-4)  --  0   Standing On One Leg (Score 0-4)  --  0   Turning Trunk (Feet Fixed) (Score 0-4)  --  0   Retrieving Objects From Floor (Score 0-4)  --  0   Turning 360 Degrees (Score 0-4)  --  0   Stool Stepping (Score 0-4)  --  0   Reaching Forward While Standing (Score 0-4)  --  0   Jaramillo Balance Total Score (0-56)  --  9   Interdisciplinary Plan of Care Collaboration   IDT Collaboration with  Occupational Therapist;Nursing;Certified Nursing Assistant Nursing   Patient Position at End of Therapy Seated;Chair Alarm On;Self Releasing Lap Belt Applied;Call Light within Reach;Tray Table within Reach;Phone within Reach In Bed;Bed Alarm On;Call Light within Reach;Tray Table within Reach;Phone within Reach   Collaboration Comments informed RN, OT, CNA that patient has 10 minutes to next therapy session and that patient is capable of staying up despite her frequent requests to lay down. Demonstrates self limiting/ bed seeking behaviors RN provided anti nausea meds         Assessment    Patient is bed seeking, is self limiting, has poor carryover of safety education. She requires cues for hand placement and body positioning during transfers 100% of the time. Performed Jaramillo balance test to assess for progress on goals, patient has had a marked decline in balance skills (from 16/56 to 9/56). At her current functional levels, she is not necessarily safe to return to home and may benefit from other options for discharge.     Strengths: Pleasant and cooperative, Supportive family  Barriers: Decreased endurance, Difficulty following instructions, Generalized weakness, Impaired activity tolerance, Impaired balance, Impaired carryover of learning, Impaired insight/denial of deficits, Impaired functional cognition, Limited mobility, Pain    Plan    Activity tolerance, OOB tolerance, functional mobility with FWW, spinal precaution compliance/ log roll review.      DME  PT DME Recommendations  Assistive Device: Front Wheeled Walker (patient reports she has one at home)    Passport items to be completed:  Get in/out of bed safely, in/out of a vehicle, safely use mobility device, walk or wheel around home/community, navigate up and down stairs, show how to get up/down from the ground, ensure home is accessible, demonstrate HEP, complete caregiver training    Physical Therapy Problems (Active)       Problem: Balance       Dates: Start:  10/13/23         Goal: STG-Within one week, patient will improve Jaramillo to 22/56 (16/56 on eval)       Dates: Start:  10/13/23               Problem: Mobility       Dates: Start:  10/13/23         Goal: STG-Within one week, patient will ambulate up/down a curb with FWW and CGA       Dates: Start:  10/13/23               Problem: Mobility Transfers       Dates: Start:  10/13/23         Goal: STG-Within one week, patient will transfer in/out of car with FWW and CGA       Dates: Start:  10/13/23               Problem: PT-Long Term Goals       Dates: Start:  10/13/23         Goal: LTG-By discharge, patient will ambulate 250 ft between rest breaks with LRAD and supervision assist       Dates: Start:  10/13/23            Goal: LTG-By discharge, patient will transfer in/out of a car with supervision assist       Dates: Start:  10/13/23            Goal: LTG-By discharge, patient will improve Jaramillo to >40/56 to reduce fall risk       Dates: Start:  10/13/23

## 2023-10-17 NOTE — PROGRESS NOTES
NURSING DAILY NOTE    Name: Yvonne Marie Wada  Date of Admission: 10/12/2023  Admitting Diagnosis: Other fracture of T7-t8 thoracic vertebra, initial encounter for closed fracture (HCC)  Attending Physician: Mikayla Sutton M.d.  Allergies: Codeine, Doxycycline, Sulfamethoxazole-trimethoprim, Tramadol, and Trazodone    Safety  Patient Assist  omod  Patient Precautions  oTLSO (Thoracolumbosacral orthosis), Spinal / Back Precautions   Precaution Comments  oTLSO for comfort OOB  Bed Transfer Status  oMinimal Assist (min A txfr bed > w/c , CGA txfr back to bed (FWW level))  Toilet Transfer Status  oContact Guard Assist (wc <> toilet w/ GB)  Assistive Devices  oRails  Oxygen  oNone - Room Air  Diet/Therapeutic Dining  o  Current Diet Order   Procedures    Diet Order Diet: Regular     Pill Administration  owhole  Agitated Behavioral Scale  o   ABS Level of Severity  o     Fall Risk  Has the patient had a fall this admission?  Wilmer  Muna Cuenca Fall Risk Scoring  o22, HIGH RISK  Fall Risk Safety Measures  sofya alarm, chair alarm, and seatbelt alarm    Vitals  Temperature: 37.2 °C (98.9 °F)  Temp src: Oral  Pulse: 83  Respiration: 18  Blood Pressure : 131/83  Blood Pressure MAP (Calculated): 99 MM HG  BP Location: Left, Upper Arm  Patient BP Position: Supine    Oxygen  Pulse Oximetry: 94 %  O2 (LPM): 0  O2 Delivery Device: None - Room Air    Bowel and Bladder  Last Bowel Movement  o10/17/23  Stool Type  oType 3: Like a sausage, but with cracks on its surface  Bowel Device  oBathroom, Diaper  Continent  oBladder: Did not void  oBowel: No movement  Bladder Function  oUrine Void (mL):  (2 small)  Number of Times Voided: 2  Urine Color: Unable To Evaluate  Wet Diaper Count: 1  Genitourinary Assessment  oBladder Assessment (WDL):  WDL Except  Dale Catheter: Not Applicable  Urinary Elimination: Incontinence, Stress Incontinence  Urine Color: Unable To Evaluate  Number of Bladder Accidents: 1  Total Number of Bladder of  "Accidents in Last 7 Days: 1  Bladder Device: Bathroom, Diaper  Bladder Scan: Post Void  $ Bladder Scan Results (mL): 1 (0 scan)    Skin  Jase Score  o 17  Sensory Interventions  o Bed Types: Standard/Trauma Mattress  Skin Preventative Measures: Pillows in Use for Support / Positioning  Moisture Interventions  oMoisturizers/Barriers: Barrier Cream, Barrier Wipes      Pain  Pain Rating Scale  o3 - Sometimes distracts me  Pain Location  oBack  Pain Location Orientation  Dwayne  Pain Interventions  oMedication (see MAR)    ADLs    Bathing  oPatient Refused Bathing (Patient said: \"Not tonight\". Nurse noted.)  Linen Change  oComplete  Personal Hygiene  o (clean, dry & dressed)  Chlorhexidine Bath  o   Oral Care  o   Teeth/Dentures  o   Shave  o   Nutrition Percentage Eaten  oLess than 25% Consumed  Environmental Precautions  oTreaded Slipper Socks on Patient, Bed in Low Position, Personal Belongings, Wastebasket, Call Bell etc. in Easy Reach  Patient Turns/Positioning  oPatient Turns Self from Side to Side  Patient Turns Assistance/Tolerance  o   Bed Positions  Jose Controls On, Bed Locked  Head of Bed Elevated  oSelf regulated      Psychosocial/Neurologic Assessment  Psychosocial Assessment  oPsychosocial (WDL):  Within Defined Limits  Neurologic Assessment  oNeuro (WDL): Exceptions to WDL  Level of Consciousness: Alert  Orientation Level: Oriented X4  Cognition: Follows commands, Appropriate judgement, Appropriate safety awareness, Appropriate attention/concentration  Speech: Clear  Pupil Assesment: No  Motor Function/Sensation Assessment: Sensation  RUE Sensation: Full sensation  LUE Sensation: Full sensation  RLE Sensation: Numbness  LLE Sensation: Numbness  oEENT (WDL):  WDL Except    Cardio/Pulmonary Assessment  Edema  o   Respiratory Breath Sounds  o   Cardiac Assessment  oCardiac (WDL):  WDL Except    "

## 2023-10-17 NOTE — CARE PLAN
Problem: Bathing  Goal: STG-Within one week, patient will bathe with Min A using DME/AE as needed  Outcome: Not Met  Note: Pt refusing to bathe     Problem: Dressing  Goal: STG-Within one week, patient will dress LB with Min A using DME/AE as needed  Outcome: Not Progressing  Note: No active attempts to improve task     Problem: Toileting  Goal: STG-Within one week, patient will complete toileting tasks with Min A using DME/AE as needed  Outcome: Not Progressing  Note: Limited participation in task     Problem: Functional Transfers  Goal: STG-Within one week, patient will transfer to toilet with SBA using DME/AE as needed  Outcome: Progressing  Note: No carry over with safety techniques

## 2023-10-17 NOTE — CARE PLAN
The patient is Stable - Low risk of patient condition declining or worsening    Shift Goals  Clinical Goals: Pain management  Patient Goals: sleep, safety, pain control  Family Goals: Education    Progress made toward(s) clinical / shift goals:    Problem: Fall Risk - Rehab  Goal: Patient will remain free from falls  Outcome: Progressing. Patient bed in lowest locked position with bed alarm on an call light within reach. Patient calls appropriately with call light for needs over shift.      Problem: Pain - Standard  Goal: Alleviation of pain or a reduction in pain to the patient’s comfort goal  Outcome: Progressing. Patient states moderate pain to mid back area over shift, Received scheduled tylenol 1000 mg and stated adequate relief from pain.

## 2023-10-18 ENCOUNTER — APPOINTMENT (OUTPATIENT)
Dept: PHYSICAL THERAPY | Facility: REHABILITATION | Age: 88
DRG: 560 | End: 2023-10-18
Attending: PHYSICAL MEDICINE & REHABILITATION
Payer: MEDICARE

## 2023-10-18 ENCOUNTER — APPOINTMENT (OUTPATIENT)
Dept: OCCUPATIONAL THERAPY | Facility: REHABILITATION | Age: 88
DRG: 560 | End: 2023-10-18
Attending: PHYSICAL MEDICINE & REHABILITATION
Payer: MEDICARE

## 2023-10-18 PROCEDURE — 99232 SBSQ HOSP IP/OBS MODERATE 35: CPT | Performed by: PHYSICAL MEDICINE & REHABILITATION

## 2023-10-18 PROCEDURE — 97530 THERAPEUTIC ACTIVITIES: CPT

## 2023-10-18 PROCEDURE — 97110 THERAPEUTIC EXERCISES: CPT

## 2023-10-18 PROCEDURE — 97535 SELF CARE MNGMENT TRAINING: CPT

## 2023-10-18 PROCEDURE — 770010 HCHG ROOM/CARE - REHAB SEMI PRIVAT*

## 2023-10-18 PROCEDURE — 700102 HCHG RX REV CODE 250 W/ 637 OVERRIDE(OP): Performed by: PHYSICAL MEDICINE & REHABILITATION

## 2023-10-18 PROCEDURE — 700111 HCHG RX REV CODE 636 W/ 250 OVERRIDE (IP): Performed by: PHYSICAL MEDICINE & REHABILITATION

## 2023-10-18 PROCEDURE — A9270 NON-COVERED ITEM OR SERVICE: HCPCS | Performed by: PHYSICAL MEDICINE & REHABILITATION

## 2023-10-18 PROCEDURE — 97116 GAIT TRAINING THERAPY: CPT

## 2023-10-18 RX ADMIN — APIXABAN 2.5 MG: 2.5 TABLET, FILM COATED ORAL at 08:34

## 2023-10-18 RX ADMIN — ACETAMINOPHEN 1000 MG: 500 TABLET ORAL at 14:36

## 2023-10-18 RX ADMIN — APIXABAN 2.5 MG: 2.5 TABLET, FILM COATED ORAL at 19:37

## 2023-10-18 RX ADMIN — SENNOSIDES AND DOCUSATE SODIUM 2 TABLET: 50; 8.6 TABLET ORAL at 19:39

## 2023-10-18 RX ADMIN — LEVOTHYROXINE SODIUM 75 MCG: 0.07 TABLET ORAL at 05:17

## 2023-10-18 RX ADMIN — ACETAMINOPHEN 1000 MG: 500 TABLET ORAL at 08:34

## 2023-10-18 RX ADMIN — ACETAMINOPHEN 1000 MG: 500 TABLET ORAL at 19:37

## 2023-10-18 RX ADMIN — OXYCODONE HYDROCHLORIDE 5 MG: 5 TABLET ORAL at 19:37

## 2023-10-18 RX ADMIN — OMEPRAZOLE 20 MG: 20 CAPSULE, DELAYED RELEASE ORAL at 08:33

## 2023-10-18 RX ADMIN — ONDANSETRON 4 MG: 4 TABLET, ORALLY DISINTEGRATING ORAL at 09:26

## 2023-10-18 RX ADMIN — BENAZEPRIL HYDROCHLORIDE 40 MG: 10 TABLET, FILM COATED ORAL at 08:33

## 2023-10-18 RX ADMIN — METOPROLOL SUCCINATE 100 MG: 100 TABLET, EXTENDED RELEASE ORAL at 08:33

## 2023-10-18 ASSESSMENT — ACTIVITIES OF DAILY LIVING (ADL)
TOILET_TRANSFER_DESCRIPTION: GRAB BAR;ADAPTIVE EQUIPMENT
BED_CHAIR_WHEELCHAIR_TRANSFER_DESCRIPTION: ADAPTIVE EQUIPMENT;INCREASED TIME;SUPERVISION FOR SAFETY;VERBAL CUEING
BED_CHAIR_WHEELCHAIR_TRANSFER_DESCRIPTION: ADAPTIVE EQUIPMENT;VERBAL CUEING;INCREASED TIME
BED_CHAIR_WHEELCHAIR_TRANSFER_DESCRIPTION: ADAPTIVE EQUIPMENT;INCREASED TIME
BED_CHAIR_WHEELCHAIR_TRANSFER_DESCRIPTION: INCREASED TIME;VERBAL CUEING;SUPERVISION FOR SAFETY

## 2023-10-18 ASSESSMENT — GAIT ASSESSMENTS
DISTANCE (FEET): 125
GAIT LEVEL OF ASSIST: CONTACT GUARD ASSIST
DEVIATION: BRADYKINETIC;DECREASED HEEL STRIKE;DECREASED TOE OFF;OTHER (COMMENT)
ASSISTIVE DEVICE: FRONT WHEEL WALKER

## 2023-10-18 NOTE — THERAPY
"Physical Therapy   Daily Treatment     Patient Name: Yvonne Marie Wada  Age:  87 y.o., Sex:  female  Medical Record #: [id number]  Today's Date: 10/18/2023     Precautions  Precautions: TLSO (Thoracolumbosacral orthosis)  Comments: for comfort    Subjective    Patient is found seated up in chair, just finished breakfast and is waiting for meds from RN. Therapy schedule reveals patient has next session of therapy at 10am, RN in agreement that patient can stay up in chair from end of PT session to next session (1 hour) due to developing redness on sacrum and refusing waffle cushion. Patient was provided hot pack for back pain at end of session, refuses TLSO.     Objective       10/18/23 0805   PT Charge Group   PT Therapeutic Exercise (Units) 2   PT Total Time Spent   PT Individual Total Time Spent (Mins) 30   Transfer Functional Level of Assist   Bed, Chair, Wheelchair Transfer Contact Guard Assist   Bed Chair Wheelchair Transfer Description Increased time;Verbal cueing;Supervision for safety   Sitting Lower Body Exercises   Nustep Resistance Level 1  (10 min 240 steps, requires encouragement to keep going. Replies \"Shut up\")   Interdisciplinary Plan of Care Collaboration   IDT Collaboration with  Nursing   Patient Position at End of Therapy Seated;Chair Alarm On;Self Releasing Lap Belt Applied;Call Light within Reach;Phone within Reach;Tray Table within Reach   Collaboration Comments RN provided meds at start of session, RN aware of therapy schedule and agrees patient should sit up until next appointment at 10am         Assessment    Patient was left seated up in chair with seat belt, chair alarm to wait for next therapy session. Bed seeking behavior limits out of bed/activity tolerance and contributes to increasing redness on sacrum. Her significant decline in standing balance as measured by yesterday's Jaramillo is an indicator of worsening functional mobility, will benefit from increased time spent out of bed and " participating with out of room/ therapy activities. Will request recreational therapy consult.     Strengths: Pleasant and cooperative, Supportive family  Barriers: Decreased endurance, Difficulty following instructions, Generalized weakness, Impaired activity tolerance, Impaired balance, Impaired carryover of learning, Impaired insight/denial of deficits, Impaired functional cognition, Limited mobility, Pain    Plan    Activity tolerance, OOB tolerance, functional mobility with FWW, spinal precaution compliance/ log roll review.     DME  PT DME Recommendations  Assistive Device: Front Wheeled Walker (patient reports she has one at home)    Passport items to be completed:  Get in/out of bed safely, in/out of a vehicle, safely use mobility device, walk or wheel around home/community, navigate up and down stairs, show how to get up/down from the ground, ensure home is accessible, demonstrate HEP, complete caregiver training    Physical Therapy Problems (Active)       Problem: Balance       Dates: Start:  10/13/23         Goal: STG-Within one week, patient will improve Jaramillo to 22/56 (16/56 on eval)       Dates: Start:  10/13/23               Problem: Mobility       Dates: Start:  10/13/23         Goal: STG-Within one week, patient will ambulate up/down a curb with FWW and CGA       Dates: Start:  10/13/23               Problem: Mobility Transfers       Dates: Start:  10/13/23         Goal: STG-Within one week, patient will transfer in/out of car with FWW and CGA       Dates: Start:  10/13/23               Problem: PT-Long Term Goals       Dates: Start:  10/13/23         Goal: LTG-By discharge, patient will ambulate 250 ft between rest breaks with LRAD and supervision assist       Dates: Start:  10/13/23            Goal: LTG-By discharge, patient will transfer in/out of a car with supervision assist       Dates: Start:  10/13/23            Goal: LTG-By discharge, patient will improve Jaramillo to >40/56 to reduce fall risk        Dates: Start:  10/13/23

## 2023-10-18 NOTE — THERAPY
"Occupational Therapy  Daily Treatment     Patient Name: Yvonne Marie Wada  Age:  87 y.o., Sex:  female  Medical Record #: [id number]  Today's Date: 10/18/2023     Precautions  Precautions: TLSO (Thoracolumbosacral orthosis)  Comments: for comfort         Subjective    \"She was walking by herself at home.\" Per .     Objective       10/18/23 1301   OT Charge Group   OT Therapy Activity (Units) 2   OT Total Time Spent   OT Individual Total Time Spent (Mins) 30   Cognition    Level of Consciousness Alert   Initiation Impaired   Comments Mod cues for initiation of each task   Functional Level of Assist   Bed, Chair, Wheelchair Transfer Standby Assist   Bed Chair Wheelchair Transfer Description Adaptive equipment;Verbal cueing;Increased time  (stand step with FWW at SBA, cues for initiation)   Tub / Shower Transfers Contact Guard Assist   Tub Shower Transfer Description Shower bench;Adaptive equipment;Grab bar;Increased time;Supervision for safety;Verbal cueing  (**dry mock step in shower transfer over 4\" barrier using FWW for approach and vertical GB on L-side for step in to sit on shower bench (pt will have chair at home).)   Interdisciplinary Plan of Care Collaboration   IDT Collaboration with  Family / Caregiver   Patient Position at End of Therapy Seated;Chair Alarm On;Self Releasing Lap Belt Applied;Family / Friend in Room  (Pass off the  at end of session to go sit in w/c in front lobby to encourage OOB time.)   Collaboration Comments  present during session and educated on pt's lack of motivation and participation in OOB tasks, bed seeking, and health risks with being supine in bed.  receptive to education.         Assessment    Pt continues to be bed seeking, though can complete most ADLs and transfers at SBA-CGA level.  is supportive of OOB time, taking pt to lobby following session.  Strengths: Able to follow instructions, Supportive family, Alert and oriented  Barriers: " Decreased endurance, Generalized weakness, Impaired balance, Impaired activity tolerance, Pain, Lack of motivation    Plan    Continue POC, promote OOB mobility, ambulation, ADLs    DME  Pt has shower chair and GB in shower.    Passport items to be completed:  Perform bathroom transfers, complete dressing, complete feeding, get ready for the day, prepare a simple meal, participate in household tasks, adapt home for safety needs, demonstrate home exercise program, complete caregiver training     Occupational Therapy Goals (Active)       Problem: Bathing       Dates: Start:  10/13/23         Goal: STG-Within one week, patient will bathe with Min A using DME/AE as needed       Dates: Start:  10/13/23         Goal Note filed on 10/17/23 1130 by Priya Sesay OT       Pt refusing to bathe                 Problem: Dressing       Dates: Start:  10/13/23         Goal: STG-Within one week, patient will dress LB with Min A using DME/AE as needed       Dates: Start:  10/13/23         Goal Note filed on 10/17/23 1130 by Priya Sesay OT       No active attempts to improve task                 Problem: Functional Transfers       Dates: Start:  10/13/23         Goal: STG-Within one week, patient will transfer to toilet with SBA using DME/AE as needed       Dates: Start:  10/13/23         Goal Note filed on 10/17/23 1130 by Priya Sesay OT       No carry over with safety techniques                 Problem: OT Long Term Goals       Dates: Start:  10/13/23         Goal: LTG-By discharge, patient will complete basic self care tasks with supervision using DME/AE as needed       Dates: Start:  10/13/23            Goal: LTG-By discharge, patient will perform bathroom transfers with supervision - set-up using DME/AE as needed       Dates: Start:  10/13/23               Problem: Toileting       Dates: Start:  10/13/23         Goal: STG-Within one week, patient will complete toileting tasks with Min A using DME/AE as needed        Dates: Start:  10/13/23         Goal Note filed on 10/17/23 1130 by Priya Sesay OT       Limited participation in task

## 2023-10-18 NOTE — THERAPY
Physical Therapy   Daily Treatment     Patient Name: Yvonne Marie Wada  Age:  87 y.o., Sex:  female  Medical Record #: [id number]  Today's Date: 10/18/2023     Precautions  Precautions: (P) TLSO (Thoracolumbosacral orthosis)  Comments: (P) for comfort    Subjective    Pt initially asked to return to bed upon PT arrival, but was easily persuaded to participate.      Objective       10/18/23 1001   PT Charge Group   PT Gait Training (Units) 1   PT Therapeutic Exercise (Units) 1   PT Therapeutic Activities (Units) 2   PT Total Time Spent   PT Individual Total Time Spent (Mins) 60   Precautions   Precautions TLSO (Thoracolumbosacral orthosis)   Comments for comfort   Gait Functional Level of Assist    Gait Level Of Assist Contact Guard Assist   Assistive Device Front Wheel Walker   Distance (Feet) 125  (+ 40 + 60)   # of Times Distance was Traveled 1   Deviation Bradykinetic;Decreased Heel Strike;Decreased Toe Off;Other (Comment)  (fwd trunk lean/ kyphosis, declines use of TLSO)   Transfer Functional Level of Assist   Bed, Chair, Wheelchair Transfer Standby Assist   Bed Chair Wheelchair Transfer Description Adaptive equipment;Increased time  (reach pivot + set up)   Toilet Transfers Standby Assist   Toilet Transfer Description Grab bar;Adaptive equipment  (wc <> toilet)   Sitting Lower Body Exercises   Ankle Pumps 1 set of 10;Bilateral   Hip Abduction 1 set of 10;Bilateral;Light Resistance Theraband   Hip Adduction 1 set of 10;Bilateral  (ball squeezes)   Long Arc Quad 1 set of 10;Bilateral   Marching Reciprocal;1 set of 10   Hamstring Curl 1 set of 10;Bilateral   Bed Mobility    Sit to Supine Standby Assist   Scooting Standby Assist  (extra time)   Rolling Standby Assist   Neuro-Muscular Treatments   Neuro-Muscular Treatments Weight Shift Left;Weight Shift Right;Verbal Cuing;Sequencing;Tactile Cuing     Standing balance during toileting SBA with grab bar support 1-2 min x 2.     Assessment    Pt continues to be limited by  dec initiation, kyphosis, and dec activity tolerance, but did complete 3 bouts of ambulation  ft with FWW. Pt declined use of TLSO during session. Pt would be a good candidate to spread out rehab participation amongst 7 days d/t dec tolerance.     Strengths: Pleasant and cooperative, Supportive family  Barriers: Decreased endurance, Difficulty following instructions, Generalized weakness, Impaired activity tolerance, Impaired balance, Impaired carryover of learning, Impaired insight/denial of deficits, Impaired functional cognition, Limited mobility, Pain    Plan    Activity tolerance, OOB tolerance, functional mobility with FWW, spinal precaution compliance/ log roll review.     DME  PT DME Recommendations  Assistive Device: Front Wheeled Walker (patient reports she has one at home)    Passport items to be completed:  Get in/out of bed safely, in/out of a vehicle, safely use mobility device, walk or wheel around home/community, navigate up and down stairs, show how to get up/down from the ground, ensure home is accessible, demonstrate HEP, complete caregiver training    Physical Therapy Problems (Active)       Problem: Balance       Dates: Start:  10/13/23         Goal: STG-Within one week, patient will improve Jaramillo to 22/56 (16/56 on eval)       Dates: Start:  10/13/23               Problem: Mobility       Dates: Start:  10/13/23         Goal: STG-Within one week, patient will ambulate up/down a curb with FWW and CGA       Dates: Start:  10/13/23               Problem: Mobility Transfers       Dates: Start:  10/13/23         Goal: STG-Within one week, patient will transfer in/out of car with FWW and CGA       Dates: Start:  10/13/23               Problem: PT-Long Term Goals       Dates: Start:  10/13/23         Goal: LTG-By discharge, patient will ambulate 250 ft between rest breaks with LRAD and supervision assist       Dates: Start:  10/13/23            Goal: LTG-By discharge, patient will transfer in/out  of a car with supervision assist       Dates: Start:  10/13/23            Goal: LTG-By discharge, patient will improve Jaramillo to >40/56 to reduce fall risk       Dates: Start:  10/13/23

## 2023-10-18 NOTE — DISCHARGE PLANNING
Case Management/IDT follow up.   IDT continues to recommend IRF level of care as patient continue to make progress with all therapies.   Dc date set for 10/25.  Attemtped to meet w/ pt; she was asleep.  Will meet in AM.    Addendm 101/8 1035  Tc to pt's spouse Bill Wada - Spouse and POA (copy not in chart); not available - no voice mail set up.   Tc to pt's dtr Rusty Campos - Dtr & 2ndary POA who lives in [facility] and is traveling home from [place] area; she reports both her and her brother have offered to have pt live both of them, but pt has declined.  Isis reports pt's spouse is not receptive to hiring caregivers in the home setting; discussed  SNF; pt has been @ [facility] in the past. Past has used home healtn in the past; dtr was not happy w/ service provided;  Explained to Isis pt need to particpate w/  therapies in order to remain @ IR[facility].  If not, consideration will be given for SNF.     Plan: follow up with pt/spouse.     Plan:  Continue to follow

## 2023-10-18 NOTE — CARE PLAN
The patient is Watcher - Medium risk of patient condition declining or worsening    Shift Goals  Clinical Goals: pain control  Patient Goals: sleep, safety, pain control  Family Goals: Education    Progress made toward(s) clinical / shift goals:      Problem: Knowledge Deficit - Standard  Goal: Patient and family/care givers will demonstrate understanding of plan of care, disease process/condition, diagnostic tests and medications  Outcome: Progressing     Problem: Pain - Standard  Goal: Alleviation of pain or a reduction in pain to the patient’s comfort goal  Outcome: Progressing   Pt c/o back pain. Using scheduled tylenol and heat packs.    Patient is not progressing towards the following goals:

## 2023-10-18 NOTE — CARE PLAN
The patient is Stable - Low risk of patient condition declining or worsening    Shift Goals  Clinical Goals: safety  Patient Goals: pain management  Family Goals: Education      Problem: Fall Risk - Rehab  Goal: Patient will remain free from falls  Outcome: Progressing Pt uses call light consistently and appropriately. Waits for assistance does not attempt self transfer this shift. Able to verbalize needs.      Problem: Skin Integrity  Goal: Skin integrity is maintained or improved  Outcome: Not Met patient could benefit from waffle cushion but refuses it on bed. Patient has redness to sacrum that is blanchable.

## 2023-10-18 NOTE — PROGRESS NOTES
Patient care assumed. Report received from [facility] PURVI Hui. Patient is alert and calm, resting in bed. Call light and bedside table within reach. Will continue to monitor.

## 2023-10-18 NOTE — PROGRESS NOTES
"  Physical Medicine & Rehabilitation Progress Note    Encounter Date: 10/18/2023    Chief Complaint: Decreased mobility, weakness    Interval Events (Subjective):  Patient sitting up in therapy gym. She reports she understands she needs to do therapy. Discussed may change to 7 days a week plan instead of 5 3 hours. Denies NVD. She reports worse pain with sitting.     _____________________________________  Interdisciplinary Team Conference   Most recent IDT on 10/17/2023    Discharge Date/Disposition:  10/25/23  _____________________________________     Objective:  VITAL SIGNS: BP (!) 136/93 Comment: Rn notified   Pulse 100   Temp 36.6 °C (97.8 °F) (Oral)   Resp 15   Ht 1.646 m (5' 4.8\")   Wt 66.5 kg (146 lb 9.7 oz)   SpO2 93%   BMI 24.55 kg/m²   Gen: NAD  Psych: Mood and affect appropriate  CV: RRR, 0 edema  Resp: CTAB, no upper airway sounds  Abd: NTND  Neuro: AOx4, following commands    Laboratory Values:  Recent Results (from the past 72 hour(s))   CBC WITH DIFFERENTIAL    Collection Time: 10/16/23  6:21 AM   Result Value Ref Range    WBC 3.9 (L) 4.8 - 10.8 K/uL    RBC 4.32 4.20 - 5.40 M/uL    Hemoglobin 13.3 12.0 - 16.0 g/dL    Hematocrit 40.0 37.0 - 47.0 %    MCV 92.6 81.4 - 97.8 fL    MCH 30.8 27.0 - 33.0 pg    MCHC 33.3 32.2 - 35.5 g/dL    RDW 45.7 35.9 - 50.0 fL    Platelet Count 203 164 - 446 K/uL    MPV 9.4 9.0 - 12.9 fL    Neutrophils-Polys 55.40 44.00 - 72.00 %    Lymphocytes 23.90 22.00 - 41.00 %    Monocytes 15.70 (H) 0.00 - 13.40 %    Eosinophils 3.00 0.00 - 6.90 %    Basophils 1.00 0.00 - 1.80 %    Immature Granulocytes 1.00 (H) 0.00 - 0.90 %    Nucleated RBC 0.00 0.00 - 0.20 /100 WBC    Neutrophils (Absolute) 2.18 1.82 - 7.42 K/uL    Lymphs (Absolute) 0.94 (L) 1.00 - 4.80 K/uL    Monos (Absolute) 0.62 0.00 - 0.85 K/uL    Eos (Absolute) 0.12 0.00 - 0.51 K/uL    Baso (Absolute) 0.04 0.00 - 0.12 K/uL    Immature Granulocytes (abs) 0.04 0.00 - 0.11 K/uL    NRBC (Absolute) 0.00 K/uL   Basic " Metabolic Panel    Collection Time: 10/16/23  6:21 AM   Result Value Ref Range    Sodium 134 (L) 135 - 145 mmol/L    Potassium 4.2 3.6 - 5.5 mmol/L    Chloride 103 96 - 112 mmol/L    Co2 21 20 - 33 mmol/L    Glucose 114 (H) 65 - 99 mg/dL    Bun 17 8 - 22 mg/dL    Creatinine 0.80 0.50 - 1.40 mg/dL    Calcium 9.1 8.5 - 10.5 mg/dL    Anion Gap 10.0 7.0 - 16.0   MAGNESIUM    Collection Time: 10/16/23  6:21 AM   Result Value Ref Range    Magnesium 1.9 1.5 - 2.5 mg/dL   ESTIMATED GFR    Collection Time: 10/16/23  6:21 AM   Result Value Ref Range    GFR (CKD-EPI) 71 >60 mL/min/1.73 m 2       Medications:  Scheduled Medications   Medication Dose Frequency    acetaminophen  1,000 mg TID    metoprolol SR  100 mg QAM    senna-docusate  2 Tablet BID    omeprazole  20 mg DAILY    apixaban  2.5 mg BID    benazepril  40 mg QAM    levothyroxine  75 mcg AM ES     PRN medications: hydrocortisone rectal, Respiratory Therapy Consult, hydrALAZINE, senna-docusate **AND** polyethylene glycol/lytes **AND** magnesium hydroxide **AND** bisacodyl, mag hydrox-al hydrox-simeth, ondansetron **OR** ondansetron, traZODone, sodium chloride, oxyCODONE immediate-release **OR** oxyCODONE immediate-release, [COMPLETED] acetaminophen **FOLLOWED BY** acetaminophen    Diet:  Current Diet Order   Procedures    Diet Order Diet: Regular       Medical Decision Making and Plan:  T7/T8 Fracture - Patient with GLF on 10/8/23 with suspected syncope found to be in A fib and with T7/T8 compression fracture. Evaluated by NSG and recommending TLSO for comfort when OOB  -PT and OT for mobility and ADLs. Per guidelines, 15 hours per week between PT, OT and/or SLP.  -Follow-up NSG. Continue TLSO for comfort     HTN/A fib - Patient on Benazepril 40 mg and Metoprolol 50 mg. Metoprolol has been increased to 100 mg. Continue Benazepril 40 mg and Metoprolol 100 mg, SBP wnl but DBP elevated will monitor     Hyponatremia - Check AM CMP - 134, will continue monitor      Hypothyroidism - Patient on Levothyroxine 75 mcg      Leukopenia - 4.1 on admission, recheck 10/16    Thrombocytopenia - Check AM CBC - 181, improved     Pain - Patient on PRN Tylenol and Oxycodone. Schedule Tylenol     Skin - Patient at risk for skin breakdown due to debility in areas including sacrum, achilles, elbows and head in addition to other sites. Nursing to assess skin daily.      GI Ppx - Patient on Prilosec for GERD prophylaxis. Patient on Senna-docusate for constipation prophylaxis.   -Severe constipation on 10/14, painful abdominal chest pain. EKG with A fib.  KUB with moderate constipation. Give Enema, suppository. Had BM, discontinue Suppository     DVT Ppx - Patient Eliquis on transfer. Continue Eliquis as not ambulating.   ____________________________________    T. Ned Sutton MD/PhD  [facility] - Physical Medicine & Rehabilitation   [facility] - Brain Injury Medicine   ____________________________________

## 2023-10-19 ENCOUNTER — APPOINTMENT (OUTPATIENT)
Dept: PHYSICAL THERAPY | Facility: REHABILITATION | Age: 88
DRG: 560 | End: 2023-10-19
Attending: PHYSICAL MEDICINE & REHABILITATION
Payer: MEDICARE

## 2023-10-19 ENCOUNTER — APPOINTMENT (OUTPATIENT)
Dept: OCCUPATIONAL THERAPY | Facility: REHABILITATION | Age: 88
DRG: 560 | End: 2023-10-19
Attending: PHYSICAL MEDICINE & REHABILITATION
Payer: MEDICARE

## 2023-10-19 PROCEDURE — 97530 THERAPEUTIC ACTIVITIES: CPT

## 2023-10-19 PROCEDURE — A9270 NON-COVERED ITEM OR SERVICE: HCPCS | Performed by: PHYSICAL MEDICINE & REHABILITATION

## 2023-10-19 PROCEDURE — 99232 SBSQ HOSP IP/OBS MODERATE 35: CPT | Performed by: PHYSICAL MEDICINE & REHABILITATION

## 2023-10-19 PROCEDURE — 97116 GAIT TRAINING THERAPY: CPT

## 2023-10-19 PROCEDURE — 97110 THERAPEUTIC EXERCISES: CPT

## 2023-10-19 PROCEDURE — 97535 SELF CARE MNGMENT TRAINING: CPT

## 2023-10-19 PROCEDURE — 700102 HCHG RX REV CODE 250 W/ 637 OVERRIDE(OP): Performed by: PHYSICAL MEDICINE & REHABILITATION

## 2023-10-19 PROCEDURE — 770010 HCHG ROOM/CARE - REHAB SEMI PRIVAT*

## 2023-10-19 RX ADMIN — BENAZEPRIL HYDROCHLORIDE 40 MG: 10 TABLET, FILM COATED ORAL at 08:38

## 2023-10-19 RX ADMIN — SENNOSIDES AND DOCUSATE SODIUM 2 TABLET: 50; 8.6 TABLET ORAL at 08:38

## 2023-10-19 RX ADMIN — ACETAMINOPHEN 1000 MG: 500 TABLET ORAL at 08:37

## 2023-10-19 RX ADMIN — ACETAMINOPHEN 1000 MG: 500 TABLET ORAL at 14:32

## 2023-10-19 RX ADMIN — METOPROLOL SUCCINATE 100 MG: 100 TABLET, EXTENDED RELEASE ORAL at 08:36

## 2023-10-19 RX ADMIN — APIXABAN 2.5 MG: 2.5 TABLET, FILM COATED ORAL at 20:11

## 2023-10-19 RX ADMIN — SENNOSIDES AND DOCUSATE SODIUM 2 TABLET: 50; 8.6 TABLET ORAL at 20:10

## 2023-10-19 RX ADMIN — ACETAMINOPHEN 1000 MG: 500 TABLET ORAL at 20:10

## 2023-10-19 RX ADMIN — OXYCODONE HYDROCHLORIDE 5 MG: 5 TABLET ORAL at 06:32

## 2023-10-19 RX ADMIN — APIXABAN 2.5 MG: 2.5 TABLET, FILM COATED ORAL at 08:38

## 2023-10-19 RX ADMIN — OMEPRAZOLE 20 MG: 20 CAPSULE, DELAYED RELEASE ORAL at 08:36

## 2023-10-19 RX ADMIN — LEVOTHYROXINE SODIUM 75 MCG: 0.07 TABLET ORAL at 05:46

## 2023-10-19 ASSESSMENT — GAIT ASSESSMENTS
DISTANCE (FEET): 75
DEVIATION: BRADYKINETIC;SHUFFLED GAIT
DEVIATION: BRADYKINETIC;SHUFFLED GAIT
GAIT LEVEL OF ASSIST: CONTACT GUARD ASSIST
ASSISTIVE DEVICE: FRONT WHEEL WALKER
ASSISTIVE DEVICE: FRONT WHEEL WALKER
DISTANCE (FEET): 80
GAIT LEVEL OF ASSIST: CONTACT GUARD ASSIST

## 2023-10-19 ASSESSMENT — ACTIVITIES OF DAILY LIVING (ADL)
TOILET_TRANSFER_DESCRIPTION: GRAB BAR;SUPERVISION FOR SAFETY
BED_CHAIR_WHEELCHAIR_TRANSFER_DESCRIPTION: ADAPTIVE EQUIPMENT;SUPERVISION FOR SAFETY

## 2023-10-19 NOTE — THERAPY
Physical Therapy   Daily Treatment     Patient Name: Yvonne Marie Wada  Age:  87 y.o., Sex:  female  Medical Record #: [id number]  Today's Date: 10/19/2023     Precautions  Precautions: TLSO (Thoracolumbosacral orthosis), Fall Risk  Comments: for comfort    Subjective    Patient is agreeable to participate, refuses TLSO.      Objective       10/19/23 1531   PT Charge Group   PT Gait Training (Units) 1   PT Therapeutic Activities (Units) 1   PT Total Time Spent   PT Individual Total Time Spent (Mins) 30   Gait Functional Level of Assist    Gait Level Of Assist Contact Guard Assist   Assistive Device Front Wheel Walker   Distance (Feet) 75   # of Times Distance was Traveled 2   Deviation Bradykinetic;Shuffled Gait  (forward flexed posture, cues to keep walker close to her)   Neuro-Muscular Treatments   Comments static standing tolerance 4 x 60-90 sec with seated rest break between bouts. Performed task with reaching, alternating weight bearing between UEs for stability at elevated table.   Interdisciplinary Plan of Care Collaboration   Patient Position at End of Therapy In Bed;Bed Alarm On;Call Light within Reach;Tray Table within Reach;Phone within Reach   Collaboration Comments patient requests to be back in bed at end of session         Assessment    Patient is more willingly participatory, although as she fatigues she becomes more self limiting. She is unable to tolerate standing for more than 90 seconds and is unwilling to attempt to stand longer. She refuses TLSO as it's for comfort anyway, but also is difficult to maintain more upright posture.     Strengths: Pleasant and cooperative, Supportive family  Barriers: Decreased endurance, Difficulty following instructions, Generalized weakness, Impaired activity tolerance, Impaired balance, Impaired carryover of learning, Impaired insight/denial of deficits, Impaired functional cognition, Limited mobility, Pain    Plan    Activity tolerance, OOB tolerance, functional  mobility with FWW, spinal precaution compliance/ log roll review.     DME  PT DME Recommendations  Assistive Device: Front Wheeled Walker (patient reports she has one at home)    Passport items to be completed:  Get in/out of bed safely, in/out of a vehicle, safely use mobility device, walk or wheel around home/community, navigate up and down stairs, show how to get up/down from the ground, ensure home is accessible, demonstrate HEP, complete caregiver training    Physical Therapy Problems (Active)       Problem: Balance       Dates: Start:  10/13/23         Goal: STG-Within one week, patient will improve Jaramillo to 22/56 (16/56 on eval)       Dates: Start:  10/13/23               Problem: Mobility       Dates: Start:  10/13/23         Goal: STG-Within one week, patient will ambulate up/down a curb with FWW and CGA       Dates: Start:  10/13/23               Problem: Mobility Transfers       Dates: Start:  10/13/23         Goal: STG-Within one week, patient will transfer in/out of car with FWW and CGA       Dates: Start:  10/13/23               Problem: PT-Long Term Goals       Dates: Start:  10/13/23         Goal: LTG-By discharge, patient will ambulate 250 ft between rest breaks with LRAD and supervision assist       Dates: Start:  10/13/23            Goal: LTG-By discharge, patient will transfer in/out of a car with supervision assist       Dates: Start:  10/13/23            Goal: LTG-By discharge, patient will improve Jaramillo to >40/56 to reduce fall risk       Dates: Start:  10/13/23

## 2023-10-19 NOTE — PROGRESS NOTES
"  Physical Medicine & Rehabilitation Progress Note    Encounter Date: 10/19/2023    Chief Complaint: Decreased mobility, weakness    Interval Events (Subjective):  Patient sitting up in room. She reports she is doing well. She reports her pain is controlled when resting. Denies NVD.     _____________________________________  Interdisciplinary Team Conference   Most recent IDT on 10/17/2023    Discharge Date/Disposition:  10/25/23  _____________________________________     Objective:  VITAL SIGNS: /79   Pulse 70   Temp 36.5 °C (97.7 °F) (Oral)   Resp 17   Ht 1.646 m (5' 4.8\")   Wt 66.5 kg (146 lb 9.7 oz)   SpO2 95%   BMI 24.55 kg/m²   Gen: NAD  Psych: Mood and affect appropriate  CV: RRR, 0 edema  Resp: CTAB, no upper airway sounds  Abd: NTND  Neuro: AOx4, following commands  Unchanged from 10/18/23    Laboratory Values:  No results found for this or any previous visit (from the past 72 hour(s)).      Medications:  Scheduled Medications   Medication Dose Frequency    acetaminophen  1,000 mg TID    metoprolol SR  100 mg QAM    senna-docusate  2 Tablet BID    omeprazole  20 mg DAILY    apixaban  2.5 mg BID    benazepril  40 mg QAM    levothyroxine  75 mcg AM ES     PRN medications: hydrocortisone rectal, Respiratory Therapy Consult, hydrALAZINE, senna-docusate **AND** polyethylene glycol/lytes **AND** magnesium hydroxide **AND** bisacodyl, mag hydrox-al hydrox-simeth, ondansetron **OR** ondansetron, traZODone, sodium chloride, oxyCODONE immediate-release **OR** oxyCODONE immediate-release, [COMPLETED] acetaminophen **FOLLOWED BY** acetaminophen    Diet:  Current Diet Order   Procedures    Diet Order Diet: Regular       Medical Decision Making and Plan:  T7/T8 Fracture - Patient with GLF on 10/8/23 with suspected syncope found to be in A fib and with T7/T8 compression fracture. Evaluated by NSG and recommending TLSO for comfort when OOB  -PT and OT for mobility and ADLs. Per guidelines, 15 hours per week " between PT, OT and/or SLP.  -Follow-up NSG. Continue TLSO for comfort     HTN/A fib - Patient on Benazepril 40 mg and Metoprolol 50 mg. Metoprolol has been increased to 100 mg. SBP wnl but DBP elevated will monitor. Continue Benazepril and Metoprolol     Hyponatremia - Check AM CMP - 134, will continue monitor. Repeat labs 10/20     Hypothyroidism - Patient on Levothyroxine 75 mcg      Leukopenia - 4.1 on admission, recheck 10/16    Thrombocytopenia - Check AM CBC - 181, improved     Pain - Patient on PRN Tylenol and Oxycodone. Schedule Tylenol. Continue Tylenol 1000 mg TID, and check CMP     Skin - Patient at risk for skin breakdown due to debility in areas including sacrum, achilles, elbows and head in addition to other sites. Nursing to assess skin daily.      GI Ppx - Patient on Prilosec for GERD prophylaxis. Patient on Senna-docusate for constipation prophylaxis.   -Severe constipation on 10/14, painful abdominal chest pain. EKG with A fib.  KUB with moderate constipation. Give Enema, suppository. Had BM, discontinue Suppository     DVT Ppx - Patient Eliquis on transfer. Continue Eliquis as limited ambulator.   ____________________________________    T. Ned Sutton MD/PhD  [facility] - Physical Medicine & Rehabilitation   [facility] - Brain Injury Medicine   ____________________________________

## 2023-10-19 NOTE — CARE PLAN
"  Problem: Fall Risk - Rehab  Goal: Patient will remain free from falls  Outcome: Progressing  Note: [place] Fall risk Assessment Score: 17    High fall risk Interventions   - Bed and strip alarm   - Yellow sign by the door   - Yellow wrist band \"Fall risk\"  - Room near to the nurse station  - Do not leave patient unattended in the bathroom  - Fall risk education provided   Problem: Pain - Standard  Goal: Alleviation of pain or a reduction in pain to the patient’s comfort goal  Outcome: Progressing  Note: Assessed for pain and discomfort , medicated with oxycodone 5 mg for back pain with relief[ see mar] . Needs anticipated and attended. Cont monitored.   The patient is Stable - Low risk of patient condition declining or worsening    Shift Goals  Clinical Goals: safety  Patient Goals: pain management  Family Goals: Education    Progress made toward(s) clinical / shift goals:  Pt free from fall and injury.    "

## 2023-10-19 NOTE — THERAPY
"Occupational Therapy  Daily Treatment     Patient Name: Yvonne Marie Wada  Age:  87 y.o., Sex:  female  Medical Record #: [id number]  Today's Date: 10/19/2023     Precautions  Precautions: (P) TLSO (Thoracolumbosacral orthosis), Fall Risk  Comments: (P) for comfort         Subjective    \"I just need to get back in bed for a bit\"     Objective       10/19/23 0901 10/19/23 1431   OT Charge Group   Charges Yes Yes   OT Self Care / ADL (Units) 1  --    OT Therapy Activity (Units) 3 2   OT Total Time Spent   OT Individual Total Time Spent (Mins) 60 30   Precautions   Precautions TLSO (Thoracolumbosacral orthosis);Fall Risk TLSO (Thoracolumbosacral orthosis);Fall Risk   Comments for comfort for comfort   Functional Level of Assist   Toileting Minimal Assist  --    Toileting Description Assist to pull pants up;Assist to pull pants down;Grab bar;Increased time;Verbal cueing;Supervision for safety  (patient participated in clothing management, needing Min A for thoroughness)  --    Bed, Chair, Wheelchair Transfer Standby Assist  --    Sitting Upper Body Exercises   Sitting Upper Body Exercises  --  Yes   Upper Extremity Bike  --  Level 1 Resistance   Comments  --  10 Min BUE (5 forward, 5 backward) 0 RB; .82 mile active, 0 passive   Interdisciplinary Plan of Care Collaboration   IDT Collaboration with  Physician Nursing   Patient Position at End of Therapy In Bed;Call Light within Reach In Bed;Bed Alarm On;Call Light within Reach   Collaboration Comments progress pain meds at beginning of session     Theract AM session: pt educated on long handled reacher and sock aid for lower body dressing with spinal precautions to limit pain with ADLs. Pt needing Max cues, hand over hand, and Max A to complete LBD with AE. Further education is needed.    Theract PM session: pt completed functional ambulation from room >< main gym with CGA and 2ww  Assessment    Pt continuing with limited participation on today's treatments (x2). Pt " "demonstrates little interest in learning modified activities on this date with hand over hand required to complete LBD with reacher and sock aid. Pt stated multiple times \"I cant do it\" without attempting task first. She also stated that someone else would do it for her. OT educated pt that she does not have the assistance at home that she has here and the importance of learning independence with tasks to decrease assistance needed from family. Pt unresponsive to education. She does demonstrate slightly increased participation in PM session, however, still requires max encouragement to participate.     Strengths: Able to follow instructions, Supportive family, Alert and oriented  Barriers: Decreased endurance, Generalized weakness, Impaired balance, Impaired activity tolerance, Pain, Lack of motivation    Plan    Continue POC, progress OOB mobility, progress participation, ADLs    DME         Occupational Therapy Goals (Active)       Problem: Bathing       Dates: Start:  10/13/23         Goal: STG-Within one week, patient will bathe with Min A using DME/AE as needed       Dates: Start:  10/13/23         Goal Note filed on 10/17/23 1130 by Priya Sesay OT       Pt refusing to bathe                 Problem: Dressing       Dates: Start:  10/13/23         Goal: STG-Within one week, patient will dress LB with Min A using DME/AE as needed       Dates: Start:  10/13/23         Goal Note filed on 10/17/23 1130 by Priya Sesay OT       No active attempts to improve task                 Problem: Functional Transfers       Dates: Start:  10/13/23         Goal: STG-Within one week, patient will transfer to toilet with SBA using DME/AE as needed       Dates: Start:  10/13/23         Goal Note filed on 10/17/23 1130 by Priya Sesay OT       No carry over with safety techniques                 Problem: OT Long Term Goals       Dates: Start:  10/13/23         Goal: LTG-By discharge, patient will complete basic self care " tasks with supervision using DME/AE as needed       Dates: Start:  10/13/23            Goal: LTG-By discharge, patient will perform bathroom transfers with supervision - set-up using DME/AE as needed       Dates: Start:  10/13/23               Problem: Toileting       Dates: Start:  10/13/23         Goal: STG-Within one week, patient will complete toileting tasks with Min A using DME/AE as needed       Dates: Start:  10/13/23         Goal Note filed on 10/17/23 1130 by Priya Sesay OT       Limited participation in task

## 2023-10-19 NOTE — THERAPY
Occupational Therapy  Daily Treatment     Patient Name: Yvonne Marie Wada  Age:  87 y.o., Sex:  female  Medical Record #: [id number]  Today's Date: 10/18/2023     Precautions  Precautions: (P) TLSO (Thoracolumbosacral orthosis)  Comments: (P) for comfort         Subjective    Pt highly resistant to OOB mobility, however agreeable to shower with max encouragement.      Objective       10/18/23 1431   OT Charge Group   Charges Yes   OT Self Care / ADL (Units) 4   OT Total Time Spent   OT Individual Total Time Spent (Mins) 60   Precautions   Precautions TLSO (Thoracolumbosacral orthosis)   Comments for comfort   Functional Level of Assist   Grooming Standby Assist   Grooming Description Seated in wheelchair at sink  (hair brushing and management)   Bathing Maximal Assist   Bathing Description Adaptive equipment;Grab bar;Hand held shower;Long handled bath tool;Assit with back;Assit wtih lower extremities;Assit with perineal;Increased time;Initial preparation for task;Set-up of equipment;Supervision for safety;Verbal cueing   Upper Body Dressing Stand by Assist   Upper Body Dressing Description   (donned/doffed sweater in seated position)   Lower Body Dressing Moderate Assist   Lower Body Dressing Description Assist with threading into pant leg;Cues for spinal precautions;Assist with closures  (Pt able to thread BLE into pants, needing assistance for brief. Min A to pull up around waist and assist to button pants in standing while pt maintains balance)   Bed, Chair, Wheelchair Transfer Standby Assist   Bed Chair Wheelchair Transfer Description Adaptive equipment;Increased time;Supervision for safety;Verbal cueing   Tub / Shower Transfers Contact Guard Assist   Tub Shower Transfer Description Adaptive equipment;Grab bar;Shower bench;Increased time;Initial preparation for task;Supervision for safety   Bed Mobility    Supine to Sit Contact Guard Assist   Sit to Supine Contact Guard Assist   Interdisciplinary Plan of Care  "Collaboration   IDT Collaboration with  Nursing   Patient Position at End of Therapy In Bed;Call Light within Reach;Tray Table within Reach;Bed Alarm On   Collaboration Comments OOB for meals     Pt needing extended time for rest breaks and max verbal cues for continuation of task    Assessment    Pt demonstrating slightly improved participation in OT treatment on this date focused on showering/dressing ADL tasks. Pt responds well to MAX positive reinforcement, praise, and encouragement. Additionally, responds well to \"if, then\" statements. (\"If you take a shower, then I will let you lay back down when we are done. OT set personal goals for pt to address in her own time (out of bed for all meals, work on LBD during nursing-addressed toileting tasks). Pt is agreeable to these personal goals and states she will do them.     Strengths: Able to follow instructions, Supportive family, Alert and oriented  Barriers: Decreased endurance, Generalized weakness, Impaired balance, Impaired activity tolerance, Pain, Lack of motivation    Plan    LBD AE training with emphasis on spinal precautions for pain conservation    DME   TBD    Occupational Therapy Goals (Active)       Problem: Bathing       Dates: Start:  10/13/23         Goal: STG-Within one week, patient will bathe with Min A using DME/AE as needed       Dates: Start:  10/13/23         Goal Note filed on 10/17/23 1130 by Priya Sesay OT       Pt refusing to bathe                 Problem: Dressing       Dates: Start:  10/13/23         Goal: STG-Within one week, patient will dress LB with Min A using DME/AE as needed       Dates: Start:  10/13/23         Goal Note filed on 10/17/23 1130 by Priya Sesay OT       No active attempts to improve task                 Problem: Functional Transfers       Dates: Start:  10/13/23         Goal: STG-Within one week, patient will transfer to toilet with SBA using DME/AE as needed       Dates: Start:  10/13/23         Goal Note " filed on 10/17/23 1130 by Priya Sesay OT       No carry over with safety techniques                 Problem: OT Long Term Goals       Dates: Start:  10/13/23         Goal: LTG-By discharge, patient will complete basic self care tasks with supervision using DME/AE as needed       Dates: Start:  10/13/23            Goal: LTG-By discharge, patient will perform bathroom transfers with supervision - set-up using DME/AE as needed       Dates: Start:  10/13/23               Problem: Toileting       Dates: Start:  10/13/23         Goal: STG-Within one week, patient will complete toileting tasks with Min A using DME/AE as needed       Dates: Start:  10/13/23         Goal Note filed on 10/17/23 1130 by Priya Sesay OT       Limited participation in task

## 2023-10-19 NOTE — CARE PLAN
The patient is Stable - Low risk of patient condition declining or worsening    Shift Goals  Clinical Goals: safety  Patient Goals: safety  Family Goals: Education    Problem: Fall Risk - Rehab  Goal: Patient will remain free from falls  Outcome: Progressing Pt uses call light consistently and appropriately. Waits for assistance does not attempt self transfer this shift. Able to verbalize needs.      Problem: Pain - Standard  Goal: Alleviation of pain or a reduction in pain to the patient’s comfort goal  Outcome: Progressing Patient able to verbalize pain level and verbalize an acceptable level of pain.

## 2023-10-19 NOTE — PROGRESS NOTES
Patient care assumed. Report received from [facility] PURVI Batista. Patient is alert and calm, resting in bed. Call light and bedside table within reach. Will continue to monitor.

## 2023-10-19 NOTE — THERAPY
Physical Therapy   Daily Treatment     Patient Name: Yvonne Marie Wada  Age:  87 y.o., Sex:  female  Medical Record #: [id number]  Today's Date: 10/19/2023     Precautions  Precautions: TLSO (Thoracolumbosacral orthosis), Fall Risk  Comments: for comfort    Subjective    Patient pleasant and agreeable to participate.      Objective       10/19/23 1301   PT Charge Group   PT Gait Training (Units) 2   PT Therapeutic Exercise (Units) 1   PT Therapeutic Activities (Units) 1   PT Total Time Spent   PT Individual Total Time Spent (Mins) 60   Gait Functional Level of Assist    Gait Level Of Assist Contact Guard Assist   Assistive Device Front Wheel Walker   Distance (Feet) 80  (+115')   # of Times Distance was Traveled 4   Deviation Bradykinetic;Shuffled Gait  (forward trunk lean/kyphotic)   Transfer Functional Level of Assist   Bed, Chair, Wheelchair Transfer Standby Assist   Bed Chair Wheelchair Transfer Description Adaptive equipment;Supervision for safety   Toilet Transfers Standby Assist   Toilet Transfer Description Grab bar;Supervision for safety   Sitting Lower Body Exercises   Hip Abduction 3 sets of 15;Bilateral;Light Resistance Theraband   Hip Adduction 3 sets of 15;Bilateral  (ball squeeze)   Long Arc Quad 3 sets of 15;Bilateral   Hamstring Curl 3 sets of 15;Bilateral;Light Resistance Theraband   Bed Mobility    Supine to Sit Standby Assist   Sit to Supine Standby Assist   Sit to Stand Standby Assist   Interdisciplinary Plan of Care Collaboration   IDT Collaboration with  Physical Therapist   Patient Position at End of Therapy In Bed;Bed Alarm On;Call Light within Reach;Tray Table within Reach;Phone within Reach   Collaboration Comments Collaborated with primary PT re: POC and CLOF         Assessment    Patient with fair tolerance to activity this afternoon, requiring frequent rest breaks with ambulation. She declined staying out of bed after session, despite education at importance of upright positioning.      Strengths: Pleasant and cooperative, Supportive family  Barriers: Decreased endurance, Difficulty following instructions, Generalized weakness, Impaired activity tolerance, Impaired balance, Impaired carryover of learning, Impaired insight/denial of deficits, Impaired functional cognition, Limited mobility, Pain    Plan    Activity tolerance, OOB tolerance, functional mobility with FWW, spinal precaution compliance/ log roll review.     DME  PT DME Recommendations  Assistive Device: Front Wheeled Walker (patient reports she has one at home)    Passport items to be completed:  Get in/out of bed safely, in/out of a vehicle, safely use mobility device, walk or wheel around home/community, navigate up and down stairs, show how to get up/down from the ground, ensure home is accessible, demonstrate HEP, complete caregiver training    Physical Therapy Problems (Active)       Problem: Balance       Dates: Start:  10/13/23         Goal: STG-Within one week, patient will improve Jaramillo to 22/56 (16/56 on eval)       Dates: Start:  10/13/23               Problem: Mobility       Dates: Start:  10/13/23         Goal: STG-Within one week, patient will ambulate up/down a curb with FWW and CGA       Dates: Start:  10/13/23               Problem: Mobility Transfers       Dates: Start:  10/13/23         Goal: STG-Within one week, patient will transfer in/out of car with FWW and CGA       Dates: Start:  10/13/23               Problem: PT-Long Term Goals       Dates: Start:  10/13/23         Goal: LTG-By discharge, patient will ambulate 250 ft between rest breaks with LRAD and supervision assist       Dates: Start:  10/13/23            Goal: LTG-By discharge, patient will transfer in/out of a car with supervision assist       Dates: Start:  10/13/23            Goal: LTG-By discharge, patient will improve Jaramillo to >40/56 to reduce fall risk       Dates: Start:  10/13/23

## 2023-10-20 ENCOUNTER — APPOINTMENT (OUTPATIENT)
Dept: OCCUPATIONAL THERAPY | Facility: REHABILITATION | Age: 88
DRG: 560 | End: 2023-10-20
Attending: PHYSICAL MEDICINE & REHABILITATION
Payer: MEDICARE

## 2023-10-20 ENCOUNTER — APPOINTMENT (OUTPATIENT)
Dept: PHYSICAL THERAPY | Facility: REHABILITATION | Age: 88
DRG: 560 | End: 2023-10-20
Attending: PHYSICAL MEDICINE & REHABILITATION
Payer: MEDICARE

## 2023-10-20 LAB
ALBUMIN SERPL BCP-MCNC: 3.4 G/DL (ref 3.2–4.9)
ALBUMIN/GLOB SERPL: 1.2 G/DL
ALP SERPL-CCNC: 63 U/L (ref 30–99)
ALT SERPL-CCNC: 11 U/L (ref 2–50)
ANION GAP SERPL CALC-SCNC: 9 MMOL/L (ref 7–16)
AST SERPL-CCNC: 18 U/L (ref 12–45)
BASOPHILS # BLD AUTO: 1.2 % (ref 0–1.8)
BASOPHILS # BLD: 0.08 K/UL (ref 0–0.12)
BILIRUB SERPL-MCNC: 0.5 MG/DL (ref 0.1–1.5)
BUN SERPL-MCNC: 25 MG/DL (ref 8–22)
CALCIUM ALBUM COR SERPL-MCNC: 9.5 MG/DL (ref 8.5–10.5)
CALCIUM SERPL-MCNC: 9 MG/DL (ref 8.5–10.5)
CHLORIDE SERPL-SCNC: 101 MMOL/L (ref 96–112)
CO2 SERPL-SCNC: 24 MMOL/L (ref 20–33)
CREAT SERPL-MCNC: 0.98 MG/DL (ref 0.5–1.4)
EOSINOPHIL # BLD AUTO: 0.28 K/UL (ref 0–0.51)
EOSINOPHIL NFR BLD: 4.3 % (ref 0–6.9)
ERYTHROCYTE [DISTWIDTH] IN BLOOD BY AUTOMATED COUNT: 45.5 FL (ref 35.9–50)
GFR SERPLBLD CREATININE-BSD FMLA CKD-EPI: 56 ML/MIN/1.73 M 2
GLOBULIN SER CALC-MCNC: 2.8 G/DL (ref 1.9–3.5)
GLUCOSE SERPL-MCNC: 98 MG/DL (ref 65–99)
HCT VFR BLD AUTO: 39.9 % (ref 37–47)
HGB BLD-MCNC: 13 G/DL (ref 12–16)
IMM GRANULOCYTES # BLD AUTO: 0.11 K/UL (ref 0–0.11)
IMM GRANULOCYTES NFR BLD AUTO: 1.7 % (ref 0–0.9)
LYMPHOCYTES # BLD AUTO: 1.36 K/UL (ref 1–4.8)
LYMPHOCYTES NFR BLD: 20.9 % (ref 22–41)
MAGNESIUM SERPL-MCNC: 1.9 MG/DL (ref 1.5–2.5)
MCH RBC QN AUTO: 30 PG (ref 27–33)
MCHC RBC AUTO-ENTMCNC: 32.6 G/DL (ref 32.2–35.5)
MCV RBC AUTO: 91.9 FL (ref 81.4–97.8)
MONOCYTES # BLD AUTO: 0.82 K/UL (ref 0–0.85)
MONOCYTES NFR BLD AUTO: 12.6 % (ref 0–13.4)
NEUTROPHILS # BLD AUTO: 3.85 K/UL (ref 1.82–7.42)
NEUTROPHILS NFR BLD: 59.3 % (ref 44–72)
NRBC # BLD AUTO: 0 K/UL
NRBC BLD-RTO: 0 /100 WBC (ref 0–0.2)
PLATELET # BLD AUTO: 283 K/UL (ref 164–446)
PMV BLD AUTO: 9.1 FL (ref 9–12.9)
POTASSIUM SERPL-SCNC: 4.5 MMOL/L (ref 3.6–5.5)
PROT SERPL-MCNC: 6.2 G/DL (ref 6–8.2)
RBC # BLD AUTO: 4.34 M/UL (ref 4.2–5.4)
SODIUM SERPL-SCNC: 134 MMOL/L (ref 135–145)
WBC # BLD AUTO: 6.5 K/UL (ref 4.8–10.8)

## 2023-10-20 PROCEDURE — 97110 THERAPEUTIC EXERCISES: CPT

## 2023-10-20 PROCEDURE — 36415 COLL VENOUS BLD VENIPUNCTURE: CPT

## 2023-10-20 PROCEDURE — 700102 HCHG RX REV CODE 250 W/ 637 OVERRIDE(OP): Performed by: PHYSICAL MEDICINE & REHABILITATION

## 2023-10-20 PROCEDURE — 85025 COMPLETE CBC W/AUTO DIFF WBC: CPT

## 2023-10-20 PROCEDURE — 83735 ASSAY OF MAGNESIUM: CPT

## 2023-10-20 PROCEDURE — 99232 SBSQ HOSP IP/OBS MODERATE 35: CPT | Performed by: PHYSICAL MEDICINE & REHABILITATION

## 2023-10-20 PROCEDURE — 80053 COMPREHEN METABOLIC PANEL: CPT

## 2023-10-20 PROCEDURE — 97530 THERAPEUTIC ACTIVITIES: CPT

## 2023-10-20 PROCEDURE — A9270 NON-COVERED ITEM OR SERVICE: HCPCS | Performed by: PHYSICAL MEDICINE & REHABILITATION

## 2023-10-20 PROCEDURE — 97535 SELF CARE MNGMENT TRAINING: CPT

## 2023-10-20 PROCEDURE — 770010 HCHG ROOM/CARE - REHAB SEMI PRIVAT*

## 2023-10-20 PROCEDURE — 97116 GAIT TRAINING THERAPY: CPT

## 2023-10-20 RX ADMIN — APIXABAN 2.5 MG: 2.5 TABLET, FILM COATED ORAL at 20:45

## 2023-10-20 RX ADMIN — METOPROLOL SUCCINATE 100 MG: 100 TABLET, EXTENDED RELEASE ORAL at 08:31

## 2023-10-20 RX ADMIN — SENNOSIDES AND DOCUSATE SODIUM 2 TABLET: 50; 8.6 TABLET ORAL at 08:31

## 2023-10-20 RX ADMIN — OXYCODONE HYDROCHLORIDE 5 MG: 5 TABLET ORAL at 11:59

## 2023-10-20 RX ADMIN — OXYCODONE HYDROCHLORIDE 5 MG: 5 TABLET ORAL at 07:51

## 2023-10-20 RX ADMIN — SENNOSIDES AND DOCUSATE SODIUM 2 TABLET: 50; 8.6 TABLET ORAL at 20:46

## 2023-10-20 RX ADMIN — APIXABAN 2.5 MG: 2.5 TABLET, FILM COATED ORAL at 08:31

## 2023-10-20 RX ADMIN — OXYCODONE HYDROCHLORIDE 5 MG: 5 TABLET ORAL at 20:49

## 2023-10-20 RX ADMIN — ACETAMINOPHEN 1000 MG: 500 TABLET ORAL at 07:51

## 2023-10-20 RX ADMIN — ACETAMINOPHEN 1000 MG: 500 TABLET ORAL at 20:45

## 2023-10-20 RX ADMIN — POLYETHYLENE GLYCOL 3350 1 PACKET: 17 POWDER, FOR SOLUTION ORAL at 20:46

## 2023-10-20 RX ADMIN — OMEPRAZOLE 20 MG: 20 CAPSULE, DELAYED RELEASE ORAL at 08:31

## 2023-10-20 RX ADMIN — ACETAMINOPHEN 1000 MG: 500 TABLET ORAL at 15:35

## 2023-10-20 RX ADMIN — BENAZEPRIL HYDROCHLORIDE 40 MG: 10 TABLET, FILM COATED ORAL at 08:31

## 2023-10-20 RX ADMIN — LEVOTHYROXINE SODIUM 75 MCG: 0.07 TABLET ORAL at 05:26

## 2023-10-20 ASSESSMENT — GAIT ASSESSMENTS
GAIT LEVEL OF ASSIST: CONTACT GUARD ASSIST
ASSISTIVE DEVICE: FRONT WHEEL WALKER
DISTANCE (FEET): 75
DISTANCE (FEET): 50
ASSISTIVE DEVICE: FRONT WHEEL WALKER
DEVIATION: BRADYKINETIC;SHUFFLED GAIT
GAIT LEVEL OF ASSIST: CONTACT GUARD ASSIST
DEVIATION: BRADYKINETIC;SHUFFLED GAIT

## 2023-10-20 ASSESSMENT — ACTIVITIES OF DAILY LIVING (ADL)
BED_CHAIR_WHEELCHAIR_TRANSFER_DESCRIPTION: SUPERVISION FOR SAFETY
BED_CHAIR_WHEELCHAIR_TRANSFER_DESCRIPTION: INCREASED TIME
BED_CHAIR_WHEELCHAIR_TRANSFER_DESCRIPTION: INCREASED TIME;VERBAL CUEING
TOILET_TRANSFER_DESCRIPTION: GRAB BAR;INITIAL PREPARATION FOR TASK;SUPERVISION FOR SAFETY;VERBAL CUEING
BED_CHAIR_WHEELCHAIR_TRANSFER_DESCRIPTION: ADAPTIVE EQUIPMENT;SUPERVISION FOR SAFETY;VERBAL CUEING
BED_CHAIR_WHEELCHAIR_TRANSFER_DESCRIPTION: ADAPTIVE EQUIPMENT;INCREASED TIME;VERBAL CUEING;SUPERVISION FOR SAFETY

## 2023-10-20 NOTE — PROGRESS NOTES
Pt's LBM 10/17. Offered MOM this am but refused. She wants to take it after eating breakfast. Will continue to monitor.

## 2023-10-20 NOTE — THERAPY
Occupational Therapy  Daily Treatment     Patient Name: Yvonne Marie Wada  Age:  87 y.o., Sex:  female  Medical Record #: [id number]  Today's Date: 10/20/2023     Precautions  Precautions: (P) Fall Risk, TLSO (Thoracolumbosacral orthosis)  Comments: (P) TLSO for comfort.         Subjective    Pt reported having 10/10 back pain.     Objective       10/20/23 0701   OT Charge Group   OT Self Care / ADL (Units) 4   OT Total Time Spent   OT Individual Total Time Spent (Mins) 60   Precautions   Precautions Fall Risk;TLSO (Thoracolumbosacral orthosis)   Comments TLSO for comfort.   Pain   Pain Scales 0 to 10 Scale    Intervention Emotional Support;Nurse Notified   Pain 0 - 10 Group   Location Back   Location Orientation Posterior;Mid   Pain Rating Scale (NPRS) 10   Description Aching   Comfort Goal Comfort with Movement   Functional Level of Assist   Grooming Standby Assist;Seated   Grooming Description Increased time;Initial preparation for task;Seated in wheelchair at sink   Toileting Minimal Assist   Toileting Description Assist to pull pants up;Assist to pull pants down;Grab bar;Increased time;Initial preparation for task;Supervision for safety   Bed, Chair, Wheelchair Transfer Standby Assist   Bed Chair Wheelchair Transfer Description Supervision for safety   Toilet Transfers Standby Assist   Toilet Transfer Description Grab bar;Supervision for safety;Increased time;Initial preparation for task   Bed Mobility    Supine to Sit Standby Assist   Sit to Supine Standby Assist   Scooting Standby Assist   Rolling Standby Assist   Skilled Intervention Verbal Cuing   Interdisciplinary Plan of Care Collaboration   IDT Collaboration with  Nursing   Patient Position at End of Therapy In Bed;Bed Alarm On;Call Light within Reach;Tray Table within Reach;Phone within Reach     RN notified of pt's back pain rated 10/10. Pt given cues for repositioning to help relieve pain. Pt limited by pain this a.m and needed increased time to perform  all tasks.    Assessment    Pt did not tolerate sitting up in w/c well due to pain. Pt requested return to bed at end of session.  Strengths: Able to follow instructions, Supportive family, Alert and oriented  Barriers: Decreased endurance, Generalized weakness, Impaired balance, Impaired activity tolerance, Pain, Lack of motivation    Plan    Continue POC, progress OOB mobility, progress participation, ADLs    DME       Passport items to be completed:      Occupational Therapy Goals (Active)       Problem: Bathing       Dates: Start:  10/13/23         Goal: STG-Within one week, patient will bathe with Min A using DME/AE as needed       Dates: Start:  10/13/23         Goal Note filed on 10/17/23 1130 by Priya Sesay OT       Pt refusing to bathe                 Problem: Dressing       Dates: Start:  10/13/23         Goal: STG-Within one week, patient will dress LB with Min A using DME/AE as needed       Dates: Start:  10/13/23         Goal Note filed on 10/17/23 1130 by Priya Sesay OT       No active attempts to improve task                 Problem: Functional Transfers       Dates: Start:  10/13/23         Goal: STG-Within one week, patient will transfer to toilet with SBA using DME/AE as needed       Dates: Start:  10/13/23         Goal Note filed on 10/17/23 1130 by Priya Sesay OT       No carry over with safety techniques                 Problem: OT Long Term Goals       Dates: Start:  10/13/23         Goal: LTG-By discharge, patient will complete basic self care tasks with supervision using DME/AE as needed       Dates: Start:  10/13/23            Goal: LTG-By discharge, patient will perform bathroom transfers with supervision - set-up using DME/AE as needed       Dates: Start:  10/13/23               Problem: Toileting       Dates: Start:  10/13/23         Goal: STG-Within one week, patient will complete toileting tasks with Min A using DME/AE as needed       Dates: Start:  10/13/23         Goal  Note filed on 10/17/23 1130 by Priya Sesay OT       Limited participation in task

## 2023-10-20 NOTE — THERAPY
Physical Therapy   Daily Treatment     Patient Name: Yvonne Marie Wada  Age:  87 y.o., Sex:  female  Medical Record #: [id number]  Today's Date: 10/20/2023     Precautions  Precautions: Fall Risk, TLSO (Thoracolumbosacral orthosis)  Comments: TLSO PRN    Subjective    Patient is found in bed prior to 0930 session, is received from OT for 1330 session. Requests to return to bed at end of 1330 session.      Objective       10/20/23 0931 10/20/23 1331   PT Charge Group   PT Gait Training (Units)  --  1   PT Therapeutic Exercise (Units) 2 1   PT Therapeutic Activities (Units) 1  --    PT Total Time Spent   PT Individual Total Time Spent (Mins) 45 30   Gait Functional Level of Assist    Gait Level Of Assist Contact Guard Assist Contact Guard Assist   Assistive Device Front Wheel Walker Front Wheel Walker   Distance (Feet) 50 75   # of Times Distance was Traveled 1 2   Deviation Bradykinetic;Shuffled Gait  (forward flexed posture, cues to maintain upright.) Bradykinetic;Shuffled Gait  (forward flexed posture, cues to remain upright, cues to keep going as she attempts to stop frequently)   Transfer Functional Level of Assist   Bed, Chair, Wheelchair Transfer Contact Guard Assist Standby Assist   Bed Chair Wheelchair Transfer Description Increased time;Verbal cueing  (cues for log rolling sequencing) Adaptive equipment;Increased time;Verbal cueing;Supervision for safety   Toilet Transfers Standby Assist  --    Toilet Transfer Description Grab bar;Initial preparation for task;Supervision for safety;Verbal cueing  --    Sitting Lower Body Exercises   Hip Abduction 2 sets of 10;Bilateral;Light Resistance Theraband  --    Long Arc Quad 2 sets of 10;Bilateral  --    Marching 2 sets of 10;Reciprocal  --    Hamstring Curl 2 sets of 10;Bilateral;Light Resistance Theraband  --    Standing Lower Body Exercises   Heel Rise 1 set of 10;Bilateral  --    Bed Mobility    Supine to Sit Contact Guard Assist  --    Sit to Supine Minimal  Assist  (assist for legs into bed, verbal and tactile cues for log rolling sequencing) Minimal Assist  (verbal and tactile cues for spinal precautions, isn't compliant with log roll for sit to supine)   Sit to Stand Contact Guard Assist  --    Interdisciplinary Plan of Care Collaboration   IDT Collaboration with   --  Occupational Therapist   Patient Position at End of Therapy In Bed;Bed Alarm On;Self Releasing Lap Belt Applied;Call Light within Reach;Tray Table within Reach;Phone within Reach In Bed;Bed Alarm On;Call Light within Reach;Tray Table within Reach;Phone within Reach   Collaboration Comments  --  received from OT     Static standing balance in parallel bars with no UE support 3 x 30 sec, significant fear of falling limits her ability to let go of parallel bars, this therapist's arms, is generally unwilling to let go and stand on her feet only.     Assessment    Patient is bed seeking, is more willingly participatory however. She is noncompliant with log roll for spinal precautions for supine <> sit, requires frequent cues for this. Demonstrates poor carryover of hand placement for sit <> stand. Static standing balance practice is limited by her unwillingness to attempt standing without UE support.     Strengths: Pleasant and cooperative, Supportive family  Barriers: Decreased endurance, Difficulty following instructions, Generalized weakness, Impaired activity tolerance, Impaired balance, Impaired carryover of learning, Impaired insight/denial of deficits, Impaired functional cognition, Limited mobility, Pain    Plan    Standing balance, standing tolerance, gait, transfer safety    DME  PT DME Recommendations  Assistive Device: Front Wheeled Walker (patient reports she has one at home)    Passport items to be completed:  Get in/out of bed safely, in/out of a vehicle, safely use mobility device, walk or wheel around home/community, navigate up and down stairs, show how to get up/down from the ground,  ensure home is accessible, demonstrate HEP, complete caregiver training    Physical Therapy Problems (Active)       Problem: Balance       Dates: Start:  10/13/23         Goal: STG-Within one week, patient will improve Jaramillo to 22/56 (16/56 on eval)       Dates: Start:  10/13/23               Problem: Mobility       Dates: Start:  10/13/23         Goal: STG-Within one week, patient will ambulate up/down a curb with FWW and CGA       Dates: Start:  10/13/23               Problem: Mobility Transfers       Dates: Start:  10/13/23         Goal: STG-Within one week, patient will transfer in/out of car with FWW and CGA       Dates: Start:  10/13/23               Problem: PT-Long Term Goals       Dates: Start:  10/13/23         Goal: LTG-By discharge, patient will ambulate 250 ft between rest breaks with LRAD and supervision assist       Dates: Start:  10/13/23            Goal: LTG-By discharge, patient will transfer in/out of a car with supervision assist       Dates: Start:  10/13/23            Goal: LTG-By discharge, patient will improve Jaramillo to >40/56 to reduce fall risk       Dates: Start:  10/13/23

## 2023-10-20 NOTE — PROGRESS NOTES
NURSING DAILY NOTE    Name: Yvonne Marie Wada   Date of Admission: 10/12/2023   Admitting Diagnosis: Other fracture of T7-t8 thoracic vertebra, initial encounter for closed fracture (HCC)  Attending Physician: Mikayla Sutton M.d.  Allergies: Codeine, Doxycycline, Sulfamethoxazole-trimethoprim, Tramadol, and Trazodone    Safety  Patient Assist  Mod assist  Patient Precautions  TLSO (Thoracolumbosacral orthosis), Fall Risk  Precaution Comments  for comfort  Bed Transfer Status  Standby Assist  Toilet Transfer Status   Standby Assist  Assistive Devices  Rails, Wheelchair  Oxygen  None - Room Air  Diet/Therapeutic Dining  Current Diet Order   Procedures    Diet Order Diet: Regular     Pill Administration  whole and one at a time   Agitated Behavioral Scale     ABS Level of Severity       Fall Risk  Has the patient had a fall this admission?   No  Muna Cuenca Fall Risk Scoring  17, HIGH RISK  Fall Risk Safety Measures  bed alarm, chair alarm, and seatbelt alarm    Vitals  Temperature: 36.5 °C (97.7 °F)  Temp src: Oral  Pulse: 63  Respiration: 17  Blood Pressure : 119/76  Blood Pressure MAP (Calculated): 90 MM HG  BP Location: Left, Upper Arm  Patient BP Position: Supine     Oxygen  Pulse Oximetry: 91 %  O2 (LPM): 0  O2 Delivery Device: None - Room Air    Bowel and Bladder  Last Bowel Movement  10/17/23  Stool Type  Type 6: Fluffy pieces with ragged edges, a mushy stool  Bowel Device  Bathroom, Diaper  Continent  Bladder: Did not void   Bowel: No movement  Bladder Function  Urine Void (mL):  (large)  Number of Times Voided: 1  Urine Color: Yellow  Wet Diaper Count: 1  Genitourinary Assessment   Bladder Assessment (WDL):  WDL Except  Dale Catheter: Not Applicable  Urinary Elimination: Stress Incontinence  Urine Color: Yellow  Number of Bladder Accidents: 1  Total Number of Bladder of Accidents in Last 7 Days: 1  Bladder Device: Bathroom  Bladder Scan:  "Post Void  $ Bladder Scan Results (mL): 1 (0 scan)    Skin  Jase Score   16  Sensory Interventions   Bed Types: Standard/Trauma Mattress  Skin Preventative Measures: Pillows in Use for Support / Positioning  Moisture Interventions  Moisturizers/Barriers: Barrier Wipes      Pain  Pain Rating Scale  3 - Sometimes distracts me  Pain Location  Back  Pain Location Orientation  Left, Upper  Pain Interventions   Declines    ADLs    Bathing   Patient Refused Bathing (Patient said: \"Not tonight\". Nurse noted.)  Linen Change   Complete  Personal Hygiene  Perineal Care, Moist Tisha Wipes  Chlorhexidine Bath      Oral Care     Teeth/Dentures     Shave     Nutrition Percentage Eaten  Lunch, Less than 25% Consumed  Environmental Precautions  Treaded Slipper Socks on Patient, Bed in Low Position, Personal Belongings, Wastebasket, Call Bell etc. in Easy Reach  Patient Turns/Positioning  Patient Turns Self from Side to Side  Patient Turns Assistance/Tolerance     Bed Positions  Bed Controls On, Bed Locked  Head of Bed Elevated  Self regulated      Psychosocial/Neurologic Assessment  Psychosocial Assessment  Psychosocial (WDL):  Within Defined Limits  Neurologic Assessment  Neuro (WDL): Exceptions to WDL  Level of Consciousness: Alert  Orientation Level: Oriented X4  Cognition: Follows commands  Speech: Clear  Pupil Assesment: No  Motor Function/Sensation Assessment: Sensation  RUE Sensation: Full sensation  LUE Sensation: Full sensation  RLE Sensation: Numbness  LLE Sensation: Numbness  EENT (WDL):  WDL Except    Cardio/Pulmonary Assessment  Edema      Respiratory Breath Sounds  RUL Breath Sounds: Clear  RML Breath Sounds: Diminished  RLL Breath Sounds: Diminished  LOU Breath Sounds: Clear  LLL Breath Sounds: Diminished  Cardiac Assessment   Cardiac (WDL):  WDL Except (A-fib)        "

## 2023-10-20 NOTE — PROGRESS NOTES
"  Physical Medicine & Rehabilitation Progress Note    Encounter Date: 10/20/2023    Chief Complaint: Decreased mobility, weakness    Interval Events (Subjective):  Patient is doing well. No complaints. Patient reports pain is controlled, sleeping well, and overall feeling ok. No new issues.   _____________________________________  Interdisciplinary Team Conference   Most recent IDT on 10/17/2023    Discharge Date/Disposition:  10/25/23  _____________________________________     Objective:  VITAL SIGNS: /87   Pulse 92   Temp 36.2 °C (97.1 °F) (Oral)   Resp 16   Ht 1.646 m (5' 4.8\")   Wt 66.5 kg (146 lb 9.7 oz)   SpO2 95%   BMI 24.55 kg/m²   Gen: NAD  Psych: Mood and affect appropriate  CV: irregularly irregular, 0 edema  Resp: CTAB, no upper airway sounds  Abd: NTND  Neuro: AOx4, following commands  Unchanged from 10/18/23    Laboratory Values:  Recent Results (from the past 72 hour(s))   CBC WITH DIFFERENTIAL    Collection Time: 10/20/23  6:05 AM   Result Value Ref Range    WBC 6.5 4.8 - 10.8 K/uL    RBC 4.34 4.20 - 5.40 M/uL    Hemoglobin 13.0 12.0 - 16.0 g/dL    Hematocrit 39.9 37.0 - 47.0 %    MCV 91.9 81.4 - 97.8 fL    MCH 30.0 27.0 - 33.0 pg    MCHC 32.6 32.2 - 35.5 g/dL    RDW 45.5 35.9 - 50.0 fL    Platelet Count 283 164 - 446 K/uL    MPV 9.1 9.0 - 12.9 fL    Neutrophils-Polys 59.30 44.00 - 72.00 %    Lymphocytes 20.90 (L) 22.00 - 41.00 %    Monocytes 12.60 0.00 - 13.40 %    Eosinophils 4.30 0.00 - 6.90 %    Basophils 1.20 0.00 - 1.80 %    Immature Granulocytes 1.70 (H) 0.00 - 0.90 %    Nucleated RBC 0.00 0.00 - 0.20 /100 WBC    Neutrophils (Absolute) 3.85 1.82 - 7.42 K/uL    Lymphs (Absolute) 1.36 1.00 - 4.80 K/uL    Monos (Absolute) 0.82 0.00 - 0.85 K/uL    Eos (Absolute) 0.28 0.00 - 0.51 K/uL    Baso (Absolute) 0.08 0.00 - 0.12 K/uL    Immature Granulocytes (abs) 0.11 0.00 - 0.11 K/uL    NRBC (Absolute) 0.00 K/uL   MAGNESIUM    Collection Time: 10/20/23  6:05 AM   Result Value Ref Range    " Magnesium 1.9 1.5 - 2.5 mg/dL   Comp Metabolic Panel    Collection Time: 10/20/23  6:05 AM   Result Value Ref Range    Sodium 134 (L) 135 - 145 mmol/L    Potassium 4.5 3.6 - 5.5 mmol/L    Chloride 101 96 - 112 mmol/L    Co2 24 20 - 33 mmol/L    Anion Gap 9.0 7.0 - 16.0    Glucose 98 65 - 99 mg/dL    Bun 25 (H) 8 - 22 mg/dL    Creatinine 0.98 0.50 - 1.40 mg/dL    Calcium 9.0 8.5 - 10.5 mg/dL    Correct Calcium 9.5 8.5 - 10.5 mg/dL    AST(SGOT) 18 12 - 45 U/L    ALT(SGPT) 11 2 - 50 U/L    Alkaline Phosphatase 63 30 - 99 U/L    Total Bilirubin 0.5 0.1 - 1.5 mg/dL    Albumin 3.4 3.2 - 4.9 g/dL    Total Protein 6.2 6.0 - 8.2 g/dL    Globulin 2.8 1.9 - 3.5 g/dL    A-G Ratio 1.2 g/dL   ESTIMATED GFR    Collection Time: 10/20/23  6:05 AM   Result Value Ref Range    GFR (CKD-EPI) 56 (A) >60 mL/min/1.73 m 2       Medications:  Scheduled Medications   Medication Dose Frequency    acetaminophen  1,000 mg TID    metoprolol SR  100 mg QAM    senna-docusate  2 Tablet BID    omeprazole  20 mg DAILY    apixaban  2.5 mg BID    benazepril  40 mg QAM    levothyroxine  75 mcg AM ES     PRN medications: hydrocortisone rectal, Respiratory Therapy Consult, hydrALAZINE, senna-docusate **AND** polyethylene glycol/lytes **AND** magnesium hydroxide **AND** bisacodyl, mag hydrox-al hydrox-simeth, ondansetron **OR** ondansetron, traZODone, sodium chloride, oxyCODONE immediate-release **OR** oxyCODONE immediate-release, [COMPLETED] acetaminophen **FOLLOWED BY** acetaminophen    Diet:  Current Diet Order   Procedures    Diet Order Diet: Regular       Medical Decision Making and Plan:  T7/T8 Fracture - Patient with GLF on 10/8/23 with suspected syncope found to be in A fib and with T7/T8 compression fracture. Evaluated by NSG and recommending TLSO for comfort when OOB  -PT and OT for mobility and ADLs. Per guidelines, 15 hours per week between PT, OT and/or SLP.  -Follow-up NSG. Continue TLSO for comfort     HTN/A fib - Patient on Benazepril 40 mg  and Metoprolol 50 mg. Metoprolol has been increased to 100 mg. SBP wnl but DBP elevated will monitor. Continue Benazepril and Metoprolol     Hyponatremia - Check AM CMP - 134, will continue monitor. Repeat labs 10/20     Hypothyroidism - Patient on Levothyroxine 75 mcg      Leukopenia - 4.1 on admission, recheck 10/16    Thrombocytopenia - Check AM CBC - 181, improved     Pain - Patient on PRN Tylenol and Oxycodone. Schedule Tylenol. Continue Tylenol 1000 mg TID, and check CMP     Skin - Patient at risk for skin breakdown due to debility in areas including sacrum, achilles, elbows and head in addition to other sites. Nursing to assess skin daily.      GI Ppx - Patient on Prilosec for GERD prophylaxis. Patient on Senna-docusate for constipation prophylaxis.   -Severe constipation on 10/14, painful abdominal chest pain. EKG with A fib.  KUB with moderate constipation. Give Enema, suppository. Had BM, discontinue Suppository     DVT Ppx - Patient Eliquis on transfer. Continue Eliquis as limited ambulator.   ____________________________________    Jai Michele DO MS  [facility] - Physical Medicine and Rehabilitation     Patient was seen for >35 minutes on unit/floor of which > 50% of time was spent on counseling and coordination of care regarding the above, including prognosis, risk reduction, benefits of treatment, and options for next stage of care.

## 2023-10-20 NOTE — DISCHARGE PLANNING
CM    Tc to pt's spouse Bill confirming dc disposition; he confirms he plans to take pt home; discussed family training (scheduled for Monday 10/23 umesh @ 930am); confirmed dc date of 10/26 and recommendations for home health.  Spouse has had 2 home health agencies provide service in the past - he is is adamant about NOT having home health.  He reports his son in law has a friend who will be able to help at home. I also discussed SNF; spouse reports pt has been @ [facility] in the past and does not want pt to go to SNF.    Returned tc to Isis's dtr @! [phone]; she reports there is conversation about pt staying with another sibling Rosaline who lives in [place], [place]. I encouraged Isis to talk to pt/spouse and other family members to confirm a dc disposition/ plan.

## 2023-10-20 NOTE — THERAPY
"Occupational Therapy  Daily Treatment     Patient Name: Yvonne Marie Wada  Age:  87 y.o., Sex:  female  Medical Record #: [id number]  Today's Date: 10/20/2023     Precautions  Precautions: (P) Fall Risk, TLSO (Thoracolumbosacral orthosis)  Comments: (P) TLSO PRN         Subjective    \"I can't do that.\"  (Button and zip pants.  Patient is, however, able to button and zip pants)     Objective       10/20/23 1101   OT Charge Group   Charges Yes   OT Self Care / ADL (Units) 1   OT Therapy Activity (Units) 1   OT Total Time Spent   OT Individual Total Time Spent (Mins) 30   Precautions   Precautions Fall Risk;TLSO (Thoracolumbosacral orthosis)   Comments TLSO PRN   Cognition    Level of Consciousness Alert   New Learning Impaired   Initiation Impaired   Comments Poor carryover with procedural memory tasks   Strength Upper Body   Upper Body Strength  X   Gross Strength Generalized Weakness, Equal Bilaterally.    Functional Level of Assist   Lower Body Dressing   (Verbal cues provided to fasten pants)   Lower Body Dressing Description Increased time;Initial preparation for task;Verbal cueing   Bed, Chair, Wheelchair Transfer Standby Assist   Bed Chair Wheelchair Transfer Description Increased time   Sitting Lower Body Exercises   Sitting Lower Body Exercises Yes   Sit to Stand 3 sets of 15   Other Exercises Motomed  (10 minutes; Level 2)   Comments Patient bed seeking througout activity   Standing Lower Body Exercises   Standing Lower Body Exercises Yes   Marching 2 sets of 10   Heel Rise 2 sets of 10;Bilateral   Toe Rise 2 sets of 10;Bilateral   Mini Squat Partial;2 sets of 10   Balance   Sitting Balance (Static) Good   Sitting Balance (Dynamic) Good   Standing Balance (Static) Fair -   Standing Balance (Dynamic) Fair -   Weight Shift Sitting Good   Weight Shift Standing Fair   Skilled Intervention Compensatory Strategies   Bed Mobility    Supine to Sit Contact Guard Assist   Scooting Standby Assist   Rolling Standby Assist "   Interdisciplinary Plan of Care Collaboration   IDT Collaboration with  Family / Caregiver   Patient Position at End of Therapy Seated;Chair Alarm On;Self Releasing Lap Belt Applied;Family / Friend in Room   Collaboration Comments CLOF; Spouse present for tx         Assessment    Patient presents with flat affect throughout treatment and requires encouragement to engage in ADL and functional mobility activities.  This improved with presence of spouse.  Poor carryover of single step instructions with repeat STS with patient requiring consistent cues for hand position with sit to stand to sit transition. No c/o pain or fatigue, however, patient bed seeking throughout treatment.     Strengths: Able to follow instructions, Supportive family, Alert and oriented  Barriers: Decreased endurance, Generalized weakness, Impaired balance, Impaired activity tolerance, Pain, Lack of motivation    Plan    Continue OT POC with focus on ADL retraining, balance building, endurance building, reinforcement of spinal precautions with ADL pursuits, and caregiver training with higher level ADLs.     DME       Passport items to be completed:  Perform bathroom transfers, complete dressing, complete feeding, get ready for the day, prepare a simple meal, participate in household tasks, adapt home for safety needs, demonstrate home exercise program, complete caregiver training     Occupational Therapy Goals (Active)       Problem: Bathing       Dates: Start:  10/13/23         Goal: STG-Within one week, patient will bathe with Min A using DME/AE as needed       Dates: Start:  10/13/23         Goal Note filed on 10/17/23 1130 by Priya Sesay OT       Pt refusing to bathe                 Problem: Dressing       Dates: Start:  10/13/23         Goal: STG-Within one week, patient will dress LB with Min A using DME/AE as needed       Dates: Start:  10/13/23         Goal Note filed on 10/17/23 1130 by Priya Sesay OT       No active attempts to  improve task                 Problem: Functional Transfers       Dates: Start:  10/13/23         Goal: STG-Within one week, patient will transfer to toilet with SBA using DME/AE as needed       Dates: Start:  10/13/23         Goal Note filed on 10/17/23 1130 by Priya Sesay OT       No carry over with safety techniques                 Problem: OT Long Term Goals       Dates: Start:  10/13/23         Goal: LTG-By discharge, patient will complete basic self care tasks with supervision using DME/AE as needed       Dates: Start:  10/13/23            Goal: LTG-By discharge, patient will perform bathroom transfers with supervision - set-up using DME/AE as needed       Dates: Start:  10/13/23               Problem: Toileting       Dates: Start:  10/13/23         Goal: STG-Within one week, patient will complete toileting tasks with Min A using DME/AE as needed       Dates: Start:  10/13/23         Goal Note filed on 10/17/23 1130 by Priya Sesay OT       Limited participation in task

## 2023-10-20 NOTE — CARE PLAN
"The patient is Watcher - Medium risk of patient condition declining or worsening    Shift Goals  Clinical Goals: pain control    Problem: Pain - Standard  Goal: Alleviation of pain or a reduction in pain to the patient’s comfort goal  Note: Patient is having back pain, prn oxy has been given and repositioned in bed     Problem: Fall Risk - Rehab  Goal: Patient will remain free from falls  Note: Muna Cuenca Fall risk Assessment Score: 19    High fall risk Interventions   - Bed and strip alarm   - Yellow sign by the door   - Yellow wrist band \"Fall risk\"  - Room near to the nurse station  - Do not leave patient unattended in the bathroom  - Fall risk education provided     "

## 2023-10-20 NOTE — CARE PLAN
"The patient is Stable - Low risk of patient condition declining or worsening    Shift Goals  Clinical Goals: safety  Patient Goals: safety  Family Goals: Education    Progress made toward(s) clinical / shift goals:      Problem: Fall Risk - Rehab  Goal: Patient will remain free from falls  Outcome: Progressing  Note: Muna Cuenca Fall risk Assessment Score: 17    High fall risk Interventions   - Alarming seatbelt  - Bed and strip alarm   - Yellow sign by the door   - Yellow wrist band \"Fall risk\"  - Room near to the nurse station  - Do not leave patient unattended in the bathroom  - Fall risk education provided    Pt calls appropriately when in need of assistance        Problem: Pain - Standard  Goal: Alleviation of pain or a reduction in pain to the patient’s comfort goal  Outcome: Progressing  Note: Tylenol given as scheduled per MAR for c/o back pain with adequate relief. Pt sleeps good. Will continue to monitor         Patient is not progressing towards the following goals:      "

## 2023-10-20 NOTE — DIETARY
Nutrition Update:    Day 8 of admit.  Yvonne Marie Wada is a 87 y.o. female with admitting DX of Other fracture of T7-t8 thoracic vertebra, initial encounter for closed fracture (HCC) [S22.844Y].  Patient being followed to optimize nutrition.    Current Diet: regular w/ Boost Plua supplements TID w/ meals.     Recorded PO intake variable between 0 and 50-75% w/ avg of 35%. Recorded PO of Boost is % based on a single report. Pt in the past reported drinking her supplements. Diet since 10/17 has provided between 2503 and 3240 kcals. Current PO intake most likely meeting pt's nutrition needs.     Problem: Nutritional:  Goal: Achieve adequate nutritional intake  Description: Patient will consume ~50% of meals and supplements  Outcome: met    RD will follow weekly or PRN.

## 2023-10-20 NOTE — THERAPY
"Occupational Therapy  Daily Treatment     Patient Name: Yvonne Marie Wada  Age:  87 y.o., Sex:  female  Medical Record #: [id number]  Today's Date: 10/20/2023     Precautions  Precautions: Fall Risk, TLSO (Thoracolumbosacral orthosis)  Comments: TLSO PRN         Subjective    \"I just can't do it anymore\" (when resistance on motomed was turned up from 1 to 2)     Objective       10/20/23 1301   OT Charge Group   Charges Yes   OT Therapy Activity (Units) 2   OT Total Time Spent   OT Individual Total Time Spent (Mins) 30   Precautions   Precautions Fall Risk;TLSO (Thoracolumbosacral orthosis)   Comments TLSO PRN   Functional Level of Assist   Bed, Chair, Wheelchair Transfer Standby Assist   Bed Chair Wheelchair Transfer Description Adaptive equipment;Supervision for safety;Verbal cueing  (ignores verbal cues for safety)   Sitting Upper Body Exercises   Upper Extremity Bike Level 1 Resistance   Comments 10 minutes (5 min forward, 5 min backwars. 1 resistance, did not tolerate upgrade to 2 resistance; 2 RB for ~1 minute each   Interdisciplinary Plan of Care Collaboration   IDT Collaboration with  Physical Therapist   Patient Position at End of Therapy Seated;Chair Alarm On   Collaboration Comments hand off to PT, performance         Assessment    Pt with continued low performance and participation on this date. Originally agreeable to get OOB for therapy, however, once up, was immediately bed seeking and less agreeable to therapeutic tasks. Pt often complained of back pain, however is refusing TLSO for comfort, as well as does not participate in spinal precautions despite max education for pain conservation. Pt with poor carry over for safety with transfers and walker usage and is not receptive to education.     Strengths: Able to follow instructions, Supportive family, Alert and oriented  Barriers: Decreased endurance, Generalized weakness, Impaired balance, Impaired activity tolerance, Pain, Lack of " motivation    Plan    Increase participation, increase OOB mobility      Occupational Therapy Goals (Active)       Problem: Bathing       Dates: Start:  10/13/23         Goal: STG-Within one week, patient will bathe with Min A using DME/AE as needed       Dates: Start:  10/13/23         Goal Note filed on 10/17/23 1130 by Priya Sesay OT       Pt refusing to bathe                 Problem: Dressing       Dates: Start:  10/13/23         Goal: STG-Within one week, patient will dress LB with Min A using DME/AE as needed       Dates: Start:  10/13/23         Goal Note filed on 10/17/23 1130 by Priya Sesay OT       No active attempts to improve task                 Problem: Functional Transfers       Dates: Start:  10/13/23         Goal: STG-Within one week, patient will transfer to toilet with SBA using DME/AE as needed       Dates: Start:  10/13/23         Goal Note filed on 10/17/23 1130 by Priya Sesay OT       No carry over with safety techniques                 Problem: OT Long Term Goals       Dates: Start:  10/13/23         Goal: LTG-By discharge, patient will complete basic self care tasks with supervision using DME/AE as needed       Dates: Start:  10/13/23            Goal: LTG-By discharge, patient will perform bathroom transfers with supervision - set-up using DME/AE as needed       Dates: Start:  10/13/23               Problem: Toileting       Dates: Start:  10/13/23         Goal: STG-Within one week, patient will complete toileting tasks with Min A using DME/AE as needed       Dates: Start:  10/13/23         Goal Note filed on 10/17/23 1130 by Priya Sesay OT       Limited participation in task

## 2023-10-21 PROCEDURE — 700102 HCHG RX REV CODE 250 W/ 637 OVERRIDE(OP): Performed by: PHYSICAL MEDICINE & REHABILITATION

## 2023-10-21 PROCEDURE — 770010 HCHG ROOM/CARE - REHAB SEMI PRIVAT*

## 2023-10-21 PROCEDURE — A9270 NON-COVERED ITEM OR SERVICE: HCPCS | Performed by: PHYSICAL MEDICINE & REHABILITATION

## 2023-10-21 RX ADMIN — ACETAMINOPHEN 1000 MG: 500 TABLET ORAL at 21:17

## 2023-10-21 RX ADMIN — SENNOSIDES AND DOCUSATE SODIUM 2 TABLET: 50; 8.6 TABLET ORAL at 08:54

## 2023-10-21 RX ADMIN — ACETAMINOPHEN 1000 MG: 500 TABLET ORAL at 15:04

## 2023-10-21 RX ADMIN — LEVOTHYROXINE SODIUM 75 MCG: 0.07 TABLET ORAL at 05:00

## 2023-10-21 RX ADMIN — OMEPRAZOLE 20 MG: 20 CAPSULE, DELAYED RELEASE ORAL at 08:53

## 2023-10-21 RX ADMIN — APIXABAN 2.5 MG: 2.5 TABLET, FILM COATED ORAL at 21:17

## 2023-10-21 RX ADMIN — ACETAMINOPHEN 1000 MG: 500 TABLET ORAL at 08:53

## 2023-10-21 RX ADMIN — APIXABAN 2.5 MG: 2.5 TABLET, FILM COATED ORAL at 08:54

## 2023-10-21 RX ADMIN — METOPROLOL SUCCINATE 100 MG: 100 TABLET, EXTENDED RELEASE ORAL at 08:54

## 2023-10-21 RX ADMIN — BENAZEPRIL HYDROCHLORIDE 40 MG: 10 TABLET, FILM COATED ORAL at 08:54

## 2023-10-21 ASSESSMENT — PAIN DESCRIPTION - PAIN TYPE: TYPE: ACUTE PAIN

## 2023-10-21 ASSESSMENT — PATIENT HEALTH QUESTIONNAIRE - PHQ9
2. FEELING DOWN, DEPRESSED, IRRITABLE, OR HOPELESS: NOT AT ALL
2. FEELING DOWN, DEPRESSED, IRRITABLE, OR HOPELESS: NOT AT ALL
1. LITTLE INTEREST OR PLEASURE IN DOING THINGS: NOT AT ALL
SUM OF ALL RESPONSES TO PHQ9 QUESTIONS 1 AND 2: 0
SUM OF ALL RESPONSES TO PHQ9 QUESTIONS 1 AND 2: 0
1. LITTLE INTEREST OR PLEASURE IN DOING THINGS: NOT AT ALL

## 2023-10-21 NOTE — CARE PLAN
Problem: Skin Integrity  Goal: Skin integrity is maintained or improved  Outcome: Progressing  Patient repositions to side to prevent pressure areas.     Problem: Fall Risk - Rehab  Goal: Patient will remain free from falls  Outcome: Progressing  Patient uses call light for assist with needs/transfers to prevent falls.   The patient is Stable - Low risk of patient condition declining or worsening    Shift Goals  Clinical Goals: pain control  Patient Goals: safety  Family Goals: Education

## 2023-10-21 NOTE — PROGRESS NOTES
NURSING DAILY NOTE    Name: Yvonne Marie Wada   Date of Admission: 10/12/2023   Admitting Diagnosis: Other fracture of T7-t8 thoracic vertebra, initial encounter for closed fracture (HCC)  Attending Physician: Jai Michele D.o.  Allergies: Codeine, Doxycycline, Sulfamethoxazole-trimethoprim, Tramadol, and Trazodone    Safety  Patient Assist  mod assist  Patient Precautions  Fall Risk, TLSO (Thoracolumbosacral orthosis)  Precaution Comments  TLSO PRN  Bed Transfer Status  Standby Assist  Toilet Transfer Status   Standby Assist  Assistive Devices  Rails, Wheelchair  Oxygen  None - Room Air  Diet/Therapeutic Dining  Current Diet Order   Procedures    Diet Order Diet: Regular     Pill Administration  whole and one at a time   Agitated Behavioral Scale     ABS Level of Severity       Fall Risk  Has the patient had a fall this admission?   No  Muna Cuenca Fall Risk Scoring  19, HIGH RISK  Fall Risk Safety Measures  bed alarm, chair alarm, poor balance, and low vision/ hearing    Vitals  Temperature: 37.2 °C (98.9 °F)  Temp src: Oral  Pulse: 67  Respiration: 18  Blood Pressure : 104/72  Blood Pressure MAP (Calculated): 83 MM HG  BP Location: Left, Upper Arm  Patient BP Position: Supine     Oxygen  Pulse Oximetry: 95 %  O2 (LPM): 0  O2 Delivery Device: None - Room Air    Bowel and Bladder  Last Bowel Movement  10/17/23  Stool Type  Type 6: Fluffy pieces with ragged edges, a mushy stool  Bowel Device  Bathroom, Diaper  Continent  Bladder: Did not void   Bowel: No movement  Bladder Function  Urine Void (mL):  (large)  Number of Times Voided: 1  Urine Color: Yellow  Wet Diaper Count: 1  Genitourinary Assessment   Bladder Assessment (WDL):  WDL Except  Dale Catheter: Not Applicable  Urinary Elimination: Stress Incontinence  Urine Color: Yellow  Number of Bladder Accidents: 1  Total Number of Bladder of Accidents in Last 7 Days: 1  Bladder Device: Bathroom  Bladder  "Scan: Post Void  $ Bladder Scan Results (mL): 161    Skin  Jase Score   17  Sensory Interventions   Bed Types: Standard/Trauma Mattress  Skin Preventative Measures: Pillows in Use for Support / Positioning  Moisture Interventions  Moisturizers/Barriers: Barrier Wipes, Barrier Paste      Pain  Pain Rating Scale  8 - Awful, hard to do anything  Pain Location  Back  Pain Location Orientation  Posterior, Mid  Pain Interventions   Medication (see MAR)    ADLs    Bathing   Patient Refused Bathing (Patient said: \"Not tonight\". Nurse noted.)  Linen Change   Complete  Personal Hygiene  Perineal Care, Moist Tisha Wipes  Chlorhexidine Bath      Oral Care     Teeth/Dentures     Shave     Nutrition Percentage Eaten  *  * Meal *  *, Dinner, Between 50-75% Consumed  Environmental Precautions  Treaded Slipper Socks on Patient, Bed in Low Position, Personal Belongings, Wastebasket, Call Bell etc. in Easy Reach  Patient Turns/Positioning  Sitting Up in Wheelchair, Right Side  Patient Turns Assistance/Tolerance  Assistance of One  Bed Positions  Bed Controls On, Bed Locked  Head of Bed Elevated  Self regulated      Psychosocial/Neurologic Assessment  Psychosocial Assessment  Psychosocial (WDL):  Within Defined Limits  Neurologic Assessment  Neuro (WDL): Exceptions to WDL  Level of Consciousness: Alert  Orientation Level: Oriented X4  Cognition: Follows commands, Appropriate attention/concentration  Speech: Clear  Pupil Assesment: No  Motor Function/Sensation Assessment: Motor strength  RUE Sensation: Full sensation  Muscle Strength Right Arm: Good Strength Against Gravity and Moderate Resistance  LUE Sensation: Full sensation  Muscle Strength Left Arm: Good Strength Against Gravity and Moderate Resistance  RLE Sensation: Numbness  Muscle Strength Right Leg: Fair Strength against Gravity but No Resistance  LLE Sensation: Numbness  Muscle Strength Left Leg: Fair Strength against Gravity but No Resistance  EENT (WDL):  WDL " Except    Cardio/Pulmonary Assessment  Edema   RLE Edema: Generalized  LLE Edema: Generalized  Respiratory Breath Sounds  RUL Breath Sounds: Clear  RML Breath Sounds: Diminished  RLL Breath Sounds: Diminished  LOU Breath Sounds: Clear  LLL Breath Sounds: Diminished  Cardiac Assessment   Cardiac (WDL):  WDL Except (A-fib)

## 2023-10-21 NOTE — PROGRESS NOTES
NURSING DAILY NOTE    Name: Yvonne Marie Wada   Date of Admission: 10/12/2023   Admitting Diagnosis: Other fracture of T7-t8 thoracic vertebra, initial encounter for closed fracture (HCC)  Attending Physician: Jai Michele D.o.  Allergies: Codeine, Doxycycline, Sulfamethoxazole-trimethoprim, Tramadol, and Trazodone    Safety  Patient Assist  mod assist  Patient Precautions  Fall Risk, TLSO (Thoracolumbosacral orthosis)  Precaution Comments  TLSO PRN  Bed Transfer Status  Standby Assist  Toilet Transfer Status   Standby Assist  Assistive Devices  Rails, Wheelchair  Oxygen  None - Room Air  Diet/Therapeutic Dining  Current Diet Order   Procedures    Diet Order Diet: Regular     Pill Administration  whole  Agitated Behavioral Scale     ABS Level of Severity       Fall Risk  Has the patient had a fall this admission?   No  Muna Cuenca Fall Risk Scoring  19, HIGH RISK  Fall Risk Safety Measures  bed alarm, chair alarm, and seatbelt alarm    Vitals  Temperature: 37 °C (98.6 °F)  Temp src: Oral  Pulse: 97  Respiration: 18  Blood Pressure : (!) 137/95  Blood Pressure MAP (Calculated): 109 MM HG  BP Location: Left, Upper Arm  Patient BP Position: Supine     Oxygen  Pulse Oximetry: 93 %  O2 (LPM): 0  O2 Delivery Device: None - Room Air    Bowel and Bladder  Last Bowel Movement  10/17/23 (miralax + senna given tonight)  Stool Type  Type 6: Fluffy pieces with ragged edges, a mushy stool  Bowel Device  Bathroom, Diaper  Continent  Bladder: Did not void   Bowel: No movement  Bladder Function  Urine Void (mL):  (large)  Number of Times Voided: 1  Urine Color: Yellow  Wet Diaper Count: 1  Genitourinary Assessment   Bladder Assessment (WDL):  WDL Except  Dale Catheter: Not Applicable  Urinary Elimination: Stress Incontinence  Urine Color: Yellow  Number of Bladder Accidents: 1  Total Number of Bladder of Accidents in Last 7 Days: 1  Bladder Device: Bathroom  Bladder Scan:  "Post Void  $ Bladder Scan Results (mL): 161    Skin  Jase Score   17  Sensory Interventions   Bed Types: Standard/Trauma Mattress  Skin Preventative Measures: Pillows in Use for Support / Positioning  Moisture Interventions  Moisturizers/Barriers: Barrier Paste, Barrier Wipes      Pain  Pain Rating Scale  4 - Distracts me, can do usual activities  Pain Location  Back  Pain Location Orientation  Posterior, Mid  Pain Interventions   Medication (see MAR)    ADLs    Bathing   Patient Refused Bathing (Patient said: \"Not tonight\". Nurse noted.)  Linen Change   Complete  Personal Hygiene  Perineal Care, Moist Tisha Wipes  Chlorhexidine Bath      Oral Care     Teeth/Dentures     Shave     Nutrition Percentage Eaten  *  * Meal *  *, Dinner, Between 50-75% Consumed  Environmental Precautions  Treaded Slipper Socks on Patient  Patient Turns/Positioning  Sitting Up in Wheelchair, Right Side  Patient Turns Assistance/Tolerance  Assistance of One  Bed Positions  Bed Controls On, Bed Locked  Head of Bed Elevated  Self regulated      Psychosocial/Neurologic Assessment  Psychosocial Assessment  Psychosocial (WDL):  Within Defined Limits  Neurologic Assessment  Neuro (WDL): Exceptions to WDL  Level of Consciousness: Alert  Orientation Level: Oriented X4  Cognition: Follows commands  Speech: Clear  Pupil Assesment: No  Motor Function/Sensation Assessment: Motor strength  RUE Sensation: Full sensation  Muscle Strength Right Arm: Good Strength Against Gravity and Moderate Resistance  LUE Sensation: Full sensation  Muscle Strength Left Arm: Good Strength Against Gravity and Moderate Resistance  RLE Sensation: Numbness  Muscle Strength Right Leg: Fair Strength against Gravity but No Resistance  LLE Sensation: Numbness  Muscle Strength Left Leg: Fair Strength against Gravity but No Resistance  EENT (WDL):  WDL Except    Cardio/Pulmonary Assessment  Edema   RLE Edema: Generalized  LLE Edema: Generalized  Respiratory Breath Sounds  RUL " Breath Sounds: Clear  RML Breath Sounds: Diminished  RLL Breath Sounds: Diminished  LOU Breath Sounds: Clear  LLL Breath Sounds: Diminished  Cardiac Assessment   Cardiac (WDL):  WDL Except (a.fib)

## 2023-10-21 NOTE — PROGRESS NOTES
NURSING DAILY NOTE    Name: Yvonne Marie Wada   Date of Admission: 10/12/2023   Admitting Diagnosis: Other fracture of T7-t8 thoracic vertebra, initial encounter for closed fracture (HCC)  Attending Physician: Jai Michele D.o.  Allergies: Codeine, Doxycycline, Sulfamethoxazole-trimethoprim, Tramadol, and Trazodone    Safety  Patient Assist  mod assist  Patient Precautions  Fall Risk, TLSO (Thoracolumbosacral orthosis)  Precaution Comments  TLSO PRN  Bed Transfer Status  Standby Assist  Toilet Transfer Status   Standby Assist  Assistive Devices  Rails, Wheelchair  Oxygen  None - Room Air  Diet/Therapeutic Dining  Current Diet Order   Procedures    Diet Order Diet: Regular     Pill Administration  whole and one at a time   Agitated Behavioral Scale     ABS Level of Severity       Fall Risk  Has the patient had a fall this admission?   No  Muna Cuenca Fall Risk Scoring  19, HIGH RISK  Fall Risk Safety Measures  bed alarm, chair alarm, and poor balance    Vitals  Temperature: 36.8 °C (98.2 °F)  Temp src: Temporal  Pulse: 82  Respiration: 18  Blood Pressure : 107/70  Blood Pressure MAP (Calculated): 82 MM HG  BP Location: Left, Upper Arm  Patient BP Position: Supine     Oxygen  Pulse Oximetry: 96 %  O2 (LPM): 0  O2 Delivery Device: None - Room Air    Bowel and Bladder  Last Bowel Movement  10/17/23 (miralax + senna given tonight)  Stool Type  Type 6: Fluffy pieces with ragged edges, a mushy stool  Bowel Device  Bathroom, Diaper  Continent  Bladder: Did not void   Bowel: No movement  Bladder Function  Urine Void (mL):  (large)  Number of Times Voided: 1  Urine Color: Unable To Evaluate  Wet Diaper Count: 1  Genitourinary Assessment   Bladder Assessment (WDL):  WDL Except  Dale Catheter: Not Applicable  Urinary Elimination: Stress Incontinence  Urine Color: Unable To Evaluate  Number of Bladder Accidents: 1  Total Number of Bladder of Accidents in Last 7 Days:  "1  Bladder Device: Bathroom  Bladder Scan: Post Void  $ Bladder Scan Results (mL): 161    Skin  Jase Score   17  Sensory Interventions   Bed Types: Standard/Trauma Mattress  Skin Preventative Measures: Pillows in Use for Support / Positioning  Moisture Interventions  Moisturizers/Barriers: Barrier Paste, Barrier Wipes      Pain  Pain Rating Scale  1 - Hardly Notice Pain  Pain Location  Back  Pain Location Orientation  Posterior, Mid  Pain Interventions   Rest    ADLs    Bathing   Patient Refused Bathing (Patient said: \"Not tonight\". Nurse noted.)  Linen Change   Complete  Personal Hygiene  Perineal Care, Moist Tisha Wipes  Chlorhexidine Bath      Oral Care     Teeth/Dentures     Shave     Nutrition Percentage Eaten  *  * Meal *  *, Dinner, Between 50-75% Consumed  Environmental Precautions  Treaded Slipper Socks on Patient, Bed in Low Position, Personal Belongings, Wastebasket, Call Bell etc. in Easy Reach  Patient Turns/Positioning  Sitting Up in Wheelchair, Right Side  Patient Turns Assistance/Tolerance  Assistance of One  Bed Positions  Bed Controls On, Bed Locked  Head of Bed Elevated  Self regulated      Psychosocial/Neurologic Assessment  Psychosocial Assessment  Psychosocial (WDL):  Within Defined Limits  Neurologic Assessment  Neuro (WDL): Exceptions to WDL  Level of Consciousness: Alert  Orientation Level: Oriented X4  Cognition: Follows commands, Appropriate attention/concentration  Speech: Clear  Pupil Assesment: No  Motor Function/Sensation Assessment: Motor strength  RUE Sensation: Full sensation  Muscle Strength Right Arm: Good Strength Against Gravity and Moderate Resistance  LUE Sensation: Full sensation  Muscle Strength Left Arm: Good Strength Against Gravity and Moderate Resistance  RLE Sensation: Numbness  Muscle Strength Right Leg: Fair Strength against Gravity but No Resistance  LLE Sensation: Numbness  Muscle Strength Left Leg: Fair Strength against Gravity but No Resistance  EENT (WDL):  WDL " Except    Cardio/Pulmonary Assessment  Edema   RLE Edema: Generalized  LLE Edema: Generalized  Respiratory Breath Sounds  RUL Breath Sounds: Clear  RML Breath Sounds: Diminished  RLL Breath Sounds: Diminished  LOU Breath Sounds: Clear  LLL Breath Sounds: Diminished  Cardiac Assessment   Cardiac (WDL):  WDL Except (A-fib)

## 2023-10-21 NOTE — CARE PLAN
Mr. Barros is a 72-year-old M w/ PMHx CAD s/p prior CABG (vein graft to distal RCA), ICM, HFrEF (LVEF 20%), s/p ICD,  HTN, type 2 who was transferred from OSH for further viability study prior to CABG consideration after he presented with angina and found to have mvCAD. LHC done in 7.24.2 showed: Left main - 90%, Prox LAD-1 - 80%, mid lesion - 60%, Lcx - 90% w/ a widely patent RCA graft. TTE from 7/25 showed a severely reduced LVEF of 25% and moderately to severely reduced right ventricular systolic function. Patient started on heparin gtt at OSH for therapeutic.     Plan:   - Will attempt to arrange for a viability study on Monday  - DAPT: continue ASA, load with plavix  - discontinued heparin gtt  - Cont high intensity statin   - Cont coreg 12.5mg BID   The patient is Stable - Low risk of patient condition declining or worsening    Shift Goals  Clinical Goals: pain control  Patient Goals: safety  Family Goals: Education    Progress made toward(s) clinical / shift goals:      Problem: Pain - Standard  Goal: Alleviation of pain or a reduction in pain to the patient’s comfort goal  Outcome: Progressing  Note: Roxicodone 5mg given PRN per MAR for c/o back pain with adequate relief. Pt sleeps good.Will continue to monitor.       Patient is not progressing towards the following goals:    Problem: Bowel Elimination  Goal: Patient will participate in bowel management program  Outcome: Not Progressing  Note: Pt's LBM 10/17. Miralax and senna tabs given tonight. Will continue to monitor.

## 2023-10-22 ENCOUNTER — APPOINTMENT (OUTPATIENT)
Dept: OCCUPATIONAL THERAPY | Facility: REHABILITATION | Age: 88
DRG: 560 | End: 2023-10-22
Attending: PHYSICAL MEDICINE & REHABILITATION
Payer: MEDICARE

## 2023-10-22 PROCEDURE — A9270 NON-COVERED ITEM OR SERVICE: HCPCS | Performed by: PHYSICAL MEDICINE & REHABILITATION

## 2023-10-22 PROCEDURE — 770010 HCHG ROOM/CARE - REHAB SEMI PRIVAT*

## 2023-10-22 PROCEDURE — 700102 HCHG RX REV CODE 250 W/ 637 OVERRIDE(OP): Performed by: PHYSICAL MEDICINE & REHABILITATION

## 2023-10-22 PROCEDURE — 97110 THERAPEUTIC EXERCISES: CPT

## 2023-10-22 RX ADMIN — LEVOTHYROXINE SODIUM 75 MCG: 0.07 TABLET ORAL at 06:09

## 2023-10-22 RX ADMIN — OMEPRAZOLE 20 MG: 20 CAPSULE, DELAYED RELEASE ORAL at 08:24

## 2023-10-22 RX ADMIN — ACETAMINOPHEN 1000 MG: 500 TABLET ORAL at 08:24

## 2023-10-22 RX ADMIN — BENAZEPRIL HYDROCHLORIDE 40 MG: 10 TABLET, FILM COATED ORAL at 08:23

## 2023-10-22 RX ADMIN — SENNOSIDES AND DOCUSATE SODIUM 2 TABLET: 50; 8.6 TABLET ORAL at 21:01

## 2023-10-22 RX ADMIN — SENNOSIDES AND DOCUSATE SODIUM 2 TABLET: 50; 8.6 TABLET ORAL at 08:24

## 2023-10-22 RX ADMIN — ACETAMINOPHEN 1000 MG: 500 TABLET ORAL at 14:44

## 2023-10-22 RX ADMIN — APIXABAN 2.5 MG: 2.5 TABLET, FILM COATED ORAL at 08:24

## 2023-10-22 RX ADMIN — METOPROLOL SUCCINATE 100 MG: 100 TABLET, EXTENDED RELEASE ORAL at 08:24

## 2023-10-22 RX ADMIN — ACETAMINOPHEN 1000 MG: 500 TABLET ORAL at 21:01

## 2023-10-22 RX ADMIN — APIXABAN 2.5 MG: 2.5 TABLET, FILM COATED ORAL at 21:01

## 2023-10-22 ASSESSMENT — PATIENT HEALTH QUESTIONNAIRE - PHQ9
2. FEELING DOWN, DEPRESSED, IRRITABLE, OR HOPELESS: NOT AT ALL
1. LITTLE INTEREST OR PLEASURE IN DOING THINGS: NOT AT ALL
2. FEELING DOWN, DEPRESSED, IRRITABLE, OR HOPELESS: NOT AT ALL
SUM OF ALL RESPONSES TO PHQ9 QUESTIONS 1 AND 2: 0
SUM OF ALL RESPONSES TO PHQ9 QUESTIONS 1 AND 2: 0
1. LITTLE INTEREST OR PLEASURE IN DOING THINGS: NOT AT ALL

## 2023-10-22 ASSESSMENT — PAIN DESCRIPTION - PAIN TYPE: TYPE: ACUTE PAIN

## 2023-10-22 ASSESSMENT — FIBROSIS 4 INDEX: FIB4 SCORE: 1.65

## 2023-10-22 ASSESSMENT — ACTIVITIES OF DAILY LIVING (ADL): BED_CHAIR_WHEELCHAIR_TRANSFER_DESCRIPTION: ADAPTIVE EQUIPMENT;INCREASED TIME;SUPERVISION FOR SAFETY;VERBAL CUEING

## 2023-10-22 NOTE — CARE PLAN
"The patient is Stable - Low risk of patient condition declining or worsening    Shift Goals  Clinical Goals: pain control  Patient Goals: safety  Family Goals: Education    Problem: Skin Integrity  Goal: Skin integrity is maintained or improved  Outcome: Progressing  Note:   Jase Score: 17    Patient's skin remains intact and free from new or accidental injury this shift; no s/s of infection. RN wound protocol checked. Encouraged hydration and educated about the importance of nutrition to keep skin integrity. Will continue to monitor.       Problem: Fall Risk - Rehab  Goal: Patient will remain free from falls  Outcome: Progressing  Note: Muna Cuenca Fall risk Assessment Score: 19    High fall risk Interventions   - Alarming seatbelt  - Bed and strip alarm   - Yellow sign by the door   - Yellow wrist band \"Fall risk\"  - Room near to the nurse station  - Do not leave patient unattended in the bathroom  - Fall risk education provided     "

## 2023-10-22 NOTE — CARE PLAN
The patient is Stable - Low risk of patient condition declining or worsening    Shift Goals  Clinical Goals: pain control  Patient Goals: safety  Family Goals: Education    Progress made toward(s) clinical / shift goals:      Problem: Knowledge Deficit - Standard  Goal: Patient and family/care givers will demonstrate understanding of plan of care, disease process/condition, diagnostic tests and medications  Outcome: Progressing     Problem: Fall Risk - Rehab  Goal: Patient will remain free from falls  Outcome: Progressing  Patient use call light for assistance.      Problem: Pain - Standard  Goal: Alleviation of pain or a reduction in pain to the patient’s comfort goal  Outcome: Progressing       Patient is not progressing towards the following goals:

## 2023-10-22 NOTE — PROGRESS NOTES
NURSING DAILY NOTE    Name: Yvonne Marie Wada   Date of Admission: 10/12/2023   Admitting Diagnosis: Other fracture of T7-t8 thoracic vertebra, initial encounter for closed fracture (HCC)  Attending Physician: Jai Michele D.o.  Allergies: Codeine, Doxycycline, Sulfamethoxazole-trimethoprim, Tramadol, and Trazodone    Safety  Patient Assist  moderate assist  Patient Precautions  Fall Risk, TLSO (Thoracolumbosacral orthosis)  Precaution Comments  TLSO PRN  Bed Transfer Status  Standby Assist  Toilet Transfer Status   Standby Assist  Assistive Devices  Rails, Wheelchair  Oxygen  None - Room Air  Diet/Therapeutic Dining  Current Diet Order   Procedures    Diet Order Diet: Regular     Pill Administration  whole  Agitated Behavioral Scale     ABS Level of Severity       Fall Risk  Has the patient had a fall this admission?   No  Muna Cuenca Fall Risk Scoring  19, HIGH RISK  Fall Risk Safety Measures  bed alarm and chair alarm    Vitals  Temperature: 36.4 °C (97.6 °F)  Temp src: Oral  Pulse: 86  Respiration: 18  Blood Pressure : 102/68  Blood Pressure MAP (Calculated): 79 MM HG  BP Location: Left, Upper Arm  Patient BP Position: Sitting     Oxygen  Pulse Oximetry: 94 %  O2 (LPM): 0  O2 Delivery Device: None - Room Air    Bowel and Bladder  Last Bowel Movement  10/21/23  Stool Type  Patient stated, Not observed  Bowel Device  Bathroom, Diaper  Continent  Bladder: Did not void   Bowel: No movement  Bladder Function  Urine Void (mL):  (large)  Number of Times Voided: 1  Urine Color: Unable To Evaluate  Wet Diaper Count: 1  Genitourinary Assessment   Bladder Assessment (WDL):  WDL Except  Dale Catheter: Not Applicable  Urinary Elimination: Stress Incontinence  Urine Color: Unable To Evaluate  Number of Bladder Accidents: 1  Total Number of Bladder of Accidents in Last 7 Days: 1  Bladder Device: Bathroom  Bladder Scan: Post Void  $ Bladder Scan Results (mL):  "161    Skin  Jase Score   17  Sensory Interventions   Bed Types: Standard/Trauma Mattress  Skin Preventative Measures: Pillows in Use for Support / Positioning  Moisture Interventions  Moisturizers/Barriers: Barrier Paste      Pain  Pain Rating Scale  1 - Hardly Notice Pain  Pain Location  Back  Pain Location Orientation  Posterior, Mid  Pain Interventions   Medication (see MAR)    ADLs    Bathing   Patient Refused Bathing (Patient said: \"No; thank you, but I am tired & sleepy\".)  Linen Change   Complete  Personal Hygiene   (clean, dry & dressed)  Chlorhexidine Bath      Oral Care     Teeth/Dentures     Shave     Nutrition Percentage Eaten  *  * Meal *  *, Dinner, Between 50-75% Consumed  Environmental Precautions  Treaded Slipper Socks on Patient  Patient Turns/Positioning  Patient Turns Self from Side to Side  Patient Turns Assistance/Tolerance  Assistance of One  Bed Positions  Bed Controls On, Bed Locked  Head of Bed Elevated  Self regulated      Psychosocial/Neurologic Assessment  Psychosocial Assessment  Psychosocial (WDL):  Within Defined Limits  Neurologic Assessment  Neuro (WDL): Exceptions to WDL  Level of Consciousness: Alert  Orientation Level: Oriented X4  Cognition: Follows commands  Speech: Clear  Pupil Assesment: No  Motor Function/Sensation Assessment: Motor strength  RUE Sensation: Full sensation  Muscle Strength Right Arm: Good Strength Against Gravity and Moderate Resistance  LUE Sensation: Full sensation  Muscle Strength Left Arm: Good Strength Against Gravity and Moderate Resistance  RLE Sensation: Numbness  Muscle Strength Right Leg: Fair Strength against Gravity but No Resistance  LLE Sensation: Numbness  Muscle Strength Left Leg: Fair Strength against Gravity but No Resistance  EENT (WDL):  WDL Except    Cardio/Pulmonary Assessment  Edema   RLE Edema: Generalized  LLE Edema: Generalized  Respiratory Breath Sounds  RUL Breath Sounds: Clear  RML Breath Sounds: Diminished  RLL Breath Sounds: " Diminished  LOU Breath Sounds: Clear  LLL Breath Sounds: Diminished  Cardiac Assessment   Cardiac (WDL):  WDL Except (a.fib)

## 2023-10-22 NOTE — THERAPY
"Occupational Therapy  Daily Treatment     Patient Name: Yvonne Marie Wada  Age:  87 y.o., Sex:  female  Medical Record #: [id number]  Today's Date: 10/22/2023     Precautions  Precautions: (P) Fall Risk, TLSO (Thoracolumbosacral orthosis)  Comments: (P) TLSO PRN         Subjective    \"Isis, lets go\" (pt kept saying to her daughter through therapy session in attempt to get her daughter to take her back to her room despite having therapy scheduled for 1 hour)     Objective       10/22/23 1401   OT Charge Group   Charges Yes   OT Therapeutic Exercise (Units) 4   OT Total Time Spent   OT Individual Total Time Spent (Mins) 60   Precautions   Precautions Fall Risk;TLSO (Thoracolumbosacral orthosis)   Comments TLSO PRN   Functional Level of Assist   Bed, Chair, Wheelchair Transfer Standby Assist   Bed Chair Wheelchair Transfer Description Adaptive equipment;Increased time;Supervision for safety;Verbal cueing  (demos unsafe transfer despite cues)   Sitting Upper Body Exercises   Upper Extremity Bike Level 1 Resistance   Comments 10x1 minute BUE forward pedal w/ extended rest break between   Bed Mobility    Supine to Sit Contact Guard Assist  (demos unsafe practice despite education on log roll)   Interdisciplinary Plan of Care Collaboration   IDT Collaboration with  Family / Caregiver;Nursing   Patient Position at End of Therapy In Bed;Call Light within Reach;Family / Friend in Room   Collaboration Comments daughter isis present during session, communicated with RN about pain meds     Max education provided to daughter about pt's current diagnosis as well as performance, barriers, and care team concerns regarding her mother's safety in DC planning. Emphasized need for skilled placement for continued rehab. Pt's daughter in agreement and hopes to be back for FT and in person discussion with CM.     Assessment    Pt with slow to no progression towards OT POC. She requires frequent rest breaks and max encouragement to " participate. Continues to be bed seeking and self limiting. Little functional progress is being made at this level of care, however, continue to have large concern for pt safety with return home and highly recommend DC to skilled placement for continuation of care at lower level of intensity.     Strengths: Able to follow instructions, Supportive family, Alert and oriented  Barriers: Decreased endurance, Generalized weakness, Impaired balance, Impaired activity tolerance, Pain, Lack of motivation    Plan    Continue POC.     DME         Occupational Therapy Goals (Active)       Problem: Bathing       Dates: Start:  10/13/23         Goal: STG-Within one week, patient will bathe with Min A using DME/AE as needed       Dates: Start:  10/13/23    Expected End:  10/25/23         Goal Note filed on 10/17/23 1130 by Priya Sesay OT       Pt refusing to bathe                 Problem: Dressing       Dates: Start:  10/13/23         Goal: STG-Within one week, patient will dress LB with Min A using DME/AE as needed       Dates: Start:  10/13/23    Expected End:  10/25/23         Goal Note filed on 10/17/23 1130 by Priya Sesay OT       No active attempts to improve task                 Problem: Functional Transfers       Dates: Start:  10/13/23         Goal: STG-Within one week, patient will transfer to toilet with SBA using DME/AE as needed       Dates: Start:  10/13/23    Expected End:  10/25/23         Goal Note filed on 10/17/23 1130 by Priya Sesay OT       No carry over with safety techniques                 Problem: OT Long Term Goals       Dates: Start:  10/13/23         Goal: LTG-By discharge, patient will complete basic self care tasks with supervision using DME/AE as needed       Dates: Start:  10/13/23    Expected End:  10/25/23            Goal: LTG-By discharge, patient will perform bathroom transfers with supervision - set-up using DME/AE as needed       Dates: Start:  10/13/23    Expected End:  10/25/23                Problem: Toileting       Dates: Start:  10/13/23         Goal: STG-Within one week, patient will complete toileting tasks with Min A using DME/AE as needed       Dates: Start:  10/13/23    Expected End:  10/25/23         Goal Note filed on 10/17/23 1130 by Priya Sesay OT       Limited participation in task

## 2023-10-23 ENCOUNTER — APPOINTMENT (OUTPATIENT)
Dept: OCCUPATIONAL THERAPY | Facility: REHABILITATION | Age: 88
DRG: 560 | End: 2023-10-23
Attending: PHYSICAL MEDICINE & REHABILITATION
Payer: MEDICARE

## 2023-10-23 ENCOUNTER — APPOINTMENT (OUTPATIENT)
Dept: PHYSICAL THERAPY | Facility: REHABILITATION | Age: 88
DRG: 560 | End: 2023-10-23
Attending: PHYSICAL MEDICINE & REHABILITATION
Payer: MEDICARE

## 2023-10-23 PROCEDURE — 97530 THERAPEUTIC ACTIVITIES: CPT

## 2023-10-23 PROCEDURE — A9270 NON-COVERED ITEM OR SERVICE: HCPCS | Performed by: PHYSICAL MEDICINE & REHABILITATION

## 2023-10-23 PROCEDURE — 700102 HCHG RX REV CODE 250 W/ 637 OVERRIDE(OP): Performed by: PHYSICAL MEDICINE & REHABILITATION

## 2023-10-23 PROCEDURE — 97116 GAIT TRAINING THERAPY: CPT

## 2023-10-23 PROCEDURE — 770010 HCHG ROOM/CARE - REHAB SEMI PRIVAT*

## 2023-10-23 PROCEDURE — 97535 SELF CARE MNGMENT TRAINING: CPT

## 2023-10-23 PROCEDURE — 97110 THERAPEUTIC EXERCISES: CPT

## 2023-10-23 PROCEDURE — 99232 SBSQ HOSP IP/OBS MODERATE 35: CPT | Performed by: PHYSICAL MEDICINE & REHABILITATION

## 2023-10-23 RX ADMIN — APIXABAN 2.5 MG: 2.5 TABLET, FILM COATED ORAL at 21:27

## 2023-10-23 RX ADMIN — ACETAMINOPHEN 1000 MG: 500 TABLET ORAL at 14:47

## 2023-10-23 RX ADMIN — LEVOTHYROXINE SODIUM 75 MCG: 0.07 TABLET ORAL at 05:57

## 2023-10-23 RX ADMIN — OXYCODONE HYDROCHLORIDE 5 MG: 5 TABLET ORAL at 08:14

## 2023-10-23 RX ADMIN — METOPROLOL SUCCINATE 100 MG: 100 TABLET, EXTENDED RELEASE ORAL at 08:14

## 2023-10-23 RX ADMIN — ACETAMINOPHEN 1000 MG: 500 TABLET ORAL at 21:27

## 2023-10-23 RX ADMIN — OMEPRAZOLE 20 MG: 20 CAPSULE, DELAYED RELEASE ORAL at 08:14

## 2023-10-23 RX ADMIN — APIXABAN 2.5 MG: 2.5 TABLET, FILM COATED ORAL at 08:14

## 2023-10-23 RX ADMIN — BENAZEPRIL HYDROCHLORIDE 40 MG: 10 TABLET, FILM COATED ORAL at 08:14

## 2023-10-23 RX ADMIN — ACETAMINOPHEN 1000 MG: 500 TABLET ORAL at 08:14

## 2023-10-23 ASSESSMENT — PAIN DESCRIPTION - PAIN TYPE: TYPE: ACUTE PAIN

## 2023-10-23 ASSESSMENT — PATIENT HEALTH QUESTIONNAIRE - PHQ9
SUM OF ALL RESPONSES TO PHQ9 QUESTIONS 1 AND 2: 0
1. LITTLE INTEREST OR PLEASURE IN DOING THINGS: NOT AT ALL
2. FEELING DOWN, DEPRESSED, IRRITABLE, OR HOPELESS: NOT AT ALL
2. FEELING DOWN, DEPRESSED, IRRITABLE, OR HOPELESS: NOT AT ALL
SUM OF ALL RESPONSES TO PHQ9 QUESTIONS 1 AND 2: 0
1. LITTLE INTEREST OR PLEASURE IN DOING THINGS: NOT AT ALL

## 2023-10-23 ASSESSMENT — ACTIVITIES OF DAILY LIVING (ADL)
BED_CHAIR_WHEELCHAIR_TRANSFER_DESCRIPTION: ADAPTIVE EQUIPMENT;INCREASED TIME;INITIAL PREPARATION FOR TASK;SUPERVISION FOR SAFETY

## 2023-10-23 ASSESSMENT — GAIT ASSESSMENTS
GAIT LEVEL OF ASSIST: CONTACT GUARD ASSIST
DEVIATION: BRADYKINETIC;DECREASED HEEL STRIKE;DECREASED TOE OFF;OTHER (COMMENT)
DISTANCE (FEET): 75
ASSISTIVE DEVICE: FRONT WHEEL WALKER

## 2023-10-23 NOTE — THERAPY
Physical Therapy   Daily Treatment     Patient Name: Yvonne Marie Wada  Age:  87 y.o., Sex:  female  Medical Record #: [id number]  Today's Date: 10/23/2023     Precautions  Precautions: (P) Fall Risk, TLSO (Thoracolumbosacral orthosis)  Comments: (P) TLSO PRN    Subjective    Pt was willing to get up from bed upon PT arrival, no complaints.      Objective       10/23/23 0931   PT Charge Group   PT Gait Training (Units) 1   PT Therapeutic Activities (Units) 1   PT Total Time Spent   PT Individual Total Time Spent (Mins) 30   Precautions   Precautions Fall Risk;TLSO (Thoracolumbosacral orthosis)   Comments TLSO PRN   Gait Functional Level of Assist    Gait Level Of Assist Contact Guard Assist   Assistive Device Front Wheel Walker   Distance (Feet) 75   # of Times Distance was Traveled 2   Deviation Bradykinetic;Decreased Heel Strike;Decreased Toe Off;Other (Comment)  (kyphotic, frequent cues for upriht posture)   Sitting Lower Body Exercises   Bilateral Row 2 sets of 10;Bilateral;Light Resistance Theraband   Sit to Stand 1 set of 10  (Cues fo hand placement sit <> stand)   Comments Scap squeeze 15x EOM.   Neuro-Muscular Treatments   Neuro-Muscular Treatments Weight Shift Left;Weight Shift Right;Verbal Cuing;Tactile Cuing;Sequencing     Bed transfer CGA reach  pivot bed to .   Handoff to CNA for toileting post tx.     Assessment    Pt participated in gait training, postural awareness/ HEP, and sit <> stand sequencing. Pt continues to be limited by dec recall in reaching back/ pushing off to assist with safety of movement, and eccentric control.     Strengths: Pleasant and cooperative, Supportive family  Barriers: Decreased endurance, Difficulty following instructions, Generalized weakness, Impaired activity tolerance, Impaired balance, Impaired carryover of learning, Impaired insight/denial of deficits, Impaired functional cognition, Limited mobility, Pain    Plan    Standing balance, standing tolerance, gait, transfer  safety, postural strengthening    DME  PT DME Recommendations  Assistive Device: Front Wheeled Walker (patient reports she has one at home)    Passport items to be completed:  Get in/out of bed safely, in/out of a vehicle, safely use mobility device, walk or wheel around home/community, navigate up and down stairs, show how to get up/down from the ground, ensure home is accessible, demonstrate HEP, complete caregiver training    Physical Therapy Problems (Active)       Problem: Balance       Dates: Start:  10/13/23         Goal: STG-Within one week, patient will improve Jaramillo to 22/56 (16/56 on eval)       Dates: Start:  10/13/23    Expected End:  10/25/23               Problem: Mobility       Dates: Start:  10/13/23         Goal: STG-Within one week, patient will ambulate up/down a curb with FWW and CGA       Dates: Start:  10/13/23    Expected End:  10/25/23               Problem: Mobility Transfers       Dates: Start:  10/13/23         Goal: STG-Within one week, patient will transfer in/out of car with FWW and CGA       Dates: Start:  10/13/23    Expected End:  10/25/23               Problem: PT-Long Term Goals       Dates: Start:  10/13/23         Goal: LTG-By discharge, patient will ambulate 250 ft between rest breaks with LRAD and supervision assist       Dates: Start:  10/13/23    Expected End:  10/25/23            Goal: LTG-By discharge, patient will transfer in/out of a car with supervision assist       Dates: Start:  10/13/23    Expected End:  10/25/23            Goal: LTG-By discharge, patient will improve Jaramillo to >40/56 to reduce fall risk       Dates: Start:  10/13/23    Expected End:  10/25/23

## 2023-10-23 NOTE — PROGRESS NOTES
NURSING DAILY NOTE    Name: Yvonne Marie Wada   Date of Admission: 10/12/2023   Admitting Diagnosis: Other fracture of T7-t8 thoracic vertebra, initial encounter for closed fracture (HCC)  Attending Physician: Jai Michele D.o.  Allergies: Codeine, Doxycycline, Sulfamethoxazole-trimethoprim, Tramadol, and Trazodone    Safety  Patient Assist  moderate assist  Patient Precautions  Fall Risk, TLSO (Thoracolumbosacral orthosis)  Precaution Comments  TLSO PRN  Bed Transfer Status  Standby Assist  Toilet Transfer Status   Standby Assist  Assistive Devices  Wheelchair  Oxygen  None - Room Air  Diet/Therapeutic Dining  Current Diet Order   Procedures    Diet Order Diet: Regular     Pill Administration  whole  Agitated Behavioral Scale     ABS Level of Severity       Fall Risk  Has the patient had a fall this admission?   No  Muna Cuenca Fall Risk Scoring  19, HIGH RISK  Fall Risk Safety Measures  bed alarm and chair alarm    Vitals  Temperature: 37 °C (98.6 °F)  Temp src: Oral  Pulse: 81  Respiration: 18  Blood Pressure : 125/82  Blood Pressure MAP (Calculated): 96 MM HG  BP Location: Left, Upper Arm  Patient BP Position: Supine     Oxygen  Pulse Oximetry: 96 %  O2 (LPM): 0  O2 Delivery Device: None - Room Air    Bowel and Bladder  Last Bowel Movement  10/22/23  Stool Type  Type 5: Soft blob with clear cut edges (passed easily)  Bowel Device  Bathroom  Continent  Bladder: Did not void   Bowel: No movement  Bladder Function  Urine Void (mL):  (large)  Number of Times Voided: 1  Urine Color: Yellow  Wet Diaper Count: 1  Genitourinary Assessment   Bladder Assessment (WDL):  WDL Except  Dale Catheter: Not Applicable  Urinary Elimination: Stress Incontinence  Urine Color: Yellow  Number of Bladder Accidents: 1  Total Number of Bladder of Accidents in Last 7 Days: 1  Bladder Device: Bathroom  Bladder Scan: Post Void  $ Bladder Scan Results (mL): 161    Skin  Jase  "Score   17  Sensory Interventions   Bed Types: Standard/Trauma Mattress  Skin Preventative Measures: Pillows in Use for Support / Positioning  Moisture Interventions  Moisturizers/Barriers: Barrier Paste      Pain  Pain Rating Scale  8 - Awful, hard to do anything  Pain Location  Back  Pain Location Orientation  Posterior  Pain Interventions   Medication (see MAR)    ADLs    Bathing   Patient Refused Bathing (Patient said: \"No; thank you, but I am tired & sleepy\".)  Linen Change   Complete  Personal Hygiene  Moist Tisha Wipes, Perineal Care  Chlorhexidine Bath      Oral Care     Teeth/Dentures     Shave     Nutrition Percentage Eaten  *  * Meal *  *, Lunch, Between 50-75% Consumed  Environmental Precautions  Treaded Slipper Socks on Patient, Personal Belongings, Wastebasket, Call Bell etc. in Easy Reach, Transferred to Stronger Side, Bed in Low Position  Patient Turns/Positioning  Patient Turns Self from Side to Side  Patient Turns Assistance/Tolerance  Assistance of One  Bed Positions  Bed Controls On, Bed Locked  Head of Bed Elevated  Self regulated      Psychosocial/Neurologic Assessment  Psychosocial Assessment  Psychosocial (WDL):  Within Defined Limits  Neurologic Assessment  Neuro (WDL): Exceptions to WDL  Level of Consciousness: Alert  Orientation Level: Oriented X4  Cognition: Follows commands  Speech: Clear  Pupil Assesment: No  Motor Function/Sensation Assessment: Motor strength  RUE Sensation: Full sensation  Muscle Strength Right Arm: Good Strength Against Gravity and Moderate Resistance  LUE Sensation: Full sensation  Muscle Strength Left Arm: Good Strength Against Gravity and Moderate Resistance  RLE Sensation: Numbness  Muscle Strength Right Leg: Fair Strength against Gravity but No Resistance  LLE Sensation: Numbness  Muscle Strength Left Leg: Fair Strength against Gravity but No Resistance  EENT (WDL):  WDL Except    Cardio/Pulmonary Assessment  Edema   RLE Edema: Generalized  LLE Edema: " Generalized  Respiratory Breath Sounds  RUL Breath Sounds: Clear  RML Breath Sounds: Diminished  RLL Breath Sounds: Diminished  LOU Breath Sounds: Clear  LLL Breath Sounds: Diminished  Cardiac Assessment   Cardiac (WDL):  WDL Except

## 2023-10-23 NOTE — PROGRESS NOTES
"  Physical Medicine & Rehabilitation Progress Note    Encounter Date: 10/23/2023    Chief Complaint: Decreased mobility, weakness    Interval Events (Subjective):  Patient sitting up in room. Discussed about progress and may need referral to SNF as she is not safe to go home. Discussed would speak with CM. Russel NVD.     _____________________________________  Interdisciplinary Team Conference   Most recent IDT on 10/17/2023    Discharge Date/Disposition:  10/25/23  _____________________________________     Objective:  VITAL SIGNS: /85   Pulse 89   Temp 36.6 °C (97.8 °F) (Oral)   Resp 15   Ht 1.646 m (5' 4.8\")   Wt 66 kg (145 lb 8.1 oz)   SpO2 93%   BMI 24.36 kg/m²   Gen: NAD  Psych: Mood and affect appropriate  CV: RRR, 0 edema  Resp: CTAB, no upper airway sounds  Abd: NTND  Neuro: AOx3, following commands    Laboratory Values:  No results found for this or any previous visit (from the past 72 hour(s)).      Medications:  Scheduled Medications   Medication Dose Frequency    acetaminophen  1,000 mg TID    metoprolol SR  100 mg QAM    senna-docusate  2 Tablet BID    omeprazole  20 mg DAILY    apixaban  2.5 mg BID    benazepril  40 mg QAM    levothyroxine  75 mcg AM ES     PRN medications: hydrocortisone rectal, Respiratory Therapy Consult, hydrALAZINE, senna-docusate **AND** polyethylene glycol/lytes **AND** magnesium hydroxide **AND** bisacodyl, mag hydrox-al hydrox-simeth, ondansetron **OR** ondansetron, traZODone, sodium chloride, oxyCODONE immediate-release **OR** oxyCODONE immediate-release, [COMPLETED] acetaminophen **FOLLOWED BY** acetaminophen    Diet:  Current Diet Order   Procedures    Diet Order Diet: Regular       Medical Decision Making and Plan:  T7/T8 Fracture - Patient with GLF on 10/8/23 with suspected syncope found to be in A fib and with T7/T8 compression fracture. Evaluated by NSG and recommending TLSO for comfort when OOB  -PT and OT for mobility and ADLs. Per guidelines, 15 hours " per week between PT, OT and/or SLP.  -Follow-up NSG. Continue TLSO for comfort     HTN/A fib - Patient on Benazepril 40 mg and Metoprolol 50 mg. Metoprolol has been increased to 100 mg. SBP wnl but DBP elevated will monitor. Continue Metoprolol 100 mg qAM and Benazepril 40 mg daily     Hyponatremia - Check AM CMP - 134, will continue monitor. Repeat labs 10/20 - 134, stable. WIll need follow-up PCP     Hypothyroidism - Patient on Levothyroxine 75 mcg      Leukopenia - 4.1 on admission, recheck 10/16 - 6.5, improved    Thrombocytopenia - Check AM CBC - 181, improved. Repeat 283     Pain - Patient on PRN Tylenol and Oxycodone. Schedule Tylenol. Continue Tylenol 1000 mg TID, and check CMP - LFTs normal.      Skin - Patient at risk for skin breakdown due to debility in areas including sacrum, achilles, elbows and head in addition to other sites. Nursing to assess skin daily.      GI Ppx - Patient on Prilosec for GERD prophylaxis. Patient on Senna-docusate for constipation prophylaxis.   -Severe constipation on 10/14, painful abdominal chest pain. EKG with A fib.  KUB with moderate constipation. Give Enema, suppository. Had BM, discontinue Suppository     DVT Ppx - Patient Eliquis on transfer. Continue Eliquis  ____________________________________    T. Ned Sutton MD/PhD  [facility] - Physical Medicine & Rehabilitation   [facility] - Brain Injury Medicine   ____________________________________

## 2023-10-23 NOTE — THERAPY
"Occupational Therapy  Daily Treatment     Patient Name: Yvonne Marie Wada  Age:  87 y.o., Sex:  female  Medical Record #: [id number]  Today's Date: 10/23/2023     Precautions  Precautions: (P) Fall Risk, TLSO (Thoracolumbosacral orthosis)  Comments: (P) TLSO PRN         Subjective    \"This is too heavy\" - in regards to the 2# DB for therex     Objective       10/23/23 1001   OT Charge Group   OT Self Care / ADL (Units) 2   OT Therapy Activity (Units) 2   OT Therapeutic Exercise (Units) 2   OT Total Time Spent   OT Individual Total Time Spent (Mins) 90   Precautions   Precautions Fall Risk;TLSO (Thoracolumbosacral orthosis)   Comments TLSO PRN   Functional Level of Assist   Grooming Standby Assist;Seated   Grooming Description Increased time;Initial preparation for task;Seated in wheelchair at sink   Toileting Moderate Assist   Toileting Description Assist to pull pants up;Assist to pull pants down;Assist for standing balance;Grab bar;Increased time;Assist for hygiene   Bed, Chair, Wheelchair Transfer Standby Assist   Bed Chair Wheelchair Transfer Description Adaptive equipment;Increased time;Initial preparation for task;Supervision for safety   Toilet Transfers Standby Assist   Toilet Transfer Description Grab bar;Adaptive equipment;Increased time;Initial preparation for task;Supervision for safety;Verbal cueing   Sitting Upper Body Exercises   Sitting Upper Body Exercises Yes   Chest Press 1 set of 10  (2 lb DB, sitting in w/c)   Front Arm Raise 1 set of 10;Bilateral  (1# DB)   Side Arm Raise 1 set of 10;Bilateral  (1# DB)   Shoulder Press 1 set of 10;Bilateral  (2# DB)   Internal Shoulder Rotation 1 set of 10;Bilateral   Bilateral Row 1 set of 10;Bilateral  (2# DB)   Bicep Curls 1 set of 10;Bilateral  (2# DB; palm up and palm down)   Interdisciplinary Plan of Care Collaboration   IDT Collaboration with  ;Physician   Patient Position at End of Therapy In Bed;Call Light within Reach;Tray Table within " Reach;Bed Alarm On   Collaboration Comments physician rounded during session; discussed DC with CM     Therex: Spend considerate time discussing concerns with DC planning with pt per safety at home. Educated pt on the importance of continuation of therapy to progress CLOF in setting with less intensive therapy for more toleration. Pt eventually agreeable to DC to skilled setting. Informed CM and physician of pt decision.     Hot pack applied to pt back for comfort during therex    Assessment    Pt with slow progression towards OT POC. More agreeable to OOB activity on this date, however continues to be self limiting during therex and has poor carry over during functional transfers with safety education. Pt refusing to attempt LBD during toileting task, stating that she is not able to complete, despite completing with SBA in past.     Strengths: Able to follow instructions, Supportive family, Alert and oriented  Barriers: Decreased endurance, Generalized weakness, Impaired balance, Impaired activity tolerance, Pain, Lack of motivation    Plan    DC IRF ALONDRA (pt is aware she has to shower, dress, toilet, groom, and eat for DC session and ensured she would not decline tasks)    DME         Occupational Therapy Goals (Active)       Problem: Bathing       Dates: Start:  10/13/23         Goal: STG-Within one week, patient will bathe with Min A using DME/AE as needed       Dates: Start:  10/13/23    Expected End:  10/25/23         Goal Note filed on 10/17/23 1130 by Priya Sesay OT       Pt refusing to bathe                 Problem: Dressing       Dates: Start:  10/13/23         Goal: STG-Within one week, patient will dress LB with Min A using DME/AE as needed       Dates: Start:  10/13/23    Expected End:  10/25/23         Goal Note filed on 10/17/23 1130 by Priya Sesay OT       No active attempts to improve task                 Problem: Functional Transfers       Dates: Start:  10/13/23         Goal: STG-Within  one week, patient will transfer to toilet with SBA using DME/AE as needed       Dates: Start:  10/13/23    Expected End:  10/25/23         Goal Note filed on 10/17/23 1130 by Priya Sesay OT       No carry over with safety techniques                 Problem: OT Long Term Goals       Dates: Start:  10/13/23         Goal: LTG-By discharge, patient will complete basic self care tasks with supervision using DME/AE as needed       Dates: Start:  10/13/23    Expected End:  10/25/23            Goal: LTG-By discharge, patient will perform bathroom transfers with supervision - set-up using DME/AE as needed       Dates: Start:  10/13/23    Expected End:  10/25/23               Problem: Toileting       Dates: Start:  10/13/23         Goal: STG-Within one week, patient will complete toileting tasks with Min A using DME/AE as needed       Dates: Start:  10/13/23    Expected End:  10/25/23         Goal Note filed on 10/17/23 1130 by Priya Sesay OT       Limited participation in task

## 2023-10-23 NOTE — DISCHARGE PLANNING
CM  Spouse did not attend family training that was scheduled indicating since pt is not going home, then family training not necessary.   Referral sent to [facility] per choice.   Chest xray current from 10/9.  [facility] - will confirm if completed.

## 2023-10-24 ENCOUNTER — APPOINTMENT (OUTPATIENT)
Dept: PHYSICAL THERAPY | Facility: REHABILITATION | Age: 88
DRG: 560 | End: 2023-10-24
Attending: PHYSICAL MEDICINE & REHABILITATION
Payer: MEDICARE

## 2023-10-24 ENCOUNTER — APPOINTMENT (OUTPATIENT)
Dept: OCCUPATIONAL THERAPY | Facility: REHABILITATION | Age: 88
DRG: 560 | End: 2023-10-24
Attending: PHYSICAL MEDICINE & REHABILITATION
Payer: MEDICARE

## 2023-10-24 PROCEDURE — 700102 HCHG RX REV CODE 250 W/ 637 OVERRIDE(OP): Performed by: PHYSICAL MEDICINE & REHABILITATION

## 2023-10-24 PROCEDURE — 97535 SELF CARE MNGMENT TRAINING: CPT

## 2023-10-24 PROCEDURE — 99232 SBSQ HOSP IP/OBS MODERATE 35: CPT | Performed by: PHYSICAL MEDICINE & REHABILITATION

## 2023-10-24 PROCEDURE — 97116 GAIT TRAINING THERAPY: CPT

## 2023-10-24 PROCEDURE — A9270 NON-COVERED ITEM OR SERVICE: HCPCS | Performed by: PHYSICAL MEDICINE & REHABILITATION

## 2023-10-24 PROCEDURE — 770010 HCHG ROOM/CARE - REHAB SEMI PRIVAT*

## 2023-10-24 PROCEDURE — 97530 THERAPEUTIC ACTIVITIES: CPT

## 2023-10-24 PROCEDURE — 97110 THERAPEUTIC EXERCISES: CPT

## 2023-10-24 RX ADMIN — APIXABAN 2.5 MG: 2.5 TABLET, FILM COATED ORAL at 08:30

## 2023-10-24 RX ADMIN — ACETAMINOPHEN 1000 MG: 500 TABLET ORAL at 14:37

## 2023-10-24 RX ADMIN — BENAZEPRIL HYDROCHLORIDE 40 MG: 10 TABLET, FILM COATED ORAL at 08:30

## 2023-10-24 RX ADMIN — METOPROLOL SUCCINATE 100 MG: 100 TABLET, EXTENDED RELEASE ORAL at 08:30

## 2023-10-24 RX ADMIN — APIXABAN 2.5 MG: 2.5 TABLET, FILM COATED ORAL at 20:37

## 2023-10-24 RX ADMIN — OXYCODONE HYDROCHLORIDE 5 MG: 5 TABLET ORAL at 08:31

## 2023-10-24 RX ADMIN — ACETAMINOPHEN 1000 MG: 500 TABLET ORAL at 08:30

## 2023-10-24 RX ADMIN — SENNOSIDES AND DOCUSATE SODIUM 2 TABLET: 50; 8.6 TABLET ORAL at 08:30

## 2023-10-24 RX ADMIN — OMEPRAZOLE 20 MG: 20 CAPSULE, DELAYED RELEASE ORAL at 08:30

## 2023-10-24 RX ADMIN — SENNOSIDES AND DOCUSATE SODIUM 2 TABLET: 50; 8.6 TABLET ORAL at 20:37

## 2023-10-24 RX ADMIN — LEVOTHYROXINE SODIUM 75 MCG: 0.07 TABLET ORAL at 05:49

## 2023-10-24 RX ADMIN — OXYCODONE HYDROCHLORIDE 5 MG: 5 TABLET ORAL at 14:37

## 2023-10-24 ASSESSMENT — ACTIVITIES OF DAILY LIVING (ADL)
TOILETING_LEVEL_OF_ASSIST_DESCRIPTION: ASSIST TO PULL PANTS UP;ASSIST TO PULL PANTS DOWN
TOILET_TRANSFER_DESCRIPTION: ADAPTIVE EQUIPMENT;GRAB BAR;INCREASED TIME;SET-UP OF EQUIPMENT;SUPERVISION FOR SAFETY;VERBAL CUEING
BED_CHAIR_WHEELCHAIR_TRANSFER_DESCRIPTION: ADAPTIVE EQUIPMENT;SET-UP OF EQUIPMENT;SUPERVISION FOR SAFETY
TOILET_TRANSFER_DESCRIPTION: ADAPTIVE EQUIPMENT;GRAB BAR
BED_CHAIR_WHEELCHAIR_TRANSFER_DESCRIPTION: ADAPTIVE EQUIPMENT;INCREASED TIME;INITIAL PREPARATION FOR TASK;SUPERVISION FOR SAFETY;VERBAL CUEING
BED_CHAIR_WHEELCHAIR_TRANSFER_DESCRIPTION: ADAPTIVE EQUIPMENT

## 2023-10-24 ASSESSMENT — GAIT ASSESSMENTS
ASSISTIVE DEVICE: FRONT WHEEL WALKER
GAIT LEVEL OF ASSIST: CONTACT GUARD ASSIST
GAIT LEVEL OF ASSIST: CONTACT GUARD ASSIST
DISTANCE (FEET): 80
ASSISTIVE DEVICE: FRONT WHEEL WALKER
DEVIATION: BRADYKINETIC;DECREASED HEEL STRIKE;DECREASED TOE OFF;ANTALGIC
DISTANCE (FEET): 120
DEVIATION: BRADYKINETIC;DECREASED HEEL STRIKE;DECREASED TOE OFF

## 2023-10-24 ASSESSMENT — PAIN DESCRIPTION - PAIN TYPE
TYPE: ACUTE PAIN

## 2023-10-24 NOTE — DISCHARGE PLANNING
"Case Management  Tc to dtr melissa @  re SNF as she was interested in facilities in [facility]. She has list of SNF's. Informed her dc date has been extended to 10/26.  She toured [facility] and is \"very comfortable with what she found out\".      Dtr indicates she wants to APPEAL DISCHARGE; I provided her with contact for [facility]; she requests referral be sent to [facility].  She is concerned about transportation to SNF; advised her some facilities  patients or [facility] is a possible option.  Voalted Dr Sutton re appeal.   "

## 2023-10-24 NOTE — DISCHARGE PLANNING
Case Management/IDT follow up.   IDT continues to recommend IRF level of care as patient continue to make progress with all therapies.   Projected dc date set for 10/26/23.      DC needs:  Recommendations made for SNF. Family going to tour places tomorrow.   Follow up with: PCP    Met with pt / family providing update from IDT and discussed plan of care.    Plan:  Continue to follow

## 2023-10-24 NOTE — THERAPY
"Physical Therapy   Daily Treatment     Patient Name: Yvonne Marie Wada  Age:  87 y.o., Sex:  female  Medical Record #: [id number]  Today's Date: 10/24/2023     Precautions  Precautions: (P) Fall Risk  Comments: TLSO PRN    Subjective    Pt was agreeable to ambulating outdoors, and reported that she enjoyed going outside.      Objective       10/24/23 1401   PT Charge Group   PT Gait Training (Units) 2   PT Therapeutic Exercise (Units) 1   PT Therapeutic Activities (Units) 1   PT Total Time Spent   PT Individual Total Time Spent (Mins) 60   Precautions   Precautions Fall Risk   Pain 0 - 10 Group   Location Back   Location Orientation Mid  (declined ice, declined use of TLSO)   Gait Functional Level of Assist    Gait Level Of Assist Contact Guard Assist  (CGA outside over unlevel/ sloped pavement, CGA/SBA inside)   Assistive Device Front Wheel Walker   Distance (Feet) 120  (+ 75 ft x2, + in room to/ from bathroom 15ft x2)   # of Times Distance was Traveled 2   Deviation Bradykinetic;Decreased Heel Strike;Decreased Toe Off;Antalgic  (kyphotic)   Stairs Functional Level of Assist   Level of Assist with Stairs Contact Guard Assist   # of Stairs Climbed 6  (4\")   Stairs Description Extra time;Hand rails;Limited by fatigue;Requires incidental assist;Safety concerns   Transfer Functional Level of Assist   Bed, Chair, Wheelchair Transfer Standby Assist   Bed Chair Wheelchair Transfer Description Adaptive equipment  (SPT in/ OOB with FWW)   Toilet Transfers Contact Guard Assist   Toilet Transfer Description Adaptive equipment;Grab bar  (SPT FWW)   Sitting Lower Body Exercises   Ankle Pumps 1 set of 10;Bilateral   Hip Abduction 1 set of 10;Bilateral;Light Resistance Theraband   Hip Adduction 1 set of 10;Bilateral  (pillow squeezes)   Long Arc Quad 1 set of 10;Bilateral   Marching Reciprocal;1 set of 10   Hamstring Curl 1 set of 10;Bilateral   Bed Mobility    Sit to Supine Standby Assist   Sit to Stand Standby Assist   Scooting " "Supervised   Rolling Supervised   Neuro-Muscular Treatments   Neuro-Muscular Treatments Weight Shift Left;Weight Shift Right;Verbal Cuing;Sequencing;Postural Changes   Comments Standing to manage clothing after toileting, and wt shifting during hygiene SBA/SPV. Cues to gaze ahead/ remain close to FWW during gait training.  object with reacher, SBA/ set up, with unilateral UE support on walker.         Assessment    Pt participated in in- room amb to/from bathroom, and outdoor ambulation up to 125 ft. Pt was able to complete seated HEP with set up/ demo. Pt completed 6 4\" stairs with BHR at [facility] level. Pt continues to be limited by dec activity tolerance, and thoracic pain/kyphosis.     Strengths: Pleasant and cooperative, Supportive family  Barriers: Decreased endurance, Difficulty following instructions, Generalized weakness, Impaired activity tolerance, Impaired balance, Impaired carryover of learning, Impaired insight/denial of deficits, Impaired functional cognition, Limited mobility, Pain    Plan    D/C data collection, D/C recommendations, review/ initial Passport     DME  PT DME Recommendations  Assistive Device: Front Wheeled Walker (patient reports she has one at home)    Passport items to be completed:  Get in/out of bed safely, in/out of a vehicle, safely use mobility device, walk or wheel around home/community, navigate up and down stairs, show how to get up/down from the ground, ensure home is accessible, demonstrate HEP, complete caregiver training    Physical Therapy Problems (Active)       Problem: Balance       Dates: Start:  10/13/23         Goal: STG-Within one week, patient will improve Jaramillo to 22/56 (16/56 on eval)       Dates: Start:  10/13/23    Expected End:  10/25/23         Goal Note filed on 10/24/23 1112 by Tamara Burns, DPT       Score declined on subsequent assessment 9/56                 Problem: Mobility       Dates: Start:  10/13/23         Goal: STG-Within one week, patient " will ambulate up/down a curb with FWW and CGA       Dates: Start:  10/13/23    Expected End:  10/25/23         Goal Note filed on 10/24/23 1112 by Tamara Burns DPT       NT, safety concerns, kyphotic, dec activity tolerance                  Problem: Mobility Transfers       Dates: Start:  10/13/23         Goal: STG-Within one week, patient will transfer in/out of car with FWW and CGA       Dates: Start:  10/13/23    Expected End:  10/25/23         Goal Note filed on 10/24/23 1112 by Tamara Burns, MELIZA       NT; TBD, patient fatigues easily, impaired balance                  Problem: PT-Long Term Goals       Dates: Start:  10/13/23         Goal: LTG-By discharge, patient will ambulate 250 ft between rest breaks with LRAD and supervision assist       Dates: Start:  10/13/23    Expected End:  10/25/23            Goal: LTG-By discharge, patient will transfer in/out of a car with supervision assist       Dates: Start:  10/13/23    Expected End:  10/25/23            Goal: LTG-By discharge, patient will improve Jaramillo to >40/56 to reduce fall risk       Dates: Start:  10/13/23    Expected End:  10/25/23

## 2023-10-24 NOTE — THERAPY
"Occupational Therapy  Daily Treatment     Patient Name: Yvonne Marie Wada  Age:  87 y.o., Sex:  female  Medical Record #: [id number]  Today's Date: 10/24/2023     Precautions  Precautions: Fall Risk  Comments: TLSO PRN         Subjective    \"Whatever I have to do to get back in bed\"     Objective       10/24/23 0901   OT Charge Group   Charges Yes   OT Self Care / ADL (Units) 4   OT Total Time Spent   OT Individual Total Time Spent (Mins) 60   Precautions   Precautions Fall Risk   Comments TLSO PRN   Functional Level of Assist   Grooming Standby Assist;Seated   Grooming Description Increased time;Initial preparation for task;Seated in wheelchair at sink;Set-up of equipment   Bathing Moderate Assist   Bathing Description Adaptive equipment;Grab bar;Tub bench;Increased time;Supervision for safety;Verbal cueing  (pt just rinsed off with water, needing cues to use soap and washcloth. decline washing hair)   Upper Body Dressing Stand by Assist   Upper Body Dressing Description Increased time;Set-up of equipment;Supervision for safety   Lower Body Dressing Maximal Assist   Lower Body Dressing Description Assist with threading into pant leg  (verbal cues to complete but declines to do so and requires assistance with all parts of tasks)   Toileting Moderate Assist   Toileting Description Assist to pull pants up;Assist to pull pants down   Bed, Chair, Wheelchair Transfer Standby Assist   Bed Chair Wheelchair Transfer Description Adaptive equipment;Increased time;Initial preparation for task;Supervision for safety;Verbal cueing   Toilet Transfers Standby Assist   Toilet Transfer Description Adaptive equipment;Grab bar;Increased time;Set-up of equipment;Supervision for safety;Verbal cueing   Tub / Shower Transfers Contact Guard Assist   Tub Shower Transfer Description Adaptive equipment;Shower bench;Grab bar;Increased time;Initial preparation for task;Set-up of equipment;Supervision for safety   Interdisciplinary Plan of Care " Collaboration   IDT Collaboration with     Patient Position at End of Therapy In Bed;Call Light within Reach;Tray Table within Reach   Collaboration Comments DC plans   Eating   Assistance Needed Set-up / clean-up   Physical Assistance Level No physical assistance   CARE Score - Eating 5   Oral Hygiene   Assistance Needed Set-up / clean-up   Physical Assistance Level No physical assistance   CARE Score - Oral Hygiene 5   Toileting Hygiene   Assistance Needed Physical assistance   Physical Assistance Level 26%-50%   CARE Score - Toileting Hygiene 3   Shower/Bathe Self   Assistance Needed Physical assistance   Physical Assistance Level 26%-50%   CARE Score - Shower/Bathe Self 3   Upper Body Dressing   Assistance Needed Set-up / clean-up   Physical Assistance Level No physical assistance   CARE Score - Upper Body Dressing 5   Lower Body Dressing   Assistance Needed Physical assistance   Physical Assistance Level 26%-50%   CARE Score - Lower Body Dressing 3   Putting On/Taking Off Footwear   Assistance Needed Physical assistance   Physical Assistance Level 51%-75%   CARE Score - Putting On/Taking Off Footwear 2   Toilet Transfer   Assistance Needed Incidental touching;Adaptive equipment   Physical Assistance Level No physical assistance   CARE Score - Toilet Transfer 4   Confusion Assessment Method (CAM)   Is there evidence of an acute change in mental status from the patient's baseline? No   Inattention Behavior not present   Disorganized thinking Behavior not present   Altered level of consciousness Behavior not present         Assessment    DC IRF ALONDRA completed on this date with moderate participation with max encouragement. Unclear DC plan at this time, however, pt planning on DC to skilled facility for continuation of care. Barriers to progress continue to be low motivation/participation, self limiting, bed seeking, and poor carry over. Pt not safe to DC home with spouse at this time from functional  standpoint.     Strengths: Able to follow instructions, Supportive family, Alert and oriented  Barriers: Decreased endurance, Generalized weakness, Impaired balance, Impaired activity tolerance, Pain, Lack of motivation    Plan  DC IRF ALONDRA completed and charted on 10/24 for potential DC in upcoming days. BIMS needs completed. Continue POC until finalized DC    DME       Occupational Therapy Goals (Active)       Problem: Bathing       Dates: Start:  10/13/23         Goal: STG-Within one week, patient will bathe with CGA using DME/AE as needed       Dates: Start:  10/24/23    Expected End:  10/25/23       Description:             Problem: Dressing       Dates: Start:  10/13/23         Goal: STG-Within one week, patient will dress LB with Min A using DME/AE as needed       Dates: Start:  10/13/23    Expected End:  10/25/23         Goal Note filed on 10/24/23 1255 by Priya Sesay OT       Pt can complete with SBA but refuses to complete                 Problem: Functional Transfers       Dates: Start:  10/13/23         Goal: STG-Within one week, patient will transfer to toilet with SUP using DME/AE as needed       Dates: Start:  10/24/23    Expected End:  10/25/23       Description:             Problem: OT Long Term Goals       Dates: Start:  10/13/23         Goal: LTG-By discharge, patient will complete basic self care tasks with supervision using DME/AE as needed       Dates: Start:  10/13/23    Expected End:  10/25/23            Goal: LTG-By discharge, patient will perform bathroom transfers with supervision - set-up using DME/AE as needed       Dates: Start:  10/13/23    Expected End:  10/25/23               Problem: Toileting       Dates: Start:  10/13/23         Goal: STG-Within one week, patient will complete toileting tasks with Min A using DME/AE as needed       Dates: Start:  10/13/23    Expected End:  10/25/23         Goal Note filed on 10/24/23 1255 by Priya Sesay, OT       Refuses to complete LBD  part of toileting tasks, therefore requiring assistance

## 2023-10-24 NOTE — THERAPY
"Physical Therapy   Daily Treatment     Patient Name: Yvonne Marie Wada  Age:  87 y.o., Sex:  female  Medical Record #: [id number]  Today's Date: 10/24/2023     Precautions  Precautions: (P) Fall Risk  Comments: (P) TLSO PRN    Subjective    I am feeling ok. I don't know when I am discharging. My house is a mess right now- my dog is dying.     Objective       10/24/23 1031   PT Charge Group   PT Gait Training (Units) 2   PT Therapeutic Exercise (Units) 1   PT Therapeutic Activities (Units) 1   PT Total Time Spent   PT Individual Total Time Spent (Mins) 60   Precautions   Precautions Fall Risk   Comments TLSO PRN   Pain   Intervention Rest   Non Verbal Descriptors   Non Verbal Scale  Calm   Cognition    Level of Consciousness Alert   New Learning Impaired   ABS (Agitated Behavior Scale)   Agitated Behavior Scale Performed No   Sleep/Wake Cycle   Sleep & Rest Awake   Gait Functional Level of Assist    Gait Level Of Assist Contact Guard Assist   Assistive Device Front Wheel Walker   Distance (Feet) 80  (plus 35 ft)   # of Times Distance was Traveled 2   Deviation Bradykinetic;Decreased Heel Strike;Decreased Toe Off   Wheelchair Functional Level of Assist   Wheelchair Assist Moderate Assist   Distance Wheelchair (Feet or Distance) 150   Wheelchair Description Assistance with steering;Extra time;Leg rest management;Impaired coordination;Limited by fatigue;Requires incidental assist;Supervision for safety   Stairs Functional Level of Assist   Level of Assist with Stairs Contact Guard Assist   # of Stairs Climbed 6  (4\")   Transfer Functional Level of Assist   Bed, Chair, Wheelchair Transfer Standby Assist   Bed Chair Wheelchair Transfer Description Adaptive equipment;Set-up of equipment;Supervision for safety   Supine Lower Body Exercise   Straight Leg Raises Front;1 set of 10;Bilateral   Knee to Chest 1 set of 10;Bilateral   Short Arc Quad 1 set of 10;Bilateral   Ankle Pumps 1 set of 10;Bilateral   Gluteal Isometrics 1 set " of 10   Bed Mobility    Supine to Sit Standby Assist  (from flat mat table)   Sit to Supine Standby Assist  (from flat mat table)   Sit to Stand Standby Assist   Scooting Supervised   Rolling Supervised   Skilled Intervention Sequencing;Verbal Cuing   Neuro-Muscular Treatments   Neuro-Muscular Treatments Postural Facilitation;Anterior weight shift;Sequencing   Interdisciplinary Plan of Care Collaboration   IDT Collaboration with  Physician   Patient Position at End of Therapy Seated;Chair Alarm On;Self Releasing Lap Belt Applied;Call Light within Reach;Tray Table within Reach;Phone within Reach   Collaboration Comments communication regarding possible DC to SNF   Roll Left and Right   Assistance Needed Supervision   CARE Score - Roll Left and Right 4   Sit to Lying   Assistance Needed Supervision   CARE Score - Sit to Lying 4   Lying to Sitting on Side of Bed   Assistance Needed Supervision   CARE Score - Lying to Sitting on Side of Bed 4   Sit to Stand   Assistance Needed Incidental touching;Adaptive equipment   CARE Score - Sit to Stand 4   Chair/Bed-to-Chair Transfer   Assistance Needed Incidental touching;Adaptive equipment   CARE Score - Chair/Bed-to-Chair Transfer 4   Car Transfer   Reason if not Attempted Environmental limitations   CARE Score - Car Transfer 10   Walk 10 Feet   Assistance Needed Adaptive equipment;Incidental touching   CARE Score - Walk 10 Feet 4   Walk 50 Feet with Two Turns   Assistance Needed Adaptive equipment;Incidental touching   CARE Score - Walk 50 Feet with Two Turns 4   Walk 150 Feet   Reason if not Attempted Safety concerns   CARE Score - Walk 150 Feet 88   Walking 10 Feet on Uneven Surfaces   Assistance Needed Adaptive equipment;Physical assistance   Physical Assistance Level 25% or less   CARE Score - Walking 10 Feet on Uneven Surfaces 3   1 Step (Curb)   Assistance Needed Adaptive equipment;Physical assistance   Physical Assistance Level 25% or less   CARE Score - 1 Step (Curb)  3   4 Steps   Assistance Needed Adaptive equipment;Incidental touching   CARE Score - 4 Steps 4   12 Steps   Assistance Needed Adaptive equipment;Physical assistance   Physical Assistance Level 25% or less   CARE Score - 12 Steps 3   Picking Up Object   Assistance Needed Adaptive equipment;Supervision   CARE Score - Picking Up Object 4   Wheel 50 Feet with Two Turns   Reason if not Attempted Activity not applicable   CARE Score - Wheel 50 Feet with Two Turns 9   Wheel 150 Feet   Reason if not Attempted Activity not applicable   CARE Score - Wheel 150 Feet 9     [facility]-[place] completed in preparation for DC to SNF if patient is accepted.     Assessment    Patient with improved participation today for 75% of treatment time. Patient reported she was feeling tired at end of session and reluctant to complete more exercises. Patient distracted with family matters and confused as to discharge plan. Large improvements seen in functional mobility compared to last time therapist worked with patient.  VC for safe body mechanics with transfers and gait training- declined donvonda TLSO. Awaiting DC plan from case management.    Strengths: Pleasant and cooperative, Supportive family  Barriers: Decreased endurance, Difficulty following instructions, Generalized weakness, Impaired activity tolerance, Impaired balance, Impaired carryover of learning, Impaired insight/denial of deficits, Impaired functional cognition, Limited mobility, Pain    Plan    DC to SNF pending family decision/DC planning. Standing balance, standing tolerance, gait, transfer safety, postural strengthening    DME  PT DME Recommendations  Assistive Device: Front Wheeled Walker (patient reports she has one at home)    Passport items to be completed:   in/out of a vehicle,  show how to get up/down from the ground, ensure home is accessible,  complete caregiver training    Physical Therapy Problems (Active)       Problem: Balance       Dates: Start:  10/13/23          Goal: STG-Within one week, patient will improve Jaramillo to 22/56 (16/56 on eval)       Dates: Start:  10/13/23    Expected End:  10/25/23         Goal Note filed on 10/24/23 1112 by Tamara Burns, DPT       Score declined on subsequent assessment 9/56                 Problem: Mobility       Dates: Start:  10/13/23         Goal: STG-Within one week, patient will ambulate up/down a curb with FWW and CGA       Dates: Start:  10/13/23    Expected End:  10/25/23         Goal Note filed on 10/24/23 1112 by Tamara Burns, DPT       NT, safety concerns, kyphotic, dec activity tolerance                  Problem: Mobility Transfers       Dates: Start:  10/13/23         Goal: STG-Within one week, patient will transfer in/out of car with FWW and CGA       Dates: Start:  10/13/23    Expected End:  10/25/23         Goal Note filed on 10/24/23 1112 by Tamara Burns, NICOLÁST       NT; TBD, patient fatigues easily, impaired balance                  Problem: PT-Long Term Goals       Dates: Start:  10/13/23         Goal: LTG-By discharge, patient will ambulate 250 ft between rest breaks with LRAD and supervision assist       Dates: Start:  10/13/23    Expected End:  10/25/23            Goal: LTG-By discharge, patient will transfer in/out of a car with supervision assist       Dates: Start:  10/13/23    Expected End:  10/25/23            Goal: LTG-By discharge, patient will improve Jaramillo to >40/56 to reduce fall risk       Dates: Start:  10/13/23    Expected End:  10/25/23

## 2023-10-24 NOTE — PROGRESS NOTES
"  Physical Medicine & Rehabilitation Progress Note    Encounter Date: 10/24/2023    Chief Complaint: Decreased mobility, weakness    Interval Events (Subjective):  Patient sitting up in therapy gym. She reports she is doing well. She reports she has spoken with CM about possible other facility. Discussed would have IDT later today to discuss plan. Per therapists she is doing much better today. Denies NVD.     _____________________________________  Interdisciplinary Team Conference   Most recent IDT on 10/24/2023    Mikayla REAGAN M.D./Ph.D., was present and led the interdisciplinary team conference on 10/24/2023.  I led the IDT conference and agree with the IDT conference documentation and plan of care as noted below.     Nursing:  Diet Current Diet Order   Procedures    Diet Order Diet: Regular       Eating ADL        % of Last Meal  Oral Nutrition: Dinner, Between 50-75% Consumed   Sleep    Bowel Last BM: 10/23/23   Bladder    Barriers to Discharge Home:      Physical Therapy:  Bed Mobility    Transfers Standby Assist  Adaptive equipment, Set-up of equipment, Supervision for safety   Mobility Contact Guard Assist   Stairs    Barriers to Discharge Home:  Bed seeking   Limited balance    Occupational Therapy:  Grooming Standby Assist, Seated   Bathing Maximal Assist   UB Dressing Stand by Assist   LB Dressing  (Verbal cues provided to fasten pants)   Toileting Moderate Assist   Shower & Transfer    Barriers to Discharge Home:      Case Management:  Continues to work on disposition and DME needs.      Discharge Date/Disposition:  10/25/23 -> TBD   _____________________________________     Objective:  VITAL SIGNS: BP (!) 133/91   Pulse (!) 108   Temp 37 °C (98.6 °F) (Oral)   Resp 18   Ht 1.646 m (5' 4.8\")   Wt 66 kg (145 lb 8.1 oz)   SpO2 94%   BMI 24.36 kg/m²   Gen: NAD  Psych: Mood and affect appropriate  CV: RRR, 0 edema  Resp: CTAB, no upper airway sounds  Abd: NTND  Neuro: AOx3, following " commands  Unchanged from 10/23/23    Laboratory Values:  No results found for this or any previous visit (from the past 72 hour(s)).      Medications:  Scheduled Medications   Medication Dose Frequency    acetaminophen  1,000 mg TID    metoprolol SR  100 mg QAM    senna-docusate  2 Tablet BID    omeprazole  20 mg DAILY    apixaban  2.5 mg BID    benazepril  40 mg QAM    levothyroxine  75 mcg AM ES     PRN medications: hydrocortisone rectal, Respiratory Therapy Consult, hydrALAZINE, senna-docusate **AND** polyethylene glycol/lytes **AND** magnesium hydroxide **AND** bisacodyl, mag hydrox-al hydrox-simeth, ondansetron **OR** ondansetron, traZODone, sodium chloride, oxyCODONE immediate-release **OR** oxyCODONE immediate-release, [COMPLETED] acetaminophen **FOLLOWED BY** acetaminophen    Diet:  Current Diet Order   Procedures    Diet Order Diet: Regular       Medical Decision Making and Plan:  T7/T8 Fracture - Patient with GLF on 10/8/23 with suspected syncope found to be in A fib and with T7/T8 compression fracture. Evaluated by NSG and recommending TLSO for comfort when OOB  -PT and OT for mobility and ADLs. Per guidelines, 15 hours per week between PT, OT and/or SLP.  -Follow-up NSG. Continue TLSO for comfort     HTN/A fib - Patient on Benazepril 40 mg and Metoprolol 50 mg. Metoprolol has been increased to 100 mg. SBP wnl but DBP elevated will monitor. Continue Metoprolol 100 mg, Benazepril 40 mg.      Hyponatremia - Check AM CMP - 134, will continue monitor. Repeat labs 10/20 - 134, stable. WIll need follow-up PCP     Hypothyroidism - Patient on Levothyroxine 75 mcg      Leukopenia - 4.1 on admission, recheck 10/16 - 6.5, improved    Thrombocytopenia - Check AM CBC - 181, improved. Repeat 283     Pain - Patient on PRN Tylenol and Oxycodone. Schedule Tylenol. Continue Tylenol 1000 mg TID, and check CMP - LFTs normal. Continue schedule Tylenol      Skin - Patient at risk for skin breakdown due to debility in areas  including sacrum, achilles, elbows and head in addition to other sites. Nursing to assess skin daily.      GI Ppx - Patient on Prilosec for GERD prophylaxis. Patient on Senna-docusate for constipation prophylaxis.   -Severe constipation on 10/14, painful abdominal chest pain. EKG with A fib.  KUB with moderate constipation. Give Enema, suppository. Had BM, discontinue Suppository. Resolved     DVT Ppx - Patient Eliquis on transfer. Continue Eliquis as limited ambulator     Dispo - Ongoing discussion with patient and family about level of care. She has made improvement in last few days.   ____________________________________    T. Ned uStton MD/PhD  [facility] - Physical Medicine & Rehabilitation   [facility] - Brain Injury Medicine   ____________________________________

## 2023-10-24 NOTE — DISCHARGE PLANNING
Salty Beard from pt's dtr Isis Campos who confirms pt is in agreement w/ SNF. Pt's spouse had deferred decision making to dtr Isis.   I faxed list of SNF to dtr as she wants to explore snf's in [facility] also. Informed her [facility] has a bed available - dtr wants to explore other facilities before deciding.  Informed her dc date set for 10/25; she is hoping date can be extended to 10/26 - will discuss w/ dr kumar.

## 2023-10-24 NOTE — PROGRESS NOTES
NURSING DAILY NOTE    Name: Yvonne Marie Wada   Date of Admission: 10/12/2023   Admitting Diagnosis: Other fracture of T7-t8 thoracic vertebra, initial encounter for closed fracture (HCC)  Attending Physician: Mikayla Sutton M.d.  Allergies: Codeine, Doxycycline, Sulfamethoxazole-trimethoprim, Tramadol, and Trazodone    Safety  Patient Assist  moderate assist  Patient Precautions  Fall Risk, TLSO (Thoracolumbosacral orthosis)  Precaution Comments  TLSO PRN  Bed Transfer Status  Standby Assist  Toilet Transfer Status   Standby Assist  Assistive Devices  Wheelchair  Oxygen  None - Room Air  Diet/Therapeutic Dining  Current Diet Order   Procedures    Diet Order Diet: Regular     Pill Administration  whole  Agitated Behavioral Scale     ABS Level of Severity       Fall Risk  Has the patient had a fall this admission?   No  Muna Cuenca Fall Risk Scoring  15, HIGH RISK  Fall Risk Safety Measures  bed alarm and chair alarm    Vitals  Temperature: 36.9 °C (98.5 °F)  Temp src: Oral  Pulse: 92  Respiration: 15  Blood Pressure : 120/82  Blood Pressure MAP (Calculated): 95 MM HG  BP Location: Left, Upper Arm  Patient BP Position: Supine     Oxygen  Pulse Oximetry: 97 %  O2 (LPM): 0  O2 Delivery Device: None - Room Air    Bowel and Bladder  Last Bowel Movement  10/23/23  Stool Type  Type 3: Like a sausage, but with cracks on its surface  Bowel Device  Bathroom  Continent  Bladder: Did not void   Bowel: No movement  Bladder Function  Urine Void (mL):  (large)  Number of Times Voided: 2  Urine Color: Yellow  Wet Diaper Count: 1  Genitourinary Assessment   Bladder Assessment (WDL):  WDL Except  Dale Catheter: Not Applicable  Urinary Elimination: Stress Incontinence  Urine Color: Yellow  Number of Bladder Accidents: 1  Total Number of Bladder of Accidents in Last 7 Days: 1  Bladder Device: Bathroom  Bladder Scan: Post Void  $ Bladder Scan Results (mL):  "161    Skin  Jase Score   17  Sensory Interventions   Bed Types: Standard/Trauma Mattress  Skin Preventative Measures: Pillows in Use for Support / Positioning  Moisture Interventions  Moisturizers/Barriers: Barrier Paste      Pain  Pain Rating Scale  8 - Awful, hard to do anything  Pain Location  Back  Pain Location Orientation  Posterior  Pain Interventions   Medication (see MAR)    ADLs    Bathing   Patient Refused Bathing (Patient said: \"No; thank you, but I am tired & sleepy\".)  Linen Change   Complete  Personal Hygiene  Moist Tisha Wipes, Perineal Care  Chlorhexidine Bath      Oral Care     Teeth/Dentures     Shave     Nutrition Percentage Eaten  Breakfast, Between 50-75% Consumed  Environmental Precautions  Treaded Slipper Socks on Patient  Patient Turns/Positioning  Patient Turns Self from Side to Side  Patient Turns Assistance/Tolerance  Assistance of One  Bed Positions  Bed Controls On, Bed Locked  Head of Bed Elevated  Self regulated      Psychosocial/Neurologic Assessment  Psychosocial Assessment  Psychosocial (WDL):  Within Defined Limits  Neurologic Assessment  Neuro (WDL): Exceptions to WDL  Level of Consciousness: Alert  Orientation Level: Oriented X4  Cognition: Follows commands  Speech: Clear  Pupil Assesment: No  Motor Function/Sensation Assessment: Motor strength  RUE Sensation: Full sensation  Muscle Strength Right Arm: Good Strength Against Gravity and Moderate Resistance  LUE Sensation: Full sensation  Muscle Strength Left Arm: Good Strength Against Gravity and Moderate Resistance  RLE Sensation: Numbness  Muscle Strength Right Leg: Fair Strength against Gravity but No Resistance  LLE Sensation: Numbness  Muscle Strength Left Leg: Fair Strength against Gravity but No Resistance  EENT (WDL):  WDL Except    Cardio/Pulmonary Assessment  Edema   RLE Edema: Generalized  LLE Edema: Generalized  Respiratory Breath Sounds  RUL Breath Sounds: Clear  RML Breath Sounds: Diminished  RLL Breath Sounds: " Diminished  LOU Breath Sounds: Clear  LLL Breath Sounds: Diminished  Cardiac Assessment   Cardiac (WDL):  WDL Except

## 2023-10-24 NOTE — PROGRESS NOTES
NURSING DAILY NOTE    Name: Yvonne Marie Wada   Date of Admission: 10/12/2023   Admitting Diagnosis: Other fracture of T7-t8 thoracic vertebra, initial encounter for closed fracture (HCC)  Attending Physician: Mikayla Sutton M.d.  Allergies: Codeine, Doxycycline, Sulfamethoxazole-trimethoprim, Tramadol, and Trazodone    Safety  Patient Assist  moderate assist  Patient Precautions  Fall Risk, TLSO (Thoracolumbosacral orthosis)  Precaution Comments  TLSO PRN  Bed Transfer Status  Standby Assist  Toilet Transfer Status   Standby Assist  Assistive Devices  Wheelchair  Oxygen  None - Room Air  Diet/Therapeutic Dining  Current Diet Order   Procedures    Diet Order Diet: Regular     Pill Administration  whole  Agitated Behavioral Scale     ABS Level of Severity       Fall Risk  Has the patient had a fall this admission?   No  Muna Cuenca Fall Risk Scoring  15, HIGH RISK  Fall Risk Safety Measures  bed alarm and chair alarm    Vitals  Temperature: 37 °C (98.6 °F)  Temp src: Oral  Pulse: 82  Respiration: 18  Blood Pressure : 112/66  Blood Pressure MAP (Calculated): 81 MM HG  BP Location: Left, Upper Arm  Patient BP Position: Supine     Oxygen  Pulse Oximetry: 92 %  O2 (LPM): 0  O2 Delivery Device: None - Room Air    Bowel and Bladder  Last Bowel Movement  10/23/23  Stool Type  Type 3: Like a sausage, but with cracks on its surface  Bowel Device  Bathroom  Continent  Bladder: Did not void   Bowel: No movement  Bladder Function  Urine Void (mL):  (large)  Number of Times Voided:  (Patient refused time void)  Urine Color: Yellow  Wet Diaper Count: 1  Genitourinary Assessment   Bladder Assessment (WDL):  WDL Except  Dale Catheter: Not Applicable  Urinary Elimination: Stress Incontinence  Urine Color: Yellow  Number of Bladder Accidents: 1  Total Number of Bladder of Accidents in Last 7 Days: 1  Bladder Device: Bathroom  Bladder Scan: Post Void  $ Bladder Scan  "Results (mL): 161    Skin  Jase Score   17  Sensory Interventions   Bed Types: Standard/Trauma Mattress  Skin Preventative Measures: Pillows in Use for Support / Positioning  Moisture Interventions  Moisturizers/Barriers: Barrier Paste      Pain  Pain Rating Scale  0 - No Pain  Pain Location  Back  Pain Location Orientation  Posterior  Pain Interventions   Declines    ADLs    Bathing   Patient Refused Bathing (Patient said: \"No; thank you, but I am tired & sleepy\".)  Linen Change   Complete  Personal Hygiene  Moist Tisha Wipes, Perineal Care  Chlorhexidine Bath      Oral Care     Teeth/Dentures     Shave     Nutrition Percentage Eaten  Dinner, Between 50-75% Consumed  Environmental Precautions  Treaded Slipper Socks on Patient  Patient Turns/Positioning  Patient Turns Self from Side to Side  Patient Turns Assistance/Tolerance  Assistance of One  Bed Positions  Bed Controls On, Bed Locked  Head of Bed Elevated  Self regulated      Psychosocial/Neurologic Assessment  Psychosocial Assessment  Psychosocial (WDL):  Within Defined Limits  Neurologic Assessment  Neuro (WDL): Exceptions to WDL  Level of Consciousness: Alert  Orientation Level: Oriented X4  Cognition: Follows commands  Speech: Clear  Pupil Assesment: No  Motor Function/Sensation Assessment: Motor strength  RUE Sensation: Full sensation  Muscle Strength Right Arm: Good Strength Against Gravity and Moderate Resistance  LUE Sensation: Full sensation  Muscle Strength Left Arm: Good Strength Against Gravity and Moderate Resistance  RLE Sensation: Numbness  Muscle Strength Right Leg: Fair Strength against Gravity but No Resistance  LLE Sensation: Numbness  Muscle Strength Left Leg: Fair Strength against Gravity but No Resistance  EENT (WDL):  WDL Except    Cardio/Pulmonary Assessment  Edema   RLE Edema: Generalized  LLE Edema: Generalized  Respiratory Breath Sounds  RUL Breath Sounds: Clear  RML Breath Sounds: Diminished  RLL Breath Sounds: Diminished  LOU Breath " Sounds: Clear  LLL Breath Sounds: Diminished  Cardiac Assessment   Cardiac (WDL):  WDL Except

## 2023-10-24 NOTE — CARE PLAN
Problem: Dressing  Goal: STG-Within one week, patient will dress LB with Min A using DME/AE as needed  Outcome: Progressing  Note: Pt can complete with SBA but refuses to complete     Problem: Toileting  Goal: STG-Within one week, patient will complete toileting tasks with Min A using DME/AE as needed  Outcome: Progressing  Note: Refuses to complete LBD part of toileting tasks, therefore requiring assistance     Problem: Bathing  Goal: STG-Within one week, patient will bathe with Min A using DME/AE as needed  Outcome: Met  Updated to: STG-Within one week, patient will bathe with CGA using DME/AE as needed (Reason: goal met)     Problem: Functional Transfers  Goal: STG-Within one week, patient will transfer to toilet with SBA using DME/AE as needed  Outcome: Met  Updated to: STG-Within one week, patient will transfer to toilet with SUP using DME/AE as needed (Reason: goal met)

## 2023-10-24 NOTE — CARE PLAN
"The patient is Stable - Low risk of patient condition declining or worsening    Shift Goals  Clinical Goals: safety  Patient Goals: safety  Family Goals: Education    Problem: Skin Integrity  Goal: Skin integrity is maintained or improved  Outcome: Progressing  Note:   Jase Score: 17    Patient's skin remains intact and free from new or accidental injury this shift; no s/s of infection. RN wound protocol checked. Encouraged hydration and educated about the importance of nutrition to keep skin integrity. Will continue to monitor.       Problem: Fall Risk - Rehab  Goal: Patient will remain free from falls  Outcome: Progressing  Note: Muna Cuenca Fall risk Assessment Score: 15    High fall risk Interventions   - Alarming seatbelt  - Bed and strip alarm   - Yellow sign by the door   - Yellow wrist band \"Fall risk\"  - Room near to the nurse station  - Do not leave patient unattended in the bathroom  - Fall risk education provided       "

## 2023-10-24 NOTE — CARE PLAN
Problem: Balance  Goal: STG-Within one week, patient will improve Jaramillo to 22/56 (16/56 on eval)  Outcome: Not Met  Note: Score declined on subsequent assessment 9/56     Problem: Mobility  Goal: STG-Within one week, patient will ambulate up/down a curb with FWW and CGA  Outcome: Not Met  Note: NT, safety concerns, kyphotic, dec activity tolerance      Problem: Mobility Transfers  Goal: STG-Within one week, patient will transfer in/out of car with FWW and CGA  Outcome: Not Met  Note: NT; TBD, patient fatigues easily, impaired balance

## 2023-10-24 NOTE — CARE PLAN
The patient is Stable - Low risk of patient condition declining or worsening    Shift Goals  Clinical Goals: safety  Patient Goals: safety  Family Goals: Education    Progress made toward(s) clinical / shift goals:      Problem: Knowledge Deficit - Standard  Goal: Patient and family/care givers will demonstrate understanding of plan of care, disease process/condition, diagnostic tests and medications  Outcome: Progressing     Problem: Fall Risk - Rehab  Goal: Patient will remain free from falls  Outcome: Progressing       Patient is not progressing towards the following goals:

## 2023-10-24 NOTE — CARE PLAN
Problem: Skin Integrity  Goal: Skin integrity is maintained or improved  Outcome: Progressing     Problem: Fall Risk - Rehab  Goal: Patient will remain free from falls  Outcome: Progressing     Problem: Pain - Standard  Goal: Alleviation of pain or a reduction in pain to the patient’s comfort goal  Outcome: Progressing   The patient is Stable - Low risk of patient condition declining or worsening    Shift Goals  Clinical Goals: pain control pt/ot  Patient Goals: rest comfort  Family Goals: Education    Progress made toward(s) clinical / shift goals:       Patient is not progressing towards the following goals:

## 2023-10-25 ENCOUNTER — APPOINTMENT (OUTPATIENT)
Dept: PHYSICAL THERAPY | Facility: REHABILITATION | Age: 88
DRG: 560 | End: 2023-10-25
Attending: PHYSICAL MEDICINE & REHABILITATION
Payer: MEDICARE

## 2023-10-25 ENCOUNTER — APPOINTMENT (OUTPATIENT)
Dept: OCCUPATIONAL THERAPY | Facility: REHABILITATION | Age: 88
DRG: 560 | End: 2023-10-25
Attending: PHYSICAL MEDICINE & REHABILITATION
Payer: MEDICARE

## 2023-10-25 PROCEDURE — 97110 THERAPEUTIC EXERCISES: CPT

## 2023-10-25 PROCEDURE — 97535 SELF CARE MNGMENT TRAINING: CPT

## 2023-10-25 PROCEDURE — 97530 THERAPEUTIC ACTIVITIES: CPT

## 2023-10-25 PROCEDURE — 99232 SBSQ HOSP IP/OBS MODERATE 35: CPT | Performed by: PHYSICAL MEDICINE & REHABILITATION

## 2023-10-25 PROCEDURE — 97116 GAIT TRAINING THERAPY: CPT

## 2023-10-25 PROCEDURE — A9270 NON-COVERED ITEM OR SERVICE: HCPCS | Performed by: PHYSICAL MEDICINE & REHABILITATION

## 2023-10-25 PROCEDURE — 770010 HCHG ROOM/CARE - REHAB SEMI PRIVAT*

## 2023-10-25 PROCEDURE — 700102 HCHG RX REV CODE 250 W/ 637 OVERRIDE(OP): Performed by: PHYSICAL MEDICINE & REHABILITATION

## 2023-10-25 RX ADMIN — ACETAMINOPHEN 1000 MG: 500 TABLET ORAL at 15:51

## 2023-10-25 RX ADMIN — ACETAMINOPHEN 1000 MG: 500 TABLET ORAL at 19:40

## 2023-10-25 RX ADMIN — SENNOSIDES AND DOCUSATE SODIUM 2 TABLET: 50; 8.6 TABLET ORAL at 19:40

## 2023-10-25 RX ADMIN — APIXABAN 2.5 MG: 2.5 TABLET, FILM COATED ORAL at 09:02

## 2023-10-25 RX ADMIN — SENNOSIDES AND DOCUSATE SODIUM 2 TABLET: 50; 8.6 TABLET ORAL at 09:03

## 2023-10-25 RX ADMIN — METOPROLOL SUCCINATE 100 MG: 100 TABLET, EXTENDED RELEASE ORAL at 09:01

## 2023-10-25 RX ADMIN — APIXABAN 2.5 MG: 2.5 TABLET, FILM COATED ORAL at 19:41

## 2023-10-25 RX ADMIN — ACETAMINOPHEN 1000 MG: 500 TABLET ORAL at 09:04

## 2023-10-25 RX ADMIN — OXYCODONE HYDROCHLORIDE 5 MG: 5 TABLET ORAL at 09:00

## 2023-10-25 RX ADMIN — OMEPRAZOLE 20 MG: 20 CAPSULE, DELAYED RELEASE ORAL at 09:04

## 2023-10-25 RX ADMIN — LEVOTHYROXINE SODIUM 75 MCG: 0.07 TABLET ORAL at 06:13

## 2023-10-25 RX ADMIN — OXYCODONE HYDROCHLORIDE 5 MG: 5 TABLET ORAL at 19:44

## 2023-10-25 RX ADMIN — BENAZEPRIL HYDROCHLORIDE 40 MG: 10 TABLET, FILM COATED ORAL at 09:02

## 2023-10-25 ASSESSMENT — ACTIVITIES OF DAILY LIVING (ADL)
TOILET_TRANSFER_DESCRIPTION: GRAB BAR;INCREASED TIME
TOILETING_LEVEL_OF_ASSIST_DESCRIPTION: ASSIST TO PULL PANTS UP
TOILET_TRANSFER_DESCRIPTION: GRAB BAR;ADAPTIVE EQUIPMENT;INCREASED TIME;VERBAL CUEING
BED_CHAIR_WHEELCHAIR_TRANSFER_DESCRIPTION: ADAPTIVE EQUIPMENT;INCREASED TIME;INITIAL PREPARATION FOR TASK;SUPERVISION FOR SAFETY;VERBAL CUEING
BED_CHAIR_WHEELCHAIR_TRANSFER_DESCRIPTION: ADAPTIVE EQUIPMENT;INCREASED TIME;INITIAL PREPARATION FOR TASK;SUPERVISION FOR SAFETY
TOILETING_LEVEL_OF_ASSIST_DESCRIPTION: ADAPTIVE EQUIPMENT;GRAB BAR;INCREASED TIME;SUPERVISION FOR SAFETY;VERBAL CUEING

## 2023-10-25 ASSESSMENT — GAIT ASSESSMENTS
GAIT LEVEL OF ASSIST: CONTACT GUARD ASSIST
ASSISTIVE DEVICE: 4 WHEEL WALKER
ASSISTIVE DEVICE: FRONT WHEEL WALKER
GAIT LEVEL OF ASSIST: CONTACT GUARD ASSIST
DISTANCE (FEET): 125
DISTANCE (FEET): 50
DEVIATION: DECREASED BASE OF SUPPORT;BRADYKINETIC;SHUFFLED GAIT;DECREASED HEEL STRIKE;DECREASED TOE OFF

## 2023-10-25 NOTE — PROGRESS NOTES
..                                                         NURSING DAILY NOTE    Name: Yvonne Marie Wada   Date of Admission: 10/12/2023   Admitting Diagnosis: Other fracture of T7-t8 thoracic vertebra, initial encounter for closed fracture (HCC)  Attending Physician: Mikayla Sutton M.d.  Allergies: Codeine, Doxycycline, Sulfamethoxazole-trimethoprim, Tramadol, and Trazodone    Safety  Patient Assist  moderate assist  Patient Precautions  Fall Risk  Precaution Comments  TLSO PRN  Bed Transfer Status  Standby Assist  Toilet Transfer Status   Contact Guard Assist  Assistive Devices  Rails, Wheelchair  Oxygen  None - Room Air  Diet/Therapeutic Dining  Current Diet Order   Procedures    Diet Order Diet: Regular     Pill Administration  whole  Agitated Behavioral Scale     ABS Level of Severity       Fall Risk  Has the patient had a fall this admission?   No  Muna Cuenca Fall Risk Scoring  14, MODERATE RISK  Fall Risk Safety Measures  bed alarm, chair alarm, poor balance, and low vision/ hearing    Vitals  Temperature: 36.8 °C (98.2 °F)  Temp src: Oral  Pulse: 100  Respiration: 18  Blood Pressure : 122/76  Blood Pressure MAP (Calculated): 91 MM HG  BP Location: Left, Upper Arm  Patient BP Position: Supine     Oxygen  Pulse Oximetry: 94 %  O2 (LPM): 0  O2 Delivery Device: None - Room Air    Bowel and Bladder  Last Bowel Movement  10/23/23  Stool Type  Type 3: Like a sausage, but with cracks on its surface  Bowel Device  Bathroom  Continent  Bladder: Did not void   Bowel: No movement  Bladder Function  Urine Void (mL):  (large)  Number of Times Voided: 1  Urine Color: Yellow  Wet Diaper Count: 1  Genitourinary Assessment   Bladder Assessment (WDL):  WDL Except  Dale Catheter: Not Applicable  Urinary Elimination: Stress Incontinence  Urine Color: Yellow  Number of Bladder Accidents: 1  Total Number of Bladder of Accidents in Last 7 Days: 1  Bladder Device: Bathroom  Bladder Scan: Post Void  $ Bladder Scan  "Results (mL): 161    Skin  Jase Score   18  Sensory Interventions   Bed Types: Standard/Trauma Mattress  Skin Preventative Measures: Pillows in Use for Support / Positioning  Moisture Interventions  Moisturizers/Barriers: Barrier Paste      Pain  Pain Rating Scale  0 - No Pain  Pain Location  Back  Pain Location Orientation  Mid (declined ice, declined use of TLSO)  Pain Interventions   Declines    ADLs    Bathing   Patient Refused Bathing (Patient said: \"No; thank you, but I am tired & sleepy\".)  Linen Change   Partial  Personal Hygiene  Moist Tisha Wipes  Chlorhexidine Bath      Oral Care     Teeth/Dentures     Shave     Nutrition Percentage Eaten  Dinner, Between 50-75% Consumed  Environmental Precautions  Bed in Low Position  Patient Turns/Positioning  Patient Turns Self from Side to Side  Patient Turns Assistance/Tolerance  Assistance of One  Bed Positions  Bed Controls On, Bed Locked  Head of Bed Elevated  Self regulated      Psychosocial/Neurologic Assessment  Psychosocial Assessment  Psychosocial (WDL):  Within Defined Limits  Neurologic Assessment  Neuro (WDL): Exceptions to WDL  Level of Consciousness: Alert  Orientation Level: Oriented X4  Cognition: Follows commands  Speech: Clear  Pupil Assesment: No  Motor Function/Sensation Assessment: Motor strength, Sensation  RUE Sensation: Full sensation  Muscle Strength Right Arm: Good Strength Against Gravity and Moderate Resistance  LUE Sensation: Full sensation  Muscle Strength Left Arm: Good Strength Against Gravity and Moderate Resistance  RLE Sensation: Numbness  Muscle Strength Right Leg: Fair Strength against Gravity but No Resistance  LLE Sensation: Numbness  Muscle Strength Left Leg: Fair Strength against Gravity but No Resistance  EENT (WDL):  WDL Except    Cardio/Pulmonary Assessment  Edema   RLE Edema: Generalized  LLE Edema: Generalized  Respiratory Breath Sounds  RUL Breath Sounds: Clear  RML Breath Sounds: Diminished  RLL Breath Sounds: " Diminished  LOU Breath Sounds: Clear  LLL Breath Sounds: Diminished  Cardiac Assessment   Cardiac (WDL):  WDL Except

## 2023-10-25 NOTE — CARE PLAN
"The patient is Stable - Low risk of patient condition declining or worsening    Shift Goals  Clinical Goals: pain control pt/ot  Patient Goals: rest comfort  Family Goals: Education    Patient is not progressing towards the following goals:    Problem: Fall Risk - Rehab  Goal: Patient will remain free from falls  Outcome: Not Met  Note: Muna Cuenca Fall risk Assessment Score: 14    Moderate fall risk Interventions  - Bed and strip alarm   - Yellow sign by the door   - Yellow wrist band \"Fall risk\"  - Room near to the nurse station  - Do not leave patient unattended in the bathroom  - Fall risk education provided     "

## 2023-10-25 NOTE — PROGRESS NOTES
"  Physical Medicine & Rehabilitation Progress Note    Encounter Date: 10/25/2023    Chief Complaint: Decreased mobility, weakness    Interval Events (Subjective):  Patient sitting up in room. She reports therapy is going well. She is making slow progress. Family would like to appeal going to SNF. Discussed with patient that her family has concerns about safety. Denies NVD. Denies SOB.     _____________________________________  Interdisciplinary Team Conference   Most recent IDT on 10/24/2023    Discharge Date/Disposition:  10/25/23 -> TBD   _____________________________________     Objective:  VITAL SIGNS: /77   Pulse 69   Temp 36.3 °C (97.4 °F) (Oral)   Resp 18   Ht 1.646 m (5' 4.8\")   Wt 66 kg (145 lb 8.1 oz)   SpO2 95%   BMI 24.36 kg/m²   Gen: NAD  Psych: Mood and affect appropriate  CV: RRR, 0 edema  Resp: CTAB, no upper airway sounds  Abd: NTND  Neuro: AOx3, following commands    Laboratory Values:  No results found for this or any previous visit (from the past 72 hour(s)).      Medications:  Scheduled Medications   Medication Dose Frequency    acetaminophen  1,000 mg TID    metoprolol SR  100 mg QAM    senna-docusate  2 Tablet BID    omeprazole  20 mg DAILY    apixaban  2.5 mg BID    benazepril  40 mg QAM    levothyroxine  75 mcg AM ES     PRN medications: hydrocortisone rectal, Respiratory Therapy Consult, hydrALAZINE, senna-docusate **AND** polyethylene glycol/lytes **AND** magnesium hydroxide **AND** bisacodyl, mag hydrox-al hydrox-simeth, ondansetron **OR** ondansetron, traZODone, sodium chloride, oxyCODONE immediate-release **OR** oxyCODONE immediate-release, [COMPLETED] acetaminophen **FOLLOWED BY** acetaminophen    Diet:  Current Diet Order   Procedures    Diet Order Diet: Regular       Medical Decision Making and Plan:  T7/T8 Fracture - Patient with GLF on 10/8/23 with suspected syncope found to be in A fib and with T7/T8 compression fracture. Evaluated by NSG and recommending TLSO for " comfort when OOB  -PT and OT for mobility and ADLs. Per guidelines, 15 hours per week between PT, OT and/or SLP.  -Follow-up NSG. Continue TLSO for comfort     HTN/A fib - Patient on Benazepril 40 mg and Metoprolol 50 mg. Metoprolol has been increased to 100 mg. SBP wnl but DBP elevated will monitor. SBP labile, continue Metoprolol 100 mg and Benazepril 40 mg      Hyponatremia - Check AM CMP - 134, will continue monitor. Repeat labs 10/20 - 134, stable. WIll need follow-up PCP     Hypothyroidism - Patient on Levothyroxine 75 mcg      Leukopenia - 4.1 on admission, recheck 10/16 - 6.5, improved    Thrombocytopenia - Check AM CBC - 181, improved. Repeat 283     Pain - Patient on PRN Tylenol and Oxycodone. Schedule Tylenol. Continue Tylenol 1000 mg TID, and check CMP - LFTs normal. Continue scheduled Tylenol      Skin - Patient at risk for skin breakdown due to debility in areas including sacrum, achilles, elbows and head in addition to other sites. Nursing to assess skin daily.      GI Ppx - Patient on Prilosec for GERD prophylaxis. Patient on Senna-docusate for constipation prophylaxis.   -Severe constipation on 10/14, painful abdominal chest pain. EKG with A fib.  KUB with moderate constipation. Give Enema, suppository. Had BM, discontinue Suppository. Resolved     DVT Ppx - Patient Eliquis on transfer. Continue Eliquis as limited ambulation.    Dispo - Ongoing discussion with patient and family about level of care. She has made improvement in last few days.   ____________________________________    T. Ned Sutton MD/PhD  [facility] - Physical Medicine & Rehabilitation   [facility]   ____________________________________

## 2023-10-25 NOTE — THERAPY
Occupational Therapy  Daily Treatment     Patient Name: Yvonne Marie Wada  Age:  87 y.o., Sex:  female  Medical Record #: [id number]  Today's Date: 10/25/2023     Precautions  Precautions: (P) Fall Risk  Comments: (P) TLSO PRN         Subjective    Pt seated in WC upon arrival. Pt agreeable for tx session.     Objective       10/25/23 0931   OT Charge Group   OT Self Care / ADL (Units) 1   OT Therapeutic Exercise (Units) 1   OT Total Time Spent   OT Individual Total Time Spent (Mins) 30   Precautions   Precautions Fall Risk   Comments TLSO PRN   Functional Level of Assist   Toileting Minimal Assist   Toileting Description Assist to pull pants up   Toilet Transfers Contact Guard Assist   Toilet Transfer Description Grab bar;Increased time   Sitting Upper Body Exercises   Chest Press Bilateral  (3 sets of 8 2 Ib dumbbell)   Front Arm Raise Bilateral  (3 sets of 8 2 Ib dumbbell)   Bicep Curls Bilateral  (3 sets of 8 2 Ib dumbbell)   Bed Mobility    Supine to Sit Standby Assist   Sit to Supine Standby Assist   Interdisciplinary Plan of Care Collaboration   IDT Collaboration with  Physical Therapist   Patient Position at End of Therapy In Bed;Bed Alarm On;Call Light within Reach;Tray Table within Reach;Phone within Reach     Pt completed functional mobility in room bed<>bathroom with FWW and CGA-SBA for safety. Pt requiring Vcs for form and pace during therex to improve strength and endurance needed for functional mobility and ADLs.    Assessment    Pt requiring increased time to complete tasks d/t pain and decreased motivation. Pt requiring frequent rest breaks. Pt CGA-SBA for functional mobility and ADLs    Strengths: Able to follow instructions, Supportive family, Alert and oriented  Barriers: Decreased endurance, Generalized weakness, Impaired balance, Impaired activity tolerance, Pain, Lack of motivation    Plan    DC IRF ALONDRA completed and charted on 10/24 for potential DC in upcoming days. BIMS needs completed.  Continue POC until finalized DC    Occupational Therapy Goals (Active)       Problem: Bathing       Dates: Start:  10/13/23         Goal: STG-Within one week, patient will bathe with CGA using DME/AE as needed       Dates: Start:  10/24/23    Expected End:  10/25/23       Description:             Problem: Dressing       Dates: Start:  10/13/23         Goal: STG-Within one week, patient will dress LB with Min A using DME/AE as needed       Dates: Start:  10/13/23    Expected End:  10/25/23         Goal Note filed on 10/24/23 1255 by Priya Sesay, OT       Pt can complete with SBA but refuses to complete                 Problem: Functional Transfers       Dates: Start:  10/13/23         Goal: STG-Within one week, patient will transfer to toilet with SUP using DME/AE as needed       Dates: Start:  10/24/23    Expected End:  10/25/23       Description:             Problem: OT Long Term Goals       Dates: Start:  10/13/23         Goal: LTG-By discharge, patient will complete basic self care tasks with supervision using DME/AE as needed       Dates: Start:  10/13/23    Expected End:  10/25/23            Goal: LTG-By discharge, patient will perform bathroom transfers with supervision - set-up using DME/AE as needed       Dates: Start:  10/13/23    Expected End:  10/25/23               Problem: Toileting       Dates: Start:  10/13/23         Goal: STG-Within one week, patient will complete toileting tasks with Min A using DME/AE as needed       Dates: Start:  10/13/23    Expected End:  10/25/23         Goal Note filed on 10/24/23 1255 by Priya Sesay, OT       Refuses to complete LBD part of toileting tasks, therefore requiring assistance

## 2023-10-25 NOTE — DISCHARGE INSTRUCTIONS
[facility] NURSING DISCHARGE INSTRUCTIONS    Blood Pressure : 105/54  Weight: 66 kg (145 lb 8.1 oz)  Nursing recommendations for Yvonne Marie Wada at time of discharge are as follows:  Client and Family Member verbalized understanding of all discharge instructions and prescriptions.     Review all your home medications and newly ordered medications with your doctor and/or pharmacist. Follow medication instructions as directed by your doctor and/or pharmacist.    Pain Management:   Discharge Pain Medication Instructions:  Comfort Goal: Sleep Comfortably  Notify your primary care provider if pain is unrelieved with these measures, if the pain is new, or increased in intensity.    Discharge Skin Characteristics: Warm, Dry  Discharge Skin Exam: Clear     Skin / Wound Care Instructions: Please contact your primary care physician for any change in skin integrity.     If You Have Surgical Incisions / Wounds:  Monitor surgical site(s) for signs of increased swelling, redness or symptoms of drainage from the site or fever as this could indicate signs and symptoms of infection. If these symptoms are noted, notifiy your primary care provider.      Discharge Safety Instructions: Should Have ADULT SUPERVISION     Discharge Safety Concerns: Unsteady Gait, Weakness, History Of Falls  The interdisciplinary team has made recommendation that you should have adult supervision in the house due to history of falls, weakness, and unsteady gait  Anti-embolic stockings are not required to increase circulation to the lower extremities.    Discharge Diet: Regular     Discharge Liquids: Thin  Discharge Bowel Function: Continent  Please contact your primary care physician for any changes in bowel habits.  Discharge Bowel Program:    Discharge Bladder Function: Continent  Discharge Urinary Devices: None      Nursing Discharge Plan:   Influenza Vaccine Indication: Indicated: 65 years and older    Case Management Discharge  "Instructions:   Discharge Location:    Agency Name/Address/Phone:    Home Health:    Outpatient Services:    DME Provider/Phone:    Medical Equipment Ordered:    Prescription Faxed to:        Discharge Medication Instructions:  Below are the medications your physician expects you to take upon discharge:      Prevent Falls in Your Home    \"Falling once doubles your chance of falling again\"        -Center for Disease Control and Prevention    Falls in the home can lead to serious injury (fractures, brain injuries), hospitalizations, increased medical costs, and could even be fatal.  The good news is, there are many precautions you can take to avoid falls in your home and help keep you safe:     If prescribed an assistive device (walker, crutches), use as instructed by the healthcare provider\"   Remove any tripping hazards from your home, including loose cords, throw rugs and clutter  Keep a nightlight on in dark (hallways, bathrooms, etc)   Get up slowly, to make sure you feel okay before getting up  Be aware of any side effects of your medications: some medications may make you dizzy  Place a non-skid rubber mat in your shower or tub-consider a shower bench or chair if unsteady on your feet  Wear supportive shoes or non-skid socks when moving around  Start an exercise program once approved by your provider.  If you are feeling weak following a hospital stay, talk to your doctor about home health or outpatient therapy programs designed to help rebuild your strength and endurance          Physical Therapy Discharge Instructions for Yvonne Marie Wada    10/25/2023    Level of Assist Required for Ambulation: Supervision on Flat Surfaces, Assist for Balance on Curbs, Assist for Balance on Stairs  Distance Patient May Ambulate: walk as far as you like, be sure to plan your rest breaks though!  Device Recommended for Ambulation: Front-Wheeled Walker  Level of Assist Required for Transfers: Supervision    Be safe out there " Serena Prieto PT    Occupational Therapy Discharge Instructions for Yvonne Marie Wada    10/26/2023    Level of Assist Required for Eating: Able to Complete Eating without Assist  Level of Assist Required for Grooming: Able to Complete Grooming without Assist  Level of Assist Required for Dressing: Able to Complete Dressing without Assist  Level of Assist Required for Toileting: Requires Supervision with Toileting  Level of Assist Required for Toilet Transfer: Requires Supervision with Toilet Transfer  Level of Assist Required for Bathing: Requires Supervision with Bathing  Level of Assist Required for Shower Transfer: Requires Supervision with Shower Transfer  Level of Assist Required for Home Mgmt: Requires Supervision with Home Management  Level of Assist Required for Meal Prep: Requires Supervision with Meal Preparation  Driving: Please Contact Physician Prior to Driving  Home Exercise Program: Refer to Home Exercise Program Handout for Details    Best of javier Powers OT

## 2023-10-25 NOTE — CARE PLAN
Problem: Mobility  Goal: STG-Within one week, patient will ambulate up/down a curb with FWW and CGA  Outcome: Met     Problem: Mobility Transfers  Goal: STG-Within one week, patient will transfer in/out of car with FWW and CGA  Outcome: Met     Problem: PT-Long Term Goals  Goal: LTG-By discharge, patient will ambulate 250 ft between rest breaks with LRAD and supervision assist  Outcome: Met  Goal: LTG-By discharge, patient will transfer in/out of a car with supervision assist  Outcome: Met     Problem: Balance  Goal: STG-Within one week, patient will improve Jaramillo to 22/56 (16/56 on eval)  Outcome: Discharged - Not Met     Problem: PT-Long Term Goals  Goal: LTG-By discharge, patient will improve Jaramillo to >40/56 to reduce fall risk  Outcome: Discharged - Not Met

## 2023-10-25 NOTE — THERAPY
"Occupational Therapy  Daily Treatment     Patient Name: Yvonne Marie Wada  Age:  87 y.o., Sex:  female  Medical Record #: [id number]  Today's Date: 10/25/2023     Precautions  Precautions: (P) Fall Risk  Comments: (P) TLSO PRN         Subjective    \"Let's try to keep this short\" - educated pt she had a one hour session and it would remain one hour     Objective       10/25/23 1401   OT Charge Group   Charges Yes   OT Self Care / ADL (Units) 2   OT Therapeutic Exercise (Units) 2   OT Total Time Spent   OT Individual Total Time Spent (Mins) 60   Precautions   Precautions Fall Risk   Comments TLSO PRN   Functional Level of Assist   Grooming Standby Assist;Seated   Grooming Description Increased time;Initial preparation for task;Seated in wheelchair at sink;Set-up of equipment;Supervision for safety  (hair brushing/styling, hand hygiene)   Toileting Standby Assist   Toileting Description Adaptive equipment;Grab bar;Increased time;Supervision for safety;Verbal cueing  (pt managed own LBD during this task)   Bed, Chair, Wheelchair Transfer Standby Assist   Bed Chair Wheelchair Transfer Description Adaptive equipment;Increased time;Initial preparation for task;Supervision for safety   Toilet Transfers Standby Assist   Toilet Transfer Description Adaptive equipment;Grab bar;Initial preparation for task;Increased time;Set-up of equipment;Supervision for safety   Sitting Upper Body Exercises   Upper Extremity Bike Level 2 Resistance   Comments 5 min forward/5 min backwars with extended rest break between   Interdisciplinary Plan of Care Collaboration   IDT Collaboration with  Nursing   Patient Position at End of Therapy In Bed;Bed Alarm On;Call Light within Reach;Tray Table within Reach   Collaboration Comments CLOF`         Assessment    Pt with slow progress towards OT POC on this date. Did note improved participation and completion of ADLs with higher levels of independence, however, still requires max encouragement for " continuation of therapy and participation as time goes on.     Strengths: Able to follow instructions, Supportive family, Alert and oriented  Barriers: Decreased endurance, Generalized weakness, Impaired balance, Impaired activity tolerance, Pain, Lack of motivation    Plan    DC IRF ALONDRA completed on 10/25 for potential DC to SNF tomorrow. DC summary needs written if pt DC as planned, however, DC IRF ALONDRA will need to be rescored pending any delays.     DME       Occupational Therapy Goals (Active)       Problem: Bathing       Dates: Start:  10/13/23         Goal: STG-Within one week, patient will bathe with CGA using DME/AE as needed       Dates: Start:  10/24/23    Expected End:  10/25/23       Description:             Problem: Dressing       Dates: Start:  10/13/23         Goal: STG-Within one week, patient will dress LB with Min A using DME/AE as needed       Dates: Start:  10/13/23    Expected End:  10/25/23         Goal Note filed on 10/24/23 2614 by Priya Sesay, OT       Pt can complete with SBA but refuses to complete                 Problem: Functional Transfers       Dates: Start:  10/13/23         Goal: STG-Within one week, patient will transfer to toilet with SUP using DME/AE as needed       Dates: Start:  10/24/23    Expected End:  10/25/23       Description:             Problem: OT Long Term Goals       Dates: Start:  10/13/23         Goal: LTG-By discharge, patient will complete basic self care tasks with supervision using DME/AE as needed       Dates: Start:  10/13/23    Expected End:  10/25/23            Goal: LTG-By discharge, patient will perform bathroom transfers with supervision - set-up using DME/AE as needed       Dates: Start:  10/13/23    Expected End:  10/25/23               Problem: Toileting       Dates: Start:  10/13/23         Goal: STG-Within one week, patient will complete toileting tasks with Min A using DME/AE as needed       Dates: Start:  10/13/23    Expected End:  10/25/23          Goal Note filed on 10/24/23 [id number] by Priya Sesay, OT       Refuses to complete LBD part of toileting tasks, therefore requiring assistance

## 2023-10-25 NOTE — THERAPY
Physical Therapy   Daily Treatment     Patient Name: Yvonne Marie Wada  Age:  87 y.o., Sex:  female  Medical Record #: [id number]  Today's Date: 10/25/2023     Precautions  Precautions: Fall Risk  Comments: TLSO PRN    Subjective    Patient is agreeable to participate. Is found seated in chair after breakfast, would like to take pain meds first. Is in bed this afternoon, is not difficult to encourage out of bed.      Objective       10/25/23 0831 10/25/23 1301   PT Charge Group   PT Gait Training (Units)  --  1   PT Therapeutic Exercise (Units) 2 1   PT Therapeutic Activities (Units) 2  --    PT Total Time Spent   PT Individual Total Time Spent (Mins) 60 30   Gait Functional Level of Assist    Gait Level Of Assist Contact Guard Assist Contact Guard Assist   Assistive Device 4 Wheel Walker Front Wheel Walker   Distance (Feet) 50 125   # of Times Distance was Traveled 3 2   Deviation Decreased Base Of Support;Bradykinetic;Shuffled Gait;Decreased Heel Strike;Decreased Toe Off  (forward flexed posture) Bradykinetic;Antalgic;Decreased Base Of Support;Shuffled Gait;Decreased Heel Strike;Decreased Toe Off  (forward flexed posture)   Transfer Functional Level of Assist   Bed, Chair, Wheelchair Transfer Standby Assist  --    Bed Chair Wheelchair Transfer Description Adaptive equipment;Increased time;Initial preparation for task;Supervision for safety;Verbal cueing  --    Toilet Transfers Contact Guard Assist  --    Toilet Transfer Description Grab bar;Adaptive equipment;Increased time;Verbal cueing  --    Sitting Lower Body Exercises   Hip Abduction 1 set of 10;Bilateral;Light Resistance Theraband  --    Long Arc Quad 1 set of 10;Bilateral  --    Marching 1 set of 10  --    Hamstring Curl 1 set of 10;Bilateral;Light Resistance Theraband  --    Sit to Stand 1 set of 10  --    Nustep  --  Resistance Level 1  (10 min 324 steps)   Bed Mobility    Supine to Sit Standby Assist  --    Sit to Supine Standby Assist  (cues for log roll)   --    Sit to Stand Standby Assist  --    Interdisciplinary Plan of Care Collaboration   IDT Collaboration with  Occupational Therapist  --    Patient Position at End of Therapy Seated;Chair Alarm On;Self Releasing Lap Belt Applied;Call Light within Reach;Tray Table within Reach;Phone within Reach In Bed;Bed Alarm On;Call Light within Reach;Tray Table within Reach;Phone within Reach   Collaboration Comments hand off to OT  --          Assessment    Patient demonstrates improving bed mobility, improving willingness to participate, improving activity tolerance. She is likely at/ near her functional baseline, is otherwise limited by pain.     Strengths: Pleasant and cooperative, Supportive family  Barriers: Decreased endurance, Difficulty following instructions, Generalized weakness, Impaired activity tolerance, Impaired balance, Impaired carryover of learning, Impaired insight/denial of deficits, Impaired functional cognition, Limited mobility, Pain    Plan    D/c to family    DME  PT DME Recommendations  Assistive Device: Front Wheeled Walker (patient reports she has one at home)    Passport items to be completed:  Get in/out of bed safely, in/out of a vehicle, safely use mobility device, walk or wheel around home/community, navigate up and down stairs, show how to get up/down from the ground, ensure home is accessible, demonstrate HEP, complete caregiver training    Physical Therapy Problems (Active)       Problem: Balance       Dates: Start:  10/13/23         Goal: STG-Within one week, patient will improve Jaramillo to 22/56 (16/56 on eval)       Dates: Start:  10/13/23    Expected End:  10/25/23         Goal Note filed on 10/24/23 1112 by Tamara Burns, DPT       Score declined on subsequent assessment 9/56                 Problem: Mobility       Dates: Start:  10/13/23         Goal: STG-Within one week, patient will ambulate up/down a curb with FWW and CGA       Dates: Start:  10/13/23    Expected End:  10/25/23          Goal Note filed on 10/24/23 1112 by Tamara Burns DPT       NT, safety concerns, kyphotic, dec activity tolerance                  Problem: Mobility Transfers       Dates: Start:  10/13/23         Goal: STG-Within one week, patient will transfer in/out of car with FWW and CGA       Dates: Start:  10/13/23    Expected End:  10/25/23         Goal Note filed on 10/24/23 1112 by Tamara Burns DPT       NT; TBD, patient fatigues easily, impaired balance                  Problem: PT-Long Term Goals       Dates: Start:  10/13/23         Goal: LTG-By discharge, patient will ambulate 250 ft between rest breaks with LRAD and supervision assist       Dates: Start:  10/13/23    Expected End:  10/25/23            Goal: LTG-By discharge, patient will transfer in/out of a car with supervision assist       Dates: Start:  10/13/23    Expected End:  10/25/23            Goal: LTG-By discharge, patient will improve Jaramlilo to >40/56 to reduce fall risk       Dates: Start:  10/13/23    Expected End:  10/25/23

## 2023-10-26 VITALS
TEMPERATURE: 98.6 F | HEIGHT: 65 IN | SYSTOLIC BLOOD PRESSURE: 99 MMHG | RESPIRATION RATE: 16 BRPM | HEART RATE: 67 BPM | WEIGHT: 145.5 LBS | OXYGEN SATURATION: 95 % | DIASTOLIC BLOOD PRESSURE: 58 MMHG | BODY MASS INDEX: 24.24 KG/M2

## 2023-10-26 PROCEDURE — A9270 NON-COVERED ITEM OR SERVICE: HCPCS | Performed by: PHYSICAL MEDICINE & REHABILITATION

## 2023-10-26 PROCEDURE — 99239 HOSP IP/OBS DSCHRG MGMT >30: CPT | Performed by: PHYSICAL MEDICINE & REHABILITATION

## 2023-10-26 PROCEDURE — 700102 HCHG RX REV CODE 250 W/ 637 OVERRIDE(OP): Performed by: PHYSICAL MEDICINE & REHABILITATION

## 2023-10-26 RX ORDER — METOPROLOL SUCCINATE 50 MG/1
100 TABLET, EXTENDED RELEASE ORAL EVERY MORNING
Qty: 90 TABLET | Refills: 3 | Status: SHIPPED
Start: 2023-10-26

## 2023-10-26 RX ORDER — ACETAMINOPHEN 500 MG
1000 TABLET ORAL 3 TIMES DAILY
Qty: 30 TABLET | Refills: 0 | Status: SHIPPED
Start: 2023-10-26

## 2023-10-26 RX ORDER — OXYCODONE HYDROCHLORIDE 5 MG/1
5 TABLET ORAL
Qty: 12 TABLET | Refills: 0 | Status: SHIPPED | OUTPATIENT
Start: 2023-10-26 | End: 2023-10-29

## 2023-10-26 RX ADMIN — OMEPRAZOLE 20 MG: 20 CAPSULE, DELAYED RELEASE ORAL at 08:40

## 2023-10-26 RX ADMIN — LEVOTHYROXINE SODIUM 75 MCG: 0.07 TABLET ORAL at 05:25

## 2023-10-26 RX ADMIN — SENNOSIDES AND DOCUSATE SODIUM 2 TABLET: 50; 8.6 TABLET ORAL at 08:39

## 2023-10-26 RX ADMIN — APIXABAN 2.5 MG: 2.5 TABLET, FILM COATED ORAL at 08:40

## 2023-10-26 RX ADMIN — METOPROLOL SUCCINATE 100 MG: 100 TABLET, EXTENDED RELEASE ORAL at 08:40

## 2023-10-26 RX ADMIN — ACETAMINOPHEN 1000 MG: 500 TABLET ORAL at 08:39

## 2023-10-26 RX ADMIN — BENAZEPRIL HYDROCHLORIDE 40 MG: 10 TABLET, FILM COATED ORAL at 08:39

## 2023-10-26 RX ADMIN — ACETAMINOPHEN 1000 MG: 500 TABLET ORAL at 14:20

## 2023-10-26 RX ADMIN — HYDROCORTISONE: 25 CREAM TOPICAL at 10:27

## 2023-10-26 ASSESSMENT — BRIEF INTERVIEW FOR MENTAL STATUS (BIMS)
WHAT MONTH IS IT: ACCURATE WITHIN 5 DAYS
WHAT YEAR IS IT: CORRECT
ASKED TO RECALL SOCK: NO, COULD NOT RECALL
ASKED TO RECALL BLUE: NO, COULD NOT RECALL
INITIAL REPETITION OF BED BLUE SOCK - FIRST ATTEMPT: 3
WHAT DAY OF THE WEEK IS IT: INCORRECT
ASKED TO RECALL BED: NO, COULD NOT RECALL
BIMS SUMMARY SCORE: 8

## 2023-10-26 ASSESSMENT — ACTIVITIES OF DAILY LIVING (ADL)
TOILETING_LEVEL_OF_ASSIST: REQUIRES SUPERVISION WITH TOILETING
SHOWER_TRANSFER_LEVEL_OF_ASSIST: REQUIRES SUPERVISION WITH SHOWER TRANSFER
TOILET_TRANSFER_LEVEL_OF_ASSIST: REQUIRES SUPERVISION WITH TOILET TRANSFER

## 2023-10-26 ASSESSMENT — PATIENT HEALTH QUESTIONNAIRE - PHQ9
SUM OF ALL RESPONSES TO PHQ9 QUESTIONS 1 AND 2: 0
2. FEELING DOWN, DEPRESSED, IRRITABLE, OR HOPELESS: NOT AT ALL
1. LITTLE INTEREST OR PLEASURE IN DOING THINGS: NOT AT ALL

## 2023-10-26 NOTE — PROGRESS NOTES
"  Physical Medicine & Rehabilitation Progress Note    Encounter Date: 10/26/2023    Chief Complaint: Decreased mobility, weakness    Interval Events (Subjective):  Patient sitting up in room with . It is unclear about discharge plan. Patient would like to go home but family would like her to go SNF. Discussed would follow-up with CM. Russel NVD.     _____________________________________  Interdisciplinary Team Conference   Most recent IDT on 10/24/2023    Discharge Date/Disposition:  10/25/23 -> TBD   _____________________________________     Objective:  VITAL SIGNS: /69   Pulse 82   Temp 36.4 °C (97.5 °F) (Oral)   Resp 17   Ht 1.646 m (5' 4.8\")   Wt 66 kg (145 lb 8.1 oz)   SpO2 95%   BMI 24.36 kg/m²   Gen: NAD  Psych: Mood and affect appropriate  CV: RRR, 0 edema  Resp: CTAB, no upper airway sounds  Abd: NTND  Neuro: AOx3, following commands  Unchanged from 10/25/23    Laboratory Values:  No results found for this or any previous visit (from the past 72 hour(s)).      Medications:  Scheduled Medications   Medication Dose Frequency    acetaminophen  1,000 mg TID    metoprolol SR  100 mg QAM    senna-docusate  2 Tablet BID    omeprazole  20 mg DAILY    apixaban  2.5 mg BID    benazepril  40 mg QAM    levothyroxine  75 mcg AM ES     PRN medications: hydrocortisone rectal, Respiratory Therapy Consult, hydrALAZINE, senna-docusate **AND** polyethylene glycol/lytes **AND** magnesium hydroxide **AND** bisacodyl, mag hydrox-al hydrox-simeth, ondansetron **OR** ondansetron, traZODone, sodium chloride, oxyCODONE immediate-release **OR** oxyCODONE immediate-release, [COMPLETED] acetaminophen **FOLLOWED BY** acetaminophen    Diet:  Current Diet Order   Procedures    Diet Order Diet: Regular       Medical Decision Making and Plan:  T7/T8 Fracture - Patient with GLF on 10/8/23 with suspected syncope found to be in A fib and with T7/T8 compression fracture. Evaluated by NSG and recommending TLSO for comfort " when OOB  -PT and OT for mobility and ADLs. Per guidelines, 15 hours per week between PT, OT and/or SLP.  -Follow-up NSG. Continue TLSO for comfort     HTN/A fib - Patient on Benazepril 40 mg and Metoprolol 50 mg. Metoprolol has been increased to 100 mg. SBP wnl but DBP elevated will monitor. SBP elevated but risk of low SBP, continue Benazepril 40 mg and Metoprolol 100 mg     Hyponatremia - Check AM CMP - 134, will continue monitor. Repeat labs 10/20 - 134, stable. WIll need follow-up PCP     Hypothyroidism - Patient on Levothyroxine 75 mcg      Leukopenia - 4.1 on admission, recheck 10/16 - 6.5, improved    Thrombocytopenia - Check AM CBC - 181, improved. Repeat 283     Pain - Patient on PRN Tylenol and Oxycodone. Schedule Tylenol. Continue Tylenol 1000 mg TID, and check CMP - LFTs normal. Continue Tylenol     Skin - Patient at risk for skin breakdown due to debility in areas including sacrum, achilles, elbows and head in addition to other sites. Nursing to assess skin daily.      GI Ppx - Patient on Prilosec for GERD prophylaxis. Patient on Senna-docusate for constipation prophylaxis.   -Severe constipation on 10/14, painful abdominal chest pain. EKG with A fib.  KUB with moderate constipation. Give Enema, suppository. Had BM, discontinue Suppository. Resolved     DVT Ppx - Patient Eliquis on transfer. Continue Eliquis    Dispo - Ongoing discussion with patient and family about level of care. Unclear discharge home vs SNF  ____________________________________    T. Ned Sutton MD/PhD  [facility] - Physical Medicine & Rehabilitation   [facility] - Brain Injury Medicine   ____________________________________

## 2023-10-26 NOTE — DISCHARGE SUMMARY
Physical Medicine & Rehabilitation Discharge Summary    Admission Date: 10/12/2023    Discharge Date: 10/26/2023    Attending Provider: Mikayla Sutton MD/PhD    Admission Diagnosis:   Active Hospital Problems    Diagnosis     *Other fracture of T7-t8 thoracic vertebra, initial encounter for closed fracture (HCC)     Syncope     Osteoporosis, idiopathic     Paroxysmal atrial fibrillation (HCC)     Hypertension        Discharge Diagnosis:  Active Hospital Problems    Diagnosis     *Other fracture of T7-t8 thoracic vertebra, initial encounter for closed fracture (HCC)     Syncope     Osteoporosis, idiopathic     Paroxysmal atrial fibrillation (HCC)     Hypertension        HPI per Admission History & Physical:  The patient is a 87 y.o.  female with a past medical history of  paroxysmal afib s/p watchman procedure August 2023 on Eliquis , HTN, HLD, GERD, Hypothyroidism, prior inpatient rehab stay in October 2022 after right tib-fib fracture, and history of known thoracic aortic aneurysm  ;  who presented on 10/8/2023  5:48 AM with dizziness and a GLF, suspected syncopal episode. Per documentation, patient had gotten up to go to the bathroom when she felt dizzy, and fell landing on the ground with  + LOC. Upon eval in the ED, CT Head was negative for acute intracranial hemorrhage or infract, CT C spine negative for fractures, CT T spine showed interval progression of a now severe T7 compression fracture and mild T8 compression deformity. Neurosurgery was consulted, with recommendations for non op management and use of TLSO for comfort. Patient's hospital course has been notable for thrombocytopenia with plts down to 145 . She has had HTN and is on tele due to the syncopal episode, which was suspected secondary to orthostatic hypertension.     Patient was admitted to [facility] on 10/12/2023.     Hospital Course by Problem List:  T7/T8 Fracture - Patient with GLF on 10/8/23 with suspected  syncope found to be in A fib and with T7/T8 compression fracture. Evaluated by NSG and recommending TLSO for comfort when OOB  -PT and OT for mobility and ADLs. Per guidelines, 15 hours per week between PT, OT and/or SLP.  -Follow-up NSG. Continue TLSO for comfort     HTN/A fib - Patient on Benazepril 40 mg and Metoprolol 50 mg. Metoprolol has been increased to 100 mg. SBP wnl but DBP elevated will monitor. SBP elevated but risk of low SBP, continue Benazepril 40 mg and Metoprolol 100 mg     Hyponatremia - Check AM CMP - 134, will continue monitor. Repeat labs 10/20 - 134, stable. WIll need follow-up PCP     Hypothyroidism - Patient on Levothyroxine 75 mcg      Leukopenia - 4.1 on admission, recheck 10/16 - 6.5, improved     Thrombocytopenia - Check AM CBC - 181, improved. Repeat 283     Pain - Patient on PRN Tylenol and Oxycodone. Schedule Tylenol. Continue Tylenol 1000 mg TID, and check CMP - LFTs normal. Continue Tylenol     Skin - Patient at risk for skin breakdown due to debility in areas including sacrum, achilles, elbows and head in addition to other sites. Nursing to assess skin daily.      GI Ppx - Patient on Prilosec for GERD prophylaxis. Patient on Senna-docusate for constipation prophylaxis.   -Severe constipation on 10/14, painful abdominal chest pain. EKG with A fib.  KUB with moderate constipation. Give Enema, suppository. Had BM, discontinue Suppository. Resolved      DVT Ppx - Patient Eliquis on transfer. Continue Eliquis     Dispo - Ongoing discussion with patient and family about level of care. Unclear discharge home vs SNF    Functional Status at Discharge  Eating:     Eating Description:     Grooming:  Standby Assist, Seated  Grooming Description:  Increased time, Initial preparation for task, Seated in wheelchair at sink, Set-up of equipment, Supervision for safety (hair brushing/styling, hand hygiene)  Bathing:  Moderate Assist  Bathing Description:  Adaptive equipment, Grab bar, Tub bench,  Increased time, Supervision for safety, Verbal cueing (pt just rinsed off with water, needing cues to use soap and washcloth. decline washing hair)  Upper Body Dressing:  Stand by Assist  Upper Body Dressing Description:  Increased time, Set-up of equipment, Supervision for safety  Lower Body Dressing:  Maximal Assist  Lower Body Dressing Description:  Assist with threading into pant leg (verbal cues to complete but declines to do so and requires assistance with all parts of tasks)  Discharge Location : Other (See Comments) (pending SNF)  Patient Discharging with Assist of: Caregiver;Family   Level of Supervision Required: 24 Hour Supervision  Recommended Equipment for Discharge: Front-Wheeled Walker  Recommended Services Upon Discharge: Home Health Occupational Therapy;Other (See Comments) (SNF vs home)  Long Term Goals Met: 1  Long Term Goals Not Met: 1  Reason(s) for Goals Not Met: poor participation  Criteria for Termination of Services: Maximum Function Achieved for Inpatient Rehabilitation  Walk:  Contact Guard Assist  Distance Walked:  125  Number of Times Distance Was Traveled:  2  Assistive Device:  Front Wheel Walker  Gait Deviation:  Bradykinetic, Antalgic, Decreased Base Of Support, Shuffled Gait, Decreased Heel Strike, Decreased Toe Off (forward flexed posture)  Wheelchair:  Moderate Assist  Distance Propelled:  150   Wheelchair Description:  Assistance with steering, Extra time, Leg rest management, Impaired coordination, Limited by fatigue, Requires incidental assist, Supervision for safety  Stairs Contact Guard Assist  Stairs Description Extra time, Hand rails, Limited by fatigue, Requires incidental assist, Safety concerns  Discharge Location: Home  Patient Discharging with Assist of: Family  Level of Supervision Required Upon Discharge: Twenty Four Hour Supervision  Recommended Equipment for Discharge: Front-Wheeled Walker  Recommeded Services Upon Discharge: Home Health Physical Therapy  Criteria  for Termination of Services: Maximum Function Achieved for Inpatient Rehabilitation  Comprehension:  Supervision  Comprehension Description:     Expression:  Supervision  Expression Description:     Social Interaction:     Social Interaction Description:     Problem Solving:  Minimal Assist  Problem Solving Description:     Memory:  Minimal Assist  Memory Description:          Mikayla REAGAN M.D., personally performed a complete drug regimen review and no potential clinically significant medication issues were identified.   Discharge Medication:     Medication List        START taking these medications        Instructions   acetaminophen 500 MG Tabs  Commonly known as: Tylenol   Take 2 Tablets by mouth in the morning, at noon, and at bedtime.  Dose: 1,000 mg            CHANGE how you take these medications        Instructions   metoprolol SR 50 MG Tb24  What changed: how much to take  Commonly known as: Toprol XL   Take 2 Tablets by mouth every morning.  Dose: 100 mg            CONTINUE taking these medications        Instructions   apixaban 2.5mg Tabs  Commonly known as: Eliquis   Take 1 Tablet by mouth 2 times a day.  Dose: 2.5 mg     benazepril 40 MG tablet  Commonly known as: Lotensin   TAKE 1 TABLET BY MOUTH EVERY DAY IN THE MORNING     levothyroxine 75 MCG Tabs  Commonly known as: Synthroid   TAKE 1 TABLET BY MOUTH EVERY DAY IN THE MORNING ON AN EMPTY STOMACH     oxyCODONE immediate-release 5 MG Tabs  Commonly known as: Roxicodone   Take 1 Tablet by mouth every 3 hours as needed for Severe Pain for up to 3 days.  Dose: 5 mg     therapeutic multivitamin-minerals Tabs   Take 1 Tablet by mouth every day.  Dose: 1 Tablet     VITAMIN D3 PO   Take 1 Tablet by mouth every day.  Dose: 1 Tablet              Discharge Diet:  Current Diet Order   Procedures    Diet Order Diet: Regular       Discharge Activity:  As tolerated     Disposition:  Patient to discharge to SNF for ongoing rehabilitation  services.    Equipment:  TBD    Follow-up & Discharge Instructions:  Follow up with your primary care provider (PCP) within 7-10 days of discharge to review your medications and take over your care.     If you develop chest pain, fever, chills, change in neurologic function (weakness, sensation changes, vision changes), or other concerning sxs, seek immediate medical attention or call 911.      Future Appointments   Date Time Provider Department Center   12/12/2023 11:15 AM Kobi Wheeler M.D. [facility] None   1/31/2024  2:30 [facility]       Condition on Discharge:  Good    More than 31 minutes was spent on discharging this patient, including face-to-face time, prescription management, and the dictation of this note.    Mikayla Sutton M.D.    Date of Service: 10/26/2023

## 2023-10-26 NOTE — PROGRESS NOTES
NURSING DAILY NOTE    Name: Yvonne Marie Wada   Date of Admission: 10/12/2023   Admitting Diagnosis: Other fracture of T7-t8 thoracic vertebra, initial encounter for closed fracture (HCC)  Attending Physician: Mikayla Sutton M.d.  Allergies: Codeine, Doxycycline, Sulfamethoxazole-trimethoprim, Tramadol, and Trazodone    Safety  Patient Assist  min asst  Patient Precautions  Fall Risk  Precaution Comments  TLSO PRN  Bed Transfer Status  Standby Assist  Toilet Transfer Status   Standby Assist  Assistive Devices  Rails, Wheelchair  Oxygen  None - Room Air  Diet/Therapeutic Dining  Current Diet Order   Procedures    Diet Order Diet: Regular     Pill Administration  whole  Agitated Behavioral Scale     ABS Level of Severity       Fall Risk  Has the patient had a fall this admission?   No  Muna Cuenca Fall Risk Scoring  14, MODERATE RISK  Fall Risk Safety Measures  bed alarm and chair alarm    Vitals  Temperature: 36.8 °C (98.3 °F)  Temp src: Oral  Pulse: 65  Respiration: 17  Blood Pressure : 125/78  Blood Pressure MAP (Calculated): 94 MM HG  BP Location: Right, Upper Arm  Patient BP Position: Supine     Oxygen  Pulse Oximetry: 95 %  O2 (LPM): 0  O2 Delivery Device: None - Room Air    Bowel and Bladder  Last Bowel Movement  10/24/23 (per pt)  Stool Type  Type 3: Like a sausage, but with cracks on its surface  Bowel Device  Bathroom  Continent  Bladder: Did not void   Bowel: No movement  Bladder Function  Urine Void (mL):  (large)  Number of Times Voided: 1  Urine Color: Yellow  Wet Diaper Count: 1  Genitourinary Assessment   Bladder Assessment (WDL):  WDL Except  Dale Catheter: Not Applicable  Urinary Elimination: Stress Incontinence  Urine Color: Yellow  Number of Bladder Accidents: 1  Total Number of Bladder of Accidents in Last 7 Days: 1  Bladder Device: Bathroom  Bladder Scan: Post Void  $ Bladder Scan Results (mL): 161    Skin  Jase Score    "18  Sensory Interventions   Bed Types: Standard/Trauma Mattress  Skin Preventative Measures: Pillows in Use for Support / Positioning  Moisture Interventions  Moisturizers/Barriers: Barrier Paste, Barrier Wipes      Pain  Pain Rating Scale  7 - Focus of attention, prevents doing daily activities  Pain Location  Back  Pain Location Orientation  Mid (declined ice, declined use of TLSO)  Pain Interventions   Declines    ADLs    Bathing   Patient Refused Bathing (Patient said: \"No; thank you, but I am tired & sleepy\".)  Linen Change   Partial  Personal Hygiene  Moist Tisha Wipes  Chlorhexidine Bath      Oral Care     Teeth/Dentures     Shave     Nutrition Percentage Eaten  Dinner, Less than 25% Consumed  Environmental Precautions  Bed in Low Position  Patient Turns/Positioning  Patient Turns Self from Side to Side  Patient Turns Assistance/Tolerance  Assistance of One  Bed Positions  Bed Controls On, Bed Locked  Head of Bed Elevated  Self regulated      Psychosocial/Neurologic Assessment  Psychosocial Assessment  Psychosocial (WDL):  Within Defined Limits  Neurologic Assessment  Neuro (WDL): Exceptions to WDL  Level of Consciousness: Alert  Orientation Level: Oriented X4  Cognition: Follows commands  Speech: Clear  Pupil Assesment: No  Motor Function/Sensation Assessment: Motor strength, Sensation  RUE Sensation: Full sensation  Muscle Strength Right Arm: Good Strength Against Gravity and Moderate Resistance  LUE Sensation: Full sensation  Muscle Strength Left Arm: Good Strength Against Gravity and Moderate Resistance  RLE Sensation: Numbness  Muscle Strength Right Leg: Fair Strength against Gravity but No Resistance  LLE Sensation: Numbness  Muscle Strength Left Leg: Fair Strength against Gravity but No Resistance  EENT (WDL):  WDL Except    Cardio/Pulmonary Assessment  Edema   RLE Edema: Generalized  LLE Edema: Generalized  Respiratory Breath Sounds  RUL Breath Sounds: Clear  RML Breath Sounds: Diminished, Clear  RLL " Breath Sounds: Diminished  LOU Breath Sounds: Clear  LLL Breath Sounds: Diminished  Cardiac Assessment   Cardiac (WDL):  WDL Except

## 2023-10-26 NOTE — CARE PLAN
"The patient is Stable - Low risk of patient condition declining or worsening    Shift Goals  Clinical Goals: pain control pt/ot  Patient Goals: rest comfort  Family Goals: Education    Patient is not progressing towards the following goals:    Problem: Fall Risk - Rehab  Goal: Patient will remain free from falls  Outcome: Not Met  Note: Muna Cuenca Fall risk Assessment Score: 14    Moderate fall risk Interventions  - Bed and strip alarm   - Yellow sign by the door   - Yellow wrist band \"Fall risk\"  - Room near to the nurse station  - Fall risk education provided     "

## 2023-10-26 NOTE — PROGRESS NOTES
Patient care assumed. Report received from [facility] PURVI Chaudhary. Patient is alert and calm, resting in bed. Call light and bedside table within reach. Will continue to monitor.

## 2023-10-26 NOTE — CARE PLAN
Problem: Knowledge Deficit - Standard  Goal: Patient and family/care givers will demonstrate understanding of plan of care, disease process/condition, diagnostic tests and medications  Outcome: Progressing   Pt education given regarding plan of care with emphasis on adequate hydration, pt shows good understanding, with some follow through, will continue to reinforce education and continue to monitor.         Problem: Fall Risk - Rehab  Goal: Patient will remain free from falls  Outcome: Progressing   Pt education given regarding fall precautions AND safety measures, pt shows good understanding, has not attempted to self transfer this shift, will continue to reinforce education and continue to monitor.

## 2023-10-26 NOTE — PROGRESS NOTES
Patient discharged to [facility] per order. Report given to Michele, signed copies placed in chart.  Patient has all belongings; signed copy of form in chart. Patient left with a script in packet for oxycodone.  Patient left facility at 1509 via w/c accompanied by rehab staff and transport from [facility].  Have enjoyed working with this pleasant patient.

## 2023-10-26 NOTE — PROGRESS NOTES
..                                                         NURSING DAILY NOTE    Name: Yvonne Marie Wada   Date of Admission: 10/12/2023   Admitting Diagnosis: Other fracture of T7-t8 thoracic vertebra, initial encounter for closed fracture (HCC)  Attending Physician: Mikayla Sutton M.d.  Allergies: Codeine, Doxycycline, Sulfamethoxazole-trimethoprim, Tramadol, and Trazodone    Safety  Patient Assist  min asst  Patient Precautions  Fall Risk  Precaution Comments  TLSO PRN  Bed Transfer Status  Standby Assist  Toilet Transfer Status   Standby Assist  Assistive Devices  Rails, Wheelchair  Oxygen  None - Room Air  Diet/Therapeutic Dining  Current Diet Order   Procedures    Diet Order Diet: Regular     Pill Administration  whole  Agitated Behavioral Scale     ABS Level of Severity       Fall Risk  Has the patient had a fall this admission?   No  Muna Cuenca Fall Risk Scoring  14, MODERATE RISK  Fall Risk Safety Measures  bed alarm, chair alarm, poor balance, low vision/ hearing, and ok to leave pt in bathroom    Vitals  Temperature: 36.4 °C (97.5 °F)  Temp src: Oral  Pulse: 71  Respiration: 18  Blood Pressure : 105/54  Blood Pressure MAP (Calculated): 71 MM HG  BP Location: Right, Upper Arm  Patient BP Position: Supine     Oxygen  Pulse Oximetry: 95 %  O2 (LPM): 0  O2 Delivery Device: None - Room Air    Bowel and Bladder  Last Bowel Movement  10/24/23 (per pt)  Stool Type  Type 3: Like a sausage, but with cracks on its surface  Bowel Device  Bathroom  Continent  Bladder: Did not void   Bowel: No movement  Bladder Function  Urine Void (mL):  (large)  Number of Times Voided: 1  Urine Color: Yellow  Wet Diaper Count: 1  Genitourinary Assessment   Bladder Assessment (WDL):  WDL Except  Dale Catheter: Not Applicable  Urinary Elimination: Stress Incontinence  Urine Color: Yellow  Number of Bladder Accidents: 1  Total Number of Bladder of Accidents in Last 7 Days: 1  Bladder Device: Bathroom  Bladder Scan: Post  "Void  $ Bladder Scan Results (mL): 161    Skin  Jase Score   18  Sensory Interventions   Bed Types: Standard/Trauma Mattress  Skin Preventative Measures: Pillows in Use for Support / Positioning  Moisture Interventions  Moisturizers/Barriers: Barrier Paste, Barrier Wipes      Pain  Pain Rating Scale  8 - Awful, hard to do anything  Pain Location  Back  Pain Location Orientation  Mid  Pain Interventions   Medication (see MAR)    ADLs    Bathing   Patient Refused Bathing (Patient said: \"No; thank you, but I am tired & sleepy\".)  Linen Change   Partial  Personal Hygiene  Perineal Care, Moist Tisha Wipes  Chlorhexidine Bath      Oral Care     Teeth/Dentures     Shave     Nutrition Percentage Eaten  Dinner, Less than 25% Consumed  Environmental Precautions  Bed in Low Position  Patient Turns/Positioning  Patient Turns Self from Side to Side  Patient Turns Assistance/Tolerance  Assistance of One  Bed Positions  Bed Controls On, Bed Locked  Head of Bed Elevated  Self regulated      Psychosocial/Neurologic Assessment  Psychosocial Assessment  Psychosocial (WDL):  Within Defined Limits  Neurologic Assessment  Neuro (WDL): Exceptions to WDL  Level of Consciousness: Alert  Orientation Level: Oriented X4  Cognition: Follows commands  Speech: Clear  Pupil Assesment: No  Motor Function/Sensation Assessment: Motor strength, Sensation  RUE Sensation: Full sensation  Muscle Strength Right Arm: Good Strength Against Gravity and Moderate Resistance  LUE Sensation: Full sensation  Muscle Strength Left Arm: Good Strength Against Gravity and Moderate Resistance  RLE Sensation: Numbness  Muscle Strength Right Leg: Good Strength Against Gravity and Moderate Resistance  LLE Sensation: Numbness  Muscle Strength Left Leg: Good Strength Against Gravity and Moderate Resistance  EENT (WDL):  WDL Except    Cardio/Pulmonary Assessment  Edema   RLE Edema: Generalized  LLE Edema: Generalized  Respiratory Breath Sounds  RUL Breath Sounds: Clear  RML " Breath Sounds: Diminished  RLL Breath Sounds: Diminished  LOU Breath Sounds: Clear  LLL Breath Sounds: Diminished  Cardiac Assessment   Cardiac (WDL):  Within Defined Limits

## 2023-10-27 NOTE — DISCHARGE PLANNING
[facility] accepted pt and DPA arranged [facility] transport at 1500. CM informed pt and SO at bedside, pt is agreeable, and dtr [facility] via TC. CM informed UC, hospitalist, leadership team, and bedside RN. COBRA placed on transfer packet.

## 2023-11-04 DIAGNOSIS — E03.9 HYPOTHYROIDISM, UNSPECIFIED TYPE: ICD-10-CM

## 2023-11-04 RX ORDER — LEVOTHYROXINE SODIUM 0.07 MG/1
TABLET ORAL
Qty: 90 TABLET | Refills: 0 | Status: SHIPPED | OUTPATIENT
Start: 2023-11-04

## 2023-11-07 ENCOUNTER — TELEPHONE (OUTPATIENT)
Dept: CARDIOLOGY | Facility: MEDICAL CENTER | Age: 88
End: 2023-11-07
Payer: MEDICARE

## 2023-11-07 RX ORDER — ASPIRIN 81 MG/1
81 TABLET ORAL DAILY
COMMUNITY

## 2023-11-07 NOTE — TELEPHONE ENCOUNTER
S/w patients  regarding Eliquis medicaiton.   Patient had watchmen procedure in August, never made f/u with MT.   Wanting to know if they need to continue Eliquis or if they can stop.     SS- Please advise. Would like you patient to have f/u in office to discuss stopping Eliquis?

## 2023-11-07 NOTE — TELEPHONE ENCOUNTER
SS    Caller: William Wada (Spouse)     Topic/issue: Ren called in regards to post watchman, he would like to know if or when is the pt able to be taken off apixaban (ELIQUIS) 2.5mg, Please advise.     Callback Number: [phone] (home)      Thank you,     Burton RAYMOND

## 2023-11-29 ENCOUNTER — PATIENT MESSAGE (OUTPATIENT)
Dept: HEALTH INFORMATION MANAGEMENT | Facility: OTHER | Age: 88
End: 2023-11-29

## 2025-01-24 NOTE — DISCHARGE PLANNING
LEFT VOICEMAIL TO CALL THE OFFICE BACK    [facility]    Referral from: Dr Atkinson  Insurance Provider on Facesheet: Medicare  Potential Rehab Diagnosis: Tib/fib fx    Chart review indicates patient may need on going medical management and may have therapy needs to possibly meet inpatient rehab facility criteria with the goal of returning to community.    D/C support: Spouse - will need to verify.     Physiatry consultation forwarded per protocol.     Tib/fib fx - physiatry to consult, TCC will follow.     Thank you for the referral.

## 2025-04-06 NOTE — H&P
Physical Medicine & Rehabilitation  History and Physical (H&P)  &     Post Admission Physician Evaluation (CONCHIS)       Date of Admission: 10/12/2023  Date of Service: 10/12/2023   Yvonne Marie Wada  RH24/02    [facility] Code to Support Admission: 0008.9 - Orthopaedic Disorders: Other Orthopaedic  Etiologic Diagnosis: Other fracture of T7-t8 thoracic vertebra, initial encounter for closed fracture (HCC)    _______________________________________________    Chief Complaint: Decreased mobility, back pain    History of Present Illness:  Adapted from the PM&R Consult by Dr. Benson:   The patient is a 87 y.o.  female with a past medical history of  paroxysmal afib s/p watchman procedure August 2023 on Eliquis , HTN, HLD, GERD, Hypothyroidism, prior inpatient rehab stay in October 2022 after right tib-fib fracture, and history of known thoracic aortic aneurysm  ;  who presented on 10/8/2023  5:48 AM with dizziness and a GLF, suspected syncopal episode. Per documentation, patient had gotten up to go to the bathroom when she felt dizzy, and fell landing on the ground with  + LOC. Upon eval in the ED, CT Head was negative for acute intracranial hemorrhage or infract, CT C spine negative for fractures, CT T spine showed interval progression of a now severe T7 compression fracture and mild T8 compression deformity. Neurosurgery was consulted, with recommendations for non op management and use of TLSO for comfort. Patient's hospital course has been notable for thrombocytopenia with plts down to 145 . She has had HTN and is on tele due to the syncopal episode, which was suspected secondary to orthostatic hypertension.     Patient tolerated transfer to [facility]. She reports she is doing well. She reports she did try the TLSO out of bed and it did help with the pain. Denies bowel or bladder changes. Denies changes in sensation from waist down.     Review of Systems:     Comprehensive 14 point ROS was reviewed and all were negative except as  Interval History: no new complaints    Review of Systems   Respiratory:  Positive for cough and shortness of breath.    Cardiovascular:  Negative for chest pain.   All other systems reviewed and are negative.    Objective:     Vital Signs (Most Recent):  Temp: 98.7 °F (37.1 °C) (04/06/25 0724)  Pulse: 87 (04/06/25 0734)  Resp: 18 (04/06/25 0734)  BP: (!) 155/89 (04/06/25 0725)  SpO2: (!) 94 % (04/06/25 0734) Vital Signs (24h Range):  Temp:  [98 °F (36.7 °C)-98.7 °F (37.1 °C)] 98.7 °F (37.1 °C)  Pulse:  [] 87  Resp:  [18-20] 18  SpO2:  [94 %-98 %] 94 %  BP: (155-176)/() 155/89     Weight: 59 kg (130 lb 1.1 oz)  Body mass index is 29.16 kg/m².    Intake/Output Summary (Last 24 hours) at 4/6/2025 1038  Last data filed at 4/6/2025 0619  Gross per 24 hour   Intake 300 ml   Output 1300 ml   Net -1000 ml         Physical Exam  Vitals and nursing note reviewed.   Constitutional:       General: She is not in acute distress.     Appearance: She is well-developed.   HENT:      Head: Normocephalic and atraumatic.      Right Ear: External ear normal.      Left Ear: External ear normal.   Cardiovascular:      Rate and Rhythm: Normal rate and regular rhythm.   Pulmonary:      Effort: Pulmonary effort is normal. No respiratory distress.   Skin:     General: Skin is warm and dry.   Neurological:      Mental Status: She is alert and oriented to person, place, and time.   Psychiatric:         Thought Content: Thought content normal.               Significant Labs: All pertinent labs within the past 24 hours have been reviewed.    Significant Imaging: I have reviewed all pertinent imaging results/findings within the past 24 hours.   "noted elsewhere in this document.     Past Medical History:  Past Medical History:   Diagnosis Date    AAA (abdominal aortic aneurysm) ([facility]) 7/26/2010    10/09 CT thorax dilated ascending thoracic aorta 4.9 cm 1/10 transesophageal echo dilated aortic root, and ascending aorta with mild aortic insufficiency 1/10  ascending aortic aneurysm repair 5/12 echo [facility] normal LV function EF 60%, moderate to severe left atrial enlargement, RVSP 32     Aortic insufficiency     Aortic valve disease     Aortic valve regurgitation     Arthritis     back and knee/ osteo    Cataract     Dental disorder     full top denture, partial bottom    Diverticulosis     Dyslipidemia 7/26/2010    Sees [facility] on lipitor 4/11 chol 159,trig 84,hdl 47,ldl 95,cpk 114 7/12 chol 163,trig 120,hdl 49,ldl 90, crp 1.1 on lipitor 20 mg     GERD (gastroesophageal reflux disease) 7/12/2012 6/07 EGD per [facility] hiatal hernia, start prevacid 30 mg 7/12 protonix 20 mg qday     Glaucoma     Heart burn     Heart murmur     Heart valve disease     \"leaking\", mitral valve    Hiatus hernia syndrome     High cholesterol     History of shingles 6/30/2011 2010 Back and left thorax      History of total knee arthroplasty 7/26/2010    4/10 MRI right knee complex tear medial meniscus, horizontal cleavage tear lateral meniscus, chondromalacia patella, moderate-sized Baker's cyst 5/10 right meniscus knee repair  5/10 told by  had gout on surgery had pathology report, I await that report Question gout seen on pathology report done during repair of right meniscus knee surgery. 7/10 uric acid 6.3, we'll await starting allopurinol depending on the operative report 8/5/10 reviewed ortho notes , op report indicates crystal deposition, could be gout or pseudogout. No bx result sent over. Will need to get. My suspicion is chondrocalcinosis. 10/11  ortho note, MRI pending 8/7/10 reviewed pathology report right knee tissue, " marked degenerative changes with focal calcification, no mention of gout. Based on this and a normal uric acid level, I do not believe she has gout, has no hyperuricemia medications would be indicated 10/11  ortho left knee meniscectomy and arthroscopy 1/12      HLD (hyperlipidemia)     Hypertension     Hypothyroid 4/8/2011 4/11 tsh 9 start synthroid 50 4/11 tsh 6,free t3 3.1,free t4 1.2,tpo negative 7/1/11 tsh 2.1 cont synthroid 50 mcg 7/12 tsh 3.4 cont synthroid 50 mcg     Indigestion     Mitral insufficiency     OSTEOPOROSIS 8/9/2010    Had been on fosamax [id number]  8/10 dexa LS -2.0,hip -1.5 await old dexa result, she will get copy for me 4/11 vit d 35 9/12 dexa LS -3.2,hip -1.4 2/13/13 relast infusion     Pain     back and right leg, can get as bad as 10/10    Preventative health care 7/26/2010    2002 pneum 2005 colon [facility] no records 4/11 vit d 35 9/11 shingles vaccine 9/12 mammogram 9/12 dexa LS -3.2,hip -1.4     Rheumatic fever     S/P appendectomy 7/26/2010    S/P TOMY (total abdominal hysterectomy) 7/26/2010    Sees gyn on HRT estradiol for 20 yrs () 11/08 mammo      Shingles 6/30/2011    Snoring     Status post lumbar surgery 7/26/2010 6/03 L4-5 epidural Dr. Jordan  7/06 overall for decompression, foraminotomy. L4-L5 posterior fusion, L4-L5 allograft       Stroke ([facility]) 1996    no residual problems     Thoracic aortic aneurysm without mention of rupture     Tricuspid insufficiency     Unspecified disorder of thyroid     hypo    Urinary bladder disorder     suprapubic catheter insertion 11/4    UTI (urinary tract infection) 11/12/2016 7/6/16 UTI klebsiella pneumoniae sensitive to all antibiotics except ampicillin, start bactrim x 5 days 1/18/19 UTI enterobacter cloacae resistant to ampicillin, cephalothin, penicillin, bactrim, sensitive to cefuroxime, ciprofloxacin and levaquin, nitrofurantoin 5/26/19 UTI klebsiella given omnicef, organism resistant  ampicillin, ciprofloxacin, levofloxacin, bactrim 8/18/21  urology note        Past Surgical History:  Past Surgical History:   Procedure Laterality Date    ORIF, ANKLE Right 10/14/2022    Procedure: RIGHT ANKLE OPEN REDUCTION INTERNAL FIXATION;  Surgeon: Ryan Huertas M.D.;  Location: SURGERY [facility];  Service: Orthopedics    TENDON REPAIR Right 11/10/2016    Procedure: TENDON REPAIR - QUADRACEPS ;  Surgeon: Maxim Herrera M.D.;  Location: SURGERY [facility];  Service:     KNEE ARTHROPLASTY TOTAL Right 9/8/2016    Procedure: KNEE ARTHROPLASTY TOTAL;  Surgeon: Maxim Herrera M.D.;  Location: SURGERY [facility];  Service:     FUSION, SPINE, LUMBAR, PLIF  11/24/2015    Procedure: LUMBAR FUSION POSTERIOR L3-4, EXPLORATION OF FUSION L4-5 WITH INSTRUMENTATION;  Surgeon: Hermann Reis M.D.;  Location: [facility];  Service:     LUMBAR LAMINECTOMY DISKECTOMY  11/24/2015    Procedure: LUMBAR L3-4 LAMINECTOMY AND REDO L4-5;  Surgeon: Hermann Reis M.D.;  Location: [facility];  Service:     KNEE ARTHROPLASTY TOTAL  1/3/2012    Performed by YOSEF MEJÍA at [facility]    KNEE ARTHROSCOPY  10/18/2011    Performed by YOSEF MEJÍA at [facility]    MENISCECTOMY, KNEE, MEDIAL  10/18/2011    Performed by YOSEF MEJÍA at [facility]    AORTIC VALVE REPLACEMENT  1/12/2010    Performed by ISAÍAS NICOLE at [facility]    APPENDECTOMY      FUSION, SPINE, LUMBAR, PLIF      HYSTERECTOMY, TOTAL ABDOMINAL         Family History:  Family History   Problem Relation Age of Onset    Stroke Mother     Heart Disease Father     Heart Disease Sister        Medications:  Current Facility-Administered Medications   Medication Dose    Respiratory Therapy Consult      Pharmacy Consult Request ...Pain Management Review 1 Each  1 Each    hydrALAZINE (Apresoline) tablet 25 mg  25 mg    acetaminophen (Tylenol) tablet 650 mg  650 mg     senna-docusate (Pericolace Or Senokot S) 8.6-50 MG per tablet 2 Tablet  2 Tablet    And    polyethylene glycol/lytes (Miralax) PACKET 1 Packet  1 Packet    And    magnesium hydroxide (Milk Of Magnesia) suspension 30 mL  30 mL    And    bisacodyl (Dulcolax) suppository 10 mg  10 mg    [START ON 10/13/2023] omeprazole (PriLOSEC) capsule 20 mg  20 mg    mag hydrox-al hydrox-simeth (Maalox Plus Es Or Mylanta Ds) suspension 20 mL  20 mL    ondansetron (Zofran ODT) dispertab 4 mg  4 mg    Or    ondansetron (Zofran) syringe/vial injection 4 mg  4 mg    traZODone (Desyrel) tablet 50 mg  50 mg    sodium chloride (Ocean) 0.65 % nasal spray 2 Spray  2 Spray    oxyCODONE immediate-release (Roxicodone) tablet 2.5 mg  2.5 mg    Or    oxyCODONE immediate-release (Roxicodone) tablet 5 mg  5 mg    acetaminophen (Tylenol) tablet 1,000 mg  1,000 mg    Followed by    [START ON 10/13/2023] acetaminophen (Tylenol) tablet 1,000 mg  1,000 mg    apixaban (Eliquis) tablet 2.5 mg  2.5 mg    [START ON 10/13/2023] benazepril (Lotensin) tablet 40 mg  40 mg    [START ON 10/13/2023] levothyroxine (Synthroid) tablet 75 mcg  75 mcg    [START ON 10/13/2023] metoprolol SR (Toprol XL) tablet 50 mg  50 mg       Allergies:  Codeine, Doxycycline, Sulfamethoxazole-trimethoprim, Tramadol, and Trazodone    Psychosocial History:  Housing / Facility: 1 Story House  Steps Into Home: 2  Steps In Home: 0  Lives with - Patient's Self Care Capacity: Spouse  Equipment Owned: 4-Wheel Walker     Prior Level of Function / Living Situation:   Physical Therapy: Prior Services: Unable To Determine At This Time  Housing / Facility: 1 Story House  Steps Into Home: 2  Steps In Home: 0  Rail: Right Rail (Steps into Home)  Bathroom Set up: Walk In Shower, Grab Bars, Shower Chair  Equipment Owned: 4-Wheel Walker  Lives with - Patient's Self Care Capacity: Spouse  Bed Mobility: Independent  Transfer Status: Independent  Ambulation: Independent  Assistive Devices Used: 4-Wheel  "Walker  Stairs: Independent  Current Level of Function:   Gait Level Of Assist: Unable to Participate  Supine to Sit: Moderate Assist  Sit to Supine: Maximal Assist (not following log roll cues.)  Scooting: Moderate Assist  Rolling: Moderate Assist to Rt., Moderate Assist to Lt.  Comments: ed done re log roll, pt unable to retain education.  Sit to Stand: Moderate Assist  Bed, Chair, Wheelchair Transfer: Unable to Participate  Toilet Transfers: Unable to Participate  Sitting Edge of Bed: 5 min max  Standing: few seconds only  Occupational Therapy:   Self Feeding: Independent  Grooming / Hygiene: Independent  Bathing: Independent  Dressing: Independent  Toileting: Independent  Medication Management: Independent  Laundry: Independent  Kitchen Mobility: Independent  Finances: Independent  Home Management: Independent  Shopping: Independent  Prior Level Of Mobility: Independent With Device in Community, Independent With Device in Home (4WW)  Prior Services: Unable To Determine At This Time  Housing / Facility: [address] [place] [address]  Current Level of Function:   Upper Body Dressing: Maximal Assist (TLSO)  Lower Body Dressing: Maximal Assist  Toileting: Maximal Assist (Bedpan, purewick)    CURRENT LEVEL OF FUNCTION:   Same as level of function prior to admission to [facility]    Physical Examination:     VITAL SIGNS:   height is 1.646 m (5' 4.8\") and weight is 66.5 kg (146 lb 9.7 oz). Her oral temperature is 36.7 °C (98 °F). Her blood pressure is 134/83 and her pulse is 98. Her respiration is 18 and oxygen saturation is 95%.   GENERAL: No apparent distress  HEENT: Normocephalic/atraumatic, EOMI, and PERRL  CARDIAC: Regular rate and rhythm, normal S1, S2   LUNGS: Clear to auscultation   ABDOMINAL: bowel sounds present, soft, and nontender    EXTREMITIES: no contractures, spasticity, or edema.  NEURO:  Mental status: answers questions appropriately follows commands  Speech: fluent, no aphasia or " dysarthria  Motor:    5/5  BUE  4/5 B HF and KE otherwise 4+/5 BLE  Sensory: intact to light touch through out    Radiology:  T Spine CT 10/8/23  IMPRESSION:     1.  Interval progression of a now severe T7 compression deformity, age-indeterminate.  2.  Progression of a mild T8 compression deformity, age indeterminate.      Laboratory Values:  Recent Labs     10/10/23  0135 10/11/23  0100   SODIUM 137 134*   POTASSIUM 4.2 4.1   CHLORIDE 105 102   CO2 22 20   GLUCOSE 98 86   BUN 20 16   CREATININE 0.96 0.78   CALCIUM 8.3* 8.6     Recent Labs     10/10/23  0135 10/11/23  0100   WBC 5.2 4.9   RBC 4.10* 4.54   HEMOGLOBIN 12.7 14.1   HEMATOCRIT 39.4 42.0   MCV 96.1 92.5   MCH 31.0 31.1   MCHC 32.2 33.6   RDW 46.7 43.8   PLATELETCT 145* 154*   MPV 9.1 9.1           Primary Rehabilitation Diagnosis:    This patient is a 87 y.o. female admitted for acute inpatient rehabilitation with Other fracture of T7-t8 thoracic vertebra, initial encounter for closed fracture (HCC).    Impairments:   ADLs/IADLs  Mobility    Secondary Diagnosis/Medical Co-morbidities Affecting Function  HTN/A fib  Hyponatremia  Hypothyroidism  Thrombocytopenia    Relevant Changes Since Preadmission Evaluation:    Status unchanged    The patient's rehabilitation potential is Good  The patient's medical prognosis is good    Rehabilitation Plan:   Discussion and Recommendations:   1. The patient requires an acute inpatient rehabilitation program with a coordinated program of care at an intensity and frequency not available at a lower level of care. This recommendation is substantiated by the patient's medical physicians who recommend that the patient's intervention and assessment of medical issues needs to be done at an acute level of care for patient's safety and maximum outcome.   2. A coordinated program of care will be supplied by an interdisciplinary team of physical therapy, occupational therapy, rehab physician, rehab nursing, and, if needed, speech  therapy and rehab psychology. Rehab team presents a patient-specific rehabilitation and education program concentrating on prevention of future problems related to accessibility, mobility, skin, bowel, bladder, sexuality, and psychosocial and medical/surgical problems.   3. Need for Rehabilitation Physician: The rehab physician will be evaluating the patient on a multi-weekly basis to help coordinate the program of care. The rehab physician communicates between medical physicians, therapists, and nurses to maximize the patient's potential outcome. Specific areas in which the rehab physician will be providing daily assessment include the following:   A. Assessing the patient's heart rate and blood pressure response (vitals monitoring) to activity and making adjustments in medications or conservative measures as needed.   B. The rehab physician will be assessing the frequency at which the program can be increased to allow the patient to reach optimal functional outcome.   C. The rehab physician will also provide assessments in daily skin care, especially in light of patient's impairments in mobility.   D. The rehab physician will provide special expertise in understanding how to work with functional impairment and recommend appropriate interventions, compensatory techniques, and education that will facilitate the patient's outcome.   4. Rehab R.N.   The rehab RN will be working with patient to carry over in room mobility and activities of daily living when the patient is not in 3 hours of skilled therapy. Rehab nursing will be working in conjunction with rehab physician to address all the medical issues above and continue to assess laboratory work and discuss abnormalities with the treating physicians, assess vitals, and response to activity, and discuss and report abnormalities with the rehab physician. Rehab RN will also continue daily skin care, supervise bladder/bowel program, instruct in medication  administration, and ensure patient safety.   5. Rehab Therapy: Therapies to treat at intensity and frequency of (may change after completion of evaluation by all therapeutic disciplines):       PT:  Physical therapy to address mobility, transfer, gait training and evaluation for adaptive equipment needs 1and 1/2 hour/day at least 5 days/week for the duration of the ELOS (see below)       OT:  Occupational therapy to address ADLs, self-care, home management training, functional mobility/transfers and assistive device evaluation, and community re-integration 1 and 1/2 hour/day at least 5 days/week for the duration of the ELOS (see below).     Medical management / Rehabilitation Issues/ Adverse Potential as part of rehabilitation plan     Rehabilitation Issues/Adverse Potential  1.  T7/T8 Fracture - Patient with GLF on 10/8/23 with suspected syncope found to be in A fib and with T7/T8 compression fracture. Evaluated by NSG and recommending TLSO for comfort when OOB. Patient demonstrates functional deficits in strength, balance, coordination, and ADL's. Patient is admitted to [facility] for comprehensive rehabilitation therapy as described below.   Rehabilitation nursing monitors bowel and bladder control, educates on medication administration, co-morbidities and monitors patient safety    2.  Neurostimulants: None at this time but continue to assess daily for need to initiate should status change.    3.  DVT prophylaxis:  Patient is on Eliquis for anticoagulation upon transfer. Encourage OOB. Monitor daily for signs and symptoms of DVT including but not limited to swelling and pain to prevent the development of DVT that may interfere with therapies.    4.  GI prophylaxis:  On prilosec to prevent gastritis/dyspepsia which may interfere with therapies.    5.  Pain: No issues with pain currently / Controlled with APAP/Oxycodone    6.  Nutrition/Dysphagia: Dietician monitors nutrient intake, recommend  supplements prn and provide nutrition education to pt/family to promote optimal nutrition for wound healing/recovery.     7.  Bladder/bowel:  Start bowel and bladder program as described below, to prevent constipation, urinary retention (which may lead to UTI), and urinary incontinence (which will impact upon pt's functional independence).   - TV Q3h while awake with post void bladder scans, I&O cath for PVRs >400  - up to commode after meal     8.  Skin/dermal ulcer prophylaxis: Monitor for new skin conditions with q.2 h. turns as required to prevent the development of skin breakdown.     9.  Cognition/Behavior: As needed psychologist provides adjustment counseling to illness and psychosocial barriers that may be potential barriers to rehabilitation.     10. Respiratory therapy: RT performs O2 management prn, breathing retraining, pulmonary hygiene and bronchospasm management prn to optimize participation in therapies.     Medical Co-Morbidities/Adverse Potential Affecting Function:  T7/T8 Fracture - Patient with GLF on 10/8/23 with suspected syncope found to be in A fib and with T7/T8 compression fracture. Evaluated by NSG and recommending TLSO for comfort when OOB  -PT and OT for mobility and ADLs. Per guidelines, 15 hours per week between PT, OT and/or SLP.  -Follow-up NSG. Continue TLSO for comfort    HTN/A fib - Patient on Benazepril 40 mg and Metoprolol 50 mg. Patient on Eliquis    Hyponatremia - Check AM CMP    Hypothyroidism - Patient on Levothyroxine 75 mcg     Thrombocytopenia - Check AM CBC    Pain - Patient on PRN Tylenol and Oxycodone    Skin - Patient at risk for skin breakdown due to debility in areas including sacrum, achilles, elbows and head in addition to other sites. Nursing to assess skin daily.     GI Ppx - Patient on Prilosec for GERD prophylaxis. Patient on Senna-docusate for constipation prophylaxis.   Last BM:  (prior to hospitalization)    DVT Ppx - Patient Eliquis on transfer.    I  personally performed a complete drug regimen review and no potential clinically significant medication issues were identified.     Goals/Expected Level of Function Based on Current Medical and Functional Status:  (may change based on patient's medical status and rate of impairment recovery)  Transfers:   Modified Independent  Mobility/Gait:   Modified Independent  ADL's:   Modified Independent    DISPOSITION: Discharge to pre-morbid independent living setting with the supportive care of patient's family.    ELOS: 7-14 days  ____________________________________    T. Ned Sutton MD/PhD  [facility]   [facility]   ____________________________________    Pt was seen today for 75 min, and entire time spent in face-to-face contact was >50% in counseling and coordination of care as detailed in A/P above.

## 2025-04-30 NOTE — ED PROVIDER NOTES
"ED Provider Note    CHIEF COMPLAINT  Chief Complaint   Patient presents with   • Chest Pain     SVT at 163 per minute   • Urinary Catheter Problem     \"I want this catheter out\". Hip surgery within the week       HPI  Yvonne Marie Wada is a 82 y.o. female who presents with rapid heart beat that began about 2 hours ago after the patient got upset concerning an argument with her . The patient is one week status post hip replacement, at that time she had a catheter placed after the surgery at [facility]. She would like to have the catheter removed. She denies any fever, she denies chest pain. The nurse's note says chest pain but the patient denies that she had any chest pain, she did feel rapid heart rate. She denies a history of SVT. She's had no fever or vomiting.    REVIEW OF SYSTEMS  See HPI for further details. All other systems are negative.     PAST MEDICAL HISTORY   has a past medical history of AAA (abdominal aortic aneurysm) (CMS-HCC) (7/26/2010); Aortic insufficiency; Aortic valve disease; Aortic valve regurgitation; Arthritis; Cataract; Dental disorder; Diverticulosis; Dyslipidemia (7/26/2010); GERD (gastroesophageal reflux disease) (7/12/2012); Glaucoma; Heart burn; Heart murmur; Heart valve disease; Hiatus hernia syndrome; High cholesterol; History of shingles (6/30/2011); History of total knee arthroplasty (7/26/2010); HLD (hyperlipidemia); Hypertension; Hypothyroid (4/8/2011); Indigestion; Mitral insufficiency; OSTEOPOROSIS (8/9/2010); Pain; Preventative health care (7/26/2010); Rheumatic fever; S/P appendectomy (7/26/2010); S/P TOMY (total abdominal hysterectomy) (7/26/2010); Shingles (6/30/2011); Snoring; Status post lumbar surgery (7/26/2010); Stroke (CMS-HCC) (1996); Thoracic aortic aneurysm without mention of rupture; Tricuspid insufficiency; and Unspecified disorder of thyroid.    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  Nurse (ALEE) and Vickie will need review this, having 200s-300s :/  "Smokeless tobacco: Never Used      Comment: none   • Alcohol use No   • Drug use: No   • Sexual activity: Not on file       SURGICAL HISTORY   has a past surgical history that includes lumbar fusion posterior; hysterectomy, total abdominal; appendectomy; aortic valve replacement (1/12/2010); knee arthroscopy (10/18/2011); medial meniscectomy (10/18/2011); knee arthroplasty total (1/3/2012); lumbar fusion posterior (11/24/2015); lumbar laminectomy diskectomy (11/24/2015); knee arthroplasty total (Right, 9/8/2016); and tendon repair (Right, 11/10/2016).    CURRENT MEDICATIONS  Home Medications     Reviewed by Mike Recio (Pharmacy Tech) on 02/20/18 at 1106  Med List Status: Complete   Medication Last Dose Status   amlodipine (NORVASC) 5 MG Tab 2/20/2018 Active   amoxicillin-clavulanate (AUGMENTIN) 875-125 MG Tab 2/1/2018 Active   atorvastatin (LIPITOR) 80 MG tablet 2/19/2018 Active   benazepril (LOTENSIN) 40 MG tablet 2/20/2018 Active   celecoxib (CELEBREX) 200 MG Cap 2/19/2018 Active   cephALEXin (KEFLEX) 500 MG Cap 2/20/2018 Active   enoxaparin (LOVENOX) 40 MG/0.4ML Solution inj 2/19/2018 Active   fluticasone (FLONASE) 50 MCG/ACT nasal spray 2/19/2018 Active   levothyroxine (SYNTHROID) 75 MCG Tab 2/19/2018 Active   magnesium gluconate (MAG-G) 500 MG tablet > 2 weeks Active   metoprolol SR (TOPROL XL) 25 MG TABLET SR 24 HR 2/20/2018 Active   Multiple Vitamins-Minerals (CENTRUM SILVER ULTRA WOMENS PO) > 2 weeks Active   omeprazole (PRILOSEC) 20 MG delayed-release capsule 2/19/2018 Active   vitamin D (CHOLECALCIFEROL) 1000 UNIT TABS > 2 weeks Active                  ALLERGIES  Allergies   Allergen Reactions   • Codeine Nausea     Synthetic codones ok   • Trazodone Vomiting     groggy   • Ultram [Tramadol] Vomiting     nausea       PHYSICAL EXAM  VITAL SIGNS: /68   Pulse 87   Temp 36.5 °C (97.7 °F)   Resp 18   Ht 1.626 m (5' 4\")   Wt 72.6 kg (160 lb)   SpO2 96%   BMI 27.46 kg/m²  @NAKIA[[id number]::@ "   Pulse ox interpretation: I interpret this pulse ox as normal.  Constitutional: Alert in no apparent distress.  HENT: No signs of trauma, Bilateral external ears normal, Nose normal.   Eyes: Pupils are equal and reactive, Conjunctiva normal, Non-icteric.   Neck: Normal range of motion, No tenderness, Supple, No stridor.   Lymphatic: No lymphadenopathy noted.   Cardiovascular: Tachycardic, no murmurs.  Thorax & Lungs: Normal breath sounds, No respiratory distress, No wheezing, No chest tenderness.   Abdomen: Bowel sounds normal, Soft, No tenderness, No masses, No pulsatile masses. No peritoneal signs.  Skin: Warm, Dry, No erythema, No rash.   Back: No bony tenderness, No CVA tenderness.   Extremities: Intact distal pulses, No edema, No tenderness, No cyanosis.  Musculoskeletal: Good range of motion in all major joints. No tenderness to palpation or major deformities noted.   Neurologic: Alert , Normal motor function, Normal sensory function, No focal deficits noted.   Psychiatric: Affect normal, Judgment normal, Mood normal.       DIAGNOSTIC STUDIES / PROCEDURES    EKG  1st EKG performed at 9:59 AM.  It is supraventricular tachycardia at a rate of 166. The axis is normal. The intervals are normal. There are nonspecific ST-T wave changes. My impression of this EKG, SVT, does not indicate STEMI criteria at this time.    Repeat EKG at 10:28 AM after the patient spontaneously converted:  This is a 12-lead EKG interpretation by myself. It is normal sinus rhythm at a rate of 92. The axis is normal. The intervals are normal. There is no ST elevation or depression. My impression of this EKG, it does not indicate ischemia nor arrhythmia at this time.    LABS  Labs Reviewed   BTYPE NATRIURETIC PEPTIDE - Abnormal; Notable for the following:        Result Value    B Natriuretic Peptide 181 (*)     All other components within normal limits    Narrative:     Indicate which anticoagulants the patient is on:->UNKNOWN   CBC WITH  DIFFERENTIAL - Abnormal; Notable for the following:     RBC 3.65 (*)     Hemoglobin 11.0 (*)     Hematocrit 33.2 (*)     MCHC 33.1 (*)     Immature Granulocytes 1.10 (*)     Monos (Absolute) 0.86 (*)     All other components within normal limits    Narrative:     Indicate which anticoagulants the patient is on:->UNKNOWN   COMP METABOLIC PANEL - Abnormal; Notable for the following:     Sodium 133 (*)     Glucose 131 (*)     Bun 37 (*)     All other components within normal limits    Narrative:     Indicate which anticoagulants the patient is on:->UNKNOWN   ESTIMATED GFR - Abnormal; Notable for the following:     GFR If  51 (*)     GFR If Non  42 (*)     All other components within normal limits    Narrative:     Indicate which anticoagulants the patient is on:->UNKNOWN   TROPONIN    Narrative:     Indicate which anticoagulants the patient is on:->UNKNOWN   PROTHROMBIN TIME    Narrative:     Indicate which anticoagulants the patient is on:->UNKNOWN   APTT    Narrative:     Indicate which anticoagulants the patient is on:->UNKNOWN   LIPASE    Narrative:     Indicate which anticoagulants the patient is on:->UNKNOWN         RADIOLOGY  DX-CHEST-LIMITED (1 VIEW)   Final Result      Stable large lung volumes and aortic ectasia with improving left atelectasis              COURSE & MEDICAL DECISION MAKING  Pertinent Labs & Imaging studies reviewed. (See chart for details)    Differential diagnosis: Electrolyte abnormality, SVT    The patient spontaneously converted to sinus rhythm. The patient's electrolytes are normal and there is no need for correction.    The patient was counseled on the pros and cons of removing her urinary catheter, she would like it removed, I will honor her wishes. She understands that she may need to return if she has urinary retention.    Her catheter is removed. She will follow-up with her orthopedic surgeon and primary care doctor. She will return for recurrence of  rapid heart rate.    The patient will return for new or worsening symptoms and is stable at the time of discharge.    The patient is referred to her primary physician for blood pressure management, diabetic screening, and for all other preventative health concerns. The patient has known hypertension that is being followed by her primary care doctor.        DISPOSITION:  Patient will be discharged home in stable condition.    FOLLOW UP:  [facility], Emergency Dept  [address]  [place] [address]  [phone]    If symptoms worsen    Tee Tran M.D.  [address]  [place] [address]  [phone]      As needed    Lon Loredo M.D.  [address]  [place] [address]  [phone]      follow up as scheduled      OUTPATIENT MEDICATIONS:  New Prescriptions    No medications on file           The patient will return for worsening symptoms and is stable at the time of discharge. The patient verbalizes understanding and will comply.    FINAL IMPRESSION  1. Supraventricular tachycardia  2. Dale catheter removal  3.         Electronically signed by: Jose Elias Anaya, 2/20/2018 10:31 AM

## (undated) DEVICE — PADDING CAST 4 IN STERILE - 4 X 4 YDS (24/CA)

## (undated) DEVICE — DRAPE C-ARM LARGE 41IN X 74 IN - (10/BX 2BX/CA)

## (undated) DEVICE — DRESSING PETROLEUM GAUZE 5 X 9" (50EA/BX 4BX/CA)"

## (undated) DEVICE — SUTURE 3-0 MONOCRYL PLUS PS-1 - 27 INCH (36/BX)

## (undated) DEVICE — PACK LOWER EXTREMITY - (2/CA)

## (undated) DEVICE — PAD LAP STERILE 18 X 18 - (5/PK 40PK/CA)

## (undated) DEVICE — BANDAGE ELASTIC STERILE VELCRO 6 X 5 YDS (25EA/CA)

## (undated) DEVICE — SODIUM CHL IRRIGATION 0.9% 1000ML (12EA/CA)

## (undated) DEVICE — PAD PREP 24 X 48 CUFFED - (100/CA)

## (undated) DEVICE — GLOVE BIOGEL INDICATOR SZ 7.5 SURGICAL PF LTX - (50PR/BX 4BX/CA)

## (undated) DEVICE — COTTON ROLL 1 POUND BIOSEAL - (5RL/CA)

## (undated) DEVICE — TOWEL STOP TIMEOUT SAFETY FLAG (40EA/CA)

## (undated) DEVICE — GLOVE, LITE (PAIR)

## (undated) DEVICE — ELECTRODE DUAL RETURN W/ CORD - (50/PK)

## (undated) DEVICE — LACTATED RINGERS INJ 1000 ML - (14EA/CA 60CA/PF)

## (undated) DEVICE — CHLORAPREP 26 ML APPLICATOR - ORANGE TINT(25/CA)

## (undated) DEVICE — DRAPE LARGE 3 QUARTER - (20/CA)

## (undated) DEVICE — PADDING CAST 6 IN STERILE - 6 X 4 YDS (24/CA)

## (undated) DEVICE — GLOVE BIOGEL SZ 7.5 SURGICAL PF LTX - (50PR/BX 4BX/CA)

## (undated) DEVICE — DRAPE 36X28IN RAD CARM BND BG - (25/CA) O

## (undated) DEVICE — Device

## (undated) DEVICE — SUTURE 3-0 ETHILON FS-1 - (36/BX) 30 INCH

## (undated) DEVICE — BLADE SURGICAL #15 - (50/BX 3BX/CA)

## (undated) DEVICE — WRAP COBAN SELF-ADHERENT 6 IN X  5YDS STERILE TAN (12/CA)

## (undated) DEVICE — BANDAGE ELASTIC LATEX STERILE VELCRO 4 X 5 YDS (25EA/CA)

## (undated) DEVICE — DRAPE C ARMOR (12EA/CA)